# Patient Record
Sex: MALE | Race: WHITE | NOT HISPANIC OR LATINO | Employment: FULL TIME | URBAN - METROPOLITAN AREA
[De-identification: names, ages, dates, MRNs, and addresses within clinical notes are randomized per-mention and may not be internally consistent; named-entity substitution may affect disease eponyms.]

---

## 2017-09-19 ENCOUNTER — HOSPITAL ENCOUNTER (OUTPATIENT)
Facility: MEDICAL CENTER | Age: 57
End: 2017-09-20
Attending: EMERGENCY MEDICINE | Admitting: ORTHOPAEDIC SURGERY

## 2017-09-19 DIAGNOSIS — S71.111A LACERATION OF RIGHT THIGH, INITIAL ENCOUNTER: ICD-10-CM

## 2017-09-19 PROBLEM — Z98.890 STATUS POST INCISION AND DRAINAGE: Status: ACTIVE | Noted: 2017-09-19

## 2017-09-19 LAB
ALBUMIN SERPL BCP-MCNC: 3.9 G/DL (ref 3.2–4.9)
ALBUMIN/GLOB SERPL: 1.8 G/DL
ALP SERPL-CCNC: 46 U/L (ref 30–99)
ALT SERPL-CCNC: 20 U/L (ref 2–50)
ANION GAP SERPL CALC-SCNC: 7 MMOL/L (ref 0–11.9)
APTT PPP: 24.1 SEC (ref 24.7–36)
AST SERPL-CCNC: 19 U/L (ref 12–45)
BASOPHILS # BLD AUTO: 0.3 % (ref 0–1.8)
BASOPHILS # BLD: 0.05 K/UL (ref 0–0.12)
BILIRUB SERPL-MCNC: 0.3 MG/DL (ref 0.1–1.5)
BUN SERPL-MCNC: 28 MG/DL (ref 8–22)
CALCIUM SERPL-MCNC: 8.6 MG/DL (ref 8.5–10.5)
CHLORIDE SERPL-SCNC: 110 MMOL/L (ref 96–112)
CO2 SERPL-SCNC: 21 MMOL/L (ref 20–33)
CREAT SERPL-MCNC: 0.89 MG/DL (ref 0.5–1.4)
EOSINOPHIL # BLD AUTO: 0.34 K/UL (ref 0–0.51)
EOSINOPHIL NFR BLD: 2.3 % (ref 0–6.9)
ERYTHROCYTE [DISTWIDTH] IN BLOOD BY AUTOMATED COUNT: 43.9 FL (ref 35.9–50)
GFR SERPL CREATININE-BSD FRML MDRD: >60 ML/MIN/1.73 M 2
GLOBULIN SER CALC-MCNC: 2.2 G/DL (ref 1.9–3.5)
GLUCOSE SERPL-MCNC: 100 MG/DL (ref 65–99)
HCT VFR BLD AUTO: 40.5 % (ref 42–52)
HGB BLD-MCNC: 14.1 G/DL (ref 14–18)
IMM GRANULOCYTES # BLD AUTO: 0.1 K/UL (ref 0–0.11)
IMM GRANULOCYTES NFR BLD AUTO: 0.7 % (ref 0–0.9)
INR PPP: 0.93 (ref 0.87–1.13)
LYMPHOCYTES # BLD AUTO: 1.83 K/UL (ref 1–4.8)
LYMPHOCYTES NFR BLD: 12.3 % (ref 22–41)
MCH RBC QN AUTO: 32.8 PG (ref 27–33)
MCHC RBC AUTO-ENTMCNC: 34.8 G/DL (ref 33.7–35.3)
MCV RBC AUTO: 94.2 FL (ref 81.4–97.8)
MONOCYTES # BLD AUTO: 0.95 K/UL (ref 0–0.85)
MONOCYTES NFR BLD AUTO: 6.4 % (ref 0–13.4)
NEUTROPHILS # BLD AUTO: 11.65 K/UL (ref 1.82–7.42)
NEUTROPHILS NFR BLD: 78 % (ref 44–72)
NRBC # BLD AUTO: 0 K/UL
NRBC BLD AUTO-RTO: 0 /100 WBC
PLATELET # BLD AUTO: 354 K/UL (ref 164–446)
PMV BLD AUTO: 9.6 FL (ref 9–12.9)
POTASSIUM SERPL-SCNC: 4.4 MMOL/L (ref 3.6–5.5)
PROT SERPL-MCNC: 6.1 G/DL (ref 6–8.2)
PROTHROMBIN TIME: 12.7 SEC (ref 12–14.6)
RBC # BLD AUTO: 4.3 M/UL (ref 4.7–6.1)
SODIUM SERPL-SCNC: 138 MMOL/L (ref 135–145)
WBC # BLD AUTO: 14.9 K/UL (ref 4.8–10.8)

## 2017-09-19 PROCEDURE — 85730 THROMBOPLASTIN TIME PARTIAL: CPT

## 2017-09-19 PROCEDURE — 501487 HCHG STRYKER TIP: Performed by: ORTHOPAEDIC SURGERY

## 2017-09-19 PROCEDURE — A6454 SELF-ADHER BAND W>=3" <5"/YD: HCPCS | Performed by: ORTHOPAEDIC SURGERY

## 2017-09-19 PROCEDURE — 99291 CRITICAL CARE FIRST HOUR: CPT

## 2017-09-19 PROCEDURE — 160048 HCHG OR STATISTICAL LEVEL 1-5: Performed by: ORTHOPAEDIC SURGERY

## 2017-09-19 PROCEDURE — 700111 HCHG RX REV CODE 636 W/ 250 OVERRIDE (IP): Performed by: EMERGENCY MEDICINE

## 2017-09-19 PROCEDURE — 160009 HCHG ANES TIME/MIN: Performed by: ORTHOPAEDIC SURGERY

## 2017-09-19 PROCEDURE — G0378 HOSPITAL OBSERVATION PER HR: HCPCS

## 2017-09-19 PROCEDURE — 500122 HCHG BOVIE, BLADE: Performed by: ORTHOPAEDIC SURGERY

## 2017-09-19 PROCEDURE — 700105 HCHG RX REV CODE 258: Performed by: EMERGENCY MEDICINE

## 2017-09-19 PROCEDURE — 160035 HCHG PACU - 1ST 60 MINS PHASE I: Performed by: ORTHOPAEDIC SURGERY

## 2017-09-19 PROCEDURE — 160027 HCHG SURGERY MINUTES - 1ST 30 MINS LEVEL 2: Performed by: ORTHOPAEDIC SURGERY

## 2017-09-19 PROCEDURE — 700111 HCHG RX REV CODE 636 W/ 250 OVERRIDE (IP): Performed by: ORTHOPAEDIC SURGERY

## 2017-09-19 PROCEDURE — 85025 COMPLETE CBC W/AUTO DIFF WBC: CPT

## 2017-09-19 PROCEDURE — 501486 HCHG STRYKER IRRIG SET HC W/TUBING: Performed by: ORTHOPAEDIC SURGERY

## 2017-09-19 PROCEDURE — 96376 TX/PRO/DX INJ SAME DRUG ADON: CPT

## 2017-09-19 PROCEDURE — A9270 NON-COVERED ITEM OR SERVICE: HCPCS

## 2017-09-19 PROCEDURE — 700111 HCHG RX REV CODE 636 W/ 250 OVERRIDE (IP)

## 2017-09-19 PROCEDURE — 96365 THER/PROPH/DIAG IV INF INIT: CPT

## 2017-09-19 PROCEDURE — 160036 HCHG PACU - EA ADDL 30 MINS PHASE I: Performed by: ORTHOPAEDIC SURGERY

## 2017-09-19 PROCEDURE — 160002 HCHG RECOVERY MINUTES (STAT): Performed by: ORTHOPAEDIC SURGERY

## 2017-09-19 PROCEDURE — 500881 HCHG PACK, EXTREMITY: Performed by: ORTHOPAEDIC SURGERY

## 2017-09-19 PROCEDURE — 160038 HCHG SURGERY MINUTES - EA ADDL 1 MIN LEVEL 2: Performed by: ORTHOPAEDIC SURGERY

## 2017-09-19 PROCEDURE — 501445 HCHG STAPLER, SKIN DISP: Performed by: ORTHOPAEDIC SURGERY

## 2017-09-19 PROCEDURE — 96375 TX/PRO/DX INJ NEW DRUG ADDON: CPT

## 2017-09-19 PROCEDURE — 500054 HCHG BANDAGE, ELASTIC 6: Performed by: ORTHOPAEDIC SURGERY

## 2017-09-19 PROCEDURE — 700102 HCHG RX REV CODE 250 W/ 637 OVERRIDE(OP)

## 2017-09-19 PROCEDURE — 501838 HCHG SUTURE GENERAL: Performed by: ORTHOPAEDIC SURGERY

## 2017-09-19 PROCEDURE — 700101 HCHG RX REV CODE 250

## 2017-09-19 PROCEDURE — 80053 COMPREHEN METABOLIC PANEL: CPT

## 2017-09-19 PROCEDURE — 85610 PROTHROMBIN TIME: CPT

## 2017-09-19 RX ORDER — DOCUSATE SODIUM 100 MG/1
100 CAPSULE, LIQUID FILLED ORAL 2 TIMES DAILY
Status: DISCONTINUED | OUTPATIENT
Start: 2017-09-19 | End: 2017-09-20 | Stop reason: HOSPADM

## 2017-09-19 RX ORDER — CEFAZOLIN SODIUM 2 G/100ML
2 INJECTION, SOLUTION INTRAVENOUS EVERY 8 HOURS
Status: DISCONTINUED | OUTPATIENT
Start: 2017-09-20 | End: 2017-09-20 | Stop reason: HOSPADM

## 2017-09-19 RX ORDER — SODIUM CHLORIDE 9 MG/ML
1000 INJECTION, SOLUTION INTRAVENOUS ONCE
Status: COMPLETED | OUTPATIENT
Start: 2017-09-19 | End: 2017-09-19

## 2017-09-19 RX ORDER — ONDANSETRON 2 MG/ML
INJECTION INTRAMUSCULAR; INTRAVENOUS
Status: COMPLETED
Start: 2017-09-19 | End: 2017-09-19

## 2017-09-19 RX ORDER — KETOROLAC TROMETHAMINE 30 MG/ML
INJECTION, SOLUTION INTRAMUSCULAR; INTRAVENOUS
Status: COMPLETED
Start: 2017-09-19 | End: 2017-09-19

## 2017-09-19 RX ORDER — OXYCODONE HYDROCHLORIDE 5 MG/1
5 TABLET ORAL
Status: DISCONTINUED | OUTPATIENT
Start: 2017-09-19 | End: 2017-09-20 | Stop reason: HOSPADM

## 2017-09-19 RX ORDER — CEFAZOLIN SODIUM 2 G/100ML
2 INJECTION, SOLUTION INTRAVENOUS ONCE
Status: COMPLETED | OUTPATIENT
Start: 2017-09-19 | End: 2017-09-19

## 2017-09-19 RX ORDER — ONDANSETRON 2 MG/ML
4 INJECTION INTRAMUSCULAR; INTRAVENOUS EVERY 4 HOURS PRN
Status: DISCONTINUED | OUTPATIENT
Start: 2017-09-19 | End: 2017-09-20 | Stop reason: HOSPADM

## 2017-09-19 RX ORDER — ACETAMINOPHEN 500 MG
1000 TABLET ORAL EVERY 6 HOURS
Status: DISCONTINUED | OUTPATIENT
Start: 2017-09-20 | End: 2017-09-20 | Stop reason: HOSPADM

## 2017-09-19 RX ORDER — BISACODYL 10 MG
10 SUPPOSITORY, RECTAL RECTAL
Status: DISCONTINUED | OUTPATIENT
Start: 2017-09-19 | End: 2017-09-20 | Stop reason: HOSPADM

## 2017-09-19 RX ORDER — AMOXICILLIN AND CLAVULANATE POTASSIUM 875; 125 MG/1; MG/1
1 TABLET, FILM COATED ORAL 2 TIMES DAILY
Qty: 14 TAB | Refills: 0 | Status: SHIPPED | OUTPATIENT
Start: 2017-09-19 | End: 2017-09-26

## 2017-09-19 RX ORDER — OXYCODONE HCL 5 MG/5 ML
SOLUTION, ORAL ORAL
Status: COMPLETED
Start: 2017-09-19 | End: 2017-09-19

## 2017-09-19 RX ORDER — ONDANSETRON 2 MG/ML
4 INJECTION INTRAMUSCULAR; INTRAVENOUS ONCE
Status: COMPLETED | OUTPATIENT
Start: 2017-09-19 | End: 2017-09-19

## 2017-09-19 RX ORDER — HALOPERIDOL 5 MG/ML
INJECTION INTRAMUSCULAR
Status: COMPLETED
Start: 2017-09-19 | End: 2017-09-19

## 2017-09-19 RX ORDER — HYDROMORPHONE HYDROCHLORIDE 2 MG/ML
INJECTION, SOLUTION INTRAMUSCULAR; INTRAVENOUS; SUBCUTANEOUS
Status: DISPENSED
Start: 2017-09-19 | End: 2017-09-20

## 2017-09-19 RX ORDER — MIDAZOLAM HYDROCHLORIDE 1 MG/ML
INJECTION INTRAMUSCULAR; INTRAVENOUS
Status: DISPENSED
Start: 2017-09-19 | End: 2017-09-20

## 2017-09-19 RX ORDER — DIPHENHYDRAMINE HYDROCHLORIDE 50 MG/ML
INJECTION INTRAMUSCULAR; INTRAVENOUS
Status: DISPENSED
Start: 2017-09-19 | End: 2017-09-20

## 2017-09-19 RX ORDER — KETOROLAC TROMETHAMINE 30 MG/ML
30 INJECTION, SOLUTION INTRAMUSCULAR; INTRAVENOUS EVERY 6 HOURS
Status: DISCONTINUED | OUTPATIENT
Start: 2017-09-20 | End: 2017-09-20 | Stop reason: HOSPADM

## 2017-09-19 RX ORDER — HYDROCODONE BITARTRATE AND ACETAMINOPHEN 10; 325 MG/1; MG/1
.5-1 TABLET ORAL EVERY 4 HOURS PRN
Qty: 40 TAB | Refills: 0 | Status: SHIPPED | OUTPATIENT
Start: 2017-09-19 | End: 2021-01-20

## 2017-09-19 RX ORDER — OXYCODONE HYDROCHLORIDE 10 MG/1
10 TABLET ORAL
Status: DISCONTINUED | OUTPATIENT
Start: 2017-09-19 | End: 2017-09-20 | Stop reason: HOSPADM

## 2017-09-19 RX ADMIN — HYDROMORPHONE HYDROCHLORIDE 1 MG: 1 INJECTION, SOLUTION INTRAMUSCULAR; INTRAVENOUS; SUBCUTANEOUS at 14:15

## 2017-09-19 RX ADMIN — ONDANSETRON 4 MG: 2 INJECTION INTRAMUSCULAR; INTRAVENOUS at 21:34

## 2017-09-19 RX ADMIN — HYDROMORPHONE HYDROCHLORIDE 0.2 MG: 1 INJECTION, SOLUTION INTRAMUSCULAR; INTRAVENOUS; SUBCUTANEOUS at 19:50

## 2017-09-19 RX ADMIN — HYDROMORPHONE HYDROCHLORIDE 0.2 MG: 1 INJECTION, SOLUTION INTRAMUSCULAR; INTRAVENOUS; SUBCUTANEOUS at 20:15

## 2017-09-19 RX ADMIN — EPHEDRINE SULFATE 10 MG: 50 INJECTION INTRAMUSCULAR; INTRAVENOUS; SUBCUTANEOUS at 18:00

## 2017-09-19 RX ADMIN — SODIUM CHLORIDE 1000 ML: 9 INJECTION, SOLUTION INTRAVENOUS at 13:25

## 2017-09-19 RX ADMIN — EPHEDRINE SULFATE 10 MG: 50 INJECTION INTRAMUSCULAR; INTRAVENOUS; SUBCUTANEOUS at 18:30

## 2017-09-19 RX ADMIN — HYDROMORPHONE HYDROCHLORIDE 0.2 MG: 1 INJECTION, SOLUTION INTRAMUSCULAR; INTRAVENOUS; SUBCUTANEOUS at 20:20

## 2017-09-19 RX ADMIN — HYDROMORPHONE HYDROCHLORIDE 0.2 MG: 1 INJECTION, SOLUTION INTRAMUSCULAR; INTRAVENOUS; SUBCUTANEOUS at 20:00

## 2017-09-19 RX ADMIN — HYDROMORPHONE HYDROCHLORIDE 0.2 MG: 1 INJECTION, SOLUTION INTRAMUSCULAR; INTRAVENOUS; SUBCUTANEOUS at 20:05

## 2017-09-19 RX ADMIN — HYDROMORPHONE HYDROCHLORIDE 1 MG: 1 INJECTION, SOLUTION INTRAMUSCULAR; INTRAVENOUS; SUBCUTANEOUS at 13:25

## 2017-09-19 RX ADMIN — HYDROMORPHONE HYDROCHLORIDE 1 MG: 1 INJECTION, SOLUTION INTRAMUSCULAR; INTRAVENOUS; SUBCUTANEOUS at 21:34

## 2017-09-19 RX ADMIN — HALOPERIDOL LACTATE 1 MG: 5 INJECTION, SOLUTION INTRAMUSCULAR at 20:25

## 2017-09-19 RX ADMIN — KETOROLAC TROMETHAMINE 30 MG: 30 INJECTION, SOLUTION INTRAMUSCULAR at 18:50

## 2017-09-19 RX ADMIN — CEFAZOLIN SODIUM 2 G: 2 INJECTION, SOLUTION INTRAVENOUS at 13:45

## 2017-09-19 RX ADMIN — EPHEDRINE SULFATE 10 MG: 50 INJECTION INTRAMUSCULAR; INTRAVENOUS; SUBCUTANEOUS at 17:30

## 2017-09-19 RX ADMIN — OXYCODONE HYDROCHLORIDE 10 MG: 5 SOLUTION ORAL at 18:35

## 2017-09-19 RX ADMIN — ONDANSETRON 4 MG: 2 INJECTION INTRAMUSCULAR; INTRAVENOUS at 13:25

## 2017-09-19 RX ADMIN — ONDANSETRON 4 MG: 2 INJECTION INTRAMUSCULAR; INTRAVENOUS at 18:30

## 2017-09-19 ASSESSMENT — PAIN SCALES - GENERAL
PAINLEVEL_OUTOF10: 6
PAINLEVEL_OUTOF10: 5
PAINLEVEL_OUTOF10: 0
PAINLEVEL_OUTOF10: 5
PAINLEVEL_OUTOF10: 8
PAINLEVEL_OUTOF10: 5
PAINLEVEL_OUTOF10: 5

## 2017-09-19 ASSESSMENT — LIFESTYLE VARIABLES
CONSUMPTION TOTAL: POSITIVE
TOTAL SCORE: 0
HOW MANY TIMES IN THE PAST YEAR HAVE YOU HAD 5 OR MORE DRINKS IN A DAY: 10
EVER HAD A DRINK FIRST THING IN THE MORNING TO STEADY YOUR NERVES TO GET RID OF A HANGOVER: NO
EVER FELT BAD OR GUILTY ABOUT YOUR DRINKING: NO
ALCOHOL_USE: YES
HAVE YOU EVER FELT YOU SHOULD CUT DOWN ON YOUR DRINKING: NO
EVER_SMOKED: YES
TOTAL SCORE: 0
DO YOU DRINK ALCOHOL: NO
TOTAL SCORE: 0
HAVE PEOPLE ANNOYED YOU BY CRITICIZING YOUR DRINKING: NO
AVERAGE NUMBER OF DAYS PER WEEK YOU HAVE A DRINK CONTAINING ALCOHOL: 1
ON A TYPICAL DAY WHEN YOU DRINK ALCOHOL HOW MANY DRINKS DO YOU HAVE: 1

## 2017-09-19 NOTE — ED NOTES
"Chief Complaint   Patient presents with   • Leg Laceration     Approx 8in x 3 in     /83   Pulse 74   Temp 36.7 °C (98.1 °F)   Resp 18   Ht 1.753 m (5' 9\")   Wt 72.6 kg (160 lb)   BMI 23.63 kg/m²   Patient brought in by capo. 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 Wound that goes down to the muscle layer. Full CMS intact in right leg. Bleeding is controlled. Patient given 2oomcg of fentanyl and 1 mg versed en route. Last dose of fenatynl at approx 1235.  "

## 2017-09-19 NOTE — ED PROVIDER NOTES
ED Provider Note    CHIEF COMPLAINT  Chief Complaint   Patient presents with   • T-5000 Lacerations     Full thickness gaping wound       HPI  Noe Hickey is a 56 y.o. male who presents For evaluation of a leg laceration.  The patient was working today when he has skill saw strike his right thigh resulting in a very large laceration over the right lateral thigh.  The patient denies any numbness tingling distally.  No other injuries or complaints.  No recent illness.    REVIEW OF SYSTEMS  See HPI for further details.  The patient denies: Hypertension, diabetes, thyroid dysfunction, cardiopulmonary disorders.  He is not on any anticoagulants.  His last tetanus was one year ago.  All other systems negative.    PAST MEDICAL HISTORY  Past Medical History:   Diagnosis Date   • Cyclic vomiting syndrome    • Fracture    • Headache, classical migraine    Depression    FAMILY HISTORY  History reviewed. No pertinent family history.    SOCIAL HISTORY  Positive tobacco use; denies alcohol or drug abuse;    SURGICAL HISTORY  Past Surgical History:   Procedure Laterality Date   • SHOULDER HEMICAP RESURFACING Right 10/12/2015    Procedure: RIGHT SHOULDER RESURFACING;  Surgeon: Javon Doll M.D.;  Location: SURGERY [facility];  Service:    • SHOULDER ARTHROSCOPY         CURRENT MEDICATIONS  Home Medications     Reviewed by Elias oRss R.N. (Registered Nurse) on 09/19/17 at 1250  Med List Status: <None>   Medication Last Dose Status   docusate sodium 100 MG Cap 10/20/2015 Active   hydrocodone-acetaminophen (NORCO) 7.5-325 MG per tablet  Active   hydrocodone/acetaminophen (NORCO)  MG Tab 10/20/2015 Active   hydrOXYzine (ATARAX) 50 MG Tab 10/20/2015 Active   ibuprofen (MOTRIN) 800 MG Tab  Active   omeprazole (PRILOSEC) 20 MG delayed-release capsule about 1 month ago Active   ondansetron (ZOFRAN ODT) 8 MG TABLET DISPERSIBLE  Active   ondansetron (ZOFRAN ODT) 8 MG TABLET DISPERSIBLE  Active   ondansetron  "(ZOFRAN ODT) 8 MG TABLET DISPERSIBLE  Active   ondansetron (ZOFRAN) 8 MG Tab  Active   oxycodone immediate release (ROXICODONE) 10 MG immediate release tablet about 2 weeks ago Active   oxycodone immediate release (ROXICODONE) 10 MG immediate release tablet  Active                ALLERGIES  No Known Allergies    PHYSICAL EXAM  VITAL SIGNS: /83   Pulse 74   Temp 36.7 °C (98.1 °F)   Resp 18   Ht 1.753 m (5' 9\")   Wt 72.6 kg (160 lb)   BMI 23.63 kg/m²    Constitutional: A 56-year-old male, anxious, awake, oriented ×3  HENT: Normocephalic, Atraumatic, Nares:Clear, Oropharynx: moist, well hydrated, posterior pharynx:clear   Eyes: PERRL, EOMI, Conjunctiva normal, No discharge.   Neck: Normal range of motion, No tenderness, Supple, No stridor.   Lymphatic: No lymphadenopathy noted.   Cardiovascular: Regular rate and rhythm without mumurs, gallups, rubs   Thorax & Lungs: Normal Equal breath sounds, No respiratory distress, No wheezing, no stridor, no rales. No chest tenderness.   Abdomen: Soft, nontender, nondistended, no organomegaly, positive bowel sounds normal in quality. No guarding or rebound.  Skin: Good skin turgor, pink, warm, dry. No rashes, petechiae, purpura. Normal capillary refill.   Extremities: Intact distal pulses, No edema, No tenderness, No cyanosis,  Vascular: Pulses are 2+, symmetric in the upper and lower extremities.  Musculoskeletal: Right lower extremity: The patient has a large 18 cm laceration over the right anterolateral thigh extending through the subcutaneous tissue and muscle fascia into the quadriceps musculature; motor, sensory, vascular distally  Neurologic: Alert & oriented x 3,  No gross focal deficits noted.   Psychiatric: Affect normal, Judgment normal, Mood normal.     COURSE & MEDICAL DECISION MAKING  Pertinent Labs & Imaging studies reviewed. (See chart for details)  1.  Monitor next line 2.  IV normal saline  3.  Zofran, titrated  4.  Dilaudid, titrated  5.  Ancef 2 g " IV    Laboratory studies:    Discussion/consultation: At this time, the patient presents for evaluation of laceration to his right lateral thigh.  Laceration is complex and large.  It would be best served by undergoing irrigation and surgical closure in the operating room.  Therefore, spoke with the orthopedic surgeon, Dr. Rivera.  The patient will be taken the operating room for further treatment and care.    FINAL IMPRESSION  1. Laceration of right thigh, initial encounter           PLAN  1.  Patient taken the operating room for further treatment care.    Electronically signed by: Guy G Gansert, 9/19/2017 1:06 PM

## 2017-09-19 NOTE — H&P
CHIEF COMPLAINT:  Right thigh laceration.    HISTORY OF PRESENT ILLNESS:  This is a 56-year-old male.  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  He presented to [facility] Emergency Department and he was   evaluated by Dr. Gansert, Dr. Gansert consulted me for further evaluation   given the depth and complexity of the open wound.  Patient denies any numbness   or paresthesias distally in the extremities and states this is an isolated   injury.  His tetanus is up to date and he did receive Ancef in the emergency   department.    PAST MEDICAL HISTORY:  ALLERGIES:  No known drug allergies.    MEDICATIONS AS OUTPATIENT:  Historical medications include oxycodone and   omeprazole.    PAST MEDICAL DIAGNOSES:  History of migraines, history of documentation of   cyclic vomiting syndrome.    PAST SURGICAL HISTORY:  Right shoulder replacement by Dr. Doll in 2015 and   multiple right shoulder surgeries leading up to that.    FAMILY HISTORY:  Negative for significant medical problems that he is aware   of.    SOCIAL HISTORY:  Patient smokes a pack of cigarettes a day.  Denies alcohol   use.  Denies illicit drug use, but states he has had some issues with opioid   dependence from pain medication in the past.    REVIEW OF SYSTEMS:  He denies fevers, chills, nausea, vomiting, shortness of   breath, chest pain, otherwise normal per AMA criteria other than that already   stated in the HPI.    PHYSICAL EXAMINATION:  VITAL SIGNS:  Temperature 98.1, heart rate 74, respiratory rate 18, blood   pressure 110/83, pulse oximetry is not recorded.  GENERAL APPEARANCE:  Patient is alert.  He is oriented.  He is in mild amount   of distress due to discomfort in the right thigh.  HEAD, EYES, EARS, NOSE, AND THROAT:  Normocephalic and atraumatic.  Mucous   membranes are moist.  PULMONARY:  Symmetric, unlabored breathing.  CARDIOVASCULAR:  Extremities are well perfused.   No obvious elevated JVP is   noted.  ABDOMEN:  Thin, nondistended.  MUSCULOSKELETAL:  Right lower extremity has some gauze in place to the lateral   thigh, this was elevated.  He has large laceration with exposed quadriceps   muscle down to fascia.  Hemostasis present with this compressive dressing.  He   is able to dorsi and plantarflex his foot and flex and extend his toes.  He   has sensation diffusely intact to light touch in the foot with palpable   dorsalis pedis pulse.    ASSESSMENT:  A 56-year-old male with laceration with a skilsaw of the right   thigh with complex depth involving the muscle of the quadriceps through   fascia.    PLAN:  1.  I discussed these findings with the patient.  I feel that given the size   and complexity and depth of the wound that he would benefit from surgical   debridement and closure in the operating room.  We discussed possibility of   requiring wound VAC therapy and potential multiple debridements, others found   to have gross contamination.  He expressed understanding and wished to proceed   with surgery when possible.  2.  Patient should be n.p.o. and I will make preparations to get into the   operating room later today for debridement and closure of his wound.       ____________________________________     MD CAROL Lubin / ALISSA    DD:  09/19/2017 13:40:22  DT:  09/19/2017 13:56:37    D#:  [id number]  Job#:  [id number]

## 2017-09-20 VITALS
SYSTOLIC BLOOD PRESSURE: 109 MMHG | TEMPERATURE: 98.2 F | HEIGHT: 69 IN | RESPIRATION RATE: 20 BRPM | BODY MASS INDEX: 23.7 KG/M2 | DIASTOLIC BLOOD PRESSURE: 53 MMHG | HEART RATE: 86 BPM | OXYGEN SATURATION: 96 % | WEIGHT: 160 LBS

## 2017-09-20 PROCEDURE — 700105 HCHG RX REV CODE 258

## 2017-09-20 PROCEDURE — 96376 TX/PRO/DX INJ SAME DRUG ADON: CPT

## 2017-09-20 PROCEDURE — G0378 HOSPITAL OBSERVATION PER HR: HCPCS

## 2017-09-20 PROCEDURE — 700111 HCHG RX REV CODE 636 W/ 250 OVERRIDE (IP): Performed by: ORTHOPAEDIC SURGERY

## 2017-09-20 PROCEDURE — G8980 MOBILITY D/C STATUS: HCPCS | Mod: CI

## 2017-09-20 PROCEDURE — G8978 MOBILITY CURRENT STATUS: HCPCS | Mod: CI

## 2017-09-20 PROCEDURE — A9270 NON-COVERED ITEM OR SERVICE: HCPCS | Performed by: ORTHOPAEDIC SURGERY

## 2017-09-20 PROCEDURE — 700102 HCHG RX REV CODE 250 W/ 637 OVERRIDE(OP): Performed by: ORTHOPAEDIC SURGERY

## 2017-09-20 PROCEDURE — 97161 PT EVAL LOW COMPLEX 20 MIN: CPT

## 2017-09-20 PROCEDURE — G8979 MOBILITY GOAL STATUS: HCPCS | Mod: CI

## 2017-09-20 RX ORDER — SODIUM CHLORIDE 9 MG/ML
INJECTION, SOLUTION INTRAVENOUS
Status: COMPLETED
Start: 2017-09-20 | End: 2017-09-20

## 2017-09-20 RX ADMIN — HYDROMORPHONE HYDROCHLORIDE 0.5 MG: 1 INJECTION, SOLUTION INTRAMUSCULAR; INTRAVENOUS; SUBCUTANEOUS at 06:13

## 2017-09-20 RX ADMIN — HYDROMORPHONE HYDROCHLORIDE 0.5 MG: 1 INJECTION, SOLUTION INTRAMUSCULAR; INTRAVENOUS; SUBCUTANEOUS at 00:33

## 2017-09-20 RX ADMIN — ENOXAPARIN SODIUM 40 MG: 100 INJECTION SUBCUTANEOUS at 08:26

## 2017-09-20 RX ADMIN — SODIUM CHLORIDE 500 ML: 9 INJECTION, SOLUTION INTRAVENOUS at 00:48

## 2017-09-20 RX ADMIN — ONDANSETRON 4 MG: 2 INJECTION INTRAMUSCULAR; INTRAVENOUS at 01:21

## 2017-09-20 RX ADMIN — OXYCODONE HYDROCHLORIDE 10 MG: 10 TABLET ORAL at 11:42

## 2017-09-20 RX ADMIN — OXYCODONE HYDROCHLORIDE 10 MG: 10 TABLET ORAL at 08:26

## 2017-09-20 RX ADMIN — CEFAZOLIN SODIUM 2 G: 2 INJECTION, SOLUTION INTRAVENOUS at 00:44

## 2017-09-20 RX ADMIN — ACETAMINOPHEN 1000 MG: 500 TABLET ORAL at 00:32

## 2017-09-20 ASSESSMENT — COGNITIVE AND FUNCTIONAL STATUS - GENERAL
MOBILITY SCORE: 24
SUGGESTED CMS G CODE MODIFIER MOBILITY: CH

## 2017-09-20 ASSESSMENT — PAIN SCALES - GENERAL
PAINLEVEL_OUTOF10: 7
PAINLEVEL_OUTOF10: 0
PAINLEVEL_OUTOF10: 6
PAINLEVEL_OUTOF10: 3

## 2017-09-20 ASSESSMENT — PATIENT HEALTH QUESTIONNAIRE - PHQ9
SUM OF ALL RESPONSES TO PHQ QUESTIONS 1-9: 0
1. LITTLE INTEREST OR PLEASURE IN DOING THINGS: NOT AT ALL
SUM OF ALL RESPONSES TO PHQ9 QUESTIONS 1 AND 2: 0
2. FEELING DOWN, DEPRESSED, IRRITABLE, OR HOPELESS: NOT AT ALL

## 2017-09-20 ASSESSMENT — GAIT ASSESSMENTS
GAIT LEVEL OF ASSIST: SUPERVISED
DISTANCE (FEET): 100

## 2017-09-20 NOTE — OR SURGEON
Operative Report    PreOp Diagnosis: Right thigh complex traumatic laceration    PostOp Diagnosis: same    Procedure(s):  IRRIGATION & DEBRIDEMENT ORTHO-THIGH - Wound Class: Contaminated    Surgeon(s):  Corbin Rivera M.D.    Anesthesiologist/Type of Anesthesia:  Anesthesiologist: Arnie Taylor M.D./General    Surgical Staff:  Circulator: Elias Mancera R.N.  Scrub Person: Belinda Jimenez    Specimens: none    Estimated Blood Loss: minimal    Findings: see dictation    Complications: none known    PLAN:  --discharge to home from PACU  --WBAT RLE  --Rx for augmentin x 7 days  --fu 10-14 days for wound check and suture removal          9/19/2017 5:21 PM Corbin Rivera

## 2017-09-20 NOTE — THERAPY
"Physical Therapy Evaluation completed.   Bed Mobility:  Supine to Sit:  (up standing in hallway.)  Transfers: Sit to Stand: Supervised  Gait: Level Of Assist: Supervised with No Equipment Needed       Plan of Care: Patient with no further skilled PT needs in the acute care setting at this time  Discharge Recommendations: Equipment: No Equipment Needed. Post-acute therapy Currently anticipate no further skilled therapy needs once patient is discharged from the inpatient setting.    See \"Rehab Therapy-Acute\" Patient Summary Report for complete documentation.     "

## 2017-09-20 NOTE — CARE PLAN
Problem: Safety  Goal: Will remain free from injury  Outcome: PROGRESSING AS EXPECTED  Safety education provided. Call light in reach, low light on, non slip socks.

## 2017-09-20 NOTE — OR NURSING
Pt AA/Ox4. VSS. Dressing to right thigh, CDI. CMS+. Pt reports 5/10 pain scale. Pain medication given. Pt reports slight nausea and vomiting. Anti-emetic given. Pt denies numbness or tingling. No nausea or vomiting. SCD to LLE in place. Report given to ALLEN Alcantar.     Pt via [employer], accompanied by transport, was transferred to [facility] at 2101. All personal belongings sent with Pt.

## 2017-09-20 NOTE — DISCHARGE INSTRUCTIONS
Discharge Instructions    Discharged to home by car with friend. Discharged via wheelchair, hospital escort: Refused.  Special equipment needed: Crutches    Be sure to schedule a follow-up appointment with your primary care doctor or any specialists as instructed.     Discharge Plan:   Diet Plan: Discussed  Activity Level: Discussed  Smoking Cessation Offered: Patient Refused  Confirmed Follow up Appointment: Patient to Call and Schedule Appointment  Confirmed Symptoms Management: Discussed  Medication Reconciliation Updated: Yes  Influenza Vaccine Indication: Patient Refuses    I understand that a diet low in cholesterol, fat, and sodium is recommended for good health. Unless I have been given specific instructions below for another diet, I accept this instruction as my diet prescription.   Other diet: general    Special Instructions: Discharge instructions for the Orthopedic Patient    Follow up with Primary Care Physician within 2 weeks of discharge to home, regarding:  Review of medications and diagnostic testing.  Surveillance for medical complications.  Workup and treatment of osteoporosis, if appropriate.     -Is this a Joint Replacement patient? No    -Is this patient being discharged with medication to prevent blood clots?  No    · Is patient discharged on Warfarin / Coumadin?   No     · Is patient Post Blood Transfusion?  No    Depression / Suicide Risk    As you are discharged from this [facility] facility, it is important to learn how to keep safe from harming yourself.    Recognize the warning signs:  · Abrupt changes in personality, positive or negative- including increase in energy   · Giving away possessions  · Change in eating patterns- significant weight changes-  positive or negative  · Change in sleeping patterns- unable to sleep or sleeping all the time   · Unwillingness or inability to communicate  · Depression  · Unusual sadness, discouragement and loneliness  · Talk of wanting to  die  · Neglect of personal appearance   · Rebelliousness- reckless behavior  · Withdrawal from people/activities they love  · Confusion- inability to concentrate     If you or a loved one observes any of these behaviors or has concerns about self-harm, here's what you can do:  · Talk about it- your feelings and reasons for harming yourself  · Remove any means that you might use to hurt yourself (examples: pills, rope, extension cords, firearm)  · Get professional help from the community (Mental Health, Substance Abuse, psychological counseling)  · Do not be alone:Call your Safe Contact- someone whom you trust who will be there for you.  · Call your local CRISIS HOTLINE 689-3078 or 939-094-3907  · Call your local Children's Mobile Crisis Response Team [place] (671) 831-2198 or www.rapt.fm  · Call the toll free National Suicide Prevention Hotlines   · National Suicide Prevention Lifeline 174-335-IBXV (0960)  · [facility] Line Network 800-SUICIDE (718-5964)        ACTIVITY: Rest and take it easy for the first 24 hours.  A responsible adult is recommended to remain with you during that time.  It is normal to feel sleepy.  We encourage you to not do anything that requires balance, judgment or coordination.    MILD FLU-LIKE SYMPTOMS ARE NORMAL. YOU MAY EXPERIENCE GENERALIZED MUSCLE ACHES, THROAT IRRITATION, HEADACHE AND/OR SOME NAUSEA.    FOR 24 HOURS DO NOT:  Drive, operate machinery or run household appliances.  Drink beer or alcoholic beverages.   Make important decisions or sign legal documents.    SPECIAL INSTRUCTIONS:   --Weight Bearing As Tolerated - Right Lower Extremity   --Okay to change dressing after 72hours.   --Keep incision clean, dry and covered.   --Rx for augmentin and norco.   --Follow-up: 10-14 days postop.    DIET: To avoid nausea, slowly advance diet as tolerated, avoiding spicy or greasy foods for the first day.  Add more substantial food to your diet according to your physician's  instructions.  Babies can be fed formula or breast milk as soon as they are hungry.  INCREASE FLUIDS AND FIBER TO AVOID CONSTIPATION.    SURGICAL DRESSING/BATHING: Keep dressing clean, dry and intact. Okay to change dressing after 72 hours. No tub bathe, submerging or swimming until advised by doctor.    FOLLOW-UP APPOINTMENT:  A follow-up appointment should be arranged with your doctor in 10-14 days; call to schedule.    You should CALL YOUR PHYSICIAN if you develop:  Fever greater than 101 degrees F.  Pain not relieved by medication, or persistent nausea or vomiting.  Excessive bleeding (blood soaking through dressing) or unexpected drainage from the wound.  Extreme redness or swelling around the incision site, drainage of pus or foul smelling drainage.  Inability to urinate or empty your bladder within 8 hours.  Problems with breathing or chest pain.    You should call 911 if you develop problems with breathing or chest pain.  If you are unable to contact your doctor or surgical center, you should go to the nearest emergency room or urgent care center.  Physician's telephone #: Corbin Nicole >> [phone].    If any questions arise, call your doctor.  If your doctor is not available, please feel free to call the Surgical Center at [phone].  The Center is open Monday through Friday from 7AM to 7PM.  You can also call the [facility] HOTLINE open 24 hours/day, 7 days/week and speak to a nurse at [phone], or toll free at [phone].    A registered nurse may call you a few days after your surgery to see how you are doing after your procedure.    MEDICATIONS: Resume taking daily medication.  Take prescribed pain medication with food.  If no medication is prescribed, you may take non-aspirin pain medication if needed.  PAIN MEDICATION CAN BE VERY CONSTIPATING.  Take a stool softener or laxative such as senokot, pericolace, or milk of magnesia if needed.    Prescription given for Augmentin and Norco.   Last pain medication given at          .    If your physician has prescribed pain medication that includes Acetaminophen (Tylenol), do not take additional Acetaminophen (Tylenol) while taking the prescribed medication.    Depression / Suicide Risk    As you are discharged from this [facility] facility, it is important to learn how to keep safe from harming yourself.    Recognize the warning signs:  · Abrupt changes in personality, positive or negative- including increase in energy   · Giving away possessions  · Change in eating patterns- significant weight changes-  positive or negative  · Change in sleeping patterns- unable to sleep or sleeping all the time   · Unwillingness or inability to communicate  · Depression  · Unusual sadness, discouragement and loneliness  · Talk of wanting to die  · Neglect of personal appearance   · Rebelliousness- reckless behavior  · Withdrawal from people/activities they love  · Confusion- inability to concentrate     If you or a loved one observes any of these behaviors or has concerns about self-harm, here's what you can do:  · Talk about it- your feelings and reasons for harming yourself  · Remove any means that you might use to hurt yourself (examples: pills, rope, extension cords, firearm)  · Get professional help from the community (Mental Health, Substance Abuse, psychological counseling)  · Do not be alone:Call your Safe Contact- someone whom you trust who will be there for you.  · Call your local CRISIS HOTLINE 153-3950 or 913-867-2311  · Call your local Children's Mobile Crisis Response Team [place] (347) 183-1048 or www.Gydget  · Call the toll free National Suicide Prevention Hotlines   · National Suicide Prevention Lifeline 942-458-JQYC (5362)  · National Hope Line Network 800-SUICIDE (048-5259)

## 2017-09-20 NOTE — PROGRESS NOTES
RN reviewed discharge instructions and medications with patient at bedside. All questions addressed. Patient discharged at 1150 with all belongings, discharge instructions, and prescriptions x2. Patient transported via wheelchair to private vehicle. Family member wheeled patient down to lobby. Declined staff assistance.

## 2017-09-20 NOTE — OP REPORT
DATE OF SERVICE:  09/19/2017    PREOPERATIVE DIAGNOSIS:  Right traumatic complex thigh laceration.    POSTOPERATIVE DIAGNOSIS:  Right traumatic complex thigh laceration.    PROCEDURES PERFORMED:  Exploration of penetrating wound to right thigh with   excisional debridement and layered closure through wound measuring 15x6x8 cm   deep, including skin, subcutaneous tissue and muscle.    INDICATION FOR PROCEDURE:  The patient is a 56-year-old male.  He was using a   saw earlier today and it essentially lacerated the lateral aspect of his mid   thigh.  He was seen in the emergency department and his wound was evaluated.    I felt he would benefit from going to the operating room for exploration of   his wound, debridement and closure.  He signed informed consent preoperatively   and wished to proceed as outlined above.    DESCRIPTION OF PROCEDURE:  The patient was met in the preoperative holding   area and his surgical site was signed.  His consent was confirmed for   accuracy.  He was taken back to the operating room and general anesthesia was   induced.  Ancef was administered.  The right lower extremity was then prepped   and draped in the usual sterile fashion.  A formal timeout was performed to   confirm patient's correct name, correct surgical site, correct procedure and   correct laterality.  He had an elliptically shaped 15x6 cm laceration that   extended down nearly all the way to the femur with just a portion of vastus   lateralis that it had been lacerated through, but it did extend through nearly   80% of the vastus lateralis muscle and through the iliotibial band.  There   was no gross contamination present, but some of the skin edges were macerated.    Excisional debridement of the wound was performed after thorough exploration   revealed there was no obvious deep bleeding  vessels or gross   contamination.  I excised a portion of the macerated skin edges with a 10   blade scalpel and then  repaired after thoroughly lavaging the wound with 3   liters normal saline using a Pulsavac.  The remaining muscle of vastus   lateralis seemed to be pink, viable and contractile with Bovie cautery   stimulation.  I repaired the iliotibial band with interrupted 0 PDS suture,   the subQ layers with interrupted 2-0 PDS suture and skin edges with staples.    The wound was thoroughly cleansed and dried.  Sterile compressive dressing was   applied.  He was then awoken from anesthesia and transferred on the [place]   and taken to postanesthesia care unit in stable condition.    PLAN:  1.  The patient will be discharged home from the PACU when he recovers from   anesthesia.  2.  He can be weightbearing as tolerated to the right lower extremity.  3.  I will discharge him home on some prophylactic Augmentin for 7 days postop   due to the depth and complexity of the penetrating wound as prophylaxis.  4.  He should follow up in 10-14 days for routine wound check and staple   removal.       ____________________________________     MD CAROL Lubin / ALISSA    DD:  09/19/2017 17:30:36  DT:  09/19/2017 17:46:41    D#:  [id number]  Job#:  [id number]

## 2020-12-22 ENCOUNTER — HOSPITAL ENCOUNTER (OUTPATIENT)
Facility: MEDICAL CENTER | Age: 60
End: 2020-12-22
Attending: ORTHOPAEDIC SURGERY | Admitting: ORTHOPAEDIC SURGERY
Payer: OTHER GOVERNMENT

## 2021-01-20 ENCOUNTER — APPOINTMENT (OUTPATIENT)
Dept: RADIOLOGY | Facility: MEDICAL CENTER | Age: 61
End: 2021-01-20
Attending: EMERGENCY MEDICINE
Payer: MEDICAID

## 2021-01-20 ENCOUNTER — HOSPITAL ENCOUNTER (EMERGENCY)
Facility: MEDICAL CENTER | Age: 61
End: 2021-01-20
Attending: EMERGENCY MEDICINE
Payer: MEDICAID

## 2021-01-20 VITALS
OXYGEN SATURATION: 98 % | RESPIRATION RATE: 17 BRPM | BODY MASS INDEX: 25.92 KG/M2 | WEIGHT: 175 LBS | SYSTOLIC BLOOD PRESSURE: 138 MMHG | TEMPERATURE: 98.6 F | DIASTOLIC BLOOD PRESSURE: 89 MMHG | HEIGHT: 69 IN | HEART RATE: 87 BPM

## 2021-01-20 DIAGNOSIS — S80.02XA CONTUSION OF LEFT KNEE, INITIAL ENCOUNTER: ICD-10-CM

## 2021-01-20 PROCEDURE — 99283 EMERGENCY DEPT VISIT LOW MDM: CPT

## 2021-01-20 PROCEDURE — A9270 NON-COVERED ITEM OR SERVICE: HCPCS | Performed by: EMERGENCY MEDICINE

## 2021-01-20 PROCEDURE — 73564 X-RAY EXAM KNEE 4 OR MORE: CPT | Mod: LT

## 2021-01-20 PROCEDURE — 700102 HCHG RX REV CODE 250 W/ 637 OVERRIDE(OP): Performed by: EMERGENCY MEDICINE

## 2021-01-20 RX ORDER — HYDROCODONE BITARTRATE AND ACETAMINOPHEN 5; 325 MG/1; MG/1
1 TABLET ORAL ONCE
Status: COMPLETED | OUTPATIENT
Start: 2021-01-20 | End: 2021-01-20

## 2021-01-20 RX ADMIN — HYDROCODONE BITARTRATE AND ACETAMINOPHEN 1 TABLET: 5; 325 TABLET ORAL at 06:06

## 2021-01-20 NOTE — ED NOTES
Patient verbalizes understanding of follow up, pain management and when to return to the ED.  All questions answered
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
## 2021-01-28 ENCOUNTER — HOSPITAL ENCOUNTER (OUTPATIENT)
Facility: MEDICAL CENTER | Age: 61
End: 2021-01-28
Attending: ANESTHESIOLOGY
Payer: MEDICAID

## 2021-01-28 LAB
SARS-COV+SARS-COV-2 AG RESP QL IA.RAPID: NOTDETECTED
SPECIMEN SOURCE: NORMAL

## 2021-01-28 PROCEDURE — 87426 SARSCOV CORONAVIRUS AG IA: CPT

## 2021-02-02 ENCOUNTER — HOSPITAL ENCOUNTER (EMERGENCY)
Dept: HOSPITAL 8 - ED | Age: 61
Discharge: HOME | End: 2021-02-02
Payer: COMMERCIAL

## 2021-02-02 VITALS — SYSTOLIC BLOOD PRESSURE: 130 MMHG | DIASTOLIC BLOOD PRESSURE: 65 MMHG

## 2021-02-02 VITALS — HEIGHT: 69 IN | WEIGHT: 180.12 LBS | BODY MASS INDEX: 26.68 KG/M2

## 2021-02-02 DIAGNOSIS — F17.210: ICD-10-CM

## 2021-02-02 DIAGNOSIS — Z88.6: ICD-10-CM

## 2021-02-02 DIAGNOSIS — M96.89: Primary | ICD-10-CM

## 2021-02-02 PROCEDURE — 96375 TX/PRO/DX INJ NEW DRUG ADDON: CPT

## 2021-02-02 PROCEDURE — 99406 BEHAV CHNG SMOKING 3-10 MIN: CPT

## 2021-02-02 PROCEDURE — 96374 THER/PROPH/DIAG INJ IV PUSH: CPT

## 2021-02-02 PROCEDURE — 99284 EMERGENCY DEPT VISIT MOD MDM: CPT

## 2021-02-02 PROCEDURE — 96376 TX/PRO/DX INJ SAME DRUG ADON: CPT

## 2021-02-02 RX ADMIN — HYDROMORPHONE HYDROCHLORIDE PRN MG: 2 INJECTION INTRAMUSCULAR; INTRAVENOUS; SUBCUTANEOUS at 03:54

## 2021-02-02 RX ADMIN — HYDROMORPHONE HYDROCHLORIDE PRN MG: 2 INJECTION INTRAMUSCULAR; INTRAVENOUS; SUBCUTANEOUS at 04:53

## 2021-02-02 NOTE — NUR
patient notified of plan of care to administer percocet and dc home with follow 
up with [facility] today when they open this morning to discuss further pain 
management and be re-evaluated by ortho. patient repoted concerns for his pain 
management at home and requested to speak with Dr. Amaro again regarding pain 
management.patient was requesting to go pain medications  Dr. Amaro notified by 
RN and to bedside to discuss with patient. RN, MD and patient all agreed upon 
one more dose of dilaudid to be adminsitered and patient be observed for 30 
minutes after this administration and then percocet will be given after 30 
minutes has passed and will dc home with followup with [facility]. all in agreement, 
2nd dose of dilaudid administered and L hand incisions dressed per Dr. Amaro's 
verbal orders of adaptic with kerlix and coban. sites cleansed with sterile NS. 
 + palpable pulse to L radial. VS remain stable

## 2021-02-02 NOTE — NUR
prior to dilaudid being administered patient reported he used to be addicted to 
prescription drugs. I asked patient if he felt comfortable receiving dilaudid 
at this time and patient stated "yes i do, it was more than 3 years ago. i was 
stupid back then". RN inserted IV and administered IV pain medications. call 
bell in reach. safety maintained. will continue to monitor.

## 2021-02-02 NOTE — NUR
patient hyperventilating due to pain in L hand. RN encouraged patient to slow 
down breathing with deep breaths and pursed lip breathing. this was performed 
by patient and breathing decreased. L hand swollen with bandages in place but 
place where cyst removed LAM and dried blood present. call camacho in reach. Dr. Amaro to bedside and removed dressing

## 2021-02-02 NOTE — NUR
discharge instructions reviewed with patient. no further questions at this 
time. percocet administered. patient states he will walk home as he lives 5 
blocks away. he declined a cab voucher. all personal belongings with patient on 
dc. IV removed per dc protocol. steady gait to lobby. VS stable

## 2021-03-31 ENCOUNTER — APPOINTMENT (OUTPATIENT)
Dept: RADIOLOGY | Facility: MEDICAL CENTER | Age: 61
End: 2021-03-31
Attending: EMERGENCY MEDICINE
Payer: MEDICAID

## 2021-03-31 ENCOUNTER — OFFICE VISIT (OUTPATIENT)
Dept: URGENT CARE | Facility: CLINIC | Age: 61
End: 2021-03-31
Payer: MEDICAID

## 2021-03-31 ENCOUNTER — HOSPITAL ENCOUNTER (EMERGENCY)
Facility: MEDICAL CENTER | Age: 61
End: 2021-03-31
Attending: EMERGENCY MEDICINE
Payer: MEDICAID

## 2021-03-31 VITALS
OXYGEN SATURATION: 96 % | SYSTOLIC BLOOD PRESSURE: 138 MMHG | BODY MASS INDEX: 28.88 KG/M2 | TEMPERATURE: 97.6 F | DIASTOLIC BLOOD PRESSURE: 73 MMHG | HEART RATE: 64 BPM | HEIGHT: 69 IN | RESPIRATION RATE: 16 BRPM | WEIGHT: 195 LBS

## 2021-03-31 VITALS
HEIGHT: 69 IN | SYSTOLIC BLOOD PRESSURE: 130 MMHG | WEIGHT: 196 LBS | OXYGEN SATURATION: 95 % | HEART RATE: 73 BPM | BODY MASS INDEX: 29.03 KG/M2 | TEMPERATURE: 98.4 F | DIASTOLIC BLOOD PRESSURE: 80 MMHG

## 2021-03-31 DIAGNOSIS — U07.1 COVID-19: ICD-10-CM

## 2021-03-31 DIAGNOSIS — R51.9 UNILATERAL HEADACHE: ICD-10-CM

## 2021-03-31 DIAGNOSIS — G44.009 CLUSTER HEADACHE, NOT INTRACTABLE, UNSPECIFIED CHRONICITY PATTERN: ICD-10-CM

## 2021-03-31 DIAGNOSIS — H53.19 VISUAL HALOS: ICD-10-CM

## 2021-03-31 LAB
ALBUMIN SERPL BCP-MCNC: 4.2 G/DL (ref 3.2–4.9)
ALBUMIN/GLOB SERPL: 1.4 G/DL
ALP SERPL-CCNC: 67 U/L (ref 30–99)
ALT SERPL-CCNC: 20 U/L (ref 2–50)
ANION GAP SERPL CALC-SCNC: 11 MMOL/L (ref 7–16)
AST SERPL-CCNC: 18 U/L (ref 12–45)
BASOPHILS # BLD AUTO: 0.4 % (ref 0–1.8)
BASOPHILS # BLD: 0.04 K/UL (ref 0–0.12)
BILIRUB SERPL-MCNC: 0.3 MG/DL (ref 0.1–1.5)
BUN SERPL-MCNC: 18 MG/DL (ref 8–22)
CALCIUM SERPL-MCNC: 8.8 MG/DL (ref 8.5–10.5)
CHLORIDE SERPL-SCNC: 106 MMOL/L (ref 96–112)
CO2 SERPL-SCNC: 24 MMOL/L (ref 20–33)
CREAT SERPL-MCNC: 0.91 MG/DL (ref 0.5–1.4)
EKG IMPRESSION: NORMAL
EOSINOPHIL # BLD AUTO: 0.25 K/UL (ref 0–0.51)
EOSINOPHIL NFR BLD: 2.3 % (ref 0–6.9)
ERYTHROCYTE [DISTWIDTH] IN BLOOD BY AUTOMATED COUNT: 44.3 FL (ref 35.9–50)
ERYTHROCYTE [SEDIMENTATION RATE] IN BLOOD BY WESTERGREN METHOD: 6 MM/HOUR (ref 0–20)
GLOBULIN SER CALC-MCNC: 2.9 G/DL (ref 1.9–3.5)
GLUCOSE SERPL-MCNC: 90 MG/DL (ref 65–99)
HCT VFR BLD AUTO: 43.5 % (ref 42–52)
HGB BLD-MCNC: 14.9 G/DL (ref 14–18)
IMM GRANULOCYTES # BLD AUTO: 0.15 K/UL (ref 0–0.11)
IMM GRANULOCYTES NFR BLD AUTO: 1.4 % (ref 0–0.9)
LYMPHOCYTES # BLD AUTO: 1.71 K/UL (ref 1–4.8)
LYMPHOCYTES NFR BLD: 16 % (ref 22–41)
MCH RBC QN AUTO: 32.2 PG (ref 27–33)
MCHC RBC AUTO-ENTMCNC: 34.3 G/DL (ref 33.7–35.3)
MCV RBC AUTO: 94 FL (ref 81.4–97.8)
MONOCYTES # BLD AUTO: 0.75 K/UL (ref 0–0.85)
MONOCYTES NFR BLD AUTO: 7 % (ref 0–13.4)
NEUTROPHILS # BLD AUTO: 7.77 K/UL (ref 1.82–7.42)
NEUTROPHILS NFR BLD: 72.9 % (ref 44–72)
NRBC # BLD AUTO: 0 K/UL
NRBC BLD-RTO: 0 /100 WBC
PLATELET # BLD AUTO: 337 K/UL (ref 164–446)
PMV BLD AUTO: 10.3 FL (ref 9–12.9)
POTASSIUM SERPL-SCNC: 4.4 MMOL/L (ref 3.6–5.5)
PROT SERPL-MCNC: 7.1 G/DL (ref 6–8.2)
RBC # BLD AUTO: 4.63 M/UL (ref 4.7–6.1)
SODIUM SERPL-SCNC: 141 MMOL/L (ref 135–145)
WBC # BLD AUTO: 10.7 K/UL (ref 4.8–10.8)

## 2021-03-31 PROCEDURE — 70496 CT ANGIOGRAPHY HEAD: CPT

## 2021-03-31 PROCEDURE — 96372 THER/PROPH/DIAG INJ SC/IM: CPT | Mod: XU

## 2021-03-31 PROCEDURE — 85652 RBC SED RATE AUTOMATED: CPT

## 2021-03-31 PROCEDURE — 80053 COMPREHEN METABOLIC PANEL: CPT

## 2021-03-31 PROCEDURE — 700117 HCHG RX CONTRAST REV CODE 255: Performed by: EMERGENCY MEDICINE

## 2021-03-31 PROCEDURE — 700101 HCHG RX REV CODE 250: Performed by: EMERGENCY MEDICINE

## 2021-03-31 PROCEDURE — 70498 CT ANGIOGRAPHY NECK: CPT

## 2021-03-31 PROCEDURE — 700111 HCHG RX REV CODE 636 W/ 250 OVERRIDE (IP): Performed by: EMERGENCY MEDICINE

## 2021-03-31 PROCEDURE — 99203 OFFICE O/P NEW LOW 30 MIN: CPT | Mod: CS | Performed by: NURSE PRACTITIONER

## 2021-03-31 PROCEDURE — 99285 EMERGENCY DEPT VISIT HI MDM: CPT

## 2021-03-31 PROCEDURE — 93005 ELECTROCARDIOGRAM TRACING: CPT | Performed by: EMERGENCY MEDICINE

## 2021-03-31 PROCEDURE — 96375 TX/PRO/DX INJ NEW DRUG ADDON: CPT | Mod: XU

## 2021-03-31 PROCEDURE — 96374 THER/PROPH/DIAG INJ IV PUSH: CPT | Mod: XU

## 2021-03-31 PROCEDURE — 85025 COMPLETE CBC W/AUTO DIFF WBC: CPT

## 2021-03-31 RX ORDER — ESCITALOPRAM OXALATE 10 MG/1
TABLET ORAL
COMMUNITY
Start: 2021-03-09 | End: 2023-01-04

## 2021-03-31 RX ORDER — SUMATRIPTAN 6 MG/.5ML
6 INJECTION, SOLUTION SUBCUTANEOUS ONCE
Status: COMPLETED | OUTPATIENT
Start: 2021-03-31 | End: 2021-03-31

## 2021-03-31 RX ORDER — ERGOCALCIFEROL 1.25 MG/1
CAPSULE ORAL
COMMUNITY
Start: 2021-03-02 | End: 2023-01-04

## 2021-03-31 RX ORDER — KETOROLAC TROMETHAMINE 30 MG/ML
15 INJECTION, SOLUTION INTRAMUSCULAR; INTRAVENOUS ONCE
Status: COMPLETED | OUTPATIENT
Start: 2021-03-31 | End: 2021-03-31

## 2021-03-31 RX ORDER — GABAPENTIN 300 MG/1
CAPSULE ORAL
COMMUNITY
Start: 2021-03-02 | End: 2023-01-04

## 2021-03-31 RX ORDER — DICLOFENAC SODIUM 75 MG/1
TABLET, DELAYED RELEASE ORAL
COMMUNITY
Start: 2021-03-02 | End: 2023-01-04

## 2021-03-31 RX ORDER — BUPROPION HYDROCHLORIDE 150 MG/1
TABLET ORAL
COMMUNITY
Start: 2021-03-02 | End: 2023-01-04

## 2021-03-31 RX ORDER — TRAZODONE HYDROCHLORIDE 50 MG/1
TABLET ORAL
COMMUNITY
Start: 2021-03-17 | End: 2023-01-04

## 2021-03-31 RX ORDER — MIRTAZAPINE 15 MG/1
TABLET, FILM COATED ORAL
COMMUNITY
Start: 2021-03-29 | End: 2023-01-04

## 2021-03-31 RX ORDER — BUSPIRONE HYDROCHLORIDE 10 MG/1
TABLET ORAL
COMMUNITY
Start: 2021-03-29 | End: 2023-01-04

## 2021-03-31 RX ORDER — TRAZODONE HYDROCHLORIDE 100 MG/1
TABLET ORAL
COMMUNITY
Start: 2021-03-29 | End: 2023-01-04

## 2021-03-31 RX ORDER — PROPARACAINE HYDROCHLORIDE 5 MG/ML
1 SOLUTION/ DROPS OPHTHALMIC ONCE
Status: COMPLETED | OUTPATIENT
Start: 2021-03-31 | End: 2021-03-31

## 2021-03-31 RX ORDER — ONDANSETRON 4 MG/1
TABLET, FILM COATED ORAL
COMMUNITY
Start: 2021-03-17 | End: 2023-01-04

## 2021-03-31 RX ORDER — METOCLOPRAMIDE HYDROCHLORIDE 5 MG/ML
10 INJECTION INTRAMUSCULAR; INTRAVENOUS ONCE
Status: COMPLETED | OUTPATIENT
Start: 2021-03-31 | End: 2021-03-31

## 2021-03-31 RX ORDER — DIPHENHYDRAMINE HYDROCHLORIDE 50 MG/ML
25 INJECTION INTRAMUSCULAR; INTRAVENOUS ONCE
Status: COMPLETED | OUTPATIENT
Start: 2021-03-31 | End: 2021-03-31

## 2021-03-31 RX ORDER — HYDROXYZINE HYDROCHLORIDE 25 MG/1
TABLET, FILM COATED ORAL
COMMUNITY
Start: 2021-03-09 | End: 2023-01-04

## 2021-03-31 RX ADMIN — DIPHENHYDRAMINE HYDROCHLORIDE 25 MG: 50 INJECTION INTRAMUSCULAR; INTRAVENOUS at 13:39

## 2021-03-31 RX ADMIN — KETOROLAC TROMETHAMINE 15 MG: 30 INJECTION, SOLUTION INTRAMUSCULAR at 15:58

## 2021-03-31 RX ADMIN — FLUORESCEIN SODIUM 1 MG: 1 STRIP OPHTHALMIC at 15:40

## 2021-03-31 RX ADMIN — IOHEXOL 80 ML: 350 INJECTION, SOLUTION INTRAVENOUS at 15:25

## 2021-03-31 RX ADMIN — SUMATRIPTAN 6 MG: 6 INJECTION, SOLUTION SUBCUTANEOUS at 15:59

## 2021-03-31 RX ADMIN — PROPARACAINE HYDROCHLORIDE 1 DROP: 5 SOLUTION/ DROPS OPHTHALMIC at 15:40

## 2021-03-31 RX ADMIN — METOCLOPRAMIDE 10 MG: 5 INJECTION, SOLUTION INTRAMUSCULAR; INTRAVENOUS at 13:38

## 2021-03-31 ASSESSMENT — ENCOUNTER SYMPTOMS
HEADACHES: 1
SHORTNESS OF BREATH: 1

## 2021-03-31 ASSESSMENT — LIFESTYLE VARIABLES: DO YOU DRINK ALCOHOL: NO

## 2021-03-31 NOTE — ED NOTES
"erp at bedside    Visual acuity completed. Patient does not wear glasses or contacts   LEFT: 20/70 \"blurry\"  RIGHT: 20/20 \"okay\"  BOTH: 20/20 \"a little blurry\"    Placed patient 2L NC per erp request. erp stated to patient \"supplemental oxygen will sometimes help with cluster headaches\"   "

## 2021-03-31 NOTE — ED NOTES
Pt ambulated to blue 19.  Agree with triage note.  Pt reports HA for the last 2-3 days with report of tingling in bilateral finger tips with report  slight weakness to right since yesterday.  Pt changing into gown.  ERP to see.

## 2021-03-31 NOTE — ED TRIAGE NOTES
"Chief Complaint   Patient presents with   • Eye Pain     Patient is has had a \"pressure, stabbing pain behind his left eye\" for about two days. Patient went to  this AM and was sent over here. Patient has taken excedrin with no relief.      /82   Pulse 74   Temp 36.3 °C (97.4 °F) (Temporal)   Resp 20   Ht 1.753 m (5' 9\")   Wt 88.5 kg (195 lb)   SpO2 98%   BMI 28.80 kg/m²     Patient arrived to ED for the above complaint. Triage process explained to patient. Patient placed in senior lounge and told to notify staff of any changes. Pt is currently covid positive.   "

## 2021-03-31 NOTE — PATIENT INSTRUCTIONS
COVID-19  COVID-19 is a respiratory infection that is caused by a virus called severe acute respiratory syndrome coronavirus 2 (SARS-CoV-2). The disease is also known as coronavirus disease or novel coronavirus. In some people, the virus may not cause any symptoms. In others, it may cause a serious infection. The infection can get worse quickly and can lead to complications, such as:  · Pneumonia, or infection of the lungs.  · Acute respiratory distress syndrome or ARDS. This is fluid build-up in the lungs.  · Acute respiratory failure. This is a condition in which there is not enough oxygen passing from the lungs to the body.  · Sepsis or septic shock. This is a serious bodily reaction to an infection.  · Blood clotting problems.  · Secondary infections due to bacteria or fungus.  The virus that causes COVID-19 is contagious. This means that it can spread from person to person through droplets from coughs and sneezes (respiratory secretions).  What are the causes?  This illness is caused by a virus. You may catch the virus by:  · Breathing in droplets from an infected person's cough or sneeze.  · Touching something, like a table or a doorknob, that was exposed to the virus (contaminated) and then touching your mouth, nose, or eyes.  What increases the risk?  Risk for infection  You are more likely to be infected with this virus if you:  · Live in or travel to an area with a COVID-19 outbreak.  · Come in contact with a sick person who recently traveled to an area with a COVID-19 outbreak.  · Provide care for or live with a person who is infected with COVID-19.  Risk for serious illness  You are more likely to become seriously ill from the virus if you:  · Are 65 years of age or older.  · Have a long-term disease that lowers your body's ability to fight infection (immunocompromised).  · Live in a nursing home or long-term care facility.  · Have a long-term (chronic) disease such as:  ? Chronic lung disease, including  chronic obstructive pulmonary disease or asthma  ? Heart disease.  ? Diabetes.  ? Chronic kidney disease.  ? Liver disease.  · Are obese.  What are the signs or symptoms?  Symptoms of this condition can range from mild to severe. Symptoms may appear any time from 2 to 14 days after being exposed to the virus. They include:  · A fever.  · A cough.  · Difficulty breathing.  · Chills.  · Muscle pains.  · A sore throat.  · Loss of taste or smell.  Some people may also have stomach problems, such as nausea, vomiting, or diarrhea.  Other people may not have any symptoms of COVID-19.  How is this diagnosed?  This condition may be diagnosed based on:  · Your signs and symptoms, especially if:  ? You live in an area with a COVID-19 outbreak.  ? You recently traveled to or from an area where the virus is common.  ? You provide care for or live with a person who was diagnosed with COVID-19.  · A physical exam.  · Lab tests, which may include:  ? A nasal swab to take a sample of fluid from your nose.  ? A throat swab to take a sample of fluid from your throat.  ? A sample of mucus from your lungs (sputum).  ? Blood tests.  · Imaging tests, which may include, X-rays, CT scan, or ultrasound.  How is this treated?  At present, there is no medicine to treat COVID-19. Medicines that treat other diseases are being used on a trial basis to see if they are effective against COVID-19.  Your health care provider will talk with you about ways to treat your symptoms. For most people, the infection is mild and can be managed at home with rest, fluids, and over-the-counter medicines.  Treatment for a serious infection usually takes places in a hospital intensive care unit (ICU). It may include one or more of the following treatments. These treatments are given until your symptoms improve.  · Receiving fluids and medicines through an IV.  · Supplemental oxygen. Extra oxygen is given through a tube in the nose, a face mask, or a  esquivel.  · Positioning you to lie on your stomach (prone position). This makes it easier for oxygen to get into the lungs.  · Continuous positive airway pressure (CPAP) or bi-level positive airway pressure (BPAP) machine. This treatment uses mild air pressure to keep the airways open. A tube that is connected to a motor delivers oxygen to the body.  · Ventilator. This treatment moves air into and out of the lungs by using a tube that is placed in your windpipe.  · Tracheostomy. This is a procedure to create a hole in the neck so that a breathing tube can be inserted.  · Extracorporeal membrane oxygenation (ECMO). This procedure gives the lungs a chance to recover by taking over the functions of the heart and lungs. It supplies oxygen to the body and removes carbon dioxide.  Follow these instructions at home:  Lifestyle  · If you are sick, stay home except to get medical care. Your health care provider will tell you how long to stay home. Call your health care provider before you go for medical care.  · Rest at home as told by your health care provider.  · Do not use any products that contain nicotine or tobacco, such as cigarettes, e-cigarettes, and chewing tobacco. If you need help quitting, ask your health care provider.  · Return to your normal activities as told by your health care provider. Ask your health care provider what activities are safe for you.  General instructions  · Take over-the-counter and prescription medicines only as told by your health care provider.  · Drink enough fluid to keep your urine pale yellow.  · Keep all follow-up visits as told by your health care provider. This is important.  How is this prevented?    There is no vaccine to help prevent COVID-19 infection. However, there are steps you can take to protect yourself and others from this virus.  To protect yourself:   · Do not travel to areas where COVID-19 is a risk. The areas where COVID-19 is reported change often. To identify  high-risk areas and travel restrictions, check the [facility] travel website: wwwnc.cdc.gov/travel/notices  · If you live in, or must travel to, an area where COVID-19 is a risk, take precautions to avoid infection.  ? Stay away from people who are sick.  ? Wash your hands often with soap and water for 20 seconds. If soap and water are not available, use an alcohol-based hand .  ? Avoid touching your mouth, face, eyes, or nose.  ? Avoid going out in public, follow guidance from your state and local health authorities.  ? If you must go out in public, wear a cloth face covering or face mask.  ? Disinfect objects and surfaces that are frequently touched every day. This may include:  § Counters and tables.  § Doorknobs and light switches.  § Sinks and faucets.  § Electronics, such as phones, remote controls, keyboards, computers, and tablets.  To protect others:  If you have symptoms of COVID-19, take steps to prevent the virus from spreading to others.  · If you think you have a COVID-19 infection, contact your health care provider right away. Tell your health care team that you think you may have a COVID-19 infection.  · Stay home. Leave your house only to seek medical care. Do not use public transport.  · Do not travel while you are sick.  · Wash your hands often with soap and water for 20 seconds. If soap and water are not available, use alcohol-based hand .  · Stay away from other members of your household. Let healthy household members care for children and pets, if possible. If you have to care for children or pets, wash your hands often and wear a mask. If possible, stay in your own room, separate from others. Use a different bathroom.  · Make sure that all people in your household wash their hands well and often.  · Cough or sneeze into a tissue or your sleeve or elbow. Do not cough or sneeze into your hand or into the air.  · Wear a cloth face covering or face mask.  Where to find more  information  · Centers for Disease Control and Prevention: www.cdc.gov/coronavirus/2019-ncov/index.html  · World Health Organization: www.who.int/health-topics/coronavirus  Contact a health care provider if:  · You live in or have traveled to an area where COVID-19 is a risk and you have symptoms of the infection.  · You have had contact with someone who has COVID-19 and you have symptoms of the infection.  Get help right away if:  · You have trouble breathing.  · You have pain or pressure in your chest.  · You have confusion.  · You have bluish lips and fingernails.  · You have difficulty waking from sleep.  · You have symptoms that get worse.  These symptoms may represent a serious problem that is an emergency. Do not wait to see if the symptoms will go away. Get medical help right away. Call your local emergency services (911 in the [place] Do not drive yourself to the hospital. Let the emergency medical personnel know if you think you have COVID-19.  Summary  · COVID-19 is a respiratory infection that is caused by a virus. It is also known as coronavirus disease or novel coronavirus. It can cause serious infections, such as pneumonia, acute respiratory distress syndrome, acute respiratory failure, or sepsis.  · The virus that causes COVID-19 is contagious. This means that it can spread from person to person through droplets from coughs and sneezes.  · You are more likely to develop a serious illness if you are 65 years of age or older, have a weak immunity, live in a nursing home, or have chronic disease.  · There is no medicine to treat COVID-19. Your health care provider will talk with you about ways to treat your symptoms.  · Take steps to protect yourself and others from infection. Wash your hands often and disinfect objects and surfaces that are frequently touched every day. Stay away from people who are sick and wear a mask if you are sick.  This information is not intended to replace advice given to you by  your health care provider. Make sure you discuss any questions you have with your health care provider.  Document Released: 01/23/2020 Document Revised: 05/14/2020 Document Reviewed: 01/23/2020  Elsevier Patient Education © 2020 [employer]    INSTRUCTIONS FOR COVID-19 OR ANY OTHER INFECTIOUS RESPIRATORY ILLNESSES    The Centers for Disease Control and Prevention (CDC) states that early indications for COVID-19 include cough, shortness of breath, difficulty breathing, or at least two of the following symptoms: chills, shaking with chills, muscle pain, headache, sore throat, and loss of taste or smell. Symptoms can range from mild to severe and may appear up to two weeks after exposure to the virus.    The practice of self-isolation and quarantine helps protect the public and your family by  preventing exposure to people who have or may have a contagious disease. Please follow the prevention steps below as based on CDC guidelines:    WHEN TO STOP ISOLATION: Persons with COVID-19 or any other infectious respiratory illness who have symptoms and were advised to care for themselves at home may discontinue home isolation under the following conditions:  · At least 24 hours have passed since recovery defined as resolution of fever without the use of fever-reducing medications; AND,  · Improvement in respiratory symptoms (e.g., cough, shortness of breath); AND,  · At least 10 days have passed since symptoms first appeared and have had no subsequent illness.    MONITOR YOUR SYMPTOMS: If your illness is worsening, seek prompt medical attention. If you have a medical emergency and need to call 911, notify the dispatch personnel that you have, or are being evaluated for confirmed or suspected COVID-19 or another infectious respiratory illness. Wear a facemask if possible.    ACTIVITY RESTRICTION: restrict activities outside your home, except for getting medical care. Do not go to work, school, or public areas. Avoid using  public transportation, ride-sharing, or taxis.    SCHEDULED MEDICAL APPOINTMENTS: Notify your provider that you have, or are being evaluated for, confirmed or suspected COVID-19 or another infectious respiratory. This will help the healthcare provider’s office safely take care of you and keep other people from getting exposed or infected.    FACEMASKS, when to wear: Anytime you are away from your home or around other people or pets. If you are unable to wear one, maintain a minimum of 6 feet distancing from others.    LIVING ENVIRONMENT: Stay in a separate room from other people and pets. If possible, use a separate bathroom, have someone else care for your pets and avoid sharing household items. Any items used should be washed thoroughly with soap and water. Clean all “high-touch” surfaces every day. Use a household cleaning spray or wipe, according to the label instructions. High touch surfaces include (but are not limited to) counters, tabletops, doorknobs, bathroom fixtures, toilets, phones, keyboards, tablets, and bedside tables.     HAND WASHING: Frequently wash hands with soap and water for at least 20 seconds,  especially after blowing your nose, coughing, or sneezing; going to the bathroom; before and after interacting with pets; and before and after eating or preparing food. If hands are visibly dirty use soap and water. If soap and water are not available, use an alcohol-based hand  with at least 60% alcohol. Avoid touching your eyes, nose, and mouth with unwashed hands. Cover your coughs and sneezes with a tissue. Throw used tissues in a lined trash can. Immediately wash your hands.    ACTIVE/FACILITATED SELF-MONITORING: Follow instructions provided by your local health department or health professionals, as appropriate. When working with your local health department check their available hours.    [facility]   Phone Number   [place] [phone]   [facility] [place] [phone]    [facility] [address]   [place] [phone]     IF YOU HAVE CONFIRMED POSITIVE COVID-19:    Those who have completely recovered from COVID-19 may have immune-boosting antibodies in their plasma--called “convalescent plasma”--that could be used to treat critically ill COVID19 patients.    [facility] is excited to begin working with [employer] on collecting convalescent plasma from  people who have recovered from COVID-19 as part of a program to treat patients infected with the virus. This FDA-approved “emergency investigational new drug” is a special blood product containing antibodies that may give patients an extra boost to fight the virus.    To be eligible to donate convalescent plasma, you must have a prior COVID-19 diagnosis documented by a laboratory test (or a positive test result for SARS-CoV-2 antibodies) and meet additional eligibility requirements.    If you are interested in donating convalescent plasma or have any additional questions, please contact the [facility] Convalescent Plasma  at [phone] or via e-mail at [email or web address].

## 2021-03-31 NOTE — PROGRESS NOTES
"  Subjective:     Noe Hickey is a 60 y.o. male who presents for Sinusitis (Pressure behind left eye, pt. stated \"cheeks feel like someone punch him\"), Headache (Pressure and and ice pick that feels like it's stabbing in the eye.), and Other (Tested positive for Covid 3/26/21)      Presents for stabbing left eye pain x 3 days. Halos in vision. Sudden onset mid day, 3 days ago. Excedrin dulled it. No relief the last 2 days. Exposed to COVID Thursday 2 weeks ago. Tested negative the first day he was exposed. Started having symptoms 2 days later, that Saturday.  Positive for COVID on second test on 3/26. Hx cluster headaches, states this does not feel the same. Excedrin usually resolves headaches. No relief this time.  Nasal congestion. Feels like a punch to left cheek bone. No taste. SOB, is getting better. Vomited due to HA. Ringing in ears. TTP cheek and nose. Slight sensitivity to light. Clear nasal drainage. Feels right hand is weaker, right hand dominant.     Sinusitis  There has been no fever. His pain is at a severity of 8/10. The pain is moderate. Associated symptoms include congestion, coughing, headaches and shortness of breath. Pertinent negatives include no ear pain.   Headache   This is a new problem. The current episode started in the past 7 days. The problem occurs constantly. The problem has been gradually worsening. The pain is located in the left unilateral region. The pain does not radiate. The pain quality is not similar to prior headaches. The quality of the pain is described as stabbing. The pain is severe. Associated symptoms include coughing, eye pain, photophobia and weakness. Pertinent negatives include no ear pain, eye redness or fever. His past medical history is significant for cluster headaches. There is no history of sinus disease.   Other  Associated symptoms include congestion, coughing, headaches and weakness. Pertinent negatives include no fever.       Past Medical History: "   Diagnosis Date   • Cyclic vomiting syndrome    • Fracture    • Headache, classical migraine        Past Surgical History:   Procedure Laterality Date   • IRRIGATION & DEBRIDEMENT ORTHO Right 9/19/2017    Procedure: IRRIGATION & DEBRIDEMENT ORTHO-THIGH;  Surgeon: Corbin Rivera M.D.;  Location: SURGERY [facility];  Service: Orthopedics   • SHOULDER HEMICAP RESURFACING Right 10/12/2015    Procedure: RIGHT SHOULDER RESURFACING;  Surgeon: Javon Doll M.D.;  Location: SURGERY [facility];  Service:    • SHOULDER ARTHROSCOPY         Social History     Socioeconomic History   • Marital status:      Spouse name: Not on file   • Number of children: Not on file   • Years of education: Not on file   • Highest education level: Not on file   Occupational History   • Not on file   Tobacco Use   • Smoking status: Current Every Day Smoker     Packs/day: 1.00     Years: 40.00     Pack years: 40.00     Types: Cigarettes   • Smokeless tobacco: Never Used   Substance and Sexual Activity   • Alcohol use: No   • Drug use: Not Currently     Comment: past problems with narcotics    • Sexual activity: Not on file   Other Topics Concern   • Not on file   Social History Narrative   • Not on file     Social Determinants of Health     Financial Resource Strain:    • Difficulty of Paying Living Expenses:    Food Insecurity:    • Worried About Running Out of Food in the Last Year:    • Ran Out of Food in the Last Year:    Transportation Needs:    • Lack of Transportation (Medical):    • Lack of Transportation (Non-Medical):    Physical Activity:    • Days of Exercise per Week:    • Minutes of Exercise per Session:    Stress:    • Feeling of Stress :    Social Connections:    • Frequency of Communication with Friends and Family:    • Frequency of Social Gatherings with Friends and Family:    • Attends Restorationism Services:    • Active Member of Clubs or Organizations:    • Attends Club or Organization Meetings:    • Marital  "Status:    Intimate Partner Violence:    • Fear of Current or Ex-Partner:    • Emotionally Abused:    • Physically Abused:    • Sexually Abused:         No family history on file.     Allergies   Allergen Reactions   • Morphine      rash       Review of Systems   Constitutional: Positive for malaise/fatigue. Negative for fever.   HENT: Positive for congestion and sinus pain. Negative for ear pain.    Eyes: Positive for photophobia and pain. Negative for discharge and redness.   Respiratory: Positive for cough and shortness of breath.    Neurological: Positive for weakness and headaches. Negative for speech change.   All other systems reviewed and are negative.       Objective:   /80 (BP Location: Left arm, Patient Position: Sitting, BP Cuff Size: Adult)   Pulse 73   Temp 36.9 °C (98.4 °F) (Temporal)   Ht 1.753 m (5' 9\")   Wt 88.9 kg (196 lb)   SpO2 95%   BMI 28.94 kg/m²     Physical Exam  Vitals reviewed.   Constitutional:       General: He is not in acute distress.     Appearance: He is well-developed. He is ill-appearing. He is not toxic-appearing.      Comments: Grimacing.    HENT:      Head: Normocephalic and atraumatic.      Right Ear: Tympanic membrane, ear canal and external ear normal.      Left Ear: Tympanic membrane, ear canal and external ear normal.      Nose: Mucosal edema present.      Left Sinus: Maxillary sinus tenderness and frontal sinus tenderness present.      Comments: Generalized left orbital TTP.      Mouth/Throat:      Mouth: Mucous membranes are moist.   Eyes:      Extraocular Movements: Extraocular movements intact.      Conjunctiva/sclera: Conjunctivae normal.      Pupils: Pupils are equal, round, and reactive to light.   Cardiovascular:      Rate and Rhythm: Normal rate.   Pulmonary:      Effort: Pulmonary effort is normal. No respiratory distress.      Breath sounds: Normal breath sounds.   Musculoskeletal:         General: Normal range of motion.      Cervical back: Normal " range of motion and neck supple. No rigidity.   Skin:     General: Skin is warm and dry.      Findings: No rash.   Neurological:      General: No focal deficit present.      Mental Status: He is alert and oriented to person, place, and time.      GCS: GCS eye subscore is 4. GCS verbal subscore is 5. GCS motor subscore is 6.      Comments: No facial droop. Strong  bilaterally. Negative arm drift.    Psychiatric:         Mood and Affect: Mood normal.         Speech: Speech normal.         Behavior: Behavior normal.         Thought Content: Thought content normal.         Judgment: Judgment normal.         Assessment/Plan:   1. COVID-19    2. Unilateral headache    3. Visual halos  -Visual Acuity    Discussed concerns with unilateral headache, and visual disturbance, halos. Vision deficit in left eye. No reported  hx of. Hx of cluster headaches, states these symptoms are atypical for him. Clear nasal drainage. Minimal nasal congestion. Discussed possible migraine related to COVID.    Red Flags indicating possible need for imaging ([id number]):  ?Onset is sudden or abrupt  ?Older age (onset after age 50 years)  ?Pattern change or recent onset of new headache    Referred to follow up emergently for further evaluation of headache.    Differential diagnosis, natural history, supportive care, and indications for immediate follow-up discussed.

## 2021-03-31 NOTE — ED PROVIDER NOTES
"ED Provider Note    CHIEF COMPLAINT  Chief Complaint   Patient presents with   • Eye Pain     Patient is has had a \"pressure, stabbing pain behind his left eye\" for about two days. Patient went to  this AM and was sent over here. Patient has taken excedrin with no relief.        HPI  Noe Hickey is a 60 y.o. male who presents to the emergency department with complaint of left eye pain, pressure and stabbing behind his left eye.  Has been there for approximately 3 days.  Does have history of cluster headaches but never has been this severe and never has been only 1 eye for this long.  He did take Excedrin without relief.  The patient states the pain is like a knife, it is very painful behind his left eye, no lesio alleviating factors.  In addition, he states that his vision is slightly hazy.  Denies trauma, loss of sensation or strength of his arms or legs, difficulty speaking, slurred speech, facial droop, difficulty walking.  The patient was seen in urgent care earlier today and instructed to come to our facility for evaluation.  The patient has tested positive for Covid last week.  Since that time he has had no significant symptoms of decreased taste or smell, cough, fever only this headache.    REVIEW OF SYSTEMS  Positives as above. Pertinent negatives include loss of sensation or strength to arms or legs, head trauma, fever, shakes, chills, sweats, nausea, vomiting  All other 10 review of systems are negative    PAST MEDICAL HISTORY  Past Medical History:   Diagnosis Date   • Cyclic vomiting syndrome    • Fracture    • Headache, classical migraine        FAMILY HISTORY  Noncontributory    SOCIAL HISTORY  Social History     Socioeconomic History   • Marital status:      Spouse name: Not on file   • Number of children: Not on file   • Years of education: Not on file   • Highest education level: Not on file   Occupational History   • Not on file   Tobacco Use   • Smoking status: Current Every " Day Smoker     Packs/day: 1.00     Years: 40.00     Pack years: 40.00     Types: Cigarettes   • Smokeless tobacco: Never Used   Substance and Sexual Activity   • Alcohol use: No   • Drug use: Not Currently     Comment: past problems with narcotics    • Sexual activity: Not on file   Other Topics Concern   • Not on file   Social History Narrative   • Not on file     Social Determinants of Health     Financial Resource Strain:    • Difficulty of Paying Living Expenses:    Food Insecurity:    • Worried About Running Out of Food in the Last Year:    • Ran Out of Food in the Last Year:    Transportation Needs:    • Lack of Transportation (Medical):    • Lack of Transportation (Non-Medical):    Physical Activity:    • Days of Exercise per Week:    • Minutes of Exercise per Session:    Stress:    • Feeling of Stress :    Social Connections:    • Frequency of Communication with Friends and Family:    • Frequency of Social Gatherings with Friends and Family:    • Attends Rastafari Services:    • Active Member of Clubs or Organizations:    • Attends Club or Organization Meetings:    • Marital Status:    Intimate Partner Violence:    • Fear of Current or Ex-Partner:    • Emotionally Abused:    • Physically Abused:    • Sexually Abused:        SURGICAL HISTORY  Past Surgical History:   Procedure Laterality Date   • IRRIGATION & DEBRIDEMENT ORTHO Right 9/19/2017    Procedure: IRRIGATION & DEBRIDEMENT ORTHO-THIGH;  Surgeon: Corbin Rivera M.D.;  Location: SURGERY [facility];  Service: Orthopedics   • SHOULDER HEMICAP RESURFACING Right 10/12/2015    Procedure: RIGHT SHOULDER RESURFACING;  Surgeon: Javon Doll M.D.;  Location: SURGERY [facility];  Service:    • SHOULDER ARTHROSCOPY         CURRENT MEDICATIONS  Home Medications     Reviewed by Belinda Rebollar R.N. (Registered Nurse) on 03/31/21 at 1143  Med List Status: Not Addressed   Medication Last Dose Status   asa/apap/caffeine (EXCEDRIN) 250-250-65 MG Tab   "Active   buPROPion (WELLBUTRIN XL) 150 MG XL tablet  Active   busPIRone (BUSPAR) 10 MG Tab tablet  Active   diclofenac DR (VOLTAREN) 75 MG Tablet Delayed Response  Active   escitalopram (LEXAPRO) 10 MG Tab  Active   gabapentin (NEURONTIN) 300 MG Cap  Active   hydrOXYzine HCl (ATARAX) 25 MG Tab  Active   ibuprofen (MOTRIN) 800 MG Tab  Active   mirtazapine (REMERON) 15 MG Tab  Active   ondansetron (ZOFRAN) 4 MG Tab tablet  Active   traZODone (DESYREL) 100 MG Tab  Active   traZODone (DESYREL) 50 MG Tab  Active   vitamin D, Ergocalciferol, (DRISDOL) 1.25 MG (16935 UT) Cap capsule  Active                ALLERGIES  Allergies   Allergen Reactions   • Morphine      rash       PHYSICAL EXAM  VITAL SIGNS: /73   Pulse 64   Temp 36.4 °C (97.6 °F) (Temporal)   Resp 16   Ht 1.753 m (5' 9\")   Wt 88.5 kg (195 lb)   SpO2 96%   BMI 28.80 kg/m²      Constitutional: Well developed, Well nourished, No acute distress, Non-toxic appearance.   Eyes: PERRLA, EOMI, Conjunctiva normal, No discharge, intraocular pressure 1711, slightly examination reveals no evidence of anterior chamber abnormality.   Cardiovascular: Normal heart rate, Normal rhythm, No murmurs, No rubs, No gallops, and intact distal pulses.   Thorax & Lungs:  No respiratory distress, no rales, no rhonchi, No wheezing, No chest wall tenderness.   Abdomen: Bowel sounds normal, Soft, No tenderness, No guarding, No rebound, No pulsatile masses.   Skin: Warm, Dry, No erythema, No rash.   Extremities: Full range of motion, no deformity, no edema.  Neurologic:  Alert & oriented to month and age, Normal cognition, Cranial nerves II-XII are intact, No slurred speech, Negative finger to nose bilaterally, No pronator drift bilaterally,   strength 5/5 bilaterally, Leg raise strength 5/5 bilaterally, Plantarflexion strength 5/5 bilaterally, Dorsiflexion strength 5/5 bilaterally, Deep tendon reflexes 2/4 upper and lower extremities bilaterally, Sensation intact throughout, " No Nystagmus.  Psychiatric: Affect normal for clinical presentation.      RADIOLOGY/PROCEDURES  CT-CTA HEAD WITH & W/O-POST PROCESS   Final Result         1. No hemodynamically significant narrowing of the major intracranial vessels.      CT-CTA NECK WITH & W/O-POST PROCESSING   Final Result      1. No evidence of flow-limiting stenosis in the cervical carotid or cervical vertebral arteries.        Results for orders placed or performed during the hospital encounter of 03/31/21   CBC WITH DIFFERENTIAL   Result Value Ref Range    WBC 10.7 4.8 - 10.8 K/uL    RBC 4.63 (L) 4.70 - 6.10 M/uL    Hemoglobin 14.9 14.0 - 18.0 g/dL    Hematocrit 43.5 42.0 - 52.0 %    MCV 94.0 81.4 - 97.8 fL    MCH 32.2 27.0 - 33.0 pg    MCHC 34.3 33.7 - 35.3 g/dL    RDW 44.3 35.9 - 50.0 fL    Platelet Count 337 164 - 446 K/uL    MPV 10.3 9.0 - 12.9 fL    Neutrophils-Polys 72.90 (H) 44.00 - 72.00 %    Lymphocytes 16.00 (L) 22.00 - 41.00 %    Monocytes 7.00 0.00 - 13.40 %    Eosinophils 2.30 0.00 - 6.90 %    Basophils 0.40 0.00 - 1.80 %    Immature Granulocytes 1.40 (H) 0.00 - 0.90 %    Nucleated RBC 0.00 /100 WBC    Neutrophils (Absolute) 7.77 (H) 1.82 - 7.42 K/uL    Lymphs (Absolute) 1.71 1.00 - 4.80 K/uL    Monos (Absolute) 0.75 0.00 - 0.85 K/uL    Eos (Absolute) 0.25 0.00 - 0.51 K/uL    Baso (Absolute) 0.04 0.00 - 0.12 K/uL    Immature Granulocytes (abs) 0.15 (H) 0.00 - 0.11 K/uL    NRBC (Absolute) 0.00 K/uL   COMP METABOLIC PANEL   Result Value Ref Range    Sodium 141 135 - 145 mmol/L    Potassium 4.4 3.6 - 5.5 mmol/L    Chloride 106 96 - 112 mmol/L    Co2 24 20 - 33 mmol/L    Anion Gap 11.0 7.0 - 16.0    Glucose 90 65 - 99 mg/dL    Bun 18 8 - 22 mg/dL    Creatinine 0.91 0.50 - 1.40 mg/dL    Calcium 8.8 8.5 - 10.5 mg/dL    AST(SGOT) 18 12 - 45 U/L    ALT(SGPT) 20 2 - 50 U/L    Alkaline Phosphatase 67 30 - 99 U/L    Total Bilirubin 0.3 0.1 - 1.5 mg/dL    Albumin 4.2 3.2 - 4.9 g/dL    Total Protein 7.1 6.0 - 8.2 g/dL    Globulin 2.9 1.9 - 3.5  g/dL    A-G Ratio 1.4 g/dL   Sed Rate   Result Value Ref Range    Sed Rate Westergren 6 0 - 20 mm/hour   ESTIMATED GFR   Result Value Ref Range    GFR If African American >60 >60 mL/min/1.73 m 2    GFR If Non African American >60 >60 mL/min/1.73 m 2   EKG   Result Value Ref Range    Report       [facility] Emergency Dept.    Test Date:  2021  Pt Name:    JUSTIN TOPETE               Department: ER  MRN:        [id number]                      Room:        19  Gender:     Male                         Technician: [id number]  :        1960                   Requested By:NÉSTOR COUCH  Order #:    [id number]                    Reading MD: NÉSTOR COUCH DO    Measurements  Intervals                                Axis  Rate:       61                           P:          71  OR:         184                          QRS:        -10  QRSD:       83                           T:          34  QT:         406  QTc:        410    Interpretive Statements  Sinus rhythm  Abnormal R-wave progression, early transition  No previous ECG available for comparison  Electronically Signed On 3- 15:44:30 PDT by NÉSTOR COUCH DO         COURSE & MEDICAL DECISION MAKING  Pertinent Labs & Imaging studies reviewed. (See chart for details)  This is a pleasant 60 gentleman presents with significant left eye pain and headache with visual changes.  He recently did have diagnosis of Covid is concern for possible intracranial abnormality.  The patient does have a history of cluster headaches and I do believe this is a cluster headache equivalent.  The patient had a CTA head and neck that were negative for acute anterior cranial abnormality, no evidence of stenosis or significant obstructive lesion vascularly.  In addition, the patient had no ocular abnormality, normal intraocular pressure excluding glaucoma, no evidence of significant abnormality on my evaluation.  The patient did  receive Reglan and Benadryl IV, Toradol IV and as well as sumatriptan subcutaneously.  On reevaluation, he states he is completely asymptomatic, his vision is normal, and he does believe he had a cluster equivalent.  I did have a long conversation with the patient concerning need to return to the emergency department if he has increasing symptoms such as numbness or weakness.  The patient be following with a primary care physician.  Discharge is positive p.o., amatory with out difficulty and we following up.    FINAL IMPRESSION     1. Cluster headache, not intractable, unspecified chronicity pattern Active     DISPOSITION:  Patient will be discharged home in stable condition.    FOLLOW UP:  [facility], Emergency Dept  [address] [address] [phone]    If symptoms worsen    Ana Barfield M.D.  [address] [place] [id number] [phone]    Schedule an appointment as soon as possible for a visit       Electronically signed by: Xander Gallo D.O., 3/31/2021 1:26 PM

## 2021-04-01 ASSESSMENT — ENCOUNTER SYMPTOMS
WEAKNESS: 1
SPEECH CHANGE: 0
PHOTOPHOBIA: 1
EYE REDNESS: 0
FEVER: 0
COUGH: 1
CLUSTER HEADACHES: 1
EYE PAIN: 1
SINUS PAIN: 1
EYE DISCHARGE: 0

## 2021-04-03 ENCOUNTER — HOSPITAL ENCOUNTER (EMERGENCY)
Facility: MEDICAL CENTER | Age: 61
End: 2021-04-03
Attending: EMERGENCY MEDICINE | Admitting: EMERGENCY MEDICINE
Payer: MEDICAID

## 2021-04-03 ENCOUNTER — APPOINTMENT (OUTPATIENT)
Dept: RADIOLOGY | Facility: MEDICAL CENTER | Age: 61
End: 2021-04-03
Attending: EMERGENCY MEDICINE
Payer: MEDICAID

## 2021-04-03 VITALS
RESPIRATION RATE: 17 BRPM | TEMPERATURE: 98.1 F | OXYGEN SATURATION: 96 % | HEIGHT: 69 IN | HEART RATE: 63 BPM | BODY MASS INDEX: 28.34 KG/M2 | DIASTOLIC BLOOD PRESSURE: 75 MMHG | SYSTOLIC BLOOD PRESSURE: 120 MMHG | WEIGHT: 191.36 LBS

## 2021-04-03 DIAGNOSIS — M25.511 ACUTE PAIN OF RIGHT SHOULDER: ICD-10-CM

## 2021-04-03 PROCEDURE — 700102 HCHG RX REV CODE 250 W/ 637 OVERRIDE(OP): Performed by: EMERGENCY MEDICINE

## 2021-04-03 PROCEDURE — 99284 EMERGENCY DEPT VISIT MOD MDM: CPT

## 2021-04-03 PROCEDURE — 73030 X-RAY EXAM OF SHOULDER: CPT | Mod: RT

## 2021-04-03 PROCEDURE — A9270 NON-COVERED ITEM OR SERVICE: HCPCS | Performed by: EMERGENCY MEDICINE

## 2021-04-03 RX ORDER — OXYCODONE HYDROCHLORIDE AND ACETAMINOPHEN 5; 325 MG/1; MG/1
1 TABLET ORAL ONCE
Status: COMPLETED | OUTPATIENT
Start: 2021-04-03 | End: 2021-04-03

## 2021-04-03 RX ADMIN — OXYCODONE HYDROCHLORIDE AND ACETAMINOPHEN 1 TABLET: 5; 325 TABLET ORAL at 21:17

## 2021-04-03 ASSESSMENT — FIBROSIS 4 INDEX: FIB4 SCORE: 0.73

## 2021-04-03 ASSESSMENT — PAIN DESCRIPTION - DESCRIPTORS: DESCRIPTORS: SHARP;SHOOTING

## 2021-04-04 NOTE — ED TRIAGE NOTES
"Chief Complaint   Patient presents with   • Shoulder Pain     right shoulder pain, patient was picking up a tooth brush bag and hurt a \"pop.\" Pt states he feels like something is pinching on the shoulder.       Patient states about an hour a half ago, he was picking up a bag of tooth brushes where he heard a \"pop\" noise from his right shoulder. States he has been having pain, a pinching sensation, like an \"ice toothpick\" is stabbing his shoulder, more posterior. States to feel some numbness and tingling from his Elbow up to his shoulder. CNS intact, patient able to move fingers and hand. No obvious deformity present. Pt has had 4 surgeries in his right shoulder, and last surgery was about 4 years ago and it was a total shoulder replacement. Patient having some difficulty moving due to the pain. GCS 15.     Pt is alert and oriented, speaking in full sentences, follows commands and responds appropriately to questions. NAD. Resp are even and unlabored.      Pt placed in lobby. Pt educated on triage process. Pt encouraged to alert staff for any changes.     Patient and staff wearing appropriate PPE    /83   Pulse 95   Temp 36.8 °C (98.2 °F) (Temporal)   Resp (!) 24   Ht 1.753 m (5' 9\")   Wt 86.8 kg (191 lb 5.8 oz)   SpO2 98%   BMI 28.26 kg/m²   "

## 2021-04-04 NOTE — ED PROVIDER NOTES
"ED Provider Note    CHIEF COMPLAINT  Chief Complaint   Patient presents with   • Shoulder Pain     right shoulder pain, patient was picking up a tooth brush bag and hurt a \"pop.\" Pt states he feels like something is pinching on the shoulder.       HPI  Noe Hickey is a 60 y.o. male who presents to the emergency part of a right shoulder pain. The patient states he was lifting a tube of toothpaste and felt a crack pop in his shoulder. He has had pain in the shoulder since that time. The pain is worsened by movement and palpation. Denies any numbness or tingling. Denies any falls or direct trauma. Denies any fevers or chills. Nothing makes it better nothing makes it worse and has no other associated complaints.    REVIEW OF SYSTEMS  See HPI for further details. Musculoskeletal: No other musculoskeletal injuries or complaints    PAST MEDICAL HISTORY  Past Medical History:   Diagnosis Date   • Cyclic vomiting syndrome    • Fracture    • Headache, classical migraine        FAMILY HISTORY  History reviewed. No pertinent family history.    SOCIAL HISTORY  Social History     Socioeconomic History   • Marital status:      Spouse name: Not on file   • Number of children: Not on file   • Years of education: Not on file   • Highest education level: Not on file   Occupational History   • Not on file   Tobacco Use   • Smoking status: Current Every Day Smoker     Packs/day: 1.00     Years: 40.00     Pack years: 40.00     Types: Cigarettes   • Smokeless tobacco: Never Used   Substance and Sexual Activity   • Alcohol use: No   • Drug use: Not Currently     Comment: past problems with narcotics    • Sexual activity: Not on file   Other Topics Concern   • Not on file   Social History Narrative   • Not on file     Social Determinants of Health     Financial Resource Strain:    • Difficulty of Paying Living Expenses:    Food Insecurity:    • Worried About Running Out of Food in the Last Year:    • Ran Out of Food in the " Last Year:    Transportation Needs:    • Lack of Transportation (Medical):    • Lack of Transportation (Non-Medical):    Physical Activity:    • Days of Exercise per Week:    • Minutes of Exercise per Session:    Stress:    • Feeling of Stress :    Social Connections:    • Frequency of Communication with Friends and Family:    • Frequency of Social Gatherings with Friends and Family:    • Attends Taoist Services:    • Active Member of Clubs or Organizations:    • Attends Club or Organization Meetings:    • Marital Status:    Intimate Partner Violence:    • Fear of Current or Ex-Partner:    • Emotionally Abused:    • Physically Abused:    • Sexually Abused:        SURGICAL HISTORY  Past Surgical History:   Procedure Laterality Date   • IRRIGATION & DEBRIDEMENT ORTHO Right 9/19/2017    Procedure: IRRIGATION & DEBRIDEMENT ORTHO-THIGH;  Surgeon: Corbin Rivera M.D.;  Location: SURGERY [facility];  Service: Orthopedics   • SHOULDER HEMICAP RESURFACING Right 10/12/2015    Procedure: RIGHT SHOULDER RESURFACING;  Surgeon: Javon Doll M.D.;  Location: SURGERY [facility];  Service:    • SHOULDER ARTHROSCOPY         CURRENT MEDICATIONS  Home Medications     Reviewed by Stefania Jacob R.N. (Registered Nurse) on 04/03/21 at 2007  Med List Status: Partial   Medication Last Dose Status   asa/apap/caffeine (EXCEDRIN) 250-250-65 MG Tab  Active   buPROPion (WELLBUTRIN XL) 150 MG XL tablet  Active   busPIRone (BUSPAR) 10 MG Tab tablet  Active   diclofenac DR (VOLTAREN) 75 MG Tablet Delayed Response  Active   escitalopram (LEXAPRO) 10 MG Tab  Active   gabapentin (NEURONTIN) 300 MG Cap  Active   hydrOXYzine HCl (ATARAX) 25 MG Tab  Active   ibuprofen (MOTRIN) 800 MG Tab  Active   mirtazapine (REMERON) 15 MG Tab  Active   ondansetron (ZOFRAN) 4 MG Tab tablet  Active   traZODone (DESYREL) 100 MG Tab  Active   traZODone (DESYREL) 50 MG Tab  Active   vitamin D, Ergocalciferol, (DRISDOL) 1.25 MG (68960 UT) Cap capsule   "Active                ALLERGIES  Allergies   Allergen Reactions   • Morphine      rash       PHYSICAL EXAM  VITAL SIGNS: /83   Pulse 64   Temp 36.8 °C (98.2 °F) (Temporal)   Resp (!) 23   Ht 1.753 m (5' 9\")   Wt 86.8 kg (191 lb 5.8 oz)   SpO2 96%   BMI 28.26 kg/m²    Constitutional: Awake alert anxious feels significantly with no acute cardiopulmonary distress  HENT: Normocephalic, Atraumatic,  Musculoskeletal: There is tenderness around the glenohumeral joint without obvious signs of dislocation. Normal neurovascular exam.   Neurologic: Alert, No focal deficits noted.   Psychiatric: Affect anxious      RADIOLOGY/PROCEDURES  DX-SHOULDER 2+ RIGHT   Final Result         1. Status post right shoulder arthroplasty without evidence of hardware complication.            COURSE & MEDICAL DECISION MAKING  Pertinent Labs & Imaging studies reviewed. (See chart for details)  Patient was given a Percocet for pain. X-ray is reassuring. Post x-ray he is reexamined he has good range of motion with good internal and external rotation of the shoulder. No clinical evidence of dislocation. Neurovascular exam is reassuring.    The patient will be placed in a sling for comfort. He is to follow-up with his doctor with orthopedic doctor. Take over-the-counter pain medication. Return for worsening pain, swelling, or other concerns. Questions answered, agreed with plan and discharged in good condition.    Ana Barfield M.D.  [address] [place] [id number]  [phone]              FINAL IMPRESSION  1. Acute pain of right shoulder                  Electronically signed by: Diony Butts M.D., 4/3/2021 9:56 PM    "

## 2021-04-05 ENCOUNTER — HOSPITAL ENCOUNTER (EMERGENCY)
Facility: MEDICAL CENTER | Age: 61
End: 2021-04-05
Attending: EMERGENCY MEDICINE | Admitting: EMERGENCY MEDICINE
Payer: MEDICAID

## 2021-04-05 VITALS
BODY MASS INDEX: 28.9 KG/M2 | HEIGHT: 69 IN | OXYGEN SATURATION: 99 % | WEIGHT: 195.11 LBS | HEART RATE: 88 BPM | TEMPERATURE: 97 F | RESPIRATION RATE: 16 BRPM | SYSTOLIC BLOOD PRESSURE: 129 MMHG | DIASTOLIC BLOOD PRESSURE: 86 MMHG

## 2021-04-05 DIAGNOSIS — K04.7 DENTAL ABSCESS: ICD-10-CM

## 2021-04-05 PROCEDURE — 99283 EMERGENCY DEPT VISIT LOW MDM: CPT

## 2021-04-05 PROCEDURE — 700102 HCHG RX REV CODE 250 W/ 637 OVERRIDE(OP): Performed by: EMERGENCY MEDICINE

## 2021-04-05 PROCEDURE — A9270 NON-COVERED ITEM OR SERVICE: HCPCS | Performed by: EMERGENCY MEDICINE

## 2021-04-05 RX ORDER — IBUPROFEN 600 MG/1
600 TABLET ORAL ONCE
Status: COMPLETED | OUTPATIENT
Start: 2021-04-05 | End: 2021-04-05

## 2021-04-05 RX ORDER — AMOXICILLIN 500 MG/1
1000 CAPSULE ORAL ONCE
Status: COMPLETED | OUTPATIENT
Start: 2021-04-05 | End: 2021-04-05

## 2021-04-05 RX ORDER — ACETAMINOPHEN 325 MG/1
650 TABLET ORAL ONCE
Status: COMPLETED | OUTPATIENT
Start: 2021-04-05 | End: 2021-04-05

## 2021-04-05 RX ADMIN — ACETAMINOPHEN 650 MG: 325 TABLET, FILM COATED ORAL at 11:44

## 2021-04-05 RX ADMIN — AMOXICILLIN 1000 MG: 500 CAPSULE ORAL at 11:44

## 2021-04-05 RX ADMIN — IBUPROFEN 600 MG: 600 TABLET, FILM COATED ORAL at 11:45

## 2021-04-05 ASSESSMENT — FIBROSIS 4 INDEX: FIB4 SCORE: 0.73

## 2021-04-05 NOTE — ED TRIAGE NOTES
"Chief Complaint   Patient presents with   • Dental Pain     left upper x 1 day    • Headache     reports headache x 1 week seen in ed for this   pt ambulated to triage having headaches \"cluster headaches\" x 1 week pt reports seen in ED for this. Continues to have headache and now dental pain.   Pt currently resident at [facility], seen by physician this am but was not examined. Reports she prescribe abx however can not get filled until this evening.   Mask applied to patient prior to triage. This RN in ppe prior to encounter.   "

## 2021-04-05 NOTE — ED PROVIDER NOTES
ED Provider Note    CHIEF COMPLAINT  Chief Complaint   Patient presents with   • Dental Pain     left upper x 1 day    • Headache     reports headache x 1 week seen in ed for this       HPI  Noe Hickey is a 60 y.o. male who presents to the emergency room with left upper tooth pain.  Pain started about 3 or 4 days ago.  He has had headaches on the left side of his head for the last week.  Pain is located in left upper tooth.  Feels swelling.  Intense stabbing pain and nerve sensitivity.  He has a history of cluster headaches by his report, but past medical history listed in the chart is classical migraines.  He has had headache for the last week or so.  Head pain is located behind the left eye.  Stabbing discomfort.  Associated light sensitivity.  Nausea and vomiting.  No focal weakness numbness slurred speech confusion.    Patient was seen in the ER on 3/31 for eye pain and headache.  He underwent CTA of the head and CTA of the C-spine which were negative.  He was treated with migraine cocktail and had improved symptoms.  2 days ago he was in the emergency department with shoulder pain.  Had a negative x-ray at that time. He was given a Percocet.     Patient has a history of methamphetamine addiction and is currently living at [facility].  He told the physician about his tooth pain.  They sent a prescription for antibiotics, but he might be able to start them until the afternoon he would like to start antibiotics sooner.    REVIEW OF SYSTEMS  As per HPI, all other systems reviewed and negative    PAST MEDICAL HISTORY  Past Medical History:   Diagnosis Date   • Cyclic vomiting syndrome    • Fracture    • Headache, classical migraine        SOCIAL HISTORY  Social History     Tobacco Use   • Smoking status: Current Every Day Smoker     Packs/day: 1.00     Years: 40.00     Pack years: 40.00     Types: Cigarettes   • Smokeless tobacco: Never Used   Substance Use Topics   • Alcohol use: No   • Drug use:  "Not Currently     Comment: past problems with narcotics        SURGICAL HISTORY  Past Surgical History:   Procedure Laterality Date   • IRRIGATION & DEBRIDEMENT ORTHO Right 9/19/2017    Procedure: IRRIGATION & DEBRIDEMENT ORTHO-THIGH;  Surgeon: Corbin Rivera M.D.;  Location: SURGERY [facility];  Service: Orthopedics   • SHOULDER HEMICAP RESURFACING Right 10/12/2015    Procedure: RIGHT SHOULDER RESURFACING;  Surgeon: Javon Doll M.D.;  Location: SURGERY [facility];  Service:    • SHOULDER ARTHROSCOPY         ALLERGIES  Allergies   Allergen Reactions   • Morphine      rash       PHYSICAL EXAM  VITAL SIGNS: /77   Pulse 91   Temp 36.1 °C (97 °F) (Temporal)   Resp 16   Ht 1.753 m (5' 9\")   Wt 88.5 kg (195 lb 1.7 oz)   SpO2 99%   BMI 28.81 kg/m²    Constitutional: Awake and alert. Nontoxic  HENT: Significant dental carry left first maxillary molar.  Mild surrounding swelling.  Exquisite tenderness in the whole region of the maxilla.  No drainable abscess.  Eyes: Pupils equal round reactive to light.  No tearing.  Conjunctive are normal.  Neck: Normal range of motion  Cardiovascular: Normal heart rate   Thorax & Lungs: No respiratory distress  Skin:  No pathologic rash.   Extremities: Well perfused  Psychiatric: easily agitated when discussing history  Neuro: Awake alert oriented.  Symmetric motor.  Steady gait.      COURSE & MEDICAL DECISION MAKING  Patient presents with headache and dental caries.  I suspect headache related to dental caries.  Appears to have infection and this will be treated with amoxicillin.  Given first dose in the ER.  He was given Tylenol and ibuprofen.  He states he is already called insurance to follow-up with dentist.  Stressed the importance of follow-up for definitive management of his cavities.  He should return to the ER for swelling, high fever, uncontrolled symptoms or concern.    FINAL IMPRESSION  1.  Dental caries with secondary infection  2.  Headache secondary " to #1  3.  History of methamphetamine addiction currently undergoing treatment      Disposition: home in good condition      This dictation was created using voice recognition software. The accuracy of the dictation is limited to the abilities of the software.  The nursing notes were reviewed and certain aspects of this information were incorporated into this note.      Electronically signed by: Elias Mike M.D., 4/5/2021 11:30 AM

## 2021-08-27 ENCOUNTER — HOSPITAL ENCOUNTER (EMERGENCY)
Dept: HOSPITAL 8 - ED | Age: 61
LOS: 1 days | Discharge: HOME | End: 2021-08-28
Payer: MEDICAID

## 2021-08-27 VITALS — BODY MASS INDEX: 29.13 KG/M2 | WEIGHT: 196.65 LBS | HEIGHT: 69 IN

## 2021-08-27 DIAGNOSIS — E87.1: ICD-10-CM

## 2021-08-27 DIAGNOSIS — K64.8: Primary | ICD-10-CM

## 2021-08-27 DIAGNOSIS — R10.11: ICD-10-CM

## 2021-08-27 LAB
ALBUMIN SERPL-MCNC: 3.5 G/DL (ref 3.4–5)
ALP SERPL-CCNC: 65 U/L (ref 45–117)
ALT SERPL-CCNC: 38 U/L (ref 12–78)
ANION GAP SERPL CALC-SCNC: 7 MMOL/L (ref 5–15)
APTT BLD: 26 SECONDS (ref 25–31)
BASOPHILS # BLD AUTO: 0.1 X10^3/UL (ref 0–0.1)
BASOPHILS NFR BLD AUTO: 1 % (ref 0–1)
BILIRUB SERPL-MCNC: 0.3 MG/DL (ref 0.2–1)
CALCIUM SERPL-MCNC: 9.3 MG/DL (ref 8.5–10.1)
CHLORIDE SERPL-SCNC: 105 MMOL/L (ref 98–107)
CREAT SERPL-MCNC: 1.33 MG/DL (ref 0.7–1.3)
EOSINOPHIL # BLD AUTO: 0.4 X10^3/UL (ref 0–0.4)
EOSINOPHIL NFR BLD AUTO: 4 % (ref 1–7)
ERYTHROCYTE [DISTWIDTH] IN BLOOD BY AUTOMATED COUNT: 15.3 % (ref 9.4–14.8)
INR PPP: 0.96 (ref 0.93–1.1)
LYMPHOCYTES # BLD AUTO: 2.1 X10^3/UL (ref 1–3.4)
LYMPHOCYTES NFR BLD AUTO: 19 % (ref 22–44)
MCH RBC QN AUTO: 31.1 PG (ref 27.5–34.5)
MCHC RBC AUTO-ENTMCNC: 33.8 G/DL (ref 33.2–36.2)
MONOCYTES # BLD AUTO: 1.1 X10^3/UL (ref 0.2–0.8)
MONOCYTES NFR BLD AUTO: 10 % (ref 2–9)
NEUTROPHILS # BLD AUTO: 7.5 X10^3/UL (ref 1.8–6.8)
NEUTROPHILS NFR BLD AUTO: 67 % (ref 42–75)
PLATELET # BLD AUTO: 436 X10^3/UL (ref 130–400)
PMV BLD AUTO: 8.5 FL (ref 7.4–10.4)
PROT SERPL-MCNC: 7.6 G/DL (ref 6.4–8.2)
PROTHROMBIN TIME: 10.3 SECONDS (ref 9.6–11.5)
RBC # BLD AUTO: 4.92 X10^6/UL (ref 4.38–5.82)
TROPONIN I SERPL-MCNC: < 0.015 NG/ML (ref 0–0.04)

## 2021-08-27 PROCEDURE — 80053 COMPREHEN METABOLIC PANEL: CPT

## 2021-08-27 PROCEDURE — 85610 PROTHROMBIN TIME: CPT

## 2021-08-27 PROCEDURE — 93005 ELECTROCARDIOGRAM TRACING: CPT

## 2021-08-27 PROCEDURE — 84484 ASSAY OF TROPONIN QUANT: CPT

## 2021-08-27 PROCEDURE — 99285 EMERGENCY DEPT VISIT HI MDM: CPT

## 2021-08-27 PROCEDURE — 76700 US EXAM ABDOM COMPLETE: CPT

## 2021-08-27 PROCEDURE — 36415 COLL VENOUS BLD VENIPUNCTURE: CPT

## 2021-08-27 PROCEDURE — 85730 THROMBOPLASTIN TIME PARTIAL: CPT

## 2021-08-27 PROCEDURE — 85025 COMPLETE CBC W/AUTO DIFF WBC: CPT

## 2021-08-28 VITALS — SYSTOLIC BLOOD PRESSURE: 105 MMHG | DIASTOLIC BLOOD PRESSURE: 62 MMHG

## 2022-12-21 NOTE — H&P (VIEW-ONLY)
REFERRING PHYSICIAN: LANI Valente.     CONSULTING PHYSICIAN: Sreena Goyal MD.    CHIEF COMPLAINT: shortness of breath     HISTORY OF PRESENT ILLNESS: The patient is a 62 y.o. male with a past medical history of GERD, migraines, 40-pack-year tobacco use, ascending aortic aneurysm 4.7 cm, aortic stenosis, a flutter with RVR. Complains of shortness of breath; coming on with shorter and shorter distances; now he cannot walk 10 steps without feeling dyspnea. Patient denies chest pain, syncope, edema and PND.  He still smokes 5-6 cigarettes per day. States he is motivated to quit.    PAST MEDICAL HISTORY:   Past Medical History:   Diagnosis Date    Cyclic vomiting syndrome     Fracture     Headache, classical migraine        PAST SURGICAL HISTORY:   Past Surgical History:   Procedure Laterality Date    IRRIGATION & DEBRIDEMENT ORTHO Right 9/19/2017    Procedure: IRRIGATION & DEBRIDEMENT ORTHO-THIGH;  Surgeon: Corbin Rivera M.D.;  Location: SURGERY [facility];  Service: Orthopedics    SHOULDER HEMICAP RESURFACING Right 10/12/2015    Procedure: RIGHT SHOULDER RESURFACING;  Surgeon: Javon Doll M.D.;  Location: SURGERY [facility];  Service:     SHOULDER ARTHROSCOPY         FAMILY HISTORY:   Negative for early CAD or sudden death    SOCIAL HISTORY:   Social History     Socioeconomic History    Marital status:      Spouse name: Not on file    Number of children: Not on file    Years of education: Not on file    Highest education level: Not on file   Occupational History    Not on file   Tobacco Use    Smoking status: Every Day     Packs/day: 1.00     Years: 40.00     Pack years: 40.00     Types: Cigarettes    Smokeless tobacco: Never   Vaping Use    Vaping Use: Never used   Substance and Sexual Activity    Alcohol use: No    Drug use: Not Currently     Comment: past problems with narcotics     Sexual activity: Not on file   Other Topics Concern    Not on file   Social History Narrative    Not  on file     Social Determinants of Health     Financial Resource Strain: Not on file   Food Insecurity: Not on file   Transportation Needs: Not on file   Physical Activity: Not on file   Stress: Not on file   Social Connections: Not on file   Intimate Partner Violence: Not on file   Housing Stability: Not on file       ALLERGIES:   Allergies   Allergen Reactions    Morphine      rash        CURRENT MEDICATIONS:     Current Outpatient Medications:     busPIRone (BUSPAR) 10 MG Tab tablet, , Disp: , Rfl:     buPROPion (WELLBUTRIN XL) 150 MG XL tablet, , Disp: , Rfl:     diclofenac DR (VOLTAREN) 75 MG Tablet Delayed Response, , Disp: , Rfl:     vitamin D, Ergocalciferol, (DRISDOL) 1.25 MG (11044 UT) Cap capsule, , Disp: , Rfl:     escitalopram (LEXAPRO) 10 MG Tab, , Disp: , Rfl:     gabapentin (NEURONTIN) 300 MG Cap, , Disp: , Rfl:     hydrOXYzine HCl (ATARAX) 25 MG Tab, , Disp: , Rfl:     mirtazapine (REMERON) 15 MG Tab, , Disp: , Rfl:     ondansetron (ZOFRAN) 4 MG Tab tablet, , Disp: , Rfl:     traZODone (DESYREL) 100 MG Tab, , Disp: , Rfl:     traZODone (DESYREL) 50 MG Tab, , Disp: , Rfl:     asa/apap/caffeine (EXCEDRIN) 250-250-65 MG Tab, Take 1 Tab by mouth every 6 hours as needed for Headache. Indications: Headache, Disp: , Rfl:     ibuprofen (MOTRIN) 800 MG Tab, Take 1 Tab by mouth 2 times a day as needed., Disp: 60 Tab, Rfl: 0     LABS REVIEWED:  Lab Results   Component Value Date/Time    SODIUM 137 12/07/2022 04:44 AM    SODIUM 141 03/31/2021 01:51 PM    POTASSIUM 5.2 12/07/2022 04:44 AM    POTASSIUM 4.4 03/31/2021 01:51 PM    CHLORIDE 104 12/07/2022 04:44 AM    CHLORIDE 106 03/31/2021 01:51 PM    CO2 27.0 12/07/2022 04:44 AM    CO2 24 03/31/2021 01:51 PM    GLUCOSE 83 12/07/2022 04:44 AM    GLUCOSE 90 03/31/2021 01:51 PM    BUN 18.0 12/07/2022 04:44 AM    BUN 18 03/31/2021 01:51 PM    CREATININE 1.1 12/07/2022 04:44 AM    CREATININE 0.91 03/31/2021 01:51 PM    BUNCREATRAT 16.4 12/07/2022 04:44 AM      Lab  Results   Component Value Date/Time    PROTHROMBTM 12.7 09/19/2017 01:40 PM    INR 0.93 09/19/2017 01:40 PM      Lab Results   Component Value Date/Time    WBC 17.60 (H) 12/08/2022 09:49 AM    WBC 10.7 03/31/2021 01:51 PM    RBC 5.78 (H) 12/08/2022 09:49 AM    RBC 4.63 (L) 03/31/2021 01:51 PM    HEMOGLOBIN 13.7 12/08/2022 09:49 AM    HEMOGLOBIN 14.9 03/31/2021 01:51 PM    HEMATOCRIT 42.3 12/08/2022 09:49 AM    HEMATOCRIT 43.5 03/31/2021 01:51 PM    MCV 73.2 (L) 12/08/2022 09:49 AM    MCV 94.0 03/31/2021 01:51 PM    MCH 23.8 (L) 12/08/2022 09:49 AM    MCH 32.2 03/31/2021 01:51 PM    MCHC 32.5 (L) 12/08/2022 09:49 AM    MCHC 34.3 03/31/2021 01:51 PM    MPV 7.8 12/08/2022 09:49 AM    MPV 10.3 03/31/2021 01:51 PM    NEUTSPOLYS 83.7 12/08/2022 09:49 AM    NEUTSPOLYS 72.90 (H) 03/31/2021 01:51 PM    LYMPHOCYTES 5.3 12/08/2022 09:49 AM    LYMPHOCYTES 16.00 (L) 03/31/2021 01:51 PM    MONOCYTES 7.1 12/08/2022 09:49 AM    MONOCYTES 7.00 03/31/2021 01:51 PM    EOSINOPHILS 3.6 12/08/2022 09:49 AM    EOSINOPHILS 2.30 03/31/2021 01:51 PM    BASOPHILS 0.3 12/08/2022 09:49 AM    BASOPHILS 0.40 03/31/2021 01:51 PM        IMAGING REVIEWED AND INTERPRETED:    ECHOCARDIOGRAM:   12/5/22 [facility]  Conclusion   1. The left ventricle is normal in size with low-normal systolic function and an ejection fraction of 50-55% by Abreu's biplane. No distinct wall motion abnormalities noted. Global peak strain of -12.0%. Moderate concentric left ventricular hypertrophy.   2. Left atrium is mildly dilated with a LA volume index of 36 mL/m2. Right atrium is mildly dilated with an area of 24 cm2.     3. The aortic valve is severely sclerotic. It is difficult to visualize aortic valve leaflets. moderate aortic stenosis with an AV max of 3.43 m/s, peak gradient 47 mmHg, mean gradient 29 mmHg, POPPY 1.0 cm2, and velocity ratio 0.27. Mild regurgitation.   4. Mild mitral regurgitation.   5. The ascending aorta is dilated at 4.7 cm.   6. Right ventricle is  mildly dilated with preserved systolic function; RVSP is elevated at 44 mmHg with a RAP of 8 mmHg.      ANGIOGRAM:   12/7/22 [facility]  Findings:     Right coronary artery: Dominant but relatively small after the posterior descending. 20% ostial narrowing.     Left main trunk: Normal.     Left anterior descending: normal other than 20% plaque in the mid third     Circumflex: Supplies a large obtuse marginal branch which is normal     Left ventriculogram: Normal volume in diastole and in systole. Normal left ventricular systolic function is present. There is no left ventricular outflow gradient.    REVIEW OF SYSTEMS:   Review of Systems   Constitutional:  Positive for malaise/fatigue. Negative for chills, fever and weight loss.   HENT:  Negative for ear pain, nosebleeds and tinnitus.    Eyes:  Negative for double vision, photophobia and pain.   Respiratory:  Positive for shortness of breath. Negative for cough and hemoptysis.    Cardiovascular:  Positive for orthopnea. Negative for chest pain, palpitations, leg swelling and PND.   Gastrointestinal:  Negative for abdominal pain, blood in stool, nausea and vomiting.   Genitourinary:  Negative for frequency, hematuria and urgency.   Musculoskeletal:  Negative for joint pain.   Skin:  Negative for rash.   Neurological:  Negative for dizziness, tremors, speech change, focal weakness, seizures and headaches.   Endo/Heme/Allergies:  Negative for polydipsia. Does not bruise/bleed easily.   Psychiatric/Behavioral:  Negative for hallucinations and memory loss.        PHYSICAL EXAMINATION:   Physical Exam  Vitals reviewed.   Constitutional:       General: He is not in acute distress.     Appearance: Normal appearance.   HENT:      Head: Normocephalic and atraumatic.      Right Ear: External ear normal.      Left Ear: External ear normal.      Nose: Nose normal. No congestion.   Eyes:      General: No scleral icterus.     Extraocular Movements: Extraocular movements intact.       "Conjunctiva/sclera: Conjunctivae normal.   Cardiovascular:      Rate and Rhythm: Normal rate and regular rhythm.      Heart sounds: Murmur heard.   Systolic murmur is present with a grade of 3/6.   Pulmonary:      Effort: Pulmonary effort is normal. No respiratory distress.   Abdominal:      General: There is no distension.      Palpations: Abdomen is soft.   Musculoskeletal:         General: Normal range of motion.      Cervical back: Normal range of motion.   Skin:     General: Skin is warm and dry.      Coloration: Skin is not jaundiced.      Findings: No rash.   Neurological:      Mental Status: He is alert and oriented to person, place, and time.      Cranial Nerves: No cranial nerve deficit.   Psychiatric:         Mood and Affect: Mood normal.         Behavior: Behavior normal.     /86 (BP Location: Left arm, Patient Position: Sitting, BP Cuff Size: Adult)   Pulse 93   Temp 37.1 °C (98.8 °F) (Temporal)   Ht 1.753 m (5' 9\")   Wt 97.3 kg (214 lb 6.4 oz)   SpO2 90%   BMI 31.66 kg/m²     IMPRESSION:  63 yo gentleman with known conditions of obesity, HLD, HTN, GERD, migraines, 40-pack-year tobacco use still smoking now with severe symptomatic aortic stenosis and ascending aortic aneurysm.      PLAN:  I recommend AVR tissue and ascending aortic replacement next available outpatient appointment.    The procedure, its risks, benefits, potential complications and alternative treatments were discussed with the patient in detail including the risks should he decide not to undergo my recommended treatment. All of his questions were answered to his satisfaction and he is willing to proceed with the operation. The risks include death, stroke,  infection: to include a rare bacterial infection related to the use of the heart/lung machine, shreya-operative myocardial infarction, dysrhythmias, diaphragmatic paralysis, chest wall paresthesia, tracheostomy, kidney or other organ failure, possible return to the operating " room for bleeding, bleeding requiring transfusion with its attendant risks including AIDS or hepatitis, dehiscence of surgical incisions, respiratory complications including the need for prolonged ventilator support, Protamine or other drug reaction, peripheral neuropathy, loss of limb, and miscount of surgical items. The operative mortality risk is approximately 1-2%. The STS mortality risk score is 1.4% and the morbidity and mortality risk score is 8%. The scores were discussed with patient.    The operation,AVR tissue and ascending aortic replacement is scheduled for Thursday Jan 6th at 7:30 AM at [facility].    Thank you for this very challenging consultation and participation in the patient’s care.  I will keep you apprised of all future developments.          Sincerely,       Serena Goyal MD.

## 2022-12-21 NOTE — H&P
REFERRING PHYSICIAN: LANI Valente.     CONSULTING PHYSICIAN: Serena Goyal MD.    CHIEF COMPLAINT: shortness of breath     HISTORY OF PRESENT ILLNESS: The patient is a 62 y.o. male with a past medical history of GERD, migraines, 40-pack-year tobacco use, ascending aortic aneurysm 4.7 cm, aortic stenosis, a flutter with RVR. Complains of shortness of breath; coming on with shorter and shorter distances; now he cannot walk 10 steps without feeling dyspnea. Patient denies chest pain, syncope, edema and PND.  He still smokes 5-6 cigarettes per day. States he is motivated to quit.    PAST MEDICAL HISTORY:   Past Medical History:   Diagnosis Date    Cyclic vomiting syndrome     Fracture     Headache, classical migraine        PAST SURGICAL HISTORY:   Past Surgical History:   Procedure Laterality Date    IRRIGATION & DEBRIDEMENT ORTHO Right 9/19/2017    Procedure: IRRIGATION & DEBRIDEMENT ORTHO-THIGH;  Surgeon: Corbin Rivera M.D.;  Location: SURGERY [facility];  Service: Orthopedics    SHOULDER HEMICAP RESURFACING Right 10/12/2015    Procedure: RIGHT SHOULDER RESURFACING;  Surgeon: Javon Doll M.D.;  Location: SURGERY [facility];  Service:     SHOULDER ARTHROSCOPY         FAMILY HISTORY:   Negative for early CAD or sudden death    SOCIAL HISTORY:   Social History     Socioeconomic History    Marital status:      Spouse name: Not on file    Number of children: Not on file    Years of education: Not on file    Highest education level: Not on file   Occupational History    Not on file   Tobacco Use    Smoking status: Every Day     Packs/day: 1.00     Years: 40.00     Pack years: 40.00     Types: Cigarettes    Smokeless tobacco: Never   Vaping Use    Vaping Use: Never used   Substance and Sexual Activity    Alcohol use: No    Drug use: Not Currently     Comment: past problems with narcotics     Sexual activity: Not on file   Other Topics Concern    Not on file   Social History Narrative    Not  on file     Social Determinants of Health     Financial Resource Strain: Not on file   Food Insecurity: Not on file   Transportation Needs: Not on file   Physical Activity: Not on file   Stress: Not on file   Social Connections: Not on file   Intimate Partner Violence: Not on file   Housing Stability: Not on file       ALLERGIES:   Allergies   Allergen Reactions    Morphine      rash        CURRENT MEDICATIONS:     Current Outpatient Medications:     busPIRone (BUSPAR) 10 MG Tab tablet, , Disp: , Rfl:     buPROPion (WELLBUTRIN XL) 150 MG XL tablet, , Disp: , Rfl:     diclofenac DR (VOLTAREN) 75 MG Tablet Delayed Response, , Disp: , Rfl:     vitamin D, Ergocalciferol, (DRISDOL) 1.25 MG (66557 UT) Cap capsule, , Disp: , Rfl:     escitalopram (LEXAPRO) 10 MG Tab, , Disp: , Rfl:     gabapentin (NEURONTIN) 300 MG Cap, , Disp: , Rfl:     hydrOXYzine HCl (ATARAX) 25 MG Tab, , Disp: , Rfl:     mirtazapine (REMERON) 15 MG Tab, , Disp: , Rfl:     ondansetron (ZOFRAN) 4 MG Tab tablet, , Disp: , Rfl:     traZODone (DESYREL) 100 MG Tab, , Disp: , Rfl:     traZODone (DESYREL) 50 MG Tab, , Disp: , Rfl:     asa/apap/caffeine (EXCEDRIN) 250-250-65 MG Tab, Take 1 Tab by mouth every 6 hours as needed for Headache. Indications: Headache, Disp: , Rfl:     ibuprofen (MOTRIN) 800 MG Tab, Take 1 Tab by mouth 2 times a day as needed., Disp: 60 Tab, Rfl: 0     LABS REVIEWED:  Lab Results   Component Value Date/Time    SODIUM 137 12/07/2022 04:44 AM    SODIUM 141 03/31/2021 01:51 PM    POTASSIUM 5.2 12/07/2022 04:44 AM    POTASSIUM 4.4 03/31/2021 01:51 PM    CHLORIDE 104 12/07/2022 04:44 AM    CHLORIDE 106 03/31/2021 01:51 PM    CO2 27.0 12/07/2022 04:44 AM    CO2 24 03/31/2021 01:51 PM    GLUCOSE 83 12/07/2022 04:44 AM    GLUCOSE 90 03/31/2021 01:51 PM    BUN 18.0 12/07/2022 04:44 AM    BUN 18 03/31/2021 01:51 PM    CREATININE 1.1 12/07/2022 04:44 AM    CREATININE 0.91 03/31/2021 01:51 PM    BUNCREATRAT 16.4 12/07/2022 04:44 AM      Lab  Results   Component Value Date/Time    PROTHROMBTM 12.7 09/19/2017 01:40 PM    INR 0.93 09/19/2017 01:40 PM      Lab Results   Component Value Date/Time    WBC 17.60 (H) 12/08/2022 09:49 AM    WBC 10.7 03/31/2021 01:51 PM    RBC 5.78 (H) 12/08/2022 09:49 AM    RBC 4.63 (L) 03/31/2021 01:51 PM    HEMOGLOBIN 13.7 12/08/2022 09:49 AM    HEMOGLOBIN 14.9 03/31/2021 01:51 PM    HEMATOCRIT 42.3 12/08/2022 09:49 AM    HEMATOCRIT 43.5 03/31/2021 01:51 PM    MCV 73.2 (L) 12/08/2022 09:49 AM    MCV 94.0 03/31/2021 01:51 PM    MCH 23.8 (L) 12/08/2022 09:49 AM    MCH 32.2 03/31/2021 01:51 PM    MCHC 32.5 (L) 12/08/2022 09:49 AM    MCHC 34.3 03/31/2021 01:51 PM    MPV 7.8 12/08/2022 09:49 AM    MPV 10.3 03/31/2021 01:51 PM    NEUTSPOLYS 83.7 12/08/2022 09:49 AM    NEUTSPOLYS 72.90 (H) 03/31/2021 01:51 PM    LYMPHOCYTES 5.3 12/08/2022 09:49 AM    LYMPHOCYTES 16.00 (L) 03/31/2021 01:51 PM    MONOCYTES 7.1 12/08/2022 09:49 AM    MONOCYTES 7.00 03/31/2021 01:51 PM    EOSINOPHILS 3.6 12/08/2022 09:49 AM    EOSINOPHILS 2.30 03/31/2021 01:51 PM    BASOPHILS 0.3 12/08/2022 09:49 AM    BASOPHILS 0.40 03/31/2021 01:51 PM        IMAGING REVIEWED AND INTERPRETED:    ECHOCARDIOGRAM:   12/5/22 [facility]  Conclusion   1. The left ventricle is normal in size with low-normal systolic function and an ejection fraction of 50-55% by Abreu's biplane. No distinct wall motion abnormalities noted. Global peak strain of -12.0%. Moderate concentric left ventricular hypertrophy.   2. Left atrium is mildly dilated with a LA volume index of 36 mL/m2. Right atrium is mildly dilated with an area of 24 cm2.     3. The aortic valve is severely sclerotic. It is difficult to visualize aortic valve leaflets. moderate aortic stenosis with an AV max of 3.43 m/s, peak gradient 47 mmHg, mean gradient 29 mmHg, POPPY 1.0 cm2, and velocity ratio 0.27. Mild regurgitation.   4. Mild mitral regurgitation.   5. The ascending aorta is dilated at 4.7 cm.   6. Right ventricle is  mildly dilated with preserved systolic function; RVSP is elevated at 44 mmHg with a RAP of 8 mmHg.      ANGIOGRAM:   12/7/22 [facility]  Findings:     Right coronary artery: Dominant but relatively small after the posterior descending. 20% ostial narrowing.     Left main trunk: Normal.     Left anterior descending: normal other than 20% plaque in the mid third     Circumflex: Supplies a large obtuse marginal branch which is normal     Left ventriculogram: Normal volume in diastole and in systole. Normal left ventricular systolic function is present. There is no left ventricular outflow gradient.    REVIEW OF SYSTEMS:   Review of Systems   Constitutional:  Positive for malaise/fatigue. Negative for chills, fever and weight loss.   HENT:  Negative for ear pain, nosebleeds and tinnitus.    Eyes:  Negative for double vision, photophobia and pain.   Respiratory:  Positive for shortness of breath. Negative for cough and hemoptysis.    Cardiovascular:  Positive for orthopnea. Negative for chest pain, palpitations, leg swelling and PND.   Gastrointestinal:  Negative for abdominal pain, blood in stool, nausea and vomiting.   Genitourinary:  Negative for frequency, hematuria and urgency.   Musculoskeletal:  Negative for joint pain.   Skin:  Negative for rash.   Neurological:  Negative for dizziness, tremors, speech change, focal weakness, seizures and headaches.   Endo/Heme/Allergies:  Negative for polydipsia. Does not bruise/bleed easily.   Psychiatric/Behavioral:  Negative for hallucinations and memory loss.        PHYSICAL EXAMINATION:   Physical Exam  Vitals reviewed.   Constitutional:       General: He is not in acute distress.     Appearance: Normal appearance.   HENT:      Head: Normocephalic and atraumatic.      Right Ear: External ear normal.      Left Ear: External ear normal.      Nose: Nose normal. No congestion.   Eyes:      General: No scleral icterus.     Extraocular Movements: Extraocular movements intact.       "Conjunctiva/sclera: Conjunctivae normal.   Cardiovascular:      Rate and Rhythm: Normal rate and regular rhythm.      Heart sounds: Murmur heard.   Systolic murmur is present with a grade of 3/6.   Pulmonary:      Effort: Pulmonary effort is normal. No respiratory distress.   Abdominal:      General: There is no distension.      Palpations: Abdomen is soft.   Musculoskeletal:         General: Normal range of motion.      Cervical back: Normal range of motion.   Skin:     General: Skin is warm and dry.      Coloration: Skin is not jaundiced.      Findings: No rash.   Neurological:      Mental Status: He is alert and oriented to person, place, and time.      Cranial Nerves: No cranial nerve deficit.   Psychiatric:         Mood and Affect: Mood normal.         Behavior: Behavior normal.     /86 (BP Location: Left arm, Patient Position: Sitting, BP Cuff Size: Adult)   Pulse 93   Temp 37.1 °C (98.8 °F) (Temporal)   Ht 1.753 m (5' 9\")   Wt 97.3 kg (214 lb 6.4 oz)   SpO2 90%   BMI 31.66 kg/m²     IMPRESSION:  61 yo gentleman with known conditions of obesity, HLD, HTN, GERD, migraines, 40-pack-year tobacco use still smoking now with severe symptomatic aortic stenosis and ascending aortic aneurysm.      PLAN:  I recommend AVR tissue and ascending aortic replacement next available outpatient appointment.    The procedure, its risks, benefits, potential complications and alternative treatments were discussed with the patient in detail including the risks should he decide not to undergo my recommended treatment. All of his questions were answered to his satisfaction and he is willing to proceed with the operation. The risks include death, stroke,  infection: to include a rare bacterial infection related to the use of the heart/lung machine, shreya-operative myocardial infarction, dysrhythmias, diaphragmatic paralysis, chest wall paresthesia, tracheostomy, kidney or other organ failure, possible return to the operating " room for bleeding, bleeding requiring transfusion with its attendant risks including AIDS or hepatitis, dehiscence of surgical incisions, respiratory complications including the need for prolonged ventilator support, Protamine or other drug reaction, peripheral neuropathy, loss of limb, and miscount of surgical items. The operative mortality risk is approximately 1-2%. The STS mortality risk score is 1.4% and the morbidity and mortality risk score is 8%. The scores were discussed with patient.    The operation,AVR tissue and ascending aortic replacement is scheduled for Thursday Jan 6th at 7:30 AM at [facility].    Thank you for this very challenging consultation and participation in the patient’s care.  I will keep you apprised of all future developments.          Sincerely,       Serena Goyal MD.

## 2022-12-27 ENCOUNTER — OFFICE VISIT (OUTPATIENT)
Dept: CARDIOTHORACIC SURGERY | Facility: MEDICAL CENTER | Age: 62
End: 2022-12-27
Payer: COMMERCIAL

## 2022-12-27 VITALS
TEMPERATURE: 98.8 F | SYSTOLIC BLOOD PRESSURE: 138 MMHG | HEIGHT: 69 IN | OXYGEN SATURATION: 90 % | DIASTOLIC BLOOD PRESSURE: 86 MMHG | WEIGHT: 214.4 LBS | HEART RATE: 93 BPM | BODY MASS INDEX: 31.76 KG/M2

## 2022-12-27 DIAGNOSIS — Z01.818 PRE-OP TESTING: ICD-10-CM

## 2022-12-27 DIAGNOSIS — I71.21 ANEURYSM OF ASCENDING AORTA WITHOUT RUPTURE (HCC): ICD-10-CM

## 2022-12-27 DIAGNOSIS — I35.0 AORTIC STENOSIS, SEVERE: ICD-10-CM

## 2022-12-27 PROCEDURE — 99205 OFFICE O/P NEW HI 60 MIN: CPT | Performed by: THORACIC SURGERY (CARDIOTHORACIC VASCULAR SURGERY)

## 2022-12-27 RX ORDER — APIXABAN 5 MG/1
TABLET, FILM COATED ORAL
COMMUNITY
Start: 2022-12-08 | End: 2023-01-04

## 2022-12-27 RX ORDER — METOPROLOL SUCCINATE 50 MG/1
TABLET, EXTENDED RELEASE ORAL
COMMUNITY
Start: 2022-12-08 | End: 2023-01-04

## 2022-12-27 RX ORDER — PANTOPRAZOLE SODIUM 40 MG/1
TABLET, DELAYED RELEASE ORAL
COMMUNITY
Start: 2022-12-08 | End: 2023-01-04

## 2022-12-27 RX ORDER — ATORVASTATIN CALCIUM 40 MG/1
TABLET, FILM COATED ORAL
COMMUNITY
Start: 2022-12-08 | End: 2023-01-04

## 2022-12-27 ASSESSMENT — ENCOUNTER SYMPTOMS
SHORTNESS OF BREATH: 1
POLYDIPSIA: 0
SPEECH CHANGE: 0
DOUBLE VISION: 0
NAUSEA: 0
COUGH: 0
HALLUCINATIONS: 0
SEIZURES: 0
PHOTOPHOBIA: 0
BLOOD IN STOOL: 0
PALPITATIONS: 0
FEVER: 0
PND: 0
HEADACHES: 0
ORTHOPNEA: 1
WEIGHT LOSS: 0
CHILLS: 0
FOCAL WEAKNESS: 0
MEMORY LOSS: 0
TREMORS: 0
HEMOPTYSIS: 0
EYE PAIN: 0
DIZZINESS: 0
VOMITING: 0
BRUISES/BLEEDS EASILY: 0
ABDOMINAL PAIN: 0

## 2022-12-27 ASSESSMENT — FIBROSIS 4 INDEX: FIB4 SCORE: 0.92

## 2022-12-28 NOTE — NON-PROVIDER
Problem: Prehabilitation    Goal: optimize identified modifiable risk factors prior to cardiac surgery.     Intervention: Screening and interventions for the following  risk factors with educational materials provided if indicated  and patient demonstrates readiness to participate.  Dentition, malnutrition, CAD and dietary cholesterol,  obesity, alcohol, tobacco, and illegal drug use,  home exercise regimen appropriateness, and social support  system for post discharge planning. Also, teaching of and   participation of inspiratory muscle training via  incentive spirometer (provided).    Review of post-surgical physical limitations, upcoming  appointments & testing, ordered diagnostics, and medication review  to identify and educate on anticoagulant and antihypertensives  that need cessation in the days prior to surgery.    The cardiac surgery prehabilitation sheet, surgery instruction sheet,  MAP, education booklet, vitals logbook, normals after heart surgery,  and cardiac rehab flier was provided for patient to read and review.    Carb load drink and surgical CHG wipes were also  provided with instructions on use.    DENTITION:  Routine dental appointment prior to surgery is   indicated for valve procedure,  patient educated as he does not get   regular dental cleanings. he denies current   dental infection or issues that should be  addressed prior to surgery and were  encouraged to get a dental cleaning.    INCENTIVE SPIROMETRY:  Discussed importance of incentive spirometry (IS) use,  20 times a day AT MINIMUM but more often if possible to  optimize cardio-pulmonary function prior to surgery.  They were instructed to allow a 5 minute break in  between uses to achieve max IS volume.  Education with return demonstration performed.   Patient is minimally effective, coaching was not needed.  Prehabilitation IS baseline is 1250.    HOME EXERCISE REGIMEN:  We discussed importance of preventing deconditioning and  muscle  wasting in the time preceding surgery as this will occur  post surgery.    > 50 % left main disease present? NO  EF-- 55%  Active sub lingual nitro use? NO  he is appropriate for initiation  of a home exercise regimen.  he is moderately physically active; current exercise  tolerance/level is low-moderate due to  symptoms of SOB. he was educated   to continue his weight lifting regimen 5-6 days a week,  adjusting the length for tolerance level.  he was educated that if chest pain or SOB occurs  patient is to stop immediately and if symptoms do not  resolve after stopping to call 911.    he was also encouraged to practice sit to stand from a chair  without arm assistance 12 times a day to strengthen quadriceps.    CAD:  Patient does not have known CAD; education on cholesterol, diet,  and the heart are not indicated and were not provided.    OBESITY:  Patient's BMI is over 30.  Education regarding portion sizing and diet are not indicated and were not provided given his BMI is likely high due to body building and high muscle mass.    MALNUTRITION:  Malnutrition screening tool (MST) shows he is not at risk  with a score of 0. (0-1 not at risk; greater or equal to 2 is at risk).    Given patient response to MST, functional capacity,   and ** reported muscle loss, it was not  recommended they increase their protein  intake to 1.2 to 2.5 gram/kg/day.    SMOKING:  Patient confirms current smoking history.  Smoking risks and cessation education indicated and was not provided as he is currently weaning down, a 1/2 pk per day, pack yr hx is 40.    ALCOHOL ABUSE:  Patient denies alcohol abuse.  Alcohol risks and cessation  education not indicated and was not provided.    ILLEGAL DRUG USE:  Patient denies illicit drug use.  Illicit drug use risks  and cessation education not indicated and was not provided    SOCIAL SUPPORT SYSTEM FOR DISCHARGE NEEDS:    Patient does have family, his wife María to stay for 1-week post  discharge  to assist with ADL's. No barriers to hospital  discharge anticipated.    PSYCHOLOGICAL PREPARATION:  We discussed the basics of physical limitation post op to include:               No driving for 4 weeks               No lifting, pushing or pulling > 10 lbs for 6 weeks  Sternal precautions to include moving within the tube and  safe mobility in and out of bed and the chair.    ANTIBIOTIC STEWARDSHIP:  MRSA swab will be collected by [facility] during pre-admit testing.    MEDICATION AN UPCOMING APPOINTMENTS REVIEW:  Current medications were reviewed that may need  specific stop dates prior to surgery. The patient was instructed   to stop the following medications after the indicated date.    No meds to stop; he said he was not taking the Eliquis due to stomach upset.    Vascular scheduling sheet was provided and explained but a referral was sent to [facility] due to insurance.    Walk test Times: 5 5 5    A phone appointment for pre op education was made for January 4th at 1030 to review all provided education materials.

## 2022-12-29 ENCOUNTER — TELEPHONE (OUTPATIENT)
Dept: CARDIOTHORACIC SURGERY | Facility: MEDICAL CENTER | Age: 62
End: 2022-12-29
Payer: COMMERCIAL

## 2022-12-30 NOTE — TELEPHONE ENCOUNTER
Chief concern:   No help at home post op: discussion of SNF vs rehab.     Disability paperwork    I explained criteria from rehab and SNF placement and that it cannot be determined beforehand if it can be arranged but we can try given he will have no help after surgery.     He was told to speak with his work's HR regarding obtaining disability or FMLA paperwork.    Call time 3 minutes

## 2023-01-04 ENCOUNTER — HOSPITAL ENCOUNTER (OUTPATIENT)
Dept: RADIOLOGY | Facility: MEDICAL CENTER | Age: 63
DRG: 219 | End: 2023-01-04
Attending: THORACIC SURGERY (CARDIOTHORACIC VASCULAR SURGERY) | Admitting: THORACIC SURGERY (CARDIOTHORACIC VASCULAR SURGERY)
Payer: COMMERCIAL

## 2023-01-04 ENCOUNTER — PRE-ADMISSION TESTING (OUTPATIENT)
Dept: ADMISSIONS | Facility: MEDICAL CENTER | Age: 63
DRG: 219 | End: 2023-01-04
Attending: THORACIC SURGERY (CARDIOTHORACIC VASCULAR SURGERY)
Payer: COMMERCIAL

## 2023-01-04 ENCOUNTER — TELEPHONE (OUTPATIENT)
Dept: CARDIOTHORACIC SURGERY | Facility: MEDICAL CENTER | Age: 63
End: 2023-01-04

## 2023-01-04 ENCOUNTER — ANESTHESIA EVENT (OUTPATIENT)
Dept: SURGERY | Facility: MEDICAL CENTER | Age: 63
DRG: 219 | End: 2023-01-04
Payer: COMMERCIAL

## 2023-01-04 DIAGNOSIS — Z01.811 PRE-OPERATIVE RESPIRATORY EXAMINATION: ICD-10-CM

## 2023-01-04 DIAGNOSIS — Z01.812 PRE-OPERATIVE LABORATORY EXAMINATION: ICD-10-CM

## 2023-01-04 DIAGNOSIS — Z01.810 PRE-OPERATIVE CARDIOVASCULAR EXAMINATION: ICD-10-CM

## 2023-01-04 LAB
ABO GROUP BLD: NORMAL
ALBUMIN SERPL BCP-MCNC: 4.1 G/DL (ref 3.2–4.9)
ALBUMIN/GLOB SERPL: 1.4 G/DL
ALP SERPL-CCNC: 47 U/L (ref 30–99)
ALT SERPL-CCNC: 38 U/L (ref 2–50)
AMPHET UR QL SCN: NEGATIVE
ANION GAP SERPL CALC-SCNC: 12 MMOL/L (ref 7–16)
APPEARANCE UR: CLEAR
APTT PPP: 27.7 SEC (ref 24.7–36)
AST SERPL-CCNC: 30 U/L (ref 12–45)
BARBITURATES UR QL SCN: NEGATIVE
BASOPHILS # BLD AUTO: 0.9 % (ref 0–1.8)
BASOPHILS # BLD: 0.13 K/UL (ref 0–0.12)
BENZODIAZ UR QL SCN: NEGATIVE
BILIRUB SERPL-MCNC: 0.3 MG/DL (ref 0.1–1.5)
BILIRUB UR QL STRIP.AUTO: NEGATIVE
BLD GP AB SCN SERPL QL: NORMAL
BUN SERPL-MCNC: 23 MG/DL (ref 8–22)
BZE UR QL SCN: NEGATIVE
CALCIUM ALBUM COR SERPL-MCNC: 9.4 MG/DL (ref 8.5–10.5)
CALCIUM SERPL-MCNC: 9.5 MG/DL (ref 8.5–10.5)
CANNABINOIDS UR QL SCN: NEGATIVE
CHLORIDE SERPL-SCNC: 100 MMOL/L (ref 96–112)
CO2 SERPL-SCNC: 22 MMOL/L (ref 20–33)
COLOR UR: YELLOW
CREAT SERPL-MCNC: 1.07 MG/DL (ref 0.5–1.4)
EKG IMPRESSION: NORMAL
EOSINOPHIL # BLD AUTO: 0.58 K/UL (ref 0–0.51)
EOSINOPHIL NFR BLD: 3.9 % (ref 0–6.9)
ERYTHROCYTE [DISTWIDTH] IN BLOOD BY AUTOMATED COUNT: 45.6 FL (ref 35.9–50)
EST. AVERAGE GLUCOSE BLD GHB EST-MCNC: 131 MG/DL
GFR SERPLBLD CREATININE-BSD FMLA CKD-EPI: 78 ML/MIN/1.73 M 2
GLOBULIN SER CALC-MCNC: 3 G/DL (ref 1.9–3.5)
GLUCOSE SERPL-MCNC: 99 MG/DL (ref 65–99)
GLUCOSE UR STRIP.AUTO-MCNC: NEGATIVE MG/DL
HBA1C MFR BLD: 6.2 % (ref 4–5.6)
HCT VFR BLD AUTO: 48.4 % (ref 42–52)
HGB BLD-MCNC: 15.1 G/DL (ref 14–18)
IMM GRANULOCYTES # BLD AUTO: 0.37 K/UL (ref 0–0.11)
IMM GRANULOCYTES NFR BLD AUTO: 2.5 % (ref 0–0.9)
INR PPP: 0.95 (ref 0.87–1.13)
KETONES UR STRIP.AUTO-MCNC: NEGATIVE MG/DL
LEUKOCYTE ESTERASE UR QL STRIP.AUTO: NEGATIVE
LYMPHOCYTES # BLD AUTO: 1.34 K/UL (ref 1–4.8)
LYMPHOCYTES NFR BLD: 8.9 % (ref 22–41)
MCH RBC QN AUTO: 22.8 PG (ref 27–33)
MCHC RBC AUTO-ENTMCNC: 31.2 G/DL (ref 33.7–35.3)
MCV RBC AUTO: 73 FL (ref 81.4–97.8)
METHADONE UR QL SCN: NEGATIVE
MICRO URNS: NORMAL
MONOCYTES # BLD AUTO: 1.16 K/UL (ref 0–0.85)
MONOCYTES NFR BLD AUTO: 7.7 % (ref 0–13.4)
NEUTROPHILS # BLD AUTO: 11.46 K/UL (ref 1.82–7.42)
NEUTROPHILS NFR BLD: 76.1 % (ref 44–72)
NITRITE UR QL STRIP.AUTO: NEGATIVE
NRBC # BLD AUTO: 0 K/UL
NRBC BLD-RTO: 0 /100 WBC
OPIATES UR QL SCN: NEGATIVE
OXYCODONE UR QL SCN: NEGATIVE
PCP UR QL SCN: NEGATIVE
PH UR STRIP.AUTO: 5.5 [PH] (ref 5–8)
PLATELET # BLD AUTO: 602 K/UL (ref 164–446)
PMV BLD AUTO: 9.4 FL (ref 9–12.9)
POTASSIUM SERPL-SCNC: 4.8 MMOL/L (ref 3.6–5.5)
PROPOXYPH UR QL SCN: NEGATIVE
PROT SERPL-MCNC: 7.1 G/DL (ref 6–8.2)
PROT UR QL STRIP: NEGATIVE MG/DL
PROTHROMBIN TIME: 12.6 SEC (ref 12–14.6)
RBC # BLD AUTO: 6.63 M/UL (ref 4.7–6.1)
RBC UR QL AUTO: NEGATIVE
RH BLD: NORMAL
SCCMEC + MECA PNL NOSE NAA+PROBE: NEGATIVE
SCCMEC + MECA PNL NOSE NAA+PROBE: NEGATIVE
SODIUM SERPL-SCNC: 134 MMOL/L (ref 135–145)
SP GR UR STRIP.AUTO: 1.02
UROBILINOGEN UR STRIP.AUTO-MCNC: 0.2 MG/DL
WBC # BLD AUTO: 15 K/UL (ref 4.8–10.8)

## 2023-01-04 PROCEDURE — 81003 URINALYSIS AUTO W/O SCOPE: CPT

## 2023-01-04 PROCEDURE — 86901 BLOOD TYPING SEROLOGIC RH(D): CPT

## 2023-01-04 PROCEDURE — 86900 BLOOD TYPING SEROLOGIC ABO: CPT

## 2023-01-04 PROCEDURE — 87641 MR-STAPH DNA AMP PROBE: CPT

## 2023-01-04 PROCEDURE — 85025 COMPLETE CBC W/AUTO DIFF WBC: CPT

## 2023-01-04 PROCEDURE — 87640 STAPH A DNA AMP PROBE: CPT

## 2023-01-04 PROCEDURE — 93010 ELECTROCARDIOGRAM REPORT: CPT | Performed by: INTERNAL MEDICINE

## 2023-01-04 PROCEDURE — 93005 ELECTROCARDIOGRAM TRACING: CPT

## 2023-01-04 PROCEDURE — 36415 COLL VENOUS BLD VENIPUNCTURE: CPT

## 2023-01-04 PROCEDURE — 83036 HEMOGLOBIN GLYCOSYLATED A1C: CPT

## 2023-01-04 PROCEDURE — 80307 DRUG TEST PRSMV CHEM ANLYZR: CPT

## 2023-01-04 PROCEDURE — 71046 X-RAY EXAM CHEST 2 VIEWS: CPT

## 2023-01-04 PROCEDURE — 86850 RBC ANTIBODY SCREEN: CPT

## 2023-01-04 PROCEDURE — 80053 COMPREHEN METABOLIC PANEL: CPT

## 2023-01-04 PROCEDURE — 85730 THROMBOPLASTIN TIME PARTIAL: CPT

## 2023-01-04 PROCEDURE — 85610 PROTHROMBIN TIME: CPT

## 2023-01-04 RX ORDER — DEXMEDETOMIDINE HYDROCHLORIDE 4 UG/ML
0-1.5 INJECTION, SOLUTION INTRAVENOUS CONTINUOUS
Status: DISCONTINUED | OUTPATIENT
Start: 2023-01-05 | End: 2023-01-05

## 2023-01-04 RX ORDER — METHADONE HYDROCHLORIDE 10 MG/ML
20 INJECTION, SOLUTION INTRAMUSCULAR; INTRAVENOUS; SUBCUTANEOUS ONCE
Status: COMPLETED | OUTPATIENT
Start: 2023-01-05 | End: 2023-01-05

## 2023-01-04 RX ORDER — EPINEPHRINE HCL IN 0.9 % NACL 4MG/250ML
0-.5 PLASTIC BAG, INJECTION (ML) INTRAVENOUS CONTINUOUS
Status: DISCONTINUED | OUTPATIENT
Start: 2023-01-05 | End: 2023-01-05

## 2023-01-04 RX ORDER — PANTOPRAZOLE SODIUM 40 MG/1
40 TABLET, DELAYED RELEASE ORAL DAILY
COMMUNITY
End: 2023-01-20

## 2023-01-04 RX ORDER — NOREPINEPHRINE BITARTRATE 0.03 MG/ML
0-1 INJECTION, SOLUTION INTRAVENOUS CONTINUOUS
Status: DISCONTINUED | OUTPATIENT
Start: 2023-01-05 | End: 2023-01-05

## 2023-01-04 ASSESSMENT — FIBROSIS 4 INDEX
FIB4 SCORE: 0.92
FIB4 SCORE: 0.92

## 2023-01-04 NOTE — TELEPHONE ENCOUNTER
Patient was reminded that AVR and TAA surgery with  Dr. Goyal is on 1/5 at 0730 in the morning. he   are aware check in is at 515 am.    Baseline IS was 1250. he has been compliant   with the IS. Volume has not improved to 1250.    he was prescribed a walking regimen or  told to continue and he has been compliant.   he has been practicing sit to stand.    The CHG wipes instructions were reviewed and understood.    PAT date and time was reviewed with patient and  verified it is within 72 hours of surgery.    Vascular studies and procedures carotids were scheduled, completed,  and reviewed by myself for concerning results did not  need escalation to the SHAUN/MD.    he did not need a dental check/work.    No meds to stop; no food after midnight.    he was told that pantoprazole medication (s) are okay to  take the morning of surgery prior to check in.    We reviewed what to bring the hospital.    Call time 5 minutes

## 2023-01-05 ENCOUNTER — APPOINTMENT (OUTPATIENT)
Dept: CARDIOLOGY | Facility: MEDICAL CENTER | Age: 63
DRG: 219 | End: 2023-01-05
Attending: ANESTHESIOLOGY
Payer: COMMERCIAL

## 2023-01-05 ENCOUNTER — HOSPITAL ENCOUNTER (INPATIENT)
Facility: MEDICAL CENTER | Age: 63
LOS: 6 days | DRG: 219 | End: 2023-01-11
Attending: THORACIC SURGERY (CARDIOTHORACIC VASCULAR SURGERY) | Admitting: THORACIC SURGERY (CARDIOTHORACIC VASCULAR SURGERY)
Payer: COMMERCIAL

## 2023-01-05 ENCOUNTER — ANESTHESIA (OUTPATIENT)
Dept: SURGERY | Facility: MEDICAL CENTER | Age: 63
DRG: 219 | End: 2023-01-05
Payer: COMMERCIAL

## 2023-01-05 ENCOUNTER — APPOINTMENT (OUTPATIENT)
Dept: RADIOLOGY | Facility: MEDICAL CENTER | Age: 63
DRG: 219 | End: 2023-01-05
Attending: THORACIC SURGERY (CARDIOTHORACIC VASCULAR SURGERY)
Payer: COMMERCIAL

## 2023-01-05 DIAGNOSIS — Z95.2 S/P AVR: ICD-10-CM

## 2023-01-05 DIAGNOSIS — G89.18 POST-OPERATIVE PAIN: ICD-10-CM

## 2023-01-05 PROBLEM — E87.5 HYPERKALEMIA: Status: ACTIVE | Noted: 2023-01-05

## 2023-01-05 PROBLEM — I35.0 NONRHEUMATIC AORTIC VALVE STENOSIS: Status: ACTIVE | Noted: 2023-01-05

## 2023-01-05 PROBLEM — Z99.11 ENCOUNTER FOR WEANING FROM VENTILATOR (HCC): Status: ACTIVE | Noted: 2023-01-05

## 2023-01-05 PROBLEM — I71.21 ANEURYSM OF ASCENDING AORTA WITHOUT RUPTURE (HCC): Status: ACTIVE | Noted: 2023-01-05

## 2023-01-05 PROBLEM — D62 ACUTE BLOOD LOSS AS CAUSE OF POSTOPERATIVE ANEMIA: Status: ACTIVE | Noted: 2023-01-05

## 2023-01-05 PROBLEM — K21.9 GASTROESOPHAGEAL REFLUX DISEASE WITHOUT ESOPHAGITIS: Status: ACTIVE | Noted: 2023-01-05

## 2023-01-05 LAB
ABO + RH BLD: NORMAL
ACT BLD: 107 SEC (ref 74–137)
ACT BLD: 131 SEC (ref 74–137)
ACT BLD: 389 SEC (ref 74–137)
ACT BLD: 414 SEC (ref 74–137)
ACT BLD: 624 SEC (ref 74–137)
ACT BLD: 630 SEC (ref 74–137)
ACT BLD: 630 SEC (ref 74–137)
ACT BLD: 642 SEC (ref 74–137)
ACT BLD: 678 SEC (ref 74–137)
ANION GAP SERPL CALC-SCNC: 10 MMOL/L (ref 7–16)
APTT PPP: 46.7 SEC (ref 24.7–36)
BASE EXCESS BLDA CALC-SCNC: -1 MMOL/L (ref -4–3)
BASE EXCESS BLDA CALC-SCNC: -2 MMOL/L (ref -4–3)
BASE EXCESS BLDA CALC-SCNC: -2 MMOL/L (ref -4–3)
BASE EXCESS BLDA CALC-SCNC: -3 MMOL/L (ref -4–3)
BASE EXCESS BLDA CALC-SCNC: -5 MMOL/L (ref -4–3)
BASE EXCESS BLDA CALC-SCNC: 0 MMOL/L (ref -4–3)
BASE EXCESS BLDA CALC-SCNC: 0 MMOL/L (ref -4–3)
BASE EXCESS BLDV CALC-SCNC: 0 MMOL/L (ref -4–3)
BODY TEMPERATURE: ABNORMAL DEGREES
BUN SERPL-MCNC: 27 MG/DL (ref 8–22)
CA-I BLD ISE-SCNC: 0.82 MMOL/L (ref 1.1–1.3)
CA-I BLD ISE-SCNC: 0.88 MMOL/L (ref 1.1–1.3)
CA-I BLD ISE-SCNC: 0.91 MMOL/L (ref 1.1–1.3)
CA-I BLD ISE-SCNC: 0.92 MMOL/L (ref 1.1–1.3)
CA-I BLD ISE-SCNC: 0.93 MMOL/L (ref 1.1–1.3)
CA-I BLD ISE-SCNC: 0.94 MMOL/L (ref 1.1–1.3)
CA-I BLD ISE-SCNC: 0.97 MMOL/L (ref 1.1–1.3)
CA-I BLD ISE-SCNC: 1.08 MMOL/L (ref 1.1–1.3)
CA-I BLD ISE-SCNC: 1.1 MMOL/L (ref 1.1–1.3)
CA-I BLD ISE-SCNC: 1.18 MMOL/L (ref 1.1–1.3)
CALCIUM SERPL-MCNC: 7.3 MG/DL (ref 8.5–10.5)
CHLORIDE SERPL-SCNC: 104 MMOL/L (ref 96–112)
CO2 BLDA-SCNC: 21 MMOL/L (ref 20–33)
CO2 BLDA-SCNC: 23 MMOL/L (ref 20–33)
CO2 BLDA-SCNC: 24 MMOL/L (ref 20–33)
CO2 BLDA-SCNC: 24 MMOL/L (ref 20–33)
CO2 BLDA-SCNC: 25 MMOL/L (ref 20–33)
CO2 BLDA-SCNC: 25 MMOL/L (ref 20–33)
CO2 BLDA-SCNC: 27 MMOL/L (ref 20–33)
CO2 BLDA-SCNC: 28 MMOL/L (ref 20–33)
CO2 BLDA-SCNC: 28 MMOL/L (ref 20–33)
CO2 BLDV-SCNC: 29 MMOL/L (ref 20–33)
CO2 SERPL-SCNC: 21 MMOL/L (ref 20–33)
CREAT SERPL-MCNC: 1.32 MG/DL (ref 0.5–1.4)
EKG IMPRESSION: NORMAL
GFR SERPLBLD CREATININE-BSD FMLA CKD-EPI: 61 ML/MIN/1.73 M 2
GLUCOSE BLD STRIP.AUTO-MCNC: 115 MG/DL (ref 65–99)
GLUCOSE BLD STRIP.AUTO-MCNC: 116 MG/DL (ref 65–99)
GLUCOSE BLD STRIP.AUTO-MCNC: 127 MG/DL (ref 65–99)
GLUCOSE BLD STRIP.AUTO-MCNC: 136 MG/DL (ref 65–99)
GLUCOSE BLD STRIP.AUTO-MCNC: 140 MG/DL (ref 65–99)
GLUCOSE BLD STRIP.AUTO-MCNC: 140 MG/DL (ref 65–99)
GLUCOSE BLD STRIP.AUTO-MCNC: 151 MG/DL (ref 65–99)
GLUCOSE BLD STRIP.AUTO-MCNC: 162 MG/DL (ref 65–99)
GLUCOSE BLD STRIP.AUTO-MCNC: 93 MG/DL (ref 65–99)
GLUCOSE SERPL-MCNC: 173 MG/DL (ref 65–99)
HCO3 BLDA-SCNC: 20 MMOL/L (ref 17–25)
HCO3 BLDA-SCNC: 21.5 MMOL/L (ref 17–25)
HCO3 BLDA-SCNC: 23.1 MMOL/L (ref 17–25)
HCO3 BLDA-SCNC: 23.1 MMOL/L (ref 17–25)
HCO3 BLDA-SCNC: 23.5 MMOL/L (ref 17–25)
HCO3 BLDA-SCNC: 23.7 MMOL/L (ref 17–25)
HCO3 BLDA-SCNC: 25.5 MMOL/L (ref 17–25)
HCO3 BLDA-SCNC: 25.6 MMOL/L (ref 17–25)
HCO3 BLDA-SCNC: 25.8 MMOL/L (ref 17–25)
HCO3 BLDA-SCNC: 26.2 MMOL/L (ref 17–25)
HCO3 BLDA-SCNC: 26.7 MMOL/L (ref 17–25)
HCO3 BLDV-SCNC: 27 MMOL/L (ref 24–28)
HCT VFR BLD AUTO: 40.2 % (ref 42–52)
HCT VFR BLD CALC: 33 % (ref 42–52)
HCT VFR BLD CALC: 34 % (ref 42–52)
HCT VFR BLD CALC: 36 % (ref 42–52)
HCT VFR BLD CALC: 36 % (ref 42–52)
HCT VFR BLD CALC: 37 % (ref 42–52)
HCT VFR BLD CALC: 37 % (ref 42–52)
HCT VFR BLD CALC: 38 % (ref 42–52)
HCT VFR BLD CALC: 45 % (ref 42–52)
HGB BLD-MCNC: 11.2 G/DL (ref 14–18)
HGB BLD-MCNC: 11.6 G/DL (ref 14–18)
HGB BLD-MCNC: 12.2 G/DL (ref 14–18)
HGB BLD-MCNC: 12.2 G/DL (ref 14–18)
HGB BLD-MCNC: 12.5 G/DL (ref 14–18)
HGB BLD-MCNC: 12.6 G/DL (ref 14–18)
HGB BLD-MCNC: 12.6 G/DL (ref 14–18)
HGB BLD-MCNC: 12.9 G/DL (ref 14–18)
HGB BLD-MCNC: 15.3 G/DL (ref 14–18)
HOROWITZ INDEX BLDA+IHG-RTO: 237 MM[HG]
HOROWITZ INDEX BLDA+IHG-RTO: 75 MM[HG]
HOROWITZ INDEX BLDA+IHG-RTO: 85 MM[HG]
INR PPP: 1.3 (ref 0.87–1.13)
LACTATE BLD-SCNC: 1.2 MMOL/L (ref 0.5–2)
LACTATE BLD-SCNC: 1.6 MMOL/L (ref 0.5–2)
MAGNESIUM SERPL-MCNC: 2.5 MG/DL (ref 1.5–2.5)
MAGNESIUM SERPL-MCNC: 3 MG/DL (ref 1.5–2.5)
O2/TOTAL GAS SETTING VFR VENT: 100 %
O2/TOTAL GAS SETTING VFR VENT: 100 %
O2/TOTAL GAS SETTING VFR VENT: 90 %
PATHOLOGY CONSULT NOTE: NORMAL
PCO2 BLDA: 35.4 MMHG (ref 26–37)
PCO2 BLDA: 37.9 MMHG (ref 26–37)
PCO2 BLDA: 38.5 MMHG (ref 26–37)
PCO2 BLDA: 40 MMHG (ref 26–37)
PCO2 BLDA: 40.9 MMHG (ref 26–37)
PCO2 BLDA: 41 MMHG (ref 26–37)
PCO2 BLDA: 45.6 MMHG (ref 26–37)
PCO2 BLDA: 47.7 MMHG (ref 26–37)
PCO2 BLDA: 49.7 MMHG (ref 26–37)
PCO2 BLDA: 50.9 MMHG (ref 26–37)
PCO2 BLDA: 52.4 MMHG (ref 26–37)
PCO2 BLDV: 55.1 MMHG (ref 41–51)
PCO2 TEMP ADJ BLDA: 33.6 MMHG (ref 26–37)
PCO2 TEMP ADJ BLDA: 37.1 MMHG (ref 26–37)
PCO2 TEMP ADJ BLDA: 37.4 MMHG (ref 26–37)
PCO2 TEMP ADJ BLDA: 38.5 MMHG (ref 26–37)
PCO2 TEMP ADJ BLDA: 38.6 MMHG (ref 26–37)
PCO2 TEMP ADJ BLDA: 40.2 MMHG (ref 26–37)
PCO2 TEMP ADJ BLDA: 42.8 MMHG (ref 26–37)
PCO2 TEMP ADJ BLDA: 45.6 MMHG (ref 26–37)
PCO2 TEMP ADJ BLDA: 47.8 MMHG (ref 26–37)
PCO2 TEMP ADJ BLDA: 48 MMHG (ref 26–37)
PCO2 TEMP ADJ BLDA: 50.9 MMHG (ref 26–37)
PCO2 TEMP ADJ BLDV: 51.2 MMHG (ref 41–51)
PH BLDA: 7.31 [PH] (ref 7.4–7.5)
PH BLDA: 7.32 [PH] (ref 7.4–7.5)
PH BLDA: 7.33 [PH] (ref 7.4–7.5)
PH BLDA: 7.33 [PH] (ref 7.4–7.5)
PH BLDA: 7.34 [PH] (ref 7.4–7.5)
PH BLDA: 7.36 [PH] (ref 7.4–7.5)
PH BLDA: 7.38 [PH] (ref 7.4–7.5)
PH BLDA: 7.39 [PH] (ref 7.4–7.5)
PH BLDA: 7.39 [PH] (ref 7.4–7.5)
PH BLDV: 7.3 [PH] (ref 7.31–7.45)
PH TEMP ADJ BLDA: 7.33 [PH] (ref 7.4–7.5)
PH TEMP ADJ BLDA: 7.34 [PH] (ref 7.4–7.5)
PH TEMP ADJ BLDA: 7.36 [PH] (ref 7.4–7.5)
PH TEMP ADJ BLDA: 7.37 [PH] (ref 7.4–7.5)
PH TEMP ADJ BLDA: 7.37 [PH] (ref 7.4–7.5)
PH TEMP ADJ BLDA: 7.39 [PH] (ref 7.4–7.5)
PH TEMP ADJ BLDA: 7.41 [PH] (ref 7.4–7.5)
PH TEMP ADJ BLDV: 7.32 [PH] (ref 7.31–7.45)
PLATELET # BLD AUTO: 460 K/UL (ref 164–446)
PO2 BLDA: 168 MMHG (ref 64–87)
PO2 BLDA: 213 MMHG (ref 64–87)
PO2 BLDA: 262 MMHG (ref 64–87)
PO2 BLDA: 271 MMHG (ref 64–87)
PO2 BLDA: 305 MMHG (ref 64–87)
PO2 BLDA: 328 MMHG (ref 64–87)
PO2 BLDA: 354 MMHG (ref 64–87)
PO2 BLDA: 443 MMHG (ref 64–87)
PO2 BLDA: 75 MMHG (ref 64–87)
PO2 BLDA: 85 MMHG (ref 64–87)
PO2 BLDA: 90 MMHG (ref 64–87)
PO2 BLDV: 52 MMHG (ref 25–40)
PO2 TEMP ADJ BLDA: 166 MMHG (ref 64–87)
PO2 TEMP ADJ BLDA: 213 MMHG (ref 64–87)
PO2 TEMP ADJ BLDA: 262 MMHG (ref 64–87)
PO2 TEMP ADJ BLDA: 267 MMHG (ref 64–87)
PO2 TEMP ADJ BLDA: 294 MMHG (ref 64–87)
PO2 TEMP ADJ BLDA: 328 MMHG (ref 64–87)
PO2 TEMP ADJ BLDA: 342 MMHG (ref 64–87)
PO2 TEMP ADJ BLDA: 431 MMHG (ref 64–87)
PO2 TEMP ADJ BLDA: 69 MMHG (ref 64–87)
PO2 TEMP ADJ BLDA: 81 MMHG (ref 64–87)
PO2 TEMP ADJ BLDA: 88 MMHG (ref 64–87)
PO2 TEMP ADJ BLDV: 47 MMHG (ref 25–40)
POTASSIUM BLD-SCNC: 4.3 MMOL/L (ref 3.6–5.5)
POTASSIUM BLD-SCNC: 4.4 MMOL/L (ref 3.6–5.5)
POTASSIUM BLD-SCNC: 5.3 MMOL/L (ref 3.6–5.5)
POTASSIUM BLD-SCNC: 5.5 MMOL/L (ref 3.6–5.5)
POTASSIUM BLD-SCNC: 5.7 MMOL/L (ref 3.6–5.5)
POTASSIUM BLD-SCNC: 5.8 MMOL/L (ref 3.6–5.5)
POTASSIUM BLD-SCNC: 6.2 MMOL/L (ref 3.6–5.5)
POTASSIUM BLD-SCNC: 6.7 MMOL/L (ref 3.6–5.5)
POTASSIUM BLD-SCNC: 6.9 MMOL/L (ref 3.6–5.5)
POTASSIUM BLD-SCNC: 7.2 MMOL/L (ref 3.6–5.5)
POTASSIUM SERPL-SCNC: 4.4 MMOL/L (ref 3.6–5.5)
POTASSIUM SERPL-SCNC: 5.6 MMOL/L (ref 3.6–5.5)
POTASSIUM SERPL-SCNC: 5.6 MMOL/L (ref 3.6–5.5)
POTASSIUM SERPL-SCNC: 5.8 MMOL/L (ref 3.6–5.5)
POTASSIUM SERPL-SCNC: 6.4 MMOL/L (ref 3.6–5.5)
PROTHROMBIN TIME: 15.9 SEC (ref 12–14.6)
SAO2 % BLDA: 100 % (ref 93–99)
SAO2 % BLDA: 95 % (ref 93–99)
SAO2 % BLDA: 95 % (ref 93–99)
SAO2 % BLDA: 96 % (ref 93–99)
SAO2 % BLDA: 99 % (ref 93–99)
SAO2 % BLDV: 82 %
SODIUM BLD-SCNC: 131 MMOL/L (ref 135–145)
SODIUM BLD-SCNC: 132 MMOL/L (ref 135–145)
SODIUM BLD-SCNC: 134 MMOL/L (ref 135–145)
SODIUM BLD-SCNC: 135 MMOL/L (ref 135–145)
SODIUM BLD-SCNC: 138 MMOL/L (ref 135–145)
SODIUM BLD-SCNC: 138 MMOL/L (ref 135–145)
SODIUM BLD-SCNC: 140 MMOL/L (ref 135–145)
SODIUM SERPL-SCNC: 135 MMOL/L (ref 135–145)
SPECIMEN DRAWN FROM PATIENT: ABNORMAL

## 2023-01-05 PROCEDURE — 700111 HCHG RX REV CODE 636 W/ 250 OVERRIDE (IP): Performed by: ANESTHESIOLOGY

## 2023-01-05 PROCEDURE — C9248 INJ, CLEVIDIPINE BUTYRATE: HCPCS | Performed by: ANESTHESIOLOGY

## 2023-01-05 PROCEDURE — 85730 THROMBOPLASTIN TIME PARTIAL: CPT

## 2023-01-05 PROCEDURE — 160042 HCHG SURGERY MINUTES - EA ADDL 1 MIN LEVEL 5: Performed by: THORACIC SURGERY (CARDIOTHORACIC VASCULAR SURGERY)

## 2023-01-05 PROCEDURE — 700105 HCHG RX REV CODE 258: Performed by: NURSE PRACTITIONER

## 2023-01-05 PROCEDURE — C1729 CATH, DRAINAGE: HCPCS | Performed by: THORACIC SURGERY (CARDIOTHORACIC VASCULAR SURGERY)

## 2023-01-05 PROCEDURE — C1768 GRAFT, VASCULAR: HCPCS | Performed by: THORACIC SURGERY (CARDIOTHORACIC VASCULAR SURGERY)

## 2023-01-05 PROCEDURE — 71045 X-RAY EXAM CHEST 1 VIEW: CPT

## 2023-01-05 PROCEDURE — 84295 ASSAY OF SERUM SODIUM: CPT

## 2023-01-05 PROCEDURE — 85018 HEMOGLOBIN: CPT

## 2023-01-05 PROCEDURE — 82330 ASSAY OF CALCIUM: CPT | Mod: 91

## 2023-01-05 PROCEDURE — 110372 HCHG SHELL REV 278: Performed by: THORACIC SURGERY (CARDIOTHORACIC VASCULAR SURGERY)

## 2023-01-05 PROCEDURE — 33859 AS-AORT GRF F/DS OTH/THN DSJ: CPT | Performed by: THORACIC SURGERY (CARDIOTHORACIC VASCULAR SURGERY)

## 2023-01-05 PROCEDURE — 85049 AUTOMATED PLATELET COUNT: CPT

## 2023-01-05 PROCEDURE — 36415 COLL VENOUS BLD VENIPUNCTURE: CPT

## 2023-01-05 PROCEDURE — 110454 HCHG SHELL REV 250: Performed by: THORACIC SURGERY (CARDIOTHORACIC VASCULAR SURGERY)

## 2023-01-05 PROCEDURE — 94799 UNLISTED PULMONARY SVC/PX: CPT

## 2023-01-05 PROCEDURE — 85610 PROTHROMBIN TIME: CPT

## 2023-01-05 PROCEDURE — 93005 ELECTROCARDIOGRAM TRACING: CPT | Performed by: NURSE PRACTITIONER

## 2023-01-05 PROCEDURE — 94002 VENT MGMT INPAT INIT DAY: CPT

## 2023-01-05 PROCEDURE — 700102 HCHG RX REV CODE 250 W/ 637 OVERRIDE(OP): Performed by: THORACIC SURGERY (CARDIOTHORACIC VASCULAR SURGERY)

## 2023-01-05 PROCEDURE — 700111 HCHG RX REV CODE 636 W/ 250 OVERRIDE (IP): Performed by: THORACIC SURGERY (CARDIOTHORACIC VASCULAR SURGERY)

## 2023-01-05 PROCEDURE — 93312 ECHO TRANSESOPHAGEAL: CPT | Mod: 26,59 | Performed by: ANESTHESIOLOGY

## 2023-01-05 PROCEDURE — C1898 LEAD, PMKR, OTHER THAN TRANS: HCPCS | Performed by: THORACIC SURGERY (CARDIOTHORACIC VASCULAR SURGERY)

## 2023-01-05 PROCEDURE — 770022 HCHG ROOM/CARE - ICU (200)

## 2023-01-05 PROCEDURE — 700105 HCHG RX REV CODE 258: Performed by: THORACIC SURGERY (CARDIOTHORACIC VASCULAR SURGERY)

## 2023-01-05 PROCEDURE — 33405 REPLACEMENT AORTIC VALVE OPN: CPT | Performed by: THORACIC SURGERY (CARDIOTHORACIC VASCULAR SURGERY)

## 2023-01-05 PROCEDURE — 93010 ELECTROCARDIOGRAM REPORT: CPT | Mod: 59 | Performed by: INTERNAL MEDICINE

## 2023-01-05 PROCEDURE — 33859 AS-AORT GRF F/DS OTH/THN DSJ: CPT | Mod: AS | Performed by: NURSE PRACTITIONER

## 2023-01-05 PROCEDURE — 93312 ECHO TRANSESOPHAGEAL: CPT

## 2023-01-05 PROCEDURE — 02UX0JZ SUPPLEMENT THORACIC AORTA, ASCENDING/ARCH WITH SYNTHETIC SUBSTITUTE, OPEN APPROACH: ICD-10-PCS | Performed by: THORACIC SURGERY (CARDIOTHORACIC VASCULAR SURGERY)

## 2023-01-05 PROCEDURE — 94760 N-INVAS EAR/PLS OXIMETRY 1: CPT

## 2023-01-05 PROCEDURE — 503001 HCHG PERFUSION: Performed by: THORACIC SURGERY (CARDIOTHORACIC VASCULAR SURGERY)

## 2023-01-05 PROCEDURE — 5A1221Z PERFORMANCE OF CARDIAC OUTPUT, CONTINUOUS: ICD-10-PCS | Performed by: THORACIC SURGERY (CARDIOTHORACIC VASCULAR SURGERY)

## 2023-01-05 PROCEDURE — 88311 DECALCIFY TISSUE: CPT

## 2023-01-05 PROCEDURE — A9270 NON-COVERED ITEM OR SERVICE: HCPCS | Performed by: THORACIC SURGERY (CARDIOTHORACIC VASCULAR SURGERY)

## 2023-01-05 PROCEDURE — 160031 HCHG SURGERY MINUTES - 1ST 30 MINS LEVEL 5: Performed by: THORACIC SURGERY (CARDIOTHORACIC VASCULAR SURGERY)

## 2023-01-05 PROCEDURE — 88305 TISSUE EXAM BY PATHOLOGIST: CPT

## 2023-01-05 PROCEDURE — 700102 HCHG RX REV CODE 250 W/ 637 OVERRIDE(OP): Performed by: ANESTHESIOLOGY

## 2023-01-05 PROCEDURE — 160009 HCHG ANES TIME/MIN: Performed by: THORACIC SURGERY (CARDIOTHORACIC VASCULAR SURGERY)

## 2023-01-05 PROCEDURE — 700101 HCHG RX REV CODE 250: Performed by: ANESTHESIOLOGY

## 2023-01-05 PROCEDURE — 99291 CRITICAL CARE FIRST HOUR: CPT | Performed by: INTERNAL MEDICINE

## 2023-01-05 PROCEDURE — 37799 UNLISTED PX VASCULAR SURGERY: CPT

## 2023-01-05 PROCEDURE — P9047 ALBUMIN (HUMAN), 25%, 50ML: HCPCS

## 2023-01-05 PROCEDURE — 94669 MECHANICAL CHEST WALL OSCILL: CPT

## 2023-01-05 PROCEDURE — 88304 TISSUE EXAM BY PATHOLOGIST: CPT

## 2023-01-05 PROCEDURE — 700111 HCHG RX REV CODE 636 W/ 250 OVERRIDE (IP)

## 2023-01-05 PROCEDURE — 80048 BASIC METABOLIC PNL TOTAL CA: CPT

## 2023-01-05 PROCEDURE — 700111 HCHG RX REV CODE 636 W/ 250 OVERRIDE (IP): Performed by: NURSE PRACTITIONER

## 2023-01-05 PROCEDURE — 36556 INSERT NON-TUNNEL CV CATH: CPT | Mod: 59,RT | Performed by: ANESTHESIOLOGY

## 2023-01-05 PROCEDURE — A9270 NON-COVERED ITEM OR SERVICE: HCPCS | Performed by: NURSE PRACTITIONER

## 2023-01-05 PROCEDURE — 83735 ASSAY OF MAGNESIUM: CPT | Mod: 91

## 2023-01-05 PROCEDURE — 85014 HEMATOCRIT: CPT

## 2023-01-05 PROCEDURE — 85347 COAGULATION TIME ACTIVATED: CPT | Mod: 91

## 2023-01-05 PROCEDURE — 700102 HCHG RX REV CODE 250 W/ 637 OVERRIDE(OP): Performed by: STUDENT IN AN ORGANIZED HEALTH CARE EDUCATION/TRAINING PROGRAM

## 2023-01-05 PROCEDURE — 700105 HCHG RX REV CODE 258

## 2023-01-05 PROCEDURE — 93005 ELECTROCARDIOGRAM TRACING: CPT | Performed by: THORACIC SURGERY (CARDIOTHORACIC VASCULAR SURGERY)

## 2023-01-05 PROCEDURE — C1725 CATH, TRANSLUMIN NON-LASER: HCPCS | Performed by: THORACIC SURGERY (CARDIOTHORACIC VASCULAR SURGERY)

## 2023-01-05 PROCEDURE — 84132 ASSAY OF SERUM POTASSIUM: CPT | Mod: 91

## 2023-01-05 PROCEDURE — 700102 HCHG RX REV CODE 250 W/ 637 OVERRIDE(OP): Performed by: NURSE PRACTITIONER

## 2023-01-05 PROCEDURE — C1751 CATH, INF, PER/CENT/MIDLINE: HCPCS | Performed by: THORACIC SURGERY (CARDIOTHORACIC VASCULAR SURGERY)

## 2023-01-05 PROCEDURE — 700105 HCHG RX REV CODE 258: Performed by: ANESTHESIOLOGY

## 2023-01-05 PROCEDURE — 700101 HCHG RX REV CODE 250: Performed by: NURSE PRACTITIONER

## 2023-01-05 PROCEDURE — 99292 CRITICAL CARE ADDL 30 MIN: CPT | Performed by: STUDENT IN AN ORGANIZED HEALTH CARE EDUCATION/TRAINING PROGRAM

## 2023-01-05 PROCEDURE — B24BZZ4 ULTRASONOGRAPHY OF HEART WITH AORTA, TRANSESOPHAGEAL: ICD-10-PCS | Performed by: ANESTHESIOLOGY

## 2023-01-05 PROCEDURE — 93321 DOPPLER ECHO F-UP/LMTD STD: CPT | Mod: 26 | Performed by: ANESTHESIOLOGY

## 2023-01-05 PROCEDURE — 93325 DOPPLER ECHO COLOR FLOW MAPG: CPT | Mod: 26 | Performed by: ANESTHESIOLOGY

## 2023-01-05 PROCEDURE — 36620 INSERTION CATHETER ARTERY: CPT | Performed by: ANESTHESIOLOGY

## 2023-01-05 PROCEDURE — 160048 HCHG OR STATISTICAL LEVEL 1-5: Performed by: THORACIC SURGERY (CARDIOTHORACIC VASCULAR SURGERY)

## 2023-01-05 PROCEDURE — 700101 HCHG RX REV CODE 250

## 2023-01-05 PROCEDURE — 00560 ANES PX HEART W/O PUMP OXTJ: CPT | Performed by: ANESTHESIOLOGY

## 2023-01-05 PROCEDURE — 82962 GLUCOSE BLOOD TEST: CPT | Mod: 91

## 2023-01-05 PROCEDURE — 82803 BLOOD GASES ANY COMBINATION: CPT | Mod: 91

## 2023-01-05 PROCEDURE — 700101 HCHG RX REV CODE 250: Performed by: THORACIC SURGERY (CARDIOTHORACIC VASCULAR SURGERY)

## 2023-01-05 PROCEDURE — 83605 ASSAY OF LACTIC ACID: CPT | Mod: 91

## 2023-01-05 PROCEDURE — 33405 REPLACEMENT AORTIC VALVE OPN: CPT | Mod: AS | Performed by: NURSE PRACTITIONER

## 2023-01-05 PROCEDURE — 02RF08Z REPLACEMENT OF AORTIC VALVE WITH ZOOPLASTIC TISSUE, OPEN APPROACH: ICD-10-PCS | Performed by: THORACIC SURGERY (CARDIOTHORACIC VASCULAR SURGERY)

## 2023-01-05 DEVICE — GRAFT HEMASHIELD PLAT 32MM - 30CM WDV: Type: IMPLANTABLE DEVICE | Site: AORTA | Status: FUNCTIONAL

## 2023-01-05 DEVICE — VALVE INSPIRIS AORTIC 27MM: Type: IMPLANTABLE DEVICE | Site: HEART | Status: FUNCTIONAL

## 2023-01-05 RX ORDER — ALUMINA, MAGNESIA, AND SIMETHICONE 2400; 2400; 240 MG/30ML; MG/30ML; MG/30ML
30 SUSPENSION ORAL EVERY 4 HOURS PRN
Status: DISCONTINUED | OUTPATIENT
Start: 2023-01-05 | End: 2023-01-11 | Stop reason: HOSPADM

## 2023-01-05 RX ORDER — POLYETHYLENE GLYCOL 3350 17 G/17G
1 POWDER, FOR SOLUTION ORAL DAILY
Status: DISCONTINUED | OUTPATIENT
Start: 2023-01-06 | End: 2023-01-11 | Stop reason: HOSPADM

## 2023-01-05 RX ORDER — DEXAMETHASONE SODIUM PHOSPHATE 4 MG/ML
INJECTION, SOLUTION INTRA-ARTICULAR; INTRALESIONAL; INTRAMUSCULAR; INTRAVENOUS; SOFT TISSUE PRN
Status: DISCONTINUED | OUTPATIENT
Start: 2023-01-05 | End: 2023-01-05 | Stop reason: SURG

## 2023-01-05 RX ORDER — CEFAZOLIN SODIUM 1 G/3ML
INJECTION, POWDER, FOR SOLUTION INTRAMUSCULAR; INTRAVENOUS PRN
Status: DISCONTINUED | OUTPATIENT
Start: 2023-01-05 | End: 2023-01-05 | Stop reason: HOSPADM

## 2023-01-05 RX ORDER — AMOXICILLIN 250 MG
1 CAPSULE ORAL DAILY
COMMUNITY
End: 2023-01-20

## 2023-01-05 RX ORDER — PROCHLORPERAZINE EDISYLATE 5 MG/ML
10 INJECTION INTRAMUSCULAR; INTRAVENOUS EVERY 6 HOURS PRN
Status: DISCONTINUED | OUTPATIENT
Start: 2023-01-05 | End: 2023-01-11 | Stop reason: HOSPADM

## 2023-01-05 RX ORDER — ACETAMINOPHEN 500 MG
1000 TABLET ORAL EVERY 6 HOURS PRN
Status: DISCONTINUED | OUTPATIENT
Start: 2023-01-10 | End: 2023-01-11 | Stop reason: HOSPADM

## 2023-01-05 RX ORDER — PROTAMINE SULFATE 10 MG/ML
INJECTION, SOLUTION INTRAVENOUS PRN
Status: DISCONTINUED | OUTPATIENT
Start: 2023-01-05 | End: 2023-01-05 | Stop reason: SURG

## 2023-01-05 RX ORDER — PHENYLEPHRINE HCL IN 0.9% NACL 0.5 MG/5ML
SYRINGE (ML) INTRAVENOUS PRN
Status: DISCONTINUED | OUTPATIENT
Start: 2023-01-05 | End: 2023-01-05 | Stop reason: SURG

## 2023-01-05 RX ORDER — SODIUM CHLORIDE, SODIUM LACTATE, POTASSIUM CHLORIDE, CALCIUM CHLORIDE 600; 310; 30; 20 MG/100ML; MG/100ML; MG/100ML; MG/100ML
INJECTION, SOLUTION INTRAVENOUS
Status: DISCONTINUED | OUTPATIENT
Start: 2023-01-05 | End: 2023-01-05 | Stop reason: SURG

## 2023-01-05 RX ORDER — MIDAZOLAM HYDROCHLORIDE 1 MG/ML
INJECTION INTRAMUSCULAR; INTRAVENOUS PRN
Status: DISCONTINUED | OUTPATIENT
Start: 2023-01-05 | End: 2023-01-05

## 2023-01-05 RX ORDER — LIDOCAINE HYDROCHLORIDE 20 MG/ML
INJECTION, SOLUTION EPIDURAL; INFILTRATION; INTRACAUDAL; PERINEURAL PRN
Status: DISCONTINUED | OUTPATIENT
Start: 2023-01-05 | End: 2023-01-05 | Stop reason: SURG

## 2023-01-05 RX ORDER — NOREPINEPHRINE BITARTRATE 0.03 MG/ML
0-1 INJECTION, SOLUTION INTRAVENOUS CONTINUOUS
Status: DISCONTINUED | OUTPATIENT
Start: 2023-01-05 | End: 2023-01-07

## 2023-01-05 RX ORDER — TRAMADOL HYDROCHLORIDE 50 MG/1
50 TABLET ORAL EVERY 4 HOURS PRN
Status: DISCONTINUED | OUTPATIENT
Start: 2023-01-05 | End: 2023-01-11 | Stop reason: HOSPADM

## 2023-01-05 RX ORDER — MAGNESIUM SULFATE 1 G/100ML
1 INJECTION INTRAVENOUS DAILY
Status: COMPLETED | OUTPATIENT
Start: 2023-01-05 | End: 2023-01-07

## 2023-01-05 RX ORDER — AMOXICILLIN 250 MG
2 CAPSULE ORAL 2 TIMES DAILY
Status: DISCONTINUED | OUTPATIENT
Start: 2023-01-05 | End: 2023-01-11 | Stop reason: HOSPADM

## 2023-01-05 RX ORDER — SODIUM CHLORIDE, SODIUM GLUCONATE, SODIUM ACETATE, POTASSIUM CHLORIDE AND MAGNESIUM CHLORIDE 526; 502; 368; 37; 30 MG/100ML; MG/100ML; MG/100ML; MG/100ML; MG/100ML
INJECTION, SOLUTION INTRAVENOUS
Status: DISCONTINUED | OUTPATIENT
Start: 2023-01-05 | End: 2023-01-05

## 2023-01-05 RX ORDER — DEXTROSE MONOHYDRATE 25 G/50ML
50 INJECTION, SOLUTION INTRAVENOUS ONCE
Status: DISCONTINUED | OUTPATIENT
Start: 2023-01-05 | End: 2023-01-05

## 2023-01-05 RX ORDER — EPINEPHRINE HCL IN 0.9 % NACL 4MG/250ML
0-.5 PLASTIC BAG, INJECTION (ML) INTRAVENOUS CONTINUOUS
Status: DISCONTINUED | OUTPATIENT
Start: 2023-01-05 | End: 2023-01-07

## 2023-01-05 RX ORDER — DEXMEDETOMIDINE HYDROCHLORIDE 4 UG/ML
0-1.5 INJECTION, SOLUTION INTRAVENOUS CONTINUOUS
Status: DISCONTINUED | OUTPATIENT
Start: 2023-01-05 | End: 2023-01-05

## 2023-01-05 RX ORDER — BISACODYL 10 MG
10 SUPPOSITORY, RECTAL RECTAL
Status: DISCONTINUED | OUTPATIENT
Start: 2023-01-05 | End: 2023-01-11 | Stop reason: HOSPADM

## 2023-01-05 RX ORDER — NITROGLYCERIN 20 MG/100ML
0-100 INJECTION INTRAVENOUS CONTINUOUS
Status: DISCONTINUED | OUTPATIENT
Start: 2023-01-05 | End: 2023-01-07

## 2023-01-05 RX ORDER — DEXMEDETOMIDINE HYDROCHLORIDE 4 UG/ML
0-1.5 INJECTION, SOLUTION INTRAVENOUS CONTINUOUS
Status: DISCONTINUED | OUTPATIENT
Start: 2023-01-05 | End: 2023-01-07

## 2023-01-05 RX ORDER — SODIUM CHLORIDE, SODIUM GLUCONATE, SODIUM ACETATE, POTASSIUM CHLORIDE AND MAGNESIUM CHLORIDE 526; 502; 368; 37; 30 MG/100ML; MG/100ML; MG/100ML; MG/100ML; MG/100ML
INJECTION, SOLUTION INTRAVENOUS PRN
Status: DISCONTINUED | OUTPATIENT
Start: 2023-01-05 | End: 2023-01-08

## 2023-01-05 RX ORDER — ROCURONIUM BROMIDE 10 MG/ML
INJECTION, SOLUTION INTRAVENOUS PRN
Status: DISCONTINUED | OUTPATIENT
Start: 2023-01-05 | End: 2023-01-05 | Stop reason: SURG

## 2023-01-05 RX ORDER — SODIUM CHLORIDE 9 MG/ML
INJECTION, SOLUTION INTRAVENOUS
Status: DISCONTINUED | OUTPATIENT
Start: 2023-01-05 | End: 2023-01-05 | Stop reason: SURG

## 2023-01-05 RX ORDER — ACETAMINOPHEN 325 MG/1
650 TABLET ORAL EVERY 4 HOURS PRN
Status: DISCONTINUED | OUTPATIENT
Start: 2023-01-05 | End: 2023-01-11 | Stop reason: HOSPADM

## 2023-01-05 RX ORDER — ACETAMINOPHEN 650 MG/1
650 SUPPOSITORY RECTAL EVERY 4 HOURS PRN
Status: DISCONTINUED | OUTPATIENT
Start: 2023-01-05 | End: 2023-01-11 | Stop reason: HOSPADM

## 2023-01-05 RX ORDER — OMEPRAZOLE 20 MG/1
20 CAPSULE, DELAYED RELEASE ORAL DAILY
Status: DISCONTINUED | OUTPATIENT
Start: 2023-01-06 | End: 2023-01-11 | Stop reason: HOSPADM

## 2023-01-05 RX ORDER — ACETAMINOPHEN 500 MG
1000 TABLET ORAL EVERY 6 HOURS
Status: DISPENSED | OUTPATIENT
Start: 2023-01-05 | End: 2023-01-10

## 2023-01-05 RX ORDER — OXYCODONE HYDROCHLORIDE 10 MG/1
10 TABLET ORAL
Status: DISCONTINUED | OUTPATIENT
Start: 2023-01-05 | End: 2023-01-11 | Stop reason: HOSPADM

## 2023-01-05 RX ORDER — ONDANSETRON 2 MG/ML
8 INJECTION INTRAMUSCULAR; INTRAVENOUS EVERY 6 HOURS PRN
Status: DISCONTINUED | OUTPATIENT
Start: 2023-01-05 | End: 2023-01-11 | Stop reason: HOSPADM

## 2023-01-05 RX ORDER — SODIUM CHLORIDE 9 MG/ML
INJECTION, SOLUTION INTRAVENOUS CONTINUOUS
Status: DISCONTINUED | OUTPATIENT
Start: 2023-01-05 | End: 2023-01-07

## 2023-01-05 RX ORDER — HEPARIN SODIUM,PORCINE 1000/ML
VIAL (ML) INJECTION PRN
Status: DISCONTINUED | OUTPATIENT
Start: 2023-01-05 | End: 2023-01-05 | Stop reason: HOSPADM

## 2023-01-05 RX ORDER — MIDAZOLAM HYDROCHLORIDE 1 MG/ML
2 INJECTION INTRAMUSCULAR; INTRAVENOUS
Status: DISCONTINUED | OUTPATIENT
Start: 2023-01-05 | End: 2023-01-07

## 2023-01-05 RX ORDER — OXYCODONE HYDROCHLORIDE 5 MG/1
5 TABLET ORAL
Status: DISCONTINUED | OUTPATIENT
Start: 2023-01-05 | End: 2023-01-11 | Stop reason: HOSPADM

## 2023-01-05 RX ORDER — ACETAMINOPHEN 500 MG
1000 TABLET ORAL ONCE
Status: COMPLETED | OUTPATIENT
Start: 2023-01-05 | End: 2023-01-05

## 2023-01-05 RX ADMIN — OXYCODONE 5 MG: 5 TABLET ORAL at 18:10

## 2023-01-05 RX ADMIN — DOCUSATE SODIUM 50 MG AND SENNOSIDES 8.6 MG 2 TABLET: 8.6; 5 TABLET, FILM COATED ORAL at 19:40

## 2023-01-05 RX ADMIN — HEPARIN SODIUM 5000 UNITS: 1000 INJECTION, SOLUTION INTRAVENOUS; SUBCUTANEOUS at 09:20

## 2023-01-05 RX ADMIN — Medication 200 MCG: at 09:10

## 2023-01-05 RX ADMIN — ACETAMINOPHEN 1000 MG: 500 TABLET ORAL at 19:40

## 2023-01-05 RX ADMIN — FENTANYL CITRATE 50 MCG: 50 INJECTION, SOLUTION INTRAMUSCULAR; INTRAVENOUS at 17:12

## 2023-01-05 RX ADMIN — EPHEDRINE SULFATE 10 MG: 50 INJECTION, SOLUTION INTRAVENOUS at 08:08

## 2023-01-05 RX ADMIN — MAGNESIUM SULFATE HEPTAHYDRATE 1 G: 1 INJECTION, SOLUTION INTRAVENOUS at 13:36

## 2023-01-05 RX ADMIN — ROCURONIUM BROMIDE 50 MG: 10 INJECTION, SOLUTION INTRAVENOUS at 11:57

## 2023-01-05 RX ADMIN — Medication 200 MCG: at 09:32

## 2023-01-05 RX ADMIN — Medication 200 MCG: at 08:50

## 2023-01-05 RX ADMIN — INSULIN HUMAN 5 UNITS: 100 INJECTION, SOLUTION PARENTERAL at 22:46

## 2023-01-05 RX ADMIN — SODIUM CHLORIDE, SODIUM GLUCONATE, SODIUM ACETATE, POTASSIUM CHLORIDE AND MAGNESIUM CHLORIDE: 526; 502; 368; 37; 30 INJECTION, SOLUTION INTRAVENOUS at 20:00

## 2023-01-05 RX ADMIN — SODIUM CHLORIDE: 9 INJECTION, SOLUTION INTRAVENOUS at 08:16

## 2023-01-05 RX ADMIN — SODIUM CHLORIDE, POTASSIUM CHLORIDE, SODIUM LACTATE AND CALCIUM CHLORIDE: 600; 310; 30; 20 INJECTION, SOLUTION INTRAVENOUS at 08:02

## 2023-01-05 RX ADMIN — METOPROLOL TARTRATE 12.5 MG: 25 TABLET, FILM COATED ORAL at 06:56

## 2023-01-05 RX ADMIN — CEFAZOLIN 2 G: 2 INJECTION, POWDER, FOR SOLUTION INTRAMUSCULAR; INTRAVENOUS at 20:53

## 2023-01-05 RX ADMIN — EPINEPHRINE 0.02 MCG/KG/MIN: 1 INJECTION INTRAMUSCULAR; INTRAVENOUS; SUBCUTANEOUS at 12:16

## 2023-01-05 RX ADMIN — FENTANYL CITRATE 50 MCG: 50 INJECTION, SOLUTION INTRAMUSCULAR; INTRAVENOUS at 22:20

## 2023-01-05 RX ADMIN — ROCURONIUM BROMIDE 100 MG: 10 INJECTION, SOLUTION INTRAVENOUS at 08:07

## 2023-01-05 RX ADMIN — FENTANYL CITRATE 100 MCG: 50 INJECTION, SOLUTION INTRAMUSCULAR; INTRAVENOUS at 08:05

## 2023-01-05 RX ADMIN — NOREPINEPHRINE BITARTRATE 0.02 MCG/KG/MIN: 1 INJECTION, SOLUTION, CONCENTRATE INTRAVENOUS at 08:11

## 2023-01-05 RX ADMIN — HEPARIN SODIUM 38000 UNITS: 1000 INJECTION, SOLUTION INTRAVENOUS; SUBCUTANEOUS at 09:08

## 2023-01-05 RX ADMIN — MIDAZOLAM HYDROCHLORIDE 2 MG: 1 INJECTION, SOLUTION INTRAMUSCULAR; INTRAVENOUS at 08:05

## 2023-01-05 RX ADMIN — METHADONE HYDROCHLORIDE 10 MG: 10 INJECTION, SOLUTION INTRAMUSCULAR; INTRAVENOUS; SUBCUTANEOUS at 08:07

## 2023-01-05 RX ADMIN — Medication 200 MCG: at 08:11

## 2023-01-05 RX ADMIN — CEFAZOLIN 2 G: 2 INJECTION, POWDER, FOR SOLUTION INTRAMUSCULAR; INTRAVENOUS at 08:18

## 2023-01-05 RX ADMIN — AMINOCAPROIC ACID 9730 MG: 250 INJECTION, SOLUTION INTRAVENOUS at 09:00

## 2023-01-05 RX ADMIN — AMINOCAPROIC ACID 2 G/HR: 250 INJECTION, SOLUTION INTRAVENOUS at 09:38

## 2023-01-05 RX ADMIN — LIDOCAINE HYDROCHLORIDE 100 MG: 20 INJECTION, SOLUTION EPIDURAL; INFILTRATION; INTRACAUDAL at 08:07

## 2023-01-05 RX ADMIN — OXYCODONE HYDROCHLORIDE 10 MG: 10 TABLET ORAL at 21:20

## 2023-01-05 RX ADMIN — DEXTROSE MONOHYDRATE 25 G: 100 INJECTION, SOLUTION INTRAVENOUS at 22:48

## 2023-01-05 RX ADMIN — CLEVIPIDINE 2 MG/HR: 0.5 EMULSION INTRAVENOUS at 11:36

## 2023-01-05 RX ADMIN — CEFAZOLIN 1 G: 330 INJECTION, POWDER, FOR SOLUTION INTRAMUSCULAR; INTRAVENOUS at 11:58

## 2023-01-05 RX ADMIN — CLEVIPIDINE 200 MCG: 0.5 EMULSION INTRAVENOUS at 09:22

## 2023-01-05 RX ADMIN — ACETAMINOPHEN 1000 MG: 500 TABLET ORAL at 06:56

## 2023-01-05 RX ADMIN — SODIUM CHLORIDE 2 UNITS/HR: 9 INJECTION, SOLUTION INTRAVENOUS at 10:11

## 2023-01-05 RX ADMIN — PROPOFOL 150 MG: 10 INJECTION, EMULSION INTRAVENOUS at 08:07

## 2023-01-05 RX ADMIN — DEXMEDETOMIDINE HYDROCHLORIDE 0.7 MCG/KG/HR: 100 INJECTION, SOLUTION INTRAVENOUS at 23:44

## 2023-01-05 RX ADMIN — DEXAMETHASONE SODIUM PHOSPHATE 8 MG: 4 INJECTION, SOLUTION INTRA-ARTICULAR; INTRALESIONAL; INTRAMUSCULAR; INTRAVENOUS; SOFT TISSUE at 08:20

## 2023-01-05 RX ADMIN — SODIUM CHLORIDE, SODIUM GLUCONATE, SODIUM ACETATE, POTASSIUM CHLORIDE AND MAGNESIUM CHLORIDE: 526; 502; 368; 37; 30 INJECTION, SOLUTION INTRAVENOUS at 12:19

## 2023-01-05 RX ADMIN — Medication 200 MCG: at 08:08

## 2023-01-05 RX ADMIN — PROTAMINE SULFATE 450 MG: 10 INJECTION, SOLUTION INTRAVENOUS at 11:41

## 2023-01-05 RX ADMIN — METHADONE HYDROCHLORIDE 10 MG: 10 INJECTION, SOLUTION INTRAMUSCULAR; INTRAVENOUS; SUBCUTANEOUS at 08:40

## 2023-01-05 RX ADMIN — DEXMEDETOMIDINE HYDROCHLORIDE 0.3 MCG/KG/HR: 100 INJECTION, SOLUTION INTRAVENOUS at 09:36

## 2023-01-05 RX ADMIN — SODIUM CHLORIDE: 9 INJECTION, SOLUTION INTRAVENOUS at 13:33

## 2023-01-05 RX ADMIN — Medication 200 MCG: at 08:14

## 2023-01-05 RX ADMIN — SODIUM CHLORIDE, SODIUM GLUCONATE, SODIUM ACETATE, POTASSIUM CHLORIDE AND MAGNESIUM CHLORIDE: 526; 502; 368; 37; 30 INJECTION, SOLUTION INTRAVENOUS at 08:15

## 2023-01-05 ASSESSMENT — PAIN DESCRIPTION - PAIN TYPE
TYPE: SURGICAL PAIN
TYPE: ACUTE PAIN
TYPE: ACUTE PAIN
TYPE: SURGICAL PAIN
TYPE: ACUTE PAIN
TYPE: ACUTE PAIN
TYPE: SURGICAL PAIN

## 2023-01-05 ASSESSMENT — FIBROSIS 4 INDEX: FIB4 SCORE: 0.51

## 2023-01-05 ASSESSMENT — PAIN SCALES - GENERAL: PAIN_LEVEL: 0

## 2023-01-05 NOTE — ASSESSMENT & PLAN NOTE
Status postsurgical repair 1/5  Postoperative management as described above with strict attention to blood pressure control

## 2023-01-05 NOTE — ASSESSMENT & PLAN NOTE
S/p aortic valve replacement with ascending aortic aneurysm repair with intraoperative ABBY 1/5/2023  Continue routine postoperative management  Monitor for and treat arrhythmias  Strict blood pressure management  As needed analgesics  Aspirin  S/p norepinephrine/epinephrine drips  S/p insulin infusion for tight glycemic control in the post-operative setting --> continue insulin sliding scale

## 2023-01-05 NOTE — ANESTHESIA PROCEDURE NOTES
Arterial Line  Performed by: Noe Bar M.D.  Authorized by: Noe Bar M.D.     Start Time:  1/5/2023 8:10 AM  End Time:  1/5/2023 8:10 AM  Localization: surface landmarks    Patient Location:  OR  Indication: continuous blood pressure monitoring        Catheter Size:  20 G  Seldinger Technique?: Yes    Laterality:  Left  Site:  Radial artery  Line Secured:  Antimicrobial disc, tape and transparent dressing  Events: patient tolerated procedure well with no complications

## 2023-01-05 NOTE — ANESTHESIA TIME REPORT
Anesthesia Start and Stop Event Times     Date Time Event    1/5/2023 0730 Ready for Procedure     0802 Anesthesia Start     1303 Anesthesia Stop        Responsible Staff  01/05/23    Name Role Begin End    Noe Bar M.D. Anesth 0802 1303        Overtime Reason:  no overtime (within assigned shift)    Comments:

## 2023-01-05 NOTE — ANESTHESIA PREPROCEDURE EVALUATION
Case: [id number] Date/Time: 01/05/23 0715    Procedures:       AORTIC VALVE REPLACEMENT, ASCENDING AORTIC ANEURYSM REPAIR AND TRANSESOPHAGEAL ECHOCARDIOGRAM. (Chest)      REPAIR, ANEURYSM OR DISSECTION, AORTA, ASCENDING (Chest)      ECHOCARDIOGRAM, TRANSESOPHAGEAL, INTRAOPERATIVE. (Esophagus)    Anesthesia type: General    Pre-op diagnosis: ASCENDING AORTIC ANEURYSM/AORTIC STENOSIS    Location: [facility] OR 03 / SURGERY [facility]    Surgeons: Serena Goyal M.D.          Relevant Problems   No relevant active problems       Physical Exam    Airway   Mallampati: III  TM distance: >3 FB  Neck ROM: full       Cardiovascular - normal exam  Rhythm: regular  Rate: normal  (+) murmur     Dental - normal exam             Pulmonary   (+) decreased breath sounds     Abdominal   (+) obese     Neurological - normal exam               Anesthesia Plan    ASA 4   ASA physical status 4 criteria: severe valve dysfunction    Plan - general       Airway plan will be ETT  ABBY Planned        Induction: intravenous    Postoperative Plan: Postoperative administration of opioids is intended.    Pertinent diagnostic labs and testing reviewed    Informed Consent:    Anesthetic plan and risks discussed with patient.    Use of blood products discussed with: patient whom consented to blood products.       severe symptomatic AS with ascending aortic anuerysm, current smoker, very short of breath, poor exercise tolerance, GERD here for AVR aneurysm repair.

## 2023-01-05 NOTE — ASSESSMENT & PLAN NOTE
Intraoperatively 6.9,  continues improving, down to 4.9 today  Monitor daily serum potassium levels  S/p IV calcium and bicarb  Continuous telemetry monitoring

## 2023-01-05 NOTE — ANESTHESIA POSTPROCEDURE EVALUATION
Patient: Noe Hickey    Procedure Summary     Date: 01/05/23 Room / Location: [facility] 03 / SURGERY [facility]    Anesthesia Start: 0802 Anesthesia Stop: 1303    Procedures:       AORTIC VALVE REPLACEMENT, ASCENDING AORTIC ANEURYSM REPAIR AND TRANSESOPHAGEAL ECHOCARDIOGRAM. (Chest)      REPAIR, ANEURYSM OR DISSECTION, AORTA, ASCENDING (Chest)      ECHOCARDIOGRAM, TRANSESOPHAGEAL, INTRAOPERATIVE. (Esophagus) Diagnosis: (ASCENDING AORTIC ANEURYSM/AORTIC STENOSIS)    Surgeons: Serena Goyal M.D. Responsible Provider: Noe Bar M.D.    Anesthesia Type: general ASA Status: 4          Final Anesthesia Type: general  Last vitals  BP   Blood Pressure: 128/74    Temp   36.3 °C (97.4 °F)    Pulse   83   Resp   (!) 24    SpO2   94 %      Anesthesia Post Evaluation    Patient location during evaluation: ICU  Patient participation: waiting for patient participation  Level of consciousness: obtunded/minimal responses  Pain score: 0    Airway patency: patent  Anesthetic complications: no  Cardiovascular status: hemodynamically stable  Respiratory status: acceptable, ventilator and intubated  Hydration status: euvolemic    PONV: none  patient was unable to participate        No notable events documented.     Nurse Pain Score: 5 (NPRS)

## 2023-01-05 NOTE — CONSULTS
Critical Care Consultation    Date of consult: 1/5/2023    Referring Physician  Serena Goyal M.D.    Reason for Consultation  Perioperative critical care management    History of Presenting Illness  62 y.o. male who presented 1/5/2023 with a past medical history significant for hypertension, atrial flutter with RVR, cyclical vomiting syndrome, GERD, obesity and symptomatic aortic stenosis due to bicuspid aortic valve and an ascending aortic aneurysm who was taken to the operating room today for an elective aortic valve replacement and aneurysm repair.  He is brought to the intensive care unit postoperatively and I was consulted for critical care management.   Per anesthesiology: Patient was intubated easily with an 8.0 endotracheal tube at 24 cm at the teeth with a grade 2A view.  Patient received 2.5 L of IV crystalloid and 580 mL of Cell Saver with urine output of 250 cc.  On echo, he has left ventricular hypertrophy with a dilated right ventricle and ejection fraction of 50 to 55% on epinephrine and norepinephrine drips.  His current drips include Precedex at 0.3, insulin drip is off, norepinephrine at 0.04 and epinephrine at 0.04.  He did have hyperkalemia intraoperatively up to 6.9 that was treated medically and most recently is down to 5.    Code Status  Full Code    Review of Systems  Review of Systems   Unable to perform ROS: Intubated     Past Medical History   has a past medical history of Arrhythmia, Breath shortness, Cyclic vomiting syndrome, Fracture, Headache, classical migraine, Heart burn, Heart valve disease, Indigestion, and Snoring.    He has no past medical history of Anesthesia, Asthma, Cancer (HCC), Carcinoma in situ of respiratory system, Cataract, Cold, Coughing blood, Dental disorder, Disorder of thyroid, Encounter for renal dialysis, Glaucoma, Heart murmur, Hemorrhagic disorder (HCC), Hepatitis A, Hepatitis B, Hepatitis C, Hiatus hernia syndrome, High cholesterol, Hypertension,  Myocardial infarct (HCC), Pacemaker, Personal history of venous thrombosis and embolism, Pneumonia, Psychiatric problem, Rheumatic fever, Seizure (HCC), Sleep apnea, Stroke (HCC), Tuberculosis, Urinary bladder disorder, or Urinary incontinence.    Surgical History   has a past surgical history that includes shoulder arthroscopy; shoulder hemicap resurfacing (Right, 10/12/2015); irrigation & debridement ortho (Right, 09/19/2017); and shoulder surgery.    Family History  family history is not on file.    Social History   reports that he quit smoking 2 days ago. His smoking use included cigarettes. He has a 40.00 pack-year smoking history. He has never used smokeless tobacco. He reports that he does not currently use drugs. He reports that he does not drink alcohol.    Medications  Home Medications       Reviewed by Desi Parks R.N. (Registered Nurse) on 01/05/23 at 0652  Med List Status: Complete     Medication Last Dose Status   asa/apap/caffeine (EXCEDRIN) 250-250-65 MG Tab 1/2/2023 Active   pantoprazole (PROTONIX) 40 MG Tablet Delayed Response 1/4/2023 Active   senna-docusate (PERICOLACE OR SENOKOT S) 8.6-50 MG Tab 1/3/2023 Active                  Current Facility-Administered Medications   Medication Dose Route Frequency Provider Last Rate Last Admin    Cardiac Surgery Prophylactic Antibiotics per Pharmacy   Other PHARMACY TO DOSE Serena Goyal M.D.        lidocaine (XYLOCAINE) 1 % injection 0.5 mL  0.5 mL Intradermal Once PRN Serena Goyal M.D.        insulin regular (HumuLIN R) 100 Units in  mL Infusion  1-6 Units/hr Intravenous Continuous Serena Goyal M.D.        EPINEPHrine (Adrenalin) infusion 4 mg/250 mL (premix)  0-0.5 mcg/kg/min (Ideal) Intravenous Continuous Serena Goyal M.D.        norepinephrine (Levophed) 8 mg in 250 mL NS infusion (premix)  0-1 mcg/kg/min (Ideal) Intravenous Continuous Serena Goyal M.D. 5.303 mL/hr at 01/05/23 0820 0.04 mcg/kg/min at 01/05/23 0820     dexmedetomidine (PRECEDEX) 400 mcg/100mL NS premix infusion  0-1.5 mcg/kg/hr (Ideal) Intravenous Continuous Serena Goyal M.D. 5.303 mL/hr at 01/05/23 0936 0.3 mcg/kg/hr at 01/05/23 0936     Facility-Administered Medications Ordered in Other Encounters   Medication Dose Route Frequency Provider Last Rate Last Admin    heparin OR USE ONLY   Intravenous PRN Noe Bar M.D.   5,000 Units at 01/05/23 0920    Lactated Ringers   Intravenous Intra-Op Continuous Noe Bar M.D.   New Bag at 01/05/23 0802    electrolyte-A (PLASMALYTE-A) infusion   Intravenous Intra-Op Continuous Noe Bar M.D.   New Bag at 01/05/23 0815    NS infusion   Intravenous Intra-Op Continuous Noe Bar M.D.   New Bag at 01/05/23 0816    fentaNYL (SUBLIMAZE) injection   Intravenous PRN Noe Bar M.D.   100 mcg at 01/05/23 0805    midazolam (Versed) injection   Intravenous PRN Noe Bar M.D.   2 mg at 01/05/23 0805    rocuronium (ZEMURON) injection   Intravenous PRN Noe Bar M.D.   100 mg at 01/05/23 0807    propofol (DIPRIVAN) injection   Intravenous PRN Noe Bar M.D.   150 mg at 01/05/23 0807    dexamethasone (DECADRON) injection   Intravenous PRDIPAK Bar M.D.   8 mg at 01/05/23 0820    ePHEDrine injection   Intravenous PRDIPAK Bar M.D.   10 mg at 01/05/23 0808    phenylephrine (HANSA-SYNEPHRINE) 100 mcg/mL inj (IV Push Syringe)   Intravenous PRN Noe Bar M.D.   200 mcg at 01/05/23 0814       Allergies  Allergies   Allergen Reactions    Morphine      Rash/ difficulty breathing       Vital Signs last 24 hours  Temp:  [36.3 °C (97.4 °F)] 36.3 °C (97.4 °F)  Pulse:  [74-83] 83  Resp:  [24] 24  BP: (128-141)/(74-81) 128/74  SpO2:  [94 %] 94 %    Physical Exam  Physical Exam  Vitals and nursing note reviewed.   Constitutional:       General: He is sleeping.      Appearance: He is well-developed and normal weight. He is not diaphoretic.      Interventions: He is sedated and intubated.   HENT:       Head: Normocephalic and atraumatic.      Nose: Nose normal. No congestion.      Mouth/Throat:      Mouth: Mucous membranes are moist.      Pharynx: Oropharynx is clear.      Comments: Endotracheal tube in place  Eyes:      General: No scleral icterus.     Conjunctiva/sclera: Conjunctivae normal.      Pupils: Pupils are equal, round, and reactive to light.   Neck:      Comments: Right IJ central venous catheter without surrounding hematoma  Cardiovascular:      Rate and Rhythm: Normal rate and regular rhythm. Occasional Extrasystoles are present.     Chest Wall: PMI is displaced.      Pulses: Decreased pulses.      Heart sounds: Heart sounds are distant. No murmur heard.     Comments: Sternotomy incision is clean/dry/intact, mediastinal chest tubes and epicardial pacing wires in place, epinephrine and norepinephrine drips infusing  Pulmonary:      Effort: No tachypnea or accessory muscle usage. He is intubated.      Breath sounds: Examination of the right-lower field reveals rales. Examination of the left-lower field reveals rales. Rales present. No wheezing.      Comments: % support, PEEP of 8, 100% FiO2 with good compliance  Abdominal:      General: Bowel sounds are normal. There is no distension.      Palpations: Abdomen is soft.      Tenderness: There is no abdominal tenderness. There is no guarding.   Genitourinary:     Comments: Miranda catheter in place  Musculoskeletal:         General: No tenderness.      Cervical back: Neck supple. No rigidity.      Right lower leg: No edema.      Left lower leg: No edema.      Comments: Left radial arterial catheter in place with distal CMS intact   Skin:     General: Skin is warm and dry.      Capillary Refill: Capillary refill takes 2 to 3 seconds.      Coloration: Skin is not pale.   Neurological:      Comments: Heavily sedated postoperatively   Psychiatric:      Comments: Unable to assess given current clinical condition       Fluids    Intake/Output Summary  (Last 24 hours) at 1/5/2023 0943  Last data filed at 1/5/2023 0818  Gross per 24 hour   Intake 10 ml   Output --   Net 10 ml       Laboratory  Recent Results (from the past 48 hour(s))   S. Aureus By PCR, Nasal Complete    Collection Time: 01/04/23  8:42 AM    Specimen: Respirate   Result Value Ref Range    Staph aureus by PCR Negative Negative    MRSA by PCR Negative Negative   URINALYSIS,CULTURE IF INDICATED    Collection Time: 01/04/23  8:43 AM    Specimen: Urine   Result Value Ref Range    Color Yellow     Character Clear     Specific Gravity 1.023 <1.035    Ph 5.5 5.0 - 8.0    Glucose Negative Negative mg/dL    Ketones Negative Negative mg/dL    Protein Negative Negative mg/dL    Bilirubin Negative Negative    Urobilinogen, Urine 0.2 Negative    Nitrite Negative Negative    Leukocyte Esterase Negative Negative    Occult Blood Negative Negative    Micro Urine Req see below    Comp Metabolic Panel    Collection Time: 01/04/23  8:44 AM   Result Value Ref Range    Sodium 134 (L) 135 - 145 mmol/L    Potassium 4.8 3.6 - 5.5 mmol/L    Chloride 100 96 - 112 mmol/L    Co2 22 20 - 33 mmol/L    Anion Gap 12.0 7.0 - 16.0    Glucose 99 65 - 99 mg/dL    Bun 23 (H) 8 - 22 mg/dL    Creatinine 1.07 0.50 - 1.40 mg/dL    Calcium 9.5 8.5 - 10.5 mg/dL    AST(SGOT) 30 12 - 45 U/L    ALT(SGPT) 38 2 - 50 U/L    Alkaline Phosphatase 47 30 - 99 U/L    Total Bilirubin 0.3 0.1 - 1.5 mg/dL    Albumin 4.1 3.2 - 4.9 g/dL    Total Protein 7.1 6.0 - 8.2 g/dL    Globulin 3.0 1.9 - 3.5 g/dL    A-G Ratio 1.4 g/dL   CBC WITH DIFFERENTIAL    Collection Time: 01/04/23  8:44 AM   Result Value Ref Range    WBC 15.0 (H) 4.8 - 10.8 K/uL    RBC 6.63 (H) 4.70 - 6.10 M/uL    Hemoglobin 15.1 14.0 - 18.0 g/dL    Hematocrit 48.4 42.0 - 52.0 %    MCV 73.0 (L) 81.4 - 97.8 fL    MCH 22.8 (L) 27.0 - 33.0 pg    MCHC 31.2 (L) 33.7 - 35.3 g/dL    RDW 45.6 35.9 - 50.0 fL    Platelet Count 602 (H) 164 - 446 K/uL    MPV 9.4 9.0 - 12.9 fL    Neutrophils-Polys 76.10 (H)  44.00 - 72.00 %    Lymphocytes 8.90 (L) 22.00 - 41.00 %    Monocytes 7.70 0.00 - 13.40 %    Eosinophils 3.90 0.00 - 6.90 %    Basophils 0.90 0.00 - 1.80 %    Immature Granulocytes 2.50 (H) 0.00 - 0.90 %    Nucleated RBC 0.00 /100 WBC    Neutrophils (Absolute) 11.46 (H) 1.82 - 7.42 K/uL    Lymphs (Absolute) 1.34 1.00 - 4.80 K/uL    Monos (Absolute) 1.16 (H) 0.00 - 0.85 K/uL    Eos (Absolute) 0.58 (H) 0.00 - 0.51 K/uL    Baso (Absolute) 0.13 (H) 0.00 - 0.12 K/uL    Immature Granulocytes (abs) 0.37 (H) 0.00 - 0.11 K/uL    NRBC (Absolute) 0.00 K/uL   HEMOGLOBIN A1C    Collection Time: 23  8:44 AM   Result Value Ref Range    Glycohemoglobin 6.2 (H) 4.0 - 5.6 %    Est Avg Glucose 131 mg/dL   APTT    Collection Time: 23  8:44 AM   Result Value Ref Range    APTT 27.7 24.7 - 36.0 sec   PT    Collection Time: 23  8:44 AM   Result Value Ref Range    PT 12.6 12.0 - 14.6 sec    INR 0.95 0.87 - 1.13   PreAdmit COD    Collection Time: 23  8:44 AM   Result Value Ref Range    ABO Grouping Only O     Rh Grouping Only NEG     Antibody Screen Scrn NEG    CORRECTED CALCIUM    Collection Time: 23  8:44 AM   Result Value Ref Range    Correct Calcium 9.4 8.5 - 10.5 mg/dL   ESTIMATED GFR    Collection Time: 23  8:44 AM   Result Value Ref Range    GFR (CKD-EPI) 78 >60 mL/min/1.73 m 2   EKG    Collection Time: 23  8:48 AM   Result Value Ref Range    Report       [facility]    Test Date:  2023  Pt Name:    JUSTIN TOPETE               Department: [facility]  MRN:        [id number]                      Room:  Gender:     Male                         Technician: MARCIAL  :        1960                   Requested By:TAY MADISON  Order #:    [id number]                    Reading MD: Keysha Jameson MD    Measurements  Intervals                                Axis  Rate:       69                           P:          62  AK:         157                          QRS:        12  QRSD:        89                           T:          19  QT:         377  QTc:        404    Interpretive Statements  Sinus rhythm  RSR' in V1 or V2, probably normal variant  ST elevation, consider anterior injury  Compared to ECG 03/31/2021 14:08:42  RSR' in V1 or V2 now present  ST (T wave) deviation now present  Myocardial infarct finding now present  Electronically Signed On 1-4-2023 9:23:34 PST by Keysha Jameson MD     URINE DRUG SCREEN    Collection Time: 01/04/23  8:49 AM   Result Value Ref Range    Amphetamines Urine Negative Negative    Barbiturates Negative Negative    Benzodiazepines Negative Negative    Cocaine Metabolite Negative Negative    Methadone Negative Negative    Opiates Negative Negative    Oxycodone Negative Negative    Phencyclidine -Pcp Negative Negative    Propoxyphene Negative Negative    Cannabinoid Metab Negative Negative   POCT glucose device results    Collection Time: 01/05/23  7:17 AM   Result Value Ref Range    POC Glucose, Blood 93 65 - 99 mg/dL   ABO Rh Confirm    Collection Time: 01/05/23  7:20 AM   Result Value Ref Range    ABO Rh Confirm O NEG        Imaging  DX-CHEST-PORTABLE (1 VIEW)    (Results Pending)   EC-ABBY W/O CONT    (Results Pending)    *Personally reviewed postoperative chest x-ray showing: Enlarged cardiac silhouette with moderate edema and left-sided effusion, endotracheal tube/right IJ central venous catheter/mediastinal chest tubes x2 noted as well as sternal wires    Assessment/Plan  * Nonrheumatic aortic valve stenosis  Assessment & Plan  Status post aortic valve replacement with ascending aortic aneurysm repair with intraoperative ABBY 1/5/2023  Routine postoperative management  Monitor mediastinal chest tube output, monitor for arrhythmias  Strict blood pressure management  As needed analgesics  Eventual antiplatelet versus anticoagulation when appropriate  Titrate epinephrine first then norepinephrine drips to goal mean arterial pressure greater than 65  Monitor  CVP and give IV fluids to maintain on the higher side initially  Insulin infusion for tight glycemic control in the post-operative setting    Encounter for weaning from ventilator (HCC)  Assessment & Plan  Intubated intraoperatively 1/5/2023  Continue full mechanical ventilatory support using ASV mode titrating per protocol  Titrate FiO2 to goal SPO2 greater than 92%  RT/O2 protocol  Dexmedetomidine infusion titrating to adequate sedation  ABCDEF bundle  Eventual diuresis    Aneurysm of ascending aorta without rupture  Assessment & Plan  Status postsurgical repair 1/5  Postoperative management as described above with strict attention to blood pressure control    Hyperkalemia  Assessment & Plan  Intraoperatively 6.9, now down to 5  Serial potassium levels every 4 hours with additional medical management if recurs  Continuous telemetry monitoring    Gastroesophageal reflux disease without esophagitis  Assessment & Plan  Resume pantoprazole  GERD precautions    Acute blood loss as cause of postoperative anemia  Assessment & Plan  Monitor CBC with conservative transfusion strategy  Monitor for bleeding postoperatively        Discussed patient condition and risk of morbidity and/or mortality with RN, RT, Pharmacy, Charge nurse / hot rounds, Patient, CVS, and anesthesiology .    The patient remains critically ill.  Critical care time = 35 minutes in directly providing and coordinating critical care and extensive data review.  No time overlap and excludes procedures.    Please note that this dictation was created using voice recognition software. I have made every reasonable attempt to correct obvious errors, but there may be errors of grammar and possibly content that I did not discover before finalizing the note.

## 2023-01-05 NOTE — ASSESSMENT & PLAN NOTE
Intubated intraoperatively 1/5/2023 --> extubated within target  Encourage incentive spirometry/PEP

## 2023-01-05 NOTE — ANESTHESIA PROCEDURE NOTES
ABBY    Date/Time: 1/5/2023 8:16 AM  Performed by: Noe Bar M.D.  Authorized by: Noe Bar M.D.     Start Time:1/5/2023 8:16 AM  Preanesthetic Checklist: patient identified, IV checked, site marked, risks and benefits discussed, surgical consent, monitors and equipment checked, pre-op evaluation and timeout performed    Indication for ABBY: diagnostic   Patient Location: OR  Intubated: Yes  Bite Block: Yes  Heart Visualized: Yes  Insertion: atraumatic    **See FULL ABBY report in patient's chart via CV Synapse**

## 2023-01-05 NOTE — ANESTHESIA PROCEDURE NOTES
Airway    Date/Time: 1/5/2023 8:07 AM  Performed by: Noe Bar M.D.  Authorized by: Noe Bar M.D.     Location:  OR  Urgency:  Elective  Difficult Airway: No    Indications for Airway Management:  Anesthesia      Spontaneous Ventilation: absent    Sedation Level:  Deep  Preoxygenated: Yes    Patient Position:  Sniffing  Mask Difficulty Assessment:  0 - not attempted  Final Airway Type:  Endotracheal airway  Final Endotracheal Airway:  ETT  Cuffed: Yes    Technique Used for Successful ETT Placement:  Direct laryngoscopy  Devices/Methods Used in Placement:  Intubating stylet and anterior pressure/BURP    Insertion Site:  Oral  Blade Type:  Lee  Laryngoscope Blade/Videolaryngoscope Blade Size:  2  ETT Size (mm):  8.0  Measured from:  Teeth  ETT to Teeth (cm):  24  Placement Verified by: auscultation and capnometry    Cormack-Lehane Classification:  Grade IIa - partial view of glottis  Number of Attempts at Approach:  1  Number of Other Approaches Attempted:  0

## 2023-01-05 NOTE — OP REPORT
Operative Report    Date of service: 01/05/23        PreOp Diagnosis: severe symptomatic aortic stenosis due to bicuspid valve, ascending aortic aneurysm, HTN, obesity       PostOp Diagnosis: same      Procedure(s):  AORTIC VALVE REPLACEMENT 27mm Inspiris tissue valve, ASCENDING AORTIC ANEURYSM REPAIR with 32mm Hemashield graft AND TRANSESOPHAGEAL ECHOCARDIOGRAM.       Surgeon(s):  Serena Goyal M.D.    Anesthesiologist/Type of Anesthesia:  Anesthesiologist: Noe Bar M.D./General    Surgical Staff:  Circulator: Elias Mancera R.N.  Perfusionist: Michelle Wilder  Scrub Person: Patty Braswell  First Assist: EFRAÍN Schofield    Specimens removed if any:  ID Type Source Tests Collected by Time Destination   A : Aortic valve Tissue Heart Valve PATHOLOGY SPECIMEN Serena Goyal M.D. 1/5/2023  9:43 AM    B : ascending aortic aneurysm Tissue Heart PATHOLOGY SPECIMEN Sreena Goyal M.D. 1/5/2023 11:01 AM        Findings: severely stenosed and calcified aortic valve with fusion of R and L cusps consistent with bicuspid congenital valve, normal diameter of arch and root with aneurysm isolated to ascending aorta, LVH, preop EF 45-50%    Complications: none              DISPOSITION: CICU            CONDITION: intubated, hemodynamically stable    Procedure Details:    The patient was taken to the operating room. Standard monitoring lines and Miranda catheter were placed. General anesthesia was induced. The patient was prepped and draped in sterile fashion. Standard median sternotomy was performed. The heart was exposed and pericardial traction sutures placed. The patient was heparinized then distal aortic and triple stage right atrial venous cannulation was performed. Antegrade cardioplegia catheter was placed. The patient was placed on cardiopulmonary bypass.  The aorta was cross-clamped and the heart arrested with [place] cardioplegia. Myocardial protection was maintained with an additional 200cc dose throught  the root after approximately 90 minutes. The field was flooded with carbon dioxide.  The aorta was transsected at the level of the sinotubular junction and the valve exposed. The leaflets were excised. The annulus was very calcified. The annulus was debrided of as much calcification as possible. Interrupted, pledgetted 2-0 Ethibond sutures were placed in horizontal mattress fashion around the annulus. The valve was sized to a 27mm Inspiris tissue valve. The sutures were all passed through the sewing ring of the valve and the valve lowered into position. All sutures were tightened, crimped, and cut with Cor-Knot device securing the valve in position.   The aortic aneurysm was resected distally to normal caliber aorta at the level of the proximal arch junction with distal ascending. The proximal anastomosis was performed to a 32x30 Hemashield graft with prolene sutures and pledgets as needed on the aortic side. The posterior wall was reinforced with additional internal pledgeted sutures.  The rewarming was initiated. The graft was trimmed so as to connect at the level of the distal end. The anastomosis was performed with prolene sutures. The posterior wall was reinforced with additional internal pledgeted sutures. Next an aortic root vent was placed and de-airing maneuvers were performed.    The heart was allowed 10 minutes to re-perfuse and ABBY was used to verify complete de-airing. The patient was then weaned and  from cardiopulmonary bypass. ABBY was used to assess the valve which was functioning normally with no significant shreya-valvular leak. Protamine was given to reverse the heparin. Ventricular pacing wires were placed. The chest tubes were placed. Hemostasis was secured then the sternum was closed using stainless steel wires. The incision was closed in three layers with sutures. Sterile dressings were placed. At the end of the operation, all sponge and needle counts were correct.

## 2023-01-05 NOTE — ANESTHESIA PROCEDURE NOTES
Central Venous Line  Performed by: Noe Bar M.D.  Authorized by: Noe Bar M.D.     Start Time:  1/5/2023 8:15 AM  End Time:  1/5/2023 8:15 AM  Patient Location:  OR  Indication: central venous access        provider hand hygiene performed prior to central venous catheter insertion, all 5 sterile barriers used (gloves, gown, cap, mask, large sterile drape) during central venous catheter insertion and skin prep agent completely dried prior to procedure    Patient Position:  Trendelenburg  Laterality:  Right  Site:  Internal jugular  Prep:  Chlorhexidine  Catheter Size:  7.5 Fr  Catheter Length (cm):  20  Number of Lumens:  Triple lumen  target vein identified, needle advanced into vein and blood aspirated and guidewire advanced into vein    Seldinger Technique?: Yes    Ultrasound-Guided: ultrasound-guided  Image captured, interpreted and electronically stored.  Sterile Gel and Probe Cover Used for Ultrasound?: Yes    Intravenous Verification: verified by ultrasound, venous blood return and chest x-ray pending    all ports aspirated, all ports flushed easily, guidewire was removed intact, biopatch was applied, line was sutured in place and dressing was applied    Events: patient tolerated procedure well with no complications    PA Catheter Placed?: No

## 2023-01-06 ENCOUNTER — APPOINTMENT (OUTPATIENT)
Dept: RADIOLOGY | Facility: MEDICAL CENTER | Age: 63
DRG: 219 | End: 2023-01-06
Attending: INTERNAL MEDICINE
Payer: COMMERCIAL

## 2023-01-06 ENCOUNTER — APPOINTMENT (OUTPATIENT)
Dept: RADIOLOGY | Facility: MEDICAL CENTER | Age: 63
DRG: 219 | End: 2023-01-06
Attending: NURSE PRACTITIONER
Payer: COMMERCIAL

## 2023-01-06 ENCOUNTER — APPOINTMENT (OUTPATIENT)
Dept: CARDIOLOGY | Facility: MEDICAL CENTER | Age: 63
DRG: 219 | End: 2023-01-06
Attending: EMERGENCY MEDICINE
Payer: COMMERCIAL

## 2023-01-06 ENCOUNTER — APPOINTMENT (OUTPATIENT)
Dept: RADIOLOGY | Facility: MEDICAL CENTER | Age: 63
DRG: 219 | End: 2023-01-06
Attending: EMERGENCY MEDICINE
Payer: COMMERCIAL

## 2023-01-06 PROBLEM — J96.02 ACUTE RESPIRATORY FAILURE WITH HYPOXIA AND HYPERCAPNIA (HCC): Status: ACTIVE | Noted: 2023-01-06

## 2023-01-06 PROBLEM — I50.810 RVF (RIGHT VENTRICULAR FAILURE) (HCC): Status: ACTIVE | Noted: 2023-01-06

## 2023-01-06 PROBLEM — I46.9 CARDIAC ARREST (HCC): Status: ACTIVE | Noted: 2023-01-06

## 2023-01-06 PROBLEM — J96.01 ACUTE RESPIRATORY FAILURE WITH HYPOXIA AND HYPERCAPNIA (HCC): Status: ACTIVE | Noted: 2023-01-06

## 2023-01-06 PROBLEM — D72.823 LEUKEMOID REACTION: Status: ACTIVE | Noted: 2023-01-06

## 2023-01-06 PROBLEM — E87.20 METABOLIC ACIDOSIS: Status: ACTIVE | Noted: 2023-01-06

## 2023-01-06 PROBLEM — N17.0 ACUTE RENAL FAILURE WITH TUBULAR NECROSIS (HCC): Status: ACTIVE | Noted: 2023-01-06

## 2023-01-06 LAB
ALBUMIN SERPL BCP-MCNC: 3.3 G/DL (ref 3.2–4.9)
ALBUMIN SERPL BCP-MCNC: 3.3 G/DL (ref 3.2–4.9)
ALBUMIN/GLOB SERPL: 1.4 G/DL
ALBUMIN/GLOB SERPL: 1.6 G/DL
ALP SERPL-CCNC: 35 U/L (ref 30–99)
ALP SERPL-CCNC: 37 U/L (ref 30–99)
ALT SERPL-CCNC: 36 U/L (ref 2–50)
ALT SERPL-CCNC: 38 U/L (ref 2–50)
ANION GAP SERPL CALC-SCNC: 10 MMOL/L (ref 7–16)
ANION GAP SERPL CALC-SCNC: 11 MMOL/L (ref 7–16)
ANION GAP SERPL CALC-SCNC: 12 MMOL/L (ref 7–16)
ANION GAP SERPL CALC-SCNC: 8 MMOL/L (ref 7–16)
ANION GAP SERPL CALC-SCNC: 9 MMOL/L (ref 7–16)
ANION GAP SERPL CALC-SCNC: 9 MMOL/L (ref 7–16)
AST SERPL-CCNC: 73 U/L (ref 12–45)
AST SERPL-CCNC: 74 U/L (ref 12–45)
BASE EXCESS BLDA CALC-SCNC: -2 MMOL/L (ref -4–3)
BASE EXCESS BLDA CALC-SCNC: -4 MMOL/L (ref -4–3)
BASE EXCESS BLDA CALC-SCNC: -4 MMOL/L (ref -4–3)
BASOPHILS # BLD AUTO: 0 % (ref 0–1.8)
BASOPHILS # BLD: 0 K/UL (ref 0–0.12)
BILIRUB SERPL-MCNC: 0.3 MG/DL (ref 0.1–1.5)
BILIRUB SERPL-MCNC: 0.4 MG/DL (ref 0.1–1.5)
BODY TEMPERATURE: ABNORMAL DEGREES
BUN SERPL-MCNC: 34 MG/DL (ref 8–22)
BUN SERPL-MCNC: 38 MG/DL (ref 8–22)
BUN SERPL-MCNC: 39 MG/DL (ref 8–22)
BUN SERPL-MCNC: 40 MG/DL (ref 8–22)
BUN SERPL-MCNC: 42 MG/DL (ref 8–22)
BUN SERPL-MCNC: 45 MG/DL (ref 8–22)
BURR CELLS BLD QL SMEAR: NORMAL
CA-I SERPL-SCNC: 0.8 MMOL/L (ref 1.1–1.3)
CALCIUM ALBUM COR SERPL-MCNC: 7.9 MG/DL (ref 8.5–10.5)
CALCIUM ALBUM COR SERPL-MCNC: 8.9 MG/DL (ref 8.5–10.5)
CALCIUM SERPL-MCNC: 6.7 MG/DL (ref 8.5–10.5)
CALCIUM SERPL-MCNC: 7.3 MG/DL (ref 8.5–10.5)
CALCIUM SERPL-MCNC: 7.9 MG/DL (ref 8.5–10.5)
CALCIUM SERPL-MCNC: 8.3 MG/DL (ref 8.5–10.5)
CFT BLD TEG: 4.7 MIN (ref 4.6–9.1)
CFT P HPASE BLD TEG: 4.8 MIN (ref 4.3–8.3)
CHLORIDE SERPL-SCNC: 100 MMOL/L (ref 96–112)
CHLORIDE SERPL-SCNC: 100 MMOL/L (ref 96–112)
CHLORIDE SERPL-SCNC: 102 MMOL/L (ref 96–112)
CHLORIDE SERPL-SCNC: 95 MMOL/L (ref 96–112)
CLOT ANGLE BLD TEG: 76.3 DEGREES (ref 63–78)
CLOT LYSIS 30M P MA LENFR BLD TEG: 0 % (ref 0–2.6)
CO2 BLDA-SCNC: 24 MMOL/L (ref 20–33)
CO2 SERPL-SCNC: 20 MMOL/L (ref 20–33)
CO2 SERPL-SCNC: 20 MMOL/L (ref 20–33)
CO2 SERPL-SCNC: 21 MMOL/L (ref 20–33)
CO2 SERPL-SCNC: 25 MMOL/L (ref 20–33)
CO2 SERPL-SCNC: 25 MMOL/L (ref 20–33)
CO2 SERPL-SCNC: 26 MMOL/L (ref 20–33)
CREAT SERPL-MCNC: 1.59 MG/DL (ref 0.5–1.4)
CREAT SERPL-MCNC: 1.66 MG/DL (ref 0.5–1.4)
CREAT SERPL-MCNC: 1.67 MG/DL (ref 0.5–1.4)
CREAT SERPL-MCNC: 1.67 MG/DL (ref 0.5–1.4)
CREAT SERPL-MCNC: 1.7 MG/DL (ref 0.5–1.4)
CREAT SERPL-MCNC: 2.02 MG/DL (ref 0.5–1.4)
CT.EXTRINSIC BLD ROTEM: 0.9 MIN (ref 0.8–2.1)
DELSYS IDSYS: ABNORMAL
EKG IMPRESSION: NORMAL
EOSINOPHIL # BLD AUTO: 0 K/UL (ref 0–0.51)
EOSINOPHIL NFR BLD: 0 % (ref 0–6.9)
ERYTHROCYTE [DISTWIDTH] IN BLOOD BY AUTOMATED COUNT: 46.3 FL (ref 35.9–50)
ERYTHROCYTE [DISTWIDTH] IN BLOOD BY AUTOMATED COUNT: 46.4 FL (ref 35.9–50)
GFR SERPLBLD CREATININE-BSD FMLA CKD-EPI: 36 ML/MIN/1.73 M 2
GFR SERPLBLD CREATININE-BSD FMLA CKD-EPI: 45 ML/MIN/1.73 M 2
GFR SERPLBLD CREATININE-BSD FMLA CKD-EPI: 46 ML/MIN/1.73 M 2
GFR SERPLBLD CREATININE-BSD FMLA CKD-EPI: 48 ML/MIN/1.73 M 2
GLOBULIN SER CALC-MCNC: 2.1 G/DL (ref 1.9–3.5)
GLOBULIN SER CALC-MCNC: 2.4 G/DL (ref 1.9–3.5)
GLUCOSE BLD STRIP.AUTO-MCNC: 118 MG/DL (ref 65–99)
GLUCOSE BLD STRIP.AUTO-MCNC: 125 MG/DL (ref 65–99)
GLUCOSE BLD STRIP.AUTO-MCNC: 140 MG/DL (ref 65–99)
GLUCOSE BLD STRIP.AUTO-MCNC: 149 MG/DL (ref 65–99)
GLUCOSE BLD STRIP.AUTO-MCNC: 149 MG/DL (ref 65–99)
GLUCOSE BLD STRIP.AUTO-MCNC: 156 MG/DL (ref 65–99)
GLUCOSE BLD STRIP.AUTO-MCNC: 159 MG/DL (ref 65–99)
GLUCOSE BLD STRIP.AUTO-MCNC: 165 MG/DL (ref 65–99)
GLUCOSE BLD STRIP.AUTO-MCNC: 166 MG/DL (ref 65–99)
GLUCOSE BLD STRIP.AUTO-MCNC: 182 MG/DL (ref 65–99)
GLUCOSE BLD STRIP.AUTO-MCNC: 224 MG/DL (ref 65–99)
GLUCOSE BLD STRIP.AUTO-MCNC: 233 MG/DL (ref 65–99)
GLUCOSE BLD STRIP.AUTO-MCNC: 99 MG/DL (ref 65–99)
GLUCOSE SERPL-MCNC: 130 MG/DL (ref 65–99)
GLUCOSE SERPL-MCNC: 131 MG/DL (ref 65–99)
GLUCOSE SERPL-MCNC: 132 MG/DL (ref 65–99)
GLUCOSE SERPL-MCNC: 138 MG/DL (ref 65–99)
GLUCOSE SERPL-MCNC: 155 MG/DL (ref 65–99)
GLUCOSE SERPL-MCNC: 181 MG/DL (ref 65–99)
HCO3 BLDA-SCNC: 22.3 MMOL/L (ref 17–25)
HCO3 BLDA-SCNC: 22.4 MMOL/L (ref 17–25)
HCO3 BLDA-SCNC: 22.6 MMOL/L (ref 17–25)
HCT VFR BLD AUTO: 36.2 % (ref 42–52)
HCT VFR BLD AUTO: 36.7 % (ref 42–52)
HGB BLD-MCNC: 11.5 G/DL (ref 14–18)
HGB BLD-MCNC: 11.5 G/DL (ref 14–18)
HOROWITZ INDEX BLDA+IHG-RTO: 139 MM[HG]
HOROWITZ INDEX BLDA+IHG-RTO: 36 MM[HG]
LACTATE SERPL-SCNC: 1.9 MMOL/L (ref 0.5–2)
LACTATE SERPL-SCNC: 2 MMOL/L (ref 0.5–2)
LACTATE SERPL-SCNC: 2.9 MMOL/L (ref 0.5–2)
LACTATE SERPL-SCNC: 3.4 MMOL/L (ref 0.5–2)
LPM ILPM: 15 LPM
LPM ILPM: 15 LPM
LV EJECT FRACT  99904: 50
LV EJECT FRACT  99904: 55
LYMPHOCYTES # BLD AUTO: 1.37 K/UL (ref 1–4.8)
LYMPHOCYTES NFR BLD: 3.4 % (ref 22–41)
MAGNESIUM SERPL-MCNC: 2.7 MG/DL (ref 1.5–2.5)
MAGNESIUM SERPL-MCNC: 2.8 MG/DL (ref 1.5–2.5)
MANUAL DIFF BLD: NORMAL
MCF BLD TEG: 68.1 MM (ref 52–69)
MCF.PLATELET INHIB BLD ROTEM: 24.6 MM (ref 15–32)
MCH RBC QN AUTO: 23.1 PG (ref 27–33)
MCH RBC QN AUTO: 23.3 PG (ref 27–33)
MCHC RBC AUTO-ENTMCNC: 31.3 G/DL (ref 33.7–35.3)
MCHC RBC AUTO-ENTMCNC: 31.8 G/DL (ref 33.7–35.3)
MCV RBC AUTO: 73.3 FL (ref 81.4–97.8)
MCV RBC AUTO: 73.8 FL (ref 81.4–97.8)
METAMYELOCYTES NFR BLD MANUAL: 0.8 %
MONOCYTES # BLD AUTO: 2.02 K/UL (ref 0–0.85)
MONOCYTES NFR BLD AUTO: 5 % (ref 0–13.4)
MORPHOLOGY BLD-IMP: NORMAL
NEUTROPHILS # BLD AUTO: 36.68 K/UL (ref 1.82–7.42)
NEUTROPHILS NFR BLD: 87.4 % (ref 44–72)
NEUTS BAND NFR BLD MANUAL: 3.4 % (ref 0–10)
NRBC # BLD AUTO: 0 K/UL
NRBC BLD-RTO: 0 /100 WBC
O2/TOTAL GAS SETTING VFR VENT: 100 %
O2/TOTAL GAS SETTING VFR VENT: 70 %
OVALOCYTES BLD QL SMEAR: NORMAL
PA AA BLD-ACNC: 71.2 % (ref 0–11)
PA ADP BLD-ACNC: 95.6 % (ref 0–17)
PCO2 BLDA: 37.7 MMHG (ref 26–37)
PCO2 BLDA: 44.6 MMHG (ref 26–37)
PCO2 BLDA: 44.6 MMHG (ref 26–37)
PCO2 TEMP ADJ BLDA: 38.1 MMHG (ref 26–37)
PCO2 TEMP ADJ BLDA: 45.4 MMHG (ref 26–37)
PCO2 TEMP ADJ BLDA: 45.6 MMHG (ref 26–37)
PH BLDA: 7.31 [PH] (ref 7.4–7.5)
PH BLDA: 7.31 [PH] (ref 7.4–7.5)
PH BLDA: 7.38 [PH] (ref 7.4–7.5)
PH TEMP ADJ BLDA: 7.3 [PH] (ref 7.4–7.5)
PH TEMP ADJ BLDA: 7.3 [PH] (ref 7.4–7.5)
PH TEMP ADJ BLDA: 7.38 [PH] (ref 7.4–7.5)
PHOSPHATE SERPL-MCNC: 5.8 MG/DL (ref 2.5–4.5)
PLATELET # BLD AUTO: 394 K/UL (ref 164–446)
PLATELET # BLD AUTO: 411 K/UL (ref 164–446)
PLATELET BLD QL SMEAR: NORMAL
PMV BLD AUTO: 9.6 FL (ref 9–12.9)
PMV BLD AUTO: 9.7 FL (ref 9–12.9)
PO2 BLDA: 36 MMHG (ref 64–87)
PO2 BLDA: 78 MMHG (ref 64–87)
PO2 BLDA: 97 MMHG (ref 64–87)
PO2 TEMP ADJ BLDA: 37 MMHG (ref 64–87)
PO2 TEMP ADJ BLDA: 80 MMHG (ref 64–87)
PO2 TEMP ADJ BLDA: 98 MMHG (ref 64–87)
POIKILOCYTOSIS BLD QL SMEAR: NORMAL
POTASSIUM SERPL-SCNC: 5.3 MMOL/L (ref 3.6–5.5)
POTASSIUM SERPL-SCNC: 5.3 MMOL/L (ref 3.6–5.5)
POTASSIUM SERPL-SCNC: 5.4 MMOL/L (ref 3.6–5.5)
POTASSIUM SERPL-SCNC: 5.5 MMOL/L (ref 3.6–5.5)
POTASSIUM SERPL-SCNC: 5.7 MMOL/L (ref 3.6–5.5)
POTASSIUM SERPL-SCNC: 5.9 MMOL/L (ref 3.6–5.5)
POTASSIUM SERPL-SCNC: 6.2 MMOL/L (ref 3.6–5.5)
PROT SERPL-MCNC: 5.4 G/DL (ref 6–8.2)
PROT SERPL-MCNC: 5.7 G/DL (ref 6–8.2)
RBC # BLD AUTO: 4.94 M/UL (ref 4.7–6.1)
RBC # BLD AUTO: 4.97 M/UL (ref 4.7–6.1)
RBC BLD AUTO: PRESENT
SAO2 % BLDA: 62 % (ref 93–99)
SAO2 % BLDA: 94 % (ref 93–99)
SAO2 % BLDA: 97 % (ref 93–99)
SCHISTOCYTES BLD QL SMEAR: NORMAL
SODIUM SERPL-SCNC: 128 MMOL/L (ref 135–145)
SODIUM SERPL-SCNC: 130 MMOL/L (ref 135–145)
SODIUM SERPL-SCNC: 130 MMOL/L (ref 135–145)
SODIUM SERPL-SCNC: 131 MMOL/L (ref 135–145)
SODIUM SERPL-SCNC: 132 MMOL/L (ref 135–145)
SODIUM SERPL-SCNC: 132 MMOL/L (ref 135–145)
SPECIMEN DRAWN FROM PATIENT: ABNORMAL
TEG ALGORITHM TGALG: ABNORMAL
TROPONIN T SERPL-MCNC: 449 NG/L (ref 6–19)
WBC # BLD AUTO: 35.2 K/UL (ref 4.8–10.8)
WBC # BLD AUTO: 40.4 K/UL (ref 4.8–10.8)

## 2023-01-06 PROCEDURE — 36620 INSERTION CATHETER ARTERY: CPT | Performed by: INTERNAL MEDICINE

## 2023-01-06 PROCEDURE — 82330 ASSAY OF CALCIUM: CPT

## 2023-01-06 PROCEDURE — 99254 IP/OBS CNSLTJ NEW/EST MOD 60: CPT | Performed by: INTERNAL MEDICINE

## 2023-01-06 PROCEDURE — 03HY32Z INSERTION OF MONITORING DEVICE INTO UPPER ARTERY, PERCUTANEOUS APPROACH: ICD-10-PCS | Performed by: INTERNAL MEDICINE

## 2023-01-06 PROCEDURE — 93308 TTE F-UP OR LMTD: CPT | Mod: 26 | Performed by: INTERNAL MEDICINE

## 2023-01-06 PROCEDURE — 71045 X-RAY EXAM CHEST 1 VIEW: CPT

## 2023-01-06 PROCEDURE — 92960 CARDIOVERSION ELECTRIC EXT: CPT

## 2023-01-06 PROCEDURE — 84100 ASSAY OF PHOSPHORUS: CPT

## 2023-01-06 PROCEDURE — 700105 HCHG RX REV CODE 258: Performed by: INTERNAL MEDICINE

## 2023-01-06 PROCEDURE — 700101 HCHG RX REV CODE 250: Performed by: INTERNAL MEDICINE

## 2023-01-06 PROCEDURE — 99291 CRITICAL CARE FIRST HOUR: CPT | Performed by: EMERGENCY MEDICINE

## 2023-01-06 PROCEDURE — 85007 BL SMEAR W/DIFF WBC COUNT: CPT

## 2023-01-06 PROCEDURE — 700101 HCHG RX REV CODE 250: Performed by: EMERGENCY MEDICINE

## 2023-01-06 PROCEDURE — 700111 HCHG RX REV CODE 636 W/ 250 OVERRIDE (IP): Performed by: EMERGENCY MEDICINE

## 2023-01-06 PROCEDURE — A9270 NON-COVERED ITEM OR SERVICE: HCPCS | Performed by: NURSE PRACTITIONER

## 2023-01-06 PROCEDURE — 770022 HCHG ROOM/CARE - ICU (200)

## 2023-01-06 PROCEDURE — 84484 ASSAY OF TROPONIN QUANT: CPT

## 2023-01-06 PROCEDURE — 80048 BASIC METABOLIC PNL TOTAL CA: CPT | Mod: 91

## 2023-01-06 PROCEDURE — 94644 CONT INHLJ TX 1ST HOUR: CPT

## 2023-01-06 PROCEDURE — 82962 GLUCOSE BLOOD TEST: CPT | Mod: 91

## 2023-01-06 PROCEDURE — 700111 HCHG RX REV CODE 636 W/ 250 OVERRIDE (IP): Performed by: INTERNAL MEDICINE

## 2023-01-06 PROCEDURE — 700102 HCHG RX REV CODE 250 W/ 637 OVERRIDE(OP): Performed by: NURSE PRACTITIONER

## 2023-01-06 PROCEDURE — 93010 ELECTROCARDIOGRAM REPORT: CPT | Mod: 77 | Performed by: INTERNAL MEDICINE

## 2023-01-06 PROCEDURE — 85027 COMPLETE CBC AUTOMATED: CPT

## 2023-01-06 PROCEDURE — 93005 ELECTROCARDIOGRAM TRACING: CPT | Performed by: EMERGENCY MEDICINE

## 2023-01-06 PROCEDURE — 85347 COAGULATION TIME ACTIVATED: CPT

## 2023-01-06 PROCEDURE — 93005 ELECTROCARDIOGRAM TRACING: CPT | Performed by: NURSE PRACTITIONER

## 2023-01-06 PROCEDURE — 76775 US EXAM ABDO BACK WALL LIM: CPT

## 2023-01-06 PROCEDURE — 85576 BLOOD PLATELET AGGREGATION: CPT

## 2023-01-06 PROCEDURE — 85025 COMPLETE CBC W/AUTO DIFF WBC: CPT

## 2023-01-06 PROCEDURE — 84132 ASSAY OF SERUM POTASSIUM: CPT

## 2023-01-06 PROCEDURE — 80053 COMPREHEN METABOLIC PANEL: CPT

## 2023-01-06 PROCEDURE — 36600 WITHDRAWAL OF ARTERIAL BLOOD: CPT

## 2023-01-06 PROCEDURE — 93010 ELECTROCARDIOGRAM REPORT: CPT | Performed by: INTERNAL MEDICINE

## 2023-01-06 PROCEDURE — 82803 BLOOD GASES ANY COMBINATION: CPT

## 2023-01-06 PROCEDURE — 83605 ASSAY OF LACTIC ACID: CPT | Mod: 91

## 2023-01-06 PROCEDURE — 37799 UNLISTED PX VASCULAR SURGERY: CPT

## 2023-01-06 PROCEDURE — 700111 HCHG RX REV CODE 636 W/ 250 OVERRIDE (IP): Performed by: NURSE PRACTITIONER

## 2023-01-06 PROCEDURE — 83735 ASSAY OF MAGNESIUM: CPT | Mod: 91

## 2023-01-06 PROCEDURE — 94660 CPAP INITIATION&MGMT: CPT

## 2023-01-06 PROCEDURE — 94640 AIRWAY INHALATION TREATMENT: CPT

## 2023-01-06 PROCEDURE — 85384 FIBRINOGEN ACTIVITY: CPT

## 2023-01-06 PROCEDURE — 700111 HCHG RX REV CODE 636 W/ 250 OVERRIDE (IP)

## 2023-01-06 PROCEDURE — 99292 CRITICAL CARE ADDL 30 MIN: CPT | Mod: 25 | Performed by: INTERNAL MEDICINE

## 2023-01-06 PROCEDURE — 93010 ELECTROCARDIOGRAM REPORT: CPT | Mod: 76 | Performed by: INTERNAL MEDICINE

## 2023-01-06 PROCEDURE — 94645 CONT INHLJ TX EACH ADDL HOUR: CPT

## 2023-01-06 PROCEDURE — 92950 HEART/LUNG RESUSCITATION CPR: CPT

## 2023-01-06 PROCEDURE — 36620 INSERTION CATHETER ARTERY: CPT

## 2023-01-06 PROCEDURE — 700105 HCHG RX REV CODE 258: Performed by: NURSE PRACTITIONER

## 2023-01-06 PROCEDURE — 93325 DOPPLER ECHO COLOR FLOW MAPG: CPT

## 2023-01-06 RX ORDER — DEXTROSE MONOHYDRATE 25 G/50ML
50 INJECTION, SOLUTION INTRAVENOUS ONCE
Status: DISCONTINUED | OUTPATIENT
Start: 2023-01-06 | End: 2023-01-06

## 2023-01-06 RX ORDER — SODIUM BICARBONATE IN D5W 150/1000ML
PLASTIC BAG, INJECTION (ML) INTRAVENOUS CONTINUOUS
Status: DISCONTINUED | OUTPATIENT
Start: 2023-01-06 | End: 2023-01-07

## 2023-01-06 RX ORDER — CALCIUM GLUCONATE 20 MG/ML
1 INJECTION, SOLUTION INTRAVENOUS ONCE
Status: DISCONTINUED | OUTPATIENT
Start: 2023-01-06 | End: 2023-01-06

## 2023-01-06 RX ORDER — HYDROMORPHONE HYDROCHLORIDE 1 MG/ML
1 INJECTION, SOLUTION INTRAMUSCULAR; INTRAVENOUS; SUBCUTANEOUS
Status: DISCONTINUED | OUTPATIENT
Start: 2023-01-06 | End: 2023-01-11

## 2023-01-06 RX ORDER — CALCIUM GLUCONATE 20 MG/ML
1 INJECTION, SOLUTION INTRAVENOUS ONCE
Status: COMPLETED | OUTPATIENT
Start: 2023-01-06 | End: 2023-01-06

## 2023-01-06 RX ORDER — FUROSEMIDE 10 MG/ML
20 INJECTION INTRAMUSCULAR; INTRAVENOUS ONCE
Status: COMPLETED | OUTPATIENT
Start: 2023-01-06 | End: 2023-01-06

## 2023-01-06 RX ORDER — DEXTROSE MONOHYDRATE 25 G/50ML
50 INJECTION, SOLUTION INTRAVENOUS ONCE
Status: COMPLETED | OUTPATIENT
Start: 2023-01-06 | End: 2023-01-06

## 2023-01-06 RX ORDER — FUROSEMIDE 10 MG/ML
20 INJECTION INTRAMUSCULAR; INTRAVENOUS
Status: DISCONTINUED | OUTPATIENT
Start: 2023-01-06 | End: 2023-01-07

## 2023-01-06 RX ADMIN — OXYCODONE HYDROCHLORIDE 10 MG: 10 TABLET ORAL at 14:43

## 2023-01-06 RX ADMIN — ACETAMINOPHEN 1000 MG: 500 TABLET ORAL at 17:15

## 2023-01-06 RX ADMIN — ACETAMINOPHEN 1000 MG: 500 TABLET ORAL at 11:01

## 2023-01-06 RX ADMIN — HYDROMORPHONE HYDROCHLORIDE 1 MG: 1 INJECTION, SOLUTION INTRAMUSCULAR; INTRAVENOUS; SUBCUTANEOUS at 16:11

## 2023-01-06 RX ADMIN — OXYCODONE HYDROCHLORIDE 10 MG: 10 TABLET ORAL at 17:45

## 2023-01-06 RX ADMIN — FENTANYL CITRATE 50 MCG: 50 INJECTION, SOLUTION INTRAMUSCULAR; INTRAVENOUS at 11:49

## 2023-01-06 RX ADMIN — MAGNESIUM SULFATE HEPTAHYDRATE 1 G: 1 INJECTION, SOLUTION INTRAVENOUS at 05:24

## 2023-01-06 RX ADMIN — WATER 0.05 MCG/KG/MIN: 1 SOLUTION INTRAVENOUS at 22:15

## 2023-01-06 RX ADMIN — FENTANYL CITRATE 50 MCG: 50 INJECTION, SOLUTION INTRAMUSCULAR; INTRAVENOUS at 04:05

## 2023-01-06 RX ADMIN — FUROSEMIDE 20 MG: 10 INJECTION, SOLUTION INTRAVENOUS at 07:22

## 2023-01-06 RX ADMIN — OXYCODONE HYDROCHLORIDE 10 MG: 10 TABLET ORAL at 21:06

## 2023-01-06 RX ADMIN — INSULIN HUMAN 2 UNITS: 100 INJECTION, SOLUTION PARENTERAL at 17:18

## 2023-01-06 RX ADMIN — HYDROMORPHONE HYDROCHLORIDE 1 MG: 1 INJECTION, SOLUTION INTRAMUSCULAR; INTRAVENOUS; SUBCUTANEOUS at 13:47

## 2023-01-06 RX ADMIN — WATER 0.05 MCG/KG/MIN: 1 SOLUTION INTRAVENOUS at 17:15

## 2023-01-06 RX ADMIN — DOCUSATE SODIUM 50 MG AND SENNOSIDES 8.6 MG 2 TABLET: 8.6; 5 TABLET, FILM COATED ORAL at 17:16

## 2023-01-06 RX ADMIN — DEXTROSE MONOHYDRATE 50 ML: 25 INJECTION, SOLUTION INTRAVENOUS at 02:14

## 2023-01-06 RX ADMIN — HYDROMORPHONE HYDROCHLORIDE 1 MG: 1 INJECTION, SOLUTION INTRAMUSCULAR; INTRAVENOUS; SUBCUTANEOUS at 18:38

## 2023-01-06 RX ADMIN — HYDROMORPHONE HYDROCHLORIDE 1 MG: 1 INJECTION, SOLUTION INTRAMUSCULAR; INTRAVENOUS; SUBCUTANEOUS at 21:43

## 2023-01-06 RX ADMIN — CALCIUM GLUCONATE 1 G: 20 INJECTION, SOLUTION INTRAVENOUS at 02:25

## 2023-01-06 RX ADMIN — METOPROLOL TARTRATE 12.5 MG: 25 TABLET, FILM COATED ORAL at 17:15

## 2023-01-06 RX ADMIN — OXYCODONE HYDROCHLORIDE 10 MG: 10 TABLET ORAL at 11:01

## 2023-01-06 RX ADMIN — ACETAMINOPHEN 1000 MG: 500 TABLET ORAL at 01:11

## 2023-01-06 RX ADMIN — FUROSEMIDE 20 MG: 10 INJECTION, SOLUTION INTRAVENOUS at 16:11

## 2023-01-06 RX ADMIN — SODIUM BICARBONATE: 84 INJECTION, SOLUTION INTRAVENOUS at 07:34

## 2023-01-06 RX ADMIN — WATER 0.05 MCG/KG/MIN: 1 SOLUTION INTRAVENOUS at 12:22

## 2023-01-06 RX ADMIN — OXYCODONE HYDROCHLORIDE 10 MG: 10 TABLET ORAL at 03:01

## 2023-01-06 RX ADMIN — CALCIUM CHLORIDE 1000 MG: 100 INJECTION, SOLUTION INTRAVENOUS at 08:57

## 2023-01-06 RX ADMIN — FUROSEMIDE 20 MG: 10 INJECTION, SOLUTION INTRAVENOUS at 02:28

## 2023-01-06 ASSESSMENT — ENCOUNTER SYMPTOMS
BLURRED VISION: 0
SORE THROAT: 0
NERVOUS/ANXIOUS: 1
SENSORY CHANGE: 0
SPEECH CHANGE: 0
HEADACHES: 0
DIAPHORESIS: 1
VOMITING: 0
BACK PAIN: 0
DIZZINESS: 0
BRUISES/BLEEDS EASILY: 0
WHEEZING: 0
DEPRESSION: 0
FEVER: 0
PALPITATIONS: 0
SHORTNESS OF BREATH: 1
COUGH: 1
CHILLS: 0
NAUSEA: 0
SPUTUM PRODUCTION: 0
MYALGIAS: 0
ABDOMINAL PAIN: 0

## 2023-01-06 ASSESSMENT — COGNITIVE AND FUNCTIONAL STATUS - GENERAL
SUGGESTED CMS G CODE MODIFIER DAILY ACTIVITY: CH
SUGGESTED CMS G CODE MODIFIER MOBILITY: CH
MOBILITY SCORE: 24
SUGGESTED CMS G CODE MODIFIER MOBILITY: CH
MOBILITY SCORE: 24
DAILY ACTIVITIY SCORE: 24

## 2023-01-06 ASSESSMENT — PATIENT HEALTH QUESTIONNAIRE - PHQ9
2. FEELING DOWN, DEPRESSED, IRRITABLE, OR HOPELESS: NOT AT ALL
1. LITTLE INTEREST OR PLEASURE IN DOING THINGS: NOT AT ALL
SUM OF ALL RESPONSES TO PHQ9 QUESTIONS 1 AND 2: 0

## 2023-01-06 ASSESSMENT — PAIN DESCRIPTION - PAIN TYPE
TYPE: SURGICAL PAIN

## 2023-01-06 ASSESSMENT — PAIN SCALES - WONG BAKER: WONGBAKER_NUMERICALRESPONSE: HURTS A WHOLE LOT

## 2023-01-06 ASSESSMENT — PULMONARY FUNCTION TESTS: EPAP_CMH2O: 8

## 2023-01-06 NOTE — PROGRESS NOTES
I was called to the bedside twice over the evening.    First time was to suction mediastinal tubes.  The right tube had developed large clot proximally and was no longer draining.  Using sterile gloves and sterile technique with nursing assistance the tube was suctioned and clots were removed after which the tube was draining appropriately.        Second time I was called to the room was due to sudden bradycardia and loss of consciousness and hypotension.  I am told that during the day his pacer was capturing and pacing inappropriately (with a rate set below the patient's intrinsic rate it was still pacing intermittently causing dysrhythmia).  Given this, the pacer was disconnected and he had been stable for several hours.    I was called to the bedside again when he suddenly became bradycardic to the 20s (appears to be sinus bradycardia based on rhythm strip printed from telemetry) and loss of his blood pressure as well as loss of consciousness.  He was rapidly reconnected to the pacer box by the bedside nurse which restored his rhythm, blood pressure and consciousness within a matter of seconds.  He again began to experience the dysrhythmia as described above with his pacer.  Leads were switched which did alleviate this problem to a great extent though it is still present to some degree.    ECG after his bradycardic episode is concerning for evolution of ST elevations especially in V2 and V4.  See pictures below.  I discussed with cardiac surgery, and reviewed his cardiac catheterization prior to his operation (aside from 20% ostial stenosis his cath was essentially clean).  The patient has been complaining of ongoing chest pain but of course after having cardiac surgery and with mediastinal drains in place this is very difficult to interpret.    Given this I discussed with cardiology who went over the clinical history and ECGs with me.  We discussed potentially obtaining stat echocardiogram to look for wall of  motion abnormalities.  Cardiology felt that an echo at this point in time would not be helpful as there would likely be wall motion abnormalities either from intermittent pacing, postsurgical stunning or even transient wall motion abnormalities from his hypotensive episode earlier.  Given the clean cath, recent surgery and hypotensive episode, cardiology felt that stat echocardiogram would not be something that could be acted on.  They recommended continuing to monitor for any change in his clinical condition but otherwise are attributing his ST elevations to his transient hypotensive episode.    I did look at his heart with a bedside ultrasound and while the windows were difficult given postsurgical dressings and drains, I was able to visualize an apical four-chamber which did not readily demonstrate any pericardial effusion and certainly did not demonstrate any collapse of his atria or ventricles.  Therefore it is clear he is not in cardiac tamponade and indeed, with restoration of his intrinsic heart rhythm his hemodynamics have normalized.    I then closed the loop with bedside nursing and I explained the details of my conversations with cardiac surgery and cardiology.  Cardiology and cardiac surgery are both content at this time to continue monitoring his clinical status and to hold off on stat echocardiogram.        Lastly, he has had continued issues with hyperkalemia.  He responds well to medical therapy and is making urine.  I do not think his bradycardia was due to his hyperkalemia as it was not severely elevated and is again down-trending.  Will give another course of dextrose/insulin and continue to check potassium q4.  An option if he does not correct on his own is to consider Veltassa.  We did check gas to evaluate acidosis, his HCO3 is 20 but otherwise is not significantly acidotic so likely his hyperkalemia is not due to ongoing cellular shifts.  Will continue to monitor this.      Initial ECG post  operatively:        ECG post bradycardic & hypotensive episode:        Scot Muhammad M.D.    The patient remains critically ill.  Critical care time = 81 minutes in directly providing and coordinating critical care and extensive data review.  No time overlap and excludes procedures.

## 2023-01-06 NOTE — CONSULTS
Cardiology Consult Note:    Keysha Jameson M.D.  Date & Time note created:    1/6/2023   1:22 AM     Referring MD:  Dr. Goyal    Patient ID:   Name:             Noe Hickey   YOB: 1960  Age:                 62 y.o.  male   MRN:               [id number]                                                             Chief Complaint / Reason for consult:  ST elevation on EKGs.    History of Present Illness:    This is a 62 years old man with recent elective aortic valve replacement and aneurysm repair.  Cardiology has been consulted due to recent event of bradycardia, hypotension and ST elevation seen on EKG.  Patient was put back on the temporary pacemaker and his blood pressure has been stabilized.  Patient is currently hemodynamically stable.    I personally interpreted EKG tracing which showed diffuse ST elevation.  Sinus rhythm.    Of note, patient did have cholangiogram done last month prior to his elective surgery which showed no obstructive CAD per report.  It was done at [facility].    Review of Systems:      Patient is resting comfortably, sleeping.          No chest pain at this time.      Past Medical History:   Past Medical History:   Diagnosis Date    Arrhythmia     Breath shortness     Cyclic vomiting syndrome     Fracture     Headache, classical migraine     Heart burn     Heart valve disease     Indigestion     Snoring      Active Hospital Problems    Diagnosis     Nonrheumatic aortic valve stenosis [I35.0]     Aneurysm of ascending aorta without rupture [I71.21]     Encounter for weaning from ventilator (HCC) [Z99.11]     Acute blood loss as cause of postoperative anemia [D62]     Gastroesophageal reflux disease without esophagitis [K21.9]     Hyperkalemia [E87.5]        Past Surgical History:  Past Surgical History:   Procedure Laterality Date    AORTIC VALVE REPLACEMENT  1/5/2023    Procedure: AORTIC VALVE REPLACEMENT, ASCENDING AORTIC ANEURYSM REPAIR AND  TRANSESOPHAGEAL ECHOCARDIOGRAM.;  Surgeon: Serena Goyal M.D.;  Location: SURGERY [facility];  Service: Cardiac    AORTIC ASCENDING DISSECTION  1/5/2023    Procedure: REPAIR, ANEURYSM OR DISSECTION, AORTA, ASCENDING;  Surgeon: Serena Goyal M.D.;  Location: SURGERY [facility];  Service: Cardiac    ECHOCARDIOGRAM, TRANSESOPHAGEAL, INTRAOPERATIVE  1/5/2023    Procedure: ECHOCARDIOGRAM, TRANSESOPHAGEAL, INTRAOPERATIVE.;  Surgeon: Serena Goyal M.D.;  Location: SURGERY [facility];  Service: Cardiac    IRRIGATION & DEBRIDEMENT ORTHO Right 09/19/2017    Procedure: IRRIGATION & DEBRIDEMENT ORTHO-THIGH;  Surgeon: Corbin Rivera M.D.;  Location: SURGERY [facility];  Service: Orthopedics    SHOULDER HEMICAP RESURFACING Right 10/12/2015    Procedure: RIGHT SHOULDER RESURFACING;  Surgeon: Javon Doll M.D.;  Location: SURGERY [facility];  Service:     SHOULDER ARTHROSCOPY      x3    SHOULDER SURGERY      replacement right       Hospital Medications:    Current Facility-Administered Medications:     Respiratory Therapy Consult, , Nebulization, Continuous RT, Matt Maurer A.P.R.N.    NS infusion, , Intravenous, Continuous, Matt Maurer A.P.R.N., Last Rate: 10 mL/hr at 01/05/23 1436, Restarted at 01/05/23 1436    electrolyte-A (PLASMALYTE-A) infusion, , Intravenous, PRN, Matt Maurer, A.P.R.N., Last Rate: 999 mL/hr at 01/05/23 2000, New Bag at 01/05/23 2000    calcium CHLORIDE 1,000 mg in dextrose 5% 100 mL IVPB, 1,000 mg, Intravenous, Once PRN, Matt Maurer A.P.R.N.    magnesium sulfate in D5W IVPB premix 1 g, 1 g, Intravenous, DAILY, Matt Maurer A.P.R.N., Stopped at 01/05/23 1436    metoprolol tartrate (LOPRESSOR) tablet 12.5 mg, 12.5 mg, Oral, BID **FOLLOWED BY** [START ON 1/7/2023] metoprolol tartrate (LOPRESSOR) tablet 25 mg, 25 mg, Oral, BID, Matt W Kading, A.P.R.N.    aspirin EC (ECOTRIN) tablet 81 mg, 81 mg, Oral, DAILY, STEPHANIE SchofieldP.R.N.    clevidipine (Cleviprex) IV emulsion, 0-21  mg/hr, Intravenous, Continuous, THANH Schofield.P.R.N., Dose not Required at 01/05/23 1315    nitroglycerin 50 mg in D5W 250 ml infusion, 0-100 mcg/min, Intravenous, Continuous, THANH Schofield.P.R.N., Dose not Required at 01/05/23 1315    Pharmacy Consult Request ...Pain Management Review 1 Each, 1 Each, Other, PHARMACY TO DOSE, STEPHANIE SchofieldP.R.DIPAK.    acetaminophen (TYLENOL) tablet 1,000 mg, 1,000 mg, Oral, Q6HRS, 1,000 mg at 01/06/23 0111 **FOLLOWED BY** [START ON 1/10/2023] acetaminophen (TYLENOL) tablet 1,000 mg, 1,000 mg, Oral, Q6HRS PRN, THANH Schofield.P.R.N.    oxyCODONE immediate-release (ROXICODONE) tablet 5 mg, 5 mg, Oral, Q3HRS PRN, 5 mg at 01/05/23 1810 **OR** oxyCODONE immediate release (ROXICODONE) tablet 10 mg, 10 mg, Oral, Q3HRS PRN, 10 mg at 01/05/23 2120 **OR** fentaNYL (SUBLIMAZE) injection 50 mcg, 50 mcg, Intravenous, Q3HRS PRN, Matt Maurer A.P.R.N., 50 mcg at 01/05/23 2220    traMADol (Ultram) 50 MG tablet 50 mg, 50 mg, Oral, Q4HRS PRN, Matt Maurer A.P.R.N.    midazolam (Versed) injection 2 mg, 2 mg, Intravenous, Q HOUR PRN, THANH Schofield.P.R.N.    sodium bicarbonate 8.4 % injection 50 mEq, 50 mEq, Intravenous, Q HOUR PRN, THANH Schofield.P.R.N.    ondansetron (ZOFRAN) syringe/vial injection 8 mg, 8 mg, Intravenous, Q6HRS PRN **OR** prochlorperazine (COMPAZINE) injection 10 mg, 10 mg, Intravenous, Q6HRS PRN, Matt Maurer A.P.R.N.    acetaminophen (Tylenol) tablet 650 mg, 650 mg, Oral, Q4HRS PRN **OR** acetaminophen (TYLENOL) suppository 650 mg, 650 mg, Rectal, Q4HRS PRN, CHELY SchofieldRLEON    senna-docusate (PERICOLACE or SENOKOT S) 8.6-50 MG per tablet 2 Tablet, 2 Tablet, Oral, BID, 2 Tablet at 01/05/23 1940 **AND** polyethylene glycol/lytes (MIRALAX) PACKET 1 Packet, 1 Packet, Oral, DAILY **AND** [START ON 1/7/2023] magnesium hydroxide (MILK OF MAGNESIA) suspension 30 mL, 30 mL, Oral, DAILY **AND** bisacodyl (DULCOLAX) suppository 10 mg, 10 mg, Rectal, QDAY PRN, Matt BECERRA  CHELY MaurerRLEON    omeprazole (PRILOSEC) capsule 20 mg, 20 mg, Oral, DAILY, EFRAÍN Schofield    mag hydrox-al hydrox-simeth (MAALOX PLUS ES or MYLANTA DS) suspension 30 mL, 30 mL, Oral, Q4HRS PRN, EFRAÍN Schofield    dexmedetomidine (PRECEDEX) 400 mcg/100mL NS premix infusion, 0-1.5 mcg/kg/hr (Ideal), Intravenous, Continuous, STEPHANIE SchofieldP.RLEON, Last Rate: 15.9 mL/hr at 01/06/23 0009, 0.9 mcg/kg/hr at 01/06/23 0009    norepinephrine (Levophed) 8 mg in 250 mL NS infusion (premix), 0-1 mcg/kg/min (Ideal), Intravenous, Continuous, Serena Goyal M.D., Last Rate: 2.7 mL/hr at 01/06/23 0008, 0.02 mcg/kg/min at 01/06/23 0008    EPINEPHrine (Adrenalin) infusion 4 mg/250 mL (premix), 0-0.5 mcg/kg/min (Ideal), Intravenous, Continuous, Serena Goyal M.D., Stopped at 01/05/23 1319    Potassium Serum (K), , , Q4H(S) **AND** K+ Scale: Goal of 4.5, 1 Each, Intravenous, Q4HR, Serena Goyal M.D.    Current Outpatient Medications:  Medications Prior to Admission   Medication Sig Dispense Refill Last Dose    senna-docusate (PERICOLACE OR SENOKOT S) 8.6-50 MG Tab Take 1 Tablet by mouth every day.   1/3/2023    pantoprazole (PROTONIX) 40 MG Tablet Delayed Response Take 40 mg by mouth every day.   1/4/2023 at 0530    asa/apap/caffeine (EXCEDRIN) 250-250-65 MG Tab Take 1 Tablet by mouth every 6 hours as needed for Headache. Indications: Headache   1/2/2023       Medication Allergy:  Allergies   Allergen Reactions    Morphine      Rash/ difficulty breathing       Family History:  History reviewed. No pertinent family history.    Social History:  Social History     Socioeconomic History    Marital status:      Spouse name: Not on file    Number of children: Not on file    Years of education: Not on file    Highest education level: Not on file   Occupational History    Not on file   Tobacco Use    Smoking status: Former     Packs/day: 1.00     Years: 40.00     Pack years: 40.00     Types: Cigarettes      "Quit date: 1/3/2023    Smokeless tobacco: Never   Vaping Use    Vaping Use: Never used   Substance and Sexual Activity    Alcohol use: No    Drug use: Not Currently     Comment: past problems with narcotics     Sexual activity: Not on file   Other Topics Concern    Not on file   Social History Narrative    Not on file     Social Determinants of Health     Financial Resource Strain: Not on file   Food Insecurity: Not on file   Transportation Needs: Not on file   Physical Activity: Not on file   Stress: Not on file   Social Connections: Not on file   Intimate Partner Violence: Not on file   Housing Stability: Not on file         Physical Exam:  Vitals/ General Appearance:   Weight/BMI: Body mass index is 30.33 kg/m².  BP 97/56   Pulse 75   Temp 36.8 °C (98.2 °F) (Bladder)   Resp 20   Ht 1.753 m (5' 9.02\")   Wt 93.2 kg (205 lb 7.5 oz)   SpO2 93%   Vitals:    01/06/23 0030 01/06/23 0045 01/06/23 0100 01/06/23 0115   BP:  101/66 97/58 97/56   Pulse: 75 73 73 75   Resp: 17 (!) 28 (!) 30 20   Temp:       TempSrc:       SpO2: 94% 93% 93%    Weight:       Height:         Oxygen Therapy:  Pulse Oximetry: 93 %, O2 (LPM): 3, FiO2%: 60 %, O2 Delivery Device: Oxymask    Constitutional:   No acute distress  HENMT:  Normocephalic, Atraumatic.  Eyes:  No discharge.  Neck:  no JVD.  Cardiovascular:  Normal heart rate, Normal rhythm.  Extremitites with intact distal pulses, no cyanosis, or edema.  Lungs:  No respiratory distress.  Abdomen: Soft, No tenderness, No guarding, No rebound.  Skin: No significant rash.        MDM (Data Review):     Records reviewed and summarized in current documentation    Lab Data Review:  Recent Results (from the past 24 hour(s))   POCT glucose device results    Collection Time: 01/05/23  7:17 AM   Result Value Ref Range    POC Glucose, Blood 93 65 - 99 mg/dL   ABO Rh Confirm    Collection Time: 01/05/23  7:20 AM   Result Value Ref Range    ABO Rh Confirm O NEG    POCT glucose device results    " Collection Time: 01/05/23  8:48 AM   Result Value Ref Range    POC Glucose, Blood 115 (H) 65 - 99 mg/dL   POCT arterial blood gas device results    Collection Time: 01/05/23  8:51 AM   Result Value Ref Range    Ph 7.328 (L) 7.400 - 7.500    Pco2 50.9 (H) 26.0 - 37.0 mmHg    Po2 328 (H) 64 - 87 mmHg    Tco2 28 20 - 33 mmol/L    S02 100 (H) 93 - 99 %    Hco3 26.7 (H) 17.0 - 25.0 mmol/L    BE 0 -4 - 3 mmol/L    Body Temp 37.0 C degrees    Ph Temp Berna 7.328 (L) 7.400 - 7.500    Pco2 Temp Co 50.9 (H) 26.0 - 37.0 mmHg    Po2 Temp Cor 328 (H) 64 - 87 mmHg    Specimen Arterial    POCT sodium device results    Collection Time: 01/05/23  8:51 AM   Result Value Ref Range    Istat Sodium 135 135 - 145 mmol/L   POCT potassium device results    Collection Time: 01/05/23  8:51 AM   Result Value Ref Range    Istat Potassium 5.3 3.6 - 5.5 mmol/L   POCT ionized CA device results    Collection Time: 01/05/23  8:51 AM   Result Value Ref Range    Istat Ionized Calcium 1.18 1.10 - 1.30 mmol/L   POCT hematocrit and hemoglobin device results    Collection Time: 01/05/23  8:51 AM   Result Value Ref Range    Istat Hematocrit 45 42 - 52 %    Istat Hemoglobin 15.3 14.0 - 18.0 g/dL   POCT activated clotting time device results    Collection Time: 01/05/23  8:54 AM   Result Value Ref Range    Istat Activated Clotting Time 131 74 - 137 sec   POCT activated clotting time device results    Collection Time: 01/05/23  9:13 AM   Result Value Ref Range    Istat Activated Clotting Time 414 (H) 74 - 137 sec   POCT glucose device results    Collection Time: 01/05/23  9:39 AM   Result Value Ref Range    POC Glucose, Blood 140 (H) 65 - 99 mg/dL   POCT activated clotting time device results    Collection Time: 01/05/23  9:41 AM   Result Value Ref Range    Istat Activated Clotting Time 389 (H) 74 - 137 sec   Histology Request    Collection Time: 01/05/23  9:43 AM   Result Value Ref Range    Pathology Request Sent to Histo    POCT venous blood gas device  results    Collection Time: 01/05/23  9:50 AM   Result Value Ref Range    Ph 7.298 (L) 7.310 - 7.450    Pco2 55.1 (H) 41.0 - 51.0 mmHg    Po2 52 (H) 25 - 40 mmHg    Tco2 29 20 - 33 mmol/L    SO2 82 %    Hco3 27.0 24.0 - 28.0 mmol/L    BE 0 -4 - 3 mmol/L    Body Temp 35.3 C degrees    Ph Temp Correc 7.322 7.310 - 7.450    Pco2 Temp Berna 51.2 (H) 41.0 - 51.0 mmHg    Po2 Temp Corre 47 (H) 25 - 40 mmHg    Specimen Venous    POCT sodium device results    Collection Time: 01/05/23  9:50 AM   Result Value Ref Range    Istat Sodium 131 (L) 135 - 145 mmol/L   POCT potassium device results    Collection Time: 01/05/23  9:50 AM   Result Value Ref Range    Istat Potassium 7.2 (HH) 3.6 - 5.5 mmol/L   POCT ionized CA device results    Collection Time: 01/05/23  9:50 AM   Result Value Ref Range    Istat Ionized Calcium 0.92 (L) 1.10 - 1.30 mmol/L   POCT hematocrit and hemoglobin device results    Collection Time: 01/05/23  9:50 AM   Result Value Ref Range    Istat Hematocrit 33 (L) 42 - 52 %    Istat Hemoglobin 11.2 (L) 14.0 - 18.0 g/dL   POCT glucose device results    Collection Time: 01/05/23  9:53 AM   Result Value Ref Range    POC Glucose, Blood 140 (H) 65 - 99 mg/dL   POCT activated clotting time device results    Collection Time: 01/05/23  9:54 AM   Result Value Ref Range    Istat Activated Clotting Time 624 (H) 74 - 137 sec   POCT arterial blood gas device results    Collection Time: 01/05/23 10:05 AM   Result Value Ref Range    Ph 7.307 (L) 7.400 - 7.500    Pco2 52.4 (HH) 26.0 - 37.0 mmHg    Po2 443 (H) 64 - 87 mmHg    Tco2 28 20 - 33 mmol/L    S02 100 (H) 93 - 99 %    Hco3 26.2 (H) 17.0 - 25.0 mmol/L    BE -1 -4 - 3 mmol/L    Body Temp 34.9 C degrees    Ph Temp Berna 7.337 (L) 7.400 - 7.500    Pco2 Temp Co 47.8 (H) 26.0 - 37.0 mmHg    Po2 Temp Cor 431 (H) 64 - 87 mmHg    Specimen Arterial    POCT sodium device results    Collection Time: 01/05/23 10:05 AM   Result Value Ref Range    Istat Sodium 132 (L) 135 - 145 mmol/L    POCT potassium device results    Collection Time: 01/05/23 10:05 AM   Result Value Ref Range    Istat Potassium 6.9 (HH) 3.6 - 5.5 mmol/L   POCT ionized CA device results    Collection Time: 01/05/23 10:05 AM   Result Value Ref Range    Istat Ionized Calcium 0.93 (L) 1.10 - 1.30 mmol/L   POCT hematocrit and hemoglobin device results    Collection Time: 01/05/23 10:05 AM   Result Value Ref Range    Istat Hematocrit 36 (L) 42 - 52 %    Istat Hemoglobin 12.2 (L) 14.0 - 18.0 g/dL   POCT glucose device results    Collection Time: 01/05/23 10:18 AM   Result Value Ref Range    POC Glucose, Blood 162 (H) 65 - 99 mg/dL   POCT arterial blood gas device results    Collection Time: 01/05/23 10:21 AM   Result Value Ref Range    Ph 7.337 (L) 7.400 - 7.500    Pco2 47.7 (H) 26.0 - 37.0 mmHg    Po2 354 (H) 64 - 87 mmHg    Tco2 27 20 - 33 mmol/L    S02 100 (H) 93 - 99 %    Hco3 25.6 (H) 17.0 - 25.0 mmol/L    BE -1 -4 - 3 mmol/L    Body Temp 34.5 C degrees    Ph Temp Berna 7.373 (L) 7.400 - 7.500    Pco2 Temp Co 42.8 (H) 26.0 - 37.0 mmHg    Po2 Temp Cor 342 (H) 64 - 87 mmHg    Specimen Arterial    POCT sodium device results    Collection Time: 01/05/23 10:21 AM   Result Value Ref Range    Istat Sodium 134 (L) 135 - 145 mmol/L   POCT potassium device results    Collection Time: 01/05/23 10:21 AM   Result Value Ref Range    Istat Potassium 6.7 (HH) 3.6 - 5.5 mmol/L   POCT ionized CA device results    Collection Time: 01/05/23 10:21 AM   Result Value Ref Range    Istat Ionized Calcium 0.94 (L) 1.10 - 1.30 mmol/L   POCT hematocrit and hemoglobin device results    Collection Time: 01/05/23 10:21 AM   Result Value Ref Range    Istat Hematocrit 37 (L) 42 - 52 %    Istat Hemoglobin 12.6 (L) 14.0 - 18.0 g/dL   POCT activated clotting time device results    Collection Time: 01/05/23 10:24 AM   Result Value Ref Range    Istat Activated Clotting Time 642 (H) 74 - 137 sec   POCT glucose device results    Collection Time: 01/05/23 10:34 AM    Result Value Ref Range    POC Glucose, Blood 151 (H) 65 - 99 mg/dL   POCT arterial blood gas device results    Collection Time: 01/05/23 10:36 AM   Result Value Ref Range    Ph 7.358 (L) 7.400 - 7.500    Pco2 41.0 (H) 26.0 - 37.0 mmHg    Po2 305 (H) 64 - 87 mmHg    Tco2 24 20 - 33 mmol/L    S02 100 (H) 93 - 99 %    Hco3 23.1 17.0 - 25.0 mmol/L    BE -2 -4 - 3 mmol/L    Body Temp 34.7 C degrees    Ph Temp Berna 7.391 (L) 7.400 - 7.500    Pco2 Temp Co 37.1 (H) 26.0 - 37.0 mmHg    Po2 Temp Cor 294 (H) 64 - 87 mmHg    Specimen Arterial    POCT sodium device results    Collection Time: 01/05/23 10:36 AM   Result Value Ref Range    Istat Sodium 135 135 - 145 mmol/L   POCT potassium device results    Collection Time: 01/05/23 10:36 AM   Result Value Ref Range    Istat Potassium 6.2 (H) 3.6 - 5.5 mmol/L   POCT ionized CA device results    Collection Time: 01/05/23 10:36 AM   Result Value Ref Range    Istat Ionized Calcium 0.91 (L) 1.10 - 1.30 mmol/L   POCT hematocrit and hemoglobin device results    Collection Time: 01/05/23 10:36 AM   Result Value Ref Range    Istat Hematocrit 37 (L) 42 - 52 %    Istat Hemoglobin 12.6 (L) 14.0 - 18.0 g/dL   POCT activated clotting time device results    Collection Time: 01/05/23 10:40 AM   Result Value Ref Range    Istat Activated Clotting Time 678 (H) 74 - 137 sec   POCT glucose device results    Collection Time: 01/05/23 11:06 AM   Result Value Ref Range    POC Glucose, Blood 136 (H) 65 - 99 mg/dL   POCT arterial blood gas device results    Collection Time: 01/05/23 11:09 AM   Result Value Ref Range    Ph 7.380 (L) 7.400 - 7.500    Pco2 40.0 (H) 26.0 - 37.0 mmHg    Po2 271 (H) 64 - 87 mmHg    Tco2 25 20 - 33 mmol/L    S02 100 (H) 93 - 99 %    Hco3 23.7 17.0 - 25.0 mmol/L    BE -1 -4 - 3 mmol/L    Body Temp 36.2 C degrees    Ph Temp Berna 7.392 (L) 7.400 - 7.500    Pco2 Temp Co 38.6 (H) 26.0 - 37.0 mmHg    Po2 Temp Cor 267 (H) 64 - 87 mmHg    Specimen Arterial    POCT sodium device  results    Collection Time: 01/05/23 11:09 AM   Result Value Ref Range    Istat Sodium 138 135 - 145 mmol/L   POCT potassium device results    Collection Time: 01/05/23 11:09 AM   Result Value Ref Range    Istat Potassium 5.8 (H) 3.6 - 5.5 mmol/L   POCT ionized CA device results    Collection Time: 01/05/23 11:09 AM   Result Value Ref Range    Istat Ionized Calcium 0.88 (L) 1.10 - 1.30 mmol/L   POCT hematocrit and hemoglobin device results    Collection Time: 01/05/23 11:09 AM   Result Value Ref Range    Istat Hematocrit 38 (L) 42 - 52 %    Istat Hemoglobin 12.9 (L) 14.0 - 18.0 g/dL   POCT activated clotting time device results    Collection Time: 01/05/23 11:12 AM   Result Value Ref Range    Istat Activated Clotting Time 630 (H) 74 - 137 sec   POCT glucose device results    Collection Time: 01/05/23 11:27 AM   Result Value Ref Range    POC Glucose, Blood 127 (H) 65 - 99 mg/dL   POCT activated clotting time device results    Collection Time: 01/05/23 11:29 AM   Result Value Ref Range    Istat Activated Clotting Time 630 (H) 74 - 137 sec   POCT arterial blood gas device results    Collection Time: 01/05/23 11:29 AM   Result Value Ref Range    Ph 7.360 (L) 7.400 - 7.500    Pco2 40.9 (H) 26.0 - 37.0 mmHg    Po2 168 (H) 64 - 87 mmHg    Tco2 24 20 - 33 mmol/L    S02 99 93 - 99 %    Hco3 23.1 17.0 - 25.0 mmol/L    BE -2 -4 - 3 mmol/L    Body Temp 36.6 C degrees    Ph Temp Berna 7.366 (L) 7.400 - 7.500    Pco2 Temp Co 40.2 (H) 26.0 - 37.0 mmHg    Po2 Temp Cor 166 (H) 64 - 87 mmHg    Specimen Arterial    POCT sodium device results    Collection Time: 01/05/23 11:29 AM   Result Value Ref Range    Istat Sodium 135 135 - 145 mmol/L   POCT potassium device results    Collection Time: 01/05/23 11:29 AM   Result Value Ref Range    Istat Potassium 5.5 3.6 - 5.5 mmol/L   POCT ionized CA device results    Collection Time: 01/05/23 11:29 AM   Result Value Ref Range    Istat Ionized Calcium 0.82 (L) 1.10 - 1.30 mmol/L   POCT hematocrit  and hemoglobin device results    Collection Time: 01/05/23 11:29 AM   Result Value Ref Range    Istat Hematocrit 34 (L) 42 - 52 %    Istat Hemoglobin 11.6 (L) 14.0 - 18.0 g/dL   POCT glucose device results    Collection Time: 01/05/23 11:57 AM   Result Value Ref Range    POC Glucose, Blood 116 (H) 65 - 99 mg/dL   POCT activated clotting time device results    Collection Time: 01/05/23 11:58 AM   Result Value Ref Range    Istat Activated Clotting Time 107 74 - 137 sec   POCT arterial blood gas device results    Collection Time: 01/05/23 11:59 AM   Result Value Ref Range    Ph 7.361 (L) 7.400 - 7.500    Pco2 45.6 (H) 26.0 - 37.0 mmHg    Po2 262 (H) 64 - 87 mmHg    Tco2 27 20 - 33 mmol/L    S02 100 (H) 93 - 99 %    Hco3 25.8 (H) 17.0 - 25.0 mmol/L    BE 0 -4 - 3 mmol/L    Body Temp 37.0 C degrees    Ph Temp Berna 7.361 (L) 7.400 - 7.500    Pco2 Temp Co 45.6 (H) 26.0 - 37.0 mmHg    Po2 Temp Cor 262 (H) 64 - 87 mmHg    Specimen Arterial    POCT sodium device results    Collection Time: 01/05/23 11:59 AM   Result Value Ref Range    Istat Sodium 138 135 - 145 mmol/L   POCT potassium device results    Collection Time: 01/05/23 11:59 AM   Result Value Ref Range    Istat Potassium 4.3 3.6 - 5.5 mmol/L   POCT ionized CA device results    Collection Time: 01/05/23 11:59 AM   Result Value Ref Range    Istat Ionized Calcium 1.08 (L) 1.10 - 1.30 mmol/L   POCT hematocrit and hemoglobin device results    Collection Time: 01/05/23 11:59 AM   Result Value Ref Range    Istat Hematocrit 36 (L) 42 - 52 %    Istat Hemoglobin 12.2 (L) 14.0 - 18.0 g/dL   Platelet count    Collection Time: 01/05/23  1:00 PM   Result Value Ref Range    Platelet Count 460 (H) 164 - 446 K/uL   Magnesium    Collection Time: 01/05/23  1:00 PM   Result Value Ref Range    Magnesium 3.0 (H) 1.5 - 2.5 mg/dL   Prothrombin time (INR)    Collection Time: 01/05/23  1:00 PM   Result Value Ref Range    PT 15.9 (H) 12.0 - 14.6 sec    INR 1.30 (H) 0.87 - 1.13   APTT (PTT)     Collection Time: 23  1:00 PM   Result Value Ref Range    APTT 46.7 (H) 24.7 - 36.0 sec   HEMOGLOBIN AND HEMATOCRIT    Collection Time: 23  1:00 PM   Result Value Ref Range    Hemoglobin 12.5 (L) 14.0 - 18.0 g/dL    Hematocrit 40.2 (L) 42.0 - 52.0 %   POTASSIUM SERUM (K)    Collection Time: 23  1:00 PM   Result Value Ref Range    Potassium 4.4 3.6 - 5.5 mmol/L   POCT arterial blood gas device results    Collection Time: 23  1:05 PM   Result Value Ref Range    Ph 7.319 (L) 7.400 - 7.500    Pco2 49.7 (H) 26.0 - 37.0 mmHg    Po2 85 64 - 87 mmHg    Tco2 27 20 - 33 mmol/L    S02 95 93 - 99 %    Hco3 25.5 (H) 17.0 - 25.0 mmol/L    BE -1 -4 - 3 mmol/L    Body Temp 36.2 C degrees    O2 Therapy 100 %    iPF Ratio 85     Ph Temp Berna 7.330 (L) 7.400 - 7.500    Pco2 Temp Co 48.0 (H) 26.0 - 37.0 mmHg    Po2 Temp Cor 81 64 - 87 mmHg    Specimen Arterial    POCT sodium device results    Collection Time: 23  1:05 PM   Result Value Ref Range    Istat Sodium 140 135 - 145 mmol/L   POCT potassium device results    Collection Time: 23  1:05 PM   Result Value Ref Range    Istat Potassium 4.4 3.6 - 5.5 mmol/L   POCT ionized CA device results    Collection Time: 23  1:05 PM   Result Value Ref Range    Istat Ionized Calcium 1.10 1.10 - 1.30 mmol/L   EKG on arrival to CSU    Collection Time: 23  1:21 PM   Result Value Ref Range    Report       [facility]    Test Date:  2023  Pt Name:    JUSTIN TOPETE               Department: 161  MRN:        [id number]                      Room:       21  Gender:     Male                         Technician: MARCIAL  :        [id number]                   Requested By:MALI ESPINOSA  Order #:    [id number]                    Reading MD: Justin Sanchez MD    Measurements  Intervals                                Axis  Rate:       78                           P:          80  DC:         167                          QRS:        36  QRSD:       106                           T:          -51  QT:         409  QTc:        466    Interpretive Statements  SINUS RHYTHM  RSR' IN V1 OR V2  EARLY REPOLARIZATION  ST SEGMENT DEPRESSION, INFERIOR LEADS, CONSIDER ISCHEMIA  Electronically Signed On 1-5-2023 18:32:14 PST by Noe Sanchez MD     POCT arterial blood gas device results    Collection Time: 01/05/23  2:38 PM   Result Value Ref Range    Ph 7.391 (L) 7.400 - 7.500    Pco2 35.4 26.0 - 37.0 mmHg    Po2 75 64 - 87 mmHg    Tco2 23 20 - 33 mmol/L    S02 95 93 - 99 %    Hco3 21.5 17.0 - 25.0 mmol/L    BE -3 -4 - 3 mmol/L    Body Temp 35.8 C degrees    O2 Therapy 100 %    iPF Ratio 75     Ph Temp Berna 7.409 7.400 - 7.500    Pco2 Temp Co 33.6 26.0 - 37.0 mmHg    Po2 Temp Cor 69 64 - 87 mmHg    Specimen Arterial    POCT lactate device results    Collection Time: 01/05/23  2:38 PM   Result Value Ref Range    iStat Lactate 1.6 0.5 - 2.0 mmol/L   POCT arterial blood gas device results    Collection Time: 01/05/23  4:25 PM   Result Value Ref Range    Ph 7.393 (L) 7.400 - 7.500    Pco2 38.5 (H) 26.0 - 37.0 mmHg    Po2 213 (H) 64 - 87 mmHg    Tco2 25 20 - 33 mmol/L    S02 100 (H) 93 - 99 %    Hco3 23.5 17.0 - 25.0 mmol/L    BE -1 -4 - 3 mmol/L    Body Temp 37.0 C degrees    O2 Therapy 90 %    iPF Ratio 237     Ph Temp Berna 7.393 (L) 7.400 - 7.500    Pco2 Temp Co 38.5 (H) 26.0 - 37.0 mmHg    Po2 Temp Cor 213 (H) 64 - 87 mmHg    Specimen Arterial    POCT lactate device results    Collection Time: 01/05/23  4:25 PM   Result Value Ref Range    iStat Lactate 1.2 0.5 - 2.0 mmol/L   Potassium Serum (K)    Collection Time: 01/05/23  5:31 PM   Result Value Ref Range    Potassium 5.6 (H) 3.6 - 5.5 mmol/L   Basic Metabolic Panel    Collection Time: 01/05/23  5:31 PM   Result Value Ref Range    Sodium 135 135 - 145 mmol/L    Potassium 5.6 (H) 3.6 - 5.5 mmol/L    Chloride 104 96 - 112 mmol/L    Co2 21 20 - 33 mmol/L    Glucose 173 (H) 65 - 99 mg/dL    Bun 27 (H) 8 - 22 mg/dL    Creatinine  1.32 0.50 - 1.40 mg/dL    Calcium 7.3 (L) 8.5 - 10.5 mg/dL    Anion Gap 10.0 7.0 - 16.0   ESTIMATED GFR    Collection Time: 23  5:31 PM   Result Value Ref Range    GFR (CKD-EPI) 61 >60 mL/min/1.73 m 2   Potassium Serum (K)    Collection Time: 23  9:43 PM   Result Value Ref Range    Potassium 6.4 (H) 3.6 - 5.5 mmol/L   Potassium Serum (K)    Collection Time: 23 10:20 PM   Result Value Ref Range    Potassium 5.8 (H) 3.6 - 5.5 mmol/L   Magnesium    Collection Time: 23 10:20 PM   Result Value Ref Range    Magnesium 2.5 1.5 - 2.5 mg/dL   EKG    Collection Time: 23 10:20 PM   Result Value Ref Range    Report       [facility]    Test Date:  2023  Pt Name:    JUSTIN TOPETE               Department: 161  MRN:        [id number]                      Room:       [facility]  Gender:     Male                         Technician: NINFA  :        1960                   Requested By:TAY MADISON  Order #:    [id number]                    Reading MD:    Measurements  Intervals                                Axis  Rate:       75                           P:          54  TX:         171                          QRS:        -14  QRSD:       103                          T:          -2  QT:         389  QTc:        435    Interpretive Statements  Sinus rhythm  RSR' in V1 or V2, right VCD or RVH  Inferior infarct, old  ST elevation, consider anterior injury  Compared to ECG 2023 13:21:19  Right ventricular hypertrophy now present  Myocardial infarct finding now present  ST (T wave) deviation now present  Early repolarization no longer present  Possible ischemia no longer present     POCT arterial blood gas device results    Collection Time: 23 10:21 PM   Result Value Ref Range    Ph 7.331 (L) 7.400 - 7.500    Pco2 37.9 (H) 26.0 - 37.0 mmHg    Po2 90 (H) 64 - 87 mmHg    Tco2 21 20 - 33 mmol/L    S02 96 93 - 99 %    Hco3 20.0 17.0 - 25.0 mmol/L    BE -5 (L) -4 - 3 mmol/L    Body Temp  36.7 C degrees    Ph Temp Berna 7.335 (L) 7.400 - 7.500    Pco2 Temp Co 37.4 (H) 26.0 - 37.0 mmHg    Po2 Temp Cor 88 (H) 64 - 87 mmHg    Specimen Arterial    POCT sodium device results    Collection Time: 23 10:21 PM   Result Value Ref Range    Istat Sodium 135 135 - 145 mmol/L   POCT potassium device results    Collection Time: 23 10:21 PM   Result Value Ref Range    Istat Potassium 5.7 (H) 3.6 - 5.5 mmol/L   POCT ionized CA device results    Collection Time: 23 10:21 PM   Result Value Ref Range    Istat Ionized Calcium 0.97 (L) 1.10 - 1.30 mmol/L   EKG in the AM Post Op Day #1 (Specify Time)    Collection Time: 23 12:35 AM   Result Value Ref Range    Report       [facility]    Test Date:  2023  Pt Name:    JUSTIN TOPETE               Department: [facility]  MRN:        [id number]                      Room:       21  Gender:     Male                         Technician: NINFA  :        1960                   Requested By:MALI ESPINOSA  Order #:    [id number]                    Reading MD:    Measurements  Intervals                                Axis  Rate:       75                           P:          52  [place]:         185                          QRS:        -8  QRSD:       90                           T:          -8  QT:         365  QTc:        408    Interpretive Statements  Sinus rhythm  RSR' in V1 or V2, right VCD or RVH  Inferior infarct, age indeterminate  ST elevation, consider anterolateral injury  Compared to ECG 2023 22:20:48  No significant changes     CBC without Differential Critical Care 0130    Collection Time: 23  1:03 AM   Result Value Ref Range    WBC 35.2 (H) 4.8 - 10.8 K/uL    RBC 4.94 4.70 - 6.10 M/uL    Hemoglobin 11.5 (L) 14.0 - 18.0 g/dL    Hematocrit 36.2 (L) 42.0 - 52.0 %    MCV 73.3 (L) 81.4 - 97.8 fL    MCH 23.3 (L) 27.0 - 33.0 pg    MCHC 31.8 (L) 33.7 - 35.3 g/dL    RDW 46.3 35.9 - 50.0 fL    Platelet Count 394 164 - 446 K/uL    MPV 9.6  9.0 - 12.9 fL       Imaging/Procedures Review:    Chest Xray:  Reviewed    EKG:   As in HPI.     MDM (Assessment and Plan):     Active Hospital Problems    Diagnosis     Nonrheumatic aortic valve stenosis [I35.0]     Aneurysm of ascending aorta without rupture [I71.21]     Encounter for weaning from ventilator (HCC) [Z99.11]     Acute blood loss as cause of postoperative anemia [D62]     Gastroesophageal reflux disease without esophagitis [K21.9]     Hyperkalemia [E87.5]          At this time, there is no concern for acute coronary syndrome.  Diffuse ST elevation may be related to global ischemia secondary to transient episodes of hypotension due to bradycardia.  No indication for further invasive cardiac work-up or intervention at this time.  Continue supportive care.  Continue to monitor cardiac rhythm and conduction. If need PPM in future, please call us back.        Keysha Jameson MD.   Cardiology Inpatient Service.  [facility].  [phone].  [place].

## 2023-01-06 NOTE — ASSESSMENT & PLAN NOTE
Likely secondary to RV failure/ATN, possible outpatient supplement involvement - improving today, nonoliguric  Avoid nephrotoxins  Monitor creatinine, urine output, electrolytes  Continue diuresis  S/p bicarb drip

## 2023-01-06 NOTE — ASSESSMENT & PLAN NOTE
Bradycardic followed by PEA/torsade de pointes arrest with rapid ROSC after defibrillation on 1/6  Continue supportive care  Continuous telemetry monitoring  Cardiology consulted and signed off  Optimize electrolytes

## 2023-01-06 NOTE — PROGRESS NOTES
Lab called critical calcium at 6.7.   Labs reviewed, Potassium at 6.2. Orders received by Dr. Montero to give IV DW50 injection, 10 units of IV insulin and 1g IVPB calcium gluconate. WILLIS Castro paged.   At this time patient bradycardic and hypotensive again pacer initiated pacing. Pacer rate now 80 bpm. Epi gtt restarted.       0211 WILLIS Castro return paged, Updated on lab values and patient's hemodynamics, agreeable with D50, Insulin and Calcium orders. Patient with 10cc of urine for last hour, creatinine at 1.66, CVP intermittently reading 10-20 with inappropriate waveform, WILLIS Castro made aware, orders received for 20mg IV lasix once. Orders repeated and entered in epic.

## 2023-01-06 NOTE — PROGRESS NOTES
Late Entry  Pt received from OR at 1300 s/p AV Replace and AAA Repair, accompanied by Dr. Bar and OR staff.  Dr. Goyal, LANI Velazquez, and Dr. Gonda at bedside for report.  Epinephrine infusing at 0.04 mcg/kg/min, levophed infusing at 0.04 mcg/kg/min, and precedex infusing at 0.3 mcg/kg/hr through RIJ triple. Rhythm at time of arrival SR 70s .  Initial labs and blood sugar collected.  Marlo Hugger initiated due to temperature of 35.2 Celsius.  Mediastinal chest tubes in place.  L side draining appropriately, R side requiring stripping for clots.      Per report, insulin infusion started in OR for hyperkalemia along with a 4 unit insulin bolus.  Infusion stopped prior to arrival to floor. K scale adjusted to q4hrs.  iStat K upon arrival 4.4.  FSBS upon arrival 125.  Per Dr. Goyal, continue to check blood sugars and restart insulin infusion per protocol if needed.      1400  Patient awake and able to follow commands.      1700  Weaning completed.  Extubation parameters met Pinsp 7, RR 14, PEEP 8, FiO2 50%, NIF -30, VC within expected range, PACO2 38, pH 7.39, Sp02 97%, RSBI less than 100.  Hemodynamically stable on one pressor.  Pt initially placed on 4L NC.  Transitioned to oxymask 5L due to mouth breathing.  Immediately complained of pain post extubation.  Fentanyl given.      1740  R mediastinal chest tube continuously clotting and becoming harder to strip.  Updated LANI Velazquez.  Dr. Muhammad came to bedside and suctioned chest tubes.  Clots removed.      1815  R mediastinal chest tube began clotting again shortly after inline suction.  Patient complaining of chest pain specifically on R side above chest tube.  Updated LANI Velazquez on chest tubes as well as current vitals, pressor use, chest tube output, urine output, and pain.      1830  Potassium resulted at 5.6.  Discussed K result with LANI Velazquez who deferred potassium management overnight to the Intensivist.  Also further discussed chest tube  clotting as well as patient's specific pain area.  Per andrew Castroay for Dr. Muhammad to suction chest tubes again if needed. BMP added.      1840  Discussed patient with Dr. Muhammad including but not limited to general presentation, history, and potassium level and management.  Per Dr. Muhammad, no intervention at this time.  Update after next K check if remains elevated.

## 2023-01-06 NOTE — THERAPY
Occupational Therapy Contact Note    Patient Name: Noe Hickey  Age:  62 y.o., Sex:  male  Medical Record #: [id number]  Today's Date: 1/6/2023    OT consult rec'd. Pt is POD #1; per order, will hold OT eval and initiate as appropriate/able after pt reaches POD #2.

## 2023-01-06 NOTE — PROCEDURES
Procedure Note    Date: 1/6/2023  Time: 0800    Procedure: Arterial Line placement  Site: R radial artery    Indication: Shock requiring continuous BP monitoring, frequent ABG monitoring  Consent: Informed consent obtained from patient or designated decision maker after explaining the benefits/risks of the procedure including but not limited to bleeding, infection, nerve or other deep structure injury, or failure of placement. Patient or surrogate expressed understanding and agreement.    Time out: Verbal consent was obtained. Immediately prior to procedure, a time out was called to verify the correct patient, procedure, equipment, support staff and site/side marked as required. Pre-procedure pain reported as 1/10 and post-procedure was 2/10.    Procedure: After obtaining consent, a time-out was performed. An Curtis's test was performed to test for adequate collateral circulation. Patient positioned, prepped, and draped in sterile fashion.  5 mL of local anesthetic injected (1% lidocaine without epinephrine) achieving appropriate comfort level for patient. Artery was located using realtime US/physical exam. A 18 gauge catheter was placed using Seldinger technique. Appropriate arterial blood visualized from the catheter and the arterial waveform confirmed on the monitor. Line secured and dressed in place. Patient tolerated procedure well without any difficulties and left in care of bedside nurse.     EBL: minimal  Complications: none    Jeremy Gonda, MD  Critical Care Medicine

## 2023-01-06 NOTE — CONSULTS
Was called to the bedside for CODE BLUE    Patient is a 62-year-old man who presented for AVR and aneurysm repair postop day 1.  Patient has had some bradycardia arrhythmias and some ST changes on his EKG daily was previously notable for some ST segment elevations, the patient reportedly had a previous angiogram within the last month that was not consistent coronary artery disease.    This morning the patient reportedly per bedside nurse went into a ventricular arrhythmia and had a pulseless cardiac arrest for which she was cardioverted rapidly without doing any chest compressions.  After cardioversion the patient restored sinus rhythm and he was somewhat bradycardic and there was a question of whether or not his pacer wires are working so we started transcutaneous pacing establish cardiac output and good perfusion and the patient awoke and returned to his baseline neurologic status.    Was unable to turn off the transcutaneous pacing and he maintained a sinus rhythm with PVCs and some ectopy, EKG was performed that showed a sinus rhythm with persistent ST segment elevations, but the patient was not hypotensive.    Reviewed the cardiac rhythm strips which were consistent with polymorphic V. tach/torsade pattern so the patient was given empiric magnesium.    Patient was hyperkalemic earlier in the evening and was given some shifting therapy and his last potassium prior to the arrest was in the 5 range, we have sent repeat labs at this time.    Stat echo has been ordered.    POCUS following the arrest shows mildly depressed left ventricular ejection fraction with preserved function, RV function looks somewhat depressed there is significant tricuspid regurgitation although did not measure the pressure gradient across the valve.    No pericardial effusion or tamponade      Patient is critically ill.   The patient continues to have: Unstable arrhythmias  The critical that has been undertaken is medically complex.   There  has been no overlap in critical care time.   Critical Care Time not including procedures: 50

## 2023-01-06 NOTE — HOSPITAL COURSE
62 y.o. male who presented 1/5/2023 with a past medical history significant for hypertension, atrial flutter with RVR, cyclical vomiting syndrome, GERD, obesity and symptomatic aortic stenosis due to bicuspid aortic valve and an ascending aortic aneurysm who was taken to the operating room today for an elective aortic valve replacement and aneurysm repair.  He is brought to the intensive care unit postoperatively and I was consulted for critical care management.              Per anesthesiology: Patient was intubated easily with an 8.0 endotracheal tube at 24 cm at the teeth with a grade 2A view.  Patient received 2.5 L of IV crystalloid and 580 mL of Cell Saver with urine output of 250 cc.  On echo, he has left ventricular hypertrophy with a dilated right ventricle and ejection fraction of 50 to 55% on epinephrine and norepinephrine drips.  His current drips include Precedex at 0.3, insulin drip is off, norepinephrine at 0.04 and epinephrine at 0.04.  He did have hyperkalemia intraoperatively up to 6.9 that was treated medically and most recently is down to 5.    1/5-1/6 - bradycardic requiring epicardial pacing, LOC, MICHELLE on EKG, cardiology consulted, PEA/torsades arrest early this morning s/p defibrillation  1/6 - flolan, BiPAP, epi gtt, diuresis  1/7 - HFNC, weaned off flolan, improving JENNY/K

## 2023-01-06 NOTE — PROGRESS NOTES
Monitor Summary    Intermittently SR 70s-80s, episodes of bradycardia, requiring pacing. Pacer rate at 50, 8, 08 VVI.     Torsades at 0540, see notes.         12hr chart check

## 2023-01-06 NOTE — CARE PLAN
Problem: Pain - Standard  Goal: Alleviation of pain or a reduction in pain to the patient’s comfort goal  Outcome: Progressing     Problem: Knowledge Deficit - Standard  Goal: Patient and family/care givers will demonstrate understanding of plan of care, disease process/condition, diagnostic tests and medications  Outcome: Progressing     Problem: Day of surgery post CABG/Heart valve replacement  Goal: Stabilization in immediate post op period  Outcome: Progressing  Intervention: Serum K q 6 hours x 24 hours.  ABG and CBC prn.  Note: Serum K Q 4 for hyperkalemia  Intervention: Initiate post cardiac insulin infusion protocol orders for FSBS greater than 140 and check frequency per protocol  Note: BG within range no insulin required   Intervention: For patients on Beta Blockers: verify dose given prior to surgery or within 6 hours after arrival to the unit  Note: Held   Intervention: Chest tube to 20 cm suction, record CT drainage with VS, and check for air leak  Note: No airleak, CT output recorded   Intervention: For CT drainage >300 mL in first hour post op and/or 150 mL in subsequent hours: Stat platelets, PT, INR, TEG, iSTAT, and H&H per order  Note: N/A  Intervention: Titrate and wean off vasoactive drips per patient's condition and per MD order while maintaining SBP  mmHg per MD order  Note: Progressing, patient on Epinephrine gtt   Intervention: IS q 1 hour while awake post extubation  Note: Unable to perform   Intervention: Bedrest until extubated and groin lines out  Note: Bedrest through outshift for bradycardia   Intervention: Dangle within 4 hours post extubation  Note: Not able to perform   Intervention: Up in chair 4 hours, day of extubation  Note: Not able to perform    The patient is Unstable - High likelihood or risk of patient condition declining or worsening    Shift Goals  Clinical Goals: Wean off presosores, control pain  Patient Goals: decrease pain  Family Goals: HUGH

## 2023-01-06 NOTE — ASSESSMENT & PLAN NOTE
Postoperatively -clinically improving  Cardiology consulted  S/p inhaled Flolan for RV afterload reduction (weaned off 1/7)  Continue diuresis   [At Term] : at term [Age Appropriate] : age appropriate developmental milestones met [Physical Therapy] : physical therapy [de-identified] : 5 lb 13 [de-identified] : poor weight gain over last month  [FreeTextEntry4] : nuchal chord [FreeTextEntry5] : for core strength

## 2023-01-06 NOTE — CARE PLAN
The patient is Watcher - Medium risk of patient condition declining or worsening    Shift Goals  Clinical Goals: Extubate, Stable BP  Patient Goals: Unable to Assess  Family Goals: Unable to Assess    Progress made toward(s) clinical / shift goals:  Met    Patient is not progressing towards the following goals:      Problem: Day of surgery post CABG/Heart valve replacement  Goal: Stabilization in immediate post op period  Intervention: VS q 15 min x 4 hours, then q 1 hour. Include temperature immediately upon arrival. Check CO/CI q 2-4 hours and PRN  Note: VS assessed and charted per protocol.    Intervention: First post op hour labs and EKG per order  Note: First post op labs  and EKG completed and assessed.    Intervention: Serum K q 6 hours x 24 hours.  ABG and CBC prn.  Note: K lab changed from q6hrs to q4 hrs.  ABG and CBC done per protocol.    Intervention: Initiate post cardiac insulin infusion protocol orders for FSBS greater than 140 and check frequency per protocol  Note: Blood sugars checked q2hrs.    Intervention: Chest tube to 20 cm suction, record CT drainage with VS, and check for air leak  Note: Chest tube to 20 cm suction.  No air leak.  Output charted per protocol.    Intervention: For CT drainage >300 mL in first hour post op and/or 150 mL in subsequent hours: Stat platelets, PT, INR, TEG, iSTAT, and H&H per order  Note: N/A  Intervention: Titrate and wean off vasoactive drips per patient's condition and per MD order while maintaining SBP  mmHg per MD order  Note: Pressors titrated to maintain SBP  mmHg.    Intervention: VAP protocol in place  Note: Confirmed  Intervention: Wean from Vent per protocol (see protocol), extubation goal within 6 hours post op  Note: Weaned per protocol and extubated at 1700.   Intervention: IS q 1 hour while awake post extubation  Note: Patient unable to do IS post extubation due to pain.  Will attempt once pain is managed.   Intervention: Dangle within 4  hours post extubation  Note: Patient to be dangled on night shift.   Intervention: Maintain all original surgical dressings per provider orders and specifications  Note: Prevena in place on midline incision.    Intervention: Clear liquids post extubation, order carbohydrate free (post cardiac surgery) diet, advance as tolerated  Note: Patient not hungry.  Diet ordered to start in am.

## 2023-01-06 NOTE — PROGRESS NOTES
Patient with sudden restlessness then unresponsive followed by bradycardia HR 20s, hypotensive, SBP less than 50. Staff assist called, pacer connected to patient increased pacer rate to 80 and 100% pacing. Dr. Muhammad at bedside, pressors titrated. Labs, EKG, ABG ordered STAT.       2215 Patient responding to questions, now with an intrinsic sinus rhythm rates 70s-80s. Potassium result of 6.4, D10 and 5 units of IV insulin ordered per Dr. Muhammad.     EKG Reveiwed by Dr. Muhammad. WILLIS Velazquez returned page, updated on event, labs and EKG, agreeable with orders in place.    Pacer sensitivity a 0.8, back up rate now at 35, intrinsic rhythm 80s. Pacer inappropriately pacing despite equipment change, WILLIS Castro aware.

## 2023-01-06 NOTE — PROGRESS NOTES
0538-Patient complaining of nausea,   0540- patient showing torsades on monitor, no pulse found. Code blue called, pads placed   0541- patient shocked 120J, rhythm now pacing through epicardial pacing wires at 50bpm via pcer wires, no capture noted, transcutaneously pacing at 70, capture noted, pulse found patient responsive.  0544 Dr. Montero at bedside   0545 Pacing at 80  0548 2g magnesium administered  0549 Epi at 0.02  0551 page placed to cardiac apn on call  0555 rob Castro returned page  0609 Dr. Montero performing cardiac ultrasound  0609 STAT Echo, labs, ekg, xray ordered

## 2023-01-06 NOTE — PROGRESS NOTES
Received report and assumed patient care. Plan to start Bipap and replace arterial line per Dr. Gonda. Pt on epi infusion, VSS on infusion. Spoke with LANI Maurer on the phone, orders to draw ionized calcium lab, if less than 1 will replace with 1g calcium chloride.

## 2023-01-06 NOTE — PROGRESS NOTES
Cardiovascular Surgery Progress Note    Name: Noe Hickey  MRN: [id number]  : 1960  Admit Date: 2023  5:24 AM  1 Day Post-Op     Procedure:  Procedure(s) and Anesthesia Type:     * AORTIC VALVE REPLACEMENT, ASCENDING AORTIC ANEURYSM REPAIR AND TRANSESOPHAGEAL ECHOCARDIOGRAM. - General     * REPAIR, ANEURYSM OR DISSECTION, AORTA, ASCENDING - General     * ECHOCARDIOGRAM, TRANSESOPHAGEAL, INTRAOPERATIVE. - General    Vitals:  Vitals:    23 0615 23 0630 23 0645 23 0758   BP: 105/67 (!) 86/66 111/65    Pulse: 74 75 76 74   Resp: 14 14 14 13   Temp:       TempSrc:       SpO2: 95% 96%  95%   Weight:       Height:          Temp (24hrs), Av.6 °C (97.9 °F), Min:35.8 °C (96.4 °F), Max:37.2 °C (99 °F)      Respiratory:  Vent Mode: Spont, PEEP/CPAP: 8, PIP: 18, MAP: 14 Respiration: 13, Pulse Oximetry: 95 %       Fluids:    Intake/Output Summary (Last 24 hours) at 2023 0853  Last data filed at 2023 0657  Gross per 24 hour   Intake 6572.76 ml   Output 2330 ml   Net 4242.76 ml     Admit weight: Weight: 97.3 kg (214 lb 8.1 oz)  Current weight: Weight: 93.2 kg (205 lb 7.5 oz) (23 0604)    Labs:  Recent Labs     23  0844 23  1300 23  0103 23  0556   WBC 15.0*  --  35.2* 40.4*   RBC 6.63*  --  4.94 4.97   HEMOGLOBIN 15.1 12.5* 11.5* 11.5*   HEMATOCRIT 48.4 40.2* 36.2* 36.7*   MCV 73.0*  --  73.3* 73.8*   MCH 22.8*  --  23.3* 23.1*   MCHC 31.2*  --  31.8* 31.3*   RDW 45.6  --  46.3 46.4   PLATELETCT 602* 460* 394 411   MPV 9.4  --  9.6 9.7     Recent Labs     23  1731 23  2143 23  0103 23  0504 23  0556   SODIUM 135  --  132*  --  132*   POTASSIUM 5.6*  5.6*   < > 6.2* 5.5 5.9*   CHLORIDE 104  --  102  --  100   CO2 21  --  21  --  20   GLUCOSE 173*  --  155*  --  132*   BUN 27*  --  34*  --  38*   CREATININE 1.32  --  1.66*  --  2.02*   CALCIUM 7.3*  --  6.7*  --  7.3*    < > = values in this interval not displayed.      Recent Labs     01/04/23  0844 01/05/23  1300   APTT 27.7 46.7*   INR 0.95 1.30*           Medications:  Scheduled Medications   Medication Dose Frequency    furosemide  20 mg BID DIURETIC    Calcium CHLORIDE IVPB  1,000 mg Once    magnesium sulfate  1 g DAILY    metoprolol tartrate  12.5 mg BID    Followed by    [START ON 1/7/2023] metoprolol tartrate  25 mg BID    aspirin EC  81 mg DAILY    Pharmacy Consult Request  1 Each PHARMACY TO DOSE    acetaminophen  1,000 mg Q6HRS    senna-docusate  2 Tablet BID    And    polyethylene glycol/lytes  1 Packet DAILY    And    [START ON 1/7/2023] magnesium hydroxide  30 mL DAILY    omeprazole  20 mg DAILY    K+ Scale: Goal of 4.5  1 Each Q4HR        Exam:   Physical Exam  Vitals and nursing note reviewed.   Constitutional:       General: He is not in acute distress.     Appearance: Normal appearance.   HENT:      Head: Normocephalic and atraumatic.      Right Ear: External ear normal.      Left Ear: External ear normal.      Nose: Nose normal.      Mouth/Throat:      Mouth: Mucous membranes are moist.   Eyes:      Extraocular Movements: Extraocular movements intact.      Conjunctiva/sclera: Conjunctivae normal.      Pupils: Pupils are equal, round, and reactive to light.   Cardiovascular:      Rate and Rhythm: Normal rate and regular rhythm.      Pulses: Normal pulses.      Heart sounds: Normal heart sounds.   Pulmonary:      Breath sounds: Examination of the right-lower field reveals decreased breath sounds. Examination of the left-lower field reveals decreased breath sounds. Decreased breath sounds present.      Comments: Bipap  Abdominal:      General: Abdomen is flat.      Palpations: Abdomen is soft.   Musculoskeletal:         General: Normal range of motion.      Cervical back: Normal range of motion.      Right lower leg: Edema present.      Left lower leg: Edema present.   Skin:     General: Skin is warm and dry.      Capillary Refill: Capillary refill takes less  than 2 seconds.   Neurological:      General: No focal deficit present.      Mental Status: He is alert and oriented to person, place, and time. Mental status is at baseline.   Psychiatric:         Mood and Affect: Mood normal.         Behavior: Behavior normal.       Cardiac Medications:    ASA - Yes    Plavix - No; contraindicated because of Bleeding    Post-operative Beta Blockers - No; contraindicated because of Other arrhythmias bradycardia    Ace/ARB- No; contraindicated because of Normal EF    Statin - No; contraindicated because of No CAD    Aldactone- No; contraindicated because of Normal EF    Ejection Fraction:  55%    Telemetry:   1/6 SR now.  Episodes of bradycardia/ ventricular arrhythmia-torsades this AM    Assessment/Plan:  POD 1 Crticially ill, on bipap.  On epi-wean off prefer levo, SR currently.  Ventricular arrhythmia this am-torsades, electrolytes repleted, defibrillation x 1, sinus restored.  Neuro intact.  Wounds CDI.  Moderate output from chest tubes.  Hgb 11.5. Creatinine increasing 2.02 UO 500ml overnight.  K 5.9.  Additional lasix given this AM.  Ionized Ca 0.8-replete.  Mg 2.7 Echo shows preservation of EF and LV function.  Plan: Trend serial electrolytes and replete.  Lasix, trend creatinine, UO- consult neph if no improvement or decreasing UO. Wean bipap as tolerated.  Transition from epi to norepi for vasopressor support to avoid arrhythmias.  Avoid fluids with potassium.      Disposition:  TBD

## 2023-01-06 NOTE — CARE PLAN
Problem: Humidified High Flow Nasal Cannula  Goal: Maintain adequate oxygenation dependent on patient condition  Description: Target End Date:  resolve prior to discharge or when underlying condition is resolved/stabilized    1.  Implement humidified high flow oxygen therapy  2.  Titrate high flow oxygen to maintain appropriate SpO2  Outcome: Progressing   Hfnc 40 lpm 50%, dirk well

## 2023-01-06 NOTE — PROGRESS NOTES
Critical Care Progress Note    Date of admission  1/5/2023    Chief Complaint  62 y.o. male admitted 1/5/2023 with AS and AAA s/p AVR and AAA repair    Hospital Course  62 y.o. male who presented 1/5/2023 with a past medical history significant for hypertension, atrial flutter with RVR, cyclical vomiting syndrome, GERD, obesity and symptomatic aortic stenosis due to bicuspid aortic valve and an ascending aortic aneurysm who was taken to the operating room today for an elective aortic valve replacement and aneurysm repair.  He is brought to the intensive care unit postoperatively and I was consulted for critical care management.              Per anesthesiology: Patient was intubated easily with an 8.0 endotracheal tube at 24 cm at the teeth with a grade 2A view.  Patient received 2.5 L of IV crystalloid and 580 mL of Cell Saver with urine output of 250 cc.  On echo, he has left ventricular hypertrophy with a dilated right ventricle and ejection fraction of 50 to 55% on epinephrine and norepinephrine drips.  His current drips include Precedex at 0.3, insulin drip is off, norepinephrine at 0.04 and epinephrine at 0.04.  He did have hyperkalemia intraoperatively up to 6.9 that was treated medically and most recently is down to 5.    1/5-1/6 - bradycardic requiring epicardial pacing, LOC, MICHELLE on EKG, cardiology consulted, PEA arrest this morning    Interval Problem Update  Reviewed last 24 hour events:   - patient had an eventful night with bradycardic events needing pacing, development of EKG changes (cards consulted), and eventual PEA arrest s/p ROSC   - AF   - NSR, back-up pacing wires in place   - SBP    - CVP 15-20   - epi gtt being transitioned to NE gtt   - developing respiratory and metabolic acidosis   - repeat echo with poor RV function --> start iflolan for afterload reduction   - WBC up to 40   - Hgb unchanged at 11.5   - Na down to 132   - K down to 5.9 this morning   - glucose well controlled   -  elevated troponin, echo without obvious WMA, EF 55%   - TEG with 96% ADP inhibition   - AAOx4, oxy for pain   - CT with 900 ml output in 24 hrs    Review of Systems  Review of Systems   Constitutional:  Positive for diaphoresis and malaise/fatigue. Negative for chills and fever.   HENT:  Negative for congestion and sore throat.    Eyes:  Negative for blurred vision.   Respiratory:  Positive for cough and shortness of breath. Negative for sputum production and wheezing.    Cardiovascular:  Positive for chest pain. Negative for palpitations and leg swelling.   Gastrointestinal:  Negative for abdominal pain, nausea and vomiting.   Genitourinary:  Negative for dysuria.   Musculoskeletal:  Negative for back pain and myalgias.   Skin:  Negative for rash.   Neurological:  Negative for dizziness, sensory change, speech change and headaches.   Endo/Heme/Allergies:  Does not bruise/bleed easily.   Psychiatric/Behavioral:  Negative for depression. The patient is nervous/anxious.    All other systems reviewed and are negative.     Vital Signs for last 24 hours   Temp:  [35.8 °C (96.4 °F)-37.2 °C (99 °F)] 36.8 °C (98.2 °F)  Pulse:  [54-95] 75  Resp:  [13-51] 14  BP: ()/(50-81) 86/66  SpO2:  [90 %-100 %] 96 %    Hemodynamic parameters for last 24 hours  CVP:  [10 MM HG-24 MM HG] 15 MM HG    Respiratory Information for the last 24 hours  Vent Mode: Spont  PEEP/CPAP: 8  P Support: 5  MAP: 14 --> extubated 1/5 to 4 lpm, now up to 15 lpm FM this morning    Physical Exam   Physical Exam  Vitals and nursing note reviewed.   Constitutional:       General: He is sleeping. He is in acute distress.      Appearance: He is well-developed and normal weight. He is ill-appearing and diaphoretic.      Interventions: Face mask in place.   HENT:      Head: Normocephalic and atraumatic.      Nose: Nose normal. No congestion.      Mouth/Throat:      Mouth: Mucous membranes are moist.      Pharynx: Oropharynx is clear. No oropharyngeal exudate.    Eyes:      General: No scleral icterus.     Conjunctiva/sclera: Conjunctivae normal.      Pupils: Pupils are equal, round, and reactive to light.   Neck:      Vascular: No JVD.      Comments: Right IJ central venous catheter without surrounding hematoma  Cardiovascular:      Rate and Rhythm: Normal rate and regular rhythm. FrequentExtrasystoles are present.     Chest Wall: PMI is displaced.      Pulses: Decreased pulses.      Heart sounds: Heart sounds are distant. No murmur heard.     Comments: Sternotomy incision is clean/dry/intact, mediastinal chest tubes and epicardial pacing wires in place  Pulmonary:      Effort: Tachypnea and respiratory distress present. No accessory muscle usage.      Breath sounds: No stridor. Examination of the right-middle field reveals rales. Examination of the left-middle field reveals rales. Examination of the right-lower field reveals decreased breath sounds and rales. Examination of the left-lower field reveals decreased breath sounds and rales. Decreased breath sounds and rales present. No wheezing.      Comments: Able to speak in full sentences but tachypneic  Abdominal:      General: Bowel sounds are normal. There is no distension.      Palpations: Abdomen is soft.      Tenderness: There is no abdominal tenderness. There is no guarding or rebound.   Genitourinary:     Comments: Miranda catheter in place  Musculoskeletal:         General: No tenderness.      Cervical back: Neck supple. No tenderness.      Right lower leg: No edema.      Left lower leg: No edema.      Comments: Left radial arterial catheter in place with poor waveform but distal CMS intact   Skin:     General: Skin is warm.      Capillary Refill: Capillary refill takes 2 to 3 seconds.      Coloration: Skin is not pale.   Neurological:      General: No focal deficit present.      Mental Status: He is oriented to person, place, and time and easily aroused.      Cranial Nerves: No cranial nerve deficit.      Sensory:  No sensory deficit.      Comments: Some short-term memory difficulties   Psychiatric:         Behavior: Behavior normal. Behavior is cooperative.         Thought Content: Thought content normal.      Comments: Anxious       Medications  Current Facility-Administered Medications   Medication Dose Route Frequency Provider Last Rate Last Admin    Respiratory Therapy Consult   Nebulization Continuous RT Matt Maurer, A.P.R.N.        NS infusion   Intravenous Continuous Matt Maurer, A.P.R.N. 10 mL/hr at 01/05/23 1436 Restarted at 01/05/23 1436    electrolyte-A (PLASMALYTE-A) infusion   Intravenous PRN Matt Maurer, A.P.R.N. 999 mL/hr at 01/05/23 2000 New Bag at 01/05/23 2000    calcium CHLORIDE 1,000 mg in dextrose 5% 100 mL IVPB  1,000 mg Intravenous Once PRN Matt Maurer, A.P.R.N.        magnesium sulfate in D5W IVPB premix 1 g  1 g Intravenous DAILY Matt Maurer, A.P.R.N. 100 mL/hr at 01/06/23 0524 1 g at 01/06/23 0524    metoprolol tartrate (LOPRESSOR) tablet 12.5 mg  12.5 mg Oral BID Matt Maurer, A.P.R.N.        Followed by    [START ON 1/7/2023] metoprolol tartrate (LOPRESSOR) tablet 25 mg  25 mg Oral BID Matt Maurer, A.P.R.N.        aspirin EC (ECOTRIN) tablet 81 mg  81 mg Oral DAILY Matt Maurer, A.P.R.N.        clevidipine (Cleviprex) IV emulsion  0-21 mg/hr Intravenous Continuous Matt Maurer, A.P.R.N.   Dose not Required at 01/05/23 1315    nitroglycerin 50 mg in D5W 250 ml infusion  0-100 mcg/min Intravenous Continuous Matt Maurer, A.P.R.N.   Dose not Required at 01/05/23 1315    Pharmacy Consult Request ...Pain Management Review 1 Each  1 Each Other PHARMACY TO DOSE Matt Maurer, A.P.R.N.        acetaminophen (TYLENOL) tablet 1,000 mg  1,000 mg Oral Q6HRS Matt Maurer, A.P.R.N.   1,000 mg at 01/06/23 0111    Followed by    [START ON 1/10/2023] acetaminophen (TYLENOL) tablet 1,000 mg  1,000 mg Oral Q6HRS PRN EFRAÍN Schofield        oxyCODONE immediate-release (ROXICODONE) tablet 5 mg  5  mg Oral Q3HRS PRN Matt Maurer, A.P.R.N.   5 mg at 01/05/23 1810    Or    oxyCODONE immediate release (ROXICODONE) tablet 10 mg  10 mg Oral Q3HRS PRN Matt Maurer, A.P.R.N.   10 mg at 01/06/23 0301    Or    fentaNYL (SUBLIMAZE) injection 50 mcg  50 mcg Intravenous Q3HRS PRN Matt Maurer, A.P.R.N.   50 mcg at 01/06/23 0405    traMADol (Ultram) 50 MG tablet 50 mg  50 mg Oral Q4HRS PRN Matt Maurer, A.P.R.N.        midazolam (Versed) injection 2 mg  2 mg Intravenous Q HOUR PRN Matt Maurer, A.P.R.N.        sodium bicarbonate 8.4 % injection 50 mEq  50 mEq Intravenous Q HOUR PRN Matt Maurer, A.P.R.N.        ondansetron (ZOFRAN) syringe/vial injection 8 mg  8 mg Intravenous Q6HRS PRN Matt Maurer, A.P.R.N.        Or    prochlorperazine (COMPAZINE) injection 10 mg  10 mg Intravenous Q6HRS PRN Matt Maurer, A.P.R.N.        acetaminophen (Tylenol) tablet 650 mg  650 mg Oral Q4HRS PRN Matt Maurer, A.P.R.N.        Or    acetaminophen (TYLENOL) suppository 650 mg  650 mg Rectal Q4HRS PRN Matt Maurer, A.P.R.N.        senna-docusate (PERICOLACE or SENOKOT S) 8.6-50 MG per tablet 2 Tablet  2 Tablet Oral BID Matt Maurer, A.P.R.N.   2 Tablet at 01/05/23 1940    And    polyethylene glycol/lytes (MIRALAX) PACKET 1 Packet  1 Packet Oral DAILY Matt Maurer, A.P.R.N.        And    [START ON 1/7/2023] magnesium hydroxide (MILK OF MAGNESIA) suspension 30 mL  30 mL Oral DAILY Matt Maurer, A.P.R.N.        And    bisacodyl (DULCOLAX) suppository 10 mg  10 mg Rectal QDAY PRN Matt Maurer, A.P.R.N.        omeprazole (PRILOSEC) capsule 20 mg  20 mg Oral DAILY STEPHANIE SchofieldPSaschaRSaschaN.        mag hydrox-al hydrox-simeth (MAALOX PLUS ES or MYLANTA DS) suspension 30 mL  30 mL Oral Q4HRS PRN STEPHANIE SchofieldPSaschaRROSA M.        dexmedetomidine (PRECEDEX) 400 mcg/100mL NS premix infusion  0-1.5 mcg/kg/hr (Ideal) Intravenous Continuous STEPHANIE SchofieldP.R.N. 15.9 mL/hr at 01/06/23 0409 0.9 mcg/kg/hr at 01/06/23 0409    norepinephrine  (Levophed) 8 mg in 250 mL NS infusion (premix)  0-1 mcg/kg/min (Ideal) Intravenous Continuous Serena Goyal M.D.   Stopped at 01/06/23 0247    EPINEPHrine (Adrenalin) infusion 4 mg/250 mL (premix)  0-0.5 mcg/kg/min (Ideal) Intravenous Continuous Serena Goyal M.D. 5.3 mL/hr at 01/06/23 0530 0.02 mcg/kg/min at 01/06/23 0530    K+ Scale: Goal of 4.5  1 Each Intravenous Q4HR Serena Goyal M.D.           Fluids    Intake/Output Summary (Last 24 hours) at 1/6/2023 0640  Last data filed at 1/6/2023 0600  Gross per 24 hour   Intake 6490.49 ml   Output 2310 ml   Net 4180.49 ml       Laboratory  Recent Labs     01/05/23  1438 01/05/23  1625 01/05/23  2221 01/06/23  0554   ISTATAPH 7.391* 7.393* 7.331* 7.308*   ISTATAPCO2 35.4 38.5* 37.9* 44.6*   ISTATAPO2 75 213* 90* 36*   ISTATATCO2 23 25 21 24   [id number] 95 100* 96 62*   ISTATARTHCO3 21.5 23.5 20.0 22.3   ISTATARTBE -3 -1 -5* -4   ISTATTEMP 35.8 C 37.0 C 36.7 C 37.5 C   ISTATFIO2 100 90  --  100   ISTATSPEC Arterial Arterial Arterial Arterial   ISTATAPHTC 7.409 7.393* 7.335* 7.301*   [id number] 69 213* 88* 37*         Recent Labs     01/04/23  0844 01/05/23  1300 01/05/23  1731 01/05/23  2143 01/05/23  2220 01/06/23  0103 01/06/23  0504   SODIUM 134*  --  135  --   --  132*  --    POTASSIUM 4.8 4.4 5.6*  5.6*   < > 5.8* 6.2* 5.5   CHLORIDE 100  --  104  --   --  102  --    CO2 22  --  21  --   --  21  --    BUN 23*  --  27*  --   --  34*  --    CREATININE 1.07  --  1.32  --   --  1.66*  --    MAGNESIUM  --  3.0*  --   --  2.5  --   --    CALCIUM 9.5  --  7.3*  --   --  6.7*  --     < > = values in this interval not displayed.     Recent Labs     01/04/23  0844 01/05/23 1731 01/06/23 0103   ALTSGPT 38  --   --    ASTSGOT 30  --   --    ALKPHOSPHAT 47  --   --    TBILIRUBIN 0.3  --   --    GLUCOSE 99 173* 155*     Recent Labs     01/04/23  0844 01/06/23 0103   WBC 15.0* 35.2*   NEUTSPOLYS 76.10*  --    LYMPHOCYTES 8.90*  --    MONOCYTES 7.70  --     EOSINOPHILS 3.90  --    BASOPHILS 0.90  --    ASTSGOT 30  --    ALTSGPT 38  --    ALKPHOSPHAT 47  --    TBILIRUBIN 0.3  --      Recent Labs     01/04/23  0844 01/05/23  1300 01/06/23  0103   RBC 6.63*  --  4.94   HEMOGLOBIN 15.1 12.5* 11.5*   HEMATOCRIT 48.4 40.2* 36.2*   PLATELETCT 602* 460* 394   PROTHROMBTM 12.6 15.9*  --    APTT 27.7 46.7*  --    INR 0.95 1.30*  --        Imaging  X-Ray:  I have personally reviewed the images and compared with prior images. and My impression is: Worsening diffuse edema with enlarged cardiac silhouette, small left effusion, sternotomy wires and right humeral hardware seen, right IJ central venous catheter and mediastinal chest tubes in good position  EKG:  I have personally reviewed the images and compared with prior images.  Echo:   Reviewed    Assessment/Plan  * Nonrheumatic aortic valve stenosis  Assessment & Plan  S/p aortic valve replacement with ascending aortic aneurysm repair with intraoperative ABBY 1/5/2023  Continue routine postoperative management  Continue to monitor mediastinal chest tube output, monitor for and treat arrhythmias  Strict blood pressure management  As needed analgesics  Eventual antiplatelet versus anticoagulation when appropriate  Wean off epinephrine today and transition to norepinephrine drips to goal mean arterial pressure greater than 65  Continue to monitor CVP  S/p insulin infusion for tight glycemic control in the post-operative setting --> insulin sliding scale for now    Acute respiratory failure with hypoxia and hypercapnia (HCC)  Assessment & Plan  Occurring postoperative day #1, associated cardiogenic pulmonary edema  Begin noninvasive ventilatory support titrating IPAP/EPAP and FiO2 to adequate ventilation and SPO2 greater than 92%  RT/O2 protocol  Forced diuresis  Limit sedatives  Low threshold to reintubate if worsens    RVF (right ventricular failure) (HCC)  Assessment & Plan  Postoperatively  Cardiology consulted, possible ischemic  work-up/cath  Begin inhaled Flolan for RV afterload reduction  Diuresis  Positive pressure ventilation  Monitor endorgan function for adequacy of resuscitation    Acute renal failure with tubular necrosis (HCC)  Assessment & Plan  Likely secondary to RV failure/ATN, possible outpatient supplement involvement  Avoid nephrotoxins  Monitor creatinine, urine output, electrolytes closely  Renal ultrasound  Improve perfusion to the kidneys  Keep Miranda catheter in place for today  Diuresis    Cardiac arrest (HCC)  Assessment & Plan  Bradycardic followed by PEA/torsade the points arrest with rapid ROSC, defibrillation  Continue supportive care, close neurologic monitoring  Continuous telemetry monitoring  Cardiology consulted  Optimize electrolytes    Encounter for weaning from ventilator (HCC)  Assessment & Plan  Intubated intraoperatively 1/5/2023 --> extubated within target  Encourage incentive spirometry    Aneurysm of ascending aorta without rupture  Assessment & Plan  Status postsurgical repair 1/5  Postoperative management as described above with strict attention to blood pressure control    Metabolic acidosis  Assessment & Plan  Worsening due to renal failure and complicating hyperkalemia  Begin bicarb drip and monitor    Hyperkalemia  Assessment & Plan  Intraoperatively 6.9, fluctuating but remains elevated  Continue to monitor serial potassium levels every 4 hours  Optimize renal perfusion  Begin bicarb drip  IV calcium  Continuous telemetry monitoring    Gastroesophageal reflux disease without esophagitis  Assessment & Plan  Continue pantoprazole  GERD precautions    Leukemoid reaction  Assessment & Plan  Likely reactive, doubt infection  Monitor off antibiotics for now    Acute blood loss as cause of postoperative anemia  Assessment & Plan  Monitor CBC with conservative transfusion strategy  Monitor for bleeding postoperatively         VTE:  Contraindicated  Ulcer: Not Indicated  Lines: Central Line  Ongoing  indication addressed, Arterial Line  Ongoing indication addressed, and Miranda Catheter  Ongoing indication addressed    I have performed a physical exam and reviewed and updated ROS and Plan today (1/6/2023). In review of yesterday's note (1/5/2023), there are no changes except as documented above.     Patient remains critically ill today requiring active management of his persistent shock (right heart failure), arrhythmias, worsening renal failure and worsening pulmonary edema. High risk of deterioration and worsening vital organ dysfunction and death without the above critical care interventions.    Discussed patient condition and risk of morbidity and/or mortality with RN, RT, Pharmacy, Charge nurse / hot rounds, Patient, and CVS. Critical care time = 105 minutes in directly providing and coordinating critical care and extensive data review.  No time overlap and excludes procedures.    Please note that this dictation was created using voice recognition software. I have made every reasonable attempt to correct obvious errors, but there may be errors of grammar and possibly content that I did not discover before finalizing the note.

## 2023-01-06 NOTE — DIETARY
Nutrition Services: Day 0 of admit.  Noe Hickey is a 62 y.o. male with admitting DX of Aneurysm of the ascending aorta, without rupture, Nonrheumatic aortic (valve) stenosis, Ascending aortic aneurysm, unspecified whether ruptured.    Consult received for Cardiac Rehab Diet Education.  S/p AORTIC VALVE REPLACEMENT; ASCENDING AORTIC ANEURYSM REPAIR.  Specific cardiac diet education is not indicated.  (Of note, no lipid panel.)

## 2023-01-07 ENCOUNTER — APPOINTMENT (OUTPATIENT)
Dept: RADIOLOGY | Facility: MEDICAL CENTER | Age: 63
DRG: 219 | End: 2023-01-07
Attending: EMERGENCY MEDICINE
Payer: COMMERCIAL

## 2023-01-07 PROBLEM — E83.51 HYPOCALCEMIA: Status: ACTIVE | Noted: 2023-01-07

## 2023-01-07 LAB
ANION GAP SERPL CALC-SCNC: 10 MMOL/L (ref 7–16)
ANION GAP SERPL CALC-SCNC: 10 MMOL/L (ref 7–16)
BUN SERPL-MCNC: 51 MG/DL (ref 8–22)
BUN SERPL-MCNC: 51 MG/DL (ref 8–22)
CA-I SERPL-SCNC: 1 MMOL/L (ref 1.1–1.3)
CALCIUM SERPL-MCNC: 7.9 MG/DL (ref 8.5–10.5)
CALCIUM SERPL-MCNC: 8 MG/DL (ref 8.5–10.5)
CHLORIDE SERPL-SCNC: 92 MMOL/L (ref 96–112)
CHLORIDE SERPL-SCNC: 93 MMOL/L (ref 96–112)
CO2 SERPL-SCNC: 25 MMOL/L (ref 20–33)
CO2 SERPL-SCNC: 27 MMOL/L (ref 20–33)
CREAT SERPL-MCNC: 1.55 MG/DL (ref 0.5–1.4)
CREAT SERPL-MCNC: 1.61 MG/DL (ref 0.5–1.4)
EKG IMPRESSION: NORMAL
ERYTHROCYTE [DISTWIDTH] IN BLOOD BY AUTOMATED COUNT: 47 FL (ref 35.9–50)
GFR SERPLBLD CREATININE-BSD FMLA CKD-EPI: 48 ML/MIN/1.73 M 2
GFR SERPLBLD CREATININE-BSD FMLA CKD-EPI: 50 ML/MIN/1.73 M 2
GLUCOSE BLD STRIP.AUTO-MCNC: 112 MG/DL (ref 65–99)
GLUCOSE BLD STRIP.AUTO-MCNC: 116 MG/DL (ref 65–99)
GLUCOSE BLD STRIP.AUTO-MCNC: 158 MG/DL (ref 65–99)
GLUCOSE BLD STRIP.AUTO-MCNC: 172 MG/DL (ref 65–99)
GLUCOSE SERPL-MCNC: 124 MG/DL (ref 65–99)
GLUCOSE SERPL-MCNC: 138 MG/DL (ref 65–99)
HCT VFR BLD AUTO: 35.1 % (ref 42–52)
HGB BLD-MCNC: 11 G/DL (ref 14–18)
MAGNESIUM SERPL-MCNC: 2.8 MG/DL (ref 1.5–2.5)
MCH RBC QN AUTO: 23.1 PG (ref 27–33)
MCHC RBC AUTO-ENTMCNC: 31.3 G/DL (ref 33.7–35.3)
MCV RBC AUTO: 73.7 FL (ref 81.4–97.8)
PLATELET # BLD AUTO: 438 K/UL (ref 164–446)
PMV BLD AUTO: 9.9 FL (ref 9–12.9)
POTASSIUM SERPL-SCNC: 4.8 MMOL/L (ref 3.6–5.5)
POTASSIUM SERPL-SCNC: 4.9 MMOL/L (ref 3.6–5.5)
POTASSIUM SERPL-SCNC: 4.9 MMOL/L (ref 3.6–5.5)
POTASSIUM SERPL-SCNC: 5.1 MMOL/L (ref 3.6–5.5)
POTASSIUM SERPL-SCNC: 5.7 MMOL/L (ref 3.6–5.5)
RBC # BLD AUTO: 4.76 M/UL (ref 4.7–6.1)
SODIUM SERPL-SCNC: 128 MMOL/L (ref 135–145)
SODIUM SERPL-SCNC: 129 MMOL/L (ref 135–145)
WBC # BLD AUTO: 39.8 K/UL (ref 4.8–10.8)

## 2023-01-07 PROCEDURE — 700111 HCHG RX REV CODE 636 W/ 250 OVERRIDE (IP): Performed by: THORACIC SURGERY (CARDIOTHORACIC VASCULAR SURGERY)

## 2023-01-07 PROCEDURE — 700111 HCHG RX REV CODE 636 W/ 250 OVERRIDE (IP): Performed by: NURSE PRACTITIONER

## 2023-01-07 PROCEDURE — 82330 ASSAY OF CALCIUM: CPT | Mod: 91

## 2023-01-07 PROCEDURE — 700102 HCHG RX REV CODE 250 W/ 637 OVERRIDE(OP): Performed by: NURSE PRACTITIONER

## 2023-01-07 PROCEDURE — 94645 CONT INHLJ TX EACH ADDL HOUR: CPT

## 2023-01-07 PROCEDURE — 770022 HCHG ROOM/CARE - ICU (200)

## 2023-01-07 PROCEDURE — 84132 ASSAY OF SERUM POTASSIUM: CPT | Mod: 91

## 2023-01-07 PROCEDURE — 85027 COMPLETE CBC AUTOMATED: CPT

## 2023-01-07 PROCEDURE — 700111 HCHG RX REV CODE 636 W/ 250 OVERRIDE (IP): Performed by: INTERNAL MEDICINE

## 2023-01-07 PROCEDURE — 99291 CRITICAL CARE FIRST HOUR: CPT | Performed by: INTERNAL MEDICINE

## 2023-01-07 PROCEDURE — 36620 INSERTION CATHETER ARTERY: CPT

## 2023-01-07 PROCEDURE — 83735 ASSAY OF MAGNESIUM: CPT

## 2023-01-07 PROCEDURE — A9270 NON-COVERED ITEM OR SERVICE: HCPCS | Performed by: NURSE PRACTITIONER

## 2023-01-07 PROCEDURE — 94669 MECHANICAL CHEST WALL OSCILL: CPT

## 2023-01-07 PROCEDURE — 71045 X-RAY EXAM CHEST 1 VIEW: CPT

## 2023-01-07 PROCEDURE — 94640 AIRWAY INHALATION TREATMENT: CPT

## 2023-01-07 PROCEDURE — 93010 ELECTROCARDIOGRAM REPORT: CPT | Performed by: INTERNAL MEDICINE

## 2023-01-07 PROCEDURE — 82962 GLUCOSE BLOOD TEST: CPT | Mod: 91

## 2023-01-07 PROCEDURE — 700101 HCHG RX REV CODE 250: Performed by: INTERNAL MEDICINE

## 2023-01-07 PROCEDURE — 700105 HCHG RX REV CODE 258: Performed by: THORACIC SURGERY (CARDIOTHORACIC VASCULAR SURGERY)

## 2023-01-07 PROCEDURE — 80048 BASIC METABOLIC PNL TOTAL CA: CPT | Mod: 91

## 2023-01-07 PROCEDURE — 93005 ELECTROCARDIOGRAM TRACING: CPT | Performed by: THORACIC SURGERY (CARDIOTHORACIC VASCULAR SURGERY)

## 2023-01-07 RX ORDER — GABAPENTIN 100 MG/1
100 CAPSULE ORAL 3 TIMES DAILY
Status: DISCONTINUED | OUTPATIENT
Start: 2023-01-07 | End: 2023-01-08

## 2023-01-07 RX ORDER — LIDOCAINE 50 MG/G
1 PATCH TOPICAL EVERY 24 HOURS
Status: DISCONTINUED | OUTPATIENT
Start: 2023-01-07 | End: 2023-01-11 | Stop reason: HOSPADM

## 2023-01-07 RX ORDER — SCOLOPAMINE TRANSDERMAL SYSTEM 1 MG/1
1 PATCH, EXTENDED RELEASE TRANSDERMAL
Status: DISCONTINUED | OUTPATIENT
Start: 2023-01-07 | End: 2023-01-08

## 2023-01-07 RX ORDER — AMLODIPINE BESYLATE 10 MG/1
5 TABLET ORAL
Status: DISCONTINUED | OUTPATIENT
Start: 2023-01-07 | End: 2023-01-08

## 2023-01-07 RX ORDER — FUROSEMIDE 10 MG/ML
40 INJECTION INTRAMUSCULAR; INTRAVENOUS
Status: DISCONTINUED | OUTPATIENT
Start: 2023-01-07 | End: 2023-01-07

## 2023-01-07 RX ORDER — FUROSEMIDE 10 MG/ML
40 INJECTION INTRAMUSCULAR; INTRAVENOUS ONCE
Status: COMPLETED | OUTPATIENT
Start: 2023-01-07 | End: 2023-01-07

## 2023-01-07 RX ORDER — FUROSEMIDE 10 MG/ML
40 INJECTION INTRAMUSCULAR; INTRAVENOUS
Status: DISCONTINUED | OUTPATIENT
Start: 2023-01-07 | End: 2023-01-11 | Stop reason: HOSPADM

## 2023-01-07 RX ORDER — AMIODARONE HYDROCHLORIDE 200 MG/1
400 TABLET ORAL 2 TIMES DAILY
Status: DISCONTINUED | OUTPATIENT
Start: 2023-01-08 | End: 2023-01-11 | Stop reason: HOSPADM

## 2023-01-07 RX ADMIN — ACETAMINOPHEN 1000 MG: 500 TABLET ORAL at 05:48

## 2023-01-07 RX ADMIN — FUROSEMIDE 40 MG: 10 INJECTION, SOLUTION INTRAMUSCULAR; INTRAVENOUS at 02:54

## 2023-01-07 RX ADMIN — OXYCODONE 5 MG: 5 TABLET ORAL at 07:13

## 2023-01-07 RX ADMIN — HYDROMORPHONE HYDROCHLORIDE 1 MG: 1 INJECTION, SOLUTION INTRAMUSCULAR; INTRAVENOUS; SUBCUTANEOUS at 00:52

## 2023-01-07 RX ADMIN — OMEPRAZOLE 20 MG: 20 CAPSULE, DELAYED RELEASE ORAL at 05:49

## 2023-01-07 RX ADMIN — HYDROMORPHONE HYDROCHLORIDE 1 MG: 1 INJECTION, SOLUTION INTRAMUSCULAR; INTRAVENOUS; SUBCUTANEOUS at 04:22

## 2023-01-07 RX ADMIN — MAGNESIUM SULFATE HEPTAHYDRATE 1 G: 1 INJECTION, SOLUTION INTRAVENOUS at 05:55

## 2023-01-07 RX ADMIN — OXYCODONE HYDROCHLORIDE 10 MG: 10 TABLET ORAL at 00:04

## 2023-01-07 RX ADMIN — DOCUSATE SODIUM 50 MG AND SENNOSIDES 8.6 MG 2 TABLET: 8.6; 5 TABLET, FILM COATED ORAL at 05:50

## 2023-01-07 RX ADMIN — HYDROMORPHONE HYDROCHLORIDE 1 MG: 1 INJECTION, SOLUTION INTRAMUSCULAR; INTRAVENOUS; SUBCUTANEOUS at 13:21

## 2023-01-07 RX ADMIN — AMIODARONE HYDROCHLORIDE 1 MG/MIN: 1.8 INJECTION, SOLUTION INTRAVENOUS at 19:03

## 2023-01-07 RX ADMIN — HYDROMORPHONE HYDROCHLORIDE 1 MG: 1 INJECTION, SOLUTION INTRAMUSCULAR; INTRAVENOUS; SUBCUTANEOUS at 21:32

## 2023-01-07 RX ADMIN — OXYCODONE HYDROCHLORIDE 10 MG: 10 TABLET ORAL at 20:43

## 2023-01-07 RX ADMIN — ACETAMINOPHEN 1000 MG: 500 TABLET ORAL at 11:46

## 2023-01-07 RX ADMIN — HYDROMORPHONE HYDROCHLORIDE 1 MG: 1 INJECTION, SOLUTION INTRAMUSCULAR; INTRAVENOUS; SUBCUTANEOUS at 18:23

## 2023-01-07 RX ADMIN — SCOPOLAMINE 1 PATCH: 1.5 PATCH, EXTENDED RELEASE TRANSDERMAL at 11:46

## 2023-01-07 RX ADMIN — WATER 0.05 MCG/KG/MIN: 1 SOLUTION INTRAVENOUS at 03:39

## 2023-01-07 RX ADMIN — LIDOCAINE 1 PATCH: 50 PATCH CUTANEOUS at 09:59

## 2023-01-07 RX ADMIN — POLYETHYLENE GLYCOL 3350 1 PACKET: 17 POWDER, FOR SOLUTION ORAL at 05:49

## 2023-01-07 RX ADMIN — FUROSEMIDE 20 MG: 10 INJECTION, SOLUTION INTRAVENOUS at 05:50

## 2023-01-07 RX ADMIN — INSULIN HUMAN 2 UNITS: 100 INJECTION, SOLUTION PARENTERAL at 11:56

## 2023-01-07 RX ADMIN — ACETAMINOPHEN 1000 MG: 500 TABLET ORAL at 00:05

## 2023-01-07 RX ADMIN — METOPROLOL TARTRATE 25 MG: 25 TABLET, FILM COATED ORAL at 05:49

## 2023-01-07 RX ADMIN — INSULIN HUMAN 2 UNITS: 100 INJECTION, SOLUTION PARENTERAL at 20:51

## 2023-01-07 RX ADMIN — OXYCODONE 5 MG: 5 TABLET ORAL at 11:45

## 2023-01-07 RX ADMIN — DOCUSATE SODIUM 50 MG AND SENNOSIDES 8.6 MG 2 TABLET: 8.6; 5 TABLET, FILM COATED ORAL at 17:41

## 2023-01-07 RX ADMIN — ACETAMINOPHEN 1000 MG: 500 TABLET ORAL at 23:16

## 2023-01-07 RX ADMIN — OXYCODONE HYDROCHLORIDE 10 MG: 10 TABLET ORAL at 03:08

## 2023-01-07 RX ADMIN — ACETAMINOPHEN 1000 MG: 500 TABLET ORAL at 17:35

## 2023-01-07 RX ADMIN — HYDROMORPHONE HYDROCHLORIDE 1 MG: 1 INJECTION, SOLUTION INTRAMUSCULAR; INTRAVENOUS; SUBCUTANEOUS at 09:18

## 2023-01-07 RX ADMIN — AMLODIPINE BESYLATE 5 MG: 10 TABLET ORAL at 12:00

## 2023-01-07 RX ADMIN — AMIODARONE HYDROCHLORIDE 150 MG: 1.5 INJECTION, SOLUTION INTRAVENOUS at 18:47

## 2023-01-07 RX ADMIN — CALCIUM CHLORIDE 2000 MG: 100 INJECTION, SOLUTION INTRAVENOUS at 13:19

## 2023-01-07 RX ADMIN — FUROSEMIDE 40 MG: 10 INJECTION, SOLUTION INTRAMUSCULAR; INTRAVENOUS at 15:36

## 2023-01-07 RX ADMIN — MAGNESIUM HYDROXIDE 30 ML: 400 SUSPENSION ORAL at 13:17

## 2023-01-07 RX ADMIN — OXYCODONE HYDROCHLORIDE 10 MG: 10 TABLET ORAL at 17:38

## 2023-01-07 RX ADMIN — METOPROLOL TARTRATE 25 MG: 25 TABLET, FILM COATED ORAL at 17:35

## 2023-01-07 RX ADMIN — ASPIRIN 81 MG: 81 TABLET, COATED ORAL at 05:50

## 2023-01-07 RX ADMIN — HYDROMORPHONE HYDROCHLORIDE 1 MG: 1 INJECTION, SOLUTION INTRAMUSCULAR; INTRAVENOUS; SUBCUTANEOUS at 06:32

## 2023-01-07 ASSESSMENT — ENCOUNTER SYMPTOMS
NERVOUS/ANXIOUS: 0
MYALGIAS: 0
BLURRED VISION: 0
SENSORY CHANGE: 0
DIZZINESS: 1
SORE THROAT: 0
WHEEZING: 0
NAUSEA: 1
INSOMNIA: 1
HEADACHES: 0
BRUISES/BLEEDS EASILY: 0
SHORTNESS OF BREATH: 1
CONSTIPATION: 0
DIAPHORESIS: 0
COUGH: 1
FEVER: 0
BACK PAIN: 0
VOMITING: 0
DEPRESSION: 0
SPEECH CHANGE: 0
PALPITATIONS: 0
SPUTUM PRODUCTION: 0
ABDOMINAL PAIN: 1

## 2023-01-07 ASSESSMENT — PAIN DESCRIPTION - PAIN TYPE
TYPE: SURGICAL PAIN

## 2023-01-07 ASSESSMENT — FIBROSIS 4 INDEX: FIB4 SCORE: 1.89

## 2023-01-07 NOTE — PROGRESS NOTES
Contacted LANI Pineda about K+ 5.7. verbal orders for one time dose 40mg lasix and repeat BMP in 3 hours.

## 2023-01-07 NOTE — PROGRESS NOTES
Critical Care Progress Note    Date of admission  1/5/2023    Chief Complaint  62 y.o. male admitted 1/5/2023 with AS and AAA s/p AVR and AAA repair    Hospital Course  62 y.o. male who presented 1/5/2023 with a past medical history significant for hypertension, atrial flutter with RVR, cyclical vomiting syndrome, GERD, obesity and symptomatic aortic stenosis due to bicuspid aortic valve and an ascending aortic aneurysm who was taken to the operating room today for an elective aortic valve replacement and aneurysm repair.  He is brought to the intensive care unit postoperatively and I was consulted for critical care management.              Per anesthesiology: Patient was intubated easily with an 8.0 endotracheal tube at 24 cm at the teeth with a grade 2A view.  Patient received 2.5 L of IV crystalloid and 580 mL of Cell Saver with urine output of 250 cc.  On echo, he has left ventricular hypertrophy with a dilated right ventricle and ejection fraction of 50 to 55% on epinephrine and norepinephrine drips.  His current drips include Precedex at 0.3, insulin drip is off, norepinephrine at 0.04 and epinephrine at 0.04.  He did have hyperkalemia intraoperatively up to 6.9 that was treated medically and most recently is down to 5.    1/5-1/6 - bradycardic requiring epicardial pacing, LOC, MICHELLE on EKG, cardiology consulted, PEA/torsades arrest early this morning s/p defibrillation  1/6 - flolan, BiPAP, epi gtt, diuresis    Interval Problem Update  Reviewed last 24 hour events:   - AF, WBC remains 40   - NSR, -160, off epi/NE gtts   - AAOx4, c/o pain and nausea   - CTs remain in place with 330 ml output   - rivera with good UOP with lasix   - BiPAP overnight --> HFNC this morning, iFlolan   - bicarb gtt, now metabolic alkalsosis   - Hgb down to 11   - Na 129   - K still remains elevated but improving   - slowly improving JENNY   - low Ca    Review of Systems  Review of Systems   Constitutional:  Positive for  malaise/fatigue. Negative for diaphoresis and fever.   HENT:  Negative for congestion and sore throat.    Eyes:  Negative for blurred vision.   Respiratory:  Positive for cough and shortness of breath. Negative for sputum production and wheezing.    Cardiovascular:  Positive for chest pain. Negative for palpitations and leg swelling.   Gastrointestinal:  Positive for abdominal pain and nausea. Negative for constipation and vomiting.   Genitourinary:  Negative for dysuria.   Musculoskeletal:  Negative for back pain and myalgias.   Skin:  Negative for itching.   Neurological:  Positive for dizziness. Negative for sensory change, speech change and headaches.   Endo/Heme/Allergies:  Does not bruise/bleed easily.   Psychiatric/Behavioral:  Negative for depression. The patient has insomnia. The patient is not nervous/anxious.    All other systems reviewed and are negative.     Vital Signs for last 24 hours   Temp:  [37 °C (98.6 °F)-37.4 °C (99.3 °F)] 37.3 °C (99.1 °F)  Pulse:  [68-84] 76  Resp:  [8-64] 15  BP: ()/(57-87) 164/78  SpO2:  [89 %-100 %] 93 %    Hemodynamic parameters for last 24 hours  CVP:  [14 MM HG-166 MM HG] 166 MM HG    Respiratory Information for the last 24 hours    BiPAP 14/8 for 2 hours overnight --> HFNC 50 lpm, 40%    Physical Exam   Physical Exam  Vitals and nursing note reviewed.   Constitutional:       General: He is awake.      Appearance: He is well-developed and normal weight. He is ill-appearing. He is not diaphoretic.      Interventions: Nasal cannula in place.   HENT:      Head: Normocephalic and atraumatic.      Nose: Nose normal. No congestion.      Comments: High flow nasal cannula in place     Mouth/Throat:      Mouth: Mucous membranes are moist.      Pharynx: Oropharynx is clear.   Eyes:      General: No scleral icterus.     Conjunctiva/sclera: Conjunctivae normal.      Pupils: Pupils are equal, round, and reactive to light.   Neck:      Vascular: No JVD.      Comments: Right IJ  central venous catheter without surrounding hematoma  Cardiovascular:      Rate and Rhythm: Normal rate and regular rhythm. Occasional Extrasystoles are present.     Chest Wall: PMI is displaced.      Pulses: No decreased pulses.      Heart sounds: Heart sounds are distant. No murmur heard.     Comments: Sternotomy incision remains clean/dry/intact, mediastinal chest tubes and epicardial pacing wires in place  Pulmonary:      Effort: Tachypnea present. No accessory muscle usage.      Breath sounds: No stridor. Examination of the right-lower field reveals rales. Examination of the left-lower field reveals rales. Rales present. No decreased breath sounds or wheezing.      Comments: Speaking in full sentences, some splinting due to pain  Abdominal:      General: Bowel sounds are normal. There is no distension.      Palpations: Abdomen is soft.      Tenderness: There is abdominal tenderness (Mild epigastric tenderness). There is no guarding or rebound.   Genitourinary:     Comments: Miranda catheter in place  Musculoskeletal:         General: No tenderness.      Cervical back: No tenderness.      Right lower leg: No edema.      Left lower leg: No edema.   Skin:     General: Skin is warm and dry.      Capillary Refill: Capillary refill takes less than 2 seconds.      Coloration: Skin is not pale.   Neurological:      General: No focal deficit present.      Mental Status: He is alert and oriented to person, place, and time.      Cranial Nerves: No cranial nerve deficit.      Sensory: No sensory deficit.      Comments: Some short-term memory difficulties   Psychiatric:         Mood and Affect: Mood normal.         Behavior: Behavior normal. Behavior is cooperative.         Thought Content: Thought content normal.       Medications  Current Facility-Administered Medications   Medication Dose Route Frequency Provider Last Rate Last Admin    furosemide (LASIX) injection 20 mg  20 mg Intravenous BID DIURETIC Jeremy M Gonda, M.D.    20 mg at 01/07/23 0550    insulin regular (HumuLIN R,NovoLIN R) injection  2-9 Units Subcutaneous 4X/DAY ACHS Matt Maurer, A.P.R.N.   2 Units at 01/06/23 1718    And    dextrose 10 % BOLUS 25 g  25 g Intravenous Q15 MIN PRN Matt Maurer, A.P.R.N.        HYDROmorphone (Dilaudid) injection 1 mg  1 mg Intravenous Q2HRS PRN Matt Maurer, A.P.R.N.   1 mg at 01/07/23 0632    Respiratory Therapy Consult   Nebulization Continuous RT Matt Maurer, A.P.R.N.        electrolyte-A (PLASMALYTE-A) infusion   Intravenous PRN Matt Maurer A.P.R.N. 999 mL/hr at 01/05/23 2000 New Bag at 01/05/23 2000    magnesium sulfate in D5W IVPB premix 1 g  1 g Intravenous DAILY Matt Maurer, A.P.R.N. 100 mL/hr at 01/07/23 0555 1 g at 01/07/23 0555    metoprolol tartrate (LOPRESSOR) tablet 25 mg  25 mg Oral BID Matt Maurer, A.P.R.N.   25 mg at 01/07/23 0549    aspirin EC (ECOTRIN) tablet 81 mg  81 mg Oral DAILY Matt Maurer, A.P.R.N.   81 mg at 01/07/23 0550    Pharmacy Consult Request ...Pain Management Review 1 Each  1 Each Other PHARMACY TO DOSE Matt Maurer A.P.R.N.        acetaminophen (TYLENOL) tablet 1,000 mg  1,000 mg Oral Q6HRS Matt Maurer, A.P.R.N.   1,000 mg at 01/07/23 0548    Followed by    [START ON 1/10/2023] acetaminophen (TYLENOL) tablet 1,000 mg  1,000 mg Oral Q6HRS PRN Matt Maurer, A.P.R.N.        oxyCODONE immediate-release (ROXICODONE) tablet 5 mg  5 mg Oral Q3HRS PRN Matt Maurer, A.P.R.N.   5 mg at 01/05/23 1810    Or    oxyCODONE immediate release (ROXICODONE) tablet 10 mg  10 mg Oral Q3HRS PRN Matt Maurer, A.P.R.N.   10 mg at 01/07/23 0308    traMADol (Ultram) 50 MG tablet 50 mg  50 mg Oral Q4HRS PRN Matt Maurer, A.P.R.N.        sodium bicarbonate 8.4 % injection 50 mEq  50 mEq Intravenous Q HOUR PRN Matt Maurer, A.P.R.N.        ondansetron (ZOFRAN) syringe/vial injection 8 mg  8 mg Intravenous Q6HRS PRN Matt Maurer, A.P.R.N.        Or    prochlorperazine (COMPAZINE) injection 10 mg  10 mg  Intravenous Q6HRS PRN Matt Maurer, A.P.R.N.        acetaminophen (Tylenol) tablet 650 mg  650 mg Oral Q4HRS PRN Matt Maurer, A.P.R.N.        Or    acetaminophen (TYLENOL) suppository 650 mg  650 mg Rectal Q4HRS PRN Matt Maurer, A.P.R.N.        senna-docusate (PERICOLACE or SENOKOT S) 8.6-50 MG per tablet 2 Tablet  2 Tablet Oral BID Matt Maurer, A.P.R.N.   2 Tablet at 01/07/23 0550    And    polyethylene glycol/lytes (MIRALAX) PACKET 1 Packet  1 Packet Oral DAILY Matt Maurer, A.P.R.N.   1 Packet at 01/07/23 0549    And    magnesium hydroxide (MILK OF MAGNESIA) suspension 30 mL  30 mL Oral DAILY Matt Maurer, A.P.R.N.        And    bisacodyl (DULCOLAX) suppository 10 mg  10 mg Rectal QDAY PRN Matt Maurer, A.P.R.N.        omeprazole (PRILOSEC) capsule 20 mg  20 mg Oral DAILY Matt Maurer, A.P.R.N.   20 mg at 01/07/23 0549    mag hydrox-al hydrox-simeth (MAALOX PLUS ES or MYLANTA DS) suspension 30 mL  30 mL Oral Q4HRS PRN Matt Maurer, A.P.R.N.        norepinephrine (Levophed) 8 mg in 250 mL NS infusion (premix)  0-1 mcg/kg/min (Ideal) Intravenous Continuous Serena Goyal M.D.   Stopped at 01/06/23 0247    EPINEPHrine (Adrenalin) infusion 4 mg/250 mL (premix)  0-0.5 mcg/kg/min (Ideal) Intravenous Continuous Serena Goyal M.D.   Stopped at 01/06/23 0936       Fluids    Intake/Output Summary (Last 24 hours) at 1/7/2023 0637  Last data filed at 1/7/2023 0600  Gross per 24 hour   Intake 1035.48 ml   Output 2980 ml   Net -1944.52 ml         Laboratory  Recent Labs     01/05/23  1625 01/05/23  2221 01/06/23  0554 01/06/23  0635 01/06/23  1043   ISTATAPH 7.393*   < > 7.308* 7.310* 7.385*   ISTATAPCO2 38.5*   < > 44.6* 44.6* 37.7*   ISTATAPO2 213*   < > 36* 78 97*   ISTATATCO2 25   < > 24 24 24   [id number] 100*   < > 62* 94 97   ISTATARTHCO3 23.5   < > 22.3 22.4 22.6   ISTATARTBE -1   < > -4 -4 -2   ISTATTEMP 37.0 C   < > 37.5 C 37.4 C 37.2 C   ISTATFIO2 90  --  100  --  70   ISTATSPEC Arterial   < >  Arterial Arterial Arterial   ISTATAPHTC 7.393*   < > 7.301* 7.304* 7.382*   [id number] 213*   < > 37* 80 98*    < > = values in this interval not displayed.           Recent Labs     01/05/23 2220 01/06/23 0103 01/06/23  0550 01/06/23  0556 01/06/23  0934 01/06/23  1351 01/06/23 1720 01/06/23 2102 01/07/23 0053   SODIUM  --    < >  --    < > 131*   < > 130* 130* 128*   POTASSIUM 5.8*   < >  --    < > 5.4   < > 5.3 5.3 5.7*   CHLORIDE  --    < >  --    < > 100   < > 95* 95* 93*   CO2  --    < >  --    < > 20   < > 25 26 25   BUN  --    < >  --    < > 39*   < > 42* 45* 51*   CREATININE  --    < >  --    < > 1.67*   < > 1.67* 1.70* 1.61*   MAGNESIUM 2.5  --  2.7*  --  2.8*  --   --   --   --    PHOSPHORUS  --   --  5.8*  --   --   --   --   --   --    CALCIUM  --    < >  --    < > 8.3*   < > 7.9* 7.9* 7.9*    < > = values in this interval not displayed.       Recent Labs     01/04/23 0844 01/05/23 1731 01/06/23  0556 01/06/23 0934 01/06/23 1351 01/06/23 1720 01/06/23 2102 01/07/23 0053   ALTSGPT 38  --  38 36  --   --   --   --    ASTSGOT 30  --  73* 74*  --   --   --   --    ALKPHOSPHAT 47  --  35 37  --   --   --   --    TBILIRUBIN 0.3  --  0.4 0.3  --   --   --   --    GLUCOSE 99   < > 132* 181*   < > 138* 131* 124*    < > = values in this interval not displayed.       Recent Labs     01/04/23 0844 01/06/23 0103 01/06/23  0556 01/06/23 0934 01/07/23  0555   WBC 15.0* 35.2* 40.4*  --  39.8*   NEUTSPOLYS 76.10*  --  87.40*  --   --    LYMPHOCYTES 8.90*  --  3.40*  --   --    MONOCYTES 7.70  --  5.00  --   --    EOSINOPHILS 3.90  --  0.00  --   --    BASOPHILS 0.90  --  0.00  --   --    ASTSGOT 30  --  73* 74*  --    ALTSGPT 38  --  38 36  --    ALKPHOSPHAT 47  --  35 37  --    TBILIRUBIN 0.3  --  0.4 0.3  --        Recent Labs     01/04/23  0844 01/05/23  1300 01/06/23  0103 01/06/23  0556 01/07/23  0555   RBC 6.63*  --  4.94 4.97 4.76   HEMOGLOBIN 15.1 12.5* 11.5* 11.5* 11.0*   HEMATOCRIT 48.4 40.2*  36.2* 36.7* 35.1*   PLATELETCT 602* 460* 394 411 438   PROTHROMBTM 12.6 15.9*  --   --   --    APTT 27.7 46.7*  --   --   --    INR 0.95 1.30*  --   --   --          Imaging  X-Ray:  I have personally reviewed the images and compared with prior images. and My impression is: Improving edema with elevated left hemidiaphragm and associated hiatal hernia, enlarged cardiac silhouette with sternotomy wires, right IJ central venous catheter mediastinal chest tubes in place    Assessment/Plan  * Nonrheumatic aortic valve stenosis  Assessment & Plan  S/p aortic valve replacement with ascending aortic aneurysm repair with intraoperative ABBY 1/5/2023  Continue routine postoperative management  Continue to monitor mediastinal chest tube output (possible removal today), monitor for and treat arrhythmias  Strict blood pressure management  As needed analgesics  Aspirin  S/p norepinephrine/epinephrine drips  S/p insulin infusion for tight glycemic control in the post-operative setting --> insulin sliding scale    Acute respiratory failure with hypoxia and hypercapnia (HCC)  Assessment & Plan  Occurring postoperative day #1, associated cardiogenic pulmonary edema -improving  Discontinue BiPAP and continue to titrate high flow nasal cannula flow and FiO2 to keep SPO2 greater than 92%  RT/O2 protocol  Forced diuresis  Limit sedatives    RVF (right ventricular failure) (HCC)  Assessment & Plan  Postoperatively -clinically improving  Cardiology consulted  Weaned off of inhaled Flolan for RV afterload reduction  Continue diuresis  Monitor endorgan perfusion    Acute renal failure with tubular necrosis (HCC)  Assessment & Plan  Likely secondary to RV failure/ATN, possible outpatient supplement involvement -slowly improving  Avoid nephrotoxins  Monitor creatinine, urine output, electrolytes closely  Renal ultrasound within normal limits 1/6  Okay to remove rivera catheter when cleared by CT surgery  Continue diuresis  Discontinue bicarb  drip    Cardiac arrest (HCC)  Assessment & Plan  Bradycardic followed by PEA/torsade de pointes arrest with rapid ROSC after defibrillation on 1/6  Continue supportive care, close neurologic monitoring  Continuous telemetry monitoring  Cardiology consulted  Optimize electrolytes    Encounter for weaning from ventilator (HCC)  Assessment & Plan  Intubated intraoperatively 1/5/2023 --> extubated within target  Encourage incentive spirometry/PEP    Aneurysm of ascending aorta without rupture  Assessment & Plan  Status postsurgical repair 1/5  Postoperative management as described above with strict attention to blood pressure control    Metabolic acidosis  Assessment & Plan  Secondary to renal failure and complicating hyperkalemia -improving  Discontinue bicarb drip and monitor    Hyperkalemia  Assessment & Plan  Intraoperatively 6.9, slowly improving  Continue to monitor serial potassium levels every 6 hours  Optimize renal perfusion  Discontinue bicarb drip  S/p IV calcium  Continuous telemetry monitoring    Gastroesophageal reflux disease without esophagitis  Assessment & Plan  Continue pantoprazole  GERD precautions    Hypocalcemia  Assessment & Plan  Replete and monitor closely    Leukemoid reaction  Assessment & Plan  Likely reactive, doubt infection - unchanged  Monitor off antibiotics for now    Acute blood loss as cause of postoperative anemia  Assessment & Plan  Relatively unchanged  Continue to monitor CBC daily with conservative transfusion strategy         VTE:  Contraindicated until chest tubes removed  Ulcer: Not Indicated  Lines: Central Line  Ongoing indication addressed and Miranda Catheter  to be removed today    I have performed a physical exam and reviewed and updated ROS and Plan today (1/7/2023). In review of yesterday's note (1/6/2023), there are no changes except as documented above.     Patient is critically ill today requiring active titration of high flow nasal cannula and weaning off of  inhaled Flolan monitoring cardiopulmonary status closely. High risk of deterioration and worsening vital organ dysfunction and death without the above critical care interventions.    Discussed patient condition and risk of morbidity and/or mortality with RN, RT, Pharmacy, Charge nurse / hot rounds, Patient, and CVS. Critical care time = 33 minutes in directly providing and coordinating critical care and extensive data review.  No time overlap and excludes procedures.    Please note that this dictation was created using voice recognition software. I have made every reasonable attempt to correct obvious errors, but there may be errors of grammar and possibly content that I did not discover before finalizing the note.

## 2023-01-07 NOTE — PROGRESS NOTES
Cardiovascular Surgery Progress Note    Name: Noe Hickey  MRN: [id number]  : 1960  Admit Date: 2023  5:24 AM  2 Days Post-Op     Procedure:  Procedure(s) and Anesthesia Type:     * AORTIC VALVE REPLACEMENT, ASCENDING AORTIC ANEURYSM REPAIR AND TRANSESOPHAGEAL ECHOCARDIOGRAM. - General     * REPAIR, ANEURYSM OR DISSECTION, AORTA, ASCENDING - General     * ECHOCARDIOGRAM, TRANSESOPHAGEAL, INTRAOPERATIVE. - General    Vitals:  Vitals:    23 0540 23 0549 23 0600 23 0700   BP: (!) 153/63 (!) 153/63 (!) 164/78 125/79   Pulse: 76 79 76 73   Resp: 15   (!) 22   Temp:       TempSrc:    Bladder   SpO2: 93%  92% 97%   Weight:       Height:          Temp (24hrs), Av.2 °C (99 °F), Min:37 °C (98.6 °F), Max:37.4 °C (99.3 °F)      Respiratory:    Respiration: (!) 22, Pulse Oximetry: 97 %       Fluids:    Intake/Output Summary (Last 24 hours) at 2023 0820  Last data filed at 2023 0700  Gross per 24 hour   Intake 1045.31 ml   Output 2780 ml   Net -1734.69 ml       Admit weight: Weight: 97.3 kg (214 lb 8.1 oz)  Current weight: Weight: 89 kg (196 lb 3.4 oz) (23 0538)    Labs:  Recent Labs     23  0103 23  0556 23  0555   WBC 35.2* 40.4* 39.8*   RBC 4.94 4.97 4.76   HEMOGLOBIN 11.5* 11.5* 11.0*   HEMATOCRIT 36.2* 36.7* 35.1*   MCV 73.3* 73.8* 73.7*   MCH 23.3* 23.1* 23.1*   MCHC 31.8* 31.3* 31.3*   RDW 46.3 46.4 47.0   PLATELETCT 394 411 438   MPV 9.6 9.7 9.9       Recent Labs     23  2102 23  0053 23  0555   SODIUM 130* 128* 129*   POTASSIUM 5.3 5.7* 4.9   CHLORIDE 95* 93* 92*   CO2 26 25 27   GLUCOSE 131* 124* 138*   BUN 45* 51* 51*   CREATININE 1.70* 1.61* 1.55*   CALCIUM 7.9* 7.9* 8.0*       Recent Labs     23  0844 23  1300   APTT 27.7 46.7*   INR 0.95 1.30*             Medications:  Scheduled Medications   Medication Dose Frequency    furosemide  20 mg BID DIURETIC    insulin regular  2-9 Units 4X/DAY ACHS    metoprolol  tartrate  25 mg BID    aspirin EC  81 mg DAILY    Pharmacy Consult Request  1 Each PHARMACY TO DOSE    acetaminophen  1,000 mg Q6HRS    senna-docusate  2 Tablet BID    And    polyethylene glycol/lytes  1 Packet DAILY    And    magnesium hydroxide  30 mL DAILY    omeprazole  20 mg DAILY        Exam:   Physical Exam  Vitals and nursing note reviewed.   HENT:      Head: Normocephalic and atraumatic.      Right Ear: External ear normal.      Left Ear: External ear normal.      Nose: Nose normal.      Mouth/Throat:      Mouth: Mucous membranes are moist.   Eyes:      Extraocular Movements: Extraocular movements intact.      Conjunctiva/sclera: Conjunctivae normal.      Pupils: Pupils are equal, round, and reactive to light.   Cardiovascular:      Rate and Rhythm: Normal rate and regular rhythm.      Pulses: Normal pulses.      Heart sounds: Normal heart sounds.   Pulmonary:      Breath sounds: Examination of the right-lower field reveals decreased breath sounds. Examination of the left-lower field reveals decreased breath sounds. Decreased breath sounds present.      Comments: High flow nasal cannula  Abdominal:      General: Abdomen is flat.      Palpations: Abdomen is soft.   Musculoskeletal:         General: Normal range of motion.      Cervical back: Normal range of motion.      Right lower leg: Edema present.      Left lower leg: Edema present.   Skin:     General: Skin is warm and dry.      Capillary Refill: Capillary refill takes less than 2 seconds.      Comments: Sternum- prevena   Neurological:      General: No focal deficit present.      Mental Status: He is alert and oriented to person, place, and time. Mental status is at baseline.   Psychiatric:         Mood and Affect: Mood normal.         Behavior: Behavior normal.       Cardiac Medications:    ASA - Yes    Plavix - No; contraindicated because of Bleeding    Post-operative Beta Blockers - Yes    Ace/ARB- No; contraindicated because of Normal EF    Statin  - No; contraindicated because of No CAD    Aldactone- No; contraindicated because of Normal EF    Ejection Fraction:  55%    Telemetry:   1/6 SR now.  Episodes of bradycardia/ ventricular arrhythmia-torsades this AM  1/7 SR    Assessment/Plan:  POD 1 Crticially ill, on bipap.  On epi-wean off prefer levo, SR currently.  Ventricular arrhythmia this am-torsades, electrolytes repleted, defibrillation x 1, sinus restored.  Neuro intact.  Wounds CDI.  Moderate output from chest tubes.  Hgb 11.5. Creatinine increasing 2.02 UO 500ml overnight.  K 5.9.  Additional lasix given this AM.  Ionized Ca 0.8-replete.  Mg 2.7 Echo shows preservation of EF and LV function.  Plan: Trend serial electrolytes and replete.  Lasix, trend creatinine, UO- consult neph if no improvement or decreasing UO. Wean bipap as tolerated.  Transition from epi to norepi for vasopressor support to avoid arrhythmias.  Avoid fluids with potassium.      POD 2 HDS- no gtts, SR- no further arrhythmias, nasal cannula 6lnc- CXR improved, Keep mediastinal tubes, Cr improved- occ hyperkalemia- diuresing well, mag- normal, ionized calcium- replace calcium, nausea- ok to use zofran, pain- add lidocaine patch/neurontin, keep rivera for strict I&O    Disposition:  TBD

## 2023-01-07 NOTE — CARE PLAN
Problem: Post Op Day 1 CABG/Heart Valve Replacement  Goal: Optimal care of the post op CABG/heart valve replacement Post Op Day 1  Intervention: EKG and CXR completed  Note: Completed in am with noc shift  Intervention: Antibiotics are discontinued within 24 hours of anesthesia end time unless indication documented for continuation beyond 24 hours  Note: Completed  Intervention: Daily weights in the morning  Note: Not completed pt was hemodynamically unstable  Intervention: Up in chair for all meals  Note: Pt up to chair once in the afternoon. Pt on HFNC.  Intervention: Ambulate in am if stable. First ambulation 25 feet. Repeat x 3 as tolerated  Note: Unable to ambulate, pt is on HFNC.   Intervention: Discontinue rivera catheter unless documented reason for continuation  Note: Rivera for strict I/Os  Intervention: Assess surgical dressing and check provider orders for potential removal  Note: Prevena over midline sternal incision   Intervention: Ensure referal to intensive cardiac rehab is ordered, and smoking cessation education if appropriate  Note: Completed  Intervention: [facility] trained RN to remove chest tubes if ordered by provider  Note: N/A, high output with hemodynamic instability in the am   Intervention: IS q 1 hour while awake and record best IS volume  Note: 200  Intervention: Knee high SUKI hose, on during the day, off at night  Note: Completed  Intervention: Saline lock IV  Note: Sodium Bibarb infusing  Intervention: After 24th hour post-anesthesia end time, transition patient to Cardiac Surgery SQ Insulin Protocol  Note: Completed, insulin gtt never started  Intervention: If patient is CABG or on home beta-blocker, start/resume beta-blocker on POD 1 or POD 2 or document contraindication  Note: Metoprolol given      The patient is Watcher - Medium risk of patient condition declining or worsening    Shift Goals  Clinical Goals: Wean off presosores, control pain  Patient Goals: decrease pain  Family Goals:  HUGH    Progress made toward(s) clinical / shift goals:  Hemodynamics improving, BP stable, remains on HFNC    Patient is not progressing towards the following goals: Pain control

## 2023-01-07 NOTE — CARE PLAN
The patient is Watcher - Medium risk of patient condition declining or worsening    Shift Goals  Clinical Goals: wean off HF  Patient Goals: Rest overnight  Family Goals: HUGH    Progress made toward(s) clinical / shift goals:    Problem: Pain - Standard  Goal: Alleviation of pain or a reduction in pain to the patient’s comfort goal  Outcome: Progressing     Problem: Knowledge Deficit - Standard  Goal: Patient and family/care givers will demonstrate understanding of plan of care, disease process/condition, diagnostic tests and medications  Outcome: Progressing     Problem: Post op day 2 CABG/Heart Valve Replacement  Goal: Optimal care of the post op CABG/heart valve replacement post op day 2  Outcome: Progressing  Intervention: Daily weights in the morning  Note: Pt weighed standing scale   Intervention: Up in chair for all meals  Note: Working on mobility and ambulation.   Intervention: Consider pacer wire removal by MD  Note: Pacer wires capped per protocol      Problem: Respiratory  Goal: Patient will achieve/maintain optimum respiratory ventilation and gas exchange  Outcome: Progressing       Patient is not progressing towards the following goals:

## 2023-01-08 PROBLEM — E87.1 HYPONATREMIA: Status: ACTIVE | Noted: 2023-01-08

## 2023-01-08 LAB
ANION GAP SERPL CALC-SCNC: 8 MMOL/L (ref 7–16)
BUN SERPL-MCNC: 50 MG/DL (ref 8–22)
CA-I SERPL-SCNC: 1.1 MMOL/L (ref 1.1–1.3)
CALCIUM SERPL-MCNC: 8.8 MG/DL (ref 8.5–10.5)
CHLORIDE SERPL-SCNC: 91 MMOL/L (ref 96–112)
CO2 SERPL-SCNC: 31 MMOL/L (ref 20–33)
CREAT SERPL-MCNC: 1.38 MG/DL (ref 0.5–1.4)
EKG IMPRESSION: NORMAL
ERYTHROCYTE [DISTWIDTH] IN BLOOD BY AUTOMATED COUNT: 46.1 FL (ref 35.9–50)
GFR SERPLBLD CREATININE-BSD FMLA CKD-EPI: 57 ML/MIN/1.73 M 2
GLUCOSE BLD STRIP.AUTO-MCNC: 116 MG/DL (ref 65–99)
GLUCOSE BLD STRIP.AUTO-MCNC: 144 MG/DL (ref 65–99)
GLUCOSE BLD STRIP.AUTO-MCNC: 167 MG/DL (ref 65–99)
GLUCOSE BLD STRIP.AUTO-MCNC: 184 MG/DL (ref 65–99)
GLUCOSE SERPL-MCNC: 130 MG/DL (ref 65–99)
HCT VFR BLD AUTO: 31.2 % (ref 42–52)
HGB BLD-MCNC: 9.9 G/DL (ref 14–18)
MAGNESIUM SERPL-MCNC: 2.6 MG/DL (ref 1.5–2.5)
MCH RBC QN AUTO: 22.9 PG (ref 27–33)
MCHC RBC AUTO-ENTMCNC: 31.7 G/DL (ref 33.7–35.3)
MCV RBC AUTO: 72.1 FL (ref 81.4–97.8)
PLATELET # BLD AUTO: 415 K/UL (ref 164–446)
PMV BLD AUTO: 10.5 FL (ref 9–12.9)
POTASSIUM SERPL-SCNC: 4.9 MMOL/L (ref 3.6–5.5)
RBC # BLD AUTO: 4.33 M/UL (ref 4.7–6.1)
SODIUM SERPL-SCNC: 130 MMOL/L (ref 135–145)
WBC # BLD AUTO: 31.7 K/UL (ref 4.8–10.8)

## 2023-01-08 PROCEDURE — A9270 NON-COVERED ITEM OR SERVICE: HCPCS | Performed by: NURSE PRACTITIONER

## 2023-01-08 PROCEDURE — 99233 SBSQ HOSP IP/OBS HIGH 50: CPT | Performed by: INTERNAL MEDICINE

## 2023-01-08 PROCEDURE — 700111 HCHG RX REV CODE 636 W/ 250 OVERRIDE (IP): Performed by: NURSE PRACTITIONER

## 2023-01-08 PROCEDURE — 85027 COMPLETE CBC AUTOMATED: CPT

## 2023-01-08 PROCEDURE — 93010 ELECTROCARDIOGRAM REPORT: CPT | Performed by: INTERNAL MEDICINE

## 2023-01-08 PROCEDURE — 83735 ASSAY OF MAGNESIUM: CPT

## 2023-01-08 PROCEDURE — 770020 HCHG ROOM/CARE - TELE (206)

## 2023-01-08 PROCEDURE — 700102 HCHG RX REV CODE 250 W/ 637 OVERRIDE(OP): Performed by: NURSE PRACTITIONER

## 2023-01-08 PROCEDURE — 700101 HCHG RX REV CODE 250: Performed by: INTERNAL MEDICINE

## 2023-01-08 PROCEDURE — 97161 PT EVAL LOW COMPLEX 20 MIN: CPT

## 2023-01-08 PROCEDURE — 82962 GLUCOSE BLOOD TEST: CPT | Mod: 91

## 2023-01-08 PROCEDURE — 80048 BASIC METABOLIC PNL TOTAL CA: CPT

## 2023-01-08 PROCEDURE — 93005 ELECTROCARDIOGRAM TRACING: CPT | Performed by: NURSE PRACTITIONER

## 2023-01-08 PROCEDURE — 97535 SELF CARE MNGMENT TRAINING: CPT

## 2023-01-08 PROCEDURE — 94669 MECHANICAL CHEST WALL OSCILL: CPT

## 2023-01-08 RX ORDER — AMLODIPINE BESYLATE 10 MG/1
10 TABLET ORAL
Status: DISCONTINUED | OUTPATIENT
Start: 2023-01-09 | End: 2023-01-11 | Stop reason: HOSPADM

## 2023-01-08 RX ORDER — GABAPENTIN 100 MG/1
200 CAPSULE ORAL 3 TIMES DAILY
Status: DISCONTINUED | OUTPATIENT
Start: 2023-01-08 | End: 2023-01-09

## 2023-01-08 RX ADMIN — DOCUSATE SODIUM 50 MG AND SENNOSIDES 8.6 MG 2 TABLET: 8.6; 5 TABLET, FILM COATED ORAL at 17:39

## 2023-01-08 RX ADMIN — FUROSEMIDE 40 MG: 10 INJECTION, SOLUTION INTRAMUSCULAR; INTRAVENOUS at 05:52

## 2023-01-08 RX ADMIN — GABAPENTIN 200 MG: 100 CAPSULE ORAL at 11:47

## 2023-01-08 RX ADMIN — HYDROMORPHONE HYDROCHLORIDE 1 MG: 1 INJECTION, SOLUTION INTRAMUSCULAR; INTRAVENOUS; SUBCUTANEOUS at 11:51

## 2023-01-08 RX ADMIN — HYDROMORPHONE HYDROCHLORIDE 1 MG: 1 INJECTION, SOLUTION INTRAMUSCULAR; INTRAVENOUS; SUBCUTANEOUS at 03:16

## 2023-01-08 RX ADMIN — MAGNESIUM HYDROXIDE 30 ML: 400 SUSPENSION ORAL at 05:52

## 2023-01-08 RX ADMIN — HYDROMORPHONE HYDROCHLORIDE 1 MG: 1 INJECTION, SOLUTION INTRAMUSCULAR; INTRAVENOUS; SUBCUTANEOUS at 19:58

## 2023-01-08 RX ADMIN — ASPIRIN 81 MG: 81 TABLET, COATED ORAL at 05:52

## 2023-01-08 RX ADMIN — ACETAMINOPHEN 1000 MG: 500 TABLET ORAL at 23:02

## 2023-01-08 RX ADMIN — OXYCODONE HYDROCHLORIDE 10 MG: 10 TABLET ORAL at 09:40

## 2023-01-08 RX ADMIN — ACETAMINOPHEN 1000 MG: 500 TABLET ORAL at 05:53

## 2023-01-08 RX ADMIN — AMIODARONE HYDROCHLORIDE 0.5 MG/MIN: 1.8 INJECTION, SOLUTION INTRAVENOUS at 11:24

## 2023-01-08 RX ADMIN — FUROSEMIDE 40 MG: 10 INJECTION, SOLUTION INTRAMUSCULAR; INTRAVENOUS at 16:00

## 2023-01-08 RX ADMIN — AMIODARONE HYDROCHLORIDE 400 MG: 200 TABLET ORAL at 05:52

## 2023-01-08 RX ADMIN — GABAPENTIN 200 MG: 100 CAPSULE ORAL at 17:38

## 2023-01-08 RX ADMIN — METOPROLOL TARTRATE 25 MG: 25 TABLET, FILM COATED ORAL at 17:37

## 2023-01-08 RX ADMIN — OXYCODONE HYDROCHLORIDE 10 MG: 10 TABLET ORAL at 13:59

## 2023-01-08 RX ADMIN — OXYCODONE 5 MG: 5 TABLET ORAL at 05:52

## 2023-01-08 RX ADMIN — POLYETHYLENE GLYCOL 3350 1 PACKET: 17 POWDER, FOR SOLUTION ORAL at 05:52

## 2023-01-08 RX ADMIN — AMIODARONE HYDROCHLORIDE 400 MG: 200 TABLET ORAL at 17:38

## 2023-01-08 RX ADMIN — ACETAMINOPHEN 1000 MG: 500 TABLET ORAL at 11:47

## 2023-01-08 RX ADMIN — OXYCODONE HYDROCHLORIDE 10 MG: 10 TABLET ORAL at 23:02

## 2023-01-08 RX ADMIN — INSULIN HUMAN 2 UNITS: 100 INJECTION, SOLUTION PARENTERAL at 11:10

## 2023-01-08 RX ADMIN — DOCUSATE SODIUM 50 MG AND SENNOSIDES 8.6 MG 2 TABLET: 8.6; 5 TABLET, FILM COATED ORAL at 05:52

## 2023-01-08 RX ADMIN — ACETAMINOPHEN 1000 MG: 500 TABLET ORAL at 17:37

## 2023-01-08 RX ADMIN — METOPROLOL TARTRATE 25 MG: 25 TABLET, FILM COATED ORAL at 05:53

## 2023-01-08 RX ADMIN — GABAPENTIN 100 MG: 100 CAPSULE ORAL at 05:53

## 2023-01-08 RX ADMIN — LIDOCAINE 1 PATCH: 50 PATCH CUTANEOUS at 09:42

## 2023-01-08 RX ADMIN — HYDROMORPHONE HYDROCHLORIDE 1 MG: 1 INJECTION, SOLUTION INTRAMUSCULAR; INTRAVENOUS; SUBCUTANEOUS at 07:35

## 2023-01-08 RX ADMIN — OXYCODONE 5 MG: 5 TABLET ORAL at 01:30

## 2023-01-08 RX ADMIN — OMEPRAZOLE 20 MG: 20 CAPSULE, DELAYED RELEASE ORAL at 05:53

## 2023-01-08 RX ADMIN — AMLODIPINE BESYLATE 5 MG: 10 TABLET ORAL at 05:53

## 2023-01-08 RX ADMIN — AMIODARONE HYDROCHLORIDE 0.5 MG/MIN: 1.8 INJECTION, SOLUTION INTRAVENOUS at 00:08

## 2023-01-08 RX ADMIN — OXYCODONE HYDROCHLORIDE 10 MG: 10 TABLET ORAL at 17:43

## 2023-01-08 RX ADMIN — INSULIN HUMAN 2 UNITS: 100 INJECTION, SOLUTION PARENTERAL at 16:55

## 2023-01-08 ASSESSMENT — PAIN DESCRIPTION - PAIN TYPE
TYPE: SURGICAL PAIN

## 2023-01-08 ASSESSMENT — ENCOUNTER SYMPTOMS
DEPRESSION: 0
SPUTUM PRODUCTION: 0
MYALGIAS: 0
BLURRED VISION: 0
CONSTIPATION: 0
NAUSEA: 1
SORE THROAT: 0
VOMITING: 0
SPEECH CHANGE: 0
SENSORY CHANGE: 0
DIZZINESS: 0
DIAPHORESIS: 0
FEVER: 0
NERVOUS/ANXIOUS: 0
HEADACHES: 0
COUGH: 0
BRUISES/BLEEDS EASILY: 0
INSOMNIA: 1
SHORTNESS OF BREATH: 0
ABDOMINAL PAIN: 0
PALPITATIONS: 0
BACK PAIN: 0

## 2023-01-08 ASSESSMENT — FIBROSIS 4 INDEX: FIB4 SCORE: 1.77

## 2023-01-08 ASSESSMENT — COGNITIVE AND FUNCTIONAL STATUS - GENERAL
CLIMB 3 TO 5 STEPS WITH RAILING: A LITTLE
MOBILITY SCORE: 16
STANDING UP FROM CHAIR USING ARMS: A LITTLE
TURNING FROM BACK TO SIDE WHILE IN FLAT BAD: A LOT
MOVING TO AND FROM BED TO CHAIR: A LOT
WALKING IN HOSPITAL ROOM: A LITTLE
SUGGESTED CMS G CODE MODIFIER MOBILITY: CK
MOVING FROM LYING ON BACK TO SITTING ON SIDE OF FLAT BED: A LITTLE

## 2023-01-08 ASSESSMENT — GAIT ASSESSMENTS
DISTANCE (FEET): 5
GAIT LEVEL OF ASSIST: SUPERVISED

## 2023-01-08 NOTE — PROGRESS NOTES
Cardiovascular Surgery Progress Note    Name: Noe Hickey  MRN: [id number]  : 1960  Admit Date: 2023  5:24 AM  3 Days Post-Op     Procedure:  Procedure(s) and Anesthesia Type:     * AORTIC VALVE REPLACEMENT, ASCENDING AORTIC ANEURYSM REPAIR AND TRANSESOPHAGEAL ECHOCARDIOGRAM. - General     * REPAIR, ANEURYSM OR DISSECTION, AORTA, ASCENDING - General     * ECHOCARDIOGRAM, TRANSESOPHAGEAL, INTRAOPERATIVE. - General    Vitals:  Vitals:    23 0552 23 0553 23 0554 23 0800   BP: (!) 165/99 (!) 165/99  133/69   Pulse: 79 78  60   Resp:       Temp:    36.7 °C (98.1 °F)   TempSrc:       SpO2:    97%   Weight:   95.9 kg (211 lb 6.7 oz)    Height:          Temp (24hrs), Av.9 °C (98.4 °F), Min:36.7 °C (98.1 °F), Max:37.2 °C (98.9 °F)      Respiratory:    Respiration: 16, Pulse Oximetry: 97 %       Fluids:    Intake/Output Summary (Last 24 hours) at 2023 0938  Last data filed at 2023 0800  Gross per 24 hour   Intake 1080.98 ml   Output 3125 ml   Net -2044.02 ml       Admit weight: Weight: 97.3 kg (214 lb 8.1 oz)  Current weight: Weight: 95.9 kg (211 lb 6.7 oz) (23 0554)    Labs:  Recent Labs     23  0556 23  0555 23  0523   WBC 40.4* 39.8* 31.7*   RBC 4.97 4.76 4.33*   HEMOGLOBIN 11.5* 11.0* 9.9*   HEMATOCRIT 36.7* 35.1* 31.2*   MCV 73.8* 73.7* 72.1*   MCH 23.1* 23.1* 22.9*   MCHC 31.3* 31.3* 31.7*   RDW 46.4 47.0 46.1   PLATELETCT 411 438 415   MPV 9.7 9.9 10.5       Recent Labs     23  0053 23  0555 23  1145 23  1745 23  2319 23  0523   SODIUM 128* 129*  --   --   --  130*   POTASSIUM 5.7* 4.9   < > 4.8 4.9 4.9   CHLORIDE 93* 92*  --   --   --  91*   CO2 25 27  --   --   --  31   GLUCOSE 124* 138*  --   --   --  130*   BUN 51* 51*  --   --   --  50*   CREATININE 1.61* 1.55*  --   --   --  1.38   CALCIUM 7.9* 8.0*  --   --   --  8.8    < > = values in this interval not displayed.       Recent Labs      01/05/23  1300   APTT 46.7*   INR 1.30*             Medications:  Scheduled Medications   Medication Dose Frequency    furosemide  40 mg BID DIURETIC    lidocaine  1 Patch Q24HR    scopolamine  1 Patch Q72HRS    amLODIPine  5 mg Q DAY    gabapentin  100 mg TID    amiodarone  400 mg BID    insulin regular  2-9 Units 4X/DAY ACHS    metoprolol tartrate  25 mg BID    aspirin EC  81 mg DAILY    Pharmacy Consult Request  1 Each PHARMACY TO DOSE    acetaminophen  1,000 mg Q6HRS    senna-docusate  2 Tablet BID    And    polyethylene glycol/lytes  1 Packet DAILY    And    magnesium hydroxide  30 mL DAILY    omeprazole  20 mg DAILY        Exam:   Physical Exam  Vitals and nursing note reviewed.   HENT:      Head: Normocephalic and atraumatic.      Right Ear: External ear normal.      Left Ear: External ear normal.      Nose: Nose normal.      Mouth/Throat:      Mouth: Mucous membranes are moist.   Eyes:      Extraocular Movements: Extraocular movements intact.      Conjunctiva/sclera: Conjunctivae normal.      Pupils: Pupils are equal, round, and reactive to light.   Cardiovascular:      Rate and Rhythm: Normal rate and regular rhythm.      Pulses: Normal pulses.      Heart sounds: Normal heart sounds.   Pulmonary:      Breath sounds: Examination of the right-lower field reveals decreased breath sounds. Examination of the left-lower field reveals decreased breath sounds. Decreased breath sounds present.      Comments: High flow nasal cannula  Abdominal:      General: Abdomen is flat.      Palpations: Abdomen is soft.   Musculoskeletal:         General: Normal range of motion.      Cervical back: Normal range of motion.      Right lower leg: Edema present.      Left lower leg: Edema present.   Skin:     General: Skin is warm and dry.      Capillary Refill: Capillary refill takes less than 2 seconds.      Comments: Sternum- prevena   Neurological:      General: No focal deficit present.      Mental Status: He is alert and  oriented to person, place, and time. Mental status is at baseline.   Psychiatric:         Mood and Affect: Mood normal.         Behavior: Behavior normal.       Cardiac Medications:    ASA - Yes    Plavix - No; contraindicated because of Bleeding    Post-operative Beta Blockers - Yes    Ace/ARB- No; contraindicated because of Normal EF    Statin - No; contraindicated because of No CAD    Aldactone- No; contraindicated because of Normal EF    Ejection Fraction:  55%    Telemetry:   1/6 SR now.  Episodes of bradycardia/ ventricular arrhythmia-torsades this AM  1/7 SR  1/8 Afib rates 70's    Assessment/Plan:  POD 1 Crticially ill, on bipap.  On epi-wean off prefer levo, SR currently.  Ventricular arrhythmia this am-torsades, electrolytes repleted, defibrillation x 1, sinus restored.  Neuro intact.  Wounds CDI.  Moderate output from chest tubes.  Hgb 11.5. Creatinine increasing 2.02 UO 500ml overnight.  K 5.9.  Additional lasix given this AM.  Ionized Ca 0.8-replete.  Mg 2.7 Echo shows preservation of EF and LV function.  Plan: Trend serial electrolytes and replete.  Lasix, trend creatinine, UO- consult neph if no improvement or decreasing UO. Wean bipap as tolerated.  Transition from epi to norepi for vasopressor support to avoid arrhythmias.  Avoid fluids with potassium.      POD 2 HDS- no gtts, SR- no further arrhythmias, nasal cannula 6lnc- CXR improved, Keep mediastinal tubes, Cr improved- occ hyperkalemia- diuresing well, mag- normal, ionized calcium- replace calcium, nausea- ok to use zofran, pain- add lidocaine patch/neurontin, keep rivera for strict I&O    POD 3 HTN- inc norvasc, Afib- on amio gtt/PO, NC 2l, Cont diuresis, CR/K+- stable, rivera removed pt patient- able to void no hematuria, pain- inc neurontin- Oxy Q 3-4 hours- d/w pt weaning off dilaudid due to possible d/c home 1-2 days and he is in agreement, awaiting PT/OT eval, transfer to tele    Disposition:  1/8 awaiting PT/OT eval - s/o agreed to take  him home o her house

## 2023-01-08 NOTE — PROGRESS NOTES
"Critical Care Progress Note    Date of admission  1/5/2023    Chief Complaint  62 y.o. male admitted 1/5/2023 with AS and AAA s/p AVR and AAA repair    Hospital Course  62 y.o. male who presented 1/5/2023 with a past medical history significant for hypertension, atrial flutter with RVR, cyclical vomiting syndrome, GERD, obesity and symptomatic aortic stenosis due to bicuspid aortic valve and an ascending aortic aneurysm who was taken to the operating room today for an elective aortic valve replacement and aneurysm repair.  He is brought to the intensive care unit postoperatively and I was consulted for critical care management.              Per anesthesiology: Patient was intubated easily with an 8.0 endotracheal tube at 24 cm at the teeth with a grade 2A view.  Patient received 2.5 L of IV crystalloid and 580 mL of Cell Saver with urine output of 250 cc.  On echo, he has left ventricular hypertrophy with a dilated right ventricle and ejection fraction of 50 to 55% on epinephrine and norepinephrine drips.  His current drips include Precedex at 0.3, insulin drip is off, norepinephrine at 0.04 and epinephrine at 0.04.  He did have hyperkalemia intraoperatively up to 6.9 that was treated medically and most recently is down to 5.    1/5-1/6 - bradycardic requiring epicardial pacing, LOC, MICHELLE on EKG, cardiology consulted, PEA/torsades arrest early this morning s/p defibrillation  1/6 - flolan, BiPAP, epi gtt, diuresis  1/7 - HFNC, weaned off flolan, improving JENNY/K    Interval Problem Update  Reviewed last 24 hour events:   - weaned off HFNC and iflolan without difficulty   - improving JENNY, K stable   - confused overnight and pulled rivera cath   - CTs removed   - very upset this morning regarding pain medication management overnight and \"being accused of being confused\"   - WBC slowly improving   - Hgb down to 10   - Na up to 130   - K 4.9   - improving JENNY, good UOP on lasix    Review of Systems  Review of Systems "   Constitutional:  Positive for malaise/fatigue. Negative for diaphoresis and fever.   HENT:  Negative for congestion and sore throat.    Eyes:  Negative for blurred vision.   Respiratory:  Negative for cough, sputum production and shortness of breath.    Cardiovascular:  Positive for chest pain. Negative for palpitations and leg swelling.   Gastrointestinal:  Positive for nausea. Negative for abdominal pain, constipation and vomiting.   Genitourinary:  Negative for dysuria.   Musculoskeletal:  Negative for back pain and myalgias.   Skin:  Negative for itching.   Neurological:  Negative for dizziness, sensory change, speech change and headaches.   Endo/Heme/Allergies:  Does not bruise/bleed easily.   Psychiatric/Behavioral:  Negative for depression. The patient has insomnia. The patient is not nervous/anxious.    All other systems reviewed and are negative.     Vital Signs for last 24 hours   Temp:  [36.8 °C (98.2 °F)-37.2 °C (98.9 °F)] 36.8 °C (98.2 °F)  Pulse:  [] 78  Resp:  [16-22] 16  BP: ()/(63-99) 165/99  SpO2:  [71 %-98 %] 96 %    Respiratory Information for the last 24 hours    4 lpm n/c    Physical Exam   Physical Exam  Vitals and nursing note reviewed.   Constitutional:       General: He is awake.      Appearance: He is well-developed and normal weight.      Interventions: Nasal cannula in place.      Comments: Sitting up in chair   HENT:      Head: Normocephalic and atraumatic.      Nose: Nose normal. No congestion.      Mouth/Throat:      Mouth: Mucous membranes are moist.      Pharynx: Oropharynx is clear.   Eyes:      General: No scleral icterus.     Conjunctiva/sclera: Conjunctivae normal.      Pupils: Pupils are equal, round, and reactive to light.   Neck:      Vascular: No JVD.      Comments: Right IJ central venous catheter without surrounding hematoma  Cardiovascular:      Rate and Rhythm: Normal rate and regular rhythm. Occasional Extrasystoles are present.     Chest Wall: PMI is  displaced.      Pulses: No decreased pulses.      Heart sounds: Heart sounds are distant. No murmur heard.     Comments: Sternotomy incision remains clean/dry/intact, hypertensive at times  Pulmonary:      Effort: No tachypnea or accessory muscle usage.      Breath sounds: No stridor. Examination of the right-lower field reveals rales. Examination of the left-lower field reveals rales. Rales present. No decreased breath sounds or wheezing.      Comments: Continues speaking in full sentences, some splinting due to pain  Abdominal:      General: Bowel sounds are normal. There is no distension.      Palpations: Abdomen is soft.      Tenderness: There is no abdominal tenderness. There is no guarding or rebound.   Musculoskeletal:         General: No tenderness.      Cervical back: No tenderness.      Right lower leg: No edema.      Left lower leg: No edema.   Skin:     General: Skin is warm and dry.      Capillary Refill: Capillary refill takes less than 2 seconds.      Coloration: Skin is not pale.   Neurological:      General: No focal deficit present.      Mental Status: He is alert and oriented to person, place, and time.      Cranial Nerves: No cranial nerve deficit.      Sensory: No sensory deficit.   Psychiatric:         Behavior: Behavior normal. Behavior is cooperative.         Thought Content: Thought content normal.       Medications  Current Facility-Administered Medications   Medication Dose Route Frequency Provider Last Rate Last Admin    furosemide (LASIX) injection 40 mg  40 mg Intravenous BID DIURETIC Belinda Pineda, A.P.N.   40 mg at 01/08/23 0552    lidocaine (LIDODERM) 5 % 1 Patch  1 Patch Transdermal Q24HR Jeremy M Gonda, M.D.   1 Patch at 01/07/23 0959    scopolamine (TRANSDERM-SCOP) patch 1 Patch  1 Patch Transdermal Q72HRS Belinda Pineda A.P.N.   1 Patch at 01/07/23 1146    amLODIPine (NORVASC) tablet 5 mg  5 mg Oral Q DAY Belinda Pineda A.P.N.   5 mg at 01/08/23 0553    gabapentin  (NEURONTIN) capsule 100 mg  100 mg Oral TID THANH Prado.P.N.   100 mg at 01/08/23 0553    amiodarone (Cordarone) tablet 400 mg  400 mg Oral BID THANH Prado.P.N.   400 mg at 01/08/23 0552    amiodarone (Nexterone) 360 mg/200 mL infusion  0.5-1 mg/min Intravenous Continuous THANH Praod.P.N. 17 mL/hr at 01/08/23 0422 0.5 mg/min at 01/08/23 0422    insulin regular (HumuLIN R,NovoLIN R) injection  2-9 Units Subcutaneous 4X/DAY ACHS Matt Maurer A.P.R.N.   2 Units at 01/07/23 2051    And    dextrose 10 % BOLUS 25 g  25 g Intravenous Q15 MIN PRN Matt Maurer A.P.R.N.        HYDROmorphone (Dilaudid) injection 1 mg  1 mg Intravenous Q2HRS PRN Matt Maurer A.P.R.N.   1 mg at 01/08/23 0316    Respiratory Therapy Consult   Nebulization Continuous RT THANH Schofield.P.R.N.        electrolyte-A (PLASMALYTE-A) infusion   Intravenous PRN Matt Maurer A.P.R.N. 999 mL/hr at 01/05/23 2000 New Bag at 01/05/23 2000    metoprolol tartrate (LOPRESSOR) tablet 25 mg  25 mg Oral BID Matt Maurer A.P.R.N.   25 mg at 01/08/23 0553    aspirin EC (ECOTRIN) tablet 81 mg  81 mg Oral DAILY Matt Maurer A.P.R.N.   81 mg at 01/08/23 0552    Pharmacy Consult Request ...Pain Management Review 1 Each  1 Each Other PHARMACY TO DOSE THANH Schofield.P.R.N.        acetaminophen (TYLENOL) tablet 1,000 mg  1,000 mg Oral Q6HRS Matt Maurer A.P.R.N.   1,000 mg at 01/08/23 0553    Followed by    [START ON 1/10/2023] acetaminophen (TYLENOL) tablet 1,000 mg  1,000 mg Oral Q6HRS PRN Matt Maurer, A.P.R.N.        oxyCODONE immediate-release (ROXICODONE) tablet 5 mg  5 mg Oral Q3HRS PRN Matt Maurer A.P.R.N.   5 mg at 01/08/23 0552    Or    oxyCODONE immediate release (ROXICODONE) tablet 10 mg  10 mg Oral Q3HRS PRN Matt Maurer A.P.R.N.   10 mg at 01/07/23 2043    traMADol (Ultram) 50 MG tablet 50 mg  50 mg Oral Q4HRS PRN Matt Maurer A.P.R.N.        sodium bicarbonate 8.4 % injection 50 mEq  50 mEq Intravenous Q HOUR PRN  Matt Maurer, A.P.R.N.        ondansetron (ZOFRAN) syringe/vial injection 8 mg  8 mg Intravenous Q6HRS PRN Matt Maurer, A.P.R.N.        Or    prochlorperazine (COMPAZINE) injection 10 mg  10 mg Intravenous Q6HRS PRN Matt Maurer, A.P.R.N.        acetaminophen (Tylenol) tablet 650 mg  650 mg Oral Q4HRS PRN Matt Maurer, A.P.R.N.        Or    acetaminophen (TYLENOL) suppository 650 mg  650 mg Rectal Q4HRS PRN Matt Maurer, A.P.R.N.        senna-docusate (PERICOLACE or SENOKOT S) 8.6-50 MG per tablet 2 Tablet  2 Tablet Oral BID Matt Maurer, A.P.R.N.   2 Tablet at 01/08/23 0552    And    polyethylene glycol/lytes (MIRALAX) PACKET 1 Packet  1 Packet Oral DAILY Matt Maurer, A.P.R.N.   1 Packet at 01/08/23 0552    And    magnesium hydroxide (MILK OF MAGNESIA) suspension 30 mL  30 mL Oral DAILY Matt Maurer, A.P.R.N.   30 mL at 01/08/23 0552    And    bisacodyl (DULCOLAX) suppository 10 mg  10 mg Rectal QDAY PRN Matt Maurer, A.P.R.N.        omeprazole (PRILOSEC) capsule 20 mg  20 mg Oral DAILY Matt Maurer, A.P.R.N.   20 mg at 01/08/23 0553    mag hydrox-al hydrox-simeth (MAALOX PLUS ES or MYLANTA DS) suspension 30 mL  30 mL Oral Q4HRS PRN Matt Maurer, A.P.R.N.           Fluids    Intake/Output Summary (Last 24 hours) at 1/8/2023 0634  Last data filed at 1/8/2023 0553  Gross per 24 hour   Intake 1070.33 ml   Output 3455 ml   Net -2384.67 ml         Laboratory  Recent Labs     01/05/23  1625 01/05/23  2221 01/06/23  0554 01/06/23  0635 01/06/23  1043   ISTATAPH 7.393*   < > 7.308* 7.310* 7.385*   ISTATAPCO2 38.5*   < > 44.6* 44.6* 37.7*   ISTATAPO2 213*   < > 36* 78 97*   ISTATATCO2 25   < > 24 24 24   ORQLLVH1SWC 100*   < > 62* 94 97   ISTATARTHCO3 23.5   < > 22.3 22.4 22.6   ISTATARTBE -1   < > -4 -4 -2   ISTATTEMP 37.0 C   < > 37.5 C 37.4 C 37.2 C   ISTATFIO2 90  --  100  --  70   ISTATSPEC Arterial   < > Arterial Arterial Arterial   ISTATAPHTC 7.393*   < > 7.301* 7.304* 7.382*   TUPAPSUT6TE 213*   < > 37*  80 98*    < > = values in this interval not displayed.           Recent Labs     01/06/23  0550 01/06/23  0556 01/06/23  0934 01/06/23  1351 01/07/23 0053 01/07/23 0555 01/07/23  0912 01/07/23  1145 01/07/23  1745 01/07/23  2319 01/08/23 0523   SODIUM  --    < > 131*   < > 128* 129*  --   --   --   --  130*   POTASSIUM  --    < > 5.4   < > 5.7* 4.9  --    < > 4.8 4.9 4.9   CHLORIDE  --    < > 100   < > 93* 92*  --   --   --   --  91*   CO2  --    < > 20   < > 25 27  --   --   --   --  31   BUN  --    < > 39*   < > 51* 51*  --   --   --   --  50*   CREATININE  --    < > 1.67*   < > 1.61* 1.55*  --   --   --   --  1.38   MAGNESIUM 2.7*  --  2.8*  --   --   --  2.8*  --   --   --  2.6*   PHOSPHORUS 5.8*  --   --   --   --   --   --   --   --   --   --    CALCIUM  --    < > 8.3*   < > 7.9* 8.0*  --   --   --   --  8.8    < > = values in this interval not displayed.       Recent Labs     01/06/23  0556 01/06/23  0934 01/06/23 1351 01/07/23 0053 01/07/23 0555 01/08/23 0523   ALTSGPT 38 36  --   --   --   --    ASTSGOT 73* 74*  --   --   --   --    ALKPHOSPHAT 35 37  --   --   --   --    TBILIRUBIN 0.4 0.3  --   --   --   --    GLUCOSE 132* 181*   < > 124* 138* 130*    < > = values in this interval not displayed.       Recent Labs     01/06/23  0556 01/06/23  0934 01/07/23 0555 01/08/23 0523   WBC 40.4*  --  39.8* 31.7*   NEUTSPOLYS 87.40*  --   --   --    LYMPHOCYTES 3.40*  --   --   --    MONOCYTES 5.00  --   --   --    EOSINOPHILS 0.00  --   --   --    BASOPHILS 0.00  --   --   --    ASTSGOT 73* 74*  --   --    ALTSGPT 38 36  --   --    ALKPHOSPHAT 35 37  --   --    TBILIRUBIN 0.4 0.3  --   --        Recent Labs     01/05/23  1300 01/06/23  0103 01/06/23  0556 01/07/23  0555 01/08/23  0523   RBC  --    < > 4.97 4.76 4.33*   HEMOGLOBIN 12.5*   < > 11.5* 11.0* 9.9*   HEMATOCRIT 40.2*   < > 36.7* 35.1* 31.2*   PLATELETCT 460*   < > 411 438 415   PROTHROMBTM 15.9*  --   --   --   --    APTT 46.7*  --   --   --   --     INR 1.30*  --   --   --   --     < > = values in this interval not displayed.         Imaging  X-Ray:  I have personally reviewed the images and compared with prior images. and No film today    Assessment/Plan  * Nonrheumatic aortic valve stenosis  Assessment & Plan  S/p aortic valve replacement with ascending aortic aneurysm repair with intraoperative ABBY 1/5/2023  Continue routine postoperative management  Monitor for and treat arrhythmias  Strict blood pressure management  As needed analgesics  Aspirin  S/p norepinephrine/epinephrine drips  S/p insulin infusion for tight glycemic control in the post-operative setting --> continue insulin sliding scale    Acute respiratory failure with hypoxia and hypercapnia (HCC)  Assessment & Plan  Occurred POD #1, associated cardiogenic pulmonary edema - improving  s/p BiPAP and high flow nasal cannula flow  RT/O2 protocol  Continue forced diuresis  Limit sedatives  Encourage incentive spirometry, out of bed to chair    RVF (right ventricular failure) (HCC)  Assessment & Plan  Postoperatively -clinically improving  Cardiology consulted  S/p inhaled Flolan for RV afterload reduction (weaned off 1/7)  Continue diuresis    Acute renal failure with tubular necrosis (HCC)  Assessment & Plan  Likely secondary to RV failure/ATN, possible outpatient supplement involvement - improving today, nonoliguric  Avoid nephrotoxins  Monitor creatinine, urine output, electrolytes  Continue diuresis  S/p bicarb drip    Cardiac arrest (HCC)  Assessment & Plan  Bradycardic followed by PEA/torsade de pointes arrest with rapid ROSC after defibrillation on 1/6  Continue supportive care  Continuous telemetry monitoring  Cardiology consulted and signed off  Optimize electrolytes    Encounter for weaning from ventilator (HCC)  Assessment & Plan  Intubated intraoperatively 1/5/2023 --> extubated within target  Encourage incentive spirometry/PEP    Aneurysm of ascending aorta without rupture  Assessment  & Plan  Status postsurgical repair 1/5  Postoperative management as described above with strict attention to blood pressure control    Metabolic acidosis  Assessment & Plan  Secondary to renal failure and complicating hyperkalemia -improved    Hyperkalemia  Assessment & Plan  Intraoperatively 6.9,  continues improving, down to 4.9 today  Monitor daily serum potassium levels  S/p IV calcium and bicarb  Continuous telemetry monitoring    Gastroesophageal reflux disease without esophagitis  Assessment & Plan  Continue outpatient pantoprazole  GERD precautions    Hyponatremia  Assessment & Plan  Secondary to renal failure and fluid overload -improving  Monitor with diuresis    Hypocalcemia  Assessment & Plan  Repleted    Leukemoid reaction  Assessment & Plan  Likely reactive, doubt infection -  slowly improving  Monitor off antibiotics for now    Acute blood loss as cause of postoperative anemia  Assessment & Plan  Slowly worsening  Continue to monitor CBC daily with conservative transfusion strategy         VTE:  Not Indicated   Ulcer: PPI  Lines: Central Line  Ongoing indication addressed    I have performed a physical exam and reviewed and updated ROS and Plan today (1/8/2023). In review of yesterday's note (1/7/2023), there are no changes except as documented above.     Discussed patient condition and risk of morbidity and/or mortality with RN, RT, Pharmacy, Charge nurse / hot rounds, Patient, and [facility]. Critical care services will sign off at this time.  Please call with any questions or concerns.    Please note that this dictation was created using voice recognition software. I have made every reasonable attempt to correct obvious errors, but there may be errors of grammar and possibly content that I did not discover before finalizing the note.
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
## 2023-01-08 NOTE — THERAPY
"Physical Therapy   Initial Evaluation     Patient Name: Noe Hickey  Age:  62 y.o., Sex:  male  Medical Record #: [id number]  Today's Date: 1/8/2023     Precautions: Cardiac Precautions;Sternal Precautions    Assessment  Patient is a 62 y.o. male admitted s/p AVR and AAA repair on 1/5. Pt seen for PT evaluation at this time. Educated and provided handout regarding post-op sternal precautions and appropriate return to activity with return home including home exercise walking program and self-monitoring activity tolerance via talk test and RPE scale. Pt and spouse present receptive and verbalize understanding. Pt feeling fatigued following earlier ambulation with nursing and shower. Pt demo'd STS with SPV while maintaining sternal precautions, was able to walk from chair to bed and returned supine with SPV. Anticipate pt will be functionally capable to return home with spouse assist at DC. PT will follow for continued edu and gait/stair training prior to DC. Recommend outpatient cardiac rehab to facilitate return to PLOF.     Plan  Treatment Plan : Bed Mobility, Gait Training, Neuro Re-Education / Balance, Self Care / Home Evaluation, Therapeutic Exercise, Therapeutic Activities, Stair Training  Treatment Frequency: 4 Times per Week  Duration: Until Therapy Goals Met    DC Equipment Recommendations: Unable to determine at this time  Discharge Recommendations: Outpatient Cardiac Rehab     01/08/23 1129   Prior Living Situation   Prior Services None   Housing / Facility 1 Story House   Steps Into Home \"a couple\"   Steps In Home 0   Bathroom Set up Bathtub / Shower Combination   Equipment Owned None   Lives with -  Spouse   Comments spouse present and supportive, will be home for a few days before starting a new job   Prior Level of Functional Mobility   Bed Mobility Independent   Transfer Status Independent   Ambulation Independent   Distance Ambulation (Feet) community distances   Assistive Devices Used None "   Stairs Independent   Cognition    Level of Consciousness Alert   Comments pleasant and cooperative, somewhat lethargic d/t incr activity today but is able to provide teach back for edu learned, spouse present and very receptive to edu on return to activity and home management   Balance Assessment   Sitting Balance (Static) Good   Sitting Balance (Dynamic) Fair +   Standing Balance (Static) Fair +   Standing Balance (Dynamic) Fair   Weight Shift Sitting Good   Weight Shift Standing Good   Comments no AD   Bed Mobility    Sit to Supine Supervised   Scooting Supervised   Comments up in chair pre-tx, performs log roll to return supine and maintains sternal prec   Gait Analysis   Gait Level Of Assist Supervised   Assistive Device None   Distance (Feet) 5   Weight Bearing Status no restrictions   Comments deferred incr amb today given fatigue from walking and showering earlier. pt was able to walk from chair to EOB without AD or LOB   Functional Mobility   Sit to Stand Supervised   Bed, Chair, Wheelchair Transfer Supervised   Comments maintains sternal precautions   Short Term Goals    Short Term Goal # 1 pt will perform supine <> sit without bed features with SPV in 6 visits to get in/out of bed at home   Short Term Goal # 2 pt will ambulate 150ft with FWW and SPV in 6 visits to access home   Short Term Goal # 3 pt will negotiate 3 steps with SPV in 6 visits to access home   Education Group   Education Provided Role of Physical Therapist;Cardiac Precautions;Sternal Precautions

## 2023-01-08 NOTE — CARE PLAN
Problem: Pain - Standard  Goal: Alleviation of pain or a reduction in pain to the patient’s comfort goal  Outcome: Not Met. Offered comfort. Medications see MAR. Repositions.      Problem: Knowledge Deficit - Standard  Goal: Patient and family/care givers will demonstrate understanding of plan of care, disease process/condition, diagnostic tests and medications  Outcome: Progressing     Problem: Post Op Day 3 CABG/Heart Valve replacement  Goal: Optimal care of the post op CABG/Heart Valve replacement post op day 3  Outcome: Progressing  Intervention: Daily weights in the morning  Note: In chart in epic     Intervention: Shower daily and clean incisions twice daily with soap and water  Note: Prevena in place    Intervention: Up in chair for all meals  Note: Pt moved into chair for this morning   Intervention: Ambulate 4 times daily, increasing the distance each time  Note: One walk in for the morning     Intervention: Consider removal of rivera, chest tube and pacer wires if not already done  Note: Rivera removed by pt   Intervention: Assess need for case management and  for discharge planning  Note: No needs present    The patient is Stable - Low risk of patient condition declining or worsening    Shift Goals  Clinical Goals: wean off HF  Patient Goals: Rest overnight  Family Goals: HUGH    Progress made toward(s) clinical / shift goals:    Problem: Fall Risk  Goal: Patient will remain free from falls  Outcome: Progressing, pt has chair alarm on.      Problem: Skin Integrity  Goal: Skin integrity is maintained or improved  Outcome: Progressing       Patient is not progressing towards the following goals:      Problem: Pain - Standard  Goal: Alleviation of pain or a reduction in pain to the patient’s comfort goal  Outcome: Not Met,

## 2023-01-08 NOTE — CARE PLAN
The patient is Watcher - Medium risk of patient condition declining or worsening    Shift Goals  Clinical Goals: wean O2  Patient Goals: Rest overnight  Family Goals: HUGH      Problem: Post op day 2 CABG/Heart Valve Replacement  Goal: Optimal care of the post op CABG/heart valve replacement post op day 2  Outcome: Progressing

## 2023-01-08 NOTE — PROGRESS NOTES
Patient converted into a. Fib rate 70s-120s, /64, asymptomatic, 3L NC. Pending serum potassium. Belinda GARIBAY updated, orders received for amio gtt with bolus.

## 2023-01-08 NOTE — PROGRESS NOTES
Monitor summary:    Sinus rhythm 70s-100s for most of shift.  1800: A. Fib rates 70s-120s. See previous note.  V wires insulated per protocol.

## 2023-01-08 NOTE — PROGRESS NOTES
Pt jumped out of bed without nurse present. Bed alarm on.  Education provided about importance of not getting up without nurse present. All fall risk precautions in place.

## 2023-01-09 ENCOUNTER — PATIENT OUTREACH (OUTPATIENT)
Dept: SCHEDULING | Facility: IMAGING CENTER | Age: 63
End: 2023-01-09
Payer: COMMERCIAL

## 2023-01-09 ENCOUNTER — APPOINTMENT (OUTPATIENT)
Dept: RADIOLOGY | Facility: MEDICAL CENTER | Age: 63
DRG: 219 | End: 2023-01-09
Attending: NURSE PRACTITIONER
Payer: COMMERCIAL

## 2023-01-09 LAB
ANION GAP SERPL CALC-SCNC: 8 MMOL/L (ref 7–16)
BUN SERPL-MCNC: 42 MG/DL (ref 8–22)
CA-I SERPL-SCNC: 1.1 MMOL/L (ref 1.1–1.3)
CALCIUM SERPL-MCNC: 8.4 MG/DL (ref 8.5–10.5)
CHLORIDE SERPL-SCNC: 91 MMOL/L (ref 96–112)
CO2 SERPL-SCNC: 34 MMOL/L (ref 20–33)
CREAT SERPL-MCNC: 1.12 MG/DL (ref 0.5–1.4)
ERYTHROCYTE [DISTWIDTH] IN BLOOD BY AUTOMATED COUNT: 45.9 FL (ref 35.9–50)
GFR SERPLBLD CREATININE-BSD FMLA CKD-EPI: 74 ML/MIN/1.73 M 2
GLUCOSE BLD STRIP.AUTO-MCNC: 123 MG/DL (ref 65–99)
GLUCOSE BLD STRIP.AUTO-MCNC: 99 MG/DL (ref 65–99)
GLUCOSE SERPL-MCNC: 132 MG/DL (ref 65–99)
HCT VFR BLD AUTO: 30.7 % (ref 42–52)
HGB BLD-MCNC: 9.7 G/DL (ref 14–18)
MAGNESIUM SERPL-MCNC: 2.5 MG/DL (ref 1.5–2.5)
MCH RBC QN AUTO: 22.9 PG (ref 27–33)
MCHC RBC AUTO-ENTMCNC: 31.6 G/DL (ref 33.7–35.3)
MCV RBC AUTO: 72.6 FL (ref 81.4–97.8)
PLATELET # BLD AUTO: 470 K/UL (ref 164–446)
PMV BLD AUTO: 10.1 FL (ref 9–12.9)
POTASSIUM SERPL-SCNC: 4.4 MMOL/L (ref 3.6–5.5)
POTASSIUM SERPL-SCNC: 4.4 MMOL/L (ref 3.6–5.5)
RBC # BLD AUTO: 4.23 M/UL (ref 4.7–6.1)
SODIUM SERPL-SCNC: 133 MMOL/L (ref 135–145)
WBC # BLD AUTO: 25.9 K/UL (ref 4.8–10.8)

## 2023-01-09 PROCEDURE — 71046 X-RAY EXAM CHEST 2 VIEWS: CPT

## 2023-01-09 PROCEDURE — 700111 HCHG RX REV CODE 636 W/ 250 OVERRIDE (IP): Performed by: NURSE PRACTITIONER

## 2023-01-09 PROCEDURE — 84132 ASSAY OF SERUM POTASSIUM: CPT

## 2023-01-09 PROCEDURE — 700102 HCHG RX REV CODE 250 W/ 637 OVERRIDE(OP): Performed by: NURSE PRACTITIONER

## 2023-01-09 PROCEDURE — 82330 ASSAY OF CALCIUM: CPT

## 2023-01-09 PROCEDURE — 97165 OT EVAL LOW COMPLEX 30 MIN: CPT

## 2023-01-09 PROCEDURE — 85027 COMPLETE CBC AUTOMATED: CPT

## 2023-01-09 PROCEDURE — A9270 NON-COVERED ITEM OR SERVICE: HCPCS | Performed by: NURSE PRACTITIONER

## 2023-01-09 PROCEDURE — 80048 BASIC METABOLIC PNL TOTAL CA: CPT

## 2023-01-09 PROCEDURE — 99024 POSTOP FOLLOW-UP VISIT: CPT | Performed by: THORACIC SURGERY (CARDIOTHORACIC VASCULAR SURGERY)

## 2023-01-09 PROCEDURE — 83735 ASSAY OF MAGNESIUM: CPT

## 2023-01-09 PROCEDURE — 770020 HCHG ROOM/CARE - TELE (206)

## 2023-01-09 PROCEDURE — 700101 HCHG RX REV CODE 250: Performed by: INTERNAL MEDICINE

## 2023-01-09 PROCEDURE — 82962 GLUCOSE BLOOD TEST: CPT

## 2023-01-09 RX ORDER — GABAPENTIN 300 MG/1
300 CAPSULE ORAL 3 TIMES DAILY
Status: DISCONTINUED | OUTPATIENT
Start: 2023-01-09 | End: 2023-01-11 | Stop reason: HOSPADM

## 2023-01-09 RX ADMIN — AMIODARONE HYDROCHLORIDE 0.5 MG/MIN: 1.8 INJECTION, SOLUTION INTRAVENOUS at 17:29

## 2023-01-09 RX ADMIN — LIDOCAINE 1 PATCH: 50 PATCH CUTANEOUS at 23:08

## 2023-01-09 RX ADMIN — GABAPENTIN 200 MG: 100 CAPSULE ORAL at 11:09

## 2023-01-09 RX ADMIN — GABAPENTIN 200 MG: 100 CAPSULE ORAL at 05:07

## 2023-01-09 RX ADMIN — OXYCODONE HYDROCHLORIDE 10 MG: 10 TABLET ORAL at 12:38

## 2023-01-09 RX ADMIN — FUROSEMIDE 40 MG: 10 INJECTION, SOLUTION INTRAMUSCULAR; INTRAVENOUS at 17:05

## 2023-01-09 RX ADMIN — POLYETHYLENE GLYCOL 3350 1 PACKET: 17 POWDER, FOR SOLUTION ORAL at 05:08

## 2023-01-09 RX ADMIN — AMLODIPINE BESYLATE 10 MG: 10 TABLET ORAL at 05:07

## 2023-01-09 RX ADMIN — ACETAMINOPHEN 1000 MG: 500 TABLET ORAL at 11:09

## 2023-01-09 RX ADMIN — OXYCODONE HYDROCHLORIDE 10 MG: 10 TABLET ORAL at 03:30

## 2023-01-09 RX ADMIN — OXYCODONE HYDROCHLORIDE 10 MG: 10 TABLET ORAL at 07:39

## 2023-01-09 RX ADMIN — OXYCODONE HYDROCHLORIDE 10 MG: 10 TABLET ORAL at 19:53

## 2023-01-09 RX ADMIN — METOPROLOL TARTRATE 25 MG: 25 TABLET, FILM COATED ORAL at 05:13

## 2023-01-09 RX ADMIN — FUROSEMIDE 40 MG: 10 INJECTION, SOLUTION INTRAMUSCULAR; INTRAVENOUS at 05:07

## 2023-01-09 RX ADMIN — METOPROLOL TARTRATE 25 MG: 25 TABLET, FILM COATED ORAL at 17:05

## 2023-01-09 RX ADMIN — AMIODARONE HYDROCHLORIDE 400 MG: 200 TABLET ORAL at 05:41

## 2023-01-09 RX ADMIN — LIDOCAINE 1 PATCH: 50 PATCH CUTANEOUS at 11:08

## 2023-01-09 RX ADMIN — OMEPRAZOLE 20 MG: 20 CAPSULE, DELAYED RELEASE ORAL at 05:07

## 2023-01-09 RX ADMIN — GABAPENTIN 300 MG: 300 CAPSULE ORAL at 17:04

## 2023-01-09 RX ADMIN — DOCUSATE SODIUM 50 MG AND SENNOSIDES 8.6 MG 2 TABLET: 8.6; 5 TABLET, FILM COATED ORAL at 05:07

## 2023-01-09 RX ADMIN — ACETAMINOPHEN 1000 MG: 500 TABLET ORAL at 05:07

## 2023-01-09 RX ADMIN — DOCUSATE SODIUM 50 MG AND SENNOSIDES 8.6 MG 2 TABLET: 8.6; 5 TABLET, FILM COATED ORAL at 17:04

## 2023-01-09 RX ADMIN — MAGNESIUM HYDROXIDE 30 ML: 400 SUSPENSION ORAL at 05:06

## 2023-01-09 RX ADMIN — OXYCODONE HYDROCHLORIDE 10 MG: 10 TABLET ORAL at 17:04

## 2023-01-09 RX ADMIN — ACETAMINOPHEN 1000 MG: 500 TABLET ORAL at 17:04

## 2023-01-09 RX ADMIN — HYDROMORPHONE HYDROCHLORIDE 1 MG: 1 INJECTION, SOLUTION INTRAMUSCULAR; INTRAVENOUS; SUBCUTANEOUS at 01:29

## 2023-01-09 RX ADMIN — AMIODARONE HYDROCHLORIDE 1 MG/MIN: 1.8 INJECTION, SOLUTION INTRAVENOUS at 11:15

## 2023-01-09 RX ADMIN — ASPIRIN 81 MG: 81 TABLET, COATED ORAL at 05:07

## 2023-01-09 ASSESSMENT — COGNITIVE AND FUNCTIONAL STATUS - GENERAL
DAILY ACTIVITIY SCORE: 19
TOILETING: A LITTLE
DRESSING REGULAR UPPER BODY CLOTHING: A LITTLE
DRESSING REGULAR LOWER BODY CLOTHING: A LITTLE
PERSONAL GROOMING: A LITTLE
SUGGESTED CMS G CODE MODIFIER DAILY ACTIVITY: CK
HELP NEEDED FOR BATHING: A LITTLE

## 2023-01-09 ASSESSMENT — PAIN DESCRIPTION - PAIN TYPE
TYPE: SURGICAL PAIN

## 2023-01-09 ASSESSMENT — FIBROSIS 4 INDEX: FIB4 SCORE: 1.65

## 2023-01-09 ASSESSMENT — ACTIVITIES OF DAILY LIVING (ADL): TOILETING: INDEPENDENT

## 2023-01-09 NOTE — PROGRESS NOTES
Cardiovascular Surgery Progress Note    Name: Noe Hickey  MRN: [id number]  : 1960  Admit Date: 2023  5:24 AM  4 Days Post-Op     Procedure:  Procedure(s) and Anesthesia Type:     * AORTIC VALVE REPLACEMENT, ASCENDING AORTIC ANEURYSM REPAIR AND TRANSESOPHAGEAL ECHOCARDIOGRAM. - General     * REPAIR, ANEURYSM OR DISSECTION, AORTA, ASCENDING - General     * ECHOCARDIOGRAM, TRANSESOPHAGEAL, INTRAOPERATIVE. - General    Vitals:  Vitals:    23 0400 23 0600 23 0700 23 0800   BP: 123/74 138/67  128/69   Pulse: 64 64 60 65   Resp:       Temp: 36.5 °C (97.7 °F)      TempSrc: Temporal      SpO2: 96% 98% 96% 96%   Weight:       Height:          Temp (24hrs), Av.6 °C (97.9 °F), Min:36.5 °C (97.7 °F), Max:36.8 °C (98.2 °F)      Respiratory:    Respiration: 16, Pulse Oximetry: 96 %       Fluids:    Intake/Output Summary (Last 24 hours) at 2023 1049  Last data filed at 2023 0800  Gross per 24 hour   Intake 1014.23 ml   Output 3750 ml   Net -2735.77 ml     Admit weight: Weight: 97.3 kg (214 lb 8.1 oz)  Current weight: Weight: 94.9 kg (209 lb 3.5 oz) (23 0330)    Labs:  Recent Labs     23  0555 23  0523 23  0135   WBC 39.8* 31.7* 25.9*   RBC 4.76 4.33* 4.23*   HEMOGLOBIN 11.0* 9.9* 9.7*   HEMATOCRIT 35.1* 31.2* 30.7*   MCV 73.7* 72.1* 72.6*   MCH 23.1* 22.9* 22.9*   MCHC 31.3* 31.7* 31.6*   RDW 47.0 46.1 45.9   PLATELETCT 438 415 470*   MPV 9.9 10.5 10.1     Recent Labs     23  0555 23  1145 23  2319 23  0523 23  0135   SODIUM 129*  --   --  130* 133*   POTASSIUM 4.9   < > 4.9 4.9 4.4   CHLORIDE 92*  --   --  91* 91*   CO2 27  --   --  31 34*   GLUCOSE 138*  --   --  130* 132*   BUN 51*  --   --  50* 42*   CREATININE 1.55*  --   --  1.38 1.12   CALCIUM 8.0*  --   --  8.8 8.4*    < > = values in this interval not displayed.                 Medications:  Scheduled Medications   Medication Dose Frequency    amLODIPine  10 mg Q  DAY    gabapentin  200 mg TID    furosemide  40 mg BID DIURETIC    lidocaine  1 Patch Q24HR    amiodarone  400 mg BID    insulin regular  2-9 Units 4X/DAY ACHS    metoprolol tartrate  25 mg BID    aspirin EC  81 mg DAILY    Pharmacy Consult Request  1 Each PHARMACY TO DOSE    acetaminophen  1,000 mg Q6HRS    senna-docusate  2 Tablet BID    And    polyethylene glycol/lytes  1 Packet DAILY    And    magnesium hydroxide  30 mL DAILY    omeprazole  20 mg DAILY        Exam:   Physical Exam  Vitals and nursing note reviewed.   HENT:      Head: Normocephalic and atraumatic.      Right Ear: External ear normal.      Left Ear: External ear normal.      Nose: Nose normal.      Mouth/Throat:      Mouth: Mucous membranes are moist.   Eyes:      Extraocular Movements: Extraocular movements intact.      Conjunctiva/sclera: Conjunctivae normal.      Pupils: Pupils are equal, round, and reactive to light.   Cardiovascular:      Rate and Rhythm: Normal rate and regular rhythm.      Pulses: Normal pulses.      Heart sounds: Normal heart sounds.   Pulmonary:      Breath sounds: Examination of the right-lower field reveals decreased breath sounds. Examination of the left-lower field reveals decreased breath sounds. Decreased breath sounds present.      Comments: High flow nasal cannula  Abdominal:      General: Abdomen is flat.      Palpations: Abdomen is soft.   Musculoskeletal:         General: Normal range of motion.      Cervical back: Normal range of motion.      Right lower leg: Edema present.      Left lower leg: Edema present.   Skin:     General: Skin is warm and dry.      Capillary Refill: Capillary refill takes less than 2 seconds.      Comments: Sternum- prevena   Neurological:      General: No focal deficit present.      Mental Status: He is alert and oriented to person, place, and time. Mental status is at baseline.   Psychiatric:         Mood and Affect: Mood normal.         Behavior: Behavior normal.       Cardiac  Medications:    ASA - Yes    Plavix - No; contraindicated because of Bleeding    Post-operative Beta Blockers - Yes    Ace/ARB- No; contraindicated because of Normal EF    Statin - No; contraindicated because of No CAD    Aldactone- No; contraindicated because of Normal EF    Ejection Fraction:  55%    Telemetry:   1/6 SR now.  Episodes of bradycardia/ ventricular arrhythmia-torsades this AM  1/7 SR  1/8 Afib rates 70's  1/9 A fib    Assessment/Plan:  POD 1 Crticially ill, on bipap.  On epi-wean off prefer levo, SR currently.  Ventricular arrhythmia this am-torsades, electrolytes repleted, defibrillation x 1, sinus restored.  Neuro intact.  Wounds CDI.  Moderate output from chest tubes.  Hgb 11.5. Creatinine increasing 2.02 UO 500ml overnight.  K 5.9.  Additional lasix given this AM.  Ionized Ca 0.8-replete.  Mg 2.7 Echo shows preservation of EF and LV function.  Plan: Trend serial electrolytes and replete.  Lasix, trend creatinine, UO- consult neph if no improvement or decreasing UO. Wean bipap as tolerated.  Transition from epi to norepi for vasopressor support to avoid arrhythmias.  Avoid fluids with potassium.      POD 2 HDS- no gtts, SR- no further arrhythmias, nasal cannula 6lnc- CXR improved, Keep mediastinal tubes, Cr improved- occ hyperkalemia- diuresing well, mag- normal, ionized calcium- replace calcium, nausea- ok to use zofran, pain- add lidocaine patch/neurontin, keep rivera for strict I&O    POD 3 HTN- inc norvasc, Afib- on amio gtt/PO, NC 2l, Cont diuresis, CR/K+- stable, rivera removed pt patient- able to void no hematuria, pain- inc neurontin- Oxy Q 3-4 hours- d/w pt weaning off dilaudid due to possible d/c home 1-2 days and he is in agreement, awaiting PT/OT eval, transfer to tele    POD 4  HDS- HTN improved, still in a fib- 70s, neuro intact, prevena in place, no BM, fluid balance negative, wt down, 4 L NC.  Plan: Resume amio gtt, continue diuresis, continue bowel protocol. Walking O2 test.  IS/ambulate.     Disposition:  1/8 awaiting PT/OT eval - s/o agreed to take him home o her house

## 2023-01-09 NOTE — PROGRESS NOTES
Pt converted into A-fib. ECG ordered and APRN was notified. Belinda Pineda gave orders to continue to monitor and call back if HR is sustained above 100.

## 2023-01-09 NOTE — CARE PLAN
The patient is Watcher - Medium risk of patient condition declining or worsening    Shift Goals  Clinical Goals: Hemodynamic stability  Patient Goals: Rest  Family Goals: HUGH    Progress made toward(s) clinical / shift goals:    Problem: Pain - Standard  Goal: Alleviation of pain or a reduction in pain to the patient’s comfort goal  Outcome: Progressing     Problem: Post Op Day 4 CABG/Heart Valve Replacement  Goal: Optimal care of the Post Op CABG/Heart Valve replacement Post Op Day 4  Outcome: Progressing  Intervention: Daily weights in the morning  Note: Weight will be taken in am  Intervention: Up in chair for all meals  Note: Pt will be brought up to chair in AM.  Intervention: Ambulate 4 times daily, increasing the distance each time  Note: Pt ambulated twice on NOC shift  Intervention: IS q 1 hour while awake and record best IS volume  Note: IS was used.  Intervention: Consider removal of rivera, chest tube and pacer wires if not already done  Note: Removed     Problem: Fall Risk  Goal: Patient will remain free from falls  Outcome: Progressing       Patient is not progressing towards the following goals:

## 2023-01-09 NOTE — THERAPY
"Occupational Therapy   Initial Evaluation     Patient Name: Noe Hickey  Age:  62 y.o., Sex:  male  Medical Record #: [id number]  Today's Date: 1/9/2023     Precautions  Precautions: Fall Risk, Cardiac Precautions (See Comments), Sternal Precautions (See Comments)  Comments: prevena    Assessment  Patient is 62 y.o. male s/p AVR and AAA repair. Pt is cooperative, but with poor attention and receptivity to education req redirection, and decreased insight into deficits and safety. Wife not present, but OT will be available for any questions. Pt educated on sternal and cardiac precautions, adaptive techniques for ADLs, gradual return to activity,  pacing, and importance of continued OOB activity. Completed ADLs/txfs with CGA-SPV and functional ambulation w/FWW and SBA; req v/cs for safety with FWW. Reports he will be staying with spouse (per RN, they are , but she is going to stay with him during his recovery.) Reports she is able to assist for a few days, but then will be starting a new job and will be able to assist only when not working. Patient will not be actively followed for occupational therapy services at this time, however may be seen if requested by physician for 1 more visit within 30 days to address any discharge or equipment needs.     Plan    Occupational Therapy Initial Treatment Plan   Duration: Other (See Comments) (d/c needs only)    DC Equipment Recommendations: Tub / Shower Seat  Discharge Recommendations: Recommend home health for continued occupational therapy services      Objective     01/09/23 1501   Prior Living Situation   Prior Services Home-Independent   Housing / Facility 1 Story House   Steps Into Home   (\"a couple\")   Bathroom Set up Bathtub / Shower Combination   Equipment Owned None   Lives with - Patient's Self Care Capacity Spouse   Comments Reports he will be staying with spouse (per RN, they are , but she is going to stay with him during his recovery.) " Reports she is able to assist for a few days, but then will be starting a new job and will be able to assist only when not working   Prior Level of ADL Function   Self Feeding Independent   Grooming / Hygiene Independent   Bathing Independent   Dressing Independent   Toileting Independent   Prior Level of IADL Function   Medication Management Independent   Laundry Independent   Kitchen Mobility Independent   Finances Independent   Home Management Independent   Shopping Independent   Prior Level Of Mobility Independent Without Device in Community;Independent Without Device in Home   Driving / Transportation Driving Independent   Precautions   Precautions Fall Risk;Cardiac Precautions (See Comments);Sternal Precautions (See Comments)   Comments prevena   Vitals   O2 (LPM) 4   O2 Delivery Device Silicone Nasal Cannula   Vitals Comments Pt with SOB after session, but SpO2 >90%. Req v/cs to stop and take deep breaths   Pain 0 - 10 Group   Location Chest   Therapist Pain Assessment During Activity;Post Activity Pain Same as Prior to Activity;Nurse Notified  (not quantified)   Cognition    Cognition / Consciousness X   Level of Consciousness Alert   Safety Awareness Impaired   New Learning Impaired   Comments pt is cooperative, but with poor attention and receptivity to education req redirection, and decreased insight into deficits and safety.   Passive ROM Upper Body   Passive ROM Upper Body WDL   Active ROM Upper Body   Active ROM Upper Body  WDL   Strength Upper Body   Upper Body Strength  X   Comments limited by precautions   Balance Assessment   Sitting Balance (Static) Good   Sitting Balance (Dynamic) Fair +   Standing Balance (Static) Fair   Standing Balance (Dynamic) Fair   Weight Shift Sitting Good   Weight Shift Standing Good   Comments w/FWW; intermittently would try to leave FWW behind   Bed Mobility    Scooting Supervised   Comments found and left in chair   ADL Assessment   Eating Supervision   Grooming    (declined need, able to reach face and top of head)   Lower Body Dressing Contact Guard Assist  (socks)   Toileting   (declined need)   Comments Educated on sternal and cardiac precautions, adaptive techniques for ADLs, gradual return to activity,  pacing, and importance of continued OOB activity.   Functional Mobility   Sit to Stand Supervised   Bed, Chair, Wheelchair Transfer Supervised   Toilet Transfers Supervised   Transfer Method Stand Step   Mobility w/FWW; req v/cs for safety with FWW. chair>toilet>hallway>chair   ICU Target Mobility Level   ICU Mobility - Targeted Level Level 4   Edema / Skin Assessment   Edema / Skin  Not Assessed   Comments prevena in place pre and post session   Activity Tolerance   Comments limited by fatigue and cognition   Education Group   Education Provided Role of Occupational Therapist;Transfers;Home Safety;Cardiac Precautions;Sternal Precautions;Activities of Daily Living;Pathology of bedrest   Role of Occupational Therapist Patient Response Patient;Acceptance;Explanation;Verbal Demonstration;Reinforcement Needed   Cardiac Precautions Patient Response Patient;Acceptance;Explanation;Handout;Verbal Demonstration;Reinforcement Needed   Sternal Precautions Patient Response Patient;Acceptance;Explanation;Handout;Action Demonstration;Reinforcement Needed   Home Safety Patient Response Patient;Acceptance;Explanation;Reinforcement Needed;No Learning Evidence   Transfers Patient Response Patient;Acceptance;Explanation;Action Demonstration;Reinforcement Needed   ADL Patient Response Patient;Acceptance;Explanation;Reinforcement Needed;Verbal Demonstration   Pathology of Bedrest Patient Response Patient;Acceptance;Explanation;Verbal Demonstration;Reinforcement Needed

## 2023-01-10 LAB
ANION GAP SERPL CALC-SCNC: 7 MMOL/L (ref 7–16)
BUN SERPL-MCNC: 33 MG/DL (ref 8–22)
CA-I SERPL-SCNC: 1.1 MMOL/L (ref 1.1–1.3)
CALCIUM SERPL-MCNC: 8.7 MG/DL (ref 8.5–10.5)
CHLORIDE SERPL-SCNC: 90 MMOL/L (ref 96–112)
CO2 SERPL-SCNC: 37 MMOL/L (ref 20–33)
CREAT SERPL-MCNC: 1.1 MG/DL (ref 0.5–1.4)
ERYTHROCYTE [DISTWIDTH] IN BLOOD BY AUTOMATED COUNT: 45.9 FL (ref 35.9–50)
GFR SERPLBLD CREATININE-BSD FMLA CKD-EPI: 75 ML/MIN/1.73 M 2
GLUCOSE SERPL-MCNC: 104 MG/DL (ref 65–99)
HCT VFR BLD AUTO: 31.4 % (ref 42–52)
HGB BLD-MCNC: 9.9 G/DL (ref 14–18)
INR PPP: 1.02 (ref 0.87–1.13)
MCH RBC QN AUTO: 23 PG (ref 27–33)
MCHC RBC AUTO-ENTMCNC: 31.5 G/DL (ref 33.7–35.3)
MCV RBC AUTO: 73 FL (ref 81.4–97.8)
PLATELET # BLD AUTO: 474 K/UL (ref 164–446)
PMV BLD AUTO: 10 FL (ref 9–12.9)
POTASSIUM SERPL-SCNC: 4.1 MMOL/L (ref 3.6–5.5)
PROTHROMBIN TIME: 13.3 SEC (ref 12–14.6)
RBC # BLD AUTO: 4.3 M/UL (ref 4.7–6.1)
SODIUM SERPL-SCNC: 134 MMOL/L (ref 135–145)
WBC # BLD AUTO: 21.2 K/UL (ref 4.8–10.8)

## 2023-01-10 PROCEDURE — 85027 COMPLETE CBC AUTOMATED: CPT

## 2023-01-10 PROCEDURE — A9270 NON-COVERED ITEM OR SERVICE: HCPCS | Performed by: NURSE PRACTITIONER

## 2023-01-10 PROCEDURE — 700111 HCHG RX REV CODE 636 W/ 250 OVERRIDE (IP): Performed by: NURSE PRACTITIONER

## 2023-01-10 PROCEDURE — 700102 HCHG RX REV CODE 250 W/ 637 OVERRIDE(OP): Performed by: NURSE PRACTITIONER

## 2023-01-10 PROCEDURE — 82330 ASSAY OF CALCIUM: CPT

## 2023-01-10 PROCEDURE — 770020 HCHG ROOM/CARE - TELE (206)

## 2023-01-10 PROCEDURE — 85610 PROTHROMBIN TIME: CPT

## 2023-01-10 PROCEDURE — 80048 BASIC METABOLIC PNL TOTAL CA: CPT

## 2023-01-10 PROCEDURE — 99024 POSTOP FOLLOW-UP VISIT: CPT | Performed by: THORACIC SURGERY (CARDIOTHORACIC VASCULAR SURGERY)

## 2023-01-10 RX ORDER — WARFARIN SODIUM 5 MG/1
5 TABLET ORAL DAILY
Status: DISCONTINUED | OUTPATIENT
Start: 2023-01-10 | End: 2023-01-11 | Stop reason: HOSPADM

## 2023-01-10 RX ADMIN — DOCUSATE SODIUM 50 MG AND SENNOSIDES 8.6 MG 2 TABLET: 8.6; 5 TABLET, FILM COATED ORAL at 05:11

## 2023-01-10 RX ADMIN — METOPROLOL TARTRATE 25 MG: 25 TABLET, FILM COATED ORAL at 17:06

## 2023-01-10 RX ADMIN — HYDROMORPHONE HYDROCHLORIDE 1 MG: 1 INJECTION, SOLUTION INTRAMUSCULAR; INTRAVENOUS; SUBCUTANEOUS at 21:26

## 2023-01-10 RX ADMIN — GABAPENTIN 300 MG: 300 CAPSULE ORAL at 05:12

## 2023-01-10 RX ADMIN — DOCUSATE SODIUM 50 MG AND SENNOSIDES 8.6 MG 2 TABLET: 8.6; 5 TABLET, FILM COATED ORAL at 17:06

## 2023-01-10 RX ADMIN — WARFARIN SODIUM 5 MG: 5 TABLET ORAL at 17:06

## 2023-01-10 RX ADMIN — OXYCODONE HYDROCHLORIDE 10 MG: 10 TABLET ORAL at 05:12

## 2023-01-10 RX ADMIN — OMEPRAZOLE 20 MG: 20 CAPSULE, DELAYED RELEASE ORAL at 05:13

## 2023-01-10 RX ADMIN — AMIODARONE HYDROCHLORIDE 400 MG: 200 TABLET ORAL at 05:13

## 2023-01-10 RX ADMIN — AMIODARONE HYDROCHLORIDE 400 MG: 200 TABLET ORAL at 17:05

## 2023-01-10 RX ADMIN — MAGNESIUM HYDROXIDE 30 ML: 400 SUSPENSION ORAL at 05:12

## 2023-01-10 RX ADMIN — OXYCODONE HYDROCHLORIDE 10 MG: 10 TABLET ORAL at 20:16

## 2023-01-10 RX ADMIN — OXYCODONE HYDROCHLORIDE 10 MG: 10 TABLET ORAL at 00:10

## 2023-01-10 RX ADMIN — METOPROLOL TARTRATE 25 MG: 25 TABLET, FILM COATED ORAL at 06:00

## 2023-01-10 RX ADMIN — AMIODARONE HYDROCHLORIDE 0.5 MG/MIN: 1.8 INJECTION, SOLUTION INTRAVENOUS at 05:39

## 2023-01-10 RX ADMIN — AMLODIPINE BESYLATE 10 MG: 10 TABLET ORAL at 05:12

## 2023-01-10 RX ADMIN — ACETAMINOPHEN 1000 MG: 500 TABLET ORAL at 12:09

## 2023-01-10 RX ADMIN — FUROSEMIDE 40 MG: 10 INJECTION, SOLUTION INTRAMUSCULAR; INTRAVENOUS at 16:36

## 2023-01-10 RX ADMIN — FUROSEMIDE 40 MG: 10 INJECTION, SOLUTION INTRAMUSCULAR; INTRAVENOUS at 05:10

## 2023-01-10 RX ADMIN — POLYETHYLENE GLYCOL 3350 1 PACKET: 17 POWDER, FOR SOLUTION ORAL at 05:11

## 2023-01-10 RX ADMIN — ACETAMINOPHEN 1000 MG: 500 TABLET ORAL at 05:11

## 2023-01-10 RX ADMIN — ACETAMINOPHEN 1000 MG: 500 TABLET ORAL at 00:00

## 2023-01-10 RX ADMIN — ASPIRIN 81 MG: 81 TABLET, COATED ORAL at 05:11

## 2023-01-10 RX ADMIN — OXYCODONE HYDROCHLORIDE 10 MG: 10 TABLET ORAL at 23:19

## 2023-01-10 ASSESSMENT — CHA2DS2 SCORE
SEX: MALE
DIABETES: NO
CHF OR LEFT VENTRICULAR DYSFUNCTION: NO
HYPERTENSION: YES
AGE 75 OR GREATER: NO
VASCULAR DISEASE: YES
PRIOR STROKE OR TIA OR THROMBOEMBOLISM: NO
CHA2DS2 VASC SCORE: 2
AGE 65 TO 74: NO

## 2023-01-10 ASSESSMENT — PAIN DESCRIPTION - PAIN TYPE
TYPE: SURGICAL PAIN

## 2023-01-10 ASSESSMENT — FIBROSIS 4 INDEX
FIB4 SCORE: 1.64
FIB4 SCORE: 1.64

## 2023-01-10 NOTE — FACE TO FACE
"Face to Face Note  -  Durable Medical Equipment    EFRAÍN Sneed - NPI: [id number]  I certify that this patient is under my care and that they have had a durable medical equipment(DME)face to face encounter by myself that meets the physician DME face-to-face encounter requirements with this patient on:    Date of encounter:   Patient:                    MRN:                       YOB: 2024  Noe Hickey  [id number]  1960     The encounter with the patient was in whole, or in part, for the following medical condition, which is the primary reason for durable medical equipment:  Post-Op Surgery    I certify that, based on my findings, the following durable medical equipment is medically necessary:  Oxygen   HOME O2 Saturation Measurements:(Values must be present for Home Oxygen orders)  Room air sat at rest: 80  Room air sat with amb: 95  With liters of O2: 2, O2 sat at rest with O2: 95  With Liters of O2: 2, O2 sat with amb with O2 : 98  Is the patient mobile?: Yes  If patient feels more short of breath, they can go up to 6 liters per minute and contact healthcare provider.    Supporting Symptoms: The patient requires supplemental oxygen, as the following interventions have been tried with limited or no improvement: \"Bronchodilators and/or steroid inhalers, \"Positive expiratory pressure therapies, \"Oral and/or IV steroids, and \"Incentive spirometry.        ------------------------------------------------------------------------------------------------------------------    Face to Face Supporting Documentation - Home Health    The encounter with this patient was in whole or in part the primary reason for home health admission.    Date of encounter:   Patient:                    MRN:                       YOB: 2024  Noe Hickey  [id number]  1960     Home health to see patient for:  Skilled Nursing care for assessment, interventions & " education, Physical Therapy evaluation and treatment, and Occupational therapy evaluation and treatment    Skilled need for:  Surgical Aftercare s/p AVR    Skilled nursing interventions to include:  Wound Care    Homebound evidenced status by:  Needs the assistance of another person in order to leave the home. Leaving home must require a considerable and taxing effort. There must exist a normal inability to leave the home.    Community Physician to provide follow up care: Pcp Pt States None     Optional Interventions    Wound information & treatment:    Home Infusion Therapy orders:    Line/Drain/Airway:    I certify the face to face encounter for this home care referral meets the CMS requirements and the encounter/clinical assessment with the patient was, in whole, or in part, for the medical condition(s) listed above, which is the primary reason for home health care. Based on my clinical findings: the service(s) are medically necessary, support the need for home health care, and the homebound criteria are met.  I certify that this patient has had a face to face encounter by myself.  Violeta Doll A.P.R.N. - NPI: [id number]    *Debility, frailty and advanced age in the absence of an acute deterioration or exacerbation of a condition do not qualify a patient for home health.

## 2023-01-10 NOTE — PROGRESS NOTES
Cardiovascular Surgery Progress Note    Name: Noe Hickey  MRN: [id number]  : 1960  Admit Date: 2023  5:24 AM  5 Days Post-Op     Procedure:  Procedure(s) and Anesthesia Type:     * AORTIC VALVE REPLACEMENT, ASCENDING AORTIC ANEURYSM REPAIR AND TRANSESOPHAGEAL ECHOCARDIOGRAM. - General     * REPAIR, ANEURYSM OR DISSECTION, AORTA, ASCENDING - General     * ECHOCARDIOGRAM, TRANSESOPHAGEAL, INTRAOPERATIVE. - General    Vitals:  Vitals:    01/10/23 0500 01/10/23 0600 01/10/23 0806 01/10/23 0818   BP: (!) 149/66   119/67   Pulse: (!) 56 63 (!) 57 (!) 53   Resp:   16    Temp:   35.9 °C (96.7 °F)    TempSrc:   Temporal    SpO2: 97% 98%  96%   Weight:  94.3 kg (207 lb 14.3 oz)     Height:          Temp (24hrs), Av.3 °C (97.3 °F), Min:35.9 °C (96.7 °F), Max:36.6 °C (97.9 °F)      Respiratory:    Respiration: 16, Pulse Oximetry: 96 %       Fluids:    Intake/Output Summary (Last 24 hours) at 1/10/2023 0904  Last data filed at 1/10/2023 0600  Gross per 24 hour   Intake 1182.78 ml   Output 2800 ml   Net -1617.22 ml       Admit weight: Weight: 97.3 kg (214 lb 8.1 oz)  Current weight: Weight: 94.3 kg (207 lb 14.3 oz) (01/10/23 0600)    Labs:  Recent Labs     23  0523 23  0135 01/10/23  0305   WBC 31.7* 25.9* 21.2*   RBC 4.33* 4.23* 4.30*   HEMOGLOBIN 9.9* 9.7* 9.9*   HEMATOCRIT 31.2* 30.7* 31.4*   MCV 72.1* 72.6* 73.0*   MCH 22.9* 22.9* 23.0*   MCHC 31.7* 31.6* 31.5*   RDW 46.1 45.9 45.9   PLATELETCT 415 470* 474*   MPV 10.5 10.1 10.0       Recent Labs     23  0523 23  0135 23  1120 01/10/23  0305   SODIUM 130* 133*  --  134*   POTASSIUM 4.9 4.4 4.4 4.1   CHLORIDE 91* 91*  --  90*   CO2 31 34*  --  37*   GLUCOSE 130* 132*  --  104*   BUN 50* 42*  --  33*   CREATININE 1.38 1.12  --  1.10   CALCIUM 8.8 8.4*  --  8.7                   Medications:  Scheduled Medications   Medication Dose Frequency    gabapentin  300 mg TID    amLODIPine  10 mg Q DAY    furosemide  40 mg BID  DIURETIC    lidocaine  1 Patch Q24HR    amiodarone  400 mg BID    metoprolol tartrate  25 mg BID    aspirin EC  81 mg DAILY    Pharmacy Consult Request  1 Each PHARMACY TO DOSE    acetaminophen  1,000 mg Q6HRS    senna-docusate  2 Tablet BID    And    polyethylene glycol/lytes  1 Packet DAILY    And    magnesium hydroxide  30 mL DAILY    omeprazole  20 mg DAILY        Exam:   Physical Exam  Vitals and nursing note reviewed.   HENT:      Head: Normocephalic and atraumatic.      Right Ear: External ear normal.      Left Ear: External ear normal.      Nose: Nose normal.      Mouth/Throat:      Mouth: Mucous membranes are moist.   Eyes:      Extraocular Movements: Extraocular movements intact.      Conjunctiva/sclera: Conjunctivae normal.      Pupils: Pupils are equal, round, and reactive to light.   Cardiovascular:      Rate and Rhythm: Normal rate and regular rhythm.      Pulses: Normal pulses.      Heart sounds: Normal heart sounds.   Pulmonary:      Breath sounds: Examination of the right-lower field reveals decreased breath sounds. Examination of the left-lower field reveals decreased breath sounds. Decreased breath sounds present.   Abdominal:      General: Abdomen is flat.      Palpations: Abdomen is soft.   Musculoskeletal:         General: Normal range of motion.      Cervical back: Normal range of motion.      Right lower leg: Edema present.      Left lower leg: Edema present.   Skin:     General: Skin is warm and dry.      Capillary Refill: Capillary refill takes less than 2 seconds.      Comments: Sternum- prevena   Neurological:      General: No focal deficit present.      Mental Status: He is alert and oriented to person, place, and time. Mental status is at baseline.   Psychiatric:         Mood and Affect: Mood normal.         Behavior: Behavior normal.       Cardiac Medications:    ASA - Yes    Plavix - No; contraindicated because of Bleeding    Post-operative Beta Blockers - Yes    Ace/ARB- No;  contraindicated because of Normal EF    Statin - No; contraindicated because of No CAD    Aldactone- No; contraindicated because of Normal EF    Ejection Fraction:  55%    Telemetry:   1/6 SR now.  Episodes of bradycardia/ ventricular arrhythmia-torsades this AM  1/7 SR  1/8 Afib rates 70's  1/9 A fib  1/10 A fib 50s    Assessment/Plan:  POD 1 Crticially ill, on bipap.  On epi-wean off prefer levo, SR currently.  Ventricular arrhythmia this am-torsades, electrolytes repleted, defibrillation x 1, sinus restored.  Neuro intact.  Wounds CDI.  Moderate output from chest tubes.  Hgb 11.5. Creatinine increasing 2.02 UO 500ml overnight.  K 5.9.  Additional lasix given this AM.  Ionized Ca 0.8-replete.  Mg 2.7 Echo shows preservation of EF and LV function.  Plan: Trend serial electrolytes and replete.  Lasix, trend creatinine, UO- consult neph if no improvement or decreasing UO. Wean bipap as tolerated.  Transition from epi to norepi for vasopressor support to avoid arrhythmias.  Avoid fluids with potassium.      POD 2 HDS- no gtts, SR- no further arrhythmias, nasal cannula 6lnc- CXR improved, Keep mediastinal tubes, Cr improved- occ hyperkalemia- diuresing well, mag- normal, ionized calcium- replace calcium, nausea- ok to use zofran, pain- add lidocaine patch/neurontin, keep rivera for strict I&O    POD 3 HTN- inc norvasc, Afib- on amio gtt/PO, NC 2l, Cont diuresis, CR/K+- stable, rivera removed pt patient- able to void no hematuria, pain- inc neurontin- Oxy Q 3-4 hours- d/w pt weaning off dilaudid due to possible d/c home 1-2 days and he is in agreement, awaiting PT/OT eval, transfer to tele    POD 4  HDS- HTN improved, still in a fib- 70s, neuro intact, prevena in place, no BM, fluid balance negative, wt down, 4 L NC.  Plan: Resume amio gtt, continue diuresis, continue bowel protocol. Walking O2 test. IS/ambulate.     POD 5  HDS, a fib 50s, neuro intact, prevena in place, abdomen soft. +BM today, fluid balance negative, wt  down, 2L NC. WBC trending down  Plan: Stop amio gtt , continue rate control with metoprolol, start anticoagulation. Walking O2 test today, remove prevena.  IS/ambulate. Possible d/c tomorrow.     Disposition:  1/8 awaiting PT/OT eval - s/o agreed to take him home o her house

## 2023-01-10 NOTE — PROGRESS NOTES
Patient has been drowsy today all shift, falls asleep during conversation. Discussed the POC for pain control including minimizing Oxycodone if possible, increased ambulation, Tylenol, breathing exercises. Patient states he wants to take less narcotics.   Wants to hold off on gabapentin.   Refuses his shower today but may take it tonight after dinner. All incisions cleaned with soap and water. Prevena removed per orders. Wife and patient educated on wound care.

## 2023-01-10 NOTE — PROGRESS NOTES
Inpatient Anticoagulation Service Note for 1/10/2023      Reason for Anticoagulation: Atrial Fibrillation   [id number] VASc Score: 2  HAS-BLED Score: 2    Hemoglobin Value: (!) 9.9  Hematocrit Value: (!) 31.4  Lab Platelet Value: (!) 474  Target INR: 2.0 to 3.0    INR from last 7 days       Date/Time INR Value    01/10/23 1203 1.02    01/05/23 1300 1.3          Dose from last 7 days       Date/Time Dose (mg)    01/10/23 1322 5          Average Dose (mg):  new start  Significant Interactions: Amiodarone, Antiplatelet Medications  Bridge Therapy: No   Reversal Agent Administered: Not Applicable    Comments: New start warfarin for AF, following bio AVR. Baseline INR sub-therapeutic. CBC low but stable, and no signs of active bleeding. DDI noted above. Cardiac diet ordered and patient with good PO intake (% of meals charted). Baseline INR sub-therapeutic. Will initiate warfarn 5mg po daily with repeat INR tomorrow.    Plan:  warfarin 5mg po daily  Education Material Provided?: No (needs prior to discharge)    Pharmacist suggested discharge dosing: warfarin 5mg po daily with repeat INR within 2-3 days of discharge     Rachana Thomas, PharmD, BCCCP

## 2023-01-10 NOTE — FACE TO FACE
Face to Face Supporting Documentation - Home Health    The encounter with this patient was in whole or in part the primary reason for home health admission.    Date of encounter:   Patient:                    MRN:                       YOB: 2024  Noe Hickey  [id number]  1960     Home health to see patient for:  Skilled Nursing care for assessment, interventions & education    Skilled need for:  Surgical Aftercare AVR and Medication Management Coumadin management, INR draws    Skilled nursing interventions to include:  Comment: INR draws    Homebound status evidenced by:  Needs the assistance of another person in order to leave the home. Leaving home requires a considerable and taxing effort. There is a normal inability to leave the home.    Community Physician to provide follow up care: Pcp Pt States None     Optional Interventions? No      I certify the face to face encounter for this home health care referral meets the CMS requirements and the encounter/clinical assessment with the patient was, in whole, or in part, for the medical condition(s) listed above, which is the primary reason for home health care. Based on my clinical findings: the service(s) are medically necessary, support the need for home health care, and the homebound criteria are met.  I certify that this patient has had a face to face encounter by myself.  Violeta Doll A.P.R.N. - NPI: [id number]

## 2023-01-10 NOTE — CARE PLAN
The patient is Stable - Low risk of patient condition declining or worsening    Shift Goals  Clinical Goals: patient pain well be well managed and he will remain vitally stable  Patient Goals: pain management and sleep  Family Goals: jelly    Progress made toward(s) clinical / shift goals:      Problem: Pain - Standard  Goal: Alleviation of pain or a reduction in pain to the patient’s comfort goal  Outcome: Progressing  Flowsheets  Taken 1/10/2023 0612 by Santosh Grover R.NSascha  Pain Rating Scale (NPRS): 4  Taken 1/8/2023 1958 by Andrew G Reyes, RSaschaNSascha  Non Verbal Scale: Crying  Taken 1/6/2023 1201 by Pastora Acosta RSaschaNSascha  Galindo-Metcalf Scale: 8   Taken 1/6/2023 0435 by Evelia Hooker R.NSascha  Critical-Care Pain Observation Score: 3  Note: Patient pain being controlled with PRN's     Problem: Knowledge Deficit - Standard  Goal: Patient and family/care givers will demonstrate understanding of plan of care, disease process/condition, diagnostic tests and medications  Outcome: Progressing  Note: Patient reports an understanding in plan of care     Problem: Post Op Day 5 CABG/Heart Valve Replacement  Goal: Optimal care of the Post Op CABG/Heart Valve replacement Post Op Day 5  Outcome: Progressing     Problem: Hemodynamics  Goal: Patient's hemodynamics, fluid balance and neurologic status will be stable or improve  Outcome: Progressing       Patient is not progressing towards the following goals:

## 2023-01-10 NOTE — CARE PLAN
The patient is Watcher - Medium risk of patient condition declining or worsening    Shift Goals  Clinical Goals: pain management, hemodynamic stability, BM  Patient Goals: pain management, bm  Family Goals: HUGH    Progress made toward(s) clinical / shift goals:    Problem: Pain - Standard  Goal: Alleviation of pain or a reduction in pain to the patient’s comfort goal  Outcome: Progressing     Problem: Knowledge Deficit - Standard  Goal: Patient and family/care givers will demonstrate understanding of plan of care, disease process/condition, diagnostic tests and medications  Outcome: Progressing     Problem: Post Op Day 4 CABG/Heart Valve Replacement  Goal: Optimal care of the Post Op CABG/Heart Valve replacement Post Op Day 4  Outcome: Progressing  Intervention: Shower daily and clean incisions twice daily with soap and water  Note: completed  Intervention: Up in chair for all meals  Note: completed  Intervention: Ambulate 4 times daily, increasing the distance each time  Note: Ambulated 3/4 during day shift  Intervention: Consider removal of rivera, chest tube and pacer wires if not already done  Note: Previously completed       Patient is not progressing towards the following goals:

## 2023-01-10 NOTE — DISCHARGE PLANNING
Agency/Facility Name: [facility]    Spoke To: Keila   Outcome: Keila called to inform DPA, Pt accepted to service.     1413  Agency/Facility Name: [facility]  Spoke To: Jhon   Outcome: Jhon called to inform Pt accepted and will be delivering within an hour.

## 2023-01-10 NOTE — DISCHARGE PLANNING
Case Management Discharge Planning    Admission Date: 1/5/2023  GMLOS: 8.9  ALOS: 5    6-Clicks ADL Score: 19  6-Clicks Mobility Score: 16  PT and/or OT Eval ordered: Yes  Post-acute Referrals Ordered: Yes  Post-acute Choice Obtained: Yes  Has referral(s) been sent to post-acute provider:  Yes      Anticipated Discharge Dispo: Discharge Disposition: D/T to home under HHA care in anticipation of covered skilled care (06)  [address] [facility] [address] [place]  [address]  Discharge Contact Phone Number: [phone]    DME Needed: Home oxygen, FWW    Action(s) Taken: RN CM spoke with patient and spouse, Do at bedside.  Pt sitting up in chair.  Pt receiving oxygen 2 LNC.  Pt sleepy.  Pt and spouse responding to questions.  Pt will be living with his spouse at [address]st [address] apartment) [place]  [address].  Do will be staying with patient 24/7 until 1-17-23.  She will be starting a new job on 1-18-23.  RN CM Explained home health, home oxygen, FWW.  Both agreeable.  Consent obtained to send referrals to in network providers.  Choice forms faxed to DPA.    Medically Clear: No    Next Steps: Follow up on referrals.    Barriers to Discharge: home health acceptance, home oxygen and fww delivery to bedside, medical clearance    Care Transition Team Assessment    Information Source  Orientation Level: Oriented X4  Information Given By: Patient, Spouse  Who is responsible for making decisions for patient? : Patient    Readmission Evaluation  Is this a readmission?: No    Elopement Risk  Legal Hold: No  Ambulatory or Self Mobile in Wheelchair: No-Not an Elopement Risk  Disoriented: No  Psychiatric Symptoms: None  History of Wandering: No  Elopement this Admit: No  Vocalizing Wanting to Leave: No  Displays Behaviors, Body Language Wanting to Leave: No-Not at Risk for Elopement  Elopement Risk: Not at Risk for Elopement    Interdisciplinary Discharge Planning  Lives with - Patient's Self Care  Capacity: Spouse  Patient or legal guardian wants to designate a caregiver: No  Housing / Facility: [address] [facility] [address]  Prior Services: Home-Independent    Discharge Preparedness  What is your plan after discharge?: Home with help, Home health care  What are your discharge supports?: Spouse  Prior Functional Level: Ambulatory, Drives Self, Independent with Activities of Daily Living, Independent with Medication Management  Difficulity with ADLs: Bathing, Dressing, Toileting, Walking  Difficulity with IADLs: Cooking, Driving, Laundry, Managing medication, Shopping    Functional Assesment  Prior Functional Level: Ambulatory, Drives Self, Independent with Activities of Daily Living, Independent with Medication Management    Finances  Financial Barriers to Discharge: No  Prescription Coverage: Yes    Vision / Hearing Impairment  Vision Impairment : No  Hearing Impairment : No         Advance Directive  Advance Directive?: None    Domestic Abuse  Have you ever been the victim of abuse or violence?: No  Physical Abuse or Sexual Abuse: No  Verbal Abuse or Emotional Abuse: No  Possible Abuse/Neglect Reported to:: Not Applicable         Discharge Risks or Barriers  Discharge risks or barriers?: No PCP    Anticipated Discharge Information  Discharge Disposition: D/T to home under A care in anticipation of covered skilled care (06)  Discharge Address: [address] [place]  [address]  Discharge Contact Phone Number: [phone]

## 2023-01-10 NOTE — FACE TO FACE
"Face to Face Note  -  Durable Medical Equipment    EFRAÍN Sneed - NPI: [id number]  I certify that this patient is under my care and that they have had a durable medical equipment(DME)face to face encounter by myself that meets the physician DME face-to-face encounter requirements with this patient on:    Date of encounter:   Patient:                    MRN:                       YOB: 2024  Noe Hickey  [id number]  1960     The encounter with the patient was in whole, or in part, for the following medical condition, which is the primary reason for durable medical equipment:  Post-Op Surgery    I certify that, based on my findings, the following durable medical equipment is medically necessary:  Oxygen   HOME O2 Saturation Measurements:(Values must be present for Home Oxygen orders)  Room air sat at rest: 80  Room air sat with amb: 95  With liters of O2: 2, O2 sat at rest with O2: 95  With Liters of O2: 2, O2 sat with amb with O2 : 98  Is the patient mobile?: Yes  If patient feels more short of breath, they can go up to 6 liters per minute and contact healthcare provider.    Supporting Symptoms: The patient requires supplemental oxygen, as the following interventions have been tried with limited or no improvement: \"Bronchodilators and/or steroid inhalers, \"Positive expiratory pressure therapies, \"Ambulation with oximetry, and \"Incentive spirometry   and Walkers.        ------------------------------------------------------------------------------------------------------------------    Face to Face Supporting Documentation - Home Health    The encounter with this patient was in whole or in part the primary reason for home health admission.    Date of encounter:   Patient:                    MRN:                       YOB: 2024  Noe Hickey  [id number]  1960     Home health to see patient for:  Skilled Nursing care for assessment, " interventions & education, Home health aide, Physical Therapy evaluation and treatment, and Occupational therapy evaluation and treatment    Skilled need for:  Surgical Aftercare AVR    Skilled nursing interventions to include:  Wound Care and Comment: INR draws, coumadin management    Homebound evidenced status by:  Needs the assistance of another person in order to leave the home. Leaving home must require a considerable and taxing effort. There must exist a normal inability to leave the home.    Community Physician to provide follow up care: Pcp Pt States None     Optional Interventions    Wound information & treatment:    Home Infusion Therapy orders:    Line/Drain/Airway:    I certify the face to face encounter for this home care referral meets the CMS requirements and the encounter/clinical assessment with the patient was, in whole, or in part, for the medical condition(s) listed above, which is the primary reason for home health care. Based on my clinical findings: the service(s) are medically necessary, support the need for home health care, and the homebound criteria are met.  I certify that this patient has had a face to face encounter by myself.  CHELY SneedR.N. - NPI: [id number]    *Debility, frailty and advanced age in the absence of an acute deterioration or exacerbation of a condition do not qualify a patient for home health.

## 2023-01-11 VITALS
DIASTOLIC BLOOD PRESSURE: 74 MMHG | TEMPERATURE: 98.1 F | HEIGHT: 69 IN | OXYGEN SATURATION: 96 % | BODY MASS INDEX: 30.43 KG/M2 | RESPIRATION RATE: 18 BRPM | WEIGHT: 205.47 LBS | SYSTOLIC BLOOD PRESSURE: 115 MMHG | HEART RATE: 64 BPM

## 2023-01-11 PROBLEM — J96.01 ACUTE RESPIRATORY FAILURE WITH HYPOXIA AND HYPERCAPNIA (HCC): Status: RESOLVED | Noted: 2023-01-06 | Resolved: 2023-01-11

## 2023-01-11 PROBLEM — I46.9 CARDIAC ARREST (HCC): Status: RESOLVED | Noted: 2023-01-06 | Resolved: 2023-01-11

## 2023-01-11 PROBLEM — Z99.11 ENCOUNTER FOR WEANING FROM VENTILATOR (HCC): Status: RESOLVED | Noted: 2023-01-05 | Resolved: 2023-01-11

## 2023-01-11 PROBLEM — D72.823 LEUKEMOID REACTION: Status: RESOLVED | Noted: 2023-01-06 | Resolved: 2023-01-11

## 2023-01-11 PROBLEM — I35.0 NONRHEUMATIC AORTIC VALVE STENOSIS: Status: RESOLVED | Noted: 2023-01-05 | Resolved: 2023-01-11

## 2023-01-11 PROBLEM — N17.0 ACUTE RENAL FAILURE WITH TUBULAR NECROSIS (HCC): Status: RESOLVED | Noted: 2023-01-06 | Resolved: 2023-01-11

## 2023-01-11 PROBLEM — I50.810 RVF (RIGHT VENTRICULAR FAILURE) (HCC): Status: RESOLVED | Noted: 2023-01-06 | Resolved: 2023-01-11

## 2023-01-11 PROBLEM — I71.21 ANEURYSM OF ASCENDING AORTA WITHOUT RUPTURE (HCC): Status: RESOLVED | Noted: 2023-01-05 | Resolved: 2023-01-11

## 2023-01-11 PROBLEM — J96.02 ACUTE RESPIRATORY FAILURE WITH HYPOXIA AND HYPERCAPNIA (HCC): Status: RESOLVED | Noted: 2023-01-06 | Resolved: 2023-01-11

## 2023-01-11 PROBLEM — E87.5 HYPERKALEMIA: Status: RESOLVED | Noted: 2023-01-05 | Resolved: 2023-01-11

## 2023-01-11 PROBLEM — E83.51 HYPOCALCEMIA: Status: RESOLVED | Noted: 2023-01-07 | Resolved: 2023-01-11

## 2023-01-11 PROBLEM — E87.1 HYPONATREMIA: Status: RESOLVED | Noted: 2023-01-08 | Resolved: 2023-01-11

## 2023-01-11 PROBLEM — E87.20 METABOLIC ACIDOSIS: Status: RESOLVED | Noted: 2023-01-06 | Resolved: 2023-01-11

## 2023-01-11 LAB
ANION GAP SERPL CALC-SCNC: 10 MMOL/L (ref 7–16)
BUN SERPL-MCNC: 29 MG/DL (ref 8–22)
CA-I SERPL-SCNC: 1 MMOL/L (ref 1.1–1.3)
CALCIUM SERPL-MCNC: 8.8 MG/DL (ref 8.5–10.5)
CHLORIDE SERPL-SCNC: 90 MMOL/L (ref 96–112)
CO2 SERPL-SCNC: 35 MMOL/L (ref 20–33)
CREAT SERPL-MCNC: 1.08 MG/DL (ref 0.5–1.4)
ERYTHROCYTE [DISTWIDTH] IN BLOOD BY AUTOMATED COUNT: 46.5 FL (ref 35.9–50)
GFR SERPLBLD CREATININE-BSD FMLA CKD-EPI: 77 ML/MIN/1.73 M 2
GLUCOSE SERPL-MCNC: 107 MG/DL (ref 65–99)
HCT VFR BLD AUTO: 32.3 % (ref 42–52)
HGB BLD-MCNC: 10.1 G/DL (ref 14–18)
INR PPP: 1.04 (ref 0.87–1.13)
MAGNESIUM SERPL-MCNC: 2.2 MG/DL (ref 1.5–2.5)
MCH RBC QN AUTO: 22.4 PG (ref 27–33)
MCHC RBC AUTO-ENTMCNC: 31.3 G/DL (ref 33.7–35.3)
MCV RBC AUTO: 71.8 FL (ref 81.4–97.8)
PLATELET # BLD AUTO: 548 K/UL (ref 164–446)
PMV BLD AUTO: 9.9 FL (ref 9–12.9)
POTASSIUM SERPL-SCNC: 3.8 MMOL/L (ref 3.6–5.5)
PROTHROMBIN TIME: 13.5 SEC (ref 12–14.6)
RBC # BLD AUTO: 4.5 M/UL (ref 4.7–6.1)
SODIUM SERPL-SCNC: 135 MMOL/L (ref 135–145)
WBC # BLD AUTO: 19.8 K/UL (ref 4.8–10.8)

## 2023-01-11 PROCEDURE — A9270 NON-COVERED ITEM OR SERVICE: HCPCS | Performed by: NURSE PRACTITIONER

## 2023-01-11 PROCEDURE — 83735 ASSAY OF MAGNESIUM: CPT

## 2023-01-11 PROCEDURE — 700111 HCHG RX REV CODE 636 W/ 250 OVERRIDE (IP): Performed by: NURSE PRACTITIONER

## 2023-01-11 PROCEDURE — 700102 HCHG RX REV CODE 250 W/ 637 OVERRIDE(OP): Performed by: NURSE PRACTITIONER

## 2023-01-11 PROCEDURE — 85610 PROTHROMBIN TIME: CPT

## 2023-01-11 PROCEDURE — 82330 ASSAY OF CALCIUM: CPT

## 2023-01-11 PROCEDURE — 85027 COMPLETE CBC AUTOMATED: CPT

## 2023-01-11 PROCEDURE — 80048 BASIC METABOLIC PNL TOTAL CA: CPT

## 2023-01-11 PROCEDURE — 97116 GAIT TRAINING THERAPY: CPT

## 2023-01-11 RX ORDER — AMIODARONE HYDROCHLORIDE 400 MG/1
400 TABLET ORAL 2 TIMES DAILY
Qty: 120 TABLET | Refills: 0 | Status: ON HOLD | OUTPATIENT
Start: 2023-01-11 | End: 2023-01-23

## 2023-01-11 RX ORDER — POTASSIUM CHLORIDE 20 MEQ/1
20 TABLET, EXTENDED RELEASE ORAL DAILY
Qty: 30 TABLET | Refills: 0 | Status: SHIPPED | OUTPATIENT
Start: 2023-01-11 | End: 2023-04-03

## 2023-01-11 RX ORDER — AMLODIPINE BESYLATE 10 MG/1
10 TABLET ORAL DAILY
Qty: 30 TABLET | Refills: 2 | Status: SHIPPED | OUTPATIENT
Start: 2023-01-12 | End: 2023-04-03

## 2023-01-11 RX ORDER — ASPIRIN 81 MG/1
81 TABLET ORAL DAILY
Qty: 30 TABLET | Refills: 2 | Status: SHIPPED | OUTPATIENT
Start: 2023-01-12 | End: 2023-04-03

## 2023-01-11 RX ORDER — WARFARIN SODIUM 5 MG/1
5 TABLET ORAL DAILY
Qty: 30 TABLET | Refills: 2 | Status: ON HOLD | OUTPATIENT
Start: 2023-01-11 | End: 2023-01-23 | Stop reason: SDUPTHER

## 2023-01-11 RX ORDER — ACETAMINOPHEN 500 MG
1000 TABLET ORAL EVERY 6 HOURS PRN
Qty: 30 TABLET | Refills: 0 | COMMUNITY
Start: 2023-01-11 | End: 2023-04-03

## 2023-01-11 RX ORDER — FUROSEMIDE 40 MG/1
40 TABLET ORAL DAILY
Qty: 30 TABLET | Refills: 0 | Status: SHIPPED | OUTPATIENT
Start: 2023-01-11 | End: 2023-04-03

## 2023-01-11 RX ORDER — OXYCODONE HYDROCHLORIDE 10 MG/1
10 TABLET ORAL EVERY 6 HOURS PRN
Qty: 28 TABLET | Refills: 0 | Status: SHIPPED | OUTPATIENT
Start: 2023-01-11 | End: 2023-01-18

## 2023-01-11 RX ORDER — ONDANSETRON 4 MG/1
4 TABLET, FILM COATED ORAL EVERY 6 HOURS PRN
Qty: 28 EACH | Refills: 0 | Status: SHIPPED | OUTPATIENT
Start: 2023-01-11 | End: 2023-01-18

## 2023-01-11 RX ORDER — GABAPENTIN 300 MG/1
300 CAPSULE ORAL 3 TIMES DAILY
Qty: 90 CAPSULE | Refills: 0 | Status: SHIPPED | OUTPATIENT
Start: 2023-01-11 | End: 2023-01-20

## 2023-01-11 RX ADMIN — POLYETHYLENE GLYCOL 3350 1 PACKET: 17 POWDER, FOR SOLUTION ORAL at 05:11

## 2023-01-11 RX ADMIN — AMLODIPINE BESYLATE 10 MG: 10 TABLET ORAL at 05:12

## 2023-01-11 RX ADMIN — OXYCODONE HYDROCHLORIDE 10 MG: 10 TABLET ORAL at 07:48

## 2023-01-11 RX ADMIN — HYDROMORPHONE HYDROCHLORIDE 1 MG: 1 INJECTION, SOLUTION INTRAMUSCULAR; INTRAVENOUS; SUBCUTANEOUS at 05:08

## 2023-01-11 RX ADMIN — OXYCODONE HYDROCHLORIDE 10 MG: 10 TABLET ORAL at 04:00

## 2023-01-11 RX ADMIN — FUROSEMIDE 40 MG: 10 INJECTION, SOLUTION INTRAMUSCULAR; INTRAVENOUS at 05:13

## 2023-01-11 RX ADMIN — METOPROLOL TARTRATE 25 MG: 25 TABLET, FILM COATED ORAL at 05:15

## 2023-01-11 RX ADMIN — MAGNESIUM HYDROXIDE 30 ML: 400 SUSPENSION ORAL at 05:11

## 2023-01-11 RX ADMIN — DOCUSATE SODIUM 50 MG AND SENNOSIDES 8.6 MG 2 TABLET: 8.6; 5 TABLET, FILM COATED ORAL at 05:12

## 2023-01-11 RX ADMIN — ONDANSETRON 8 MG: 2 INJECTION INTRAMUSCULAR; INTRAVENOUS at 07:48

## 2023-01-11 RX ADMIN — GABAPENTIN 300 MG: 300 CAPSULE ORAL at 05:12

## 2023-01-11 RX ADMIN — OMEPRAZOLE 20 MG: 20 CAPSULE, DELAYED RELEASE ORAL at 05:12

## 2023-01-11 RX ADMIN — ASPIRIN 81 MG: 81 TABLET, COATED ORAL at 05:12

## 2023-01-11 RX ADMIN — HYDROMORPHONE HYDROCHLORIDE 1 MG: 1 INJECTION, SOLUTION INTRAMUSCULAR; INTRAVENOUS; SUBCUTANEOUS at 00:25

## 2023-01-11 RX ADMIN — AMIODARONE HYDROCHLORIDE 400 MG: 200 TABLET ORAL at 05:11

## 2023-01-11 RX ADMIN — OXYCODONE HYDROCHLORIDE 10 MG: 10 TABLET ORAL at 11:53

## 2023-01-11 ASSESSMENT — COGNITIVE AND FUNCTIONAL STATUS - GENERAL
STANDING UP FROM CHAIR USING ARMS: A LITTLE
WALKING IN HOSPITAL ROOM: A LITTLE
CLIMB 3 TO 5 STEPS WITH RAILING: A LITTLE
MOBILITY SCORE: 18
MOVING FROM LYING ON BACK TO SITTING ON SIDE OF FLAT BED: A LITTLE
TURNING FROM BACK TO SIDE WHILE IN FLAT BAD: A LITTLE
SUGGESTED CMS G CODE MODIFIER MOBILITY: CK
MOVING TO AND FROM BED TO CHAIR: A LITTLE

## 2023-01-11 ASSESSMENT — PAIN SCALES - WONG BAKER: WONGBAKER_NUMERICALRESPONSE: DOESN'T HURT AT ALL

## 2023-01-11 ASSESSMENT — PAIN DESCRIPTION - PAIN TYPE
TYPE: SURGICAL PAIN

## 2023-01-11 ASSESSMENT — GAIT ASSESSMENTS
GAIT LEVEL OF ASSIST: SUPERVISED
DISTANCE (FEET): 300
DEVIATION: DECREASED TOE OFF;DECREASED HEEL STRIKE

## 2023-01-11 ASSESSMENT — FIBROSIS 4 INDEX: FIB4 SCORE: 1.42

## 2023-01-11 NOTE — CARE PLAN
"The patient is Watcher - Medium risk of patient condition declining or worsening    Shift Goals  Clinical Goals: pain control, monitor I&O's  Patient Goals: pain control  Family Goals: HUGH    Progress made toward(s) clinical / shift goals:      Problem: Post Op Day 5 CABG/Heart Valve Replacement  Goal: Optimal care of the Post Op CABG/Heart Valve replacement Post Op Day 5  Outcome: Progressing  Intervention: Daily weights in the morning  Note: Weight taken at 0400  Intervention: Shower daily and clean incisions twice daily with soap and water  Note: Pt cleaned incision early this AM  Intervention: Ambulate 4 times daily, increasing the distance each time  Note: Pt completed first walk of the day  Intervention: IS q 1 hour while awake and record best IS volume  Note: Pt is self motivated to use IS  Intervention: Complete \"Home O2 Assessment' in vitals flowsheets if unable to wean off O2  Note: Home O2 eval completed, home concentrator at bedside  Intervention: Consider removal of rivera, chest tube and pacer wires if not already done  Note: Chest tubes, pacer wires, and rivera removed  Intervention: Discharge Education  Note: Ongoing education provided  Intervention: Add special instructions for cardiac surgery discharge instructions to after visit summary and review with patient  Note: Pt expected to d/c today, instructions will be provided and reviewed with pt     Problem: Pain - Standard  Goal: Alleviation of pain or a reduction in pain to the patient’s comfort goal  Outcome: Progressing  Note: Pt receiving PRN medications and education provided on non-pharmacological comfort measures such as using their heart pillow       Patient is not progressing towards the following goals:      "

## 2023-01-11 NOTE — PROGRESS NOTES
4 Eyes Skin Assessment Completed by ALLEN Overton and ALEX Lake.    Head WDL  Ears WDL  Nose WDL  Mouth WDL  Neck WDL  Breast/Chest Redness and Incision, sternal incision clean/dry/intact   Shoulder Blades WDL  Spine WDL  (R) Arm/Elbow/Hand WDL  (L) Arm/Elbow/Hand WDL  Abdomen Redness and Incision, X2 mediastinal sites clean/dry/intact  Groin WDL  Scrotum/Coccyx/Buttocks WDL  (R) Leg WDL  (L) Leg WDL  (R) Heel/Foot/Toe WDL  (L) Heel/Foot/Toe WDL          Devices In Places Tele Box, Blood Pressure Cuff, Pulse Ox, Central Line, Nasal Cannula, and SUKI's      Interventions In Place Gray Ear Foams, Pillows, and Pressure Redistribution Mattress    Possible Skin Injury No    Pictures Uploaded Into Epic Yes  Wound Consult Placed N/A  RN Wound Prevention Protocol Ordered No

## 2023-01-11 NOTE — DISCHARGE INSTRUCTIONS
DISCHARGE INSTRUCTIONS DISCUSSED WITH THE PATIENT:       1. NO driving for 4 weeks after surgery. You may ride as a passenger.  2. NO lifting of any item over 10 lbs (e.g. gallon of milk) for 6 weeks after surgery.  3. DO walk as much as possible! Walk a minimum of once a day. Depending on your fatigue and comfort level, you may walk as much as you wish. There is no maximum.  4. Other physical activities (sex, housework, gardening, etc.) are OK after 4 weeks   5. Continue using incentive spirometer for 2 weeks, especially if going home on oxygen.     Incision Care:  1. SHOWER ONLY - no baths. Clean incision daily with plain Ivory ® soap or any other dye or perfume free soap. Then pat incision dry with clean towel. Avoid creams or lotions on the incision(s).  a. If there is any increase in redness or swelling, or separation of the incision line, or thick drainage* from any of the incisions, call right away  * Clear, thin drainage is not abnormal especially from the leg incision and/or                         chest tube sites.  2. Continue to wear your SUKI Stockings for 4 weeks. You may take off the stockings when in bed or when the legs are elevated.     Patient instructed to call [facility] at [phone]  if any increased shortness of breath, uncontrolled pain, weight gain greater than 2 pounds in 1 day, SBP >140, HR <60 or redness swelling or drainage of incisions.

## 2023-01-11 NOTE — CARE PLAN
The patient is Stable - Low risk of patient condition declining or worsening    Shift Goals  Clinical Goals: Pain management  Patient Goals: pain control  Family Goals: HUGH      Problem: Knowledge Deficit - Standard  Goal: Patient and family/care givers will demonstrate understanding of plan of care, disease process/condition, diagnostic tests and medications  Outcome: Progressing       Problem: Pain - Standard  Goal: Alleviation of pain or a reduction in pain to the patient’s comfort goal  Outcome: Not Met

## 2023-01-11 NOTE — PROGRESS NOTES
Patient brought to room 723-1 with belongings. Report received and assumed patient care. Pt A&O x 4 and on 3L NC, saturating 92 %. No signs of distress noted at this time. Pt placed on tele box and monitors were notified of arrival. Pt updated on plan of care for the day and has call light within reach. Fall precautions are in place.

## 2023-01-11 NOTE — CARE PLAN
"The patient is Watcher - Medium risk of patient condition declining or worsening    Shift Goals  Clinical Goals: better pain control  Patient Goals: pain management and sleep  Family Goals: jelly    Progress made toward(s) clinical / shift goals:    Problem: Pain - Standard  Goal: Alleviation of pain or a reduction in pain to the patient’s comfort goal  Outcome: Progressing  Note: Pain assessed PRN and per unit protocol. Non-pharmacological measures utilized and medication given PRN per MAR.      Problem: Post Op Day 5 CABG/Heart Valve Replacement  Goal: Optimal care of the Post Op CABG/Heart Valve replacement Post Op Day 5  Outcome: Progressing  Intervention: Daily weights in the morning  Note: Done in am    Intervention: Shower daily and clean incisions twice daily with soap and water  Note: Refuses shower. Incisions cleaned with soap and water  Intervention: Up in chair for all meals  Note: Up to chair for all meals  Intervention: Ambulate 4 times daily, increasing the distance each time  Note: Ambulated frequently  Intervention: IS q 1 hour while awake and record best IS volume  Note: 1000 is best  Intervention: Complete \"Home O2 Assessment' in vitals flowsheets if unable to wean off O2  Note: completed  Intervention: Consider removal of rivera, chest tube and pacer wires if not already done  Note: All removed  Intervention: Discharge Education  Note: ongoing  Intervention: Add special instructions for cardiac surgery discharge instructions to after visit summary and review with patient  Note: Ongoing         Patient is not progressing towards the following goals:      "

## 2023-01-11 NOTE — PROGRESS NOTES
IV and tele monitor removed. Discharge instructions reviewed and signed. All questions answered. Pt. Awaiting arrival of ride.

## 2023-01-11 NOTE — DISCHARGE PLANNING
Patient discharging home with Radha JACKSON and Home O2 with [facility]. (O2 delivered to patient room.

## 2023-01-11 NOTE — THERAPY
Physical Therapy   Daily Treatment     Patient Name: Noe Hickey  Age:  62 y.o., Sex:  male  Medical Record #: [id number]  Today's Date: 1/11/2023     Precautions  Precautions: Cardiac Precautions (See Comments);Sternal Precautions (See Comments)    Assessment    Pt seen for follow up PT tx. Pt able to demonstrate 300ft of gait with no AD and managing O2 tank. Education provided on line management for O2 in order to decrease fall risk. PT will cont if pt remains in acute care setting.     Plan    Physical Therapy Treatment Plan  Physical Therapy Treatment Plan: Continue Current Treatment Plan    DC Equipment Recommendations: None  Discharge Recommendations: Other - (Outpatient cardiac rehab)         01/11/23 1150   Vitals   O2 (LPM) 3   O2 Delivery Device Silicone Nasal Cannula   Cognition    Level of Consciousness Alert   Comments mild deficits with safety awareness noted when managing lines   Other Treatments   Other Treatments Provided education provided on O2 line management   Balance   Sitting Balance (Static) Good   Sitting Balance (Dynamic) Good   Standing Balance (Static) Fair +   Standing Balance (Dynamic) Fair +   Weight Shift Sitting Good   Weight Shift Standing Good   Comments no AD   Bed Mobility    Comments in chair pre and post, reports no issues with bed mobility   Gait Analysis   Gait Level Of Assist Supervised   Assistive Device None   Distance (Feet) 300   # of Times Distance was Traveled 1   Deviation Decreased Toe Off;Decreased Heel Strike   Weight Bearing Status no restrictions   Skilled Intervention Verbal Cuing   Comments pt reports only 1 step, declined attempt   Functional Mobility   Sit to Stand Supervised   Bed, Chair, Wheelchair Transfer Supervised   Toilet Transfers Supervised   Transfer Method Stand Step   Mobility in room and hallway with no AD   Skilled Intervention Verbal Cuing   Short Term Goals    Short Term Goal # 1 pt will perform supine <> sit without bed features with  SPV in 6 visits to get in/out of bed at home   Goal Outcome # 1 Other (see comments)  (not observed, pt reports no problem with bed mobility)   Short Term Goal # 2 pt will ambulate 150ft with FWW and SPV in 6 visits to access home   Goal Outcome # 2 Goal met   Short Term Goal # 3 pt will negotiate 3 steps with SPV in 6 visits to access home   Goal Outcome # 3 Other (see comments)  (pt declined)   Anticipated Discharge Equipment and Recommendations   DC Equipment Recommendations None   Discharge Recommendations Other -  (Outpatient cardiac rehab)

## 2023-01-11 NOTE — PROGRESS NOTES
Received report from NOC shift RN. Patient is A&Ox4, VSS, no signs of distress. Patient up to chair for breakfast. All questions and concerns answered, call light in reach, will continue to monitor.

## 2023-01-11 NOTE — DISCHARGE SUMMARY
DISCHARGE SUMMARY    ADMISSION DATE: 1/5/2023    DISCHARGE DATE: 1/11/23    ADMITTING DIAGNOSES: severe symptomatic aortic stenosis due to bicuspid valve, ascending aortic aneurysm, HTN, obesity     DISCHARGE DIAGNOSES: severe symptomatic aortic stenosis due to bicuspid valve, ascending aortic aneurysm, HTN, obesity     PROCEDURES PERFORMED: Dr Goyal 1/5/22  AORTIC VALVE REPLACEMENT 27mm Inspiris tissue valve, ASCENDING AORTIC ANEURYSM REPAIR with 32mm Hemashield graft AND TRANSESOPHAGEAL ECHOCARDIOGRAM.     HISTORY OF PRESENT ILLNESS:  The patient is a 62 y.o. male with a past medical history of GERD, migraines, 40-pack-year tobacco use, ascending aortic aneurysm 4.7 cm, aortic stenosis, a flutter with RVR. Complains of shortness of breath; coming on with shorter and shorter distances; now he cannot walk 10 steps without feeling dyspnea. Patient denies chest pain, syncope, edema and PND.  He still smokes 5-6 cigarettes per day. States he is motivated to quit.    HOSPITAL COURSE:   POD 1 Crticially ill, on bipap.  On epi-wean off prefer levo, SR currently.  Ventricular arrhythmia this am-torsades, electrolytes repleted, defibrillation x 1, sinus restored.  Neuro intact.  Wounds CDI.  Moderate output from chest tubes.  Hgb 11.5. Creatinine increasing 2.02 UO 500ml overnight.  K 5.9.  Additional lasix given this AM.  Ionized Ca 0.8-replete.  Mg 2.7 Echo shows preservation of EF and LV function.  Plan: Trend serial electrolytes and replete.  Lasix, trend creatinine, UO- consult neph if no improvement or decreasing UO. Wean bipap as tolerated.  Transition from epi to norepi for vasopressor support to avoid arrhythmias.  Avoid fluids with potassium.       POD 2 HDS- no gtts, SR- no further arrhythmias, nasal cannula 6lnc- CXR improved, Keep mediastinal tubes, Cr improved- occ hyperkalemia- diuresing well, mag- normal, ionized calcium- replace calcium, nausea- ok to use zofran, pain- add lidocaine patch/neurontin,  keep rivera for strict I&O     POD 3 HTN- inc norvasc, Afib- on amio gtt/PO, NC 2l, Cont diuresis, CR/K+- stable, rivera removed pt patient- able to void no hematuria, pain- inc neurontin- Oxy Q 3-4 hours- d/w pt weaning off dilaudid due to possible d/c home 1-2 days and he is in agreement, awaiting PT/OT eval, transfer to [facility]     POD 4  HDS- HTN improved, still in a fib- 70s, neuro intact, prevena in place, no BM, fluid balance negative, wt down, 4 L NC.  Plan: Resume amio gtt, continue diuresis, continue bowel protocol. Walking O2 test. IS/ambulate.      POD 5  HDS, a fib 50s, neuro intact, prevena in place, abdomen soft. +BM today, fluid balance negative, wt down, 2L NC. WBC trending down  Plan: Stop amio gtt , continue rate control with metoprolol, start anticoagulation. Walking O2 test today, remove prevena.  IS/ambulate. Possible d/c tomorrow.     POD 6 HDS. Afib 70s. Neuro intact.  Abdomen soft.  Weight down fluid negative.  Cr stable. Hgb stable.  Plan: Home with home o2/HH.      TELEMETRY:1/6 SR now.  Episodes of bradycardia/ ventricular arrhythmia-torsades this AM  1/7 SR  1/8 Afib rates 70's  1/9 A fib  1/10 A fib 50s  1/11 Afib 70s    RECENT LABS:     Lab Results   Component Value Date/Time    SODIUM 135 01/11/2023 03:02 AM    POTASSIUM 3.8 01/11/2023 03:02 AM    CHLORIDE 90 (L) 01/11/2023 03:02 AM    CO2 35 (H) 01/11/2023 03:02 AM    GLUCOSE 107 (H) 01/11/2023 03:02 AM    BUN 29 (H) 01/11/2023 03:02 AM    CREATININE 1.08 01/11/2023 03:02 AM    BUNCREATRAT 16.4 12/07/2022 04:44 AM      Lab Results   Component Value Date/Time    WBC 19.8 (H) 01/11/2023 03:02 AM    RBC 4.50 (L) 01/11/2023 03:02 AM    HEMOGLOBIN 10.1 (L) 01/11/2023 03:02 AM    HEMATOCRIT 32.3 (L) 01/11/2023 03:02 AM    MCV 71.8 (L) 01/11/2023 03:02 AM    MCH 22.4 (L) 01/11/2023 03:02 AM    MCHC 31.3 (L) 01/11/2023 03:02 AM    MPV 9.9 01/11/2023 03:02 AM    NEUTSPOLYS 87.40 (H) 01/06/2023 05:56 AM    LYMPHOCYTES 3.40 (L) 01/06/2023 05:56 AM     MONOCYTES 5.00 01/06/2023 05:56 AM    EOSINOPHILS 0.00 01/06/2023 05:56 AM    BASOPHILS 0.00 01/06/2023 05:56 AM      Lab Results   Component Value Date/Time    PROTHROMBTM 13.5 01/11/2023 03:02 AM    INR 1.04 01/11/2023 03:02 AM        Fluids:    Intake/Output Summary (Last 24 hours) at 1/11/2023 1036  Last data filed at 1/11/2023 1022  Gross per 24 hour   Intake 2160 ml   Output 2500 ml   Net -340 ml     Admit weight: Weight: 97.3 kg (214 lb 8.1 oz)  Current weight: Weight: 93.2 kg (205 lb 7.5 oz) (01/11/23 0400)    ALLERGIES:     Morphine    EJECTION FRACTION:  55%    CARDIAC MEDICATIONS:    ASA - Yes     Plavix - No; contraindicated because of Bleeding     Post-operative Beta Blockers - Yes     Ace/ARB- No; contraindicated because of Normal EF     Statin - No; contraindicated because of No CAD     Aldactone- No; contraindicated because of Normal EF    DISCHARGE MEDICATIONS:      Medication List        START taking these medications        Instructions   acetaminophen 500 MG Tabs  Commonly known as: TYLENOL   Take 2 Tablets by mouth every 6 hours as needed for Moderate Pain or Mild Pain.  Dose: 1,000 mg     amiodarone 400 MG tablet  Commonly known as: PACERONE   Doctor's comments: 400 mg po BID x 7 days then 400 mg po daily x 7 days then 200 mg po daily thereafter.  Take 1 Tablet by mouth 2 times a day.  Dose: 400 mg     amLODIPine 10 MG Tabs  Start taking on: January 12, 2023  Commonly known as: NORVASC   Take 1 Tablet by mouth every day.  Dose: 10 mg     aspirin 81 MG EC tablet  Start taking on: January 12, 2023   Take 1 Tablet by mouth every day.  Dose: 81 mg     furosemide 40 MG Tabs  Commonly known as: LASIX   Take 1 Tablet by mouth every day.  Dose: 40 mg     gabapentin 300 MG Caps  Commonly known as: NEURONTIN   Take 1 Capsule by mouth 3 times a day.  Dose: 300 mg     metoprolol tartrate 25 MG Tabs  Commonly known as: LOPRESSOR   Take 1 Tablet by mouth 2 times a day.  Dose: 25 mg     ondansetron 4 MG  Tabs tablet  Commonly known as: Zofran   Take 1 Tablet by mouth every 6 hours as needed for Nausea/Vomiting for up to 7 days.  Dose: 4 mg     oxyCODONE immediate release 10 MG immediate release tablet  Commonly known as: ROXICODONE   Take 1 Tablet by mouth every 6 hours as needed for Severe Pain for up to 7 days.  Dose: 10 mg     potassium chloride SA 20 MEQ Tbcr  Commonly known as: Kdur   Take 1 Tablet by mouth every day.  Dose: 20 mEq     warfarin 5 MG Tabs  Commonly known as: COUMADIN   Take 1 Tablet by mouth every day at 6 PM.  Dose: 5 mg            CONTINUE taking these medications        Instructions   pantoprazole 40 MG Tbec  Commonly known as: PROTONIX   Take 40 mg by mouth every day.  Dose: 40 mg     senna-docusate 8.6-50 MG Tabs  Commonly known as: PERICOLACE or SENOKOT S   Take 1 Tablet by mouth every day.  Dose: 1 Tablet            STOP taking these medications      asa/apap/caffeine 250-250-65 MG Tabs  Commonly known as: EXCEDRIN              NARCOTIC PAIN MEDICATIONS:   In prescribing controlled substances to this patient, I certify that I have obtained and reviewed their medical history. I have also made a good brian effort to obtain applicable records from other providers who have treated the patient .    I have conducted a physical exam and documented it. I have reviewed the patient's prescription history as maintained by the [place] Prescription Monitoring Program.     I have assessed the patient’s risk for abuse, dependency, and addiction using the validated Opioid Risk Tool.    Given the above, I believe the benefits of controlled substance therapy outweigh the risks. The reasons for prescribing controlled substances include non-narcotic, oral analgesic alternatives have been inadequate for pain control. Accordingly, I have discussed the risk and benefits, treatment plan, and alternative therapies with the patient.     Pt understands this prescription is a controlled substance which is potentially  habit-forming and its use is regulated by the [facility]. It must be submitted to the pharmacy within 5 days of the date written and can not be called in or faxed to the pharmacy. Refills are subject to terms of a medicine agreement. Any refill requires a new prescription that must be obtained from this office during regular office hours Monday through Thursday 7 am to 4 pm. We ask for 72 hours notice to get an appointment for a narcotic pain medication refill. This medicine can cause nausea, significant constipation, sedation, confusion.     DIET:   Cardiac diet    DISCHARGE INSTRUCTIONS DISCUSSED WITH THE PATIENT:      1. NO driving for 4 weeks after surgery. You may ride as a passenger.  2. NO lifting of any item over 10 lbs (e.g. gallon of milk) for 6 weeks after surgery.  3. DO walk as much as possible! Walk a minimum of once a day. Depending on your fatigue and comfort level, you may walk as much as you wish. There is no maximum.  4. Other physical activities (sex, housework, gardening, etc.) are OK after 4 weeks   5. Continue using incentive spirometer for 2 weeks, especially if going home on oxygen.    Incision Care:  1. SHOWER ONLY - no baths. Clean incision daily with plain Ivory ® soap or any other dye or perfume free soap. Then pat incision dry with clean towel. Avoid creams or lotions on the incision(s).  a. If there is any increase in redness or swelling, or separation of the incision line, or thick drainage* from any of the incisions, call right away  * Clear, thin drainage is not abnormal especially from the leg incision and/or                         chest tube sites.  2. Continue to wear your SUKI Stockings for 4 weeks. You may take off the stockings when in bed or when the legs are elevated.    Patient instructed to call [facility] at [phone]  if any increased shortness of breath, uncontrolled pain, weight gain greater than 2 pounds in 1 day, SBP >140, HR <60 or redness swelling or drainage  of incisions.      FOLLOW-UP:   Future Appointments   Date Time Provider Department Center   2/6/2023  1:45 PM CT RESOURCE PROVIDER [facility] None

## 2023-01-13 ENCOUNTER — TELEPHONE (OUTPATIENT)
Dept: CARDIOTHORACIC SURGERY | Facility: MEDICAL CENTER | Age: 63
End: 2023-01-13
Payer: COMMERCIAL

## 2023-01-13 ENCOUNTER — ANTICOAGULATION MONITORING (OUTPATIENT)
Dept: VASCULAR LAB | Facility: MEDICAL CENTER | Age: 63
End: 2023-01-13
Payer: COMMERCIAL

## 2023-01-13 ENCOUNTER — DOCUMENTATION (OUTPATIENT)
Dept: VASCULAR LAB | Facility: MEDICAL CENTER | Age: 63
End: 2023-01-13
Payer: COMMERCIAL

## 2023-01-13 DIAGNOSIS — I48.91 ATRIAL FIBRILLATION, UNSPECIFIED TYPE (HCC): ICD-10-CM

## 2023-01-13 LAB — INR PPP: 2.2 (ref 2–3.5)

## 2023-01-13 NOTE — TELEPHONE ENCOUNTER
"Patient states that he needs to stay with his brother in [facility] as \"it's just not working out staying with Do\" and wanted to know how that can be done in relation to his appointments.    He was told to keep his appointments and have his brother drive him in for them which he was agreeable to.    He was told he needs to contact our coumadin clinic (number provided) for insight into INR draws and how that will work because he was set up with home health prior to discharge for those draws. Getting draws done in [place] and getting the results to our clinic will be challenging.    Call time 4 minutes  "

## 2023-01-13 NOTE — PROGRESS NOTES
OP Anticoagulation Service Note    Date: 1/13/2023  There were no vitals filed for this visit.    Anticoagulation Summary  As of 1/13/2023      INR goal:  2.0-3.0   TTR:  --   INR used for dosing:  No new INR was available at the time of this encounter.   Warfarin maintenance plan:  5 mg (5 mg x 1) every day   Weekly warfarin total:  35 mg   Plan last modified:  Mala Choi, PharmD (1/13/2023)   Next INR check:  1/13/2023   Target end date:               Anticoagulation Episode Summary       INR check location:      Preferred lab:      Send INR reminders to:      Comments:            Anticoagulation Patient Findings      HPI:   Noe Hickey is new to our services, on anticoagulation therapy with warfarin (a high risk medication) for atrial fibrillation s/p bioprosthetic AVR.     CHADS-VASC = 1 (vascular disease)  HAS - BLED = 1 (ASA)    Provided pt education on warfarin. Including how to take, what to do if missed dose, importance of INR monitoring, drug and food interactions. Patient is aware to avoid NSAIDs and ASA (unless directed by provider). Educated pt on s/s of bleeding and thrombosis. Patient is aware to seek medical attention for severe falls or injury to their heads, to report all medication changes to our office and to notify our office of unusual bleeding or bruising.   Medication reviewed and updated  Is pt on triple therapy? No, DAPT per cardiology  Hx of bleeding ? Y (No)  Pt counseled to avoid pregnancy - n/a  Pt counseled to avoid estrogen - n/a  Pt counseled to quit smoking, done during inpatient    A/P   Patient being seen by home health but is planning to go back to [place] and will need to be managed telephonically and have lab orders placed for future INR's.    INR order for [facility] placed for today. Will update once lab is obtained.     CC: Dr Bloch

## 2023-01-14 NOTE — PROGRESS NOTES
Initial anticoag note and most recent d/c summary reviewed.     Pending further recs, we will continue with indefinite oac (at least 3 mo with warfarin) and at least a few months of low dose asa as directed at d/c for aflutter and recent SAVR.     Will need to develop a plan to transfer care back to his treatment team in [place] if he is not going to remains in our area.     We will defer all further cv care, aside from day to day management of anticoagulation to cardiology and CT surgery until or unless patient establishes with vascular medicine.     Michael Bloch, MD  Anticoagulation Clinic    Cc: SAE Maurer

## 2023-01-14 NOTE — PROGRESS NOTES
Anticoagulation Summary  As of 2023      INR goal:  2.0-3.0   TTR:  --   INR used for dosin.20 (2023)   Warfarin maintenance plan:  2.5 mg (5 mg x 0.5) every day   Weekly warfarin total:  17.5 mg   Plan last modified:  Royer Chavez PharmD (2023)   Next INR check:  2023   Target end date:               Anticoagulation Episode Summary       INR check location:      Preferred lab:      Send INR reminders to:      Comments:              Refer to Anticoagulation Patient Findings for HPI  Patient Findings       Negatives:  Signs/symptoms of thrombosis, Signs/symptoms of bleeding, Laboratory test error suspected, Change in health, Change in alcohol use, Change in activity, Upcoming invasive procedure, Emergency department visit, Upcoming dental procedure, Missed doses, Extra doses, Change in medications, Change in diet/appetite, Hospital admission, Bruising, Other complaints            Spoke with patient to report a therapeutic INR.      Pt instructed to HOLD x 1 dose (INR therapeutic after only 2 doses) and then begin newly decreased regimen of 2.5 mg once daily.     Will follow up in 3 days via [employer] .     Royer Chavez, RosalindaD, BCACP     [Right Handed] : right handed [Postictal Confusion and Lethargy] : postictal confusion and lethargy [Family History of Seizures] : family history of seizures [Head Trauma] : head trauma [Brivaracetam] : brivaracetam [Divalproex (Depakote)] : divalproex (Depakote) [Gabapentin (Neurontin)] : gabapentin (Neurontin) [Lamotrigine (Lamictal)] : lamotrigine (Lamictal) [Levetiracetam (Keppra)] : levetiracetam (Keppra) [Oxcarbazepine (Trileptal)] : oxcarbazepine (Trileptal) [Phenytoin (Dilantin)] : phenytoin (Dilantin) [Zonisamide (Zonegran)] : zonisamide (Zonegran) [[place] Status Epilepticus] : no [ Complications] : ~T no  complications [Febrile Seizures] : no febrile seizures [Meningitis or Encephalitis] : no meningitis or encephalitis [Developmental Delay] : no developmental delay [Stroke] : no stroke  [FreeTextEntry1] : 13 yo [FreeTextEntry2] : myoclonus, GTCs [FreeTextEntry6] : 1-6 min convulsions [FreeTextEntry8] : Photosensitivity [de-identified] : [facility] 2010 [de-identified] : age 6 head fracture (hairline) [de-identified] : .  more emotional, tired. [de-identified] : rash to VPA, LTG not effective for myoclonus. BRV .  more emotional, tired.

## 2023-01-16 ENCOUNTER — TELEPHONE (OUTPATIENT)
Dept: CARDIOTHORACIC SURGERY | Facility: MEDICAL CENTER | Age: 63
End: 2023-01-16
Payer: COMMERCIAL

## 2023-01-16 NOTE — PROGRESS NOTES
CHIEF COMPLAINT: Post-op visit     PROCEDURE: Dr Goyal 1/5/22  AORTIC VALVE REPLACEMENT 27mm Inspiris tissue valve, ASCENDING AORTIC ANEURYSM REPAIR with 32mm Hemashield graft AND TRANSESOPHAGEAL ECHOCARDIOGRAM.     HPI: ***    There were no vitals taken for this visit.    PHYSICAL EXAM:  Physical Exam   Cardiac: S1S2  Neuro:  AAO x 3  Resp:  CTA  Wounds:  CDI  Sternum:  Stable    PLAN: ***  Continue coumadin for 3 months or per your Cardiologist's recommendations.  We reviewed post operative sternal precautions, weight limits and driving precautions moving forward.  We reviewed SBE prophylaxis.      Overall, we are very pleased with the patient’s progress and we have instructed the patient to follow-up with us in the future should they have any concerns related to their surgery. Otherwise, we will see the patient on a PRN basis. The patient will continue to follow-up with their Cardiologist and PCP.  The patient has been informed that any further prescription refills should be done through their primary care physician and/or cardiologist.  They acknowledged understanding.  Thank you for allowing us to participate in the care of this very pleasant patient and please let us know if there is any way we may be of further assistance.

## 2023-01-16 NOTE — TELEPHONE ENCOUNTER
Hospital follow up call    he states that all incisions are healing well and approximated with no s/s of infection (redness, puss, fever, purulent drainage, or tenderness).    he is using the IS; volume is not improved although he has been having pain issues.    he is walking everyday, distance is increased from the hospital.    he is obtaining daily weights. Current weight is 210.4 lbs which is more than the first home weight of 207 lbs. His LE edema is minimally improved. He states his appetite is improved and is eating well.    he is taking all prescribed meds as directed. He has no medication questions, he has decided not to stay with his brother, he will remain in [place].    he is taking vitals (HR and BP).    BP's: 110's to 120 / 80's  HR's: 70's to 90's    he has had a bowel movement since discharge.    he is showering everyday or every other day and is wearing the compression/SUKI hose.    he states that the post op pain is somewhat well controlled.  Narcotics are being utilized but he is having bad delusions. Tylenol is not being utilized, he just realized this today he was instructed to take 1000 mg Q 6 hours with a daily max of 4000 mg in 24 hours.    he has no additional questions at this time. Follow up education was needed on calling if he continues to gain weight, if he LE edema worsens or if his SOB does not improve with better pain control afforded with adding tylenol. Follow up appointments were reviewed and I ensured they have my number if issues or concerns arise.      Follow up call time was 9 minutes.

## 2023-01-17 ENCOUNTER — TELEPHONE (OUTPATIENT)
Dept: CARDIOTHORACIC SURGERY | Facility: MEDICAL CENTER | Age: 63
End: 2023-01-17
Payer: COMMERCIAL

## 2023-01-17 ENCOUNTER — ANTICOAGULATION MONITORING (OUTPATIENT)
Dept: VASCULAR LAB | Facility: MEDICAL CENTER | Age: 63
End: 2023-01-17
Payer: COMMERCIAL

## 2023-01-17 LAB — INR PPP: 2.8 (ref 2–3.5)

## 2023-01-17 NOTE — TELEPHONE ENCOUNTER
Chief concern: worsening LE edema and weight gain.    His weight is up 3 lbs from yesterday; he is now at 213.5 lbs vs 210.4 lbs yesterday.    Sent home on 40 mg lasix daily and 20 meq of potassium daily.    Per LANI Velazquez have patient take an extra 40 mg lasix and 20 meq potassium today and check back in with RN navigator tomorrow morning.    The instructions were relayed to Noe; he verbalized understanding.    Event time: 15 minutes

## 2023-01-17 NOTE — TELEPHONE ENCOUNTER
"Patient states HH facilitated him going to a SNF, he is just awaiting insurance auth and then he will be admitted there. He states again that his situation is \"not working out\" and that it's \"not good for me, not good for the wife\".    No actionable issues at this time.    Call time 2 minutes  "

## 2023-01-18 ENCOUNTER — TELEPHONE (OUTPATIENT)
Dept: CARDIOTHORACIC SURGERY | Facility: MEDICAL CENTER | Age: 63
End: 2023-01-18
Payer: COMMERCIAL

## 2023-01-18 NOTE — PROGRESS NOTES
Anticoagulation Summary  As of 2023      INR goal:  2.0-3.0   TTR:  --   INR used for dosin.80 (2023)   Warfarin maintenance plan:  2.5 mg (5 mg x 0.5) every day   Weekly warfarin total:  17.5 mg   Plan last modified:  Rosalinda MorrisD (2023)   Next INR check:  2023   Target end date:  Indefinite             Anticoagulation Episode Summary       INR check location:      Preferred lab:      Send INR reminders to:      Comments:              Refer to Anticoagulation Patient Findings for HPI  Patient Findings       Negatives:  Signs/symptoms of thrombosis, Signs/symptoms of bleeding, Laboratory test error suspected, Change in health, Change in alcohol use, Change in activity, Upcoming invasive procedure, Emergency department visit, Upcoming dental procedure, Missed doses, Extra doses, Change in medications, Change in diet/appetite, Hospital admission, Bruising, Other complaints            Spoke with patient to report a therapeutic INR.      Pt is on antiplatelet therapy Aspirin 81mg for SAVR    Pt instructed to continue with current warfarin dosing regimen, confirms dosing.   Will follow up in 3 day(s) via [employer] .     Jennifer Prado, RosalindaD

## 2023-01-18 NOTE — TELEPHONE ENCOUNTER
"Patient called to check in as requested with his weight after the extra 1 time dose of 40 mg po lasix yesterday (plus 1 extra 20 meq of potassium)     He states his breathing \"isn't too good\" and and he is now at 222 lbs which is up 9 lbs from yesterday and up 15 lbs from first home discharge weight.    He states his legs are even more swollen he states that \"he can't even talk\" and he is on 2L nc day and night.  He is at 92%.    Have placed call to LANI Pineda and will await further instructions/interventions.  "

## 2023-01-18 NOTE — TELEPHONE ENCOUNTER
Per LANI Pineda, continue with double lasix (80 mg) and double potassium (40 meq)  daily for now, trend weights and call tomorrow if weight is still going up.    He states he will be admitted to [facility] within the hour; he was told that if, upon assessment, they are concerned with his presentation they can send him to the ED.    Call time 5 minutes

## 2023-01-20 ENCOUNTER — HOSPITAL ENCOUNTER (INPATIENT)
Facility: MEDICAL CENTER | Age: 63
LOS: 14 days | DRG: 208 | End: 2023-02-03
Attending: EMERGENCY MEDICINE | Admitting: HOSPITALIST
Payer: COMMERCIAL

## 2023-01-20 ENCOUNTER — TELEPHONE (OUTPATIENT)
Dept: VASCULAR LAB | Facility: MEDICAL CENTER | Age: 63
End: 2023-01-20
Payer: COMMERCIAL

## 2023-01-20 ENCOUNTER — APPOINTMENT (OUTPATIENT)
Dept: RADIOLOGY | Facility: MEDICAL CENTER | Age: 63
DRG: 208 | End: 2023-01-20
Attending: EMERGENCY MEDICINE
Payer: COMMERCIAL

## 2023-01-20 DIAGNOSIS — G89.18 POST-OPERATIVE PAIN: ICD-10-CM

## 2023-01-20 DIAGNOSIS — Z95.2 AORTIC VALVE REPLACED: ICD-10-CM

## 2023-01-20 DIAGNOSIS — G47.00 INSOMNIA, UNSPECIFIED TYPE: ICD-10-CM

## 2023-01-20 DIAGNOSIS — G93.40 ACUTE ENCEPHALOPATHY: ICD-10-CM

## 2023-01-20 DIAGNOSIS — I50.23 ACUTE ON CHRONIC SYSTOLIC HEART FAILURE (HCC): ICD-10-CM

## 2023-01-20 DIAGNOSIS — Z95.2 S/P AVR: ICD-10-CM

## 2023-01-20 DIAGNOSIS — I48.0 PAROXYSMAL ATRIAL FIBRILLATION (HCC): ICD-10-CM

## 2023-01-20 DIAGNOSIS — J18.9 PNEUMONIA DUE TO INFECTIOUS ORGANISM, UNSPECIFIED LATERALITY, UNSPECIFIED PART OF LUNG: ICD-10-CM

## 2023-01-20 DIAGNOSIS — J96.01 ACUTE RESPIRATORY FAILURE WITH HYPOXIA (HCC): ICD-10-CM

## 2023-01-20 DIAGNOSIS — Z98.890 STATUS POST INCISION AND DRAINAGE: ICD-10-CM

## 2023-01-20 DIAGNOSIS — J44.9 CHRONIC OBSTRUCTIVE PULMONARY DISEASE, UNSPECIFIED COPD TYPE (HCC): ICD-10-CM

## 2023-01-20 DIAGNOSIS — K21.9 GASTROESOPHAGEAL REFLUX DISEASE WITHOUT ESOPHAGITIS: ICD-10-CM

## 2023-01-20 DIAGNOSIS — J18.9 PNEUMONIA OF LEFT LOWER LOBE DUE TO INFECTIOUS ORGANISM: ICD-10-CM

## 2023-01-20 PROBLEM — I50.33 ACUTE ON CHRONIC DIASTOLIC HEART FAILURE (HCC): Status: ACTIVE | Noted: 2023-01-20

## 2023-01-20 PROBLEM — N17.9 ACUTE RENAL FAILURE (ARF) (HCC): Status: ACTIVE | Noted: 2023-01-06

## 2023-01-20 PROBLEM — R79.89 ELEVATED TROPONIN: Status: ACTIVE | Noted: 2023-01-20

## 2023-01-20 LAB
ALBUMIN SERPL BCP-MCNC: 3.6 G/DL (ref 3.2–4.9)
ALBUMIN/GLOB SERPL: 1.3 G/DL
ALP SERPL-CCNC: 60 U/L (ref 30–99)
ALT SERPL-CCNC: 45 U/L (ref 2–50)
ANION GAP SERPL CALC-SCNC: 10 MMOL/L (ref 7–16)
AST SERPL-CCNC: 33 U/L (ref 12–45)
BASOPHILS # BLD AUTO: 0.5 % (ref 0–1.8)
BASOPHILS # BLD: 0.08 K/UL (ref 0–0.12)
BILIRUB SERPL-MCNC: 0.4 MG/DL (ref 0.1–1.5)
BUN SERPL-MCNC: 24 MG/DL (ref 8–22)
CALCIUM ALBUM COR SERPL-MCNC: 9 MG/DL (ref 8.5–10.5)
CALCIUM SERPL-MCNC: 8.7 MG/DL (ref 8.5–10.5)
CHLORIDE SERPL-SCNC: 100 MMOL/L (ref 96–112)
CO2 SERPL-SCNC: 25 MMOL/L (ref 20–33)
CREAT SERPL-MCNC: 1.73 MG/DL (ref 0.5–1.4)
EOSINOPHIL # BLD AUTO: 0.21 K/UL (ref 0–0.51)
EOSINOPHIL NFR BLD: 1.4 % (ref 0–6.9)
ERYTHROCYTE [DISTWIDTH] IN BLOOD BY AUTOMATED COUNT: 48.4 FL (ref 35.9–50)
FLUAV RNA SPEC QL NAA+PROBE: NEGATIVE
FLUBV RNA SPEC QL NAA+PROBE: NEGATIVE
GFR SERPLBLD CREATININE-BSD FMLA CKD-EPI: 44 ML/MIN/1.73 M 2
GLOBULIN SER CALC-MCNC: 2.8 G/DL (ref 1.9–3.5)
GLUCOSE SERPL-MCNC: 93 MG/DL (ref 65–99)
HCT VFR BLD AUTO: 29.5 % (ref 42–52)
HGB BLD-MCNC: 9 G/DL (ref 14–18)
IMM GRANULOCYTES # BLD AUTO: 0.27 K/UL (ref 0–0.11)
IMM GRANULOCYTES NFR BLD AUTO: 1.7 % (ref 0–0.9)
INR PPP: 3.39 (ref 0.87–1.13)
LYMPHOCYTES # BLD AUTO: 1.09 K/UL (ref 1–4.8)
LYMPHOCYTES NFR BLD: 7 % (ref 22–41)
MCH RBC QN AUTO: 22.2 PG (ref 27–33)
MCHC RBC AUTO-ENTMCNC: 30.5 G/DL (ref 33.7–35.3)
MCV RBC AUTO: 72.8 FL (ref 81.4–97.8)
MONOCYTES # BLD AUTO: 1.84 K/UL (ref 0–0.85)
MONOCYTES NFR BLD AUTO: 11.9 % (ref 0–13.4)
NEUTROPHILS # BLD AUTO: 11.99 K/UL (ref 1.82–7.42)
NEUTROPHILS NFR BLD: 77.5 % (ref 44–72)
NRBC # BLD AUTO: 0.16 K/UL
NRBC BLD-RTO: 1 /100 WBC
NT-PROBNP SERPL IA-MCNC: 1489 PG/ML (ref 0–125)
PLATELET # BLD AUTO: 464 K/UL (ref 164–446)
PMV BLD AUTO: 9.9 FL (ref 9–12.9)
POTASSIUM SERPL-SCNC: 4.8 MMOL/L (ref 3.6–5.5)
PROCALCITONIN SERPL-MCNC: 0.08 NG/ML
PROT SERPL-MCNC: 6.4 G/DL (ref 6–8.2)
PROTHROMBIN TIME: 33 SEC (ref 12–14.6)
RBC # BLD AUTO: 4.05 M/UL (ref 4.7–6.1)
RSV RNA SPEC QL NAA+PROBE: NEGATIVE
SARS-COV-2 RNA RESP QL NAA+PROBE: NOTDETECTED
SODIUM SERPL-SCNC: 135 MMOL/L (ref 135–145)
SPECIMEN SOURCE: NORMAL
TROPONIN T SERPL-MCNC: 85 NG/L (ref 6–19)
TROPONIN T SERPL-MCNC: 87 NG/L (ref 6–19)
WBC # BLD AUTO: 15.5 K/UL (ref 4.8–10.8)

## 2023-01-20 PROCEDURE — 84145 PROCALCITONIN (PCT): CPT

## 2023-01-20 PROCEDURE — 700111 HCHG RX REV CODE 636 W/ 250 OVERRIDE (IP): Performed by: EMERGENCY MEDICINE

## 2023-01-20 PROCEDURE — 36415 COLL VENOUS BLD VENIPUNCTURE: CPT

## 2023-01-20 PROCEDURE — C9803 HOPD COVID-19 SPEC COLLECT: HCPCS | Performed by: EMERGENCY MEDICINE

## 2023-01-20 PROCEDURE — 93005 ELECTROCARDIOGRAM TRACING: CPT | Performed by: EMERGENCY MEDICINE

## 2023-01-20 PROCEDURE — 96376 TX/PRO/DX INJ SAME DRUG ADON: CPT

## 2023-01-20 PROCEDURE — 85610 PROTHROMBIN TIME: CPT

## 2023-01-20 PROCEDURE — 700117 HCHG RX CONTRAST REV CODE 255: Performed by: EMERGENCY MEDICINE

## 2023-01-20 PROCEDURE — A9270 NON-COVERED ITEM OR SERVICE: HCPCS | Performed by: HOSPITALIST

## 2023-01-20 PROCEDURE — 0241U HCHG SARS-COV-2 COVID-19 NFCT DS RESP RNA 4 TRGT MIC: CPT

## 2023-01-20 PROCEDURE — 96365 THER/PROPH/DIAG IV INF INIT: CPT

## 2023-01-20 PROCEDURE — 84484 ASSAY OF TROPONIN QUANT: CPT | Mod: 91

## 2023-01-20 PROCEDURE — 87040 BLOOD CULTURE FOR BACTERIA: CPT

## 2023-01-20 PROCEDURE — 99223 1ST HOSP IP/OBS HIGH 75: CPT | Performed by: HOSPITALIST

## 2023-01-20 PROCEDURE — 71045 X-RAY EXAM CHEST 1 VIEW: CPT

## 2023-01-20 PROCEDURE — 80053 COMPREHEN METABOLIC PANEL: CPT

## 2023-01-20 PROCEDURE — 93005 ELECTROCARDIOGRAM TRACING: CPT

## 2023-01-20 PROCEDURE — 83880 ASSAY OF NATRIURETIC PEPTIDE: CPT

## 2023-01-20 PROCEDURE — 96375 TX/PRO/DX INJ NEW DRUG ADDON: CPT

## 2023-01-20 PROCEDURE — 700102 HCHG RX REV CODE 250 W/ 637 OVERRIDE(OP): Performed by: HOSPITALIST

## 2023-01-20 PROCEDURE — 71275 CT ANGIOGRAPHY CHEST: CPT

## 2023-01-20 PROCEDURE — 700111 HCHG RX REV CODE 636 W/ 250 OVERRIDE (IP): Performed by: HOSPITALIST

## 2023-01-20 PROCEDURE — 99285 EMERGENCY DEPT VISIT HI MDM: CPT

## 2023-01-20 PROCEDURE — 770020 HCHG ROOM/CARE - TELE (206)

## 2023-01-20 PROCEDURE — 85025 COMPLETE CBC W/AUTO DIFF WBC: CPT

## 2023-01-20 RX ORDER — FAMOTIDINE 20 MG/1
20 TABLET, FILM COATED ORAL 2 TIMES DAILY
Status: DISCONTINUED | OUTPATIENT
Start: 2023-01-20 | End: 2023-01-21

## 2023-01-20 RX ORDER — ONDANSETRON 2 MG/ML
4 INJECTION INTRAMUSCULAR; INTRAVENOUS EVERY 4 HOURS PRN
Status: DISCONTINUED | OUTPATIENT
Start: 2023-01-20 | End: 2023-02-03 | Stop reason: HOSPADM

## 2023-01-20 RX ORDER — PROCHLORPERAZINE EDISYLATE 5 MG/ML
5-10 INJECTION INTRAMUSCULAR; INTRAVENOUS EVERY 4 HOURS PRN
Status: DISCONTINUED | OUTPATIENT
Start: 2023-01-20 | End: 2023-02-03 | Stop reason: HOSPADM

## 2023-01-20 RX ORDER — BISACODYL 10 MG
10 SUPPOSITORY, RECTAL RECTAL
Status: DISCONTINUED | OUTPATIENT
Start: 2023-01-20 | End: 2023-01-29

## 2023-01-20 RX ORDER — HYDRALAZINE HYDROCHLORIDE 20 MG/ML
10 INJECTION INTRAMUSCULAR; INTRAVENOUS EVERY 4 HOURS PRN
Status: DISCONTINUED | OUTPATIENT
Start: 2023-01-20 | End: 2023-02-03 | Stop reason: HOSPADM

## 2023-01-20 RX ORDER — PROMETHAZINE HYDROCHLORIDE 25 MG/1
12.5-25 TABLET ORAL EVERY 4 HOURS PRN
Status: DISCONTINUED | OUTPATIENT
Start: 2023-01-20 | End: 2023-01-29

## 2023-01-20 RX ORDER — OXYCODONE HYDROCHLORIDE 5 MG/1
5 TABLET ORAL EVERY 4 HOURS PRN
Status: DISCONTINUED | OUTPATIENT
Start: 2023-01-20 | End: 2023-01-21

## 2023-01-20 RX ORDER — ACETAMINOPHEN 325 MG/1
650 TABLET ORAL EVERY 6 HOURS PRN
Status: DISCONTINUED | OUTPATIENT
Start: 2023-01-20 | End: 2023-01-29

## 2023-01-20 RX ORDER — AMIODARONE HYDROCHLORIDE 200 MG/1
400 TABLET ORAL DAILY
Status: DISCONTINUED | OUTPATIENT
Start: 2023-01-21 | End: 2023-01-29

## 2023-01-20 RX ORDER — AMLODIPINE BESYLATE 10 MG/1
10 TABLET ORAL DAILY
Status: DISCONTINUED | OUTPATIENT
Start: 2023-01-21 | End: 2023-01-29

## 2023-01-20 RX ORDER — GUAIFENESIN/DEXTROMETHORPHAN 100-10MG/5
10 SYRUP ORAL EVERY 6 HOURS PRN
Status: DISCONTINUED | OUTPATIENT
Start: 2023-01-20 | End: 2023-01-29

## 2023-01-20 RX ORDER — FUROSEMIDE 10 MG/ML
40 INJECTION INTRAMUSCULAR; INTRAVENOUS
Status: DISCONTINUED | OUTPATIENT
Start: 2023-01-20 | End: 2023-01-23

## 2023-01-20 RX ORDER — FUROSEMIDE 40 MG/1
40 TABLET ORAL 2 TIMES DAILY
Status: ON HOLD | COMMUNITY
End: 2023-01-23

## 2023-01-20 RX ORDER — AZITHROMYCIN 500 MG/1
500 INJECTION, POWDER, LYOPHILIZED, FOR SOLUTION INTRAVENOUS ONCE
Status: COMPLETED | OUTPATIENT
Start: 2023-01-20 | End: 2023-01-20

## 2023-01-20 RX ORDER — WARFARIN SODIUM 5 MG/1
5 TABLET ORAL DAILY
Status: DISCONTINUED | OUTPATIENT
Start: 2023-01-20 | End: 2023-01-20

## 2023-01-20 RX ORDER — CEFTRIAXONE 2 G/1
2000 INJECTION, POWDER, FOR SOLUTION INTRAMUSCULAR; INTRAVENOUS ONCE
Status: COMPLETED | OUTPATIENT
Start: 2023-01-20 | End: 2023-01-20

## 2023-01-20 RX ORDER — AMOXICILLIN 250 MG
2 CAPSULE ORAL 2 TIMES DAILY
Status: DISCONTINUED | OUTPATIENT
Start: 2023-01-20 | End: 2023-01-29

## 2023-01-20 RX ORDER — AMIODARONE HYDROCHLORIDE 400 MG/1
400 TABLET ORAL DAILY
Status: ON HOLD | COMMUNITY
End: 2023-01-23 | Stop reason: SDUPTHER

## 2023-01-20 RX ORDER — POTASSIUM CHLORIDE 1500 MG/1
20 TABLET, EXTENDED RELEASE ORAL 2 TIMES DAILY
Status: ON HOLD | COMMUNITY
End: 2023-01-23

## 2023-01-20 RX ORDER — ONDANSETRON 4 MG/1
4 TABLET, ORALLY DISINTEGRATING ORAL EVERY 4 HOURS PRN
Status: DISCONTINUED | OUTPATIENT
Start: 2023-01-20 | End: 2023-01-29

## 2023-01-20 RX ORDER — PROMETHAZINE HYDROCHLORIDE 25 MG/1
12.5-25 SUPPOSITORY RECTAL EVERY 4 HOURS PRN
Status: DISCONTINUED | OUTPATIENT
Start: 2023-01-20 | End: 2023-02-03 | Stop reason: HOSPADM

## 2023-01-20 RX ORDER — POLYETHYLENE GLYCOL 3350 17 G/17G
1 POWDER, FOR SOLUTION ORAL
Status: DISCONTINUED | OUTPATIENT
Start: 2023-01-20 | End: 2023-01-29

## 2023-01-20 RX ADMIN — AZITHROMYCIN MONOHYDRATE 500 MG: 500 INJECTION, POWDER, LYOPHILIZED, FOR SOLUTION INTRAVENOUS at 21:14

## 2023-01-20 RX ADMIN — IOHEXOL 81 ML: 350 INJECTION, SOLUTION INTRAVENOUS at 20:19

## 2023-01-20 RX ADMIN — OXYCODONE HYDROCHLORIDE 5 MG: 5 TABLET ORAL at 22:20

## 2023-01-20 RX ADMIN — FENTANYL CITRATE 100 MCG: 50 INJECTION, SOLUTION INTRAMUSCULAR; INTRAVENOUS at 18:48

## 2023-01-20 RX ADMIN — METOPROLOL TARTRATE 25 MG: 25 TABLET, FILM COATED ORAL at 22:20

## 2023-01-20 RX ADMIN — FUROSEMIDE 40 MG: 10 INJECTION, SOLUTION INTRAMUSCULAR; INTRAVENOUS at 22:20

## 2023-01-20 RX ADMIN — FAMOTIDINE 20 MG: 20 TABLET, FILM COATED ORAL at 22:20

## 2023-01-20 RX ADMIN — FENTANYL CITRATE 50 MCG: 50 INJECTION, SOLUTION INTRAMUSCULAR; INTRAVENOUS at 17:33

## 2023-01-20 RX ADMIN — CEFTRIAXONE SODIUM 2000 MG: 2 INJECTION, POWDER, FOR SOLUTION INTRAMUSCULAR; INTRAVENOUS at 21:09

## 2023-01-20 ASSESSMENT — CHA2DS2 SCORE
AGE 65 TO 74: NO
HYPERTENSION: YES
DIABETES: NO
AGE 75 OR GREATER: NO
SEX: MALE
CHA2DS2 VASC SCORE: 2
VASCULAR DISEASE: NO
PRIOR STROKE OR TIA OR THROMBOEMBOLISM: NO
CHF OR LEFT VENTRICULAR DYSFUNCTION: YES

## 2023-01-20 ASSESSMENT — ENCOUNTER SYMPTOMS
WHEEZING: 0
HALLUCINATIONS: 0
FEVER: 0
BACK PAIN: 0
FALLS: 0
SHORTNESS OF BREATH: 1
DIZZINESS: 0
SORE THROAT: 0
SINUS PAIN: 0
HEADACHES: 0
STRIDOR: 0
HEMOPTYSIS: 0
EYE PAIN: 0
NECK PAIN: 0
NAUSEA: 0
PALPITATIONS: 0
BLURRED VISION: 0
SPUTUM PRODUCTION: 0
POLYDIPSIA: 0
COUGH: 1
BLOOD IN STOOL: 0
WEAKNESS: 0
PHOTOPHOBIA: 0
DOUBLE VISION: 0
MYALGIAS: 0
HEARTBURN: 0
CONSTIPATION: 0
BRUISES/BLEEDS EASILY: 0
PND: 1
FLANK PAIN: 0
CHILLS: 0
DIAPHORESIS: 0
VOMITING: 0
ORTHOPNEA: 1
TINGLING: 0
CLAUDICATION: 0
DIARRHEA: 0
DEPRESSION: 0
TREMORS: 0
ABDOMINAL PAIN: 0

## 2023-01-20 ASSESSMENT — COPD QUESTIONNAIRES
HAVE YOU SMOKED AT LEAST 100 CIGARETTES IN YOUR ENTIRE LIFE: YES
DO YOU EVER COUGH UP ANY MUCUS OR PHLEGM?: NO/ONLY WITH OCCASIONAL COLDS OR INFECTIONS
DURING THE PAST 4 WEEKS HOW MUCH DID YOU FEEL SHORT OF BREATH: NONE/LITTLE OF THE TIME
COPD SCREENING SCORE: 4

## 2023-01-20 ASSESSMENT — COGNITIVE AND FUNCTIONAL STATUS - GENERAL
MOBILITY SCORE: 21
MOVING FROM LYING ON BACK TO SITTING ON SIDE OF FLAT BED: A LITTLE
SUGGESTED CMS G CODE MODIFIER DAILY ACTIVITY: CH
DAILY ACTIVITIY SCORE: 24
MOVING TO AND FROM BED TO CHAIR: A LITTLE
SUGGESTED CMS G CODE MODIFIER MOBILITY: CJ
TURNING FROM BACK TO SIDE WHILE IN FLAT BAD: A LITTLE

## 2023-01-20 ASSESSMENT — LIFESTYLE VARIABLES
DOES PATIENT WANT TO STOP DRINKING: NO
TOTAL SCORE: 0
EVER HAD A DRINK FIRST THING IN THE MORNING TO STEADY YOUR NERVES TO GET RID OF A HANGOVER: NO
SUBSTANCE_ABUSE: 0
CONSUMPTION TOTAL: INCOMPLETE
HAVE PEOPLE ANNOYED YOU BY CRITICIZING YOUR DRINKING: NO
EVER FELT BAD OR GUILTY ABOUT YOUR DRINKING: NO
ALCOHOL_USE: NO
HAVE YOU EVER FELT YOU SHOULD CUT DOWN ON YOUR DRINKING: NO

## 2023-01-20 ASSESSMENT — FIBROSIS 4 INDEX
FIB4 SCORE: 1.42
FIB4 SCORE: 0.67

## 2023-01-20 ASSESSMENT — PATIENT HEALTH QUESTIONNAIRE - PHQ9
2. FEELING DOWN, DEPRESSED, IRRITABLE, OR HOPELESS: NOT AT ALL
SUM OF ALL RESPONSES TO PHQ9 QUESTIONS 1 AND 2: 0
1. LITTLE INTEREST OR PLEASURE IN DOING THINGS: NOT AT ALL

## 2023-01-20 NOTE — TELEPHONE ENCOUNTER
Called Trevor  regarding INR that is due today.    Trevor  state that pt now residing in [facility] - they manage warfarin while pt staying in their facility.    Trevor state that pt most likely admitted x 1 month. They will contact us w repeat INR results if pt discharged sooner.    Will f/u periodically to ensure pt has not been discharged.    Royer Chavez, RosalindaD, BCACP

## 2023-01-21 ENCOUNTER — APPOINTMENT (OUTPATIENT)
Dept: CARDIOLOGY | Facility: MEDICAL CENTER | Age: 63
DRG: 208 | End: 2023-01-21
Attending: HOSPITALIST
Payer: COMMERCIAL

## 2023-01-21 PROBLEM — R06.2 WHEEZING: Status: ACTIVE | Noted: 2023-01-21

## 2023-01-21 LAB
ALBUMIN SERPL BCP-MCNC: 3.7 G/DL (ref 3.2–4.9)
ALBUMIN/GLOB SERPL: 1.3 G/DL
ALP SERPL-CCNC: 60 U/L (ref 30–99)
ALT SERPL-CCNC: 44 U/L (ref 2–50)
ANION GAP SERPL CALC-SCNC: 12 MMOL/L (ref 7–16)
ANISOCYTOSIS BLD QL SMEAR: ABNORMAL
AST SERPL-CCNC: 35 U/L (ref 12–45)
BASOPHILS # BLD AUTO: 0.9 % (ref 0–1.8)
BASOPHILS # BLD: 0.14 K/UL (ref 0–0.12)
BILIRUB SERPL-MCNC: 0.2 MG/DL (ref 0.1–1.5)
BUN SERPL-MCNC: 26 MG/DL (ref 8–22)
CALCIUM ALBUM COR SERPL-MCNC: 8.9 MG/DL (ref 8.5–10.5)
CALCIUM SERPL-MCNC: 8.7 MG/DL (ref 8.5–10.5)
CHLORIDE SERPL-SCNC: 96 MMOL/L (ref 96–112)
CO2 SERPL-SCNC: 25 MMOL/L (ref 20–33)
CREAT SERPL-MCNC: 1.63 MG/DL (ref 0.5–1.4)
EOSINOPHIL # BLD AUTO: 0.12 K/UL (ref 0–0.51)
EOSINOPHIL NFR BLD: 0.8 % (ref 0–6.9)
ERYTHROCYTE [DISTWIDTH] IN BLOOD BY AUTOMATED COUNT: 48.9 FL (ref 35.9–50)
GFR SERPLBLD CREATININE-BSD FMLA CKD-EPI: 47 ML/MIN/1.73 M 2
GLOBULIN SER CALC-MCNC: 2.9 G/DL (ref 1.9–3.5)
GLUCOSE SERPL-MCNC: 101 MG/DL (ref 65–99)
HCT VFR BLD AUTO: 31.8 % (ref 42–52)
HGB BLD-MCNC: 9.5 G/DL (ref 14–18)
INR PPP: 2.92 (ref 0.87–1.13)
LV EJECT FRACT  99904: 45
LV EJECT FRACT MOD 4C 99902: 54.69
LYMPHOCYTES # BLD AUTO: 0.94 K/UL (ref 1–4.8)
LYMPHOCYTES NFR BLD: 6.1 % (ref 22–41)
MANUAL DIFF BLD: NORMAL
MCH RBC QN AUTO: 21.8 PG (ref 27–33)
MCHC RBC AUTO-ENTMCNC: 29.9 G/DL (ref 33.7–35.3)
MCV RBC AUTO: 73.1 FL (ref 81.4–97.8)
MICROCYTES BLD QL SMEAR: ABNORMAL
MONOCYTES # BLD AUTO: 0 K/UL (ref 0–0.85)
MONOCYTES NFR BLD AUTO: 0 % (ref 0–13.4)
MORPHOLOGY BLD-IMP: NORMAL
NEUTROPHILS # BLD AUTO: 14.2 K/UL (ref 1.82–7.42)
NEUTROPHILS NFR BLD: 92.2 % (ref 44–72)
NRBC # BLD AUTO: 0.12 K/UL
NRBC BLD-RTO: 0.8 /100 WBC
OVALOCYTES BLD QL SMEAR: NORMAL
PLATELET # BLD AUTO: 459 K/UL (ref 164–446)
PLATELET BLD QL SMEAR: NORMAL
PMV BLD AUTO: 10 FL (ref 9–12.9)
POIKILOCYTOSIS BLD QL SMEAR: NORMAL
POLYCHROMASIA BLD QL SMEAR: NORMAL
POTASSIUM SERPL-SCNC: 4.6 MMOL/L (ref 3.6–5.5)
PROT SERPL-MCNC: 6.6 G/DL (ref 6–8.2)
PROTHROMBIN TIME: 29.5 SEC (ref 12–14.6)
RBC # BLD AUTO: 4.35 M/UL (ref 4.7–6.1)
RBC BLD AUTO: PRESENT
SCHISTOCYTES BLD QL SMEAR: NORMAL
SODIUM SERPL-SCNC: 133 MMOL/L (ref 135–145)
WBC # BLD AUTO: 15.4 K/UL (ref 4.8–10.8)

## 2023-01-21 PROCEDURE — A9270 NON-COVERED ITEM OR SERVICE: HCPCS | Performed by: HOSPITALIST

## 2023-01-21 PROCEDURE — 700102 HCHG RX REV CODE 250 W/ 637 OVERRIDE(OP): Performed by: STUDENT IN AN ORGANIZED HEALTH CARE EDUCATION/TRAINING PROGRAM

## 2023-01-21 PROCEDURE — 36415 COLL VENOUS BLD VENIPUNCTURE: CPT

## 2023-01-21 PROCEDURE — 770020 HCHG ROOM/CARE - TELE (206)

## 2023-01-21 PROCEDURE — A9270 NON-COVERED ITEM OR SERVICE: HCPCS | Performed by: STUDENT IN AN ORGANIZED HEALTH CARE EDUCATION/TRAINING PROGRAM

## 2023-01-21 PROCEDURE — 85610 PROTHROMBIN TIME: CPT

## 2023-01-21 PROCEDURE — 93306 TTE W/DOPPLER COMPLETE: CPT | Mod: 26 | Performed by: INTERNAL MEDICINE

## 2023-01-21 PROCEDURE — 80053 COMPREHEN METABOLIC PANEL: CPT

## 2023-01-21 PROCEDURE — 93306 TTE W/DOPPLER COMPLETE: CPT

## 2023-01-21 PROCEDURE — 99232 SBSQ HOSP IP/OBS MODERATE 35: CPT | Performed by: STUDENT IN AN ORGANIZED HEALTH CARE EDUCATION/TRAINING PROGRAM

## 2023-01-21 PROCEDURE — 700102 HCHG RX REV CODE 250 W/ 637 OVERRIDE(OP): Performed by: HOSPITALIST

## 2023-01-21 PROCEDURE — 85007 BL SMEAR W/DIFF WBC COUNT: CPT

## 2023-01-21 PROCEDURE — 700111 HCHG RX REV CODE 636 W/ 250 OVERRIDE (IP): Performed by: HOSPITALIST

## 2023-01-21 PROCEDURE — 85025 COMPLETE CBC W/AUTO DIFF WBC: CPT

## 2023-01-21 RX ORDER — IPRATROPIUM BROMIDE AND ALBUTEROL SULFATE 2.5; .5 MG/3ML; MG/3ML
3 SOLUTION RESPIRATORY (INHALATION)
Status: DISCONTINUED | OUTPATIENT
Start: 2023-01-21 | End: 2023-01-22

## 2023-01-21 RX ORDER — OMEPRAZOLE 20 MG/1
20 CAPSULE, DELAYED RELEASE ORAL DAILY
Status: DISCONTINUED | OUTPATIENT
Start: 2023-01-21 | End: 2023-01-29

## 2023-01-21 RX ORDER — OXYCODONE HYDROCHLORIDE 10 MG/1
10 TABLET ORAL EVERY 4 HOURS PRN
Status: DISCONTINUED | OUTPATIENT
Start: 2023-01-21 | End: 2023-01-26

## 2023-01-21 RX ORDER — TRAZODONE HYDROCHLORIDE 50 MG/1
50 TABLET ORAL
Status: DISCONTINUED | OUTPATIENT
Start: 2023-01-21 | End: 2023-01-29

## 2023-01-21 RX ORDER — WARFARIN SODIUM 3 MG/1
3 TABLET ORAL DAILY
Status: DISCONTINUED | OUTPATIENT
Start: 2023-01-21 | End: 2023-01-22

## 2023-01-21 RX ADMIN — AMLODIPINE BESYLATE 10 MG: 10 TABLET ORAL at 05:37

## 2023-01-21 RX ADMIN — ACETAMINOPHEN 650 MG: 325 TABLET ORAL at 21:58

## 2023-01-21 RX ADMIN — OXYCODONE HYDROCHLORIDE 5 MG: 5 TABLET ORAL at 08:14

## 2023-01-21 RX ADMIN — OXYCODONE HYDROCHLORIDE 10 MG: 10 TABLET ORAL at 15:23

## 2023-01-21 RX ADMIN — FUROSEMIDE 40 MG: 10 INJECTION, SOLUTION INTRAMUSCULAR; INTRAVENOUS at 15:24

## 2023-01-21 RX ADMIN — OXYCODONE HYDROCHLORIDE 10 MG: 10 TABLET ORAL at 19:55

## 2023-01-21 RX ADMIN — FAMOTIDINE 20 MG: 20 TABLET, FILM COATED ORAL at 05:37

## 2023-01-21 RX ADMIN — CEFTRIAXONE SODIUM 2000 MG: 10 INJECTION, POWDER, FOR SOLUTION INTRAVENOUS at 05:38

## 2023-01-21 RX ADMIN — TRAZODONE HYDROCHLORIDE 50 MG: 50 TABLET ORAL at 20:52

## 2023-01-21 RX ADMIN — METOPROLOL TARTRATE 25 MG: 25 TABLET, FILM COATED ORAL at 16:54

## 2023-01-21 RX ADMIN — ONDANSETRON 4 MG: 4 TABLET, ORALLY DISINTEGRATING ORAL at 03:21

## 2023-01-21 RX ADMIN — AMIODARONE HYDROCHLORIDE 400 MG: 200 TABLET ORAL at 05:37

## 2023-01-21 RX ADMIN — OXYCODONE HYDROCHLORIDE 10 MG: 10 TABLET ORAL at 11:02

## 2023-01-21 RX ADMIN — OMEPRAZOLE 20 MG: 20 CAPSULE, DELAYED RELEASE ORAL at 16:54

## 2023-01-21 RX ADMIN — ONDANSETRON HYDROCHLORIDE 4 MG: 2 SOLUTION INTRAMUSCULAR; INTRAVENOUS at 14:01

## 2023-01-21 RX ADMIN — OXYCODONE HYDROCHLORIDE 5 MG: 5 TABLET ORAL at 02:42

## 2023-01-21 RX ADMIN — SENNOSIDES AND DOCUSATE SODIUM 2 TABLET: 50; 8.6 TABLET ORAL at 16:54

## 2023-01-21 RX ADMIN — METOPROLOL TARTRATE 25 MG: 25 TABLET, FILM COATED ORAL at 05:38

## 2023-01-21 RX ADMIN — FUROSEMIDE 40 MG: 10 INJECTION, SOLUTION INTRAMUSCULAR; INTRAVENOUS at 05:37

## 2023-01-21 RX ADMIN — ASPIRIN 81 MG: 81 TABLET, COATED ORAL at 05:37

## 2023-01-21 RX ADMIN — WARFARIN SODIUM 3 MG: 3 TABLET ORAL at 16:53

## 2023-01-21 ASSESSMENT — LIFESTYLE VARIABLES: SUBSTANCE_ABUSE: 0

## 2023-01-21 ASSESSMENT — ENCOUNTER SYMPTOMS
SENSORY CHANGE: 0
HEADACHES: 0
WHEEZING: 0
VOMITING: 0
ABDOMINAL PAIN: 0
HEMOPTYSIS: 0
WEAKNESS: 1
PALPITATIONS: 0
SHORTNESS OF BREATH: 1
SPUTUM PRODUCTION: 0
FEVER: 0
LOSS OF CONSCIOUSNESS: 0
DIZZINESS: 0
FALLS: 0
BLURRED VISION: 0
COUGH: 1
FOCAL WEAKNESS: 0
EYE PAIN: 0
CHILLS: 0
INSOMNIA: 0
BACK PAIN: 0
NAUSEA: 0

## 2023-01-21 ASSESSMENT — PAIN SCALES - WONG BAKER
WONGBAKER_NUMERICALRESPONSE: HURTS A LITTLE MORE
WONGBAKER_NUMERICALRESPONSE: HURTS A LITTLE MORE

## 2023-01-21 NOTE — PROGRESS NOTES
4 Eyes Skin Assessment Completed by ALLEN Gifford and ALLEN Overton.    Head WDL  Ears WDL  Nose WDL  Mouth WDL  Neck WDL  Breast/Chest WDL  Shoulder Blades WDL  Spine WDL  (R) Arm/Elbow/Hand WDL  (L) Arm/Elbow/Hand WDL  Abdomen WDL  Groin WDL  Scrotum/Coccyx/Buttocks WDL  (R) Leg WDL  (L) Leg WDL  (R) Heel/Foot/Toe WDL  (L) Heel/Foot/Toe WDL          Devices In Places Tele Box      Interventions In Place Pillows    Possible Skin Injury No    Pictures Uploaded Into Epic N/A  Wound Consult Placed N/A  RN Wound Prevention Protocol Ordered No

## 2023-01-21 NOTE — ASSESSMENT & PLAN NOTE
Respiratory care, respiratory therapy  40+ pack year history, stopped smoking recently  Suspect underlying COPD, outpatient PFT and pulm follow up  symbicort and cesia here

## 2023-01-21 NOTE — ASSESSMENT & PLAN NOTE
Echo with EF of 45% and also shoving global hypokinesis   I have consulted cardiology appreciate rec.

## 2023-01-21 NOTE — ED NOTES
Assumed care of patient, report given by Christina VILLA. Porter in lowest position and locked. Instructed pt to call for assistance if needed. Call light and belongings within reach. Pt sitting up in bed, awaiting for CT.

## 2023-01-21 NOTE — ASSESSMENT & PLAN NOTE
Rate controlled on metoprolol and amiodarone  Continue warfarin for anticoagulation  Cardiology follow-up

## 2023-01-21 NOTE — H&P
Hospital Medicine History & Physical Note    Date of Service  1/20/2023    Primary Care Physician  Pcp Pt States None    Consultants      Specialist Names:     Code Status  Full Code    Chief Complaint  Chief Complaint   Patient presents with    Chest Pain     X 5 hours    Shortness of Breath    Leg Swelling       History of Presenting Illness  Noe Hickey is a 62 y.o. male who presented 1/20/2023 with past medical history of atrial fibrillation/flutter, history of aortic stenosis status post valve replacement, history of tobacco abuse, hyperlipidemia who presents to the hospital for shortness of breath, cough and lower extremity swelling.  Patient was recently discharged in the hospital on 1/5 for aortic aneurysm repair and aortic valve replacement.  Postoperatively he was given IV fluids due to blood loss.  During his hospital stay he had a complication of torsades and ventricular arrhythmia.  He was shocked once and then returned back to normal sinus rhythm.  His echo found EF to be 55% at that time.  Patient states that since his hospital stay has been having worsening lower extremity edema and cough.  He denies any hemoptysis or purulent sputum.  He was discharged to [facility] nursing [facility] and states that he has been eating a high salt diet.  Patient was discharged with Lasix and states that he has been compliant with it.    Chest x-ray interpreted by me found left lower lobe infiltrate  EKG found normal sinus rhythm, left axis deviation, low voltage    I discussed the plan of care with patient.    Review of Systems  Review of Systems   Constitutional:  Positive for malaise/fatigue. Negative for chills, diaphoresis and fever.   HENT:  Negative for congestion, ear discharge, ear pain, hearing loss, nosebleeds, sinus pain, sore throat and tinnitus.    Eyes:  Negative for blurred vision, double vision, photophobia and pain.   Respiratory:  Positive for cough and shortness of breath. Negative  for hemoptysis, sputum production, wheezing and stridor.    Cardiovascular:  Positive for orthopnea, leg swelling and PND. Negative for chest pain, palpitations and claudication.   Gastrointestinal:  Negative for abdominal pain, blood in stool, constipation, diarrhea, heartburn, melena, nausea and vomiting.   Genitourinary:  Negative for dysuria, flank pain, frequency, hematuria and urgency.   Musculoskeletal:  Negative for back pain, falls, joint pain, myalgias and neck pain.   Skin:  Negative for itching and rash.   Neurological:  Negative for dizziness, tingling, tremors, weakness and headaches.   Endo/Heme/Allergies:  Negative for environmental allergies and polydipsia. Does not bruise/bleed easily.   Psychiatric/Behavioral:  Negative for depression, hallucinations, substance abuse and suicidal ideas.      Past Medical History   has a past medical history of Arrhythmia, Breath shortness, Cyclic vomiting syndrome, Fracture, Headache, classical migraine, Heart burn, Heart valve disease, Indigestion, Pneumonia due to infectious organism (1/20/2023), and Snoring.    Surgical History   has a past surgical history that includes shoulder arthroscopy; shoulder hemicap resurfacing (Right, 10/12/2015); irrigation & debridement ortho (Right, 09/19/2017); shoulder surgery; aortic valve replacement (1/5/2023); aortic ascending dissection (1/5/2023); and echocardiogram, transesophageal, intraoperative (1/5/2023).     Family History    Family history reviewed with patient. There is no family history that is pertinent to the chief complaint.     Social History   reports that he quit smoking about 2 weeks ago. His smoking use included cigarettes. He has a 40.00 pack-year smoking history. He has never used smokeless tobacco. He reports that he does not currently use drugs. He reports that he does not drink alcohol.    Allergies  Allergies   Allergen Reactions    Morphine Rash     Rash/ difficulty breathing        Medications  Prior to Admission Medications   Prescriptions Last Dose Informant Patient Reported? Taking?   acetaminophen (TYLENOL) 500 MG Tab 1/19/2023 at AM Significant Other, Patient Yes No   Sig: Take 2 Tablets by mouth every 6 hours as needed for Moderate Pain or Mild Pain.   amLODIPine (NORVASC) 10 MG Tab 1/17/2023 at PM Significant Other No No   Sig: Take 1 Tablet by mouth every day.   amiodarone (PACERONE) 400 MG tablet 1/18/2023 at PM Significant Other No No   Sig: Take 1 Tablet by mouth 2 times a day.   amiodarone (PACERONE) 400 MG tablet Not started yet at N/A Significant Other Yes Yes   Sig: Take 400 mg by mouth every day.   aspirin EC 81 MG EC tablet 1/17/2023 at PM Significant Other No No   Sig: Take 1 Tablet by mouth every day.   furosemide (LASIX) 40 MG Tab 1/17/2023 at PM Significant Other No No   Sig: Take 1 Tablet by mouth every day.   furosemide (LASIX) 40 MG Tab Not started yet at N/A Significant Other Yes Yes   Sig: Take 40 mg by mouth 2 times a day.   metoprolol tartrate (LOPRESSOR) 25 MG Tab 1/17/2023 at PM Significant Other No No   Sig: Take 1 Tablet by mouth 2 times a day.   potassium Chloride ER (K-TAB) 20 MEQ Tab CR tablet Not started yet at N/A Significant Other Yes Yes   Sig: Take 20 mEq by mouth 2 times a day.   potassium chloride SA (KDUR) 20 MEQ Tab CR 1/17/2023 at PM Significant Other No No   Sig: Take 1 Tablet by mouth every day.   warfarin (COUMADIN) 5 MG Tab 1/17/2023 at PM Significant Other No No   Sig: Take 1 Tablet by mouth every day at 6 PM.      Facility-Administered Medications: None       Physical Exam  Temp:  [36.4 °C (97.6 °F)] 36.4 °C (97.6 °F)  Pulse:  [64-68] 67  Resp:  [15-24] 24  BP: (122-150)/(80-82) 150/80  SpO2:  [94 %-98 %] 97 %  Blood Pressure: (!) 150/80   Temperature: 36.4 °C (97.6 °F)   Pulse: 67   Respiration: (!) 24   Pulse Oximetry: 97 %       Physical Exam  Vitals and nursing note reviewed.   Constitutional:       General: He is not in acute  distress.     Appearance: Normal appearance. He is not ill-appearing, toxic-appearing or diaphoretic.   HENT:      Head: Normocephalic and atraumatic.      Nose: No congestion or rhinorrhea.      Mouth/Throat:      Pharynx: No posterior oropharyngeal erythema.   Eyes:      General: No scleral icterus.        Right eye: No discharge.   Neck:      Vascular: Hepatojugular reflux and JVD present.   Cardiovascular:      Rate and Rhythm: Normal rate and regular rhythm.      Pulses: Normal pulses.      Heart sounds: No murmur heard.    No friction rub. No gallop.   Pulmonary:      Effort: No respiratory distress.      Breath sounds: No stridor. Rales present. No wheezing or rhonchi.   Abdominal:      General: There is no distension.      Tenderness: There is no abdominal tenderness.   Musculoskeletal:         General: No swelling, tenderness, deformity or signs of injury.      Cervical back: Normal range of motion.      Right lower leg: Edema present.      Left lower leg: Edema present.   Skin:     Capillary Refill: Capillary refill takes more than 3 seconds.      Coloration: Skin is not jaundiced or pale.      Findings: No bruising, erythema, lesion or rash.   Neurological:      General: No focal deficit present.      Mental Status: He is alert and oriented to person, place, and time.       Laboratory:  Recent Labs     01/20/23  1622   WBC 15.5*   RBC 4.05*   HEMOGLOBIN 9.0*   HEMATOCRIT 29.5*   MCV 72.8*   MCH 22.2*   MCHC 30.5*   RDW 48.4   PLATELETCT 464*   MPV 9.9     Recent Labs     01/20/23  1622   SODIUM 135   POTASSIUM 4.8   CHLORIDE 100   CO2 25   GLUCOSE 93   BUN 24*   CREATININE 1.73*   CALCIUM 8.7     Recent Labs     01/20/23  1622   ALTSGPT 45   ASTSGOT 33   ALKPHOSPHAT 60   TBILIRUBIN 0.4   GLUCOSE 93         Recent Labs     01/20/23  1622   NTPROBNP 1489*         Recent Labs     01/20/23  1622 01/20/23  1852   TROPONINT 85* 87*       Imaging:  CT-CTA CHEST PULMONARY ARTERY W/ RECONS   Final Result      1.   No CT evidence of pulmonary embolism.      2.  Significant cardiomegaly.      3.  Consolidation and volume loss in the left lower lobe and lingular segment left upper lobe could BE due to consolidative atelectasis and pneumonia.      4.  Minimal groundglass opacifications noted in the right lung which could be due to inflammation or infection.      5.  Additional linear opacifications in the right lung likely due to atelectasis or scarring.            DX-CHEST-PORTABLE (1 VIEW)   Final Result      1.  Left lung base atelectasis.      2.  Cardiomegaly.      3.  Chronic elevation of left hemidiaphragm.      EC-ECHOCARDIOGRAM COMPLETE W/O CONT    (Results Pending)       X-Ray:  I have personally reviewed the images and compared with prior images.  EKG:  I have personally reviewed the images and compared with prior images.    Assessment/Plan:  Justification for Admission Status  I anticipate this patient will require at least two midnights for appropriate medical management, necessitating inpatient admission because pneumonia    Patient will need a Telemetry bed on MEDICAL service .  The need is secondary to pneumonia.    * Pneumonia due to infectious organism- (present on admission)  Assessment & Plan  Found to have left sided infiltrate  Bronchial breath sounds and egophony on the left lung exam   Started on  antibiotics including ceftriaxone and azithromycin   Follow up sputum and blood cultures  Respiratory care protocol   Placed on o2 therapy       Elevated troponin  Assessment & Plan  Secondary to renal failure    Paroxysmal atrial fibrillation (HCC)  Assessment & Plan  Rate controlled on metoprolol and amiodarone  Continue warfarin    Acute on chronic diastolic heart failure (HCC)  Assessment & Plan  Last EF was 55%  Decompensated  I have ordered a cardiac echo  Monitor telemetry  Strict ins and outs and daily weights  Start IV Lasix 40 twice daily      Acute respiratory failure with hypoxia (HCC)  Assessment &  Plan  On 3 L of O2 above baseline    Acute renal failure (ARF) (HCC)  Assessment & Plan  Monitor BMP and assess response  Avoid IV contrast/nephrotoxins/NSAIDs  Dose adjust meds for decreased GFR    Start IV Lasix today assess response        VTE prophylaxis: SCDs/TEDs

## 2023-01-21 NOTE — ED NOTES
Pt medicated per MAR, urinal emptied at 300cc of urine voided. Pt medicated for pain per MAR. RT at bedside assessing patient. Sputum cup at bedside, pt is aware.

## 2023-01-21 NOTE — ED TRIAGE NOTES
"Chief Complaint   Patient presents with    Chest Pain     X 5 hours    Shortness of Breath    Leg Swelling     Pt DORA SANTIAGO from [facility] for complaints of SOB, chest pain, and leg swelling. Pt had open heart surgery on 1/5.     BP (!) 146/82   Pulse 68   Resp (!) 24   Ht 1.753 m (5' 9\")   Wt 93 kg (205 lb)   SpO2 96%     "

## 2023-01-21 NOTE — ASSESSMENT & PLAN NOTE
Last EF was 55%  Echo shows follow-up EF 45%  Continue current medication regimen as optimized with the help of cardiology  Assess for additional treatment for volume control

## 2023-01-21 NOTE — CARE PLAN
The patient is Watcher - Medium risk of patient condition declining or worsening    Shift Goals  Clinical Goals: pain management    Progress made toward(s) clinical / shift goals:    Problem: Pain - Standard  Goal: Alleviation of pain or a reduction in pain to the patient’s comfort goal  Outcome: Progressing  Note: Pain medication per mar     Problem: Knowledge Deficit - Standard  Goal: Patient and family/care givers will demonstrate understanding of plan of care, disease process/condition, diagnostic tests and medications  Outcome: Progressing     Problem: Fall Risk  Goal: Patient will remain free from falls  Outcome: Progressing       Patient is not progressing towards the following goals:

## 2023-01-21 NOTE — DISCHARGE PLANNING
Care Transition Team Assessment    RN SISI spoke with patient at bedside. Assessment limited due to patient's cough/shortness of breath. Patient with recent admission 1/5-1/11/23. Patient was discharged home with [facility] and [facility] for home O2. Patient states he has an [employer] and wife will bring upon discharge.    Information Source  Orientation Level: Oriented X4  Information Given By: Patient  Who is responsible for making decisions for patient? : Patient    Elopement Risk  Legal Hold: No  Ambulatory or Self Mobile in Wheelchair: Yes  Disoriented: No  Psychiatric Symptoms: None  History of Wandering: No  Elopement this Admit: No  Vocalizing Wanting to Leave: No  Displays Behaviors, Body Language Wanting to Leave: No-Not at Risk for Elopement  Elopement Risk: Not at Risk for Elopement    Interdisciplinary Discharge Planning  Lives with - Patient's Self Care Capacity: Spouse  Support Systems: Spouse / Significant Other  Do You Take your Prescribed Medications Regularly: Yes  Mobility Issues: No  Prior Services: Skilled Home Health Services ([facility])  Durable Medical Equipment: Home Oxygen  DME Provider / Phone: [facility]    Discharge Preparedness  What is your plan after discharge?: Home health care  Prior Functional Level: Needs Assist with Activities of Daily Living    Functional Assesment  Prior Functional Level: Needs Assist with Activities of Daily Living    Domestic Abuse  Have you ever been the victim of abuse or violence?: No  Physical Abuse or Sexual Abuse: No  Verbal Abuse or Emotional Abuse: No  Possible Abuse/Neglect Reported to:: Not Applicable    Anticipated Discharge Information  Discharge Disposition: D/T to home under A care in anticipation of covered skilled care (06)

## 2023-01-21 NOTE — ED NOTES
POC updated to patient, x 1 set of blood cultures collected and sent, Notified phlebotomist for second set of BC. ERP at bedside. Warming up pt food, okay to eat per ERP

## 2023-01-21 NOTE — ASSESSMENT & PLAN NOTE
Patient s/p mechanical ventilation, successfully extubated  Continue to wean oxygen  Pulmonary toilet  Volume control

## 2023-01-21 NOTE — PROGRESS NOTES
Inpatient Anticoagulation Service Note for 2023      Reason for Anticoagulation: Atrial Fibrillation, Bioprosthetic Valve Replacement   [id number] VASc Score: 2  HAS-BLED Score: 1    Hemoglobin Value: (!) 9  Hematocrit Value: (!) 29.5  Lab Platelet Value: (!) 464  Target INR: 2.0 to 3.0    INR from last 7 days       Date/Time INR Value    23 1622 3.39          Dose from last 7 days       Date/Time Dose (mg)    23 2233 0          Significant Interactions: Antiplatelet Medications, Amiodarone  Bridge Therapy: No    Reversal Agent Administered: Not Applicable  Comments: INR supratherapeutic, will hold dose tonight. According to med rec, patient has not had warfarin in the last 2 days although this seems inconsistent with his INR pattern. No bleeding noted. Daily INR ordered.    Plan:  Hold warfarin  Education Material Provided?: No    Pharmacist suggested discharge dosin.5mg daily. INR within 72 hours of discharge.      Rohit Rees, RosalindaD

## 2023-01-21 NOTE — ED PROVIDER NOTES
"ED Provider Note    CHIEF COMPLAINT  Chief Complaint   Patient presents with    Chest Pain     X 5 hours    Shortness of Breath    Leg Swelling       EXTERNAL RECORDS REVIEWED  Previous notes for inpatient    HPI/ROS  LIMITATION TO HISTORY   None  OUTSIDE HISTORIAN(S):  Other, girlfriend    Noe Hickey is a 62 y.o. male who presents here for evaluation of shortness of breath and bilateral lower leg swelling.  Patient recently had a procedure, aortic valve replacement, done on the fifth of this month.  He states that since that time, he has been in rehab, but has been having shortness of breath has been persistent.  He has no fever chills, no vomiting, no abdominal pain.  Patient states that shortness of breath is worse when he talks, and better when he remains still.  He has not taken anything other than his \"water pills\" to get rid of some of the fluid.    PAST MEDICAL HISTORY   has a past medical history of Arrhythmia, Breath shortness, Cyclic vomiting syndrome, Fracture, Headache, classical migraine, Heart burn, Heart valve disease, Indigestion, and Snoring.    SURGICAL HISTORY   has a past surgical history that includes shoulder arthroscopy; shoulder hemicap resurfacing (Right, 10/12/2015); irrigation & debridement ortho (Right, 2017); shoulder surgery; aortic valve replacement (2023); aortic ascending dissection (2023); and echocardiogram, transesophageal, intraoperative (2023).    FAMILY HISTORY  No family history on file.    SOCIAL HISTORY  Social History     Tobacco Use    Smoking status: Former     Packs/day: 1.00     Years: 40.00     Pack years: 40.00     Types: Cigarettes     Quit date: 1/3/2023     Years since quittin.0    Smokeless tobacco: Never   Vaping Use    Vaping Use: Never used   Substance and Sexual Activity    Alcohol use: No    Drug use: Not Currently     Comment: past problems with narcotics     Sexual activity: Not on file       CURRENT MEDICATIONS  Home " "Medications       Reviewed by Alexandra Ang (Pharmacy Tech) on 01/20/23 at 1737  Med List Status: Complete     Medication Last Dose Status   acetaminophen (TYLENOL) 500 MG Tab 1/19/2023 Active   amiodarone (PACERONE) 400 MG tablet 1/18/2023 Active   amiodarone (PACERONE) 400 MG tablet Not started yet Active   amLODIPine (NORVASC) 10 MG Tab 1/17/2023 Active   aspirin EC 81 MG EC tablet 1/17/2023 Active   furosemide (LASIX) 40 MG Tab 1/17/2023 Active   furosemide (LASIX) 40 MG Tab Not started yet Active   metoprolol tartrate (LOPRESSOR) 25 MG Tab 1/17/2023 Active   potassium Chloride ER (K-TAB) 20 MEQ Tab CR tablet Not started yet Active   potassium chloride SA (KDUR) 20 MEQ Tab CR 1/17/2023 Active   warfarin (COUMADIN) 5 MG Tab 1/17/2023 Active                    ALLERGIES  Allergies   Allergen Reactions    Morphine Rash     Rash/ difficulty breathing       PHYSICAL EXAM  VITAL SIGNS: /81   Pulse 67   Resp 17   Ht 1.753 m (5' 9\")   Wt 93 kg (205 lb)   SpO2 98%   BMI 30.27 kg/m²    Constitutional: Well developed, well nourished.  Daughter at acute distress.  HEENT: Normocephalic, atraumatic. Posterior pharynx clear and moist.  Eyes:  EOMI. Normal sclera.  Neck: Supple, Full range of motion, nontender.   Chest/Pulmonary: Diminished breath sounds, equal expansion  Cardio: Regular rate and rhythm with no murmur.   Abdomen: Soft, nontender. No peritoneal signs. No guarding. No palpable masses.  Musculoskeletal: No deformity, no edema, neurovascular intact.   Neuro: Clear speech, appropriate, cooperative, cranial nerves II-XII grossly intact.  Psych: Normal mood and affect      DIAGNOSTIC STUDIES / PROCEDURES      LABS  Results for orders placed or performed during the hospital encounter of 01/20/23   CBC with Differential   Result Value Ref Range    WBC 15.5 (H) 4.8 - 10.8 K/uL    RBC 4.05 (L) 4.70 - 6.10 M/uL    Hemoglobin 9.0 (L) 14.0 - 18.0 g/dL    Hematocrit 29.5 (L) 42.0 - 52.0 %    MCV 72.8 (L) 81.4 " - 97.8 fL    MCH 22.2 (L) 27.0 - 33.0 pg    MCHC 30.5 (L) 33.7 - 35.3 g/dL    RDW 48.4 35.9 - 50.0 fL    Platelet Count 464 (H) 164 - 446 K/uL    MPV 9.9 9.0 - 12.9 fL    Neutrophils-Polys 77.50 (H) 44.00 - 72.00 %    Lymphocytes 7.00 (L) 22.00 - 41.00 %    Monocytes 11.90 0.00 - 13.40 %    Eosinophils 1.40 0.00 - 6.90 %    Basophils 0.50 0.00 - 1.80 %    Immature Granulocytes 1.70 (H) 0.00 - 0.90 %    Nucleated RBC 1.00 /100 WBC    Neutrophils (Absolute) 11.99 (H) 1.82 - 7.42 K/uL    Lymphs (Absolute) 1.09 1.00 - 4.80 K/uL    Monos (Absolute) 1.84 (H) 0.00 - 0.85 K/uL    Eos (Absolute) 0.21 0.00 - 0.51 K/uL    Baso (Absolute) 0.08 0.00 - 0.12 K/uL    Immature Granulocytes (abs) 0.27 (H) 0.00 - 0.11 K/uL    NRBC (Absolute) 0.16 K/uL   Complete Metabolic Panel (CMP)   Result Value Ref Range    Sodium 135 135 - 145 mmol/L    Potassium 4.8 3.6 - 5.5 mmol/L    Chloride 100 96 - 112 mmol/L    Co2 25 20 - 33 mmol/L    Anion Gap 10.0 7.0 - 16.0    Glucose 93 65 - 99 mg/dL    Bun 24 (H) 8 - 22 mg/dL    Creatinine 1.73 (H) 0.50 - 1.40 mg/dL    Calcium 8.7 8.5 - 10.5 mg/dL    AST(SGOT) 33 12 - 45 U/L    ALT(SGPT) 45 2 - 50 U/L    Alkaline Phosphatase 60 30 - 99 U/L    Total Bilirubin 0.4 0.1 - 1.5 mg/dL    Albumin 3.6 3.2 - 4.9 g/dL    Total Protein 6.4 6.0 - 8.2 g/dL    Globulin 2.8 1.9 - 3.5 g/dL    A-G Ratio 1.3 g/dL   Troponins NOW   Result Value Ref Range    Troponin T 85 (H) 6 - 19 ng/L   Troponins in two (2) hours   Result Value Ref Range    Troponin T 87 (H) 6 - 19 ng/L   CORRECTED CALCIUM   Result Value Ref Range    Correct Calcium 9.0 8.5 - 10.5 mg/dL   ESTIMATED GFR   Result Value Ref Range    GFR (CKD-EPI) 44 (A) >60 mL/min/1.73 m 2   proBrain Natriuretic Peptide, NT   Result Value Ref Range    NT-proBNP 1489 (H) 0 - 125 pg/mL   EKG   Result Value Ref Range    Report       [facility] Emergency Dept.    Test Date:  2023-01-20  Pt Name:    JUSTIN TOPETE               Department: ER  MRN:         [id number]                      Room:       [facility]  Gender:     Male                         Technician: [id number]  :        1960                   Requested By:ER TRIAGE PROTOCOL  Order #:    [id number]                    Reading MD:    Measurements  Intervals                                Axis  Rate:       69                           P:          55  AR:         202                          QRS:        -6  QRSD:       82                           T:          40  QT:         405  QTc:        434    Interpretive Statements  Sinus rhythm  Compared to ECG 2023 20:28:36  Atrial fibrillation no longer present  ST (T wave) deviation no longer present  Myocardial infarct finding no longer present       EKG; normal sinus rhythm at a rate of 69.  No ST elevation, no ST depression.  QTC is 434.  Compared to EKG from 2023.    RADIOLOGY  I have independently interpreted the diagnostic imaging associated with this visit and am waiting the final reading from the radiologist.   My preliminary interpretation is a follows:   No acute fx    CT-CTA CHEST PULMONARY ARTERY W/ RECONS   Final Result      1.  No CT evidence of pulmonary embolism.      2.  Significant cardiomegaly.      3.  Consolidation and volume loss in the left lower lobe and lingular segment left upper lobe could BE due to consolidative atelectasis and pneumonia.      4.  Minimal groundglass opacifications noted in the right lung which could be due to inflammation or infection.      5.  Additional linear opacifications in the right lung likely due to atelectasis or scarring.            DX-CHEST-PORTABLE (1 VIEW)   Final Result      1.  Left lung base atelectasis.      2.  Cardiomegaly.      3.  Chronic elevation of left hemidiaphragm.            COURSE & MEDICAL DECISION MAKING      INITIAL ASSESSMENT AND PLAN  Care Narrative: This is a 62-year-old male here for evaluation of shortness of breath from pulm rehab.  Patient is here because he is having  increasing shortness of breath over the last few days.  And some lower leg swelling.  He is recently postoperative out of a aortic valve replacement, and comes in here for the breathing issue and swelling.  The patient will be seen and evaluated by the hospitalist service, and on CT it appears he has a pneumonia.  He also has elevated white blood cell count, will cultures drawn prior to antibiotics.    ADDITIONAL PROBLEM LIST AND DISPOSITION        FINAL DIAGNOSIS  1. Pneumonia due to infectious organism, unspecified laterality, unspecified part of lung    2.      Critical care time 50 minutes.            Electronically signed by: Rene Araujo D.O., 1/20/2023 5:54 PM

## 2023-01-21 NOTE — PROGRESS NOTES
Inpatient Anticoagulation Service Note for 1/21/2023      Reason for Anticoagulation: Atrial Fibrillation, Bioprosthetic Valve Replacement   [id number] VASc Score: 2  HAS-BLED Score: 1    Hemoglobin Value: (!) 9.5  Hematocrit Value: (!) 31.8  Lab Platelet Value: (!) 459  Target INR: 2.0 to 3.0    INR from last 7 days       Date/Time INR Value    01/21/23 0210 2.92    01/20/23 1622 3.39          Dose from last 7 days       Date/Time Dose (mg)    01/21/23 1203 3    01/20/23 2233 0          Significant Interactions: Antiplatelet Medications, Amiodarone  Bridge Therapy: No   Reversal Agent Administered: Not Applicable  Comments: Resuming at lower end of dosing at 3 mg for now.  INR trend.  Education Material Provided?: No    Pharmacist suggested discharge dosing: SANDRA Rodriguez, PharmD  [phone]

## 2023-01-21 NOTE — ASSESSMENT & PLAN NOTE
S/p antibiotic treatment  Respiratory care protocol   Placed on o2 therapy   Improving currently on room air has completed antibiotics

## 2023-01-21 NOTE — ED NOTES
Pt to T714/02 via [facility]. Pt with all belongings in possession. Pt is on cardiac monitoring and on 3L of oxygen NC. Report has been given to Ирина VILLA.

## 2023-01-21 NOTE — ED NOTES
Med Rec Complete per patient & patient's spouse at bedside  Allergies Reviewed with patient  No antibiotics within the last 30 days  Patient's Preferred Pharmacy:  [facility] on [address]     There have been changes regarding the frequency  to a few of the patient's medications:     1) Furosemide 40 mg now taken twice daily  (not started yet)  2) Potassium 20 mEq now taken twice daily (not started yet)  3) Amiodarone 400mg now taken only once daily (not started yet).

## 2023-01-21 NOTE — ASSESSMENT & PLAN NOTE
Improving  Avoid IV contrast/nephrotoxins/NSAIDs  Creatinine has normalized continue to monitor

## 2023-01-21 NOTE — PROGRESS NOTES
[facility] Medicine Daily Progress Note    Date of Service  1/21/2023    Chief Complaint  Noe Hickey is a 62 y.o. male admitted 1/20/2023 with leg swelling, cough.    Hospital Course  Noe Hickey is a 62 y.o. male who presented 1/20/2023 with past medical history of atrial fibrillation/flutter, history of aortic stenosis status post valve replacement, history of tobacco abuse, hyperlipidemia who presents to the hospital for shortness of breath, cough and lower extremity swelling.  Patient was recently discharged in the hospital on 1/5 for aortic aneurysm repair and aortic valve replacement.  Postoperatively he was given IV fluids due to blood loss.  During his hospital stay he had a complication of torsades and ventricular arrhythmia.  He was shocked once and then returned back to normal sinus rhythm.  His echo found EF to be 55% at that time.  Patient states that since his hospital stay has been having worsening lower extremity edema and cough.  He denies any hemoptysis or purulent sputum.  He was discharged to [facility] and states that he has been eating a high salt diet.  Patient was discharged with Lasix and states that he has been compliant with it.    Interval Problem Update  Patient admitted overnight for acute hypoxia due to pneumonia.  On 3L oxygen, continue to wean.  Continue ceftriaxone for pneumonia, can stop azithromycin as pneumonia appears lobar.  Unclear if actual pneumonia vs atelectasis, however given hypoxia and cough will continue short course antibiotics.  Continue IV lasix for edemaq.  Echo ordered.  Wheezing heard on exam, no formal diagnosis of COPD in the past, however patient with 40+ pack year history. Start duonebs prn for COPD. Start symbicort.  Came from [facility], possibly can discharge back there. PT/OT evaluations.    I have discussed this patient's plan of care and discharge plan at IDT rounds today with Case Management, Nursing,  Nursing leadership, and other members of the IDT team.    Consultants/Specialty  none    Code Status  Full Code    Disposition  Patient is not medically cleared for discharge.   Anticipate discharge to to skilled nursing facility.  I have placed the appropriate orders for post-discharge needs.    Review of Systems  Review of Systems   Constitutional:  Negative for chills and fever.   Eyes:  Negative for blurred vision and pain.   Respiratory:  Positive for cough and shortness of breath. Negative for hemoptysis, sputum production and wheezing.    Cardiovascular:  Positive for leg swelling. Negative for chest pain and palpitations.   Gastrointestinal:  Negative for abdominal pain, nausea and vomiting.   Genitourinary:  Negative for dysuria and urgency.   Musculoskeletal:  Negative for back pain and falls.   Skin:  Negative for itching and rash.   Neurological:  Positive for weakness. Negative for dizziness, sensory change, focal weakness, loss of consciousness and headaches.   Psychiatric/Behavioral:  Negative for substance abuse. The patient does not have insomnia.       Physical Exam  Temp:  [36 °C (96.8 °F)-37.3 °C (99.1 °F)] 36.7 °C (98.1 °F)  Pulse:  [63-73] 64  Resp:  [14-24] 18  BP: (113-189)/() 117/76  SpO2:  [91 %-98 %] 95 %    Physical Exam  Constitutional:       General: He is not in acute distress.     Appearance: He is not ill-appearing.   HENT:      Head: Normocephalic and atraumatic.      Right Ear: External ear normal.      Left Ear: External ear normal.      Mouth/Throat:      Pharynx: No oropharyngeal exudate or posterior oropharyngeal erythema.   Eyes:      Extraocular Movements: Extraocular movements intact.      Pupils: Pupils are equal, round, and reactive to light.   Cardiovascular:      Rate and Rhythm: Normal rate and regular rhythm.      Pulses: Normal pulses.      Heart sounds: Normal heart sounds.   Pulmonary:      Effort: Pulmonary effort is normal. No respiratory distress.       Breath sounds: No stridor. Wheezing present. No rales.   Abdominal:      General: Bowel sounds are normal. There is no distension.      Palpations: Abdomen is soft.      Tenderness: There is no abdominal tenderness. There is no guarding or rebound.   Musculoskeletal:         General: Swelling present. No tenderness.      Cervical back: Normal range of motion and neck supple.      Right lower leg: Edema present.      Left lower leg: Edema present.   Skin:     General: Skin is warm and dry.   Neurological:      General: No focal deficit present.      Mental Status: He is oriented to person, place, and time.      Cranial Nerves: No cranial nerve deficit.      Sensory: No sensory deficit.      Motor: No weakness.   Psychiatric:         Mood and Affect: Mood normal.         Behavior: Behavior normal.       Fluids    Intake/Output Summary (Last 24 hours) at 1/21/2023 1320  Last data filed at 1/21/2023 0400  Gross per 24 hour   Intake --   Output 1450 ml   Net -1450 ml       Laboratory  Recent Labs     01/20/23  1622 01/21/23  0210   WBC 15.5* 15.4*   RBC 4.05* 4.35*   HEMOGLOBIN 9.0* 9.5*   HEMATOCRIT 29.5* 31.8*   MCV 72.8* 73.1*   MCH 22.2* 21.8*   MCHC 30.5* 29.9*   RDW 48.4 48.9   PLATELETCT 464* 459*   MPV 9.9 10.0     Recent Labs     01/20/23  1622 01/21/23  0210   SODIUM 135 133*   POTASSIUM 4.8 4.6   CHLORIDE 100 96   CO2 25 25   GLUCOSE 93 101*   BUN 24* 26*   CREATININE 1.73* 1.63*   CALCIUM 8.7 8.7     Recent Labs     01/20/23  1622 01/21/23  0210   INR 3.39* 2.92*               Imaging  EC-ECHOCARDIOGRAM COMPLETE W/O CONT   Final Result      CT-CTA CHEST PULMONARY ARTERY W/ RECONS   Final Result      1.  No CT evidence of pulmonary embolism.      2.  Significant cardiomegaly.      3.  Consolidation and volume loss in the left lower lobe and lingular segment left upper lobe could BE due to consolidative atelectasis and pneumonia.      4.  Minimal groundglass opacifications noted in the right lung which could be  due to inflammation or infection.      5.  Additional linear opacifications in the right lung likely due to atelectasis or scarring.            DX-CHEST-PORTABLE (1 VIEW)   Final Result      1.  Left lung base atelectasis.      2.  Cardiomegaly.      3.  Chronic elevation of left hemidiaphragm.           Assessment/Plan  * Pneumonia due to infectious organism- (present on admission)  Assessment & Plan  Found to have left sided infiltrate  Bronchial breath sounds and egophony on the left lung exam   Started on  antibiotics  Follow up sputum and blood cultures  Respiratory care protocol   Placed on o2 therapy       Wheezing  Assessment & Plan  [place]zard on exam  40+ pack year history, stopped smoking recently  Suspect underlying COPD, outpatient PFT and pulm follow up  Start symbicort and duonebs here    Elevated troponin  Assessment & Plan  Secondary to renal failure    Paroxysmal atrial fibrillation (HCC)  Assessment & Plan  Rate controlled on metoprolol and amiodarone  Continue warfarin    Acute on chronic diastolic heart failure (HCC)  Assessment & Plan  Last EF was 55%  Decompensated  I have ordered a cardiac echo  Monitor telemetry  Strict ins and outs and daily weights  Start IV Lasix 40 twice daily      Acute respiratory failure with hypoxia (HCC)  Assessment & Plan  On 3 L of O2 above baseline  Suspect due to pneumonia vs atelectasis  Continue antibiotics and IS    Acute renal failure (ARF) (HCC)  Assessment & Plan  Monitor BMP and assess response  Avoid IV contrast/nephrotoxins/NSAIDs  Dose adjust meds for decreased GFR    Start IV Lasix today assess response         VTE prophylaxis: therapeutic anticoagulation with warfarin    I have performed a physical exam and reviewed and updated ROS and Plan today (1/21/2023). In review of yesterday's note (1/20/2023), there are no changes except as documented above.

## 2023-01-22 PROBLEM — J44.9 COPD (CHRONIC OBSTRUCTIVE PULMONARY DISEASE) (HCC): Status: ACTIVE | Noted: 2023-01-21

## 2023-01-22 PROBLEM — I50.23 ACUTE ON CHRONIC SYSTOLIC HEART FAILURE (HCC): Status: ACTIVE | Noted: 2023-01-20

## 2023-01-22 LAB
ANION GAP SERPL CALC-SCNC: 10 MMOL/L (ref 7–16)
BUN SERPL-MCNC: 30 MG/DL (ref 8–22)
CALCIUM SERPL-MCNC: 8.5 MG/DL (ref 8.5–10.5)
CHLORIDE SERPL-SCNC: 97 MMOL/L (ref 96–112)
CO2 SERPL-SCNC: 29 MMOL/L (ref 20–33)
CREAT SERPL-MCNC: 1.61 MG/DL (ref 0.5–1.4)
ERYTHROCYTE [DISTWIDTH] IN BLOOD BY AUTOMATED COUNT: 48.3 FL (ref 35.9–50)
GFR SERPLBLD CREATININE-BSD FMLA CKD-EPI: 48 ML/MIN/1.73 M 2
GLUCOSE SERPL-MCNC: 123 MG/DL (ref 65–99)
HCT VFR BLD AUTO: 28.5 % (ref 42–52)
HGB BLD-MCNC: 8.7 G/DL (ref 14–18)
INR PPP: 2.3 (ref 0.87–1.13)
MCH RBC QN AUTO: 22 PG (ref 27–33)
MCHC RBC AUTO-ENTMCNC: 30.5 G/DL (ref 33.7–35.3)
MCV RBC AUTO: 72.2 FL (ref 81.4–97.8)
PLATELET # BLD AUTO: 424 K/UL (ref 164–446)
PMV BLD AUTO: 10 FL (ref 9–12.9)
POTASSIUM SERPL-SCNC: 4.2 MMOL/L (ref 3.6–5.5)
PROTHROMBIN TIME: 24.6 SEC (ref 12–14.6)
RBC # BLD AUTO: 3.95 M/UL (ref 4.7–6.1)
SODIUM SERPL-SCNC: 136 MMOL/L (ref 135–145)
UFH PPP CHRO-ACNC: <0.1 IU/ML
WBC # BLD AUTO: 14.7 K/UL (ref 4.8–10.8)

## 2023-01-22 PROCEDURE — 36415 COLL VENOUS BLD VENIPUNCTURE: CPT

## 2023-01-22 PROCEDURE — 80048 BASIC METABOLIC PNL TOTAL CA: CPT

## 2023-01-22 PROCEDURE — 85610 PROTHROMBIN TIME: CPT

## 2023-01-22 PROCEDURE — 770020 HCHG ROOM/CARE - TELE (206)

## 2023-01-22 PROCEDURE — A9270 NON-COVERED ITEM OR SERVICE: HCPCS | Performed by: HOSPITALIST

## 2023-01-22 PROCEDURE — 700101 HCHG RX REV CODE 250: Performed by: STUDENT IN AN ORGANIZED HEALTH CARE EDUCATION/TRAINING PROGRAM

## 2023-01-22 PROCEDURE — 85027 COMPLETE CBC AUTOMATED: CPT

## 2023-01-22 PROCEDURE — 700102 HCHG RX REV CODE 250 W/ 637 OVERRIDE(OP): Performed by: HOSPITALIST

## 2023-01-22 PROCEDURE — 94640 AIRWAY INHALATION TREATMENT: CPT

## 2023-01-22 PROCEDURE — 97162 PT EVAL MOD COMPLEX 30 MIN: CPT

## 2023-01-22 PROCEDURE — 700111 HCHG RX REV CODE 636 W/ 250 OVERRIDE (IP): Performed by: HOSPITALIST

## 2023-01-22 PROCEDURE — A9270 NON-COVERED ITEM OR SERVICE: HCPCS | Performed by: STUDENT IN AN ORGANIZED HEALTH CARE EDUCATION/TRAINING PROGRAM

## 2023-01-22 PROCEDURE — 99232 SBSQ HOSP IP/OBS MODERATE 35: CPT | Performed by: STUDENT IN AN ORGANIZED HEALTH CARE EDUCATION/TRAINING PROGRAM

## 2023-01-22 PROCEDURE — 85520 HEPARIN ASSAY: CPT

## 2023-01-22 PROCEDURE — 700102 HCHG RX REV CODE 250 W/ 637 OVERRIDE(OP): Performed by: STUDENT IN AN ORGANIZED HEALTH CARE EDUCATION/TRAINING PROGRAM

## 2023-01-22 RX ORDER — IPRATROPIUM BROMIDE AND ALBUTEROL SULFATE 2.5; .5 MG/3ML; MG/3ML
3 SOLUTION RESPIRATORY (INHALATION)
Status: DISCONTINUED | OUTPATIENT
Start: 2023-01-22 | End: 2023-01-23

## 2023-01-22 RX ORDER — WARFARIN SODIUM 2.5 MG/1
2.5 TABLET ORAL DAILY
Status: DISCONTINUED | OUTPATIENT
Start: 2023-01-22 | End: 2023-01-29

## 2023-01-22 RX ORDER — BUDESONIDE AND FORMOTEROL FUMARATE DIHYDRATE 80; 4.5 UG/1; UG/1
2 AEROSOL RESPIRATORY (INHALATION)
Status: DISCONTINUED | OUTPATIENT
Start: 2023-01-22 | End: 2023-01-23

## 2023-01-22 RX ADMIN — BUDESONIDE AND FORMOTEROL FUMARATE DIHYDRATE 2 PUFF: 80; 4.5 AEROSOL RESPIRATORY (INHALATION) at 08:28

## 2023-01-22 RX ADMIN — ACETAMINOPHEN 650 MG: 325 TABLET ORAL at 15:45

## 2023-01-22 RX ADMIN — OXYCODONE HYDROCHLORIDE 10 MG: 10 TABLET ORAL at 17:26

## 2023-01-22 RX ADMIN — SENNOSIDES AND DOCUSATE SODIUM 2 TABLET: 50; 8.6 TABLET ORAL at 05:04

## 2023-01-22 RX ADMIN — BUDESONIDE AND FORMOTEROL FUMARATE DIHYDRATE 2 PUFF: 80; 4.5 AEROSOL RESPIRATORY (INHALATION) at 19:31

## 2023-01-22 RX ADMIN — METOPROLOL TARTRATE 25 MG: 25 TABLET, FILM COATED ORAL at 17:26

## 2023-01-22 RX ADMIN — OXYCODONE HYDROCHLORIDE 10 MG: 10 TABLET ORAL at 21:30

## 2023-01-22 RX ADMIN — FUROSEMIDE 40 MG: 10 INJECTION, SOLUTION INTRAMUSCULAR; INTRAVENOUS at 15:47

## 2023-01-22 RX ADMIN — OXYCODONE HYDROCHLORIDE 10 MG: 10 TABLET ORAL at 10:45

## 2023-01-22 RX ADMIN — AMLODIPINE BESYLATE 10 MG: 10 TABLET ORAL at 05:09

## 2023-01-22 RX ADMIN — WARFARIN SODIUM 2.5 MG: 2.5 TABLET ORAL at 17:26

## 2023-01-22 RX ADMIN — TRAZODONE HYDROCHLORIDE 50 MG: 50 TABLET ORAL at 21:30

## 2023-01-22 RX ADMIN — CEFTRIAXONE SODIUM 2000 MG: 10 INJECTION, POWDER, FOR SOLUTION INTRAVENOUS at 05:10

## 2023-01-22 RX ADMIN — ASPIRIN 81 MG: 81 TABLET, COATED ORAL at 05:09

## 2023-01-22 RX ADMIN — FUROSEMIDE 40 MG: 10 INJECTION, SOLUTION INTRAMUSCULAR; INTRAVENOUS at 05:04

## 2023-01-22 RX ADMIN — OXYCODONE HYDROCHLORIDE 10 MG: 10 TABLET ORAL at 04:34

## 2023-01-22 RX ADMIN — IPRATROPIUM BROMIDE AND ALBUTEROL SULFATE 3 ML: .5; 2.5 SOLUTION RESPIRATORY (INHALATION) at 18:35

## 2023-01-22 RX ADMIN — OXYCODONE HYDROCHLORIDE 10 MG: 10 TABLET ORAL at 00:13

## 2023-01-22 RX ADMIN — OMEPRAZOLE 20 MG: 20 CAPSULE, DELAYED RELEASE ORAL at 05:05

## 2023-01-22 RX ADMIN — METOPROLOL TARTRATE 25 MG: 25 TABLET, FILM COATED ORAL at 05:05

## 2023-01-22 RX ADMIN — AMIODARONE HYDROCHLORIDE 400 MG: 200 TABLET ORAL at 05:05

## 2023-01-22 ASSESSMENT — LIFESTYLE VARIABLES: SUBSTANCE_ABUSE: 0

## 2023-01-22 ASSESSMENT — COGNITIVE AND FUNCTIONAL STATUS - GENERAL
STANDING UP FROM CHAIR USING ARMS: A LITTLE
MOVING TO AND FROM BED TO CHAIR: A LOT
CLIMB 3 TO 5 STEPS WITH RAILING: A LOT
MOVING FROM LYING ON BACK TO SITTING ON SIDE OF FLAT BED: A LITTLE
MOBILITY SCORE: 16
SUGGESTED CMS G CODE MODIFIER MOBILITY: CK
TURNING FROM BACK TO SIDE WHILE IN FLAT BAD: A LITTLE
WALKING IN HOSPITAL ROOM: A LITTLE

## 2023-01-22 ASSESSMENT — ENCOUNTER SYMPTOMS
DIZZINESS: 0
SENSORY CHANGE: 0
SPUTUM PRODUCTION: 0
VOMITING: 0
CHILLS: 0
BLURRED VISION: 0
PALPITATIONS: 0
ABDOMINAL PAIN: 0
BACK PAIN: 0
HEADACHES: 0
FEVER: 0
EYE PAIN: 0
INSOMNIA: 0
WEAKNESS: 1
FOCAL WEAKNESS: 0
COUGH: 1
SHORTNESS OF BREATH: 1
FALLS: 0
WHEEZING: 0
HEMOPTYSIS: 0
NAUSEA: 0
LOSS OF CONSCIOUSNESS: 0

## 2023-01-22 ASSESSMENT — GAIT ASSESSMENTS
DEVIATION: SHUFFLED GAIT;BRADYKINETIC
DISTANCE (FEET): 5
GAIT LEVEL OF ASSIST: CONTACT GUARD ASSIST

## 2023-01-22 ASSESSMENT — PAIN DESCRIPTION - PAIN TYPE
TYPE: ACUTE PAIN
TYPE: CHRONIC PAIN
TYPE: CHRONIC PAIN
TYPE: ACUTE PAIN

## 2023-01-22 NOTE — PROGRESS NOTES
[facility] Medicine Daily Progress Note    Date of Service  1/22/2023    Chief Complaint  Noe Hickey is a 62 y.o. male admitted 1/20/2023 with leg swelling, cough.    Hospital Course  Noe Hickey is a 62 y.o. male who presented 1/20/2023 with past medical history of atrial fibrillation/flutter, history of aortic stenosis status post valve replacement, history of tobacco abuse, hyperlipidemia who presents to the hospital for shortness of breath, cough and lower extremity swelling.  Patient was recently discharged in the hospital on 1/5 for aortic aneurysm repair and aortic valve replacement.  Postoperatively he was given IV fluids due to blood loss.  During his hospital stay he had a complication of torsades and ventricular arrhythmia.  He was shocked once and then returned back to normal sinus rhythm.  His echo found EF to be 55% at that time.  Patient states that since his hospital stay has been having worsening lower extremity edema and cough.  He denies any hemoptysis or purulent sputum.  He was discharged to [facility] and states that he has been eating a high salt diet.  Patient was discharged with Lasix and states that he has been compliant with it.    Interval Problem Update  No acute events overnight.  On 3L oxygen, continue to wean.  Continue ceftriaxone for pneumonia.  Continue IV lasix diuresis for edema.  Echo reviewed, EF45% worse than prior.  Holding lisinopril start due to JENNY.  Duonebs scheduled q6h for wheezing.  PT recommends post acute services. OT eval pending.  Anticipate patient should be medically cleared by tomorrow if continues to improve.  Came from [facility], can potentially return there tomorrow if medically cleared.      I have discussed this patient's plan of care and discharge plan at IDT rounds today with Case Management, Nursing, Nursing leadership, and other members of the IDT team.    Consultants/Specialty  none    Code Status  Full  Code    Disposition  Patient is not medically cleared for discharge.   Anticipate discharge to to skilled nursing facility.  I have placed the appropriate orders for post-discharge needs.    Review of Systems  Review of Systems   Constitutional:  Negative for chills and fever.   Eyes:  Negative for blurred vision and pain.   Respiratory:  Positive for cough and shortness of breath. Negative for hemoptysis, sputum production and wheezing.    Cardiovascular:  Positive for leg swelling. Negative for chest pain and palpitations.   Gastrointestinal:  Negative for abdominal pain, nausea and vomiting.   Genitourinary:  Negative for dysuria and urgency.   Musculoskeletal:  Negative for back pain and falls.   Skin:  Negative for itching and rash.   Neurological:  Positive for weakness. Negative for dizziness, sensory change, focal weakness, loss of consciousness and headaches.   Psychiatric/Behavioral:  Negative for substance abuse. The patient does not have insomnia.       Physical Exam  Temp:  [36.9 °C (98.4 °F)-37.3 °C (99.1 °F)] 37.2 °C (99 °F)  Pulse:  [65-92] 65  Resp:  [16-18] 18  BP: (114-158)/(62-80) 138/80  SpO2:  [94 %-96 %] 94 %    Physical Exam  Constitutional:       General: He is not in acute distress.     Appearance: He is not ill-appearing.   HENT:      Head: Normocephalic and atraumatic.      Right Ear: External ear normal.      Left Ear: External ear normal.      Mouth/Throat:      Pharynx: No oropharyngeal exudate or posterior oropharyngeal erythema.   Eyes:      Extraocular Movements: Extraocular movements intact.      Pupils: Pupils are equal, round, and reactive to light.   Cardiovascular:      Rate and Rhythm: Normal rate and regular rhythm.      Pulses: Normal pulses.      Heart sounds: Normal heart sounds.   Pulmonary:      Effort: Pulmonary effort is normal. No respiratory distress.      Breath sounds: No stridor. Wheezing present. No rales.   Abdominal:      General: Bowel sounds are normal. There  is no distension.      Palpations: Abdomen is soft.      Tenderness: There is no abdominal tenderness. There is no guarding or rebound.   Musculoskeletal:         General: Swelling present. No tenderness.      Cervical back: Normal range of motion and neck supple.      Right lower leg: Edema present.      Left lower leg: Edema present.   Skin:     General: Skin is warm and dry.   Neurological:      General: No focal deficit present.      Mental Status: He is oriented to person, place, and time.      Cranial Nerves: No cranial nerve deficit.      Sensory: No sensory deficit.      Motor: No weakness.   Psychiatric:         Mood and Affect: Mood normal.         Behavior: Behavior normal.       Fluids    Intake/Output Summary (Last 24 hours) at 1/22/2023 1205  Last data filed at 1/22/2023 0926  Gross per 24 hour   Intake 360 ml   Output 1408 ml   Net -1048 ml       Laboratory  Recent Labs     01/20/23  1622 01/21/23 0210 01/22/23  0132   WBC 15.5* 15.4* 14.7*   RBC 4.05* 4.35* 3.95*   HEMOGLOBIN 9.0* 9.5* 8.7*   HEMATOCRIT 29.5* 31.8* 28.5*   MCV 72.8* 73.1* 72.2*   MCH 22.2* 21.8* 22.0*   MCHC 30.5* 29.9* 30.5*   RDW 48.4 48.9 48.3   PLATELETCT 464* 459* 424   MPV 9.9 10.0 10.0     Recent Labs     01/20/23  1622 01/21/23  0210 01/22/23  0132   SODIUM 135 133* 136   POTASSIUM 4.8 4.6 4.2   CHLORIDE 100 96 97   CO2 25 25 29   GLUCOSE 93 101* 123*   BUN 24* 26* 30*   CREATININE 1.73* 1.63* 1.61*   CALCIUM 8.7 8.7 8.5     Recent Labs     01/20/23  1622 01/21/23  0210 01/22/23  0132   INR 3.39* 2.92* 2.30*               Imaging  EC-ECHOCARDIOGRAM COMPLETE W/O CONT   Final Result      CT-CTA CHEST PULMONARY ARTERY W/ RECONS   Final Result      1.  No CT evidence of pulmonary embolism.      2.  Significant cardiomegaly.      3.  Consolidation and volume loss in the left lower lobe and lingular segment left upper lobe could BE due to consolidative atelectasis and pneumonia.      4.  Minimal groundglass opacifications noted in  the right lung which could be due to inflammation or infection.      5.  Additional linear opacifications in the right lung likely due to atelectasis or scarring.            DX-CHEST-PORTABLE (1 VIEW)   Final Result      1.  Left lung base atelectasis.      2.  Cardiomegaly.      3.  Chronic elevation of left hemidiaphragm.           Assessment/Plan  * Pneumonia due to infectious organism- (present on admission)  Assessment & Plan  Found to have left sided infiltrate  Bronchial breath sounds and egophony on the left lung exam   Started on  antibiotics  Follow up sputum and blood cultures  Respiratory care protocol   Placed on o2 therapy       COPD (chronic obstructive pulmonary disease) ([facility])  Assessment & Plan  Wheezing heard on exam  40+ pack year history, stopped smoking recently  Suspect underlying COPD, outpatient PFT and pulm follow up  Start symbicort and duonebs here  Not in acute exacerbation    Elevated troponin  Assessment & Plan  Secondary to renal failure    Paroxysmal atrial fibrillation ([facility])  Assessment & Plan  Rate controlled on metoprolol and amiodarone  Continue warfarin    Acute on chronic systolic heart failure ([facility])  Assessment & Plan  Last EF was 55%  Echo shows worse EF 45%  Continue lasix IV diuresis  On metoprolol  Holding lisinopril start due to JENNY  Strict ins and outs and daily weights    Acute respiratory failure with hypoxia ([facility])  Assessment & Plan  On 3 L of O2 above baseline  Suspect due to pneumonia vs atelectasis  Continue antibiotics and IS    Acute renal failure (ARF) ([facility])  Assessment & Plan  Monitor BMP and assess response  Avoid IV contrast/nephrotoxins/NSAIDs  Dose adjust meds for decreased GFR    Start IV Lasix today assess response         VTE prophylaxis: therapeutic anticoagulation with warfarin    I have performed a physical exam and reviewed and updated ROS and Plan today (1/22/2023). In review of yesterday's note (1/21/2023), there are no changes except as documented  above.

## 2023-01-22 NOTE — PROGRESS NOTES
Inpatient Anticoagulation Service Note for 1/22/2023      Reason for Anticoagulation: Atrial Fibrillation, Bioprosthetic Valve Replacement   [id number] VASc Score: 2  HAS-BLED Score: 1    Hemoglobin Value: (!) 8.7  Hematocrit Value: (!) 28.5  Lab Platelet Value: 424  Target INR: 2.0 to 3.0    INR from last 7 days       Date/Time INR Value    01/22/23 0132 2.3    01/21/23 0210 2.92    01/20/23 1622 3.39          Dose from last 7 days       Date/Time Dose (mg)    01/22/23 0929 2.5    01/21/23 1203 3    01/20/23 2233 0          Average Dose (mg): 2.5  Significant Interactions: Antiplatelet Medications, Amiodarone  Bridge Therapy: No   Reversal Agent Administered: Not Applicable    Assessment: INR supratherapeutic on admission, likely result of change in clinical status with aeCHF.  He sees the [facility], per review of their notes he has been therapeutic on warfarin 2.5 mg daily.  His last dose of warfarin PTA was 1/17, so he has had several days prior without warfarin, likely result of ongoing downtrending INR.  H/H low, no overt s/sx of bleeding.  No changes to diet.      Plan:  Will resume his home warfarin regimen of 2.5 mg daily and follow up INR in the AM.  Education Material Provided?: No    Pharmacist suggested discharge dosing: warfarin 2.5 mg daily and follow up INR within 2-3 days of discharge.     Sheila Baig, PharmD, BCPS

## 2023-01-22 NOTE — PROGRESS NOTES
Handoff report received from day shift nurse and pt care assumed. Pt is currently resting in bed, A&Ox4 with no complaints of pain, on 1L O2 nasal cannula, and VSS. Tele box on and monitors notified. Call light and belongings within reach, bed in lowest and locked position, fall precautions in place. Pt educated on use of call light and all needs addressed. Hourly rounding in place.

## 2023-01-22 NOTE — DISCHARGE PLANNING
Received Choice form at 1233  Agency/Facility Name: [facility]   Referral sent per Choice form @ [id number]

## 2023-01-22 NOTE — THERAPY
"Physical Therapy   Initial Evaluation     Patient Name: Noe Hickey  Age:  62 y.o., Sex:  male  Medical Record #: [id number]  Today's Date: 1/22/2023     Precautions  Precautions: Cardiac Precautions (See Comments);Sternal Precautions (See Comments)  Comments: recent CABG    Assessment  Patient is 62 y.o. male admitted from SNF with LE edema and cough. Recent admission for AAA and AVR. Pt initially states he discharged home but reports increased difficulty with mobility, then admitted to [facility]. Pt received EOB, recovering post walk to and from bathroom with RN. He was having difficulty getting full sentences out due to SOB, SpO2 >90% on 5L. Pt required SBA for transfers and CGA for 5ft of gait to recliner. PT will cont while pt is in acute care setting to address strength, balance, activity tolerance, and mobility.     Plan    Physical Therapy Initial Treatment Plan   Treatment Plan : Bed Mobility, Gait Training, Neuro Re-Education / Balance, Self Care / Home Evaluation, Stair Training, Therapeutic Activities, Therapeutic Exercise  Treatment Frequency: 3 Times per Week  Duration: Until Therapy Goals Met    DC Equipment Recommendations: Unable to determine at this time  Discharge Recommendations: Recommend post-acute placement for additional physical therapy services prior to discharge home        01/22/23 0850   Vitals   O2 (LPM) 5   O2 Delivery Device Nasal Cannula   Vitals Comments significant SOB, SpO2 sats >90%   Prior Living Situation   Prior Services Other (Comments)  (SNF)   Housing / Facility Skilled Nursing Facility   Equipment Owned 4-Wheel Walker   Lives with - Patient's Self Care Capacity Spouse   Comments pt attempted to dc home post last admission and reports it \"wasnt going well\" but attributing most of the issues to social issues. He then went to [facility] where he was just starting with therapy when SOB worsened and he was sent back to hospital   Prior Level of Functional Mobility "   Bed Mobility Independent   Transfer Status Independent   Ambulation Independent   Distance Ambulation (Feet)   (community)   Assistive Devices Used None   Stairs Independent   Comments independent prior to last admission   Cognition    Level of Consciousness Alert   Comments cooperative and receptive   Active ROM Lower Body    Active ROM Lower Body  WDL   Strength Lower Body   Lower Body Strength  WDL   Comments functional but weak   Other Treatments   Other Treatments Provided spoke with pt about last dc home and issues. He would like to dc back to SNF for continued therapy   Balance Assessment   Sitting Balance (Static) Fair +   Sitting Balance (Dynamic) Fair   Standing Balance (Static) Fair   Standing Balance (Dynamic) Fair -   Weight Shift Sitting Good   Weight Shift Standing Fair   Comments no AD   Bed Mobility    Comments found EOB and left in recliner   Gait Analysis   Gait Level Of Assist Contact Guard Assist   Assistive Device None   Distance (Feet) 5   # of Times Distance was Traveled 1   Deviation Shuffled Gait;Bradykinetic   Weight Bearing Status no restrictions   Comments limited by SOB   Functional Mobility   Sit to Stand Standby Assist   Bed, Chair, Wheelchair Transfer Standby Assist   Transfer Method Stand Step   Mobility in room with no AD   Short Term Goals    Short Term Goal # 1 pt will perform supine <> sit without bed features with SPV in 6 visits to get in/out of bed at home   Short Term Goal # 2 pt will ambulate 150ft with FWW and SPV in 6 visits to access home   Short Term Goal # 3 pt will negotiate 3 steps with SPV in 6 visits to access home   Short Term Goal # 4 pt will be able to complete functional transfers with no AD and SPV in 6tx in order to dc home   Education Group   Education Provided Role of Physical Therapist   Role of Physical Therapist Patient Response Patient;Acceptance;Demonstration;Action Demonstration   Anticipated Discharge Equipment and Recommendations   DC Equipment  Recommendations Unable to determine at this time   Discharge Recommendations Recommend post-acute placement for additional physical therapy services prior to discharge home

## 2023-01-22 NOTE — CARE PLAN
The patient is Stable - Low risk of patient condition declining or worsening    Shift Goals  Clinical Goals: Pain management, Monitor labs and vitals  Patient Goals: Pain management  Family Goals: HUGH      Problem: Pain - Standard  Goal: Alleviation of pain or a reduction in pain to the patient’s comfort goal  Outcome: Progressing     Problem: Knowledge Deficit - Standard  Goal: Patient and family/care givers will demonstrate understanding of plan of care, disease process/condition, diagnostic tests and medications  Outcome: Progressing     Problem: Fall Risk  Goal: Patient will remain free from falls  Outcome: Progressing       Progress made toward(s) clinical / shift goals:  Patient's pain was assessed and managed throughout the whole shift, see MAR. Patient was responded well to the PRN oxycodone, reporting at a 3 after administration. Patient remained free from falls for the whole duration of the shift. Fall precautions in place.     Patient is not progressing towards the following goals:

## 2023-01-22 NOTE — PROGRESS NOTES
Handoff report received from day shift nurse and pt care assumed. Pt is currently resting in bed, A&Ox4 with reports of pain around sternal incision, on 3L O2 nasal cannula, and VSS. Tele box on and monitors notified. Call light and belongings within reach, bed in lowest and locked position, fall precautions in place. Pt educated on use of call light and all needs addressed. Hourly rounding in place.

## 2023-01-22 NOTE — RESPIRATORY CARE
COPD EDUCATION by COPD CLINICAL EDUCATOR  1/22/2023 at 2:57 PM by Arabella Pulido, RRT     Patient reviewed by COPD education team. Patient does not have a history or diagnosis of COPD.Therefore, patient does not qualify for the COPD program.

## 2023-01-23 ENCOUNTER — PATIENT OUTREACH (OUTPATIENT)
Dept: SCHEDULING | Facility: IMAGING CENTER | Age: 63
End: 2023-01-23
Payer: COMMERCIAL

## 2023-01-23 LAB
ANION GAP SERPL CALC-SCNC: 12 MMOL/L (ref 7–16)
BUN SERPL-MCNC: 27 MG/DL (ref 8–22)
CALCIUM SERPL-MCNC: 8.8 MG/DL (ref 8.5–10.5)
CHLORIDE SERPL-SCNC: 94 MMOL/L (ref 96–112)
CO2 SERPL-SCNC: 31 MMOL/L (ref 20–33)
CREAT SERPL-MCNC: 1.53 MG/DL (ref 0.5–1.4)
GFR SERPLBLD CREATININE-BSD FMLA CKD-EPI: 51 ML/MIN/1.73 M 2
GLUCOSE SERPL-MCNC: 84 MG/DL (ref 65–99)
INR PPP: 2.29 (ref 0.87–1.13)
POTASSIUM SERPL-SCNC: 3.7 MMOL/L (ref 3.6–5.5)
PROTHROMBIN TIME: 24.5 SEC (ref 12–14.6)
SARS-COV+SARS-COV-2 AG RESP QL IA.RAPID: NOTDETECTED
SODIUM SERPL-SCNC: 137 MMOL/L (ref 135–145)
SPECIMEN SOURCE: NORMAL

## 2023-01-23 PROCEDURE — A9270 NON-COVERED ITEM OR SERVICE: HCPCS | Performed by: HOSPITALIST

## 2023-01-23 PROCEDURE — A9270 NON-COVERED ITEM OR SERVICE: HCPCS | Performed by: STUDENT IN AN ORGANIZED HEALTH CARE EDUCATION/TRAINING PROGRAM

## 2023-01-23 PROCEDURE — 99232 SBSQ HOSP IP/OBS MODERATE 35: CPT | Performed by: STUDENT IN AN ORGANIZED HEALTH CARE EDUCATION/TRAINING PROGRAM

## 2023-01-23 PROCEDURE — 80048 BASIC METABOLIC PNL TOTAL CA: CPT

## 2023-01-23 PROCEDURE — 700102 HCHG RX REV CODE 250 W/ 637 OVERRIDE(OP): Performed by: HOSPITALIST

## 2023-01-23 PROCEDURE — 700111 HCHG RX REV CODE 636 W/ 250 OVERRIDE (IP): Performed by: HOSPITALIST

## 2023-01-23 PROCEDURE — 36415 COLL VENOUS BLD VENIPUNCTURE: CPT

## 2023-01-23 PROCEDURE — 97165 OT EVAL LOW COMPLEX 30 MIN: CPT

## 2023-01-23 PROCEDURE — 700102 HCHG RX REV CODE 250 W/ 637 OVERRIDE(OP): Performed by: STUDENT IN AN ORGANIZED HEALTH CARE EDUCATION/TRAINING PROGRAM

## 2023-01-23 PROCEDURE — 700101 HCHG RX REV CODE 250: Performed by: STUDENT IN AN ORGANIZED HEALTH CARE EDUCATION/TRAINING PROGRAM

## 2023-01-23 PROCEDURE — 85610 PROTHROMBIN TIME: CPT

## 2023-01-23 PROCEDURE — 87426 SARSCOV CORONAVIRUS AG IA: CPT

## 2023-01-23 PROCEDURE — 94640 AIRWAY INHALATION TREATMENT: CPT

## 2023-01-23 PROCEDURE — 770020 HCHG ROOM/CARE - TELE (206)

## 2023-01-23 RX ORDER — AMIODARONE HYDROCHLORIDE 400 MG/1
TABLET ORAL
Qty: 30 TABLET | Refills: 0
Start: 2023-01-23 | End: 2023-02-03

## 2023-01-23 RX ORDER — CEFDINIR 300 MG/1
300 CAPSULE ORAL EVERY 12 HOURS
Qty: 6 CAPSULE | Refills: 0 | Status: ACTIVE
Start: 2023-01-23 | End: 2023-01-26

## 2023-01-23 RX ORDER — BUDESONIDE AND FORMOTEROL FUMARATE DIHYDRATE 80; 4.5 UG/1; UG/1
2 AEROSOL RESPIRATORY (INHALATION) 2 TIMES DAILY
Qty: 1 EACH | Refills: 0
Start: 2023-01-23 | End: 2023-04-03

## 2023-01-23 RX ORDER — OXYCODONE HYDROCHLORIDE 10 MG/1
10 TABLET ORAL EVERY 6 HOURS PRN
Qty: 10 TABLET | Refills: 0
Start: 2023-01-23 | End: 2023-01-28

## 2023-01-23 RX ORDER — METOPROLOL TARTRATE 50 MG/1
50 TABLET, FILM COATED ORAL 2 TIMES DAILY
Status: DISCONTINUED | OUTPATIENT
Start: 2023-01-23 | End: 2023-01-29

## 2023-01-23 RX ORDER — LORAZEPAM 0.5 MG/1
0.5 TABLET ORAL ONCE
Status: COMPLETED | OUTPATIENT
Start: 2023-01-23 | End: 2023-01-23

## 2023-01-23 RX ORDER — TRAZODONE HYDROCHLORIDE 50 MG/1
50 TABLET ORAL
Qty: 30 TABLET | Refills: 0
Start: 2023-01-23 | End: 2023-04-03

## 2023-01-23 RX ORDER — FUROSEMIDE 40 MG/1
40 TABLET ORAL
Status: DISCONTINUED | OUTPATIENT
Start: 2023-01-23 | End: 2023-01-24

## 2023-01-23 RX ORDER — CEFDINIR 300 MG/1
300 CAPSULE ORAL EVERY 12 HOURS
Status: COMPLETED | OUTPATIENT
Start: 2023-01-23 | End: 2023-01-26

## 2023-01-23 RX ORDER — METOPROLOL TARTRATE 50 MG/1
50 TABLET, FILM COATED ORAL 2 TIMES DAILY
Qty: 60 TABLET | Refills: 0
Start: 2023-01-23 | End: 2023-04-03

## 2023-01-23 RX ORDER — OMEPRAZOLE 20 MG/1
20 CAPSULE, DELAYED RELEASE ORAL DAILY
Qty: 30 CAPSULE | Refills: 0
Start: 2023-01-24 | End: 2023-04-03

## 2023-01-23 RX ORDER — WARFARIN SODIUM 5 MG/1
2.5 TABLET ORAL DAILY
Qty: 30 TABLET | Refills: 2 | Status: ACTIVE
Start: 2023-01-23 | End: 2023-02-03

## 2023-01-23 RX ORDER — BUDESONIDE AND FORMOTEROL FUMARATE DIHYDRATE 80; 4.5 UG/1; UG/1
2 AEROSOL RESPIRATORY (INHALATION) 2 TIMES DAILY
Status: DISCONTINUED | OUTPATIENT
Start: 2023-01-24 | End: 2023-02-03 | Stop reason: HOSPADM

## 2023-01-23 RX ORDER — IPRATROPIUM BROMIDE AND ALBUTEROL SULFATE 2.5; .5 MG/3ML; MG/3ML
3 SOLUTION RESPIRATORY (INHALATION)
Status: DISCONTINUED | OUTPATIENT
Start: 2023-01-24 | End: 2023-01-24

## 2023-01-23 RX ADMIN — ASPIRIN 81 MG: 81 TABLET, COATED ORAL at 05:40

## 2023-01-23 RX ADMIN — OXYCODONE HYDROCHLORIDE 10 MG: 10 TABLET ORAL at 17:27

## 2023-01-23 RX ADMIN — IPRATROPIUM BROMIDE AND ALBUTEROL SULFATE 3 ML: .5; 2.5 SOLUTION RESPIRATORY (INHALATION) at 14:34

## 2023-01-23 RX ADMIN — METOPROLOL TARTRATE 25 MG: 25 TABLET, FILM COATED ORAL at 05:40

## 2023-01-23 RX ADMIN — IPRATROPIUM BROMIDE AND ALBUTEROL SULFATE 3 ML: .5; 2.5 SOLUTION RESPIRATORY (INHALATION) at 08:32

## 2023-01-23 RX ADMIN — IPRATROPIUM BROMIDE AND ALBUTEROL SULFATE 3 ML: .5; 2.5 SOLUTION RESPIRATORY (INHALATION) at 19:35

## 2023-01-23 RX ADMIN — BUDESONIDE AND FORMOTEROL FUMARATE DIHYDRATE 2 PUFF: 80; 4.5 AEROSOL RESPIRATORY (INHALATION) at 19:35

## 2023-01-23 RX ADMIN — AMIODARONE HYDROCHLORIDE 400 MG: 200 TABLET ORAL at 05:40

## 2023-01-23 RX ADMIN — CEFDINIR 300 MG: 300 CAPSULE ORAL at 17:27

## 2023-01-23 RX ADMIN — CEFTRIAXONE SODIUM 2000 MG: 10 INJECTION, POWDER, FOR SOLUTION INTRAVENOUS at 05:40

## 2023-01-23 RX ADMIN — FUROSEMIDE 40 MG: 40 TABLET ORAL at 15:17

## 2023-01-23 RX ADMIN — FUROSEMIDE 40 MG: 10 INJECTION, SOLUTION INTRAMUSCULAR; INTRAVENOUS at 05:40

## 2023-01-23 RX ADMIN — OXYCODONE HYDROCHLORIDE 10 MG: 10 TABLET ORAL at 11:48

## 2023-01-23 RX ADMIN — WARFARIN SODIUM 2.5 MG: 2.5 TABLET ORAL at 17:28

## 2023-01-23 RX ADMIN — OXYCODONE HYDROCHLORIDE 10 MG: 10 TABLET ORAL at 20:29

## 2023-01-23 RX ADMIN — CEFDINIR 300 MG: 300 CAPSULE ORAL at 07:40

## 2023-01-23 RX ADMIN — IPRATROPIUM BROMIDE AND ALBUTEROL SULFATE 3 ML: .5; 2.5 SOLUTION RESPIRATORY (INHALATION) at 03:14

## 2023-01-23 RX ADMIN — OXYCODONE HYDROCHLORIDE 10 MG: 10 TABLET ORAL at 07:40

## 2023-01-23 RX ADMIN — AMLODIPINE BESYLATE 10 MG: 10 TABLET ORAL at 05:40

## 2023-01-23 RX ADMIN — OMEPRAZOLE 20 MG: 20 CAPSULE, DELAYED RELEASE ORAL at 05:40

## 2023-01-23 RX ADMIN — BUDESONIDE AND FORMOTEROL FUMARATE DIHYDRATE 2 PUFF: 80; 4.5 AEROSOL RESPIRATORY (INHALATION) at 07:41

## 2023-01-23 RX ADMIN — LORAZEPAM 0.5 MG: 0.5 TABLET ORAL at 15:17

## 2023-01-23 RX ADMIN — METOPROLOL TARTRATE 50 MG: 50 TABLET, FILM COATED ORAL at 17:27

## 2023-01-23 RX ADMIN — OXYCODONE HYDROCHLORIDE 10 MG: 10 TABLET ORAL at 02:44

## 2023-01-23 RX ADMIN — TRAZODONE HYDROCHLORIDE 50 MG: 50 TABLET ORAL at 20:30

## 2023-01-23 ASSESSMENT — COGNITIVE AND FUNCTIONAL STATUS - GENERAL
DAILY ACTIVITIY SCORE: 24
SUGGESTED CMS G CODE MODIFIER DAILY ACTIVITY: CH

## 2023-01-23 ASSESSMENT — ACTIVITIES OF DAILY LIVING (ADL): TOILETING: INDEPENDENT

## 2023-01-23 ASSESSMENT — PAIN DESCRIPTION - PAIN TYPE: TYPE: ACUTE PAIN;CHRONIC PAIN

## 2023-01-23 ASSESSMENT — FIBROSIS 4 INDEX: FIB4 SCORE: 0.78

## 2023-01-23 NOTE — CARE PLAN
Problem: Pain - Standard  Goal: Alleviation of pain or a reduction in pain to the patient’s comfort goal  Outcome: Progressing     Problem: Knowledge Deficit - Standard  Goal: Patient and family/care givers will demonstrate understanding of plan of care, disease process/condition, diagnostic tests and medications  Outcome: Progressing     Problem: Fall Risk  Goal: Patient will remain free from falls  Outcome: Progressing   The patient is Stable - Low risk of patient condition declining or worsening    Shift Goals  Clinical Goals: Pain management  Patient Goals: Pain management  Family Goals: HUGH

## 2023-01-23 NOTE — DISCHARGE PLANNING
Transport Request Received:1/23/2023 at 1511    Transport Company: [employer]    Transport Date: 1/24/2023  Time: 1300    MTM: Spoke with [employer] #[id number]    Destination: [facility] at [address]     Notified care team of scheduled transport via [employer].      If there are any changes needed to the DC transportation scheduled, please contact [facility] at ext. [phone] between the hours of 9997-4687 Mon-Fri. If outside those hours, contact the ED Case Manager at ext. [phone].

## 2023-01-23 NOTE — DISCHARGE SUMMARY
Discharge Summary    CHIEF COMPLAINT ON ADMISSION  Chief Complaint   Patient presents with    Chest Pain     X 5 hours    Shortness of Breath    Leg Swelling       Reason for Admission  ems    Admission Date  1/20/2023     CODE STATUS  Full Code    HPI & HOSPITAL COURSE  This is a 62 y.o. male here with leg swelling, cough. Patient with history of atrial fibrillation/flutter on warfarin, aortic stenosis status post valve replacement, tobacco use, hyperlipidemia, who presented for leg swelling and cough. Patient with recent hospitalization for aortic aneurysm repair and aortic valve replacement, complicated by torsades and ventricular arrhythmia, he was shocked and started on amiodarone for rhythm control at that time. Patient found to have acute hypoxic respiratory failure due to pneumonia and acute CHF exacerbation. Patient treated with antibiotics for pneumonia, transitioned to oral cefdinir on discharge. Repeat echocardiogram shows worsening of left ventricular ejection fraction to 45%. Patient was diuresed with IV lasix with good effect, patient with improved leg swelling. He is on metoprolol for heart failure, ace inhibitor/arb held due to acute kidney injury. Acute kidney injury stable with creatinine at 1.53, slight improvement with lasix diuresis. He is to follow up with his cardiologist. Patient also likely with underlying COPD, not formally diagnosed. He has extensive tobacco history with wheezing on exam. He was treated with symbicort and duoneb breathing treatments. He was counseled on tobacco cessation and continued health benefits of such, as well as detriments of continued tobacco use. He is to follow up with his PCP for formal pulmonary function testing and continued management. Patient requiring 2.5-3L oxygen nasal cannula at time of discharge. He has been accepted to [facility] for continued rehabilitation therapies. He is feeling well.    Therefore, he is discharged in fair and stable condition  to skilled nursing facility.    The patient met 2-midnight criteria for an inpatient stay at the time of discharge.      FOLLOW UP ITEMS POST DISCHARGE  Take medications as prescribed.  Follow up with PCP, cardiology.    DISCHARGE DIAGNOSES  Principal Problem:    Pneumonia due to infectious organism POA: Yes  Active Problems:    Acute renal failure (ARF) (HCC) POA: Unknown    Acute respiratory failure with hypoxia (HCC) POA: Unknown    Acute on chronic systolic heart failure (HCC) POA: Unknown    Paroxysmal atrial fibrillation (HCC) POA: Unknown    Elevated troponin POA: Unknown    COPD (chronic obstructive pulmonary disease) ([facility]) POA: Unknown  Resolved Problems:    * No resolved hospital problems. *      FOLLOW UP  Future Appointments   Date Time Provider Department Center   2/6/2023  1:45 PM CT RESOURCE PROVIDER [facility] None     primary care provider    Follow up  Please call your primary care provider to schedule a hospital follow up. Thank you.    ETTA Marie  [address]  [facility] [address]  [phone]    Go on 2/15/2023  Go to your appointment with ETTA Marie on Wednesday February 15th at 4:00pm      MEDICATIONS ON DISCHARGE     Medication List        START taking these medications        Instructions   budesonide-formoterol 80-4.5 MCG/ACT Aero  Commonly known as: SYMBICORT   Inhale 2 Puffs 2 times a day.  Dose: 2 Puff     cefdinir 300 MG Caps  Commonly known as: OMNICEF   Take 1 Capsule by mouth every 12 hours for 3 days.  Dose: 300 mg     omeprazole 20 MG delayed-release capsule  Start taking on: January 24, 2023  Commonly known as: PRILOSEC   Take 1 Capsule by mouth every day.  Dose: 20 mg     oxyCODONE immediate release 10 MG immediate release tablet  Commonly known as: ROXICODONE   Take 1 Tablet by mouth every 6 hours as needed for Severe Pain for up to 5 days.  Dose: 10 mg     traZODone 50 MG Tabs  Commonly known as: DESYREL   Take 1 Tablet by mouth at  bedtime.  Dose: 50 mg            CHANGE how you take these medications        Instructions   amiodarone 400 MG tablet  What changed: Another medication with the same name was removed. Continue taking this medication, and follow the directions you see here.  Commonly known as: PACERONE   Take 400 mg by mouth every day.  Dose: 400 mg     furosemide 40 MG Tabs  What changed: Another medication with the same name was removed. Continue taking this medication, and follow the directions you see here.  Commonly known as: LASIX   Take 1 Tablet by mouth every day.  Dose: 40 mg     metoprolol tartrate 50 MG Tabs  What changed:   medication strength  how much to take  Commonly known as: LOPRESSOR   Take 1 Tablet by mouth 2 times a day.  Dose: 50 mg            CONTINUE taking these medications        Instructions   acetaminophen 500 MG Tabs  Commonly known as: TYLENOL   Take 2 Tablets by mouth every 6 hours as needed for Moderate Pain or Mild Pain.  Dose: 1,000 mg     amLODIPine 10 MG Tabs  Commonly known as: NORVASC   Take 1 Tablet by mouth every day.  Dose: 10 mg     aspirin 81 MG EC tablet   Take 1 Tablet by mouth every day.  Dose: 81 mg     potassium chloride SA 20 MEQ Tbcr  Commonly known as: Kdur   Take 1 Tablet by mouth every day.  Dose: 20 mEq     warfarin 5 MG Tabs  Commonly known as: COUMADIN   Take 1 Tablet by mouth every day at 6 PM.  Dose: 5 mg            STOP taking these medications      potassium Chloride ER 20 MEQ Tbcr tablet  Commonly known as: K-TAB              Allergies  Allergies   Allergen Reactions    Morphine Rash     Rash/ difficulty breathing       DIET  Orders Placed This Encounter   Procedures    Diet Order Diet: Cardiac     Standing Status:   Standing     Number of Occurrences:   1     Order Specific Question:   Diet:     Answer:   Cardiac [6]       ACTIVITY  As tolerated and directed by skilled nursing.  Weight bearing as tolerated    LINES, DRAINS, AND WOUNDS  This is an automated list. Peripheral  IVs will be removed prior to discharge.  Peripheral IV 01/22/23 20 G Left;Posterior Forearm (Active)   Site Assessment Clean;Dry;Intact 01/23/23 0756   Dressing Type Transparent;Occlusive 01/23/23 0756   Line Status Flushed;Blood return noted;Saline locked 01/23/23 0756   Dressing Status Clean;Dry;Intact 01/23/23 0756   Dressing Intervention N/A 01/23/23 0756   Dressing Change Due 01/28/23 01/23/23 0756   Infiltration Grading ([facility]) 0 01/23/23 0756   Phlebitis Scale ([facility] Only) 0 01/23/23 0756          Peripheral IV 01/22/23 20 G Left;Posterior Forearm (Active)   Site Assessment Clean;Dry;Intact 01/23/23 0756   Dressing Type Transparent;Occlusive 01/23/23 0756   Line Status Flushed;Blood return noted;Saline locked 01/23/23 0756   Dressing Status Clean;Dry;Intact 01/23/23 0756   Dressing Intervention N/A 01/23/23 0756   Dressing Change Due 01/28/23 01/23/23 0756   Infiltration Grading ([facility], [facility]) 0 01/23/23 0756   Phlebitis Scale ([facility] Only) 0 01/23/23 0756               MENTAL STATUS ON TRANSFER      Alert, oriented x4       CONSULTATIONS  none    PROCEDURES  none    LABORATORY  Lab Results   Component Value Date    SODIUM 137 01/23/2023    POTASSIUM 3.7 01/23/2023    CHLORIDE 94 (L) 01/23/2023    CO2 31 01/23/2023    GLUCOSE 84 01/23/2023    BUN 27 (H) 01/23/2023    CREATININE 1.53 (H) 01/23/2023        Lab Results   Component Value Date    WBC 14.7 (H) 01/22/2023    HEMOGLOBIN 8.7 (L) 01/22/2023    HEMATOCRIT 28.5 (L) 01/22/2023    PLATELETCT 424 01/22/2023        Total time of the discharge process exceeds 38 minutes.

## 2023-01-23 NOTE — DISCHARGE INSTRUCTIONS
HF Patient Discharge Instructions  Monitor your weight daily, and maintain a weight chart, to track your weight changes.   Activity as tolerated, unless your Doctor has ordered otherwise.  Follow a low fat, low cholesterol, low salt diet unless instructed otherwise by your Doctor. Read the labels on the back of food products and track your intake of fat, cholesterol and salt.   Fluid Restriction No. If a Fluid Restriction has been ordered by your Doctor, measure fluids with a measuring cup to ensure that you are not exceeding the restriction.   No smoking.  Oxygen Yes. If your Doctor has ordered that you wear Oxygen at home, it is important to wear it as ordered.  Did you receive an explanation from staff on the importance of taking each of your medications and why it is necessary to keep taking them unless your doctor says to stop? Yes  Were all of your questions answered about how to manage your heart failure and what to do if you have increased signs and symptoms after you go home? Yes  Do you feel like your heart failure care team involved you in the care treatment plan and allowed you to make decisions regarding your care while in the hospital and addressed any discharge needs you might have? Yes    See the educational handout provided at discharge for more information on monitoring your daily weight, activity and diet. This also explains more about Heart Failure, symptoms of a flare-up and some of the tests that you have undergone.     Warning Signs of a Flare-Up include:  Swelling in the ankles or lower legs.  Shortness of breath, while at rest, or while doing normal activities.   Shortness of breath at night when in bed, or coughing in bed.   Requiring more pillows to sleep at night, or needing to sit up at night to sleep.  Feeling weak, dizzy or fatigued.     When to call your Doctor:  Call [facility] seven days a week from 8:00 a.m. to 8:00 p.m. for medical questions [phone].  Call your  Primary Care Physician or Cardiologist if:   You experience any pain radiating to your jaw or neck.  You have any difficulty breathing.  You experience weight gain of 3 lbs in a day or 5 lbs in a week.   You feel any palpitations or irregular heartbeats.  You become dizzy or lose consciousness.   If you have had an angiogram or had a pacemaker or AICD placed, and experience:  Bleeding, drainage or swelling at the surgical / puncture site.  Fever greater than 100.0 F  Shock from internal defibrillator.  Cool and / or numb extremities.     Please access the [facility] My HF Guide/Heart Failure Interactive Workbook:   [email or web address]

## 2023-01-23 NOTE — CARE PLAN
The patient is Stable - Low risk of patient condition declining or worsening    Shift Goals  Clinical Goals: Monitor O2, Manage pain  Patient Goals: Pain management, Sleep  Family Goals: HUGH      Problem: Pain - Standard  Goal: Alleviation of pain or a reduction in pain to the patient’s comfort goal  Outcome: Progressing     Problem: Knowledge Deficit - Standard  Goal: Patient and family/care givers will demonstrate understanding of plan of care, disease process/condition, diagnostic tests and medications  Outcome: Progressing     Problem: Fall Risk  Goal: Patient will remain free from falls  Outcome: Progressing       Progress made toward(s) clinical / shift goals:  Patient's oxygen was monitored throughout the shift and was addressed as needed. Patient's pain was assessed throughout the night and managed per MAR. Patient remained free from falls and precautions in place.    Patient is not progressing towards the following goals:

## 2023-01-23 NOTE — PROGRESS NOTES
Handoff report received from day shift nurse and pt care assumed. Pt is currently resting in bed, A&Ox4 with no reports of pain, on 3L O2 nasal cannula, and VSS. Tele box on and monitors notified. Call light and belongings within reach, bed in lowest and locked position, fall precautions in place. Pt educated on use of call light and all needs addressed. Hourly rounding in place.

## 2023-01-23 NOTE — THERAPY
"Occupational Therapy   Initial Evaluation     Patient Name: Noe Hickey  Age:  62 y.o., Sex:  male  Medical Record #: [id number]  Today's Date: 1/23/2023     Precautions: Cardiac Precautions (See Comments), Sternal Precautions (See Comments)  Comments: supplemental O2 required    Assessment    Patient is 62 y.o. male admitted with cough and BLE swelling. Recent admit for aortic aneurysm repair and aortic valve replacement. Pt reports he cannot DC back with his wife as they are not getting along. Pt receptive to education regarding activity modifications, energy conservation, and ADL adaptive strategies to maintain cardiac and sternal precautions. Pt does not have acute OT needs at this time.     Plan    Occupational Therapy Initial Treatment Plan   Duration: Evaluation only    DC Equipment Recommendations: Tub / Shower Seat  Discharge Recommendations: Anticipate that the patient will have no further occupational therapy needs after discharge from the hospital     Subjective    \"I'm going to work with the social workers to figure out where to stay.\"     Objective       01/23/23 1005   Prior Living Situation   Prior Services Continuous (24 Hour) Care Giving Per Service   Housing / Facility   ([place])   Comments pt reports he and his wife have a volitile relationship and he cannot live with her, he is \"in between\" housing right now and plans to work with social work to find a suitable place to DC   Prior Level of ADL Function   Self Feeding Independent   Grooming / Hygiene Independent   Bathing Independent   Dressing Independent   Toileting Independent   Prior Level of IADL Function   Comments independent   Precautions   Comments supplemental O2 required   Pain 0 - 10 Group   Therapist Pain Assessment Nurse Notified;Post Activity Pain Same as Prior to Activity;0   Cognition    Level of Consciousness Alert   Comments tangential, emotionally labile, pleasant and cooperative   Coordination Upper Body "   Coordination WDL   Balance Assessment   Sitting Balance (Static) Good   Sitting Balance (Dynamic) Good   Standing Balance (Static) Good   Standing Balance (Dynamic) Good   Weight Shift Sitting Good   Weight Shift Standing Good   Bed Mobility    Comments up with CNA pre, seated in bedside chair post   ADL Assessment   Eating Supervision   Grooming Supervision;Standing   Upper Body Dressing Supervision   Lower Body Dressing Supervision   Toileting Supervision   How much help from another person does the patient currently need...   Putting on and taking off regular lower body clothing? 4   Bathing (including washing, rinsing, and drying)? 4   Toileting, which includes using a toilet, bedpan, or urinal? 4   Putting on and taking off regular upper body clothing? 4   Taking care of personal grooming such as brushing teeth? 4   Eating meals? 4   6 Clicks Daily Activity Score 24   Functional Mobility   Sit to Stand Supervised   Bed, Chair, Wheelchair Transfer Supervised   Toilet Transfers Supervised   Mobility no AD   Activity Tolerance   Comments functional for ADLs   Education Group   Education Provided Role of Occupational Therapist;Activities of Daily Living   Role of Occupational Therapist Patient Response Patient;Acceptance;Explanation;Verbal Demonstration   ADL Patient Response Patient;Acceptance;Explanation;Verbal Demonstration   Occupational Therapy Initial Treatment Plan    Duration Evaluation only   Problem List   Problem List None   Anticipated Discharge Equipment and Recommendations   DC Equipment Recommendations Tub / Shower Seat   Discharge Recommendations Anticipate that the patient will have no further occupational therapy needs after discharge from the hospital

## 2023-01-23 NOTE — DISCHARGE PLANNING
RNCM PC to Liaison from [facility] to inquire bed avaialability; [facility] submitted for auth - likely pt able transfer to [facility] on 1/24/23

## 2023-01-23 NOTE — PROGRESS NOTES
Inpatient Anticoagulation Service Note for 2023    Reason for Anticoagulation: Atrial Fibrillation, Bioprosthetic Valve Replacement   CAB8CS1 VASc Score: 2  HAS-BLED Score: 1    Hemoglobin Value: (!) 8.7  Hematocrit Value: (!) 28.5  Lab Platelet Value: 424  Target INR: 2.0 to 3.0    INR from last 7 days       Date/Time INR Value    23 0119 2.29    23 0132 2.3    23 0210 2.92    23 1622 3.39          Dose from last 7 days       Date/Time Dose (mg)    23 1425 2.5    23 0929 2.5    23 1203 3    23 2233 0          Home Dosin.5 mg daily  Significant Interactions: Antiplatelet Medications, Amiodarone    A/P:  - warfarin continued from home  - INR remains therapeutic, 2.29  - DDIs above noted, CBCs remain stable w/o overt bleeding   - continue w/ home dosing, warfarin 2.5 mg daily  - f/u INR in the AM  - pharmacy will continue following     Pharmacist suggested discharge dosing: Home dosing of 2.5mg daily. Recommend INR check w/in 72 hours of discharge.        Rosalinda FooteD

## 2023-01-23 NOTE — DISCHARGE PLANNING
Agency/Facility Name: [facility]  Outcome: DPA left voicemail regarding Pt now medically cleared    [phone]  Agency/Facility Name: [facility]   Spoke To: Lana   Outcome: Lana called to have referral resent to [facility].     DPA resent referral @ [phone]  Agency/Facility Name: [facility]   Outcome: DPA left Vmail regarding bed availability. DPA awaiting call back.

## 2023-01-24 ENCOUNTER — APPOINTMENT (OUTPATIENT)
Dept: RADIOLOGY | Facility: MEDICAL CENTER | Age: 63
DRG: 208 | End: 2023-01-24
Attending: INTERNAL MEDICINE
Payer: COMMERCIAL

## 2023-01-24 LAB
ANION GAP SERPL CALC-SCNC: 12 MMOL/L (ref 7–16)
BUN SERPL-MCNC: 29 MG/DL (ref 8–22)
CALCIUM SERPL-MCNC: 9.1 MG/DL (ref 8.5–10.5)
CHLORIDE SERPL-SCNC: 94 MMOL/L (ref 96–112)
CO2 SERPL-SCNC: 29 MMOL/L (ref 20–33)
CREAT SERPL-MCNC: 1.6 MG/DL (ref 0.5–1.4)
ERYTHROCYTE [DISTWIDTH] IN BLOOD BY AUTOMATED COUNT: 47.5 FL (ref 35.9–50)
GFR SERPLBLD CREATININE-BSD FMLA CKD-EPI: 48 ML/MIN/1.73 M 2
GLUCOSE SERPL-MCNC: 79 MG/DL (ref 65–99)
HCT VFR BLD AUTO: 30.9 % (ref 42–52)
HGB BLD-MCNC: 9.3 G/DL (ref 14–18)
INR PPP: 2.47 (ref 0.87–1.13)
MCH RBC QN AUTO: 21.6 PG (ref 27–33)
MCHC RBC AUTO-ENTMCNC: 30.1 G/DL (ref 33.7–35.3)
MCV RBC AUTO: 71.7 FL (ref 81.4–97.8)
PLATELET # BLD AUTO: 508 K/UL (ref 164–446)
PMV BLD AUTO: 10.1 FL (ref 9–12.9)
POTASSIUM SERPL-SCNC: 4 MMOL/L (ref 3.6–5.5)
PROTHROMBIN TIME: 26 SEC (ref 12–14.6)
RBC # BLD AUTO: 4.31 M/UL (ref 4.7–6.1)
SODIUM SERPL-SCNC: 135 MMOL/L (ref 135–145)
WBC # BLD AUTO: 15.4 K/UL (ref 4.8–10.8)

## 2023-01-24 PROCEDURE — A9270 NON-COVERED ITEM OR SERVICE: HCPCS

## 2023-01-24 PROCEDURE — 700111 HCHG RX REV CODE 636 W/ 250 OVERRIDE (IP): Performed by: INTERNAL MEDICINE

## 2023-01-24 PROCEDURE — 80048 BASIC METABOLIC PNL TOTAL CA: CPT

## 2023-01-24 PROCEDURE — 700102 HCHG RX REV CODE 250 W/ 637 OVERRIDE(OP): Performed by: INTERNAL MEDICINE

## 2023-01-24 PROCEDURE — 700102 HCHG RX REV CODE 250 W/ 637 OVERRIDE(OP): Performed by: HOSPITALIST

## 2023-01-24 PROCEDURE — 94640 AIRWAY INHALATION TREATMENT: CPT

## 2023-01-24 PROCEDURE — 700102 HCHG RX REV CODE 250 W/ 637 OVERRIDE(OP)

## 2023-01-24 PROCEDURE — 99232 SBSQ HOSP IP/OBS MODERATE 35: CPT | Performed by: INTERNAL MEDICINE

## 2023-01-24 PROCEDURE — A9270 NON-COVERED ITEM OR SERVICE: HCPCS | Performed by: HOSPITALIST

## 2023-01-24 PROCEDURE — 36415 COLL VENOUS BLD VENIPUNCTURE: CPT

## 2023-01-24 PROCEDURE — 71045 X-RAY EXAM CHEST 1 VIEW: CPT

## 2023-01-24 PROCEDURE — A9270 NON-COVERED ITEM OR SERVICE: HCPCS | Performed by: INTERNAL MEDICINE

## 2023-01-24 PROCEDURE — 97116 GAIT TRAINING THERAPY: CPT

## 2023-01-24 PROCEDURE — 700102 HCHG RX REV CODE 250 W/ 637 OVERRIDE(OP): Performed by: STUDENT IN AN ORGANIZED HEALTH CARE EDUCATION/TRAINING PROGRAM

## 2023-01-24 PROCEDURE — 770020 HCHG ROOM/CARE - TELE (206)

## 2023-01-24 PROCEDURE — 99254 IP/OBS CNSLTJ NEW/EST MOD 60: CPT | Performed by: INTERNAL MEDICINE

## 2023-01-24 PROCEDURE — 85610 PROTHROMBIN TIME: CPT

## 2023-01-24 PROCEDURE — 700101 HCHG RX REV CODE 250: Performed by: STUDENT IN AN ORGANIZED HEALTH CARE EDUCATION/TRAINING PROGRAM

## 2023-01-24 PROCEDURE — 85027 COMPLETE CBC AUTOMATED: CPT

## 2023-01-24 PROCEDURE — A9270 NON-COVERED ITEM OR SERVICE: HCPCS | Performed by: STUDENT IN AN ORGANIZED HEALTH CARE EDUCATION/TRAINING PROGRAM

## 2023-01-24 RX ORDER — POTASSIUM CHLORIDE 20 MEQ/1
40 TABLET, EXTENDED RELEASE ORAL 2 TIMES DAILY
Status: DISCONTINUED | OUTPATIENT
Start: 2023-01-24 | End: 2023-01-29

## 2023-01-24 RX ORDER — FUROSEMIDE 10 MG/ML
40 INJECTION INTRAMUSCULAR; INTRAVENOUS
Status: DISCONTINUED | OUTPATIENT
Start: 2023-01-24 | End: 2023-01-26

## 2023-01-24 RX ORDER — IPRATROPIUM BROMIDE AND ALBUTEROL SULFATE 2.5; .5 MG/3ML; MG/3ML
3 SOLUTION RESPIRATORY (INHALATION)
Status: DISCONTINUED | OUTPATIENT
Start: 2023-01-24 | End: 2023-01-29

## 2023-01-24 RX ORDER — HYDROXYZINE HYDROCHLORIDE 25 MG/1
25 TABLET, FILM COATED ORAL
Status: COMPLETED | OUTPATIENT
Start: 2023-01-24 | End: 2023-01-24

## 2023-01-24 RX ADMIN — METOPROLOL TARTRATE 50 MG: 50 TABLET, FILM COATED ORAL at 17:51

## 2023-01-24 RX ADMIN — CEFDINIR 300 MG: 300 CAPSULE ORAL at 05:51

## 2023-01-24 RX ADMIN — OMEPRAZOLE 20 MG: 20 CAPSULE, DELAYED RELEASE ORAL at 05:51

## 2023-01-24 RX ADMIN — WARFARIN SODIUM 2.5 MG: 2.5 TABLET ORAL at 17:52

## 2023-01-24 RX ADMIN — TRAZODONE HYDROCHLORIDE 50 MG: 50 TABLET ORAL at 21:40

## 2023-01-24 RX ADMIN — IPRATROPIUM BROMIDE AND ALBUTEROL SULFATE 3 ML: 2.5; .5 SOLUTION RESPIRATORY (INHALATION) at 07:32

## 2023-01-24 RX ADMIN — OXYCODONE HYDROCHLORIDE 10 MG: 10 TABLET ORAL at 20:09

## 2023-01-24 RX ADMIN — ACETAMINOPHEN 650 MG: 325 TABLET ORAL at 02:32

## 2023-01-24 RX ADMIN — ASPIRIN 81 MG: 81 TABLET, COATED ORAL at 05:51

## 2023-01-24 RX ADMIN — HYDROXYZINE HYDROCHLORIDE 25 MG: 25 TABLET, FILM COATED ORAL at 21:40

## 2023-01-24 RX ADMIN — OXYCODONE HYDROCHLORIDE 10 MG: 10 TABLET ORAL at 02:51

## 2023-01-24 RX ADMIN — METOPROLOL TARTRATE 50 MG: 50 TABLET, FILM COATED ORAL at 05:51

## 2023-01-24 RX ADMIN — POTASSIUM CHLORIDE 40 MEQ: 20 TABLET, EXTENDED RELEASE ORAL at 17:51

## 2023-01-24 RX ADMIN — AMLODIPINE BESYLATE 10 MG: 10 TABLET ORAL at 05:51

## 2023-01-24 RX ADMIN — CEFDINIR 300 MG: 300 CAPSULE ORAL at 17:51

## 2023-01-24 RX ADMIN — OXYCODONE HYDROCHLORIDE 10 MG: 10 TABLET ORAL at 15:38

## 2023-01-24 RX ADMIN — FUROSEMIDE 40 MG: 10 INJECTION, SOLUTION INTRAMUSCULAR; INTRAVENOUS at 15:48

## 2023-01-24 RX ADMIN — AMIODARONE HYDROCHLORIDE 400 MG: 200 TABLET ORAL at 05:50

## 2023-01-24 RX ADMIN — OXYCODONE HYDROCHLORIDE 10 MG: 10 TABLET ORAL at 10:06

## 2023-01-24 RX ADMIN — BUDESONIDE AND FORMOTEROL FUMARATE DIHYDRATE 2 PUFF: 80; 4.5 AEROSOL RESPIRATORY (INHALATION) at 17:51

## 2023-01-24 RX ADMIN — FUROSEMIDE 40 MG: 40 TABLET ORAL at 05:51

## 2023-01-24 RX ADMIN — BUDESONIDE AND FORMOTEROL FUMARATE DIHYDRATE 2 PUFF: 80; 4.5 AEROSOL RESPIRATORY (INHALATION) at 05:53

## 2023-01-24 ASSESSMENT — GAIT ASSESSMENTS
DISTANCE (FEET): 300
DEVIATION: SHUFFLED GAIT;DECREASED HEEL STRIKE;DECREASED TOE OFF
GAIT LEVEL OF ASSIST: SUPERVISED

## 2023-01-24 ASSESSMENT — ENCOUNTER SYMPTOMS
EYE PAIN: 0
FEVER: 0
BACK PAIN: 0
LOSS OF CONSCIOUSNESS: 0
WHEEZING: 0
DIZZINESS: 0
ABDOMINAL PAIN: 0
WEAKNESS: 1
SHORTNESS OF BREATH: 1
NAUSEA: 0
VOMITING: 0
COUGH: 1
CHILLS: 0
HEMOPTYSIS: 0
SPUTUM PRODUCTION: 0
PALPITATIONS: 0
SENSORY CHANGE: 0
FALLS: 0
HEADACHES: 0
INSOMNIA: 0
BLURRED VISION: 0
FOCAL WEAKNESS: 0

## 2023-01-24 ASSESSMENT — COGNITIVE AND FUNCTIONAL STATUS - GENERAL
TURNING FROM BACK TO SIDE WHILE IN FLAT BAD: A LITTLE
MOBILITY SCORE: 18
MOVING TO AND FROM BED TO CHAIR: A LITTLE
SUGGESTED CMS G CODE MODIFIER MOBILITY: CK
CLIMB 3 TO 5 STEPS WITH RAILING: A LITTLE
STANDING UP FROM CHAIR USING ARMS: A LITTLE
WALKING IN HOSPITAL ROOM: A LITTLE
MOVING FROM LYING ON BACK TO SITTING ON SIDE OF FLAT BED: A LITTLE

## 2023-01-24 ASSESSMENT — FIBROSIS 4 INDEX: FIB4 SCORE: 0.65

## 2023-01-24 ASSESSMENT — LIFESTYLE VARIABLES: SUBSTANCE_ABUSE: 0

## 2023-01-24 NOTE — DISCHARGE PLANNING
Agency/Facility Name: [facility]   Outcome: DPA left Vmail regarding insurance auth updates. DPA awaiting call back.     [id number]  Agency/Facility Name: [facility]   Spoke To: Lana   Outcome: Lana called back to notify DPA no insurance updates just yet. Lana to contact DPA with updates.     [phone]  Agency/Facility Name: [facility]   Spoke To: Lana   Outcome: Lana notified DPA insurance auth still pending, Lana asked if transport time can be pushed back.

## 2023-01-24 NOTE — PROGRESS NOTES
Monitor Summary:  Rate: 62-76  Rhythm: SR  Ectopy: (R) PVCs  Measurements: 0.18/0.08/0.42

## 2023-01-24 NOTE — PROGRESS NOTES
[facility] Medicine Daily Progress Note    Date of Service  1/24/2023    Chief Complaint  Noe Hickey is a 62 y.o. male admitted 1/20/2023 with leg swelling, cough.    Hospital Course  Noe Hickey is a 62 y.o. male who presented 1/20/2023 with past medical history of atrial fibrillation/flutter, history of aortic stenosis status post valve replacement, history of tobacco abuse, hyperlipidemia who presents to the hospital for shortness of breath, cough and lower extremity swelling.  Patient was recently discharged in the hospital on 1/5 for aortic aneurysm repair and aortic valve replacement.  Postoperatively he was given IV fluids due to blood loss.  During his hospital stay he had a complication of torsades and ventricular arrhythmia.  He was shocked once and then returned back to normal sinus rhythm.  His echo found EF to be 55% at that time.  Patient states that since his hospital stay has been having worsening lower extremity edema and cough.  He denies any hemoptysis or purulent sputum.  He was discharged to [place] skilled nursing [facility] and states that he has been eating a high salt diet.  Patient was discharged with Lasix and states that he has been compliant with it.    Interval Problem Update  Patient seen and examined, admitted for chest pain and SOB, chest pain intermittently   Wbc 15 today cont on cefdinir repeat CXR.  His echo showed global hypokinesis and EF of  45% from 55 it was before   I have consulted cardiology appreciate rec.     I have discussed this patient's plan of care and discharge plan at IDT rounds today with Case Management, Nursing, Nursing leadership, and other members of the IDT team.    Consultants/Specialty  none    Code Status  Full Code    Disposition  Patient is not medically cleared for discharge.   Anticipate discharge to to skilled nursing facility.  I have placed the appropriate orders for post-discharge needs.    Review of Systems  Review of Systems    Constitutional:  Negative for chills and fever.   Eyes:  Negative for blurred vision and pain.   Respiratory:  Positive for cough and shortness of breath. Negative for hemoptysis, sputum production and wheezing.    Cardiovascular:  Positive for leg swelling. Negative for chest pain and palpitations.   Gastrointestinal:  Negative for abdominal pain, nausea and vomiting.   Genitourinary:  Negative for dysuria and urgency.   Musculoskeletal:  Negative for back pain and falls.   Skin:  Negative for itching and rash.   Neurological:  Positive for weakness. Negative for dizziness, sensory change, focal weakness, loss of consciousness and headaches.   Psychiatric/Behavioral:  Negative for substance abuse. The patient does not have insomnia.       Physical Exam  Temp:  [36.5 °C (97.7 °F)-37.6 °C (99.7 °F)] 37.2 °C (99 °F)  Pulse:  [58-71] 63  Resp:  [18-21] 19  BP: (121-128)/(66-90) 128/73  SpO2:  [90 %-98 %] 90 %    Physical Exam  Constitutional:       General: He is not in acute distress.     Appearance: He is not ill-appearing.   HENT:      Head: Normocephalic and atraumatic.      Right Ear: External ear normal.      Left Ear: External ear normal.      Mouth/Throat:      Pharynx: No oropharyngeal exudate or posterior oropharyngeal erythema.   Eyes:      Extraocular Movements: Extraocular movements intact.      Pupils: Pupils are equal, round, and reactive to light.   Cardiovascular:      Rate and Rhythm: Normal rate and regular rhythm.      Pulses: Normal pulses.      Heart sounds: Normal heart sounds.   Pulmonary:      Effort: Pulmonary effort is normal. No respiratory distress.      Breath sounds: No stridor. Wheezing present. No rales.   Abdominal:      General: Bowel sounds are normal. There is no distension.      Palpations: Abdomen is soft.      Tenderness: There is no abdominal tenderness. There is no guarding or rebound.   Musculoskeletal:         General: Swelling present. No tenderness.      Cervical back:  Normal range of motion and neck supple.      Right lower leg: Edema present.      Left lower leg: Edema present.   Skin:     General: Skin is warm and dry.   Neurological:      General: No focal deficit present.      Mental Status: He is oriented to person, place, and time.      Cranial Nerves: No cranial nerve deficit.      Sensory: No sensory deficit.      Motor: No weakness.   Psychiatric:         Mood and Affect: Mood normal.         Behavior: Behavior normal.       Fluids    Intake/Output Summary (Last 24 hours) at 1/24/2023 1356  Last data filed at 1/24/2023 1221  Gross per 24 hour   Intake 240 ml   Output 200 ml   Net 40 ml       Laboratory  Recent Labs     01/22/23  0132 01/24/23  0804   WBC 14.7* 15.4*   RBC 3.95* 4.31*   HEMOGLOBIN 8.7* 9.3*   HEMATOCRIT 28.5* 30.9*   MCV 72.2* 71.7*   MCH 22.0* 21.6*   MCHC 30.5* 30.1*   RDW 48.3 47.5   PLATELETCT 424 508*   MPV 10.0 10.1     Recent Labs     01/22/23  0132 01/23/23  0119 01/24/23  0804   SODIUM 136 137 135   POTASSIUM 4.2 3.7 4.0   CHLORIDE 97 94* 94*   CO2 29 31 29   GLUCOSE 123* 84 79   BUN 30* 27* 29*   CREATININE 1.61* 1.53* 1.60*   CALCIUM 8.5 8.8 9.1     Recent Labs     01/22/23  0132 01/23/23  0119 01/24/23  0101   INR 2.30* 2.29* 2.47*               Imaging  DX-CHEST-PORTABLE (1 VIEW)   Final Result      1.  Chronic elevation of the left hemidiaphragm with left basilar atelectasis.   2.  Cardiomegaly.      EC-ECHOCARDIOGRAM COMPLETE W/O CONT   Final Result      CT-CTA CHEST PULMONARY ARTERY W/ RECONS   Final Result      1.  No CT evidence of pulmonary embolism.      2.  Significant cardiomegaly.      3.  Consolidation and volume loss in the left lower lobe and lingular segment left upper lobe could BE due to consolidative atelectasis and pneumonia.      4.  Minimal groundglass opacifications noted in the right lung which could be due to inflammation or infection.      5.  Additional linear opacifications in the right lung likely due to atelectasis  or scarring.            DX-CHEST-PORTABLE (1 VIEW)   Final Result      1.  Left lung base atelectasis.      2.  Cardiomegaly.      3.  Chronic elevation of left hemidiaphragm.           Assessment/Plan  * Pneumonia due to infectious organism- (present on admission)  Assessment & Plan  Found to have left sided infiltrate  Bronchial breath sounds and egophony on the left lung exam   Started on  antibiotics  Follow up sputum and blood cultures  Respiratory care protocol   Placed on o2 therapy       COPD (chronic obstructive pulmonary disease) ([facility])  Assessment & Plan  Wheezing heard on exam  40+ pack year history, stopped smoking recently  Suspect underlying COPD, outpatient PFT and pulm follow up  Start symbicort and duonebs here  Not in acute exacerbation    Elevated troponin  Assessment & Plan  Echo with EF of 45% and also shoving global hypokinesis   I have consulted cardiology appreciate rec.     Paroxysmal atrial fibrillation ([facility])  Assessment & Plan  Rate controlled on metoprolol and amiodarone  Continue warfarin    Acute on chronic systolic heart failure ([facility])  Assessment & Plan  Last EF was 55%  Echo shows worse EF 45%  Also global hypokinesis   Continue lasix IV diuresis  On metoprolol  I have consulted cardiology appreciate rec     Acute respiratory failure with hypoxia ([facility])  Assessment & Plan  On 3 L of O2 above baseline  Suspect due to pneumonia vs atelectasis  Continue antibiotics and IS    Acute renal failure (ARF) ([facility])  Assessment & Plan  Monitor BMP and assess response  Avoid IV contrast/nephrotoxins/NSAIDs  Dose adjust meds for decreased GFR             VTE prophylaxis: therapeutic anticoagulation with warfarin    I have performed a physical exam and reviewed and updated ROS and Plan today (1/24/2023). In review of yesterday's note (1/23/2023), there are no changes except as documented above.

## 2023-01-24 NOTE — DISCHARGE PLANNING
Discharge transport with WMT changed from 1300 to 1500 due to SNF preference for arrival. WMT update via Angelica at [facility].

## 2023-01-24 NOTE — DISCHARGE PLANNING
Discharge transport cancelled with WMT due to PT not being medically cleared to leave at this time.   SLC

## 2023-01-24 NOTE — PROGRESS NOTES
Inpatient Anticoagulation Service Note for 2023    Reason for Anticoagulation: Atrial Fibrillation, Bioprosthetic Valve Replacement   [id number] VASc Score: 2  HAS-BLED Score: 1    Hemoglobin Value: (!) 9.3  Hematocrit Value: (!) 30.9  Lab Platelet Value: (!) 508  Target INR: 2.0 to 3.0    INR from last 7 days       Date/Time INR Value    23 0101 2.47    23 0119 2.29    23 0132 2.3    23 0210 2.92    23 1622 3.39          Dose from last 7 days       Date/Time Dose (mg)    23 1139 2.5    23 1425 2.5    23 0929 2.5    23 1203 3    23 2233 0          Home Dosin.5 mg daily  Significant Interactions: Antiplatelet Medications, Amiodarone     A/P:  - warfarin continued from home  - INR remains therapeutic, 2.47  - DDIs above noted, CBCs remain stable w/o overt bleeding   - continue w/ home dosing, warfarin 2.5 mg daily  - f/u INR in the AM  - pharmacy will continue following     Pharmacist suggested discharge dosing: Home dosing of 2.5mg daily. Recommend INR check w/in 72 hours of discharge.        Diony Bertrand, RosalindaD

## 2023-01-24 NOTE — HEART FAILURE PROGRAM
Patient discharged on 1/11/23 after SAVR. It looks like there were some calls to [facility] med group on 1/18/23 due to weight gain and dyspnea. Pt was apparently admitting to [facility] - per h/p patient states that he was eating a high salt diet there.    Per Dr. Anguiano's note yesterday, patient's primary problem is pneumonia but heart failure is also diagnosed.   EF this time is 45% it was 50% at the time of his SAVR. Patient follows with [facility] group so our HF clinic does not have control of his care in the community.    Admission on Friday, 1/20/23 discharging back to [facility]. Discussed HF education with RN Judy also asked that she include in report to SNF that patient has heart failure and needs to be monitored for s/s of exacerbation. Judy says that pt is going to SNF tomorrow but that she'll pass along in report.     Asked pharmacist, Diony Bertrand to assess for vaccinations. Admit profile flu info section is not complete.     Where we stand right now on HFpEF MEASURES per review of most recent notes and discharge med rec.    Anticoagulation for atrial arrhythmia, if applicable: warfarin  Glycemic control for DM + HF: n/a  Lipid lowering medication for DM + HF: n/a  Pneumococcal vaccine: discussed above  Influenza vaccine for the current flu season: discussed above  Documentation of nicotine cessation counseling: MISSING see below  Referral to disease management program specializing in heart failure care: discussed above, pt goes to  Cardiology group, will not be able to go to HF clinic  HF Clinic RN alert: n/a  [facility] Interactive HF education prompt placed in f/u section: yes    Thank you, Ирина Cardio RN Navigator [id number]

## 2023-01-24 NOTE — PROGRESS NOTES
"Pt has not been sleeping all night and throughout shift is observed pacing in room rearranging various items. Pt answering orientation questions appropriately, but making statements that do not coincide with his surroundings. Examples include telling staff to speak with his wife gesturing to the corner of the room where he is the only occupant and asking about backing a truck up to the RV. Pt came out of his room to the nurses station stating he is \"going to leave for a little bit.\" RN author of this note informed the patient he is not able to leave the unit while on telemetry monitoring and has IV access. Pt started telling this RN \"I'm not your prisoner, you can't hold me hostage, I can do whatever I'm not under arrest.\" This RN acknowledged that the patient is not under any kind of legal hold and if he wants to walk around he is able to do so within the nursing unit, otherwise he would be leaving AMA and would need his monitor and IV access removed. The patient was continuing to disagree with the RN who called for security assistance as the patient was becoming more agitated and attempting to go to the elevators. RN with assistance of CNA and ACT were able to redirect patient back onto unit where he ambulated and returned to his room. Security arrived on unit while pt was ambulating and was informed of the situation. Security informed RN to call if further assistance is needed. Pt back in room but still pacing, restless, and rearranging items continuously. Will continue to monitor.  "

## 2023-01-24 NOTE — PROGRESS NOTES
NOC HOSPITALIST CROSS COVER    Notified by RN regarding patient being agitated and pacing.  Per nursing, patient did attempt to elope from the unit, but appeared to be hallucinating.      Vitals:    01/24/23 0323   BP: 122/66   Pulse: 71   Resp: 20   Temp: 37.3 °C (99.1 °F)   SpO2: 91%          Plan:  #Agitation  -Atarax 25 mg p.o. once  -Attempt to reorient  -UDS negative on 1/4/2023        -----------------------------------------------------------------------------------------------------------    Electronically signed by:  Jose العلي, DNP, APRN, ALEXANDERP-BC  Hospitalist Services

## 2023-01-24 NOTE — CARE PLAN
The patient is Stable - Low risk of patient condition declining or worsening    Shift Goals  Clinical Goals: Wean oxygen as able  Patient Goals: Pain management, discharge  Family Goals: HUGH    Progress made toward(s) clinical / shift goals: Pt stable on 2.5L n/c (baseline oxygen at [place] per pt, will DC back to [place] tomorrow      Problem: Pain - Standard  Goal: Alleviation of pain or a reduction in pain to the patient’s comfort goal  Outcome: Progressing     Problem: Knowledge Deficit - Standard  Goal: Patient and family/care givers will demonstrate understanding of plan of care, disease process/condition, diagnostic tests and medications  Outcome: Progressing     Problem: Fall Risk  Goal: Patient will remain free from falls  Outcome: Progressing

## 2023-01-24 NOTE — CONSULTS
Reason for Consult:  Asked by Dr Mehul Mahmood M.D. to see this patient with acute on chronic heart failure  Patient's PCP: Unknown    CC:   Chief Complaint   Patient presents with    Chest Pain     X 5 hours    Shortness of Breath    Leg Swelling       HPI: This is a 62-year-old gentleman who recently underwent aortic valve replacement and aortic aneurysm repair within the month.  He presents with shortness of breath leg swelling and acute kidney injury    Unfortunately the patient was quite agitated and did not want to engage in conversation appropriately was hard to redirect him and he became more more agitated during our conversation and it was not entirely clear that he was understanding the rationale of continued hospitalization.  He is understandably frustrated by his postoperative course after hospitalization but did not seem to be engaged in the conversation to achieve meaningful dialogue so we ended our conversation and I told him I would return tomorrow    He reports his typical weight was around 215 pounds he presented to surgery around 210 and lost down to 196 as would be expected he has had significant weight gain since surgery due to inadequate diuresis.    Medications / Drug list prior to admission:  No current facility-administered medications on file prior to encounter.     Current Outpatient Medications on File Prior to Encounter   Medication Sig Dispense Refill    acetaminophen (TYLENOL) 500 MG Tab Take 2 Tablets by mouth every 6 hours as needed for Moderate Pain or Mild Pain. 30 Tablet 0    amLODIPine (NORVASC) 10 MG Tab Take 1 Tablet by mouth every day. 30 Tablet 2    aspirin EC 81 MG EC tablet Take 1 Tablet by mouth every day. 30 Tablet 2    furosemide (LASIX) 40 MG Tab Take 1 Tablet by mouth every day. 30 Tablet 0    potassium chloride SA (KDUR) 20 MEQ Tab CR Take 1 Tablet by mouth every day. 30 Tablet 0       Current list of administered Medications:    Current Facility-Administered  Medications:     hydrOXYzine HCl (ATARAX) tablet 25 mg, 25 mg, Oral, Once PRN, EFRAÍN Martel    ipratropium-albuterol (DUONEB) nebulizer solution, 3 mL, Nebulization, Q2HRS PRN (RT), Mehul Mahmood M.D.    cefdinir (OMNICEF) capsule 300 mg, 300 mg, Oral, Q12HRS, Noe Anguiano M.D., 300 mg at 01/24/23 0551    furosemide (LASIX) tablet 40 mg, 40 mg, Oral, Q DAY, Noe Anguiano M.D., 40 mg at 01/24/23 0551    metoprolol tartrate (LOPRESSOR) tablet 50 mg, 50 mg, Oral, BID, Noe Anguiano M.D., 50 mg at 01/24/23 0551    budesonide-formoterol (SYMBICORT) 80-4.5 MCG/ACT inhaler 2 Puff, 2 Puff, Inhalation, BID, Noe Anguiano M.D., 2 Puff at 01/24/23 0553    warfarin (COUMADIN) tablet 2.5 mg, 2.5 mg, Oral, DAILY AT 1800, Noe Anguiano M.D., 2.5 mg at 01/23/23 1728    oxyCODONE immediate release (ROXICODONE) tablet 10 mg, 10 mg, Oral, Q4HRS PRN, Noe Anguiano M.D., 10 mg at 01/24/23 1006    omeprazole (PRILOSEC) capsule 20 mg, 20 mg, Oral, DAILY, Noe Anguiano M.D., 20 mg at 01/24/23 0551    traZODone (DESYREL) tablet 50 mg, 50 mg, Oral, QHS, Noe Anguiano M.D., 50 mg at 01/23/23 2030    senna-docusate (PERICOLACE or SENOKOT S) 8.6-50 MG per tablet 2 Tablet, 2 Tablet, Oral, BID, 2 Tablet at 01/22/23 0504 **AND** polyethylene glycol/lytes (MIRALAX) PACKET 1 Packet, 1 Packet, Oral, QDAY PRN **AND** magnesium hydroxide (MILK OF MAGNESIA) suspension 30 mL, 30 mL, Oral, QDAY PRN **AND** bisacodyl (DULCOLAX) suppository 10 mg, 10 mg, Rectal, QDAY PRN, Burt Yin M.D.    Respiratory Therapy Consult, , Nebulization, Continuous RT, Burt Yin M.D.    acetaminophen (Tylenol) tablet 650 mg, 650 mg, Oral, Q6HRS PRN, Burt Yin M.D., 650 mg at 01/24/23 0232    hydrALAZINE (APRESOLINE) injection 10 mg, 10 mg, Intravenous, Q4HRS PRN, Burt Yin M.D.    ondansetron (ZOFRAN) syringe/vial injection 4 mg, 4 mg, Intravenous, Q4HRS PRN, Burt Yin M.D., 4 mg at 01/21/23 1401    ondansetron (ZOFRAN ODT) dispertab 4 mg, 4 mg,  Oral, Q4HRS PRN, Burt Yin M.D., 4 mg at 01/21/23 0321    promethazine (PHENERGAN) tablet 12.5-25 mg, 12.5-25 mg, Oral, Q4HRS PRN, Burt Yin M.D.    promethazine (PHENERGAN) suppository 12.5-25 mg, 12.5-25 mg, Rectal, Q4HRS PRN, Burt Yin M.D.    prochlorperazine (COMPAZINE) injection 5-10 mg, 5-10 mg, Intravenous, Q4HRS PRN, Burt Yin M.D.    guaiFENesin dextromethorphan (ROBITUSSIN DM) 100-10 MG/5ML syrup 10 mL, 10 mL, Oral, Q6HRS PRN, Burt Yin M.D.    amiodarone (Cordarone) tablet 400 mg, 400 mg, Oral, DAILY, Burt Yin M.D., 400 mg at 01/24/23 0550    amLODIPine (NORVASC) tablet 10 mg, 10 mg, Oral, DAILY, Burt Yin M.D., 10 mg at 01/24/23 0551    aspirin EC (ECOTRIN) tablet 81 mg, 81 mg, Oral, DAILY, Burt Yin M.D., 81 mg at 01/24/23 0551    MD Alert...Warfarin per Pharmacy, , Other, PHARMACY TO DOSE, Burt Yin M.D.    Past Medical History:   Diagnosis Date    Arrhythmia     Breath shortness     Cyclic vomiting syndrome     Fracture     Headache, classical migraine     Heart burn     Heart valve disease     Indigestion     Pneumonia due to infectious organism 1/20/2023    Snoring        Past Surgical History:   Procedure Laterality Date    AORTIC VALVE REPLACEMENT  1/5/2023    Procedure: AORTIC VALVE REPLACEMENT, ASCENDING AORTIC ANEURYSM REPAIR AND TRANSESOPHAGEAL ECHOCARDIOGRAM.;  Surgeon: Serena Goyal M.D.;  Location: SURGERY [facility];  Service: Cardiac    AORTIC ASCENDING DISSECTION  1/5/2023    Procedure: REPAIR, ANEURYSM OR DISSECTION, AORTA, ASCENDING;  Surgeon: Serena Goyal M.D.;  Location: SURGERY [facility];  Service: Cardiac    ECHOCARDIOGRAM, TRANSESOPHAGEAL, INTRAOPERATIVE  1/5/2023    Procedure: ECHOCARDIOGRAM, TRANSESOPHAGEAL, INTRAOPERATIVE.;  Surgeon: Serena Goyal M.D.;  Location: SURGERY [facility];  Service: Cardiac    IRRIGATION & DEBRIDEMENT ORTHO Right 09/19/2017    Procedure: IRRIGATION & DEBRIDEMENT ORTHO-THIGH;  Surgeon: Corbin QUEVEDO  RONI Rivera;  Location: SURGERY [facility];  Service: Orthopedics    SHOULDER HEMICAP RESURFACING Right 10/12/2015    Procedure: RIGHT SHOULDER RESURFACING;  Surgeon: Javon Doll M.D.;  Location: SURGERY [facility];  Service:     SHOULDER ARTHROSCOPY      x3    SHOULDER SURGERY      replacement right       No family history on file.  Patient family history was personally reviewed, no pertinent family history to current presentation    Social History     Tobacco Use    Smoking status: Former     Packs/day: 1.00     Years: 40.00     Pack years: 40.00     Types: Cigarettes     Quit date: 1/3/2023     Years since quittin.0    Smokeless tobacco: Never   Vaping Use    Vaping Use: Never used   Substance Use Topics    Alcohol use: No    Drug use: Not Currently     Comment: past problems with narcotics        ALLERGIES:  Allergies   Allergen Reactions    Morphine Rash     Rash/ difficulty breathing       Review of systems:  A complete review of symptoms was reviewed with patient. This is reviewed in H&P and PMH. ALL OTHERS reviewed and negative    Physical exam:  Patient Vitals for the past 24 hrs:   BP Temp Temp src Pulse Resp SpO2 Weight   23 1221 128/73 37.2 °C (99 °F) Temporal 63 19 90 % --   23 0746 127/72 37.1 °C (98.8 °F) Temporal 61 19 93 % --   23 0732 -- -- -- (!) 58 (!) 21 98 % --   23 0323 122/66 37.3 °C (99.1 °F) Temporal 71 20 91 % --   23 2320 128/87 36.5 °C (97.7 °F) Temporal 65 18 92 % --   23 126/88 37.5 °C (99.5 °F) Temporal 65 20 91 % 98.7 kg (217 lb 9.5 oz)   23 1550 (!) 121/90 37.6 °C (99.7 °F) Temporal 70 18 95 % --     General: No acute respiratory distress.  The patient was agitated and seems to get more agitated with ongoing conversation  EYES: no jaundice  HEENT: OP clear   Neck:  No JVD.   CVS:  RRR.   Resp: Normal respiratory effort,   Abdomen: ND,  Skin: Grossly nothing acute no obvious rashes  Neurological: Alert, Moves all  extremities  Extremities:   Moderate lower edema. No cyanosis.       Data:  Laboratory studies personally reviewed by me:  Recent Results (from the past 24 hour(s))   SNF COVID Discharge (DRY NASAL SWAB IS REQUIRED)    Collection Time: 01/23/23  5:33 PM   Result Value Ref Range    SARS-CoV-2 Source Nasal Swab     SARS-COV ANTIGEN SULEMAN NotDetected NotDetected   PROTHROMBIN TIME    Collection Time: 01/24/23  1:01 AM   Result Value Ref Range    PT 26.0 (H) 12.0 - 14.6 sec    INR 2.47 (H) 0.87 - 1.13   CBC WITHOUT DIFFERENTIAL    Collection Time: 01/24/23  8:04 AM   Result Value Ref Range    WBC 15.4 (H) 4.8 - 10.8 K/uL    RBC 4.31 (L) 4.70 - 6.10 M/uL    Hemoglobin 9.3 (L) 14.0 - 18.0 g/dL    Hematocrit 30.9 (L) 42.0 - 52.0 %    MCV 71.7 (L) 81.4 - 97.8 fL    MCH 21.6 (L) 27.0 - 33.0 pg    MCHC 30.1 (L) 33.7 - 35.3 g/dL    RDW 47.5 35.9 - 50.0 fL    Platelet Count 508 (H) 164 - 446 K/uL    MPV 10.1 9.0 - 12.9 fL   Basic Metabolic Panel    Collection Time: 01/24/23  8:04 AM   Result Value Ref Range    Sodium 135 135 - 145 mmol/L    Potassium 4.0 3.6 - 5.5 mmol/L    Chloride 94 (L) 96 - 112 mmol/L    Co2 29 20 - 33 mmol/L    Glucose 79 65 - 99 mg/dL    Bun 29 (H) 8 - 22 mg/dL    Creatinine 1.60 (H) 0.50 - 1.40 mg/dL    Calcium 9.1 8.5 - 10.5 mg/dL    Anion Gap 12.0 7.0 - 16.0   ESTIMATED GFR    Collection Time: 01/24/23  8:04 AM   Result Value Ref Range    GFR (CKD-EPI) 48 (A) >60 mL/min/1.73 m 2       Imaging:  DX-CHEST-PORTABLE (1 VIEW)   Final Result      1.  Chronic elevation of the left hemidiaphragm with left basilar atelectasis.   2.  Cardiomegaly.      EC-ECHOCARDIOGRAM COMPLETE W/O CONT   Final Result      CT-CTA CHEST PULMONARY ARTERY W/ RECONS   Final Result      1.  No CT evidence of pulmonary embolism.      2.  Significant cardiomegaly.      3.  Consolidation and volume loss in the left lower lobe and lingular segment left upper lobe could BE due to consolidative atelectasis and pneumonia.      4.  Minimal  groundglass opacifications noted in the right lung which could be due to inflammation or infection.      5.  Additional linear opacifications in the right lung likely due to atelectasis or scarring.            DX-CHEST-PORTABLE (1 VIEW)   Final Result      1.  Left lung base atelectasis.      2.  Cardiomegaly.      3.  Chronic elevation of left hemidiaphragm.              EKG tracings personally reviewed by me              Echocardiogram images personally reviewed by me show mildly reduced ejection fraction with inferior inferior lateral hypokinesis present on prior echo    Angiogram report from [facility] shows mild coronary artery disease    All pertinent features of laboratory and imaging reviewed including primary images where applicable      Principal Problem:    Acute on chronic systolic heart failure (HCC) POA: Yes  Active Problems:    Acute renal failure (ARF) (HCC) POA: Unknown    Acute respiratory failure with hypoxia (HCC) POA: Unknown    Pneumonia due to infectious organism POA: Yes    Paroxysmal atrial fibrillation (HCC) POA: Unknown    Elevated troponin POA: Unknown    COPD (chronic obstructive pulmonary disease) (HCC) POA: Unknown  Resolved Problems:    * No resolved hospital problems. *      Assessment / Plan:  Acute on chronic heart failure  Based on his weight I would imagine his dry weight after surgery is below 200 pounds and so he needs to achieve more diuresis.    I tried to reinforce this with the patient and the fact that he had torsades in the setting of recent surgery that he needs to stay for inpatient diuresis with IV diuretics with close monitoring of his potassium and kidney function.    I stressed to him the high risk nature of his underlying health issues including recent cardiac arrest with torsades and the need for structured successful diuresis.    Hopefully we are able to have a more meaningful conversation tomorrow    I personally discussed his case with  Dr Mehul Mahmood,  M.D.    Future Appointments   Date Time Provider Department Center   2/6/2023  1:45 PM CT RESOURCE PROVIDER [facility] None       It is my pleasure to participate in the care of Mr. Hickey.  Please do not hesitate to contact me with questions or concerns.    Keshawn Trejo MD PhD [facility]  Cardiologist [facility]    1/24/2023    Please note that this dictation was created using voice recognition software. There may be errors I did not discover before finalizing the note.

## 2023-01-25 LAB
ANION GAP SERPL CALC-SCNC: 13 MMOL/L (ref 7–16)
BACTERIA BLD CULT: NORMAL
BACTERIA BLD CULT: NORMAL
BUN SERPL-MCNC: 31 MG/DL (ref 8–22)
CALCIUM SERPL-MCNC: 9.1 MG/DL (ref 8.5–10.5)
CHLORIDE SERPL-SCNC: 94 MMOL/L (ref 96–112)
CO2 SERPL-SCNC: 30 MMOL/L (ref 20–33)
CREAT SERPL-MCNC: 1.67 MG/DL (ref 0.5–1.4)
ERYTHROCYTE [DISTWIDTH] IN BLOOD BY AUTOMATED COUNT: 47.7 FL (ref 35.9–50)
GFR SERPLBLD CREATININE-BSD FMLA CKD-EPI: 46 ML/MIN/1.73 M 2
GLUCOSE SERPL-MCNC: 93 MG/DL (ref 65–99)
HCT VFR BLD AUTO: 31.9 % (ref 42–52)
HGB BLD-MCNC: 9.5 G/DL (ref 14–18)
INR PPP: 2.47 (ref 0.87–1.13)
MAGNESIUM SERPL-MCNC: 1.8 MG/DL (ref 1.5–2.5)
MCH RBC QN AUTO: 21.6 PG (ref 27–33)
MCHC RBC AUTO-ENTMCNC: 29.8 G/DL (ref 33.7–35.3)
MCV RBC AUTO: 72.7 FL (ref 81.4–97.8)
PLATELET # BLD AUTO: 525 K/UL (ref 164–446)
PMV BLD AUTO: 10 FL (ref 9–12.9)
POTASSIUM SERPL-SCNC: 4.3 MMOL/L (ref 3.6–5.5)
PROTHROMBIN TIME: 26 SEC (ref 12–14.6)
RBC # BLD AUTO: 4.39 M/UL (ref 4.7–6.1)
SIGNIFICANT IND 70042: NORMAL
SIGNIFICANT IND 70042: NORMAL
SITE SITE: NORMAL
SITE SITE: NORMAL
SODIUM SERPL-SCNC: 137 MMOL/L (ref 135–145)
SOURCE SOURCE: NORMAL
SOURCE SOURCE: NORMAL
TROPONIN T SERPL-MCNC: 56 NG/L (ref 6–19)
WBC # BLD AUTO: 15.2 K/UL (ref 4.8–10.8)

## 2023-01-25 PROCEDURE — A9270 NON-COVERED ITEM OR SERVICE: HCPCS | Performed by: HOSPITALIST

## 2023-01-25 PROCEDURE — 99232 SBSQ HOSP IP/OBS MODERATE 35: CPT | Performed by: INTERNAL MEDICINE

## 2023-01-25 PROCEDURE — 84484 ASSAY OF TROPONIN QUANT: CPT

## 2023-01-25 PROCEDURE — 99254 IP/OBS CNSLTJ NEW/EST MOD 60: CPT | Mod: GC | Performed by: PSYCHIATRY & NEUROLOGY

## 2023-01-25 PROCEDURE — 700102 HCHG RX REV CODE 250 W/ 637 OVERRIDE(OP): Performed by: HOSPITALIST

## 2023-01-25 PROCEDURE — 85027 COMPLETE CBC AUTOMATED: CPT

## 2023-01-25 PROCEDURE — 93005 ELECTROCARDIOGRAM TRACING: CPT | Performed by: STUDENT IN AN ORGANIZED HEALTH CARE EDUCATION/TRAINING PROGRAM

## 2023-01-25 PROCEDURE — 700111 HCHG RX REV CODE 636 W/ 250 OVERRIDE (IP): Performed by: INTERNAL MEDICINE

## 2023-01-25 PROCEDURE — 94640 AIRWAY INHALATION TREATMENT: CPT

## 2023-01-25 PROCEDURE — 93005 ELECTROCARDIOGRAM TRACING: CPT | Performed by: INTERNAL MEDICINE

## 2023-01-25 PROCEDURE — 700101 HCHG RX REV CODE 250: Performed by: INTERNAL MEDICINE

## 2023-01-25 PROCEDURE — A9270 NON-COVERED ITEM OR SERVICE: HCPCS | Performed by: INTERNAL MEDICINE

## 2023-01-25 PROCEDURE — 700102 HCHG RX REV CODE 250 W/ 637 OVERRIDE(OP): Performed by: INTERNAL MEDICINE

## 2023-01-25 PROCEDURE — 770020 HCHG ROOM/CARE - TELE (206)

## 2023-01-25 PROCEDURE — 80048 BASIC METABOLIC PNL TOTAL CA: CPT

## 2023-01-25 PROCEDURE — 83735 ASSAY OF MAGNESIUM: CPT

## 2023-01-25 PROCEDURE — 700102 HCHG RX REV CODE 250 W/ 637 OVERRIDE(OP): Performed by: STUDENT IN AN ORGANIZED HEALTH CARE EDUCATION/TRAINING PROGRAM

## 2023-01-25 PROCEDURE — 36415 COLL VENOUS BLD VENIPUNCTURE: CPT

## 2023-01-25 PROCEDURE — A9270 NON-COVERED ITEM OR SERVICE: HCPCS | Performed by: STUDENT IN AN ORGANIZED HEALTH CARE EDUCATION/TRAINING PROGRAM

## 2023-01-25 PROCEDURE — 85610 PROTHROMBIN TIME: CPT

## 2023-01-25 RX ORDER — NITROGLYCERIN 0.4 MG/1
0.4 TABLET SUBLINGUAL
Status: DISCONTINUED | OUTPATIENT
Start: 2023-01-25 | End: 2023-02-03 | Stop reason: HOSPADM

## 2023-01-25 RX ORDER — LORAZEPAM 2 MG/ML
1 INJECTION INTRAMUSCULAR EVERY 4 HOURS PRN
Status: DISCONTINUED | OUTPATIENT
Start: 2023-01-25 | End: 2023-01-29

## 2023-01-25 RX ADMIN — POTASSIUM CHLORIDE 40 MEQ: 20 TABLET, EXTENDED RELEASE ORAL at 16:51

## 2023-01-25 RX ADMIN — IPRATROPIUM BROMIDE AND ALBUTEROL SULFATE 3 ML: .5; 2.5 SOLUTION RESPIRATORY (INHALATION) at 23:16

## 2023-01-25 RX ADMIN — ASPIRIN 81 MG: 81 TABLET, COATED ORAL at 05:54

## 2023-01-25 RX ADMIN — FUROSEMIDE 40 MG: 10 INJECTION, SOLUTION INTRAMUSCULAR; INTRAVENOUS at 16:01

## 2023-01-25 RX ADMIN — METOPROLOL TARTRATE 50 MG: 50 TABLET, FILM COATED ORAL at 05:54

## 2023-01-25 RX ADMIN — NITROGLYCERIN 0.4 MG: 0.4 TABLET, ORALLY DISINTEGRATING SUBLINGUAL at 11:08

## 2023-01-25 RX ADMIN — AMLODIPINE BESYLATE 10 MG: 10 TABLET ORAL at 05:54

## 2023-01-25 RX ADMIN — OXYCODONE HYDROCHLORIDE 10 MG: 10 TABLET ORAL at 02:25

## 2023-01-25 RX ADMIN — FUROSEMIDE 40 MG: 10 INJECTION, SOLUTION INTRAMUSCULAR; INTRAVENOUS at 05:54

## 2023-01-25 RX ADMIN — WARFARIN SODIUM 2.5 MG: 2.5 TABLET ORAL at 18:25

## 2023-01-25 RX ADMIN — CEFDINIR 300 MG: 300 CAPSULE ORAL at 05:54

## 2023-01-25 RX ADMIN — OMEPRAZOLE 20 MG: 20 CAPSULE, DELAYED RELEASE ORAL at 05:53

## 2023-01-25 RX ADMIN — OXYCODONE HYDROCHLORIDE 10 MG: 10 TABLET ORAL at 11:11

## 2023-01-25 RX ADMIN — BUDESONIDE AND FORMOTEROL FUMARATE DIHYDRATE 2 PUFF: 80; 4.5 AEROSOL RESPIRATORY (INHALATION) at 16:50

## 2023-01-25 RX ADMIN — CEFDINIR 300 MG: 300 CAPSULE ORAL at 16:51

## 2023-01-25 RX ADMIN — TRAZODONE HYDROCHLORIDE 50 MG: 50 TABLET ORAL at 21:56

## 2023-01-25 RX ADMIN — OXYCODONE HYDROCHLORIDE 10 MG: 10 TABLET ORAL at 16:00

## 2023-01-25 RX ADMIN — BUDESONIDE AND FORMOTEROL FUMARATE DIHYDRATE 2 PUFF: 80; 4.5 AEROSOL RESPIRATORY (INHALATION) at 05:55

## 2023-01-25 RX ADMIN — FUROSEMIDE 40 MG: 10 INJECTION, SOLUTION INTRAMUSCULAR; INTRAVENOUS at 11:08

## 2023-01-25 RX ADMIN — AMIODARONE HYDROCHLORIDE 400 MG: 200 TABLET ORAL at 05:54

## 2023-01-25 RX ADMIN — METOPROLOL TARTRATE 50 MG: 50 TABLET, FILM COATED ORAL at 16:51

## 2023-01-25 RX ADMIN — POTASSIUM CHLORIDE 40 MEQ: 20 TABLET, EXTENDED RELEASE ORAL at 05:54

## 2023-01-25 RX ADMIN — OXYCODONE HYDROCHLORIDE 10 MG: 10 TABLET ORAL at 20:58

## 2023-01-25 ASSESSMENT — ENCOUNTER SYMPTOMS
SPUTUM PRODUCTION: 0
HEMOPTYSIS: 0
LOSS OF CONSCIOUSNESS: 0
DIZZINESS: 0
CHILLS: 0
HEADACHES: 0
DEPRESSION: 0
VOMITING: 0
COUGH: 1
HALLUCINATIONS: 1
FOCAL WEAKNESS: 0
INSOMNIA: 0
NAUSEA: 0
BLURRED VISION: 0
INSOMNIA: 1
WEAKNESS: 1
EYE PAIN: 0
SENSORY CHANGE: 0
FALLS: 0
ABDOMINAL PAIN: 0
BACK PAIN: 0
FEVER: 0
WHEEZING: 0
SHORTNESS OF BREATH: 1
NERVOUS/ANXIOUS: 1
PALPITATIONS: 0

## 2023-01-25 ASSESSMENT — LIFESTYLE VARIABLES: SUBSTANCE_ABUSE: 0

## 2023-01-25 ASSESSMENT — PAIN DESCRIPTION - PAIN TYPE
TYPE: ACUTE PAIN
TYPE: ACUTE PAIN

## 2023-01-25 NOTE — PROGRESS NOTES
Inpatient Anticoagulation Service Note for 2023    Reason for Anticoagulation: Atrial Fibrillation, Bioprosthetic Valve Replacement  Target INR: 2.0 to 3.0    [id number] VASc Score: 2  HAS-BLED Score: 1    Hemoglobin Value: (!) 9.5  Hematocrit Value: (!) 31.9  Lab Platelet Value: (!) 525    INR from last 7 days       Date/Time INR Value    23 0045 2.47    23 0101 2.47    23 0119 2.29    23 0132 2.3    23 0210 2.92    23 1622 3.39          Dose from last 7 days       Date/Time Dose (mg)    23 0824 2.5    23 1139 2.5    23 1425 2.5    23 0929 2.5    23 1203 3    23 2233 0          Home Dosin.5 mg daily  Significant Interactions: Antiplatelet Medications, Amiodarone     A/P:  - warfarin continued from home  - INR remains therapeutic, 2.47  - DDIs above noted, CBCs remain stable w/o overt bleeding   - continue w/ home dosing, warfarin 2.5 mg daily  - f/u INR in the AM  - pharmacy will continue following     Pharmacist suggested discharge dosing: Home dosing of 2.5 mg daily. Recommend INR check w/in 72 hours of discharge.        Diony Bertrand, RosalindaD

## 2023-01-25 NOTE — PROGRESS NOTES
[facility] Medicine Daily Progress Note    Date of Service  1/25/2023    Chief Complaint  Noe Hickey is a 62 y.o. male admitted 1/20/2023 with leg swelling, cough.    Hospital Course  Noe Hickey is a 62 y.o. male who presented 1/20/2023 with past medical history of atrial fibrillation/flutter, history of aortic stenosis status post valve replacement, history of tobacco abuse, hyperlipidemia who presents to the hospital for shortness of breath, cough and lower extremity swelling.  Patient was recently discharged in the hospital on 1/5 for aortic aneurysm repair and aortic valve replacement.  Postoperatively he was given IV fluids due to blood loss.  During his hospital stay he had a complication of torsades and ventricular arrhythmia.  He was shocked once and then returned back to normal sinus rhythm.  His echo found EF to be 55% at that time.  Patient states that since his hospital stay has been having worsening lower extremity edema and cough.  He denies any hemoptysis or purulent sputum.  He was discharged to [facility] and states that he has been eating a high salt diet.  Patient was discharged with Lasix and states that he has been compliant with it.    Interval Problem Update  1/24: Patient seen and examined, admitted for chest pain and SOB, chest pain intermittently   Wbc 15 today cont on cefdinir repeat CXR.  His echo showed global hypokinesis and EF of  45% from 55 it was before   I have consulted cardiology appreciate rec.   1/25: Patient seen and examined, agitated and with some hallucination as per staff has been going for a while concern for some underline psychiatric  issues   Regarding his HF, cardiology has been following on IV lasix  for diuresis   I have discussed this patient's plan of care and discharge plan at IDT rounds today with Case Management, Nursing, Nursing leadership, and other members of the IDT team.    Consultants/Specialty  none    Code  Status  Full Code    Disposition  Patient is not medically cleared for discharge.   Anticipate discharge to to skilled nursing facility.  I have placed the appropriate orders for post-discharge needs.    Review of Systems  Review of Systems   Constitutional:  Negative for chills and fever.   Eyes:  Negative for blurred vision and pain.   Respiratory:  Positive for cough and shortness of breath. Negative for hemoptysis, sputum production and wheezing.    Cardiovascular:  Positive for leg swelling. Negative for chest pain and palpitations.   Gastrointestinal:  Negative for abdominal pain, nausea and vomiting.   Genitourinary:  Negative for dysuria and urgency.   Musculoskeletal:  Negative for back pain and falls.   Skin:  Negative for itching and rash.   Neurological:  Positive for weakness. Negative for dizziness, sensory change, focal weakness, loss of consciousness and headaches.   Psychiatric/Behavioral:  Negative for substance abuse. The patient does not have insomnia.       Physical Exam  Temp:  [36.5 °C (97.7 °F)-37.2 °C (99 °F)] 37.1 °C (98.8 °F)  Pulse:  [59-66] 60  Resp:  [18-22] 22  BP: ()/() 118/69  SpO2:  [90 %-93 %] 90 %    Physical Exam  Constitutional:       General: He is not in acute distress.     Appearance: He is not ill-appearing.   HENT:      Head: Normocephalic and atraumatic.      Right Ear: External ear normal.      Left Ear: External ear normal.      Mouth/Throat:      Pharynx: No oropharyngeal exudate or posterior oropharyngeal erythema.   Eyes:      Extraocular Movements: Extraocular movements intact.      Pupils: Pupils are equal, round, and reactive to light.   Cardiovascular:      Rate and Rhythm: Normal rate and regular rhythm.      Pulses: Normal pulses.      Heart sounds: Normal heart sounds.   Pulmonary:      Effort: Pulmonary effort is normal. No respiratory distress.      Breath sounds: No stridor. Wheezing present. No rales.   Abdominal:      General: Bowel sounds are  normal. There is no distension.      Palpations: Abdomen is soft.      Tenderness: There is no abdominal tenderness. There is no guarding or rebound.   Musculoskeletal:         General: Swelling present. No tenderness.      Cervical back: Normal range of motion and neck supple.      Right lower leg: Edema present.      Left lower leg: Edema present.   Skin:     General: Skin is warm and dry.   Neurological:      General: No focal deficit present.      Mental Status: He is oriented to person, place, and time.      Cranial Nerves: No cranial nerve deficit.      Sensory: No sensory deficit.      Motor: No weakness.   Psychiatric:         Mood and Affect: Mood normal.         Behavior: Behavior normal.       Fluids  No intake or output data in the 24 hours ending 01/25/23 1250      Laboratory  Recent Labs     01/24/23  0804 01/25/23  0045   WBC 15.4* 15.2*   RBC 4.31* 4.39*   HEMOGLOBIN 9.3* 9.5*   HEMATOCRIT 30.9* 31.9*   MCV 71.7* 72.7*   MCH 21.6* 21.6*   MCHC 30.1* 29.8*   RDW 47.5 47.7   PLATELETCT 508* 525*   MPV 10.1 10.0     Recent Labs     01/23/23  0119 01/24/23  0804 01/25/23  0045   SODIUM 137 135 137   POTASSIUM 3.7 4.0 4.3   CHLORIDE 94* 94* 94*   CO2 31 29 30   GLUCOSE 84 79 93   BUN 27* 29* 31*   CREATININE 1.53* 1.60* 1.67*   CALCIUM 8.8 9.1 9.1     Recent Labs     01/23/23  0119 01/24/23  0101 01/25/23  0045   INR 2.29* 2.47* 2.47*               Imaging  DX-CHEST-PORTABLE (1 VIEW)   Final Result      1.  Chronic elevation of the left hemidiaphragm with left basilar atelectasis.   2.  Cardiomegaly.      EC-ECHOCARDIOGRAM COMPLETE W/O CONT   Final Result      CT-CTA CHEST PULMONARY ARTERY W/ RECONS   Final Result      1.  No CT evidence of pulmonary embolism.      2.  Significant cardiomegaly.      3.  Consolidation and volume loss in the left lower lobe and lingular segment left upper lobe could BE due to consolidative atelectasis and pneumonia.      4.  Minimal groundglass opacifications noted in the  right lung which could be due to inflammation or infection.      5.  Additional linear opacifications in the right lung likely due to atelectasis or scarring.            DX-CHEST-PORTABLE (1 VIEW)   Final Result      1.  Left lung base atelectasis.      2.  Cardiomegaly.      3.  Chronic elevation of left hemidiaphragm.           Assessment/Plan  * Acute on chronic systolic heart failure (HCC)- (present on admission)  Assessment & Plan  Last EF was 55%  Echo shows worse EF 45%  Also global hypokinesis   Continue lasix IV diuresis  On metoprolol  I have consulted cardiology appreciate rec     COPD (chronic obstructive pulmonary disease) ([facility])  Assessment & Plan  Wheezing heard on exam  40+ pack year history, stopped smoking recently  Suspect underlying COPD, outpatient PFT and pulm follow up  Start symbicort and duonebs here  Not in acute exacerbation    Elevated troponin  Assessment & Plan  Echo with EF of 45% and also shoving global hypokinesis   I have consulted cardiology appreciate rec.     Paroxysmal atrial fibrillation ([facility])  Assessment & Plan  Rate controlled on metoprolol and amiodarone  Continue warfarin    Pneumonia due to infectious organism- (present on admission)  Assessment & Plan  Found to have left sided infiltrate  Bronchial breath sounds and egophony on the left lung exam   Started on  antibiotics  Follow up sputum and blood cultures  Respiratory care protocol   Placed on o2 therapy       Acute respiratory failure with hypoxia ([facility])  Assessment & Plan  On 3 L of O2 above baseline  Suspect due to pneumonia vs atelectasis  Continue antibiotics and IS    Acute renal failure (ARF) ([facility])  Assessment & Plan  Monitor BMP and assess response  Avoid IV contrast/nephrotoxins/NSAIDs  Dose adjust meds for decreased GFR             VTE prophylaxis: therapeutic anticoagulation with warfarin    I have performed a physical exam and reviewed and updated ROS and Plan today (1/25/2023). In review of yesterday's note  (1/24/2023), there are no changes except as documented above.

## 2023-01-25 NOTE — PROGRESS NOTES
Pt again did not sleep overnight and exhibiting episodes of hallucinations both visual and auditory. Although he answers orientation questions appropriately he is constantly wandering in his room, rearranging furniture and causing damage to various items in his room. He is constantly removing his tele monitor, removed his IV overnight, and removed his wrist band. When questioned about his behavior he is unable to provide an explanation and acknowledges he is behaving erratically yet unable to control his impulses.

## 2023-01-25 NOTE — CONSULTS
PSYCHIATRIC INTAKE EVALUATION(new)  Reason for admission: Shortness of breath, cough, lower extremity swelling  Reason for consult: Psychiatric medication evaluation, management for new onset hallucinations/agitation.  Requesting Provider: Dr. Mahmood     Legal Hold Status: Not currently on legal hold               *HPI: Noe Hickey is a 62 year old male, with past medical history of atrial fibrillation/flutter, history of aortic stenosis status post valve replacement, tobacco abuse and hyperlipidemia, who presented to the ED for shortness of breath, cough and lower extremity swelling on 1/20/2023.  Patient was admitted to the medicine floor for infectious pneumonia, elevated troponin, acute on chronic systolic heart failure, acute respiratory failure with hypoxia and acute renal failure.  Chart reviewed.  Patient does not have any home psychotropic medications.  Currently, patient has trazodone 50 mg nightly for sleep.  There are currently no medications for agitation/aggression available.    Per nursing notation, patient has not been sleeping throughout the night and is exhibiting episodes of hallucinations, both visual and auditory.  Although he responds to orientation questions appropriately, he is observed constantly wandering around in his room, rearranging furniture and causing damage to various items in his room.  He is constantly removing his telemetry monitor, removed his IV overnight and removed his wristband.  When questioned about his behavior, he is unable to provide an explanation and acknowledges that he is behaving erratically, yet is unable to control his impulses.  Has reportedly tried to elope.  Today, nursing reports that patient has been observed alternating between bed and walking in the room, takes his monitor off intermittently, no anger/aggression.  Nursing suggests the patient may be potentially sundowning.    Per medicine team documentation, patient was recently discharged from the  "[facility] on 1/5 for aortic aneurysm repair and aortic valve replacement.  Patient was then admitted on 1/20/2023 for leg swelling and cough.  On 1/24, patient was seen and examined for chest pain and shortness of breath.  WBC 15.  Patient was started on cefdinir.  Echo showed global hypokinesis and EF of 45%.  Cardiology was consulted.  On 1/25, patient was reported to have agitation with some hallucinations per staff, which showed concern for underlying psychiatric issues.    Per interview with patient, patient reports that he has trouble talking due to shortness of breath.  He states that he had a recent surgery on January 1, which involved an aneurysm repair and valve replacement.  He reports that he has diffuse pain from \"the blades of my here to my feet.\"  He states he is currently on oxycodone for pain.  He reports that he is currently experiencing a lot of stress, would like to transition to the \"healing part.\"  His current stressors involve contention with his wife.  He reports that prior to his surgery in January, he was trying to work through their issues and plan to move in with her.  However, this lasted for \"18 hours,\" and he currently does not have a place to live.  He reports financial stressors also contribute to his anxiety.  His goal involves getting through the the next couple of months, however he is unsure with his recovery looks like.    He reports frustration towards the medical team after having been approved to transfer to [place] [facility], and ultimately not being able to transfer due to his leg swelling.  He perseverates on feeling misled, and expresses concerns about why his leg swelling was not an issue before.  He does report that his legs hurt as a result of the swelling, and he states that he is currently taking a diuretic for the swelling.  He states that [facility] does not want to let him leave until he is ready, which he is agreeable to.  However, fixates on being upset on not being " "told about his leg swelling prior to his [place] acceptance. He reports having an issue with heart control, in which he takes warfarin for.  He continues to endorse 3/10 chest pain.    Patient denies depressive symptoms.  He reports experiencing increased anxiety over the last 2 years, which he describes as being situational.  Despite being situational, he reports that it is distressing to him.  He denies history of psychotropic medications for anxiety or therapy.  He denies any history of violence, irritability and anger.  He does endorse feeling \"pissed off\" at the medical team for not informing him about his leg swelling being an issue for transfer beforehand.  He denies both suicidal and homicidal ideation.  He denies intent to harm staff.  His goals continue to be to transfer to [place] [facility].  He denies periods of confusion.  He does report experiencing auditory and visual hallucinations, in which occur primarily at night when he is trying to sleep.  He reports chronic issues with sleep as a result of his anxiety and stress levels.  He has hallucinations are related to \"memories.\"    Psychiatric ROS:  Depression: Denies issues with attention/concentration, energy, suicidal ideation, anhedonia, depressed mood, energy.  Reports poor sleep, however states is related to anxiety.  Shereen: Denies issues with distractibility, impulsivity, grandiosity, flight of ideas, increased goal oriented activity, decreased need for sleep, talkativeness.  Anxiety: Reports racing thoughts, \"900 mph.\"  He reports worrying about everything that has happened over the last 2 years, which range from conflict with his wife, lack of housing, declining health.  Psychosis: Reports having visual and auditory hallucinations, which she reports occur at night when he is trying to sleep.  Reports that these hallucinations tend to be memories, for instance they will involve his wife or ex-girlfriend.    Medical ROS (as pertinent):     Review of " "Systems   Constitutional:  Positive for malaise/fatigue.   Psychiatric/Behavioral:  Positive for hallucinations. Negative for depression, substance abuse and suicidal ideas. The patient is nervous/anxious and has insomnia.          *Psychiatric Examination:  Vitals:   Vitals:    01/25/23 0751   BP: (!) 156/105   Pulse: (!) 59   Resp: 18   Temp:    SpO2: 93%      General Appearance: 62-year-old male, appears stated age, appears cross chronically ill, in no acute distress, wearing hospital attire.  Patient is pleasant and cooperative with interview.  Abnormal Movements: No tics or tremors noted.  Patient appeared to make gestures with hands during sleep, however these were not present patient is awake  Gait and Posture: No abnormalities in gait.  Speech: Slowed rate, tone and volume, which appear to be related to dyspnea.  Thought processes: Mostly linear, organized.  Associations: No loose associations.  Abnormal or Psychotic Thoughts: Reports experiencing auditory and visual hallucinations, which tend to occur when the patient is trying to sleep.  Reports that these tend to go away once he wakes up and \"tells them to leave me alone.\"  He denies that these are distressing to him.  Reports history of \"vivid dreams\" during withdrawal from methamphetamine.  Judgement and Insight: Fair, fair.  Orientation: Alert and oriented to person, place, time, situation patient.  Recent and Remote Memory: No gross deficits in recent or remote memory.  Attention Span and Concentration: Grossly intact.  Language: Fluent in English, peers to comprehend questions asked, spontaneously.  Fund of Knowledge: Not formally tested.  Mood and Affect: Mood: \"pissed off.\" Affect: Somewhat irritable, restricted. No mood lability.   SI/HI: Denies suicidal ideation.  Denies homicidal ideation.    Past Medical History:   Past Medical History:   Diagnosis Date    Arrhythmia     Breath shortness     Cyclic vomiting syndrome     Fracture     Headache, " "classical migraine     Heart burn     Heart valve disease     Indigestion     Pneumonia due to infectious organism 1/20/2023    Snoring         Past Psychiatric History:  Previous Diagnosis: Denies.  Current meds: Denies.  Previous med trials: Denies.  Hospitalizations: Denies.   Suicide attempts/SIB: Denies.  Outpatient services: Denies.  Access to guns: Denies.  Abuse/trauma hx: Denies, however states that he does not recall anything between the ages of [age].  Reports living with his mother during this time.  Legal hx: Reports hx of being in [facility] for 20 years, due to attempted robbery.    Family Hx: Denies.    Social Hx:   Drug: Patient reports \"there is not a drug I have not taken.\"  He denies any drug use since 01/2021.  [facility].  Alcohol: Denies current alcohol use.  Nicotine: Reports he quit smoking prior to his surgery in 01/2023.    Most previously living in a custodial house until his admission to the hospital.  He currently has a wife, however their relationship is strained and he will not be living with her.  He plans to attend [facility] and figure out housing afterwards.  He reports being employed as a , and has been with this company for the last 1 year.  His mother lives in [place].  He currently has 3 children, stating that he is currently reconnecting with 2 of his sons.      Current Medications:  Current Facility-Administered Medications   Medication Dose Route Frequency Provider Last Rate Last Admin    ipratropium-albuterol (DUONEB) nebulizer solution  3 mL Nebulization Q2HRS PRN (RT) Mehul Mahmood M.D.        furosemide (LASIX) injection 40 mg  40 mg Intravenous TID DIURETIC Keshawn Trejo M.D.   40 mg at 01/25/23 0554    potassium chloride SA (Kdur) tablet 40 mEq  40 mEq Oral BID Keshawn Trejo M.D.   40 mEq at 01/25/23 0554    cefdinir (OMNICEF) capsule 300 mg  300 mg Oral Q12HRS Noe Anguiano M.D.   300 mg at 01/25/23 0554 "    metoprolol tartrate (LOPRESSOR) tablet 50 mg  50 mg Oral BID Noe Anguiano M.D.   50 mg at 01/25/23 0554    budesonide-formoterol (SYMBICORT) 80-4.5 MCG/ACT inhaler 2 Puff  2 Puff Inhalation BID Noe Anguiano M.D.   2 Puff at 01/25/23 0555    warfarin (COUMADIN) tablet 2.5 mg  2.5 mg Oral DAILY AT 1800 Noe Anguiano M.D.   2.5 mg at 01/24/23 1752    oxyCODONE immediate release (ROXICODONE) tablet 10 mg  10 mg Oral Q4HRS PRN Noe Anguiano M.D.   10 mg at 01/25/23 0225    omeprazole (PRILOSEC) capsule 20 mg  20 mg Oral DAILY Noe Anguiano M.D.   20 mg at 01/25/23 0553    traZODone (DESYREL) tablet 50 mg  50 mg Oral QHS Noe Anguiano M.D.   50 mg at 01/24/23 2140    senna-docusate (PERICOLACE or SENOKOT S) 8.6-50 MG per tablet 2 Tablet  2 Tablet Oral BID Burt Yin M.D.   2 Tablet at 01/22/23 0504    And    polyethylene glycol/lytes (MIRALAX) PACKET 1 Packet  1 Packet Oral QDAY PRN Burt Yin M.D.        And    magnesium hydroxide (MILK OF MAGNESIA) suspension 30 mL  30 mL Oral QDAY PRN Burt Yin M.D.        And    bisacodyl (DULCOLAX) suppository 10 mg  10 mg Rectal QDAY PRN Burt Yin M.D.        Respiratory Therapy Consult   Nebulization Continuous RT Burt Yin M.D.        acetaminophen (Tylenol) tablet 650 mg  650 mg Oral Q6HRS PRN Burt Yin M.D.   650 mg at 01/24/23 0232    hydrALAZINE (APRESOLINE) injection 10 mg  10 mg Intravenous Q4HRS PRN Burt Yin M.D.        ondansetron (ZOFRAN) syringe/vial injection 4 mg  4 mg Intravenous Q4HRS PRN Burt Yin M.D.   4 mg at 01/21/23 1401    ondansetron (ZOFRAN ODT) dispertab 4 mg  4 mg Oral Q4HRS PRN Burt Yin M.D.   4 mg at 01/21/23 0321    promethazine (PHENERGAN) tablet 12.5-25 mg  12.5-25 mg Oral Q4HRS PRN Burt Yin M.D.        promethazine (PHENERGAN) suppository 12.5-25 mg  12.5-25 mg Rectal Q4HRS PRN Burt Yin M.D.        prochlorperazine (COMPAZINE) injection 5-10 mg  5-10 mg Intravenous Q4HRS PRDIPAK Yin M.D.         guaiFENesin dextromethorphan (ROBITUSSIN DM) 100-10 MG/5ML syrup 10 mL  10 mL Oral Q6HRS PRN Burt Yin M.D.        amiodarone (Cordarone) tablet 400 mg  400 mg Oral DAILY Burt Yin M.D.   400 mg at 01/25/23 0554    amLODIPine (NORVASC) tablet 10 mg  10 mg Oral DAILY Burt Yin M.D.   10 mg at 01/25/23 0554    aspirin EC (ECOTRIN) tablet 81 mg  81 mg Oral DAILY Burt Yin M.D.   81 mg at 01/25/23 0554    MD Alert...Warfarin per Pharmacy   Other PHARMACY TO DOSE Burt Yin M.D.            Allergies:  Morphine       Labs personally reviewed:   Recent Results (from the past 72 hour(s))   PROTHROMBIN TIME    Collection Time: 01/23/23  1:19 AM   Result Value Ref Range    PT 24.5 (H) 12.0 - 14.6 sec    INR 2.29 (H) 0.87 - 1.13   Basic Metabolic Panel    Collection Time: 01/23/23  1:19 AM   Result Value Ref Range    Sodium 137 135 - 145 mmol/L    Potassium 3.7 3.6 - 5.5 mmol/L    Chloride 94 (L) 96 - 112 mmol/L    Co2 31 20 - 33 mmol/L    Glucose 84 65 - 99 mg/dL    Bun 27 (H) 8 - 22 mg/dL    Creatinine 1.53 (H) 0.50 - 1.40 mg/dL    Calcium 8.8 8.5 - 10.5 mg/dL    Anion Gap 12.0 7.0 - 16.0   ESTIMATED GFR    Collection Time: 01/23/23  1:19 AM   Result Value Ref Range    GFR (CKD-EPI) 51 (A) >60 mL/min/1.73 m 2   SNF COVID Discharge (DRY NASAL SWAB IS REQUIRED)    Collection Time: 01/23/23  5:33 PM   Result Value Ref Range    SARS-CoV-2 Source Nasal Swab     SARS-COV ANTIGEN SULEMAN NotDetected NotDetected   PROTHROMBIN TIME    Collection Time: 01/24/23  1:01 AM   Result Value Ref Range    PT 26.0 (H) 12.0 - 14.6 sec    INR 2.47 (H) 0.87 - 1.13   CBC WITHOUT DIFFERENTIAL    Collection Time: 01/24/23  8:04 AM   Result Value Ref Range    WBC 15.4 (H) 4.8 - 10.8 K/uL    RBC 4.31 (L) 4.70 - 6.10 M/uL    Hemoglobin 9.3 (L) 14.0 - 18.0 g/dL    Hematocrit 30.9 (L) 42.0 - 52.0 %    MCV 71.7 (L) 81.4 - 97.8 fL    MCH 21.6 (L) 27.0 - 33.0 pg    MCHC 30.1 (L) 33.7 - 35.3 g/dL    RDW 47.5 35.9 - 50.0 fL    Platelet Count  508 (H) 164 - 446 K/uL    MPV 10.1 9.0 - 12.9 fL   Basic Metabolic Panel    Collection Time: 01/24/23  8:04 AM   Result Value Ref Range    Sodium 135 135 - 145 mmol/L    Potassium 4.0 3.6 - 5.5 mmol/L    Chloride 94 (L) 96 - 112 mmol/L    Co2 29 20 - 33 mmol/L    Glucose 79 65 - 99 mg/dL    Bun 29 (H) 8 - 22 mg/dL    Creatinine 1.60 (H) 0.50 - 1.40 mg/dL    Calcium 9.1 8.5 - 10.5 mg/dL    Anion Gap 12.0 7.0 - 16.0   ESTIMATED GFR    Collection Time: 01/24/23  8:04 AM   Result Value Ref Range    GFR (CKD-EPI) 48 (A) >60 mL/min/1.73 m 2   PROTHROMBIN TIME    Collection Time: 01/25/23 12:45 AM   Result Value Ref Range    PT 26.0 (H) 12.0 - 14.6 sec    INR 2.47 (H) 0.87 - 1.13   CBC WITHOUT DIFFERENTIAL    Collection Time: 01/25/23 12:45 AM   Result Value Ref Range    WBC 15.2 (H) 4.8 - 10.8 K/uL    RBC 4.39 (L) 4.70 - 6.10 M/uL    Hemoglobin 9.5 (L) 14.0 - 18.0 g/dL    Hematocrit 31.9 (L) 42.0 - 52.0 %    MCV 72.7 (L) 81.4 - 97.8 fL    MCH 21.6 (L) 27.0 - 33.0 pg    MCHC 29.8 (L) 33.7 - 35.3 g/dL    RDW 47.7 35.9 - 50.0 fL    Platelet Count 525 (H) 164 - 446 K/uL    MPV 10.0 9.0 - 12.9 fL   Basic Metabolic Panel    Collection Time: 01/25/23 12:45 AM   Result Value Ref Range    Sodium 137 135 - 145 mmol/L    Potassium 4.3 3.6 - 5.5 mmol/L    Chloride 94 (L) 96 - 112 mmol/L    Co2 30 20 - 33 mmol/L    Glucose 93 65 - 99 mg/dL    Bun 31 (H) 8 - 22 mg/dL    Creatinine 1.67 (H) 0.50 - 1.40 mg/dL    Calcium 9.1 8.5 - 10.5 mg/dL    Anion Gap 13.0 7.0 - 16.0   MAGNESIUM    Collection Time: 01/25/23 12:45 AM   Result Value Ref Range    Magnesium 1.8 1.5 - 2.5 mg/dL   ESTIMATED GFR    Collection Time: 01/25/23 12:45 AM   Result Value Ref Range    GFR (CKD-EPI) 46 (A) >60 mL/min/1.73 m 2           EKG:   Results for orders placed or performed during the hospital encounter of 01/20/23   EKG   Result Value Ref Range    Report       [facility] Emergency Dept.    Test Date:  2023-01-20  Pt Name:    JUSTIN TOPETE                Department: ER  MRN:        [id number]                      Room:       GR 25  Gender:     Male                         Technician: [id number]  :        1960                   Requested By:ER TRIAGE PROTOCOL  Order #:    [id number]                    Reading MD:    Measurements  Intervals                                Axis  Rate:       69                           P:          55  CA:         202                          QRS:        -6  QRSD:       82                           T:          40  QT:         405  QTc:        434    Interpretive Statements  Sinus rhythm  Compared to ECG 2023 20:28:36  Atrial fibrillation no longer present  ST (T wave) deviation no longer present  Myocardial infarct finding no longer present     EKG   Result Value Ref Range    Report       [facility] Emergency Dept.    Test Date:  2023  Pt Name:    JUSTIN TOPETE               Department: ER  MRN:        [id number]                      Room:       T714  Gender:     Male                         Technician: [id number]  :        1960                   Requested By:ER TRIAGE PROTOCOL  Order #:    [id number]                    Reading MD:    Measurements  Intervals                                Axis  Rate:       69                           P:          55  CA:         202                          QRS:        -6  QRSD:       82                           T:          40  QT:         405  QTc:        434    Interpretive Statements  Sinus rhythm  Compared to ECG 2023 20:28:36  Atrial fibrillation no longer present  ST (T wave) deviation no longer present  Myocardial infarct finding no longer present     EKG   Result Value Ref Range    Report       [facility]    Test Date:  2023  Pt Name:    JUSTIN TOPETE               Department: 171  MRN:        [id number]                      Room:       T709  Gender:     Male                         Technician: JAYLENE  :        1960                    Requested By:KATY SEGURA  Order #:    [id number]                    Reading MD:    Measurements  Intervals                                Axis  Rate:       55                           P:          39  HI:         197                          QRS:        -13  QRSD:       115                          T:          26  QT:         479  QTc:        459    Interpretive Statements  Sinus bradycardia  Nonspecific intraventricular conduction delay  Inferior infarct, old  Baseline wander in lead(s) V4  Compared to ECG 01/20/2023 16:21:18  Intraventricular conduction delay now present  Myocardial infarct finding now present  Sinus rhythm no longer present        Brain Imaging:   CTA head with and without postprocess on 3/2021:  IMPRESSION:   1. No hemodynamically significant narrowing of the major intracranial vessels.  EEG: No EEG on file.      Assessment: Noe Hickey is a 62 year old male, with past medical history of atrial fibrillation/flutter, history of aortic stenosis status post valve replacement, tobacco abuse and hyperlipidemia, who presented to the ED for shortness of breath, cough and lower extremity swelling on 1/20/2023.  Patient was admitted to the medicine floor for infectious pneumonia, elevated troponin, acute on chronic systolic heart failure, acute respiratory failure with hypoxia and acute renal failure.      Psychiatry was consulted to evaluate patient for new onset agitation and hallucinations.  Patient does not have a significant past psychiatric history.  He is currently not on any home psychotropic medications.  Patient denies history of auditory and visual hallucinations, with the exception of hallucinations while withdrawing from methamphetamine, which he described as vivid dreams.  Patient does not have history of reported of neurocognitive disorder.  There appears to be documentation throughout his hospitalization involving new onset of agitation and hallucinations, however patient has not  required physical restraints.  Throughout psychiatric evaluation, patient was alert, oriented, logical and did not appear to be altered.  Given the context of patient being treated for infectious pneumonia in addition to other multiple comorbid conditions, as well as hallucinations occurring while patient is attempting to sleep, suspect that this presentation may be consistent with delirium that has either improved, resolved, or is still waxing and waning.  Other predisposing factors for delirium involve age and sleep deprivation. Will recommend Ativan IM as needed for acute agitation/aggression.  Psychiatry will continue to follow.      Dx:  #Delirium (improving or resolved)    Medical:  #Acute on chronic systolic heart failure  #COPD  #Elevated troponin  #Paroxysmal atrial fibrillation  #Pneumonia due to infectious organism  #Acute respiratory failure with hypoxia  #Acute renal failure    Plan:  Legal hold: Does not meet criteria for legal hold.  No legal hold placed at this time.  Psychotropic medications  May consider Ativan 1 mg IM every 4 hours as needed as needed for severe agitation/aggression.  May continue Trazodone 50 mg QHS for sleep.   Recommend delirium precautions (frequent redirection, frequent orientation, reducing noise and light at nighttime).  Labs reviewed.  No labs ordered.  EKG reviewed.  EKG reviewed.    Discussed the case with: Dr. Garcia, Dr. Mahmood.    Psychiatry will follow up. Thank you for the consult.     This note was created using voice recognition software (Dragon). The accuracy of the dictation is limited by the abilities of the software. I have reviewed the note prior to signing. However, error related to voice recognition software and /or scribes may still exist and should be interpreted within the appropriate context.

## 2023-01-25 NOTE — THERAPY
Physical Therapy   Daily Treatment     Patient Name: Noe Hickey  Age:  62 y.o., Sex:  male  Medical Record #: [id number]  Today's Date: 1/24/2023     Precautions  Precautions: Cardiac Precautions (See Comments);Sternal Precautions (See Comments)    Assessment    Pt seen for follow up PT tx. Pt up self in room on arrival. He declined sup O2 with gait and demonstrate 300ft with no AD or assist and 1 rest break. On arrival back in room, SpO2 88%. Patient will not be actively followed for physical therapy services at this time, however may be seen if requested by physician for 1 more visit within 30 days to address any discharge or equipment needs.    Plan    Physical Therapy Treatment Plan  Physical Therapy Treatment Plan: Modify Current Treatment Plan  Modified Plan: Discharge Secondary to Goals Met    DC Equipment Recommendations: None  Discharge Recommendations: Anticipate that the patient will have no further physical therapy needs after discharge from the hospital         01/24/23 1633   Vitals   Room Air Oximetry 88   O2 (LPM) 0   O2 Delivery Device None - Room Air   Vitals Comments declined O2 with ambulating, 88% on RA   Cognition    Cognition / Consciousness X   Level of Consciousness Alert   Safety Awareness Impaired   New Learning Impaired   Comments emotinally labile, wants to go to [facility]   Other Treatments   Other Treatments Provided encouraged to keep ambulating with staff   Balance   Sitting Balance (Static) Good   Sitting Balance (Dynamic) Good   Standing Balance (Static) Good   Standing Balance (Dynamic) Fair +   Weight Shift Sitting Good   Weight Shift Standing Good   Comments no AD   Bed Mobility    Comments recevied EOB and left EOB   Gait Analysis   Gait Level Of Assist Supervised   Assistive Device None   Distance (Feet) 300   # of Times Distance was Traveled 1   Deviation Shuffled Gait;Decreased Heel Strike;Decreased Toe Off   Weight Bearing Status no restrictions   Skilled  Intervention Verbal Cuing   Comments SOB requiring standing rest break   Functional Mobility   Sit to Stand Supervised   Bed, Chair, Wheelchair Transfer Supervised   Transfer Method Stand Step   Mobility in hallway with no AD   Short Term Goals    Short Term Goal # 1 pt will perform supine <> sit without bed features with SPV in 6 visits to get in/out of bed at home   Goal Outcome # 1 Other (see comments)  (not observed, up self in room with no reports of issues with bed mob)   Short Term Goal # 2 pt will ambulate 150ft with FWW and SPV in 6 visits to access home   Goal Outcome # 2 Goal met   Short Term Goal # 3 pt will negotiate 3 steps with SPV in 6 visits to access home   Goal Outcome # 3 Other (see comments)  (pt declined)   Short Term Goal # 4 pt will be able to complete functional transfers with no AD and SPV in 6tx in order to dc home   Goal Outcome # 4 Goal met   Physical Therapy Treatment Plan   Physical Therapy Treatment Plan Modify Current Treatment Plan   Modified Plan Discharge Secondary to Goals Met   Anticipated Discharge Equipment and Recommendations   DC Equipment Recommendations None   Discharge Recommendations Anticipate that the patient will have no further physical therapy needs after discharge from the hospital

## 2023-01-25 NOTE — PROGRESS NOTES
"Patient complaining of 8/10 stabbing chest pain. Vital signs checked and stable. MD aware. EKG ordered per MD order.     10:34 EKG showed sinus georges, cardiology Dr. Trejo and primary  aware. Nitroglycerin ordered by cardiology.     11:08 1 dose of nitro given along with oxycodone (see MAR). Patient states relief with one dose of nitro and states he does not want anymore.     11:28 Call from RN case manager Juvenal reporting that EKG report states myocardial infarct finding now present\". Dr. Trejo notified. SISI Sanford to order troponin. BP stable.    "

## 2023-01-25 NOTE — PROGRESS NOTES
[facility] Cardiology Follow-up Consult Note  Date of note:    1/25/2023          Patient ID:  Name:   Noe Hickey   YOB: 1960  Age:   62 y.o.  male   MRN:   [id number]    Chief Complaint   Patient presents with    Chest Pain     X 5 hours    Shortness of Breath    Leg Swelling       Interim Events:  Patient was much more cooperative willing to engage in conversation today understands importance of treatment of his heart failure he had reported chest pain was given nitro and pain management and has improved    Creatinine is stable with the IV diuretics    Psych eval for hallucinations and agitation    ROS  No NV, No Bleeding, No dizziness   All other review of systems reviewed and negative.    Past medical, surgical, social, and family history reviewed and unchanged from admission except as noted in assessment and plan.    Medications: Reviewed in MAR  Current Facility-Administered Medications   Medication Dose Frequency Provider Last Rate Last Admin    nitroglycerin (NITROSTAT) tablet 0.4 mg  0.4 mg Q5 MIN PRN Keshawn Trejo M.D.   0.4 mg at 01/25/23 1108    ipratropium-albuterol (DUONEB) nebulizer solution  3 mL Q2HRS PRN (RT) Mehul Mahmood M.D.        furosemide (LASIX) injection 40 mg  40 mg TID DIURETIC Keshawn Trejo M.D.   40 mg at 01/25/23 1108    potassium chloride SA (Kdur) tablet 40 mEq  40 mEq BID Keshawn Trejo M.D.   40 mEq at 01/25/23 0554    cefdinir (OMNICEF) capsule 300 mg  300 mg Q12HRS Noe Anguiano M.D.   300 mg at 01/25/23 0554    metoprolol tartrate (LOPRESSOR) tablet 50 mg  50 mg BID Noe Anguiano M.D.   50 mg at 01/25/23 0554    budesonide-formoterol (SYMBICORT) 80-4.5 MCG/ACT inhaler 2 Puff  2 Puff BID Noe Anguiano M.D.   2 Puff at 01/25/23 0555    warfarin (COUMADIN) tablet 2.5 mg  2.5 mg DAILY AT 1800 Noe Anguiano M.D.   2.5 mg at 01/24/23 1752    oxyCODONE immediate release (ROXICODONE) tablet 10 mg  10 mg Q4HRS PRN Noe Anguiano M.D.   10 mg at  01/25/23 1111    omeprazole (PRILOSEC) capsule 20 mg  20 mg DAILY Noe Anguiano M.D.   20 mg at 01/25/23 0553    traZODone (DESYREL) tablet 50 mg  50 mg QHS Noe Anguiano M.D.   50 mg at 01/24/23 2140    senna-docusate (PERICOLACE or SENOKOT S) 8.6-50 MG per tablet 2 Tablet  2 Tablet BID Burt Yin M.D.   2 Tablet at 01/22/23 0504    And    polyethylene glycol/lytes (MIRALAX) PACKET 1 Packet  1 Packet QDAY PRN Burt Yin M.D.        And    magnesium hydroxide (MILK OF MAGNESIA) suspension 30 mL  30 mL QDAY PRN Burt Yin M.D.        And    bisacodyl (DULCOLAX) suppository 10 mg  10 mg QDAY PRN Burt Yin M.D.        Respiratory Therapy Consult   Continuous RT Burt Yin M.D.        acetaminophen (Tylenol) tablet 650 mg  650 mg Q6HRS PRN Burt Yin M.D.   650 mg at 01/24/23 0232    hydrALAZINE (APRESOLINE) injection 10 mg  10 mg Q4HRS PRDIPAK Yin M.D.        ondansetron (ZOFRAN) syringe/vial injection 4 mg  4 mg Q4HRS PRDIPAK Yin M.D.   4 mg at 01/21/23 1401    ondansetron (ZOFRAN ODT) dispertab 4 mg  4 mg Q4HRS PRN Burt Yin M.D.   4 mg at 01/21/23 0321    promethazine (PHENERGAN) tablet 12.5-25 mg  12.5-25 mg Q4HRS PRDIPAK Yin M.D.        promethazine (PHENERGAN) suppository 12.5-25 mg  12.5-25 mg Q4HRS PRN Burt Yin M.D.        prochlorperazine (COMPAZINE) injection 5-10 mg  5-10 mg Q4HRS PRDIPAK Yin M.D.        guaiFENesin dextromethorphan (ROBITUSSIN DM) 100-10 MG/5ML syrup 10 mL  10 mL Q6HRS PRN Burt Yin M.D.        amiodarone (Cordarone) tablet 400 mg  400 mg DAILY Burt Yin M.D.   400 mg at 01/25/23 0554    amLODIPine (NORVASC) tablet 10 mg  10 mg DAILY Burt iYn M.D.   10 mg at 01/25/23 0554    aspirin EC (ECOTRIN) tablet 81 mg  81 mg DAILY Burt Yin M.D.   81 mg at 01/25/23 0554    MD Alert...Warfarin per Pharmacy   PHARMACY TO DOSE Burt Yin M.D.       Last reviewed on 1/20/2023 11:11 PM by Ирина Dennis R.N.   Allergies   Allergen  "Reactions    Morphine Rash     Rash/ difficulty breathing       Physical Exam  Body mass index is 32.07 kg/m². /71   Pulse (!) 59   Temp 36.9 °C (98.5 °F) (Temporal)   Resp 18   Ht 1.753 m (5' 9\")   Wt 98.5 kg (217 lb 2.5 oz)   SpO2 93%    Vitals:    01/25/23 0400 01/25/23 0751 01/25/23 1000 01/25/23 1122   BP: (!) 162/104 (!) 156/105 123/71 (P) 118/69   Pulse:  (!) 59     Resp:  18     Temp:       TempSrc:  Temporal     SpO2: 90% 93%     Weight:       Height:        Oxygen Therapy:  Pulse Oximetry: 93 %, O2 (LPM): 0, O2 Delivery Device: None - Room Air    General: no apparent distress  Eyes: nl conjunctiva  ENT: OP clear  Neck: no JVD   Lungs: normal respiratory effort,   Heart: RRR,   EXT moderate edema bilateral lower extremities.   Abdomen: soft, non tender, non distended,  Neurological: No focal deficits  Psychiatric: Appropriate affect,   Skin: Warm extremities    Labs (personally reviewed and notable for):   Recent Results (from the past 24 hour(s))   PROTHROMBIN TIME    Collection Time: 01/25/23 12:45 AM   Result Value Ref Range    PT 26.0 (H) 12.0 - 14.6 sec    INR 2.47 (H) 0.87 - 1.13   CBC WITHOUT DIFFERENTIAL    Collection Time: 01/25/23 12:45 AM   Result Value Ref Range    WBC 15.2 (H) 4.8 - 10.8 K/uL    RBC 4.39 (L) 4.70 - 6.10 M/uL    Hemoglobin 9.5 (L) 14.0 - 18.0 g/dL    Hematocrit 31.9 (L) 42.0 - 52.0 %    MCV 72.7 (L) 81.4 - 97.8 fL    MCH 21.6 (L) 27.0 - 33.0 pg    MCHC 29.8 (L) 33.7 - 35.3 g/dL    RDW 47.7 35.9 - 50.0 fL    Platelet Count 525 (H) 164 - 446 K/uL    MPV 10.0 9.0 - 12.9 fL   Basic Metabolic Panel    Collection Time: 01/25/23 12:45 AM   Result Value Ref Range    Sodium 137 135 - 145 mmol/L    Potassium 4.3 3.6 - 5.5 mmol/L    Chloride 94 (L) 96 - 112 mmol/L    Co2 30 20 - 33 mmol/L    Glucose 93 65 - 99 mg/dL    Bun 31 (H) 8 - 22 mg/dL    Creatinine 1.67 (H) 0.50 - 1.40 mg/dL    Calcium 9.1 8.5 - 10.5 mg/dL    Anion Gap 13.0 7.0 - 16.0   MAGNESIUM    Collection Time: " 23 12:45 AM   Result Value Ref Range    Magnesium 1.8 1.5 - 2.5 mg/dL   ESTIMATED GFR    Collection Time: 23 12:45 AM   Result Value Ref Range    GFR (CKD-EPI) 46 (A) >60 mL/min/1.73 m 2   EKG    Collection Time: 23 10:27 AM   Result Value Ref Range    Report       [facility]    Test Date:  2023  Pt Name:    JUSTIN TOPETE               Department: 171  MRN:        [id number]                      Room:       [facility]  Gender:     Male                         Technician: JAYLENE  :        1960                   Requested By:KATY SEGURA  Order #:    [id number]                    Reading MD:    Measurements  Intervals                                Axis  Rate:       55                           P:          39  MS:         197                          QRS:        -13  QRSD:       115                          T:          26  QT:         479  QTc:        459    Interpretive Statements  Sinus bradycardia  Nonspecific intraventricular conduction delay  Inferior infarct, old  Baseline wander in lead(s) V4  Compared to ECG 2023 16:21:18  Intraventricular conduction delay now present  Myocardial infarct finding now present  Sinus rhythm no longer present         Cardiac Imaging and Procedures Review:    EKG and telemetry tracings personally reviewed        Impression and Medical Decision Making:  Principal Problem:    Acute on chronic systolic heart failure (HCC) POA: Yes  Active Problems:    Acute renal failure (ARF) (HCC) POA: Unknown    Acute respiratory failure with hypoxia (HCC) POA: Unknown    Pneumonia due to infectious organism POA: Yes    Paroxysmal atrial fibrillation (HCC) POA: Unknown    Elevated troponin POA: Unknown    COPD (chronic obstructive pulmonary disease) (HCC) POA: Unknown  Resolved Problems:    * No resolved hospital problems. *    Acute heart failure  Weight is still elevated as expected his dry weight is 205 or less as well as edema on exam  He agrees to stay for IV  diuresis understands importance of regular follow-up on his labs given recent history of torsades    I personally discussed his case with  Dr Mehul Mahmood M.D.    Future Appointments   Date Time Provider Department Center   2/6/2023  1:45 PM CT RESOURCE PROVIDER [facility] None       It is my pleasure to participate in the care of Mr. Hickey.  Please do not hesitate to contact me with questions or concerns.    Keshawn Trejo MD PhD [facility]  Cardiologist [facility]    1/25/2023    Please note that this dictation was created using voice recognition software. There may be errors of grammar and possibly content I did not discover before finalizing the note.

## 2023-01-25 NOTE — CARE PLAN
The patient is Stable - Low risk of patient condition declining or worsening    Shift Goals  Clinical Goals: pt will rest comfortably overnight  Patient Goals: Pain management, discharge  Family Goals: HUGH    Progress made toward(s) clinical / shift goals:    Problem: Pain - Standard  Goal: Alleviation of pain or a reduction in pain to the patient’s comfort goal  Outcome: Progressing  Note: Pain meds administered per MAR for chest pain related to open heart surgery. Pt educated on nonpharmacologic methods of pain management also.

## 2023-01-26 LAB
ANION GAP SERPL CALC-SCNC: 10 MMOL/L (ref 7–16)
BUN SERPL-MCNC: 33 MG/DL (ref 8–22)
CALCIUM SERPL-MCNC: 9.1 MG/DL (ref 8.5–10.5)
CHLORIDE SERPL-SCNC: 98 MMOL/L (ref 96–112)
CO2 SERPL-SCNC: 28 MMOL/L (ref 20–33)
CREAT SERPL-MCNC: 1.67 MG/DL (ref 0.5–1.4)
EKG IMPRESSION: NORMAL
EKG IMPRESSION: NORMAL
ERYTHROCYTE [DISTWIDTH] IN BLOOD BY AUTOMATED COUNT: 47.9 FL (ref 35.9–50)
GFR SERPLBLD CREATININE-BSD FMLA CKD-EPI: 46 ML/MIN/1.73 M 2
GLUCOSE SERPL-MCNC: 94 MG/DL (ref 65–99)
HCT VFR BLD AUTO: 32.2 % (ref 42–52)
HGB BLD-MCNC: 9.5 G/DL (ref 14–18)
INR PPP: 2.13 (ref 0.87–1.13)
MCH RBC QN AUTO: 21.5 PG (ref 27–33)
MCHC RBC AUTO-ENTMCNC: 29.5 G/DL (ref 33.7–35.3)
MCV RBC AUTO: 72.9 FL (ref 81.4–97.8)
PLATELET # BLD AUTO: 328 K/UL (ref 164–446)
PMV BLD AUTO: 10.1 FL (ref 9–12.9)
POTASSIUM SERPL-SCNC: 4.5 MMOL/L (ref 3.6–5.5)
PROTHROMBIN TIME: 23.3 SEC (ref 12–14.6)
RBC # BLD AUTO: 4.42 M/UL (ref 4.7–6.1)
SODIUM SERPL-SCNC: 136 MMOL/L (ref 135–145)
WBC # BLD AUTO: 13.1 K/UL (ref 4.8–10.8)

## 2023-01-26 PROCEDURE — 36415 COLL VENOUS BLD VENIPUNCTURE: CPT

## 2023-01-26 PROCEDURE — 94640 AIRWAY INHALATION TREATMENT: CPT

## 2023-01-26 PROCEDURE — A9270 NON-COVERED ITEM OR SERVICE: HCPCS | Performed by: INTERNAL MEDICINE

## 2023-01-26 PROCEDURE — 94760 N-INVAS EAR/PLS OXIMETRY 1: CPT

## 2023-01-26 PROCEDURE — A9270 NON-COVERED ITEM OR SERVICE: HCPCS | Performed by: HOSPITALIST

## 2023-01-26 PROCEDURE — 700101 HCHG RX REV CODE 250: Performed by: INTERNAL MEDICINE

## 2023-01-26 PROCEDURE — 85027 COMPLETE CBC AUTOMATED: CPT

## 2023-01-26 PROCEDURE — 700102 HCHG RX REV CODE 250 W/ 637 OVERRIDE(OP): Performed by: STUDENT IN AN ORGANIZED HEALTH CARE EDUCATION/TRAINING PROGRAM

## 2023-01-26 PROCEDURE — 93010 ELECTROCARDIOGRAM REPORT: CPT | Performed by: INTERNAL MEDICINE

## 2023-01-26 PROCEDURE — 99231 SBSQ HOSP IP/OBS SF/LOW 25: CPT | Mod: GC | Performed by: PSYCHIATRY & NEUROLOGY

## 2023-01-26 PROCEDURE — 93010 ELECTROCARDIOGRAM REPORT: CPT | Mod: 76 | Performed by: INTERNAL MEDICINE

## 2023-01-26 PROCEDURE — 700102 HCHG RX REV CODE 250 W/ 637 OVERRIDE(OP): Performed by: HOSPITALIST

## 2023-01-26 PROCEDURE — 99232 SBSQ HOSP IP/OBS MODERATE 35: CPT | Performed by: INTERNAL MEDICINE

## 2023-01-26 PROCEDURE — 700102 HCHG RX REV CODE 250 W/ 637 OVERRIDE(OP): Performed by: INTERNAL MEDICINE

## 2023-01-26 PROCEDURE — 80048 BASIC METABOLIC PNL TOTAL CA: CPT

## 2023-01-26 PROCEDURE — 770020 HCHG ROOM/CARE - TELE (206)

## 2023-01-26 PROCEDURE — 85610 PROTHROMBIN TIME: CPT

## 2023-01-26 PROCEDURE — 99232 SBSQ HOSP IP/OBS MODERATE 35: CPT | Mod: FS | Performed by: INTERNAL MEDICINE

## 2023-01-26 PROCEDURE — A9270 NON-COVERED ITEM OR SERVICE: HCPCS | Performed by: STUDENT IN AN ORGANIZED HEALTH CARE EDUCATION/TRAINING PROGRAM

## 2023-01-26 PROCEDURE — 700111 HCHG RX REV CODE 636 W/ 250 OVERRIDE (IP): Performed by: INTERNAL MEDICINE

## 2023-01-26 RX ORDER — OXYCODONE HYDROCHLORIDE 10 MG/1
10 TABLET ORAL
Status: DISCONTINUED | OUTPATIENT
Start: 2023-01-26 | End: 2023-01-29

## 2023-01-26 RX ORDER — FUROSEMIDE 10 MG/ML
80 INJECTION INTRAMUSCULAR; INTRAVENOUS
Status: DISCONTINUED | OUTPATIENT
Start: 2023-01-26 | End: 2023-01-31

## 2023-01-26 RX ADMIN — FUROSEMIDE 40 MG: 10 INJECTION, SOLUTION INTRAMUSCULAR; INTRAVENOUS at 05:23

## 2023-01-26 RX ADMIN — OXYCODONE HYDROCHLORIDE 10 MG: 10 TABLET ORAL at 12:09

## 2023-01-26 RX ADMIN — NITROGLYCERIN 0.4 MG: 0.4 TABLET, ORALLY DISINTEGRATING SUBLINGUAL at 02:05

## 2023-01-26 RX ADMIN — METOPROLOL TARTRATE 50 MG: 50 TABLET, FILM COATED ORAL at 18:44

## 2023-01-26 RX ADMIN — BUDESONIDE AND FORMOTEROL FUMARATE DIHYDRATE 2 PUFF: 80; 4.5 AEROSOL RESPIRATORY (INHALATION) at 05:23

## 2023-01-26 RX ADMIN — TRAZODONE HYDROCHLORIDE 50 MG: 50 TABLET ORAL at 23:20

## 2023-01-26 RX ADMIN — POTASSIUM CHLORIDE 40 MEQ: 20 TABLET, EXTENDED RELEASE ORAL at 18:44

## 2023-01-26 RX ADMIN — OMEPRAZOLE 20 MG: 20 CAPSULE, DELAYED RELEASE ORAL at 05:23

## 2023-01-26 RX ADMIN — IPRATROPIUM BROMIDE AND ALBUTEROL SULFATE 3 ML: .5; 2.5 SOLUTION RESPIRATORY (INHALATION) at 21:38

## 2023-01-26 RX ADMIN — BUDESONIDE AND FORMOTEROL FUMARATE DIHYDRATE 2 PUFF: 80; 4.5 AEROSOL RESPIRATORY (INHALATION) at 18:47

## 2023-01-26 RX ADMIN — OXYCODONE HYDROCHLORIDE 10 MG: 10 TABLET ORAL at 18:44

## 2023-01-26 RX ADMIN — POTASSIUM CHLORIDE 40 MEQ: 20 TABLET, EXTENDED RELEASE ORAL at 05:22

## 2023-01-26 RX ADMIN — ASPIRIN 81 MG: 81 TABLET, COATED ORAL at 05:22

## 2023-01-26 RX ADMIN — FUROSEMIDE 80 MG: 10 INJECTION, SOLUTION INTRAMUSCULAR; INTRAVENOUS at 12:13

## 2023-01-26 RX ADMIN — FUROSEMIDE 80 MG: 10 INJECTION, SOLUTION INTRAMUSCULAR; INTRAVENOUS at 18:44

## 2023-01-26 RX ADMIN — OXYCODONE HYDROCHLORIDE 10 MG: 10 TABLET ORAL at 22:00

## 2023-01-26 RX ADMIN — OXYCODONE HYDROCHLORIDE 10 MG: 10 TABLET ORAL at 08:52

## 2023-01-26 RX ADMIN — CEFDINIR 300 MG: 300 CAPSULE ORAL at 18:44

## 2023-01-26 RX ADMIN — OXYCODONE HYDROCHLORIDE 10 MG: 10 TABLET ORAL at 02:00

## 2023-01-26 RX ADMIN — CEFDINIR 300 MG: 300 CAPSULE ORAL at 05:22

## 2023-01-26 RX ADMIN — WARFARIN SODIUM 2.5 MG: 2.5 TABLET ORAL at 18:47

## 2023-01-26 RX ADMIN — OXYCODONE HYDROCHLORIDE 10 MG: 10 TABLET ORAL at 15:29

## 2023-01-26 RX ADMIN — AMLODIPINE BESYLATE 10 MG: 10 TABLET ORAL at 05:23

## 2023-01-26 ASSESSMENT — ENCOUNTER SYMPTOMS
ARTHRALGIAS: 0
HEADACHES: 0
HEMOPTYSIS: 0
LOSS OF CONSCIOUSNESS: 0
CHILLS: 0
ENDOCRINE NEGATIVE: 1
FEVER: 0
BRUISES/BLEEDS EASILY: 0
PALPITATIONS: 0
COUGH: 1
ABDOMINAL PAIN: 0
NAUSEA: 0
EYES NEGATIVE: 1
CHEST TIGHTNESS: 0
NERVOUS/ANXIOUS: 1
HALLUCINATIONS: 0
DEPRESSION: 0
DIZZINESS: 0
PSYCHIATRIC NEGATIVE: 1
COLOR CHANGE: 0
LIGHT-HEADEDNESS: 0
SHORTNESS OF BREATH: 1
BLURRED VISION: 0
FALLS: 0
VOMITING: 0
DIAPHORESIS: 0
SENSORY CHANGE: 0
CONSTIPATION: 0
EYE PAIN: 0
WHEEZING: 0
SPUTUM PRODUCTION: 0
INSOMNIA: 0
COUGH: 0
WEAKNESS: 1
DIARRHEA: 0
ABDOMINAL DISTENTION: 0
FATIGUE: 0
FOCAL WEAKNESS: 0
BACK PAIN: 0
MYALGIAS: 0

## 2023-01-26 ASSESSMENT — FIBROSIS 4 INDEX: FIB4 SCORE: 1.01

## 2023-01-26 ASSESSMENT — PAIN DESCRIPTION - PAIN TYPE
TYPE: ACUTE PAIN

## 2023-01-26 ASSESSMENT — LIFESTYLE VARIABLES: SUBSTANCE_ABUSE: 0

## 2023-01-26 NOTE — CARE PLAN
The patient is Stable - Low risk of patient condition declining or worsening    Shift Goals  Clinical Goals: psych consult, pain management  Patient Goals: rest,comfort, pain management  Family Goals: HUGH    Progress made toward(s) clinical / shift goals: Psych consult completed, pain management on going.       Problem: Pain - Standard  Goal: Alleviation of pain or a reduction in pain to the patient’s comfort goal  Outcome: Progressing  Note: Patient began complaining of 8/10 chest pain, all orders followed, patient given nitro and oxy x2 as ordered. Patient states some relief of pain and states his comfort goal is 7 or below.      Problem: Mobility  Goal: Patient's capacity to carry out activities will improve  Outcome: Progressing  Note: Patient steady with ambulation and moves about in the room quite a bit. Patient ambulates to and from the bathroom and also ambulates in the room.        Patient is not progressing towards the following goals:

## 2023-01-26 NOTE — PROGRESS NOTES
Cardiology Follow Up Progress Note    Date of Service  1/26/2023    Attending Physician  Mehul Mahmood M.D.    Chief Complaint   Chest pain     HPI  Noe Hickey is a 62 y.o. male with a history of atrial fibrillation/flutter on warfarin, aortic stenosis status post valve replacement, tobacco use, hyperlipidemia, admitted 1/20/2023 with shortness of breath, lower extremity edema, and JENNY.     Interim Events  1/26/2023: No cardiac events overnight. Sinus georges to sinus on telemetry. Vital signs stable. SPO2 91-93% on RA. Urine output 1140 over the past 24hrs. He reports continued pleuritic chest pain when he takes a breath in along with shortness of breath. He has continued lower extremity edema and orthopnea. No palpitations.  No dizziness or lightheadedness. No syncope or presyncope.      Review of Systems  Review of Systems   Constitutional:  Negative for chills, diaphoresis, fatigue and fever.   HENT: Negative.     Eyes: Negative.    Respiratory:  Positive for shortness of breath. Negative for cough and chest tightness.         Chest pain when he takes a deep breath in   Cardiovascular:  Negative for chest pain, palpitations and leg swelling.   Gastrointestinal:  Negative for abdominal distention, abdominal pain, constipation, diarrhea, nausea and vomiting.   Endocrine: Negative.    Genitourinary:  Negative for decreased urine volume, difficulty urinating, dysuria, frequency and urgency.   Musculoskeletal:  Negative for arthralgias and myalgias.   Skin:  Negative for color change.   Neurological:  Negative for dizziness, syncope and light-headedness.   Hematological:  Does not bruise/bleed easily.   Psychiatric/Behavioral: Negative.       Vital signs in last 24 hours  Temp:  [36.7 °C (98.1 °F)-37.1 °C (98.8 °F)] 36.7 °C (98.1 °F)  Pulse:  [52-64] 64  Resp:  [18-22] 18  BP: (118-130)/(64-89) 123/66  SpO2:  [90 %-93 %] 92 %    Physical Exam  Physical Exam  Vitals and nursing note reviewed.   Constitutional:        General: He is not in acute distress.     Appearance: Normal appearance. He is not toxic-appearing.   HENT:      Head: Normocephalic and atraumatic.      Right Ear: External ear normal.      Left Ear: External ear normal.      Nose: Nose normal.      Mouth/Throat:      Mouth: Mucous membranes are moist.      Pharynx: Oropharynx is clear.   Eyes:      General: No scleral icterus.     Extraocular Movements: Extraocular movements intact.      Conjunctiva/sclera: Conjunctivae normal.      Pupils: Pupils are equal, round, and reactive to light.   Neck:      Vascular: No JVD.   Cardiovascular:      Rate and Rhythm: Normal rate and regular rhythm.      Pulses: Normal pulses.      Heart sounds: Normal heart sounds. No murmur heard.    No friction rub. No gallop.   Pulmonary:      Effort: Pulmonary effort is normal.      Breath sounds: Rales present.   Abdominal:      General: Abdomen is flat. Bowel sounds are normal. There is no distension.      Palpations: Abdomen is soft.      Tenderness: There is no abdominal tenderness.   Musculoskeletal:         General: Normal range of motion.      Cervical back: Normal range of motion and neck supple.      Right lower leg: Edema present.      Left lower leg: Edema present.   Skin:     General: Skin is warm and dry.      Capillary Refill: Capillary refill takes less than 2 seconds.      Coloration: Skin is not jaundiced.   Neurological:      General: No focal deficit present.      Mental Status: He is alert and oriented to person, place, and time. Mental status is at baseline.   Psychiatric:         Mood and Affect: Mood normal.         Behavior: Behavior normal.         Judgment: Judgment normal.       Lab Review  Lab Results   Component Value Date/Time    WBC 13.1 (H) 01/26/2023 02:07 AM    RBC 4.42 (L) 01/26/2023 02:07 AM    HEMOGLOBIN 9.5 (L) 01/26/2023 02:07 AM    HEMATOCRIT 32.2 (L) 01/26/2023 02:07 AM    MCV 72.9 (L) 01/26/2023 02:07 AM    MCH 21.5 (L) 01/26/2023 02:07 AM     MCHC 29.5 (L) 01/26/2023 02:07 AM    MPV 10.1 01/26/2023 02:07 AM      Lab Results   Component Value Date/Time    SODIUM 136 01/26/2023 02:07 AM    POTASSIUM 4.5 01/26/2023 02:07 AM    CHLORIDE 98 01/26/2023 02:07 AM    CO2 28 01/26/2023 02:07 AM    GLUCOSE 94 01/26/2023 02:07 AM    BUN 33 (H) 01/26/2023 02:07 AM    CREATININE 1.67 (H) 01/26/2023 02:07 AM    BUNCREATRAT 16.4 12/07/2022 04:44 AM      Lab Results   Component Value Date/Time    ASTSGOT 35 01/21/2023 02:10 AM    ALTSGPT 44 01/21/2023 02:10 AM     Lab Results   Component Value Date/Time    TROPONINT 56 (H) 01/25/2023 12:36 PM       No results for input(s): NTPROBNP in the last 72 hours.    Cardiac Imaging and Procedures Review  EKG:  My personal interpretation of the EKG dated 1/25/2022 is Sinus bradycardia     Echocardiogram:  1/21/2023  CONCLUSIONS  Compared to the prior study on 1-5-23 ju, interval aortic valve   replacement   The left ventricular ejection fraction is visually estimated to be 45%.  Global hypokinesis with regional variation.  Known bioprosthetic aortic valve that is functioning normally with   normal transvalvular gradients.    Imaging  Chest X-Ray:  1/24/2023  FINDINGS:  Cardiomediastinal silhouette is stable. There is a valve prosthesis.  Chronic elevation of left hemidiaphragm with left basilar subsegmental atelectasis.  Right lung is clear. No pleural effusion or pneumothorax.  Right humeral head prosthesis.  IMPRESSION:  1.  Chronic elevation of the left hemidiaphragm with left basilar atelectasis.  2.  Cardiomegaly.    Assessment/Plan  #HFrEF AHA Stage C NYHA Class IV  #JENNY  #Paroxysmal Afib  #Acute respiratory failure with hypoxia  #Pneumonia  #COPD  #Elevated troponin    Recommendations:  -He continues to exhibit signs and symptoms of fluid overload  -ACE-I/ARB/ARNI: Consider once euvolemic  -Evidence Based Beta-blocker: Continue lopressor 50mg BID  - Urine output -1140 over the past 24 hours. Goal >2L  -Diuretic: Increase  furosemide to 80mg TID along with potassium 40mg BID  - LVEF 40% on recent echo  -ICD not indicated given LVEF of >35%  - Rate is well controlled  - Continue amlodipine  - Continue warfarin per pharmacy  -Labs: Daily BMP  -Continue Daily weights; Strict I+O’s:   -PNA and Flu vaccine: Per pharmacy  -Meds-to-beds and HF instructions on discharge   -FU in HF clinic within 7 days of discharge      Thank you for allowing me to participate in the care of this patient.  Cardiology will continue to follow.    Please contact me with any questions.    Belinda Hernández PA-C, Cardiology  [facility] Heart and Vascular [facility], [address]  [address] [address]  Phone: [phone]  Fax: [phone]    PLEASE NOTE: This Note was created using voice recognition Software. I have made every reasonable attempt to correct obvious errors, but I expect that there are errors of grammar and possibly content that I did not discover before finalizing the note.     I personally spent a total of 14 minutes which includes face-to-face time and non-face-to-face time spent on preparing to see the patient, reviewing hospital notes and tests, obtaining history from the patient, performing a medically appropriate exam, counseling and educating the patient, ordering medications/tests/procedures/referrals as clinically indicated, and documenting information in the electronic medical record.

## 2023-01-26 NOTE — CONSULTS
PSYCHIATRIC FOLLOW-UP:(established)  Reason for admission: Shortness of breath, cough, lower extremity swelling.  Reason for consult: Psychiatric medication evaluation, management for new onset hallucinations/agitation.  Legal Hold Status: Not applicable.        Chart reviewed.         *HPI: Noe Hickey is a 62 year old male, with past medical history of atrial fibrillation/flutter, history of aortic stenosis status post valve replacement, tobacco abuse and hyperlipidemia, who presented to the ED for shortness of breath, cough and lower extremity swelling on 1/20/2023.  Patient was admitted to the medicine floor for infectious pneumonia, elevated troponin, acute on chronic systolic heart failure, acute respiratory failure with hypoxia and acute renal failure.  Psychiatry was consulted on 1/25/2023 for new onset agitation and hallucinations.  Patient does not have any past psychiatric history and is currently not on any psychotropic medications.  Patient has trazodone 50 mg nightly for sleep available to him.  Per medicine team, patient appeared to be exhibiting new onset agitation and hallucinations for the last 2 days. During psychiatric evaluation, patient denies history of auditory and visual hallucinations outside of the context of substance withdrawal.  Patient endorses having visual and auditory hallucinations, which tend to occur when the patient is trying to sleep.  Due to patient being treated for infectious pneumonia, suspect that this was in the context of delirium, which has improved or resolved.  Throughout interview, patient remained calm, pleasant, cooperative and logical.  It is noted that he has not required physical restraints throughout his hospital stay.  He has also not required any medications to manage agitation.  Due to significant cardiac disease, it was recommended that antipsychotics be avoided for managing agitation.  If agitation persist persists, it was recommended that medicine team  "consider Ativan 1 mg IM every 4 hours as needed.  He was also recommended to continue monitoring for worsening for worsening confusion.  Chart reviewed.    Per chart review, patient has not required as needed agitation agitation medications over overnight.    Per interview with patient, patient was observed sitting at bedside.  Patient reports that he is waiting patiently for his breakfast and pain medications.  He spontaneously reports that he would like to follow-up with the conversation yesterday, in which he reports that his anxiety and stress levels have improved.  He attributes this to having better interactions with staff, the ability to discuss his anxiety with psychiatry, and his decision to stay focused on the present moment and not worry so much about things outside of his control.  He discusses how his anxiety/stress during the last few days is largely attributed to things going on outside of the hospital, which involved establishing stable housing, returning to work as well as can tensions with his wife.  He states that he has a better understanding of why he continues to remain hospitalized, stating that although he may not completely agree, he does understand and knows that this is part of his healing process.  His coping mechanisms for anxiety involve \"taking a step back, looking at what is going on, and taking things one step at a time.\"  He also endorses having paced in the hallway rather than stay in his room, which he also attributed to his high anxiety and stress.  He also discusses the dichotomy of being in prison versus the hospital setting.  In prison, he reports having to be \"in charge.\"  However, in the hospital setting, he acknowledges that he should collaborate with the hospital team, as they are trying to help him.  He denies suicidal ideation, homicidal ideation, auditory and visual hallucinations.  He does report having dreams last night, and he does state that these were different " "than the hallucinations he was experiencing previously.  Patient denies offer to speak with psychotherapist.  He denies any additional questions or concerns at this time.    Medical ROS (as pertinent):     Review of Systems   Constitutional:  Negative for malaise/fatigue.   Respiratory:  Positive for shortness of breath.    Cardiovascular:  Positive for chest pain and leg swelling.   Psychiatric/Behavioral:  Negative for depression, hallucinations, substance abuse and suicidal ideas. The patient is nervous/anxious. The patient does not have insomnia.          *Psychiatric Examination:  Vitals:   Vitals:    01/26/23 0520   BP: 123/64   Pulse: (!) 52   Resp: 18   Temp: 37 °C (98.6 °F)   SpO2: 91%      General Appearance: 62-year-old male, appears stated age, appears cross chronically ill, in no acute distress, wearing hospital attire.  Patient is pleasant and cooperative with interview.  Abnormal Movements: No tics or tremors noted.    Gait and Posture: Gait not observed.  Speech: Continues to have slowed rate, tone and volume, and paucity in speech, which appear to be related to dyspnea.  Patient also states that this may be related to some anxiety.  Thought processes: Linear, organized, logical.  Associations: No loose associations.  Abnormal or Psychotic Thoughts: Denies auditory and visual hallucinations.  Denies suicidal and homicidal ideation.  Reports having dreams last night, however does state they are different than the hallucinations he was experiencing yesterday.   Judgement and Insight: Fair, fair.  Orientation: Alert and oriented to person, place, time, situation patient.  Recent and Remote Memory: No gross deficits in recent or remote memory.  Attention Span and Concentration: Grossly intact.  Language: Fluent in English, peers to comprehend questions asked, spontaneously.  Fund of Knowledge: Not formally tested.  Mood and Affect: Mood: \"Better\" Affect: Constricted, no mood lability.       *EKG: "   Results for orders placed or performed during the hospital encounter of 23   EKG   Result Value Ref Range    Report       [facility]    Test Date:  2023  Pt Name:    JUSTIN TOPETE               Department: ER  MRN:        [id number]                      Room:        25  Gender:     Male                         Technician: [id number]  :        1960                   Requested By:ER TRIAGE PROTOCOL  Order #:    [id number]                    Reading MD:    Measurements  Intervals                                Axis  Rate:       69                           P:          55  NE:         202                          QRS:        -6  QRSD:       82                           T:          40  QT:         405  QTc:        434    Interpretive Statements  Sinus rhythm  Compared to ECG 2023 20:28:36  Atrial fibrillation no longer present  ST (T wave) deviation no longer present  Myocardial infarct finding no longer present     EKG   Result Value Ref Range    Report       [facility]    Test Date:  2023  Pt Name:    JUSTIN TOPETE               Department: ER  MRN:        [id number]                      Room:       14  Gender:     Male                         Technician: [id number]  :        1960                   Requested By:ER TRIAGE PROTOCOL  Order #:    [id number]                    Reading MD:    Measurements  Intervals                                Axis  Rate:       69                           P:          55  NE:         202                          QRS:        -6  QRSD:       82                           T:          40  QT:         405  QTc:        434    Interpretive Statements  Sinus rhythm  Compared to ECG 2023 20:28:36  Atrial fibrillation no longer present  ST (T wave) deviation no longer present  Myocardial infarct finding no longer present     EKG   Result Value Ref Range    Report       [facility]    Test Date:  2023  Pt Name:    JUSTIN TOPETE               Department: 171  MRN:        [id number]                      Room:       T709  Gender:     Male                         Technician: [facility]  :        1960                   Requested By:KATY SEGURA  Order #:    [id number]                    Reading MD:    Measurements  Intervals                                Axis  Rate:       55                           P:          39  PA:         197                          QRS:        -13  QRSD:       115                          T:          26  QT:         479  QTc:        459    Interpretive Statements  Sinus bradycardia  Nonspecific intraventricular conduction delay  Inferior infarct, old  Baseline wander in lead(s) V4  Compared to ECG 2023 16:21:18  Intraventricular conduction delay now present  Myocardial infarct finding now present  Sinus rhythm no longer present     EKG   Result Value Ref Range    Report       [facility]    Test Date:  2023  Pt Name:    JUSTIN TOPETE               Department: 171  MRN:        [id number]                      Room:       T709  Gender:     Male                         Technician: [facility]  :        1960                   Requested By:LEEANN CAICEDO  Order #:    [id number]                    Reading MD:    Measurements  Intervals                                Axis  Rate:       54                           P:          44  PA:         198                          QRS:        -1  QRSD:       100                          T:          38  QT:         510  QTc:        484    Interpretive Statements  Sinus bradycardia  Borderline T abnormalities, anterior leads  Borderline prolonged QT interval  Compared to ECG 2023 10:27:13  T-wave abnormality now present  Intraventricular conduction delay no longer present  Myocardial infarct finding no longer present           Brain Imaging:   CTA head with and without postprocess on 3/2021:  IMPRESSION:    1. No hemodynamically significant narrowing of the major intracranial vessels.  EEG: No EEG on file.     *Labs personally reviewed: GFR 46 (unchanged), PT 23.3 (trending down), INR 2.13 (trending down).     Assessment: Noe Hickey is a 62 year old male, with past medical history of atrial fibrillation/flutter, history of aortic stenosis status post valve replacement, tobacco abuse and hyperlipidemia, who presented to the ED for shortness of breath, cough and lower extremity swelling on 1/20/2023.  Patient was admitted to the medicine floor for infectious pneumonia, elevated troponin, acute on chronic systolic heart failure, acute respiratory failure with hypoxia and acute renal failure.       Psychiatry was consulted to evaluate patient for new onset agitation and hallucinations.  Patient does not have a significant past psychiatric history.  He is currently not on any home psychotropic medications.  Patient denies history of auditory and visual hallucinations, with the exception of hallucinations while withdrawing from methamphetamine, which he described as vivid dreams.  Patient does not have history of reported of neurocognitive disorder.  There appears to be documentation throughout his hospitalization involving new onset of agitation and hallucinations, however patient has not required physical restraints.  Throughout psychiatric evaluation, patient was alert, oriented, logical and did not appear to be altered.  Given the context of patient being treated for infectious pneumonia in addition to other multiple comorbid conditions, as well as hallucinations occurring while patient is attempting to sleep, suspect that this presentation may be consistent with delirium that has either improved, resolved, or is still waxing and waning.  Other predisposing factors for delirium involve age and sleep deprivation.  Recommended Ativan IM as needed for agitation.    Today, patient reports resolution of auditory visual  hallucinations.  Patient did not require IM agitation medications overnight.  Patient reports feeling less anxious and stressed yesterday and endorses his willingness to cooperate with healthcare team going forward.  Suspected delirium state appears to be resolved.  Psychiatry will be signing off.  Continue to recommend delirium protocol as well as monitoring for changes in mental state and increased agitation/aggression.  May continue to consider IM Ativan as needed for agitation.    Dx:  #Delirium (resolved)     Medical:  #Acute on chronic systolic heart failure  #COPD  #Elevated troponin  #Paroxysmal atrial fibrillation  #Pneumonia due to infectious organism  #Acute respiratory failure with hypoxia  #Acute renal failure     Plan:  Legal hold: Does not meet criteria for legal hold.  No legal hold placed at this time.  Psychotropic medications  May consider Ativan 1 mg IM every 4 hours as needed as needed for severe agitation/aggression.  May continue Trazodone 50 mg QHS for sleep.   Recommend delirium precautions (frequent redirection, frequent orientation, reducing noise and light at nighttime).  Labs reviewed.  No labs ordered.  EKG reviewed.  EKG reviewed.    Discussed the case with: Dr. Garcia, Dr. Mahmood.    Psychiatry will be signing off. Thank you for the consult.       This note was created using voice recognition software (Dragon). The accuracy of the dictation is limited by the abilities of the software. I have reviewed the note prior to signing. However, error related to voice recognition software and /or scribes may still exist and should be interpreted within the appropriate context.

## 2023-01-26 NOTE — PROGRESS NOTES
Monitor summary:     Rhythm : Sinus Rhythm / Sinus Quentin  Rate : 55-61  Ectopy : PVC/PAC    TX=.18  QRS=.06  QT=.46        Per telemetry strip summary from monitor room.

## 2023-01-26 NOTE — PROGRESS NOTES
Inpatient Anticoagulation Service Note for 1/26/2023      Reason for Anticoagulation: Atrial Fibrillation, Bioprosthetic Valve Replacement   [id number] VASc Score: 2  HAS-BLED Score: 1    Hemoglobin Value: (!) 9.5  Hematocrit Value: (!) 32.2  Lab Platelet Value: 328  Target INR: 2.0 to 3.0    INR from last 7 days       Date/Time INR Value    01/26/23 0207 2.13    01/25/23 0045 2.47    01/24/23 0101 2.47    01/23/23 0119 2.29    01/22/23 0132 2.3    01/21/23 0210 2.92    01/20/23 1622 3.39          Dose from last 7 days       Date/Time Dose (mg)    01/26/23 0953 2.5    01/25/23 0824 2.5    01/24/23 1139 2.5    01/23/23 1425 2.5    01/22/23 0929 2.5    01/21/23 1203 3    01/20/23 2233 0          Average Dose (mg): 2.5  Significant Interactions: Antiplatelet Medications, Amiodarone, Antibiotics  Bridge Therapy: No   Reversal Agent Administered: Not Applicable  Comments: Patient continues on home warfarin for history of AF, bio valve replacement. INR therapeutic. CBC low but stable and no signs/symptoms of bleeding. DDI noted above, antibiotics to complete today. Cardiac diet ordered, adequate intake per chart review (% of dinner consumed yesterday). Previous warfarin doses received. INR therapeutic, continue home warfarin 2.5mg po daily with repeat INR tomorrow.    Plan:  warfarin 2.5mg po daily with repeat INR tomorrow  Education Material Provided?: No (Home medication)    Pharmacist suggested discharge dosing: continue home warfarin dose of 2.5mg po daily with repeat INR within 2-3 days of discharge.      Rachana Thomas, PharmD, BCCCP

## 2023-01-26 NOTE — CARE PLAN
"The patient is Stable - Low risk of patient condition declining or worsening    Shift Goals  Clinical Goals: Patient's agitation/anxiety will remain low throughout shift. Pain management.  Patient Goals: \"Get something to eat and less pain.\"  Family Goals: HUGH    Progress made toward(s) clinical / shift goals:  Provided patient with a verbal discussion related to POC. Patient's level of anxiety/agitation subjectively reported to be less. Client has remained calm with normal affect throughout shift without the need for anti-anxiety medications. Pt is able to verbalize pain on a scale of 1-10 and is able to verbalize comfort goal. Pain management plan followed and pt educated on nonpharmacologic and pharmacological comfort measures.     Patient is not progressing towards the following goals: Patient continues to have 8/10 chest pain despite PRN medications. MD aware.       "

## 2023-01-26 NOTE — DISCHARGE PLANNING
Agency/Facility Name: [facility]   Spoke To: Lana   Outcome: DPA called to notify Lana anticipating medical clearance tomorrow. DPA to start insurance auth.

## 2023-01-26 NOTE — PROGRESS NOTES
[facility] Medicine Daily Progress Note    Date of Service  1/26/2023    Chief Complaint  Noe Hickey is a 62 y.o. male admitted 1/20/2023 with leg swelling, cough.    Hospital Course  Noe Hickey is a 62 y.o. male who presented 1/20/2023 with past medical history of atrial fibrillation/flutter, history of aortic stenosis status post valve replacement, history of tobacco abuse, hyperlipidemia who presents to the hospital for shortness of breath, cough and lower extremity swelling.  Patient was recently discharged in the hospital on 1/5 for aortic aneurysm repair and aortic valve replacement.  Postoperatively he was given IV fluids due to blood loss.  During his hospital stay he had a complication of torsades and ventricular arrhythmia.  He was shocked once and then returned back to normal sinus rhythm.  His echo found EF to be 55% at that time.  Patient states that since his hospital stay has been having worsening lower extremity edema and cough.  He denies any hemoptysis or purulent sputum.  He was discharged to [facility] nursing [facility] and states that he has been eating a high salt diet.  Patient was discharged with Lasix and states that he has been compliant with it.    Interval Problem Update  1/24: Patient seen and examined, admitted for chest pain and SOB, chest pain intermittently   Wbc 15 today cont on cefdinir repeat CXR.  His echo showed global hypokinesis and EF of  45% from 55 it was before   I have consulted cardiology appreciate rec.   1/25: Patient seen and examined, agitated and with some hallucination as per staff has been going for a while concern for some underline psychiatric  issues   Regarding his HF, cardiology has been following on IV lasix  for diuresis   1/26: patient seen and examined, afebrile, seen by psychiatry appreciate rec.  Regarding his HF, cardiology following on IV lasix appreciate rec.   I have discussed this patient's plan of care and discharge plan  at IDT rounds today with Case Management, Nursing, Nursing leadership, and other members of the IDT team.    Consultants/Specialty  none    Code Status  Full Code    Disposition  Patient is not medically cleared for discharge.   Anticipate discharge to to skilled nursing facility.  I have placed the appropriate orders for post-discharge needs.    Review of Systems  Review of Systems   Constitutional:  Negative for chills and fever.   Eyes:  Negative for blurred vision and pain.   Respiratory:  Positive for cough and shortness of breath. Negative for hemoptysis, sputum production and wheezing.    Cardiovascular:  Positive for leg swelling. Negative for chest pain and palpitations.   Gastrointestinal:  Negative for abdominal pain, nausea and vomiting.   Genitourinary:  Negative for dysuria and urgency.   Musculoskeletal:  Negative for back pain and falls.   Skin:  Negative for itching and rash.   Neurological:  Positive for weakness. Negative for dizziness, sensory change, focal weakness, loss of consciousness and headaches.   Psychiatric/Behavioral:  Negative for substance abuse. The patient does not have insomnia.       Physical Exam  Temp:  [36.7 °C (98.1 °F)-37.1 °C (98.8 °F)] 36.7 °C (98.1 °F)  Pulse:  [52-64] 64  Resp:  [18-20] 18  BP: (118-130)/(64-91) 125/91  SpO2:  [91 %-93 %] 93 %    Physical Exam  Constitutional:       General: He is not in acute distress.     Appearance: He is not ill-appearing.   HENT:      Head: Normocephalic and atraumatic.      Right Ear: External ear normal.      Left Ear: External ear normal.      Mouth/Throat:      Pharynx: No oropharyngeal exudate or posterior oropharyngeal erythema.   Eyes:      Extraocular Movements: Extraocular movements intact.      Pupils: Pupils are equal, round, and reactive to light.   Cardiovascular:      Rate and Rhythm: Normal rate and regular rhythm.      Pulses: Normal pulses.      Heart sounds: Normal heart sounds.   Pulmonary:      Effort: Pulmonary  effort is normal. No respiratory distress.      Breath sounds: No stridor. Wheezing present. No rales.   Abdominal:      General: Bowel sounds are normal. There is no distension.      Palpations: Abdomen is soft.      Tenderness: There is no abdominal tenderness. There is no guarding or rebound.   Musculoskeletal:         General: Swelling present. No tenderness.      Cervical back: Normal range of motion and neck supple.      Right lower leg: Edema present.      Left lower leg: Edema present.   Skin:     General: Skin is warm and dry.   Neurological:      General: No focal deficit present.      Mental Status: He is oriented to person, place, and time.      Cranial Nerves: No cranial nerve deficit.      Sensory: No sensory deficit.      Motor: No weakness.   Psychiatric:         Mood and Affect: Mood normal.         Behavior: Behavior normal.       Fluids    Intake/Output Summary (Last 24 hours) at 1/26/2023 1227  Last data filed at 1/26/2023 0852  Gross per 24 hour   Intake 720 ml   Output 1140 ml   Net -420 ml         Laboratory  Recent Labs     01/24/23  0804 01/25/23  0045 01/26/23  0207   WBC 15.4* 15.2* 13.1*   RBC 4.31* 4.39* 4.42*   HEMOGLOBIN 9.3* 9.5* 9.5*   HEMATOCRIT 30.9* 31.9* 32.2*   MCV 71.7* 72.7* 72.9*   MCH 21.6* 21.6* 21.5*   MCHC 30.1* 29.8* 29.5*   RDW 47.5 47.7 47.9   PLATELETCT 508* 525* 328   MPV 10.1 10.0 10.1       Recent Labs     01/24/23  0804 01/25/23  0045 01/26/23  0207   SODIUM 135 137 136   POTASSIUM 4.0 4.3 4.5   CHLORIDE 94* 94* 98   CO2 29 30 28   GLUCOSE 79 93 94   BUN 29* 31* 33*   CREATININE 1.60* 1.67* 1.67*   CALCIUM 9.1 9.1 9.1       Recent Labs     01/24/23  0101 01/25/23  0045 01/26/23  0207   INR 2.47* 2.47* 2.13*                 Imaging  DX-CHEST-PORTABLE (1 VIEW)   Final Result      1.  Chronic elevation of the left hemidiaphragm with left basilar atelectasis.   2.  Cardiomegaly.      EC-ECHOCARDIOGRAM COMPLETE W/O CONT   Final Result      CT-CTA CHEST PULMONARY ARTERY  W/ RECONS   Final Result      1.  No CT evidence of pulmonary embolism.      2.  Significant cardiomegaly.      3.  Consolidation and volume loss in the left lower lobe and lingular segment left upper lobe could BE due to consolidative atelectasis and pneumonia.      4.  Minimal groundglass opacifications noted in the right lung which could be due to inflammation or infection.      5.  Additional linear opacifications in the right lung likely due to atelectasis or scarring.            DX-CHEST-PORTABLE (1 VIEW)   Final Result      1.  Left lung base atelectasis.      2.  Cardiomegaly.      3.  Chronic elevation of left hemidiaphragm.             Assessment/Plan  * Acute on chronic systolic heart failure (HCC)- (present on admission)  Assessment & Plan  Last EF was 55%  Echo shows worse EF 45%  Also global hypokinesis   Continue lasix IV diuresis  On metoprolol  I have consulted cardiology appreciate rec     COPD (chronic obstructive pulmonary disease) (HCC)  Assessment & Plan  Wheezing heard on exam  40+ pack year history, stopped smoking recently  Suspect underlying COPD, outpatient PFT and pulm follow up  Start symbicort and duonebs here  Not in acute exacerbation    Elevated troponin  Assessment & Plan  Echo with EF of 45% and also shoving global hypokinesis   I have consulted cardiology appreciate rec.     Paroxysmal atrial fibrillation (HCC)  Assessment & Plan  Rate controlled on metoprolol and amiodarone  Continue warfarin    Pneumonia due to infectious organism- (present on admission)  Assessment & Plan  Found to have left sided infiltrate  Bronchial breath sounds and egophony on the left lung exam   Started on  antibiotics  Follow up sputum and blood cultures  Respiratory care protocol   Placed on o2 therapy       Acute respiratory failure with hypoxia (HCC)  Assessment & Plan  On 3 L of O2 above baseline  Suspect due to pneumonia vs atelectasis  Continue antibiotics and IS    Acute renal failure (ARF)  (HCC)  Assessment & Plan  Monitor BMP and assess response  Avoid IV contrast/nephrotoxins/NSAIDs  Dose adjust meds for decreased GFR             VTE prophylaxis: therapeutic anticoagulation with warfarin    I have performed a physical exam and reviewed and updated ROS and Plan today (1/26/2023). In review of yesterday's note (1/25/2023), there are no changes except as documented above.

## 2023-01-27 ENCOUNTER — APPOINTMENT (OUTPATIENT)
Dept: RADIOLOGY | Facility: MEDICAL CENTER | Age: 63
DRG: 208 | End: 2023-01-27
Attending: INTERNAL MEDICINE
Payer: COMMERCIAL

## 2023-01-27 PROBLEM — J98.6 ELEVATED HEMIDIAPHRAGM: Chronic | Status: ACTIVE | Noted: 2023-01-04

## 2023-01-27 LAB
ANION GAP SERPL CALC-SCNC: 12 MMOL/L (ref 7–16)
BUN SERPL-MCNC: 30 MG/DL (ref 8–22)
CALCIUM SERPL-MCNC: 8.9 MG/DL (ref 8.5–10.5)
CHLORIDE SERPL-SCNC: 94 MMOL/L (ref 96–112)
CO2 SERPL-SCNC: 28 MMOL/L (ref 20–33)
CREAT SERPL-MCNC: 1.59 MG/DL (ref 0.5–1.4)
ERYTHROCYTE [DISTWIDTH] IN BLOOD BY AUTOMATED COUNT: 49 FL (ref 35.9–50)
GFR SERPLBLD CREATININE-BSD FMLA CKD-EPI: 48 ML/MIN/1.73 M 2
GLUCOSE SERPL-MCNC: 98 MG/DL (ref 65–99)
HCT VFR BLD AUTO: 33.7 % (ref 42–52)
HGB BLD-MCNC: 9.9 G/DL (ref 14–18)
INR PPP: 2.06 (ref 0.87–1.13)
MCH RBC QN AUTO: 21.3 PG (ref 27–33)
MCHC RBC AUTO-ENTMCNC: 29.4 G/DL (ref 33.7–35.3)
MCV RBC AUTO: 72.6 FL (ref 81.4–97.8)
PLATELET # BLD AUTO: 540 K/UL (ref 164–446)
PMV BLD AUTO: 9.8 FL (ref 9–12.9)
POTASSIUM SERPL-SCNC: 4.2 MMOL/L (ref 3.6–5.5)
PROCALCITONIN SERPL-MCNC: 0.07 NG/ML
PROTHROMBIN TIME: 22.6 SEC (ref 12–14.6)
RBC # BLD AUTO: 4.64 M/UL (ref 4.7–6.1)
SODIUM SERPL-SCNC: 134 MMOL/L (ref 135–145)
WBC # BLD AUTO: 15.3 K/UL (ref 4.8–10.8)

## 2023-01-27 PROCEDURE — 700102 HCHG RX REV CODE 250 W/ 637 OVERRIDE(OP)

## 2023-01-27 PROCEDURE — 36415 COLL VENOUS BLD VENIPUNCTURE: CPT

## 2023-01-27 PROCEDURE — 700102 HCHG RX REV CODE 250 W/ 637 OVERRIDE(OP): Performed by: INTERNAL MEDICINE

## 2023-01-27 PROCEDURE — 700102 HCHG RX REV CODE 250 W/ 637 OVERRIDE(OP): Performed by: HOSPITALIST

## 2023-01-27 PROCEDURE — A9270 NON-COVERED ITEM OR SERVICE: HCPCS | Performed by: STUDENT IN AN ORGANIZED HEALTH CARE EDUCATION/TRAINING PROGRAM

## 2023-01-27 PROCEDURE — 85027 COMPLETE CBC AUTOMATED: CPT

## 2023-01-27 PROCEDURE — 84145 PROCALCITONIN (PCT): CPT

## 2023-01-27 PROCEDURE — 99232 SBSQ HOSP IP/OBS MODERATE 35: CPT | Performed by: INTERNAL MEDICINE

## 2023-01-27 PROCEDURE — 80048 BASIC METABOLIC PNL TOTAL CA: CPT

## 2023-01-27 PROCEDURE — 770020 HCHG ROOM/CARE - TELE (206)

## 2023-01-27 PROCEDURE — 700111 HCHG RX REV CODE 636 W/ 250 OVERRIDE (IP): Performed by: INTERNAL MEDICINE

## 2023-01-27 PROCEDURE — A9270 NON-COVERED ITEM OR SERVICE: HCPCS | Performed by: HOSPITALIST

## 2023-01-27 PROCEDURE — 85610 PROTHROMBIN TIME: CPT

## 2023-01-27 PROCEDURE — 99232 SBSQ HOSP IP/OBS MODERATE 35: CPT | Mod: FS | Performed by: INTERNAL MEDICINE

## 2023-01-27 PROCEDURE — A9270 NON-COVERED ITEM OR SERVICE: HCPCS | Performed by: INTERNAL MEDICINE

## 2023-01-27 PROCEDURE — 71045 X-RAY EXAM CHEST 1 VIEW: CPT

## 2023-01-27 PROCEDURE — A9270 NON-COVERED ITEM OR SERVICE: HCPCS

## 2023-01-27 PROCEDURE — 700102 HCHG RX REV CODE 250 W/ 637 OVERRIDE(OP): Performed by: STUDENT IN AN ORGANIZED HEALTH CARE EDUCATION/TRAINING PROGRAM

## 2023-01-27 PROCEDURE — 700105 HCHG RX REV CODE 258: Performed by: INTERNAL MEDICINE

## 2023-01-27 RX ADMIN — AMIODARONE HYDROCHLORIDE 400 MG: 200 TABLET ORAL at 05:02

## 2023-01-27 RX ADMIN — BUDESONIDE AND FORMOTEROL FUMARATE DIHYDRATE 2 PUFF: 80; 4.5 AEROSOL RESPIRATORY (INHALATION) at 05:05

## 2023-01-27 RX ADMIN — POTASSIUM CHLORIDE 40 MEQ: 20 TABLET, EXTENDED RELEASE ORAL at 05:01

## 2023-01-27 RX ADMIN — POTASSIUM CHLORIDE 40 MEQ: 20 TABLET, EXTENDED RELEASE ORAL at 18:12

## 2023-01-27 RX ADMIN — OXYCODONE HYDROCHLORIDE 10 MG: 10 TABLET ORAL at 02:03

## 2023-01-27 RX ADMIN — METOPROLOL TARTRATE 50 MG: 50 TABLET, FILM COATED ORAL at 18:12

## 2023-01-27 RX ADMIN — LIDOCAINE HYDROCHLORIDE 15 ML: 20 SOLUTION OROPHARYNGEAL at 05:02

## 2023-01-27 RX ADMIN — OXYCODONE HYDROCHLORIDE 10 MG: 10 TABLET ORAL at 15:31

## 2023-01-27 RX ADMIN — TRAZODONE HYDROCHLORIDE 50 MG: 50 TABLET ORAL at 23:54

## 2023-01-27 RX ADMIN — OMEPRAZOLE 20 MG: 20 CAPSULE, DELAYED RELEASE ORAL at 05:02

## 2023-01-27 RX ADMIN — OXYCODONE HYDROCHLORIDE 10 MG: 10 TABLET ORAL at 20:48

## 2023-01-27 RX ADMIN — FUROSEMIDE 80 MG: 10 INJECTION, SOLUTION INTRAMUSCULAR; INTRAVENOUS at 05:03

## 2023-01-27 RX ADMIN — OXYCODONE HYDROCHLORIDE 10 MG: 10 TABLET ORAL at 11:55

## 2023-01-27 RX ADMIN — FUROSEMIDE 80 MG: 10 INJECTION, SOLUTION INTRAMUSCULAR; INTRAVENOUS at 15:31

## 2023-01-27 RX ADMIN — OXYCODONE HYDROCHLORIDE 10 MG: 10 TABLET ORAL at 07:35

## 2023-01-27 RX ADMIN — AMPICILLIN AND SULBACTAM 3 G: 1; 2 INJECTION, POWDER, FOR SOLUTION INTRAMUSCULAR; INTRAVENOUS at 18:19

## 2023-01-27 RX ADMIN — AMPICILLIN AND SULBACTAM 3 G: 1; 2 INJECTION, POWDER, FOR SOLUTION INTRAMUSCULAR; INTRAVENOUS at 14:47

## 2023-01-27 RX ADMIN — AMPICILLIN AND SULBACTAM 3 G: 1; 2 INJECTION, POWDER, FOR SOLUTION INTRAMUSCULAR; INTRAVENOUS at 23:53

## 2023-01-27 RX ADMIN — FUROSEMIDE 80 MG: 10 INJECTION, SOLUTION INTRAMUSCULAR; INTRAVENOUS at 11:55

## 2023-01-27 RX ADMIN — WARFARIN SODIUM 2.5 MG: 2.5 TABLET ORAL at 18:12

## 2023-01-27 RX ADMIN — BUDESONIDE AND FORMOTEROL FUMARATE DIHYDRATE 2 PUFF: 80; 4.5 AEROSOL RESPIRATORY (INHALATION) at 18:12

## 2023-01-27 RX ADMIN — SENNOSIDES AND DOCUSATE SODIUM 2 TABLET: 50; 8.6 TABLET ORAL at 18:12

## 2023-01-27 RX ADMIN — OXYCODONE HYDROCHLORIDE 10 MG: 10 TABLET ORAL at 23:54

## 2023-01-27 RX ADMIN — AMLODIPINE BESYLATE 10 MG: 10 TABLET ORAL at 05:02

## 2023-01-27 RX ADMIN — METOPROLOL TARTRATE 50 MG: 50 TABLET, FILM COATED ORAL at 05:02

## 2023-01-27 RX ADMIN — ASPIRIN 81 MG: 81 TABLET, COATED ORAL at 05:02

## 2023-01-27 ASSESSMENT — ENCOUNTER SYMPTOMS
COUGH: 1
SENSORY CHANGE: 0
SHORTNESS OF BREATH: 1
ARTHRALGIAS: 0
COLOR CHANGE: 0
HEADACHES: 0
LOSS OF CONSCIOUSNESS: 0
FOCAL WEAKNESS: 0
BLURRED VISION: 0
COUGH: 0
MYALGIAS: 0
WHEEZING: 0
HEMOPTYSIS: 0
WEAKNESS: 1
CHILLS: 0
INSOMNIA: 0
SPUTUM PRODUCTION: 0
CHEST TIGHTNESS: 0
EYES NEGATIVE: 1
ABDOMINAL DISTENTION: 0
FEVER: 0
PSYCHIATRIC NEGATIVE: 1
ENDOCRINE NEGATIVE: 1
LIGHT-HEADEDNESS: 0
EYE PAIN: 0
NAUSEA: 0
CONSTIPATION: 0
BACK PAIN: 0
VOMITING: 0
DIARRHEA: 0
ABDOMINAL PAIN: 0
FALLS: 0
BRUISES/BLEEDS EASILY: 0
FATIGUE: 0
DIZZINESS: 0
DIAPHORESIS: 0
PALPITATIONS: 0

## 2023-01-27 ASSESSMENT — LIFESTYLE VARIABLES
EVER FELT BAD OR GUILTY ABOUT YOUR DRINKING: NO
HOW MANY TIMES IN THE PAST YEAR HAVE YOU HAD 5 OR MORE DRINKS IN A DAY: 0
TOTAL SCORE: 0
CONSUMPTION TOTAL: NEGATIVE
SUBSTANCE_ABUSE: 0
ALCOHOL_USE: NO
TOTAL SCORE: 0
ON A TYPICAL DAY WHEN YOU DRINK ALCOHOL HOW MANY DRINKS DO YOU HAVE: 0
EVER HAD A DRINK FIRST THING IN THE MORNING TO STEADY YOUR NERVES TO GET RID OF A HANGOVER: NO
AVERAGE NUMBER OF DAYS PER WEEK YOU HAVE A DRINK CONTAINING ALCOHOL: 0
TOTAL SCORE: 0
DOES PATIENT WANT TO STOP DRINKING: NO
HAVE YOU EVER FELT YOU SHOULD CUT DOWN ON YOUR DRINKING: NO
HAVE PEOPLE ANNOYED YOU BY CRITICIZING YOUR DRINKING: NO

## 2023-01-27 ASSESSMENT — FIBROSIS 4 INDEX
FIB4 SCORE: 0.59
FIB4 SCORE: 0.62

## 2023-01-27 ASSESSMENT — PAIN DESCRIPTION - PAIN TYPE
TYPE: ACUTE PAIN

## 2023-01-27 NOTE — PROGRESS NOTES
Cardiology Follow Up Progress Note    Date of Service  1/27/2023    Attending Physician  Mehul Mahmood M.D.    Chief Complaint   Chest pain     HPI  Noe Hickey is a 62 y.o. male with a history of atrial fibrillation/flutter on warfarin, aortic stenosis status post valve replacement, tobacco use, hyperlipidemia, admitted 1/20/2023 with shortness of breath, lower extremity edema, and JENNY.     Interim Events  No cardiac events overnight. NSR on the monitor   Complaining of heartburn this morning.  Noted 2-3+ pitting edema pretibial. Per patient it is improving     Review of Systems  Review of Systems   Constitutional:  Negative for chills, diaphoresis, fatigue and fever.   HENT: Negative.     Eyes: Negative.    Respiratory:  Positive for shortness of breath. Negative for cough and chest tightness.    Cardiovascular:  Positive for leg swelling. Negative for chest pain and palpitations.   Gastrointestinal:  Negative for abdominal distention, abdominal pain, constipation, diarrhea, nausea and vomiting.   Endocrine: Negative.    Genitourinary:  Negative for decreased urine volume, difficulty urinating, dysuria, frequency and urgency.   Musculoskeletal:  Negative for arthralgias and myalgias.   Skin:  Negative for color change.   Neurological:  Negative for dizziness, syncope and light-headedness.   Hematological:  Does not bruise/bleed easily.   Psychiatric/Behavioral: Negative.       Vital signs in last 24 hours  Temp:  [36.6 °C (97.9 °F)-37.3 °C (99.1 °F)] 37.2 °C (99 °F)  Pulse:  [61-74] 61  Resp:  [16-22] 16  BP: (100-144)/() 135/65  SpO2:  [90 %-96 %] 91 %    Physical Exam  Physical Exam  Vitals and nursing note reviewed.   Constitutional:       General: He is not in acute distress.     Appearance: Normal appearance. He is not toxic-appearing.   HENT:      Head: Normocephalic and atraumatic.      Right Ear: External ear normal.      Left Ear: External ear normal.      Nose: Nose normal.       Mouth/Throat:      Mouth: Mucous membranes are moist.      Pharynx: Oropharynx is clear.   Eyes:      General: No scleral icterus.     Extraocular Movements: Extraocular movements intact.      Conjunctiva/sclera: Conjunctivae normal.      Pupils: Pupils are equal, round, and reactive to light.   Neck:      Vascular: No JVD.   Cardiovascular:      Rate and Rhythm: Normal rate and regular rhythm.      Pulses: Normal pulses.      Heart sounds: Normal heart sounds. No murmur heard.    No friction rub. No gallop.   Pulmonary:      Effort: Pulmonary effort is normal.      Breath sounds: Rales present.   Abdominal:      General: Abdomen is flat. Bowel sounds are normal. There is no distension.      Palpations: Abdomen is soft.      Tenderness: There is no abdominal tenderness.   Musculoskeletal:         General: Normal range of motion.      Cervical back: Normal range of motion and neck supple.      Right lower leg: 3+ Edema present.      Left lower leg: 3+ Edema present.   Skin:     General: Skin is warm and dry.      Capillary Refill: Capillary refill takes less than 2 seconds.      Coloration: Skin is not jaundiced.   Neurological:      General: No focal deficit present.      Mental Status: He is alert and oriented to person, place, and time. Mental status is at baseline.   Psychiatric:         Mood and Affect: Mood normal.         Behavior: Behavior normal.         Judgment: Judgment normal.       Lab Review  Lab Results   Component Value Date/Time    WBC 15.3 (H) 01/27/2023 02:48 AM    RBC 4.64 (L) 01/27/2023 02:48 AM    HEMOGLOBIN 9.9 (L) 01/27/2023 02:48 AM    HEMATOCRIT 33.7 (L) 01/27/2023 02:48 AM    MCV 72.6 (L) 01/27/2023 02:48 AM    MCH 21.3 (L) 01/27/2023 02:48 AM    MCHC 29.4 (L) 01/27/2023 02:48 AM    MPV 9.8 01/27/2023 02:48 AM      Lab Results   Component Value Date/Time    SODIUM 134 (L) 01/27/2023 02:43 AM    POTASSIUM 4.2 01/27/2023 02:43 AM    CHLORIDE 94 (L) 01/27/2023 02:43 AM    CO2 28 01/27/2023  02:43 AM    GLUCOSE 98 01/27/2023 02:43 AM    BUN 30 (H) 01/27/2023 02:43 AM    CREATININE 1.59 (H) 01/27/2023 02:43 AM    BUNCREATRAT 16.4 12/07/2022 04:44 AM      Lab Results   Component Value Date/Time    ASTSGOT 35 01/21/2023 02:10 AM    ALTSGPT 44 01/21/2023 02:10 AM     Lab Results   Component Value Date/Time    TROPONINT 56 (H) 01/25/2023 12:36 PM       No results for input(s): NTPROBNP in the last 72 hours.    Cardiac Imaging and Procedures Review  EKG:  My personal interpretation of the EKG dated 1/25/2022 is Sinus bradycardia     Echocardiogram:  1/21/2023  CONCLUSIONS  Compared to the prior study on 1-5-23 ju, interval aortic valve   replacement   The left ventricular ejection fraction is visually estimated to be 45%.  Global hypokinesis with regional variation.  Known bioprosthetic aortic valve that is functioning normally with   normal transvalvular gradients.    Imaging  Chest X-Ray:  1/24/2023  FINDINGS:  Cardiomediastinal silhouette is stable. There is a valve prosthesis.  Chronic elevation of left hemidiaphragm with left basilar subsegmental atelectasis.  Right lung is clear. No pleural effusion or pneumothorax.  Right humeral head prosthesis.  IMPRESSION:  1.  Chronic elevation of the left hemidiaphragm with left basilar atelectasis.  2.  Cardiomegaly.    Assessment/Plan  #HFrEF AHA Stage C NYHA Class IV  #JENNY  #Paroxysmal Afib  #Acute respiratory failure with hypoxia  #Pneumonia  #COPD  #Elevated troponin    Recommendations:  -He continues to exhibit signs and symptoms of fluid overload  -ACE-I/ARB/ARNI: Consider once euvolemic  -Evidence Based Beta-blocker: Continue lopressor 50mg BID  - Urine output -2000 over the past 24 hours. Goal >2L  -Diuretic: continue furosemide to 80mg TID along with potassium 40mg BID  - LVEF 40% on recent echo  -ICD not indicated given LVEF of >35%  - Rate is well controlled  - Continue amlodipine    - continue amiodarone 400mg qd   - Continue warfarin per  pharmacy  -Labs: Daily BMP  -Continue Daily weights; Strict I+O’s:         Thank you for allowing me to participate in the care of this patient.  Cardiology will continue to follow.    Please contact me with any questions.    I personally spent a total of 14 minutes which includes face-to-face time and non-face-to-face time spent on preparing to see the patient, reviewing hospital notes and tests, obtaining history from the patient, performing a medically appropriate exam, counseling and educating the patient, ordering medications/tests/procedures/referrals as clinically indicated, and documenting information in the electronic medical record.     LANI Rodriguez   [facility]

## 2023-01-27 NOTE — PROGRESS NOTES
[facility] Medicine Daily Progress Note    Date of Service  1/27/2023    Chief Complaint  Noe Hickey is a 62 y.o. male admitted 1/20/2023 with leg swelling, cough.    Hospital Course  Noe Hickey is a 62 y.o. male who presented 1/20/2023 with past medical history of atrial fibrillation/flutter, history of aortic stenosis status post valve replacement, history of tobacco abuse, hyperlipidemia who presents to the hospital for shortness of breath, cough and lower extremity swelling.  Patient was recently discharged in the hospital on 1/5 for aortic aneurysm repair and aortic valve replacement.  Postoperatively he was given IV fluids due to blood loss.  During his hospital stay he had a complication of torsades and ventricular arrhythmia.  He was shocked once and then returned back to normal sinus rhythm.  His echo found EF to be 55% at that time.  Patient states that since his hospital stay has been having worsening lower extremity edema and cough.  He denies any hemoptysis or purulent sputum.  He was discharged to [facility] nursing [facility] and states that he has been eating a high salt diet.  Patient was discharged with Lasix and states that he has been compliant with it.    Interval Problem Update  1/24: Patient seen and examined, admitted for chest pain and SOB, chest pain intermittently   Wbc 15 today cont on cefdinir repeat CXR.  His echo showed global hypokinesis and EF of  45% from 55 it was before   I have consulted cardiology appreciate rec.   1/25: Patient seen and examined, agitated and with some hallucination as per staff has been going for a while concern for some underline psychiatric  issues   Regarding his HF, cardiology has been following on IV lasix  for diuresis   1/26: patient seen and examined, afebrile, seen by psychiatry appreciate rec.  Regarding his HF, cardiology following on IV lasix appreciate rec.   1/27: Patient resting in bed, afebrile, cont on IV lasix for  his HF as per cardiology rec.  Wbc 15 today will check a CXR and UA afebrile   Will start empiric IV abx   Will check a procalcitonin     I have discussed this patient's plan of care and discharge plan at IDT rounds today with Case Management, Nursing, Nursing leadership, and other members of the IDT team.    Consultants/Specialty  none    Code Status  Full Code    Disposition  Patient is not medically cleared for discharge.   Anticipate discharge to to skilled nursing facility.  I have placed the appropriate orders for post-discharge needs.    Review of Systems  Review of Systems   Constitutional:  Negative for chills and fever.   Eyes:  Negative for blurred vision and pain.   Respiratory:  Positive for cough and shortness of breath. Negative for hemoptysis, sputum production and wheezing.    Cardiovascular:  Positive for leg swelling. Negative for chest pain and palpitations.   Gastrointestinal:  Negative for abdominal pain, nausea and vomiting.   Genitourinary:  Negative for dysuria and urgency.   Musculoskeletal:  Negative for back pain and falls.   Skin:  Negative for itching and rash.   Neurological:  Positive for weakness. Negative for dizziness, sensory change, focal weakness, loss of consciousness and headaches.   Psychiatric/Behavioral:  Negative for substance abuse. The patient does not have insomnia.       Physical Exam  Temp:  [36.6 °C (97.9 °F)-37.3 °C (99.1 °F)] 37.1 °C (98.8 °F)  Pulse:  [61-74] 63  Resp:  [16-22] 16  BP: (100-144)/() 135/91  SpO2:  [90 %-96 %] 92 %    Physical Exam  Constitutional:       General: He is not in acute distress.     Appearance: He is not ill-appearing.   HENT:      Head: Normocephalic and atraumatic.      Right Ear: External ear normal.      Left Ear: External ear normal.      Mouth/Throat:      Pharynx: No oropharyngeal exudate or posterior oropharyngeal erythema.   Eyes:      Extraocular Movements: Extraocular movements intact.      Pupils: Pupils are equal,  round, and reactive to light.   Cardiovascular:      Rate and Rhythm: Normal rate and regular rhythm.      Pulses: Normal pulses.      Heart sounds: Normal heart sounds.   Pulmonary:      Effort: Pulmonary effort is normal. No respiratory distress.      Breath sounds: No stridor. Wheezing present. No rales.   Abdominal:      General: Bowel sounds are normal. There is no distension.      Palpations: Abdomen is soft.      Tenderness: There is no abdominal tenderness. There is no guarding or rebound.   Musculoskeletal:         General: Swelling present. No tenderness.      Cervical back: Normal range of motion and neck supple.      Right lower leg: Edema present.      Left lower leg: Edema present.   Skin:     General: Skin is warm and dry.   Neurological:      General: No focal deficit present.      Mental Status: He is oriented to person, place, and time.      Cranial Nerves: No cranial nerve deficit.      Sensory: No sensory deficit.      Motor: No weakness.   Psychiatric:         Mood and Affect: Mood normal.         Behavior: Behavior normal.       Fluids    Intake/Output Summary (Last 24 hours) at 1/27/2023 1321  Last data filed at 1/27/2023 1010  Gross per 24 hour   Intake 1400 ml   Output 1300 ml   Net 100 ml         Laboratory  Recent Labs     01/25/23  0045 01/26/23  0207 01/27/23  0248   WBC 15.2* 13.1* 15.3*   RBC 4.39* 4.42* 4.64*   HEMOGLOBIN 9.5* 9.5* 9.9*   HEMATOCRIT 31.9* 32.2* 33.7*   MCV 72.7* 72.9* 72.6*   MCH 21.6* 21.5* 21.3*   MCHC 29.8* 29.5* 29.4*   RDW 47.7 47.9 49.0   PLATELETCT 525* 328 540*   MPV 10.0 10.1 9.8     Recent Labs     01/25/23  0045 01/26/23  0207 01/27/23  0243   SODIUM 137 136 134*   POTASSIUM 4.3 4.5 4.2   CHLORIDE 94* 98 94*   CO2 30 28 28   GLUCOSE 93 94 98   BUN 31* 33* 30*   CREATININE 1.67* 1.67* 1.59*   CALCIUM 9.1 9.1 8.9     Recent Labs     01/25/23  0045 01/26/23  0207 01/27/23  0243   INR 2.47* 2.13* 2.06*               Imaging  DX-CHEST-PORTABLE (1 VIEW)   Final  Result      1.  Chronic elevation of the left hemidiaphragm with left basilar atelectasis.   2.  Cardiomegaly.      EC-ECHOCARDIOGRAM COMPLETE W/O CONT   Final Result      CT-CTA CHEST PULMONARY ARTERY W/ RECONS   Final Result      1.  No CT evidence of pulmonary embolism.      2.  Significant cardiomegaly.      3.  Consolidation and volume loss in the left lower lobe and lingular segment left upper lobe could BE due to consolidative atelectasis and pneumonia.      4.  Minimal groundglass opacifications noted in the right lung which could be due to inflammation or infection.      5.  Additional linear opacifications in the right lung likely due to atelectasis or scarring.            DX-CHEST-PORTABLE (1 VIEW)   Final Result      1.  Left lung base atelectasis.      2.  Cardiomegaly.      3.  Chronic elevation of left hemidiaphragm.      DX-CHEST-PORTABLE (1 VIEW)    (Results Pending)          Assessment/Plan  * Acute on chronic systolic heart failure (HCC)- (present on admission)  Assessment & Plan  Last EF was 55%  Echo shows worse EF 45%  Also global hypokinesis   Continue lasix IV diuresis  On metoprolol  I have consulted cardiology appreciate rec     COPD (chronic obstructive pulmonary disease) ([facility])  Assessment & Plan  Wheezing heard on exam  40+ pack year history, stopped smoking recently  Suspect underlying COPD, outpatient PFT and pulm follow up  Start symbicort and duonebs here  Not in acute exacerbation    Elevated troponin  Assessment & Plan  Echo with EF of 45% and also shoving global hypokinesis   I have consulted cardiology appreciate rec.     Paroxysmal atrial fibrillation (HCC)  Assessment & Plan  Rate controlled on metoprolol and amiodarone  Continue warfarin    Pneumonia due to infectious organism- (present on admission)  Assessment & Plan  Found to have left sided infiltrate  Bronchial breath sounds and egophony on the left lung exam   Started on  antibiotics  Follow up sputum and blood  cultures  Respiratory care protocol   Placed on o2 therapy       Acute respiratory failure with hypoxia (HCC)  Assessment & Plan  On 3 L of O2 above baseline  Suspect due to pneumonia vs atelectasis  Continue antibiotics and IS    Acute renal failure (ARF) (HCC)  Assessment & Plan  Monitor BMP and assess response  Avoid IV contrast/nephrotoxins/NSAIDs  Dose adjust meds for decreased GFR             VTE prophylaxis: therapeutic anticoagulation with warfarin    I have performed a physical exam and reviewed and updated ROS and Plan today (1/27/2023). In review of yesterday's note (1/26/2023), there are no changes except as documented above.

## 2023-01-27 NOTE — PROGRESS NOTES
Received bedside report from ALLEN Whyte, pt care assumed. VS WDL, pt assessment complete. Pt A&Ox4, c/o 6/10 pain at this time. POC discussed with pt and verbalizes no questions. Pt denies any additional needs at this time. Bed locked and in lowest position, bed alarm off as client is up to self. Pt educated on fall risk and verbalized understanding, call light within reach, hourly rounding initiated.

## 2023-01-27 NOTE — PROGRESS NOTES
"Inpatient Anticoagulation Service Note for 1/27/2023      Reason for Anticoagulation: Atrial Fibrillation, Bioprosthetic Valve Replacement   [id number] VASc Score: 2  HAS-BLED Score: 1    Hemoglobin Value: (!) 9.9  Hematocrit Value: (!) 33.7  Lab Platelet Value: (!) 540  Target INR: 2.0 to 3.0    INR from last 7 days       Date/Time INR Value    01/27/23 0243 2.06    01/26/23 0207 2.13    01/25/23 0045 2.47    01/24/23 0101 2.47    01/23/23 0119 2.29    01/22/23 0132 2.3    01/21/23 0210 2.92    01/20/23 1622 3.39          Dose from last 7 days       Date/Time Dose (mg)    01/27/23 0851 2.5    01/26/23 0953 2.5    01/25/23 0824 2.5    01/24/23 1139 2.5    01/23/23 1425 2.5    01/22/23 0929 2.5    01/21/23 1203 3    01/20/23 2233 0          Average Dose (mg): 2.5  Significant Interactions: Antiplatelet Medications, Amiodarone  Bridge Therapy: No   Reversal Agent Administered: Not Applicable  Comments: Patient continues on home warfarin for history of AF, bio valve replacement. INR therapeutic. CBC low but stable and no signs/symptoms of bleeding. DDI noted above, antibiotics completed yesterday. Cardiac diet ordered, adequate intake per chart review (% of meals consumed yesterday/this morning). Previous warfarin doses received. INR therapeutic, continue home warfarin 2.5mg po daily with repeat INR tomorrow.Note INR drifting downward over the past few days, tentative plan for a \"boost dose\" tomorrow if trend continues.    Plan:  warfarin 2.5mg po daily  Education Material Provided?: No (Home medication)    Pharmacist suggested discharge dosing: warfarin 2.5mg po daily with repeat INR within 2-3 days of discharge.      Rachana Thomas, PharmD, BCCCP    "

## 2023-01-27 NOTE — DISCHARGE PLANNING
Agency/Facility Name: [facility]   Spoke To: Lana   Outcome: DPA called to verify if Lana has gotten insurance auth. Per Lana [employer]. DPA informed Lana [facility] not [facility]. Lana will expedite insurance auth.

## 2023-01-27 NOTE — CARE PLAN
The patient is Stable - Low risk of patient condition declining or worsening    Shift Goals  Clinical Goals: Patient will remain safe throughout shift. Pain management. Patient will verbalize understanding of POC.  Patient Goals: Denied  Family Goals: HUGH    Progress made toward(s) clinical / shift goals:  Educated patient about 1-10 pain scale, regular pain assessments, and available pharmaciological and non-pharmacological pain relief. Provided PRN pain relief as ordered and utilized. Patient expressed understanding and had no further questions at this time. Patient educated to use call light for assistance. Fall precautions in place. Staff will assist with mobilization. Hourly rounding in place.     Patient is not progressing towards the following goals: Patient's pain remains at best 6/10 despite pain interventions.

## 2023-01-28 LAB
ANION GAP SERPL CALC-SCNC: 10 MMOL/L (ref 7–16)
BUN SERPL-MCNC: 29 MG/DL (ref 8–22)
CALCIUM SERPL-MCNC: 8.9 MG/DL (ref 8.5–10.5)
CHLORIDE SERPL-SCNC: 97 MMOL/L (ref 96–112)
CO2 SERPL-SCNC: 31 MMOL/L (ref 20–33)
CREAT SERPL-MCNC: 1.64 MG/DL (ref 0.5–1.4)
ERYTHROCYTE [DISTWIDTH] IN BLOOD BY AUTOMATED COUNT: 48.4 FL (ref 35.9–50)
GFR SERPLBLD CREATININE-BSD FMLA CKD-EPI: 47 ML/MIN/1.73 M 2
GLUCOSE SERPL-MCNC: 93 MG/DL (ref 65–99)
HCT VFR BLD AUTO: 32.6 % (ref 42–52)
HGB BLD-MCNC: 9.6 G/DL (ref 14–18)
INR PPP: 2.16 (ref 0.87–1.13)
MCH RBC QN AUTO: 21.4 PG (ref 27–33)
MCHC RBC AUTO-ENTMCNC: 29.4 G/DL (ref 33.7–35.3)
MCV RBC AUTO: 72.6 FL (ref 81.4–97.8)
MORPHOLOGY BLD-IMP: NORMAL
PLATELET # BLD AUTO: 564 K/UL (ref 164–446)
PMV BLD AUTO: 9.8 FL (ref 9–12.9)
POTASSIUM SERPL-SCNC: 4.2 MMOL/L (ref 3.6–5.5)
PROTHROMBIN TIME: 23.5 SEC (ref 12–14.6)
RBC # BLD AUTO: 4.49 M/UL (ref 4.7–6.1)
SODIUM SERPL-SCNC: 138 MMOL/L (ref 135–145)
WBC # BLD AUTO: 13.4 K/UL (ref 4.8–10.8)

## 2023-01-28 PROCEDURE — 700111 HCHG RX REV CODE 636 W/ 250 OVERRIDE (IP): Performed by: HOSPITALIST

## 2023-01-28 PROCEDURE — 700102 HCHG RX REV CODE 250 W/ 637 OVERRIDE(OP)

## 2023-01-28 PROCEDURE — 700102 HCHG RX REV CODE 250 W/ 637 OVERRIDE(OP): Performed by: HOSPITALIST

## 2023-01-28 PROCEDURE — 85610 PROTHROMBIN TIME: CPT

## 2023-01-28 PROCEDURE — 94760 N-INVAS EAR/PLS OXIMETRY 1: CPT

## 2023-01-28 PROCEDURE — A9270 NON-COVERED ITEM OR SERVICE: HCPCS | Performed by: INTERNAL MEDICINE

## 2023-01-28 PROCEDURE — 700102 HCHG RX REV CODE 250 W/ 637 OVERRIDE(OP): Performed by: INTERNAL MEDICINE

## 2023-01-28 PROCEDURE — 700111 HCHG RX REV CODE 636 W/ 250 OVERRIDE (IP): Performed by: INTERNAL MEDICINE

## 2023-01-28 PROCEDURE — 80048 BASIC METABOLIC PNL TOTAL CA: CPT

## 2023-01-28 PROCEDURE — 85027 COMPLETE CBC AUTOMATED: CPT

## 2023-01-28 PROCEDURE — 700102 HCHG RX REV CODE 250 W/ 637 OVERRIDE(OP): Performed by: STUDENT IN AN ORGANIZED HEALTH CARE EDUCATION/TRAINING PROGRAM

## 2023-01-28 PROCEDURE — 99232 SBSQ HOSP IP/OBS MODERATE 35: CPT | Performed by: INTERNAL MEDICINE

## 2023-01-28 PROCEDURE — 700105 HCHG RX REV CODE 258: Performed by: INTERNAL MEDICINE

## 2023-01-28 PROCEDURE — A9270 NON-COVERED ITEM OR SERVICE: HCPCS

## 2023-01-28 PROCEDURE — 770020 HCHG ROOM/CARE - TELE (206)

## 2023-01-28 PROCEDURE — 700101 HCHG RX REV CODE 250: Performed by: INTERNAL MEDICINE

## 2023-01-28 PROCEDURE — 99231 SBSQ HOSP IP/OBS SF/LOW 25: CPT | Mod: FS | Performed by: INTERNAL MEDICINE

## 2023-01-28 PROCEDURE — A9270 NON-COVERED ITEM OR SERVICE: HCPCS | Performed by: HOSPITALIST

## 2023-01-28 PROCEDURE — A9270 NON-COVERED ITEM OR SERVICE: HCPCS | Performed by: STUDENT IN AN ORGANIZED HEALTH CARE EDUCATION/TRAINING PROGRAM

## 2023-01-28 RX ORDER — CALCIUM CARBONATE 500 MG/1
500 TABLET, CHEWABLE ORAL ONCE
Status: COMPLETED | OUTPATIENT
Start: 2023-01-28 | End: 2023-01-28

## 2023-01-28 RX ADMIN — OXYCODONE HYDROCHLORIDE 10 MG: 10 TABLET ORAL at 03:46

## 2023-01-28 RX ADMIN — TRAZODONE HYDROCHLORIDE 50 MG: 50 TABLET ORAL at 23:12

## 2023-01-28 RX ADMIN — ASPIRIN 81 MG: 81 TABLET, COATED ORAL at 06:17

## 2023-01-28 RX ADMIN — AMPICILLIN AND SULBACTAM 3 G: 1; 2 INJECTION, POWDER, FOR SOLUTION INTRAMUSCULAR; INTRAVENOUS at 23:12

## 2023-01-28 RX ADMIN — OXYCODONE HYDROCHLORIDE 10 MG: 10 TABLET ORAL at 09:46

## 2023-01-28 RX ADMIN — FUROSEMIDE 80 MG: 10 INJECTION, SOLUTION INTRAMUSCULAR; INTRAVENOUS at 17:15

## 2023-01-28 RX ADMIN — AMLODIPINE BESYLATE 10 MG: 10 TABLET ORAL at 06:16

## 2023-01-28 RX ADMIN — AMPICILLIN AND SULBACTAM 3 G: 1; 2 INJECTION, POWDER, FOR SOLUTION INTRAMUSCULAR; INTRAVENOUS at 06:16

## 2023-01-28 RX ADMIN — WARFARIN SODIUM 2.5 MG: 2.5 TABLET ORAL at 17:14

## 2023-01-28 RX ADMIN — AMPICILLIN AND SULBACTAM 3 G: 1; 2 INJECTION, POWDER, FOR SOLUTION INTRAMUSCULAR; INTRAVENOUS at 12:05

## 2023-01-28 RX ADMIN — CALCIUM CARBONATE 500 MG: 500 TABLET, CHEWABLE ORAL at 20:16

## 2023-01-28 RX ADMIN — AMPICILLIN AND SULBACTAM 3 G: 1; 2 INJECTION, POWDER, FOR SOLUTION INTRAMUSCULAR; INTRAVENOUS at 17:26

## 2023-01-28 RX ADMIN — OXYCODONE HYDROCHLORIDE 10 MG: 10 TABLET ORAL at 17:14

## 2023-01-28 RX ADMIN — IPRATROPIUM BROMIDE AND ALBUTEROL SULFATE 3 ML: .5; 2.5 SOLUTION RESPIRATORY (INHALATION) at 01:54

## 2023-01-28 RX ADMIN — METOPROLOL TARTRATE 50 MG: 50 TABLET, FILM COATED ORAL at 06:16

## 2023-01-28 RX ADMIN — POTASSIUM CHLORIDE 40 MEQ: 20 TABLET, EXTENDED RELEASE ORAL at 06:16

## 2023-01-28 RX ADMIN — OMEPRAZOLE 20 MG: 20 CAPSULE, DELAYED RELEASE ORAL at 06:16

## 2023-01-28 RX ADMIN — FUROSEMIDE 80 MG: 10 INJECTION, SOLUTION INTRAMUSCULAR; INTRAVENOUS at 06:16

## 2023-01-28 RX ADMIN — POTASSIUM CHLORIDE 40 MEQ: 20 TABLET, EXTENDED RELEASE ORAL at 17:15

## 2023-01-28 RX ADMIN — SENNOSIDES AND DOCUSATE SODIUM 2 TABLET: 50; 8.6 TABLET ORAL at 06:17

## 2023-01-28 RX ADMIN — ONDANSETRON 4 MG: 4 TABLET, ORALLY DISINTEGRATING ORAL at 09:46

## 2023-01-28 RX ADMIN — METOPROLOL TARTRATE 50 MG: 50 TABLET, FILM COATED ORAL at 17:15

## 2023-01-28 RX ADMIN — FUROSEMIDE 80 MG: 10 INJECTION, SOLUTION INTRAMUSCULAR; INTRAVENOUS at 12:03

## 2023-01-28 RX ADMIN — BUDESONIDE AND FORMOTEROL FUMARATE DIHYDRATE 2 PUFF: 80; 4.5 AEROSOL RESPIRATORY (INHALATION) at 17:15

## 2023-01-28 RX ADMIN — OXYCODONE HYDROCHLORIDE 10 MG: 10 TABLET ORAL at 20:59

## 2023-01-28 RX ADMIN — AMIODARONE HYDROCHLORIDE 400 MG: 200 TABLET ORAL at 06:16

## 2023-01-28 RX ADMIN — OXYCODONE HYDROCHLORIDE 10 MG: 10 TABLET ORAL at 13:13

## 2023-01-28 ASSESSMENT — ENCOUNTER SYMPTOMS
SHORTNESS OF BREATH: 1
WHEEZING: 0
CHEST TIGHTNESS: 0
EYE PAIN: 0
FATIGUE: 0
NAUSEA: 0
COUGH: 0
EYES NEGATIVE: 1
INSOMNIA: 0
COLOR CHANGE: 0
PSYCHIATRIC NEGATIVE: 1
PALPITATIONS: 0
WEAKNESS: 1
COUGH: 1
LIGHT-HEADEDNESS: 0
ABDOMINAL PAIN: 0
SENSORY CHANGE: 0
FALLS: 0
CONSTIPATION: 0
BRUISES/BLEEDS EASILY: 0
ENDOCRINE NEGATIVE: 1
ARTHRALGIAS: 0
BACK PAIN: 0
CHILLS: 0
HEMOPTYSIS: 0
VOMITING: 0
SPUTUM PRODUCTION: 0
LOSS OF CONSCIOUSNESS: 0
FOCAL WEAKNESS: 0
MYALGIAS: 0
ABDOMINAL DISTENTION: 0
FEVER: 0
HEADACHES: 0
DIAPHORESIS: 0
BLURRED VISION: 0
DIARRHEA: 0
DIZZINESS: 0

## 2023-01-28 ASSESSMENT — FIBROSIS 4 INDEX: FIB4 SCORE: 0.59

## 2023-01-28 ASSESSMENT — LIFESTYLE VARIABLES: SUBSTANCE_ABUSE: 0

## 2023-01-28 NOTE — PROGRESS NOTES
[facility] Medicine Daily Progress Note    Date of Service  1/28/2023    Chief Complaint  Noe Hickey is a 62 y.o. male admitted 1/20/2023 with leg swelling, cough.    Hospital Course  Noe Hickey is a 62 y.o. male who presented 1/20/2023 with past medical history of atrial fibrillation/flutter, history of aortic stenosis status post valve replacement, history of tobacco abuse, hyperlipidemia who presents to the hospital for shortness of breath, cough and lower extremity swelling.  Patient was recently discharged in the hospital on 1/5 for aortic aneurysm repair and aortic valve replacement.  Postoperatively he was given IV fluids due to blood loss.  During his hospital stay he had a complication of torsades and ventricular arrhythmia.  He was shocked once and then returned back to normal sinus rhythm.  His echo found EF to be 55% at that time.  Patient states that since his hospital stay has been having worsening lower extremity edema and cough.  He denies any hemoptysis or purulent sputum.  He was discharged to [facility] nursing [facility] and states that he has been eating a high salt diet.  Patient was discharged with Lasix and states that he has been compliant with it.    Interval Problem Update  1/24: Patient seen and examined, admitted for chest pain and SOB, chest pain intermittently   Wbc 15 today cont on cefdinir repeat CXR.  His echo showed global hypokinesis and EF of  45% from 55 it was before   I have consulted cardiology appreciate rec.   1/25: Patient seen and examined, agitated and with some hallucination as per staff has been going for a while concern for some underline psychiatric  issues   Regarding his HF, cardiology has been following on IV lasix  for diuresis   1/26: patient seen and examined, afebrile, seen by psychiatry appreciate rec.  Regarding his HF, cardiology following on IV lasix appreciate rec.   1/27: Patient resting in bed, afebrile, cont on IV lasix for  his HF as per cardiology rec.  Wbc 15 today will check a CXR and UA afebrile   Will start empiric IV abx   Will check a procalcitonin   /28: Patient seen and examined, afebrile. Cardiology following cont on IV diuresis.   No overnight events   I have discussed this patient's plan of care and discharge plan at IDT rounds today with Case Management, Nursing, Nursing leadership, and other members of the IDT team.    Consultants/Specialty  none    Code Status  Full Code    Disposition  Patient is not medically cleared for discharge.   Anticipate discharge to to skilled nursing facility.  I have placed the appropriate orders for post-discharge needs.    Review of Systems  Review of Systems   Constitutional:  Negative for chills and fever.   Eyes:  Negative for blurred vision and pain.   Respiratory:  Positive for cough and shortness of breath. Negative for hemoptysis, sputum production and wheezing.    Cardiovascular:  Positive for leg swelling. Negative for chest pain and palpitations.   Gastrointestinal:  Negative for abdominal pain, nausea and vomiting.   Genitourinary:  Negative for dysuria and urgency.   Musculoskeletal:  Negative for back pain and falls.   Skin:  Negative for itching and rash.   Neurological:  Positive for weakness. Negative for dizziness, sensory change, focal weakness, loss of consciousness and headaches.   Psychiatric/Behavioral:  Negative for substance abuse. The patient does not have insomnia.       Physical Exam  Temp:  [36.9 °C (98.4 °F)-37.2 °C (99 °F)] 36.9 °C (98.4 °F)  Pulse:  [54-68] 54  Resp:  [16-19] 18  BP: (111-143)/(67-82) 125/81  SpO2:  [89 %-98 %] 90 %    Physical Exam  Constitutional:       General: He is not in acute distress.     Appearance: He is not ill-appearing.   HENT:      Head: Normocephalic and atraumatic.      Right Ear: External ear normal.      Left Ear: External ear normal.      Mouth/Throat:      Pharynx: No oropharyngeal exudate or posterior oropharyngeal  erythema.   Eyes:      Extraocular Movements: Extraocular movements intact.      Pupils: Pupils are equal, round, and reactive to light.   Cardiovascular:      Rate and Rhythm: Normal rate and regular rhythm.      Pulses: Normal pulses.      Heart sounds: Normal heart sounds.   Pulmonary:      Effort: Pulmonary effort is normal. No respiratory distress.      Breath sounds: No stridor. Wheezing present. No rales.   Abdominal:      General: Bowel sounds are normal. There is no distension.      Palpations: Abdomen is soft.      Tenderness: There is no abdominal tenderness. There is no guarding or rebound.   Musculoskeletal:         General: Swelling present. No tenderness.      Cervical back: Normal range of motion and neck supple.      Right lower leg: Edema present.      Left lower leg: Edema present.   Skin:     General: Skin is warm and dry.   Neurological:      General: No focal deficit present.      Mental Status: He is oriented to person, place, and time.      Cranial Nerves: No cranial nerve deficit.      Sensory: No sensory deficit.      Motor: No weakness.   Psychiatric:         Mood and Affect: Mood normal.         Behavior: Behavior normal.       Fluids    Intake/Output Summary (Last 24 hours) at 1/28/2023 1159  Last data filed at 1/28/2023 0626  Gross per 24 hour   Intake 550 ml   Output 1700 ml   Net -1150 ml           Laboratory  Recent Labs     01/26/23 0207 01/27/23 0248 01/28/23 0208   WBC 13.1* 15.3* 13.4*   RBC 4.42* 4.64* 4.49*   HEMOGLOBIN 9.5* 9.9* 9.6*   HEMATOCRIT 32.2* 33.7* 32.6*   MCV 72.9* 72.6* 72.6*   MCH 21.5* 21.3* 21.4*   MCHC 29.5* 29.4* 29.4*   RDW 47.9 49.0 48.4   PLATELETCT 328 540* 564*   MPV 10.1 9.8 9.8       Recent Labs     01/26/23 0207 01/27/23 0243 01/28/23 0208   SODIUM 136 134* 138   POTASSIUM 4.5 4.2 4.2   CHLORIDE 98 94* 97   CO2 28 28 31   GLUCOSE 94 98 93   BUN 33* 30* 29*   CREATININE 1.67* 1.59* 1.64*   CALCIUM 9.1 8.9 8.9       Recent Labs     01/26/23 0207  01/27/23  0243 01/28/23  0208   INR 2.13* 2.06* 2.16*                 Imaging  DX-CHEST-PORTABLE (1 VIEW)   Final Result      1.  Chronic elevation of the left hemidiaphragm with left basilar atelectasis.   2.  Cardiomegaly.         DX-CHEST-PORTABLE (1 VIEW)   Final Result      1.  Chronic elevation of the left hemidiaphragm with left basilar atelectasis.   2.  Cardiomegaly.      EC-ECHOCARDIOGRAM COMPLETE W/O CONT   Final Result      CT-CTA CHEST PULMONARY ARTERY W/ RECONS   Final Result      1.  No CT evidence of pulmonary embolism.      2.  Significant cardiomegaly.      3.  Consolidation and volume loss in the left lower lobe and lingular segment left upper lobe could BE due to consolidative atelectasis and pneumonia.      4.  Minimal groundglass opacifications noted in the right lung which could be due to inflammation or infection.      5.  Additional linear opacifications in the right lung likely due to atelectasis or scarring.            DX-CHEST-PORTABLE (1 VIEW)   Final Result      1.  Left lung base atelectasis.      2.  Cardiomegaly.      3.  Chronic elevation of left hemidiaphragm.             Assessment/Plan  * Acute on chronic systolic heart failure (HCC)- (present on admission)  Assessment & Plan  Last EF was 55%  Echo shows worse EF 45%  Also global hypokinesis   Continue lasix IV diuresis  On metoprolol  I have consulted cardiology appreciate rec     COPD (chronic obstructive pulmonary disease) ([facility])  Assessment & Plan  Wheezing heard on exam  40+ pack year history, stopped smoking recently  Suspect underlying COPD, outpatient PFT and pulm follow up  Start symbicort and duonebs here  Not in acute exacerbation    Elevated troponin  Assessment & Plan  Echo with EF of 45% and also shoving global hypokinesis   I have consulted cardiology appreciate rec.     Paroxysmal atrial fibrillation ([facility])  Assessment & Plan  Rate controlled on metoprolol and amiodarone  Continue warfarin    Pneumonia due to  infectious organism- (present on admission)  Assessment & Plan  Found to have left sided infiltrate  Bronchial breath sounds and egophony on the left lung exam   Started on  antibiotics  Follow up sputum and blood cultures  Respiratory care protocol   Placed on o2 therapy       Acute respiratory failure with hypoxia (HCC)  Assessment & Plan  On 3 L of O2 above baseline  Suspect due to pneumonia vs atelectasis  Continue antibiotics and IS    Acute renal failure (ARF) (HCC)  Assessment & Plan  Monitor BMP and assess response  Avoid IV contrast/nephrotoxins/NSAIDs  Dose adjust meds for decreased GFR             VTE prophylaxis: therapeutic anticoagulation with warfarin    I have performed a physical exam and reviewed and updated ROS and Plan today (1/28/2023). In review of yesterday's note (1/27/2023), there are no changes except as documented above.

## 2023-01-28 NOTE — PROGRESS NOTES
Inpatient Anticoagulation Service Note    Date: 1/28/2023    Reason for Anticoagulation: Atrial Fibrillation, Bioprosthetic Valve Replacement (27mm Inspiris tissue valve 1/5/2023)  Target INR: 2.0 to 3.0    [id number] VASc Score: 2  HAS-BLED Score: 1     Hemoglobin Value: (!) 9.6  Hematocrit Value: (!) 32.6  Lab Platelet Value: (!) 564    INR from last 7 days       Date/Time INR Value    01/28/23 0208 2.16    01/27/23 0243 2.06    01/26/23 0207 2.13    01/25/23 0045 2.47    01/24/23 0101 2.47    01/23/23 0119 2.29    01/22/23 0132 2.3          Dose from last 7 days       Date/Time Dose (mg)    01/28/23  2.5    01/27/23  2.5    01/26/23  2.5    01/25/23  2.5    01/24/23  2.5    01/23/23  2.5    01/22/23  2.5    01/21/23  3            HPI: 61 yo male with CHF & COPD admitted with acute respiratory failure is chronically anticoagulated with warfarin therapy and is managed outpatient by the [facility].   Per records, patient is prescribed Warfarin 2.5 mg po daily. INR slightly SUPRAtherapeutic on admission.     Assessment: INR remains within therapeutic range. Hematology within acceptable parameters.   Potential drug-drug interactions identified with acute inpatient medications: Antibiotics - Unasyn until 2/1/23 & Omeprazole   Potential drug-drug interactions identified with chronic home medications: Aspirin & Amiodarone which should be established interactions with warfarin therapy.   Inpatient Diet: Cardiac      Plan: Continue warfarin 2.5 mg daily. Change INR draws to every Mon, Wed, Fri.     Education Material Provided?: No (established warfarin patient)     Pharmacist suggested discharge dosing: Warfarin 2.5 mg po daily.      Ping K. Amos, PharmD

## 2023-01-28 NOTE — PROGRESS NOTES
Cardiology Follow Up Progress Note    Date of Service  1/28/2023    Attending Physician  Mehul Mahmood M.D.    Chief Complaint   Chest pain     HPI  Noe Hickey is a 62 y.o. male with a history of atrial fibrillation/flutter on warfarin, aortic stenosis status post valve replacement, tobacco use, hyperlipidemia, admitted 1/20/2023 with shortness of breath, lower extremity edema, and JENNY.     Interim Events  No cardiac events overnight. NSR on the monitor   No event overnight   Noted 2-3+ pitting edema pretibial. improving     Review of Systems  Review of Systems   Constitutional:  Negative for chills, diaphoresis, fatigue and fever.   HENT: Negative.     Eyes: Negative.    Respiratory:  Positive for shortness of breath. Negative for cough and chest tightness.    Cardiovascular:  Positive for leg swelling. Negative for chest pain and palpitations.   Gastrointestinal:  Negative for abdominal distention, abdominal pain, constipation, diarrhea, nausea and vomiting.   Endocrine: Negative.    Genitourinary:  Negative for decreased urine volume, difficulty urinating, dysuria, frequency and urgency.   Musculoskeletal:  Negative for arthralgias and myalgias.   Skin:  Negative for color change.   Neurological:  Negative for dizziness, syncope and light-headedness.   Hematological:  Does not bruise/bleed easily.   Psychiatric/Behavioral: Negative.       Vital signs in last 24 hours  Temp:  [37.1 °C (98.8 °F)-37.2 °C (99 °F)] 37.1 °C (98.8 °F)  Pulse:  [61-68] 68  Resp:  [16-19] 18  BP: (111-143)/(67-91) 125/81  SpO2:  [89 %-98 %] 92 %    Physical Exam  Physical Exam  Vitals and nursing note reviewed.   Constitutional:       General: He is not in acute distress.     Appearance: Normal appearance. He is not toxic-appearing.   HENT:      Head: Normocephalic and atraumatic.      Right Ear: External ear normal.      Left Ear: External ear normal.      Nose: Nose normal.      Mouth/Throat:      Mouth: Mucous membranes are  moist.      Pharynx: Oropharynx is clear.   Eyes:      General: No scleral icterus.     Extraocular Movements: Extraocular movements intact.      Conjunctiva/sclera: Conjunctivae normal.      Pupils: Pupils are equal, round, and reactive to light.   Neck:      Vascular: No JVD.   Cardiovascular:      Rate and Rhythm: Normal rate and regular rhythm.      Pulses: Normal pulses.      Heart sounds: Normal heart sounds. No murmur heard.    No friction rub. No gallop.   Pulmonary:      Effort: Pulmonary effort is normal.      Breath sounds: Rales present.   Abdominal:      General: Abdomen is flat. Bowel sounds are normal. There is no distension.      Palpations: Abdomen is soft.      Tenderness: There is no abdominal tenderness.   Musculoskeletal:         General: Normal range of motion.      Cervical back: Normal range of motion and neck supple.      Right lower le+ Pitting Edema present.      Left lower le+ Pitting Edema present.   Skin:     General: Skin is warm and dry.      Capillary Refill: Capillary refill takes less than 2 seconds.      Coloration: Skin is not jaundiced.   Neurological:      General: No focal deficit present.      Mental Status: He is alert and oriented to person, place, and time. Mental status is at baseline.   Psychiatric:         Mood and Affect: Mood normal.         Behavior: Behavior normal.         Judgment: Judgment normal.       Lab Review  Lab Results   Component Value Date/Time    WBC 13.4 (H) 2023 02:08 AM    RBC 4.49 (L) 2023 02:08 AM    HEMOGLOBIN 9.6 (L) 2023 02:08 AM    HEMATOCRIT 32.6 (L) 2023 02:08 AM    MCV 72.6 (L) 2023 02:08 AM    MCH 21.4 (L) 2023 02:08 AM    MCHC 29.4 (L) 2023 02:08 AM    MPV 9.8 2023 02:08 AM      Lab Results   Component Value Date/Time    SODIUM 138 2023 02:08 AM    POTASSIUM 4.2 2023 02:08 AM    CHLORIDE 97 2023 02:08 AM    CO2 31 2023 02:08 AM    GLUCOSE 93 2023 02:08  AM    BUN 29 (H) 01/28/2023 02:08 AM    CREATININE 1.64 (H) 01/28/2023 02:08 AM    BUNCREATRAT 16.4 12/07/2022 04:44 AM      Lab Results   Component Value Date/Time    ASTSGOT 35 01/21/2023 02:10 AM    ALTSGPT 44 01/21/2023 02:10 AM     Lab Results   Component Value Date/Time    TROPONINT 56 (H) 01/25/2023 12:36 PM       No results for input(s): NTPROBNP in the last 72 hours.    Cardiac Imaging and Procedures Review  EKG:  My personal interpretation of the EKG dated 1/25/2022 is Sinus bradycardia     Echocardiogram:  1/21/2023  CONCLUSIONS  Compared to the prior study on 1-5-23 ju, interval aortic valve   replacement   The left ventricular ejection fraction is visually estimated to be 45%.  Global hypokinesis with regional variation.  Known bioprosthetic aortic valve that is functioning normally with   normal transvalvular gradients.    Imaging  Chest X-Ray:  1/24/2023  FINDINGS:  Cardiomediastinal silhouette is stable. There is a valve prosthesis.  Chronic elevation of left hemidiaphragm with left basilar subsegmental atelectasis.  Right lung is clear. No pleural effusion or pneumothorax.  Right humeral head prosthesis.  IMPRESSION:  1.  Chronic elevation of the left hemidiaphragm with left basilar atelectasis.  2.  Cardiomegaly.    Assessment/Plan  #HFrEF AHA Stage C NYHA Class IV  #JENNY  #Paroxysmal Afib  #Acute respiratory failure with hypoxia  #Pneumonia  #COPD  #Elevated troponin  # s/p AVR replacement  27mm Inspiris tissue valve with Dr. Goyal 1/5/2023    Recommendations:  -He continues to exhibit signs and symptoms of fluid overload  -ACE-I/ARB/ARNI: Consider once euvolemic  -Evidence Based Beta-blocker: Continue lopressor 50mg BID  - Urine output -1.7 over the past 24 hours. Goal >2L. Weight down 7lbs since admission  -Diuretic: continue furosemide to 80mg TID along with potassium 40mg BID  - LVEF 40% on recent echo  -ICD not indicated given LVEF of >35%  - Rate is well controlled  - Continue  amlodipine    - continue amiodarone 400mg qd   - Continue warfarin per pharmacy  -Labs: Daily BMP  -Continue Daily weights; Strict I+O’s:         Thank you for allowing me to participate in the care of this patient.  Cardiology will continue to follow.    Please contact me with any questions.    I personally spent a total of 14 minutes which includes face-to-face time and non-face-to-face time spent on preparing to see the patient, reviewing hospital notes and tests, obtaining history from the patient, performing a medically appropriate exam, counseling and educating the patient, ordering medications/tests/procedures/referrals as clinically indicated, and documenting information in the electronic medical record.     LANI Rodriguez   [facility]

## 2023-01-28 NOTE — CARE PLAN
The patient is Stable - Low risk of patient condition declining or worsening    Shift Goals  Clinical Goals: Patient will remain safe throughout shift. Pain management. Patient will verbalize understanding of POC.  Patient Goals: Denied  Family Goals: HUGH    Progress made toward(s) clinical / shift goals:    Problem: Knowledge Deficit - Standard  Goal: Patient and family/care givers will demonstrate understanding of plan of care, disease process/condition, diagnostic tests and medications  Outcome: Progressing     Problem: Fall Risk  Goal: Patient will remain free from falls  Outcome: Progressing       Patient is not progressing towards the following goals:    Problem: Pain - Standard  Goal: Alleviation of pain or a reduction in pain to the patient’s comfort goal  Outcome: Progressing     Problem: Psychosocial  Goal: Patient's level of anxiety will decrease  Outcome: Progressing

## 2023-01-29 ENCOUNTER — APPOINTMENT (OUTPATIENT)
Dept: RADIOLOGY | Facility: MEDICAL CENTER | Age: 63
DRG: 208 | End: 2023-01-29
Attending: INTERNAL MEDICINE
Payer: COMMERCIAL

## 2023-01-29 PROBLEM — Z95.2 S/P AVR: Status: ACTIVE | Noted: 2023-01-29

## 2023-01-29 PROBLEM — G93.40 ACUTE ENCEPHALOPATHY: Status: ACTIVE | Noted: 2023-01-29

## 2023-01-29 PROBLEM — L27.0 DRUG RASH: Status: ACTIVE | Noted: 2023-01-29

## 2023-01-29 LAB
AMPHET UR QL SCN: NEGATIVE
ANION GAP SERPL CALC-SCNC: 11 MMOL/L (ref 7–16)
BARBITURATES UR QL SCN: NEGATIVE
BASE EXCESS BLDA CALC-SCNC: 8 MMOL/L (ref -4–3)
BENZODIAZ UR QL SCN: NEGATIVE
BODY TEMPERATURE: ABNORMAL DEGREES
BREATHS SETTING VENT: 18
BUN SERPL-MCNC: 33 MG/DL (ref 8–22)
BZE UR QL SCN: NEGATIVE
CALCIUM SERPL-MCNC: 9.2 MG/DL (ref 8.5–10.5)
CANNABINOIDS UR QL SCN: NEGATIVE
CHLORIDE SERPL-SCNC: 96 MMOL/L (ref 96–112)
CO2 BLDA-SCNC: 35 MMOL/L (ref 20–33)
CO2 SERPL-SCNC: 29 MMOL/L (ref 20–33)
CREAT SERPL-MCNC: 1.58 MG/DL (ref 0.5–1.4)
DELSYS IDSYS: ABNORMAL
EKG IMPRESSION: NORMAL
END TIDAL CARBON DIOXIDE IECO2: 40 MMHG
ERYTHROCYTE [DISTWIDTH] IN BLOOD BY AUTOMATED COUNT: 48.8 FL (ref 35.9–50)
GFR SERPLBLD CREATININE-BSD FMLA CKD-EPI: 49 ML/MIN/1.73 M 2
GLUCOSE SERPL-MCNC: 93 MG/DL (ref 65–99)
HCO3 BLDA-SCNC: 33.1 MMOL/L (ref 17–25)
HCT VFR BLD AUTO: 31.8 % (ref 42–52)
HGB BLD-MCNC: 9.3 G/DL (ref 14–18)
HOROWITZ INDEX BLDA+IHG-RTO: 132 MM[HG]
MCH RBC QN AUTO: 21.3 PG (ref 27–33)
MCHC RBC AUTO-ENTMCNC: 29.2 G/DL (ref 33.7–35.3)
MCV RBC AUTO: 72.9 FL (ref 81.4–97.8)
METHADONE UR QL SCN: NEGATIVE
MODE IMODE: ABNORMAL
O2/TOTAL GAS SETTING VFR VENT: 60 %
OPIATES UR QL SCN: NEGATIVE
OXYCODONE UR QL SCN: POSITIVE
PCO2 BLDA: 46.5 MMHG (ref 26–37)
PCO2 TEMP ADJ BLDA: 47.3 MMHG (ref 26–37)
PCP UR QL SCN: NEGATIVE
PEEP END EXPIRATORY PRESSURE IPEEP: 8 CMH20
PH BLDA: 7.46 [PH] (ref 7.4–7.5)
PH TEMP ADJ BLDA: 7.46 [PH] (ref 7.4–7.5)
PLATELET # BLD AUTO: 537 K/UL (ref 164–446)
PMV BLD AUTO: 10.2 FL (ref 9–12.9)
PO2 BLDA: 79 MMHG (ref 64–87)
PO2 TEMP ADJ BLDA: 82 MMHG (ref 64–87)
POTASSIUM SERPL-SCNC: 5.1 MMOL/L (ref 3.6–5.5)
PROPOXYPH UR QL SCN: NEGATIVE
RBC # BLD AUTO: 4.36 M/UL (ref 4.7–6.1)
SAO2 % BLDA: 96 % (ref 93–99)
SODIUM SERPL-SCNC: 136 MMOL/L (ref 135–145)
SPECIMEN DRAWN FROM PATIENT: ABNORMAL
TIDAL VOLUME IVT: 460 ML
WBC # BLD AUTO: 13.7 K/UL (ref 4.8–10.8)

## 2023-01-29 PROCEDURE — 700105 HCHG RX REV CODE 258: Performed by: INTERNAL MEDICINE

## 2023-01-29 PROCEDURE — A9270 NON-COVERED ITEM OR SERVICE: HCPCS | Performed by: HOSPITALIST

## 2023-01-29 PROCEDURE — 700111 HCHG RX REV CODE 636 W/ 250 OVERRIDE (IP): Performed by: INTERNAL MEDICINE

## 2023-01-29 PROCEDURE — 36556 INSERT NON-TUNNEL CV CATH: CPT

## 2023-01-29 PROCEDURE — 93005 ELECTROCARDIOGRAM TRACING: CPT | Performed by: INTERNAL MEDICINE

## 2023-01-29 PROCEDURE — 770022 HCHG ROOM/CARE - ICU (200)

## 2023-01-29 PROCEDURE — 700101 HCHG RX REV CODE 250: Performed by: STUDENT IN AN ORGANIZED HEALTH CARE EDUCATION/TRAINING PROGRAM

## 2023-01-29 PROCEDURE — 02HV33Z INSERTION OF INFUSION DEVICE INTO SUPERIOR VENA CAVA, PERCUTANEOUS APPROACH: ICD-10-PCS | Performed by: INTERNAL MEDICINE

## 2023-01-29 PROCEDURE — 700117 HCHG RX CONTRAST REV CODE 255: Performed by: INTERNAL MEDICINE

## 2023-01-29 PROCEDURE — 93010 ELECTROCARDIOGRAM REPORT: CPT | Performed by: INTERNAL MEDICINE

## 2023-01-29 PROCEDURE — A9270 NON-COVERED ITEM OR SERVICE: HCPCS | Performed by: INTERNAL MEDICINE

## 2023-01-29 PROCEDURE — 94002 VENT MGMT INPAT INIT DAY: CPT

## 2023-01-29 PROCEDURE — 85027 COMPLETE CBC AUTOMATED: CPT

## 2023-01-29 PROCEDURE — 99233 SBSQ HOSP IP/OBS HIGH 50: CPT | Performed by: INTERNAL MEDICINE

## 2023-01-29 PROCEDURE — 700111 HCHG RX REV CODE 636 W/ 250 OVERRIDE (IP): Performed by: HOSPITALIST

## 2023-01-29 PROCEDURE — 94799 UNLISTED PULMONARY SVC/PX: CPT

## 2023-01-29 PROCEDURE — 700102 HCHG RX REV CODE 250 W/ 637 OVERRIDE(OP): Performed by: HOSPITALIST

## 2023-01-29 PROCEDURE — 36556 INSERT NON-TUNNEL CV CATH: CPT | Mod: RT,GC | Performed by: INTERNAL MEDICINE

## 2023-01-29 PROCEDURE — 36600 WITHDRAWAL OF ARTERIAL BLOOD: CPT

## 2023-01-29 PROCEDURE — 70496 CT ANGIOGRAPHY HEAD: CPT

## 2023-01-29 PROCEDURE — 80307 DRUG TEST PRSMV CHEM ANLYZR: CPT

## 2023-01-29 PROCEDURE — 700102 HCHG RX REV CODE 250 W/ 637 OVERRIDE(OP): Performed by: INTERNAL MEDICINE

## 2023-01-29 PROCEDURE — 82803 BLOOD GASES ANY COMBINATION: CPT

## 2023-01-29 PROCEDURE — 99291 CRITICAL CARE FIRST HOUR: CPT | Mod: 25 | Performed by: INTERNAL MEDICINE

## 2023-01-29 PROCEDURE — C1751 CATH, INF, PER/CENT/MIDLINE: HCPCS

## 2023-01-29 PROCEDURE — 70498 CT ANGIOGRAPHY NECK: CPT

## 2023-01-29 PROCEDURE — 5A1945Z RESPIRATORY VENTILATION, 24-96 CONSECUTIVE HOURS: ICD-10-PCS | Performed by: INTERNAL MEDICINE

## 2023-01-29 PROCEDURE — 99292 CRITICAL CARE ADDL 30 MIN: CPT | Mod: 25 | Performed by: INTERNAL MEDICINE

## 2023-01-29 PROCEDURE — 31500 INSERT EMERGENCY AIRWAY: CPT | Mod: GC | Performed by: INTERNAL MEDICINE

## 2023-01-29 PROCEDURE — 700102 HCHG RX REV CODE 250 W/ 637 OVERRIDE(OP): Performed by: STUDENT IN AN ORGANIZED HEALTH CARE EDUCATION/TRAINING PROGRAM

## 2023-01-29 PROCEDURE — 0BH17EZ INSERTION OF ENDOTRACHEAL AIRWAY INTO TRACHEA, VIA NATURAL OR ARTIFICIAL OPENING: ICD-10-PCS | Performed by: INTERNAL MEDICINE

## 2023-01-29 PROCEDURE — 71275 CT ANGIOGRAPHY CHEST: CPT

## 2023-01-29 PROCEDURE — 99232 SBSQ HOSP IP/OBS MODERATE 35: CPT | Mod: FS | Performed by: INTERNAL MEDICINE

## 2023-01-29 PROCEDURE — 31500 INSERT EMERGENCY AIRWAY: CPT

## 2023-01-29 PROCEDURE — 99152 MOD SED SAME PHYS/QHP 5/>YRS: CPT

## 2023-01-29 PROCEDURE — 700101 HCHG RX REV CODE 250: Performed by: INTERNAL MEDICINE

## 2023-01-29 PROCEDURE — 94003 VENT MGMT INPAT SUBQ DAY: CPT

## 2023-01-29 PROCEDURE — 700111 HCHG RX REV CODE 636 W/ 250 OVERRIDE (IP)

## 2023-01-29 PROCEDURE — 71045 X-RAY EXAM CHEST 1 VIEW: CPT

## 2023-01-29 PROCEDURE — 80048 BASIC METABOLIC PNL TOTAL CA: CPT

## 2023-01-29 PROCEDURE — A9270 NON-COVERED ITEM OR SERVICE: HCPCS | Performed by: STUDENT IN AN ORGANIZED HEALTH CARE EDUCATION/TRAINING PROGRAM

## 2023-01-29 RX ORDER — TRAZODONE HYDROCHLORIDE 50 MG/1
50 TABLET ORAL
Status: DISCONTINUED | OUTPATIENT
Start: 2023-01-29 | End: 2023-01-31

## 2023-01-29 RX ORDER — HALOPERIDOL 5 MG/ML
1 INJECTION INTRAMUSCULAR EVERY 4 HOURS PRN
Status: DISCONTINUED | OUTPATIENT
Start: 2023-01-29 | End: 2023-01-29

## 2023-01-29 RX ORDER — AMLODIPINE BESYLATE 10 MG/1
10 TABLET ORAL DAILY
Status: DISCONTINUED | OUTPATIENT
Start: 2023-01-30 | End: 2023-01-31

## 2023-01-29 RX ORDER — ROCURONIUM BROMIDE 10 MG/ML
100 INJECTION, SOLUTION INTRAVENOUS ONCE
Status: COMPLETED | OUTPATIENT
Start: 2023-01-29 | End: 2023-01-29

## 2023-01-29 RX ORDER — WARFARIN SODIUM 2.5 MG/1
2.5 TABLET ORAL DAILY
Status: DISCONTINUED | OUTPATIENT
Start: 2023-01-30 | End: 2023-01-30

## 2023-01-29 RX ORDER — DEXMEDETOMIDINE HYDROCHLORIDE 4 UG/ML
.1-1.5 INJECTION, SOLUTION INTRAVENOUS CONTINUOUS
Status: DISCONTINUED | OUTPATIENT
Start: 2023-01-29 | End: 2023-01-29

## 2023-01-29 RX ORDER — IPRATROPIUM BROMIDE AND ALBUTEROL SULFATE 2.5; .5 MG/3ML; MG/3ML
3 SOLUTION RESPIRATORY (INHALATION)
Status: DISCONTINUED | OUTPATIENT
Start: 2023-01-29 | End: 2023-02-03 | Stop reason: HOSPADM

## 2023-01-29 RX ORDER — ETOMIDATE 2 MG/ML
20 INJECTION INTRAVENOUS ONCE
Status: COMPLETED | OUTPATIENT
Start: 2023-01-29 | End: 2023-01-29

## 2023-01-29 RX ORDER — PROMETHAZINE HYDROCHLORIDE 25 MG/1
12.5-25 TABLET ORAL EVERY 4 HOURS PRN
Status: DISCONTINUED | OUTPATIENT
Start: 2023-01-29 | End: 2023-02-03 | Stop reason: HOSPADM

## 2023-01-29 RX ORDER — ASPIRIN 81 MG/1
81 TABLET, CHEWABLE ORAL DAILY
Status: DISCONTINUED | OUTPATIENT
Start: 2023-01-30 | End: 2023-02-03 | Stop reason: HOSPADM

## 2023-01-29 RX ORDER — ACETAMINOPHEN 325 MG/1
650 TABLET ORAL EVERY 6 HOURS PRN
Status: DISCONTINUED | OUTPATIENT
Start: 2023-01-29 | End: 2023-01-31

## 2023-01-29 RX ORDER — METOPROLOL TARTRATE 50 MG/1
50 TABLET, FILM COATED ORAL 2 TIMES DAILY
Status: DISCONTINUED | OUTPATIENT
Start: 2023-01-30 | End: 2023-01-31

## 2023-01-29 RX ORDER — ROCURONIUM BROMIDE 10 MG/ML
50 INJECTION, SOLUTION INTRAVENOUS ONCE
Status: COMPLETED | OUTPATIENT
Start: 2023-01-29 | End: 2023-01-29

## 2023-01-29 RX ORDER — FAMOTIDINE 20 MG/1
20 TABLET, FILM COATED ORAL EVERY 12 HOURS
Status: DISCONTINUED | OUTPATIENT
Start: 2023-01-29 | End: 2023-01-29

## 2023-01-29 RX ORDER — HALOPERIDOL 5 MG/ML
1-3 INJECTION INTRAMUSCULAR
Status: DISCONTINUED | OUTPATIENT
Start: 2023-01-29 | End: 2023-01-29

## 2023-01-29 RX ORDER — POLYETHYLENE GLYCOL 3350 17 G/17G
1 POWDER, FOR SOLUTION ORAL
Status: DISCONTINUED | OUTPATIENT
Start: 2023-01-29 | End: 2023-01-31

## 2023-01-29 RX ORDER — HALOPERIDOL 5 MG/ML
2.5 INJECTION INTRAMUSCULAR
Status: COMPLETED | OUTPATIENT
Start: 2023-01-29 | End: 2023-01-29

## 2023-01-29 RX ORDER — HALOPERIDOL 5 MG/ML
2.5 INJECTION INTRAMUSCULAR EVERY 4 HOURS PRN
Status: DISCONTINUED | OUTPATIENT
Start: 2023-01-29 | End: 2023-01-29

## 2023-01-29 RX ORDER — LORAZEPAM 2 MG/ML
1 INJECTION INTRAMUSCULAR EVERY 4 HOURS PRN
Status: DISCONTINUED | OUTPATIENT
Start: 2023-01-29 | End: 2023-01-29

## 2023-01-29 RX ORDER — AMOXICILLIN 250 MG
2 CAPSULE ORAL 2 TIMES DAILY
Status: DISCONTINUED | OUTPATIENT
Start: 2023-01-30 | End: 2023-01-31

## 2023-01-29 RX ORDER — ONDANSETRON 4 MG/1
4 TABLET, ORALLY DISINTEGRATING ORAL EVERY 4 HOURS PRN
Status: DISCONTINUED | OUTPATIENT
Start: 2023-01-29 | End: 2023-01-31

## 2023-01-29 RX ORDER — AMIODARONE HYDROCHLORIDE 200 MG/1
400 TABLET ORAL DAILY
Status: DISCONTINUED | OUTPATIENT
Start: 2023-01-30 | End: 2023-01-31

## 2023-01-29 RX ORDER — BISACODYL 10 MG
10 SUPPOSITORY, RECTAL RECTAL
Status: DISCONTINUED | OUTPATIENT
Start: 2023-01-29 | End: 2023-01-31

## 2023-01-29 RX ORDER — POTASSIUM CHLORIDE 20 MEQ/1
40 TABLET, EXTENDED RELEASE ORAL 2 TIMES DAILY
Status: DISCONTINUED | OUTPATIENT
Start: 2023-01-30 | End: 2023-01-30

## 2023-01-29 RX ADMIN — PROPOFOL 20 MCG/KG/MIN: 10 INJECTION, EMULSION INTRAVENOUS at 11:09

## 2023-01-29 RX ADMIN — LORAZEPAM 1 MG: 2 INJECTION INTRAMUSCULAR; INTRAVENOUS at 05:03

## 2023-01-29 RX ADMIN — AMPICILLIN AND SULBACTAM 3 G: 1; 2 INJECTION, POWDER, FOR SOLUTION INTRAMUSCULAR; INTRAVENOUS at 05:32

## 2023-01-29 RX ADMIN — POTASSIUM CHLORIDE 40 MEQ: 20 TABLET, EXTENDED RELEASE ORAL at 18:00

## 2023-01-29 RX ADMIN — HALOPERIDOL LACTATE 3 MG: 5 INJECTION, SOLUTION INTRAMUSCULAR at 09:20

## 2023-01-29 RX ADMIN — HALOPERIDOL LACTATE 2.5 MG: 5 INJECTION, SOLUTION INTRAMUSCULAR at 09:39

## 2023-01-29 RX ADMIN — FUROSEMIDE 80 MG: 10 INJECTION, SOLUTION INTRAMUSCULAR; INTRAVENOUS at 16:00

## 2023-01-29 RX ADMIN — ROCURONIUM BROMIDE 50 MG: 10 INJECTION, SOLUTION INTRAVENOUS at 15:15

## 2023-01-29 RX ADMIN — IOHEXOL 75 ML: 350 INJECTION, SOLUTION INTRAVENOUS at 15:46

## 2023-01-29 RX ADMIN — DEXMEDETOMIDINE 1.5 MCG/KG/HR: 200 INJECTION, SOLUTION INTRAVENOUS at 10:26

## 2023-01-29 RX ADMIN — PROPOFOL 30 MCG/KG/MIN: 10 INJECTION, EMULSION INTRAVENOUS at 13:51

## 2023-01-29 RX ADMIN — HALOPERIDOL LACTATE 2.5 MG: 5 INJECTION, SOLUTION INTRAMUSCULAR at 06:46

## 2023-01-29 RX ADMIN — ONDANSETRON 4 MG: 4 TABLET, ORALLY DISINTEGRATING ORAL at 01:27

## 2023-01-29 RX ADMIN — HALOPERIDOL LACTATE 2.5 MG: 5 INJECTION, SOLUTION INTRAMUSCULAR at 04:17

## 2023-01-29 RX ADMIN — WARFARIN SODIUM 2.5 MG: 2.5 TABLET ORAL at 18:00

## 2023-01-29 RX ADMIN — OXYCODONE HYDROCHLORIDE 10 MG: 10 TABLET ORAL at 02:05

## 2023-01-29 RX ADMIN — TRAZODONE HYDROCHLORIDE 50 MG: 50 TABLET ORAL at 21:43

## 2023-01-29 RX ADMIN — PROPOFOL 35 MCG/KG/MIN: 10 INJECTION, EMULSION INTRAVENOUS at 20:20

## 2023-01-29 RX ADMIN — IOHEXOL 75 ML: 350 INJECTION, SOLUTION INTRAVENOUS at 15:51

## 2023-01-29 RX ADMIN — ETOMIDATE 20 MG: 2 INJECTION, SOLUTION INTRAVENOUS at 11:02

## 2023-01-29 RX ADMIN — PROPOFOL 30 MCG/KG/MIN: 10 INJECTION, EMULSION INTRAVENOUS at 11:30

## 2023-01-29 RX ADMIN — ROCURONIUM BROMIDE 100 MG: 50 INJECTION INTRAVENOUS at 11:04

## 2023-01-29 RX ADMIN — FUROSEMIDE 80 MG: 10 INJECTION, SOLUTION INTRAMUSCULAR; INTRAVENOUS at 10:39

## 2023-01-29 RX ADMIN — SENNOSIDES AND DOCUSATE SODIUM 2 TABLET: 50; 8.6 TABLET ORAL at 18:00

## 2023-01-29 ASSESSMENT — ENCOUNTER SYMPTOMS
HEADACHES: 0
NAUSEA: 0
CHILLS: 0
DIZZINESS: 0
BLURRED VISION: 0
EYE PAIN: 0
PALPITATIONS: 0
HEMOPTYSIS: 0
FEVER: 0
LOSS OF CONSCIOUSNESS: 0
WEAKNESS: 1
SHORTNESS OF BREATH: 1
COUGH: 1
BACK PAIN: 0
SENSORY CHANGE: 0
ABDOMINAL PAIN: 0
FALLS: 0
VOMITING: 0
FOCAL WEAKNESS: 0
INSOMNIA: 0
WHEEZING: 0
SPUTUM PRODUCTION: 0

## 2023-01-29 ASSESSMENT — PAIN DESCRIPTION - PAIN TYPE
TYPE: ACUTE PAIN
TYPE: ACUTE PAIN

## 2023-01-29 ASSESSMENT — LIFESTYLE VARIABLES: SUBSTANCE_ABUSE: 0

## 2023-01-29 NOTE — PROGRESS NOTES
"While sitting at bedside, patient appeared to be experiencing hallucinations. Pt stated \"he was driving\" and ordered me to \"get out of the way or I'm going to hit you.\" After several attempts of redirecting pt to lay down in bed, pt continued to get out of the bed and was unable follow directions. Pt became very agitated. Pt appeared to be hallucinating and on an attempt to leave the bed, pt hit is head on bedside table. Pt responds to verbal redirection however continues to need redirection frequently. I notified charge ALLEN Palacios and Alireza VILLA about my concerns of pt becoming verbally aggressive and falling out of bed.   "

## 2023-01-29 NOTE — PROGRESS NOTES
Pt on max NRB, desatting to 56% and apneic, pt stimulated and rass +4 despite dex gtt. Dr. Queen and Ozzy updated. Dr. Preciado at bedside, preparing for intubation.     RSI  1100 144/102, 92% max NRB, 94 hr, 44 resp  1102 20 mg etomidate in  1104 100 mg rocuronium in, leather restraints removed by security, converted to soft bilateral wrist restraints for medical safety  1104 positive color change and lung sounds     ET tube 24 @ teeth    MD preparing for CVC placement    1120 - CT surgery LANI Velazquez updated on patient condition and severe agitation in restraints earlier, orders for CT chest.

## 2023-01-29 NOTE — PROGRESS NOTES
This nurse was called to the room by the sitter because the patient broke out of his restraints after 3 mg IV haldol was given. This nurse ran into the room and watched the patient break out of the second wrist restraint and then throw a punch at this nurse. This nurse then was charged by the patient which lead to the nurse slamming the door closed and holding the door shut while the patient continued to attempt to pull the door open and try to break the glass with the telebox. Security was called and this nurse continued to hold the door shut until security arrived. Haldol was given again per provider. Patient was then placed in leather restraints and stabilized to transfer to next unit.

## 2023-01-29 NOTE — CARE PLAN
The patient is Watcher - Medium risk of patient condition declining or worsening    Shift Goals  Clinical Goals: pain control  Patient Goals: comfort  Family Goals: HUGH    Progress made toward(s) clinical / shift goals:    Problem: Pain - Standard  Goal: Alleviation of pain or a reduction in pain to the patient’s comfort goal  Outcome: Progressing     Problem: Fall Risk  Goal: Patient will remain free from falls  Outcome: Progressing     Problem: Psychosocial  Goal: Patient's level of anxiety will decrease  Outcome: Progressing  Goal: Patient's ability to verbalize feelings about condition will improve  Outcome: Progressing       Patient is not progressing towards the following goals:
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
## 2023-01-29 NOTE — PROGRESS NOTES
"Date of Service: 1/29/2023     Procedure:  Internal Jugular Triple Lumen Central line placement     Indication: Shock requiring multiple medications, anticipated need for pressor medications.     Physician:  Dr. Jorden Levi MD, Dr. Juvencio Preciado MD     Post Procedure Diagnosis:  1.  Acute hypoxic respiratory failure  2.  Shock       Narrative:    Attending Dr. Preciado was present for entire procedure    Appropriate consent was obtained and \"time out\" was performed.  .          Patient was laid down flat and timeout was called. IJ located with ultrasound, then patient was prepped and draped in usual fashion. Sterile technique utilized, hands washed, gloves, gowns and bouffant worn. Using sterile ultrasound guidance needle was advanced and pulsatile flow noted, guidewire advanced through needle and confirmed in place with ultrasound. Needle removed over the wire and small nick made in the skin with scalpel, and dilator passed over the wire. Dilator removed and catheter passed over the wire, confirmed in place with ultrasound, wire removed. All ports flushed and functioning. Catheter was sutured in place and dressed in usual fashion.     Placement location will be confirmed with chest xray      "

## 2023-01-29 NOTE — PROGRESS NOTES
Inpatient Anticoagulation Service Note    Date: 1/29/2023    Reason for Anticoagulation: Atrial Fibrillation, Bioprosthetic Valve Replacement (27mm Inspiris tissue valve 1/5/2023)  Target INR: 2.0 to 3.0     [id number] VASc Score: 2  HAS-BLED Score: 1     Hemoglobin Value: (!) 9.3  Hematocrit Value: (!) 31.8  Lab Platelet Value: (!) 537    INR from last 7 days       Date/Time INR Value    01/28/23 0208 2.16    01/27/23 0243 2.06    01/26/23 0207 2.13    01/25/23 0045 2.47    01/24/23 0101 2.47    01/23/23 0119 2.29          Dose from last 7 days       Date/Time Dose (mg)    01/29/23  2.5    01/28/23  2.5    01/27/23  2.5    01/26/23  2.5    01/25/23  2.5    01/24/23  2.5    01/23/23  2.5    01/22/23  2.5            HPI: 63 yo male with CHF & COPD admitted with acute respiratory failure is chronically anticoagulated with warfarin therapy and is managed outpatient by the [facility].   Per records, patient is prescribed Warfarin 2.5 mg po daily. INR slightly SUPRAtherapeutic on admission.      Assessment: No INR today (ordered mon, wed, fri).   Potential drug-drug interactions identified with acute inpatient medications: Antibiotics - Unasyn until 2/1/23 & Omeprazole   Potential drug-drug interactions identified with chronic home medications: Aspirin & Amiodarone which should be established interactions with warfarin therapy.   Inpatient Diet: Cardiac       Plan: Continue warfarin 2.5 mg daily.      Education Material Provided?: No (established warfarin patient)      Pharmacist suggested discharge dosing: Warfarin 2.5 mg po daily.      Ping Trinidad, PharmD

## 2023-01-29 NOTE — CONSULTS
BRIEF PSYCHIATRIC CONSULT NOTE: patient seen: consult requested for agitation and aggression requiring intubation and restraints. Chart reviewed.  Likely encephalopathy. No past psych hx on file. Hx of meth. Will cancel consult, please reconsult as needed.

## 2023-01-29 NOTE — PROGRESS NOTES
[facility] Medicine Daily Progress Note    Date of Service  1/29/2023    Chief Complaint  Noe Hickey is a 62 y.o. male admitted 1/20/2023 with leg swelling, cough.    Hospital Course  Noe Hickey is a 62 y.o. male who presented 1/20/2023 with past medical history of atrial fibrillation/flutter, history of aortic stenosis status post valve replacement, history of tobacco abuse, hyperlipidemia who presents to the hospital for shortness of breath, cough and lower extremity swelling.  Patient was recently discharged in the hospital on 1/5 for aortic aneurysm repair and aortic valve replacement.  Postoperatively he was given IV fluids due to blood loss.  During his hospital stay he had a complication of torsades and ventricular arrhythmia.  He was shocked once and then returned back to normal sinus rhythm.  His echo found EF to be 55% at that time.  Patient states that since his hospital stay has been having worsening lower extremity edema and cough.  He denies any hemoptysis or purulent sputum.  He was discharged to [facility] nursing [facility] and states that he has been eating a high salt diet.  Patient was discharged with Lasix and states that he has been compliant with it.    Interval Problem Update  1/24: Patient seen and examined, admitted for chest pain and SOB, chest pain intermittently   Wbc 15 today cont on cefdinir repeat CXR.  His echo showed global hypokinesis and EF of  45% from 55 it was before   I have consulted cardiology appreciate rec.   1/25: Patient seen and examined, agitated and with some hallucination as per staff has been going for a while concern for some underline psychiatric  issues   Regarding his HF, cardiology has been following on IV lasix  for diuresis   1/26: patient seen and examined, afebrile, seen by psychiatry appreciate rec.  Regarding his HF, cardiology following on IV lasix appreciate rec.   1/27: Patient resting in bed, afebrile, cont on IV lasix for  his HF as per cardiology rec.  Wbc 15 today will check a CXR and UA afebrile   Will start empiric IV abx   Will check a procalcitonin   1/28: Patient seen and examined, afebrile. Cardiology following cont on IV diuresis.   No overnight events   1/29: patient aggressive and agitated a code gray was called patient has received multiple doses of haldol total of 15 mg still aggressive and security was called   Patient placed on violent retrains   Critical care has been consulted and patient started on precedex drip and will be admitted to ICU since also his respiratory status is deteriorating     I have discussed this patient's plan of care and discharge plan at IDT rounds today with Case Management, Nursing, Nursing leadership, and other members of the IDT team.    Consultants/Specialty  none    Code Status  Full Code    Disposition  Patient is not medically cleared for discharge.   Anticipate discharge to to skilled nursing facility.  I have placed the appropriate orders for post-discharge needs.    Review of Systems  Review of Systems   Constitutional:  Negative for chills and fever.   Eyes:  Negative for blurred vision and pain.   Respiratory:  Positive for cough and shortness of breath. Negative for hemoptysis, sputum production and wheezing.    Cardiovascular:  Positive for leg swelling. Negative for chest pain and palpitations.   Gastrointestinal:  Negative for abdominal pain, nausea and vomiting.   Genitourinary:  Negative for dysuria and urgency.   Musculoskeletal:  Negative for back pain and falls.   Skin:  Negative for itching and rash.   Neurological:  Positive for weakness. Negative for dizziness, sensory change, focal weakness, loss of consciousness and headaches.   Psychiatric/Behavioral:  Negative for substance abuse. The patient does not have insomnia.       Physical Exam  Temp:  [36.8 °C (98.2 °F)-37.8 °C (100 °F)] 37.8 °C (100 °F)  Pulse:  [] 60  Resp:  [15-48] 18  BP: ()/(51-83)  103/55  SpO2:  [28 %-100 %] 100 %    Physical Exam  Constitutional:       General: He is not in acute distress.     Appearance: He is not ill-appearing.   HENT:      Head: Normocephalic and atraumatic.      Right Ear: External ear normal.      Left Ear: External ear normal.      Mouth/Throat:      Pharynx: No oropharyngeal exudate or posterior oropharyngeal erythema.   Eyes:      Extraocular Movements: Extraocular movements intact.      Pupils: Pupils are equal, round, and reactive to light.   Cardiovascular:      Rate and Rhythm: Normal rate and regular rhythm.      Pulses: Normal pulses.      Heart sounds: Normal heart sounds.   Pulmonary:      Effort: Pulmonary effort is normal. No respiratory distress.      Breath sounds: No stridor. Wheezing present. No rales.   Abdominal:      General: Bowel sounds are normal. There is no distension.      Palpations: Abdomen is soft.      Tenderness: There is no abdominal tenderness. There is no guarding or rebound.   Musculoskeletal:         General: Swelling present. No tenderness.      Cervical back: Normal range of motion and neck supple.      Right lower leg: Edema present.      Left lower leg: Edema present.   Skin:     General: Skin is warm and dry.   Neurological:      General: No focal deficit present.      Mental Status: He is oriented to person, place, and time.      Cranial Nerves: No cranial nerve deficit.      Sensory: No sensory deficit.      Motor: No weakness.   Psychiatric:         Mood and Affect: Mood normal.         Behavior: Behavior normal.       Fluids    Intake/Output Summary (Last 24 hours) at 1/29/2023 1526  Last data filed at 1/29/2023 1344  Gross per 24 hour   Intake 564.18 ml   Output 2100 ml   Net -1535.82 ml           Laboratory  Recent Labs     01/27/23  0248 01/28/23  0208 01/29/23  0204   WBC 15.3* 13.4* 13.7*   RBC 4.64* 4.49* 4.36*   HEMOGLOBIN 9.9* 9.6* 9.3*   HEMATOCRIT 33.7* 32.6* 31.8*   MCV 72.6* 72.6* 72.9*   MCH 21.3* 21.4* 21.3*    MCHC 29.4* 29.4* 29.2*   RDW 49.0 48.4 48.8   PLATELETCT 540* 564* 537*   MPV 9.8 9.8 10.2       Recent Labs     01/27/23  0243 01/28/23  0208 01/29/23  0204   SODIUM 134* 138 136   POTASSIUM 4.2 4.2 5.1   CHLORIDE 94* 97 96   CO2 28 31 29   GLUCOSE 98 93 93   BUN 30* 29* 33*   CREATININE 1.59* 1.64* 1.58*   CALCIUM 8.9 8.9 9.2       Recent Labs     01/27/23  0243 01/28/23  0208   INR 2.06* 2.16*                 Imaging  DX-ABDOMEN FOR TUBE PLACEMENT   Final Result      NG tube tip in expected location the gastric body.      DX-CHEST-PORTABLE (1 VIEW)   Final Result      Tubes and line as described above.      Improving left basilar atelectasis.      DX-CHEST-PORTABLE (1 VIEW)   Final Result      1.  Chronic elevation of the left hemidiaphragm with left basilar atelectasis.   2.  Cardiomegaly.         DX-CHEST-PORTABLE (1 VIEW)   Final Result      1.  Chronic elevation of the left hemidiaphragm with left basilar atelectasis.   2.  Cardiomegaly.      EC-ECHOCARDIOGRAM COMPLETE W/O CONT   Final Result      CT-CTA CHEST PULMONARY ARTERY W/ RECONS   Final Result      1.  No CT evidence of pulmonary embolism.      2.  Significant cardiomegaly.      3.  Consolidation and volume loss in the left lower lobe and lingular segment left upper lobe could BE due to consolidative atelectasis and pneumonia.      4.  Minimal groundglass opacifications noted in the right lung which could be due to inflammation or infection.      5.  Additional linear opacifications in the right lung likely due to atelectasis or scarring.            DX-CHEST-PORTABLE (1 VIEW)   Final Result      1.  Left lung base atelectasis.      2.  Cardiomegaly.      3.  Chronic elevation of left hemidiaphragm.      CT-CTA HEAD WITH & W/O-POST PROCESS    (Results Pending)   CT-CTA NECK WITH & W/O-POST PROCESSING    (Results Pending)   CT-CTA CHEST PULMONARY ARTERY W/ RECONS    (Results Pending)          Assessment/Plan  * Acute on chronic systolic heart failure  ([facility])- (present on admission)  Assessment & Plan  Last EF was 55%  Echo shows worse EF 45%  Also global hypokinesis   Continue lasix IV diuresis  On metoprolol  I have consulted cardiology appreciate rec     COPD (chronic obstructive pulmonary disease) ([facility])  Assessment & Plan  Wheezing heard on exam  40+ pack year history, stopped smoking recently  Suspect underlying COPD, outpatient PFT and pulm follow up  Start symbicort and duonebs here  Not in acute exacerbation    Elevated troponin  Assessment & Plan  Echo with EF of 45% and also shoving global hypokinesis   I have consulted cardiology appreciate rec.     Paroxysmal atrial fibrillation ([facility])  Assessment & Plan  Rate controlled on metoprolol and amiodarone  Continue warfarin    Pneumonia due to infectious organism- (present on admission)  Assessment & Plan  Found to have left sided infiltrate  Bronchial breath sounds and egophony on the left lung exam   Started on  antibiotics  Follow up sputum and blood cultures  Respiratory care protocol   Placed on o2 therapy       Acute respiratory failure with hypoxia ([facility])  Assessment & Plan  On 3 L of O2 above baseline  Suspect due to pneumonia vs atelectasis  Continue antibiotics and IS    Acute renal failure (ARF) ([facility])  Assessment & Plan  Monitor BMP and assess response  Avoid IV contrast/nephrotoxins/NSAIDs  Dose adjust meds for decreased GFR             VTE prophylaxis: therapeutic anticoagulation with warfarin    I have performed a physical exam and reviewed and updated ROS and Plan today (1/29/2023). In review of yesterday's note (1/28/2023), there are no changes except as documented above.

## 2023-01-29 NOTE — PROGRESS NOTES
Pt brought to T620 on zoll by RRT. On max oxymask, spo2 90-93%. Starting dex gtt at max rate per Dr. Queen. MD at bedside. Pt in 4 point leather restraints, security at bedside.     Unable to complete 2 RN skin check at this time d/t agitation and safety issues. Will complete when able.

## 2023-01-29 NOTE — PROGRESS NOTES
[facility] Acceptance Note    Called by Dr. Mehul Mahmood for admission to IMCU for agitated delirium in which a code grey was called who is now in violent restraints having received 8mg of Haldol.  Will start Precedex drip in meantime.  Will admit to IMCU under the care of the hospitalist.      Camila Queen MD  Pulmonary and Critical Care Medicine      Addendum:  pt with desaturations on 10 lpm oxymask and still agitated.  Due to high risk of needing behavioral intubation and sedation due to agitated delirium, will send to ICU instead.

## 2023-01-29 NOTE — ASSESSMENT & PLAN NOTE
Chronic tobacco use, no PFT on file, CT more suggestive of chronic bronchitis vs. Emphysema. Not in exacerbation.  -Symbicort 2 puff BID  -Duoneb q2h/prn  -RT protocols

## 2023-01-29 NOTE — ASSESSMENT & PLAN NOTE
Agitation requiring sedation, intubated 1/29. Extubated on 1/31.  -titrate O2 sats to >88%  -RT protocols

## 2023-01-29 NOTE — PROGRESS NOTES
1118 - Timeout called for CVC placement, all agree. Emergent consent by 2 MD.     1130 - Wire out. Xray ordered.

## 2023-01-29 NOTE — PROGRESS NOTES
4 Eyes Skin Assessment Completed by Dante RN and Senthil RN.    Head WDL  Ears Redness and Blanching  Nose Redness and Blanching  Mouth Redness  Neck Redness and Blanching  Breast/Chest Redness, Blanching, and Scar  Shoulder Blades Redness and Blanching  Spine Redness and Blanching  (R) Arm/Elbow/Hand Redness, Blanching, Bruising, Abrasion, Discoloration, and Edema  (L) Arm/Elbow/Hand Redness, Blanching, Bruising, Abrasion, Discoloration, and Edema  Abdomen Redness, Blanching, and Scar  Groin WDL  Scrotum/Coccyx/Buttocks WDL  (R) Leg Redness, Blanching, and Abrasion  (L) Leg Redness, Blanching, and Abrasion  (R) Heel/Foot/Toe WDL  (L) Heel/Foot/Toe WDL    Patient was requiring the use of leather restraints prior to arrival onto unit. Refer to photographs for further detail.      Devices In Places ECG, Pulse Ox, Miranda, SCD's, ET Tube, OG/NG, and Central Line      Interventions In Place Heel Mepilex, Sacral Mepilex, Heel Float Boots, Pillows, Q2 Turns, Low Air Loss Mattress, Barrier Cream, Heels Loaded W/Pillows, and Pressure Redistribution Mattress    Possible Skin Injury No    Pictures Uploaded Into Epic Yes  Wound Consult Placed N/A  RN Wound Prevention Protocol Ordered No

## 2023-01-29 NOTE — PROCEDURES
Date of Service: 1/29/2023     Procedure: Endotracheal intubation      Indication: Acute hypoxic respiratory failure     Physician:  Dr. Jorden Levi MD, Dr. Juvencio Preciado MD       Narrative:    Attending Dr. Preciado was present for entire procedure          Consent obtained:  Emergent situation    Risks discussed:  Low GCS, unable to protect airway, hypoxia    Alternatives discussed:  No acceptable alternative treatments  Pre-procedure details:     Patient status:  Unresponsive    Mallampati score:  II    Pretreatment meds: etomidate 20.    Paralytics:  Rocuronium 100  Procedure details:     Preoxygenation:  Bag valve mask    CPR in progress: no      Intubation method:  Oral    Oral intubation technique:  Video-assisted    Laryngoscope type:  GlideScope    Laryngoscope blade:  Mac 4    Cormack-Lehane Classification:  Grade 2a    Tube size (mm):  8.0    Tube type:  Cuffed    Number of attempts:  1    Ventilation between attempts: no      Cricoid pressure: no      Tube visualized through cords: yes    Placement assessment:     ETT to teeth:  24    Tube secured with:  ETT delvalle    Breath sounds:  Equal    Placement verification: chest rise, condensation, CXR verification and direct visualization      CXR findings:  ETT in proper place  Post-procedure details:     Patient tolerance of procedure:  Tolerated well, no immediate complications  Comments:       Patient combatative, minimally responsive, and desaturating on pulse oximetry monitor. For patient safety ICU team determined endotracheal intubation indicated, patient unable to safely protect his airway.    Emergent endotracheal intubation

## 2023-01-29 NOTE — ASSESSMENT & PLAN NOTE
Agitation. No acute abnormal cranial imaging findings. Suspected relation to medication effect (atarax?). Required sedation leading to intubation.  -STOP atarax  -seroquel 12.5mg PO TID

## 2023-01-29 NOTE — ASSESSMENT & PLAN NOTE
Renal failure, possible relation to medication reaction, possible new baseline renal function.  -CMP follow renal function  -oral fluids as tolerated

## 2023-01-29 NOTE — CONSULTS
"Critical Care Consultation    Date of consult: 1/29/2023    Referring Physician  Mehul Olson M.D.    Reason for Consultation  Critical care management of agitated delirium with acute hypoxic respiratory failure    History of Presenting Illness  Mr. Hickey is a 62 year old male with the past medical history significant for atrial fibrillation/flutter, tobacco abuse, hyperlipidemia, hypertension, obesity, and severe symptomatic aortic stenosis due to bicuspid valve with ascending aortic aneurysm who was hospitalized at [facility] from 1/5-1/11/2023 for AVR and aneurysm repair whose hospital course was complicated by acute hypoxic respiratory failure requiring BiPAP therapy, defibrillation for torsades de pointe, and atrial fibrillation on Warfarin who eventually was discharged to [facility].  The patient was readmitted on 1/20/2023 for complaints of worsening lower extremity swelling, chest pain, shortness of breath, and cough that was thought to be a CHF exacerbation.  The patient was started on diuretics and seen by Cardiology.  A repeat ECHO did reveal global hypokinesis with a worsening EF from 55% to 45%.  The patient was doing relatively well until 1/24 when he started having nocturnal agitation and hallucinating and received Atarax.  This agitation continued to wax and wane until today when it fully escalated to a CODE GRAY after the patient became increasingly agitated, pacing his room, yelling out to staff and attempting to punch nursing.  At one point I am told that the patient took his telemetry box and was slamming it against the glass windows of his room.  When roughly 10 security guards came for the CODE GRAY the patient was found to be naked except for his half shirt, but cooperated and laid back in bed.  He received multiple doses of Haldol, a total of 13mg, and he remains agitated requiring 4-point restraints.  The critical care team attempted the \"Soft Radha\" song from [employer] Bang [employer] to soothe and calm " the patient, but this didn't work.  The patient was transferred to the ICU for precedex infusion and urgent need for behavioral intubation.    Code Status  Full Code    Review of Systems  Review of Systems   Unable to perform ROS: Acuity of condition     Past Medical History   has a past medical history of Arrhythmia, Breath shortness, Cyclic vomiting syndrome, Elevated hemidiaphragm - LEFT post AVR (1/4/2023), Fracture, Headache, classical migraine, Heart burn, Heart valve disease, Indigestion, Pneumonia due to infectious organism (1/20/2023), and Snoring.    He has no past medical history of Anesthesia, Asthma, Cancer (HCC), Carcinoma in situ of respiratory system, Cataract, Cold, Coughing blood, Dental disorder, Disorder of thyroid, Encounter for renal dialysis, Glaucoma, Heart murmur, Hemorrhagic disorder (HCC), Hepatitis A, Hepatitis B, Hepatitis C, Hiatus hernia syndrome, High cholesterol, Hypertension, Myocardial infarct (HCC), Pacemaker, Personal history of venous thrombosis and embolism, Psychiatric problem, Rheumatic fever, Seizure (HCC), Sleep apnea, Stroke (HCC), Tuberculosis, Urinary bladder disorder, or Urinary incontinence.    Surgical History   has a past surgical history that includes shoulder arthroscopy; shoulder hemicap resurfacing (Right, 10/12/2015); irrigation & debridement ortho (Right, 09/19/2017); shoulder surgery; aortic valve replacement (1/5/2023); aortic ascending dissection (1/5/2023); and echocardiogram, transesophageal, intraoperative (1/5/2023).    Family History  Unknown due to the patient being unable to give history given his agitated state.    Social History   reports that he quit smoking about 3 weeks ago. His smoking use included cigarettes. He has a 40.00 pack-year smoking history. He has never used smokeless tobacco. He reports that he does not currently use drugs. He reports that he does not drink alcohol.  Hx of methamphetamine abuse    Medications  Home Medications        Reviewed by Ирина Dennis R.N. (Registered Nurse) on 01/20/23 at 2311  Med List Status: Complete     Medication Last Dose Status   acetaminophen (TYLENOL) 500 MG Tab 1/19/2023 Active   amiodarone (PACERONE) 400 MG tablet 1/18/2023 Active   amiodarone (PACERONE) 400 MG tablet Not started yet Active   amLODIPine (NORVASC) 10 MG Tab 1/17/2023 Active   aspirin EC 81 MG EC tablet 1/17/2023 Active   furosemide (LASIX) 40 MG Tab 1/17/2023 Active   furosemide (LASIX) 40 MG Tab Not started yet Active   metoprolol tartrate (LOPRESSOR) 25 MG Tab 1/17/2023 Active   potassium Chloride ER (K-TAB) 20 MEQ Tab CR tablet Not started yet Active   potassium chloride SA (KDUR) 20 MEQ Tab CR 1/17/2023 Active   warfarin (COUMADIN) 5 MG Tab 1/17/2023 Active                  Current Facility-Administered Medications   Medication Dose Route Frequency Provider Last Rate Last Admin    Respiratory Therapy Consult   Nebulization Continuous RT Camila Queen M.D.        MD Alert...ICU Electrolyte Replacement per Pharmacy   Other PHARMACY TO DOSE Camila Queen M.D.        lidocaine (XYLOCAINE) 1 % injection 2 mL  2 mL Tracheal Tube Q30 MIN PRN Camila Queen M.D.        propofol (DIPRIVAN) injection  0-80 mcg/kg/min (Ideal) Intravenous Continuous Camila Queen M.D. 12.7 mL/hr at 01/29/23 1130 30 mcg/kg/min at 01/29/23 1130    fentaNYL (SUBLIMAZE) injection 25 mcg  25 mcg Intravenous Q HOUR PRN Camila Queen M.D.        Or    fentaNYL (SUBLIMAZE) injection 50 mcg  50 mcg Intravenous Q HOUR PRN Camila Queen M.D.        Or    fentaNYL (SUBLIMAZE) injection 100 mcg  100 mcg Intravenous Q HOUR PRN Camila Queen M.D.        ipratropium-albuterol (DUONEB) nebulizer solution  3 mL Nebulization Q2HRS PRN (RT) Camila Queen M.D.        rocuronium (ZEMURON) injection 50 mg  50 mg Intravenous Once Jordne Levi M.D.        furosemide (LASIX) injection 80 mg  80 mg Intravenous TID DIURETIC Keshawn Trejo M.D.   80 mg at  01/29/23 1039    nitroglycerin (NITROSTAT) tablet 0.4 mg  0.4 mg Sublingual Q5 MIN PRN Keshawn Trejo M.D.   0.4 mg at 01/26/23 0205    potassium chloride SA (Kdur) tablet 40 mEq  40 mEq Oral BID Keshawn Trejo M.D.   40 mEq at 01/28/23 1715    metoprolol tartrate (LOPRESSOR) tablet 50 mg  50 mg Oral BID Noe Anguiano M.D.   50 mg at 01/28/23 1715    budesonide-formoterol (SYMBICORT) 80-4.5 MCG/ACT inhaler 2 Puff  2 Puff Inhalation BID Neo Anguiano M.D.   2 Puff at 01/28/23 1715    warfarin (COUMADIN) tablet 2.5 mg  2.5 mg Oral DAILY AT 1800 Noe Anguiano M.D.   2.5 mg at 01/28/23 1714    omeprazole (PRILOSEC) capsule 20 mg  20 mg Oral DAILY Noe Anguiano M.D.   20 mg at 01/28/23 0616    traZODone (DESYREL) tablet 50 mg  50 mg Oral QHS Noe Anguiano M.D.   50 mg at 01/28/23 2312    senna-docusate (PERICOLACE or SENOKOT S) 8.6-50 MG per tablet 2 Tablet  2 Tablet Oral BID Burt Yin M.D.   2 Tablet at 01/28/23 0617    And    polyethylene glycol/lytes (MIRALAX) PACKET 1 Packet  1 Packet Oral QDAY PRN Burt Yin M.D.        And    magnesium hydroxide (MILK OF MAGNESIA) suspension 30 mL  30 mL Oral QDAY PRN Burt Yin M.D.        And    bisacodyl (DULCOLAX) suppository 10 mg  10 mg Rectal QDAY PRN Burt Yin M.D.        acetaminophen (Tylenol) tablet 650 mg  650 mg Oral Q6HRS PRN Burt iYn M.D.   650 mg at 01/24/23 0232    hydrALAZINE (APRESOLINE) injection 10 mg  10 mg Intravenous Q4HRS PRN Burt Yin M.D.        ondansetron (ZOFRAN) syringe/vial injection 4 mg  4 mg Intravenous Q4HRS PRDIPAK Yin M.D.   4 mg at 01/21/23 1401    ondansetron (ZOFRAN ODT) dispertab 4 mg  4 mg Oral Q4HRS PRDIPAK Yin M.D.   4 mg at 01/29/23 0127    promethazine (PHENERGAN) tablet 12.5-25 mg  12.5-25 mg Oral Q4HRS PRDIPAK Yin M.D.        promethazine (PHENERGAN) suppository 12.5-25 mg  12.5-25 mg Rectal Q4HRS PRDIPAK Yin M.D.        prochlorperazine (COMPAZINE) injection 5-10 mg  5-10  mg Intravenous Q4HRS PRN Burt Yin M.D.        amiodarone (Cordarone) tablet 400 mg  400 mg Oral DAILY Burt Yin M.D.   400 mg at 01/28/23 0616    amLODIPine (NORVASC) tablet 10 mg  10 mg Oral DAILY Burt Yin M.D.   10 mg at 01/28/23 0616    aspirin EC (ECOTRIN) tablet 81 mg  81 mg Oral DAILY Burt Yin M.D.   81 mg at 01/28/23 0617    MD Alert...Warfarin per Pharmacy   Other PHARMACY TO DOSE Burt Yin M.D.           Allergies  Allergies   Allergen Reactions    Morphine Rash     Rash/ difficulty breathing    Ampicillin-Sulbactam Sodium      Rash to upper torso - appears to be related to Unasyn new start (Jan 2023)       Vital Signs last 24 hours  Temp:  [36.8 °C (98.2 °F)-37.8 °C (100 °F)] 37.8 °C (100 °F)  Pulse:  [] 61  Resp:  [15-48] 19  BP: ()/(51-83) 113/63  SpO2:  [28 %-100 %] 99 %    Physical Exam  Physical Exam  Vitals and nursing note reviewed.   Constitutional:       General: He is in acute distress.      Appearance: He is ill-appearing and toxic-appearing.   HENT:      Head: Normocephalic and atraumatic.      Right Ear: External ear normal.      Left Ear: External ear normal.      Nose: Nose normal. No rhinorrhea.      Mouth/Throat:      Mouth: Mucous membranes are dry.      Pharynx: Oropharynx is clear. No oropharyngeal exudate.   Eyes:      General: No scleral icterus.     Conjunctiva/sclera: Conjunctivae normal.      Pupils: Pupils are equal, round, and reactive to light.   Cardiovascular:      Rate and Rhythm: Tachycardia present. Rhythm irregularly irregular.      Pulses:           Radial pulses are 2+ on the right side and 2+ on the left side.        Dorsalis pedis pulses are 2+ on the right side and 2+ on the left side.      Heart sounds: Heart sounds are distant. No murmur heard.  Pulmonary:      Effort: Respiratory distress present.      Breath sounds: No wheezing.      Comments: Coarse breath sounds heard throughout  Sternal incision is intact without drainage,  erythema, or swelling  Chest:      Chest wall: No tenderness.   Abdominal:      Tenderness: There is no abdominal tenderness. There is no guarding.   Musculoskeletal:         General: Normal range of motion.      Cervical back: Normal range of motion and neck supple.      Right lower le+ Pitting Edema present.      Left lower le+ Pitting Edema present.   Lymphadenopathy:      Cervical: No cervical adenopathy.   Skin:     General: Skin is warm and dry.      Capillary Refill: Capillary refill takes less than 2 seconds.      Findings: Rash present.      Comments: Erythematous papular rash to face and chest   Neurological:      General: No focal deficit present.      Mental Status: He is disoriented.      Cranial Nerves: No cranial nerve deficit.      Sensory: No sensory deficit.      Motor: No weakness.   Psychiatric:         Mood and Affect: Affect is angry.         Speech: Speech is rapid and pressured and tangential.         Behavior: Behavior is agitated, aggressive, hyperactive and combative.         Thought Content: Thought content is delusional.         Judgment: Judgment is impulsive and inappropriate.      Comments: ?hallucinations       Fluids    Intake/Output Summary (Last 24 hours) at 2023 1309  Last data filed at 2023 2350  Gross per 24 hour   Intake 1000 ml   Output 1200 ml   Net -200 ml       Laboratory  Recent Results (from the past 48 hour(s))   PROTHROMBIN TIME    Collection Time: 23  2:08 AM   Result Value Ref Range    PT 23.5 (H) 12.0 - 14.6 sec    INR 2.16 (H) 0.87 - 1.13   CBC WITHOUT DIFFERENTIAL    Collection Time: 23  2:08 AM   Result Value Ref Range    WBC 13.4 (H) 4.8 - 10.8 K/uL    RBC 4.49 (L) 4.70 - 6.10 M/uL    Hemoglobin 9.6 (L) 14.0 - 18.0 g/dL    Hematocrit 32.6 (L) 42.0 - 52.0 %    MCV 72.6 (L) 81.4 - 97.8 fL    MCH 21.4 (L) 27.0 - 33.0 pg    MCHC 29.4 (L) 33.7 - 35.3 g/dL    RDW 48.4 35.9 - 50.0 fL    Platelet Count 564 (H) 164 - 446 K/uL    MPV 9.8 9.0  - 12.9 fL   Basic Metabolic Panel    Collection Time: 23  2:08 AM   Result Value Ref Range    Sodium 138 135 - 145 mmol/L    Potassium 4.2 3.6 - 5.5 mmol/L    Chloride 97 96 - 112 mmol/L    Co2 31 20 - 33 mmol/L    Glucose 93 65 - 99 mg/dL    Bun 29 (H) 8 - 22 mg/dL    Creatinine 1.64 (H) 0.50 - 1.40 mg/dL    Calcium 8.9 8.5 - 10.5 mg/dL    Anion Gap 10.0 7.0 - 16.0   ESTIMATED GFR    Collection Time: 23  2:08 AM   Result Value Ref Range    GFR (CKD-EPI) 47 (A) >60 mL/min/1.73 m 2   PERIPHERAL SMEAR REVIEW    Collection Time: 23  2:08 AM   Result Value Ref Range    Peripheral Smear Review see below    CBC WITHOUT DIFFERENTIAL    Collection Time: 23  2:04 AM   Result Value Ref Range    WBC 13.7 (H) 4.8 - 10.8 K/uL    RBC 4.36 (L) 4.70 - 6.10 M/uL    Hemoglobin 9.3 (L) 14.0 - 18.0 g/dL    Hematocrit 31.8 (L) 42.0 - 52.0 %    MCV 72.9 (L) 81.4 - 97.8 fL    MCH 21.3 (L) 27.0 - 33.0 pg    MCHC 29.2 (L) 33.7 - 35.3 g/dL    RDW 48.8 35.9 - 50.0 fL    Platelet Count 537 (H) 164 - 446 K/uL    MPV 10.2 9.0 - 12.9 fL   Basic Metabolic Panel    Collection Time: 23  2:04 AM   Result Value Ref Range    Sodium 136 135 - 145 mmol/L    Potassium 5.1 3.6 - 5.5 mmol/L    Chloride 96 96 - 112 mmol/L    Co2 29 20 - 33 mmol/L    Glucose 93 65 - 99 mg/dL    Bun 33 (H) 8 - 22 mg/dL    Creatinine 1.58 (H) 0.50 - 1.40 mg/dL    Calcium 9.2 8.5 - 10.5 mg/dL    Anion Gap 11.0 7.0 - 16.0   ESTIMATED GFR    Collection Time: 23  2:04 AM   Result Value Ref Range    GFR (CKD-EPI) 49 (A) >60 mL/min/1.73 m 2   EKG    Collection Time: 23 10:13 AM   Result Value Ref Range    Report       [facility]    Test Date:  2023  Pt Name:    JUSTIN VEGANER               Department: 171  MRN:        [id number]                      Room:       [facility]  Gender:     Male                         Technician: GAY  :        1960                   Requested By:MAGDIEL LAZCANO  Order #:    [id number]                     Reading MD:    Measurements  Intervals                                Axis  Rate:       110                          P:  UT:                                      QRS:        7  QRSD:       159                          T:          66  QT:         352  QTc:        477    Interpretive Statements  Atrial fibrillation  Nonspecific intraventricular conduction delay  Compared to ECG 01/25/2023 12:01:10  Intraventricular conduction delay now present  Sinus bradycardia no longer present     URINE DRUG SCREEN    Collection Time: 01/29/23 11:20 AM   Result Value Ref Range    Amphetamines Urine Negative Negative    Barbiturates Negative Negative    Benzodiazepines Negative Negative    Cocaine Metabolite Negative Negative    Methadone Negative Negative    Opiates Negative Negative    Oxycodone Positive (A) Negative    Phencyclidine -Pcp Negative Negative    Propoxyphene Negative Negative    Cannabinoid Metab Negative Negative       Imaging  CXR(reviewed): mild cephalization    Assessment/Plan  * Acute on chronic systolic heart failure (HCC)- (present on admission)  Assessment & Plan  Cont lasix, metoprolol  Judicious fluid management    S/P AVR- (present on admission)  Assessment & Plan  Hx of on 1/5 with ascending aortic aneurysm repair    Drug rash- (present on admission)  Assessment & Plan  Concern with allergy to unasyn and sulfa  Monitor  Stop empiric Unasyn    Acute encephalopathy  Assessment & Plan  ?metabolic vs toxic  UDS pending  CT head w/o, CTA head and neck pending  CT chest to eval for sternal infection pending  Behavorial intubation required due to severe agitated delirium    COPD (chronic obstructive pulmonary disease) ([facility])  Assessment & Plan  No exacerbation at this time  RT consult  Duonebs as needed    Elevated troponin  Assessment & Plan  No acute ischemia    Paroxysmal atrial fibrillation (HCC)  Assessment & Plan  Rate controlled on metoprolol  Warfarin  INR at 2.16    Acute respiratory failure with  hypoxia (HCC)  Assessment & Plan  Worsening O2 needs requiring emergent intubation on 1/29  Cont full vent support  RT/O2 protocols  Cont vent bundle protocols  Lung protective ventilatory strategies  SAT/SBTs as tolerated    Acute renal failure (ARF) (HCC)  Assessment & Plan  ?due to diuresis  Monitor UOP/creat  Avoid nephrotoxins        Discussed patient condition and risk of morbidity and/or mortality with Hospitalist, RN, RT, Pharmacy, Charge nurse / hot rounds, and Patient.      The patient remains critically ill.  I have assessed and reassessed the respiratory status and made ventilator adjustments based upon arterial blood gas analysis, ventilator waveforms and airway mechanics.  I have assessed and reassessed the blood pressure, hemodynamics, cardiovascular status. This patient remains at high risk for worsening cardiopulmonary dysfunction and death without the above critical care interventions.    Critical care time = 125 minutes in directly providing and coordinating critical care and extensive data review.  No time overlap and excludes procedures.

## 2023-01-29 NOTE — CARE PLAN
The patient is Watcher - Medium risk of patient condition declining or worsening    Shift Goals  Clinical Goals: pain control  Patient Goals: comfort  Family Goals: HUGH    Progress made toward(s) clinical / shift goals:    Problem: Safety - Medical Restraint  Goal: Remains free of injury from restraints (Restraint for Interference with Medical Device)  Outcome: Progressing       Patient is not progressing towards the following goals:

## 2023-01-29 NOTE — ASSESSMENT & PLAN NOTE
Rash found on chest during transfer to ICU. Possible interaction with unasyn, atarax?  -STOP unasyn/atarax

## 2023-01-29 NOTE — ASSESSMENT & PLAN NOTE
HFrEF (45%), related to Ao stenosis, afib RVR.  -HOLD lasix 80mg IV  -metoprolol 50mg PO BID  -Amlodipine 10mg PO qD  -no Nitrates indicated  -immunizations per primary care

## 2023-01-29 NOTE — ASSESSMENT & PLAN NOTE
Rate controlled on metoprolol  -warfarin per pharmacy, goals of INR 2-3  -amiodarone 400mg PO qD  -Metoprolol 50mg PO BID

## 2023-01-30 ENCOUNTER — APPOINTMENT (OUTPATIENT)
Dept: RADIOLOGY | Facility: MEDICAL CENTER | Age: 63
DRG: 208 | End: 2023-01-30
Attending: INTERNAL MEDICINE
Payer: COMMERCIAL

## 2023-01-30 PROBLEM — R79.89 ELEVATED TROPONIN: Status: RESOLVED | Noted: 2023-01-20 | Resolved: 2023-01-30

## 2023-01-30 LAB
ALBUMIN SERPL BCP-MCNC: 3 G/DL (ref 3.2–4.9)
ALBUMIN/GLOB SERPL: 1.3 G/DL
ALP SERPL-CCNC: 59 U/L (ref 30–99)
ALT SERPL-CCNC: 249 U/L (ref 2–50)
ANION GAP SERPL CALC-SCNC: 10 MMOL/L (ref 7–16)
ANION GAP SERPL CALC-SCNC: 11 MMOL/L (ref 7–16)
ANISOCYTOSIS BLD QL SMEAR: ABNORMAL
AST SERPL-CCNC: 197 U/L (ref 12–45)
BASE EXCESS BLDA CALC-SCNC: 10 MMOL/L (ref -4–3)
BASOPHILS # BLD AUTO: 0.7 % (ref 0–1.8)
BASOPHILS # BLD: 0.1 K/UL (ref 0–0.12)
BILIRUB SERPL-MCNC: 0.6 MG/DL (ref 0.1–1.5)
BODY TEMPERATURE: ABNORMAL DEGREES
BREATHS SETTING VENT: 18
BUN SERPL-MCNC: 31 MG/DL (ref 8–22)
BUN SERPL-MCNC: 34 MG/DL (ref 8–22)
BURR CELLS BLD QL SMEAR: NORMAL
CALCIUM ALBUM COR SERPL-MCNC: 9.5 MG/DL (ref 8.5–10.5)
CALCIUM SERPL-MCNC: 8.7 MG/DL (ref 8.5–10.5)
CALCIUM SERPL-MCNC: 8.7 MG/DL (ref 8.5–10.5)
CHLORIDE SERPL-SCNC: 100 MMOL/L (ref 96–112)
CHLORIDE SERPL-SCNC: 100 MMOL/L (ref 96–112)
CK SERPL-CCNC: 155 U/L (ref 0–154)
CO2 BLDA-SCNC: 35 MMOL/L (ref 20–33)
CO2 SERPL-SCNC: 31 MMOL/L (ref 20–33)
CO2 SERPL-SCNC: 33 MMOL/L (ref 20–33)
COMMENT 1642: NORMAL
CREAT SERPL-MCNC: 1.8 MG/DL (ref 0.5–1.4)
CREAT SERPL-MCNC: 1.9 MG/DL (ref 0.5–1.4)
DELSYS IDSYS: ABNORMAL
END TIDAL CARBON DIOXIDE IECO2: 37 MMHG
EOSINOPHIL # BLD AUTO: 0.24 K/UL (ref 0–0.51)
EOSINOPHIL NFR BLD: 1.6 % (ref 0–6.9)
ERYTHROCYTE [DISTWIDTH] IN BLOOD BY AUTOMATED COUNT: 47.7 FL (ref 35.9–50)
GFR SERPLBLD CREATININE-BSD FMLA CKD-EPI: 39 ML/MIN/1.73 M 2
GFR SERPLBLD CREATININE-BSD FMLA CKD-EPI: 42 ML/MIN/1.73 M 2
GLOBULIN SER CALC-MCNC: 2.4 G/DL (ref 1.9–3.5)
GLUCOSE SERPL-MCNC: 72 MG/DL (ref 65–99)
GLUCOSE SERPL-MCNC: 87 MG/DL (ref 65–99)
HCO3 BLDA-SCNC: 33.8 MMOL/L (ref 17–25)
HCT VFR BLD AUTO: 28.1 % (ref 42–52)
HGB BLD-MCNC: 8.3 G/DL (ref 14–18)
HOROWITZ INDEX BLDA+IHG-RTO: 153 MM[HG]
HYPOCHROMIA BLD QL SMEAR: ABNORMAL
IMM GRANULOCYTES # BLD AUTO: 0.21 K/UL (ref 0–0.11)
IMM GRANULOCYTES NFR BLD AUTO: 1.4 % (ref 0–0.9)
INR PPP: 1.97 (ref 0.87–1.13)
INR PPP: 1.98 (ref 0.87–1.13)
LYMPHOCYTES # BLD AUTO: 0.91 K/UL (ref 1–4.8)
LYMPHOCYTES NFR BLD: 6.1 % (ref 22–41)
MAGNESIUM SERPL-MCNC: 1.9 MG/DL (ref 1.5–2.5)
MCH RBC QN AUTO: 21 PG (ref 27–33)
MCHC RBC AUTO-ENTMCNC: 29.5 G/DL (ref 33.7–35.3)
MCV RBC AUTO: 71 FL (ref 81.4–97.8)
MICROCYTES BLD QL SMEAR: ABNORMAL
MODE IMODE: ABNORMAL
MONOCYTES # BLD AUTO: 1.25 K/UL (ref 0–0.85)
MONOCYTES NFR BLD AUTO: 8.3 % (ref 0–13.4)
MORPHOLOGY BLD-IMP: NORMAL
NEUTROPHILS # BLD AUTO: 12.28 K/UL (ref 1.82–7.42)
NEUTROPHILS NFR BLD: 81.9 % (ref 44–72)
NRBC # BLD AUTO: 0.11 K/UL
NRBC BLD-RTO: 0.7 /100 WBC
O2/TOTAL GAS SETTING VFR VENT: 40 %
PCO2 BLDA: 40.3 MMHG (ref 26–37)
PCO2 TEMP ADJ BLDA: 40.5 MMHG (ref 26–37)
PEEP END EXPIRATORY PRESSURE IPEEP: 8 CMH20
PH BLDA: 7.53 [PH] (ref 7.4–7.5)
PH TEMP ADJ BLDA: 7.53 [PH] (ref 7.4–7.5)
PHOSPHATE SERPL-MCNC: 2.4 MG/DL (ref 2.5–4.5)
PLATELET # BLD AUTO: 461 K/UL (ref 164–446)
PLATELET BLD QL SMEAR: NORMAL
PMV BLD AUTO: 10 FL (ref 9–12.9)
PO2 BLDA: 61 MMHG (ref 64–87)
PO2 TEMP ADJ BLDA: 62 MMHG (ref 64–87)
POIKILOCYTOSIS BLD QL SMEAR: NORMAL
POLYCHROMASIA BLD QL SMEAR: NORMAL
POTASSIUM SERPL-SCNC: 3.8 MMOL/L (ref 3.6–5.5)
POTASSIUM SERPL-SCNC: 4 MMOL/L (ref 3.6–5.5)
PROT SERPL-MCNC: 5.4 G/DL (ref 6–8.2)
PROTHROMBIN TIME: 21.9 SEC (ref 12–14.6)
PROTHROMBIN TIME: 22 SEC (ref 12–14.6)
RBC # BLD AUTO: 3.96 M/UL (ref 4.7–6.1)
RBC BLD AUTO: NORMAL
SAO2 % BLDA: 93 % (ref 93–99)
SODIUM SERPL-SCNC: 141 MMOL/L (ref 135–145)
SODIUM SERPL-SCNC: 144 MMOL/L (ref 135–145)
SPECIMEN DRAWN FROM PATIENT: ABNORMAL
TIDAL VOLUME IVT: 460 ML
WBC # BLD AUTO: 15 K/UL (ref 4.8–10.8)

## 2023-01-30 PROCEDURE — A9270 NON-COVERED ITEM OR SERVICE: HCPCS | Performed by: STUDENT IN AN ORGANIZED HEALTH CARE EDUCATION/TRAINING PROGRAM

## 2023-01-30 PROCEDURE — 700105 HCHG RX REV CODE 258: Performed by: INTERNAL MEDICINE

## 2023-01-30 PROCEDURE — 99291 CRITICAL CARE FIRST HOUR: CPT | Mod: GC | Performed by: INTERNAL MEDICINE

## 2023-01-30 PROCEDURE — 82803 BLOOD GASES ANY COMBINATION: CPT

## 2023-01-30 PROCEDURE — 94003 VENT MGMT INPAT SUBQ DAY: CPT

## 2023-01-30 PROCEDURE — 770022 HCHG ROOM/CARE - ICU (200)

## 2023-01-30 PROCEDURE — 82550 ASSAY OF CK (CPK): CPT

## 2023-01-30 PROCEDURE — 85025 COMPLETE CBC W/AUTO DIFF WBC: CPT

## 2023-01-30 PROCEDURE — 700102 HCHG RX REV CODE 250 W/ 637 OVERRIDE(OP)

## 2023-01-30 PROCEDURE — 700102 HCHG RX REV CODE 250 W/ 637 OVERRIDE(OP): Performed by: INTERNAL MEDICINE

## 2023-01-30 PROCEDURE — A9270 NON-COVERED ITEM OR SERVICE: HCPCS | Performed by: INTERNAL MEDICINE

## 2023-01-30 PROCEDURE — 80053 COMPREHEN METABOLIC PANEL: CPT

## 2023-01-30 PROCEDURE — 71045 X-RAY EXAM CHEST 1 VIEW: CPT

## 2023-01-30 PROCEDURE — 700111 HCHG RX REV CODE 636 W/ 250 OVERRIDE (IP): Performed by: INTERNAL MEDICINE

## 2023-01-30 PROCEDURE — 85610 PROTHROMBIN TIME: CPT | Mod: 91

## 2023-01-30 PROCEDURE — 99233 SBSQ HOSP IP/OBS HIGH 50: CPT | Performed by: INTERNAL MEDICINE

## 2023-01-30 PROCEDURE — 700105 HCHG RX REV CODE 258: Performed by: STUDENT IN AN ORGANIZED HEALTH CARE EDUCATION/TRAINING PROGRAM

## 2023-01-30 PROCEDURE — 83735 ASSAY OF MAGNESIUM: CPT

## 2023-01-30 PROCEDURE — 94799 UNLISTED PULMONARY SVC/PX: CPT

## 2023-01-30 PROCEDURE — 700102 HCHG RX REV CODE 250 W/ 637 OVERRIDE(OP): Performed by: HOSPITALIST

## 2023-01-30 PROCEDURE — 302135 SEQUENTIAL COMPRESSION MACHINE: Performed by: INTERNAL MEDICINE

## 2023-01-30 PROCEDURE — A9270 NON-COVERED ITEM OR SERVICE: HCPCS

## 2023-01-30 PROCEDURE — A9270 NON-COVERED ITEM OR SERVICE: HCPCS | Performed by: HOSPITALIST

## 2023-01-30 PROCEDURE — 87040 BLOOD CULTURE FOR BACTERIA: CPT

## 2023-01-30 PROCEDURE — 36600 WITHDRAWAL OF ARTERIAL BLOOD: CPT

## 2023-01-30 PROCEDURE — 700101 HCHG RX REV CODE 250: Performed by: INTERNAL MEDICINE

## 2023-01-30 PROCEDURE — 80048 BASIC METABOLIC PNL TOTAL CA: CPT

## 2023-01-30 PROCEDURE — 97602 WOUND(S) CARE NON-SELECTIVE: CPT

## 2023-01-30 PROCEDURE — 700102 HCHG RX REV CODE 250 W/ 637 OVERRIDE(OP): Performed by: STUDENT IN AN ORGANIZED HEALTH CARE EDUCATION/TRAINING PROGRAM

## 2023-01-30 PROCEDURE — 84100 ASSAY OF PHOSPHORUS: CPT

## 2023-01-30 RX ORDER — WARFARIN SODIUM 5 MG/1
5 TABLET ORAL
Status: COMPLETED | OUTPATIENT
Start: 2023-01-30 | End: 2023-01-30

## 2023-01-30 RX ORDER — SODIUM CHLORIDE, SODIUM LACTATE, POTASSIUM CHLORIDE, AND CALCIUM CHLORIDE .6; .31; .03; .02 G/100ML; G/100ML; G/100ML; G/100ML
500 INJECTION, SOLUTION INTRAVENOUS ONCE
Status: COMPLETED | OUTPATIENT
Start: 2023-01-30 | End: 2023-01-31

## 2023-01-30 RX ORDER — MAGNESIUM SULFATE HEPTAHYDRATE 40 MG/ML
2 INJECTION, SOLUTION INTRAVENOUS ONCE
Status: COMPLETED | OUTPATIENT
Start: 2023-01-30 | End: 2023-01-30

## 2023-01-30 RX ORDER — QUETIAPINE FUMARATE 25 MG/1
12.5 TABLET, FILM COATED ORAL 3 TIMES DAILY
Status: DISCONTINUED | OUTPATIENT
Start: 2023-01-30 | End: 2023-01-31

## 2023-01-30 RX ORDER — QUETIAPINE FUMARATE 25 MG/1
TABLET, FILM COATED ORAL
Status: COMPLETED
Start: 2023-01-30 | End: 2023-01-30

## 2023-01-30 RX ADMIN — PROPOFOL 40 MCG/KG/MIN: 10 INJECTION, EMULSION INTRAVENOUS at 23:24

## 2023-01-30 RX ADMIN — FENTANYL CITRATE 100 MCG: 50 INJECTION, SOLUTION INTRAMUSCULAR; INTRAVENOUS at 19:15

## 2023-01-30 RX ADMIN — OMEPRAZOLE 40 MG: KIT at 06:18

## 2023-01-30 RX ADMIN — POTASSIUM CHLORIDE 40 MEQ: 1500 TABLET, EXTENDED RELEASE ORAL at 06:19

## 2023-01-30 RX ADMIN — PROPOFOL 35 MCG/KG/MIN: 10 INJECTION, EMULSION INTRAVENOUS at 00:50

## 2023-01-30 RX ADMIN — TRAZODONE HYDROCHLORIDE 50 MG: 50 TABLET ORAL at 21:08

## 2023-01-30 RX ADMIN — PROPOFOL 40 MCG/KG/MIN: 10 INJECTION, EMULSION INTRAVENOUS at 06:34

## 2023-01-30 RX ADMIN — QUETIAPINE 12.5 MG: 25 TABLET, FILM COATED ORAL at 18:00

## 2023-01-30 RX ADMIN — QUETIAPINE FUMARATE 12.5 MG: 25 TABLET ORAL at 14:30

## 2023-01-30 RX ADMIN — ASPIRIN 81 MG: 81 TABLET, CHEWABLE ORAL at 06:18

## 2023-01-30 RX ADMIN — SENNOSIDES AND DOCUSATE SODIUM 2 TABLET: 50; 8.6 TABLET ORAL at 18:00

## 2023-01-30 RX ADMIN — AMIODARONE HYDROCHLORIDE 400 MG: 200 TABLET ORAL at 06:18

## 2023-01-30 RX ADMIN — MAGNESIUM SULFATE HEPTAHYDRATE 2 G: 40 INJECTION, SOLUTION INTRAVENOUS at 07:45

## 2023-01-30 RX ADMIN — WARFARIN SODIUM 5 MG: 5 TABLET ORAL at 18:00

## 2023-01-30 RX ADMIN — PROPOFOL 30 MCG/KG/MIN: 10 INJECTION, EMULSION INTRAVENOUS at 12:14

## 2023-01-30 RX ADMIN — THIAMINE HYDROCHLORIDE 100 MG: 100 INJECTION, SOLUTION INTRAMUSCULAR; INTRAVENOUS at 14:15

## 2023-01-30 RX ADMIN — PROPOFOL 35 MCG/KG/MIN: 10 INJECTION, EMULSION INTRAVENOUS at 17:19

## 2023-01-30 RX ADMIN — SODIUM CHLORIDE, POTASSIUM CHLORIDE, SODIUM LACTATE AND CALCIUM CHLORIDE 500 ML: 600; 310; 30; 20 INJECTION, SOLUTION INTRAVENOUS at 11:30

## 2023-01-30 RX ADMIN — SENNOSIDES AND DOCUSATE SODIUM 2 TABLET: 50; 8.6 TABLET ORAL at 06:19

## 2023-01-30 RX ADMIN — FUROSEMIDE 80 MG: 10 INJECTION, SOLUTION INTRAMUSCULAR; INTRAVENOUS at 06:18

## 2023-01-30 RX ADMIN — SODIUM PHOSPHATE, MONOBASIC, MONOHYDRATE AND SODIUM PHOSPHATE, DIBASIC, ANHYDROUS 15 MMOL: 142; 276 INJECTION, SOLUTION INTRAVENOUS at 07:45

## 2023-01-30 RX ADMIN — QUETIAPINE FUMARATE 12.5 MG: 25 TABLET ORAL at 18:00

## 2023-01-30 ASSESSMENT — ENCOUNTER SYMPTOMS
PALPITATIONS: 0
ABDOMINAL DISTENTION: 0
CHILLS: 0
POLYPHAGIA: 0
EYE PAIN: 0
JOINT SWELLING: 0
ACTIVITY CHANGE: 0
APPETITE CHANGE: 0
AGITATION: 0
ABDOMINAL PAIN: 0
EYE ITCHING: 0
EYE DISCHARGE: 0
STRIDOR: 0
SPEECH DIFFICULTY: 0
COLOR CHANGE: 0
HALLUCINATIONS: 0
DIAPHORESIS: 0
COUGH: 0
DIARRHEA: 0
CHOKING: 0
NUMBNESS: 0
BRUISES/BLEEDS EASILY: 0
BACK PAIN: 0
NAUSEA: 0
WEAKNESS: 0
POLYDIPSIA: 0
LIGHT-HEADEDNESS: 0
SEIZURES: 0
MYALGIAS: 0
FLANK PAIN: 0
CHEST TIGHTNESS: 0
NERVOUS/ANXIOUS: 0
CONFUSION: 0
TREMORS: 0
DECREASED CONCENTRATION: 0
EYE REDNESS: 0
CONSTIPATION: 0
HEADACHES: 0
ADENOPATHY: 0

## 2023-01-30 ASSESSMENT — PAIN DESCRIPTION - PAIN TYPE
TYPE: ACUTE PAIN

## 2023-01-30 ASSESSMENT — FIBROSIS 4 INDEX: FIB4 SCORE: 1.71

## 2023-01-30 NOTE — PROGRESS NOTES
[facility] Cardiology Follow-up Consult Note  Date of note:    1/29/2023          Patient ID:  Name:   Noe Hickey   YOB: 1960  Age:   62 y.o.  male   MRN:   [id number]    Chief Complaint   Patient presents with    Chest Pain     X 5 hours    Shortness of Breath    Leg Swelling       Interim Events:  Patient became agitated this a.m. and had hallucinations overnight events requiring intubation given the need for sedating medications      ROS  Unable to obtain given intubation    Past medical, surgical, social, and family history reviewed and unchanged from admission except as noted in assessment and plan.    Medications: Reviewed in MAR  Current Facility-Administered Medications   Medication Dose Frequency Provider Last Rate Last Admin    Respiratory Therapy Consult   Continuous RT Camila Queen M.D. MD Alert...ICU Electrolyte Replacement per Pharmacy   PHARMACY TO DOSE Camila Queen M.D.        lidocaine (XYLOCAINE) 1 % injection 2 mL  2 mL Q30 MIN PRN Camila Queen M.D.        propofol (DIPRIVAN) injection  0-80 mcg/kg/min (Ideal) Continuous Camila Queen M.D. 14.9 mL/hr at 01/29/23 2039 35 mcg/kg/min at 01/29/23 2039    fentaNYL (SUBLIMAZE) injection 25 mcg  25 mcg Q HOUR PRN Camila Queen M.D.        Or    fentaNYL (SUBLIMAZE) injection 50 mcg  50 mcg Q HOUR PRN Camila Queen M.D.        Or    fentaNYL (SUBLIMAZE) injection 100 mcg  100 mcg Q HOUR PRN Camila Queen M.D.        ipratropium-albuterol (DUONEB) nebulizer solution  3 mL Q2HRS PRN (RT) Camila Queen M.D.        traZODone (DESYREL) tablet 50 mg  50 mg QHS Noe Anguiano M.D.   50 mg at 01/29/23 2143    [START ON 1/30/2023] metoprolol tartrate (LOPRESSOR) tablet 50 mg  50 mg BID Noe Anguiano M.D.        ondansetron (ZOFRAN ODT) dispertab 4 mg  4 mg Q4HRS PRN Burt Yin M.D.        [START ON 1/30/2023] senna-docusate (PERICOLACE or SENOKOT S) 8.6-50 MG per tablet 2 Tablet  2 Tablet BID Hamad  RONI Yin        And    polyethylene glycol/lytes (MIRALAX) PACKET 1 Packet  1 Packet QDAY PRN Burt Yin M.D.        And    magnesium hydroxide (MILK OF MAGNESIA) suspension 30 mL  30 mL QDAY PRN Burt Yin M.D.        And    bisacodyl (DULCOLAX) suppository 10 mg  10 mg QDAY PRN Burt Yin M.D.        [START ON 1/30/2023] amLODIPine (NORVASC) tablet 10 mg  10 mg DAILY Burt Yin M.D.        [START ON 1/30/2023] amiodarone (Cordarone) tablet 400 mg  400 mg DAILY Burt Yin M.D.        promethazine (PHENERGAN) tablet 12.5-25 mg  12.5-25 mg Q4HRS PRN Burt Yin M.D.        acetaminophen (Tylenol) tablet 650 mg  650 mg Q6HRS PRN Burt Yin M.D.        [START ON 1/30/2023] warfarin (COUMADIN) tablet 2.5 mg  2.5 mg DAILY AT 1800 Noe Anguiano M.D.        Pharmacy Consult: Enteral tube insertion - review meds/change route/product selection   PHARMACY TO DOSE Camila Queen M.D.        [START ON 1/30/2023] omeprazole (FIRST-OMEPRAZOLE) 2 mg/mL oral susp 40 mg  40 mg DAILY Camila Queen M.D.        [START ON 1/30/2023] aspirin (ASA) chewable tab 81 mg  81 mg DAILY Camila Queen M.D.        [START ON 1/30/2023] potassium chloride SA (Kdur) tablet 40 mEq  40 mEq BID Camila Queen M.D.        furosemide (LASIX) injection 80 mg  80 mg TID DIURETIC Keshawn Trejo M.D.   80 mg at 01/29/23 1600    nitroglycerin (NITROSTAT) tablet 0.4 mg  0.4 mg Q5 MIN PRN Keshawn Trejo M.D.   0.4 mg at 01/26/23 0205    budesonide-formoterol (SYMBICORT) 80-4.5 MCG/ACT inhaler 2 Puff  2 Puff BID Noe Anguiano M.D.   2 Puff at 01/28/23 1715    hydrALAZINE (APRESOLINE) injection 10 mg  10 mg Q4HRS PRN Burt Yin M.D.        ondansetron (ZOFRAN) syringe/vial injection 4 mg  4 mg Q4HRS PRDIPAK Yin M.D.   4 mg at 01/21/23 1401    promethazine (PHENERGAN) suppository 12.5-25 mg  12.5-25 mg Q4HRS PRN Burt Yin M.D.        prochlorperazine (COMPAZINE) injection 5-10 mg  5-10 mg Q4HRS PRN  "Burt Yin M.D.        MD Alert...Warfarin per Pharmacy   PHARMACY TO DOSE Burt Yin M.D.       Last reviewed on 1/20/2023 11:11 PM by Ирина Dennis R.N.   Allergies   Allergen Reactions    Morphine Rash     Rash/ difficulty breathing    Ampicillin-Sulbactam Sodium      Rash to upper torso - appears to be related to Unasyn new start (Jan 2023)       Physical Exam  Body mass index is 31.45 kg/m². BP 93/50   Pulse 61   Temp 36.8 °C (98.2 °F) (Bladder)   Resp 18   Ht 1.753 m (5' 9\")   Wt 96.6 kg (212 lb 15.4 oz)   SpO2 99%    Vitals:    01/29/23 2000 01/29/23 2100 01/29/23 2157 01/29/23 2200   BP: 95/52 104/53 91/50 93/50   Pulse: 60 62 61 61   Resp: 18 18 18 18   Temp: 36.8 °C (98.2 °F)      TempSrc: Bladder      SpO2: 100% 100% 99% 99%   Weight:       Height:        Oxygen Therapy:  Pulse Oximetry: 99 %, O2 (LPM): 0, FiO2%: 40 %, O2 Delivery Device: Ventilator    General: RASS -3  Eyes: nl conjunctiva  ENT: ET tube  Neck: no JVD   Lungs: On ventilator  Heart: RRR  EXT moderate edema bilateral lower extremities. + pedal pulses. no cyanosis  Abdomen: Mildly distended,  Neurological: Sedated  Psychiatric: Sedated on ventilator  Skin: Warm extremities    Labs (personally reviewed and notable for):   Recent Results (from the past 24 hour(s))   CBC WITHOUT DIFFERENTIAL    Collection Time: 01/29/23  2:04 AM   Result Value Ref Range    WBC 13.7 (H) 4.8 - 10.8 K/uL    RBC 4.36 (L) 4.70 - 6.10 M/uL    Hemoglobin 9.3 (L) 14.0 - 18.0 g/dL    Hematocrit 31.8 (L) 42.0 - 52.0 %    MCV 72.9 (L) 81.4 - 97.8 fL    MCH 21.3 (L) 27.0 - 33.0 pg    MCHC 29.2 (L) 33.7 - 35.3 g/dL    RDW 48.8 35.9 - 50.0 fL    Platelet Count 537 (H) 164 - 446 K/uL    MPV 10.2 9.0 - 12.9 fL   Basic Metabolic Panel    Collection Time: 01/29/23  2:04 AM   Result Value Ref Range    Sodium 136 135 - 145 mmol/L    Potassium 5.1 3.6 - 5.5 mmol/L    Chloride 96 96 - 112 mmol/L    Co2 29 20 - 33 mmol/L    Glucose 93 65 - 99 mg/dL    Bun 33 (H) 8 - 22 " mg/dL    Creatinine 1.58 (H) 0.50 - 1.40 mg/dL    Calcium 9.2 8.5 - 10.5 mg/dL    Anion Gap 11.0 7.0 - 16.0   ESTIMATED GFR    Collection Time: 23  2:04 AM   Result Value Ref Range    GFR (CKD-EPI) 49 (A) >60 mL/min/1.73 m 2   EKG    Collection Time: 23 10:13 AM   Result Value Ref Range    Report       [facility]    Test Date:  2023  Pt Name:    JUSTIN TOPETE               Department: 171  MRN:        [id number]                      Room:       [facility]  Gender:     Male                         Technician: GAY  :        1960                   Requested By:MAGDIEL LAZCANO  Order #:    [id number]                    Reading MD:    Measurements  Intervals                                Axis  Rate:       110                          P:  CT:                                      QRS:        7  QRSD:       159                          T:          66  QT:         352  QTc:        477    Interpretive Statements  Atrial fibrillation  Nonspecific intraventricular conduction delay  Compared to ECG 2023 12:01:10  Intraventricular conduction delay now present  Sinus bradycardia no longer present     URINE DRUG SCREEN    Collection Time: 23 11:20 AM   Result Value Ref Range    Amphetamines Urine Negative Negative    Barbiturates Negative Negative    Benzodiazepines Negative Negative    Cocaine Metabolite Negative Negative    Methadone Negative Negative    Opiates Negative Negative    Oxycodone Positive (A) Negative    Phencyclidine -Pcp Negative Negative    Propoxyphene Negative Negative    Cannabinoid Metab Negative Negative   POCT arterial blood gas device results    Collection Time: 23 12:55 PM   Result Value Ref Range    Ph 7.461 7.400 - 7.500    Pco2 46.5 (H) 26.0 - 37.0 mmHg    Po2 79 64 - 87 mmHg    Tco2 35 (H) 20 - 33 mmol/L    S02 96 93 - 99 %    Hco3 33.1 (H) 17.0 - 25.0 mmol/L    BE 8 (H) -4 - 3 mmol/L    Body Temp 37.4 C degrees    O2 Therapy 60 %    iPF Ratio 132     Ph  Temp Berna 7.455 7.400 - 7.500    Pco2 Temp Co 47.3 (H) 26.0 - 37.0 mmHg    Po2 Temp Cor 82 64 - 87 mmHg    Specimen Arterial     DelSys Vent     End Tidal Carbon Dioxide 40 mmhg    Tidal Volume 460 mL    Peep End Expiratory Pressure 8 cmh20    Set Rate 18     Mode APV-CMV        Cardiac Imaging and Procedures Review:    EKG and telemetry tracings personally reviewed    Impression and Medical Decision Making:  Principal Problem:    Acute on chronic systolic heart failure (HCC) POA: Yes  Active Problems:    Acute renal failure (ARF) (HCC) POA: Unknown    Acute respiratory failure with hypoxia (HCC) POA: Unknown    Pneumonia due to infectious organism POA: Yes    Paroxysmal atrial fibrillation (HCC) POA: Unknown    Elevated troponin POA: Unknown    COPD (chronic obstructive pulmonary disease) (HCC) POA: Unknown    Elevated hemidiaphragm - LEFT post AVR (Chronic) POA: Yes    Acute encephalopathy POA: No    Drug rash POA: Yes    S/P AVR POA: Yes  Resolved Problems:    * No resolved hospital problems. *    Acute on chronic heart failure  Continue diuresis  Replete electrolytes  Goal weight was 205 or less depending on lower extremity edema as well    Paroxysmal A. Fib  Amiodarone  Anticoagulation    I personally discussed his case with  Dr Camila Queen M.D. and Dr. Gudino    Future Appointments   Date Time Provider Department Center   2/6/2023  1:45 PM CT RESOURCE PROVIDER [facility] None       It is my pleasure to participate in the care of Mr. Hickey.  Please do not hesitate to contact me with questions or concerns.    Keshawn Trejo MD PhD FAC  Cardiologist [facility]    1/29/2023    Please note that this dictation was created using voice recognition software. There may be errors of grammar and possibly content I did not discover before finalizing the note.

## 2023-01-30 NOTE — PROGRESS NOTES
Monitor summary:  Sinus bradycardia to normal sinus rhythm with rates from the 50s to 60s  Frequent PACs  .16/.09/.46

## 2023-01-30 NOTE — WOUND TEAM
[facility]  Inpatient Services  Wound and Skin Care Brief Evaluation    Admission Date: 1/20/2023     Last order of IP CONSULT TO WOUND CARE was found on 1/29/2023 from Hospital Encounter on 1/20/2023     HPI, PMH, SH: Reviewed    Chief Complaint   Patient presents with    Chest Pain     X 5 hours    Shortness of Breath    Leg Swelling     Diagnosis: Pneumonia due to infectious organism [J18.9]    Unit where seen by Wound Team: T620/00     Wound consult placed regarding BUE and BLE. Chart and images reviewed. This discussed with bedside RN Aldo. This RN in to assess patient. Pt sedated. Non-selectively debrided with NS/wound cleanser and gauze OR moist warm washcloth and no rinse foam soap. No pressure injuries or advanced wound care needs identified. Wound consult completed. No further follow up unless indicated and consulted.                     RSKIN:   CURRENTLY IN PLACE (X), APPLIED THIS VISIT (A), ORDERED (O):   Q shift Clement:  X  Q shift pressure point assessments:  X    Surface/Positioning X  Pressure redistribution mattress            Low Airloss        x ICU  Bariatric foam      Bariatric ASIF     Waffle cushion        Waffle Overlay          Reposition q 2 hours  x    TAPs Turning system     Z Omar Pillow     Offloading/Redistribution X  Sacral Mepilex (Silicone dressing)     Heel Mepilex (Silicone dressing)  x       Heel float boots (Prevalon boot)             Float Heels off Bed with Pillows           Respiratory ventilator  Silicone O2 tubing         Gray Foam Ear protectors     Cannula fixation Device (Tender )          High flow offloading Clip    Elastic head band offloading device      Anchorfast                                                         Trach with Optifoam split foam             Containment/Moisture Prevention x    Rectal tube or BMS    Purwick/Condom Cath        Miranda Catheter  x  Barrier wipes           Barrier paste       Antifungal tx      Interdry         Mobilization HUGH      Up to chair        Ambulate      PT/OT      Nutrition X     Dietician        Diabetes Education      PO     TF     TPN     NPOx   # days     Other    All areas blanching continue to offload.

## 2023-01-30 NOTE — CARE PLAN
Ventilator Daily Summary    Vent Day #1    Ventilator settings changed this shift:18 [phone] +     Weaning trials:    Respiratory Procedures:    Plan: Continue current ventilator settings and wean mechanical ventilation as tolerated per physician orders.     Problem: Ventilation  Goal: Ability to achieve and maintain unassisted ventilation or tolerate decreased levels of ventilator support  Description: Target End Date:  4 days     Document on Vent flowsheet    1.  Support and monitor invasive and noninvasive mechanical ventilation  2.  Monitor ventilator weaning response  3.  Perform ventilator associated pneumonia prevention interventions  4.  Manage ventilation therapy by monitoring diagnostic test results  Outcome: Progressing

## 2023-01-30 NOTE — PROGRESS NOTES
Inpatient Anticoagulation Service Note for 1/30/2023      Reason for Anticoagulation: Atrial Fibrillation   [id number] VASc Score: 2  HAS-BLED Score: 1    Hemoglobin Value: (Abnormal) 8.3  Hematocrit Value: (Abnormal) 28.1  Lab Platelet Value: (Abnormal) 461  Target INR: 2.0 to 3.0    INR from last 7 days       Date/Time INR Value    01/30/23 1222 (Abnormal) 1.98    01/30/23 0813 (Abnormal) 1.97    01/28/23 0208 (Abnormal) 2.16    01/27/23 0243 (Abnormal) 2.06    01/26/23 0207 (Abnormal) 2.13    01/25/23 0045 (Abnormal) 2.47    01/24/23 0101 (Abnormal) 2.47          Dose from last 7 days       Date/Time Dose (mg)    01/30/23 1331 5    01/29/23 0828 2.5    01/28/23 1127 2.5    01/27/23 0851 2.5    01/26/23 0953 2.5    01/25/23 0824 2.5    01/24/23 1139 2.5    01/23/23 1425 2.5          Average Dose (mg): 2.5  Significant Interactions: Antiplatelet Medications, Amiodarone  Bridge Therapy: No   Reversal Agent Administered: Not Applicable    A/P  Slightly sub-therapeutic INR while receiving outpatient dosing of warfarin.  No new DDI noted.  Will give warfairn 5 mg tonight.  INR tomorrow to guide subsquent dosing.    Education Material Provided?: No    Pharmacist suggested discharge dosing: TBD pending INR trends, perhaps warfarin 2.5 mg PO daily.  Recommend a follow-up PT/INR within 48-72 hours of discharge.     Kay Rhodes, PharmD, BCPS, BCCCP

## 2023-01-30 NOTE — CARE PLAN
The patient is Stable - Low risk of patient condition declining or worsening    Shift Goals  Clinical Goals: obtain RASS goal of -1 to +1  Patient Goals: HUGH  Family Goals: HUGH    Progress made toward(s) clinical / shift goals:    Problem: Pain - Standard  Goal: Alleviation of pain or a reduction in pain to the patient’s comfort goal  Outcome: Progressing  Note: CPOT: 1     Problem: Safety - Medical Restraint  Goal: Remains free of injury from restraints (Restraint for Interference with Medical Device)  Outcome: Progressing  Flowsheets (Taken 1/30/2023 0016)  Addressed this shift: Remains free of injury from restraints (restraint for interference with medical device):   Determine that other, less restrictive measures have been tried or would not be effective before applying the restraint   Evaluate the patient's condition at the time of restraint application   Inform patient/family regarding the reason for restraint   Every 2 hours: Monitor safety, psychosocial status, comfort, nutrition and hydration  Note: Pt remains free from injury  Goal: Free from restraint(s) (Restraint for Interference with Medical Device)  Outcome: Progressing  Flowsheets (Taken 1/30/2023 0016)  Addressed this shift: Free from restraint(s) (restraint for interference with medical device):   ONCE/SHIFT or MINIMUM Every 12 hours: Assess and document the continuing need for restraints   Every 24 hours: Continued use of restraint requires Licensed Independent Practitioner to perform face to face examination and written order   Identify and implement measures to help patient regain control  Note: Pt requires mechanical restraints to prevent from pulling at life sustaining lines and tubes       Patient is not progressing towards the following goals:

## 2023-01-30 NOTE — PROGRESS NOTES
[facility] Progress Note      Admit Date: 1/20/2023    Resident(s): Jd Disla M.D.   Attending:  QUINN MERCHANT/ Dr. Jesus MD    Patient ID:    Name:  Noe Hickey   YOB: 1960  Age:  62 y.o.  male   MRN:  [id number]    Hospital Course (carried forward and updated):  Mr. Hickey is a 62 year old male with the past medical history significant for atrial fibrillation/flutter, tobacco abuse, hyperlipidemia, hypertension, obesity, and severe symptomatic aortic stenosis due to bicuspid valve with ascending aortic aneurysm who was hospitalized at [facility] from 1/5-1/11/2023 for AVR and aneurysm repair whose hospital course was complicated by acute hypoxic respiratory failure requiring BiPAP therapy, defibrillation for torsades de pointe, and atrial fibrillation on Warfarin who eventually was discharged to [facility].  The patient was readmitted on 1/20/2023 for complaints of worsening lower extremity swelling, chest pain, shortness of breath, and cough that was thought to be a CHF exacerbation.  The patient was started on diuretics and seen by Cardiology.  A repeat ECHO did reveal global hypokinesis with a worsening EF from 55% to 45%.  The patient was doing relatively well until 1/24 when he started having nocturnal agitation and hallucinating and received Atarax.  This agitation continued to wax and wane until today when it fully escalated to a CODE GRAY after the patient became increasingly agitated, pacing his room, yelling out to staff and attempting to punch nursing.  At one point I am told that the patient took his telemetry box and was slamming it against the glass windows of his room.  When roughly 10 security guards came for the CODE GRAY the patient was found to be naked except for his half shirt, but cooperated and laid back in bed.  He received multiple doses of Haldol, a total of 13mg, and he remains agitated requiring 4-point restraints (per JAIME Queen consult)    1/29: transfer  "to ICU, intubated, precedex gtt  1/30: Attempted wean of sedation, no SBT. Elevated transaminases.     Consultants:  Critical Care  Psychiatry  Cardiology     Interval Events:  Transferred to ICU, intubated for agitation requiring sedation. CVC placed. Cautious sedation weaning, high risk for delirium/agitation.      Review of Systems   Unable to perform ROS: Intubated     PHYSICAL EXAM:  Vitals:    01/30/23 0400 01/30/23 0500 01/30/23 0600 01/30/23 0823   BP: 108/61 94/52 99/65    Pulse: 64 60 64 81   Resp: 18 18 19 (!) 7   Temp: 37.2 °C (99 °F)      TempSrc: Bladder      SpO2: 100% 97% 99% 99%   Weight:   89 kg (196 lb 3.4 oz)    Height:        Body mass index is 28.98 kg/m².  Latest Vitals:  BP 99/65   Pulse 81   Temp 37.2 °C (99 °F) (Bladder)   Resp (!) 7   Ht 1.753 m (5' 9\")   Wt 89 kg (196 lb 3.4 oz)   SpO2 99%   BMI 28.98 kg/m²   O2 therapy: Pulse Oximetry: 99 %, O2 Delivery Device: Ventilator  Vitals Range last 24h:  Temp:  [36.8 °C (98.2 °F)-37.2 °C (99 °F)] 37.2 °C (99 °F)  Pulse:  [] 81  Resp:  [7-41] 7  BP: ()/(50-80) 99/65  SpO2:  [96 %-100 %] 99 %       Intake/Output Summary (Last 24 hours) at 1/30/2023 1111  Last data filed at 1/30/2023 0609  Gross per 24 hour   Intake 319.04 ml   Output 5300 ml   Net -4980.96 ml        Physical Exam  Vitals and nursing note reviewed.   Constitutional:       Comments: Sedated, intubated   HENT:      Head: Normocephalic and atraumatic.      Mouth/Throat:      Comments: ETT in place, no visible oral lesions/wounds  Cardiovascular:      Comments: Regular rhythm, 3/6 systolic murmur, no peripheral edema  Pulmonary:      Breath sounds: No wheezing, rhonchi or rales.      Comments: Mechanical sounds  Abdominal:      General: There is no distension.      Palpations: Abdomen is soft.      Tenderness: There is no abdominal tenderness. There is no guarding or rebound.   Skin:     General: Skin is warm and dry.   Neurological:      Comments: sedated "       Recent Labs     01/29/23  1255 01/30/23  0349   ISTATAPH 7.461 7.531*   ISTATAPCO2 46.5* 40.3*   ISTATAPO2 79 61*   ISTATATCO2 35* 35*   ABBEZAW5CPO 96 93   ISTATARTHCO3 33.1* 33.8*   ISTATARTBE 8* 10*   ISTATTEMP 37.4 C 37.1 C   ISTATFIO2 60 40   ISTATSPEC Arterial Arterial   ISTATAPHTC 7.455 7.530*   WDZWCOKT5NF 82 62*     Recent Labs     01/28/23  0208 01/29/23  0204 01/30/23  0345   SODIUM 138 136 141   POTASSIUM 4.2 5.1 3.8   CHLORIDE 97 96 100   CO2 31 29 31   BUN 29* 33* 34*   CREATININE 1.64* 1.58* 1.90*   MAGNESIUM  --   --  1.9   PHOSPHORUS  --   --  2.4*   CALCIUM 8.9 9.2 8.7     Recent Labs     01/28/23 0208 01/29/23 0204 01/30/23 0345   ALTSGPT  --   --  249*   ASTSGOT  --   --  197*   ALKPHOSPHAT  --   --  59   TBILIRUBIN  --   --  0.6   GLUCOSE 93 93 72     Recent Labs     01/28/23 0208 01/29/23  0204 01/30/23  0345 01/30/23  0813   RBC 4.49* 4.36* 3.96*  --    HEMOGLOBIN 9.6* 9.3* 8.3*  --    HEMATOCRIT 32.6* 31.8* 28.1*  --    PLATELETCT 564* 537* 461*  --    PROTHROMBTM 23.5*  --   --  21.9*   INR 2.16*  --   --  1.97*     Recent Labs     01/28/23 0208 01/29/23 0204 01/30/23 0345   WBC 13.4* 13.7* 15.0*   NEUTSPOLYS  --   --  81.90*   LYMPHOCYTES  --   --  6.10*   MONOCYTES  --   --  8.30   EOSINOPHILS  --   --  1.60   BASOPHILS  --   --  0.70   ASTSGOT  --   --  197*   ALTSGPT  --   --  249*   ALKPHOSPHAT  --   --  59   TBILIRUBIN  --   --  0.6       Meds:   sodium phosphate ivpb  15 mmol 15 mmol (01/30/23 0745)    LR  500 mL      Respiratory Therapy Consult        MD Alert...Adult ICU Electrolyte Replacement per Pharmacy        lidocaine  2 mL      propofol  0-80 mcg/kg/min (Ideal) 40 mcg/kg/min (01/30/23 0634)    fentaNYL  25 mcg      Or    fentaNYL  50 mcg      Or    fentaNYL  100 mcg      ipratropium-albuterol  3 mL      traZODone  50 mg      metoprolol tartrate  50 mg      ondansetron  4 mg      senna-docusate  2 Tablet      And    polyethylene glycol/lytes  1 Packet      And     magnesium hydroxide  30 mL      And    bisacodyl  10 mg      amLODIPine  10 mg      amiodarone  400 mg      promethazine  12.5-25 mg      acetaminophen  650 mg      warfarin  2.5 mg      Pharmacy        omeprazole  40 mg      aspirin  81 mg      [Held by provider] furosemide  80 mg      nitroglycerin  0.4 mg      budesonide-formoterol  2 Puff      hydrALAZINE  10 mg      ondansetron  4 mg      promethazine  12.5-25 mg      prochlorperazine  5-10 mg      MD Alert...Warfarin per Pharmacy            Procedures:  1/29: intubation, Critical care  1/29: R IJ CVC, critical care    Imaging:  DX-CHEST-PORTABLE (1 VIEW)   Final Result         1.  Left lower lobe infiltrate, slightly increased since prior study.   2.  Cardiomegaly   3.  Atherosclerosis      CT-CTA CHEST PULMONARY ARTERY W/ RECONS   Final Result      1.  No evidence of pulmonary embolus.      2.  Worsening bibasilar atelectasis/consolidation, left greater than right.      3.  Again seen recent surgical change consistent with unhealed median sternotomy and aortic valvular replacement. There is again seen fluid and stranding attenuation within the anterior mediastinum. No evidence of rim-enhancing fluid collection to    suggest presence of abscess.      4.  Cardiomegaly.      5.  Fatty liver.      CT-CTA NECK WITH & W/O-POST PROCESSING   Final Result      Mild plaque in the left carotid bulb and proximal left internal carotid artery with less than 50% stenosis.      NG tube coiled in the hypopharynx.      CT-CTA HEAD WITH & W/O-POST PROCESS   Final Result      CT angiogram of the [place] within normal limits.      DX-ABDOMEN FOR TUBE PLACEMENT   Final Result      NG tube tip in expected location the gastric body.      DX-CHEST-PORTABLE (1 VIEW)   Final Result      Tubes and line as described above.      Improving left basilar atelectasis.      DX-CHEST-PORTABLE (1 VIEW)   Final Result      1.  Chronic elevation of the left hemidiaphragm with left basilar  atelectasis.   2.  Cardiomegaly.         DX-CHEST-PORTABLE (1 VIEW)   Final Result      1.  Chronic elevation of the left hemidiaphragm with left basilar atelectasis.   2.  Cardiomegaly.      EC-ECHOCARDIOGRAM COMPLETE W/O CONT   Final Result      CT-CTA CHEST PULMONARY ARTERY W/ RECONS   Final Result      1.  No CT evidence of pulmonary embolism.      2.  Significant cardiomegaly.      3.  Consolidation and volume loss in the left lower lobe and lingular segment left upper lobe could BE due to consolidative atelectasis and pneumonia.      4.  Minimal groundglass opacifications noted in the right lung which could be due to inflammation or infection.      5.  Additional linear opacifications in the right lung likely due to atelectasis or scarring.            DX-CHEST-PORTABLE (1 VIEW)   Final Result      1.  Left lung base atelectasis.      2.  Cardiomegaly.      3.  Chronic elevation of left hemidiaphragm.          Problem and Plan:    Noe Hickey is a 62M admitted on 1/20 for hypoxic respiratory failure, PNA; pmhx inclusive of Ao stenosis (bovine tissue valve 1/5), ascending aortic aneurysm (1/5 repair), A fib RVR (warfarin), HFpEF (45%), chronic tobacco use, migraines; transferred to CICU on 1/29 for agitation requiring sedation/intubation.    * Acute on chronic systolic heart failure (HCC)- (present on admission)  Assessment & Plan  HFrEF (45%), related to Ao stenosis, afib RVR.  -HOLD lasix 80mg IV  -metoprolol 50mg PO BID  -Amlodipine 10mg PO qD  -no Nitrates indicated  -immunizations per primary care        S/P AVR- (present on admission)  Assessment & Plan  Hx of on 1/5 with ascending aortic aneurysm repair    Drug rash- (present on admission)  Assessment & Plan  Rash found on chest during transfer to ICU. Possible interaction with unasyn, atarax?  -STOP unasyn/atarax    Acute encephalopathy  Assessment & Plan  Agitation. No acute abnormal cranial imaging findings. Suspected relation to medication effect  (atarax?). Required sedation leading to intubation.  -STOP atarax  -SAT/SBT  -Consider seroquel as needed  -Psychiatry reconsult as weaned off vent/following    COPD (chronic obstructive pulmonary disease) (HCC)  Assessment & Plan  Chronic tobacco use, no PFT on file, CT more suggestive of chronic bronchitis vs. Emphysema. Not in exacerbation.  -Symbicort 2 puff BID  -Duoneb q2h/prn  -RT protocols    Paroxysmal atrial fibrillation (HCC)  Assessment & Plan  Rate controlled on metoprolol  -warfarin per pharmacy, goals of INR 2-3  -amiodarone 400mg PO qD  -Metoprolol 50mg PO BID    Acute respiratory failure with hypoxia ([facility])  Assessment & Plan  Agitation requiring sedation, intubated 1/29.  -daily SAT/SBT  -A-F bundle      Acute renal failure (ARF) ([facility])  Assessment & Plan  Renal failure, possible relation to medication reaction, possible new baseline renal function.  -CMP follow renal function  -LR 500ml Bolus        DISPO: CICU, airway support/intubated    CODE STATUS: FULL    Quality Measures:  Feeding: TF  Analgesia: fentanyl  Sedation: propofol  Thromboprophylaxis: ASA/warfarin  Head of bed: >30 degrees  Ulcer prophylaxis: pepcid  Glycemic control: n/a  Bowel care: bowel regimen  Indwelling lines: ETT, AGA rivera IJ CVC, PIV  Deescalation of antibiotics: n/a      Jd Disla M.D.

## 2023-01-30 NOTE — PROGRESS NOTES
Cardiology Follow Up Progress Note    Date of Service  1/30/2023    Attending Physician  Jhon Lee M.D.    Chief Complaint       HPI  Noe Hickey is a 62 y.o. male admitted 1/20/2023 with ADHF    Interim Events  ADHF  No blood cultures sent  Intubated last night for delirium  Cr stable, baseline normal prior to surgery  Diuresing well    Review of Systems  Review of Systems   Constitutional:  Negative for activity change, appetite change, chills and diaphoresis.   Eyes:  Negative for pain, discharge, redness and itching.   Respiratory:  Negative for cough, choking, chest tightness and stridor.    Cardiovascular:  Negative for palpitations.   Gastrointestinal:  Negative for abdominal distention, abdominal pain, constipation, diarrhea and nausea.   Endocrine: Negative for cold intolerance, heat intolerance, polydipsia and polyphagia.   Genitourinary:  Negative for difficulty urinating, dysuria, enuresis, flank pain, frequency, genital sores and hematuria.   Musculoskeletal:  Negative for back pain, gait problem, joint swelling and myalgias.   Skin:  Negative for color change, pallor and rash.   Neurological:  Negative for tremors, seizures, syncope, speech difficulty, weakness, light-headedness, numbness and headaches.   Hematological:  Negative for adenopathy. Does not bruise/bleed easily.   Psychiatric/Behavioral:  Negative for agitation, behavioral problems, confusion, decreased concentration and hallucinations. The patient is not nervous/anxious.      Vital signs in last 24 hours  Temp:  [36.8 °C (98.2 °F)-37.2 °C (99 °F)] 37.2 °C (99 °F)  Pulse:  [54-81] 81  Resp:  [7-20] 7  BP: ()/(50-66) 99/65  SpO2:  [96 %-100 %] 99 %    Physical Exam  Physical Exam  Vitals and nursing note reviewed.   Constitutional:       General: He is not in acute distress.     Appearance: Normal appearance. He is normal weight. He is not ill-appearing, toxic-appearing or diaphoretic.   HENT:      Head:  Normocephalic and atraumatic.      Right Ear: Ear canal and external ear normal.      Left Ear: Ear canal and external ear normal.      Nose: Nose normal. No congestion or rhinorrhea.      Mouth/Throat:      Mouth: Mucous membranes are moist.      Pharynx: Oropharynx is clear. No oropharyngeal exudate or posterior oropharyngeal erythema.   Eyes:      General: No scleral icterus.        Right eye: No discharge.         Left eye: No discharge.      Extraocular Movements: Extraocular movements intact.      Conjunctiva/sclera: Conjunctivae normal.      Pupils: Pupils are equal, round, and reactive to light.   Neck:      Vascular: No carotid bruit.   Cardiovascular:      Rate and Rhythm: Normal rate and regular rhythm.      Pulses: Normal pulses.      Heart sounds: Normal heart sounds. No murmur heard.    No gallop.   Pulmonary:      Effort: Pulmonary effort is normal. No respiratory distress.      Breath sounds: Normal breath sounds. No stridor. No wheezing, rhonchi or rales.   Abdominal:      General: Abdomen is flat. Bowel sounds are normal. There is no distension.      Palpations: Abdomen is soft. There is no mass.      Tenderness: There is no abdominal tenderness. There is no guarding or rebound.      Hernia: No hernia is present.   Musculoskeletal:         General: No swelling or tenderness. Normal range of motion.      Cervical back: Normal range of motion and neck supple. No rigidity. No muscular tenderness.      Right lower leg: No edema.      Left lower leg: No edema.   Lymphadenopathy:      Cervical: No cervical adenopathy.   Skin:     General: Skin is warm and dry.      Capillary Refill: Capillary refill takes 2 to 3 seconds.      Coloration: Skin is not jaundiced or pale.      Findings: No bruising or lesion.   Neurological:      General: No focal deficit present.      Mental Status: He is alert and oriented to person, place, and time. Mental status is at baseline.      Cranial Nerves: No cranial nerve  deficit.      Motor: No weakness.   Psychiatric:         Mood and Affect: Mood normal.         Behavior: Behavior normal.         Thought Content: Thought content normal.         Judgment: Judgment normal.       Lab Review  Lab Results   Component Value Date/Time    WBC 15.0 (H) 01/30/2023 03:45 AM    RBC 3.96 (L) 01/30/2023 03:45 AM    HEMOGLOBIN 8.3 (L) 01/30/2023 03:45 AM    HEMATOCRIT 28.1 (L) 01/30/2023 03:45 AM    MCV 71.0 (L) 01/30/2023 03:45 AM    MCH 21.0 (L) 01/30/2023 03:45 AM    MCHC 29.5 (L) 01/30/2023 03:45 AM    MPV 10.0 01/30/2023 03:45 AM      Lab Results   Component Value Date/Time    SODIUM 141 01/30/2023 03:45 AM    POTASSIUM 3.8 01/30/2023 03:45 AM    CHLORIDE 100 01/30/2023 03:45 AM    CO2 31 01/30/2023 03:45 AM    GLUCOSE 72 01/30/2023 03:45 AM    BUN 34 (H) 01/30/2023 03:45 AM    CREATININE 1.90 (H) 01/30/2023 03:45 AM    BUNCREATRAT 16.4 12/07/2022 04:44 AM      Lab Results   Component Value Date/Time    ASTSGOT 197 (H) 01/30/2023 03:45 AM    ALTSGPT 249 (H) 01/30/2023 03:45 AM     Lab Results   Component Value Date/Time    TROPONINT 56 (H) 01/25/2023 12:36 PM       No results for input(s): NTPROBNP in the last 72 hours.    Cardiac Imaging and Procedures Review  EKG:  My personal interpretation of the EKG dated 1/29/2023 is atrial fibrillation with rapid ventricular sponsor    Echocardiogram: Dated 1/21/2023 personally viewed inter myself showing an aortic valve replacement EF 45%.    Cardiac Catheterization: Dated 12/16/2022 personally reviewed and interpreted myself showing mild nonobstructive CAD    Imaging  Chest X-Ray:  NA     Stress Test:  NA    Assessment/Plan  No new Assessment & Plan notes have been filed under this hospital service since the last note was generated.  Service: Cardiology  62-year-old male with agitation of unknown etiology now status post intubation.  His echocardiogram is mildly unremarkable.  We will keep him on the amiodarone for the paroxysmal atrial  fibrillation.  He will continue to diurese.  I ordered blood cultures for the delirium as well as his recent surgery.    Thank you for allowing me to participate in the care of this patient.  I will continue to follow this patient    Please contact me with any questions.    Keshawn Abbott M.D.   Cardiologist, [facility]  [phone]

## 2023-01-31 ENCOUNTER — HOSPITAL ENCOUNTER (OUTPATIENT)
Dept: RADIOLOGY | Facility: MEDICAL CENTER | Age: 63
End: 2023-01-31
Attending: INTERNAL MEDICINE
Payer: COMMERCIAL

## 2023-01-31 LAB
ANION GAP SERPL CALC-SCNC: 9 MMOL/L (ref 7–16)
BASE EXCESS BLDA CALC-SCNC: 11 MMOL/L (ref -4–3)
BASE EXCESS BLDA CALC-SCNC: 12 MMOL/L (ref -4–3)
BASOPHILS # BLD AUTO: 0.6 % (ref 0–1.8)
BASOPHILS # BLD: 0.08 K/UL (ref 0–0.12)
BODY TEMPERATURE: ABNORMAL DEGREES
BODY TEMPERATURE: ABNORMAL DEGREES
BREATHS SETTING VENT: 14
BREATHS SETTING VENT: 14
BUN SERPL-MCNC: 25 MG/DL (ref 8–22)
CALCIUM SERPL-MCNC: 8.5 MG/DL (ref 8.5–10.5)
CHLORIDE SERPL-SCNC: 101 MMOL/L (ref 96–112)
CO2 BLDA-SCNC: 37 MMOL/L (ref 20–33)
CO2 BLDA-SCNC: 38 MMOL/L (ref 20–33)
CO2 SERPL-SCNC: 31 MMOL/L (ref 20–33)
CREAT SERPL-MCNC: 1.53 MG/DL (ref 0.5–1.4)
DELSYS IDSYS: ABNORMAL
DELSYS IDSYS: ABNORMAL
EKG IMPRESSION: NORMAL
END TIDAL CARBON DIOXIDE IECO2: 42 MMHG
EOSINOPHIL # BLD AUTO: 0.28 K/UL (ref 0–0.51)
EOSINOPHIL NFR BLD: 2 % (ref 0–6.9)
ERYTHROCYTE [DISTWIDTH] IN BLOOD BY AUTOMATED COUNT: 49.1 FL (ref 35.9–50)
GFR SERPLBLD CREATININE-BSD FMLA CKD-EPI: 51 ML/MIN/1.73 M 2
GLUCOSE SERPL-MCNC: 72 MG/DL (ref 65–99)
HCO3 BLDA-SCNC: 35.6 MMOL/L (ref 17–25)
HCO3 BLDA-SCNC: 36.4 MMOL/L (ref 17–25)
HCT VFR BLD AUTO: 28.8 % (ref 42–52)
HGB BLD-MCNC: 8.5 G/DL (ref 14–18)
HOROWITZ INDEX BLDA+IHG-RTO: 133 MM[HG]
HOROWITZ INDEX BLDA+IHG-RTO: 263 MM[HG]
IMM GRANULOCYTES # BLD AUTO: 0.16 K/UL (ref 0–0.11)
IMM GRANULOCYTES NFR BLD AUTO: 1.1 % (ref 0–0.9)
INR PPP: 2.1 (ref 0.87–1.13)
LYMPHOCYTES # BLD AUTO: 1 K/UL (ref 1–4.8)
LYMPHOCYTES NFR BLD: 7.1 % (ref 22–41)
MAGNESIUM SERPL-MCNC: 2.3 MG/DL (ref 1.5–2.5)
MCH RBC QN AUTO: 21.3 PG (ref 27–33)
MCHC RBC AUTO-ENTMCNC: 29.5 G/DL (ref 33.7–35.3)
MCV RBC AUTO: 72.2 FL (ref 81.4–97.8)
MODE IMODE: ABNORMAL
MODE IMODE: ABNORMAL
MONOCYTES # BLD AUTO: 1.11 K/UL (ref 0–0.85)
MONOCYTES NFR BLD AUTO: 7.8 % (ref 0–13.4)
NEUTROPHILS # BLD AUTO: 11.52 K/UL (ref 1.82–7.42)
NEUTROPHILS NFR BLD: 81.4 % (ref 44–72)
NRBC # BLD AUTO: 0.04 K/UL
NRBC BLD-RTO: 0.3 /100 WBC
O2/TOTAL GAS SETTING VFR VENT: 30 %
O2/TOTAL GAS SETTING VFR VENT: 30 %
PCO2 BLDA: 46.9 MMHG (ref 26–37)
PCO2 BLDA: 47.7 MMHG (ref 26–37)
PCO2 TEMP ADJ BLDA: 46.1 MMHG (ref 26–37)
PCO2 TEMP ADJ BLDA: 46.8 MMHG (ref 26–37)
PEEP END EXPIRATORY PRESSURE IPEEP: 8 CMH20
PEEP END EXPIRATORY PRESSURE IPEEP: 8 CMH20
PH BLDA: 7.48 [PH] (ref 7.4–7.5)
PH BLDA: 7.5 [PH] (ref 7.4–7.5)
PH TEMP ADJ BLDA: 7.49 [PH] (ref 7.4–7.5)
PH TEMP ADJ BLDA: 7.5 [PH] (ref 7.4–7.5)
PHOSPHATE SERPL-MCNC: 3.9 MG/DL (ref 2.5–4.5)
PLATELET # BLD AUTO: 464 K/UL (ref 164–446)
PMV BLD AUTO: 10.1 FL (ref 9–12.9)
PO2 BLDA: 40 MMHG (ref 64–87)
PO2 BLDA: 79 MMHG (ref 64–87)
PO2 TEMP ADJ BLDA: 39 MMHG (ref 64–87)
PO2 TEMP ADJ BLDA: 77 MMHG (ref 64–87)
POTASSIUM SERPL-SCNC: 3.8 MMOL/L (ref 3.6–5.5)
PROTHROMBIN TIME: 23 SEC (ref 12–14.6)
RBC # BLD AUTO: 3.99 M/UL (ref 4.7–6.1)
SAO2 % BLDA: 79 % (ref 93–99)
SAO2 % BLDA: 96 % (ref 93–99)
SODIUM SERPL-SCNC: 141 MMOL/L (ref 135–145)
SPECIMEN DRAWN FROM PATIENT: ABNORMAL
SPECIMEN DRAWN FROM PATIENT: ABNORMAL
TIDAL VOLUME IVT: 460 ML
TIDAL VOLUME IVT: 460 ML
TRIGL SERPL-MCNC: 89 MG/DL (ref 0–149)
WBC # BLD AUTO: 14.2 K/UL (ref 4.8–10.8)

## 2023-01-31 PROCEDURE — A9270 NON-COVERED ITEM OR SERVICE: HCPCS | Performed by: INTERNAL MEDICINE

## 2023-01-31 PROCEDURE — 94003 VENT MGMT INPAT SUBQ DAY: CPT

## 2023-01-31 PROCEDURE — 700111 HCHG RX REV CODE 636 W/ 250 OVERRIDE (IP): Performed by: INTERNAL MEDICINE

## 2023-01-31 PROCEDURE — 71045 X-RAY EXAM CHEST 1 VIEW: CPT

## 2023-01-31 PROCEDURE — 80048 BASIC METABOLIC PNL TOTAL CA: CPT

## 2023-01-31 PROCEDURE — 93005 ELECTROCARDIOGRAM TRACING: CPT | Performed by: INTERNAL MEDICINE

## 2023-01-31 PROCEDURE — 85610 PROTHROMBIN TIME: CPT

## 2023-01-31 PROCEDURE — A9270 NON-COVERED ITEM OR SERVICE: HCPCS | Performed by: STUDENT IN AN ORGANIZED HEALTH CARE EDUCATION/TRAINING PROGRAM

## 2023-01-31 PROCEDURE — 770022 HCHG ROOM/CARE - ICU (200)

## 2023-01-31 PROCEDURE — 700102 HCHG RX REV CODE 250 W/ 637 OVERRIDE(OP): Performed by: INTERNAL MEDICINE

## 2023-01-31 PROCEDURE — 84100 ASSAY OF PHOSPHORUS: CPT

## 2023-01-31 PROCEDURE — 700102 HCHG RX REV CODE 250 W/ 637 OVERRIDE(OP): Performed by: STUDENT IN AN ORGANIZED HEALTH CARE EDUCATION/TRAINING PROGRAM

## 2023-01-31 PROCEDURE — 83735 ASSAY OF MAGNESIUM: CPT

## 2023-01-31 PROCEDURE — 85025 COMPLETE CBC W/AUTO DIFF WBC: CPT

## 2023-01-31 PROCEDURE — 82803 BLOOD GASES ANY COMBINATION: CPT

## 2023-01-31 PROCEDURE — 94150 VITAL CAPACITY TEST: CPT

## 2023-01-31 PROCEDURE — 93010 ELECTROCARDIOGRAM REPORT: CPT | Performed by: INTERNAL MEDICINE

## 2023-01-31 PROCEDURE — 36600 WITHDRAWAL OF ARTERIAL BLOOD: CPT

## 2023-01-31 PROCEDURE — 700105 HCHG RX REV CODE 258: Performed by: INTERNAL MEDICINE

## 2023-01-31 PROCEDURE — 99232 SBSQ HOSP IP/OBS MODERATE 35: CPT | Performed by: INTERNAL MEDICINE

## 2023-01-31 PROCEDURE — 99291 CRITICAL CARE FIRST HOUR: CPT | Mod: GC | Performed by: INTERNAL MEDICINE

## 2023-01-31 PROCEDURE — 700102 HCHG RX REV CODE 250 W/ 637 OVERRIDE(OP): Performed by: HOSPITALIST

## 2023-01-31 PROCEDURE — 84478 ASSAY OF TRIGLYCERIDES: CPT

## 2023-01-31 PROCEDURE — 94799 UNLISTED PULMONARY SVC/PX: CPT

## 2023-01-31 PROCEDURE — A9270 NON-COVERED ITEM OR SERVICE: HCPCS | Performed by: HOSPITALIST

## 2023-01-31 RX ORDER — QUETIAPINE FUMARATE 25 MG/1
25 TABLET, FILM COATED ORAL EVERY 8 HOURS
Status: DISCONTINUED | OUTPATIENT
Start: 2023-01-31 | End: 2023-02-02

## 2023-01-31 RX ORDER — GAUZE BANDAGE 2" X 2"
100 BANDAGE TOPICAL DAILY
Status: COMPLETED | OUTPATIENT
Start: 2023-02-01 | End: 2023-02-01

## 2023-01-31 RX ORDER — QUETIAPINE FUMARATE 25 MG/1
25 TABLET, FILM COATED ORAL EVERY 8 HOURS
Status: DISCONTINUED | OUTPATIENT
Start: 2023-01-31 | End: 2023-01-31

## 2023-01-31 RX ORDER — QUETIAPINE FUMARATE 25 MG/1
12.5 TABLET, FILM COATED ORAL EVERY 8 HOURS
Status: DISCONTINUED | OUTPATIENT
Start: 2023-01-31 | End: 2023-01-31

## 2023-01-31 RX ORDER — BISACODYL 10 MG
10 SUPPOSITORY, RECTAL RECTAL
Status: DISCONTINUED | OUTPATIENT
Start: 2023-01-31 | End: 2023-02-03 | Stop reason: HOSPADM

## 2023-01-31 RX ORDER — METOPROLOL TARTRATE 50 MG/1
50 TABLET, FILM COATED ORAL 2 TIMES DAILY
Status: DISCONTINUED | OUTPATIENT
Start: 2023-01-31 | End: 2023-02-03 | Stop reason: HOSPADM

## 2023-01-31 RX ORDER — AMLODIPINE BESYLATE 10 MG/1
10 TABLET ORAL DAILY
Status: DISCONTINUED | OUTPATIENT
Start: 2023-02-01 | End: 2023-02-03 | Stop reason: HOSPADM

## 2023-01-31 RX ORDER — OMEPRAZOLE 20 MG/1
20 CAPSULE, DELAYED RELEASE ORAL DAILY
Status: DISCONTINUED | OUTPATIENT
Start: 2023-02-01 | End: 2023-02-03 | Stop reason: HOSPADM

## 2023-01-31 RX ORDER — POLYETHYLENE GLYCOL 3350 17 G/17G
1 POWDER, FOR SOLUTION ORAL
Status: DISCONTINUED | OUTPATIENT
Start: 2023-01-31 | End: 2023-02-03 | Stop reason: HOSPADM

## 2023-01-31 RX ORDER — ONDANSETRON 4 MG/1
4 TABLET, ORALLY DISINTEGRATING ORAL EVERY 4 HOURS PRN
Status: DISCONTINUED | OUTPATIENT
Start: 2023-01-31 | End: 2023-02-03 | Stop reason: HOSPADM

## 2023-01-31 RX ORDER — AMOXICILLIN 250 MG
2 CAPSULE ORAL 2 TIMES DAILY
Status: DISCONTINUED | OUTPATIENT
Start: 2023-01-31 | End: 2023-02-03 | Stop reason: HOSPADM

## 2023-01-31 RX ORDER — AMIODARONE HYDROCHLORIDE 200 MG/1
400 TABLET ORAL DAILY
Status: DISCONTINUED | OUTPATIENT
Start: 2023-02-01 | End: 2023-02-03 | Stop reason: HOSPADM

## 2023-01-31 RX ORDER — ACETAMINOPHEN 325 MG/1
650 TABLET ORAL EVERY 6 HOURS PRN
Status: DISCONTINUED | OUTPATIENT
Start: 2023-01-31 | End: 2023-01-31

## 2023-01-31 RX ORDER — KETOROLAC TROMETHAMINE 30 MG/ML
15 INJECTION, SOLUTION INTRAMUSCULAR; INTRAVENOUS ONCE
Status: COMPLETED | OUTPATIENT
Start: 2023-01-31 | End: 2023-01-31

## 2023-01-31 RX ORDER — WARFARIN SODIUM 2.5 MG/1
2.5 TABLET ORAL DAILY
Status: DISCONTINUED | OUTPATIENT
Start: 2023-01-31 | End: 2023-02-02

## 2023-01-31 RX ORDER — TRAZODONE HYDROCHLORIDE 50 MG/1
50 TABLET ORAL
Status: DISCONTINUED | OUTPATIENT
Start: 2023-01-31 | End: 2023-02-03 | Stop reason: HOSPADM

## 2023-01-31 RX ORDER — ACETAMINOPHEN 325 MG/1
650 TABLET ORAL EVERY 6 HOURS PRN
Status: DISCONTINUED | OUTPATIENT
Start: 2023-01-31 | End: 2023-02-03 | Stop reason: HOSPADM

## 2023-01-31 RX ADMIN — PROPOFOL 0 MCG/KG/MIN: 10 INJECTION, EMULSION INTRAVENOUS at 06:15

## 2023-01-31 RX ADMIN — AMLODIPINE BESYLATE 10 MG: 10 TABLET ORAL at 06:00

## 2023-01-31 RX ADMIN — WARFARIN SODIUM 2.5 MG: 2.5 TABLET ORAL at 18:20

## 2023-01-31 RX ADMIN — METOPROLOL TARTRATE 50 MG: 50 TABLET, FILM COATED ORAL at 06:00

## 2023-01-31 RX ADMIN — ASPIRIN 81 MG: 81 TABLET, CHEWABLE ORAL at 05:13

## 2023-01-31 RX ADMIN — SENNOSIDES AND DOCUSATE SODIUM 2 TABLET: 50; 8.6 TABLET ORAL at 05:13

## 2023-01-31 RX ADMIN — QUETIAPINE FUMARATE 25 MG: 25 TABLET ORAL at 13:56

## 2023-01-31 RX ADMIN — POLYETHYLENE GLYCOL 3350 1 PACKET: 17 POWDER, FOR SOLUTION ORAL at 05:14

## 2023-01-31 RX ADMIN — METOPROLOL TARTRATE 50 MG: 50 TABLET, FILM COATED ORAL at 18:20

## 2023-01-31 RX ADMIN — QUETIAPINE FUMARATE 25 MG: 25 TABLET ORAL at 22:14

## 2023-01-31 RX ADMIN — POTASSIUM BICARBONATE 25 MEQ: 978 TABLET, EFFERVESCENT ORAL at 07:36

## 2023-01-31 RX ADMIN — KETOROLAC TROMETHAMINE 15 MG: 30 INJECTION, SOLUTION INTRAMUSCULAR; INTRAVENOUS at 18:20

## 2023-01-31 RX ADMIN — QUETIAPINE FUMARATE 12.5 MG: 25 TABLET ORAL at 05:13

## 2023-01-31 RX ADMIN — BUDESONIDE AND FORMOTEROL FUMARATE DIHYDRATE 2 PUFF: 80; 4.5 AEROSOL RESPIRATORY (INHALATION) at 18:20

## 2023-01-31 RX ADMIN — AMIODARONE HYDROCHLORIDE 400 MG: 200 TABLET ORAL at 05:13

## 2023-01-31 RX ADMIN — THIAMINE HYDROCHLORIDE 100 MG: 100 INJECTION, SOLUTION INTRAMUSCULAR; INTRAVENOUS at 05:20

## 2023-01-31 RX ADMIN — OMEPRAZOLE 40 MG: KIT at 05:13

## 2023-01-31 RX ADMIN — TRAZODONE HYDROCHLORIDE 50 MG: 50 TABLET ORAL at 20:11

## 2023-01-31 RX ADMIN — ACETAMINOPHEN 650 MG: 325 TABLET, FILM COATED ORAL at 18:20

## 2023-01-31 ASSESSMENT — ENCOUNTER SYMPTOMS
SEIZURES: 0
EYE PAIN: 0
CHILLS: 0
MYALGIAS: 0
EYE ITCHING: 0
NAUSEA: 0
HALLUCINATIONS: 0
COLOR CHANGE: 0
POLYPHAGIA: 0
HEADACHES: 0
APPETITE CHANGE: 0
CONFUSION: 0
STRIDOR: 0
AGITATION: 0
PALPITATIONS: 0
NUMBNESS: 0
CONSTIPATION: 0
POLYDIPSIA: 0
EYE DISCHARGE: 0
BACK PAIN: 0
DIAPHORESIS: 0
ACTIVITY CHANGE: 0
EYE REDNESS: 0
SPEECH DIFFICULTY: 0
DECREASED CONCENTRATION: 0
COUGH: 0
WEAKNESS: 0
FLANK PAIN: 0
LIGHT-HEADEDNESS: 0
BRUISES/BLEEDS EASILY: 0
CHEST TIGHTNESS: 0
TREMORS: 0
CHOKING: 0
DIARRHEA: 0
JOINT SWELLING: 0
ABDOMINAL PAIN: 0
ABDOMINAL DISTENTION: 0
ADENOPATHY: 0
NERVOUS/ANXIOUS: 0

## 2023-01-31 ASSESSMENT — PAIN DESCRIPTION - PAIN TYPE
TYPE: ACUTE PAIN

## 2023-01-31 ASSESSMENT — FIBROSIS 4 INDEX: FIB4 SCORE: 1.7

## 2023-01-31 ASSESSMENT — PULMONARY FUNCTION TESTS: FVC: 1.9

## 2023-01-31 NOTE — CARE PLAN
The patient is Watcher - Medium risk of patient condition declining or worsening    Shift Goals  Clinical Goals: Maintain RASS +1 to -1, stable hemodynamics  Patient Goals: HUGH  Family Goals: HUGH    Progress made toward(s) clinical / shift goals:    Problem: Pain - Standard  Goal: Alleviation of pain or a reduction in pain to the patient’s comfort goal  Outcome: Progressing     Problem: Fall Risk  Goal: Patient will remain free from falls  Outcome: Progressing     Problem: Hemodynamics  Goal: Patient's hemodynamics, fluid balance and neurologic status will be stable or improve  Outcome: Progressing     Problem: Safety - Medical Restraint  Goal: Remains free of injury from restraints (Restraint for Interference with Medical Device)  Outcome: Progressing  Goal: Free from restraint(s) (Restraint for Interference with Medical Device)  Outcome: Progressing       Patient is not progressing towards the following goals:

## 2023-01-31 NOTE — CARE PLAN
Ventilator Daily Summary    Vent Day #2    Ventilator settings changed this shift: 14, 460, 8, 30%    Weaning trials: None    Respiratory Procedures: None    Plan: Continue current ventilator settings and wean mechanical ventilation as tolerated per physician orders.     Problem: Ventilation  Goal: Ability to achieve and maintain unassisted ventilation or tolerate decreased levels of ventilator support  Description: Target End Date:  4 days     Document on Vent flowsheet    1.  Support and monitor invasive and noninvasive mechanical ventilation  2.  Monitor ventilator weaning response  3.  Perform ventilator associated pneumonia prevention interventions  4.  Manage ventilation therapy by monitoring diagnostic test results  Outcome: Progressing

## 2023-01-31 NOTE — PROGRESS NOTES
Cardiology Follow Up Progress Note    Date of Service  1/31/2023    Attending Physician  Jhon Lee M.D.    Chief Complaint       HPI  Noe Hickey is a 62 y.o. male admitted 1/20/2023 with ADHF    Interim Events  Extubated  Diuresing well  Emotionally labile  No chest pain    Review of Systems  Review of Systems   Constitutional:  Negative for activity change, appetite change, chills and diaphoresis.   Eyes:  Negative for pain, discharge, redness and itching.   Respiratory:  Negative for cough, choking, chest tightness and stridor.    Cardiovascular:  Negative for palpitations.   Gastrointestinal:  Negative for abdominal distention, abdominal pain, constipation, diarrhea and nausea.   Endocrine: Negative for cold intolerance, heat intolerance, polydipsia and polyphagia.   Genitourinary:  Negative for difficulty urinating, dysuria, enuresis, flank pain, frequency, genital sores and hematuria.   Musculoskeletal:  Negative for back pain, gait problem, joint swelling and myalgias.   Skin:  Negative for color change, pallor and rash.   Neurological:  Negative for tremors, seizures, syncope, speech difficulty, weakness, light-headedness, numbness and headaches.   Hematological:  Negative for adenopathy. Does not bruise/bleed easily.   Psychiatric/Behavioral:  Negative for agitation, behavioral problems, confusion, decreased concentration and hallucinations. The patient is not nervous/anxious.      Vital signs in last 24 hours  Temp:  [36.3 °C (97.3 °F)] 36.3 °C (97.3 °F)  Pulse:  [55-92] 69  Resp:  [5-19] 16  BP: ()/(51-72) 107/58  SpO2:  [96 %-99 %] 98 %    Physical Exam  Physical Exam  Vitals and nursing note reviewed.   Constitutional:       General: He is not in acute distress.     Appearance: Normal appearance. He is normal weight. He is not ill-appearing, toxic-appearing or diaphoretic.   HENT:      Head: Normocephalic and atraumatic.      Right Ear: Ear canal and external ear normal.       Left Ear: Ear canal and external ear normal.      Nose: Nose normal. No congestion or rhinorrhea.      Mouth/Throat:      Mouth: Mucous membranes are moist.      Pharynx: Oropharynx is clear. No oropharyngeal exudate or posterior oropharyngeal erythema.   Eyes:      General: No scleral icterus.        Right eye: No discharge.         Left eye: No discharge.      Extraocular Movements: Extraocular movements intact.      Conjunctiva/sclera: Conjunctivae normal.      Pupils: Pupils are equal, round, and reactive to light.   Neck:      Vascular: No carotid bruit.   Cardiovascular:      Rate and Rhythm: Normal rate and regular rhythm.      Pulses: Normal pulses.      Heart sounds: Normal heart sounds. No murmur heard.    No gallop.   Pulmonary:      Effort: Pulmonary effort is normal. No respiratory distress.      Breath sounds: Normal breath sounds. No stridor. No wheezing, rhonchi or rales.   Abdominal:      General: Abdomen is flat. Bowel sounds are normal. There is no distension.      Palpations: Abdomen is soft. There is no mass.      Tenderness: There is no abdominal tenderness. There is no guarding or rebound.      Hernia: No hernia is present.   Musculoskeletal:         General: No swelling or tenderness. Normal range of motion.      Cervical back: Normal range of motion and neck supple. No rigidity. No muscular tenderness.      Right lower leg: No edema.      Left lower leg: No edema.   Lymphadenopathy:      Cervical: No cervical adenopathy.   Skin:     General: Skin is warm and dry.      Capillary Refill: Capillary refill takes 2 to 3 seconds.      Coloration: Skin is not jaundiced or pale.      Findings: No bruising or lesion.   Neurological:      General: No focal deficit present.      Mental Status: He is alert and oriented to person, place, and time. Mental status is at baseline.      Cranial Nerves: No cranial nerve deficit.      Motor: No weakness.   Psychiatric:         Mood and Affect: Mood normal.          Behavior: Behavior normal.         Thought Content: Thought content normal.         Judgment: Judgment normal.       Lab Review  Lab Results   Component Value Date/Time    WBC 14.2 (H) 01/31/2023 02:55 AM    RBC 3.99 (L) 01/31/2023 02:55 AM    HEMOGLOBIN 8.5 (L) 01/31/2023 02:55 AM    HEMATOCRIT 28.8 (L) 01/31/2023 02:55 AM    MCV 72.2 (L) 01/31/2023 02:55 AM    MCH 21.3 (L) 01/31/2023 02:55 AM    MCHC 29.5 (L) 01/31/2023 02:55 AM    MPV 10.1 01/31/2023 02:55 AM      Lab Results   Component Value Date/Time    SODIUM 141 01/31/2023 02:55 AM    POTASSIUM 3.8 01/31/2023 02:55 AM    CHLORIDE 101 01/31/2023 02:55 AM    CO2 31 01/31/2023 02:55 AM    GLUCOSE 72 01/31/2023 02:55 AM    BUN 25 (H) 01/31/2023 02:55 AM    CREATININE 1.53 (H) 01/31/2023 02:55 AM    BUNCREATRAT 16.4 12/07/2022 04:44 AM      Lab Results   Component Value Date/Time    ASTSGOT 197 (H) 01/30/2023 03:45 AM    ALTSGPT 249 (H) 01/30/2023 03:45 AM     Lab Results   Component Value Date/Time    TRIGLYCERIDE 89 01/31/2023 02:55 AM    TROPONINT 56 (H) 01/25/2023 12:36 PM       No results for input(s): NTPROBNP in the last 72 hours.    Cardiac Imaging and Procedures Review  EKG:  My personal interpretation of the EKG dated 1/29/2023 is atrial fibrillation with rapid ventricular sponsor    Echocardiogram: Dated 1/21/2023 personally viewed inter myself showing an aortic valve replacement EF 45%.    Cardiac Catheterization: Dated 12/16/2022 personally reviewed and interpreted myself showing mild nonobstructive CAD    Imaging  Chest X-Ray:  NA     Stress Test:  NA    Assessment/Plan  No new Assessment & Plan notes have been filed under this hospital service since the last note was generated.  Service: Cardiology  62-year-old male with agitation of unknown etiology now status post intubation.  The last point from a cardiac standpoint he is doing well.  I will keep on the amiodarone for the atrial fibrillation and continue anticoagulation.  He is doing well  from a cardiac standpoint.  Cardiology will sign off at this point.  Please feel free to reconsult should you have further questions.    Thank you for allowing me to participate in the care of this patient.  Cardiology will sign off on this patient    Please contact me with any questions.    Keshawn Abbott M.D.   Cardiologist, [facility]  [phone]

## 2023-01-31 NOTE — PROGRESS NOTES
[facility] ICU Progress Note      Admit Date: 1/20/2023    Resident(s): Jd Disla M.D.   Attending:  QUINN MERCHANT/ Dr. Jesus MD    Patient ID:    Name:  Noe Hickey   YOB: 1960  Age:  62 y.o.  male   MRN:  [id number]    Hospital Course (carried forward and updated):  Mr. Hickey is a 62 year old male with the past medical history significant for atrial fibrillation/flutter, tobacco abuse, hyperlipidemia, hypertension, obesity, and severe symptomatic aortic stenosis due to bicuspid valve with ascending aortic aneurysm who was hospitalized at [facility] from 1/5-1/11/2023 for AVR and aneurysm repair whose hospital course was complicated by acute hypoxic respiratory failure requiring BiPAP therapy, defibrillation for torsades de pointe, and atrial fibrillation on Warfarin who eventually was discharged to [facility].  The patient was readmitted on 1/20/2023 for complaints of worsening lower extremity swelling, chest pain, shortness of breath, and cough that was thought to be a CHF exacerbation.  The patient was started on diuretics and seen by Cardiology.  A repeat ECHO did reveal global hypokinesis with a worsening EF from 55% to 45%.  The patient was doing relatively well until 1/24 when he started having nocturnal agitation and hallucinating and received Atarax.  This agitation continued to wax and wane until today when it fully escalated to a CODE GRAY after the patient became increasingly agitated, pacing his room, yelling out to staff and attempting to punch nursing.  At one point I am told that the patient took his telemetry box and was slamming it against the glass windows of his room.  When roughly 10 security guards came for the CODE GRAY the patient was found to be naked except for his half shirt, but cooperated and laid back in bed.  He received multiple doses of Haldol, a total of 13mg, and he remains agitated requiring 4-point restraints (per JAIME Queen consult)    1/29: transfer  "to ICU, intubated, precedex gtt  1/30: Attempted wean of sedation, no SBT. Elevated transaminases.  1/31: passed SAT, following commands. Successfully extubated.    Consultants:  Critical Care  Psychiatry  Cardiology     Interval Events:  SAT successful, SBT ongoing. Following commands. Extubated withOUT complication.      Review of Systems   Unable to perform ROS: Intubated     PHYSICAL EXAM:  Vitals:    01/31/23 0500 01/31/23 0600 01/31/23 0633 01/31/23 0800   BP: 103/56 133/72  106/62   Pulse: 62 92 85 72   Resp: (!) 10 (!) 7 14 13   Temp:       TempSrc:       SpO2: 97% 97% 96% 96%   Weight:       Height:   1.753 m (5' 9.02\")     Body mass index is 28.83 kg/m².  Latest Vitals:  /62   Pulse 72   Temp 36.3 °C (97.3 °F) (Bladder)   Resp 13   Ht 1.753 m (5' 9.02\")   Wt 88.6 kg (195 lb 5.2 oz)   SpO2 96%   BMI 28.83 kg/m²   O2 therapy: Pulse Oximetry: 96 %, O2 (LPM): 3.5, O2 Delivery Device: Nasal Cannula  Vitals Range last 24h:  Temp:  [36.3 °C (97.3 °F)] 36.3 °C (97.3 °F)  Pulse:  [55-92] 72  Resp:  [5-34] 13  BP: ()/(51-72) 106/62  SpO2:  [96 %-99 %] 96 %  Date 01/31/23 0700 - 02/01/23 0659   Shift 5197-6021 9819-5883 8361-1782 24 Hour Total   INTAKE   Shift Total       OUTPUT   Urine 275   275     Output (mL) (Urethral Catheter Temperature probe 16 Fr.) 275   275   Shift Total 275   275   NET -275   -275        Intake/Output Summary (Last 24 hours) at 1/31/2023 1014  Last data filed at 1/31/2023 0800  Gross per 24 hour   Intake 791.09 ml   Output 3320 ml   Net -2528.91 ml        Physical Exam  Vitals and nursing note reviewed.   Constitutional:       Comments: Sedated, intubated   HENT:      Head: Normocephalic and atraumatic.      Mouth/Throat:      Comments: ETT in place, no visible oral lesions/wounds  Cardiovascular:      Comments: Regular rhythm, 3/6 systolic murmur, no peripheral edema  Pulmonary:      Breath sounds: No wheezing, rhonchi or rales.      Comments: Mechanical " sounds  Abdominal:      General: There is no distension.      Palpations: Abdomen is soft.      Tenderness: There is no abdominal tenderness. There is no guarding or rebound.   Skin:     General: Skin is warm and dry.   Neurological:      Comments: sedated       Recent Labs     01/29/23  1255 01/30/23  0349 01/31/23  0433   ISTATAPH 7.461 7.531* 7.482   ISTATAPCO2 46.5* 40.3* 47.7*   ISTATAPO2 79 61* 79   ISTATATCO2 35* 35* 37*   [id number] 96 93 96   ISTATARTHCO3 33.1* 33.8* 35.6*   ISTATARTBE 8* 10* 11*   ISTATTEMP 37.4 C 37.1 C 36.6 C   ISTATFIO2 60 40 30   ISTATSPEC Arterial Arterial Arterial   ISTATAPHTC 7.455 7.530* 7.488   [id number] 82 62* 77     Recent Labs     01/30/23  0345 01/30/23  1222 01/31/23  0255   SODIUM 141 144 141   POTASSIUM 3.8 4.0 3.8   CHLORIDE 100 100 101   CO2 31 33 31   BUN 34* 31* 25*   CREATININE 1.90* 1.80* 1.53*   MAGNESIUM 1.9  --  2.3   PHOSPHORUS 2.4*  --  3.9   CALCIUM 8.7 8.7 8.5     Recent Labs     01/30/23  0345 01/30/23  1222 01/31/23  0255   ALTSGPT 249*  --   --    ASTSGOT 197*  --   --    ALKPHOSPHAT 59  --   --    TBILIRUBIN 0.6  --   --    GLUCOSE 72 87 72     Recent Labs     01/29/23  0204 01/30/23  0345 01/30/23  0813 01/30/23  1222 01/31/23  0255   RBC 4.36* 3.96*  --   --  3.99*   HEMOGLOBIN 9.3* 8.3*  --   --  8.5*   HEMATOCRIT 31.8* 28.1*  --   --  28.8*   PLATELETCT 537* 461*  --   --  464*   PROTHROMBTM  --   --  21.9* 22.0* 23.0*   INR  --   --  1.97* 1.98* 2.10*     Recent Labs     01/29/23  0204 01/30/23  0345 01/31/23  0255   WBC 13.7* 15.0* 14.2*   NEUTSPOLYS  --  81.90* 81.40*   LYMPHOCYTES  --  6.10* 7.10*   MONOCYTES  --  8.30 7.80   EOSINOPHILS  --  1.60 2.00   BASOPHILS  --  0.70 0.60   ASTSGOT  --  197*  --    ALTSGPT  --  249*  --    ALKPHOSPHAT  --  59  --    TBILIRUBIN  --  0.6  --        Meds:   QUEtiapine  12.5 mg      thiamine  100 mg Stopped (01/31/23 0550)    Respiratory Therapy Consult        MD Alert...Adult ICU Electrolyte Replacement per  Pharmacy        ipratropium-albuterol  3 mL      traZODone  50 mg      metoprolol tartrate  50 mg      ondansetron  4 mg      senna-docusate  2 Tablet      And    polyethylene glycol/lytes  1 Packet      And    magnesium hydroxide  30 mL      And    bisacodyl  10 mg      amLODIPine  10 mg      amiodarone  400 mg      promethazine  12.5-25 mg      acetaminophen  650 mg      Pharmacy        omeprazole  40 mg      aspirin  81 mg      nitroglycerin  0.4 mg      budesonide-formoterol  2 Puff      hydrALAZINE  10 mg      ondansetron  4 mg      promethazine  12.5-25 mg      prochlorperazine  5-10 mg      MD Alert...Warfarin per Pharmacy            Procedures:  1/29: intubation, Critical care  1/29: R IJ CVC, critical care    Imaging:  DX-CHEST-PORTABLE (1 VIEW)   Final Result         1.  Left lower lobe infiltrate   2.  Layering left pleural effusion   3.  Cardiomegaly   4.  Atherosclerosis      DX-CHEST-PORTABLE (1 VIEW)   Final Result         1.  Left lower lobe infiltrate, slightly increased since prior study.   2.  Cardiomegaly   3.  Atherosclerosis      CT-CTA CHEST PULMONARY ARTERY W/ RECONS   Final Result      1.  No evidence of pulmonary embolus.      2.  Worsening bibasilar atelectasis/consolidation, left greater than right.      3.  Again seen recent surgical change consistent with unhealed median sternotomy and aortic valvular replacement. There is again seen fluid and stranding attenuation within the anterior mediastinum. No evidence of rim-enhancing fluid collection to    suggest presence of abscess.      4.  Cardiomegaly.      5.  Fatty liver.      CT-CTA NECK WITH & W/O-POST PROCESSING   Final Result      Mild plaque in the left carotid bulb and proximal left internal carotid artery with less than 50% stenosis.      NG tube coiled in the hypopharynx.      CT-CTA HEAD WITH & W/O-POST PROCESS   Final Result      CT angiogram of the [place] within normal limits.      DX-ABDOMEN FOR TUBE PLACEMENT    Final Result      NG tube tip in expected location the gastric body.      DX-CHEST-PORTABLE (1 VIEW)   Final Result      Tubes and line as described above.      Improving left basilar atelectasis.      DX-CHEST-PORTABLE (1 VIEW)   Final Result      1.  Chronic elevation of the left hemidiaphragm with left basilar atelectasis.   2.  Cardiomegaly.         DX-CHEST-PORTABLE (1 VIEW)   Final Result      1.  Chronic elevation of the left hemidiaphragm with left basilar atelectasis.   2.  Cardiomegaly.      EC-ECHOCARDIOGRAM COMPLETE W/O CONT   Final Result      CT-CTA CHEST PULMONARY ARTERY W/ RECONS   Final Result      1.  No CT evidence of pulmonary embolism.      2.  Significant cardiomegaly.      3.  Consolidation and volume loss in the left lower lobe and lingular segment left upper lobe could BE due to consolidative atelectasis and pneumonia.      4.  Minimal groundglass opacifications noted in the right lung which could be due to inflammation or infection.      5.  Additional linear opacifications in the right lung likely due to atelectasis or scarring.            DX-CHEST-PORTABLE (1 VIEW)   Final Result      1.  Left lung base atelectasis.      2.  Cardiomegaly.      3.  Chronic elevation of left hemidiaphragm.          Problem and Plan:    Noe Hickey is a 62M admitted on 1/20 for hypoxic respiratory failure, PNA; pmhx inclusive of Ao stenosis (bovine tissue valve 1/5), ascending aortic aneurysm (1/5 repair), A fib RVR (warfarin), HFpEF (45%), chronic tobacco use, migraines; transferred to CICU on 1/29 for agitation requiring sedation/intubation.    * Acute on chronic systolic heart failure (HCC)- (present on admission)  Assessment & Plan  HFrEF (45%), related to Ao stenosis, afib RVR.  -HOLD lasix 80mg IV  -metoprolol 50mg PO BID  -Amlodipine 10mg PO qD  -no Nitrates indicated  -immunizations per primary care        S/P AVR- (present on admission)  Assessment & Plan  Hx of on 1/5 with ascending aortic  aneurysm repair    Drug rash- (present on admission)  Assessment & Plan  Rash found on chest during transfer to ICU. Possible interaction with unasyn, atarax?  -STOP unasyn/atarax    Acute encephalopathy  Assessment & Plan  Agitation. No acute abnormal cranial imaging findings. Suspected relation to medication effect (atarax?). Required sedation leading to intubation.  -STOP atarax  -seroquel 12.5mg PO TID      COPD (chronic obstructive pulmonary disease) (HCC)  Assessment & Plan  Chronic tobacco use, no PFT on file, CT more suggestive of chronic bronchitis vs. Emphysema. Not in exacerbation.  -Symbicort 2 puff BID  -Duoneb q2h/prn  -RT protocols    Paroxysmal atrial fibrillation (HCC)  Assessment & Plan  Rate controlled on metoprolol  -warfarin per pharmacy, goals of INR 2-3  -amiodarone 400mg PO qD  -Metoprolol 50mg PO BID    Acute respiratory failure with hypoxia (HCC)  Assessment & Plan  Agitation requiring sedation, intubated 1/29. Extubated on 1/31.  -titrate O2 sats to >88%  -RT protocols      Acute renal failure (ARF) (HCC)  Assessment & Plan  Renal failure, possible relation to medication reaction, possible new baseline renal function.  -CMP follow renal function  -oral fluids as tolerated        DISPO: CICU, airway support/intubated    CODE STATUS: FULL    Quality Measures:  Feeding: TF  Analgesia: fentanyl  Sedation: propofol  Thromboprophylaxis: ASA/warfarin  Head of bed: >30 degrees  Ulcer prophylaxis: pepcid  Glycemic control: n/a  Bowel care: bowel regimen  Indwelling lines: Miranda, R IJ CVC, PIV  Deescalation of antibiotics: n/a      Jd Disla M.D.

## 2023-01-31 NOTE — PROGRESS NOTES
Inpatient Anticoagulation Service Note for 1/31/2023    Reason for Anticoagulation: Atrial Fibrillation   [id number] VASc Score: 2  HAS-BLED Score: 1    Hemoglobin Value: (Abnormal) 8.5  Hematocrit Value: (Abnormal) 28.8  Lab Platelet Value: (Abnormal) 464  Target INR: 2.0 to 3.0    INR from last 7 days       Date/Time INR Value    01/31/23 0255 (Abnormal) 2.1    01/30/23 1222 (Abnormal) 1.98    01/30/23 0813 (Abnormal) 1.97    01/28/23 0208 (Abnormal) 2.16    01/27/23 0243 (Abnormal) 2.06    01/26/23 0207 (Abnormal) 2.13    01/25/23 0045 (Abnormal) 2.47          Dose from last 7 days       Date/Time Dose (mg)    01/31/23 1144 2.5    01/30/23 1331 5    01/29/23 0828 2.5    01/28/23 1127 2.5    01/27/23 0851 2.5    01/26/23 0953 2.5    01/25/23 0824 2.5          Average Dose (mg):  (per [facility] clinic - 2.5 mg PO QD)  Significant Interactions: Antiplatelet Medications, Amiodarone  Bridge Therapy: No  Reversal Agent Administered: Not Applicable    A/P  Therapeutic INR.  No new DDI noted.  Patient has been extubated.  Will resume home dosing of warfarin 2.5 mg PO daily with INR tomorrow.  If stable the next 48 hours consider reducing frequency of INR draws.    Education Material Provided?: No    Pharmacist suggested discharge dosing: TBD pending INR trends, perhaps warfarin 2.5 mg PO daily.  Recommend a follow-up PT/INR within 48-72 hours of discharge.      Kay Rhodes, PharmD, BCPS, BCCCP

## 2023-02-01 LAB
ANION GAP SERPL CALC-SCNC: 11 MMOL/L (ref 7–16)
BASOPHILS # BLD AUTO: 0.6 % (ref 0–1.8)
BASOPHILS # BLD: 0.07 K/UL (ref 0–0.12)
BUN SERPL-MCNC: 22 MG/DL (ref 8–22)
CALCIUM SERPL-MCNC: 8.5 MG/DL (ref 8.5–10.5)
CHLORIDE SERPL-SCNC: 101 MMOL/L (ref 96–112)
CO2 SERPL-SCNC: 29 MMOL/L (ref 20–33)
CREAT SERPL-MCNC: 1.64 MG/DL (ref 0.5–1.4)
EKG IMPRESSION: NORMAL
EOSINOPHIL # BLD AUTO: 0.32 K/UL (ref 0–0.51)
EOSINOPHIL NFR BLD: 2.6 % (ref 0–6.9)
ERYTHROCYTE [DISTWIDTH] IN BLOOD BY AUTOMATED COUNT: 50.3 FL (ref 35.9–50)
FERRITIN SERPL-MCNC: 24.5 NG/ML (ref 22–322)
GFR SERPLBLD CREATININE-BSD FMLA CKD-EPI: 47 ML/MIN/1.73 M 2
GLUCOSE SERPL-MCNC: 136 MG/DL (ref 65–99)
HCT VFR BLD AUTO: 31.1 % (ref 42–52)
HGB BLD-MCNC: 8.9 G/DL (ref 14–18)
IMM GRANULOCYTES # BLD AUTO: 0.2 K/UL (ref 0–0.11)
IMM GRANULOCYTES NFR BLD AUTO: 1.6 % (ref 0–0.9)
INR PPP: 2.11 (ref 0.87–1.13)
IRON SATN MFR SERPL: 5 % (ref 15–55)
IRON SERPL-MCNC: 14 UG/DL (ref 50–180)
LYMPHOCYTES # BLD AUTO: 0.96 K/UL (ref 1–4.8)
LYMPHOCYTES NFR BLD: 7.7 % (ref 22–41)
MAGNESIUM SERPL-MCNC: 2 MG/DL (ref 1.5–2.5)
MCH RBC QN AUTO: 21 PG (ref 27–33)
MCHC RBC AUTO-ENTMCNC: 28.6 G/DL (ref 33.7–35.3)
MCV RBC AUTO: 73.5 FL (ref 81.4–97.8)
MONOCYTES # BLD AUTO: 0.93 K/UL (ref 0–0.85)
MONOCYTES NFR BLD AUTO: 7.5 % (ref 0–13.4)
NEUTROPHILS # BLD AUTO: 9.95 K/UL (ref 1.82–7.42)
NEUTROPHILS NFR BLD: 80 % (ref 44–72)
NRBC # BLD AUTO: 0.05 K/UL
NRBC BLD-RTO: 0.4 /100 WBC
PHOSPHATE SERPL-MCNC: 3.1 MG/DL (ref 2.5–4.5)
PLATELET # BLD AUTO: 481 K/UL (ref 164–446)
PMV BLD AUTO: 10.4 FL (ref 9–12.9)
POTASSIUM SERPL-SCNC: 3.6 MMOL/L (ref 3.6–5.5)
PROTHROMBIN TIME: 23 SEC (ref 12–14.6)
RBC # BLD AUTO: 4.23 M/UL (ref 4.7–6.1)
SODIUM SERPL-SCNC: 141 MMOL/L (ref 135–145)
TIBC SERPL-MCNC: 265 UG/DL (ref 250–450)
UIBC SERPL-MCNC: 251 UG/DL (ref 110–370)
WBC # BLD AUTO: 12.4 K/UL (ref 4.8–10.8)

## 2023-02-01 PROCEDURE — 700102 HCHG RX REV CODE 250 W/ 637 OVERRIDE(OP): Performed by: INTERNAL MEDICINE

## 2023-02-01 PROCEDURE — 85025 COMPLETE CBC W/AUTO DIFF WBC: CPT

## 2023-02-01 PROCEDURE — 700101 HCHG RX REV CODE 250: Performed by: HOSPITALIST

## 2023-02-01 PROCEDURE — 83540 ASSAY OF IRON: CPT

## 2023-02-01 PROCEDURE — 85610 PROTHROMBIN TIME: CPT

## 2023-02-01 PROCEDURE — 83550 IRON BINDING TEST: CPT

## 2023-02-01 PROCEDURE — 80048 BASIC METABOLIC PNL TOTAL CA: CPT

## 2023-02-01 PROCEDURE — A9270 NON-COVERED ITEM OR SERVICE: HCPCS | Performed by: INTERNAL MEDICINE

## 2023-02-01 PROCEDURE — 700102 HCHG RX REV CODE 250 W/ 637 OVERRIDE(OP): Performed by: HOSPITALIST

## 2023-02-01 PROCEDURE — A9270 NON-COVERED ITEM OR SERVICE: HCPCS | Performed by: HOSPITALIST

## 2023-02-01 PROCEDURE — 770020 HCHG ROOM/CARE - TELE (206)

## 2023-02-01 PROCEDURE — 99233 SBSQ HOSP IP/OBS HIGH 50: CPT | Performed by: HOSPITALIST

## 2023-02-01 PROCEDURE — 84100 ASSAY OF PHOSPHORUS: CPT

## 2023-02-01 PROCEDURE — 82728 ASSAY OF FERRITIN: CPT

## 2023-02-01 PROCEDURE — 83735 ASSAY OF MAGNESIUM: CPT

## 2023-02-01 RX ORDER — OXYCODONE HYDROCHLORIDE 5 MG/1
5 TABLET ORAL EVERY 4 HOURS PRN
Status: DISCONTINUED | OUTPATIENT
Start: 2023-02-01 | End: 2023-02-02

## 2023-02-01 RX ORDER — POTASSIUM CHLORIDE 20 MEQ/1
60 TABLET, EXTENDED RELEASE ORAL ONCE
Status: COMPLETED | OUTPATIENT
Start: 2023-02-01 | End: 2023-02-01

## 2023-02-01 RX ORDER — LIDOCAINE 50 MG/G
1 PATCH TOPICAL EVERY 24 HOURS
Status: DISCONTINUED | OUTPATIENT
Start: 2023-02-01 | End: 2023-02-03 | Stop reason: HOSPADM

## 2023-02-01 RX ORDER — ALPRAZOLAM 0.25 MG/1
0.25 TABLET ORAL 4 TIMES DAILY PRN
Status: DISCONTINUED | OUTPATIENT
Start: 2023-02-01 | End: 2023-02-03 | Stop reason: HOSPADM

## 2023-02-01 RX ADMIN — QUETIAPINE FUMARATE 25 MG: 25 TABLET ORAL at 14:49

## 2023-02-01 RX ADMIN — BUDESONIDE AND FORMOTEROL FUMARATE DIHYDRATE 2 PUFF: 80; 4.5 AEROSOL RESPIRATORY (INHALATION) at 05:22

## 2023-02-01 RX ADMIN — WARFARIN SODIUM 2.5 MG: 2.5 TABLET ORAL at 17:02

## 2023-02-01 RX ADMIN — POTASSIUM CHLORIDE 60 MEQ: 1500 TABLET, EXTENDED RELEASE ORAL at 09:54

## 2023-02-01 RX ADMIN — BUDESONIDE AND FORMOTEROL FUMARATE DIHYDRATE 2 PUFF: 80; 4.5 AEROSOL RESPIRATORY (INHALATION) at 17:02

## 2023-02-01 RX ADMIN — THIAMINE HCL TAB 100 MG 100 MG: 100 TAB at 05:12

## 2023-02-01 RX ADMIN — SENNOSIDES AND DOCUSATE SODIUM 2 TABLET: 50; 8.6 TABLET ORAL at 05:21

## 2023-02-01 RX ADMIN — QUETIAPINE FUMARATE 25 MG: 25 TABLET ORAL at 05:13

## 2023-02-01 RX ADMIN — LIDOCAINE 1 PATCH: 50 PATCH TOPICAL at 20:03

## 2023-02-01 RX ADMIN — ACETAMINOPHEN 650 MG: 325 TABLET, FILM COATED ORAL at 17:03

## 2023-02-01 RX ADMIN — METOPROLOL TARTRATE 50 MG: 50 TABLET, FILM COATED ORAL at 17:03

## 2023-02-01 RX ADMIN — AMIODARONE HYDROCHLORIDE 400 MG: 200 TABLET ORAL at 05:11

## 2023-02-01 RX ADMIN — ASPIRIN 81 MG: 81 TABLET, CHEWABLE ORAL at 05:12

## 2023-02-01 RX ADMIN — OXYCODONE HYDROCHLORIDE 5 MG: 5 TABLET ORAL at 20:03

## 2023-02-01 RX ADMIN — TRAZODONE HYDROCHLORIDE 50 MG: 50 TABLET ORAL at 20:03

## 2023-02-01 RX ADMIN — OMEPRAZOLE 20 MG: 20 CAPSULE, DELAYED RELEASE ORAL at 05:13

## 2023-02-01 RX ADMIN — ALPRAZOLAM 0.25 MG: 0.25 TABLET ORAL at 18:23

## 2023-02-01 RX ADMIN — QUETIAPINE FUMARATE 25 MG: 25 TABLET ORAL at 22:30

## 2023-02-01 ASSESSMENT — ENCOUNTER SYMPTOMS
FOCAL WEAKNESS: 0
SENSORY CHANGE: 0
ABDOMINAL PAIN: 0
HEMOPTYSIS: 0
FALLS: 0
DIZZINESS: 0
NERVOUS/ANXIOUS: 1
FEVER: 0
DEPRESSION: 1
CHILLS: 0
BACK PAIN: 0
SHORTNESS OF BREATH: 1
LOSS OF CONSCIOUSNESS: 0
PALPITATIONS: 0
EYE PAIN: 0
WHEEZING: 0
INSOMNIA: 0
BLURRED VISION: 0
NAUSEA: 0
WEAKNESS: 1
COUGH: 1
HEADACHES: 0
VOMITING: 0
SPUTUM PRODUCTION: 0

## 2023-02-01 ASSESSMENT — FIBROSIS 4 INDEX: FIB4 SCORE: 1.64

## 2023-02-01 ASSESSMENT — LIFESTYLE VARIABLES: SUBSTANCE_ABUSE: 0

## 2023-02-01 ASSESSMENT — PAIN DESCRIPTION - PAIN TYPE: TYPE: ACUTE PAIN

## 2023-02-01 NOTE — PROGRESS NOTES
Noe Hickey is a 62M admitted on 1/20 for hypoxic respiratory failure, PNA; pmhx inclusive of Ao stenosis (bovine tissue valve 1/5), ascending aortic aneurysm (1/5 repair), A fib RVR (warfarin), HFpEF (45%), chronic tobacco use, migraines; transferred to CICU on 1/29 intubated, extubated on 1/31.      Ao valve bovine prosthetic valve replacement with ascending aorta aneursymal repair during previous hospitalisation on 1/6. Readmitted on 1/20 for chest pain, swelling, cough. Heart failure. Was progressing to discharge when developed severe agitation requiring sedation and intubation. Extubated on 1/31 withOUT complication. Labile mood post extubation, withOUT agitation.    -Titration of atrial fibrillation medications  -Trend JENNY, likely related to diuresis  -consider psychiatry reconsult

## 2023-02-01 NOTE — CONSULTS
Psychiatry consult received.  Chart reviewed. Pt not seen.  Please repeat EKG, last QTC was 501 yesterday.    If QTC remains around 501, switch to Risperdal 0.5mg-1mg PO QHS for agitation. Use Haldol 2mg IM Q6H PRN with caution. If delirious, avoid benzos or anticholinergics.     If QTC >510, please avoid any antipsychotics.    If QTC<460, continue seroquel 25mg PO TID.    Psychiatry will see patient tomorrow early in the morning.    Case discussed with Dr Bright

## 2023-02-01 NOTE — CARE PLAN
The patient is Watcher - Medium risk of patient condition declining or worsening    Shift Goals  Clinical Goals: Maintain RASS +1 to -1, stable hemodynamics  Patient Goals: HUGH  Family Goals: HUGH    Progress made toward(s) clinical / shift goals:    Problem: Pain - Standard  Goal: Alleviation of pain or a reduction in pain to the patient’s comfort goal  Outcome: Progressing  Note: Medicated per MAR. Educated on pain scale. implemented non pharmacological methods: distraction, reposition, rest.       Problem: Knowledge Deficit - Standard  Goal: Patient and family/care givers will demonstrate understanding of plan of care, disease process/condition, diagnostic tests and medications  Outcome: Progressing  Note: Pt educated about disease process. Reason why medications are taken. And informed about treatment plan.      Problem: Safety - Medical Restraint  Goal: Remains free of injury from restraints (Restraint for Interference with Medical Device)  Outcome: Met  Goal: Free from restraint(s) (Restraint for Interference with Medical Device)  Outcome: Met       Patient is not progressing towards the following goals:

## 2023-02-01 NOTE — CARE PLAN
The patient is Stable - Low risk of patient condition declining or worsening    Shift Goals  Clinical Goals: Maintain RASS +1 to -1, stable hemodynamics  Patient Goals: HUGH  Family Goals: HUGH    Progress made toward(s) clinical / shift goals:    Problem: Pain - Standard  Goal: Alleviation of pain or a reduction in pain to the patient’s comfort goal  Outcome: Progressing     Problem: Psychosocial  Goal: Patient's level of anxiety will decrease  Outcome: Progressing  Goal: Patient's ability to verbalize feelings about condition will improve  Outcome: Progressing  Goal: Patient's ability to re-evaluate and adapt role responsibilities will improve  Outcome: Progressing  Goal: Patient and family will demonstrate ability to cope with life altering diagnosis and/or procedure  Outcome: Progressing  Goal: Spiritual and cultural needs incorporated into hospitalization  Outcome: Progressing

## 2023-02-01 NOTE — PROGRESS NOTES
Pt complaining of new mid sternal chest pain 8/10, NOT radiating to arm or jaw. Pain increased when area palpated. STAT EKG ordered. Dr. Queen updated.

## 2023-02-01 NOTE — PROGRESS NOTES
Inpatient Anticoagulation Service Note for 2/1/2023    Reason for Anticoagulation: Atrial Fibrillation   [id number] VASc Score: 2  HAS-BLED Score: 1    Hemoglobin Value: (Abnormal) 8.9  Hematocrit Value: (Abnormal) 31.1  Lab Platelet Value: (Abnormal) 481  Target INR: 2.0 to 3.0    INR from last 7 days       Date/Time INR Value    02/01/23 0525 (Abnormal) 2.11    01/31/23 0255 (Abnormal) 2.1    01/30/23 1222 (Abnormal) 1.98    01/30/23 0813 (Abnormal) 1.97    01/28/23 0208 (Abnormal) 2.16    01/27/23 0243 (Abnormal) 2.06    01/26/23 0207 (Abnormal) 2.13          Dose from last 7 days       Date/Time Dose (mg)    02/01/23 1355 2.5    01/31/23 1144 2.5    01/30/23 1331 5    01/29/23 0828 2.5    01/28/23 1127 2.5    01/27/23 0851 2.5    01/26/23 0953 2.5          Average Dose (mg):  (per [facility] - 2.5 mg PO QD)  Significant Interactions: Antiplatelet Medications, Amiodarone  Bridge Therapy: No   Reversal Agent Administered: Not Applicable    A/p  Therapeutic INR.  Continue with home dosing of warfarin 2.5 mg daily with INR tomorrow.  If INR remains therapeutic tomorrow consider reducing frequency of INR draws.    Education Material Provided?: No    Pharmacist suggested discharge dosing: TBD pending INR trends, perhaps warfarin 2.5 mg PO daily.  Recommend a follow-up PT/INR within 48-72 hours of discharge.      Kay Rhodes, PharmD, BCPS, BCCCP

## 2023-02-02 ENCOUNTER — APPOINTMENT (OUTPATIENT)
Dept: RADIOLOGY | Facility: MEDICAL CENTER | Age: 63
DRG: 208 | End: 2023-02-02
Attending: HOSPITALIST
Payer: COMMERCIAL

## 2023-02-02 LAB
ALBUMIN SERPL BCP-MCNC: 3 G/DL (ref 3.2–4.9)
ALBUMIN/GLOB SERPL: 1 G/DL
ALP SERPL-CCNC: 53 U/L (ref 30–99)
ALT SERPL-CCNC: 119 U/L (ref 2–50)
ANION GAP SERPL CALC-SCNC: 6 MMOL/L (ref 7–16)
ANION GAP SERPL CALC-SCNC: 9 MMOL/L (ref 7–16)
AST SERPL-CCNC: 44 U/L (ref 12–45)
BASOPHILS # BLD AUTO: 0.6 % (ref 0–1.8)
BASOPHILS # BLD AUTO: 0.6 % (ref 0–1.8)
BASOPHILS # BLD: 0.06 K/UL (ref 0–0.12)
BASOPHILS # BLD: 0.06 K/UL (ref 0–0.12)
BILIRUB SERPL-MCNC: 0.4 MG/DL (ref 0.1–1.5)
BUN SERPL-MCNC: 14 MG/DL (ref 8–22)
BUN SERPL-MCNC: 15 MG/DL (ref 8–22)
CALCIUM ALBUM COR SERPL-MCNC: 9.2 MG/DL (ref 8.5–10.5)
CALCIUM SERPL-MCNC: 8.4 MG/DL (ref 8.5–10.5)
CALCIUM SERPL-MCNC: 8.6 MG/DL (ref 8.5–10.5)
CHLORIDE SERPL-SCNC: 102 MMOL/L (ref 96–112)
CHLORIDE SERPL-SCNC: 105 MMOL/L (ref 96–112)
CO2 SERPL-SCNC: 25 MMOL/L (ref 20–33)
CO2 SERPL-SCNC: 30 MMOL/L (ref 20–33)
CREAT SERPL-MCNC: 1.11 MG/DL (ref 0.5–1.4)
CREAT SERPL-MCNC: 1.23 MG/DL (ref 0.5–1.4)
EKG IMPRESSION: NORMAL
EOSINOPHIL # BLD AUTO: 0.39 K/UL (ref 0–0.51)
EOSINOPHIL # BLD AUTO: 0.39 K/UL (ref 0–0.51)
EOSINOPHIL NFR BLD: 3.6 % (ref 0–6.9)
EOSINOPHIL NFR BLD: 3.7 % (ref 0–6.9)
ERYTHROCYTE [DISTWIDTH] IN BLOOD BY AUTOMATED COUNT: 49.2 FL (ref 35.9–50)
ERYTHROCYTE [DISTWIDTH] IN BLOOD BY AUTOMATED COUNT: 50.2 FL (ref 35.9–50)
GFR SERPLBLD CREATININE-BSD FMLA CKD-EPI: 66 ML/MIN/1.73 M 2
GFR SERPLBLD CREATININE-BSD FMLA CKD-EPI: 75 ML/MIN/1.73 M 2
GLOBULIN SER CALC-MCNC: 3 G/DL (ref 1.9–3.5)
GLUCOSE SERPL-MCNC: 81 MG/DL (ref 65–99)
GLUCOSE SERPL-MCNC: 92 MG/DL (ref 65–99)
HCT VFR BLD AUTO: 31.1 % (ref 42–52)
HCT VFR BLD AUTO: 32.7 % (ref 42–52)
HGB BLD-MCNC: 8.8 G/DL (ref 14–18)
HGB BLD-MCNC: 9.5 G/DL (ref 14–18)
IMM GRANULOCYTES # BLD AUTO: 0.14 K/UL (ref 0–0.11)
IMM GRANULOCYTES # BLD AUTO: 0.18 K/UL (ref 0–0.11)
IMM GRANULOCYTES NFR BLD AUTO: 1.3 % (ref 0–0.9)
IMM GRANULOCYTES NFR BLD AUTO: 1.7 % (ref 0–0.9)
INR PPP: 1.96 (ref 0.87–1.13)
INR PPP: 2.05 (ref 0.87–1.13)
LYMPHOCYTES # BLD AUTO: 1.17 K/UL (ref 1–4.8)
LYMPHOCYTES # BLD AUTO: 1.34 K/UL (ref 1–4.8)
LYMPHOCYTES NFR BLD: 10.9 % (ref 22–41)
LYMPHOCYTES NFR BLD: 12.7 % (ref 22–41)
MAGNESIUM SERPL-MCNC: 1.8 MG/DL (ref 1.5–2.5)
MCH RBC QN AUTO: 20.7 PG (ref 27–33)
MCH RBC QN AUTO: 21 PG (ref 27–33)
MCHC RBC AUTO-ENTMCNC: 28.3 G/DL (ref 33.7–35.3)
MCHC RBC AUTO-ENTMCNC: 29.1 G/DL (ref 33.7–35.3)
MCV RBC AUTO: 72.2 FL (ref 81.4–97.8)
MCV RBC AUTO: 73.2 FL (ref 81.4–97.8)
MONOCYTES # BLD AUTO: 0.96 K/UL (ref 0–0.85)
MONOCYTES # BLD AUTO: 0.96 K/UL (ref 0–0.85)
MONOCYTES NFR BLD AUTO: 8.9 % (ref 0–13.4)
MONOCYTES NFR BLD AUTO: 9.1 % (ref 0–13.4)
NEUTROPHILS # BLD AUTO: 7.64 K/UL (ref 1.82–7.42)
NEUTROPHILS # BLD AUTO: 8.02 K/UL (ref 1.82–7.42)
NEUTROPHILS NFR BLD: 72.6 % (ref 44–72)
NEUTROPHILS NFR BLD: 74.3 % (ref 44–72)
NRBC # BLD AUTO: 0.04 K/UL
NRBC # BLD AUTO: 0.06 K/UL
NRBC BLD-RTO: 0.4 /100 WBC
NRBC BLD-RTO: 0.6 /100 WBC
NT-PROBNP SERPL IA-MCNC: 1033 PG/ML (ref 0–125)
PHOSPHATE SERPL-MCNC: 2.9 MG/DL (ref 2.5–4.5)
PLATELET # BLD AUTO: 462 K/UL (ref 164–446)
PLATELET # BLD AUTO: 480 K/UL (ref 164–446)
PMV BLD AUTO: 10 FL (ref 9–12.9)
PMV BLD AUTO: 9.7 FL (ref 9–12.9)
POTASSIUM SERPL-SCNC: 4 MMOL/L (ref 3.6–5.5)
POTASSIUM SERPL-SCNC: 4.1 MMOL/L (ref 3.6–5.5)
PROCALCITONIN SERPL-MCNC: <0.05 NG/ML
PROT SERPL-MCNC: 6 G/DL (ref 6–8.2)
PROTHROMBIN TIME: 21.8 SEC (ref 12–14.6)
PROTHROMBIN TIME: 22.5 SEC (ref 12–14.6)
RBC # BLD AUTO: 4.25 M/UL (ref 4.7–6.1)
RBC # BLD AUTO: 4.53 M/UL (ref 4.7–6.1)
SODIUM SERPL-SCNC: 136 MMOL/L (ref 135–145)
SODIUM SERPL-SCNC: 141 MMOL/L (ref 135–145)
T4 FREE SERPL-MCNC: 0.94 NG/DL (ref 0.93–1.7)
TSH SERPL DL<=0.005 MIU/L-ACNC: 6.01 UIU/ML (ref 0.38–5.33)
WBC # BLD AUTO: 10.5 K/UL (ref 4.8–10.8)
WBC # BLD AUTO: 10.8 K/UL (ref 4.8–10.8)

## 2023-02-02 PROCEDURE — 700101 HCHG RX REV CODE 250: Performed by: INTERNAL MEDICINE

## 2023-02-02 PROCEDURE — 84100 ASSAY OF PHOSPHORUS: CPT

## 2023-02-02 PROCEDURE — 700102 HCHG RX REV CODE 250 W/ 637 OVERRIDE(OP): Performed by: STUDENT IN AN ORGANIZED HEALTH CARE EDUCATION/TRAINING PROGRAM

## 2023-02-02 PROCEDURE — 99232 SBSQ HOSP IP/OBS MODERATE 35: CPT | Performed by: STUDENT IN AN ORGANIZED HEALTH CARE EDUCATION/TRAINING PROGRAM

## 2023-02-02 PROCEDURE — 80048 BASIC METABOLIC PNL TOTAL CA: CPT

## 2023-02-02 PROCEDURE — A9270 NON-COVERED ITEM OR SERVICE: HCPCS | Performed by: STUDENT IN AN ORGANIZED HEALTH CARE EDUCATION/TRAINING PROGRAM

## 2023-02-02 PROCEDURE — 36415 COLL VENOUS BLD VENIPUNCTURE: CPT

## 2023-02-02 PROCEDURE — 71045 X-RAY EXAM CHEST 1 VIEW: CPT

## 2023-02-02 PROCEDURE — 83880 ASSAY OF NATRIURETIC PEPTIDE: CPT

## 2023-02-02 PROCEDURE — 770020 HCHG ROOM/CARE - TELE (206)

## 2023-02-02 PROCEDURE — A9270 NON-COVERED ITEM OR SERVICE: HCPCS | Performed by: HOSPITALIST

## 2023-02-02 PROCEDURE — 700102 HCHG RX REV CODE 250 W/ 637 OVERRIDE(OP): Performed by: INTERNAL MEDICINE

## 2023-02-02 PROCEDURE — 83735 ASSAY OF MAGNESIUM: CPT

## 2023-02-02 PROCEDURE — 85025 COMPLETE CBC W/AUTO DIFF WBC: CPT

## 2023-02-02 PROCEDURE — 700101 HCHG RX REV CODE 250: Performed by: HOSPITALIST

## 2023-02-02 PROCEDURE — 93005 ELECTROCARDIOGRAM TRACING: CPT | Performed by: HOSPITALIST

## 2023-02-02 PROCEDURE — A9270 NON-COVERED ITEM OR SERVICE: HCPCS | Performed by: INTERNAL MEDICINE

## 2023-02-02 PROCEDURE — 700102 HCHG RX REV CODE 250 W/ 637 OVERRIDE(OP): Performed by: HOSPITALIST

## 2023-02-02 PROCEDURE — 84145 PROCALCITONIN (PCT): CPT

## 2023-02-02 PROCEDURE — 85610 PROTHROMBIN TIME: CPT

## 2023-02-02 PROCEDURE — 94760 N-INVAS EAR/PLS OXIMETRY 1: CPT

## 2023-02-02 PROCEDURE — 700111 HCHG RX REV CODE 636 W/ 250 OVERRIDE (IP)

## 2023-02-02 PROCEDURE — 94640 AIRWAY INHALATION TREATMENT: CPT

## 2023-02-02 PROCEDURE — 84439 ASSAY OF FREE THYROXINE: CPT

## 2023-02-02 PROCEDURE — 84443 ASSAY THYROID STIM HORMONE: CPT

## 2023-02-02 PROCEDURE — 93010 ELECTROCARDIOGRAM REPORT: CPT | Performed by: INTERNAL MEDICINE

## 2023-02-02 PROCEDURE — 80053 COMPREHEN METABOLIC PANEL: CPT

## 2023-02-02 PROCEDURE — 99232 SBSQ HOSP IP/OBS MODERATE 35: CPT | Mod: GC | Performed by: PSYCHIATRY & NEUROLOGY

## 2023-02-02 RX ORDER — WARFARIN SODIUM 3 MG/1
3 TABLET ORAL DAILY
Status: DISCONTINUED | OUTPATIENT
Start: 2023-02-02 | End: 2023-02-03

## 2023-02-02 RX ORDER — QUETIAPINE FUMARATE 25 MG/1
25 TABLET, FILM COATED ORAL 2 TIMES DAILY
Status: DISCONTINUED | OUTPATIENT
Start: 2023-02-02 | End: 2023-02-03 | Stop reason: HOSPADM

## 2023-02-02 RX ORDER — PROCHLORPERAZINE EDISYLATE 5 MG/ML
INJECTION INTRAMUSCULAR; INTRAVENOUS
Status: COMPLETED
Start: 2023-02-02 | End: 2023-02-02

## 2023-02-02 RX ORDER — OXYCODONE HYDROCHLORIDE 10 MG/1
10 TABLET ORAL EVERY 4 HOURS PRN
Status: DISCONTINUED | OUTPATIENT
Start: 2023-02-02 | End: 2023-02-03 | Stop reason: HOSPADM

## 2023-02-02 RX ADMIN — TRAZODONE HYDROCHLORIDE 50 MG: 50 TABLET ORAL at 20:36

## 2023-02-02 RX ADMIN — PROCHLORPERAZINE EDISYLATE 5 MG: 5 INJECTION INTRAMUSCULAR; INTRAVENOUS at 22:20

## 2023-02-02 RX ADMIN — WARFARIN SODIUM 3 MG: 3 TABLET ORAL at 18:02

## 2023-02-02 RX ADMIN — QUETIAPINE FUMARATE 25 MG: 25 TABLET ORAL at 17:34

## 2023-02-02 RX ADMIN — ASPIRIN 81 MG: 81 TABLET, CHEWABLE ORAL at 04:36

## 2023-02-02 RX ADMIN — ALPRAZOLAM 0.25 MG: 0.25 TABLET ORAL at 09:41

## 2023-02-02 RX ADMIN — OXYCODONE HYDROCHLORIDE 10 MG: 10 TABLET ORAL at 20:34

## 2023-02-02 RX ADMIN — AMIODARONE HYDROCHLORIDE 400 MG: 200 TABLET ORAL at 06:54

## 2023-02-02 RX ADMIN — IPRATROPIUM BROMIDE AND ALBUTEROL SULFATE 3 ML: .5; 2.5 SOLUTION RESPIRATORY (INHALATION) at 11:38

## 2023-02-02 RX ADMIN — METOPROLOL TARTRATE 50 MG: 50 TABLET, FILM COATED ORAL at 17:34

## 2023-02-02 RX ADMIN — QUETIAPINE FUMARATE 25 MG: 25 TABLET ORAL at 06:53

## 2023-02-02 RX ADMIN — ALPRAZOLAM 0.25 MG: 0.25 TABLET ORAL at 17:34

## 2023-02-02 RX ADMIN — OXYCODONE HYDROCHLORIDE 5 MG: 5 TABLET ORAL at 12:05

## 2023-02-02 RX ADMIN — OMEPRAZOLE 20 MG: 20 CAPSULE, DELAYED RELEASE ORAL at 04:36

## 2023-02-02 RX ADMIN — OXYCODONE HYDROCHLORIDE 10 MG: 10 TABLET ORAL at 15:50

## 2023-02-02 RX ADMIN — LIDOCAINE 1 PATCH: 50 PATCH TOPICAL at 20:36

## 2023-02-02 RX ADMIN — AMLODIPINE BESYLATE 10 MG: 10 TABLET ORAL at 04:36

## 2023-02-02 RX ADMIN — OXYCODONE HYDROCHLORIDE 5 MG: 5 TABLET ORAL at 08:06

## 2023-02-02 RX ADMIN — OXYCODONE HYDROCHLORIDE 5 MG: 5 TABLET ORAL at 00:00

## 2023-02-02 RX ADMIN — ALPRAZOLAM 0.25 MG: 0.25 TABLET ORAL at 03:04

## 2023-02-02 RX ADMIN — BUDESONIDE AND FORMOTEROL FUMARATE DIHYDRATE 2 PUFF: 80; 4.5 AEROSOL RESPIRATORY (INHALATION) at 04:40

## 2023-02-02 RX ADMIN — BUDESONIDE AND FORMOTEROL FUMARATE DIHYDRATE 2 PUFF: 80; 4.5 AEROSOL RESPIRATORY (INHALATION) at 17:34

## 2023-02-02 RX ADMIN — ACETAMINOPHEN 650 MG: 325 TABLET, FILM COATED ORAL at 17:40

## 2023-02-02 ASSESSMENT — PAIN DESCRIPTION - PAIN TYPE
TYPE: ACUTE PAIN
TYPE: ACUTE PAIN

## 2023-02-02 ASSESSMENT — COGNITIVE AND FUNCTIONAL STATUS - GENERAL
SUGGESTED CMS G CODE MODIFIER DAILY ACTIVITY: CH
WALKING IN HOSPITAL ROOM: A LITTLE
DAILY ACTIVITIY SCORE: 24
MOBILITY SCORE: 22
CLIMB 3 TO 5 STEPS WITH RAILING: A LITTLE
SUGGESTED CMS G CODE MODIFIER MOBILITY: CJ

## 2023-02-02 ASSESSMENT — ENCOUNTER SYMPTOMS
WEAKNESS: 1
NAUSEA: 0
COUGH: 1
NERVOUS/ANXIOUS: 1
PALPITATIONS: 0
LOSS OF CONSCIOUSNESS: 0
FALLS: 0
HALLUCINATIONS: 0
HEADACHES: 0
DIZZINESS: 0
BLURRED VISION: 0
CHILLS: 0
WHEEZING: 0
INSOMNIA: 0
SPUTUM PRODUCTION: 0
VOMITING: 0
EYE PAIN: 0
SHORTNESS OF BREATH: 1
ABDOMINAL PAIN: 0
DEPRESSION: 1
SENSORY CHANGE: 0
FEVER: 0
DEPRESSION: 0
FOCAL WEAKNESS: 0
HEMOPTYSIS: 0
BACK PAIN: 0

## 2023-02-02 ASSESSMENT — LIFESTYLE VARIABLES: SUBSTANCE_ABUSE: 0

## 2023-02-02 ASSESSMENT — FIBROSIS 4 INDEX: FIB4 SCORE: 0.53

## 2023-02-02 NOTE — CARE PLAN
The patient is Stable - Low risk of patient condition declining or worsening    Shift Goals  Clinical Goals: Maintain RASS +1 to -1, stable hemodynamics  Patient Goals: HUGH  Family Goals: HUGH      Patient is not progressing towards the following goals:    Problem: Discharge Barriers/Planning  Goal: Patient's continuum of care needs are met  Outcome: Not Met     Problem: Self Care  Goal: Patient will have the ability to perform ADLs independently or with assistance (bathe, groom, dress, toilet and feed)  Outcome: Not Met    Progress made toward(s) clinical / shift goals:  progressing    Problem: Pain - Standard  Goal: Alleviation of pain or a reduction in pain to the patient’s comfort goal  Outcome: Progressing     Problem: Knowledge Deficit - Standard  Goal: Patient and family/care givers will demonstrate understanding of plan of care, disease process/condition, diagnostic tests and medications  Outcome: Progressing     Problem: Fall Risk  Goal: Patient will remain free from falls  Outcome: Progressing     Problem: Psychosocial  Goal: Patient's level of anxiety will decrease  Outcome: Progressing  Goal: Patient's ability to verbalize feelings about condition will improve  Outcome: Progressing  Goal: Patient's ability to re-evaluate and adapt role responsibilities will improve  Outcome: Progressing     Problem: Communication  Goal: The ability to communicate needs accurately and effectively will improve  Outcome: Progressing     Problem: Hemodynamics  Goal: Patient's hemodynamics, fluid balance and neurologic status will be stable or improve  Outcome: Progressing     Problem: Respiratory  Goal: Patient will achieve/maintain optimum respiratory ventilation and gas exchange  Outcome: Progressing     Problem: Fluid Volume  Goal: Fluid volume balance will be maintained  Outcome: Progressing     Problem: Nutrition  Goal: Patient's nutritional and fluid intake will be adequate or improve  Outcome: Progressing     Problem:  Urinary Elimination  Goal: Establish and maintain regular urinary output  Outcome: Progressing     Problem: Mobility  Goal: Patient's capacity to carry out activities will improve  Outcome: Progressing     Problem: Infection - Standard  Goal: Patient will remain free from infection  Outcome: Progressing

## 2023-02-02 NOTE — CONSULTS
PSYCHIATRIC FOLLOW-UP:(established)  *Reason for admission: Shortness of breath, cough, lower extremity swelling, pneumonia       *Legal Hold Status:  Not on legal hold.             *HPI: Noe Hickey is a 62 year old male, with past medical history of atrial fibrillation/flutter, history of aortic stenosis status post valve replacement, tobacco abuse and hyperlipidemia, who presented to the ED for shortness of breath, cough and lower extremity swelling on 1/20/2023. Patient was admitted to the medicine floor for infectious pneumonia, elevated troponin, acute on chronic systolic heart failure, acute respiratory failure with hypoxia and acute renal failure.      Psychiatry was consulted on 1/25/2023 for new onset agitation and hallucinations.  During evaluation, patient had endorsed visual and auditory hallucinations, which occurred when patient attempted to sleep.  Due to patient being treated for infectious disease, it was suspected that this was in the context of delirium, given he has no past psychiatric history of psychosis or hallucinations during withdrawal.  There are also reports that he has been agitated, however he was calm and cooperative during examination.  Due to significant cardiac disease, it was recommended that antipsychotics be avoided for managing agitation.it was recommended that the team consider Ativan 1 mg IM every 4 hours as needed for continued agitation.  On 1/26/2023, patient endorsed resolution of hallucinations and did not require any agitation medications overnight.  Psychiatry signed off.    Per chart review, patient was progressing towards discharge when he developed severe and violent agitation requiring sedation and intubation on 1/29/2023.  He was extubated on 1/31/2023.    On 2/1/2023, psychiatry was reconsulted for recommendations to manage agitation and aggression.  Most recent EKG showed QTc of 501.  It was recommended to repeat EKG, and if QTc remains around 501, it was  "recommended to switch to Risperdal 0.5 mg to 1 mg p.o. nightly for agitation.  It was advised to use Haldol 2 mg IM every 6 hours as needed with caution.  If patient is delirious, it was recommended to avoid benzodiazepines and anticholinergics.  If QTc is over 510, avoid any antipsychotics.  If QTc is below 460, is recommended continue Seroquel 25 mg p.o. 3 times daily.    Per chart review, pt given alprazolam 0.25 mg as needed for anxiety at 3 AM.  Patient has alprazolam 0.25 mg 4 times daily as needed for anxiety and sleep.  Currently on Seroquel 25 mg 3 times daily.  Also on trazodone 50 mg nightly for sleep.  Repeat EKG on 2/2/2023 showed QTc of 450.     Per nursing, did not endorse any acute concerns or behavioral outbursts overnight this morning.    During interview with pt, patient was observed lying in bed approach.  Patient set up and was interviewed at bedside.  Patient reports that he was scheduled to be discharged on 1/30 to [facility].  However, on 1/29, patient endorsed experiencing visual hallucinations of \"John Biden and democrats\".  He denies any recollection of the events following the hallucinations, and remembers waking up with a \"tube down my throat\".  Today, patient denies visual and auditory hallucinations.  He denies suicidal and homicidal ideation.  During the interview, case management walked in to discuss discharge planning, as patient will be discharged today.  Patient will have a PT evaluation to ensure he meets criteria for [facility].  It was discussed that if he does not meet criteria for [facility], he would have to find another place to stay.  Patient denied having any additional supports in the community.  Reports having a contentious relationship with his wife, does not have a close relationship with his brother (already recently declined allowing the patient to live with him in [place]), and is estranged from his children.  Patient denies having access to funds " "currently, however states that a coworker owes him $250, in which he could stay in a hotel for couple days.  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  He denies that he could potentially stay with a coworker or supervisor, stating, you do not do that in construction\".  Throughout the interview, patient was notably becoming upset and tearful, however did not exhibit any aggression, violence, agitation.  Rather, he calmly endorsed his frustrations at his current situation, and was receptive to being advised to take things one step at a time.  Patient denied any additional questions or concerns at this time.      Per rounds with attending, patient endorses that his anxiety has improved with Seroquel, however does report feeling somewhat sedated throughout the day.  Will recommend decreasing Seroquel to 3 times daily to twice daily.  Recommend that patient also continue trazodone 50 mg nightly for sleep.  Patient denies having a PCP.  Case management was notified, and will speak to him about establishing care with a PCP.  Recommended that patient titrate slowly off Seroquel with the assistance of his PCP in the outpatient setting.  Patient reports some anxiety surrounding his discharge, however will continue taking things one step at a time.        Medical ROS (as pertinent):     Review of Systems   Constitutional:  Positive for malaise/fatigue.   Psychiatric/Behavioral:  Negative for depression, hallucinations and suicidal ideas. The patient is nervous/anxious. The patient does not have insomnia.          *Psychiatric Examination:  Vitals:   Vitals:    02/02/23 0654   BP: 98/59   Pulse: 64   Resp:    Temp:    SpO2:       General Appearance: 62-year-old male, appears stated age, appears cross chronically ill, in no acute distress. Patient is pleasant and cooperative with interview.  Notably becomes upset and tearful upon discussing his lack of stable housing and minimal " "support system.  He does not become violent, agitated or aggressive.  Abnormal Movements: No abnormal tics or tremors noted.    Gait and Posture: Gait not observed.  Speech: Slowed rate, tone and volume. Appear to be related to dyspnea (has chronic COPD requiring oxygen).  Thought processes: Linear, organized, goal oriented.  Associations: No loose associations.  Abnormal or Psychotic Thoughts: Denies current auditory and visual hallucinations.  Reports recent history of visual hallucinations, however these have resolved.  Patient is not observed responding to internal stimuli.  Judgement and Insight: Fair, fair.  Orientation: Alert and oriented to person, place, time.  Recent and Remote Memory: No gross deficits in recent or remote memory.  Attention Span and Concentration: Grossly intact.  Language: Fluent in English, peers to comprehend questions asked, spontaneously.  Fund of Knowledge: Not formally tested.  Mood and Affect: Mood: \"Frustrated\" Affect: Restricted, somewhat irritable when discussing barriers to discharge, appropriate to content, no mood lability.  SI/HI: Denies SI/HI.         *EKG:   Results for orders placed or performed during the hospital encounter of 23   EKG   Result Value Ref Range    Report       [facility]    Test Date:  2023  Pt Name:    JUSTIN TOPETE               Department: ER  MRN:        [id number]                      Room:       T804  Gender:     Male                         Technician: SHIRAJ  :        1960                   Requested By:ER TRIAGE PROTOCOL  Order #:    [id number]                    Reading MD: Manpreet Mcdonough MD    Measurements  Intervals                                Axis  Rate:       58                           P:          42  OK:         196                          QRS:        -5  QRSD:       92                           T:          17  QT:         449  QTc:        442    Interpretive Statements  Sinus bradycardia  Electronically " Signed On 2023 22:24:27 PST by Manpreet Mcdonough MD     EKG   Result Value Ref Range    Report       [facility]    Test Date:  2023  Pt Name:    JUSTIN TOPETE               Department: ER  MRN:        [id number]                      Room:       T714  Gender:     Male                         Technician: [id number]  :        1960                   Requested By:ER TRIAGE PROTOCOL  Order #:    [id number]                    Reading MD:    Measurements  Intervals                                Axis  Rate:       69                           P:          55  VT:         202                          QRS:        -6  QRSD:       82                           T:          40  QT:         405  QTc:        434    Interpretive Statements  Sinus rhythm  Compared to ECG 2023 20:28:36  Atrial fibrillation no longer present  ST (T wave) deviation no longer present  Myocardial infarct finding no longer present     EKG   Result Value Ref Range    Report       [facility]    Test Date:  2023  Pt Name:    JUSTIN YOSELYN               Department: 171  MRN:        [id number]                      Room:       T709  Gender:     Male                         Technician: DLH  :        1960                   Requested By:KATY SEGURA  Order #:    [id number]                    Reading MD: Rachid Jefferson MD    Measurements  Intervals                                Axis  Rate:       55                           P:          39  VT:         197                          QRS:        -13  QRSD:       115                          T:          26  QT:         479  QTc:        459    Interpretive Statements  Sinus bradycardia  Consider inferior infarct, old  Prolonged QTC  Electronically Signed On 2023 13:36:54 PST by Rachid Jefferson MD     EKG   Result Value Ref Range    Report       [facility]    Test Date:  2023  Pt Name:    JUSTIN Treichlers               Department:  171  MRN:        [id number]                      Room:       T709  Gender:     Male                         Technician: [facility]  :        1960                   Requested By:LEEANN CAICEDO  Order #:    [id number]                    Reading MD: Rachid Jefferson MD    Measurements  Intervals                                Axis  Rate:       54                           P:          44  NM:         198                          QRS:        -1  QRSD:       100                          T:          38  QT:         477  QTc:        453    Interpretive Statements  Sinus bradycardia  Prolonged QTC  Electronically Signed On 2023 16:56:30 PST by Rachid Jefferson MD     EKG   Result Value Ref Range    Report       [facility]    Test Date:  2023  Pt Name:    JUSTIN MUNOZGONER               Department: 171  MRN:        [id number]                      Room:       T620  Gender:     Male                         Technician: TXM  :        1960                   Requested By:MAGDIEL LAZCANO  Order #:    [id number]                    Reading MD: Keshawn Trejo MD    Measurements  Intervals                                Axis  Rate:       110                          P:  NM:                                      QRS:        7  QRSD:       159                          T:          66  QT:         352  QTc:        477    Interpretive Statements  Atrial fibrillation  Nonspecific intraventricular conduction delay  Compared to ECG 2023 12:01:10  Sinus bradycardia no longer present  Electronically Signed On 2023 23:53:50 PST by Keshawn Trejo MD     EKG   Result Value Ref Range    Report       [facility]    Test Date:  2023  Pt Name:    JUSTIN MUNOZGONER               Department: 161  MRN:        [id number]                      Room:       T620  Gender:     Male                         Technician: [facility]  :        1960                   Requested By:LOLITA COLIN  Order #:    [id number]                     Reading MD: Justin Sanhcez MD    Measurements  Intervals                                Axis  Rate:       74                           P:          37  HI:         199                          QRS:        -10  QRSD:       94                           T:  QT:         451  QTc:        501    Interpretive Statements  Sinus rhythm  Ventricular premature complex  NONSPECIFIC ST-T WAVE ABNORMALITIES, ANTEROLATERAL LEADS  Prolonged QT interval  Electronically Signed On 2023 18:52:15 PST by Justin Sanchez MD     EKG   Result Value Ref Range    Report       [facility]    Test Date:  2023  Pt Name:    JUSTIN TOPETE               Department: 161  MRN:        [id number]                      Room:       [facility]  Gender:     Male                         Technician: NINFA  :        1960                   Requested By:RODOLFO RAMIREZ  Order #:    [id number]                    Reading MD: Manpreet Mcdonough MD    Measurements  Intervals                                Axis  Rate:       59                           P:          50  HI:         200                          QRS:        6  QRSD:       97                           T:          41  QT:         454  QTc:        450    Interpretive Statements  Sinus bradycardia  Electronically Signed On 2023 6:54:13 PST by Manpreet Mcdonough MD        *Imaging:   -CTA head on : WNL  -CXR on :   IMPRESSION:  1.  Left lower lobe infiltrate slightly increased since prior study.  2.  Layering left pleural effusion, stable to slightly increased since prior study  3.  Cardiomegaly  4.  Atherosclerosis     EEG:  No EEG on file.      *Labs personally reviewed: GFR 47 (trending down), Calcium 9.2, Hgb 8.8 (trending down), BMP pending.     Assessment: Justin Topete is a 62 year old male, with past medical history of atrial fibrillation/flutter, history of aortic stenosis status post valve replacement, tobacco abuse and hyperlipidemia, who  presented to the ED for shortness of breath, cough and lower extremity swelling on 1/20/2023. Patient was admitted to the medicine floor for infectious pneumonia, elevated troponin, acute on chronic systolic heart failure, acute respiratory failure with hypoxia and acute renal failure.      Today, patient endorses recollection of visual hallucinations a few days prior, which have since resolved.  He does not recall the events leading to his intubation. Patient currently denies auditory and visual hallucinations, as well as suicidal and homicidal ideation.  Patient will be discharged today.  Upon discussing barriers to discharge, patient becomes visibly upset and tearful, however patient did not exhibit any violence, aggression or agitation.  Patient discussed the importance of taking things one step at a time and if one door shots, he plans to look for another.  Suspect the hallucinations and agitation, that ultimately led to intubation, was in the context of delirium.  His delirium appears to have resolved.  Please see medication recommendations below if agitation persists.  Patient reports good effect of Seroquel on anxiety.  Will recommend decreasing Seroquel dosage to 25 mg twice daily due to sedation throughout the day.  Psychiatry will continue to follow on this patient was discharged from the hospital.    Dx:   #Delirium (resolved)    Medical : Per medical team.     Plan:  1- Legal hold: Does not meet legal criteria.  2- Psychotropic medications:   -If QTC remains around 501, recommend Risperdal 0.5 mg to 1 mg p.o.   nightly for agitation.    -It is advised to use Haldol 2 mg IM every 6 hours as needed with   caution.    -If patient is delirious, it was recommended to avoid benzodiazepines   and anticholinergics.    -If QTc is over 510, avoid any antipsychotics.    -If QTc is below 460, is recommended continue Seroquel.  Seroquel dosage decreased to 25 mg twice daily for anxiety/agitation, due to sedation.   Recommend that this be tapered down by the assistance of a PCP.  3- Brief supportive psychotherapy provided (10 minutes)  4- Labs reviewed. No new labs ordered.  5- EKG reviewed.  6- Old records ordered/reviewed/summarized.  7- Discussed the case with: , Dr. Snow.   8- Psychiatry will continue to follow if patient is not discharged.  Thank you for the consult.         This note was created using voice recognition software (Dragon). The accuracy of the dictation is limited by the abilities of the software. I have reviewed the note prior to signing. However, error related to voice recognition software and /or scribes may still exist and should be interpreted within the appropriate context.

## 2023-02-02 NOTE — DISCHARGE PLANNING
Case Management Discharge Planning    Admission Date: 1/20/2023  GMLOS: 5  ALOS: 13    6-Clicks ADL Score: 24  6-Clicks Mobility Score: 22      Anticipated Discharge Dispo: Discharge Disposition: D/T to SNF with Medicare cert in anticipation of skilled care (03)  Wife called and she reported that patient has all his clothes and belongings at [place]. She ws grateful for the update.

## 2023-02-02 NOTE — PROGRESS NOTES
[facility] Medicine Daily Progress Note    Date of Service  2/1/2023    Chief Complaint  Noe Hickey is a 62 y.o. male admitted 1/20/2023 with leg swelling, cough.    Hospital Course  Noe Hickey is a 62 y.o. male who presented 1/20/2023 with past medical history of atrial fibrillation/flutter, history of aortic stenosis status post valve replacement, history of tobacco abuse, hyperlipidemia who presents to the hospital for shortness of breath, cough and lower extremity swelling.  Patient was recently discharged in the hospital on 1/5 for aortic aneurysm repair and aortic valve replacement.  Postoperatively he was given IV fluids due to blood loss.  During his hospital stay he had a complication of torsades and ventricular arrhythmia.  He was shocked once and then returned back to normal sinus rhythm.  His echo found EF to be 55% at that time.  Patient states that since his hospital stay has been having worsening lower extremity edema and cough.  He denies any hemoptysis or purulent sputum.  He was discharged to [facility] nursing [facility] and states that he has been eating a high salt diet.  Patient was discharged with Lasix and states that he has been compliant with it.  The patient was cared for in the acute setting, was treated with antibiotics, a echocardiogram was repeated showing a EF of 45%, cardiology was consulted, and the course the patient developed agitation and hallucinations, he received Atarax.  The agitation continued to wax and wane until 1/29 where a code gray was called and the patient became increasingly agitated and aggressive.  The patient was treated at that time with multiple doses of Haldol, requiring four-point restraints.  The patient was then transferred to the ICU level of care and started on a Precedex drip.  Patient found to have acute encephalopathy, metabolic versus toxic, had a CT of the head that was negative, the patient was maintained on cardiac  treatment including treatment for paroxysmal atrial fibrillation in form of amiodarone and anticoagulation with Coumadin.  The patient was seen by psychiatry no clear cause for the patient's agitated delirium was identified.  The patient was started eventually on Seroquel 3 times daily, he is significantly improved after that and was felt to be able to be transferred out of ICU again.  The patient on my examination today is calm, he is somewhat tearful, he seems to have some marital problems, he is worried about disposition, he states that he has significant anxiety currently  Interval Problem Update  Patient seen and examined today.  Data, Medication data reviewed.  Case discussed with nursing as available.  Plan of Care reviewed with patient and notified of changes.   2/1 the patient is afebrile, heart rate in 60s, respiration 18, unlabored, 1.5 L nasal cannula oxygen for support, blood pressure in the 100s over 50s, the patient is calm, he is alert and oriented x4, he is somewhat tearful and appears depressed and anxious, he does have some residual pain in his incisional area from his open heart surgery.  The area is healing well though without signs or symptoms of infection.  Cardiology is overall pleased with his progression, his laboratory data is reviewed, he does have a slight leukocytosis, hemoglobin 8.9, platelet count 481, his chemistry reflects a creatinine of 1.64, glucose 136, INR of 2.11.      I have discussed this patient's plan of care and discharge plan at IDT rounds today with Case Management, Nursing, Nursing leadership, and other members of the IDT team.    Consultants/Specialty  Cardiology  Critical care intensivist  Psychiatry    Code Status  Full Code    Disposition  Patient is not medically cleared for discharge.   Anticipate discharge to to skilled nursing facility.  I have placed the appropriate orders for post-discharge needs.    Review of Systems  Review of Systems   Constitutional:   Negative for chills and fever.   Eyes:  Negative for blurred vision and pain.   Respiratory:  Positive for cough and shortness of breath. Negative for hemoptysis, sputum production and wheezing.    Cardiovascular:  Positive for chest pain. Negative for palpitations.   Gastrointestinal:  Negative for abdominal pain, nausea and vomiting.   Genitourinary:  Negative for dysuria and urgency.   Musculoskeletal:  Negative for back pain and falls.   Skin:  Negative for itching and rash.   Neurological:  Positive for weakness. Negative for dizziness, sensory change, focal weakness, loss of consciousness and headaches.   Psychiatric/Behavioral:  Positive for depression. Negative for substance abuse. The patient is nervous/anxious. The patient does not have insomnia.       Physical Exam  Temp:  [37 °C (98.6 °F)-37.8 °C (100 °F)] 37.1 °C (98.8 °F)  Pulse:  [54-72] 67  Resp:  [12-29] 16  BP: ()/(47-79) 99/54  SpO2:  [89 %-98 %] 93 %    Physical Exam  Constitutional:       General: He is not in acute distress.     Appearance: He is not ill-appearing.   HENT:      Head: Normocephalic and atraumatic.      Right Ear: External ear normal.      Left Ear: External ear normal.      Mouth/Throat:      Pharynx: No oropharyngeal exudate or posterior oropharyngeal erythema.   Eyes:      Extraocular Movements: Extraocular movements intact.      Pupils: Pupils are equal, round, and reactive to light.   Cardiovascular:      Rate and Rhythm: Normal rate and regular rhythm.      Pulses: Normal pulses.      Heart sounds: Normal heart sounds.      Comments: Healing sternotomy  Pulmonary:      Effort: Pulmonary effort is normal. No respiratory distress.      Breath sounds: No stridor. Rhonchi present. No rales.   Abdominal:      General: Bowel sounds are normal. There is no distension.      Palpations: Abdomen is soft.      Tenderness: There is no abdominal tenderness. There is no guarding or rebound.   Musculoskeletal:         General: No  tenderness.      Cervical back: Normal range of motion and neck supple.   Skin:     General: Skin is warm and dry.   Neurological:      General: No focal deficit present.      Mental Status: He is oriented to person, place, and time.      Cranial Nerves: No cranial nerve deficit.      Sensory: No sensory deficit.      Motor: No weakness.   Psychiatric:         Behavior: Behavior normal.      Comments: Anxious, tearful       Fluids    Intake/Output Summary (Last 24 hours) at 2/1/2023 1735  Last data filed at 2/1/2023 1200  Gross per 24 hour   Intake 1580 ml   Output 1240 ml   Net 340 ml           Laboratory  Recent Labs     01/30/23  0345 01/31/23  0255 02/01/23  0525   WBC 15.0* 14.2* 12.4*   RBC 3.96* 3.99* 4.23*   HEMOGLOBIN 8.3* 8.5* 8.9*   HEMATOCRIT 28.1* 28.8* 31.1*   MCV 71.0* 72.2* 73.5*   MCH 21.0* 21.3* 21.0*   MCHC 29.5* 29.5* 28.6*   RDW 47.7 49.1 50.3*   PLATELETCT 461* 464* 481*   MPV 10.0 10.1 10.4       Recent Labs     01/30/23  1222 01/31/23  0255 02/01/23  0525   SODIUM 144 141 141   POTASSIUM 4.0 3.8 3.6   CHLORIDE 100 101 101   CO2 33 31 29   GLUCOSE 87 72 136*   BUN 31* 25* 22   CREATININE 1.80* 1.53* 1.64*   CALCIUM 8.7 8.5 8.5       Recent Labs     01/30/23  1222 01/31/23  0255 02/01/23  0525   INR 1.98* 2.10* 2.11*           Recent Labs     01/31/23  0255   TRIGLYCERIDE 89       Imaging  DX-CHEST-PORTABLE (1 VIEW)   Final Result         1.  Left lower lobe infiltrate   2.  Layering left pleural effusion   3.  Cardiomegaly   4.  Atherosclerosis      DX-CHEST-PORTABLE (1 VIEW)   Final Result         1.  Left lower lobe infiltrate, slightly increased since prior study.   2.  Cardiomegaly   3.  Atherosclerosis      CT-CTA CHEST PULMONARY ARTERY W/ RECONS   Final Result      1.  No evidence of pulmonary embolus.      2.  Worsening bibasilar atelectasis/consolidation, left greater than right.      3.  Again seen recent surgical change consistent with unhealed median sternotomy and aortic valvular  replacement. There is again seen fluid and stranding attenuation within the anterior mediastinum. No evidence of rim-enhancing fluid collection to    suggest presence of abscess.      4.  Cardiomegaly.      5.  Fatty liver.      CT-CTA NECK WITH & W/O-POST PROCESSING   Final Result      Mild plaque in the left carotid bulb and proximal left internal carotid artery with less than 50% stenosis.      NG tube coiled in the hypopharynx.      CT-CTA HEAD WITH & W/O-POST PROCESS   Final Result      CT angiogram of the [place] within normal limits.      DX-ABDOMEN FOR TUBE PLACEMENT   Final Result      NG tube tip in expected location the gastric body.      DX-CHEST-PORTABLE (1 VIEW)   Final Result      Tubes and line as described above.      Improving left basilar atelectasis.      DX-CHEST-PORTABLE (1 VIEW)   Final Result      1.  Chronic elevation of the left hemidiaphragm with left basilar atelectasis.   2.  Cardiomegaly.         DX-CHEST-PORTABLE (1 VIEW)   Final Result      1.  Chronic elevation of the left hemidiaphragm with left basilar atelectasis.   2.  Cardiomegaly.      EC-ECHOCARDIOGRAM COMPLETE W/O CONT   Final Result      CT-CTA CHEST PULMONARY ARTERY W/ RECONS   Final Result      1.  No CT evidence of pulmonary embolism.      2.  Significant cardiomegaly.      3.  Consolidation and volume loss in the left lower lobe and lingular segment left upper lobe could BE due to consolidative atelectasis and pneumonia.      4.  Minimal groundglass opacifications noted in the right lung which could be due to inflammation or infection.      5.  Additional linear opacifications in the right lung likely due to atelectasis or scarring.            DX-CHEST-PORTABLE (1 VIEW)   Final Result      1.  Left lung base atelectasis.      2.  Cardiomegaly.      3.  Chronic elevation of left hemidiaphragm.             Assessment/Plan  * Acute on chronic systolic heart failure (HCC)- (present on admission)  Assessment &  Plan  Last EF was 55%  Echo shows follow-up EF 45%  Continue current medication regimen as optimized with the help of cardiology  Assess for additional treatment for volume control       Acute encephalopathy  Assessment & Plan  Unclear overall cause, appeared to be a toxic metabolic  The patient is currently improving,  No organic cause identified  Psychiatry is consulting      S/P AVR- (present on admission)  Assessment & Plan  On 1/5/2023  Cardiology consulted  Follow-up with CTS as scheduled after discharge    Elevated hemidiaphragm - LEFT post AVR- (present on admission)  Assessment & Plan  Incentive spirometry, outpatient follow-up    COPD (chronic obstructive pulmonary disease) ([facility])  Assessment & Plan  Respiratory care, respiratory therapy  40+ pack year history, stopped smoking recently  Suspect underlying COPD, outpatient PFT and pulm follow up  symbicort and duonebs here      Paroxysmal atrial fibrillation ([facility])  Assessment & Plan  Rate controlled on metoprolol and amiodarone  Continue warfarin for anticoagulation  Cardiology follow-up    Pneumonia due to infectious organism- (present on admission)  Assessment & Plan  S/p antibiotic treatment  Respiratory care protocol   Placed on o2 therapy       Acute respiratory failure with hypoxia ([facility])  Assessment & Plan  Patient s/p mechanical ventilation, successfully extubated  Continue to wean oxygen  Pulmonary toilet  Volume control    Acute renal failure (ARF) ([facility])  Assessment & Plan  Improving  Avoid IV contrast/nephrotoxins/NSAIDs  Monitor closely        Acute blood loss as cause of postoperative anemia- (present on admission)  Assessment & Plan  Monitor, transfuse as indicated    Plan  Continue with current cardiac regimen including amiodarone, blood pressure control with Norvasc, beta-blockade for rate control of atrial fibrillation,  Continue anticoagulation, titrate warfarin per pharmacy  Respiratory care, inhaled steroids, respiratory protocol  Continue  with low-dose Seroquel, adjust and titrate as tolerated and indicated  Monitor hemoglobin,  Evaluate for iron deficiency  Monitor renal function closely, avoid nephrotoxic agents  Hopefully the patient will return to previous normal renal function over time with cardiac optimization  Monitor for additional volume control  Follow-up chest x-ray in the a.m.  Continue mobilization, ambulation  Psychiatry follow-up appreciated  Check a.m. EKG/QTC    Medically complex high-risk patient      VTE prophylaxis: therapeutic anticoagulation with warfarin    I have performed a physical exam and reviewed and updated ROS and Plan today (2/1/2023). In review of yesterday's note (1/31/2023), there are no changes except as documented above.      Please note that this dictation was created using voice recognition software. I have made every reasonable attempt to correct obvious errors, but I expect that there are errors of grammar and possibly context that I did not discover before finalizing the note.

## 2023-02-02 NOTE — DISCHARGE PLANNING
Case Management Discharge Planning    Admission Date: 1/20/2023  GMLOS: 5  ALOS: 13    6-Clicks ADL Score: 24  6-Clicks Mobility Score: 22    DX :  1/5 for aortic aneurysm repair and aortic valve replacement.  Transfer to  Anticipated Discharge Dispo: Discharge Disposition: D/T to SNF [facility] .Awaiting call back from [facility] . Met with patient and he reported the plan is to return to [facility] for post op care and rehab.Patient gave permission to call wife to notify that he will be transfer today to [facility].         DME Needed: No,has home oxygen with [employer] -    Action(s) Taken: Updated Provider/Nurse on Discharge Plan, met with patient and verified that he his plan is to return to [facility]. Patient confirmed that was the plan . Phoned Phoned patient's wife Do and left message .     Escalations Completed: DPA to call [facility].    Medically Clear: No    Next Steps: DPA to call [facility] SN[facility] , PT will reevaluate for SNF rehab     Barriers to Discharge: Awaiting placement at [facility].   Is the patient up for discharge tomorrow: No

## 2023-02-02 NOTE — PROGRESS NOTES
Inpatient Anticoagulation Service Note for 2/2/2023      Reason for Anticoagulation: Atrial Fibrillation   [id number] VASc Score: 2  HAS-BLED Score: 1    Hemoglobin Value: (!) 9.5  Hematocrit Value: (!) 32.7  Lab Platelet Value: (!) 480  Target INR: 2.0 to 3.0    INR from last 7 days       Date/Time INR Value    02/02/23 0948 2.05    02/01/23 0525 2.11    01/31/23 0255 2.1    01/30/23 1222 1.98    01/30/23 0813 1.97    01/28/23 0208 2.16    01/27/23 0243 2.06          Dose from last 7 days       Date/Time Dose (mg)    02/02/23 1216 3    02/01/23 1355 2.5    01/31/23 1144 2.5    01/30/23 1331 5    01/29/23 0828 2.5    01/28/23 1127 2.5    01/27/23 0851 2.5          Average Dose (mg):  (per [facility] clinic - 2.5 mg PO QD)  Significant Interactions: Antiplatelet Medications, Amiodarone  Bridge Therapy: No    Reversal Agent Administered: Not Applicable  Comments: Therapeutic INR but trending down. Pt has required a few higher doses throughout admission to keep INR therapeutic. Will plan on bumping the dose slightly tonight and re-assessing daily. H/H stable. No concerns for bleeding noted in chart. No new DDIs.    Plan:  3 mg   Education Material Provided?: No (established warfarin patient.)    Pharmacist suggested discharge dosing: Warfarin 3 mg PO daily with close INR follow up within 48 hours of discharge.      Flory Cano, PharmD

## 2023-02-02 NOTE — DISCHARGE PLANNING
Agency/Facility Name: [facility]  Spoke To: Adriana  Outcome: SNF will begin process to obtain auth, at this time SNF will not have bed for resumption of services until Wednesday.     RN CM notified

## 2023-02-02 NOTE — DISCHARGE PLANNING
Agency/Facility Name: [facility]  Outcome: DPA left v-mail regarding referral status with request for callback.

## 2023-02-02 NOTE — PROGRESS NOTES
Pt downgraded from ICU around approx 2300. Pt A&Ox4, calm, cooperative. Pt updated on POC; oriented to new unit and room. Pt educated on safety/fall precautions.     4 Eyes Skin Assessment Completed by Giana Yuan, ALLEN and Dorian Darnell RN.    Head WDL  Ears WDL  Nose WDL  Mouth WDL  Neck WDL  Breast/Chest WDL  Shoulder Blades WDL  Spine WDL  (R) Arm/Elbow/Hand WDL  (L) Arm/Elbow/Hand WDL  Abdomen WDL  Groin WDL  Scrotum/Coccyx/Buttocks WDL  (R) Leg WDL  (L) Leg WDL  (R) Heel/Foot/Toe WDL  (L) Heel/Foot/Toe WDL          Devices In Places Nasal Cannula, telemetry leads/box      Interventions In Place Gray Ear Foams, Pillows, and Heels Loaded W/Pillows    Possible Skin Injury No    Pictures Uploaded Into Epic N/A  Wound Consult Placed N/A  RN Wound Prevention Protocol Ordered No

## 2023-02-02 NOTE — ASSESSMENT & PLAN NOTE
Unclear overall cause, appeared to be a toxic metabolic  The patient is currently improving,  No organic cause identified  Psychiatry is consulting

## 2023-02-03 ENCOUNTER — PHARMACY VISIT (OUTPATIENT)
Dept: PHARMACY | Facility: MEDICAL CENTER | Age: 63
End: 2023-02-03
Payer: COMMERCIAL

## 2023-02-03 ENCOUNTER — PATIENT OUTREACH (OUTPATIENT)
Dept: SCHEDULING | Facility: IMAGING CENTER | Age: 63
End: 2023-02-03
Payer: COMMERCIAL

## 2023-02-03 VITALS
HEIGHT: 69 IN | DIASTOLIC BLOOD PRESSURE: 65 MMHG | OXYGEN SATURATION: 92 % | TEMPERATURE: 97.7 F | SYSTOLIC BLOOD PRESSURE: 122 MMHG | BODY MASS INDEX: 29.94 KG/M2 | WEIGHT: 202.16 LBS | HEART RATE: 68 BPM | RESPIRATION RATE: 18 BRPM

## 2023-02-03 PROBLEM — J96.01 ACUTE RESPIRATORY FAILURE WITH HYPOXIA (HCC): Status: RESOLVED | Noted: 2023-01-06 | Resolved: 2023-02-03

## 2023-02-03 LAB
ANION GAP SERPL CALC-SCNC: 9 MMOL/L (ref 7–16)
BASOPHILS # BLD AUTO: 0.5 % (ref 0–1.8)
BASOPHILS # BLD: 0.07 K/UL (ref 0–0.12)
BUN SERPL-MCNC: 17 MG/DL (ref 8–22)
CALCIUM SERPL-MCNC: 8.6 MG/DL (ref 8.5–10.5)
CHLORIDE SERPL-SCNC: 104 MMOL/L (ref 96–112)
CO2 SERPL-SCNC: 26 MMOL/L (ref 20–33)
COMMENT 1642: NORMAL
CREAT SERPL-MCNC: 1.3 MG/DL (ref 0.5–1.4)
EOSINOPHIL # BLD AUTO: 0.46 K/UL (ref 0–0.51)
EOSINOPHIL NFR BLD: 3.6 % (ref 0–6.9)
ERYTHROCYTE [DISTWIDTH] IN BLOOD BY AUTOMATED COUNT: 50.3 FL (ref 35.9–50)
GFR SERPLBLD CREATININE-BSD FMLA CKD-EPI: 62 ML/MIN/1.73 M 2
GLUCOSE SERPL-MCNC: 95 MG/DL (ref 65–99)
HCT VFR BLD AUTO: 32.1 % (ref 42–52)
HGB BLD-MCNC: 9.2 G/DL (ref 14–18)
IMM GRANULOCYTES # BLD AUTO: 0.2 K/UL (ref 0–0.11)
IMM GRANULOCYTES NFR BLD AUTO: 1.5 % (ref 0–0.9)
INR PPP: 1.8 (ref 0.87–1.13)
LYMPHOCYTES # BLD AUTO: 1.49 K/UL (ref 1–4.8)
LYMPHOCYTES NFR BLD: 11.5 % (ref 22–41)
MCH RBC QN AUTO: 21 PG (ref 27–33)
MCHC RBC AUTO-ENTMCNC: 28.7 G/DL (ref 33.7–35.3)
MCV RBC AUTO: 73.1 FL (ref 81.4–97.8)
MONOCYTES # BLD AUTO: 1.09 K/UL (ref 0–0.85)
MONOCYTES NFR BLD AUTO: 8.4 % (ref 0–13.4)
MORPHOLOGY BLD-IMP: NORMAL
NEUTROPHILS # BLD AUTO: 9.6 K/UL (ref 1.82–7.42)
NEUTROPHILS NFR BLD: 74.5 % (ref 44–72)
NRBC # BLD AUTO: 0.05 K/UL
NRBC BLD-RTO: 0.4 /100 WBC
PLATELET # BLD AUTO: 479 K/UL (ref 164–446)
PMV BLD AUTO: 10.3 FL (ref 9–12.9)
POTASSIUM SERPL-SCNC: 4.5 MMOL/L (ref 3.6–5.5)
PROTHROMBIN TIME: 20.5 SEC (ref 12–14.6)
RBC # BLD AUTO: 4.39 M/UL (ref 4.7–6.1)
SODIUM SERPL-SCNC: 139 MMOL/L (ref 135–145)
WBC # BLD AUTO: 12.9 K/UL (ref 4.8–10.8)

## 2023-02-03 PROCEDURE — 700102 HCHG RX REV CODE 250 W/ 637 OVERRIDE(OP): Performed by: STUDENT IN AN ORGANIZED HEALTH CARE EDUCATION/TRAINING PROGRAM

## 2023-02-03 PROCEDURE — 85025 COMPLETE CBC W/AUTO DIFF WBC: CPT

## 2023-02-03 PROCEDURE — A9270 NON-COVERED ITEM OR SERVICE: HCPCS | Performed by: STUDENT IN AN ORGANIZED HEALTH CARE EDUCATION/TRAINING PROGRAM

## 2023-02-03 PROCEDURE — 700102 HCHG RX REV CODE 250 W/ 637 OVERRIDE(OP): Performed by: HOSPITALIST

## 2023-02-03 PROCEDURE — 97164 PT RE-EVAL EST PLAN CARE: CPT

## 2023-02-03 PROCEDURE — 700111 HCHG RX REV CODE 636 W/ 250 OVERRIDE (IP): Performed by: HOSPITALIST

## 2023-02-03 PROCEDURE — 99239 HOSP IP/OBS DSCHRG MGMT >30: CPT | Performed by: STUDENT IN AN ORGANIZED HEALTH CARE EDUCATION/TRAINING PROGRAM

## 2023-02-03 PROCEDURE — RXMED WILLOW AMBULATORY MEDICATION CHARGE: Performed by: STUDENT IN AN ORGANIZED HEALTH CARE EDUCATION/TRAINING PROGRAM

## 2023-02-03 PROCEDURE — 97168 OT RE-EVAL EST PLAN CARE: CPT

## 2023-02-03 PROCEDURE — 700102 HCHG RX REV CODE 250 W/ 637 OVERRIDE(OP): Performed by: INTERNAL MEDICINE

## 2023-02-03 PROCEDURE — A9270 NON-COVERED ITEM OR SERVICE: HCPCS | Performed by: INTERNAL MEDICINE

## 2023-02-03 PROCEDURE — 85610 PROTHROMBIN TIME: CPT

## 2023-02-03 PROCEDURE — 80048 BASIC METABOLIC PNL TOTAL CA: CPT

## 2023-02-03 PROCEDURE — 97535 SELF CARE MNGMENT TRAINING: CPT

## 2023-02-03 PROCEDURE — A9270 NON-COVERED ITEM OR SERVICE: HCPCS | Performed by: HOSPITALIST

## 2023-02-03 RX ORDER — AMIODARONE HYDROCHLORIDE 200 MG/1
200 TABLET ORAL DAILY
Qty: 30 TABLET | Refills: 1 | Status: SHIPPED | OUTPATIENT
Start: 2023-02-03 | End: 2023-05-05

## 2023-02-03 RX ORDER — WARFARIN SODIUM 2.5 MG/1
TABLET ORAL
Qty: 35 TABLET | Refills: 0 | Status: ACTIVE | OUTPATIENT
Start: 2023-02-03 | End: 2023-05-11

## 2023-02-03 RX ORDER — WARFARIN SODIUM 5 MG/1
5 TABLET ORAL DAILY
Status: DISCONTINUED | OUTPATIENT
Start: 2023-02-03 | End: 2023-02-03 | Stop reason: HOSPADM

## 2023-02-03 RX ORDER — WARFARIN SODIUM 2.5 MG/1
2.5 TABLET ORAL DAILY
Qty: 35 TABLET | Refills: 0 | Status: SHIPPED | OUTPATIENT
Start: 2023-02-03 | End: 2023-02-03 | Stop reason: SDUPTHER

## 2023-02-03 RX ORDER — OXYCODONE HYDROCHLORIDE 10 MG/1
10 TABLET ORAL EVERY 6 HOURS PRN
Qty: 20 TABLET | Refills: 0 | Status: SHIPPED | OUTPATIENT
Start: 2023-02-03 | End: 2023-02-08

## 2023-02-03 RX ORDER — QUETIAPINE FUMARATE 25 MG/1
25 TABLET, FILM COATED ORAL 2 TIMES DAILY
Qty: 60 TABLET | Refills: 3 | Status: SHIPPED | OUTPATIENT
Start: 2023-02-03 | End: 2023-05-30

## 2023-02-03 RX ORDER — AMIODARONE HYDROCHLORIDE 400 MG/1
200 TABLET ORAL DAILY
Qty: 30 TABLET | Refills: 1 | Status: SHIPPED | OUTPATIENT
Start: 2023-02-04 | End: 2023-02-03

## 2023-02-03 RX ADMIN — METOPROLOL TARTRATE 50 MG: 50 TABLET, FILM COATED ORAL at 17:06

## 2023-02-03 RX ADMIN — OMEPRAZOLE 20 MG: 20 CAPSULE, DELAYED RELEASE ORAL at 05:15

## 2023-02-03 RX ADMIN — QUETIAPINE FUMARATE 25 MG: 25 TABLET ORAL at 05:25

## 2023-02-03 RX ADMIN — OXYCODONE HYDROCHLORIDE 10 MG: 10 TABLET ORAL at 09:29

## 2023-02-03 RX ADMIN — OXYCODONE HYDROCHLORIDE 10 MG: 10 TABLET ORAL at 05:23

## 2023-02-03 RX ADMIN — BUDESONIDE AND FORMOTEROL FUMARATE DIHYDRATE 2 PUFF: 80; 4.5 AEROSOL RESPIRATORY (INHALATION) at 05:30

## 2023-02-03 RX ADMIN — AMLODIPINE BESYLATE 10 MG: 10 TABLET ORAL at 05:17

## 2023-02-03 RX ADMIN — ALPRAZOLAM 0.25 MG: 0.25 TABLET ORAL at 09:29

## 2023-02-03 RX ADMIN — AMIODARONE HYDROCHLORIDE 400 MG: 200 TABLET ORAL at 05:24

## 2023-02-03 RX ADMIN — OXYCODONE HYDROCHLORIDE 10 MG: 10 TABLET ORAL at 14:02

## 2023-02-03 RX ADMIN — ASPIRIN 81 MG: 81 TABLET, CHEWABLE ORAL at 05:15

## 2023-02-03 RX ADMIN — ALPRAZOLAM 0.25 MG: 0.25 TABLET ORAL at 15:07

## 2023-02-03 RX ADMIN — OXYCODONE HYDROCHLORIDE 10 MG: 10 TABLET ORAL at 00:44

## 2023-02-03 RX ADMIN — ONDANSETRON HYDROCHLORIDE 4 MG: 2 SOLUTION INTRAMUSCULAR; INTRAVENOUS at 02:26

## 2023-02-03 RX ADMIN — QUETIAPINE FUMARATE 25 MG: 25 TABLET ORAL at 17:06

## 2023-02-03 RX ADMIN — WARFARIN SODIUM 5 MG: 5 TABLET ORAL at 17:06

## 2023-02-03 ASSESSMENT — COGNITIVE AND FUNCTIONAL STATUS - GENERAL
HELP NEEDED FOR BATHING: A LITTLE
MOBILITY SCORE: 24
PERSONAL GROOMING: A LITTLE
SUGGESTED CMS G CODE MODIFIER DAILY ACTIVITY: CJ
TOILETING: A LITTLE
SUGGESTED CMS G CODE MODIFIER MOBILITY: CH
DAILY ACTIVITIY SCORE: 21

## 2023-02-03 ASSESSMENT — GAIT ASSESSMENTS
GAIT LEVEL OF ASSIST: SUPERVISED
DISTANCE (FEET): 175
DEVIATION: NO DEVIATION

## 2023-02-03 NOTE — PROGRESS NOTES
[facility] Medicine Daily Progress Note    Date of Service  2/2/2023    Chief Complaint  Noe Hickey is a 62 y.o. male admitted 1/20/2023 with leg swelling, cough.    Hospital Course  Noe Hickey is a 62 y.o. male who presented 1/20/2023 with past medical history of atrial fibrillation/flutter, history of aortic stenosis status post valve replacement, history of tobacco abuse, hyperlipidemia who presents to the hospital for shortness of breath, cough and lower extremity swelling.  Patient was recently discharged in the hospital on 1/5 for aortic aneurysm repair and aortic valve replacement.  Postoperatively he was given IV fluids due to blood loss.  During his hospital stay he had a complication of torsades and ventricular arrhythmia.  He was shocked once and then returned back to normal sinus rhythm.  His echo found EF to be 55% at that time.  Patient states that since his hospital stay has been having worsening lower extremity edema and cough.  He denies any hemoptysis or purulent sputum.  He was discharged to [facility] nursing [facility] and states that he has been eating a high salt diet.  Patient was discharged with Lasix and states that he has been compliant with it.  The patient was cared for in the acute setting, was treated with antibiotics, a echocardiogram was repeated showing a EF of 45%, cardiology was consulted, and the course the patient developed agitation and hallucinations, he received Atarax.  The agitation continued to wax and wane until 1/29 where a code gray was called and the patient became increasingly agitated and aggressive.  The patient was treated at that time with multiple doses of Haldol, requiring four-point restraints.  The patient was then transferred to the ICU level of care and started on a Precedex drip.  Patient found to have acute encephalopathy, metabolic versus toxic, had a CT of the head that was negative, the patient was maintained on cardiac  treatment including treatment for paroxysmal atrial fibrillation in form of amiodarone and anticoagulation with Coumadin.  The patient was seen by psychiatry no clear cause for the patient's agitated delirium was identified.  The patient was started eventually on Seroquel 3 times daily, he is significantly improved after that and was felt to be able to be transferred out of ICU again.  The patient on my examination today is calm, he is somewhat tearful, he seems to have some marital problems, he is worried about disposition, he states that he has significant anxiety currently      Interval Problem Update  Patient seen and examined today, he complains of persistent postsurgical chest pain, has been ongoing however slightly worse today after coughing, denies significant shortness of breath feels like that has been improving  -We will increase oxycodone from 5 to 10 mg.  -Psychiatry following, Seroquel dose has been adjusted  -Patient is calm, pleasant and appreciative of his care  -He is hemodynamically stable and saturating well on room air soft pressures however MAP still greater than 65  -H&H slowly improving    Patient was previously at [facility] at this time he is medically clear for discharge either back to [facility] or home.  Case management is working on help with his disposition.    I have discussed this patient's plan of care and discharge plan at IDT rounds today with Case Management, Nursing, Nursing leadership, and other members of the IDT team.    Consultants/Specialty  Cardiology  Critical care intensivist  Psychiatry    Code Status  Full Code    Disposition  Patient is medically cleared for discharge.   Anticipate discharge to to skilled nursing facility.  I have placed the appropriate orders for post-discharge needs.    Review of Systems  Review of Systems   Constitutional:  Negative for chills and fever.   Eyes:  Negative for blurred vision and pain.   Respiratory:  Positive for cough and shortness of  breath. Negative for hemoptysis, sputum production and wheezing.    Cardiovascular:  Positive for chest pain. Negative for palpitations.   Gastrointestinal:  Negative for abdominal pain, nausea and vomiting.   Genitourinary:  Negative for dysuria and urgency.   Musculoskeletal:  Negative for back pain and falls.   Skin:  Negative for itching and rash.   Neurological:  Positive for weakness. Negative for dizziness, sensory change, focal weakness, loss of consciousness and headaches.   Psychiatric/Behavioral:  Positive for depression. Negative for substance abuse. The patient is nervous/anxious. The patient does not have insomnia.       Physical Exam  Temp:  [36.7 °C (98.1 °F)-37.1 °C (98.8 °F)] 37.1 °C (98.8 °F)  Pulse:  [58-75] 75  Resp:  [16-18] 16  BP: ()/(45-77) 95/59  SpO2:  [92 %-98 %] 92 %    Physical Exam  Constitutional:       General: He is not in acute distress.     Appearance: He is not ill-appearing.   HENT:      Head: Normocephalic and atraumatic.      Right Ear: External ear normal.      Left Ear: External ear normal.      Mouth/Throat:      Pharynx: No oropharyngeal exudate or posterior oropharyngeal erythema.   Eyes:      Extraocular Movements: Extraocular movements intact.      Pupils: Pupils are equal, round, and reactive to light.   Neck:      Comments: Right IJ  Cardiovascular:      Rate and Rhythm: Normal rate and regular rhythm.      Pulses: Normal pulses.      Heart sounds: Normal heart sounds.      Comments: Well healing sternotomy  Pulmonary:      Effort: Pulmonary effort is normal. No respiratory distress.      Breath sounds: No stridor. No rhonchi or rales.      Comments: Diminished breath sounds in bases, lungs sound a bit tight but no wheezing  Abdominal:      General: Bowel sounds are normal. There is no distension.      Palpations: Abdomen is soft.      Tenderness: There is no abdominal tenderness. There is no guarding or rebound.   Musculoskeletal:         General: No  tenderness.      Cervical back: Normal range of motion and neck supple.   Skin:     General: Skin is warm and dry.   Neurological:      General: No focal deficit present.      Mental Status: He is oriented to person, place, and time.      Cranial Nerves: No cranial nerve deficit.      Sensory: No sensory deficit.      Motor: No weakness.   Psychiatric:         Behavior: Behavior normal.      Comments: Anxious, tearful       Fluids    Intake/Output Summary (Last 24 hours) at 2/2/2023 1659  Last data filed at 2/1/2023 2000  Gross per 24 hour   Intake 600 ml   Output 350 ml   Net 250 ml         Laboratory  Recent Labs     02/01/23  0525 02/02/23  0400 02/02/23  1151   WBC 12.4* 10.5 10.8   RBC 4.23* 4.25* 4.53*   HEMOGLOBIN 8.9* 8.8* 9.5*   HEMATOCRIT 31.1* 31.1* 32.7*   MCV 73.5* 73.2* 72.2*   MCH 21.0* 20.7* 21.0*   MCHC 28.6* 28.3* 29.1*   RDW 50.3* 50.2* 49.2   PLATELETCT 481* 462* 480*   MPV 10.4 10.0 9.7     Recent Labs     02/01/23  0525 02/02/23  0400 02/02/23  1151   SODIUM 141 141 136   POTASSIUM 3.6 4.0 4.1   CHLORIDE 101 105 102   CO2 29 30 25   GLUCOSE 136* 92 81   BUN 22 15 14   CREATININE 1.64* 1.11 1.23   CALCIUM 8.5 8.4* 8.6     Recent Labs     02/01/23  0525 02/02/23  0948 02/02/23  1151   INR 2.11* 2.05* 1.96*         Recent Labs     01/31/23  0255   TRIGLYCERIDE 89       Imaging  DX-CHEST-PORTABLE (1 VIEW)   Final Result         1.  Left lower lobe infiltrate slightly increased since prior study.   2.  Layering left pleural effusion, stable to slightly increased since prior study   3.  Cardiomegaly   4.  Atherosclerosis      DX-CHEST-PORTABLE (1 VIEW)   Final Result         1.  Left lower lobe infiltrate   2.  Layering left pleural effusion   3.  Cardiomegaly   4.  Atherosclerosis      DX-CHEST-PORTABLE (1 VIEW)   Final Result         1.  Left lower lobe infiltrate, slightly increased since prior study.   2.  Cardiomegaly   3.  Atherosclerosis      CT-CTA CHEST PULMONARY ARTERY W/ RECONS   Final  Result      1.  No evidence of pulmonary embolus.      2.  Worsening bibasilar atelectasis/consolidation, left greater than right.      3.  Again seen recent surgical change consistent with unhealed median sternotomy and aortic valvular replacement. There is again seen fluid and stranding attenuation within the anterior mediastinum. No evidence of rim-enhancing fluid collection to    suggest presence of abscess.      4.  Cardiomegaly.      5.  Fatty liver.      CT-CTA NECK WITH & W/O-POST PROCESSING   Final Result      Mild plaque in the left carotid bulb and proximal left internal carotid artery with less than 50% stenosis.      NG tube coiled in the hypopharynx.      CT-CTA HEAD WITH & W/O-POST PROCESS   Final Result      CT angiogram of the [place] within normal limits.      DX-ABDOMEN FOR TUBE PLACEMENT   Final Result      NG tube tip in expected location the gastric body.      DX-CHEST-PORTABLE (1 VIEW)   Final Result      Tubes and line as described above.      Improving left basilar atelectasis.      DX-CHEST-PORTABLE (1 VIEW)   Final Result      1.  Chronic elevation of the left hemidiaphragm with left basilar atelectasis.   2.  Cardiomegaly.         DX-CHEST-PORTABLE (1 VIEW)   Final Result      1.  Chronic elevation of the left hemidiaphragm with left basilar atelectasis.   2.  Cardiomegaly.      EC-ECHOCARDIOGRAM COMPLETE W/O CONT   Final Result      CT-CTA CHEST PULMONARY ARTERY W/ RECONS   Final Result      1.  No CT evidence of pulmonary embolism.      2.  Significant cardiomegaly.      3.  Consolidation and volume loss in the left lower lobe and lingular segment left upper lobe could BE due to consolidative atelectasis and pneumonia.      4.  Minimal groundglass opacifications noted in the right lung which could be due to inflammation or infection.      5.  Additional linear opacifications in the right lung likely due to atelectasis or scarring.            DX-CHEST-PORTABLE (1 VIEW)   Final  Result      1.  Left lung base atelectasis.      2.  Cardiomegaly.      3.  Chronic elevation of left hemidiaphragm.             Assessment/Plan  * Acute on chronic systolic heart failure (HCC)- (present on admission)  Assessment & Plan  Last EF was 55%  Echo shows follow-up EF 45%  Continue current medication regimen as optimized with the help of cardiology  Assess for additional treatment for volume control       S/P AVR- (present on admission)  Assessment & Plan  On 1/5/2023  Cardiology consulted  Follow-up with CTS as scheduled after discharge    Acute encephalopathy  Assessment & Plan  Unclear overall cause, appeared to be a toxic metabolic  The patient is currently improving,  No organic cause identified  Psychiatry is consulting      Elevated hemidiaphragm - LEFT post AVR- (present on admission)  Assessment & Plan  Incentive spirometry, outpatient follow-up    COPD (chronic obstructive pulmonary disease) ([facility])  Assessment & Plan  Respiratory care, respiratory therapy  40+ pack year history, stopped smoking recently  Suspect underlying COPD, outpatient PFT and pulm follow up  symbicort and duonebs here      Paroxysmal atrial fibrillation ([facility])  Assessment & Plan  Rate controlled on metoprolol and amiodarone  Continue warfarin for anticoagulation  Cardiology follow-up    Pneumonia due to infectious organism- (present on admission)  Assessment & Plan  S/p antibiotic treatment  Respiratory care protocol   Placed on o2 therapy   Improving currently on room air has completed antibiotics      Acute respiratory failure with hypoxia ([facility])  Assessment & Plan  Patient s/p mechanical ventilation, successfully extubated  Continue to wean oxygen  Pulmonary toilet  Volume control    Acute renal failure (ARF) ([facility])  Assessment & Plan  Improving  Avoid IV contrast/nephrotoxins/NSAIDs  Creatinine has normalized continue to monitor        Acute blood loss as cause of postoperative anemia- (present on admission)  Assessment &  Plan  Monitor, transfuse as indicated            VTE prophylaxis: therapeutic anticoagulation with warfarin    I have performed a physical exam and reviewed and updated ROS and Plan today (2/2/2023). In review of yesterday's note (2/1/2023), there are no changes except as documented above.

## 2023-02-03 NOTE — DISCHARGE PLANNING
Agency/Facility Name: [facility]  Spoke To: Adriana   Outcome: SNF requesting updated referral, DPA will resend referral with updated clinicals pending updated PT/OT consult.    RN CM notified

## 2023-02-03 NOTE — DISCHARGE PLANNING
Case Management Discharge Planning    Admission Date: 1/20/2023  GMLOS: 5  ALOS: 14    6-Clicks ADL Score: 24  6-Clicks Mobility Score: 22    Anticipated Discharge Dispo: Discharge Disposition: D/T to SNF with Medicare cert in anticipation of skilled care (03)  [facility] will not have a bed until 2/13/23. Plan is to have PT evaluate. Patient 6 clicks 22  .If patient does not meet criteria he will be discharged .   DME Needed: No    Action(s) Taken: Updated Provider/Nurse on Discharge Plan    Escalations Completed: Provider    Medically Clear: Yes    Next Steps: PT eval , needed.    Barriers to Discharge: cleared by PT , no need for SNF     Is the patient up for discharge today . Wife will pick patient up at 18:00  when she get off work .

## 2023-02-03 NOTE — HEART FAILURE PROGRAM
Discussed with STEFFANY, MD, and RN patient is going home instead of to SNF. Asked schedulers for a f/u appointment within 7 calendar day f/u.

## 2023-02-03 NOTE — CARE PLAN
The patient is Stable - Low risk of patient condition declining or worsening    Shift Goals  Clinical Goals: Safety; pain management  Patient Goals: Pain control, rest, shower  Family Goals: HUGH    Progress made toward(s) clinical / shift goals:  progressing    Problem: Pain - Standard  Goal: Alleviation of pain or a reduction in pain to the patient’s comfort goal  Outcome: Progressing     Problem: Knowledge Deficit - Standard  Goal: Patient and family/care givers will demonstrate understanding of plan of care, disease process/condition, diagnostic tests and medications  Outcome: Progressing     Problem: Fall Risk  Goal: Patient will remain free from falls  Outcome: Progressing     Problem: Psychosocial  Goal: Patient's level of anxiety will decrease  Outcome: Progressing  Goal: Patient's ability to verbalize feelings about condition will improve  Outcome: Progressing  Goal: Patient's ability to re-evaluate and adapt role responsibilities will improve  Outcome: Progressing  Goal: Patient and family will demonstrate ability to cope with life altering diagnosis and/or procedure  Outcome: Progressing     Problem: Hemodynamics  Goal: Patient's hemodynamics, fluid balance and neurologic status will be stable or improve  Outcome: Progressing     Problem: Respiratory  Goal: Patient will achieve/maintain optimum respiratory ventilation and gas exchange  Outcome: Progressing     Problem: Fluid Volume  Goal: Fluid volume balance will be maintained  Outcome: Progressing     Problem: Infection - Standard  Goal: Patient will remain free from infection  Outcome: Progressing       Patient is not progressing towards the following goals:    Problem: Gastrointestinal Irritability  Goal: Nausea and vomiting will be absent or improve  Outcome: Not Met

## 2023-02-03 NOTE — PROGRESS NOTES
MONITOR SUMMARY:    RHYTHM: NSR, SB    HEART RATE: 57-76 bpm    ECTOPY: R PVC    MEASUREMENT: .18/.081/.41

## 2023-02-03 NOTE — CARE PLAN
The patient is Stable - Low risk of patient condition declining or worsening    Shift Goals  Clinical Goals: Safety; pain management  Patient Goals: Pain control, rest, shower  Family Goals: HUGH    Progress made toward(s) clinical / shift goals:    Problem: Pain - Standard  Goal: Alleviation of pain or a reduction in pain to the patient’s comfort goal  Outcome: Progressing     Problem: Knowledge Deficit - Standard  Goal: Patient and family/care givers will demonstrate understanding of plan of care, disease process/condition, diagnostic tests and medications  Outcome: Progressing     Problem: Fall Risk  Goal: Patient will remain free from falls  Outcome: Progressing     Problem: Psychosocial  Goal: Patient's level of anxiety will decrease  Outcome: Progressing  Goal: Patient's ability to verbalize feelings about condition will improve  Outcome: Progressing  Goal: Patient's ability to re-evaluate and adapt role responsibilities will improve  Outcome: Progressing  Goal: Patient and family will demonstrate ability to cope with life altering diagnosis and/or procedure  Outcome: Progressing  Goal: Spiritual and cultural needs incorporated into hospitalization  Outcome: Progressing     Problem: Communication  Goal: The ability to communicate needs accurately and effectively will improve  Outcome: Progressing     Problem: Discharge Barriers/Planning  Goal: Patient's continuum of care needs are met  Outcome: Progressing     Problem: Hemodynamics  Goal: Patient's hemodynamics, fluid balance and neurologic status will be stable or improve  Outcome: Progressing     Problem: Respiratory  Goal: Patient will achieve/maintain optimum respiratory ventilation and gas exchange  Outcome: Progressing     Problem: Chest Tube Management  Goal: Complications related to chest tube will be avoided or minimized  Outcome: Progressing     Problem: Fluid Volume  Goal: Fluid volume balance will be maintained  Outcome: Progressing     Problem:  Mechanical Ventilation  Goal: Safe management of artificial airway and ventilation  Outcome: Progressing  Goal: Successful weaning off mechanical ventilator, spontaneously maintains adequate gas exchange  Outcome: Progressing  Goal: Patient will be able to express needs and understand communication  Outcome: Progressing     Problem: Nutrition  Goal: Patient's nutritional and fluid intake will be adequate or improve  Outcome: Progressing  Goal: Enteral nutrition will be maintained or improve  Outcome: Progressing  Goal: Enteral nutrition will be maintained or improve  Outcome: Progressing     Problem: Urinary Elimination  Goal: Establish and maintain regular urinary output  Outcome: Progressing     Problem: Bowel Elimination  Goal: Establish and maintain regular bowel function  Outcome: Progressing     Problem: Gastrointestinal Irritability  Goal: Nausea and vomiting will be absent or improve  Outcome: Progressing  Goal: Diarrhea will be absent or improved  Outcome: Progressing     Problem: Rectal Tube  Goal: Fecal output will be contained and skin will remain free from irritation  Outcome: Progressing     Problem: Mobility  Goal: Patient's capacity to carry out activities will improve  Outcome: Progressing     Problem: Self Care  Goal: Patient will have the ability to perform ADLs independently or with assistance (bathe, groom, dress, toilet and feed)  Outcome: Progressing     Problem: Infection - Standard  Goal: Patient will remain free from infection  Outcome: Progressing     Problem: Wound/ / Incision Healing  Goal: Patient's wound/surgical incision will decrease in size and heals properly  Outcome: Progressing

## 2023-02-03 NOTE — THERAPY
Occupational Therapy   Re-Evaluation     Patient Name: Noe Hickey  Age:  62 y.o., Sex:  male  Medical Record #: [id number]  Today's Date: 2/3/2023     Precautions  Precautions: Cardiac Precautions (See Comments), Sternal Precautions (See Comments)  Comments: supplemental O2 required    Assessment  Patient is 62 y.o. male seen for OT re-eval after txf to ICU d/t development of agitation, aggression, and hallucinations from acute encephalopathy, and was then intubated, now extubated. Pt is pleasant and cooperative; reports he knows he's still not back to baseline cognition and intermittently gets confused (doesn't know where he is). Able to maintain sternal precautions w/o v/cs, but aware he should have someone with him for safety, especially for IADLs, until cognition has improved. Reports he will have assist and SPV for safety. Spent time educating pt on home safety, adaptive techniques for ADL/txfs, DME, energy conservation, cognition/safety with IADLs, no locking knees with sustained standing, return to work, and importance of pacing and gradual return to activity. Completed ADLs/txfs with SPV and functional ambulation w/o AD and SPV; v/cs for safety. Reports will be returning home with wife temporarily so she can assist PRN. Patient will not be actively followed for occupational therapy services at this time, however may be seen if requested by physician for 1 more visit within 30 days to address any discharge or equipment needs.       Plan    Occupational Therapy Initial Treatment Plan   Duration: Evaluation only    DC Equipment Recommendations: Tub / Shower Seat  Discharge Recommendations: Anticipate that the patient will have no further occupational therapy needs after discharge from the hospital (as long as has someone who can assist frequently with IADLs)      Objective     02/03/23 1154   Precautions   Precautions Fall Risk;Cardiac Precautions (See Comments);Sternal Precautions (See Comments)    Vitals   O2 Delivery Device None - Room Air   Vitals Comments No c/o dizziness this session, but reports intermittent dizziness during functional mobility/txfs while in house; pt aware that he needs to sit down and slow down   Pain 0 - 10 Group   Location Chest;Sternum   Location Orientation Mid   Therapist Pain Assessment Post Activity Pain Same as Prior to Activity;During Activity;Nurse Notified  (not quantified)   Cognition    Cognition / Consciousness WDL   Level of Consciousness Alert   Comments pleasant and cooperative; pt reports he knows he's still not back to baseline cognition and intermittently gets confused. Able to maintain sternal precautions w/o v/cs. Reports he will have assist and SPV for safety.   Passive ROM Upper Body   Passive ROM Upper Body WDL   Active ROM Upper Body   Active ROM Upper Body  WDL   Strength Upper Body   Upper Body Strength  X   Comments limited by precautions   Other Treatments   Other Treatments Provided Spent time educating pt on home safety, adaptive techniques for ADL/txfs, DME, energy conservation, cognition/safety with IADLs, no locking knees with sustained standing, return to work, and importance of pacing and gradual return to activity. Reports will be returning home with wife temporarily so she can assist PRN.   Balance   Sitting Balance (Static) Good   Sitting Balance (Dynamic) Good   Standing Balance (Static) Good   Standing Balance (Dynamic) Fair +   Weight Shift Sitting Good   Weight Shift Standing Good   Skilled Intervention Verbal Cuing;Tactile Cuing   Comments w/o AD   Bed Mobility    Scooting Supervised   Skilled Intervention Verbal Cuing;Compensatory Strategies   Comments found and left in chair   Activities of Daily Living   Eating Supervision   Grooming   (declined need)   Upper Body Dressing Supervision  (sweatshirt)   Lower Body Dressing Supervision   Toileting   (declined need, but reports has been completing w/o assist)   Skilled Intervention Verbal  Cuing;Compensatory Strategies   Comments Per RN, pt is up self in room and req no assist.   Functional Mobility   Sit to Stand Supervised   Bed, Chair, Wheelchair Transfer Supervised   Toilet Transfers Supervised   Mobility no AD in room and hallway   Activity Tolerance   Comments limited by fatigue and pain   Education Group   Education Provided Transfers;Home Safety;Adaptive Equipment;Pathology of bedrest;Cardiac Precautions;Sternal Precautions;Energy Conservation;Activities of Daily Living   Cardiac Precautions Patient Response Patient;Acceptance;Explanation;Verbal Demonstration;Reinforcement Needed   Sternal Precautions Patient Response Patient;Acceptance;Explanation;Reinforcement Needed;Action Demonstration   Energy Conservation Patient Response Patient;Acceptance;Explanation;Verbal Demonstration;Reinforcement Needed   Home Safety Patient Response Patient;Acceptance;Explanation;Reinforcement Needed;Verbal Demonstration   Transfers Patient Response Patient;Acceptance;Explanation;Action Demonstration;Reinforcement Needed   ADL Patient Response Patient;Acceptance;Explanation;Verbal Demonstration;Reinforcement Needed   Adaptive Equipment Patient Response Patient;Acceptance;Explanation;Verbal Demonstration;Reinforcement Needed   Pathology of Bedrest Patient Response Patient;Acceptance;Explanation;Verbal Demonstration;Reinforcement Needed   Occupational Therapy Treatment Plan   O.T. Treatment Plan Continue Current Treatment Plan  (d/c needs only)

## 2023-02-03 NOTE — DISCHARGE SUMMARY
Discharge Summary    CHIEF COMPLAINT ON ADMISSION  Chief Complaint   Patient presents with    Chest Pain     X 5 hours    Shortness of Breath    Leg Swelling       Reason for Admission  ems     Admission Date  1/20/2023    CODE STATUS  Full Code    HPI & HOSPITAL COURSE    Noe Hickey is a 62 y.o. male who presented 1/20/2023 with past medical history of atrial fibrillation/flutter, history of aortic stenosis status post valve replacement, history of tobacco abuse, hyperlipidemia who presents to the hospital for shortness of breath, cough and lower extremity swelling.  Patient was recently discharged from the hospital on 1/5 for aortic aneurysm repair and aortic valve replacement.  Postoperatively he was given IV fluids due to blood loss.  During his hospital stay he had a complication of torsades and ventricular arrhythmia.  He was shocked once and then returned back to normal sinus rhythm.  His echo found EF to be 55% at that time.  Patient states that since his hospital stay has been having worsening lower extremity edema and cough.  He denies any hemoptysis or purulent sputum.  He was discharged to [facility] and states that he has been eating a high salt diet.  Patient was discharged with Lasix and states that he has been compliant with it.  On admission he was afebrile, HR 67, /80, RR 24, 97% on RA. He did later require oxygen. He appeared volume overloaded on exam . He had elevated WBC, plt and Creatinine of 1.73. CTA done showing no PE but LLL and lingual consolidation which could be pneumonia vs atelectasis . He was started on rocephin and azithromycin for possible pneumonia and IV lasix for volume overload.   Repeat echo done showing ejection of 45% worse than previous., cardiology was consulted, diuresis and GDMT was continued minus ACEi due to JENNY. Discharge was planned to [facility] on 1/23/23 however while waiting for placement he developed worsening agitation and  confusion and hallucinations. Psychiatry was consulted. Patient required restraints and developed worsening agitation. He was transferred to [facility] for precedex ggt on 1/29/2023. He developed respiratory failure with desatruation to 56% and continued agitation despite precedex and required intubation on 1/29/2023 and transferred to ICU. Unclear etiology for his agitation. He was treated with thiamine. He was extubated on 1/31/2023, remained emotional labile but improving. Psych reconsulted and medications were adjusted. Patient improved and was transferred to telemetry floor. He had no further episodes of agitation. He was anxious and depressed and concerned about his discharge plan as he is having some social/marital issues and states he has no where to go. He was reassess by therapy however was deemed to have no post acute care needs. From a cardiology standpoint he was stable.   On discharge he was hemodynamically stable, appeared euvolemic and was saturating well on room air. He remained anxious about his social situation but was calm and cooperative and at his cognitive baseline.         Therefore, he is discharged in fair and stable condition to skilled nursing facility.  No notes on file    Therefore, he is discharged in fair and stable condition to home with close outpatient follow-up.    The patient met 2-midnight criteria for an inpatient stay at the time of discharge.    Discharge Date  2/3/2023    FOLLOW UP ITEMS POST DISCHARGE  Cardiology follow up   Psychiatry follow up       DISCHARGE DIAGNOSES  Principal Problem:    Acute on chronic systolic heart failure (HCC) POA: Yes  Active Problems:    Acute blood loss as cause of postoperative anemia POA: Yes    Acute renal failure (ARF) ([facility]) POA: No    Pneumonia due to infectious organism POA: Yes    Paroxysmal atrial fibrillation (HCC) POA: Yes    COPD (chronic obstructive pulmonary disease) ([facility]) POA: Unknown    Elevated hemidiaphragm - LEFT post AVR  (Chronic) POA: Yes    Acute encephalopathy POA: No    Drug rash POA: Yes    S/P AVR POA: Yes  Resolved Problems:    Acute respiratory failure with hypoxia (HCC) POA: Unknown    Elevated troponin POA: Unknown      FOLLOW UP  Future Appointments   Date Time Provider Department Center   2/6/2023  1:45 PM CT RESOURCE PROVIDER [facility] None     primary care provider    Follow up  Please call your primary care provider to schedule a hospital follow up. Thank you.    ETTA Marie  [address]  [address]  [facility] [address]  [phone]    Go on 3/3/2023  Please go to your follow up appointment with Jhon QUILES on Friday March 3, 2023 check in at 9:45am for a 10:00am appointment.    [facility]  [address] [facility] [address]  [phone]        Heart Failure Education  Please access the [facility] My HF Guide/Heart Failure Interactive Workbook:   [email or web address]  Follow up  Please visit this website as soon as possible for information on how to manage your heart failure at home. Thank you, [facility] Heart Failure [facility]      MEDICATIONS ON DISCHARGE     Medication List        START taking these medications        Instructions   budesonide-formoterol 80-4.5 MCG/ACT Aero  Commonly known as: SYMBICORT   Inhale 2 Puffs 2 times a day.  Dose: 2 Puff     omeprazole 20 MG delayed-release capsule  Commonly known as: PRILOSEC   Take 1 Capsule by mouth every day.  Dose: 20 mg     * oxyCODONE immediate release 10 MG immediate release tablet  Commonly known as: ROXICODONE   Take 1 Tablet by mouth every 6 hours as needed for Severe Pain for up to 5 days.  Dose: 10 mg     QUEtiapine 25 MG Tabs  Commonly known as: Seroquel   Take 1 Tablet by mouth 2 times a day.  Dose: 25 mg     traZODone 50 MG Tabs  Commonly known as: DESYREL   Take 1 Tablet by mouth at bedtime.  Dose: 50 mg           * This list has 1 medication(s) that are the same as other medications prescribed  for you. Read the directions carefully, and ask your doctor or other care provider to review them with you.                CHANGE how you take these medications        Instructions   amiodarone 400 MG tablet  Start taking on: January 23, 2023  What changed:   See the new instructions.  Another medication with the same name was removed. Continue taking this medication, and follow the directions you see here.  Commonly known as: PACERONE   Take 1 Tablet by mouth every day for 2 days, THEN 0.5 Tablets every day for 30 days.     furosemide 40 MG Tabs  What changed: Another medication with the same name was removed. Continue taking this medication, and follow the directions you see here.  Commonly known as: LASIX   Take 1 Tablet by mouth every day.  Dose: 40 mg     metoprolol tartrate 50 MG Tabs  What changed:   medication strength  how much to take  Commonly known as: LOPRESSOR   Take 1 Tablet by mouth 2 times a day.  Dose: 50 mg     warfarin 2.5 MG Tabs  What changed:   medication strength  how much to take  Commonly known as: COUMADIN   Take 1 Tablet by mouth every day at 6 PM.  Dose: 2.5 mg            CONTINUE taking these medications        Instructions   acetaminophen 500 MG Tabs  Commonly known as: TYLENOL   Take 2 Tablets by mouth every 6 hours as needed for Moderate Pain or Mild Pain.  Dose: 1,000 mg     amLODIPine 10 MG Tabs  Commonly known as: NORVASC   Take 1 Tablet by mouth every day.  Dose: 10 mg     aspirin 81 MG EC tablet   Take 1 Tablet by mouth every day.  Dose: 81 mg     potassium chloride SA 20 MEQ Tbcr  Commonly known as: Kdur   Take 1 Tablet by mouth every day.  Dose: 20 mEq            STOP taking these medications      potassium Chloride ER 20 MEQ Tbcr tablet  Commonly known as: K-TAB            ASK your doctor about these medications        Instructions   cefdinir 300 MG Caps  Commonly known as: OMNICEF  Ask about: Should I take this medication?   Take 1 Capsule by mouth every 12 hours for 3  days.  Dose: 300 mg     * oxyCODONE immediate release 10 MG immediate release tablet  Commonly known as: ROXICODONE  Ask about: Should I take this medication?   Take 1 Tablet by mouth every 6 hours as needed for Severe Pain for up to 5 days.  Dose: 10 mg           * This list has 1 medication(s) that are the same as other medications prescribed for you. Read the directions carefully, and ask your doctor or other care provider to review them with you.                  Allergies  Allergies   Allergen Reactions    Morphine Rash     Rash/ difficulty breathing    Ampicillin-Sulbactam Sodium      Rash to upper torso - appears to be related to Unasyn new start (Jan 2023)       DIET  Orders Placed This Encounter   Procedures    Diet Order Diet: Consistent CHO (Diabetic)     Standing Status:   Standing     Number of Occurrences:   1     Order Specific Question:   Diet:     Answer:   Consistent CHO (Diabetic) [4]       ACTIVITY  As tolerated.      CONSULTATIONS  Critical Care   Cardiology     PROCEDURES  ET intubation 1/29/2023  Central line placement 1/29/2023    LABORATORY  Lab Results   Component Value Date    SODIUM 139 02/03/2023    POTASSIUM 4.5 02/03/2023    CHLORIDE 104 02/03/2023    CO2 26 02/03/2023    GLUCOSE 95 02/03/2023    BUN 17 02/03/2023    CREATININE 1.30 02/03/2023        Lab Results   Component Value Date    WBC 12.9 (H) 02/03/2023    HEMOGLOBIN 9.2 (L) 02/03/2023    HEMATOCRIT 32.1 (L) 02/03/2023    PLATELETCT 479 (H) 02/03/2023        Total time of the discharge process exceeds 40 minutes.

## 2023-02-03 NOTE — PROGRESS NOTES
Inpatient Anticoagulation Service Note for 2/3/2023      Reason for Anticoagulation: Atrial Fibrillation   [id number] VASc Score: 2  HAS-BLED Score: 1    Hemoglobin Value: (!) 9.2  Hematocrit Value: (!) 32.1  Lab Platelet Value: (!) 479  Target INR: 2.0 to 3.0    INR from last 7 days       Date/Time INR Value    02/03/23 0300 1.8    02/02/23 1151 1.96    02/02/23 0948 2.05    02/01/23 0525 2.11    01/31/23 0255 2.1    01/30/23 1222 1.98    01/30/23 0813 1.97    01/28/23 0208 2.16          Dose from last 7 days       Date/Time Dose (mg)    02/03/23 1207 5    02/02/23 1216 3    02/01/23 1355 2.5    01/31/23 1144 2.5    01/30/23 1331 5    01/29/23 0828 2.5    01/28/23 1127 2.5          Average Dose (mg):  (per anticoag clinic - 2.5 mg PO QD)  Significant Interactions: Antiplatelet Medications, Amiodarone  Bridge Therapy: No     Reversal Agent Administered: Not Applicable  Comments: Subtherapeutic INR. H/H stable. No s/sx of bleeding. Plan on increasing the dose again tonight.    Plan:  5 mg   Education Material Provided?: No (established warfarin patient.)    Pharmacist suggested discharge dosing: Take 5 mg PO daily x2 days (2/3 & 2/4) then resume home dose of 2.5 mg on 2/5 and follow up with INR clinic on Monday 2/6.      Flory Cano, PharmD

## 2023-02-03 NOTE — THERAPY
Physical Therapy   Re-evaluation     Patient Name: Noe Hickey  Age:  62 y.o., Sex:  male  Medical Record #: [id number]  Today's Date: 2/3/2023     Precautions  Precautions: Cardiac Precautions (See Comments);Sternal Precautions (See Comments)    Assessment  Pt seen for PT re-eval after pt became agitated and was later intubated on 1/29 due to acute encephalopathy. PT re-eval complete, pt currently presents at his functional baseline and is able to complete home level mobility at SP level with no AD. Pt able to articulate his sternal and cardiac precautions, as well as follow them during the session. Pt reports that he is going home with his wife temporarily; reports it is a [facility] with 2 MICHELLE. Anticipate that pt will be able to safely DC home with no more PT needs. Pt has no further acute care PT needs, however he is encouraged to ambulate in [place] with [facility] staff prior to DC to prevent deconditioning.     Plan    Physical Therapy Initial Treatment Plan   Treatment Plan : Bed Mobility, Gait Training, Neuro Re-Education / Balance, Self Care / Home Evaluation, Stair Training, Therapeutic Activities, Therapeutic Exercise  Treatment Frequency: 3 Times per Week  Duration: Until Therapy Goals Met    DC Equipment Recommendations: None  Discharge Recommendations: Anticipate that the patient will have no further physical therapy needs after discharge from the hospital          02/03/23 1211   Vitals   Room Air Oximetry 91   O2 (LPM) 0   O2 Delivery Device None - Room Air   Pain 0 - 10 Group   Location Sternum   Location Orientation Mid   Pain Rating Scale (NPRS)   (not quantified)   Description Aching   Therapist Pain Assessment During Activity   Cognition    Cognition / Consciousness WDL   Level of Consciousness Alert   Comments pleasant and participatory, receptive to education and able to talk about situations in which he can improve his safety awareness   Active ROM Lower Body    Active ROM Lower Body  WDL   Strength  Lower Body   Lower Body Strength  WDL   Balance   Sitting Balance (Static) Good   Sitting Balance (Dynamic) Good   Standing Balance (Static) Good   Standing Balance (Dynamic) Fair +   Weight Shift Sitting Good   Weight Shift Standing Good   Skilled Intervention Verbal Cuing   Comments no AD   Bed Mobility    Comments NT, in chair pre/post   Gait Analysis   Gait Level Of Assist Supervised   Assistive Device None   Distance (Feet) 175   # of Times Distance was Traveled 1   Deviation No deviation   # of Stairs Climbed 6   Level of Assist with Stairs Standby Assist   Weight Bearing Status no restrictions   Skilled Intervention Verbal Cuing   Functional Mobility   Sit to Stand Supervised   Bed, Chair, Wheelchair Transfer Supervised   Toilet Transfers Supervised   Mobility no AD   Comments maintains sternal precautions   How much difficulty does the patient currently have...   Turning over in bed (including adjusting bedclothes, sheets and blankets)? 4   Sitting down on and standing up from a chair with arms (e.g., wheelchair, bedside commode, etc.) 4   Moving from lying on back to sitting on the side of the bed? 4   How much help from another person does the patient currently need...   Moving to and from a bed to a chair (including a wheelchair)? 4   Need to walk in a hospital room? 4   Climbing 3-5 steps with a railing? 4   6 clicks Mobility Score 24   Activity Tolerance   Comments no overt pain, SOB, or fatigue   Anticipated Discharge Equipment and Recommendations   DC Equipment Recommendations None   Discharge Recommendations Anticipate that the patient will have no further physical therapy needs after discharge from the hospital   Interdisciplinary Plan of Care Collaboration   IDT Collaboration with  Nursing;Occupational Therapist   Patient Position at End of Therapy Seated;Call Light within Reach;Tray Table within Reach;Phone within Reach   Collaboration Comments RN updated   Session Information   Date / Session  Number  2/3- 3 (re eval only, dc needs)

## 2023-02-04 LAB
BACTERIA BLD CULT: NORMAL
BACTERIA BLD CULT: NORMAL
SIGNIFICANT IND 70042: NORMAL
SIGNIFICANT IND 70042: NORMAL
SITE SITE: NORMAL
SITE SITE: NORMAL
SOURCE SOURCE: NORMAL
SOURCE SOURCE: NORMAL

## 2023-02-06 ENCOUNTER — APPOINTMENT (OUTPATIENT)
Dept: CARDIOTHORACIC SURGERY | Facility: MEDICAL CENTER | Age: 63
End: 2023-02-06
Payer: COMMERCIAL

## 2023-02-09 ENCOUNTER — TELEPHONE (OUTPATIENT)
Dept: VASCULAR LAB | Facility: MEDICAL CENTER | Age: 63
End: 2023-02-09
Payer: COMMERCIAL

## 2023-02-09 NOTE — TELEPHONE ENCOUNTER
[facility]    Pt missed their last appointment. Left VM for pt to reschedule.    Ulysses Vega, PharmD

## 2023-02-13 ENCOUNTER — TELEPHONE (OUTPATIENT)
Dept: VASCULAR LAB | Facility: MEDICAL CENTER | Age: 63
End: 2023-02-13
Payer: COMMERCIAL

## 2023-02-13 DIAGNOSIS — I48.91 ATRIAL FIBRILLATION, UNSPECIFIED TYPE (HCC): ICD-10-CM

## 2023-02-13 NOTE — TELEPHONE ENCOUNTER
[facility]    Pt has not obtained INR since discharge.  He states he no longer has HH. SO for INR sent to [facility] and pt to obtain INR today or tomorrow.     Ulysses Vega, PharmD

## 2023-02-15 ENCOUNTER — TELEPHONE (OUTPATIENT)
Dept: VASCULAR LAB | Facility: MEDICAL CENTER | Age: 63
End: 2023-02-15
Payer: COMMERCIAL

## 2023-02-15 ENCOUNTER — DOCUMENTATION (OUTPATIENT)
Dept: VASCULAR LAB | Facility: MEDICAL CENTER | Age: 63
End: 2023-02-15
Payer: COMMERCIAL

## 2023-02-15 DIAGNOSIS — Z95.2 S/P AVR: ICD-10-CM

## 2023-02-15 DIAGNOSIS — Z86.79 S/P ASCENDING AORTIC ANEURYSM REPAIR: ICD-10-CM

## 2023-02-15 DIAGNOSIS — Z98.890 S/P ASCENDING AORTIC ANEURYSM REPAIR: ICD-10-CM

## 2023-02-15 DIAGNOSIS — I48.91 ATRIAL FIBRILLATION, UNSPECIFIED TYPE (HCC): ICD-10-CM

## 2023-02-15 NOTE — TELEPHONE ENCOUNTER
[facility]    Pt states he will get INR as soon as he can. Educated him about the importance of getting the INR.    Ulysses Vega, PharmD

## 2023-02-16 ENCOUNTER — TELEPHONE (OUTPATIENT)
Dept: VASCULAR LAB | Facility: MEDICAL CENTER | Age: 63
End: 2023-02-16
Payer: COMMERCIAL

## 2023-02-16 NOTE — TELEPHONE ENCOUNTER
Pt is overdue for INR    Pt has no mode of transportation.  Pt will get INR drawn either 2/17 or Monday 2/20    Pt last seen 1/17/23    Jennifer Prado, RosalindaD

## 2023-02-16 NOTE — PROGRESS NOTES
Patient out of network for [facility] and apparently unable to establish with vascular medicine    Referral sent to [place] [facility] to establish care there for management of anticoagulation and postoperative management and surveillance of SAVR and ascending aortic aneurysm repair    Michael Bloch, MD  Vascular Care    Cc: LIUS Garcia

## 2023-02-22 ENCOUNTER — DOCUMENTATION (OUTPATIENT)
Dept: VASCULAR LAB | Facility: MEDICAL CENTER | Age: 63
End: 2023-02-22
Payer: COMMERCIAL

## 2023-02-22 NOTE — PROGRESS NOTES
[facility]    Pt was referred to  cardiology by Dr Bloch on 2/15/23.  Left VM with  anticoagulation clinic that it would be best if pt can transition to their services since they will be with their cardiology group.  Left VM for  anticoagulation and sent out initial note from 1/13/23 for reference.     Ulysses Vega, PharmD     [facility] Anticoagulation- f: [phone]

## 2023-02-24 ENCOUNTER — TELEPHONE (OUTPATIENT)
Dept: VASCULAR LAB | Facility: MEDICAL CENTER | Age: 63
End: 2023-02-24
Payer: COMMERCIAL

## 2023-02-24 NOTE — TELEPHONE ENCOUNTER
[facility]    S/w  anticoagulation and they are working to get the pt established with their services.  They have assumed care of anticoagulation.    Ulysses Vega, PharmD    CC  Dr Bloch

## 2023-03-21 ENCOUNTER — DOCUMENTATION (OUTPATIENT)
Dept: VASCULAR LAB | Facility: MEDICAL CENTER | Age: 63
End: 2023-03-21
Payer: COMMERCIAL

## 2023-03-21 ENCOUNTER — APPOINTMENT (OUTPATIENT)
Dept: VASCULAR LAB | Facility: MEDICAL CENTER | Age: 63
End: 2023-03-21
Payer: COMMERCIAL

## 2023-03-21 NOTE — PROGRESS NOTES
[facility]    Pt came in to [facility] today thinking his Anticoag NP appt was today. However pt has [employer] insurance which is OON with [facility]. Per documentation on 2/24, [facility] will be managing anticoagulation. Pt and his wife verbalized understanding.    Jennifer Prado, PharmD

## 2023-03-22 ENCOUNTER — DOCUMENTATION (OUTPATIENT)
Dept: VASCULAR LAB | Facility: MEDICAL CENTER | Age: 63
End: 2023-03-22
Payer: COMMERCIAL

## 2023-03-22 NOTE — PROGRESS NOTES
Received message from Pamela @ [employer] ([phone] ext [phone]) in regards to patient's current insurance coverage.  She was concerned that we did not have most up to date coverage information.  Called back to Pamela and had to LM.  Informed her that we show [employer] and to let us know if things were different.  Rashid Kerns, PharmD, BCACP    ADDENDUM:  Received c/b from Pamela @ [employer].  She states patient has dropped [employer] insurance plan and is strictly covered through [employer].  Attempted to reach patient to reschedule anticoag visit, phone rings several times then disconnects X 3.  Will attempt to reach at a later time.  Rashid Kerns, PharmTALI, BCACP

## 2023-03-27 ENCOUNTER — TELEPHONE (OUTPATIENT)
Dept: VASCULAR LAB | Facility: MEDICAL CENTER | Age: 63
End: 2023-03-27
Payer: COMMERCIAL

## 2023-03-28 ENCOUNTER — APPOINTMENT (OUTPATIENT)
Dept: VASCULAR LAB | Facility: MEDICAL CENTER | Age: 63
End: 2023-03-28
Payer: COMMERCIAL

## 2023-03-28 ENCOUNTER — APPOINTMENT (OUTPATIENT)
Dept: RADIOLOGY | Facility: MEDICAL CENTER | Age: 63
End: 2023-03-28
Attending: EMERGENCY MEDICINE
Payer: COMMERCIAL

## 2023-03-28 ENCOUNTER — HOSPITAL ENCOUNTER (EMERGENCY)
Facility: MEDICAL CENTER | Age: 63
End: 2023-03-28
Attending: EMERGENCY MEDICINE
Payer: COMMERCIAL

## 2023-03-28 VITALS
SYSTOLIC BLOOD PRESSURE: 136 MMHG | OXYGEN SATURATION: 92 % | HEIGHT: 69 IN | RESPIRATION RATE: 19 BRPM | WEIGHT: 190 LBS | BODY MASS INDEX: 28.14 KG/M2 | DIASTOLIC BLOOD PRESSURE: 79 MMHG | HEART RATE: 60 BPM | TEMPERATURE: 97.9 F

## 2023-03-28 DIAGNOSIS — R10.10 UPPER ABDOMINAL PAIN: ICD-10-CM

## 2023-03-28 LAB
ALBUMIN SERPL BCP-MCNC: 3.9 G/DL (ref 3.2–4.9)
ALBUMIN/GLOB SERPL: 1.3 G/DL
ALP SERPL-CCNC: 72 U/L (ref 30–99)
ALT SERPL-CCNC: 31 U/L (ref 2–50)
ANION GAP SERPL CALC-SCNC: 10 MMOL/L (ref 7–16)
ANISOCYTOSIS BLD QL SMEAR: ABNORMAL
APPEARANCE UR: CLEAR
AST SERPL-CCNC: 30 U/L (ref 12–45)
BASOPHILS # BLD AUTO: 0.4 % (ref 0–1.8)
BASOPHILS # BLD: 0.05 K/UL (ref 0–0.12)
BILIRUB SERPL-MCNC: 0.3 MG/DL (ref 0.1–1.5)
BILIRUB UR QL STRIP.AUTO: NEGATIVE
BUN SERPL-MCNC: 21 MG/DL (ref 8–22)
CALCIUM ALBUM COR SERPL-MCNC: 9 MG/DL (ref 8.5–10.5)
CALCIUM SERPL-MCNC: 8.9 MG/DL (ref 8.5–10.5)
CHLORIDE SERPL-SCNC: 99 MMOL/L (ref 96–112)
CO2 SERPL-SCNC: 27 MMOL/L (ref 20–33)
COLOR UR: YELLOW
COMMENT 1642: NORMAL
CREAT SERPL-MCNC: 1.23 MG/DL (ref 0.5–1.4)
EKG IMPRESSION: NORMAL
EOSINOPHIL # BLD AUTO: 0.22 K/UL (ref 0–0.51)
EOSINOPHIL NFR BLD: 1.7 % (ref 0–6.9)
ERYTHROCYTE [DISTWIDTH] IN BLOOD BY AUTOMATED COUNT: 58.5 FL (ref 35.9–50)
GFR SERPLBLD CREATININE-BSD FMLA CKD-EPI: 66 ML/MIN/1.73 M 2
GLOBULIN SER CALC-MCNC: 3 G/DL (ref 1.9–3.5)
GLUCOSE SERPL-MCNC: 96 MG/DL (ref 65–99)
GLUCOSE UR STRIP.AUTO-MCNC: NEGATIVE MG/DL
HCT VFR BLD AUTO: 40.5 % (ref 42–52)
HGB BLD-MCNC: 11.3 G/DL (ref 14–18)
HYPOCHROMIA BLD QL SMEAR: ABNORMAL
IMM GRANULOCYTES # BLD AUTO: 0.06 K/UL (ref 0–0.11)
IMM GRANULOCYTES NFR BLD AUTO: 0.5 % (ref 0–0.9)
KETONES UR STRIP.AUTO-MCNC: NEGATIVE MG/DL
LEUKOCYTE ESTERASE UR QL STRIP.AUTO: NEGATIVE
LIPASE SERPL-CCNC: 38 U/L (ref 11–82)
LYMPHOCYTES # BLD AUTO: 1.07 K/UL (ref 1–4.8)
LYMPHOCYTES NFR BLD: 8.3 % (ref 22–41)
MACROCYTES BLD QL SMEAR: ABNORMAL
MCH RBC QN AUTO: 20.4 PG (ref 27–33)
MCHC RBC AUTO-ENTMCNC: 27.9 G/DL (ref 33.7–35.3)
MCV RBC AUTO: 73.1 FL (ref 81.4–97.8)
MICRO URNS: NORMAL
MICROCYTES BLD QL SMEAR: ABNORMAL
MONOCYTES # BLD AUTO: 1.25 K/UL (ref 0–0.85)
MONOCYTES NFR BLD AUTO: 9.7 % (ref 0–13.4)
MORPHOLOGY BLD-IMP: NORMAL
NEUTROPHILS # BLD AUTO: 10.21 K/UL (ref 1.82–7.42)
NEUTROPHILS NFR BLD: 79.4 % (ref 44–72)
NITRITE UR QL STRIP.AUTO: NEGATIVE
NRBC # BLD AUTO: 0 K/UL
NRBC BLD-RTO: 0 /100 WBC
PH UR STRIP.AUTO: 5.5 [PH] (ref 5–8)
PLATELET # BLD AUTO: 423 K/UL (ref 164–446)
PLATELET BLD QL SMEAR: NORMAL
PMV BLD AUTO: 10.6 FL (ref 9–12.9)
POIKILOCYTOSIS BLD QL SMEAR: NORMAL
POTASSIUM SERPL-SCNC: 5.1 MMOL/L (ref 3.6–5.5)
PROT SERPL-MCNC: 6.9 G/DL (ref 6–8.2)
PROT UR QL STRIP: NEGATIVE MG/DL
RBC # BLD AUTO: 5.54 M/UL (ref 4.7–6.1)
RBC BLD AUTO: PRESENT
RBC UR QL AUTO: NEGATIVE
SCHISTOCYTES BLD QL SMEAR: NORMAL
SODIUM SERPL-SCNC: 136 MMOL/L (ref 135–145)
SP GR UR STRIP.AUTO: 1.01
TROPONIN T SERPL-MCNC: 17 NG/L (ref 6–19)
UROBILINOGEN UR STRIP.AUTO-MCNC: 0.2 MG/DL
WBC # BLD AUTO: 12.9 K/UL (ref 4.8–10.8)

## 2023-03-28 PROCEDURE — 700117 HCHG RX CONTRAST REV CODE 255: Performed by: EMERGENCY MEDICINE

## 2023-03-28 PROCEDURE — 700102 HCHG RX REV CODE 250 W/ 637 OVERRIDE(OP): Performed by: EMERGENCY MEDICINE

## 2023-03-28 PROCEDURE — 74175 CTA ABDOMEN W/CONTRAST: CPT

## 2023-03-28 PROCEDURE — 80053 COMPREHEN METABOLIC PANEL: CPT

## 2023-03-28 PROCEDURE — 85025 COMPLETE CBC W/AUTO DIFF WBC: CPT

## 2023-03-28 PROCEDURE — 83690 ASSAY OF LIPASE: CPT

## 2023-03-28 PROCEDURE — A9270 NON-COVERED ITEM OR SERVICE: HCPCS | Performed by: EMERGENCY MEDICINE

## 2023-03-28 PROCEDURE — 99284 EMERGENCY DEPT VISIT MOD MDM: CPT

## 2023-03-28 PROCEDURE — 36415 COLL VENOUS BLD VENIPUNCTURE: CPT

## 2023-03-28 PROCEDURE — 84484 ASSAY OF TROPONIN QUANT: CPT

## 2023-03-28 PROCEDURE — 81003 URINALYSIS AUTO W/O SCOPE: CPT

## 2023-03-28 PROCEDURE — 700105 HCHG RX REV CODE 258: Performed by: EMERGENCY MEDICINE

## 2023-03-28 RX ORDER — SODIUM CHLORIDE 9 MG/ML
1000 INJECTION, SOLUTION INTRAVENOUS ONCE
Status: COMPLETED | OUTPATIENT
Start: 2023-03-28 | End: 2023-03-28

## 2023-03-28 RX ADMIN — IOHEXOL 100 ML: 350 INJECTION, SOLUTION INTRAVENOUS at 18:11

## 2023-03-28 RX ADMIN — SODIUM CHLORIDE 1000 ML: 9 INJECTION, SOLUTION INTRAVENOUS at 17:44

## 2023-03-28 RX ADMIN — LIDOCAINE HYDROCHLORIDE 30 ML: 20 SOLUTION OROPHARYNGEAL at 17:44

## 2023-03-28 ASSESSMENT — FIBROSIS 4 INDEX: FIB4 SCORE: 0.92

## 2023-03-28 NOTE — ED TRIAGE NOTES
.  Chief Complaint   Patient presents with    Abdominal Pain      Pt BIB EMS from home with above complaint. Pt reports ULQ abd pain radiating into mid abdomen. Pt note intermittent pain x 1 week, associated nausea denies vomiting. Pain 9/10. Pt given Fentanyl 50 mch IN PTA.    Pt notes aortic aneurysm repair 1/05/23.

## 2023-03-29 NOTE — ED NOTES
Discharge instructions given to patient, a verbal understanding of all instructions was stated. IV removed, cathlon intact, site without s/s of infection. Pt preferred to walk out accompanied by self VSS, all belongings accounted for.

## 2023-03-29 NOTE — ED PROVIDER NOTES
ED Provider Note    CHIEF COMPLAINT  Chief Complaint   Patient presents with    Abdominal Pain       EXTERNAL RECORDS REVIEWED  Inpatient Notes discharge summary 2/3/2023, complex admission initially for chest pain and fluid overload ultimately ended up intubated in the ICU after some agitation and hallucinations    HPI/ROS    Noe Hickey is a 62 y.o. male who presents for evaluation of upper abdominal pain on the left side intermittently present for the past week but at times severe.  The patient has a very complicated recent medical history including aortic valve and proximal aneurysm repair.  He was then readmitted to the hospital sometime later with CHF and volume overload, requiring intubation at one point for hallucinations and agitation.  He reports the pain he is having now is not involving the chest really only the left upper quadrant of his abdomen.  Intermittently present, at times he is pain-free.  Nauseated without vomiting.  He has been somewhat constipated but no diarrhea.  He is not experiencing any shortness of breath.  No abnormal extremity edema.  No other acute complaints.  Denies any alcohol use in the past 15 years but does use meth although reports not for several years.  He has history of cyclic vomiting syndrome based on chart review as well.    PAST MEDICAL HISTORY   has a past medical history of Arrhythmia, Breath shortness, Cyclic vomiting syndrome, Elevated hemidiaphragm - LEFT post AVR (1/4/2023), Fracture, Headache, classical migraine, Heart burn, Heart valve disease, Indigestion, Pneumonia due to infectious organism (1/20/2023), and Snoring.    SURGICAL HISTORY   has a past surgical history that includes shoulder arthroscopy; shoulder hemicap resurfacing (Right, 10/12/2015); irrigation & debridement ortho (Right, 09/19/2017); shoulder surgery; aortic valve replacement (1/5/2023); aortic ascending dissection (1/5/2023); and echocardiogram, transesophageal, intraoperative  "(2023).    FAMILY HISTORY  History reviewed. No pertinent family history.    SOCIAL HISTORY  Social History     Tobacco Use    Smoking status: Every Day     Packs/day: 1.00     Years: 40.00     Pack years: 40.00     Types: Cigarettes     Last attempt to quit: 1/3/2023     Years since quittin.2    Smokeless tobacco: Never   Vaping Use    Vaping Use: Never used   Substance and Sexual Activity    Alcohol use: No    Drug use: Not Currently     Comment: past problems with narcotics     Sexual activity: Not on file       CURRENT MEDICATIONS  Home Medications    **Home medications have not yet been reviewed for this encounter**         ALLERGIES  Allergies   Allergen Reactions    Morphine Rash     Rash/ difficulty breathing    Ampicillin-Sulbactam Sodium      Rash to upper torso - appears to be related to Unasyn new start (2023)       PHYSICAL EXAM  VITAL SIGNS: BP (!) 155/86   Pulse 63   Temp 37.3 °C (99.1 °F) (Temporal)   Resp 20   Ht 1.753 m (5' 9\")   Wt 86.2 kg (190 lb)   SpO2 96%   BMI 28.06 kg/m²    Constitutional: Well appearing patient in no acute distress.  Awake and alert, not toxic nor ill in appearance.  HENT: Normocephalic, no obvious evidence of acute trauma.  Eyes: No scleral icterus. Normal conjunctiva   Thorax & Lungs: Normal nonlabored respirations.  Sternotomy scar is appreciated.  I appreciate no wheezing, rhonchi or rales. There is normal air movement.  Upon cardiac ascultation I appreciate a regular heart rhythm and a normal rate.   Abdomen: The abdomen is not visibly distended.  Upon palpation he has generalized tenderness across the upper abdomen, no rebound or guarding.  Lower portions of his abdomen are minimally tender.  Skin: The exposed portions of skin reveal no obvious rash or other abnormalities.  Extremities/Musculoskeletal: No obvious sign of acute trauma. No asymmetric calf tenderness or edema.   Neurologic: Alert & oriented. No focal deficits observed.      DIAGNOSTIC " STUDIES / PROCEDURES    LABS  Results for orders placed or performed during the hospital encounter of 03/28/23   CBC WITH DIFFERENTIAL   Result Value Ref Range    WBC 12.9 (H) 4.8 - 10.8 K/uL    RBC 5.54 4.70 - 6.10 M/uL    Hemoglobin 11.3 (L) 14.0 - 18.0 g/dL    Hematocrit 40.5 (L) 42.0 - 52.0 %    MCV 73.1 (L) 81.4 - 97.8 fL    MCH 20.4 (L) 27.0 - 33.0 pg    MCHC 27.9 (L) 33.7 - 35.3 g/dL    RDW 58.5 (H) 35.9 - 50.0 fL    Platelet Count 423 164 - 446 K/uL    MPV 10.6 9.0 - 12.9 fL    Neutrophils-Polys 79.40 (H) 44.00 - 72.00 %    Lymphocytes 8.30 (L) 22.00 - 41.00 %    Monocytes 9.70 0.00 - 13.40 %    Eosinophils 1.70 0.00 - 6.90 %    Basophils 0.40 0.00 - 1.80 %    Immature Granulocytes 0.50 0.00 - 0.90 %    Nucleated RBC 0.00 /100 WBC    Neutrophils (Absolute) 10.21 (H) 1.82 - 7.42 K/uL    Lymphs (Absolute) 1.07 1.00 - 4.80 K/uL    Monos (Absolute) 1.25 (H) 0.00 - 0.85 K/uL    Eos (Absolute) 0.22 0.00 - 0.51 K/uL    Baso (Absolute) 0.05 0.00 - 0.12 K/uL    Immature Granulocytes (abs) 0.06 0.00 - 0.11 K/uL    NRBC (Absolute) 0.00 K/uL    Hypochromia 2+ (A)     Anisocytosis 2+ (A)     Macrocytosis 1+     Microcytosis 2+ (A)    COMP METABOLIC PANEL   Result Value Ref Range    Sodium 136 135 - 145 mmol/L    Potassium 5.1 3.6 - 5.5 mmol/L    Chloride 99 96 - 112 mmol/L    Co2 27 20 - 33 mmol/L    Anion Gap 10.0 7.0 - 16.0    Glucose 96 65 - 99 mg/dL    Bun 21 8 - 22 mg/dL    Creatinine 1.23 0.50 - 1.40 mg/dL    Calcium 8.9 8.5 - 10.5 mg/dL    AST(SGOT) 30 12 - 45 U/L    ALT(SGPT) 31 2 - 50 U/L    Alkaline Phosphatase 72 30 - 99 U/L    Total Bilirubin 0.3 0.1 - 1.5 mg/dL    Albumin 3.9 3.2 - 4.9 g/dL    Total Protein 6.9 6.0 - 8.2 g/dL    Globulin 3.0 1.9 - 3.5 g/dL    A-G Ratio 1.3 g/dL   LIPASE   Result Value Ref Range    Lipase 38 11 - 82 U/L   URINALYSIS    Specimen: Urine, Clean Catch   Result Value Ref Range    Color Yellow     Character Clear     Specific Gravity 1.014 <1.035    Ph 5.5 5.0 - 8.0    Glucose  Negative Negative mg/dL    Ketones Negative Negative mg/dL    Protein Negative Negative mg/dL    Bilirubin Negative Negative    Urobilinogen, Urine 0.2 Negative    Nitrite Negative Negative    Leukocyte Esterase Negative Negative    Occult Blood Negative Negative    Micro Urine Req see below    TROPONIN   Result Value Ref Range    Troponin T 17 6 - 19 ng/L   CORRECTED CALCIUM   Result Value Ref Range    Correct Calcium 9.0 8.5 - 10.5 mg/dL   ESTIMATED GFR   Result Value Ref Range    GFR (CKD-EPI) 66 >60 mL/min/1.73 m 2   PERIPHERAL SMEAR REVIEW   Result Value Ref Range    Peripheral Smear Review see below    PLATELET ESTIMATE   Result Value Ref Range    Plt Estimation Normal    MORPHOLOGY   Result Value Ref Range    RBC Morphology Present     Poikilocytosis 1+     Schistocytes 1+    DIFFERENTIAL COMMENT   Result Value Ref Range    Comments-Diff see below         RADIOLOGY  I have independently interpreted the diagnostic imaging associated with this visit and am waiting the final reading from the radiologist.   My preliminary interpretation is as follows: No aortic dissection  Radiologist interpretation:   CT-CTA COMPLETE THORACOABDOMINAL AORTA   Final Result      1.  No aortic aneurysm or dissection identified.      2.  Post aortic valve and ascending thoracic aorta repair.      3.  Mild calcific atherosclerosis.      4.  Cardiomegaly is again present.      5.  Consolidation and volume loss left lower lobe lingular segment left upper lobe again noted, less prominent than on prior CT.      6.  No new infiltrates or consolidations.                              COURSE & MEDICAL DECISION MAKING    ED Observation Status? No; Patient does not meet criteria for ED Observation.     INITIAL ASSESSMENT, COURSE AND PLAN  Care Narrative: This is a 62-year-old male with upper abdominal pain and associated tenderness on exam, he is a couple months status post a proximal aortic aneurysm repair and aortic valve replacement.  His  current pain has been intermittent, at times not present.  Vital signs reveal some hypertension otherwise reassuring.  The plan will be for blood work, CT of his aorta, medication with a GI cocktail and Zofran to start with and then he will be reevaluated    Differential includes: Pancreatitis, hepatitis, biliary obstruction, gastritis/PUD, aortic injury, aortic dissection, anemia, dehydration, electrolyte abnormalities    Blood work is reassuring, normal renal function, normal troponin, normal lipase, normal chemistries, improving anemia.  CT scan reveals no acute findings.  At this point, the patient is stable and appropriate for discharge with no urgent emergent etiologies existing to explain his symptoms.  I do a higher suspicion at this point for gastritis/PUD given the pattern of his symptoms.  He is being discharged back to [facility], I have signed the appropriate paperwork.      DISPOSITION AND DISCUSSIONS    Escalation of care considered, and ultimately not performed:acute inpatient care management, however at this time, the patient is most appropriate for outpatient management    Decision tools and prescription drugs considered including, but not limited to: Pain Medications I did consider prescribing him some opiate analgesics for his pain however the patient is currently in a drug rehab facility so I do not think that would be appropriate .    FINAL DIAGNOSIS  1. Upper abdominal pain           Electronically signed by: Abhinav Canela M.D., 3/28/2023 5:40 PM

## 2023-03-31 ENCOUNTER — TELEPHONE (OUTPATIENT)
Dept: HEALTH INFORMATION MANAGEMENT | Facility: OTHER | Age: 63
End: 2023-03-31

## 2023-04-03 ENCOUNTER — OFFICE VISIT (OUTPATIENT)
Dept: CARDIOLOGY | Facility: MEDICAL CENTER | Age: 63
End: 2023-04-03
Payer: MEDICAID

## 2023-04-03 ENCOUNTER — TELEPHONE (OUTPATIENT)
Dept: CARDIOLOGY | Facility: MEDICAL CENTER | Age: 63
End: 2023-04-03

## 2023-04-03 VITALS
OXYGEN SATURATION: 93 % | HEART RATE: 60 BPM | BODY MASS INDEX: 28.44 KG/M2 | RESPIRATION RATE: 16 BRPM | WEIGHT: 192 LBS | DIASTOLIC BLOOD PRESSURE: 62 MMHG | SYSTOLIC BLOOD PRESSURE: 114 MMHG | HEIGHT: 69 IN

## 2023-04-03 DIAGNOSIS — I48.0 PAROXYSMAL ATRIAL FIBRILLATION (HCC): ICD-10-CM

## 2023-04-03 DIAGNOSIS — I50.23 ACUTE ON CHRONIC SYSTOLIC HEART FAILURE (HCC): ICD-10-CM

## 2023-04-03 DIAGNOSIS — D68.69 SECONDARY HYPERCOAGULABLE STATE (HCC): ICD-10-CM

## 2023-04-03 DIAGNOSIS — Z95.2 S/P AVR: ICD-10-CM

## 2023-04-03 DIAGNOSIS — Z98.890 STATUS POST ASCENDING AORTIC ANEURYSM REPAIR: ICD-10-CM

## 2023-04-03 DIAGNOSIS — Z86.79 STATUS POST ASCENDING AORTIC ANEURYSM REPAIR: ICD-10-CM

## 2023-04-03 DIAGNOSIS — I25.10 NONOBSTRUCTIVE ATHEROSCLEROSIS OF CORONARY ARTERY: ICD-10-CM

## 2023-04-03 DIAGNOSIS — Z72.0 TOBACCO ABUSE: ICD-10-CM

## 2023-04-03 DIAGNOSIS — I48.92 PAROXYSMAL ATRIAL FLUTTER (HCC): ICD-10-CM

## 2023-04-03 PROCEDURE — 99417 PROLNG OP E/M EACH 15 MIN: CPT | Performed by: INTERNAL MEDICINE

## 2023-04-03 PROCEDURE — 99215 OFFICE O/P EST HI 40 MIN: CPT | Performed by: INTERNAL MEDICINE

## 2023-04-03 RX ORDER — METOPROLOL SUCCINATE 50 MG/1
50 TABLET, EXTENDED RELEASE ORAL DAILY
Qty: 90 TABLET | Refills: 3 | Status: ON HOLD | OUTPATIENT
Start: 2023-04-03 | End: 2023-07-09

## 2023-04-03 RX ORDER — FUROSEMIDE 20 MG/1
20 TABLET ORAL DAILY
COMMUNITY
End: 2023-05-30

## 2023-04-03 RX ORDER — ONDANSETRON 4 MG/1
4 TABLET, ORALLY DISINTEGRATING ORAL EVERY 6 HOURS PRN
Status: ON HOLD | COMMUNITY
End: 2023-06-12

## 2023-04-03 RX ORDER — EMPAGLIFLOZIN 10 MG/1
10 TABLET, FILM COATED ORAL DAILY
Qty: 90 TABLET | Refills: 3 | Status: SHIPPED | OUTPATIENT
Start: 2023-04-03 | End: 2023-05-15

## 2023-04-03 RX ORDER — FERROUS SULFATE 325(65) MG
325 TABLET ORAL DAILY
COMMUNITY
End: 2023-06-13

## 2023-04-03 RX ORDER — PANTOPRAZOLE SODIUM 40 MG/1
40 TABLET, DELAYED RELEASE ORAL
Status: ON HOLD | COMMUNITY
End: 2023-06-12 | Stop reason: SDUPTHER

## 2023-04-03 RX ORDER — NITROGLYCERIN 0.3 MG/1
0.3 TABLET SUBLINGUAL
COMMUNITY
End: 2023-06-13

## 2023-04-03 ASSESSMENT — FIBROSIS 4 INDEX: FIB4 SCORE: 0.8

## 2023-04-03 NOTE — PATIENT INSTRUCTIONS
Stop metoprolol tartrate 50 mg twice daily  Start metoprolol succinate 50 mg once daily  Start rivaroxaban (Xarelto) 20 mg daily and stop warfarin if approved  If not approved, continue warfarin  Start empagliflozin (Jardiance) 10 mg daily  Have your labs checked in 1-2 weeks  Schedule an appointment with EP to discuss ablation for your atrial fibrillation  Schedule an echocardiogram  Schedule pulmonary function tests

## 2023-04-03 NOTE — PROGRESS NOTES
CARDIOLOGY CONSULTATION NOTE      Date of Visit: 4/4/2023    Primary Care Provider: EFRAÍN Hewitt    Patient Name: Noe Hickey  YOB: 1960  MRN: [id number]     Reason for Visit:   Establish care     Patient Story:   Noe Hickey is a 62 year-old gentleman with a past medical history of paroxysmal atrial fibrillation/flutter, aortic stenosis with ascending aortic aneurysm (s/p bioprosthetic AVR and ascending aortic root replacement), nonobstructive coronary artery disease, GERD, migraines, and active tobacco abuse.  Briefly, in December 2022 he was admitted to [facility] with reported atrial flutter with rapid ventricular response.  An echocardiogram at that time demonstrated moderate-severe bicuspid aortic valve stenosis with a moderately dilated ascending aorta.  He also underwent coronary angiography, which demonstrated mild nonobstructive coronary artery disease.  He then saw Dr. Goyal on 12/27/2022 for evaluation for aortic valve replacement and ascending aortic root repair.  He ultimately underwent aortic valve replacement with a 27 mm Inspiris tissue valve and ascending aortic aneurysm repair with a 32 mm Hemashield graft on 1/5/2023.  His post-operative course was complicated by an episode of torsades in the setting of multiple vasopressor agents including epinephrine and electrolyte abnormalities as well as development of atrial fibrillation.  He was discharged on 1/11/2023, but then readmitted on 1/20/2023 for shortness of breath and lower extremity edema.  During that admission, he was aggressively diuresed and was preparing for discharge to a skilled nursing facility when he developed significant delirium requiring transfer to the [facility] for initiation of a dexmedetomidine drip.  He then developed worsening respiratory status and required intubation.  He was ultimately discharged on 2/3/2023 to [facility].  On  discharge, he was referred to establish care with Vascular medicine for management of his warfarin and aortic surveillance, but was denied due to insurance issues ([employer]).  He then returned to the ED on 3/28/2023 with abdominal pain.  A CT scan at that time was unremarkable and his symptoms were thought to be related to gastritis/PUD.  He then changed insurance from [employer] to [place] [employer].  He presents today to establish care.    Today, he notes that he continues to feel poorly since his surgery, although he is slowly improving.  His furosemide was recently reduced to 40 mg daily and with this he has noticed a slight increase in lower extremity edema and shortness of breath as well as orthopnea.  He also notes that his INR was not regularly managed for over a month due to his difficulty accessing care.  He did have his laboratories checked a few days ago by his new PCP, but he has not aware of his current INR level.  He has not had any bleeding complications.  He is hoping to return to work soon.  He would like to reduce his medication burden is much as possible.  He does continue to smoke about 4-5 cigarettes daily.     Medications and Allergies:     Current Outpatient Medications   Medication Sig Dispense Refill    furosemide (LASIX) 20 MG Tab Take 20 mg by mouth every day.      pantoprazole (PROTONIX) 40 MG Tablet Delayed Response Take 40 mg by mouth every day.      ferrous sulfate 325 (65 Fe) MG tablet Take 325 mg by mouth every day.      nitroGLYCERIN (NITROSTAT) 0.3 MG SL tablet Place 0.3 mg under the tongue every 5 minutes as needed for Chest Pain.      ondansetron (ZOFRAN ODT) 4 MG TABLET DISPERSIBLE Take 4 mg by mouth every 6 hours as needed for Nausea/Vomiting.      metoprolol SR (TOPROL XL) 50 MG TABLET SR 24 HR Take 1 Tablet by mouth every day. 90 Tablet 3    rivaroxaban (XARELTO) 20 MG Tab tablet Take 1 Tablet by mouth with dinner. 90 Tablet 3    Empagliflozin (JARDIANCE) 10 MG Tab tablet Take  1 Tablet by mouth every day. 90 Tablet 3    QUEtiapine (SEROQUEL) 25 MG Tab Take 1 Tablet by mouth 2 times a day. (Patient taking differently: Take  by mouth. 1/ 2 tablet qhs) 60 Tablet 3    warfarin (COUMADIN) 2.5 MG Tab Take 2 tablets (5 mg) every evening for 2 days (on 2/3 & 2/4) then resume one tablet (2.5 mg) every evening on 2/5 unless otherwise directed by anticoagulation clinic. 35 Tablet 0    amiodarone (CORDARONE) 200 MG Tab Take 1 Tablet by mouth every day. 30 Tablet 1     No current facility-administered medications for this visit.       Allergies   Allergen Reactions    Morphine Rash     Rash/ difficulty breathing    Ampicillin-Sulbactam Sodium      Rash to upper torso - appears to be related to Unasyn new start (Jan 2023)        Medical Decision Making:   # Severe bicuspid aortic valve stenosis with ascending aortic aneurysm (s/p bioprosthetic AVR and ascending aortic aneurysm repair on 1/5/2022):  -Discontinue aspirin 81 mg while on anticoagulation    # Decompensated heart failure with reduced ejection fraction: He has mild lower extremity edema today and is complaining of shortness of breath/orthopnea since reducing his furosemide dose.  His current ejection fraction is unknown and we will obtain an urgent echocardiogram in order to guide medical therapy.  Given evidence of volume overload, we will initiate empagliflozin 10 mg daily, which would be useful for HFrEF or HFpEF.  -Obtain urgent echocardiogram  -Change to metoprolol XL 50 mg daily  -Continue furosemide 20 mg daily  -Start empagliflozin 10 mg daily for additional diuresis as patient appears mildly decompensated and is at risk for worsening decompensation  -Check BMP in 1-2 weeks to assess for potentially serious/life-threatening renal dysfunction following initiation of empagliflozin    # Paroxysmal atrial flutter/atrial fibrillation: He reportedly had atrial flutter at his initial presentation at [facility] in December  2022, however, he developed atrial fibrillation following AVR.  By palpation, he is currently regular.  -Continue amiodarone 200 mg daily for now  -Transition to rivaroxaban 20 mg daily if affordable for the patient and discontinue warfarin  -Obtain patient's most recent laboratories from PCP  -Referral to EP for evaluation for ablation or transition to another antiarrhythmic medication    # Nonobstructive coronary artery disease, tobacco abuse: His coronary angiogram demonstrated mild, nonobstructive coronary disease, which is not necessarily an indication for initiation of statin therapy in a patient with a baseline LDL less than 70.  However, in the setting of active tobacco abuse, initiation of a statin may be reasonable.  We will discuss further with patient after making numerous medication changes as above.  Importance of complete tobacco cessation was discussed and the patient reported that he eventually would like to stop smoking entirely.  -Obtain PFTs    Care Time  A total of 80 minutes was spent on this patient encounter, which required an extensive review of complicated past medical history/provider notes and evaluation of an acutely decompensated patient with complex medical problems.    Follow Up  1 month     Cardiac Studies and Procedures:   Echocardiography  TTE (1/21/2023)  The left ventricular ejection fraction is visually estimated to be 45%.  Global hypokinesis with regional variation.  Known bioprosthetic aortic valve that is functioning normally with normal transvalvular gradients.    TTE (12/5/2022)  1. The left ventricle is normal in size with low-normal systolic function and an ejection fraction of 50-55% by Abreu's biplane. No distinct wall motion abnormalities noted. Global peak strain of -12.0%. Moderate concentric left ventricular hypertrophy.  2.  The left atrium is mildly dilated with a LA volume index of 36 mL/m2. Right atrium is mildly dilated with an area of 24 cm2.   3. The  "aortic valve is severely sclerotic. It is difficult to visualize aortic valve leaflets. moderate aortic stenosis with an AV max of 3.43 m/s, peak gradient 47 mmHg, mean gradient 29 mmHg, POPPY 1.0 cm2, and velocity ratio 0.27. Mild regurgitation.  4. Mild mitral regurgitation.  5. The ascending aorta is moderately dilated at 4.7 cm.   6. Right ventricle is mildly dilated with preserved systolic function. RVSP is mildly elevated at 44 mmHg with a RAP of 8 mmHg.    Stress Testing  Nuclear MPI (12/6/2022)  1. Diaphragmatic attenuation seen. Images are patchy. 2. Small area of basal inferior ischemia.  3. Mildly depressed left ventricular systolic function with a calculated ejection fraction of 46% with global hypokinesis.  4. No chest pain and no ischemic EKG changes seen with pharmacologic stress.     Coronary Angiography/Cardiac Catheterization  CAG (12/7/2020)  1. Left main trunk: Normal.  2. Left anterior descending: normal other than 20% plaque in the mid third  3. Left circumflex: Supplies a large obtuse marginal branch which is normal  4. Right coronary artery: Dominant but relatively small after the posterior descending. 20% ostial narrowing.    CT/MRI  CT CAP (3/28/2023)  1.  No aortic aneurysm or dissection identified.  2.  Post aortic valve and ascending thoracic aorta repair.  3.  Mild calcific atherosclerosis.  4.  Cardiomegaly is again present.  5.  Consolidation and volume loss left lower lobe lingular segment left upper lobe again noted, less prominent than on prior CT.  6.  No new infiltrates or consolidations.    Cardiac Surgery  AVR with ascending aortic repair (1/5/2023)  AVR with a 27 mm Inspiris tissue valve  Ascending aortic aneurysm repair with a 32 mm Hemashield graft     Vital Signs:   /62 (BP Location: Left arm, Patient Position: Sitting)   Pulse 60   Resp 16   Ht 1.753 m (5' 9\")   Wt 87.1 kg (192 lb)   SpO2 93%    BP Readings from Last 4 Encounters:   04/03/23 114/62   03/28/23 " 136/79   02/03/23 122/65   01/11/23 115/74     Wt Readings from Last 4 Encounters:   04/03/23 87.1 kg (192 lb)   03/28/23 86.2 kg (190 lb)   02/02/23 91.7 kg (202 lb 2.6 oz)   01/11/23 93.2 kg (205 lb 7.5 oz)     Body mass index is 28.35 kg/m².     Laboratories:   Addendum for [facility] labs (3/14/2023)  Glucose 122  A1c 5.4  Creatinine 1 0.26  K 4.5  TSH 2.4  Total cholesterol 113  TG 88  HDL 29  LDL 66  WBC 8.7  Hgb 10.5    INR 1.0    Lipids  No results found for: LDL    No results found for: HDL    Lab Results   Component Value Date/Time    TRIGLYCERIDE 89 01/31/2023 02:55 AM       No results found for: CHOLSTRLTOT    No components found for: LPA      Chemistries  Lab Results   Component Value Date/Time    CREATININE 1.23 03/28/2023 04:51 PM    CREATININE 1.3 (H) 03/05/2023 04:17 AM    CREATININE 1.4 (H) 03/03/2023 04:27 AM    CREATININE 1.4 (H) 03/02/2023 04:34 AM    CREATININE 1.1 02/27/2023 04:25 AM    CREATININE 1.5 (H) 02/26/2023 04:44 AM    CREATININE 1.30 02/03/2023 01:00 AM    CREATININE 1.23 02/02/2023 11:51 AM    CREATININE 1.11 02/02/2023 04:00 AM    CREATININE 1.64 (H) 02/01/2023 05:25 AM     Lab Results   Component Value Date/Time    BUN 21 03/28/2023 04:51 PM    BUN 15.0 03/05/2023 04:17 AM    BUN 18.0 03/03/2023 04:27 AM    BUN 17.0 03/02/2023 04:34 AM    BUN 17.0 02/27/2023 04:25 AM    BUN 29.0 (H) 02/26/2023 04:44 AM    BUN 17 02/03/2023 01:00 AM    BUN 14 02/02/2023 11:51 AM    BUN 15 02/02/2023 04:00 AM    BUN 22 02/01/2023 05:25 AM     Lab Results   Component Value Date/Time    POTASSIUM 5.1 03/28/2023 04:51 PM    POTASSIUM 4.7 03/05/2023 04:17 AM    POTASSIUM 4.6 03/03/2023 04:27 AM    POTASSIUM 4.5 03/02/2023 04:34 AM    POTASSIUM 4.5 02/03/2023 01:00 AM    POTASSIUM 4.1 02/02/2023 11:51 AM     Lab Results   Component Value Date/Time    SODIUM 136 03/28/2023 04:51 PM    SODIUM 141 03/05/2023 04:17 AM    SODIUM 141 03/03/2023 04:27 AM    SODIUM 138 03/02/2023 04:34 AM    SODIUM 139  02/03/2023 01:00 AM    SODIUM 136 02/02/2023 11:51 AM     Lab Results   Component Value Date/Time    GLUCOSE 96 03/28/2023 04:51 PM    GLUCOSE 78 03/05/2023 04:17 AM    GLUCOSE 75 03/03/2023 04:27 AM    GLUCOSE 81 03/02/2023 04:34 AM    GLUCOSE 95 02/03/2023 01:00 AM    GLUCOSE 81 02/02/2023 11:51 AM     Lab Results   Component Value Date/Time    ASTSGOT 30 03/28/2023 04:51 PM    ASTSGOT 16 02/26/2023 04:44 AM    ASTSGOT 21 02/23/2023 04:38 PM    ASTSGOT 18 02/20/2023 04:53 AM    ASTSGOT 44 02/02/2023 04:00 AM    ASTSGOT 197 (H) 01/30/2023 03:45 AM     Lab Results   Component Value Date/Time    ALTSGPT 31 03/28/2023 04:51 PM    ALTSGPT 17 02/26/2023 04:44 AM    ALTSGPT 25 02/23/2023 04:38 PM    ALTSGPT 26 02/20/2023 04:53 AM    ALTSGPT 119 (H) 02/02/2023 04:00 AM    ALTSGPT 249 (H) 01/30/2023 03:45 AM     Lab Results   Component Value Date/Time    ALKPHOSPHAT 72 03/28/2023 04:51 PM    ALKPHOSPHAT 43 02/26/2023 04:44 AM    ALKPHOSPHAT 52 02/23/2023 04:38 PM    ALKPHOSPHAT 51 02/20/2023 04:53 AM    ALKPHOSPHAT 53 02/02/2023 04:00 AM    ALKPHOSPHAT 59 01/30/2023 03:45 AM     Lab Results   Component Value Date/Time    HBA1C 5.4 02/04/2023 04:32 PM    HBA1C 6.2 (H) 01/04/2023 08:44 AM     No results found for: TSH  Lab Results   Component Value Date/Time    NTPROBNP 1033 (H) 02/02/2023 04:00 AM    NTPROBNP 1489 (H) 01/20/2023 04:22 PM     Lab Results   Component Value Date/Time    TROPONINT 17 03/28/2023 04:51 PM    TROPONINT 56 (H) 01/25/2023 12:36 PM    TROPONINT 87 (H) 01/20/2023 06:52 PM       Blood Counts  Lab Results   Component Value Date/Time    HEMOGLOBIN 11.3 (L) 03/28/2023 04:51 PM    HEMOGLOBIN 9.6 (L) 03/03/2023 04:27 AM    HEMOGLOBIN 10.1 (L) 03/02/2023 04:17 AM    HEMOGLOBIN 9.1 (L) 02/27/2023 04:25 AM    HEMOGLOBIN 9.2 (L) 02/03/2023 01:00 AM    HEMOGLOBIN 9.5 (L) 02/02/2023 11:51 AM     Lab Results   Component Value Date/Time    PLATELETCT 423 03/28/2023 04:51 PM    PLATELETCT 267 03/03/2023 04:27 AM     PLATELETCT 300 03/02/2023 04:17 AM    PLATELETCT 288 02/27/2023 04:25 AM    PLATELETCT 479 (H) 02/03/2023 01:00 AM    PLATELETCT 480 (H) 02/02/2023 11:51 AM     Lab Results   Component Value Date/Time    WBC 12.9 (H) 03/28/2023 04:51 PM    WBC 4.80 03/03/2023 04:27 AM    WBC 6.00 03/02/2023 04:17 AM    WBC 9.00 02/27/2023 04:25 AM    WBC 12.9 (H) 02/03/2023 01:00 AM    WBC 10.8 02/02/2023 11:51 AM        Physical Examination:   General: Well-appearing, no acute distress  Eyes: Extraocular movements intact, anicteric  Neck: Full range of motion, JVP visible at clavicular notch  Pulmonary: Normal respiratory effort, no distress  Cardiovascular: Regular rate, regular rhythm  Extremities: Warm and well perfused, 1+ bilateral lower extremity edema  Neurological: Alert and oriented, no gross focal motor deficits  Psychiatric: Normal affect, normal judgment     Past History:   Past Medical History  The patient's past medical history was reviewed.  See HPI and self-reported patient medical history form for pertinent medical history to consultation.    Past Social History  The patient's social history was reviewed.  See HPI self-reported patient medical history form for pertinent social history to consultation.    Past Family History  The patient's family history was reviewed.  See HPI self-reported patient medical history form for pertinent family history to consultation.    Review of Systems  A pertinent cardiac review of systems was performed and was otherwise unremarkable except as per HPI and self-reported patient medical history form.        Victor Manuel Chaney MD, [facility]  Interventional Cardiology  [facility], [address]  [address] [address]  Phone: [phone]  Fax: [phone]

## 2023-04-04 PROBLEM — Z72.0 TOBACCO ABUSE: Status: ACTIVE | Noted: 2023-04-04

## 2023-04-04 PROBLEM — Z86.79 STATUS POST ASCENDING AORTIC ANEURYSM REPAIR: Status: ACTIVE | Noted: 2017-09-19

## 2023-04-04 PROBLEM — N17.9 ACUTE RENAL FAILURE (ARF) (HCC): Status: RESOLVED | Noted: 2023-01-06 | Resolved: 2023-04-04

## 2023-04-04 PROBLEM — J18.9 PNEUMONIA DUE TO INFECTIOUS ORGANISM: Status: RESOLVED | Noted: 2023-01-20 | Resolved: 2023-04-04

## 2023-04-04 PROBLEM — L27.0 DRUG RASH: Status: RESOLVED | Noted: 2023-01-29 | Resolved: 2023-04-04

## 2023-04-04 PROBLEM — G93.40 ACUTE ENCEPHALOPATHY: Status: RESOLVED | Noted: 2023-01-29 | Resolved: 2023-04-04

## 2023-04-04 PROBLEM — Z98.890 STATUS POST INCISION AND DRAINAGE: Status: RESOLVED | Noted: 2017-09-19 | Resolved: 2023-04-04

## 2023-04-04 PROBLEM — D68.69 SECONDARY HYPERCOAGULABLE STATE (HCC): Status: ACTIVE | Noted: 2023-04-04

## 2023-04-04 PROBLEM — I25.10 NONOBSTRUCTIVE ATHEROSCLEROSIS OF CORONARY ARTERY: Status: ACTIVE | Noted: 2023-04-04

## 2023-04-04 PROBLEM — I48.92 PAROXYSMAL ATRIAL FLUTTER (HCC): Status: ACTIVE | Noted: 2023-04-04

## 2023-04-04 PROBLEM — D62 ACUTE BLOOD LOSS AS CAUSE OF POSTOPERATIVE ANEMIA: Status: RESOLVED | Noted: 2023-01-05 | Resolved: 2023-04-04

## 2023-04-05 ENCOUNTER — TELEPHONE (OUTPATIENT)
Dept: CARDIOLOGY | Facility: MEDICAL CENTER | Age: 63
End: 2023-04-05
Payer: MEDICAID

## 2023-04-05 NOTE — TELEPHONE ENCOUNTER
PA approved through 4/4/2024.    JUSTIN TOPETE Key: [id number] - PA Case ID: [id number] - Rx #: [id number] help? Call us at [phone]  Outcome  Approvedtoday  PA Case: [id number], Status: Approved, Coverage Starts on: 4/5/2023 12:00:00 AM, Coverage Ends on: 4/4/2024 12:00:00 AM.  Drug  Jardiance 10MG tablets  Form  [employer] Electronic PA Form (2017 NCPDP)  Original Claim Info  76,75

## 2023-04-05 NOTE — TELEPHONE ENCOUNTER
PA started    JUSTIN TOPETE Key: [id number] - Rx #: [id number] help? Call us at [phone]  Status  New(Not sent to plan)  Drug  Jardiance 10MG tablets  Form  [employer] Electronic PA Form (2017 NCPDP)  Original Claim Info  76,75   I will START or STAY ON the medications listed below when I get home from the hospital:    acetaminophen 325 mg oral tablet  -- 975 milligram(s) by mouth every 6 hours  -- Indication: For for moderate pain, postpartum    Viread 300 mg oral tablet  -- 1 tab(s) by mouth once a day MDD:1  -- Check with your doctor before becoming pregnant.  It is very important that you take or use this exactly as directed.  Do not skip doses or discontinue unless directed by your doctor.  Obtain medical advice before taking any non-prescription drugs as some may affect the action of this medication.    -- Indication: For for hepatitis B    Prenatal 1 Plus 1 oral tablet  -- 1 tab(s) by mouth once a day  -- Indication: For continue daily

## 2023-04-05 NOTE — TELEPHONE ENCOUNTER
PA sent to lavern TOPETE Key: [id number] - PA Case ID: [id number] - Rx #: [id number] help? Call us at [phone]  Status  Sent to [facility]  Drug  Jardiance 10MG tablets  Form  [employer] Electronic PA Form (2017 NCPDP)  Original Claim Info  76,75

## 2023-04-14 ENCOUNTER — TELEPHONE (OUTPATIENT)
Dept: HEALTH INFORMATION MANAGEMENT | Facility: OTHER | Age: 63
End: 2023-04-14
Payer: MEDICAID

## 2023-05-02 ENCOUNTER — TELEPHONE (OUTPATIENT)
Dept: CARDIOLOGY | Facility: MEDICAL CENTER | Age: 63
End: 2023-05-02
Payer: MEDICAID

## 2023-05-02 NOTE — TELEPHONE ENCOUNTER
To BN, please review pt request below and advise, thank you!    ============================    Upon chart review echo scheduled 6/1/2023 & BMP not completed at this time.  FV scheduled 5/10/23 with EP clinic.

## 2023-05-02 NOTE — TELEPHONE ENCOUNTER
BN      **forgot to attach RX in previous encounter**      amiodarone (CORDARONE) 200 MG Tab [[id number]]  Take 1 Tablet by mouth every day.      Thank You  Lisa CERON

## 2023-05-02 NOTE — TELEPHONE ENCOUNTER
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 PT would like to be notified when paperwork is available for him to .     Callback Number: [phone]    Thank You  Lisa CERON

## 2023-05-02 NOTE — TELEPHONE ENCOUNTER
BN    Caller: Noe Hickey    Topic/issue: PT is experiencing nausea and throwing up everyday. PCP told PT he should contact his Cardiology provider and inquire about an alternative RX or discontinuing it all together . PCP is suggesting the RX could be causing ulcers and was told it is a harsh RX. Please call back to advise.     Callback Number: [phone]    Thank You  Lisa CERON

## 2023-05-03 NOTE — TELEPHONE ENCOUNTER
Other (Needing a return to work letter)  Victor Manuel Chaney M.D.  You 1 hour ago (4:50 PM)     He was supposed to obtain a BMP and follow-up with me in 1 month after our last visit.     Based on his last appointment, I am not comfortable with providing him a full work release.  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        Returned pt call, [phone], and reviewed MD recommendations.  unable to reach.  Left voicemail to return this call at their earliest convenience.

## 2023-05-05 RX ORDER — AMIODARONE HYDROCHLORIDE 100 MG/1
100 TABLET ORAL DAILY
COMMUNITY
End: 2023-05-10

## 2023-05-05 RX ORDER — AMIODARONE HYDROCHLORIDE 200 MG/1
100 TABLET ORAL DAILY
COMMUNITY
End: 2023-05-05 | Stop reason: CLARIF

## 2023-05-05 NOTE — TELEPHONE ENCOUNTER
To BN, pt states since taking amiodarone he's experienced nausea and vomiting daily.  FV scheduled with EP clinic 5/8/2023.  Please advise, thank you!    ===================    2nd attempt to call pt, [phone], and reviewed BN recommendations.  Noted FV scheduled with EP 5/10 and echo scheduled 5/8.  Advised to keep FV schedule.  Offered pt to be scheduled with BN 5/16/2023 and advised to contact our office to cancel if needed based on EP evaluation, he accepts. Pt scheduled for FV.  Noe verbalizes understanding and states no other concerns or questions at this time.  Pt is appreciative of information given.

## 2023-05-06 NOTE — TELEPHONE ENCOUNTER
Other (Needing a return to work letter)    Victor Manuel Chaney M.D.  You 2 hours ago (3:11 PM)     Can you have him take a half-dose of his amio until he sees EP on 5/10?     Thanks.      Returned pt call, [phone], and reviewed MD recommendations.  He verbalizes understanding and states he will proceed with recommendations.  Medication list updated.  Answered all questions answered.  He states no other concerns or questions at this time and is appreciative of information given.

## 2023-05-08 ENCOUNTER — HOSPITAL ENCOUNTER (OUTPATIENT)
Dept: CARDIOLOGY | Facility: MEDICAL CENTER | Age: 63
End: 2023-05-08
Attending: INTERNAL MEDICINE
Payer: MEDICAID

## 2023-05-08 DIAGNOSIS — I50.23 ACUTE ON CHRONIC SYSTOLIC HEART FAILURE (HCC): ICD-10-CM

## 2023-05-08 PROCEDURE — 93306 TTE W/DOPPLER COMPLETE: CPT

## 2023-05-09 LAB
LV EJECT FRACT  99904: 60
LV EJECT FRACT MOD 2C 99903: 65.32
LV EJECT FRACT MOD 4C 99902: 59.49
LV EJECT FRACT MOD BP 99901: 62.5

## 2023-05-09 PROCEDURE — 93306 TTE W/DOPPLER COMPLETE: CPT | Mod: 26 | Performed by: INTERNAL MEDICINE

## 2023-05-10 ENCOUNTER — OFFICE VISIT (OUTPATIENT)
Dept: CARDIOLOGY | Facility: MEDICAL CENTER | Age: 63
End: 2023-05-10
Attending: INTERNAL MEDICINE
Payer: MEDICAID

## 2023-05-10 ENCOUNTER — TELEPHONE (OUTPATIENT)
Dept: CARDIOLOGY | Facility: MEDICAL CENTER | Age: 63
End: 2023-05-10

## 2023-05-10 VITALS
OXYGEN SATURATION: 95 % | HEART RATE: 67 BPM | SYSTOLIC BLOOD PRESSURE: 134 MMHG | DIASTOLIC BLOOD PRESSURE: 82 MMHG | RESPIRATION RATE: 20 BRPM | HEIGHT: 69 IN | WEIGHT: 186 LBS | BODY MASS INDEX: 27.55 KG/M2

## 2023-05-10 DIAGNOSIS — I48.0 PAROXYSMAL ATRIAL FIBRILLATION (HCC): ICD-10-CM

## 2023-05-10 PROCEDURE — 99245 OFF/OP CONSLTJ NEW/EST HI 55: CPT | Performed by: INTERNAL MEDICINE

## 2023-05-10 ASSESSMENT — FIBROSIS 4 INDEX: FIB4 SCORE: 0.8

## 2023-05-10 NOTE — TELEPHONE ENCOUNTER
Patient scheduled for afib ablation on 7-25-23 with Dr. Webster. Patient has been instructed to check in at 11:00 for 1:00 case time. Message sent to authorizations. Emailed [facility].

## 2023-05-10 NOTE — PROGRESS NOTES
Arrhythmia Clinic Note (New patient)     DOS: 5/10/2023    Referring physician: Dr Chaney    Chief complaint/Reason for consult: Afib, flutter    HPI: 63 y/o M with bicuspid aortic valve and atrial flutter and fibrillation. Per review of Dr Chaney's records he presented to [facility] in Dec 2022 with rapid atrial flutter and fibrillation, found to have severe aortic stenosis with dilated ascending aorta. Unclear if he was cardioverted at [facility] but at some point he was placed on amiodarone. He was taken for AVR and aortic aneurysm repair on 1/5/23. At this point he was in sinus rhythm. On 1/6 he developed atrial fibrillation with controlled rate. He was back in sinus intermittently. At some point he had an episodes of Torsades, it was not well documented what occurred in this setting. He was continued on amiodarone. His post operative course was complex and he was treated for systolic heart failure which has since recovered. He is currently complaining of chronic daily vomiting, not a new issue. He stopped taking medications 2 days ago. He otherwise was compliant with medications. He is referred to EP to discuss rhythm control of afib/flutter. No palpitations currently.     ROS (+ highlighted in bold):  Constitutional: Fevers/chills/fatigue/weightloss  HEENT: Blurry vision/eye pain/sore throat/hearing loss  Respiratory: Shortness of breath/cough  Cardiovascular: Chest pain/palpitations/edema/orthopnea/syncope  GI: Nausea/vomitting/diarrhea  MSK: Arthralgias/myagias/muscle weakness  Skin: Rash/sores  Neurological: Numbness/tremors/vertigo  Endocrine: Excessive thirst/polyuria/cold intolerance/heat intolerance  Psych: Depression/anxiety    Past Medical History:   Diagnosis Date    Arrhythmia     Breath shortness     Cyclic vomiting syndrome     Elevated hemidiaphragm - LEFT post AVR 1/4/2023    Fracture     Headache, classical migraine     Heart burn     Heart valve disease     Indigestion     Pneumonia due to  infectious organism 2023    Snoring        Past Surgical History:   Procedure Laterality Date    AORTIC VALVE REPLACEMENT  2023    Procedure: AORTIC VALVE REPLACEMENT, ASCENDING AORTIC ANEURYSM REPAIR AND TRANSESOPHAGEAL ECHOCARDIOGRAM.;  Surgeon: Serena Goyal M.D.;  Location: SURGERY [facility];  Service: Cardiac    AORTIC ASCENDING DISSECTION  2023    Procedure: REPAIR, ANEURYSM OR DISSECTION, AORTA, ASCENDING;  Surgeon: Serena Goyal M.D.;  Location: SURGERY [facility];  Service: Cardiac    ECHOCARDIOGRAM, TRANSESOPHAGEAL, INTRAOPERATIVE  2023    Procedure: ECHOCARDIOGRAM, TRANSESOPHAGEAL, INTRAOPERATIVE.;  Surgeon: Serena Goyal M.D.;  Location: SURGERY [facility];  Service: Cardiac    IRRIGATION & DEBRIDEMENT ORTHO Right 2017    Procedure: IRRIGATION & DEBRIDEMENT ORTHO-THIGH;  Surgeon: Corbin Rivera M.D.;  Location: SURGERY [facility];  Service: Orthopedics    SHOULDER HEMICAP RESURFACING Right 10/12/2015    Procedure: RIGHT SHOULDER RESURFACING;  Surgeon: Javon Doll M.D.;  Location: SURGERY [facility];  Service:     SHOULDER ARTHROSCOPY      x3    SHOULDER SURGERY      replacement right       Social History     Socioeconomic History    Marital status:      Spouse name: Not on file    Number of children: Not on file    Years of education: Not on file    Highest education level: Not on file   Occupational History    Not on file   Tobacco Use    Smoking status: Every Day     Packs/day: 0.50     Years: 40.00     Pack years: 20.00     Types: Cigarettes     Last attempt to quit: 1/3/2023     Years since quittin.3    Smokeless tobacco: Never   Vaping Use    Vaping Use: Never used   Substance and Sexual Activity    Alcohol use: No    Drug use: Not Currently     Comment: past problems with narcotics     Sexual activity: Not on file   Other Topics Concern    Not on file   Social History Narrative    Not on file     Social Determinants of Health     Financial  Resource Strain: Not on file   Food Insecurity: Not on file   Transportation Needs: Not on file   Physical Activity: Not on file   Stress: Not on file   Social Connections: Not on file   Intimate Partner Violence: Not on file   Housing Stability: Not on file       History reviewed. No pertinent family history.    Allergies   Allergen Reactions    Morphine Rash     Rash/ difficulty breathing    Ampicillin-Sulbactam Sodium      Rash to upper torso - appears to be related to Unasyn new start (Jan 2023)       Current Outpatient Medications   Medication Sig Dispense Refill    furosemide (LASIX) 20 MG Tab Take 20 mg by mouth every day. (Patient not taking: Reported on 5/10/2023)      pantoprazole (PROTONIX) 40 MG Tablet Delayed Response Take 40 mg by mouth every day. (Patient not taking: Reported on 5/10/2023)      ferrous sulfate 325 (65 Fe) MG tablet Take 325 mg by mouth every day. (Patient not taking: Reported on 5/10/2023)      nitroGLYCERIN (NITROSTAT) 0.3 MG SL tablet Place 0.3 mg under the tongue every 5 minutes as needed for Chest Pain. (Patient not taking: Reported on 5/10/2023)      ondansetron (ZOFRAN ODT) 4 MG TABLET DISPERSIBLE Take 4 mg by mouth every 6 hours as needed for Nausea/Vomiting. (Patient not taking: Reported on 5/10/2023)      metoprolol SR (TOPROL XL) 50 MG TABLET SR 24 HR Take 1 Tablet by mouth every day. (Patient not taking: Reported on 5/10/2023) 90 Tablet 3    rivaroxaban (XARELTO) 20 MG Tab tablet Take 1 Tablet by mouth with dinner. (Patient not taking: Reported on 5/10/2023) 90 Tablet 3    Empagliflozin (JARDIANCE) 10 MG Tab tablet Take 1 Tablet by mouth every day. (Patient not taking: Reported on 5/10/2023) 90 Tablet 3    QUEtiapine (SEROQUEL) 25 MG Tab Take 1 Tablet by mouth 2 times a day. (Patient not taking: Reported on 5/10/2023) 60 Tablet 3    warfarin (COUMADIN) 2.5 MG Tab Take 2 tablets (5 mg) every evening for 2 days (on 2/3 & 2/4) then resume one tablet (2.5 mg) every evening on  "2/5 unless otherwise directed by anticoagulation clinic. (Patient not taking: Reported on 5/10/2023) 35 Tablet 0     No current facility-administered medications for this visit.       Physical Exam:  Vitals:    05/10/23 0759   BP: 134/82   BP Location: Left arm   Patient Position: Sitting   BP Cuff Size: Adult   Pulse: 67   Resp: 20   SpO2: 95%   Weight: 84.4 kg (186 lb)   Height: 1.753 m (5' 9\")     General appearance: NAD, conversant   Eyes: anicteric sclerae, moist conjunctivae; no lid-lag; PERRLA  HENT: Atraumatic; oropharynx clear with moist mucous membranes and no mucosal ulcerations; normal hard and soft palate  Neck: Trachea midline; FROM, supple, no thyromegaly or lymphadenopathy  Lungs: CTA, with normal respiratory effort and no intercostal retractions  CV: RRR, no MRGs, no JVD   Abdomen: Soft, non-tender; no masses or HSM  Extremities: No peripheral edema or extremity lymphadenopathy  Skin: Normal temperature, turgor and texture; no rash, ulcers or subcutaneous nodules  Psych: Appropriate affect, alert and oriented to person, place and time    Data:  Lipids:   Lab Results   Component Value Date/Time    TRIGLYCERIDE 89 01/31/2023 02:55 AM        BMP:  Lab Results   Component Value Date/Time    SODIUM 136 03/28/2023 1651    POTASSIUM 5.1 03/28/2023 1651    CHLORIDE 99 03/28/2023 1651    CO2 27 03/28/2023 1651    GLUCOSE 96 03/28/2023 1651    BUN 21 03/28/2023 1651    CREATININE 1.23 03/28/2023 1651    CALCIUM 8.9 03/28/2023 1651    ANION 10.0 03/28/2023 1651        TSH:   Lab Results   Component Value Date/Time    TSHULTRASEN 6.010 (H) 02/02/2023 0400        THYROXINE (T4):   No results found for: SERA     CBC:   Lab Results   Component Value Date/Time    WBC 12.9 (H) 03/28/2023 04:51 PM    RBC 5.54 03/28/2023 04:51 PM    HEMOGLOBIN 11.3 (L) 03/28/2023 04:51 PM    HEMATOCRIT 40.5 (L) 03/28/2023 04:51 PM    MCV 73.1 (L) 03/28/2023 04:51 PM    MCH 20.4 (L) 03/28/2023 04:51 PM    MCHC 27.9 (L) 03/28/2023 " 04:51 PM    RDW 58.5 (H) 03/28/2023 04:51 PM    PLATELETCT 423 03/28/2023 04:51 PM    MPV 10.6 03/28/2023 04:51 PM    NEUTSPOLYS 79.40 (H) 03/28/2023 04:51 PM    LYMPHOCYTES 8.30 (L) 03/28/2023 04:51 PM    MONOCYTES 9.70 03/28/2023 04:51 PM    EOSINOPHILS 1.70 03/28/2023 04:51 PM    BASOPHILS 0.40 03/28/2023 04:51 PM    IMMGRAN 0.50 03/28/2023 04:51 PM    NRBC 0.00 03/28/2023 04:51 PM    NEUTS 10.21 (H) 03/28/2023 04:51 PM    LYMPHS 1.07 03/28/2023 04:51 PM    MONOS 1.25 (H) 03/28/2023 04:51 PM    EOS 0.22 03/28/2023 04:51 PM    BASO 0.05 03/28/2023 04:51 PM    IMMGRANAB 0.06 03/28/2023 04:51 PM    NRBCAB 0.00 03/28/2023 04:51 PM        CBC w/o DIFF  Lab Results   Component Value Date/Time    WBC 12.9 (H) 03/28/2023 04:51 PM    RBC 5.54 03/28/2023 04:51 PM    HEMOGLOBIN 11.3 (L) 03/28/2023 04:51 PM    MCV 73.1 (L) 03/28/2023 04:51 PM    MCH 20.4 (L) 03/28/2023 04:51 PM    MCHC 27.9 (L) 03/28/2023 04:51 PM    RDW 58.5 (H) 03/28/2023 04:51 PM    MPV 10.6 03/28/2023 04:51 PM       Prior echo/stress results reviewed: Normal EF, compared to prior echo, EF improved    Prior cath results reviewed: non-obstructive CAD per report    EKG interpreted by me: SR    Impression/Plan:  1. Paroxysmal atrial fibrillation (HCC)  EKG        pAF  AFL  AS s/p AVR  High risk medication use    - He would like to stop amiodarone at this point, I agree with this  - He would like to be proactive about rhythm management. Given his presentation in December with heart failure and AFL with RVR, and given pAF requiring amiodarone beyond the expected post-op AVR course, we discussed alternative AAD vs PVI/CTI ablation. We discussed pulmonary vein isolation for therapeutic management and continued rhythm control.  We discussed the risks and benefits of this procedure.  Risks include 1-3% risk of major cardiovascular event including stroke, myocardial infarction, phrenic nerve damage, esophageal injury and/or fistula formation, cardiac perforation,  pericardial effusion, tamponade, major bleeding, or death.  I quoted a 70 to 80% chance free of atrial fibrillation at 12 months.  We discussed that he may also need a second procedure.    We will proceed with scheduling ablation, PVI and CTI. He will need to remain on OAC at minimum 2 months following ablation.      Alex Webster MD  Cardiac Electrophysiology

## 2023-05-10 NOTE — TELEPHONE ENCOUNTER
----- Message from Alex Webster M.D. sent at 5/10/2023  8:51 AM PDT -----  Please schedule afib ablation, no meds to hold, thanks

## 2023-05-10 NOTE — TELEPHONE ENCOUNTER
Caller: Noe Hickey    Topic/issue: Patient is calling back after office visit with  requesting a letter to return to work on 6/6/23. Patient is also requesting a list of his current medications that states what medication he takes daily and what to take as needed. Please advise.     Callback Number: [phone] (home)     Thank you,   Sheron SMITH

## 2023-05-11 NOTE — TELEPHONE ENCOUNTER
MC: Is pt cleared to return to work 6/6/23? Thx!    Letter drafted w/current meds prescribed by cardiology and emailed to emailprovided by pt and listed in chart.

## 2023-05-12 ENCOUNTER — TELEPHONE (OUTPATIENT)
Dept: CARDIOLOGY | Facility: MEDICAL CENTER | Age: 63
End: 2023-05-12
Payer: MEDICAID

## 2023-05-12 NOTE — TELEPHONE ENCOUNTER
----- Message from Debbie Izaguirre R.N. sent at 5/12/2023  8:33 AM PDT -----  Isauro Sloan,    Last seen by , thank you!    ----- Message -----  From: Victor Manuel Chaney M.D.  Sent: 5/11/2023   6:27 PM PDT  To: Debbie Izaguirre R.N.    His echo looked good, can you make sure he gets his labs done before his appointment with me on 5/16.  If he does them at an outside laboratory, we need copies of those.

## 2023-05-12 NOTE — TELEPHONE ENCOUNTER
Letter drafted and sent to pts email detailing able to return to work per MC.     Alex Webster M.D.  You 14 hours ago (5:53 PM)     Yes that's fine

## 2023-05-13 ENCOUNTER — OFFICE VISIT (OUTPATIENT)
Dept: URGENT CARE | Facility: CLINIC | Age: 63
End: 2023-05-13
Payer: MEDICAID

## 2023-05-13 ENCOUNTER — APPOINTMENT (OUTPATIENT)
Dept: RADIOLOGY | Facility: MEDICAL CENTER | Age: 63
End: 2023-05-13
Attending: EMERGENCY MEDICINE
Payer: MEDICAID

## 2023-05-13 ENCOUNTER — HOSPITAL ENCOUNTER (EMERGENCY)
Facility: MEDICAL CENTER | Age: 63
End: 2023-05-14
Attending: EMERGENCY MEDICINE
Payer: MEDICAID

## 2023-05-13 VITALS
TEMPERATURE: 98 F | SYSTOLIC BLOOD PRESSURE: 120 MMHG | DIASTOLIC BLOOD PRESSURE: 78 MMHG | BODY MASS INDEX: 25.98 KG/M2 | HEART RATE: 78 BPM | WEIGHT: 175.4 LBS | RESPIRATION RATE: 16 BRPM | OXYGEN SATURATION: 94 % | HEIGHT: 69 IN

## 2023-05-13 DIAGNOSIS — H92.02 OTALGIA OF LEFT EAR: ICD-10-CM

## 2023-05-13 DIAGNOSIS — H61.23 BILATERAL IMPACTED CERUMEN: ICD-10-CM

## 2023-05-13 DIAGNOSIS — F43.21 SITUATIONAL DEPRESSION: ICD-10-CM

## 2023-05-13 DIAGNOSIS — R11.0 NAUSEA: ICD-10-CM

## 2023-05-13 DIAGNOSIS — F41.0 ANXIETY ATTACK: ICD-10-CM

## 2023-05-13 DIAGNOSIS — F41.1 GENERALIZED ANXIETY DISORDER: ICD-10-CM

## 2023-05-13 DIAGNOSIS — R07.9 CHEST PAIN, UNSPECIFIED TYPE: ICD-10-CM

## 2023-05-13 LAB — EKG IMPRESSION: NORMAL

## 2023-05-13 PROCEDURE — 80053 COMPREHEN METABOLIC PANEL: CPT

## 2023-05-13 PROCEDURE — 99213 OFFICE O/P EST LOW 20 MIN: CPT | Performed by: NURSE PRACTITIONER

## 2023-05-13 PROCEDURE — 85025 COMPLETE CBC W/AUTO DIFF WBC: CPT

## 2023-05-13 PROCEDURE — 85610 PROTHROMBIN TIME: CPT

## 2023-05-13 PROCEDURE — 93005 ELECTROCARDIOGRAM TRACING: CPT

## 2023-05-13 PROCEDURE — 93005 ELECTROCARDIOGRAM TRACING: CPT | Performed by: EMERGENCY MEDICINE

## 2023-05-13 PROCEDURE — 84484 ASSAY OF TROPONIN QUANT: CPT

## 2023-05-13 PROCEDURE — 36415 COLL VENOUS BLD VENIPUNCTURE: CPT

## 2023-05-13 PROCEDURE — 85730 THROMBOPLASTIN TIME PARTIAL: CPT

## 2023-05-13 PROCEDURE — 99285 EMERGENCY DEPT VISIT HI MDM: CPT

## 2023-05-13 ASSESSMENT — FIBROSIS 4 INDEX
FIB4 SCORE: 0.8
FIB4 SCORE: 0.8

## 2023-05-13 ASSESSMENT — ENCOUNTER SYMPTOMS
FEVER: 0
COUGH: 0
SORE THROAT: 0

## 2023-05-13 NOTE — PROGRESS NOTES
Subjective:     Noe Hickey is a 62 y.o. male who presents for Otalgia ((L) ear pain X 3 days has used OTC drops and peroxide and not helping.)      Plugged up with showering. Tried OTC drops and peroxide without relief. Hx of cerumen impaction. Denies current cold symptoms.    Otalgia   There is pain in the left ear. The current episode started in the past 7 days. Pertinent negatives include no coughing or sore throat.       Past Medical History:   Diagnosis Date    Arrhythmia     Breath shortness     Cyclic vomiting syndrome     Elevated hemidiaphragm - LEFT post AVR 1/4/2023    Fracture     Headache, classical migraine     Heart burn     Heart valve disease     Indigestion     Pneumonia due to infectious organism 1/20/2023    Snoring        Past Surgical History:   Procedure Laterality Date    AORTIC VALVE REPLACEMENT  1/5/2023    Procedure: AORTIC VALVE REPLACEMENT, ASCENDING AORTIC ANEURYSM REPAIR AND TRANSESOPHAGEAL ECHOCARDIOGRAM.;  Surgeon: Serena Goyal M.D.;  Location: [facility];  Service: Cardiac    AORTIC ASCENDING DISSECTION  1/5/2023    Procedure: REPAIR, ANEURYSM OR DISSECTION, AORTA, ASCENDING;  Surgeon: Serena Goyal M.D.;  Location: [facility];  Service: Cardiac    ECHOCARDIOGRAM, TRANSESOPHAGEAL, INTRAOPERATIVE  1/5/2023    Procedure: ECHOCARDIOGRAM, TRANSESOPHAGEAL, INTRAOPERATIVE.;  Surgeon: Serena Goyal M.D.;  Location: [facility];  Service: Cardiac    IRRIGATION & DEBRIDEMENT ORTHO Right 09/19/2017    Procedure: IRRIGATION & DEBRIDEMENT ORTHO-THIGH;  Surgeon: Corbin Rivera M.D.;  Location: [facility];  Service: Orthopedics    SHOULDER HEMICAP RESURFACING Right 10/12/2015    Procedure: RIGHT SHOULDER RESURFACING;  Surgeon: Javon Doll M.D.;  Location: [facility];  Service:     SHOULDER ARTHROSCOPY      x3    SHOULDER SURGERY      replacement right       Social History     Socioeconomic History    Marital status:  "     Spouse name: Not on file    Number of children: Not on file    Years of education: Not on file    Highest education level: Not on file   Occupational History    Not on file   Tobacco Use    Smoking status: Every Day     Packs/day: 0.50     Years: 40.00     Pack years: 20.00     Types: Cigarettes     Last attempt to quit: 1/3/2023     Years since quittin.3    Smokeless tobacco: Never   Vaping Use    Vaping Use: Never used   Substance and Sexual Activity    Alcohol use: No    Drug use: Not Currently     Comment: past problems with narcotics     Sexual activity: Not on file   Other Topics Concern    Not on file   Social History Narrative    Not on file     Social Determinants of Health     Financial Resource Strain: Not on file   Food Insecurity: Not on file   Transportation Needs: Not on file   Physical Activity: Not on file   Stress: Not on file   Social Connections: Not on file   Intimate Partner Violence: Not on file   Housing Stability: Not on file        No family history on file.     Allergies   Allergen Reactions    Morphine Rash     Rash/ difficulty breathing    Ampicillin-Sulbactam Sodium      Rash to upper torso - appears to be related to Unasyn new start (2023)       Review of Systems   Constitutional:  Negative for fever.   HENT:  Positive for ear pain and tinnitus. Negative for sore throat.    Respiratory:  Negative for cough.    Endo/Heme/Allergies:  Negative for environmental allergies.   All other systems reviewed and are negative.       Objective:   /78 (BP Location: Right arm, Patient Position: Sitting, BP Cuff Size: Adult)   Pulse 78   Temp 36.7 °C (98 °F) (Temporal)   Resp 16   Ht 1.753 m (5' 9\")   Wt 79.6 kg (175 lb 6.4 oz)   SpO2 94%   BMI 25.90 kg/m²     Physical Exam  Vitals reviewed.   Constitutional:       General: He is not in acute distress.     Appearance: He is not toxic-appearing.   HENT:      Right Ear: External ear normal. No drainage or tenderness. " There is impacted cerumen. Tympanic membrane is not perforated.      Left Ear: External ear normal. Tenderness present. No drainage. There is impacted cerumen. No mastoid tenderness. Tympanic membrane is not perforated.   Cardiovascular:      Rate and Rhythm: Normal rate.   Pulmonary:      Effort: Pulmonary effort is normal. No respiratory distress.   Skin:     General: Skin is warm and dry.   Neurological:      Mental Status: He is alert and oriented to person, place, and time.         Assessment/Plan:   1. Bilateral impacted cerumen    2. Otalgia of left ear  -Bilateral ear irrigation.     -Tylenol as directed for pain and/or fevers.   -Follow up with primary care provider.    Follow up for persistent or worsening symptoms, persistent fevers, ear drainage, severe headache or stiff neck, hearing changes, or any other concerns.    -Patient reports improvement in ear symptoms post irrigation. TM intact. No indication of otitis media.     Differential diagnosis, natural history, supportive care, and indications for immediate follow-up discussed.

## 2023-05-13 NOTE — PATIENT INSTRUCTIONS
-Tylenol as directed for pain and/or fevers.   -Follow up with primary care provider.    Follow up for persistent or worsening symptoms, persistent fevers, ear drainage, severe headache or stiff neck, hearing changes, or any other concerns.

## 2023-05-14 VITALS
BODY MASS INDEX: 25.92 KG/M2 | OXYGEN SATURATION: 91 % | DIASTOLIC BLOOD PRESSURE: 73 MMHG | WEIGHT: 175 LBS | SYSTOLIC BLOOD PRESSURE: 118 MMHG | HEART RATE: 60 BPM | HEIGHT: 69 IN | RESPIRATION RATE: 12 BRPM | TEMPERATURE: 97.8 F

## 2023-05-14 LAB
ALBUMIN SERPL BCP-MCNC: 4.1 G/DL (ref 3.2–4.9)
ALBUMIN/GLOB SERPL: 1.6 G/DL
ALP SERPL-CCNC: 59 U/L (ref 30–99)
ALT SERPL-CCNC: 18 U/L (ref 2–50)
ANION GAP SERPL CALC-SCNC: 13 MMOL/L (ref 7–16)
ANISOCYTOSIS BLD QL SMEAR: ABNORMAL
APTT PPP: 32.7 SEC (ref 24.7–36)
AST SERPL-CCNC: 16 U/L (ref 12–45)
BASOPHILS # BLD AUTO: 0.6 % (ref 0–1.8)
BASOPHILS # BLD: 0.07 K/UL (ref 0–0.12)
BILIRUB SERPL-MCNC: 0.2 MG/DL (ref 0.1–1.5)
BUN SERPL-MCNC: 27 MG/DL (ref 8–22)
CALCIUM ALBUM COR SERPL-MCNC: 9.4 MG/DL (ref 8.5–10.5)
CALCIUM SERPL-MCNC: 9.5 MG/DL (ref 8.5–10.5)
CHLORIDE SERPL-SCNC: 104 MMOL/L (ref 96–112)
CO2 SERPL-SCNC: 22 MMOL/L (ref 20–33)
COMMENT 1642: NORMAL
CREAT SERPL-MCNC: 1.04 MG/DL (ref 0.5–1.4)
EOSINOPHIL # BLD AUTO: 0.28 K/UL (ref 0–0.51)
EOSINOPHIL NFR BLD: 2.4 % (ref 0–6.9)
ERYTHROCYTE [DISTWIDTH] IN BLOOD BY AUTOMATED COUNT: 57 FL (ref 35.9–50)
GFR SERPLBLD CREATININE-BSD FMLA CKD-EPI: 80 ML/MIN/1.73 M 2
GLOBULIN SER CALC-MCNC: 2.6 G/DL (ref 1.9–3.5)
GLUCOSE SERPL-MCNC: 98 MG/DL (ref 65–99)
HCT VFR BLD AUTO: 51.8 % (ref 42–52)
HGB BLD-MCNC: 15.1 G/DL (ref 14–18)
IMM GRANULOCYTES # BLD AUTO: 0.08 K/UL (ref 0–0.11)
IMM GRANULOCYTES NFR BLD AUTO: 0.7 % (ref 0–0.9)
INR PPP: 1.48 (ref 0.87–1.13)
LYMPHOCYTES # BLD AUTO: 1.57 K/UL (ref 1–4.8)
LYMPHOCYTES NFR BLD: 13.5 % (ref 22–41)
MCH RBC QN AUTO: 21.2 PG (ref 27–33)
MCHC RBC AUTO-ENTMCNC: 29.2 G/DL (ref 33.7–35.3)
MCV RBC AUTO: 72.8 FL (ref 81.4–97.8)
MICROCYTES BLD QL SMEAR: ABNORMAL
MONOCYTES # BLD AUTO: 1.1 K/UL (ref 0–0.85)
MONOCYTES NFR BLD AUTO: 9.4 % (ref 0–13.4)
MORPHOLOGY BLD-IMP: NORMAL
NEUTROPHILS # BLD AUTO: 8.55 K/UL (ref 1.82–7.42)
NEUTROPHILS NFR BLD: 73.4 % (ref 44–72)
NRBC # BLD AUTO: 0 K/UL
NRBC BLD-RTO: 0 /100 WBC
PLATELET # BLD AUTO: 316 K/UL (ref 164–446)
PLATELET BLD QL SMEAR: NORMAL
POTASSIUM SERPL-SCNC: 4.5 MMOL/L (ref 3.6–5.5)
PROT SERPL-MCNC: 6.7 G/DL (ref 6–8.2)
PROTHROMBIN TIME: 17.7 SEC (ref 12–14.6)
RBC # BLD AUTO: 7.12 M/UL (ref 4.7–6.1)
RBC BLD AUTO: PRESENT
SODIUM SERPL-SCNC: 139 MMOL/L (ref 135–145)
TROPONIN T SERPL-MCNC: 22 NG/L (ref 6–19)
TROPONIN T SERPL-MCNC: 24 NG/L (ref 6–19)
WBC # BLD AUTO: 11.7 K/UL (ref 4.8–10.8)

## 2023-05-14 PROCEDURE — 96376 TX/PRO/DX INJ SAME DRUG ADON: CPT

## 2023-05-14 PROCEDURE — 84484 ASSAY OF TROPONIN QUANT: CPT

## 2023-05-14 PROCEDURE — 71045 X-RAY EXAM CHEST 1 VIEW: CPT

## 2023-05-14 PROCEDURE — A9270 NON-COVERED ITEM OR SERVICE: HCPCS | Mod: UD | Performed by: EMERGENCY MEDICINE

## 2023-05-14 PROCEDURE — 36415 COLL VENOUS BLD VENIPUNCTURE: CPT

## 2023-05-14 PROCEDURE — 700105 HCHG RX REV CODE 258: Mod: UD | Performed by: EMERGENCY MEDICINE

## 2023-05-14 PROCEDURE — 96374 THER/PROPH/DIAG INJ IV PUSH: CPT

## 2023-05-14 PROCEDURE — 96375 TX/PRO/DX INJ NEW DRUG ADDON: CPT

## 2023-05-14 PROCEDURE — 700102 HCHG RX REV CODE 250 W/ 637 OVERRIDE(OP): Mod: UD | Performed by: EMERGENCY MEDICINE

## 2023-05-14 PROCEDURE — 700111 HCHG RX REV CODE 636 W/ 250 OVERRIDE (IP): Mod: UD | Performed by: EMERGENCY MEDICINE

## 2023-05-14 RX ORDER — ONDANSETRON 4 MG/1
4 TABLET, ORALLY DISINTEGRATING ORAL EVERY 6 HOURS PRN
Qty: 10 TABLET | Refills: 0 | Status: SHIPPED | OUTPATIENT
Start: 2023-05-14 | End: 2023-05-15

## 2023-05-14 RX ORDER — LORAZEPAM 1 MG/1
1 TABLET ORAL EVERY 6 HOURS PRN
Qty: 20 TABLET | Refills: 0 | Status: SHIPPED | OUTPATIENT
Start: 2023-05-14 | End: 2023-05-19

## 2023-05-14 RX ORDER — ONDANSETRON 2 MG/ML
4 INJECTION INTRAMUSCULAR; INTRAVENOUS ONCE
Status: COMPLETED | OUTPATIENT
Start: 2023-05-14 | End: 2023-05-14

## 2023-05-14 RX ORDER — ONDANSETRON 2 MG/ML
4 INJECTION INTRAMUSCULAR; INTRAVENOUS ONCE
Status: DISCONTINUED | OUTPATIENT
Start: 2023-05-14 | End: 2023-05-14

## 2023-05-14 RX ORDER — LORAZEPAM 2 MG/ML
1 INJECTION INTRAMUSCULAR ONCE
Status: COMPLETED | OUTPATIENT
Start: 2023-05-14 | End: 2023-05-14

## 2023-05-14 RX ORDER — HYDROMORPHONE HYDROCHLORIDE 1 MG/ML
1 INJECTION, SOLUTION INTRAMUSCULAR; INTRAVENOUS; SUBCUTANEOUS ONCE
Status: COMPLETED | OUTPATIENT
Start: 2023-05-14 | End: 2023-05-14

## 2023-05-14 RX ORDER — OXYCODONE HYDROCHLORIDE AND ACETAMINOPHEN 5; 325 MG/1; MG/1
1 TABLET ORAL ONCE
Status: COMPLETED | OUTPATIENT
Start: 2023-05-14 | End: 2023-05-14

## 2023-05-14 RX ORDER — SODIUM CHLORIDE 9 MG/ML
INJECTION, SOLUTION INTRAVENOUS CONTINUOUS
Status: DISCONTINUED | OUTPATIENT
Start: 2023-05-14 | End: 2023-05-14 | Stop reason: HOSPADM

## 2023-05-14 RX ORDER — ASPIRIN 81 MG/1
324 TABLET, CHEWABLE ORAL ONCE
Status: COMPLETED | OUTPATIENT
Start: 2023-05-14 | End: 2023-05-14

## 2023-05-14 RX ADMIN — SODIUM CHLORIDE: 9 INJECTION, SOLUTION INTRAVENOUS at 00:35

## 2023-05-14 RX ADMIN — LORAZEPAM 1 MG: 2 INJECTION INTRAMUSCULAR; INTRAVENOUS at 00:33

## 2023-05-14 RX ADMIN — ONDANSETRON 4 MG: 2 INJECTION INTRAMUSCULAR; INTRAVENOUS at 00:33

## 2023-05-14 RX ADMIN — ONDANSETRON 4 MG: 2 INJECTION INTRAMUSCULAR; INTRAVENOUS at 04:38

## 2023-05-14 RX ADMIN — ASPIRIN 81 MG 324 MG: 81 TABLET ORAL at 00:32

## 2023-05-14 RX ADMIN — OXYCODONE AND ACETAMINOPHEN 1 TABLET: 5; 325 TABLET ORAL at 04:38

## 2023-05-14 RX ADMIN — HYDROMORPHONE HYDROCHLORIDE 1 MG: 1 INJECTION, SOLUTION INTRAMUSCULAR; INTRAVENOUS; SUBCUTANEOUS at 00:34

## 2023-05-14 ASSESSMENT — PAIN DESCRIPTION - PAIN TYPE: TYPE: ACUTE PAIN

## 2023-05-14 NOTE — ED PROVIDER NOTES
ER Provider Note    Scribed for Dr. Yamilka Chaney D.O. by Rachid Roland. 5/13/2023  11:59 PM    Primary Care Provider: EFRAÍN Hewitt    CHIEF COMPLAINT  Chief Complaint   Patient presents with    Chest Pain     PT sent to ED from [facility]. PT's son passed away recently, causing PT to feel an extreme level of anxiety. PT has hx of anxiety but has felt no relief with prescribed medications and is now beginning to experience CP + SOB. PT has hx of valve replacement on 1/5/23.      EXTERNAL RECORDS REVIEWED  Inpatient Notes Admitted in Jan 2023 for non rheumatic aortic valve stenosis    HPI/ROS    LIMITATION TO HISTORY   Select: : None    Noe Hickey is a 62 y.o. male who presents to the ED from [facility] where he is currently staying for chest pain secondary to anxiety onset today. Unfortunately his son passed away earlier today due to cancer, and he began to breathe harder when this happened. His anxiety has increased also because of this.  He has a history of cardiac arrhythmia, anxiety, thoracic aneurysm repair as well as valve repair, he has not taken any aspirin today. He states that he has been sick for the last 5-6 weeks where he will wake up with abdominal pain. He has had a ultrasound, HIDA scan pending.    PAST MEDICAL HISTORY  Past Medical History:   Diagnosis Date    Arrhythmia     Breath shortness     Cyclic vomiting syndrome     Elevated hemidiaphragm - LEFT post AVR 1/4/2023    Fracture     Headache, classical migraine     Heart burn     Heart valve disease     Indigestion     Pneumonia due to infectious organism 1/20/2023    Snoring        SURGICAL HISTORY  Past Surgical History:   Procedure Laterality Date    AORTIC VALVE REPLACEMENT  1/5/2023    Procedure: AORTIC VALVE REPLACEMENT, ASCENDING AORTIC ANEURYSM REPAIR AND TRANSESOPHAGEAL ECHOCARDIOGRAM.;  Surgeon: Serena Goyal M.D.;  Location: SURGERY [facility];  Service: Cardiac    AORTIC ASCENDING  DISSECTION  1/5/2023    Procedure: REPAIR, ANEURYSM OR DISSECTION, AORTA, ASCENDING;  Surgeon: Serena Goyal M.D.;  Location: SURGERY [facility];  Service: Cardiac    ECHOCARDIOGRAM, TRANSESOPHAGEAL, INTRAOPERATIVE  1/5/2023    Procedure: ECHOCARDIOGRAM, TRANSESOPHAGEAL, INTRAOPERATIVE.;  Surgeon: Serena Goyal M.D.;  Location: SURGERY [facility];  Service: Cardiac    IRRIGATION & DEBRIDEMENT ORTHO Right 09/19/2017    Procedure: IRRIGATION & DEBRIDEMENT ORTHO-THIGH;  Surgeon: Corbin Rivera M.D.;  Location: SURGERY [facility];  Service: Orthopedics    SHOULDER HEMICAP RESURFACING Right 10/12/2015    Procedure: RIGHT SHOULDER RESURFACING;  Surgeon: Javon Doll M.D.;  Location: SURGERY [facility];  Service:     SHOULDER ARTHROSCOPY      x3    SHOULDER SURGERY      replacement right       FAMILY HISTORY  History reviewed. No pertinent family history.    SOCIAL HISTORY   reports that he has been smoking cigarettes. He has a 20.00 pack-year smoking history. He has never used smokeless tobacco. He reports that he does not currently use drugs. He reports that he does not drink alcohol.    CURRENT MEDICATIONS  Discharge Medication List as of 5/14/2023  5:28 AM        CONTINUE these medications which have NOT CHANGED    Details   furosemide (LASIX) 20 MG Tab Take 20 mg by mouth every day., Historical Med      pantoprazole (PROTONIX) 40 MG Tablet Delayed Response Take 40 mg by mouth every day., Historical Med      ferrous sulfate 325 (65 Fe) MG tablet Take 325 mg by mouth every day., Historical Med      nitroGLYCERIN (NITROSTAT) 0.3 MG SL tablet Place 0.3 mg under the tongue every 5 minutes as needed for Chest Pain., Historical Med      !! ondansetron (ZOFRAN ODT) 4 MG TABLET DISPERSIBLE Take 4 mg by mouth every 6 hours as needed for Nausea/Vomiting., Historical Med      metoprolol SR (TOPROL XL) 50 MG TABLET SR 24 HR Take 1 Tablet by mouth every day., Disp-90 Tablet, R-3, Normal      rivaroxaban (XARELTO)  "20 MG Tab tablet Take 1 Tablet by mouth with dinner., Disp-90 Tablet, R-3, Normal      Empagliflozin (JARDIANCE) 10 MG Tab tablet Take 1 Tablet by mouth every day., Disp-90 Tablet, R-3, Normal      QUEtiapine (SEROQUEL) 25 MG Tab Take 1 Tablet by mouth 2 times a day., Disp-60 Tablet, R-3, Normal       !! - Potential duplicate medications found. Please discuss with provider.          ALLERGIES  Morphine and Ampicillin-sulbactam sodium    PHYSICAL EXAM  /59   Pulse 79   Temp 36.5 °C (97.7 °F) (Temporal)   Resp 18   Ht 1.753 m (5' 9\")   Wt 79.4 kg (175 lb)   SpO2 96%   BMI 25.84 kg/m²   Constitutional: Patient is well developed, well nourished. Moderate distress, very distraught and tearful, hyperventilating and very anxious.  HENT: Normocephalic, atraumatic.  Moist oral mucosa, posterior pharynx is clear without erythema or exudates  Cardiovascular: Tachycardic rate and Regular rhythm. No murmur,   Thorax & Lungs: Clear and equal breath sounds with good excursion. No respiratory distress, no rhonchi, wheezing or rales.   Abdomen: Bowel sounds normal in all four quadrants. Soft,nontender, no rebound , guarding, palpable masses.   Skin: Warm, Dry, No erythema, No rashes.    Extremities: Peripheral pulses 4/4 No edema, No tenderness,     Neurologic: Alert & oriented x 3, Normal motor function, Normal sensory function  Psychiatric: Affect very distraught and tearful, mainly anxious.    DIAGNOSTIC STUDIES & PROCEDURES    Labs:   Results for orders placed or performed during the hospital encounter of 05/13/23   CBC with Differential   Result Value Ref Range    WBC 11.7 (H) 4.8 - 10.8 K/uL    RBC 7.12 (H) 4.70 - 6.10 M/uL    Hemoglobin 15.1 14.0 - 18.0 g/dL    Hematocrit 51.8 42.0 - 52.0 %    MCV 72.8 (L) 81.4 - 97.8 fL    MCH 21.2 (L) 27.0 - 33.0 pg    MCHC 29.2 (L) 33.7 - 35.3 g/dL    RDW 57.0 (H) 35.9 - 50.0 fL    Platelet Count 316 164 - 446 K/uL    Neutrophils-Polys 73.40 (H) 44.00 - 72.00 %    Lymphocytes " 13.50 (L) 22.00 - 41.00 %    Monocytes 9.40 0.00 - 13.40 %    Eosinophils 2.40 0.00 - 6.90 %    Basophils 0.60 0.00 - 1.80 %    Immature Granulocytes 0.70 0.00 - 0.90 %    Nucleated RBC 0.00 /100 WBC    Neutrophils (Absolute) 8.55 (H) 1.82 - 7.42 K/uL    Lymphs (Absolute) 1.57 1.00 - 4.80 K/uL    Monos (Absolute) 1.10 (H) 0.00 - 0.85 K/uL    Eos (Absolute) 0.28 0.00 - 0.51 K/uL    Baso (Absolute) 0.07 0.00 - 0.12 K/uL    Immature Granulocytes (abs) 0.08 0.00 - 0.11 K/uL    NRBC (Absolute) 0.00 K/uL    Anisocytosis 1+     Microcytosis 1+    Complete Metabolic Panel (CMP)   Result Value Ref Range    Sodium 139 135 - 145 mmol/L    Potassium 4.5 3.6 - 5.5 mmol/L    Chloride 104 96 - 112 mmol/L    Co2 22 20 - 33 mmol/L    Anion Gap 13.0 7.0 - 16.0    Glucose 98 65 - 99 mg/dL    Bun 27 (H) 8 - 22 mg/dL    Creatinine 1.04 0.50 - 1.40 mg/dL    Calcium 9.5 8.5 - 10.5 mg/dL    AST(SGOT) 16 12 - 45 U/L    ALT(SGPT) 18 2 - 50 U/L    Alkaline Phosphatase 59 30 - 99 U/L    Total Bilirubin 0.2 0.1 - 1.5 mg/dL    Albumin 4.1 3.2 - 4.9 g/dL    Total Protein 6.7 6.0 - 8.2 g/dL    Globulin 2.6 1.9 - 3.5 g/dL    A-G Ratio 1.6 g/dL   Troponins NOW   Result Value Ref Range    Troponin T 22 (H) 6 - 19 ng/L   APTT   Result Value Ref Range    APTT 32.7 24.7 - 36.0 sec   PROTHROMBIN TIME (INR)   Result Value Ref Range    PT 17.7 (H) 12.0 - 14.6 sec    INR 1.48 (H) 0.87 - 1.13   PERIPHERAL SMEAR REVIEW   Result Value Ref Range    Peripheral Smear Review see below    PLATELET ESTIMATE   Result Value Ref Range    Plt Estimation Normal    MORPHOLOGY   Result Value Ref Range    RBC Morphology Present    DIFFERENTIAL COMMENT   Result Value Ref Range    Comments-Diff see below    CORRECTED CALCIUM   Result Value Ref Range    Correct Calcium 9.4 8.5 - 10.5 mg/dL   ESTIMATED GFR   Result Value Ref Range    GFR (CKD-EPI) 80 >60 mL/min/1.73 m 2   TROPONIN   Result Value Ref Range    Troponin T 24 (H) 6 - 19 ng/L   EKG (NOW)   Result Value Ref Range     Report       [facility] Emergency Dept.    Test Date:  2023  Pt Name:    JUSTIN TOPETE               Department: ER  MRN:        [id number]                      Room:  Gender:     Male                         Technician: [id number]  :        1960                   Requested By:ER TRIAGE PROTOCOL  Order #:    [id number]                    Reading MD:    Measurements  Intervals                                Axis  Rate:       69                           P:          62  LA:         178                          QRS:        23  QRSD:       94                           T:          41  QT:         401  QTc:        430    Interpretive Statements  Sinus rhythm  Probable left atrial enlargement  Compared to ECG 05/10/2023 08:07:59  No significant changes         All labs reviewed by me.    EKG:   I have independently interpreted this EKG     Radiology:   The attending Emergency Physician has independently interpreted the diagnostic imaging associated with this visit and is awaiting the final reading from the radiologist, which will be displayed below.  Preliminary interpretation is a follows: No acute cardiopulmonary disease.  Radiologist interpretation:    DX-CHEST-PORTABLE (1 VIEW)   Final Result      1.  Unchanged LEFT basilar underinflation atelectasis   2.  Persistently enlarged cardiac silhouette          COURSE & MEDICAL DECISION MAKING    ED Observation Status? Yes; I am placing the patient in to an observation status due to a diagnostic uncertainty as well as therapeutic intensity. Patient placed in observation status at 12:06 AM, 2023.     Observation plan is as follows: Monitor for symptom management and diagnostic results     Upon Reevaluation, the patient's condition has: Improved; and will be discharged.    Patient discharged from ED Observation status at 05:19 am (Time) 23 (Date).     INITIAL ASSESSMENT AND PLAN  Care Narrative:       11:59 PM - Patient seen and evaluated  at bedside. Discussed plan of care, including imaging, medications, lab work and EKG. Patient agrees to plan of care. Patient will be treated with aspirin 324 mg, Dilaudid 1 mg, Ativan 1 mg and Zofran 4 mg for his symptoms. Ordered DX-chest, APTT, INR, CBC w/ diff, CMP, troponin and EKG to evaluate. Differential diagnoses include but are not limited to: STEMI vs stress vs GI    Patient was markedly improved after the medication to calm him down and he was resting comfortably.  Laboratories came back really unremarkable other than he has a slightly bumped troponin of 22, this was repeated in 2 hours and it was 24.  He does not complain of any chest pain at this time.  He is still somewhat anxious and requests evaluation at [facility].  I spoke with our behavioral health nurse and she spoke with the patient.  At this time he will be treated best at [facility] with outpatient counseling as renal behavioral would not admit him for his anxiety.  I will give him medication for his anxiety.  He is currently on hydroxyzine which she states is not working.  I will give him a prescription for Ativan for 5 days but he is to take this only as needed and he was instructed that it was highly addictive.  He will be discharged back to [facility] for tonight and follow-up on an outpatient basis as needed.  He is discharged in stable and improved condition.    HYDRATION: Based on the patient's presentation of Dehydration the patient was given IV fluids. IV Hydration was used because oral hydration was not adequate alone. Upon recheck following hydration, the patient was improved.               DISPOSITION AND DISCUSSIONS  I have discussed management of the patient with the following physicians and SHAUN's: None    Discussion of management with other QHP or appropriate source(s): Behavioral Health Loren spoke with patient regarding patient options and treatment    Barriers to care at this time, including but not limited  to: None    Decision tools and prescription drugs considered including, but not limited to: Pain Medications Dilaudid 1 mg .  Patient received medication in the emergency department for outpatient he was given Ativan and Zofran and he is to follow-up with outpatient counseling.      FINAL IMPRESSION   1. Chest pain, unspecified type    2. Anxiety attack    3. Situational depression    4. Nausea    5. Generalized anxiety disorder         Rachid FLETCHER (Patricia), am scribing for, and in the presence of, Yamilka Chaney D.O..    Electronically signed by: Rachid Roland (Ramoniblu), 5/13/2023    IYamilka D.O. personally performed the services described in this documentation, as scribed by Rachid Roland in my presence, and it is both accurate and complete.    The note accurately reflects work and decisions made by me.  Yamilka Chaney D.O.  5/15/2023  12:39 AM

## 2023-05-14 NOTE — DISCHARGE INSTRUCTIONS
Continue your treatment at [facility].  Take 1 aspirin daily.  Follow-up with your primary care provider within the week for recheck and return if problems.  Take the anxiety medication as directed and only as needed for severe anxiety as this is highly addictive.

## 2023-05-14 NOTE — ED TRIAGE NOTES
Chief Complaint   Patient presents with    Chest Pain     PT sent to ED from [facility]. PT's son passed away recently, causing PT to feel an extreme level of anxiety. PT has hx of anxiety but has felt no relief with prescribed medications and is now beginning to experience CP + SOB. PT has hx of valve replacement on 1/5/23.      PT ambulatory to triage for above complaint. GCS 15.     Pt is alert and oriented, speaking in full sentences, follows commands and responds appropriately to questions. NAD. Resp are even and unlabored.      Pt placed in lobby. Pt educated on triage process. Pt encouraged to alert staff for any changes.     Patient and staff wearing appropriate PPE

## 2023-05-14 NOTE — DISCHARGE PLANNING
Alert Team:    Pt presenting to ED reporting anxiety and chest pain. States his son  yesterday and he has been having extreme anxiety and difficulty sleeping. Treated with ativan in ED and symptoms improved. Pt denies SI/HI/hallucinations. Pt provided with outpatient psychiatry/therapy resources w/ emphasis on  Health and encouraged to go to [facility] Monday to establish services; also given grief resources. Pt verbalized understanding of resources and all questions answered. Pt currently in the transitional living program at [facility] and plans to return there today after discharge. ERP wrote a prescription for anti-anxiety medications and taxi voucher provided to [facility] in order for pt to  the prescription. Updated RN.

## 2023-05-14 NOTE — ED NOTES
"Pt discharged home, pt given cab voucher. Pt A&ox4, on room air, steady gait. IV discontinued and gauze placed, pt in possession of belongings. Pt provided discharge education and information pertaining to medications and follow up appointments. Pt received copy of discharge instructions and verbalized understanding. /73   Pulse 60   Temp 36.6 °C (97.8 °F) (Temporal)   Resp 12   Ht 1.753 m (5' 9\")   Wt 79.4 kg (175 lb)   SpO2 91%   BMI 25.84 kg/m²     "

## 2023-05-15 ENCOUNTER — APPOINTMENT (OUTPATIENT)
Dept: RADIOLOGY | Facility: MEDICAL CENTER | Age: 63
End: 2023-05-15
Attending: STUDENT IN AN ORGANIZED HEALTH CARE EDUCATION/TRAINING PROGRAM
Payer: MEDICAID

## 2023-05-15 ENCOUNTER — HOSPITAL ENCOUNTER (OUTPATIENT)
Facility: MEDICAL CENTER | Age: 63
End: 2023-05-16
Attending: STUDENT IN AN ORGANIZED HEALTH CARE EDUCATION/TRAINING PROGRAM | Admitting: STUDENT IN AN ORGANIZED HEALTH CARE EDUCATION/TRAINING PROGRAM
Payer: MEDICAID

## 2023-05-15 DIAGNOSIS — R07.9 CHEST PAIN, UNSPECIFIED TYPE: ICD-10-CM

## 2023-05-15 LAB
ALBUMIN SERPL BCP-MCNC: 3.9 G/DL (ref 3.2–4.9)
ALBUMIN/GLOB SERPL: 1.6 G/DL
ALP SERPL-CCNC: 61 U/L (ref 30–99)
ALT SERPL-CCNC: 21 U/L (ref 2–50)
AMPHET UR QL SCN: NEGATIVE
ANION GAP SERPL CALC-SCNC: 13 MMOL/L (ref 7–16)
ANISOCYTOSIS BLD QL SMEAR: ABNORMAL
AST SERPL-CCNC: 21 U/L (ref 12–45)
BARBITURATES UR QL SCN: NEGATIVE
BASOPHILS # BLD AUTO: 0.7 % (ref 0–1.8)
BASOPHILS # BLD: 0.1 K/UL (ref 0–0.12)
BENZODIAZ UR QL SCN: NEGATIVE
BILIRUB SERPL-MCNC: 0.2 MG/DL (ref 0.1–1.5)
BUN SERPL-MCNC: 22 MG/DL (ref 8–22)
BZE UR QL SCN: NEGATIVE
CALCIUM ALBUM COR SERPL-MCNC: 8.6 MG/DL (ref 8.5–10.5)
CALCIUM SERPL-MCNC: 8.5 MG/DL (ref 8.5–10.5)
CANNABINOIDS UR QL SCN: NEGATIVE
CHLORIDE SERPL-SCNC: 108 MMOL/L (ref 96–112)
CO2 SERPL-SCNC: 20 MMOL/L (ref 20–33)
COMMENT 1642: NORMAL
CREAT SERPL-MCNC: 1 MG/DL (ref 0.5–1.4)
EKG IMPRESSION: NORMAL
EOSINOPHIL # BLD AUTO: 0.34 K/UL (ref 0–0.51)
EOSINOPHIL NFR BLD: 2.5 % (ref 0–6.9)
ERYTHROCYTE [DISTWIDTH] IN BLOOD BY AUTOMATED COUNT: 57 FL (ref 35.9–50)
FENTANYL UR QL: NEGATIVE
GFR SERPLBLD CREATININE-BSD FMLA CKD-EPI: 84 ML/MIN/1.73 M 2
GLOBULIN SER CALC-MCNC: 2.5 G/DL (ref 1.9–3.5)
GLUCOSE SERPL-MCNC: 88 MG/DL (ref 65–99)
HCT VFR BLD AUTO: 53.5 % (ref 42–52)
HGB BLD-MCNC: 15.6 G/DL (ref 14–18)
IMM GRANULOCYTES # BLD AUTO: 0.12 K/UL (ref 0–0.11)
IMM GRANULOCYTES NFR BLD AUTO: 0.9 % (ref 0–0.9)
LYMPHOCYTES # BLD AUTO: 1.29 K/UL (ref 1–4.8)
LYMPHOCYTES NFR BLD: 9.5 % (ref 22–41)
MACROCYTES BLD QL SMEAR: ABNORMAL
MCH RBC QN AUTO: 21.3 PG (ref 27–33)
MCHC RBC AUTO-ENTMCNC: 29.2 G/DL (ref 33.7–35.3)
MCV RBC AUTO: 72.9 FL (ref 81.4–97.8)
METHADONE UR QL SCN: NEGATIVE
MICROCYTES BLD QL SMEAR: ABNORMAL
MONOCYTES # BLD AUTO: 1.42 K/UL (ref 0–0.85)
MONOCYTES NFR BLD AUTO: 10.5 % (ref 0–13.4)
MORPHOLOGY BLD-IMP: NORMAL
NEUTROPHILS # BLD AUTO: 10.3 K/UL (ref 1.82–7.42)
NEUTROPHILS NFR BLD: 75.9 % (ref 44–72)
NRBC # BLD AUTO: 0 K/UL
NRBC BLD-RTO: 0 /100 WBC
OPIATES UR QL SCN: NEGATIVE
OXYCODONE UR QL SCN: NEGATIVE
PCP UR QL SCN: NEGATIVE
PLATELET # BLD AUTO: 385 K/UL (ref 164–446)
PLATELET BLD QL SMEAR: NORMAL
POIKILOCYTOSIS BLD QL SMEAR: NORMAL
POTASSIUM SERPL-SCNC: 4.3 MMOL/L (ref 3.6–5.5)
PROPOXYPH UR QL SCN: NEGATIVE
PROT SERPL-MCNC: 6.4 G/DL (ref 6–8.2)
RBC # BLD AUTO: 7.34 M/UL (ref 4.7–6.1)
RBC BLD AUTO: PRESENT
SCHISTOCYTES BLD QL SMEAR: NORMAL
SODIUM SERPL-SCNC: 141 MMOL/L (ref 135–145)
TROPONIN T SERPL-MCNC: 19 NG/L (ref 6–19)
TROPONIN T SERPL-MCNC: 21 NG/L (ref 6–19)
WBC # BLD AUTO: 13.6 K/UL (ref 4.8–10.8)

## 2023-05-15 PROCEDURE — G0378 HOSPITAL OBSERVATION PER HR: HCPCS

## 2023-05-15 PROCEDURE — A9270 NON-COVERED ITEM OR SERVICE: HCPCS | Mod: UD | Performed by: STUDENT IN AN ORGANIZED HEALTH CARE EDUCATION/TRAINING PROGRAM

## 2023-05-15 PROCEDURE — 700117 HCHG RX CONTRAST REV CODE 255: Performed by: STUDENT IN AN ORGANIZED HEALTH CARE EDUCATION/TRAINING PROGRAM

## 2023-05-15 PROCEDURE — 99406 BEHAV CHNG SMOKING 3-10 MIN: CPT

## 2023-05-15 PROCEDURE — 36415 COLL VENOUS BLD VENIPUNCTURE: CPT

## 2023-05-15 PROCEDURE — 96374 THER/PROPH/DIAG INJ IV PUSH: CPT

## 2023-05-15 PROCEDURE — 700102 HCHG RX REV CODE 250 W/ 637 OVERRIDE(OP): Mod: UD | Performed by: STUDENT IN AN ORGANIZED HEALTH CARE EDUCATION/TRAINING PROGRAM

## 2023-05-15 PROCEDURE — 80053 COMPREHEN METABOLIC PANEL: CPT

## 2023-05-15 PROCEDURE — 700111 HCHG RX REV CODE 636 W/ 250 OVERRIDE (IP): Mod: UD | Performed by: STUDENT IN AN ORGANIZED HEALTH CARE EDUCATION/TRAINING PROGRAM

## 2023-05-15 PROCEDURE — 93005 ELECTROCARDIOGRAM TRACING: CPT

## 2023-05-15 PROCEDURE — 99285 EMERGENCY DEPT VISIT HI MDM: CPT

## 2023-05-15 PROCEDURE — 84484 ASSAY OF TROPONIN QUANT: CPT

## 2023-05-15 PROCEDURE — 71045 X-RAY EXAM CHEST 1 VIEW: CPT

## 2023-05-15 PROCEDURE — 80307 DRUG TEST PRSMV CHEM ANLYZR: CPT

## 2023-05-15 PROCEDURE — 99223 1ST HOSP IP/OBS HIGH 75: CPT | Mod: 25,GC | Performed by: STUDENT IN AN ORGANIZED HEALTH CARE EDUCATION/TRAINING PROGRAM

## 2023-05-15 PROCEDURE — 74175 CTA ABDOMEN W/CONTRAST: CPT

## 2023-05-15 PROCEDURE — 93005 ELECTROCARDIOGRAM TRACING: CPT | Performed by: STUDENT IN AN ORGANIZED HEALTH CARE EDUCATION/TRAINING PROGRAM

## 2023-05-15 PROCEDURE — 85025 COMPLETE CBC W/AUTO DIFF WBC: CPT

## 2023-05-15 PROCEDURE — 99406 BEHAV CHNG SMOKING 3-10 MIN: CPT | Mod: GC | Performed by: STUDENT IN AN ORGANIZED HEALTH CARE EDUCATION/TRAINING PROGRAM

## 2023-05-15 PROCEDURE — 96375 TX/PRO/DX INJ NEW DRUG ADDON: CPT | Mod: XU

## 2023-05-15 RX ORDER — ACETAMINOPHEN 325 MG/1
650 TABLET ORAL EVERY 6 HOURS PRN
Status: DISCONTINUED | OUTPATIENT
Start: 2023-05-15 | End: 2023-05-16 | Stop reason: HOSPADM

## 2023-05-15 RX ORDER — ONDANSETRON 2 MG/ML
4 INJECTION INTRAMUSCULAR; INTRAVENOUS ONCE
Status: COMPLETED | OUTPATIENT
Start: 2023-05-15 | End: 2023-05-15

## 2023-05-15 RX ORDER — FUROSEMIDE 40 MG/1
20 TABLET ORAL DAILY
Status: DISCONTINUED | OUTPATIENT
Start: 2023-05-16 | End: 2023-05-16 | Stop reason: HOSPADM

## 2023-05-15 RX ORDER — QUETIAPINE FUMARATE 25 MG/1
25 TABLET, FILM COATED ORAL 2 TIMES DAILY
Status: DISCONTINUED | OUTPATIENT
Start: 2023-05-16 | End: 2023-05-16 | Stop reason: HOSPADM

## 2023-05-15 RX ORDER — PROCHLORPERAZINE EDISYLATE 5 MG/ML
5-10 INJECTION INTRAMUSCULAR; INTRAVENOUS EVERY 4 HOURS PRN
Status: DISCONTINUED | OUTPATIENT
Start: 2023-05-15 | End: 2023-05-16 | Stop reason: HOSPADM

## 2023-05-15 RX ORDER — METOPROLOL SUCCINATE 50 MG/1
50 TABLET, EXTENDED RELEASE ORAL DAILY
Status: DISCONTINUED | OUTPATIENT
Start: 2023-05-16 | End: 2023-05-16 | Stop reason: HOSPADM

## 2023-05-15 RX ORDER — LORAZEPAM 1 MG/1
1 TABLET ORAL ONCE
Status: COMPLETED | OUTPATIENT
Start: 2023-05-15 | End: 2023-05-15

## 2023-05-15 RX ORDER — HYDROMORPHONE HYDROCHLORIDE 1 MG/ML
1 INJECTION, SOLUTION INTRAMUSCULAR; INTRAVENOUS; SUBCUTANEOUS ONCE
Status: COMPLETED | OUTPATIENT
Start: 2023-05-15 | End: 2023-05-15

## 2023-05-15 RX ORDER — PROMETHAZINE HYDROCHLORIDE 25 MG/1
12.5-25 TABLET ORAL EVERY 4 HOURS PRN
Status: DISCONTINUED | OUTPATIENT
Start: 2023-05-15 | End: 2023-05-16 | Stop reason: HOSPADM

## 2023-05-15 RX ORDER — NITROGLYCERIN 0.4 MG/1
0.4 TABLET SUBLINGUAL
Status: DISCONTINUED | OUTPATIENT
Start: 2023-05-15 | End: 2023-05-16 | Stop reason: HOSPADM

## 2023-05-15 RX ORDER — ONDANSETRON 4 MG/1
4 TABLET, ORALLY DISINTEGRATING ORAL EVERY 6 HOURS PRN
Status: DISCONTINUED | OUTPATIENT
Start: 2023-05-15 | End: 2023-05-16

## 2023-05-15 RX ORDER — PANTOPRAZOLE SODIUM 40 MG/1
40 TABLET, DELAYED RELEASE ORAL DAILY
Status: DISCONTINUED | OUTPATIENT
Start: 2023-05-16 | End: 2023-05-16

## 2023-05-15 RX ORDER — POLYETHYLENE GLYCOL 3350 17 G/17G
1 POWDER, FOR SOLUTION ORAL
Status: DISCONTINUED | OUTPATIENT
Start: 2023-05-15 | End: 2023-05-16 | Stop reason: HOSPADM

## 2023-05-15 RX ORDER — ONDANSETRON 2 MG/ML
4 INJECTION INTRAMUSCULAR; INTRAVENOUS EVERY 4 HOURS PRN
Status: DISCONTINUED | OUTPATIENT
Start: 2023-05-15 | End: 2023-05-16 | Stop reason: HOSPADM

## 2023-05-15 RX ORDER — BISACODYL 10 MG
10 SUPPOSITORY, RECTAL RECTAL
Status: DISCONTINUED | OUTPATIENT
Start: 2023-05-15 | End: 2023-05-16 | Stop reason: HOSPADM

## 2023-05-15 RX ORDER — ONDANSETRON 4 MG/1
4 TABLET, ORALLY DISINTEGRATING ORAL EVERY 4 HOURS PRN
Status: DISCONTINUED | OUTPATIENT
Start: 2023-05-15 | End: 2023-05-16 | Stop reason: HOSPADM

## 2023-05-15 RX ORDER — PROMETHAZINE HYDROCHLORIDE 25 MG/1
12.5-25 SUPPOSITORY RECTAL EVERY 4 HOURS PRN
Status: DISCONTINUED | OUTPATIENT
Start: 2023-05-15 | End: 2023-05-16 | Stop reason: HOSPADM

## 2023-05-15 RX ORDER — AMOXICILLIN 250 MG
2 CAPSULE ORAL 2 TIMES DAILY
Status: DISCONTINUED | OUTPATIENT
Start: 2023-05-16 | End: 2023-05-16 | Stop reason: HOSPADM

## 2023-05-15 RX ADMIN — IOHEXOL 100 ML: 350 INJECTION, SOLUTION INTRAVENOUS at 22:45

## 2023-05-15 RX ADMIN — ONDANSETRON 4 MG: 2 INJECTION INTRAMUSCULAR; INTRAVENOUS at 20:56

## 2023-05-15 RX ADMIN — LORAZEPAM 1 MG: 1 TABLET ORAL at 22:35

## 2023-05-15 RX ADMIN — HYDROMORPHONE HYDROCHLORIDE 1 MG: 1 INJECTION, SOLUTION INTRAMUSCULAR; INTRAVENOUS; SUBCUTANEOUS at 20:56

## 2023-05-15 ASSESSMENT — FIBROSIS 4 INDEX: FIB4 SCORE: 0.75

## 2023-05-15 ASSESSMENT — HEART SCORE
HISTORY: MODERATELY SUSPICIOUS
RISK FACTORS: 1-2 RISK FACTORS
AGE: 45-64
ECG: NORMAL
HEART SCORE: 4
TROPONIN: 1-3 TIMES NORMAL LIMIT

## 2023-05-15 NOTE — ED TRIAGE NOTES
Noe Hickey  62 y.o.  male  Chief Complaint   Patient presents with    Chest Pain     Pt was laying down 1 hour ago, c/o 7/10 sharp chest pain/squeezing pain down L arm + SOB, nausea & diaphoresis. Still ongoing. C/o feeling dizzy. IV started with REMSA, given 325mg ASA PTA.    Anxiety     Pt recently lost his son on May 13th, presented to ED for similar episode. States his CP today started after feeling anxious. Pt trying to slow his breathing in triage.        Pt was prescribed ativan 2 days ago & tried taking 2 an hour ago with no relief. Patient educated on triage process, to alert staff if any changes in condition.

## 2023-05-16 VITALS
HEIGHT: 69 IN | HEART RATE: 59 BPM | WEIGHT: 175.27 LBS | RESPIRATION RATE: 18 BRPM | TEMPERATURE: 97.7 F | DIASTOLIC BLOOD PRESSURE: 79 MMHG | OXYGEN SATURATION: 97 % | BODY MASS INDEX: 25.96 KG/M2 | SYSTOLIC BLOOD PRESSURE: 113 MMHG

## 2023-05-16 PROBLEM — I50.32 CHRONIC HEART FAILURE WITH PRESERVED EJECTION FRACTION (HCC): Status: ACTIVE | Noted: 2023-05-16

## 2023-05-16 PROBLEM — D72.829 LEUKOCYTOSIS: Status: ACTIVE | Noted: 2023-01-06

## 2023-05-16 PROBLEM — F11.91 OPIOID USE, UNSPECIFIED, IN REMISSION: Status: ACTIVE | Noted: 2023-05-16

## 2023-05-16 PROBLEM — Z71.89 GOALS OF CARE, COUNSELING/DISCUSSION: Status: ACTIVE | Noted: 2023-05-16

## 2023-05-16 LAB
ALBUMIN SERPL BCP-MCNC: 4.3 G/DL (ref 3.2–4.9)
ALBUMIN/GLOB SERPL: 1.9 G/DL
ALP SERPL-CCNC: 56 U/L (ref 30–99)
ALT SERPL-CCNC: 24 U/L (ref 2–50)
ANION GAP SERPL CALC-SCNC: 12 MMOL/L (ref 7–16)
AST SERPL-CCNC: 19 U/L (ref 12–45)
BILIRUB SERPL-MCNC: 0.4 MG/DL (ref 0.1–1.5)
BUN SERPL-MCNC: 22 MG/DL (ref 8–22)
CALCIUM ALBUM COR SERPL-MCNC: 9.1 MG/DL (ref 8.5–10.5)
CALCIUM SERPL-MCNC: 9.3 MG/DL (ref 8.5–10.5)
CHLORIDE SERPL-SCNC: 104 MMOL/L (ref 96–112)
CO2 SERPL-SCNC: 24 MMOL/L (ref 20–33)
CREAT SERPL-MCNC: 1.09 MG/DL (ref 0.5–1.4)
GFR SERPLBLD CREATININE-BSD FMLA CKD-EPI: 76 ML/MIN/1.73 M 2
GLOBULIN SER CALC-MCNC: 2.3 G/DL (ref 1.9–3.5)
GLUCOSE SERPL-MCNC: 88 MG/DL (ref 65–99)
POTASSIUM SERPL-SCNC: 4.7 MMOL/L (ref 3.6–5.5)
PROT SERPL-MCNC: 6.6 G/DL (ref 6–8.2)
SODIUM SERPL-SCNC: 140 MMOL/L (ref 135–145)

## 2023-05-16 PROCEDURE — 80053 COMPREHEN METABOLIC PANEL: CPT

## 2023-05-16 PROCEDURE — 700111 HCHG RX REV CODE 636 W/ 250 OVERRIDE (IP): Mod: UD | Performed by: STUDENT IN AN ORGANIZED HEALTH CARE EDUCATION/TRAINING PROGRAM

## 2023-05-16 PROCEDURE — G0378 HOSPITAL OBSERVATION PER HR: HCPCS

## 2023-05-16 PROCEDURE — 99239 HOSP IP/OBS DSCHRG MGMT >30: CPT | Mod: GC | Performed by: STUDENT IN AN ORGANIZED HEALTH CARE EDUCATION/TRAINING PROGRAM

## 2023-05-16 PROCEDURE — 96376 TX/PRO/DX INJ SAME DRUG ADON: CPT

## 2023-05-16 PROCEDURE — 36415 COLL VENOUS BLD VENIPUNCTURE: CPT

## 2023-05-16 RX ORDER — HYDROMORPHONE HYDROCHLORIDE 1 MG/ML
1 INJECTION, SOLUTION INTRAMUSCULAR; INTRAVENOUS; SUBCUTANEOUS
Status: COMPLETED | OUTPATIENT
Start: 2023-05-16 | End: 2023-05-16

## 2023-05-16 RX ORDER — OMEPRAZOLE 20 MG/1
20 CAPSULE, DELAYED RELEASE ORAL DAILY
Status: DISCONTINUED | OUTPATIENT
Start: 2023-05-16 | End: 2023-05-16 | Stop reason: HOSPADM

## 2023-05-16 RX ADMIN — HYDROMORPHONE HYDROCHLORIDE 1 MG: 1 INJECTION, SOLUTION INTRAMUSCULAR; INTRAVENOUS; SUBCUTANEOUS at 01:26

## 2023-05-16 ASSESSMENT — FIBROSIS 4 INDEX: FIB4 SCORE: 0.75

## 2023-05-16 ASSESSMENT — LIFESTYLE VARIABLES
HAVE PEOPLE ANNOYED YOU BY CRITICIZING YOUR DRINKING: NO
AVERAGE NUMBER OF DAYS PER WEEK YOU HAVE A DRINK CONTAINING ALCOHOL: 0
TOTAL SCORE: 0
ALCOHOL_USE: NO
EVER HAD A DRINK FIRST THING IN THE MORNING TO STEADY YOUR NERVES TO GET RID OF A HANGOVER: NO
CONSUMPTION TOTAL: NEGATIVE
DOES PATIENT WANT TO STOP DRINKING: NO
HOW MANY TIMES IN THE PAST YEAR HAVE YOU HAD 5 OR MORE DRINKS IN A DAY: 0
ON A TYPICAL DAY WHEN YOU DRINK ALCOHOL HOW MANY DRINKS DO YOU HAVE: 0
EVER FELT BAD OR GUILTY ABOUT YOUR DRINKING: NO
TOTAL SCORE: 0
TOTAL SCORE: 0
HAVE YOU EVER FELT YOU SHOULD CUT DOWN ON YOUR DRINKING: NO

## 2023-05-16 ASSESSMENT — ENCOUNTER SYMPTOMS
DEPRESSION: 0
NERVOUS/ANXIOUS: 1
DIZZINESS: 0
HEADACHES: 0
CONSTIPATION: 0
COUGH: 0
WEAKNESS: 0
DOUBLE VISION: 0
SHORTNESS OF BREATH: 1
BLURRED VISION: 0
SPUTUM PRODUCTION: 0
PALPITATIONS: 1
CHILLS: 0
FEVER: 0
BLOOD IN STOOL: 0
DIARRHEA: 0
NAUSEA: 1
ABDOMINAL PAIN: 1
VOMITING: 0
FOCAL WEAKNESS: 0

## 2023-05-16 ASSESSMENT — CHA2DS2 SCORE
PRIOR STROKE OR TIA OR THROMBOEMBOLISM: NO
CHA2DS2 VASC SCORE: 2
SEX: MALE
AGE 65 TO 74: YES
CHF OR LEFT VENTRICULAR DYSFUNCTION: YES
DIABETES: NO
VASCULAR DISEASE: NO
HYPERTENSION: NO
AGE 75 OR GREATER: NO

## 2023-05-16 ASSESSMENT — PAIN DESCRIPTION - PAIN TYPE: TYPE: ACUTE PAIN

## 2023-05-16 ASSESSMENT — PATIENT HEALTH QUESTIONNAIRE - PHQ9
SUM OF ALL RESPONSES TO PHQ9 QUESTIONS 1 AND 2: 0
1. LITTLE INTEREST OR PLEASURE IN DOING THINGS: NOT AT ALL
2. FEELING DOWN, DEPRESSED, IRRITABLE, OR HOPELESS: NOT AT ALL

## 2023-05-16 NOTE — ED NOTES
Bedside report received from Yudith VILLA. Pt resting comfortably and aware of POC with call light available and in reach. Pt on RA. Fall precautions in place and appropriate for pt. Pt reports no needs at this time. Appropriate equipment in room.

## 2023-05-16 NOTE — ASSESSMENT & PLAN NOTE
HF (EF 60%) with grade 1 diastolic dysfunction. NOT currently in exacerbation.  -Lasix 20mg PO qD  -HOLD toprol 50mg PO qD (per stress testing)  -Cardiology follow up as directed

## 2023-05-16 NOTE — ASSESSMENT & PLAN NOTE
#Suspected  UTI with MRSA  -Neurogenic bladder s/p recent cystoscopy.  -outpatient urine culture collected via new catheter grew MRSA   -UA collected on admission is clear  -on vancomycin 1 gm IV qd Day # 3  -UCx in house is negative   -will complete abx therapy today    #Dispo: discharge pending SW verification     Case d/w Dr Barry      57 y/o female with h/o adrenal insufficiency, Atrial fibrillation s/p ablation, anemia, anxiety, aspiration PNA, CHF (HFpEF), chronic low back pain, COPD, prior C.diff, duodenal ulcer, empyema, endometriosis, GERD, GI bleed, hypogammaglobulinemia, hypoglycemia, hypokalemia, hypomagnesemia, hyponatremia, hypothyroidism, IBS, MRSA colonization, migraines, narcolepsy, neurogenic bladder, OA, orthostatic hypotension, PCOS, peripheral neuropathy, postgastric syndrome, recurrent UTI, SCFE, schizoaffective disorder, septic embolism, sigmoid volvulus, sleep apnea, spinal stenosis, spondylolisthesis, recurrent UTI's s/p cystostomy who was admitted on 10/23 after she was referred to the ED by Dr. Lew Roy urologist because her urine culture collected by him via new catheter grew MRSA resistant to all oral antibiotics. She denies fever, chills, hypotension. The patient could tolerate IV Vancomycin premedicated with Benadryl.     1. Low grade fever. Probable UTI with MRSA. Neurogenic bladder s/p recent cystoscopy. Multiple abx allergies including PCN and vancomycin. Obesity.  -outpatient urine culture collected via new catheter grew MRSA   -UA collected on admission is clear  -tolerating vancomycin with benadryl  -on vancomycin 1 gm IV qd # 3  -tolerating abx well so far; no side effects noted  -monitor closely in shakir of vancomycin allergy history  -urology evaluation  -urine culture is negative and UA is clear  -will complete abx therapy today  -monitor temps  -f/u CBC  -supportive care  2. Other issues:   -care per medicine       Pt has been seen and examined with FP resident, resident supervised agree with a/p       Patient is a 56y old  Female who presents with a chief complaint of UTI with MRSA MDR (24 Oct 2021 10:18)      HPI:  HPI: The patient is a 57 yo female with multiple medical problems, including recurrent UTI's, s/p cystostomy who was referred to the ED by Dr. Lew Roy urologist because  her urine culture collected by him via new catheter grew MRSA resistant to all oral antibiotics.     PHYSICAL EXAM:    -rs-aeeb, cta  -cvs-s1s2 normal   -p/a-soft,bs+      A/P    #d/c today with further management as an outpt, time spent 45 minutes  The ASCVD Risk score (Chuckie SAMANO, et al., 2019) failed to calculate for the following reasons:    The patient has a prior MI or stroke diagnosis      Recurrent chest pain with typical features: central crushing chest pain radiating to L arm, with associated nausea and diaphoresis, shortness of breath. Lasting duration of day, unremitting. Denies past MI/CVA history, though with ascending aortic repair and bioprosthetic valve placement. Stressor events of recent death of son, currently staying in rehab facility.  -Troponin normal/borderline elevated, stable from previous day lab results  -treadmill stress test in AM  -NPO midnight  -HOLD toprol 50mg PO qD  -consideration of cardiology consultation   Pt has been seen and examined with FP resident, resident supervised agree with a/p       Patient is a 56y old  Female who presents with a chief complaint of UTI with MRSA MDR (24 Oct 2021 10:18)      HPI:  HPI: The patient is a 57 yo female with multiple medical problems, including recurrent UTI's, s/p cystostomy who was referred to the ED by Dr. Lew Roy urologist because  her urine culture collected by him via new catheter grew MRSA resistant to all oral antibiotics.     PHYSICAL EXAM:  Vital Signs Last 24 Hrs  T(C): 36.3 (24 Oct 2021 08:48), Max: 37.5 (23 Oct 2021 20:58)  T(F): 97.3 (24 Oct 2021 08:48), Max: 99.5 (23 Oct 2021 20:58)  HR: 78 (24 Oct 2021 08:48) (75 - 78)  BP: 126/55 (24 Oct 2021 08:48) (114/88 - 145/81)  BP(mean): 96 (23 Oct 2021 13:24) (96 - 96)  RR: 18 (24 Oct 2021 08:48) (18 - 18)  SpO2: 99% (24 Oct 2021 08:48) (94% - 99%)  -rs-aeeb, cta  -cvs-s1s2 normal   -p/a-soft,bs+      A/P    #ct abx, supportive care

## 2023-05-16 NOTE — ED PROVIDER NOTES
ED Provider Note    CHIEF COMPLAINT  Chief Complaint   Patient presents with    Chest Pain     Pt was laying down 1 hour ago, c/o 7/10 sharp chest pain/squeezing pain down L arm + SOB, nausea & diaphoresis. Still ongoing. C/o feeling dizzy. IV started with REMSA, given 325mg ASA PTA.    Anxiety     Pt recently lost his son on May 13th, presented to ED for similar episode. States his CP today started after feeling anxious. Pt trying to slow his breathing in triage.        EXTERNAL RECORDS REVIEWED  Inpatient Notes patient seen here 5/13 overnight for chest pain, at that visit thought to be anxiety which patient has a history of ; per chart review patient has had multiple cardiac procedures and evaluations in the past but no stress test or cath noted    HPI/ROS  LIMITATION TO HISTORY   Select: : None  OUTSIDE HISTORIAN(S):  EMS patient aspirin in route    Noe Hickey is a 62 y.o. male past medical history of aortic valve replacement 1/5/2023, ascending aortic aneurysm repair 1/5/2023, paroxysmal A-fib, depression, prior opiate use presenting with chest pain that began around 4 PM today as patient was laying down.  Patient describes pain as sharp and squeezing, states it radiated down his left arm and caused some shortness of breath, nausea and diaphoresis.  He states pain is still ongoing and has been getting worse.  Patient reports feeling dizzy secondary to this.  Patient states his son passed away 2 days ago from cancer and this is worsened his anxiety and he states he has been dwelling on this.  Denies SI.  He reports he has had nausea and abdominal pain for several weeks off and on.  At his visit for the same symptoms yesterday he was treated with aspirin, Dilaudid, Ativan, Zofran for his symptoms.  At the visit yesterday his troponins were only very slightly elevated at 22 and the 24.  He was evaluated by behavioral health nurse and after this he was discharged with short course of Ativan for anxiety  over the next 5 days.  Patient states he is taking 2 Ativan today which has not helped his pain in his symptoms.    Patient denies any history of cath or stress test.  He has no history of hypertension, high cholesterol, or diabetes.  Does use tobacco daily.    PAST MEDICAL HISTORY  Past Medical History:   Diagnosis Date    Pneumonia due to infectious organism 1/20/2023    Elevated hemidiaphragm - LEFT post AVR 1/4/2023    Arrhythmia     Breath shortness     Cyclic vomiting syndrome     Fracture     Headache, classical migraine     Heart burn     Heart valve disease     Indigestion     Snoring         SURGICAL HISTORY  Past Surgical History:   Procedure Laterality Date    AORTIC VALVE REPLACEMENT  1/5/2023    Procedure: AORTIC VALVE REPLACEMENT, ASCENDING AORTIC ANEURYSM REPAIR AND TRANSESOPHAGEAL ECHOCARDIOGRAM.;  Surgeon: Serena Goyal M.D.;  Location: SURGERY [facility];  Service: Cardiac    AORTIC ASCENDING DISSECTION  1/5/2023    Procedure: REPAIR, ANEURYSM OR DISSECTION, AORTA, ASCENDING;  Surgeon: Serena Goyal M.D.;  Location: [facility];  Service: Cardiac    ECHOCARDIOGRAM, TRANSESOPHAGEAL, INTRAOPERATIVE  1/5/2023    Procedure: ECHOCARDIOGRAM, TRANSESOPHAGEAL, INTRAOPERATIVE.;  Surgeon: Serena Goyal M.D.;  Location: [facility];  Service: Cardiac    IRRIGATION & DEBRIDEMENT ORTHO Right 09/19/2017    Procedure: IRRIGATION & DEBRIDEMENT ORTHO-THIGH;  Surgeon: Corbin Rivera M.D.;  Location: [facility];  Service: Orthopedics    SHOULDER HEMICAP RESURFACING Right 10/12/2015    Procedure: RIGHT SHOULDER RESURFACING;  Surgeon: Javon Doll M.D.;  Location: [facility];  Service:     SHOULDER ARTHROSCOPY      x3    SHOULDER SURGERY      replacement right        FAMILY HISTORY  No family history on file.    SOCIAL HISTORY       CURRENT MEDICATIONS  Home Medications    Medication Sig Taking? Last Dose Authorizing Provider   LORazepam (ATIVAN) 1 MG Tab Take 1  "Tablet by mouth every 6 hours as needed for Anxiety for up to 5 days.  5/15/2023 at 1400 Yamilka Chaney D.O.   furosemide (LASIX) 20 MG Tab Take 20 mg by mouth every day.  5/15/2023 at 0900 Physician Outpatient   pantoprazole (PROTONIX) 40 MG Tablet Delayed Response Take 40 mg by mouth every day.  5/15/2023 at 0900 Physician Outpatient   ferrous sulfate 325 (65 Fe) MG tablet Take 325 mg by mouth every day.  5/15/2023 at 0900 Physician Outpatient   nitroGLYCERIN (NITROSTAT) 0.3 MG SL tablet Place 0.3 mg under the tongue every 5 minutes as needed for Chest Pain.  4/15/2023 at 1500 Physician Outpatient   ondansetron (ZOFRAN ODT) 4 MG TABLET DISPERSIBLE Take 4 mg by mouth every 6 hours as needed for Nausea/Vomiting.  5/15/2023 at 0900 Physician Outpatient   metoprolol SR (TOPROL XL) 50 MG TABLET SR 24 HR Take 1 Tablet by mouth every day.  5/15/2023 at 0900 Victor Manuel Chaney M.D.   rivaroxaban (XARELTO) 20 MG Tab tablet Take 1 Tablet by mouth with dinner.  5/14/2023 at 1700 Victor Manuel Chaney M.D.   QUEtiapine (SEROQUEL) 25 MG Tab Take 1 Tablet by mouth 2 times a day.  5/14/2023 at 2100 Tania Snow M.D.       ALLERGIES  No Known Allergies    PHYSICAL EXAM  /79   Pulse (!) 59   Temp 36.5 °C (97.7 °F) (Temporal)   Resp 18   Ht 1.753 m (5' 9\")   Wt 79.5 kg (175 lb 4.3 oz)   SpO2 97%   Constitutional: Alert in no apparent distress.  HENT: No signs of trauma, Bilateral external ears normal, Nose normal.   Eyes: Pupils are equal and reactive, Conjunctiva normal, Non-icteric.   Neck: Normal range of motion, No tenderness, Supple, No stridor.   Cardiovascular: Regular rate and rhythm, no murmurs.   Thorax & Lungs: Normal breath sounds, No respiratory distress, No wheezing  Abdomen: Soft, No tenderness, No peritoneal signs, No masses, No pulsatile masses.   Skin: Warm, Dry, No erythema, No rash.   Extremities: Intact distal pulses, No edema, No tenderness, No cyanosis  Neurologic: Alert , Normal motor function, " Normal speech, No focal deficits noted.   Psychiatric: Affect normal, Judgment normal, Mood normal.         DIAGNOSTIC STUDIES / PROCEDURES    LABS & EKG  I have independently interpreted this EKG     Results for orders placed or performed during the hospital encounter of 05/15/23   CBC with Differential   Result Value Ref Range    WBC 13.6 (H) 4.8 - 10.8 K/uL    RBC 7.34 (H) 4.70 - 6.10 M/uL    Hemoglobin 15.6 14.0 - 18.0 g/dL    Hematocrit 53.5 (H) 42.0 - 52.0 %    MCV 72.9 (L) 81.4 - 97.8 fL    MCH 21.3 (L) 27.0 - 33.0 pg    MCHC 29.2 (L) 33.7 - 35.3 g/dL    RDW 57.0 (H) 35.9 - 50.0 fL    Platelet Count 385 164 - 446 K/uL    Neutrophils-Polys 75.90 (H) 44.00 - 72.00 %    Lymphocytes 9.50 (L) 22.00 - 41.00 %    Monocytes 10.50 0.00 - 13.40 %    Eosinophils 2.50 0.00 - 6.90 %    Basophils 0.70 0.00 - 1.80 %    Immature Granulocytes 0.90 0.00 - 0.90 %    Nucleated RBC 0.00 /100 WBC    Neutrophils (Absolute) 10.30 (H) 1.82 - 7.42 K/uL    Lymphs (Absolute) 1.29 1.00 - 4.80 K/uL    Monos (Absolute) 1.42 (H) 0.00 - 0.85 K/uL    Eos (Absolute) 0.34 0.00 - 0.51 K/uL    Baso (Absolute) 0.10 0.00 - 0.12 K/uL    Immature Granulocytes (abs) 0.12 (H) 0.00 - 0.11 K/uL    NRBC (Absolute) 0.00 K/uL    Anisocytosis 1+     Macrocytosis 1+     Microcytosis 1+    Complete Metabolic Panel (CMP)   Result Value Ref Range    Sodium 141 135 - 145 mmol/L    Potassium 4.3 3.6 - 5.5 mmol/L    Chloride 108 96 - 112 mmol/L    Co2 20 20 - 33 mmol/L    Anion Gap 13.0 7.0 - 16.0    Glucose 88 65 - 99 mg/dL    Bun 22 8 - 22 mg/dL    Creatinine 1.00 0.50 - 1.40 mg/dL    Calcium 8.5 8.5 - 10.5 mg/dL    AST(SGOT) 21 12 - 45 U/L    ALT(SGPT) 21 2 - 50 U/L    Alkaline Phosphatase 61 30 - 99 U/L    Total Bilirubin 0.2 0.1 - 1.5 mg/dL    Albumin 3.9 3.2 - 4.9 g/dL    Total Protein 6.4 6.0 - 8.2 g/dL    Globulin 2.5 1.9 - 3.5 g/dL    A-G Ratio 1.6 g/dL   Troponins NOW   Result Value Ref Range    Troponin T 21 (H) 6 - 19 ng/L   Troponins in two (2) hours    Result Value Ref Range    Troponin T 19 6 - 19 ng/L   CORRECTED CALCIUM   Result Value Ref Range    Correct Calcium 8.6 8.5 - 10.5 mg/dL   ESTIMATED GFR   Result Value Ref Range    GFR (CKD-EPI) 84 >60 mL/min/1.73 m 2   PERIPHERAL SMEAR REVIEW   Result Value Ref Range    Peripheral Smear Review see below    PLATELET ESTIMATE   Result Value Ref Range    Plt Estimation Normal    MORPHOLOGY   Result Value Ref Range    RBC Morphology Present     Poikilocytosis 1+     Schistocytes 1+    DIFFERENTIAL COMMENT   Result Value Ref Range    Comments-Diff see below    URINE DRUG SCREEN   Result Value Ref Range    Amphetamines Urine Negative Negative    Barbiturates Negative Negative    Benzodiazepines Negative Negative    Cocaine Metabolite Negative Negative    Fentanyl, Urine Negative Negative    Methadone Negative Negative    Opiates Negative Negative    Oxycodone Negative Negative    Phencyclidine -Pcp Negative Negative    Propoxyphene Negative Negative    Cannabinoid Metab Negative Negative   EKG   Result Value Ref Range    Report       [facility] Emergency Dept.    Test Date:  2023-05-15  Pt Name:    JUSTIN TOPETE               Department: ER  MRN:        [id number]                      Room:  Gender:     Male                         Technician: [id number]  :        1960                   Requested By:ER TRIAGE PROTOCOL  Order #:    [id number]                    Reading MD: Farheen Sanchez    Measurements  Intervals                                Axis  Rate:       72                           P:          76  ID:         [id number]                          QRS:        71  QRSD:       93                           T:          71  QT:         391  QTc:        428    Interpretive Statements  Normal sinus rhythm rate of 72, normal axis, normal intervals, no ST  elevations,  depressions, or T wave inversions, Unchanged from EKG 2023  Electronically Signed On 5- 20:55:06 PDT by Farheen Sanchez          RADIOLOGY  I have independently interpreted the diagnostic imaging associated with this visit and am waiting the final reading from the radiologist.   My preliminary interpretation is a follows: Chest x-ray shows no new focal infiltrates, patchy opacity in left base is similar to prior, less likely infection, no pneumothorax.    Radiologist interpretation:   CT-CTA COMPLETE THORACOABDOMINAL AORTA   Final Result         1.  No aortic aneurysm or dissection identified.   2.  Linear density in the left lung base favoring atelectasis.   3.  Asymmetric elevation of the left hemidiaphragm   4.  Diverticulosis   5.  Hepatomegaly   6.  Atherosclerosis                        DX-CHEST-PORTABLE (1 VIEW)   Final Result         Linear patchy opacity in the left lung base, similar to prior, atelectasis versus infection      NM-CARDIAC STRESS TEST    (Results Pending)       COURSE & MEDICAL DECISION MAKING    ED Observation Status? No; Patient does not meet criteria for ED Observation.     INITIAL ASSESSMENT, COURSE AND PLAN  Care Narrative: 62-year-old male with history of tobacco use and prior cardiac surgeries and A-fib presenting with chest pain, shortness of breath, nausea and diaphoresis over the last several hours.  Patient seen here several days ago for same symptoms was discharged home with thought that they were anxiety.  His anxiety certainly may be playing a role as patient's son recently passed away.  He is quite calm on my evaluation however and his symptoms have been persisting at home despite Ativan.  Unfortunately he does also have cardiac risk factors with age, tobacco use and has a history of prior cardiac procedures with no prior catheterization done.  He has had 2 very slightly elevated troponins at his prior visit and 1 slightly elevated when here although the time he seen a second 1 was done which is down trended.  His heart score is 4.  Given that patient has not had any prior stress test or cardiac  cath feel his risk is high enough of the possible cardiac disease contributing to his symptoms that he likely warrants admission for minimum stress test.  We will obtain a CTA to rule out dissection in the area of his prior thoracic aneurysm repair.    11:32 PM  CTA showed no evidence of dissection, patient accepted for admission by Dr. Disla, IM resident.      ADDITIONAL PROBLEM LIST    Chest pain  Anxiety    DISPOSITION AND DISCUSSIONS  I have discussed management of the patient with the following physicians and SHAUN's:  Dr. Disla, IM resident    Decision tools and prescription drugs considered including, but not limited to: HEART Score 4 .    Admitted to medicine in stable condition    FINAL DIAGNOSIS  1. Chest pain, unspecified type Acute             Electronically signed by: Farheen Sanchez M.D., 05/15/23 8:48 PM

## 2023-05-16 NOTE — H&P
History & Physical Note    Date of Admission: 5/16/2023  Admission Status: Emergency  [facility] Team: UNSOM  Attending: Farheen Sanchez M.D.   Senior Resident: Jd Disla M.D.  Contact Number: [phone]    Chief Complaint: Chest pain     History of Present Illness (HPI):   Noe samaniego a 62M presents with subacute typical chest pain; pmhx includes ascending aortic aneurysm (s/p repair 1/5/2023), bicuspid aortic valve (s/p repair 1/5/2023), atrial fibrillation (xarelto), opioid use in remission, GERD; admitted 5/15 for typical chest pain.    Recent death of son related to cancer. Has had crushing central chest pain with radiation to L arm, associated dyspnea, diaphoresis that lasts hours to days, over the past week, nearly every day. Pain is crushing, up to 6-7/10. No clear prodrome or improvement. Had aortic aneurysm repair and bioprosthetic aortic valve repair in January 2023, for which he did experience off/on chest pain since then.    Review of Systems:   Review of Systems   Constitutional:  Negative for chills, fever and malaise/fatigue.   Eyes:  Negative for blurred vision and double vision.   Respiratory:  Positive for shortness of breath. Negative for cough and sputum production.    Cardiovascular:  Positive for chest pain and palpitations. Negative for leg swelling.   Gastrointestinal:  Positive for abdominal pain and nausea. Negative for blood in stool, constipation, diarrhea and vomiting.   Genitourinary:  Negative for dysuria, frequency and urgency.   Musculoskeletal:  Positive for joint pain.   Neurological:  Negative for dizziness, focal weakness, weakness and headaches.   Psychiatric/Behavioral:  Negative for depression and suicidal ideas. The patient is nervous/anxious.          Past Medical History:   Past Medical History was reviewed with patient.   has a past medical history of Arrhythmia, Breath shortness, Cyclic vomiting syndrome, Elevated hemidiaphragm - LEFT post AVR (1/4/2023),  Fracture, Headache, classical migraine, Heart burn, Heart valve disease, Indigestion, Pneumonia due to infectious organism (1/20/2023), and Snoring.    He has no past medical history of Anesthesia, Asthma, Cancer (HCC), Carcinoma in situ of respiratory system, Cataract, Cold, Coughing blood, Dental disorder, Disorder of thyroid, Encounter for renal dialysis, Glaucoma, Heart murmur, Hemorrhagic disorder (HCC), Hepatitis A, Hepatitis B, Hepatitis C, Hiatus hernia syndrome, High cholesterol, Hypertension, Myocardial infarct (HCC), Pacemaker, Personal history of venous thrombosis and embolism, Psychiatric problem, Rheumatic fever, Seizure (HCC), Sleep apnea, Stroke (HCC), Tuberculosis, Urinary bladder disorder, or Urinary incontinence.    Past Surgical History: Past Surgical History was reviewed with patient.   has a past surgical history that includes shoulder arthroscopy; shoulder hemicap resurfacing (Right, 10/12/2015); irrigation & debridement ortho (Right, 09/19/2017); shoulder surgery; aortic valve replacement (1/5/2023); aortic ascending dissection (1/5/2023); and echocardiogram, transesophageal, intraoperative (1/5/2023).    Medications: Medications have been reviewed with patient.  Prior to Admission Medications   Prescriptions Last Dose Informant Patient Reported? Taking?   Empagliflozin (JARDIANCE) 10 MG Tab tablet   No No   Sig: Take 1 Tablet by mouth every day.   LORazepam (ATIVAN) 1 MG Tab   No No   Sig: Take 1 Tablet by mouth every 6 hours as needed for Anxiety for up to 5 days.   QUEtiapine (SEROQUEL) 25 MG Tab   No No   Sig: Take 1 Tablet by mouth 2 times a day.   ferrous sulfate 325 (65 Fe) MG tablet   Yes No   Sig: Take 325 mg by mouth every day.   furosemide (LASIX) 20 MG Tab   Yes No   Sig: Take 20 mg by mouth every day.   metoprolol SR (TOPROL XL) 50 MG TABLET SR 24 HR   No No   Sig: Take 1 Tablet by mouth every day.   nitroGLYCERIN (NITROSTAT) 0.3 MG SL tablet   Yes No   Sig: Place 0.3 mg under  the tongue every 5 minutes as needed for Chest Pain.   ondansetron (ZOFRAN ODT) 4 MG TABLET DISPERSIBLE   Yes No   Sig: Take 4 mg by mouth every 6 hours as needed for Nausea/Vomiting.   ondansetron (ZOFRAN ODT) 4 MG TABLET DISPERSIBLE   No No   Sig: Take 1 Tablet by mouth every 6 hours as needed for Nausea/Vomiting.   pantoprazole (PROTONIX) 40 MG Tablet Delayed Response   Yes No   Sig: Take 40 mg by mouth every day.   rivaroxaban (XARELTO) 20 MG Tab tablet   No No   Sig: Take 1 Tablet by mouth with dinner.      Facility-Administered Medications: None        Allergies: Allergies have been reviewed with patient.  Allergies   Allergen Reactions    Morphine Rash     Rash/ difficulty breathing    Ampicillin-Sulbactam Sodium      Rash to upper torso - appears to be related to Unasyn new start (Jan 2023)       Family History:   Son: cancer  family history is not on file.     Social History:   Tobacco: 1/2ppd, estimated 20pk/year history   Alcohol: quit around 2021   Recreational drugs (illegal and prescription):  past opioids, denies IVDU   Employment: previously factory tech  Activity Level: walking, light exercises   Living situation:  [facility] facility  Primary Care Provider: reviewed EFRAÍN Hewitt  Other (stressors, spirituality, exposures):  death of son to cancer  Physical Exam:   Vitals:  Temp:  [36.8 °C (98.2 °F)] 36.8 °C (98.2 °F)  Pulse:  [55-80] 56  Resp:  [14-19] 19  BP: (105-120)/(68-86) 111/71  SpO2:  [94 %-95 %] 94 %    Physical Exam  Constitutional:       General: He is in acute distress.      Appearance: He is not ill-appearing or diaphoretic.   HENT:      Head: Normocephalic and atraumatic.      Mouth/Throat:      Mouth: Mucous membranes are moist.      Pharynx: Oropharynx is clear.   Eyes:      Extraocular Movements: Extraocular movements intact.      Pupils: Pupils are equal, round, and reactive to light.   Cardiovascular:      Rate and Rhythm: Normal rate and regular rhythm.       Heart sounds: No murmur heard.     No friction rub. No gallop.   Pulmonary:      Breath sounds: No wheezing, rhonchi or rales.   Abdominal:      Comments: Generalised tenderness withOUT rebound or guarding, non-distended. No rash/lesions   Musculoskeletal:         General: Normal range of motion.      Cervical back: Normal range of motion.   Skin:     General: Skin is warm and dry.   Neurological:      General: No focal deficit present.      Mental Status: He is alert and oriented to person, place, and time.      Cranial Nerves: No cranial nerve deficit.      Sensory: No sensory deficit.   Psychiatric:         Behavior: Behavior normal.         Thought Content: Thought content normal.         Labs:   Recent Labs     05/13/23  2338 05/15/23  1619   WBC 11.7* 13.6*   RBC 7.12* 7.34*   HEMOGLOBIN 15.1 15.6   HEMATOCRIT 51.8 53.5*   MCV 72.8* 72.9*   MCH 21.2* 21.3*   RDW 57.0* 57.0*   PLATELETCT 316 385   NEUTSPOLYS 73.40* 75.90*   LYMPHOCYTES 13.50* 9.50*   MONOCYTES 9.40 10.50   EOSINOPHILS 2.40 2.50   BASOPHILS 0.60 0.70   RBCMORPHOLO Present Present     Lab Results   Component Value Date/Time    SODIUM 141 05/15/2023 04:19 PM    POTASSIUM 4.3 05/15/2023 04:19 PM    CHLORIDE 108 05/15/2023 04:19 PM    CO2 20 05/15/2023 04:19 PM    GLUCOSE 88 05/15/2023 04:19 PM    BUN 22 05/15/2023 04:19 PM    CREATININE 1.00 05/15/2023 04:19 PM    BUNCREATRAT 11.5 03/05/2023 04:17 AM          EKG: Per my read, normal sinus, no ST or T wave abnormality.     Imaging:   CTA abdomen 5/15:  1.  No aortic aneurysm or dissection identified.  2.  Linear density in the left lung base favoring atelectasis.  3.  Asymmetric elevation of the left hemidiaphragm  4.  Diverticulosis  5.  Hepatomegaly  6.  Atherosclerosis      Previous Data Review: reviewed    Problem Representation:     Noe samaniego a 62M presents with subacute typical chest pain; pmhx includes ascending aortic aneurysm (s/p repair 1/5/2023), bicuspid aortic valve (s/p repair  1/5/2023), atrial fibrillation (xarelto), opioid use in remission, GERD; admitted 5/15 for typical chest pain.    * Chest pain- (present on admission)  Assessment & Plan  The ASCVD Risk score (Chuckie SAMANO, et al., 2019) failed to calculate for the following reasons:    The patient has a prior MI or stroke diagnosis      Recurrent chest pain with typical features: central crushing chest pain radiating to L arm, with associated nausea and diaphoresis, shortness of breath. Lasting duration of day, unremitting. Denies past MI/CVA history, though with ascending aortic repair and bioprosthetic valve placement. Stressor events of recent death of son, currently staying in rehab facility.  -Troponin normal/borderline elevated, stable from previous day lab results  -treadmill stress test in AM  -NPO midnight  -HOLD toprol 50mg PO qD  -consideration of cardiology consultation    Chronic heart failure with preserved ejection fraction (HCC)  Assessment & Plan  HF (EF 60%) with grade 1 diastolic dysfunction. NOT currently in exacerbation.  -Lasix 20mg PO qD  -HOLD toprol 50mg PO qD (per stress testing)  -Cardiology follow up as directed      Goals of care, counseling/discussion  Assessment & Plan  Discussed goals of care and code status, in setting of current presentation and in review of past medical history. Is not currently suicidal and has NO active thoughts nor planning. Has capacity to make medical decisions. Discussed for 4 minutes.   -DNR/DNI    Opioid use, unspecified, in remission  Assessment & Plan  Prior opioid usage, now in remission. Currently in treatment at [facility]. Requesting specifically ativan and dilaudid, with recent stressor events.  -Cautious use of opioid medications    Tobacco abuse- (present on admission)  Assessment & Plan  Current 1/2ppd tobacco use. Precontemplative stage of cessation. Offered cessation medications/NRT. Counseled for 3 minutes regarding use and cessation. Declines NRT at this  time.  -PCP follow up and additional tobacco cessation counseling    Paroxysmal atrial fibrillation (HCC)- (present on admission)  Assessment & Plan  Hx of atrial fibrillation, pending ablation procedure in June per patient report, sinus by EKG on presentation.  -telemetry  -HOLD Toprol 50mg PO qD (per stress testing)  -xarelto 20mg PO qD      Leukocytosis  Assessment & Plan  Chronic appearing leukocytosis. Unclear etiology.  -CBC with smear/microscopy

## 2023-05-16 NOTE — ASSESSMENT & PLAN NOTE
Prior opioid usage, now in remission. Currently in treatment at [facility]. Requesting specifically ativan and dilaudid, with recent stressor events.  -Cautious use of opioid medications

## 2023-05-16 NOTE — ED NOTES
Ambulated to room steady gait. Labs previously collected while waiting in [facility]. Agree with triage note. Pt add that he had episode of emesis x 2 while in lobby. Placed on monitor hr 61 sr

## 2023-05-16 NOTE — ASSESSMENT & PLAN NOTE
Hx of atrial fibrillation, pending ablation procedure in June per patient report, sinus by EKG on presentation.  -telemetry  -HOLD Toprol 50mg PO qD (per stress testing)  -xarelto 20mg PO qD

## 2023-05-16 NOTE — PROGRESS NOTES
4 Eyes Skin Assessment Completed by ALLEN Hatfield and ALLEN Jacob.    Head WDL  Ears WDL  Nose WDL  Mouth WDL  Neck WDL  Breast/Chest WDL  Shoulder Blades WDL  Spine WDL  (R) Arm/Elbow/Hand WDL  (L) Arm/Elbow/Hand WDL  Abdomen WDL  Groin WDL  Scrotum/Coccyx/Buttocks WDL  (R) Leg WDL  (L) Leg WDL  (R) Heel/Foot/Toe WDL  (L) Heel/Foot/Toe WDL          Devices In Places Tele Box and Pulse Ox      Interventions In Place Pillows    Possible Skin Injury No    Pictures Uploaded Into Epic N/A  Wound Consult Placed N/A  RN Wound Prevention Protocol Ordered No     Scar to midline chest    
Noe Starks Edelmira has chosen to leave the hospital against medical advice. The attending physician has not discharged the patient. The provider is aware that the patient is leaving against medical advice. Patient expresses understanding of the risks of leaving the hospital and benefits of admission including but not limited to, the availability and proximity of nurses, physicians, monitoring, diagnostic testing, treatment, and a safe discharge plan. The patient had the opportunity to ask questions about their medical condition and recommended treatment.  Patient is aware that they may return for care at any time.  Iv removed cath intact.  MD notified of decision.  
[facility]  Infectious Disease  [address] [place] [id number]  Phone: [phone]  Fax:   Follow Up Time:

## 2023-05-16 NOTE — DISCHARGE SUMMARY
Discharge Summary    Date of Admission: 5/15/2023  Date of Discharge: 5/16/2023  3:50 AM  Discharging Attending: Nelly Spence MD  Discharging Senior Resident: Dr. Jd Disla MD    CHIEF COMPLAINT ON ADMISSION  Chief Complaint   Patient presents with    Chest Pain     Pt was laying down 1 hour ago, c/o 7/10 sharp chest pain/squeezing pain down L arm + SOB, nausea & diaphoresis. Still ongoing. C/o feeling dizzy. IV started with REMSA, given 325mg ASA PTA.    Anxiety     Pt recently lost his son on May 13th, presented to ED for similar episode. States his CP today started after feeling anxious. Pt trying to slow his breathing in triage.        Reason for Admission  Chest pain    Admission Date  5/15/2023    CODE STATUS  DNAR/DNI    HPI & HOSPITAL COURSE  Noe is a 62M presents with subacute typical chest pain; pmhx includes ascending aortic aneurysm (s/p repair 1/5/2023), bicuspid aortic valve (s/p repair 1/5/2023), atrial fibrillation (xarelto), opioid use in remission, GERD; admitted 5/15 for typical chest pain.    Was admitted for chest pain, to undergo stress testing in morning. Had made multiple requests for specifically named medications--dilaudid and ativan. After having received limited dosing due to concerns of limited indication, elected to leave prior to completion of evaluation.    #Chest pain  Significant cardiac risk factors including recent (January 2023) ascending aortic and aortic valve repair, atrial fibrillation. Chest pain had some typical features, but chronicity less suggestive for acute coronary syndrome, considering normal/stable cardiac enzymes over time course of symptoms with multiple evaluations.  -Toprol xl 50mg PO qD  -xarelto 20mg PO qD  -PCP follow up for consideration of treadmill stress testing    #polysubstance use  Current tobacco usage of 1/2ppd, opioid use (in remission). High risk for recurrence of substance use disorder, requesting medications by name, in setting of  stressor event.  -PCP follow up for cessation counseling    #leukocytosis  Chronic appearing, withOUT infectious source nor suspect medication.  -PCP follow up for consideration of peripheral smear testing, repeat CBC    Therefore, he is discharged in fair and stable condition against medcial advice.    The patient recovered much more quickly than anticipated on admission.    PHYSICAL EXAM ON DISCHARGE  Temp:  [36.5 °C (97.7 °F)-36.8 °C (98.2 °F)] 36.5 °C (97.7 °F)  Pulse:  [55-80] 59  Resp:  [14-19] 18  BP: (105-120)/(68-86) 113/79  SpO2:  [94 %-97 %] 97 %    Physical Exam    GEN: well appearing, no acute distress  CV: RRR, no m/g/r  Pulm: CTAB, no rales, wheeze, crackles  Abd: soft, nontender, nondistended  Extremities: shoulder/elbow flexion/extension 5/5 bilaterally, knee flexion/extension 5/5 bilaterally, no peripheral edema  Neuro: CN II-XII intact, no focal deficits, Aox4  Psych: Denies any SI/HI morning of discharge    Discharge Date  5/16/2023    FOLLOW UP ITEMS POST DISCHARGE  PCP for consideration of treadmill stress testing, CBC with peripheral smear, substance use counseling    DISCHARGE DIAGNOSES  Principal Problem:    Chest pain (POA: Yes)  Active Problems:    Leukocytosis (POA: Unknown)    Paroxysmal atrial fibrillation (HCC) (POA: Yes)    Tobacco abuse (POA: Yes)    Opioid use, unspecified, in remission (POA: Unknown)    Goals of care, counseling/discussion (POA: Unknown)    Chronic heart failure with preserved ejection fraction (HCC) (POA: Unknown)  Resolved Problems:    * No resolved hospital problems. *      FOLLOW UP  Future Appointments   Date Time Provider Department Center   5/16/2023  2:20 PM Victor Manuel Chaney M.D. [facility] None   5/22/2023 10:00 AM PULMONARY FUNCTION LAB [facility] None   7/25/2023  1:00 PM [facility] CATH LAB PORTABLE ECHO [facility]   7/25/2023  1:30 PM [facility] CATH LAB 3 CLOT None   10/3/2023  1:15 PM EFRAÍN Rodriguez [facility] None     No follow-up provider  specified.    MEDICATIONS ON DISCHARGE     Medication List        ASK your doctor about these medications        Instructions   ferrous sulfate 325 (65 Fe) MG tablet   Take 325 mg by mouth every day.  Dose: 325 mg     furosemide 20 MG Tabs  Commonly known as: LASIX   Take 20 mg by mouth every day.  Dose: 20 mg     LORazepam 1 MG Tabs  Commonly known as: ATIVAN   Take 1 Tablet by mouth every 6 hours as needed for Anxiety for up to 5 days.  Dose: 1 mg     metoprolol SR 50 MG Tb24  Commonly known as: TOPROL XL   Take 1 Tablet by mouth every day.  Dose: 50 mg     nitroGLYCERIN 0.3 MG SL tablet  Commonly known as: NITROSTAT   Place 0.3 mg under the tongue every 5 minutes as needed for Chest Pain.  Dose: 0.3 mg     ondansetron 4 MG Tbdp  Commonly known as: ZOFRAN ODT  Ask about: Which instructions should I use?   Take 4 mg by mouth every 6 hours as needed for Nausea/Vomiting.  Dose: 4 mg     pantoprazole 40 MG Tbec  Commonly known as: PROTONIX   Take 40 mg by mouth every day.  Dose: 40 mg     QUEtiapine 25 MG Tabs  Commonly known as: Seroquel   Take 1 Tablet by mouth 2 times a day.  Dose: 25 mg     rivaroxaban 20 MG Tabs tablet  Commonly known as: Xarelto   Take 1 Tablet by mouth with dinner.  Dose: 20 mg              Allergies  Allergies   Allergen Reactions    Morphine Rash     Rash/ difficulty breathing    Ampicillin-Sulbactam Sodium      Rash to upper torso - appears to be related to Unasyn new start (Jan 2023)       DIET  Orders Placed This Encounter   Procedures    Diet NPO Restrict to: Sips with Medications     Standing Status:   Standing     Number of Occurrences:   1     Order Specific Question:   Diet NPO Restrict to:     Answer:   Sips with Medications [3]       ACTIVITY  As tolerated.  Weight bearing as tolerated    CONSULTATIONS  N/a    PROCEDURES  N/a

## 2023-05-16 NOTE — ASSESSMENT & PLAN NOTE
Current 1/2ppd tobacco use. Precontemplative stage of cessation. Offered cessation medications/NRT. Counseled for 3 minutes regarding use and cessation. Declines NRT at this time.  -PCP follow up and additional tobacco cessation counseling

## 2023-05-16 NOTE — ASSESSMENT & PLAN NOTE
Discussed goals of care and code status, in setting of current presentation and in review of past medical history. Is not currently suicidal and has NO active thoughts nor planning. Has capacity to make medical decisions. Discussed for 4 minutes.   -DNR/DNI

## 2023-05-16 NOTE — ED NOTES
Pt ambulated up to the  with complaints of worsening pain and discomfort. Pt concerned over wait time, triage process was explained to patient along with wait time. Apologized for the wait time.

## 2023-05-19 ENCOUNTER — APPOINTMENT (OUTPATIENT)
Dept: ADMISSIONS | Facility: MEDICAL CENTER | Age: 63
End: 2023-05-19
Attending: INTERNAL MEDICINE
Payer: MEDICAID

## 2023-05-22 ENCOUNTER — APPOINTMENT (OUTPATIENT)
Dept: PULMONOLOGY | Facility: MEDICAL CENTER | Age: 63
End: 2023-05-22
Attending: INTERNAL MEDICINE
Payer: MEDICAID

## 2023-05-23 ENCOUNTER — HOSPITAL ENCOUNTER (OUTPATIENT)
Dept: RADIOLOGY | Facility: MEDICAL CENTER | Age: 63
End: 2023-05-23
Attending: NURSE PRACTITIONER
Payer: MEDICAID

## 2023-05-23 DIAGNOSIS — R11.2 NAUSEA AND VOMITING, UNSPECIFIED VOMITING TYPE: ICD-10-CM

## 2023-05-23 PROCEDURE — 78227 HEPATOBIL SYST IMAGE W/DRUG: CPT

## 2023-05-24 ENCOUNTER — PATIENT MESSAGE (OUTPATIENT)
Dept: CARDIOLOGY | Facility: MEDICAL CENTER | Age: 63
End: 2023-05-24
Payer: MEDICAID

## 2023-05-26 NOTE — PATIENT COMMUNICATION
MC: Do you have any further recommendations? Thx!    CYNDI Callahan.  You 1 minute ago (1:28 PM)       I don't think so. Sounds like he has had no more of these episodes. Patient needs to notify our office if he has more of these blurry vision episodes as it could possibly be from arrhyhtmia. Please follow up with Dr. Webster next week to see if he has any further recommendations.

## 2023-05-28 NOTE — TELEPHONE ENCOUNTER
He is scheduled for ablation in 2 months, can let him known we can move it up if we have availability.

## 2023-05-30 ENCOUNTER — OFFICE VISIT (OUTPATIENT)
Dept: CARDIOLOGY | Facility: MEDICAL CENTER | Age: 63
End: 2023-05-30
Attending: NURSE PRACTITIONER
Payer: MEDICAID

## 2023-05-30 VITALS
RESPIRATION RATE: 12 BRPM | BODY MASS INDEX: 27.4 KG/M2 | HEART RATE: 93 BPM | SYSTOLIC BLOOD PRESSURE: 138 MMHG | DIASTOLIC BLOOD PRESSURE: 92 MMHG | OXYGEN SATURATION: 98 % | WEIGHT: 185 LBS | HEIGHT: 69 IN

## 2023-05-30 DIAGNOSIS — I48.0 PAROXYSMAL ATRIAL FIBRILLATION (HCC): ICD-10-CM

## 2023-05-30 DIAGNOSIS — Z95.2 S/P AVR: ICD-10-CM

## 2023-05-30 DIAGNOSIS — R42 DIZZY: ICD-10-CM

## 2023-05-30 DIAGNOSIS — I50.32 CHRONIC HEART FAILURE WITH PRESERVED EJECTION FRACTION (HCC): ICD-10-CM

## 2023-05-30 DIAGNOSIS — Z86.79 STATUS POST ASCENDING AORTIC ANEURYSM REPAIR: ICD-10-CM

## 2023-05-30 DIAGNOSIS — I48.92 PAROXYSMAL ATRIAL FLUTTER (HCC): ICD-10-CM

## 2023-05-30 DIAGNOSIS — D68.69 SECONDARY HYPERCOAGULABLE STATE (HCC): ICD-10-CM

## 2023-05-30 DIAGNOSIS — Z98.890 STATUS POST ASCENDING AORTIC ANEURYSM REPAIR: ICD-10-CM

## 2023-05-30 DIAGNOSIS — R20.2 NUMBNESS AND TINGLING OF LEFT LOWER EXTREMITY: ICD-10-CM

## 2023-05-30 DIAGNOSIS — R20.0 NUMBNESS AND TINGLING OF LEFT LOWER EXTREMITY: ICD-10-CM

## 2023-05-30 DIAGNOSIS — I25.10 NONOBSTRUCTIVE ATHEROSCLEROSIS OF CORONARY ARTERY: ICD-10-CM

## 2023-05-30 PROCEDURE — 3075F SYST BP GE 130 - 139MM HG: CPT | Performed by: NURSE PRACTITIONER

## 2023-05-30 PROCEDURE — 99214 OFFICE O/P EST MOD 30 MIN: CPT | Performed by: NURSE PRACTITIONER

## 2023-05-30 PROCEDURE — 3080F DIAST BP >= 90 MM HG: CPT | Performed by: NURSE PRACTITIONER

## 2023-05-30 PROCEDURE — 99212 OFFICE O/P EST SF 10 MIN: CPT | Performed by: NURSE PRACTITIONER

## 2023-05-30 RX ORDER — HYDROXYZINE HYDROCHLORIDE 25 MG/1
25-50 TABLET, FILM COATED ORAL NIGHTLY PRN
COMMUNITY
Start: 2023-05-12

## 2023-05-30 RX ORDER — CHLORPROMAZINE HYDROCHLORIDE 50 MG/1
50 TABLET, FILM COATED ORAL
Status: ON HOLD | COMMUNITY
Start: 2023-05-12 | End: 2023-06-17

## 2023-05-30 ASSESSMENT — FIBROSIS 4 INDEX: FIB4 SCORE: 0.56

## 2023-05-30 NOTE — PROGRESS NOTES
Cardiology Clinic Follow-up Note    Date of note:    5/30/2023  Primary Care Provider: EFRAÍN Hewitt    Name:             Noe Hickey  YOB: 1960  MRN:               [id number]    CC: dizziness     Primary Cardiologist: Dr Chaney and Dr. Webster    Patient HPI:   Noe Hickey is a 62 y.o. male with current medical problems including bicuspid aortic valve s/p AVR and aortic aneurysm repair on 1/5/23 and atrial flutter and fibrillation    Interim History:  Mr. Hickey was last seen in this cardiology office by Dr Chaney on 4/4/3/23 and then Dr Webster on 5/3/23.  He is currently waiting to undergo A-fib ablation in July for PAF.  Amio was stopped by Dr Webster as patient felt this was causing intolerance symptoms.  Was feeling dizzy, related to low blood sugar, feels that this has improved with increasing oral intake with smaller more frequent meals.  C/o of left foot numbness tingling pain, is at night, no worse with ambulation during the day.    Patient endorses medication compliance, questioning if we can reduce or eliminate some at this point.    Review of systems:  All others systems reviewed and negative except for what is outlined in the above HPI    Past Medical History:   Diagnosis Date    Arrhythmia     Breath shortness     Cyclic vomiting syndrome     Elevated hemidiaphragm - LEFT post AVR 1/4/2023    Fracture     Headache, classical migraine     Heart burn     Heart valve disease     Indigestion     Pneumonia due to infectious organism 1/20/2023    Snoring      Past Surgical History:   Procedure Laterality Date    AORTIC VALVE REPLACEMENT  1/5/2023    Procedure: AORTIC VALVE REPLACEMENT, ASCENDING AORTIC ANEURYSM REPAIR AND TRANSESOPHAGEAL ECHOCARDIOGRAM.;  Surgeon: Serena Goyal M.D.;  Location: SURGERY [facility];  Service: Cardiac    AORTIC ASCENDING DISSECTION  1/5/2023    Procedure: REPAIR, ANEURYSM OR DISSECTION, AORTA, ASCENDING;  Surgeon: Serena Goyal  M.D.;  Location: SURGERY [facility];  Service: Cardiac    ECHOCARDIOGRAM, TRANSESOPHAGEAL, INTRAOPERATIVE  2023    Procedure: ECHOCARDIOGRAM, TRANSESOPHAGEAL, INTRAOPERATIVE.;  Surgeon: Serena Goyal M.D.;  Location: SURGERY [facility];  Service: Cardiac    IRRIGATION & DEBRIDEMENT ORTHO Right 2017    Procedure: IRRIGATION & DEBRIDEMENT ORTHO-THIGH;  Surgeon: Corbin Rivera M.D.;  Location: SURGERY [facility];  Service: Orthopedics    SHOULDER HEMICAP RESURFACING Right 10/12/2015    Procedure: RIGHT SHOULDER RESURFACING;  Surgeon: Javon Doll M.D.;  Location: SURGERY [facility];  Service:     SHOULDER ARTHROSCOPY      x3    SHOULDER SURGERY      replacement right     History reviewed. No pertinent family history.  Social History     Socioeconomic History    Marital status:      Spouse name: Not on file    Number of children: Not on file    Years of education: Not on file    Highest education level: Not on file   Occupational History    Not on file   Tobacco Use    Smoking status: Every Day     Packs/day: 0.50     Years: 40.00     Pack years: 20.00     Types: Cigarettes     Last attempt to quit: 1/3/2023     Years since quittin.4    Smokeless tobacco: Never    Tobacco comments:     Down to 5 cig daily   Vaping Use    Vaping Use: Never used   Substance and Sexual Activity    Alcohol use: No    Drug use: Not Currently     Comment: past problems with narcotics     Sexual activity: Not on file   Other Topics Concern    Not on file   Social History Narrative    Not on file     Social Determinants of Health     Financial Resource Strain: Not on file   Food Insecurity: Not on file   Transportation Needs: Not on file   Physical Activity: Not on file   Stress: Not on file   Social Connections: Not on file   Intimate Partner Violence: Not on file   Housing Stability: Not on file     Allergies   Allergen Reactions    Morphine Rash     Rash/ difficulty breathing     Current Outpatient  "Medications   Medication Sig Dispense Refill    chlorproMAZINE (THORAZINE) 25 MG Tab       hydrOXYzine HCl (ATARAX) 25 MG Tab       pantoprazole (PROTONIX) 40 MG Tablet Delayed Response Take 40 mg by mouth every day.      ferrous sulfate 325 (65 Fe) MG tablet Take 325 mg by mouth every day.      nitroGLYCERIN (NITROSTAT) 0.3 MG SL tablet Place 0.3 mg under the tongue every 5 minutes as needed for Chest Pain.      ondansetron (ZOFRAN ODT) 4 MG TABLET DISPERSIBLE Take 4 mg by mouth every 6 hours as needed for Nausea/Vomiting.      metoprolol SR (TOPROL XL) 50 MG TABLET SR 24 HR Take 1 Tablet by mouth every day. 90 Tablet 3    rivaroxaban (XARELTO) 20 MG Tab tablet Take 1 Tablet by mouth with dinner. 90 Tablet 3     No current facility-administered medications for this visit.       Physical Exam:  Ambulatory Vitals  BP (!) 138/92 (BP Location: Left arm, Patient Position: Sitting, BP Cuff Size: Adult)   Pulse 93   Resp 12   Ht 1.753 m (5' 9\")   Wt 83.9 kg (185 lb)   SpO2 98%    BP Readings from Last 4 Encounters:   05/30/23 (!) 138/92   05/16/23 113/79   05/14/23 118/73   05/13/23 120/78       Weight/BMI: Body mass index is 27.32 kg/m².  Wt Readings from Last 4 Encounters:   05/30/23 83.9 kg (185 lb)   05/16/23 79.5 kg (175 lb 4.3 oz)   05/13/23 79.4 kg (175 lb)   05/13/23 79.6 kg (175 lb 6.4 oz)       General: No apparent distress. Well nourished.   Neck: No JVD. No caroid bruits, trachea midline  Lungs: CTAB. Normal effort, without crackles/rhonchi, no wheezing  Heart: RRR. Normal S1/S2, no murmur, no rub. no lower extremity edema. 2+ radial pulses, 2+ DT pulses  Ext: No clubbing or cyanosis.  Abdomen: soft, non tender, non distended, no alan hepatomegaly.  Neurological: No focal deficits, no facial asymmetry.  Normal speech.  Psychiatric: Appropriate affect, alert and oriented x 4.   Skin: Warm and dry, no rash.    Lab Data Review:  Lab Results   Component Value Date/Time    TRIGLYCERIDE 89 01/31/2023 02:55 AM "       Lab Results   Component Value Date/Time    SODIUM 141 2023 12:03 AM    SODIUM 140 2023 02:22 AM    POTASSIUM 3.9 2023 12:03 AM    POTASSIUM 4.7 2023 02:22 AM    CHLORIDE 113 (H) 2023 12:03 AM    CHLORIDE 104 2023 02:22 AM    CO2 23.0 2023 12:03 AM    CO2 24 2023 02:22 AM    GLUCOSE 98 2023 12:03 AM    GLUCOSE 88 2023 02:22 AM    BUN 30.0 (H) 2023 12:03 AM    BUN 22 2023 02:22 AM    CREATININE 1.1 2023 12:03 AM    CREATININE 1.09 2023 02:22 AM    BUNCREATRAT 27.3 (H) 2023 12:03 AM     Lab Results   Component Value Date/Time    ALKPHOSPHAT 48 2023 12:03 AM    ALKPHOSPHAT 56 2023 02:22 AM    ASTSGOT 16 2023 12:03 AM    ASTSGOT 19 2023 02:22 AM    ALTSGPT 21 2023 12:03 AM    ALTSGPT 24 2023 02:22 AM    TBILIRUBIN 0.2 2023 12:03 AM    TBILIRUBIN 0.4 2023 02:22 AM      Lab Results   Component Value Date/Time    WBC 11.50 (H) 2023 12:03 AM    WBC 13.6 (H) 05/15/2023 04:19 PM       Cardiac Imaging and Procedures Review:      EKG 5/15/23: My Personal interpretation reveals SR 72    Echo 23:  CONCLUSIONS  Normal left ventricular systolic function.  The left ventricular ejection fraction is visually estimated to be 55-  60%.  Grade I diastolic dysfunction.  The right ventricle is moderately dilated with reduced right   ventricular systolic function.  Estimated right ventricular systolic pressure is 25 mmHg.  Known bioprosthetic aortic valve that is functioning normally with   minimally elevated transvalvular gradients.  Mild pulmonic insufficiency.  Normal inferior vena cava size and inspiratory collapse.     Compared to the prior study on 2023, left ventricular systolic   function has improved.      Assessment and Clinical Decision Makin. Chronic heart failure with preserved ejection fraction (HCC)        2. Ascending aortic aneurysm repair        3. Bioprosthetic  aortic valve for severe bicuspid aortic valve stenosis - 1/5/2023        4. Paroxysmal atrial fibrillation (HCC)        5. Paroxysmal atrial flutter (HCC)        6. Secondary hypercoagulable state (HCC)        7. Nonobstructive atherosclerosis of coronary artery        8. Dizzy          The following treatment plan was discussed    S/p bioprosthetic AV  Chronic heart failure with preserved EF  -Patient is completely asymptomatic with any heart failure type symptoms, increasing his current gym regimen and feeling well  -Dizziness and blurred vision/lightheadedness dissipated after he changed frequency of small meals  -Okay to stop furosemide, monitor for increase in weights, lower extremity swelling, increased PENN    PAF  Secondary hypercoagulable state  -Upcoming ablation  -Continue current Xarelto    Lower extremity numbness, left foot  -Discussed further work-up with PCP  -Most likely etiology neuropathy, possibly secondary to amiodarone use although patient was only on for short-term    Plan reviewed in detail with the patient, verbalizes understanding and is in agreement.  Pt is to follow up with   Future Appointments   Date Time Provider Department Center   7/3/2023  8:30 AM [facility] RN TELE 2 [facility] None   7/25/2023  1:00 PM [facility] C[facility] LAB PORTABLE ECHO [facility]   7/25/2023  1:30 PM [facility] CATH LAB 3 CLOT None   10/3/2023  1:15 PM EFRAÍN Rodriguez None       Encouraged Pt to follow up with us over the phone or electronically using my MyChart as cardiac issues/concerns arise.      PLEASE NOTE: This dictation was created using voice recognition software. I have made every reasonable attempt to correct obvious errors, but I expect that there are errors of grammar and possibly content that I did not discover before finalizing the note.       THANH Salgado.AILYN.   [facility]  [phone]

## 2023-05-31 ENCOUNTER — APPOINTMENT (OUTPATIENT)
Dept: RADIOLOGY | Facility: MEDICAL CENTER | Age: 63
End: 2023-05-31
Attending: EMERGENCY MEDICINE
Payer: MEDICAID

## 2023-05-31 ENCOUNTER — HOSPITAL ENCOUNTER (EMERGENCY)
Facility: MEDICAL CENTER | Age: 63
End: 2023-05-31
Attending: EMERGENCY MEDICINE
Payer: MEDICAID

## 2023-05-31 VITALS
BODY MASS INDEX: 27.4 KG/M2 | RESPIRATION RATE: 20 BRPM | WEIGHT: 185 LBS | HEART RATE: 75 BPM | DIASTOLIC BLOOD PRESSURE: 59 MMHG | TEMPERATURE: 98.2 F | HEIGHT: 69 IN | OXYGEN SATURATION: 96 % | SYSTOLIC BLOOD PRESSURE: 128 MMHG

## 2023-05-31 DIAGNOSIS — R07.89 OTHER CHEST PAIN: ICD-10-CM

## 2023-05-31 LAB
ALBUMIN SERPL BCP-MCNC: 3.5 G/DL (ref 3.2–4.9)
ALBUMIN/GLOB SERPL: 1.3 G/DL
ALP SERPL-CCNC: 51 U/L (ref 30–99)
ALT SERPL-CCNC: 22 U/L (ref 2–50)
ANION GAP SERPL CALC-SCNC: 10 MMOL/L (ref 7–16)
ANISOCYTOSIS BLD QL SMEAR: ABNORMAL
AST SERPL-CCNC: 25 U/L (ref 12–45)
BASOPHILS # BLD AUTO: 0.6 % (ref 0–1.8)
BASOPHILS # BLD: 0.09 K/UL (ref 0–0.12)
BILIRUB SERPL-MCNC: <0.2 MG/DL (ref 0.1–1.5)
BUN SERPL-MCNC: 25 MG/DL (ref 8–22)
BURR CELLS BLD QL SMEAR: NORMAL
CALCIUM ALBUM COR SERPL-MCNC: 9.4 MG/DL (ref 8.5–10.5)
CALCIUM SERPL-MCNC: 9 MG/DL (ref 8.5–10.5)
CHLORIDE SERPL-SCNC: 104 MMOL/L (ref 96–112)
CO2 SERPL-SCNC: 24 MMOL/L (ref 20–33)
COMMENT 1642: NORMAL
CREAT SERPL-MCNC: 1.22 MG/DL (ref 0.5–1.4)
EKG IMPRESSION: NORMAL
EOSINOPHIL # BLD AUTO: 0.27 K/UL (ref 0–0.51)
EOSINOPHIL NFR BLD: 1.9 % (ref 0–6.9)
ERYTHROCYTE [DISTWIDTH] IN BLOOD BY AUTOMATED COUNT: 56.2 FL (ref 35.9–50)
GFR SERPLBLD CREATININE-BSD FMLA CKD-EPI: 66 ML/MIN/1.73 M 2
GLOBULIN SER CALC-MCNC: 2.6 G/DL (ref 1.9–3.5)
GLUCOSE SERPL-MCNC: 146 MG/DL (ref 65–99)
HCT VFR BLD AUTO: 46.1 % (ref 42–52)
HGB BLD-MCNC: 13.8 G/DL (ref 14–18)
IMM GRANULOCYTES # BLD AUTO: 0.16 K/UL (ref 0–0.11)
IMM GRANULOCYTES NFR BLD AUTO: 1.1 % (ref 0–0.9)
LYMPHOCYTES # BLD AUTO: 1.64 K/UL (ref 1–4.8)
LYMPHOCYTES NFR BLD: 11.6 % (ref 22–41)
MCH RBC QN AUTO: 21.6 PG (ref 27–33)
MCHC RBC AUTO-ENTMCNC: 29.9 G/DL (ref 32.3–36.5)
MCV RBC AUTO: 72 FL (ref 81.4–97.8)
MICROCYTES BLD QL SMEAR: ABNORMAL
MONOCYTES # BLD AUTO: 0.94 K/UL (ref 0–0.85)
MONOCYTES NFR BLD AUTO: 6.6 % (ref 0–13.4)
MORPHOLOGY BLD-IMP: NORMAL
NEUTROPHILS # BLD AUTO: 11.05 K/UL (ref 1.82–7.42)
NEUTROPHILS NFR BLD: 78.2 % (ref 44–72)
NRBC # BLD AUTO: 0 K/UL
NRBC BLD-RTO: 0 /100 WBC (ref 0–0.2)
OVALOCYTES BLD QL SMEAR: NORMAL
PLATELET # BLD AUTO: 488 K/UL (ref 164–446)
PLATELET BLD QL SMEAR: NORMAL
PMV BLD AUTO: 9.8 FL (ref 9–12.9)
POIKILOCYTOSIS BLD QL SMEAR: NORMAL
POLYCHROMASIA BLD QL SMEAR: NORMAL
POTASSIUM SERPL-SCNC: 4.2 MMOL/L (ref 3.6–5.5)
PROT SERPL-MCNC: 6.1 G/DL (ref 6–8.2)
RBC # BLD AUTO: 6.4 M/UL (ref 4.7–6.1)
RBC BLD AUTO: PRESENT
SODIUM SERPL-SCNC: 138 MMOL/L (ref 135–145)
TROPONIN T SERPL-MCNC: 17 NG/L (ref 6–19)
TROPONIN T SERPL-MCNC: 18 NG/L (ref 6–19)
WBC # BLD AUTO: 14.2 K/UL (ref 4.8–10.8)

## 2023-05-31 PROCEDURE — 93005 ELECTROCARDIOGRAM TRACING: CPT

## 2023-05-31 PROCEDURE — 700102 HCHG RX REV CODE 250 W/ 637 OVERRIDE(OP): Mod: UD | Performed by: EMERGENCY MEDICINE

## 2023-05-31 PROCEDURE — 99285 EMERGENCY DEPT VISIT HI MDM: CPT

## 2023-05-31 PROCEDURE — 71045 X-RAY EXAM CHEST 1 VIEW: CPT

## 2023-05-31 PROCEDURE — 36415 COLL VENOUS BLD VENIPUNCTURE: CPT

## 2023-05-31 PROCEDURE — 96374 THER/PROPH/DIAG INJ IV PUSH: CPT

## 2023-05-31 PROCEDURE — 700111 HCHG RX REV CODE 636 W/ 250 OVERRIDE (IP): Mod: UD | Performed by: EMERGENCY MEDICINE

## 2023-05-31 PROCEDURE — 93005 ELECTROCARDIOGRAM TRACING: CPT | Performed by: EMERGENCY MEDICINE

## 2023-05-31 PROCEDURE — A9270 NON-COVERED ITEM OR SERVICE: HCPCS | Mod: UD | Performed by: EMERGENCY MEDICINE

## 2023-05-31 PROCEDURE — 80053 COMPREHEN METABOLIC PANEL: CPT

## 2023-05-31 PROCEDURE — 84484 ASSAY OF TROPONIN QUANT: CPT | Mod: 91

## 2023-05-31 PROCEDURE — 85025 COMPLETE CBC W/AUTO DIFF WBC: CPT

## 2023-05-31 RX ORDER — HYDROMORPHONE HYDROCHLORIDE 1 MG/ML
1 INJECTION, SOLUTION INTRAMUSCULAR; INTRAVENOUS; SUBCUTANEOUS ONCE
Status: COMPLETED | OUTPATIENT
Start: 2023-05-31 | End: 2023-05-31

## 2023-05-31 RX ORDER — OXYCODONE HYDROCHLORIDE AND ACETAMINOPHEN 5; 325 MG/1; MG/1
1 TABLET ORAL ONCE
Status: COMPLETED | OUTPATIENT
Start: 2023-05-31 | End: 2023-05-31

## 2023-05-31 RX ADMIN — HYDROMORPHONE HYDROCHLORIDE 1 MG: 1 INJECTION, SOLUTION INTRAMUSCULAR; INTRAVENOUS; SUBCUTANEOUS at 20:48

## 2023-05-31 RX ADMIN — OXYCODONE HYDROCHLORIDE AND ACETAMINOPHEN 1 TABLET: 5; 325 TABLET ORAL at 23:02

## 2023-05-31 ASSESSMENT — PAIN DESCRIPTION - PAIN TYPE
TYPE: ACUTE PAIN

## 2023-05-31 ASSESSMENT — FIBROSIS 4 INDEX: FIB4 SCORE: 0.56

## 2023-06-01 NOTE — ED PROVIDER NOTES
ED Provider Note    Scribed for Rui Rene M.D. by Gay Celaya. 5/31/2023  8:20 PM    Primary care provider: EFRAÍN Hewitt  Means of arrival: EMS    CHIEF COMPLAINT  Chief Complaint   Patient presents with    Chest Pain     Started at appx 1700, midsternal chest pain 8/10 dull stabbing pain that radiates to L arm and neck. States dizziness, +nausea        HPI    Noe Hickey is a 62 y.o. male who presents to the Emergency Department via EMS of chest pain onset prior to arrival. The patient notes that while eating dinner he felt a sudden onset of 7/10 left sided chest pain that has been increasing severity. His pain is exacerbated with deep breaths. The patient notes the pain radiates to his left shoulder and neck. The patient endures associated symptoms of dizziness and nausea. He denies any PE, DVT, or blood clots. He notes having a stress test recently at [facility] or [place]. He notes he has not have a known heart attack. He adds he had a surgery in January fixing his valve flap for a high pulse. He notes having chest pain 5-6 times since this surgery. He denies high blood pressure. He notes he is on a blood thinner. He adds his son passed this month and is working on accepting this loss. The patient notes that he feels the medication the paramedics gave him did not help, it gave him a headache. There are no known alleviating or exacerbating factors.        EXTERNAL RECORDS REVIEWED  Review of records show a discharge summary from May 16 th where the patient came in for chest pain and anxiety. The patient lost his son on May 13 th. The patient had ascending thoracic aorta repair January 2023 and a bicuspid aortic valve replaced at the same time. The patient is AFIB on Xarelto. The patient is DNR/DNI. The patient has a history of opoid use and remission. The patient is a smoker. The patient has not had a stress test done at [facility]. The most recent echo showed an EF of 55-65% with  grade 1 diastolic dysfunction. The patient has a bioprosthetic aortic valve. On  at [facility] the patient was noted to have trivial atherosclerosis after a catheter was done. The patient also had a stress test done there on the  which showed a small area of basilar ischemia and an EF of 46% with global hypokinesia.         REVIEW OF SYSTEMS  Pertinent positives include: Chest pain, left shoulder pain, neck pain, dizziness, nausea, headache.       PAST MEDICAL HISTORY  Past Medical History:   Diagnosis Date    Arrhythmia     Breath shortness     Cyclic vomiting syndrome     Elevated hemidiaphragm - LEFT post AVR 2023    Fracture     Headache, classical migraine     Heart burn     Heart valve disease     Indigestion     Pneumonia due to infectious organism 2023    Snoring      SOCIAL HISTORY  Social History     Tobacco Use    Smoking status: Every Day     Packs/day: 0.50     Years: 40.00     Pack years: 20.00     Types: Cigarettes     Last attempt to quit: 1/3/2023     Years since quittin.4    Smokeless tobacco: Never    Tobacco comments:     Down to 5 cig daily   Vaping Use    Vaping Use: Never used   Substance Use Topics    Alcohol use: No    Drug use: Not Currently     Comment: past problems with narcotics      Social History     Substance and Sexual Activity   Drug Use Not Currently    Comment: past problems with narcotics        SURGICAL HISTORY  Past Surgical History:   Procedure Laterality Date    AORTIC VALVE REPLACEMENT  2023    Procedure: AORTIC VALVE REPLACEMENT, ASCENDING AORTIC ANEURYSM REPAIR AND TRANSESOPHAGEAL ECHOCARDIOGRAM.;  Surgeon: Serena Goyal M.D.;  Location: SURGERY [facility];  Service: Cardiac    AORTIC ASCENDING DISSECTION  2023    Procedure: REPAIR, ANEURYSM OR DISSECTION, AORTA, ASCENDING;  Surgeon: Serena Goyal M.D.;  Location: SURGERY [facility];  Service: Cardiac    ECHOCARDIOGRAM, TRANSESOPHAGEAL, INTRAOPERATIVE  2023     "Procedure: ECHOCARDIOGRAM, TRANSESOPHAGEAL, INTRAOPERATIVE.;  Surgeon: Serena Goyal M.D.;  Location: SURGERY [facility];  Service: Cardiac    IRRIGATION & DEBRIDEMENT ORTHO Right 09/19/2017    Procedure: IRRIGATION & DEBRIDEMENT ORTHO-THIGH;  Surgeon: Corbin Rivera M.D.;  Location: SURGERY [facility];  Service: Orthopedics    SHOULDER HEMICAP RESURFACING Right 10/12/2015    Procedure: RIGHT SHOULDER RESURFACING;  Surgeon: Javon Doll M.D.;  Location: SURGERY [facility];  Service:     SHOULDER ARTHROSCOPY      x3    SHOULDER SURGERY      replacement right       CURRENT MEDICATIONS  No current facility-administered medications for this encounter.    Current Outpatient Medications:     chlorproMAZINE (THORAZINE) 25 MG Tab, , Disp: , Rfl:     hydrOXYzine HCl (ATARAX) 25 MG Tab, , Disp: , Rfl:     pantoprazole (PROTONIX) 40 MG Tablet Delayed Response, Take 40 mg by mouth every day., Disp: , Rfl:     ferrous sulfate 325 (65 Fe) MG tablet, Take 325 mg by mouth every day., Disp: , Rfl:     nitroGLYCERIN (NITROSTAT) 0.3 MG SL tablet, Place 0.3 mg under the tongue every 5 minutes as needed for Chest Pain., Disp: , Rfl:     ondansetron (ZOFRAN ODT) 4 MG TABLET DISPERSIBLE, Take 4 mg by mouth every 6 hours as needed for Nausea/Vomiting., Disp: , Rfl:     metoprolol SR (TOPROL XL) 50 MG TABLET SR 24 HR, Take 1 Tablet by mouth every day., Disp: 90 Tablet, Rfl: 3    rivaroxaban (XARELTO) 20 MG Tab tablet, Take 1 Tablet by mouth with dinner., Disp: 90 Tablet, Rfl: 3    ALLERGIES  Allergies   Allergen Reactions    Morphine Rash     Rash/ difficulty breathing       PHYSICAL EXAM  VITAL SIGNS: /61   Pulse 90   Temp 36.7 °C (98 °F) (Temporal)   Resp (!) 26   Ht 1.753 m (5' 9\")   Wt 83.9 kg (185 lb)   SpO2 95%   BMI 27.32 kg/m²   Reviewed and tachypneic, afebrile, no hypoxia room air  Constitutional: Well developed, Well nourished,Elevated BMI.  HENT: Normocephalic, atraumatic, bilateral external ears " normal, No intraoral erythema, edema, exudate  Eyes: PERRLA, conjunctiva pink, no scleral icterus.   Cardiovascular: 1/6 systolic murmur in the base, Regular rate and rhythm. No rubs or gallops.  No dependent edema or calf tenderness  Respiratory: Lungs clear to auscultation bilaterally. No wheezes, rales, or rhonchi.  Abdominal:  Elevated BMI, Abdomen soft, non-tender, non distended. No rebound, or guarding.    Skin: No erythema, no rash. No wounds or bruising.  Genitourinary: No costovertebral angle tenderness.   Musculoskeletal: no deformities.   Neurologic: Alert & oriented x 3, cranial nerves 2-12 intact by passive exam.  No focal deficit noted.  Psychiatric: Affect anxious, Judgment normal, Mood normal.       LABS Ordered and Reviewed by Me:  Results for orders placed or performed during the hospital encounter of 05/31/23   CBC with Differential   Result Value Ref Range    WBC 14.2 (H) 4.8 - 10.8 K/uL    RBC 6.40 (H) 4.70 - 6.10 M/uL    Hemoglobin 13.8 (L) 14.0 - 18.0 g/dL    Hematocrit 46.1 42.0 - 52.0 %    MCV 72.0 (L) 81.4 - 97.8 fL    MCH 21.6 (L) 27.0 - 33.0 pg    MCHC 29.9 (L) 32.3 - 36.5 g/dL    RDW 56.2 (H) 35.9 - 50.0 fL    Platelet Count 488 (H) 164 - 446 K/uL    MPV 9.8 9.0 - 12.9 fL    Neutrophils-Polys 78.20 (H) 44.00 - 72.00 %    Lymphocytes 11.60 (L) 22.00 - 41.00 %    Monocytes 6.60 0.00 - 13.40 %    Eosinophils 1.90 0.00 - 6.90 %    Basophils 0.60 0.00 - 1.80 %    Immature Granulocytes 1.10 (H) 0.00 - 0.90 %    Nucleated RBC 0.00 0.00 - 0.20 /100 WBC    Neutrophils (Absolute) 11.05 (H) 1.82 - 7.42 K/uL    Lymphs (Absolute) 1.64 1.00 - 4.80 K/uL    Monos (Absolute) 0.94 (H) 0.00 - 0.85 K/uL    Eos (Absolute) 0.27 0.00 - 0.51 K/uL    Baso (Absolute) 0.09 0.00 - 0.12 K/uL    Immature Granulocytes (abs) 0.16 (H) 0.00 - 0.11 K/uL    NRBC (Absolute) 0.00 K/uL    Anisocytosis 1+     Microcytosis 1+    Complete Metabolic Panel (CMP)   Result Value Ref Range    Sodium 138 135 - 145 mmol/L    Potassium  4.2 3.6 - 5.5 mmol/L    Chloride 104 96 - 112 mmol/L    Co2 24 20 - 33 mmol/L    Anion Gap 10.0 7.0 - 16.0    Glucose 146 (H) 65 - 99 mg/dL    Bun 25 (H) 8 - 22 mg/dL    Creatinine 1.22 0.50 - 1.40 mg/dL    Calcium 9.0 8.5 - 10.5 mg/dL    AST(SGOT) 25 12 - 45 U/L    ALT(SGPT) 22 2 - 50 U/L    Alkaline Phosphatase 51 30 - 99 U/L    Total Bilirubin <0.2 0.1 - 1.5 mg/dL    Albumin 3.5 3.2 - 4.9 g/dL    Total Protein 6.1 6.0 - 8.2 g/dL    Globulin 2.6 1.9 - 3.5 g/dL    A-G Ratio 1.3 g/dL   Troponins NOW   Result Value Ref Range    Troponin T 17 6 - 19 ng/L   PLATELET ESTIMATE   Result Value Ref Range    Plt Estimation Increased    MORPHOLOGY   Result Value Ref Range    RBC Morphology Present     Polychromia 1+     Poikilocytosis 1+     Ovalocytes 1+     Echinocytes 1+    PERIPHERAL SMEAR REVIEW   Result Value Ref Range    Peripheral Smear Review see below    DIFFERENTIAL COMMENT   Result Value Ref Range    Comments-Diff see below    CORRECTED CALCIUM   Result Value Ref Range    Correct Calcium 9.4 8.5 - 10.5 mg/dL   ESTIMATED GFR   Result Value Ref Range    GFR (CKD-EPI) 66 >60 mL/min/1.73 m 2                      Repeat troponin 18    Rhythm Strip: Interpretation by me Sinus rhythm     EKG Interpretation by me    Indication: Normal sinus 82    Rhythm: normal sinus   Rate: Normal at 82  Axis: normal  Ectopy: none  Conduction: normal  ST/T Waves: no acute change  Q Waves: none  R Wave progression: normal  Hypertrophy changes: Absent    Comparison: Unchanged from prior    Clinical Impression:Normal sinus rhythm    RADIOLOGY  I have independently interpreted the chest x-ray associated with this visit demonstrating prior sternotomy and persistent elevation of the left diaphragm.  I am awaiting the final reading from the radiologist.       Final Radiology Report  DX-CHEST-PORTABLE (1 VIEW)   Final Result         1.  Linear patchy opacity in the left lung base, similar to prior, atelectasis versus infection   2.  Similar  elevation of the left hemidiaphragm.        Radiologist interpretation have been reviewed by me.       ED COURSE:  8:20 PM - Patient seen and examined at bedside.     ED Observation Status? Yes; Patient placed in observation status at 8:20 PM 05/31/23 for serum studies, EKG, imaging, and reassessment.     INTERVENTIONS BY ME:  Medications   HYDROmorphone (Dilaudid) injection 1 mg (1 mg Intravenous Given 5/31/23 2048)     10:08 PM  - On reassessment response to intervention the patient notes his pain is improved he now rates the severity a 3/10. Discussed plan of discharge after second Troponin tests if level is below 17. The plan for discharge was discussed. Patient and/or family was given the opportunity to ask any questions. Patient and/or family verbalizes understanding and agreement to this plan of care.         11:33 PM - DC from ED observation.    MEDICAL DECISION MAKING:  PROBLEMS EVALUATED THIS VISIT:    This patient presents with chest pain.  It is concerning given recent cardiac surgery that he could be having an MI and PE pneumonia or pneumothorax.  Aortic complication after aortic surgery was also at initial concern.  Based on serial troponins there is no evidence of myocardial infarction.  Per outside chart review 88 angiogram in December 2022 that showed trivial atherosclerosis.  There is no evidence of clinical PE pneumonia or pneumothorax.  Per chart review he has had CT imaging of his aorta since his procedure and that is all been fine.  His presentation may be due to some of the stress and anxiety from the recent death of his son this month.  On exam it appears he has a chest wall pain.    RISK:  High given need for opiate analgesics       PLAN:  Patient will be discharged in stable condition.    Nonspecific chest pain handout given    Return for changing chest pain, shortness of breath, or leg swelling.     Followup:  EFRAÍN Hewitt  [address] [facility]  [id number]  [phone]    Schedule an appointment as soon as possible for a visit in 1 week  As needed    CONDITION: Stable.     FINAL IMPRESSION  1. Other chest pain     ED Observation Care     Gay FLETCHER (Scribe), am scribing for, and in the presence of, Rui Rene M.D..    Electronically signed by: Gya Celaya (Scribe), 5/31/2023    Rui FLETCHER M.D. personally performed the services described in this documentation, as scribed by Gay Celaya in my presence, and it is both accurate and complete.    The note accurately reflects work and decisions made by me.  Rui Rene M.D.  5/31/2023  11:35 PM

## 2023-06-01 NOTE — ED TRIAGE NOTES
"Chief Complaint   Patient presents with    Chest Pain     Started at appx 1700, midsternal chest pain 8/10 dull stabbing pain that radiates to L arm and neck. States dizziness, +nausea        BIB [facility] to R1, pt on monitor and in gown, labs drawn and sent. Pt consists of: above complaint, pt A&Ox4, on room air. Pt states he has cardiac hx and had aortic aneurysm and flap replacement recently in January 5, 2023. Pt is on xarelto. Pt states he is going to have an ablation July 25. Pt is from [facility].     Medications given en route: 324 ASA, x1 dose of nitro, 4mg zofran    /61   Pulse 90   Temp 36.7 °C (98 °F) (Temporal)   Resp (!) 26   Ht 1.753 m (5' 9\")   Wt 83.9 kg (185 lb)   SpO2 95%   BMI 27.32 kg/m²     "

## 2023-06-01 NOTE — ED NOTES
"Pt discharged to [facility], pt A&Ox4, on room air, steady gait. Pt given MTM resources and advised not to drive. IV discontinued and gauze placed, pt in possession of belongings. Pt provided discharge education and information pertaining to medications and follow up appointments. Pt received copy of discharge instructions and verbalized understanding. /59   Pulse 75   Temp 36.8 °C (98.2 °F) (Temporal)   Resp 20   Ht 1.753 m (5' 9\")   Wt 83.9 kg (185 lb)   SpO2 96%   BMI 27.32 kg/m²     "

## 2023-06-01 NOTE — ED NOTES
Pt stating he feels better after medication administration. 4/10 pain. No other needs at the moment. NAD noted.

## 2023-06-02 NOTE — TELEPHONE ENCOUNTER
Due to a cancellation - patient now scheduled for afib ablation on 6-12-23 with Dr. Webster. Patient has been instructed to check in at 6:00 for 7:30 case time. Message sent to authorizations. Emailed [employer].

## 2023-06-07 ENCOUNTER — APPOINTMENT (OUTPATIENT)
Dept: ADMISSIONS | Facility: MEDICAL CENTER | Age: 63
End: 2023-06-07
Attending: INTERNAL MEDICINE
Payer: MEDICAID

## 2023-06-09 ENCOUNTER — PRE-ADMISSION TESTING (OUTPATIENT)
Dept: ADMISSIONS | Facility: MEDICAL CENTER | Age: 63
End: 2023-06-09
Attending: INTERNAL MEDICINE
Payer: MEDICAID

## 2023-06-09 DIAGNOSIS — Z01.810 PRE-OPERATIVE CARDIOVASCULAR EXAMINATION: ICD-10-CM

## 2023-06-09 DIAGNOSIS — Z01.812 PRE-OPERATIVE LABORATORY EXAMINATION: ICD-10-CM

## 2023-06-09 LAB
ALBUMIN SERPL BCP-MCNC: 4.1 G/DL (ref 3.2–4.9)
ALBUMIN/GLOB SERPL: 1.5 G/DL
ALP SERPL-CCNC: 53 U/L (ref 30–99)
ALT SERPL-CCNC: 34 U/L (ref 2–50)
ANION GAP SERPL CALC-SCNC: 13 MMOL/L (ref 7–16)
AST SERPL-CCNC: 21 U/L (ref 12–45)
BASOPHILS # BLD AUTO: 0.6 % (ref 0–1.8)
BASOPHILS # BLD: 0.09 K/UL (ref 0–0.12)
BILIRUB SERPL-MCNC: 0.3 MG/DL (ref 0.1–1.5)
BUN SERPL-MCNC: 25 MG/DL (ref 8–22)
CALCIUM ALBUM COR SERPL-MCNC: 8.9 MG/DL (ref 8.5–10.5)
CALCIUM SERPL-MCNC: 9 MG/DL (ref 8.5–10.5)
CHLORIDE SERPL-SCNC: 102 MMOL/L (ref 96–112)
CO2 SERPL-SCNC: 21 MMOL/L (ref 20–33)
CREAT SERPL-MCNC: 1.09 MG/DL (ref 0.5–1.4)
EKG IMPRESSION: NORMAL
EOSINOPHIL # BLD AUTO: 0.41 K/UL (ref 0–0.51)
EOSINOPHIL NFR BLD: 2.6 % (ref 0–6.9)
ERYTHROCYTE [DISTWIDTH] IN BLOOD BY AUTOMATED COUNT: 53.4 FL (ref 35.9–50)
GFR SERPLBLD CREATININE-BSD FMLA CKD-EPI: 76 ML/MIN/1.73 M 2
GLOBULIN SER CALC-MCNC: 2.7 G/DL (ref 1.9–3.5)
GLUCOSE SERPL-MCNC: 144 MG/DL (ref 65–99)
HCT VFR BLD AUTO: 50.3 % (ref 42–52)
HGB BLD-MCNC: 15.3 G/DL (ref 14–18)
IMM GRANULOCYTES # BLD AUTO: 0.28 K/UL (ref 0–0.11)
IMM GRANULOCYTES NFR BLD AUTO: 1.7 % (ref 0–0.9)
INR PPP: 1.19 (ref 0.87–1.13)
LYMPHOCYTES # BLD AUTO: 1.49 K/UL (ref 1–4.8)
LYMPHOCYTES NFR BLD: 9.3 % (ref 22–41)
MCH RBC QN AUTO: 21.5 PG (ref 27–33)
MCHC RBC AUTO-ENTMCNC: 30.4 G/DL (ref 32.3–36.5)
MCV RBC AUTO: 70.8 FL (ref 81.4–97.8)
MONOCYTES # BLD AUTO: 1.12 K/UL (ref 0–0.85)
MONOCYTES NFR BLD AUTO: 7 % (ref 0–13.4)
NEUTROPHILS # BLD AUTO: 12.62 K/UL (ref 1.82–7.42)
NEUTROPHILS NFR BLD: 78.8 % (ref 44–72)
NRBC # BLD AUTO: 0 K/UL
NRBC BLD-RTO: 0 /100 WBC (ref 0–0.2)
PLATELET # BLD AUTO: 448 K/UL (ref 164–446)
PMV BLD AUTO: 9.5 FL (ref 9–12.9)
POTASSIUM SERPL-SCNC: 4.6 MMOL/L (ref 3.6–5.5)
PROT SERPL-MCNC: 6.8 G/DL (ref 6–8.2)
PROTHROMBIN TIME: 14.9 SEC (ref 12–14.6)
RBC # BLD AUTO: 7.1 M/UL (ref 4.7–6.1)
SODIUM SERPL-SCNC: 136 MMOL/L (ref 135–145)
WBC # BLD AUTO: 16 K/UL (ref 4.8–10.8)

## 2023-06-09 PROCEDURE — 80053 COMPREHEN METABOLIC PANEL: CPT

## 2023-06-09 PROCEDURE — 93010 ELECTROCARDIOGRAM REPORT: CPT | Performed by: INTERNAL MEDICINE

## 2023-06-09 PROCEDURE — 36415 COLL VENOUS BLD VENIPUNCTURE: CPT

## 2023-06-09 PROCEDURE — 93005 ELECTROCARDIOGRAM TRACING: CPT

## 2023-06-09 PROCEDURE — 85025 COMPLETE CBC W/AUTO DIFF WBC: CPT

## 2023-06-09 PROCEDURE — 85610 PROTHROMBIN TIME: CPT

## 2023-06-09 RX ORDER — IBUPROFEN 200 MG
400 TABLET ORAL EVERY 6 HOURS PRN
Status: ON HOLD | COMMUNITY
End: 2023-06-12

## 2023-06-12 ENCOUNTER — HOSPITAL ENCOUNTER (OUTPATIENT)
Facility: MEDICAL CENTER | Age: 63
End: 2023-06-12
Attending: INTERNAL MEDICINE | Admitting: INTERNAL MEDICINE
Payer: MEDICAID

## 2023-06-12 ENCOUNTER — APPOINTMENT (OUTPATIENT)
Dept: CARDIOLOGY | Facility: MEDICAL CENTER | Age: 63
End: 2023-06-12
Attending: INTERNAL MEDICINE
Payer: MEDICAID

## 2023-06-12 ENCOUNTER — ANESTHESIA (OUTPATIENT)
Dept: CARDIOLOGY | Facility: MEDICAL CENTER | Age: 63
End: 2023-06-12
Payer: MEDICAID

## 2023-06-12 ENCOUNTER — ANESTHESIA EVENT (OUTPATIENT)
Dept: CARDIOLOGY | Facility: MEDICAL CENTER | Age: 63
End: 2023-06-12
Payer: MEDICAID

## 2023-06-12 VITALS
RESPIRATION RATE: 18 BRPM | WEIGHT: 190.48 LBS | BODY MASS INDEX: 28.21 KG/M2 | OXYGEN SATURATION: 92 % | HEIGHT: 69 IN | TEMPERATURE: 98.2 F | DIASTOLIC BLOOD PRESSURE: 71 MMHG | SYSTOLIC BLOOD PRESSURE: 106 MMHG | HEART RATE: 80 BPM

## 2023-06-12 DIAGNOSIS — I48.0 PAROXYSMAL ATRIAL FIBRILLATION (HCC): ICD-10-CM

## 2023-06-12 PROBLEM — I48.91 ATRIAL FIBRILLATION (HCC): Status: ACTIVE | Noted: 2023-06-12

## 2023-06-12 PROBLEM — Z98.890 H/O CARDIAC RADIOFREQUENCY ABLATION: Status: ACTIVE | Noted: 2023-06-12

## 2023-06-12 LAB
ACT BLD: 239 SEC (ref 74–137)
ACT BLD: 269 SEC (ref 74–137)
ACT BLD: 287 SEC (ref 74–137)
EKG IMPRESSION: NORMAL
ERYTHROCYTE [DISTWIDTH] IN BLOOD BY AUTOMATED COUNT: 53.3 FL (ref 35.9–50)
HCT VFR BLD AUTO: 52.3 % (ref 42–52)
HGB BLD-MCNC: 16 G/DL (ref 14–18)
INR PPP: 1.22 (ref 0.87–1.13)
LV EJECT FRACT  99904: 40
MCH RBC QN AUTO: 21.7 PG (ref 27–33)
MCHC RBC AUTO-ENTMCNC: 30.6 G/DL (ref 32.3–36.5)
MCV RBC AUTO: 71.1 FL (ref 81.4–97.8)
PLATELET # BLD AUTO: 482 K/UL (ref 164–446)
PROTHROMBIN TIME: 15.2 SEC (ref 12–14.6)
RBC # BLD AUTO: 7.36 M/UL (ref 4.7–6.1)
WBC # BLD AUTO: 15.7 K/UL (ref 4.8–10.8)

## 2023-06-12 PROCEDURE — 93623 PRGRMD STIMJ&PACG IV RX NFS: CPT

## 2023-06-12 PROCEDURE — 93325 DOPPLER ECHO COLOR FLOW MAPG: CPT | Mod: 26 | Performed by: STUDENT IN AN ORGANIZED HEALTH CARE EDUCATION/TRAINING PROGRAM

## 2023-06-12 PROCEDURE — 93010 ELECTROCARDIOGRAM REPORT: CPT | Mod: 59 | Performed by: INTERNAL MEDICINE

## 2023-06-12 PROCEDURE — 700101 HCHG RX REV CODE 250: Performed by: STUDENT IN AN ORGANIZED HEALTH CARE EDUCATION/TRAINING PROGRAM

## 2023-06-12 PROCEDURE — 700105 HCHG RX REV CODE 258: Mod: UD | Performed by: STUDENT IN AN ORGANIZED HEALTH CARE EDUCATION/TRAINING PROGRAM

## 2023-06-12 PROCEDURE — 700111 HCHG RX REV CODE 636 W/ 250 OVERRIDE (IP): Mod: UD

## 2023-06-12 PROCEDURE — 700111 HCHG RX REV CODE 636 W/ 250 OVERRIDE (IP): Mod: UD | Performed by: STUDENT IN AN ORGANIZED HEALTH CARE EDUCATION/TRAINING PROGRAM

## 2023-06-12 PROCEDURE — 160036 HCHG PACU - EA ADDL 30 MINS PHASE I

## 2023-06-12 PROCEDURE — A9270 NON-COVERED ITEM OR SERVICE: HCPCS | Mod: UD | Performed by: STUDENT IN AN ORGANIZED HEALTH CARE EDUCATION/TRAINING PROGRAM

## 2023-06-12 PROCEDURE — 93657 TX L/R ATRIAL FIB ADDL: CPT | Performed by: INTERNAL MEDICINE

## 2023-06-12 PROCEDURE — 93655 ICAR CATH ABLTJ DSCRT ARRHYT: CPT | Performed by: INTERNAL MEDICINE

## 2023-06-12 PROCEDURE — 93005 ELECTROCARDIOGRAM TRACING: CPT | Performed by: INTERNAL MEDICINE

## 2023-06-12 PROCEDURE — 93320 DOPPLER ECHO COMPLETE: CPT | Mod: 26 | Performed by: STUDENT IN AN ORGANIZED HEALTH CARE EDUCATION/TRAINING PROGRAM

## 2023-06-12 PROCEDURE — 36415 COLL VENOUS BLD VENIPUNCTURE: CPT

## 2023-06-12 PROCEDURE — 160035 HCHG PACU - 1ST 60 MINS PHASE I

## 2023-06-12 PROCEDURE — 700101 HCHG RX REV CODE 250: Mod: UD

## 2023-06-12 PROCEDURE — 85347 COAGULATION TIME ACTIVATED: CPT | Mod: 91

## 2023-06-12 PROCEDURE — G0378 HOSPITAL OBSERVATION PER HR: HCPCS

## 2023-06-12 PROCEDURE — 85027 COMPLETE CBC AUTOMATED: CPT

## 2023-06-12 PROCEDURE — 700102 HCHG RX REV CODE 250 W/ 637 OVERRIDE(OP): Mod: UD | Performed by: STUDENT IN AN ORGANIZED HEALTH CARE EDUCATION/TRAINING PROGRAM

## 2023-06-12 PROCEDURE — 700105 HCHG RX REV CODE 258: Mod: UD | Performed by: INTERNAL MEDICINE

## 2023-06-12 PROCEDURE — 93656 COMPRE EP EVAL ABLTJ ATR FIB: CPT | Performed by: INTERNAL MEDICINE

## 2023-06-12 PROCEDURE — 93623 PRGRMD STIMJ&PACG IV RX NFS: CPT | Mod: 26 | Performed by: INTERNAL MEDICINE

## 2023-06-12 PROCEDURE — 00537 ANES CARDIAC EP PROCEDURES: CPT | Performed by: STUDENT IN AN ORGANIZED HEALTH CARE EDUCATION/TRAINING PROGRAM

## 2023-06-12 PROCEDURE — 85610 PROTHROMBIN TIME: CPT

## 2023-06-12 PROCEDURE — 160002 HCHG RECOVERY MINUTES (STAT)

## 2023-06-12 PROCEDURE — 700101 HCHG RX REV CODE 250: Mod: UD | Performed by: STUDENT IN AN ORGANIZED HEALTH CARE EDUCATION/TRAINING PROGRAM

## 2023-06-12 PROCEDURE — 93325 DOPPLER ECHO COLOR FLOW MAPG: CPT

## 2023-06-12 PROCEDURE — 93312 ECHO TRANSESOPHAGEAL: CPT | Mod: 26,59 | Performed by: STUDENT IN AN ORGANIZED HEALTH CARE EDUCATION/TRAINING PROGRAM

## 2023-06-12 RX ORDER — SODIUM CHLORIDE, SODIUM LACTATE, POTASSIUM CHLORIDE, CALCIUM CHLORIDE 600; 310; 30; 20 MG/100ML; MG/100ML; MG/100ML; MG/100ML
INJECTION, SOLUTION INTRAVENOUS CONTINUOUS
Status: ACTIVE | OUTPATIENT
Start: 2023-06-12 | End: 2023-06-12

## 2023-06-12 RX ORDER — OXYCODONE HCL 5 MG/5 ML
5 SOLUTION, ORAL ORAL
Status: COMPLETED | OUTPATIENT
Start: 2023-06-12 | End: 2023-06-12

## 2023-06-12 RX ORDER — HYDROXYZINE HYDROCHLORIDE 25 MG/1
25 TABLET, FILM COATED ORAL NIGHTLY PRN
Status: DISCONTINUED | OUTPATIENT
Start: 2023-06-12 | End: 2023-06-12 | Stop reason: HOSPADM

## 2023-06-12 RX ORDER — ISOPROTERENOL HYDROCHLORIDE 0.2 MG/ML
INJECTION, SOLUTION INTRAVENOUS
Status: COMPLETED
Start: 2023-06-12 | End: 2023-06-12

## 2023-06-12 RX ORDER — MIDAZOLAM HYDROCHLORIDE 1 MG/ML
INJECTION INTRAMUSCULAR; INTRAVENOUS
Status: COMPLETED
Start: 2023-06-12 | End: 2023-06-12

## 2023-06-12 RX ORDER — HALOPERIDOL 5 MG/ML
1 INJECTION INTRAMUSCULAR
Status: DISCONTINUED | OUTPATIENT
Start: 2023-06-12 | End: 2023-06-12 | Stop reason: HOSPADM

## 2023-06-12 RX ORDER — SODIUM CHLORIDE, SODIUM LACTATE, POTASSIUM CHLORIDE, CALCIUM CHLORIDE 600; 310; 30; 20 MG/100ML; MG/100ML; MG/100ML; MG/100ML
INJECTION, SOLUTION INTRAVENOUS CONTINUOUS
Status: DISCONTINUED | OUTPATIENT
Start: 2023-06-12 | End: 2023-06-12 | Stop reason: HOSPADM

## 2023-06-12 RX ORDER — NOREPINEPHRINE BITARTRATE 1 MG/ML
INJECTION, SOLUTION INTRAVENOUS
Status: DISCONTINUED
Start: 2023-06-12 | End: 2023-06-12 | Stop reason: HOSPADM

## 2023-06-12 RX ORDER — LIDOCAINE HYDROCHLORIDE 20 MG/ML
INJECTION, SOLUTION INFILTRATION; PERINEURAL
Status: COMPLETED
Start: 2023-06-12 | End: 2023-06-12

## 2023-06-12 RX ORDER — HEPARIN SODIUM 1000 [USP'U]/ML
INJECTION, SOLUTION INTRAVENOUS; SUBCUTANEOUS
Status: COMPLETED
Start: 2023-06-12 | End: 2023-06-12

## 2023-06-12 RX ORDER — EPHEDRINE SULFATE 50 MG/ML
5 INJECTION, SOLUTION INTRAVENOUS
Status: DISCONTINUED | OUTPATIENT
Start: 2023-06-12 | End: 2023-06-12 | Stop reason: HOSPADM

## 2023-06-12 RX ORDER — BUPIVACAINE HYDROCHLORIDE 5 MG/ML
INJECTION, SOLUTION EPIDURAL; INTRACAUDAL
Status: COMPLETED
Start: 2023-06-12 | End: 2023-06-12

## 2023-06-12 RX ORDER — PANTOPRAZOLE SODIUM 40 MG/1
40 TABLET, DELAYED RELEASE ORAL DAILY
Status: DISCONTINUED | OUTPATIENT
Start: 2023-06-12 | End: 2023-06-12

## 2023-06-12 RX ORDER — LIDOCAINE HYDROCHLORIDE 40 MG/ML
SOLUTION TOPICAL
Status: COMPLETED
Start: 2023-06-12 | End: 2023-06-12

## 2023-06-12 RX ORDER — METOPROLOL SUCCINATE 50 MG/1
50 TABLET, EXTENDED RELEASE ORAL EVERY EVENING
Status: DISCONTINUED | OUTPATIENT
Start: 2023-06-12 | End: 2023-06-12 | Stop reason: HOSPADM

## 2023-06-12 RX ORDER — KETOROLAC TROMETHAMINE 30 MG/ML
30 INJECTION, SOLUTION INTRAMUSCULAR; INTRAVENOUS ONCE
Status: COMPLETED | OUTPATIENT
Start: 2023-06-12 | End: 2023-06-12

## 2023-06-12 RX ORDER — ONDANSETRON 2 MG/ML
INJECTION INTRAMUSCULAR; INTRAVENOUS PRN
Status: DISCONTINUED | OUTPATIENT
Start: 2023-06-12 | End: 2023-06-12 | Stop reason: SURG

## 2023-06-12 RX ORDER — PHENYLEPHRINE HCL IN 0.9% NACL 0.5 MG/5ML
SYRINGE (ML) INTRAVENOUS
Status: DISCONTINUED
Start: 2023-06-12 | End: 2023-06-12 | Stop reason: HOSPADM

## 2023-06-12 RX ORDER — ONDANSETRON 2 MG/ML
4 INJECTION INTRAMUSCULAR; INTRAVENOUS
Status: COMPLETED | OUTPATIENT
Start: 2023-06-12 | End: 2023-06-12

## 2023-06-12 RX ORDER — PROTAMINE SULFATE 10 MG/ML
INJECTION, SOLUTION INTRAVENOUS
Status: DISCONTINUED
Start: 2023-06-12 | End: 2023-06-12 | Stop reason: HOSPADM

## 2023-06-12 RX ORDER — LIDOCAINE HYDROCHLORIDE 40 MG/ML
SOLUTION TOPICAL PRN
Status: DISCONTINUED | OUTPATIENT
Start: 2023-06-12 | End: 2023-06-12 | Stop reason: SURG

## 2023-06-12 RX ORDER — HEPARIN SODIUM 200 [USP'U]/100ML
INJECTION, SOLUTION INTRAVENOUS
Status: COMPLETED
Start: 2023-06-12 | End: 2023-06-12

## 2023-06-12 RX ORDER — LIDOCAINE HYDROCHLORIDE 20 MG/ML
INJECTION, SOLUTION EPIDURAL; INFILTRATION; INTRACAUDAL; PERINEURAL PRN
Status: DISCONTINUED | OUTPATIENT
Start: 2023-06-12 | End: 2023-06-12 | Stop reason: SURG

## 2023-06-12 RX ORDER — HYDRALAZINE HYDROCHLORIDE 20 MG/ML
5 INJECTION INTRAMUSCULAR; INTRAVENOUS
Status: DISCONTINUED | OUTPATIENT
Start: 2023-06-12 | End: 2023-06-12 | Stop reason: HOSPADM

## 2023-06-12 RX ORDER — DIPHENHYDRAMINE HYDROCHLORIDE 50 MG/ML
12.5 INJECTION INTRAMUSCULAR; INTRAVENOUS
Status: DISCONTINUED | OUTPATIENT
Start: 2023-06-12 | End: 2023-06-12 | Stop reason: HOSPADM

## 2023-06-12 RX ORDER — OXYCODONE HCL 5 MG/5 ML
10 SOLUTION, ORAL ORAL
Status: COMPLETED | OUTPATIENT
Start: 2023-06-12 | End: 2023-06-12

## 2023-06-12 RX ORDER — DEXAMETHASONE SODIUM PHOSPHATE 4 MG/ML
INJECTION, SOLUTION INTRA-ARTICULAR; INTRALESIONAL; INTRAMUSCULAR; INTRAVENOUS; SOFT TISSUE PRN
Status: DISCONTINUED | OUTPATIENT
Start: 2023-06-12 | End: 2023-06-12 | Stop reason: SURG

## 2023-06-12 RX ORDER — PANTOPRAZOLE SODIUM 40 MG/1
40 TABLET, DELAYED RELEASE ORAL DAILY
COMMUNITY
Start: 2023-06-12

## 2023-06-12 RX ORDER — PHENYLEPHRINE HYDROCHLORIDE 10 MG/ML
INJECTION, SOLUTION INTRAMUSCULAR; INTRAVENOUS; SUBCUTANEOUS PRN
Status: DISCONTINUED | OUTPATIENT
Start: 2023-06-12 | End: 2023-06-12 | Stop reason: SURG

## 2023-06-12 RX ADMIN — PHENYLEPHRINE HYDROCHLORIDE 200 MCG: 10 INJECTION INTRAVENOUS at 07:57

## 2023-06-12 RX ADMIN — EPHEDRINE SULFATE 5 MG: 50 INJECTION, SOLUTION INTRAVENOUS at 10:03

## 2023-06-12 RX ADMIN — OXYCODONE HYDROCHLORIDE 10 MG: 5 SOLUTION ORAL at 09:47

## 2023-06-12 RX ADMIN — DEXAMETHASONE SODIUM PHOSPHATE 4 MG: 4 INJECTION INTRA-ARTICULAR; INTRALESIONAL; INTRAMUSCULAR; INTRAVENOUS; SOFT TISSUE at 07:50

## 2023-06-12 RX ADMIN — LIDOCAINE HYDROCHLORIDE 80 MG: 20 INJECTION, SOLUTION EPIDURAL; INFILTRATION; INTRACAUDAL at 07:43

## 2023-06-12 RX ADMIN — PHENYLEPHRINE HYDROCHLORIDE 200 MCG: 10 INJECTION INTRAVENOUS at 08:05

## 2023-06-12 RX ADMIN — FENTANYL CITRATE 100 MCG: 50 INJECTION, SOLUTION INTRAMUSCULAR; INTRAVENOUS at 07:43

## 2023-06-12 RX ADMIN — BUPIVACAINE HYDROCHLORIDE: 5 INJECTION, SOLUTION EPIDURAL; INTRACAUDAL at 07:57

## 2023-06-12 RX ADMIN — SODIUM CHLORIDE, POTASSIUM CHLORIDE, SODIUM LACTATE AND CALCIUM CHLORIDE: 600; 310; 30; 20 INJECTION, SOLUTION INTRAVENOUS at 06:21

## 2023-06-12 RX ADMIN — ONDANSETRON 4 MG: 2 INJECTION INTRAMUSCULAR; INTRAVENOUS at 09:54

## 2023-06-12 RX ADMIN — HALOPERIDOL LACTATE 1 MG: 5 INJECTION, SOLUTION INTRAMUSCULAR at 09:55

## 2023-06-12 RX ADMIN — HEPARIN SODIUM 8000 UNITS: 200 INJECTION, SOLUTION INTRAVENOUS at 09:06

## 2023-06-12 RX ADMIN — ONDANSETRON 4 MG: 2 INJECTION INTRAMUSCULAR; INTRAVENOUS at 09:06

## 2023-06-12 RX ADMIN — PHENYLEPHRINE HYDROCHLORIDE 100 MCG: 10 INJECTION INTRAVENOUS at 07:48

## 2023-06-12 RX ADMIN — ISOPROTERENOL HYDROCHLORIDE 0.2 MG: 0.2 INJECTION, SOLUTION INTRACARDIAC; INTRAMUSCULAR; INTRAVENOUS; SUBCUTANEOUS at 09:06

## 2023-06-12 RX ADMIN — ROCURONIUM BROMIDE 70 MG: 10 INJECTION, SOLUTION INTRAVENOUS at 07:44

## 2023-06-12 RX ADMIN — PHENYLEPHRINE HYDROCHLORIDE 100 MCG: 10 INJECTION INTRAVENOUS at 07:53

## 2023-06-12 RX ADMIN — LIDOCAINE HYDROCHLORIDE: 20 INJECTION, SOLUTION INFILTRATION; PERINEURAL at 07:57

## 2023-06-12 RX ADMIN — LIDOCAINE HYDROCHLORIDE 4 ML: 40 SOLUTION TOPICAL at 07:44

## 2023-06-12 RX ADMIN — NOREPINEPHRINE BITARTRATE 0.05 MCG/KG/MIN: 1 INJECTION, SOLUTION, CONCENTRATE INTRAVENOUS at 08:12

## 2023-06-12 RX ADMIN — PROPOFOL 120 MG: 10 INJECTION, EMULSION INTRAVENOUS at 07:43

## 2023-06-12 RX ADMIN — HEPARIN SODIUM: 1000 INJECTION, SOLUTION INTRAVENOUS; SUBCUTANEOUS at 09:07

## 2023-06-12 RX ADMIN — SUGAMMADEX 200 MG: 100 INJECTION, SOLUTION INTRAVENOUS at 09:11

## 2023-06-12 RX ADMIN — MIDAZOLAM 2 MG: 1 INJECTION, SOLUTION INTRAMUSCULAR; INTRAVENOUS at 07:38

## 2023-06-12 RX ADMIN — HEPARIN SODIUM: 1000 INJECTION, SOLUTION INTRAVENOUS; SUBCUTANEOUS at 07:57

## 2023-06-12 RX ADMIN — FENTANYL CITRATE 50 MCG: 50 INJECTION, SOLUTION INTRAMUSCULAR; INTRAVENOUS at 09:38

## 2023-06-12 RX ADMIN — KETOROLAC TROMETHAMINE 30 MG: 30 INJECTION, SOLUTION INTRAMUSCULAR at 09:46

## 2023-06-12 ASSESSMENT — CHA2DS2 SCORE
CHF OR LEFT VENTRICULAR DYSFUNCTION: YES
PRIOR STROKE OR TIA OR THROMBOEMBOLISM: NO
CHA2DS2 VASC SCORE: 2
AGE 65 TO 74: NO
HYPERTENSION: NO
AGE 75 OR GREATER: NO
DIABETES: NO
SEX: MALE
VASCULAR DISEASE: YES

## 2023-06-12 ASSESSMENT — COGNITIVE AND FUNCTIONAL STATUS - GENERAL
DAILY ACTIVITIY SCORE: 23
HELP NEEDED FOR BATHING: A LITTLE
CLIMB 3 TO 5 STEPS WITH RAILING: A LITTLE
SUGGESTED CMS G CODE MODIFIER DAILY ACTIVITY: CI
MOBILITY SCORE: 23
SUGGESTED CMS G CODE MODIFIER MOBILITY: CI

## 2023-06-12 ASSESSMENT — PAIN SCALES - GENERAL: PAIN_LEVEL: 1

## 2023-06-12 ASSESSMENT — FIBROSIS 4 INDEX: FIB4 SCORE: 0.51

## 2023-06-12 NOTE — DISCHARGE SUMMARY
Seen in afternoon same day discharge EP rounds.  S/P ablation of symptomatic paroxysmal Afib and flutter by Dr Webster with pulmonary vein isolation, LA roof line and CTI with bidirectional block for typical atrial flutter.        Conclusions per Dr Webster's Op Note dated 6/12/23:  Electrophysiological Findings:  Sinus cycle length 766 msec  Intervals- A-H 97 msec  H-V 35 msec  QRS 88 msec   msec   msec  AV block  ms  Total RF time: 1285 sec  Fluoro time: 0 min  Complications: None  Impression:  1. Atrial fibrillation, paroxysmal  2. Successful isolation of all four pulmonary veins  3. Successful LA roof line for additional afib ablation  4. Typical atrial flutter, successful CTI ablation with bidirectional block     Monitored rhythm has been sinus rhythm during his monitored recovery.  Vital signs are stable. He has ambulated without difficulty and his Right femoral access site remains C/D/I without evidence of hematoma, significant ecchymosis or pain.  Remainder of exam WDL.      I have provided education about importance of esophageal prophylaxis post ablation, he takes Protonix as needed.  We discussed once daily dosing x next 30 days.        Medication List        CHANGE how you take these medications        Instructions   metoprolol SR 50 MG Tb24  What changed: when to take this  Commonly known as: TOPROL XL   Take 1 Tablet by mouth every day.  Dose: 50 mg     pantoprazole 40 MG Tbec  What changed:   when to take this  reasons to take this  additional instructions  Commonly known as: PROTONIX   Take 1 Tablet by mouth every day. Take daily for 30 days post ablation then can reduce to as needed again.  Dose: 40 mg            CONTINUE taking these medications        Instructions   chlorproMAZINE 25 MG Tabs  Commonly known as: THORAZINE   Take 25 mg by mouth 1 time a day as needed (nausea).  Dose: 25 mg     EXCEDRIN PO   Take 2 Tablets by mouth as needed.  Dose: 2 Tablet     hydrOXYzine HCl 25 MG  Tabs  Commonly known as: ATARAX   25 mg at bedtime as needed (sleep).  Dose: 25 mg     nitroGLYCERIN 0.3 MG SL tablet  Commonly known as: NITROSTAT   Place 0.3 mg under the tongue every 5 minutes as needed for Chest Pain.  Dose: 0.3 mg     rivaroxaban 20 MG Tabs tablet  Commonly known as: Xarelto   Take 1 Tablet by mouth with dinner.  Dose: 20 mg            STOP taking these medications      ibuprofen 200 MG Tabs  Commonly known as: MOTRIN     ondansetron 4 MG Tbdp  Commonly known as: ZOFRAN ODT            ASK your doctor about these medications        Instructions   ferrous sulfate 325 (65 Fe) MG tablet   Take 325 mg by mouth every day.  Dose: 325 mg            Diet:  Cardiac     Disposition  He will be discharged back to [facility] via car (pt reports wife will take him).  I have verified with [facility] that he is cleared from their standpoint to return back tonight and staff available 24 hours a day.     Discharge instructions discussed with patient:  [facility] Post Ablation Patient Instructions:  No lifting > 10 lbs x 1 week.    No soaking in baths, hot tubs, pools x 1 week.  May shower the day after discharge and take off groin dressings and leave uncovered.  Continue to monitor sites daily for warmth, redness, discolored drainage.  It is common to have a small lump in the area where the cather was (usually the size of a small marble); this will go away but takes approximately 6 weeks to normalize.   3.   Please take all medications as prescribed to you; please do not stop any medications prescribed post ablation unless directed by your healthcare provider.    4.   Please do not miss any doses of your blood thinner (if you have been started on, or take chronic blood thinners) without discussion with your healthcare provider first.   5.   Please walk and take deep breaths after discharge.  After discharge, if  experiences neurological changes/signs of  stroke, high fever, you should be seen in the emergency dept.   6.   It is possible you may experience some chest discomfort or chest tightness post ablation.  This is usually secondary to inflammation and irritation of the tissues at the area of the ablation.  If this occurs, it is advised to try 400 mg of Ibuprofen with food as needed up to three times a day for a maximum of two days.  This should help to decrease pain and tissue inflammation.          **Please notify the office ([phone]) if this occurs.         ** DO NOT TAKE Ibuprofen IF HISTORY OF SIGNIFICANT BLEEDING OR KIDNEY DISEASE WITHOUT DISCUSSING WITH YOUR CARDIOLOGY PROVIDER FIRST.          ** If pain becomes severe or you have additional symptoms you may need to be medically evaluated; please contact the cardiology office ([phone]) for further direction.   7. It is possible that you may experience arrhythmia/Atrial Fibrillation post ablation.  This is secondary to irritation and inflammation of the cardiac tissues from the ablation.  If you have atrial fibrillation all day or feel poorly with it, please notify your cardiologist's via phone ([phone]) or [place].    8.  Please contact call our office ([phone]) or message via [employer] message if you have any questions or concerns post procedurally.  9. You need to be seen for post ablation follow up 2-4 weeks post procedure.  If you do not have a follow up appointment scheduled, please call [phone] to schedule your follow up appointment.

## 2023-06-12 NOTE — CARE PLAN
The patient is Stable - Low risk of patient condition declining or worsening    Shift Goals  Clinical Goals: maintain sinus rhythm  Patient Goals: have lunch    Progress made toward(s) clinical / shift goals:    Problem: Pain - Standard  Goal: Alleviation of pain or a reduction in pain to the patient’s comfort goal  Outcome: Progressing     Problem: Knowledge Deficit - Standard  Goal: Patient and family/care givers will demonstrate understanding of plan of care, disease process/condition, diagnostic tests and medications  Outcome: Progressing       Patient is not progressing towards the following goals:

## 2023-06-12 NOTE — OR NURSING
0925: Pt arrived from cath lab post afib ablation. Pt is arousable to RN voice. R groin sight is CDI with gauze and tegaderm. Cardiac rhythm appears to be SR. Pt reports pain in bilateral groins; medicated per MAR.     0945: Dr. Webster at bedside; discussed POC. RN updated Dr. Webster on pt's status including BL groin pain. R groin sight is CDI.     1017: RN cleansed BL groin sights with soap and water. Ice packs placed; pt repositioned.  Pt sleeping intermittently; continues to reports pain in BL groins despite both sides being CDI and soft.     1058: Pt sleeping intermittently; reports pain is lessened; now tolerable. RN updated pt's contact, Do.    1108: Report to ALLEN Doyle.     1116: Pt to T7-2 via jay with ALLEN Noel. Groin sight is CDI and soft.

## 2023-06-12 NOTE — ANESTHESIA PREPROCEDURE EVALUATION
Anesthesia Start Date/Time: 06/12/23 0737    Scheduled providers: Alex Webster M.D.; Ирина Mathews M.D.    Procedure: CL-EP ABLATION ATRIAL FIBRILLATION    Diagnosis:       Paroxysmal atrial fibrillation (HCC) [I48.0]      Paroxysmal atrial fibrillation [I48.0]    Indications: See Associated Dx    Location: [facility] IMAGING - CATH LAB [facility]        61 yo man s/p AVR and AAA repair on 1/5/23 now with Afib for cardiac ablation.     Had episode of substernal pain ~2 weeks ago but work up was negative. Likely a result of stress and anxiety, per patient.     Tolerated anesthesia in the past without issues.   NPO>8hrs. No N/V.   METS>4. Exercises regularly at the gym with weights.     TTE 5/8/23  CONCLUSIONS  Normal left ventricular systolic function.  The left ventricular ejection fraction is visually estimated to be 55-60%.  Grade I diastolic dysfunction.  The right ventricle is moderately dilated with reduced right   ventricular systolic function.  Estimated right ventricular systolic pressure is 25 mmHg.  Known bioprosthetic aortic valve that is functioning normally with minimally elevated transvalvular gradients.  Mild pulmonic insufficiency.  Normal inferior vena cava size and inspiratory collapse.     Compared to the prior study on 01/21/2023, left ventricular systolic function has improved.    Relevant Problems   PULMONARY   (positive) COPD (chronic obstructive pulmonary disease) (HCC)      CARDIAC   (positive) Nonobstructive atherosclerosis of coronary artery   (positive) Paroxysmal atrial fibrillation (HCC)   (positive) Paroxysmal atrial flutter (HCC)      GI   (positive) Gastroesophageal reflux disease without esophagitis       Physical Exam    Airway   Mallampati: II  TM distance: >3 FB  Neck ROM: full       Cardiovascular - normal exam  Rhythm: regular  Rate: normal  (-) murmur     Dental - normal exam           Pulmonary - normal exam  Breath sounds clear to auscultation     Abdominal     Neurological - normal exam         Other findings: No loose teeth             Anesthesia Plan    ASA 3       Plan - general       Airway plan will be ETT  ABBY Planned        Induction: intravenous    Postoperative Plan: Postoperative administration of opioids is intended.    Pertinent diagnostic labs and testing reviewed    Informed Consent:    Anesthetic plan and risks discussed with patient.    Use of blood products discussed with: patient whom consented to blood products.

## 2023-06-12 NOTE — H&P
[facility]  Electrophysiology Pre-procedure H&P    DOS:6/12/2023    Planned Procedure: AF ablation    Chief complaint/Reason for Procedure: Afib    HPI: 61 y/o M with pAF for ablation      Past Medical History:   Diagnosis Date    Arrhythmia     Breath shortness 06/09/2023    prior to 1/2023 surgery    Cyclic vomiting syndrome     Elevated hemidiaphragm - LEFT post AVR 01/04/2023    Fracture     Headache, classical migraine     Heart burn     Heart valve disease 06/09/2023    heart surgery in 1/2023    Indigestion     Pneumonia due to infectious organism 01/20/2023    Snoring        Past Surgical History:   Procedure Laterality Date    AORTIC VALVE REPLACEMENT  1/5/2023    Procedure: AORTIC VALVE REPLACEMENT, ASCENDING AORTIC ANEURYSM REPAIR AND TRANSESOPHAGEAL ECHOCARDIOGRAM.;  Surgeon: Serena Goyal M.D.;  Location: SURGERY [facility];  Service: Cardiac    AORTIC ASCENDING DISSECTION  1/5/2023    Procedure: REPAIR, ANEURYSM OR DISSECTION, AORTA, ASCENDING;  Surgeon: Serena Goyal M.D.;  Location: [facility];  Service: Cardiac    ECHOCARDIOGRAM, TRANSESOPHAGEAL, INTRAOPERATIVE  1/5/2023    Procedure: ECHOCARDIOGRAM, TRANSESOPHAGEAL, INTRAOPERATIVE.;  Surgeon: Serena Goyal M.D.;  Location: SURGERY [facility];  Service: Cardiac    IRRIGATION & DEBRIDEMENT ORTHO Right 09/19/2017    Procedure: IRRIGATION & DEBRIDEMENT ORTHO-THIGH;  Surgeon: Corbin Rivera M.D.;  Location: [facility];  Service: Orthopedics    SHOULDER HEMICAP RESURFACING Right 10/12/2015    Procedure: RIGHT SHOULDER RESURFACING;  Surgeon: Javon Doll M.D.;  Location: [facility];  Service:     SHOULDER ARTHROSCOPY      x3    SHOULDER SURGERY      replacement right       Social History     Socioeconomic History    Marital status:      Spouse name: Not on file    Number of children: Not on file    Years of education: Not on file    Highest education level: Not on file   Occupational  "History    Not on file   Tobacco Use    Smoking status: Every Day     Packs/day: 0.50     Years: 40.00     Pack years: 20.00     Types: Cigarettes    Smokeless tobacco: Never    Tobacco comments:     Down to 5 cig daily   Vaping Use    Vaping Use: Never used   Substance and Sexual Activity    Alcohol use: No    Drug use: Not Currently     Comment: past problems with narcotics not since 2019    Sexual activity: Not on file   Other Topics Concern    Not on file   Social History Narrative    Not on file     Social Determinants of Health     Financial Resource Strain: Not on file   Food Insecurity: Not on file   Transportation Needs: Not on file   Physical Activity: Not on file   Stress: Not on file   Social Connections: Not on file   Intimate Partner Violence: Not on file   Housing Stability: Not on file       History reviewed. No pertinent family history.    Allergies   Allergen Reactions    Morphine Rash     Rash/ difficulty breathing       Current Facility-Administered Medications   Medication Dose Route Frequency Provider Last Rate Last Admin    lidocaine (XYLOCAINE) 1 % injection 0.5 mL  0.5 mL Intradermal Once PRN Alex Webster M.D.        lactated ringers infusion   Intravenous Continuous Alex Webster M.D. 10 mL/hr at 06/12/23 0621 New Bag at 06/12/23 0621    HEPARIN SODIUM (PORCINE) 1000 UNIT/ML INJ SOLN             LIDOCAINE HCL 2 % INJ SOLN             HEPARIN (PORCINE) IN NACL 2000-0.9 UNIT/L-% IV SOLN                Physical Exam:  Vitals:    06/12/23 0601 06/12/23 0614   BP:  110/68   Pulse:  78   Resp:  14   Temp:  36.5 °C (97.7 °F)   TempSrc:  Temporal   SpO2:  94%   Weight: 86.4 kg (190 lb 7.6 oz)    Height: 1.753 m (5' 9\")      General appearance: NAD, conversant   Neck: Trachea midline; FROM, supple, no thyromegaly or lymphadenopathy  CV: RRR, no MRGs, no JVD   Extremities: No peripheral edema or extremity lymphadenopathy  Skin: Normal temperature, turgor and texture; no rash, ulcers or " subcutaneous nodules  Psych: Appropriate affect, alert and oriented to person, place and time    Data:  Lab Results   Component Value Date/Time    TRIGLYCERIDE 89 01/31/2023 02:55 AM       Lab Results   Component Value Date/Time    SODIUM 136 06/09/2023 03:53 PM    POTASSIUM 4.6 06/09/2023 03:53 PM    CHLORIDE 102 06/09/2023 03:53 PM    CO2 21 06/09/2023 03:53 PM    GLUCOSE 144 (H) 06/09/2023 03:53 PM    BUN 25 (H) 06/09/2023 03:53 PM    CREATININE 1.09 06/09/2023 03:53 PM    BUNCREATRAT 27.3 (H) 05/19/2023 12:03 AM     Lab Results   Component Value Date/Time    ALKPHOSPHAT 53 06/09/2023 03:53 PM    ASTSGOT 21 06/09/2023 03:53 PM    ALTSGPT 34 06/09/2023 03:53 PM    TBILIRUBIN 0.3 06/09/2023 03:53 PM      No results found for: BNPBTYPENAT      Recent Labs     06/09/23  1553 06/12/23  0610   WBC 16.0* 15.7*   RBC 7.10* 7.36*   HEMOGLOBIN 15.3 16.0   HEMATOCRIT 50.3 52.3*   MCV 70.8* 71.1*   MCH 21.5* 21.7*   MCHC 30.4* 30.6*   RDW 53.4* 53.3*   PLATELETCT 448* 482*   MPV 9.5  --        EKG interpreted by me: Afib    Impression/Plan:  1) pAF    Plan AF ablation. We discussed pulmonary vein isolation for therapeutic management and continued rhythm control.  We discussed the risks and benefits of this procedure.  Risks include 1-3% risk of major cardiovascular event including stroke, myocardial infarction, phrenic nerve damage, esophageal injury and/or fistula formation, cardiac perforation, pericardial effusion, tamponade, major bleeding, or death.  I quoted a 70 to 80% chance free of atrial fibrillation at 12 months.  We discussed that he may also need a second procedure.        Alex Webster MD  Cardiac Electrophysiology

## 2023-06-12 NOTE — ANESTHESIA PROCEDURE NOTES
ABBY    Date/Time: 6/12/2023 7:48 AM    Performed by: Ирина Mathews M.D.  Authorized by: Ирина Mathews M.D.    Start Time:6/12/2023 7:48 AM  Preanesthetic Checklist: patient identified, IV checked, site marked, risks and benefits discussed, surgical consent, monitors and equipment checked, pre-op evaluation and timeout performed    Indication for ABBY: diagnostic   Patient Location: OR  Intubated: Yes  Bite Block: Yes  Heart Visualized: Yes  Insertion: atraumatic    **See FULL ABBY report in patient's chart via [facility]**

## 2023-06-12 NOTE — ANESTHESIA PROCEDURE NOTES
Airway    Date/Time: 6/12/2023 7:44 AM    Performed by: Ирина Mathews M.D.  Authorized by: Ирина Mathews M.D.    Location:  OR  Urgency:  Elective  Indications for Airway Management:  Anesthesia      Spontaneous Ventilation: absent    Sedation Level:  Deep  Preoxygenated: Yes    Patient Position:  Sniffing  Mask Difficulty Assessment:  1 - vent by mask  Final Airway Type:  Endotracheal airway  Final Endotracheal Airway:  ETT  Cuffed: Yes    Technique Used for Successful ETT Placement:  Direct laryngoscopy  Devices/Methods Used in Placement:  Anterior pressure/BURP    Insertion Site:  Oral  Blade Type:  Kevyn  Laryngoscope Blade/Videolaryngoscope Blade Size:  4  ETT Size (mm):  8.0  Measured from:  Teeth  ETT to Teeth (cm):  24  Placement Verified by: auscultation and capnometry    Cormack-Lehane Classification:  Grade IIb - view of arytenoids or posterior of glottis only  Number of Attempts at Approach:  1

## 2023-06-12 NOTE — ANESTHESIA TIME REPORT
Anesthesia Start and Stop Event Times     Date Time Event    6/12/2023 0720 Ready for Procedure     0737 Anesthesia Start     0929 Anesthesia Stop        Responsible Staff  06/12/23    Name Role Begin End    Ирина Mathews M.D. Anesth 0737 0929        Overtime Reason:  no overtime (within assigned shift)    Comments:

## 2023-06-12 NOTE — DISCHARGE INSTRUCTIONS
[facility] Post Ablation Patient Instructions:  No lifting > 10 lbs x 1 week.      No soaking in baths, hot tubs, pools x 1 week.  May shower the day after discharge and take off groin dressings and leave  sites uncovered.  Continue to monitor sites daily for warmth, redness, discolored drainage.  It is common to have a small lump in the area where the cather was (usually the size of a marble); this will go away but takes approximately 6 weeks to normalize.     3.   Please take all medications as prescribed to you; please do not stop any medications prescribed post ablation unless directed by your healthcare provider.      4.   Please do not miss any doses of your blood thinner (if you have been started on, or take chronic blood thinners) without discussion with your healthcare provider first.     5.   Please walk and take deep breaths after discharge.  After discharge, if you experience neurological changes/signs of stroke or high fever you should be seen in the emergency dept.     6.   It is possible you may experience some chest discomfort or chest tightness post ablation.  This is usually secondary to inflammation and irritation of the tissues at the area of the ablation.  If this occurs, it is advised to try 400 mg of Ibuprofen with food as needed up to three times a day for a maximum of two days.  This should help to decrease pain and tissue inflammation.          **Please notify the office ([phone]) if this occurs.         ** DO NOT TAKE Ibuprofen IF HISTORY OF ALLERGY, SIGNIFICANT BLEEDING OR KIDNEY DISEASE WITHOUT DISCUSSING WITH YOUR CARDIOLOGY PROVIDER FIRST.          ** If pain becomes severe or you have additional symptoms you may need to be medically evaluated; please contact the cardiology office ([phone]) for further direction.     7. It is possible that you may experience arrhythmia/Atrial Fibrillation post ablation.  This is secondary to irritation and  inflammation of the cardiac tissues from the ablation.  If you have atrial fibrillation all day or feel poorly with it, please notify your cardiologist's via phone ([phone]) or mychart.      8.  Please contact call our office ([phone]) or message via [employer] message if you have any questions or concerns post procedurally.    9. You need to be seen for post ablation follow up 3-4 weeks post procedure. An appointment is scheduled for you.  Please contact the office ([phone]) if you need to change your appointment.

## 2023-06-12 NOTE — OP REPORT
Electrophysiology Procedure Note  [facility]    Procedures Performed:  Pulmonary Vein Isolation  Additional ablation for atrial fibrillation  Ablation of additional arrhythmia  Intracardiac Echocardiography  Three-dimensional intracardiac mapping  IV isoproterenol infusion with programmed stimulation    Electrophysiologist: Alex Webster MD    Assistant(s): None    Anesthesia: General anesthesia was provided by Dr. Mathews of the Anesthesiology service.    Statement of Medical Necessity: This is a 62  year-old male with history of symptomatic paroxysmal atrial  fibrillation and flutter    Pre-procedure ECG: Sinus     Post-procedure ECG: Sinus     Description of Procedure:    Access and catheter placement: After obtaining informed written consent, the patient was  brought to the EP lab in the fasting, non-sedated stated. The patient was sedated and intubated  by the anesthesiologist. The patient was prepped and draped in the usual sterile fashion. Using  the modified Seldinger technique, access was obtained in the right  femoral vein. Guidewires were advanced into the IVC. In the right femoral vein, 3 sheaths of   8F were placed. A deflectable  decapolar catheter was advanced through the LFV to the coronary sinus. A 8F intracardiac  echo catheter was advanced to the right atrium.     We induced typical atrial flutter, TCL 220ms, confirmed typical flutter by LAT mapping. We performed ablation at 35W along the CTI to create a line of block >150ms.     Through an 8F sheath,  a long wire was advanced to the SVC. The short sheath was  changed out for a medium-curl Vizigo (Biosense Evangelista) sheath which was advanced to the SVC. The wire was  removed and the dilator was flushed. A 98-cm transseptal needle ([employer]) was advanced to the tip of the  dilator. The transseptal needle was attached to the manifold and  flushed. Under intracardiac echocardiographic guidance, the sheath/needle   system was withdrawn to  the fossa ovalis. At this time, 12,000 units of heparin were administered.   Additional boluses of heparin were given as needed to keep -350 sec during LA dwell time; he did require 22,000  The needle was advanced out of the dilator, and RF energy applied via the Orland needle.   ICE visualized the needle passage through the fossa ovalis into the left  atrium. Confirmation of left atrial location was confirmed by injecting saline and transducing  pressure. The dilator was advanced over the needle into the left atrium, and then the sheath was advanced over the dilator. The dilator and  needle were removed. The sheath was flushed and connected to a continuous heparinized   saline drip.       A duodecapolar Penta-Ray catheter (Bios[employer]Odds-Evangelista) was  advanced through the Vizigo sheath into the left atrium. A 3-dimensional electroanatomical  map was constructed using the CARTO system. The Penta-ray was removed and a 3.5-mm externally-irrigated ablation catheter (Biosense-Evangelista   Thermocool ST SF DF-curve) was advanced through the Vizigo sheath into the left atrium.     Attention was first turned to the left pulmonary veins. The Penta-Ray  catheter was placed in the LSPV and LIPV which showed conduction into the vein. A circumferential  ablation line using radiofrequency energy 20-40W was placed which resulted in LSPV and LIPV isolation.  The catheters were then moved to the RSPV and RIPV which showed evidence of conduction into the veins,   and ablation 20-40W was performed circumferentially around these veins   resulting in RSPV and RIPV isolation. Power was reduced near the esophagus.  The Penta-Ray was advanced into all four pulmonary veins and persistent conduction block into the right and left pulmonary veins was demonstrated.     We also performed LA roof ablation to perform additional ablation of non-PV afib substrate, ablation was performed at 40W between LSPV and RSPV, block was confirmed with  differential pacing.    Isuprel 2-4mcg/min was administered and burst pacing was performed in the left atrium without induction of sustained AF, AFL, or SVT.     The left atrial sheath and catheter were withdrawn to the right atrium. The CTI was confirmed blocked.  ICE visualization   of the pericardium confirmed no pericardial effusion at the end of the case. The  patient was awakened from anesthesia, catheters and sheaths were removed, Vascade MVP closure devices were deployed, and manual  pressure was held on bilateral groins until hemostasis was achieved.     Electrophysiological Findings:  Sinus cycle length 766 msec  Intervals- A-H 97 msec  H-V 35 msec  QRS 88 msec   msec   msec  AV block  ms    Total RF time: 1285 sec  Fluoro time: 0 min    Estimated blood loss - 30 mL    Complications: None    Impression:  1. Atrial fibrillation, paroxysmal  2. Successful isolation of all four pulmonary veins  3. Successful LA roof line for additional afib ablation  4. Typical atrial flutter, successful CTI ablation with bidirectional block    Recommendations:  1. Bed rest for 2 hours  2. Telemetry monitoring during recovery  3. Anticoagulant therapy for at least 6-12 months  4. Follow up in Arrhythmia clinic in 4 weeks      Alex Webster MD  Cardiac Electrophysiology

## 2023-06-13 ENCOUNTER — TELEPHONE (OUTPATIENT)
Dept: CARDIOLOGY | Facility: MEDICAL CENTER | Age: 63
End: 2023-06-13
Payer: MEDICAID

## 2023-06-13 ENCOUNTER — APPOINTMENT (OUTPATIENT)
Dept: RADIOLOGY | Facility: MEDICAL CENTER | Age: 63
DRG: 919 | End: 2023-06-13
Attending: EMERGENCY MEDICINE
Payer: MEDICAID

## 2023-06-13 ENCOUNTER — PATIENT MESSAGE (OUTPATIENT)
Dept: CARDIOLOGY | Facility: MEDICAL CENTER | Age: 63
End: 2023-06-13
Payer: MEDICAID

## 2023-06-13 ENCOUNTER — HOSPITAL ENCOUNTER (INPATIENT)
Facility: MEDICAL CENTER | Age: 63
LOS: 3 days | DRG: 919 | End: 2023-06-17
Attending: EMERGENCY MEDICINE | Admitting: HOSPITALIST
Payer: MEDICAID

## 2023-06-13 DIAGNOSIS — Z98.890 H/O PRIOR ABLATION TREATMENT: ICD-10-CM

## 2023-06-13 DIAGNOSIS — J18.9 PNEUMONIA OF LEFT LOWER LOBE DUE TO INFECTIOUS ORGANISM: ICD-10-CM

## 2023-06-13 DIAGNOSIS — J18.9 PNEUMONIA DUE TO INFECTIOUS ORGANISM, UNSPECIFIED LATERALITY, UNSPECIFIED PART OF LUNG: ICD-10-CM

## 2023-06-13 DIAGNOSIS — R10.31 RIGHT INGUINAL PAIN: ICD-10-CM

## 2023-06-13 DIAGNOSIS — D72.829 LEUKOCYTOSIS, UNSPECIFIED TYPE: ICD-10-CM

## 2023-06-13 DIAGNOSIS — A41.9 SEPSIS WITHOUT ACUTE ORGAN DYSFUNCTION, DUE TO UNSPECIFIED ORGANISM (HCC): ICD-10-CM

## 2023-06-13 LAB
ANION GAP SERPL CALC-SCNC: 15 MMOL/L (ref 7–16)
APTT PPP: 27.7 SEC (ref 24.7–36)
BUN SERPL-MCNC: 21 MG/DL (ref 8–22)
CALCIUM SERPL-MCNC: 8.8 MG/DL (ref 8.5–10.5)
CHLORIDE SERPL-SCNC: 104 MMOL/L (ref 96–112)
CO2 SERPL-SCNC: 22 MMOL/L (ref 20–33)
CREAT SERPL-MCNC: 1.12 MG/DL (ref 0.5–1.4)
EKG IMPRESSION: NORMAL
ERYTHROCYTE [DISTWIDTH] IN BLOOD BY AUTOMATED COUNT: 53.7 FL (ref 35.9–50)
GFR SERPLBLD CREATININE-BSD FMLA CKD-EPI: 74 ML/MIN/1.73 M 2
GLUCOSE SERPL-MCNC: 93 MG/DL (ref 65–99)
HCT VFR BLD AUTO: 44.6 % (ref 42–52)
HGB BLD-MCNC: 13.6 G/DL (ref 14–18)
INR PPP: 1.18 (ref 0.87–1.13)
LACTATE SERPL-SCNC: 1.6 MMOL/L (ref 0.5–2)
LACTATE SERPL-SCNC: 1.7 MMOL/L (ref 0.5–2)
MCH RBC QN AUTO: 22 PG (ref 27–33)
MCHC RBC AUTO-ENTMCNC: 30.5 G/DL (ref 32.3–36.5)
MCV RBC AUTO: 72.1 FL (ref 81.4–97.8)
PLATELET # BLD AUTO: 416 K/UL (ref 164–446)
PMV BLD AUTO: 9.9 FL (ref 9–12.9)
POTASSIUM SERPL-SCNC: 4.7 MMOL/L (ref 3.6–5.5)
PROTHROMBIN TIME: 14.8 SEC (ref 12–14.6)
RBC # BLD AUTO: 6.19 M/UL (ref 4.7–6.1)
SODIUM SERPL-SCNC: 141 MMOL/L (ref 135–145)
WBC # BLD AUTO: 28.2 K/UL (ref 4.8–10.8)

## 2023-06-13 PROCEDURE — 700102 HCHG RX REV CODE 250 W/ 637 OVERRIDE(OP): Mod: UD | Performed by: HOSPITALIST

## 2023-06-13 PROCEDURE — A9270 NON-COVERED ITEM OR SERVICE: HCPCS | Mod: UD | Performed by: HOSPITALIST

## 2023-06-13 PROCEDURE — 85610 PROTHROMBIN TIME: CPT

## 2023-06-13 PROCEDURE — 74176 CT ABD & PELVIS W/O CONTRAST: CPT

## 2023-06-13 PROCEDURE — 700105 HCHG RX REV CODE 258: Mod: UD | Performed by: HOSPITALIST

## 2023-06-13 PROCEDURE — 700105 HCHG RX REV CODE 258: Mod: UD | Performed by: EMERGENCY MEDICINE

## 2023-06-13 PROCEDURE — 700102 HCHG RX REV CODE 250 W/ 637 OVERRIDE(OP): Mod: UD | Performed by: EMERGENCY MEDICINE

## 2023-06-13 PROCEDURE — G0378 HOSPITAL OBSERVATION PER HR: HCPCS

## 2023-06-13 PROCEDURE — 93005 ELECTROCARDIOGRAM TRACING: CPT

## 2023-06-13 PROCEDURE — 700111 HCHG RX REV CODE 636 W/ 250 OVERRIDE (IP): Mod: UD | Performed by: HOSPITALIST

## 2023-06-13 PROCEDURE — 36415 COLL VENOUS BLD VENIPUNCTURE: CPT

## 2023-06-13 PROCEDURE — 700111 HCHG RX REV CODE 636 W/ 250 OVERRIDE (IP): Mod: UD | Performed by: EMERGENCY MEDICINE

## 2023-06-13 PROCEDURE — 83605 ASSAY OF LACTIC ACID: CPT

## 2023-06-13 PROCEDURE — 96376 TX/PRO/DX INJ SAME DRUG ADON: CPT

## 2023-06-13 PROCEDURE — A9270 NON-COVERED ITEM OR SERVICE: HCPCS | Mod: UD | Performed by: EMERGENCY MEDICINE

## 2023-06-13 PROCEDURE — A9270 NON-COVERED ITEM OR SERVICE: HCPCS | Mod: UD

## 2023-06-13 PROCEDURE — 85027 COMPLETE CBC AUTOMATED: CPT

## 2023-06-13 PROCEDURE — 85730 THROMBOPLASTIN TIME PARTIAL: CPT

## 2023-06-13 PROCEDURE — 96375 TX/PRO/DX INJ NEW DRUG ADDON: CPT

## 2023-06-13 PROCEDURE — 99222 1ST HOSP IP/OBS MODERATE 55: CPT | Performed by: HOSPITALIST

## 2023-06-13 PROCEDURE — 93926 LOWER EXTREMITY STUDY: CPT | Mod: 26,RT | Performed by: INTERNAL MEDICINE

## 2023-06-13 PROCEDURE — 93010 ELECTROCARDIOGRAM REPORT: CPT | Performed by: INTERNAL MEDICINE

## 2023-06-13 PROCEDURE — 93926 LOWER EXTREMITY STUDY: CPT | Mod: RT

## 2023-06-13 PROCEDURE — 99285 EMERGENCY DEPT VISIT HI MDM: CPT

## 2023-06-13 PROCEDURE — 87040 BLOOD CULTURE FOR BACTERIA: CPT | Mod: 91

## 2023-06-13 PROCEDURE — 80048 BASIC METABOLIC PNL TOTAL CA: CPT

## 2023-06-13 PROCEDURE — 96366 THER/PROPH/DIAG IV INF ADDON: CPT

## 2023-06-13 PROCEDURE — 71045 X-RAY EXAM CHEST 1 VIEW: CPT

## 2023-06-13 PROCEDURE — 700102 HCHG RX REV CODE 250 W/ 637 OVERRIDE(OP): Mod: UD

## 2023-06-13 PROCEDURE — 96365 THER/PROPH/DIAG IV INF INIT: CPT

## 2023-06-13 RX ORDER — ONDANSETRON 4 MG/1
4 TABLET, ORALLY DISINTEGRATING ORAL 2 TIMES DAILY PRN
COMMUNITY

## 2023-06-13 RX ORDER — BISACODYL 10 MG
10 SUPPOSITORY, RECTAL RECTAL
Status: DISCONTINUED | OUTPATIENT
Start: 2023-06-13 | End: 2023-06-17 | Stop reason: HOSPADM

## 2023-06-13 RX ORDER — AZITHROMYCIN 250 MG/1
500 TABLET, FILM COATED ORAL ONCE
Status: COMPLETED | OUTPATIENT
Start: 2023-06-13 | End: 2023-06-13

## 2023-06-13 RX ORDER — OMEPRAZOLE 20 MG/1
20 CAPSULE, DELAYED RELEASE ORAL DAILY
Status: DISCONTINUED | OUTPATIENT
Start: 2023-06-14 | End: 2023-06-17 | Stop reason: HOSPADM

## 2023-06-13 RX ORDER — ONDANSETRON 2 MG/ML
4 INJECTION INTRAMUSCULAR; INTRAVENOUS ONCE
Status: COMPLETED | OUTPATIENT
Start: 2023-06-13 | End: 2023-06-13

## 2023-06-13 RX ORDER — AMOXICILLIN 250 MG
2 CAPSULE ORAL 2 TIMES DAILY
Status: DISCONTINUED | OUTPATIENT
Start: 2023-06-13 | End: 2023-06-17 | Stop reason: HOSPADM

## 2023-06-13 RX ORDER — HYDROMORPHONE HYDROCHLORIDE 1 MG/ML
0.5 INJECTION, SOLUTION INTRAMUSCULAR; INTRAVENOUS; SUBCUTANEOUS EVERY 4 HOURS PRN
Status: DISCONTINUED | OUTPATIENT
Start: 2023-06-13 | End: 2023-06-15

## 2023-06-13 RX ORDER — HYDROMORPHONE HYDROCHLORIDE 1 MG/ML
0.5 INJECTION, SOLUTION INTRAMUSCULAR; INTRAVENOUS; SUBCUTANEOUS ONCE
Status: COMPLETED | OUTPATIENT
Start: 2023-06-13 | End: 2023-06-13

## 2023-06-13 RX ORDER — PANTOPRAZOLE SODIUM 40 MG/1
40 TABLET, DELAYED RELEASE ORAL DAILY
Status: DISCONTINUED | OUTPATIENT
Start: 2023-06-13 | End: 2023-06-13

## 2023-06-13 RX ORDER — POLYETHYLENE GLYCOL 3350 17 G/17G
1 POWDER, FOR SOLUTION ORAL
Status: DISCONTINUED | OUTPATIENT
Start: 2023-06-13 | End: 2023-06-17 | Stop reason: HOSPADM

## 2023-06-13 RX ORDER — ERGOCALCIFEROL 1.25 MG/1
5000 CAPSULE ORAL
Status: ON HOLD | COMMUNITY
Start: 2023-05-01 | End: 2023-07-09

## 2023-06-13 RX ORDER — DOXYCYCLINE 100 MG/1
100 TABLET ORAL EVERY 12 HOURS
Status: DISCONTINUED | OUTPATIENT
Start: 2023-06-13 | End: 2023-06-17 | Stop reason: HOSPADM

## 2023-06-13 RX ORDER — CHLORPROMAZINE HYDROCHLORIDE 50 MG/1
TABLET, FILM COATED ORAL
Status: SHIPPED | COMMUNITY
Start: 2023-06-09 | End: 2023-06-13

## 2023-06-13 RX ORDER — ALBUTEROL SULFATE 90 UG/1
2 AEROSOL, METERED RESPIRATORY (INHALATION)
Status: DISCONTINUED | OUTPATIENT
Start: 2023-06-13 | End: 2023-06-17 | Stop reason: HOSPADM

## 2023-06-13 RX ORDER — METOPROLOL SUCCINATE 50 MG/1
50 TABLET, EXTENDED RELEASE ORAL EVERY EVENING
Status: DISCONTINUED | OUTPATIENT
Start: 2023-06-13 | End: 2023-06-17 | Stop reason: HOSPADM

## 2023-06-13 RX ORDER — HYDROXYZINE 50 MG/1
25 TABLET, FILM COATED ORAL NIGHTLY PRN
Status: DISCONTINUED | OUTPATIENT
Start: 2023-06-13 | End: 2023-06-17 | Stop reason: HOSPADM

## 2023-06-13 RX ORDER — CHLORPROMAZINE HYDROCHLORIDE 50 MG/1
50 TABLET, FILM COATED ORAL
Status: DISCONTINUED | OUTPATIENT
Start: 2023-06-13 | End: 2023-06-17 | Stop reason: HOSPADM

## 2023-06-13 RX ADMIN — HYDROMORPHONE HYDROCHLORIDE 0.5 MG: 1 INJECTION, SOLUTION INTRAMUSCULAR; INTRAVENOUS; SUBCUTANEOUS at 23:23

## 2023-06-13 RX ADMIN — HYDROMORPHONE HYDROCHLORIDE 0.5 MG: 1 INJECTION, SOLUTION INTRAMUSCULAR; INTRAVENOUS; SUBCUTANEOUS at 19:28

## 2023-06-13 RX ADMIN — AMPICILLIN AND SULBACTAM 3 G: 1; 2 INJECTION, POWDER, FOR SOLUTION INTRAMUSCULAR; INTRAVENOUS at 23:28

## 2023-06-13 RX ADMIN — HYDROMORPHONE HYDROCHLORIDE 0.5 MG: 1 INJECTION, SOLUTION INTRAMUSCULAR; INTRAVENOUS; SUBCUTANEOUS at 11:55

## 2023-06-13 RX ADMIN — RIVAROXABAN 20 MG: 20 TABLET, FILM COATED ORAL at 17:21

## 2023-06-13 RX ADMIN — HYDROMORPHONE HYDROCHLORIDE 0.5 MG: 1 INJECTION, SOLUTION INTRAMUSCULAR; INTRAVENOUS; SUBCUTANEOUS at 14:51

## 2023-06-13 RX ADMIN — AMPICILLIN AND SULBACTAM 3 G: 1; 2 INJECTION, POWDER, FOR SOLUTION INTRAMUSCULAR; INTRAVENOUS at 17:24

## 2023-06-13 RX ADMIN — AMPICILLIN AND SULBACTAM 3 G: 1; 2 INJECTION, POWDER, FOR SOLUTION INTRAMUSCULAR; INTRAVENOUS at 13:00

## 2023-06-13 RX ADMIN — AZITHROMYCIN MONOHYDRATE 500 MG: 250 TABLET ORAL at 14:12

## 2023-06-13 RX ADMIN — DOXYCYCLINE 100 MG: 100 TABLET, FILM COATED ORAL at 17:21

## 2023-06-13 RX ADMIN — ALBUTEROL SULFATE 2 PUFF: 90 AEROSOL, METERED RESPIRATORY (INHALATION) at 21:32

## 2023-06-13 RX ADMIN — METOPROLOL SUCCINATE 50 MG: 50 TABLET, EXTENDED RELEASE ORAL at 17:21

## 2023-06-13 RX ADMIN — ONDANSETRON 4 MG: 2 INJECTION INTRAMUSCULAR; INTRAVENOUS at 11:55

## 2023-06-13 ASSESSMENT — ENCOUNTER SYMPTOMS
DOUBLE VISION: 0
FEVER: 0
BLURRED VISION: 0
HEADACHES: 0
BACK PAIN: 0
DIZZINESS: 0
SHORTNESS OF BREATH: 0
NAUSEA: 0
LOSS OF CONSCIOUSNESS: 0
DIARRHEA: 0
ABDOMINAL PAIN: 0
PALPITATIONS: 0
VOMITING: 0
CHILLS: 0
SORE THROAT: 0
COUGH: 1

## 2023-06-13 ASSESSMENT — LIFESTYLE VARIABLES
ON A TYPICAL DAY WHEN YOU DRINK ALCOHOL HOW MANY DRINKS DO YOU HAVE: 0
HAVE PEOPLE ANNOYED YOU BY CRITICIZING YOUR DRINKING: NO
EVER FELT BAD OR GUILTY ABOUT YOUR DRINKING: NO
EVER HAD A DRINK FIRST THING IN THE MORNING TO STEADY YOUR NERVES TO GET RID OF A HANGOVER: NO
REASON UNABLE TO ASSESS: DONE
HAVE YOU EVER FELT YOU SHOULD CUT DOWN ON YOUR DRINKING: NO
CONSUMPTION TOTAL: NEGATIVE
TOTAL SCORE: 0
HOW MANY TIMES IN THE PAST YEAR HAVE YOU HAD 5 OR MORE DRINKS IN A DAY: 0
TOTAL SCORE: 0
ALCOHOL_USE: YES
AVERAGE NUMBER OF DAYS PER WEEK YOU HAVE A DRINK CONTAINING ALCOHOL: 0
TOTAL SCORE: 0

## 2023-06-13 ASSESSMENT — FIBROSIS 4 INDEX
FIB4 SCORE: 0.55
FIB4 SCORE: 0.47
FIB4 SCORE: 0.55

## 2023-06-13 ASSESSMENT — CHA2DS2 SCORE
SEX: MALE
AGE 65 TO 74: NO
AGE 75 OR GREATER: NO
CHF OR LEFT VENTRICULAR DYSFUNCTION: YES
VASCULAR DISEASE: YES
PRIOR STROKE OR TIA OR THROMBOEMBOLISM: NO
CHA2DS2 VASC SCORE: 2
DIABETES: NO
HYPERTENSION: NO

## 2023-06-13 ASSESSMENT — PAIN DESCRIPTION - PAIN TYPE
TYPE: SURGICAL PAIN
TYPE: ACUTE PAIN
TYPE: SURGICAL PAIN
TYPE: ACUTE PAIN

## 2023-06-13 ASSESSMENT — PATIENT HEALTH QUESTIONNAIRE - PHQ9
SUM OF ALL RESPONSES TO PHQ9 QUESTIONS 1 AND 2: 0
2. FEELING DOWN, DEPRESSED, IRRITABLE, OR HOPELESS: NOT AT ALL
1. LITTLE INTEREST OR PLEASURE IN DOING THINGS: NOT AT ALL

## 2023-06-13 NOTE — ASSESSMENT & PLAN NOTE
Underwent successful ablation on 6/12/2023.  On rivaroxaban and metoprolol.  Has pain in right femoral access site.  No large hematoma or pseudoaneurysm noted on CT.  Continue supportive care

## 2023-06-13 NOTE — ED NOTES
Pt assisted to ronni 17 via wheelchair. Pt changed into gown and placed on monitors. Chart up for ERP.  Pt appears uncomfortable I gurney. No active femoral site bleeding noted.

## 2023-06-13 NOTE — ASSESSMENT & PLAN NOTE
Patient was afebrile and hemodynamically stable on admission.  No obvious source of infection, possible pneumonia.  Sepsis has not been established

## 2023-06-13 NOTE — TELEPHONE ENCOUNTER
Phone Number Called: [phone]    Call outcome: Spoke to patient regarding message below.    Message: Called to discuss pain level. He stated it started at 0200 pain is bad, has taken 8 motrin since then and it has not slowed down. Recommended he go to the ER for an Ultrasound and notify them of procedure done yesterday. Answered all questions and concerns, appreciative of call.     MR/LASHAUN: NITHYA Sent to ER.

## 2023-06-13 NOTE — ED TRIAGE NOTES
Chief Complaint   Patient presents with    Post-Op Complications     Pt reports having ablation yesterday. D/c'd last night. States around 2am woke up in pain and noticed large hematoma to right groin. Used ice and motrin at home without relief.      Pt ambulates to triage for above.     Had ablation for a-fib. States is still taking his xarelto, was not told to stop any of his meds.     Pt appears very uncomfortable in triage. Pt to lobby, educated on triage process. Informed to notify staff of changes in condition.

## 2023-06-13 NOTE — ED PROVIDER NOTES
ER Provider Note    Scribed for Manjit Rico M.D. by Faith Douglas. 6/13/2023   10:32 AM    Primary Care Provider: EFRAÍN Hewitt    CHIEF COMPLAINT  Chief Complaint   Patient presents with    Post-Op Complications     Pt reports having ablation yesterday. D/c'd last night. States around 2am woke up in pain and noticed large hematoma to right groin. Used ice and motrin at home without relief.      EXTERNAL RECORDS REVIEWED  Outpatient Notes shows that the patient had a Cardiac catheterization/ablation yesterday done by  (Cardiology)    HPI/ROS  LIMITATION TO HISTORY   Select: : None  OUTSIDE HISTORIAN(S):  None    Noe Hickey is a 62 y.o. male who presents to the ED for evaluation of acute right-sided groin pain onset last night. Patient had a cardiac catheterization/ablation done yesterday, and was discharged home at around 6 PM last night. The patient states he was sleeping and woke up to pain and bleeding on the right side of his groin. He tried alleviating his symptoms with Motrin and ice. Despite medication, his symptoms continued to persist, prompting him to present to the ED today for further evaluation. Currently in the ED, patient states that he still has pain to his right groin. Denies fevers. He is currently on Eliquis. He has no known drug allergies.     PAST MEDICAL HISTORY  Past Medical History:   Diagnosis Date    Arrhythmia     Breath shortness 06/09/2023    prior to 1/2023 surgery    Cyclic vomiting syndrome     Elevated hemidiaphragm - LEFT post AVR 01/04/2023    Fracture     Headache, classical migraine     Heart burn     Heart valve disease 06/09/2023    heart surgery in 1/2023    Indigestion     Pneumonia due to infectious organism 01/20/2023    Snoring        SURGICAL HISTORY  Past Surgical History:   Procedure Laterality Date    AORTIC VALVE REPLACEMENT  1/5/2023    Procedure: AORTIC VALVE REPLACEMENT, ASCENDING AORTIC ANEURYSM REPAIR AND  TRANSESOPHAGEAL ECHOCARDIOGRAM.;  Surgeon: Serena Goyal M.D.;  Location: SURGERY [facility];  Service: Cardiac    AORTIC ASCENDING DISSECTION  1/5/2023    Procedure: REPAIR, ANEURYSM OR DISSECTION, AORTA, ASCENDING;  Surgeon: Serena Goyal M.D.;  Location: SURGERY [facility];  Service: Cardiac    ECHOCARDIOGRAM, TRANSESOPHAGEAL, INTRAOPERATIVE  1/5/2023    Procedure: ECHOCARDIOGRAM, TRANSESOPHAGEAL, INTRAOPERATIVE.;  Surgeon: Serena Goyal M.D.;  Location: SURGERY [facility];  Service: Cardiac    IRRIGATION & DEBRIDEMENT ORTHO Right 09/19/2017    Procedure: IRRIGATION & DEBRIDEMENT ORTHO-THIGH;  Surgeon: Corbin Rivera M.D.;  Location: SURGERY [facility];  Service: Orthopedics    SHOULDER HEMICAP RESURFACING Right 10/12/2015    Procedure: RIGHT SHOULDER RESURFACING;  Surgeon: Javon Doll M.D.;  Location: SURGERY [facility];  Service:     SHOULDER ARTHROSCOPY      x3    SHOULDER SURGERY      replacement right       FAMILY HISTORY  History reviewed. No pertinent family history.    SOCIAL HISTORY   reports that he has been smoking cigarettes. He has a 20.00 pack-year smoking history. He has never used smokeless tobacco. He reports that he does not currently use drugs. He reports that he does not drink alcohol.    CURRENT MEDICATIONS  Previous Medications    CHLORPROMAZINE (THORAZINE) 50 MG TAB    Take 50 mg by mouth 1 time a day as needed (nausea).    HYDROXYZINE HCL (ATARAX) 25 MG TAB    25 mg at bedtime as needed (sleep).    METOPROLOL SR (TOPROL XL) 50 MG TABLET SR 24 HR    Take 1 Tablet by mouth every day.    ONDANSETRON (ZOFRAN ODT) 4 MG TABLET DISPERSIBLE    Take 4 mg by mouth 2 times a day as needed for Nausea/Vomiting.    PANTOPRAZOLE (PROTONIX) 40 MG TABLET DELAYED RESPONSE    Take 1 Tablet by mouth every day. Take daily for 30 days post ablation then can reduce to as needed again.    RIVAROXABAN (XARELTO) 20 MG TAB TABLET    Take 1 Tablet by mouth with dinner.    VITAMIN D,  "ERGOCALCIFEROL, 04427 UNITS CAP    Take 5,000 Units by mouth every 7 days.       ALLERGIES  Allergies   Allergen Reactions    Morphine Rash and Itching     Rash/ difficulty breathing        PHYSICAL EXAM  /76   Pulse 89   Temp 37.1 °C (98.7 °F) (Temporal)   Resp (!) 22   Ht 1.753 m (5' 9\")   Wt 90 kg (198 lb 6.6 oz)   SpO2 95%   BMI 29.30 kg/m²      Nursing note and vitals reviewed.  Constitutional: Well-developed and well-nourished. No distress.   HENT: Head is normocephalic and atraumatic. Oropharynx is clear and moist without exudate or erythema.   Eyes: Pupils are equal, round, and reactive to light. Conjunctiva are normal.   Cardiovascular: Normal rate and regular rhythm. No murmur heard. Normal radial pulses.  Pulmonary/Chest: Breath sounds normal. No wheezes or rales.   Abdominal: Soft and non-tender. No distention    Musculoskeletal:  Extremities exhibit normal range of motion.  Neurological: Awake, alert and oriented to person, place, and time. No focal deficits noted.  Skin: Skin is warm and dry. No rash. Ecchymosis and minimal swelling to the right groin. No palpable pulsatile mass.  Psychiatric: Normal mood and affect. Appropriate for clinical situation      DIAGNOSTIC STUDIES    Labs:   Results for orders placed or performed during the hospital encounter of 06/13/23   CBC WITHOUT DIFFERENTIAL   Result Value Ref Range    WBC 28.2 (H) 4.8 - 10.8 K/uL    RBC 6.19 (H) 4.70 - 6.10 M/uL    Hemoglobin 13.6 (L) 14.0 - 18.0 g/dL    Hematocrit 44.6 42.0 - 52.0 %    MCV 72.1 (L) 81.4 - 97.8 fL    MCH 22.0 (L) 27.0 - 33.0 pg    MCHC 30.5 (L) 32.3 - 36.5 g/dL    RDW 53.7 (H) 35.9 - 50.0 fL    Platelet Count 416 164 - 446 K/uL    MPV 9.9 9.0 - 12.9 fL   BASIC METABOLIC PANEL   Result Value Ref Range    Sodium 141 135 - 145 mmol/L    Potassium 4.7 3.6 - 5.5 mmol/L    Chloride 104 96 - 112 mmol/L    Co2 22 20 - 33 mmol/L    Glucose 93 65 - 99 mg/dL    Bun 21 8 - 22 mg/dL    Creatinine 1.12 0.50 - 1.40 " mg/dL    Calcium 8.8 8.5 - 10.5 mg/dL    Anion Gap 15.0 7.0 - 16.0   PROTHROMBIN TIME (INR)   Result Value Ref Range    PT 14.8 (H) 12.0 - 14.6 sec    INR 1.18 (H) 0.87 - 1.13   APTT   Result Value Ref Range    APTT 27.7 24.7 - 36.0 sec   ESTIMATED GFR   Result Value Ref Range    GFR (CKD-EPI) 74 >60 mL/min/1.73 m 2   LACTIC ACID   Result Value Ref Range    Lactic Acid 1.7 0.5 - 2.0 mmol/L       Radiology:   This attending emergency physician has independently interpreted the diagnostic imaging associated with this visit and is awaiting the final reading from the radiologist.   Preliminary interpretation is a follows: Ultrasound shows no evidence of pseudoaneurysm or significant hematoma.  CT scan does not demonstrate any inflammatory changes.    Radiologist interpretation:   US-EXTREMITY ARTERY LOWER UNILAT RIGHT   Final Result      CT-ABDOMEN-PELVIS W/O   Final Result      1.  Right inguinal inflammatory changes. No large hematoma or pseudoaneurysm, though suboptimally evaluated without intravenous contrast. Consider ultrasound evaluation if there is concern for these etiology.   2.  Elevation of the left hemidiaphragm. Left lower lobe consolidation. Atelectasis versus pneumonia.   3.  Tiny nonobstructing right renal stone.      DX-CHEST-PORTABLE (1 VIEW)    (Results Pending)        INITIAL ASSESSMENT AND PLAN    10:32 AM - Patient was evaluated at bedside. Patient presents to the ED for right-sided groin pain after having an ablation done yesterday. After my exam, I discussed with the patient the plan of care, which includes treating the patient with medication for their symptoms, as well as obtaining lab work and imaging for further evaluation. Patient understands and verbalizes agreement to plan of care.  Ordered for US-extremity artery lower bilateral right, BMP, CBC without diff, APTT, Prothrombin Time, and Estimated GFR to evaluate. The patient will be medicated with Zofran 4 mg and Dilaudid 0.5 mg for his  symptoms. Patient verbalizes understanding and support with my plan of care.     10:39 AM - Paged Cardiology    11:22 AM - Cardiology responded. 1:27 PM I discussed the patient's case and the above findings with Dr. Grant (Cardiology) who recommended an ultrasound be ordered and to call him back if there are any significant abnormalities.      11:40 AM - Ordered for CT-abdomen-pelvis without for further evaluation.     12:40 PM - Patient will be treated with Zithromax 500 mg and Unasyn 3 g for his symptoms. Ordered for DX-chest, blood culture, and lactic acid for further evaluation.     12:41 PM - Paged Hospitalist    1:10 PM - Hospitalist responded. I discussed the patient's case and the above findings with Dr. Stanley (Hospitalist) who agrees to evaluate the patient for hospitalization.      ED Observation Status? Yes; I am placing the patient in to an observation status due to a diagnostic uncertainty as well as therapeutic intensity. Patient placed in observation status at 10:40 AM, 6/13/2023.     Observation plan is as follows: We will manage their symptoms, evaluate with lab work and imaging, and then reassess after results are reviewed      Upon Reevaluation, the patient's condition has: not improved; and will be escalated to hospitalization.    Patient discharged from ED Observation status at 1:10 PM (Time) 6/13/2023  (Date).      COURSE AND MEDICAL DECISION MAKING    Presents today with right groin pain.  Incidentally he was noted to have a white blood cell count of 28.  In the course of his work-up for his groin discomfort he was found to have a left lower lobe pneumonia.  In further discussion with the patient he does endorse some respiratory symptoms recently.  Had a history of pneumonia 4 months ago or so.  Given his white blood cell count I feel this is likely an acute infection.  Given his overall clinical picture I feel that he would benefit from admission to the hospital for further  evaluation and treatment.    Pain was well controlled in the emergency department over the catheterization site following several doses of hydromorphone.  Much more comfortable on reevaluation.      The patient was treated with Zofran for nausea.  Placed on a cardiac monitor to monitor for any arrhythmia associated with Zofran and QT prolongation.    The patient was treated with IV narcotics for pain control.  Placed on cardiac and blood pressure monitor to monitor for associated hypotension.  Also placed on pulse oximetry to monitor for hypoxia associated with medication administration/side effect.      DISPOSITION AND DISCUSSIONS    I have discussed management of the patient with the following physicians and SHAUN's:  Dr. Grant (Cardiology) and Dr. Stanley (Hospitalist)     Discussion of management with other [facility] or appropriate source(s): None     Patient will be hospitalized by Dr. Stanley (Hospitalist)       FINAL DIAGNOSIS  1. Right inguinal pain    2. Sepsis without acute organ dysfunction, due to unspecified organism (HCC)    3. Pneumonia of left lower lobe due to infectious organism    4. Leukocytosis, unspecified type    5. H/O prior ablation treatment         Faith FLETCHER (Scribe), am scribing for, and in the presence of, Manjit Rico M.D..    Electronically signed by: Faith Douglas (Scribe), 6/13/2023    Manjit FLETCHER M.D. personally performed the services described in this documentation, as scribed by Faith Douglas in my presence, and it is both accurate and complete.      The note accurately reflects work and decisions made by me.  Manjit Rico M.D.  6/13/2023  1:40 PM

## 2023-06-13 NOTE — H&P
Hospital Medicine History & Physical Note    Date of Service  6/13/2023    Primary Care Physician  EFRAÍN Hewitt    Consultants      Specialist Names:     Code Status  Full Code    Chief Complaint  Chief Complaint   Patient presents with    Post-Op Complications     Pt reports having ablation yesterday. D/c'd last night. States around 2am woke up in pain and noticed large hematoma to right groin. Used ice and motrin at home without relief.        History of Presenting Illness  Noe Hickey is a 62 y.o. male who presented 6/13/2023 with a previous medical history that includes ascending aortic aneurysm, bicuspid aortic valve both s/p  repair, atrial fibrillation s/p ablation, hypertension.    Patient was just in the hospital and was discharged yesterday after being admitted for an elective ablation of atrial fibrillation.  Procedure was completed by Dr. Webster without issue.  Access was obtained through the right femoral artery.  Patient states he went home initially feeling well, with small amount of bruising in the right groin.  Overnight he developed significant pain in that area, in which worsened bruising in the morning.  He came in for evaluation of this.  Here in the ED the patient has had a CT of the abdomen and pelvis to rule out retroperitoneal hematoma as well as an ultrasound to evaluate for arterial damage.  The CT has demonstrated some inflammatory changes in the right groin, the ultrasound is negative for pseudoaneurysm or clot.  The CT was significant as well however for an infiltrate noted in the left lower lobe.  Lab work has been significant for white count of 28 however otherwise benign.  Patient has been in a sinus rhythm during his ED stay, and is on room air.    On my examination the patient notes pain in the right groin however denies any pain further down the leg.  Pain is worse when he moves the leg, especially when he pushes on it which is quite painful.  He does note  a dry cough which she has had for some time now, ever since he had his aortic valve replaced and aneurysm fixed earlier this year.  He has not had any fever or chills, and no shortness of breath.    I discussed the plan of care with patient.    Review of Systems  Review of Systems   Constitutional:  Negative for chills and fever.   HENT:  Negative for nosebleeds and sore throat.    Eyes:  Negative for blurred vision and double vision.   Respiratory:  Positive for cough. Negative for shortness of breath.    Cardiovascular:  Negative for chest pain, palpitations and leg swelling.   Gastrointestinal:  Negative for abdominal pain, diarrhea, nausea and vomiting.   Genitourinary:  Negative for dysuria and urgency.   Musculoskeletal:  Negative for back pain.        Right groin pain    Skin:  Negative for rash.   Neurological:  Negative for dizziness, loss of consciousness and headaches.       Past Medical History   has a past medical history of Arrhythmia, Breath shortness (06/09/2023), Cyclic vomiting syndrome, Elevated hemidiaphragm - LEFT post AVR (01/04/2023), Fracture, Headache, classical migraine, Heart burn, Heart valve disease (06/09/2023), Indigestion, Pneumonia due to infectious organism (01/20/2023), and Snoring.    Surgical History   has a past surgical history that includes shoulder arthroscopy; shoulder hemicap resurfacing (Right, 10/12/2015); irrigation & debridement ortho (Right, 09/19/2017); shoulder surgery; aortic valve replacement (1/5/2023); aortic ascending dissection (1/5/2023); and echocardiogram, transesophageal, intraoperative (1/5/2023).     Family History  family history is not on file.   Family history reviewed with patient. There is no family history that is pertinent to the chief complaint.     Social History   reports that he has been smoking cigarettes. He has a 20.00 pack-year smoking history. He has never used smokeless tobacco. He reports that he does not currently use drugs. He reports  that he does not drink alcohol.    Allergies  Allergies   Allergen Reactions    Morphine Rash and Itching     Rash/ difficulty breathing       Medications  Prior to Admission Medications   Prescriptions Last Dose Informant Patient Reported? Taking?   Vitamin D, Ergocalciferol, 64019 units Cap 2023 at unk Patient Yes No   Sig: Take 5,000 Units by mouth every 7 days.   chlorproMAZINE (THORAZINE) 50 MG Tab 2023 at 1600 Patient Yes No   Sig: Take 50 mg by mouth 1 time a day as needed (nausea).   hydrOXYzine HCl (ATARAX) 25 MG Tab 2023 at pm Patient Yes No   Si mg at bedtime as needed (sleep).   metoprolol SR (TOPROL XL) 50 MG TABLET SR 24 HR 2023 at 1600 Patient No No   Sig: Take 1 Tablet by mouth every day.   ondansetron (ZOFRAN ODT) 4 MG TABLET DISPERSIBLE 2023 at unk Patient Yes Yes   Sig: Take 4 mg by mouth 2 times a day as needed for Nausea/Vomiting.   pantoprazole (PROTONIX) 40 MG Tablet Delayed Response 2023 at 0330 Patient Yes No   Sig: Take 1 Tablet by mouth every day. Take daily for 30 days post ablation then can reduce to as needed again.   rivaroxaban (XARELTO) 20 MG Tab tablet 2023 at 1600 Patient No No   Sig: Take 1 Tablet by mouth with dinner.      Facility-Administered Medications: None       Physical Exam  Temp:  [37.1 °C (98.7 °F)] 37.1 °C (98.7 °F)  Pulse:  [83-89] 83  Resp:  [22] 22  BP: (109-119)/(58-76) 119/58  SpO2:  [88 %-95 %] 88 %  Blood Pressure: 119/58   Temperature: 37.1 °C (98.7 °F)   Pulse: 83   Respiration: (!) 22   Pulse Oximetry: 88 %       Physical Exam  Constitutional:       General: He is not in acute distress.     Appearance: He is well-developed. He is not diaphoretic.   HENT:      Head: Normocephalic and atraumatic.   Eyes:      Conjunctiva/sclera: Conjunctivae normal.   Neck:      Vascular: No JVD.   Cardiovascular:      Rate and Rhythm: Normal rate.      Heart sounds: No murmur heard.     No gallop.   Pulmonary:      Effort: Pulmonary  effort is normal. No respiratory distress.      Breath sounds: No stridor. No wheezing or rales.      Comments: Breath sounds are normal throughout with exception of diminished breath sounds in the left base  Abdominal:      Palpations: Abdomen is soft.      Tenderness: There is no abdominal tenderness. There is no guarding or rebound.   Musculoskeletal:      Right lower leg: No edema.      Left lower leg: No edema.      Comments: Significant hematomas noted in the right groin, there is no erythema however.  No areas of fluctuance.  Femoral pulses easily palpable, distal pulses in the dorsalis pedis and tibialis posterior are intact and symmetric to the other side.  Right foot is warm and pink with normal capillary refill.   Skin:     General: Skin is warm and dry.      Capillary Refill: Capillary refill takes less than 2 seconds.      Findings: No rash.   Neurological:      Mental Status: He is alert and oriented to person, place, and time.   Psychiatric:         Mood and Affect: Mood normal.         Behavior: Behavior normal.         Thought Content: Thought content normal.         Laboratory:  Recent Labs     06/12/23  0610 06/13/23  1045   WBC 15.7* 28.2*   RBC 7.36* 6.19*   HEMOGLOBIN 16.0 13.6*   HEMATOCRIT 52.3* 44.6   MCV 71.1* 72.1*   MCH 21.7* 22.0*   MCHC 30.6* 30.5*   RDW 53.3* 53.7*   PLATELETCT 482* 416   MPV  --  9.9     Recent Labs     06/13/23  1045   SODIUM 141   POTASSIUM 4.7   CHLORIDE 104   CO2 22   GLUCOSE 93   BUN 21   CREATININE 1.12   CALCIUM 8.8     Recent Labs     06/13/23  1045   GLUCOSE 93     Recent Labs     06/12/23  0610 06/13/23  1045   APTT  --  27.7   INR 1.22* 1.18*     No results for input(s): NTPROBNP in the last 72 hours.      No results for input(s): TROPONINT in the last 72 hours.    Imaging:  US-EXTREMITY ARTERY LOWER UNILAT RIGHT   Final Result      CT-ABDOMEN-PELVIS W/O   Final Result      1.  Right inguinal inflammatory changes. No large hematoma or pseudoaneurysm, though  suboptimally evaluated without intravenous contrast. Consider ultrasound evaluation if there is concern for these etiology.   2.  Elevation of the left hemidiaphragm. Left lower lobe consolidation. Atelectasis versus pneumonia.   3.  Tiny nonobstructing right renal stone.      DX-CHEST-PORTABLE (1 VIEW)    (Results Pending)       X-Ray:  I have personally reviewed the images and compared with prior images.  EKG:  I have personally reviewed the images and compared with prior images.    Assessment/Plan:  Justification for Admission Status  I anticipate this patient is appropriate for observation status at this time because patient presents with sepsis likely from respiratory source less likely from groin incision    Patient will need a Med/Surg bed on MEDICAL service .  The need is secondary to sepsis.    * Pneumonia due to infectious organism- (present on admission)  Assessment & Plan  Left lower lobe pneumonia noted on CT abdomen pelvis  Patient with recent procedure possibly aspiration  Ampicillin-sulbactam, and p.o. doxycycline  Follow cultures  Admit for observation    Sepsis (HCC)  Assessment & Plan  Present on admission  Source respiratory versus less likely right groin incision  Follow results of cultures  Repeat CBC in a.m.  Stat lactic acid pending: If it is elevated then we will complete full sepsis bolus if it is not however then we will hold any further fluid resuscitation  Vital signs have been stable in the ED    Secondary hypercoagulable state (HCC)- (present on admission)  Assessment & Plan  Patient is maintained on rivaroxaban for history of atrial fibrillation    Paroxysmal atrial flutter (HCC)- (present on admission)  Assessment & Plan  Patient is s/p ablation by Dr. Webster day prior to admission  Sinus on EKG and telemetry  Continue rivaroxaban  Continue metoprolol  Right groin site demonstrates significant hematoma however ultrasound is negative for pseudoaneurysm or clot.  CT scan is negative for  any retroperitoneal hematoma  Clinically does not look infected  Distal neurovascular exam is intact  As needed support and follow physical exam    Bioprosthetic aortic valve for severe bicuspid aortic valve stenosis - 1/5/2023- (present on admission)  Assessment & Plan  Patient with a history of a sending aortic aneurysm and bicuspid aortic valve s/p repair in January of this year        VTE prophylaxis: therapeutic anticoagulation with rivaroxaban

## 2023-06-13 NOTE — ASSESSMENT & PLAN NOTE
Patient with a history of a sending aortic aneurysm and bicuspid aortic valve s/p repair in January of this year

## 2023-06-13 NOTE — ASSESSMENT & PLAN NOTE
Left lower lobe infiltrate versus atelectasis noted on CT abdomen pelvis.  Due to recent procedure, Unasyn and doxycycline were empirically started.  Procalcitonin was negative.

## 2023-06-13 NOTE — PROGRESS NOTES
Patient discharged to [facility] with wife. DC education provided including follow up, post ablation care, and medications. All questions answered, all belongings sent with patient.

## 2023-06-13 NOTE — TELEPHONE ENCOUNTER
MR    Caller: Noe Hickey    Topic/issue: Patient had an ablation done yesterday but last night and this morning  has been experiencing pain in the upper right leg under the groin and has a lot of blood under the skin. Patient has tried ice packs and motrin but nothing is helping / working.     Callback Number: [phone]    Thank you,  -Dyan TRAN

## 2023-06-13 NOTE — CARE PLAN
The patient is Watcher - Medium risk of patient condition declining or worsening    Shift Goals  Clinical Goals: iv abx  Patient Goals: pneumonia resolved    Progress made toward(s) clinical / shift goals:  \    Patient is not progressing towards the following goals:      Problem: Knowledge Deficit - Standard  Goal: Patient and family/care givers will demonstrate understanding of plan of care, disease process/condition, diagnostic tests and medications  Outcome: Progressing     Problem: Respiratory  Goal: Patient will achieve/maintain optimum respiratory ventilation and gas exchange  Outcome: Progressing     Problem: Physical Regulation  Goal: Diagnostic test results will improve  Outcome: Progressing  Goal: Signs and symptoms of infection will decrease  Outcome: Progressing     Problem: Pain - Standard  Goal: Alleviation of pain or a reduction in pain to the patient’s comfort goal  Outcome: Progressing

## 2023-06-14 PROBLEM — J98.4 PNEUMONITIS: Status: ACTIVE | Noted: 2023-06-14

## 2023-06-14 LAB
ANION GAP SERPL CALC-SCNC: 12 MMOL/L (ref 7–16)
BUN SERPL-MCNC: 17 MG/DL (ref 8–22)
CALCIUM SERPL-MCNC: 8.3 MG/DL (ref 8.5–10.5)
CHLORIDE SERPL-SCNC: 104 MMOL/L (ref 96–112)
CO2 SERPL-SCNC: 24 MMOL/L (ref 20–33)
CREAT SERPL-MCNC: 1.21 MG/DL (ref 0.5–1.4)
ERYTHROCYTE [DISTWIDTH] IN BLOOD BY AUTOMATED COUNT: 54.3 FL (ref 35.9–50)
GFR SERPLBLD CREATININE-BSD FMLA CKD-EPI: 67 ML/MIN/1.73 M 2
GLUCOSE SERPL-MCNC: 89 MG/DL (ref 65–99)
HCT VFR BLD AUTO: 43.4 % (ref 42–52)
HGB BLD-MCNC: 12.9 G/DL (ref 14–18)
MCH RBC QN AUTO: 21.5 PG (ref 27–33)
MCHC RBC AUTO-ENTMCNC: 29.7 G/DL (ref 32.3–36.5)
MCV RBC AUTO: 72.2 FL (ref 81.4–97.8)
PLATELET # BLD AUTO: 410 K/UL (ref 164–446)
PMV BLD AUTO: 9.9 FL (ref 9–12.9)
POTASSIUM SERPL-SCNC: 5.1 MMOL/L (ref 3.6–5.5)
RBC # BLD AUTO: 6.01 M/UL (ref 4.7–6.1)
SODIUM SERPL-SCNC: 140 MMOL/L (ref 135–145)
WBC # BLD AUTO: 21 K/UL (ref 4.8–10.8)

## 2023-06-14 PROCEDURE — 85027 COMPLETE CBC AUTOMATED: CPT

## 2023-06-14 PROCEDURE — 96366 THER/PROPH/DIAG IV INF ADDON: CPT

## 2023-06-14 PROCEDURE — 700105 HCHG RX REV CODE 258: Performed by: HOSPITALIST

## 2023-06-14 PROCEDURE — 96376 TX/PRO/DX INJ SAME DRUG ADON: CPT

## 2023-06-14 PROCEDURE — 700102 HCHG RX REV CODE 250 W/ 637 OVERRIDE(OP): Mod: UD | Performed by: HOSPITALIST

## 2023-06-14 PROCEDURE — 700102 HCHG RX REV CODE 250 W/ 637 OVERRIDE(OP): Performed by: HOSPITALIST

## 2023-06-14 PROCEDURE — 700102 HCHG RX REV CODE 250 W/ 637 OVERRIDE(OP)

## 2023-06-14 PROCEDURE — A9270 NON-COVERED ITEM OR SERVICE: HCPCS | Mod: UD | Performed by: HOSPITALIST

## 2023-06-14 PROCEDURE — 700111 HCHG RX REV CODE 636 W/ 250 OVERRIDE (IP): Performed by: HOSPITALIST

## 2023-06-14 PROCEDURE — 99232 SBSQ HOSP IP/OBS MODERATE 35: CPT | Performed by: HOSPITALIST

## 2023-06-14 PROCEDURE — 80048 BASIC METABOLIC PNL TOTAL CA: CPT

## 2023-06-14 PROCEDURE — A9270 NON-COVERED ITEM OR SERVICE: HCPCS | Performed by: HOSPITALIST

## 2023-06-14 PROCEDURE — 36415 COLL VENOUS BLD VENIPUNCTURE: CPT

## 2023-06-14 PROCEDURE — 770006 HCHG ROOM/CARE - MED/SURG/GYN SEMI*

## 2023-06-14 PROCEDURE — A9270 NON-COVERED ITEM OR SERVICE: HCPCS

## 2023-06-14 RX ORDER — OXYCODONE HYDROCHLORIDE 10 MG/1
10 TABLET ORAL EVERY 4 HOURS PRN
Status: DISCONTINUED | OUTPATIENT
Start: 2023-06-14 | End: 2023-06-15

## 2023-06-14 RX ORDER — OXYCODONE HYDROCHLORIDE 5 MG/1
5 TABLET ORAL EVERY 4 HOURS PRN
Status: DISCONTINUED | OUTPATIENT
Start: 2023-06-14 | End: 2023-06-14

## 2023-06-14 RX ADMIN — OXYCODONE HYDROCHLORIDE 5 MG: 5 TABLET ORAL at 16:45

## 2023-06-14 RX ADMIN — DOXYCYCLINE 100 MG: 100 TABLET, FILM COATED ORAL at 17:48

## 2023-06-14 RX ADMIN — HYDROMORPHONE HYDROCHLORIDE 0.5 MG: 1 INJECTION, SOLUTION INTRAMUSCULAR; INTRAVENOUS; SUBCUTANEOUS at 05:02

## 2023-06-14 RX ADMIN — AMPICILLIN AND SULBACTAM 3 G: 1; 2 INJECTION, POWDER, FOR SOLUTION INTRAMUSCULAR; INTRAVENOUS at 18:45

## 2023-06-14 RX ADMIN — HYDROMORPHONE HYDROCHLORIDE 0.5 MG: 1 INJECTION, SOLUTION INTRAMUSCULAR; INTRAVENOUS; SUBCUTANEOUS at 09:06

## 2023-06-14 RX ADMIN — AMPICILLIN AND SULBACTAM 3 G: 1; 2 INJECTION, POWDER, FOR SOLUTION INTRAMUSCULAR; INTRAVENOUS at 11:36

## 2023-06-14 RX ADMIN — ALBUTEROL SULFATE 2 PUFF: 90 AEROSOL, METERED RESPIRATORY (INHALATION) at 03:16

## 2023-06-14 RX ADMIN — HYDROMORPHONE HYDROCHLORIDE 0.5 MG: 1 INJECTION, SOLUTION INTRAMUSCULAR; INTRAVENOUS; SUBCUTANEOUS at 22:26

## 2023-06-14 RX ADMIN — RIVAROXABAN 20 MG: 20 TABLET, FILM COATED ORAL at 17:49

## 2023-06-14 RX ADMIN — ALBUTEROL SULFATE 2 PUFF: 90 AEROSOL, METERED RESPIRATORY (INHALATION) at 19:25

## 2023-06-14 RX ADMIN — SENNOSIDES AND DOCUSATE SODIUM 2 TABLET: 50; 8.6 TABLET ORAL at 17:49

## 2023-06-14 RX ADMIN — OMEPRAZOLE 20 MG: 20 CAPSULE, DELAYED RELEASE ORAL at 05:02

## 2023-06-14 RX ADMIN — HYDROMORPHONE HYDROCHLORIDE 0.5 MG: 1 INJECTION, SOLUTION INTRAMUSCULAR; INTRAVENOUS; SUBCUTANEOUS at 17:49

## 2023-06-14 RX ADMIN — ALBUTEROL SULFATE 2 PUFF: 90 AEROSOL, METERED RESPIRATORY (INHALATION) at 22:27

## 2023-06-14 RX ADMIN — METOPROLOL SUCCINATE 50 MG: 50 TABLET, EXTENDED RELEASE ORAL at 17:49

## 2023-06-14 RX ADMIN — ALBUTEROL SULFATE 2 PUFF: 90 AEROSOL, METERED RESPIRATORY (INHALATION) at 11:31

## 2023-06-14 RX ADMIN — ALBUTEROL SULFATE 2 PUFF: 90 AEROSOL, METERED RESPIRATORY (INHALATION) at 08:30

## 2023-06-14 RX ADMIN — OXYCODONE HYDROCHLORIDE 10 MG: 10 TABLET ORAL at 19:58

## 2023-06-14 RX ADMIN — AMPICILLIN AND SULBACTAM 3 G: 1; 2 INJECTION, POWDER, FOR SOLUTION INTRAMUSCULAR; INTRAVENOUS at 05:08

## 2023-06-14 RX ADMIN — SENNOSIDES AND DOCUSATE SODIUM 2 TABLET: 50; 8.6 TABLET ORAL at 05:02

## 2023-06-14 RX ADMIN — DOXYCYCLINE 100 MG: 100 TABLET, FILM COATED ORAL at 05:02

## 2023-06-14 RX ADMIN — HYDROMORPHONE HYDROCHLORIDE 0.5 MG: 1 INJECTION, SOLUTION INTRAMUSCULAR; INTRAVENOUS; SUBCUTANEOUS at 13:24

## 2023-06-14 ASSESSMENT — COGNITIVE AND FUNCTIONAL STATUS - GENERAL
SUGGESTED CMS G CODE MODIFIER MOBILITY: CH
DAILY ACTIVITIY SCORE: 24
SUGGESTED CMS G CODE MODIFIER DAILY ACTIVITY: CH
MOBILITY SCORE: 24

## 2023-06-14 ASSESSMENT — PAIN DESCRIPTION - PAIN TYPE
TYPE: ACUTE PAIN

## 2023-06-14 ASSESSMENT — ENCOUNTER SYMPTOMS
CARDIOVASCULAR NEGATIVE: 1
NEUROLOGICAL NEGATIVE: 1
PSYCHIATRIC NEGATIVE: 1
CONSTITUTIONAL NEGATIVE: 1
GASTROINTESTINAL NEGATIVE: 1
RESPIRATORY NEGATIVE: 1
MYALGIAS: 1

## 2023-06-14 NOTE — PROGRESS NOTES
Pt awake,a&ox 4,no c/o sob,pt continuing IS with 1500 cc volume,pt still with pain in his rt groin,medicated for pain,pt for transfer.

## 2023-06-14 NOTE — CARE PLAN
The patient is Watcher - Medium risk of patient condition declining or worsening    Shift Goals  Clinical Goals: Continue IV abx;  Patient Goals: Pain control;  Family Goals: HUGH;    Problem: Respiratory  Goal: Patient will achieve/maintain optimum respiratory ventilation and gas exchange  Description: Target End Date: 6/15/23    1.  Assess and monitor rate, rhythm, depth and effort of respiration  2.  Breath sounds assessed qshift and/or as needed  3.  Assess O2 saturation, administer/titrate oxygen as ordered  4.  Position patient for maximum ventilatory efficiency  5.  Turn, cough, and deep breath with splinting to improve effectiveness  6.  Collaborate with RT to administer medication/treatments per order  7.  Encourage use of incentive spirometer and encourage patient to cough  8. Alternate physical activity with rest periods  Outcome: Not Progressing     Problem: Pain - Standard  Goal: Alleviation of pain or a reduction in pain to the patient’s comfort goal  Description: Target End Date: 6/15/23    Document on Vitals flowsheet    1.  Document pain using the appropriate pain scale per order or unit policy  2.  Educate and implement non-pharmacologic comfort measures (i.e. relaxation, distraction, massage, cold/heat therapy, etc.)  3.  Pain management medications as ordered  4.  Reassess pain after pain med administration per policy  5.  If opiods administered assess patient's response to pain medication is appropriate per POSS sedation scale  Outcome: Progressing

## 2023-06-14 NOTE — PROGRESS NOTES
"2115: Patient reporting chest tightness and shortness of breath. Patient stating \"the chest tightness doesn't feel hear related. I think its my lungs.\" Josse GARIBAY notified. STAT EKG ordered as well Albuterol inhaler q4 hours.   "

## 2023-06-14 NOTE — PROGRESS NOTES
Clarified medical status of patient admission order per receiving unit from RTOC lead. Venancio CONNELLY reviewed confirmed no further cardiac monitoring is indicated. Dr. Linder admission order of medical status to remain, order modified.

## 2023-06-14 NOTE — CARE PLAN
The patient is Watcher - Medium risk of patient condition declining or worsening    Shift Goals  Clinical Goals: iv abx  Patient Goals: pain control  Family Goals: HUGH;    Progress made toward(s) clinical / shift goals:  pain control    Patient is not progressing towards the following goals:      Problem: Knowledge Deficit - Standard  Goal: Patient and family/care givers will demonstrate understanding of plan of care, disease process/condition, diagnostic tests and medications  Outcome: Progressing

## 2023-06-15 PROBLEM — R10.31 RIGHT INGUINAL PAIN: Status: ACTIVE | Noted: 2023-06-15

## 2023-06-15 PROBLEM — J98.4 PNEUMONITIS: Status: RESOLVED | Noted: 2023-06-14 | Resolved: 2023-06-15

## 2023-06-15 PROBLEM — Z95.2 S/P AVR: Status: RESOLVED | Noted: 2023-01-29 | Resolved: 2023-06-15

## 2023-06-15 LAB
ANION GAP SERPL CALC-SCNC: 12 MMOL/L (ref 7–16)
BUN SERPL-MCNC: 12 MG/DL (ref 8–22)
CALCIUM SERPL-MCNC: 9.3 MG/DL (ref 8.5–10.5)
CHLORIDE SERPL-SCNC: 98 MMOL/L (ref 96–112)
CO2 SERPL-SCNC: 27 MMOL/L (ref 20–33)
CREAT SERPL-MCNC: 1.2 MG/DL (ref 0.5–1.4)
ERYTHROCYTE [DISTWIDTH] IN BLOOD BY AUTOMATED COUNT: 55.2 FL (ref 35.9–50)
GFR SERPLBLD CREATININE-BSD FMLA CKD-EPI: 68 ML/MIN/1.73 M 2
GLUCOSE SERPL-MCNC: 130 MG/DL (ref 65–99)
HCT VFR BLD AUTO: 47.5 % (ref 42–52)
HGB BLD-MCNC: 14.1 G/DL (ref 14–18)
MCH RBC QN AUTO: 21.6 PG (ref 27–33)
MCHC RBC AUTO-ENTMCNC: 29.7 G/DL (ref 32.3–36.5)
MCV RBC AUTO: 72.9 FL (ref 81.4–97.8)
PLATELET # BLD AUTO: 411 K/UL (ref 164–446)
PMV BLD AUTO: 9.6 FL (ref 9–12.9)
POTASSIUM SERPL-SCNC: 5.1 MMOL/L (ref 3.6–5.5)
PROCALCITONIN SERPL-MCNC: <0.05 NG/ML
RBC # BLD AUTO: 6.52 M/UL (ref 4.7–6.1)
SODIUM SERPL-SCNC: 137 MMOL/L (ref 135–145)
WBC # BLD AUTO: 17.5 K/UL (ref 4.8–10.8)

## 2023-06-15 PROCEDURE — 700102 HCHG RX REV CODE 250 W/ 637 OVERRIDE(OP): Performed by: INTERNAL MEDICINE

## 2023-06-15 PROCEDURE — 84145 PROCALCITONIN (PCT): CPT

## 2023-06-15 PROCEDURE — 700111 HCHG RX REV CODE 636 W/ 250 OVERRIDE (IP): Performed by: HOSPITALIST

## 2023-06-15 PROCEDURE — 80048 BASIC METABOLIC PNL TOTAL CA: CPT

## 2023-06-15 PROCEDURE — 700111 HCHG RX REV CODE 636 W/ 250 OVERRIDE (IP): Performed by: INTERNAL MEDICINE

## 2023-06-15 PROCEDURE — A9270 NON-COVERED ITEM OR SERVICE: HCPCS | Performed by: INTERNAL MEDICINE

## 2023-06-15 PROCEDURE — 85027 COMPLETE CBC AUTOMATED: CPT

## 2023-06-15 PROCEDURE — A9270 NON-COVERED ITEM OR SERVICE: HCPCS | Performed by: HOSPITALIST

## 2023-06-15 PROCEDURE — 700102 HCHG RX REV CODE 250 W/ 637 OVERRIDE(OP): Performed by: HOSPITALIST

## 2023-06-15 PROCEDURE — 700105 HCHG RX REV CODE 258: Performed by: HOSPITALIST

## 2023-06-15 PROCEDURE — 770006 HCHG ROOM/CARE - MED/SURG/GYN SEMI*

## 2023-06-15 PROCEDURE — 36415 COLL VENOUS BLD VENIPUNCTURE: CPT

## 2023-06-15 PROCEDURE — 99232 SBSQ HOSP IP/OBS MODERATE 35: CPT | Performed by: INTERNAL MEDICINE

## 2023-06-15 RX ORDER — OXYCODONE HYDROCHLORIDE 5 MG/1
5 TABLET ORAL
Status: DISCONTINUED | OUTPATIENT
Start: 2023-06-15 | End: 2023-06-17 | Stop reason: HOSPADM

## 2023-06-15 RX ORDER — ACETAMINOPHEN 500 MG
1000 TABLET ORAL EVERY 6 HOURS PRN
Status: DISCONTINUED | OUTPATIENT
Start: 2023-06-20 | End: 2023-06-17 | Stop reason: HOSPADM

## 2023-06-15 RX ORDER — HYDROMORPHONE HYDROCHLORIDE 1 MG/ML
0.5 INJECTION, SOLUTION INTRAMUSCULAR; INTRAVENOUS; SUBCUTANEOUS
Status: DISCONTINUED | OUTPATIENT
Start: 2023-06-15 | End: 2023-06-17 | Stop reason: HOSPADM

## 2023-06-15 RX ORDER — ACETAMINOPHEN 500 MG
1000 TABLET ORAL EVERY 6 HOURS
Status: DISCONTINUED | OUTPATIENT
Start: 2023-06-15 | End: 2023-06-17 | Stop reason: HOSPADM

## 2023-06-15 RX ORDER — OXYCODONE HYDROCHLORIDE 10 MG/1
10 TABLET ORAL
Status: DISCONTINUED | OUTPATIENT
Start: 2023-06-15 | End: 2023-06-17 | Stop reason: HOSPADM

## 2023-06-15 RX ORDER — TRAMADOL HYDROCHLORIDE 50 MG/1
50 TABLET ORAL EVERY 6 HOURS
Status: DISCONTINUED | OUTPATIENT
Start: 2023-06-15 | End: 2023-06-17 | Stop reason: HOSPADM

## 2023-06-15 RX ADMIN — ACETAMINOPHEN 1000 MG: 500 TABLET, FILM COATED ORAL at 12:57

## 2023-06-15 RX ADMIN — TRAMADOL HYDROCHLORIDE 50 MG: 50 TABLET, COATED ORAL at 12:57

## 2023-06-15 RX ADMIN — RIVAROXABAN 20 MG: 20 TABLET, FILM COATED ORAL at 17:50

## 2023-06-15 RX ADMIN — DOXYCYCLINE 100 MG: 100 TABLET, FILM COATED ORAL at 04:35

## 2023-06-15 RX ADMIN — DOXYCYCLINE 100 MG: 100 TABLET, FILM COATED ORAL at 17:50

## 2023-06-15 RX ADMIN — OXYCODONE HYDROCHLORIDE 10 MG: 10 TABLET ORAL at 00:00

## 2023-06-15 RX ADMIN — METOPROLOL SUCCINATE 50 MG: 50 TABLET, EXTENDED RELEASE ORAL at 17:50

## 2023-06-15 RX ADMIN — SENNOSIDES AND DOCUSATE SODIUM 2 TABLET: 50; 8.6 TABLET ORAL at 17:50

## 2023-06-15 RX ADMIN — TRAMADOL HYDROCHLORIDE 50 MG: 50 TABLET, COATED ORAL at 17:50

## 2023-06-15 RX ADMIN — HYDROMORPHONE HYDROCHLORIDE 0.5 MG: 1 INJECTION, SOLUTION INTRAMUSCULAR; INTRAVENOUS; SUBCUTANEOUS at 12:02

## 2023-06-15 RX ADMIN — OXYCODONE HYDROCHLORIDE 10 MG: 10 TABLET ORAL at 19:44

## 2023-06-15 RX ADMIN — HYDROMORPHONE HYDROCHLORIDE 0.5 MG: 1 INJECTION, SOLUTION INTRAMUSCULAR; INTRAVENOUS; SUBCUTANEOUS at 21:05

## 2023-06-15 RX ADMIN — AMPICILLIN AND SULBACTAM 3 G: 1; 2 INJECTION, POWDER, FOR SOLUTION INTRAMUSCULAR; INTRAVENOUS at 04:38

## 2023-06-15 RX ADMIN — AMPICILLIN AND SULBACTAM 3 G: 1; 2 INJECTION, POWDER, FOR SOLUTION INTRAMUSCULAR; INTRAVENOUS at 11:56

## 2023-06-15 RX ADMIN — ALBUTEROL SULFATE 2 PUFF: 90 AEROSOL, METERED RESPIRATORY (INHALATION) at 16:18

## 2023-06-15 RX ADMIN — ACETAMINOPHEN 1000 MG: 500 TABLET, FILM COATED ORAL at 17:50

## 2023-06-15 RX ADMIN — OXYCODONE HYDROCHLORIDE 10 MG: 10 TABLET ORAL at 12:57

## 2023-06-15 RX ADMIN — ALBUTEROL SULFATE 2 PUFF: 90 AEROSOL, METERED RESPIRATORY (INHALATION) at 02:30

## 2023-06-15 RX ADMIN — ALBUTEROL SULFATE 2 PUFF: 90 AEROSOL, METERED RESPIRATORY (INHALATION) at 07:58

## 2023-06-15 RX ADMIN — OMEPRAZOLE 20 MG: 20 CAPSULE, DELAYED RELEASE ORAL at 04:35

## 2023-06-15 RX ADMIN — OXYCODONE HYDROCHLORIDE 10 MG: 10 TABLET ORAL at 16:18

## 2023-06-15 RX ADMIN — HYDROMORPHONE HYDROCHLORIDE 0.5 MG: 1 INJECTION, SOLUTION INTRAMUSCULAR; INTRAVENOUS; SUBCUTANEOUS at 02:26

## 2023-06-15 RX ADMIN — OXYCODONE HYDROCHLORIDE 10 MG: 10 TABLET ORAL at 09:09

## 2023-06-15 RX ADMIN — AMPICILLIN AND SULBACTAM 3 G: 1; 2 INJECTION, POWDER, FOR SOLUTION INTRAMUSCULAR; INTRAVENOUS at 17:50

## 2023-06-15 RX ADMIN — ALBUTEROL SULFATE 2 PUFF: 90 AEROSOL, METERED RESPIRATORY (INHALATION) at 10:56

## 2023-06-15 RX ADMIN — OXYCODONE HYDROCHLORIDE 10 MG: 10 TABLET ORAL at 23:18

## 2023-06-15 RX ADMIN — AMPICILLIN AND SULBACTAM 3 G: 1; 2 INJECTION, POWDER, FOR SOLUTION INTRAMUSCULAR; INTRAVENOUS at 00:01

## 2023-06-15 RX ADMIN — HYDROMORPHONE HYDROCHLORIDE 0.5 MG: 1 INJECTION, SOLUTION INTRAMUSCULAR; INTRAVENOUS; SUBCUTANEOUS at 14:41

## 2023-06-15 RX ADMIN — SENNOSIDES AND DOCUSATE SODIUM 2 TABLET: 50; 8.6 TABLET ORAL at 04:35

## 2023-06-15 RX ADMIN — HYDROMORPHONE HYDROCHLORIDE 0.5 MG: 1 INJECTION, SOLUTION INTRAMUSCULAR; INTRAVENOUS; SUBCUTANEOUS at 06:29

## 2023-06-15 RX ADMIN — OXYCODONE HYDROCHLORIDE 10 MG: 10 TABLET ORAL at 04:36

## 2023-06-15 ASSESSMENT — PAIN DESCRIPTION - PAIN TYPE
TYPE: ACUTE PAIN

## 2023-06-15 ASSESSMENT — PAIN SCALES - WONG BAKER
WONGBAKER_NUMERICALRESPONSE: HURTS A LITTLE MORE
WONGBAKER_NUMERICALRESPONSE: HURTS A LITTLE MORE

## 2023-06-15 ASSESSMENT — ENCOUNTER SYMPTOMS
CARDIOVASCULAR NEGATIVE: 1
SHORTNESS OF BREATH: 0
PSYCHIATRIC NEGATIVE: 1
EYES NEGATIVE: 1
GASTROINTESTINAL NEGATIVE: 1
NEUROLOGICAL NEGATIVE: 1
CONSTITUTIONAL NEGATIVE: 1

## 2023-06-15 NOTE — CARE PLAN
The patient is Stable - Low risk of patient condition declining or worsening    Shift Goals  Clinical Goals: pain control, mobility, iv abx, IS  Patient Goals: pain control  Family Goals: HUGH    Progress made toward(s) clinical / shift goals:  A&O x4, VSS on RA, independent, pain management plan changed, see MAR, pt aware and agreeable with new regimen, tolerating regular diet, voiding adequately, no BM since admit but denies constipation, R groin hematoma, safety maintained      Problem: Knowledge Deficit - Standard  Goal: Patient and family/care givers will demonstrate understanding of plan of care, disease process/condition, diagnostic tests and medications  Outcome: Progressing     Problem: Hemodynamics  Goal: Patient's hemodynamics, fluid balance and neurologic status will be stable or improve  Outcome: Progressing     Problem: Respiratory  Goal: Patient will achieve/maintain optimum respiratory ventilation and gas exchange  Outcome: Progressing     Problem: Pain - Standard  Goal: Alleviation of pain or a reduction in pain to the patient’s comfort goal  Outcome: Progressing       Patient is not progressing towards the following goals:

## 2023-06-15 NOTE — ASSESSMENT & PLAN NOTE
Underwent elective ablation for atrial fibrillation with access through right femoral artery on 6/12/2023. Complained of 8 out of 10 right groin pain yesterday, improved today. CT abdomen and pelvis from admission shows no large hematoma or pseudoaneurysm.  Multimodal pain management in place. Continue supportive care.

## 2023-06-15 NOTE — PROGRESS NOTES
Assumed care of pt at 1900. Report received from Day RN. Pt is A&O x 4. Patient stated that their pain is 7/10, pt has been given pain medication per the MAR. Pt's pain comfort goal is 4/10. Pt educated on how to use the call light, pt verbalized understanding. I watched the pt ambulate to the bathroom and back with no issues. Bed in lowest locked position, call light within reach, hourly rounding in place. Labs reviewed, orders reviewed, communication board updated. Pt did say that there is not enough on the meal trays. Pt has been ordered double meal portions. Pt declines any further needs at this time.

## 2023-06-15 NOTE — PROGRESS NOTES
4 Eyes Skin Assessment Completed by ALLEN Anthony and ALLEN [place].    Head WDL  Ears WDL  Nose WDL  Mouth WDL  Neck WDL  Breast/Chest WDL  Shoulder Blades WDL  Spine WDL  (R) Arm/Elbow/Hand WDL  (L) Arm/Elbow/Hand WDL  Abdomen WDL  Groin Bruising and Swelling  Scrotum/Coccyx/Buttocks WDL  (R) Leg WDL  (L) Leg WDL  (R) Heel/Foot/Toe WDL  (L) Heel/Foot/Toe WDL          Devices In Places Blood Pressure Cuff      Interventions In Place Pillows    Possible Skin Injury No    Pictures Uploaded Into Epic N/A  Wound Consult Placed N/A  RN Wound Prevention Protocol Ordered No

## 2023-06-15 NOTE — CARE PLAN
The patient is Stable - Low risk of patient condition declining or worsening    Shift Goals  Clinical Goals: Pain Control, encourage IS  Patient Goals: Pain control  Family Goals: HUGH    Progress made toward(s) clinical / shift goals:      Problem: Respiratory  Goal: Patient will achieve/maintain optimum respiratory ventilation and gas exchange  Outcome: Progressing  Educate and encourage the importance of the IS.      Problem: Pain - Standard  Goal: Alleviation of pain or a reduction in pain to the patient’s comfort goal  Outcome: Progressing  Pt's pain is 7/10 despite pain medications. Pt's comfort goal is 4/10.       Patient is not progressing towards the following goals:

## 2023-06-15 NOTE — HOSPITAL COURSE
Noe Hickey is a 62 y.o. male who presented 6/13/2023 with a previous medical history that includes ascending aortic aneurysm, bicuspid aortic valve both s/p  repair, atrial fibrillation s/p ablation, hypertension.     Patient was just in the hospital and was discharged yesterday after being admitted for an elective ablation of atrial fibrillation.  Procedure was completed by Dr. Webster without issue.  Access was obtained through the right femoral artery.  Patient states he went home initially feeling well, with small amount of bruising in the right groin.  Overnight he developed significant pain in that area, in which worsened bruising in the morning.  He came in for evaluation of this.  Here in the ED the patient has had a CT of the abdomen and pelvis to rule out retroperitoneal hematoma as well as an ultrasound to evaluate for arterial damage.  The CT has demonstrated some inflammatory changes in the right groin, the ultrasound is negative for pseudoaneurysm or clot.  The CT was significant as well however for an infiltrate noted in the left lower lobe.  Lab work has been significant for white count of 28 however otherwise benign.  Patient has been in a sinus rhythm during his ED stay, and is on room air.

## 2023-06-15 NOTE — PROGRESS NOTES
4 Eyes Skin Assessment Completed by Mari RN and ALLEN Lange.    Head Scab L temple  Ears WDL  Nose WDL  Mouth WDL  Neck WDL  Breast/Chest WDL  Shoulder Blades WDL  Spine WDL  (R) Arm/Elbow/Hand WDL  (L) Arm/Elbow/Hand WDL  Abdomen Scar  Groin WDL  Scrotum/Coccyx/Buttocks WDL  (R) Leg WDL  (L) Leg WDL  (R) Heel/Foot/Toe WDL  (L) Heel/Foot/Toe WDL          Devices In Places Blood Pressure Cuff      Interventions In Place Pillows    Possible Skin Injury No    Pictures Uploaded Into Epic N/A  Wound Consult Placed N/A  RN Wound Prevention Protocol Ordered No

## 2023-06-15 NOTE — PROGRESS NOTES
[facility] Medicine Daily Progress Note    Date of Service  6/15/2023    Chief Complaint  Noe Hickey is a 62 y.o. male admitted 6/13/2023 with right groin pain.  He underwent elective ablation for atrial fibrillation on 6/12/2023. Access was through the right femoral artery.    He has history of ascending aortic aneurysm, bicuspid aortic valve (s/p repair), and hypertension.    Hospital Course  CT abdomen and pelvis from the ER showed right inguinal inflammatory changes, there was no large hematoma or pseudoaneurysm.  Was noted to have left lower lobe consolidation, atelectasis versus pneumonia.    He was saturating 94% on room air.  He was afebrile and hemodynamically stable.  Labs showed WBCs of 28.2 K.  Patient was started on Unasyn for presumed pneumonia.    Interval Problem Update  Afebrile and hemodynamically stable. WBCs are 21.0 K.  Complains of 8 out of 10 pain in his right groin area.  Procalcitonin ordered.  On multimodal pain management for inguinal pain.    I have discussed this patient's plan of care and discharge plan at IDT rounds today with Case Management, Nursing, Nursing leadership, and other members of the IDT team.    Consultants/Specialty  None    Code Status  Full Code    Disposition  The patient is not medically cleared for discharge to home or a post-acute facility.  Anticipate discharge to: home with close outpatient follow-up    I have placed the appropriate orders for post-discharge needs.    Review of Systems  Review of Systems   Constitutional: Negative.    HENT: Negative.     Eyes: Negative.    Respiratory:  Negative for shortness of breath.    Cardiovascular: Negative.    Gastrointestinal: Negative.    Genitourinary: Negative.    Musculoskeletal:         Pain in right inguinal area   Skin: Negative.    Neurological: Negative.    Endo/Heme/Allergies: Negative.    Psychiatric/Behavioral: Negative.          Physical Exam  Temp:  [36.3 °C (97.4 °F)-37.7 °C (99.9 °F)] 36.3 °C  (97.4 °F)  Pulse:  [66-73] 66  Resp:  [16-19] 18  BP: (116-131)/(80-85) 116/85  SpO2:  [90 %-93 %] 93 %    Physical Exam  Constitutional:       Appearance: He is ill-appearing.   HENT:      Head: Normocephalic.      Nose: Nose normal.      Mouth/Throat:      Mouth: Mucous membranes are moist.   Eyes:      Pupils: Pupils are equal, round, and reactive to light.   Cardiovascular:      Rate and Rhythm: Normal rate.   Abdominal:      Palpations: Abdomen is soft.   Musculoskeletal:      Cervical back: Normal range of motion.      Right lower leg: No edema.      Left lower leg: No edema.      Comments: Tender to touch in right inguinal area   Neurological:      General: No focal deficit present.      Mental Status: He is alert.   Psychiatric:         Mood and Affect: Mood normal.         Fluids    Intake/Output Summary (Last 24 hours) at 6/15/2023 1534  Last data filed at 6/15/2023 1000  Gross per 24 hour   Intake 565 ml   Output --   Net 565 ml       Laboratory  Recent Labs     06/13/23  1045 06/14/23  0015 06/15/23  0918   WBC 28.2* 21.0* 17.5*   RBC 6.19* 6.01 6.52*   HEMOGLOBIN 13.6* 12.9* 14.1   HEMATOCRIT 44.6 43.4 47.5   MCV 72.1* 72.2* 72.9*   MCH 22.0* 21.5* 21.6*   MCHC 30.5* 29.7* 29.7*   RDW 53.7* 54.3* 55.2*   PLATELETCT 416 410 411   MPV 9.9 9.9 9.6     Recent Labs     06/13/23  1045 06/14/23  0015 06/15/23  0918   SODIUM 141 140 137   POTASSIUM 4.7 5.1 5.1   CHLORIDE 104 104 98   CO2 22 24 27   GLUCOSE 93 89 130*   BUN 21 17 12   CREATININE 1.12 1.21 1.20   CALCIUM 8.8 8.3* 9.3     Recent Labs     06/13/23  1045   APTT 27.7   INR 1.18*               Imaging  DX-CHEST-PORTABLE (1 VIEW)   Final Result         1.  Linear patchy opacity in the left lung base, similar to prior, atelectasis versus infection   2.  Similar elevation of the left hemidiaphragm.      US-EXTREMITY ARTERY LOWER UNILAT RIGHT   Final Result      CT-ABDOMEN-PELVIS W/O   Final Result      1.  Right inguinal inflammatory changes. No large  hematoma or pseudoaneurysm, though suboptimally evaluated without intravenous contrast. Consider ultrasound evaluation if there is concern for these etiology.   2.  Elevation of the left hemidiaphragm. Left lower lobe consolidation. Atelectasis versus pneumonia.   3.  Tiny nonobstructing right renal stone.           Assessment/Plan  * Right inguinal pain  Assessment & Plan  Complains of 8 out of 10 right groin pain.  Underwent elective ablation for atrial fibrillation with access through right femoral artery on 6/12/2023.  CT abdomen and pelvis from admission shows no large hematoma or pseudoaneurysm.  Multimodal pain management in place. Continue supportive care.    Pneumonia due to infectious organism- (present on admission)  Assessment & Plan  Left lower lobe infiltrate versus atelectasis noted on CT abdomen pelvis.  Due to recent procedure, Unasyn and doxycycline were empirically started.  Procalcitonin ordered    Sepsis (HCC)  Assessment & Plan  Patient was afebrile and hemodynamically stable on admission.  No obvious source of infection, possible pneumonia.  Sepsis has not been     Secondary hypercoagulable state (HCC)- (present on admission)  Assessment & Plan  On rivaroxaban for history of atrial fibrillation    Paroxysmal atrial flutter (HCC)- (present on admission)  Assessment & Plan  Underwent successful ablation on 6/12/2023.  On rivaroxaban and metoprolol.  Has pain in right femoral access site.  No large hematoma or pseudoaneurysm noted on CT.  Continue supportive care           VTE prophylaxis: therapeutic anticoagulation with Xarelto

## 2023-06-15 NOTE — PROGRESS NOTES
Hospital Medicine Daily Progress Note    Date of Service  6/14/2023    Chief Complaint  Noe Hickey is a 62 y.o. male admitted 6/13/2023 with right groin pain    Hospital Course  Neo Hickey is a 62 y.o. male who presented 6/13/2023 with a previous medical history that includes ascending aortic aneurysm, bicuspid aortic valve both s/p  repair, atrial fibrillation s/p ablation, hypertension.     Patient was just in the hospital and was discharged yesterday after being admitted for an elective ablation of atrial fibrillation.  Procedure was completed by Dr. Webster without issue.  Access was obtained through the right femoral artery.  Patient states he went home initially feeling well, with small amount of bruising in the right groin.  Overnight he developed significant pain in that area, in which worsened bruising in the morning.  He came in for evaluation of this.  Here in the ED the patient has had a CT of the abdomen and pelvis to rule out retroperitoneal hematoma as well as an ultrasound to evaluate for arterial damage.  The CT has demonstrated some inflammatory changes in the right groin, the ultrasound is negative for pseudoaneurysm or clot.  The CT was significant as well however for an infiltrate noted in the left lower lobe.  Lab work has been significant for white count of 28 however otherwise benign.  Patient has been in a sinus rhythm during his ED stay, and is on room air.    Interval Problem Update    Patient is complaining of right groin pain intensity 8 out of 10 dull ache, constant and is worse with any movement.    Treating patient's pneumonia with IV Unasyn and p.o. doxycycline.    Patient's white count has improved but still elevated at 21,000          I have discussed this patient's plan of care and discharge plan at IDT rounds today with Case Management, Nursing, Nursing leadership, and other members of the IDT team.    Consultants/Specialty      Code Status  Full  Code    Disposition  The patient is not medically cleared for discharge to home or a post-acute facility.  Anticipate discharge to: home with close outpatient follow-up    I have placed the appropriate orders for post-discharge needs.    Review of Systems  Review of Systems   Constitutional: Negative.    HENT: Negative.     Respiratory: Negative.     Cardiovascular: Negative.    Gastrointestinal: Negative.    Genitourinary: Negative.    Musculoskeletal:  Positive for myalgias.   Neurological: Negative.    Psychiatric/Behavioral: Negative.          Physical Exam  Temp:  [36.1 °C (97 °F)-37.7 °C (99.9 °F)] 37 °C (98.6 °F)  Pulse:  [63-85] 73  Resp:  [16-19] 17  BP: (111-131)/(64-84) 116/82  SpO2:  [87 %-94 %] 92 %    Physical Exam  Constitutional:       General: He is in acute distress.      Appearance: He is not ill-appearing or diaphoretic.   HENT:      Nose: Nose normal.      Mouth/Throat:      Mouth: Mucous membranes are moist.   Cardiovascular:      Rate and Rhythm: Normal rate and regular rhythm.      Pulses: Normal pulses.   Pulmonary:      Effort: Pulmonary effort is normal. No respiratory distress.      Breath sounds: No rhonchi.   Abdominal:      General: There is no distension.      Palpations: There is no mass.      Tenderness: There is no abdominal tenderness. There is no guarding or rebound.      Hernia: No hernia is present.   Musculoskeletal:         General: Tenderness (Right groin) present. No swelling.      Cervical back: Normal range of motion.      Right lower leg: No edema.      Left lower leg: Edema present.   Skin:     Capillary Refill: Capillary refill takes 2 to 3 seconds.      Findings: Bruising present.      Comments: Right groin bruising with associated tenderness   Neurological:      General: No focal deficit present.      Mental Status: He is oriented to person, place, and time.      Cranial Nerves: No cranial nerve deficit.         Fluids  No intake or output data in the 24 hours ending  06/14/23 1921    Laboratory  Recent Labs     06/12/23  0610 06/13/23  1045 06/14/23  0015   WBC 15.7* 28.2* 21.0*   RBC 7.36* 6.19* 6.01   HEMOGLOBIN 16.0 13.6* 12.9*   HEMATOCRIT 52.3* 44.6 43.4   MCV 71.1* 72.1* 72.2*   MCH 21.7* 22.0* 21.5*   MCHC 30.6* 30.5* 29.7*   RDW 53.3* 53.7* 54.3*   PLATELETCT 482* 416 410   MPV  --  9.9 9.9     Recent Labs     06/13/23  1045 06/14/23  0015   SODIUM 141 140   POTASSIUM 4.7 5.1   CHLORIDE 104 104   CO2 22 24   GLUCOSE 93 89   BUN 21 17   CREATININE 1.12 1.21   CALCIUM 8.8 8.3*     Recent Labs     06/12/23  0610 06/13/23  1045   APTT  --  27.7   INR 1.22* 1.18*               Imaging  DX-CHEST-PORTABLE (1 VIEW)   Final Result         1.  Linear patchy opacity in the left lung base, similar to prior, atelectasis versus infection   2.  Similar elevation of the left hemidiaphragm.      US-EXTREMITY ARTERY LOWER UNILAT RIGHT   Final Result      CT-ABDOMEN-PELVIS W/O   Final Result      1.  Right inguinal inflammatory changes. No large hematoma or pseudoaneurysm, though suboptimally evaluated without intravenous contrast. Consider ultrasound evaluation if there is concern for these etiology.   2.  Elevation of the left hemidiaphragm. Left lower lobe consolidation. Atelectasis versus pneumonia.   3.  Tiny nonobstructing right renal stone.           Assessment/Plan  * Pneumonia due to infectious organism- (present on admission)  Assessment & Plan  Left lower lobe pneumonia noted on CT abdomen pelvis  Patient with recent procedure possibly aspiration  Ampicillin-sulbactam, and p.o. doxycycline  Follow cultures  Admit for observation    Pneumonitis- (present on admission)  Assessment & Plan  Ab iv unasyn and po doxy    Sepsis (HCC)  Assessment & Plan  Present on admission  Source respiratory versus less likely right groin incision  Follow results of cultures  Repeat CBC in a.m.      Secondary hypercoagulable state (HCC)- (present on admission)  Assessment & Plan  Patient is  maintained on rivaroxaban for history of atrial fibrillation    Paroxysmal atrial flutter (HCC)- (present on admission)  Assessment & Plan  Patient is s/p ablation by Dr. Webster day prior to admission  Sinus on EKG and telemetry  Continue rivaroxaban  Continue metoprolol  Right groin site demonstrates significant hematoma however ultrasound is negative for pseudoaneurysm or clot.  CT scan is negative for any retroperitoneal hematoma        Bioprosthetic aortic valve for severe bicuspid aortic valve stenosis - 1/5/2023- (present on admission)  Assessment & Plan  Patient with a history of a sending aortic aneurysm and bicuspid aortic valve s/p repair in January of this year         VTE prophylaxis: SCDs/TEDs    I have performed a physical exam and reviewed and updated ROS and Plan today (6/14/2023). In review of yesterday's note (6/13/2023), there are no changes except as documented above.

## 2023-06-16 LAB
ANION GAP SERPL CALC-SCNC: 10 MMOL/L (ref 7–16)
ANISOCYTOSIS BLD QL SMEAR: ABNORMAL
BASOPHILS # BLD AUTO: 0 % (ref 0–1.8)
BASOPHILS # BLD: 0 K/UL (ref 0–0.12)
BUN SERPL-MCNC: 16 MG/DL (ref 8–22)
CALCIUM SERPL-MCNC: 8.6 MG/DL (ref 8.5–10.5)
CHLORIDE SERPL-SCNC: 104 MMOL/L (ref 96–112)
CO2 SERPL-SCNC: 25 MMOL/L (ref 20–33)
CREAT SERPL-MCNC: 1.16 MG/DL (ref 0.5–1.4)
EOSINOPHIL # BLD AUTO: 0.67 K/UL (ref 0–0.51)
EOSINOPHIL NFR BLD: 5.1 % (ref 0–6.9)
ERYTHROCYTE [DISTWIDTH] IN BLOOD BY AUTOMATED COUNT: 55.9 FL (ref 35.9–50)
GFR SERPLBLD CREATININE-BSD FMLA CKD-EPI: 71 ML/MIN/1.73 M 2
GLUCOSE SERPL-MCNC: 110 MG/DL (ref 65–99)
HCT VFR BLD AUTO: 42 % (ref 42–52)
HGB BLD-MCNC: 12.5 G/DL (ref 14–18)
HYPOCHROMIA BLD QL SMEAR: ABNORMAL
LYMPHOCYTES # BLD AUTO: 1.23 K/UL (ref 1–4.8)
LYMPHOCYTES NFR BLD: 9.4 % (ref 22–41)
MANUAL DIFF BLD: NORMAL
MCH RBC QN AUTO: 21.9 PG (ref 27–33)
MCHC RBC AUTO-ENTMCNC: 29.8 G/DL (ref 32.3–36.5)
MCV RBC AUTO: 73.6 FL (ref 81.4–97.8)
MICROCYTES BLD QL SMEAR: ABNORMAL
MONOCYTES # BLD AUTO: 0.79 K/UL (ref 0–0.85)
MONOCYTES NFR BLD AUTO: 6 % (ref 0–13.4)
MORPHOLOGY BLD-IMP: NORMAL
NEUTROPHILS # BLD AUTO: 10.41 K/UL (ref 1.82–7.42)
NEUTROPHILS NFR BLD: 79.5 % (ref 44–72)
NRBC # BLD AUTO: 0.02 K/UL
NRBC BLD-RTO: 0.2 /100 WBC (ref 0–0.2)
OVALOCYTES BLD QL SMEAR: NORMAL
PLATELET # BLD AUTO: 378 K/UL (ref 164–446)
PLATELET BLD QL SMEAR: NORMAL
PMV BLD AUTO: 9.6 FL (ref 9–12.9)
POIKILOCYTOSIS BLD QL SMEAR: NORMAL
POTASSIUM SERPL-SCNC: 4.6 MMOL/L (ref 3.6–5.5)
RBC # BLD AUTO: 5.71 M/UL (ref 4.7–6.1)
RBC BLD AUTO: PRESENT
SCHISTOCYTES BLD QL SMEAR: NORMAL
SODIUM SERPL-SCNC: 139 MMOL/L (ref 135–145)
WBC # BLD AUTO: 13.1 K/UL (ref 4.8–10.8)

## 2023-06-16 PROCEDURE — 99232 SBSQ HOSP IP/OBS MODERATE 35: CPT | Performed by: INTERNAL MEDICINE

## 2023-06-16 PROCEDURE — 36415 COLL VENOUS BLD VENIPUNCTURE: CPT

## 2023-06-16 PROCEDURE — 770006 HCHG ROOM/CARE - MED/SURG/GYN SEMI*

## 2023-06-16 PROCEDURE — A9270 NON-COVERED ITEM OR SERVICE: HCPCS | Performed by: INTERNAL MEDICINE

## 2023-06-16 PROCEDURE — 80048 BASIC METABOLIC PNL TOTAL CA: CPT

## 2023-06-16 PROCEDURE — 85007 BL SMEAR W/DIFF WBC COUNT: CPT

## 2023-06-16 PROCEDURE — 700111 HCHG RX REV CODE 636 W/ 250 OVERRIDE (IP): Performed by: HOSPITALIST

## 2023-06-16 PROCEDURE — 700102 HCHG RX REV CODE 250 W/ 637 OVERRIDE(OP): Performed by: INTERNAL MEDICINE

## 2023-06-16 PROCEDURE — 700111 HCHG RX REV CODE 636 W/ 250 OVERRIDE (IP): Performed by: INTERNAL MEDICINE

## 2023-06-16 PROCEDURE — 700102 HCHG RX REV CODE 250 W/ 637 OVERRIDE(OP): Performed by: HOSPITALIST

## 2023-06-16 PROCEDURE — 700105 HCHG RX REV CODE 258: Performed by: HOSPITALIST

## 2023-06-16 PROCEDURE — 85025 COMPLETE CBC W/AUTO DIFF WBC: CPT

## 2023-06-16 PROCEDURE — A9270 NON-COVERED ITEM OR SERVICE: HCPCS | Performed by: HOSPITALIST

## 2023-06-16 RX ORDER — AMOXICILLIN AND CLAVULANATE POTASSIUM 875; 125 MG/1; MG/1
1 TABLET, FILM COATED ORAL EVERY 12 HOURS
Status: DISCONTINUED | OUTPATIENT
Start: 2023-06-16 | End: 2023-06-17 | Stop reason: HOSPADM

## 2023-06-16 RX ORDER — IPRATROPIUM BROMIDE AND ALBUTEROL SULFATE 2.5; .5 MG/3ML; MG/3ML
3 SOLUTION RESPIRATORY (INHALATION)
Status: DISCONTINUED | OUTPATIENT
Start: 2023-06-16 | End: 2023-06-17 | Stop reason: HOSPADM

## 2023-06-16 RX ADMIN — OXYCODONE HYDROCHLORIDE 10 MG: 10 TABLET ORAL at 17:13

## 2023-06-16 RX ADMIN — OMEPRAZOLE 20 MG: 20 CAPSULE, DELAYED RELEASE ORAL at 06:01

## 2023-06-16 RX ADMIN — OXYCODONE HYDROCHLORIDE 10 MG: 10 TABLET ORAL at 12:02

## 2023-06-16 RX ADMIN — TRAMADOL HYDROCHLORIDE 50 MG: 50 TABLET, COATED ORAL at 06:03

## 2023-06-16 RX ADMIN — RIVAROXABAN 20 MG: 20 TABLET, FILM COATED ORAL at 17:13

## 2023-06-16 RX ADMIN — ALBUTEROL SULFATE 2 PUFF: 90 AEROSOL, METERED RESPIRATORY (INHALATION) at 11:02

## 2023-06-16 RX ADMIN — AMPICILLIN AND SULBACTAM 3 G: 1; 2 INJECTION, POWDER, FOR SOLUTION INTRAMUSCULAR; INTRAVENOUS at 00:25

## 2023-06-16 RX ADMIN — ACETAMINOPHEN 1000 MG: 500 TABLET, FILM COATED ORAL at 06:01

## 2023-06-16 RX ADMIN — AMPICILLIN AND SULBACTAM 3 G: 1; 2 INJECTION, POWDER, FOR SOLUTION INTRAMUSCULAR; INTRAVENOUS at 12:01

## 2023-06-16 RX ADMIN — POLYETHYLENE GLYCOL 3350 1 PACKET: 17 POWDER, FOR SOLUTION ORAL at 17:17

## 2023-06-16 RX ADMIN — ALBUTEROL SULFATE 2 PUFF: 90 AEROSOL, METERED RESPIRATORY (INHALATION) at 22:55

## 2023-06-16 RX ADMIN — HYDROMORPHONE HYDROCHLORIDE 0.5 MG: 1 INJECTION, SOLUTION INTRAMUSCULAR; INTRAVENOUS; SUBCUTANEOUS at 01:58

## 2023-06-16 RX ADMIN — SENNOSIDES AND DOCUSATE SODIUM 2 TABLET: 50; 8.6 TABLET ORAL at 06:02

## 2023-06-16 RX ADMIN — CHLORPROMAZINE HYDROCHLORIDE 50 MG: 50 TABLET, FILM COATED ORAL at 03:11

## 2023-06-16 RX ADMIN — ACETAMINOPHEN 1000 MG: 500 TABLET, FILM COATED ORAL at 00:14

## 2023-06-16 RX ADMIN — METOPROLOL SUCCINATE 50 MG: 50 TABLET, EXTENDED RELEASE ORAL at 17:13

## 2023-06-16 RX ADMIN — AMPICILLIN AND SULBACTAM 3 G: 1; 2 INJECTION, POWDER, FOR SOLUTION INTRAMUSCULAR; INTRAVENOUS at 06:09

## 2023-06-16 RX ADMIN — HYDROMORPHONE HYDROCHLORIDE 0.5 MG: 1 INJECTION, SOLUTION INTRAMUSCULAR; INTRAVENOUS; SUBCUTANEOUS at 23:18

## 2023-06-16 RX ADMIN — ACETAMINOPHEN 1000 MG: 500 TABLET, FILM COATED ORAL at 17:13

## 2023-06-16 RX ADMIN — DOXYCYCLINE 100 MG: 100 TABLET, FILM COATED ORAL at 06:01

## 2023-06-16 RX ADMIN — OXYCODONE HYDROCHLORIDE 10 MG: 10 TABLET ORAL at 06:00

## 2023-06-16 RX ADMIN — HYDROMORPHONE HYDROCHLORIDE 0.5 MG: 1 INJECTION, SOLUTION INTRAMUSCULAR; INTRAVENOUS; SUBCUTANEOUS at 22:39

## 2023-06-16 RX ADMIN — ALBUTEROL SULFATE 2 PUFF: 90 AEROSOL, METERED RESPIRATORY (INHALATION) at 17:16

## 2023-06-16 RX ADMIN — TRAMADOL HYDROCHLORIDE 50 MG: 50 TABLET, COATED ORAL at 00:15

## 2023-06-16 RX ADMIN — HYDROXYZINE HYDROCHLORIDE 25 MG: 50 TABLET, FILM COATED ORAL at 20:23

## 2023-06-16 RX ADMIN — ALBUTEROL SULFATE 2 PUFF: 90 AEROSOL, METERED RESPIRATORY (INHALATION) at 04:07

## 2023-06-16 RX ADMIN — ACETAMINOPHEN 1000 MG: 500 TABLET, FILM COATED ORAL at 12:01

## 2023-06-16 RX ADMIN — TRAMADOL HYDROCHLORIDE 50 MG: 50 TABLET, COATED ORAL at 12:01

## 2023-06-16 RX ADMIN — TRAMADOL HYDROCHLORIDE 50 MG: 50 TABLET, COATED ORAL at 17:14

## 2023-06-16 RX ADMIN — AMOXICILLIN AND CLAVULANATE POTASSIUM 1 TABLET: 875; 125 TABLET, FILM COATED ORAL at 17:13

## 2023-06-16 RX ADMIN — OXYCODONE HYDROCHLORIDE 10 MG: 10 TABLET ORAL at 20:23

## 2023-06-16 RX ADMIN — SENNOSIDES AND DOCUSATE SODIUM 2 TABLET: 50; 8.6 TABLET ORAL at 17:13

## 2023-06-16 RX ADMIN — OXYCODONE HYDROCHLORIDE 10 MG: 10 TABLET ORAL at 09:19

## 2023-06-16 RX ADMIN — DOXYCYCLINE 100 MG: 100 TABLET, FILM COATED ORAL at 17:13

## 2023-06-16 ASSESSMENT — ENCOUNTER SYMPTOMS
GASTROINTESTINAL NEGATIVE: 1
EYES NEGATIVE: 1
PSYCHIATRIC NEGATIVE: 1
NEUROLOGICAL NEGATIVE: 1
CONSTITUTIONAL NEGATIVE: 1
CARDIOVASCULAR NEGATIVE: 1
SHORTNESS OF BREATH: 0

## 2023-06-16 ASSESSMENT — PAIN DESCRIPTION - PAIN TYPE
TYPE: ACUTE PAIN

## 2023-06-16 ASSESSMENT — PAIN SCALES - WONG BAKER
WONGBAKER_NUMERICALRESPONSE: HURTS EVEN MORE
WONGBAKER_NUMERICALRESPONSE: HURTS EVEN MORE
WONGBAKER_NUMERICALRESPONSE: HURTS JUST A LITTLE BIT

## 2023-06-16 NOTE — PROGRESS NOTES
Assumed care of patient. AO x4. With complaints of pain 4/10. Routine assessment done. Vital signs within normal limits. Call light within reach and personal belongings within reach. Bed locked and in lowest position. Policies and procedures reinforced patient verbalized understanding.Treaded socks in place. Will continue to monitor.

## 2023-06-16 NOTE — CARE PLAN
The patient is Stable - Low risk of patient condition declining or worsening    Shift Goals  Clinical Goals: Pain control/ABX  Patient Goals: Comfort  Family Goals: HUGH    Progress made toward(s) clinical / shift goals:    Problem: Pain - Standard  Goal: Alleviation of pain or a reduction in pain to the patient’s comfort goal  Outcome: Progressing     Problem: Fluid Volume  Goal: Fluid volume balance will be maintained  Outcome: Progressing     Problem: Knowledge Deficit - Standard  Goal: Patient and family/care givers will demonstrate understanding of plan of care, disease process/condition, diagnostic tests and medications  Outcome: Progressing       Patient is not progressing towards the following goals:

## 2023-06-16 NOTE — RESPIRATORY CARE
COPD EDUCATION by COPD CLINICAL EDUCATOR  6/16/2023 at 6:22 AM by Arabella Pulido, RRT     Patient reviewed by COPD education team. Patient does not have a history or diagnosis of COPD. Therefore, patient does not qualify for the COPD program.

## 2023-06-16 NOTE — PROGRESS NOTES
Hospital Medicine Daily Progress Note    Date of Service  6/16/2023    Chief Complaint  Noe Hickey is a 62 y.o. male admitted 6/13/2023 with right groin pain.  He underwent elective ablation for atrial fibrillation on 6/12/2023. Access was through the right femoral artery.    He has history of ascending aortic aneurysm, bicuspid aortic valve (s/p repair), and hypertension.    Hospital Course  CT abdomen and pelvis from the ER showed right inguinal inflammatory changes, there was no large hematoma or pseudoaneurysm.  Was noted to have left lower lobe consolidation, atelectasis versus pneumonia.    He was saturating 94% on room air.  He was afebrile and hemodynamically stable.  Labs showed WBCs of 28.2 K.  Patient was started on Unasyn for presumed pneumonia.    Interval Problem Update  Afebrile and hemodynamically stable. WBCs have improved to 13.1. Procalcitonin was negative. On multimodal pain management for inguinal pain. Pain has slightly improved. Complains of respiratory symptoms, requesting nebulizer treatment.     I have discussed this patient's plan of care and discharge plan at IDT rounds today with Case Management, Nursing, Nursing leadership, and other members of the IDT team.    Consultants/Specialty  None    Code Status  Full Code    Disposition  The patient is not medically cleared for discharge to home or a post-acute facility.  Anticipate discharge to: home with close outpatient follow-up    I have placed the appropriate orders for post-discharge needs.    Review of Systems  Review of Systems   Constitutional: Negative.    HENT: Negative.     Eyes: Negative.    Respiratory:  Negative for shortness of breath.    Cardiovascular: Negative.    Gastrointestinal: Negative.    Genitourinary: Negative.    Musculoskeletal:         Pain in right inguinal area   Skin: Negative.    Neurological: Negative.    Endo/Heme/Allergies: Negative.    Psychiatric/Behavioral: Negative.          Physical  Exam  Temp:  [36.3 °C (97.3 °F)-36.8 °C (98.3 °F)] 36.8 °C (98.3 °F)  Pulse:  [63-75] 71  Resp:  [18] 18  BP: (107-143)/(73-88) 141/84  SpO2:  [92 %-95 %] 95 %    Physical Exam  Constitutional:       Appearance: He is ill-appearing.   HENT:      Head: Normocephalic.      Nose: Nose normal.      Mouth/Throat:      Mouth: Mucous membranes are moist.   Eyes:      Pupils: Pupils are equal, round, and reactive to light.   Cardiovascular:      Rate and Rhythm: Normal rate.   Abdominal:      Palpations: Abdomen is soft.   Musculoskeletal:      Cervical back: Normal range of motion.      Right lower leg: No edema.      Left lower leg: No edema.      Comments: Tender to touch in right inguinal area   Neurological:      General: No focal deficit present.      Mental Status: He is alert.   Psychiatric:         Mood and Affect: Mood normal.         Fluids    Intake/Output Summary (Last 24 hours) at 6/16/2023 1411  Last data filed at 6/16/2023 1100  Gross per 24 hour   Intake 325 ml   Output --   Net 325 ml       Laboratory  Recent Labs     06/14/23  0015 06/15/23  0918 06/16/23  0111   WBC 21.0* 17.5* 13.1*   RBC 6.01 6.52* 5.71   HEMOGLOBIN 12.9* 14.1 12.5*   HEMATOCRIT 43.4 47.5 42.0   MCV 72.2* 72.9* 73.6*   MCH 21.5* 21.6* 21.9*   MCHC 29.7* 29.7* 29.8*   RDW 54.3* 55.2* 55.9*   PLATELETCT 410 411 378   MPV 9.9 9.6 9.6     Recent Labs     06/14/23  0015 06/15/23  0918 06/16/23  0111   SODIUM 140 137 139   POTASSIUM 5.1 5.1 4.6   CHLORIDE 104 98 104   CO2 24 27 25   GLUCOSE 89 130* 110*   BUN 17 12 16   CREATININE 1.21 1.20 1.16   CALCIUM 8.3* 9.3 8.6                     Imaging  DX-CHEST-PORTABLE (1 VIEW)   Final Result         1.  Linear patchy opacity in the left lung base, similar to prior, atelectasis versus infection   2.  Similar elevation of the left hemidiaphragm.      US-EXTREMITY ARTERY LOWER UNILAT RIGHT   Final Result      CT-ABDOMEN-PELVIS W/O   Final Result      1.  Right inguinal inflammatory changes. No  large hematoma or pseudoaneurysm, though suboptimally evaluated without intravenous contrast. Consider ultrasound evaluation if there is concern for these etiology.   2.  Elevation of the left hemidiaphragm. Left lower lobe consolidation. Atelectasis versus pneumonia.   3.  Tiny nonobstructing right renal stone.           Assessment/Plan  * Right inguinal pain  Assessment & Plan  Underwent elective ablation for atrial fibrillation with access through right femoral artery on 6/12/2023. Complained of 8 out of 10 right groin pain yesterday, improved today. CT abdomen and pelvis from admission shows no large hematoma or pseudoaneurysm.  Multimodal pain management in place. Continue supportive care.    Pneumonia due to infectious organism- (present on admission)  Assessment & Plan  Left lower lobe infiltrate versus atelectasis noted on CT abdomen pelvis.  Due to recent procedure, Unasyn and doxycycline were empirically started.  Procalcitonin was negative.    Sepsis (HCC)  Assessment & Plan  Patient was afebrile and hemodynamically stable on admission.  No obvious source of infection, possible pneumonia.  Sepsis has not been established    Secondary hypercoagulable state (HCC)- (present on admission)  Assessment & Plan  On rivaroxaban for history of atrial fibrillation    Paroxysmal atrial flutter (HCC)- (present on admission)  Assessment & Plan  Underwent successful ablation on 6/12/2023.  On rivaroxaban and metoprolol.  Has pain in right femoral access site.  No large hematoma or pseudoaneurysm noted on CT.  Continue supportive care           VTE prophylaxis: therapeutic anticoagulation with Xarelto

## 2023-06-16 NOTE — CARE PLAN
The patient is Stable - Low risk of patient condition declining or worsening    Shift Goals  Clinical Goals: pain control, hopeful to dc  Patient Goals: pain control, discharge  Family Goals: HUGH    Progress made toward(s) clinical / shift goals:  A&O x4, VSS on RA, independent, pain controlled w po meds, tolerating regular diet, voiding adequately, no BM since admit but denies constipation, R groin bruise, safety maintained      Problem: Knowledge Deficit - Standard  Goal: Patient and family/care givers will demonstrate understanding of plan of care, disease process/condition, diagnostic tests and medications  Outcome: Progressing     Problem: Hemodynamics  Goal: Patient's hemodynamics, fluid balance and neurologic status will be stable or improve  Outcome: Progressing     Problem: Respiratory  Goal: Patient will achieve/maintain optimum respiratory ventilation and gas exchange  Outcome: Progressing     Problem: Physical Regulation  Goal: Diagnostic test results will improve  Outcome: Progressing  Goal: Signs and symptoms of infection will decrease  Outcome: Progressing     Problem: Pain - Standard  Goal: Alleviation of pain or a reduction in pain to the patient’s comfort goal  Outcome: Progressing       Patient is not progressing towards the following goals:

## 2023-06-16 NOTE — PROGRESS NOTES
Assumed care of pt at 0700. Received report from NOC RN. Pt A&O x 4. Pt reports pain tolerable at this time.  Call light within reach, belongings within reach, fall precautions in place, and bed alarm on and bed in lowest position. Patient does not have any other needs at this time.      POC was discussed with patient at bedside.

## 2023-06-17 ENCOUNTER — PATIENT MESSAGE (OUTPATIENT)
Dept: CARDIOLOGY | Facility: MEDICAL CENTER | Age: 63
End: 2023-06-17
Payer: MEDICAID

## 2023-06-17 ENCOUNTER — PHARMACY VISIT (OUTPATIENT)
Dept: PHARMACY | Facility: MEDICAL CENTER | Age: 63
End: 2023-06-17
Payer: COMMERCIAL

## 2023-06-17 VITALS
OXYGEN SATURATION: 91 % | HEART RATE: 68 BPM | HEIGHT: 69 IN | BODY MASS INDEX: 29.91 KG/M2 | DIASTOLIC BLOOD PRESSURE: 85 MMHG | RESPIRATION RATE: 18 BRPM | WEIGHT: 201.94 LBS | TEMPERATURE: 97.8 F | SYSTOLIC BLOOD PRESSURE: 128 MMHG

## 2023-06-17 PROBLEM — A41.9 SEPSIS (HCC): Status: RESOLVED | Noted: 2023-06-13 | Resolved: 2023-06-17

## 2023-06-17 LAB
ANION GAP SERPL CALC-SCNC: 10 MMOL/L (ref 7–16)
BASOPHILS # BLD AUTO: 0.6 % (ref 0–1.8)
BASOPHILS # BLD: 0.07 K/UL (ref 0–0.12)
BUN SERPL-MCNC: 18 MG/DL (ref 8–22)
CALCIUM SERPL-MCNC: 9.2 MG/DL (ref 8.5–10.5)
CHLORIDE SERPL-SCNC: 101 MMOL/L (ref 96–112)
CO2 SERPL-SCNC: 26 MMOL/L (ref 20–33)
CREAT SERPL-MCNC: 1.15 MG/DL (ref 0.5–1.4)
EOSINOPHIL # BLD AUTO: 0.43 K/UL (ref 0–0.51)
EOSINOPHIL NFR BLD: 3.4 % (ref 0–6.9)
ERYTHROCYTE [DISTWIDTH] IN BLOOD BY AUTOMATED COUNT: 54.3 FL (ref 35.9–50)
GFR SERPLBLD CREATININE-BSD FMLA CKD-EPI: 71 ML/MIN/1.73 M 2
GLUCOSE SERPL-MCNC: 116 MG/DL (ref 65–99)
HCT VFR BLD AUTO: 41.4 % (ref 42–52)
HGB BLD-MCNC: 12.3 G/DL (ref 14–18)
IMM GRANULOCYTES # BLD AUTO: 0.21 K/UL (ref 0–0.11)
IMM GRANULOCYTES NFR BLD AUTO: 1.7 % (ref 0–0.9)
LYMPHOCYTES # BLD AUTO: 1.57 K/UL (ref 1–4.8)
LYMPHOCYTES NFR BLD: 12.4 % (ref 22–41)
MCH RBC QN AUTO: 21.8 PG (ref 27–33)
MCHC RBC AUTO-ENTMCNC: 29.7 G/DL (ref 32.3–36.5)
MCV RBC AUTO: 73.3 FL (ref 81.4–97.8)
MONOCYTES # BLD AUTO: 1.07 K/UL (ref 0–0.85)
MONOCYTES NFR BLD AUTO: 8.4 % (ref 0–13.4)
NEUTROPHILS # BLD AUTO: 9.32 K/UL (ref 1.82–7.42)
NEUTROPHILS NFR BLD: 73.5 % (ref 44–72)
NRBC # BLD AUTO: 0 K/UL
NRBC BLD-RTO: 0 /100 WBC (ref 0–0.2)
PLATELET # BLD AUTO: 374 K/UL (ref 164–446)
PMV BLD AUTO: 9.3 FL (ref 9–12.9)
POTASSIUM SERPL-SCNC: 4.6 MMOL/L (ref 3.6–5.5)
RBC # BLD AUTO: 5.65 M/UL (ref 4.7–6.1)
SODIUM SERPL-SCNC: 137 MMOL/L (ref 135–145)
WBC # BLD AUTO: 12.7 K/UL (ref 4.8–10.8)

## 2023-06-17 PROCEDURE — A9270 NON-COVERED ITEM OR SERVICE: HCPCS | Performed by: INTERNAL MEDICINE

## 2023-06-17 PROCEDURE — A9270 NON-COVERED ITEM OR SERVICE: HCPCS | Performed by: HOSPITALIST

## 2023-06-17 PROCEDURE — 700111 HCHG RX REV CODE 636 W/ 250 OVERRIDE (IP): Performed by: INTERNAL MEDICINE

## 2023-06-17 PROCEDURE — 80048 BASIC METABOLIC PNL TOTAL CA: CPT

## 2023-06-17 PROCEDURE — 700102 HCHG RX REV CODE 250 W/ 637 OVERRIDE(OP): Performed by: HOSPITALIST

## 2023-06-17 PROCEDURE — 36415 COLL VENOUS BLD VENIPUNCTURE: CPT

## 2023-06-17 PROCEDURE — 700102 HCHG RX REV CODE 250 W/ 637 OVERRIDE(OP): Performed by: INTERNAL MEDICINE

## 2023-06-17 PROCEDURE — 99239 HOSP IP/OBS DSCHRG MGMT >30: CPT | Performed by: INTERNAL MEDICINE

## 2023-06-17 PROCEDURE — RXMED WILLOW AMBULATORY MEDICATION CHARGE: Performed by: INTERNAL MEDICINE

## 2023-06-17 PROCEDURE — 85025 COMPLETE CBC W/AUTO DIFF WBC: CPT

## 2023-06-17 PROCEDURE — 94640 AIRWAY INHALATION TREATMENT: CPT

## 2023-06-17 PROCEDURE — 94760 N-INVAS EAR/PLS OXIMETRY 1: CPT

## 2023-06-17 PROCEDURE — 700101 HCHG RX REV CODE 250: Performed by: INTERNAL MEDICINE

## 2023-06-17 RX ORDER — DOXYCYCLINE 100 MG/1
100 TABLET ORAL EVERY 12 HOURS
Qty: 8 TABLET | Refills: 0 | Status: ON HOLD | OUTPATIENT
Start: 2023-06-17 | End: 2023-07-09

## 2023-06-17 RX ORDER — AMOXICILLIN AND CLAVULANATE POTASSIUM 875; 125 MG/1; MG/1
1 TABLET, FILM COATED ORAL EVERY 12 HOURS
Qty: 8 TABLET | Refills: 0 | Status: ACTIVE | OUTPATIENT
Start: 2023-06-17 | End: 2023-06-21

## 2023-06-17 RX ADMIN — DOXYCYCLINE 100 MG: 100 TABLET, FILM COATED ORAL at 06:17

## 2023-06-17 RX ADMIN — OMEPRAZOLE 20 MG: 20 CAPSULE, DELAYED RELEASE ORAL at 06:18

## 2023-06-17 RX ADMIN — AMOXICILLIN AND CLAVULANATE POTASSIUM 1 TABLET: 875; 125 TABLET, FILM COATED ORAL at 06:18

## 2023-06-17 RX ADMIN — POLYETHYLENE GLYCOL 3350 1 PACKET: 17 POWDER, FOR SOLUTION ORAL at 06:18

## 2023-06-17 RX ADMIN — TRAMADOL HYDROCHLORIDE 50 MG: 50 TABLET, COATED ORAL at 00:26

## 2023-06-17 RX ADMIN — OXYCODONE HYDROCHLORIDE 10 MG: 10 TABLET ORAL at 02:50

## 2023-06-17 RX ADMIN — IPRATROPIUM BROMIDE AND ALBUTEROL SULFATE 3 ML: .5; 2.5 SOLUTION RESPIRATORY (INHALATION) at 02:12

## 2023-06-17 RX ADMIN — ACETAMINOPHEN 1000 MG: 500 TABLET, FILM COATED ORAL at 06:17

## 2023-06-17 RX ADMIN — OXYCODONE HYDROCHLORIDE 10 MG: 10 TABLET ORAL at 09:22

## 2023-06-17 RX ADMIN — ACETAMINOPHEN 1000 MG: 500 TABLET, FILM COATED ORAL at 00:26

## 2023-06-17 RX ADMIN — ALBUTEROL SULFATE 2 PUFF: 90 AEROSOL, METERED RESPIRATORY (INHALATION) at 06:17

## 2023-06-17 RX ADMIN — HYDROMORPHONE HYDROCHLORIDE 0.5 MG: 1 INJECTION, SOLUTION INTRAMUSCULAR; INTRAVENOUS; SUBCUTANEOUS at 05:00

## 2023-06-17 RX ADMIN — HYDROMORPHONE HYDROCHLORIDE 0.5 MG: 1 INJECTION, SOLUTION INTRAMUSCULAR; INTRAVENOUS; SUBCUTANEOUS at 10:13

## 2023-06-17 RX ADMIN — TRAMADOL HYDROCHLORIDE 50 MG: 50 TABLET, COATED ORAL at 06:18

## 2023-06-17 RX ADMIN — ALBUTEROL SULFATE 2 PUFF: 90 AEROSOL, METERED RESPIRATORY (INHALATION) at 02:51

## 2023-06-17 ASSESSMENT — PAIN DESCRIPTION - PAIN TYPE
TYPE: ACUTE PAIN

## 2023-06-17 ASSESSMENT — COPD QUESTIONNAIRES
DURING THE PAST 4 WEEKS HOW MUCH DID YOU FEEL SHORT OF BREATH: NONE/LITTLE OF THE TIME
DO YOU EVER COUGH UP ANY MUCUS OR PHLEGM?: NO/ONLY WITH OCCASIONAL COLDS OR INFECTIONS
HAVE YOU SMOKED AT LEAST 100 CIGARETTES IN YOUR ENTIRE LIFE: YES
COPD SCREENING SCORE: 5

## 2023-06-17 ASSESSMENT — FIBROSIS 4 INDEX: FIB4 SCORE: 0.61

## 2023-06-17 NOTE — DISCHARGE SUMMARY
Discharge Summary    CHIEF COMPLAINT ON ADMISSION  Chief Complaint   Patient presents with    Post-Op Complications     Pt reports having ablation yesterday. D/c'd last night. States around 2am woke up in pain and noticed large hematoma to right groin. Used ice and motrin at home without relief.        Reason for Admission  Groin Pain     Admission Date  6/13/2023    CODE STATUS  Full Code    HPI & HOSPITAL COURSE  Noe Hickey is a 62 y.o. male admitted 6/13/2023 with right groin pain.  He underwent elective ablation for atrial fibrillation on 6/12/2023. Access was through the right femoral artery.     He has history of ascending aortic aneurysm, bicuspid aortic valve (s/p repair), and hypertension.    CT abdomen and pelvis from the ER showed right inguinal inflammatory changes, there was no large hematoma or pseudoaneurysm.  Was noted to have left lower lobe consolidation, atelectasis versus pneumonia.    Right lower extremity ultrasound showed no evidence of pseudoaneurysm.     On admission, patient was saturating 94% on room air.  He was afebrile and hemodynamically stable.  Labs showed WBCs of 28.2 K.  Patient was started on Unasyn and Doxycycline for presumed pneumonia.     Afebrile and hemodynamically stable. WBCs have improved to 12.2 K. Procalcitonin was negative. He is saturating 91-94 % on room air.     Patient feels ready to go home.  He has been advised to follow-up with his primary care physician within 2 to 3 days.  He has been advised to complete his antibiotic course with Augmentin and doxycycline for 4 more days.      Therefore, he is discharged in fair and stable condition to home with close outpatient follow-up.        Discharge Date  6/17/2023    FOLLOW UP ITEMS POST DISCHARGE  PCP in 2-3 days  Cardiology within 1 week    DISCHARGE DIAGNOSES  Principal Problem:    Right inguinal pain (POA: Unknown)  Active Problems:    Pneumonia due to infectious organism (POA: Yes)    Pneumonia (POA:  Yes)    COPD (chronic obstructive pulmonary disease) (HCC) (POA: Yes)    Paroxysmal atrial flutter (HCC) (POA: Yes)    Tobacco abuse (POA: Yes)    Secondary hypercoagulable state (HCC) (POA: Yes)    H/O cardiac radiofrequency ablation (POA: Yes)  Resolved Problems:    Bioprosthetic aortic valve for severe bicuspid aortic valve stenosis - 1/5/2023 (POA: Yes)    Sepsis (HCC) (POA: Unknown)    Pneumonitis (POA: Yes)      FOLLOW UP  Future Appointments   Date Time Provider Department Center   7/3/2023 10:45 AM EFRAÍN Theodore None   9/13/2023  4:20 PM RONI Catalan None   10/3/2023  1:15 PM EFRAÍN Rodriguez None     No follow-up provider specified.    MEDICATIONS ON DISCHARGE     Medication List        START taking these medications        Instructions   amoxicillin-clavulanate 875-125 MG Tabs  Commonly known as: AUGMENTIN   Take 1 Tablet by mouth every 12 hours for 4 days.  Dose: 1 Tablet     doxycycline monohydrate 100 MG tablet  Commonly known as: ADOXA   Take 1 Tablet by mouth every 12 hours.  Dose: 100 mg            CONTINUE taking these medications        Instructions   hydrOXYzine HCl 25 MG Tabs  Commonly known as: ATARAX   25 mg at bedtime as needed (sleep).  Dose: 25 mg     metoprolol SR 50 MG Tb24  Commonly known as: TOPROL XL   Take 1 Tablet by mouth every day.  Dose: 50 mg     ondansetron 4 MG Tbdp  Commonly known as: ZOFRAN ODT   Take 4 mg by mouth 2 times a day as needed for Nausea/Vomiting.  Dose: 4 mg     pantoprazole 40 MG Tbec  Commonly known as: PROTONIX   Take 1 Tablet by mouth every day. Take daily for 30 days post ablation then can reduce to as needed again.  Dose: 40 mg     rivaroxaban 20 MG Tabs tablet  Commonly known as: Xarelto   Take 1 Tablet by mouth with dinner.  Dose: 20 mg     Vitamin D (Ergocalciferol) 75913 units Caps   Take 5,000 Units by mouth every 7 days.  Dose: 5,000 Units            STOP taking these medications      chlorproMAZINE 50 MG  Tabs  Commonly known as: THORAZINE              Allergies  Allergies   Allergen Reactions    Morphine Rash and Itching     Rash/ difficulty breathing       DIET  Orders Placed This Encounter   Procedures    Diet Order Diet: Regular; Tray Modifications (optional): Double Portions     Standing Status:   Standing     Number of Occurrences:   1     Order Specific Question:   Diet:     Answer:   Regular [1]     Order Specific Question:   Tray Modifications (optional)     Answer:   Double Portions       ACTIVITY  As tolerated.      CONSULTATIONS  None    PROCEDURES  None    LABORATORY  Lab Results   Component Value Date    SODIUM 137 06/17/2023    POTASSIUM 4.6 06/17/2023    CHLORIDE 101 06/17/2023    CO2 26 06/17/2023    GLUCOSE 116 (H) 06/17/2023    BUN 18 06/17/2023    CREATININE 1.15 06/17/2023        Lab Results   Component Value Date    WBC 12.7 (H) 06/17/2023    HEMOGLOBIN 12.3 (L) 06/17/2023    HEMATOCRIT 41.4 (L) 06/17/2023    PLATELETCT 374 06/17/2023        Total time of the discharge process exceeds 34 minutes.

## 2023-06-17 NOTE — PROGRESS NOTES
Report received. Assumed care. Pt walk around. A/O x4. VSS. Responds appropriately. Denies pain, SOB. Assessment complete. Discussed POC, , pt verbalizes understanding. Explained importance of calling before getting OOB. Call light and belongings within reach. Bed alarm on. Bed in the lowest position. Treaded socks in place. Hourly rounding in progress. Will continue to monitor.     Goals:  Pain control- education about pain medication and using PO over IV. Patient possible DC tomorrow.   IS education   Deep breathing and cough.

## 2023-06-17 NOTE — CARE PLAN
The patient is Stable - Low risk of patient condition declining or worsening    Shift Goals  Clinical Goals: pain control  Patient Goals: pain control  Family Goals: N/A    Progress made toward(s) clinical / shift goals:      Problem: Knowledge Deficit - Standard  Goal: Patient and family/care givers will demonstrate understanding of plan of care, disease process/condition, diagnostic tests and medications  Outcome: Progressing  Note: Patient understand treatment plan. He is confused on why he cant have IV Pain medication. Education give, patient doesn't agree. Requesting iv pain medication refer to MAR.        Patient is not progressing towards the following goals:    Problem: Respiratory  Goal: Patient will achieve/maintain optimum respiratory ventilation and gas exchange  Outcome: Not Met  Flowsheets (Taken 6/17/2023 0438)  O2 Delivery Device:   None - Room Air   Nebulizer  Incentive Spirometer:   Effective   Needs Assistance  Note: Raspatory therapist came to gave breathing tx. Patient doesn't feel it helps.      Problem: Pain - Standard  Goal: Alleviation of pain or a reduction in pain to the patient’s comfort goal  Outcome: Not Met  Flowsheets (Taken 6/17/2023 0438)  OB Pain Intervention: Medication - See MAR  Note: Pain not controlled per MAR

## 2023-06-17 NOTE — PROGRESS NOTES
Patient has become very angry that he is not receiving 0.5mg of Dilaudid, Oxycodone 10mg was given 2023 PM.Educated patient on staying on a PO regiment due to possible DC tomorrow. Patient understood however was very angry that Dilaudid was being take away due to his uncontrolled pain.  Patient notified this nurse at 2239 that he was having break through pain. Nurse educated that it was two hours after PO medication was given. Patient insisted to get Dilaudid. Patient requested to speak to charge nurse to explain his frustration, while this nurse wrote Hospitalist to give Dilaudid or hold it. Hospitalist was ok to give IV Dilaudid early for breakthrough pain,Gave med but patient was still having pain after 20 mins. Charge nurse educated that the order states may repeat dose x1 in 20 mins. Second Dose of Diladid was given@ 2318 . Patient is A&O x 4. States his groin really hurts.  9 out of 10 pain. He apologized for yelling.     Patients is walking the halls, he will notify me when he is pain. Pain is not controlled at this point.     Groin is clean, dry, intact. Big bruise is present

## 2023-06-17 NOTE — PROGRESS NOTES
Assumed care of this pt at 0700. Report received by night shift RN. Pt is AnOx4 and ambulating. Plan of care discussed.

## 2023-06-17 NOTE — DISCHARGE INSTRUCTIONS
Continue Augmentin and doxycycline every 12 hours for 4 days.    Follow-up with your primary care physician within 2 to 3 days    Follow-up with your cardiologist within 1 week.

## 2023-06-19 ENCOUNTER — TELEPHONE (OUTPATIENT)
Dept: HEALTH INFORMATION MANAGEMENT | Facility: OTHER | Age: 63
End: 2023-06-19
Payer: MEDICAID

## 2023-06-19 NOTE — PATIENT COMMUNICATION
"Phone Number Called: [phone]    Call outcome: Spoke to patient regarding message below.    Message: Called to discuss message he sent. Ablation was fine. Went to ER due to pain, WBC was high, he was there for 5 days, he received little to no communication about possible pneumonia. He doesn't have any fever or chills, \"feel like I have been through a washing machine\". \"Breathing is not good, not breathing like I was before, labored at times\". Discussed ER precautions for worsening symptoms. Scheduled to see JS on 06/20/23. Answered all questions and concerns, appreciative of call.       "

## 2023-06-20 ENCOUNTER — OFFICE VISIT (OUTPATIENT)
Dept: CARDIOLOGY | Facility: MEDICAL CENTER | Age: 63
End: 2023-06-20
Attending: NURSE PRACTITIONER
Payer: MEDICAID

## 2023-06-20 VITALS
OXYGEN SATURATION: 95 % | DIASTOLIC BLOOD PRESSURE: 88 MMHG | HEART RATE: 88 BPM | HEIGHT: 69 IN | WEIGHT: 190 LBS | BODY MASS INDEX: 28.14 KG/M2 | RESPIRATION RATE: 20 BRPM | SYSTOLIC BLOOD PRESSURE: 130 MMHG

## 2023-06-20 DIAGNOSIS — Z95.2 S/P AVR: ICD-10-CM

## 2023-06-20 DIAGNOSIS — I48.0 PAROXYSMAL ATRIAL FIBRILLATION (HCC): ICD-10-CM

## 2023-06-20 DIAGNOSIS — Z79.01 ANTICOAGULATED: ICD-10-CM

## 2023-06-20 DIAGNOSIS — D68.69 SECONDARY HYPERCOAGULABLE STATE (HCC): ICD-10-CM

## 2023-06-20 DIAGNOSIS — D72.829 LEUKOCYTOSIS, UNSPECIFIED TYPE: ICD-10-CM

## 2023-06-20 DIAGNOSIS — I50.32 CHRONIC HEART FAILURE WITH PRESERVED EJECTION FRACTION (HCC): ICD-10-CM

## 2023-06-20 PROCEDURE — 99212 OFFICE O/P EST SF 10 MIN: CPT | Performed by: NURSE PRACTITIONER

## 2023-06-20 PROCEDURE — 3075F SYST BP GE 130 - 139MM HG: CPT | Performed by: NURSE PRACTITIONER

## 2023-06-20 PROCEDURE — 93005 ELECTROCARDIOGRAM TRACING: CPT | Performed by: NURSE PRACTITIONER

## 2023-06-20 PROCEDURE — 93010 ELECTROCARDIOGRAM REPORT: CPT | Performed by: NURSE PRACTITIONER

## 2023-06-20 PROCEDURE — 3079F DIAST BP 80-89 MM HG: CPT | Performed by: NURSE PRACTITIONER

## 2023-06-20 PROCEDURE — 99214 OFFICE O/P EST MOD 30 MIN: CPT | Performed by: NURSE PRACTITIONER

## 2023-06-20 ASSESSMENT — ENCOUNTER SYMPTOMS
PALPITATIONS: 0
PND: 0
WEIGHT LOSS: 0
ORTHOPNEA: 0
CLAUDICATION: 0
DIZZINESS: 0
SHORTNESS OF BREATH: 0
WEAKNESS: 0

## 2023-06-20 ASSESSMENT — FIBROSIS 4 INDEX: FIB4 SCORE: 0.61

## 2023-06-20 NOTE — PROGRESS NOTES
Chief Complaint   Patient presents with    CHF (Chronic)     F/V Dx: Chronic heart failure with preserved ejection fraction (HCC)       Subjective     Noe Hickey is a 62 y.o. male who presents today for hospital follow up.    Patient underwent AF ablation on 6/12/2023. Presented to the ER on 6/13/2023 with c/o right groin pain and swelling. CT abdomen and pelvis from the ER showed right inguinal inflammatory changes, there was no large hematoma or pseudoaneurysm. Was noted to have left lower lobe consolidation, atelectasis versus pneumonia.Right lower extremity ultrasound showed no evidence of pseudoaneurysm. Patient was started on Unasyn and Doxycycline. He was discharged on 6/17/2023.     Other past medical history is pertinent for ascending aortic aneurysm, bicuspid aortic valve (s/p replacement), aortic aneurysm (s/p repair) and hypertension.    Today patient states that he is feeling better.  Reports that his right femoral access site is without swelling or bruising and that the tenderness is improving.  Tolerating anticoagulation well with no bleeding problems.  Patient is interested in getting off the metoprolol.    Past Medical History:   Diagnosis Date    Arrhythmia     Breath shortness 06/09/2023    prior to 1/2023 surgery    Cyclic vomiting syndrome     Elevated hemidiaphragm - LEFT post AVR 01/04/2023    Fracture     Headache, classical migraine     Heart burn     Heart valve disease 06/09/2023    heart surgery in 1/2023    Indigestion     Pneumonia due to infectious organism 01/20/2023    Snoring      Past Surgical History:   Procedure Laterality Date    AORTIC VALVE REPLACEMENT  1/5/2023    Procedure: AORTIC VALVE REPLACEMENT, ASCENDING AORTIC ANEURYSM REPAIR AND TRANSESOPHAGEAL ECHOCARDIOGRAM.;  Surgeon: Serena Goyal M.D.;  Location: SURGERY [facility];  Service: Cardiac    AORTIC ASCENDING DISSECTION  1/5/2023    Procedure: REPAIR, ANEURYSM OR DISSECTION, AORTA, ASCENDING;  Surgeon:  Serena Goyal M.D.;  Location: SURGERY [facility];  Service: Cardiac    ECHOCARDIOGRAM, TRANSESOPHAGEAL, INTRAOPERATIVE  1/5/2023    Procedure: ECHOCARDIOGRAM, TRANSESOPHAGEAL, INTRAOPERATIVE.;  Surgeon: Serena Goyal M.D.;  Location: SURGERY [facility];  Service: Cardiac    IRRIGATION & DEBRIDEMENT ORTHO Right 09/19/2017    Procedure: IRRIGATION & DEBRIDEMENT ORTHO-THIGH;  Surgeon: Corbin Rivera M.D.;  Location: SURGERY [facility];  Service: Orthopedics    SHOULDER HEMICAP RESURFACING Right 10/12/2015    Procedure: RIGHT SHOULDER RESURFACING;  Surgeon: Javon Doll M.D.;  Location: SURGERY [facility];  Service:     SHOULDER ARTHROSCOPY      x3    SHOULDER SURGERY      replacement right     History reviewed. No pertinent family history.  Social History     Socioeconomic History    Marital status:      Spouse name: Not on file    Number of children: Not on file    Years of education: Not on file    Highest education level: Not on file   Occupational History    Not on file   Tobacco Use    Smoking status: Every Day     Packs/day: 0.50     Years: 40.00     Pack years: 20.00     Types: Cigarettes    Smokeless tobacco: Never    Tobacco comments:     Down to 5 cig daily   Vaping Use    Vaping Use: Never used   Substance and Sexual Activity    Alcohol use: No    Drug use: Not Currently     Comment: past problems with narcotics not since 2019    Sexual activity: Not on file   Other Topics Concern    Not on file   Social History Narrative    Not on file     Social Determinants of Health     Financial Resource Strain: Not on file   Food Insecurity: Not on file   Transportation Needs: Not on file   Physical Activity: Not on file   Stress: Not on file   Social Connections: Not on file   Intimate Partner Violence: Not on file   Housing Stability: Not on file     Allergies   Allergen Reactions    Morphine Rash and Itching     Rash/ difficulty breathing     Outpatient Encounter Medications as of 6/20/2023  "  Medication Sig Dispense Refill    amoxicillin-clavulanate (AUGMENTIN) 875-125 MG Tab Take 1 Tablet by mouth every 12 hours for 4 days. 8 Tablet 0    doxycycline monohydrate (ADOXA) 100 MG tablet Take 1 Tablet by mouth every 12 hours. 8 Tablet 0    Vitamin D, Ergocalciferol, 19385 units Cap Take 5,000 Units by mouth every 7 days.      ondansetron (ZOFRAN ODT) 4 MG TABLET DISPERSIBLE Take 4 mg by mouth 2 times a day as needed for Nausea/Vomiting.      pantoprazole (PROTONIX) 40 MG Tablet Delayed Response Take 1 Tablet by mouth every day. Take daily for 30 days post ablation then can reduce to as needed again.      hydrOXYzine HCl (ATARAX) 25 MG Tab 25 mg at bedtime as needed (sleep).      metoprolol SR (TOPROL XL) 50 MG TABLET SR 24 HR Take 1 Tablet by mouth every day. 90 Tablet 3    rivaroxaban (XARELTO) 20 MG Tab tablet Take 1 Tablet by mouth with dinner. 90 Tablet 3     No facility-administered encounter medications on file as of 6/20/2023.     Review of Systems   Constitutional:  Negative for malaise/fatigue and weight loss.   Respiratory:  Negative for shortness of breath.    Cardiovascular:  Negative for chest pain, palpitations, orthopnea, claudication, leg swelling and PND.   Musculoskeletal:         Right femoral access site tenderness improving   Neurological:  Negative for dizziness and weakness.   All other systems reviewed and are negative.             Objective     /88 (BP Location: Left arm, Patient Position: Sitting, BP Cuff Size: Adult long)   Pulse 88   Resp 20   Ht 1.753 m (5' 9.02\")   Wt 86.2 kg (190 lb)   SpO2 95%   BMI 28.04 kg/m²     Physical Exam  Constitutional:       General: He is not in acute distress.     Appearance: He is well-developed.   HENT:      Head: Normocephalic.   Eyes:      Extraocular Movements: Extraocular movements intact.   Neck:      Vascular: No carotid bruit or JVD.   Cardiovascular:      Rate and Rhythm: Normal rate and regular rhythm.      Heart sounds: " Normal heart sounds. No murmur heard.  Pulmonary:      Effort: Pulmonary effort is normal.      Breath sounds: Normal breath sounds.   Musculoskeletal:      Cervical back: Normal range of motion.      Right lower leg: No edema.      Left lower leg: No edema.   Skin:     General: Skin is warm and dry.   Neurological:      Mental Status: He is alert and oriented to person, place, and time.   Psychiatric:         Mood and Affect: Mood normal.         Behavior: Behavior normal.            EP OP NOTE 6/12/2023:  Impression:  1. Atrial fibrillation, paroxysmal  2. Successful isolation of all four pulmonary veins  3. Successful LA roof line for additional afib ablation  4. Typical atrial flutter, successful CTI ablation with bidirectional block      ABBY 6/12/2023:  CONCLUSIONS  ABBY performed to rule out thrombus prior to cardiac ablation.   No evidence of HORTENSIA thrombus.   Known prosthetic aortic valve functioning appropriately.   Moderately reduced biventricular function.   LVEF is visually estimated to be around 40%.    TTE 5/8/2023:  CONCLUSIONS  Normal left ventricular systolic function.  The left ventricular ejection fraction is visually estimated to be 55-  60%.  Grade I diastolic dysfunction.  The right ventricle is moderately dilated with reduced right   ventricular systolic function.  Estimated right ventricular systolic pressure is 25 mmHg.  Known bioprosthetic aortic valve that is functioning normally with   minimally elevated transvalvular gradients.  Mild pulmonic insufficiency.  Normal inferior vena cava size and inspiratory collapse.     Compared to the prior study on 01/21/2023, left ventricular systolic   function has improved.    Assessment & Plan     1. Chronic heart failure with preserved ejection fraction (HCC)  EKG    Basic Metabolic Panel      2. S/P AVR        3. Leukocytosis, unspecified type  CBC WITHOUT DIFFERENTIAL      4. Paroxysmal atrial fibrillation (HCC)        5. Secondary hypercoagulable  state (HCC)        6. Anticoagulated            Medical Decision Making: Today's Assessment/Status/Plan:        pAF/AFL:  - S/P PVI, LA roof line and CTI with bidirectional block with Dr. Webster on 6/12/2023.   - EKG today shows normal sinus rhythm, rate 77.  - Patient denies arrhythmia recurrence.  - Start titrating off of metoprolol 4 weeks post ablation per patient request, resume if he has arrhythmia recurrence.  - Post ablation healing duration and expectations was reviewed with patient.    Moderately reduced bi-ventricular function:  - Observed on intra-op ABBY, likely underestimated.  LVEF was 55-60% per TTE in May.  Appears euvolemic and well compensated on exam.      Bi-Cuspid Aortic Valve S/P replacement:  - ABBY on 6/12/2023 showed normal prosthetic valve functioning. LVEF was 55-60% per TTE in May.     Patient will follow up as scheduled below or earlier if needed. Encouraged patient to contact our office should any questions or concerns arise in the mean time.       Future Appointments   Date Time Provider Department Center   9/13/2023  4:20 PM Alex Webster M.D. [facility] None   10/3/2023  1:15 PM EFRAÍN Rodriguez None

## 2023-06-26 LAB — EKG IMPRESSION: NORMAL

## 2023-06-26 PROCEDURE — 93010 ELECTROCARDIOGRAM REPORT: CPT | Performed by: INTERNAL MEDICINE

## 2023-07-03 ENCOUNTER — APPOINTMENT (OUTPATIENT)
Dept: ADMISSIONS | Facility: MEDICAL CENTER | Age: 63
End: 2023-07-03
Attending: INTERNAL MEDICINE
Payer: MEDICAID

## 2023-07-04 ENCOUNTER — APPOINTMENT (OUTPATIENT)
Dept: RADIOLOGY | Facility: MEDICAL CENTER | Age: 63
DRG: 193 | End: 2023-07-04
Attending: EMERGENCY MEDICINE
Payer: MEDICAID

## 2023-07-04 ENCOUNTER — HOSPITAL ENCOUNTER (INPATIENT)
Facility: MEDICAL CENTER | Age: 63
LOS: 5 days | DRG: 193 | End: 2023-07-09
Attending: EMERGENCY MEDICINE | Admitting: HOSPITALIST
Payer: MEDICAID

## 2023-07-04 DIAGNOSIS — I50.32 CHRONIC HEART FAILURE WITH PRESERVED EJECTION FRACTION (HCC): ICD-10-CM

## 2023-07-04 DIAGNOSIS — I48.0 PAROXYSMAL ATRIAL FIBRILLATION (HCC): ICD-10-CM

## 2023-07-04 DIAGNOSIS — G44.201 ACUTE INTRACTABLE TENSION-TYPE HEADACHE: ICD-10-CM

## 2023-07-04 PROBLEM — J18.1 LOBAR PNEUMONIA (HCC): Status: ACTIVE | Noted: 2023-07-04

## 2023-07-04 PROBLEM — H53.8 BLURRY VISION, LEFT EYE: Status: ACTIVE | Noted: 2023-07-04

## 2023-07-04 PROBLEM — F17.200 TOBACCO DEPENDENCE: Status: ACTIVE | Noted: 2023-07-04

## 2023-07-04 PROBLEM — I50.22 CHRONIC SYSTOLIC CONGESTIVE HEART FAILURE (HCC): Status: ACTIVE | Noted: 2023-07-04

## 2023-07-04 LAB
ABO GROUP BLD: NORMAL
ALBUMIN SERPL BCP-MCNC: 4.1 G/DL (ref 3.2–4.9)
ALBUMIN/GLOB SERPL: 1.5 G/DL
ALP SERPL-CCNC: 52 U/L (ref 30–99)
ALT SERPL-CCNC: 32 U/L (ref 2–50)
ANION GAP SERPL CALC-SCNC: 13 MMOL/L (ref 7–16)
APTT PPP: 24.9 SEC (ref 24.7–36)
AST SERPL-CCNC: 25 U/L (ref 12–45)
BASOPHILS # BLD AUTO: 0.5 % (ref 0–1.8)
BASOPHILS # BLD: 0.09 K/UL (ref 0–0.12)
BILIRUB SERPL-MCNC: 0.3 MG/DL (ref 0.1–1.5)
BLD GP AB SCN SERPL QL: NORMAL
BUN SERPL-MCNC: 22 MG/DL (ref 8–22)
CALCIUM ALBUM COR SERPL-MCNC: 8.9 MG/DL (ref 8.5–10.5)
CALCIUM SERPL-MCNC: 9 MG/DL (ref 8.4–10.2)
CHLORIDE SERPL-SCNC: 105 MMOL/L (ref 96–112)
CO2 SERPL-SCNC: 21 MMOL/L (ref 20–33)
CREAT SERPL-MCNC: 1.09 MG/DL (ref 0.5–1.4)
EKG IMPRESSION: NORMAL
EOSINOPHIL # BLD AUTO: 0.36 K/UL (ref 0–0.51)
EOSINOPHIL NFR BLD: 2.1 % (ref 0–6.9)
ERYTHROCYTE [DISTWIDTH] IN BLOOD BY AUTOMATED COUNT: 48.9 FL (ref 35.9–50)
GFR SERPLBLD CREATININE-BSD FMLA CKD-EPI: 76 ML/MIN/1.73 M 2
GLOBULIN SER CALC-MCNC: 2.7 G/DL (ref 1.9–3.5)
GLUCOSE SERPL-MCNC: 111 MG/DL (ref 65–99)
HCT VFR BLD AUTO: 49.3 % (ref 42–52)
HGB BLD-MCNC: 14.8 G/DL (ref 14–18)
IMM GRANULOCYTES # BLD AUTO: 0.23 K/UL (ref 0–0.11)
IMM GRANULOCYTES NFR BLD AUTO: 1.3 % (ref 0–0.9)
INR PPP: 0.94 (ref 0.87–1.13)
LACTATE SERPL-SCNC: 1 MMOL/L (ref 0.5–2)
LACTATE SERPL-SCNC: 1.7 MMOL/L (ref 0.5–2)
LYMPHOCYTES # BLD AUTO: 1.37 K/UL (ref 1–4.8)
LYMPHOCYTES NFR BLD: 7.8 % (ref 22–41)
MCH RBC QN AUTO: 21.4 PG (ref 27–33)
MCHC RBC AUTO-ENTMCNC: 30 G/DL (ref 32.3–36.5)
MCV RBC AUTO: 71.1 FL (ref 81.4–97.8)
MONOCYTES # BLD AUTO: 1.13 K/UL (ref 0–0.85)
MONOCYTES NFR BLD AUTO: 6.5 % (ref 0–13.4)
NEUTROPHILS # BLD AUTO: 14.31 K/UL (ref 1.82–7.42)
NEUTROPHILS NFR BLD: 81.8 % (ref 44–72)
NRBC # BLD AUTO: 0 K/UL
NRBC BLD-RTO: 0 /100 WBC (ref 0–0.2)
PLATELET # BLD AUTO: 417 K/UL (ref 164–446)
PMV BLD AUTO: 9.2 FL (ref 9–12.9)
POTASSIUM SERPL-SCNC: 4.3 MMOL/L (ref 3.6–5.5)
PROT SERPL-MCNC: 6.8 G/DL (ref 6–8.2)
PROTHROMBIN TIME: 13.1 SEC (ref 12–14.6)
RBC # BLD AUTO: 6.93 M/UL (ref 4.7–6.1)
RH BLD: NORMAL
SODIUM SERPL-SCNC: 139 MMOL/L (ref 135–145)
TROPONIN T SERPL-MCNC: 22 NG/L (ref 6–19)
WBC # BLD AUTO: 17.5 K/UL (ref 4.8–10.8)

## 2023-07-04 PROCEDURE — 85610 PROTHROMBIN TIME: CPT

## 2023-07-04 PROCEDURE — 85025 COMPLETE CBC W/AUTO DIFF WBC: CPT

## 2023-07-04 PROCEDURE — 87040 BLOOD CULTURE FOR BACTERIA: CPT

## 2023-07-04 PROCEDURE — 36415 COLL VENOUS BLD VENIPUNCTURE: CPT

## 2023-07-04 PROCEDURE — 700105 HCHG RX REV CODE 258: Mod: JZ | Performed by: EMERGENCY MEDICINE

## 2023-07-04 PROCEDURE — 85730 THROMBOPLASTIN TIME PARTIAL: CPT

## 2023-07-04 PROCEDURE — 93005 ELECTROCARDIOGRAM TRACING: CPT | Performed by: EMERGENCY MEDICINE

## 2023-07-04 PROCEDURE — 700111 HCHG RX REV CODE 636 W/ 250 OVERRIDE (IP): Mod: JZ | Performed by: HOSPITALIST

## 2023-07-04 PROCEDURE — 700102 HCHG RX REV CODE 250 W/ 637 OVERRIDE(OP): Performed by: HOSPITALIST

## 2023-07-04 PROCEDURE — 99406 BEHAV CHNG SMOKING 3-10 MIN: CPT | Performed by: HOSPITALIST

## 2023-07-04 PROCEDURE — 700105 HCHG RX REV CODE 258: Performed by: HOSPITALIST

## 2023-07-04 PROCEDURE — 700111 HCHG RX REV CODE 636 W/ 250 OVERRIDE (IP)

## 2023-07-04 PROCEDURE — 96375 TX/PRO/DX INJ NEW DRUG ADDON: CPT

## 2023-07-04 PROCEDURE — 83036 HEMOGLOBIN GLYCOSYLATED A1C: CPT

## 2023-07-04 PROCEDURE — 700111 HCHG RX REV CODE 636 W/ 250 OVERRIDE (IP): Mod: JZ | Performed by: EMERGENCY MEDICINE

## 2023-07-04 PROCEDURE — 80053 COMPREHEN METABOLIC PANEL: CPT

## 2023-07-04 PROCEDURE — 700101 HCHG RX REV CODE 250: Performed by: EMERGENCY MEDICINE

## 2023-07-04 PROCEDURE — 86850 RBC ANTIBODY SCREEN: CPT

## 2023-07-04 PROCEDURE — 700111 HCHG RX REV CODE 636 W/ 250 OVERRIDE (IP): Performed by: HOSPITALIST

## 2023-07-04 PROCEDURE — 83605 ASSAY OF LACTIC ACID: CPT

## 2023-07-04 PROCEDURE — 99223 1ST HOSP IP/OBS HIGH 75: CPT | Mod: 25 | Performed by: HOSPITALIST

## 2023-07-04 PROCEDURE — 99285 EMERGENCY DEPT VISIT HI MDM: CPT

## 2023-07-04 PROCEDURE — 86900 BLOOD TYPING SEROLOGIC ABO: CPT

## 2023-07-04 PROCEDURE — 70496 CT ANGIOGRAPHY HEAD: CPT

## 2023-07-04 PROCEDURE — 700117 HCHG RX CONTRAST REV CODE 255: Performed by: EMERGENCY MEDICINE

## 2023-07-04 PROCEDURE — 84484 ASSAY OF TROPONIN QUANT: CPT

## 2023-07-04 PROCEDURE — 0042T CT-CEREBRAL PERFUSION ANALYSIS: CPT

## 2023-07-04 PROCEDURE — A9270 NON-COVERED ITEM OR SERVICE: HCPCS | Performed by: HOSPITALIST

## 2023-07-04 PROCEDURE — 96365 THER/PROPH/DIAG IV INF INIT: CPT

## 2023-07-04 PROCEDURE — 71045 X-RAY EXAM CHEST 1 VIEW: CPT

## 2023-07-04 PROCEDURE — 70450 CT HEAD/BRAIN W/O DYE: CPT

## 2023-07-04 PROCEDURE — 770020 HCHG ROOM/CARE - TELE (206)

## 2023-07-04 PROCEDURE — 86901 BLOOD TYPING SEROLOGIC RH(D): CPT

## 2023-07-04 PROCEDURE — 94760 N-INVAS EAR/PLS OXIMETRY 1: CPT

## 2023-07-04 PROCEDURE — 70498 CT ANGIOGRAPHY NECK: CPT

## 2023-07-04 RX ORDER — POLYETHYLENE GLYCOL 3350 17 G/17G
1 POWDER, FOR SOLUTION ORAL
Status: DISCONTINUED | OUTPATIENT
Start: 2023-07-04 | End: 2023-07-09 | Stop reason: HOSPADM

## 2023-07-04 RX ORDER — ONDANSETRON 2 MG/ML
INJECTION INTRAMUSCULAR; INTRAVENOUS
Status: COMPLETED
Start: 2023-07-04 | End: 2023-07-04

## 2023-07-04 RX ORDER — METOCLOPRAMIDE HYDROCHLORIDE 5 MG/ML
10 INJECTION INTRAMUSCULAR; INTRAVENOUS ONCE
Status: COMPLETED | OUTPATIENT
Start: 2023-07-04 | End: 2023-07-04

## 2023-07-04 RX ORDER — KETOROLAC TROMETHAMINE 30 MG/ML
15 INJECTION, SOLUTION INTRAMUSCULAR; INTRAVENOUS EVERY 6 HOURS PRN
Status: COMPLETED | OUTPATIENT
Start: 2023-07-04 | End: 2023-07-05

## 2023-07-04 RX ORDER — KETOROLAC TROMETHAMINE 30 MG/ML
30 INJECTION, SOLUTION INTRAMUSCULAR; INTRAVENOUS ONCE
Status: COMPLETED | OUTPATIENT
Start: 2023-07-04 | End: 2023-07-04

## 2023-07-04 RX ORDER — CHLORPROMAZINE HYDROCHLORIDE 50 MG/1
50 TABLET, FILM COATED ORAL 2 TIMES DAILY PRN
COMMUNITY
End: 2023-09-10

## 2023-07-04 RX ORDER — ONDANSETRON 2 MG/ML
4 INJECTION INTRAMUSCULAR; INTRAVENOUS EVERY 4 HOURS PRN
Status: DISCONTINUED | OUTPATIENT
Start: 2023-07-04 | End: 2023-07-09 | Stop reason: HOSPADM

## 2023-07-04 RX ORDER — METOPROLOL SUCCINATE 25 MG/1
50 TABLET, EXTENDED RELEASE ORAL DAILY
Status: DISCONTINUED | OUTPATIENT
Start: 2023-07-05 | End: 2023-07-06

## 2023-07-04 RX ORDER — METOPROLOL TARTRATE 1 MG/ML
5 INJECTION, SOLUTION INTRAVENOUS ONCE
Status: COMPLETED | OUTPATIENT
Start: 2023-07-04 | End: 2023-07-04

## 2023-07-04 RX ORDER — PROMETHAZINE HYDROCHLORIDE 25 MG/1
12.5-25 SUPPOSITORY RECTAL EVERY 4 HOURS PRN
Status: DISCONTINUED | OUTPATIENT
Start: 2023-07-04 | End: 2023-07-09 | Stop reason: HOSPADM

## 2023-07-04 RX ORDER — AMOXICILLIN AND CLAVULANATE POTASSIUM 875; 125 MG/1; MG/1
1 TABLET, FILM COATED ORAL 2 TIMES DAILY
Status: ON HOLD | COMMUNITY
End: 2023-07-09

## 2023-07-04 RX ORDER — ACETAMINOPHEN 500 MG
1000 TABLET ORAL ONCE
Status: COMPLETED | OUTPATIENT
Start: 2023-07-04 | End: 2023-07-04

## 2023-07-04 RX ORDER — HYDROXYZINE HYDROCHLORIDE 25 MG/1
25 TABLET, FILM COATED ORAL NIGHTLY PRN
Status: DISCONTINUED | OUTPATIENT
Start: 2023-07-04 | End: 2023-07-09 | Stop reason: HOSPADM

## 2023-07-04 RX ORDER — PROMETHAZINE HYDROCHLORIDE 25 MG/1
12.5-25 TABLET ORAL EVERY 4 HOURS PRN
Status: DISCONTINUED | OUTPATIENT
Start: 2023-07-04 | End: 2023-07-09 | Stop reason: HOSPADM

## 2023-07-04 RX ORDER — OMEPRAZOLE 20 MG/1
40 CAPSULE, DELAYED RELEASE ORAL DAILY
Status: DISCONTINUED | OUTPATIENT
Start: 2023-07-05 | End: 2023-07-08

## 2023-07-04 RX ORDER — ACETAMINOPHEN 325 MG/1
650 TABLET ORAL EVERY 6 HOURS PRN
Status: DISCONTINUED | OUTPATIENT
Start: 2023-07-04 | End: 2023-07-09 | Stop reason: HOSPADM

## 2023-07-04 RX ORDER — SODIUM CHLORIDE, SODIUM LACTATE, POTASSIUM CHLORIDE, CALCIUM CHLORIDE 600; 310; 30; 20 MG/100ML; MG/100ML; MG/100ML; MG/100ML
1000 INJECTION, SOLUTION INTRAVENOUS ONCE
Status: COMPLETED | OUTPATIENT
Start: 2023-07-04 | End: 2023-07-04

## 2023-07-04 RX ORDER — DIPHENHYDRAMINE HYDROCHLORIDE 50 MG/ML
50 INJECTION INTRAMUSCULAR; INTRAVENOUS ONCE
Status: COMPLETED | OUTPATIENT
Start: 2023-07-04 | End: 2023-07-04

## 2023-07-04 RX ORDER — ONDANSETRON 2 MG/ML
4 INJECTION INTRAMUSCULAR; INTRAVENOUS ONCE
Status: COMPLETED | OUTPATIENT
Start: 2023-07-04 | End: 2023-07-04

## 2023-07-04 RX ORDER — NICOTINE 21 MG/24HR
14 PATCH, TRANSDERMAL 24 HOURS TRANSDERMAL
Status: DISCONTINUED | OUTPATIENT
Start: 2023-07-05 | End: 2023-07-09 | Stop reason: HOSPADM

## 2023-07-04 RX ORDER — AMOXICILLIN 250 MG
2 CAPSULE ORAL 2 TIMES DAILY
Status: DISCONTINUED | OUTPATIENT
Start: 2023-07-04 | End: 2023-07-09 | Stop reason: HOSPADM

## 2023-07-04 RX ORDER — SODIUM CHLORIDE, SODIUM LACTATE, POTASSIUM CHLORIDE, AND CALCIUM CHLORIDE .6; .31; .03; .02 G/100ML; G/100ML; G/100ML; G/100ML
500 INJECTION, SOLUTION INTRAVENOUS
Status: DISCONTINUED | OUTPATIENT
Start: 2023-07-04 | End: 2023-07-09 | Stop reason: HOSPADM

## 2023-07-04 RX ORDER — PROCHLORPERAZINE EDISYLATE 5 MG/ML
5-10 INJECTION INTRAMUSCULAR; INTRAVENOUS EVERY 4 HOURS PRN
Status: DISCONTINUED | OUTPATIENT
Start: 2023-07-04 | End: 2023-07-09 | Stop reason: HOSPADM

## 2023-07-04 RX ORDER — HYDROMORPHONE HYDROCHLORIDE 1 MG/ML
0.5 INJECTION, SOLUTION INTRAMUSCULAR; INTRAVENOUS; SUBCUTANEOUS ONCE
Status: COMPLETED | OUTPATIENT
Start: 2023-07-04 | End: 2023-07-04

## 2023-07-04 RX ORDER — ONDANSETRON 4 MG/1
4 TABLET, ORALLY DISINTEGRATING ORAL EVERY 4 HOURS PRN
Status: DISCONTINUED | OUTPATIENT
Start: 2023-07-04 | End: 2023-07-09 | Stop reason: HOSPADM

## 2023-07-04 RX ORDER — BISACODYL 10 MG
10 SUPPOSITORY, RECTAL RECTAL
Status: DISCONTINUED | OUTPATIENT
Start: 2023-07-04 | End: 2023-07-09 | Stop reason: HOSPADM

## 2023-07-04 RX ADMIN — SODIUM CHLORIDE, POTASSIUM CHLORIDE, SODIUM LACTATE AND CALCIUM CHLORIDE 1000 ML: 600; 310; 30; 20 INJECTION, SOLUTION INTRAVENOUS at 16:54

## 2023-07-04 RX ADMIN — ONDANSETRON 4 MG: 2 INJECTION INTRAMUSCULAR; INTRAVENOUS at 16:22

## 2023-07-04 RX ADMIN — IOHEXOL 40 ML: 350 INJECTION, SOLUTION INTRAVENOUS at 16:51

## 2023-07-04 RX ADMIN — HYDROMORPHONE HYDROCHLORIDE 0.5 MG: 1 INJECTION, SOLUTION INTRAMUSCULAR; INTRAVENOUS; SUBCUTANEOUS at 23:20

## 2023-07-04 RX ADMIN — METOCLOPRAMIDE 10 MG: 5 INJECTION, SOLUTION INTRAMUSCULAR; INTRAVENOUS at 17:40

## 2023-07-04 RX ADMIN — DIPHENHYDRAMINE HYDROCHLORIDE 50 MG: 50 INJECTION INTRAMUSCULAR; INTRAVENOUS at 17:40

## 2023-07-04 RX ADMIN — FENTANYL CITRATE 50 MCG: 50 INJECTION, SOLUTION INTRAMUSCULAR; INTRAVENOUS at 16:57

## 2023-07-04 RX ADMIN — IOHEXOL 80 ML: 350 INJECTION, SOLUTION INTRAVENOUS at 16:39

## 2023-07-04 RX ADMIN — RIVAROXABAN 20 MG: 20 TABLET, FILM COATED ORAL at 20:37

## 2023-07-04 RX ADMIN — PIPERACILLIN AND TAZOBACTAM 4.5 G: 4; .5 INJECTION, POWDER, FOR SOLUTION INTRAVENOUS at 17:17

## 2023-07-04 RX ADMIN — METOPROLOL TARTRATE 5 MG: 5 INJECTION INTRAVENOUS at 16:54

## 2023-07-04 RX ADMIN — ACETAMINOPHEN 1000 MG: 500 TABLET ORAL at 22:07

## 2023-07-04 RX ADMIN — PIPERACILLIN AND TAZOBACTAM 4.5 G: 4; .5 INJECTION, POWDER, FOR SOLUTION INTRAVENOUS at 20:48

## 2023-07-04 RX ADMIN — KETOROLAC TROMETHAMINE 30 MG: 30 INJECTION, SOLUTION INTRAMUSCULAR; INTRAVENOUS at 17:40

## 2023-07-04 RX ADMIN — KETOROLAC TROMETHAMINE 15 MG: 30 INJECTION, SOLUTION INTRAMUSCULAR at 20:37

## 2023-07-04 ASSESSMENT — LIFESTYLE VARIABLES
ALCOHOL_USE: NO
HAVE YOU EVER FELT YOU SHOULD CUT DOWN ON YOUR DRINKING: NO
ON A TYPICAL DAY WHEN YOU DRINK ALCOHOL HOW MANY DRINKS DO YOU HAVE: 0
TOTAL SCORE: 0
TOTAL SCORE: 0
CONSUMPTION TOTAL: NEGATIVE
EVER FELT BAD OR GUILTY ABOUT YOUR DRINKING: NO
TOTAL SCORE: 0
HOW MANY TIMES IN THE PAST YEAR HAVE YOU HAD 5 OR MORE DRINKS IN A DAY: 0
AVERAGE NUMBER OF DAYS PER WEEK YOU HAVE A DRINK CONTAINING ALCOHOL: 0
HAVE PEOPLE ANNOYED YOU BY CRITICIZING YOUR DRINKING: NO
EVER HAD A DRINK FIRST THING IN THE MORNING TO STEADY YOUR NERVES TO GET RID OF A HANGOVER: NO

## 2023-07-04 ASSESSMENT — COGNITIVE AND FUNCTIONAL STATUS - GENERAL
SUGGESTED CMS G CODE MODIFIER MOBILITY: CH
SUGGESTED CMS G CODE MODIFIER DAILY ACTIVITY: CH
MOBILITY SCORE: 24
DAILY ACTIVITIY SCORE: 24

## 2023-07-04 ASSESSMENT — ENCOUNTER SYMPTOMS
FLANK PAIN: 0
BLURRED VISION: 1
TINGLING: 1
STRIDOR: 0
HEADACHES: 1
EYE REDNESS: 0
COUGH: 1
MYALGIAS: 0
EYE DISCHARGE: 0
NERVOUS/ANXIOUS: 0
CHILLS: 0
SHORTNESS OF BREATH: 1
FEVER: 0
BRUISES/BLEEDS EASILY: 0
ABDOMINAL PAIN: 0
VOMITING: 0
FOCAL WEAKNESS: 0
DIZZINESS: 1

## 2023-07-04 ASSESSMENT — CHA2DS2 SCORE
CHF OR LEFT VENTRICULAR DYSFUNCTION: YES
SEX: MALE
CHA2DS2 VASC SCORE: 2
HYPERTENSION: NO
AGE 75 OR GREATER: NO
AGE 65 TO 74: NO
DIABETES: NO
VASCULAR DISEASE: YES
PRIOR STROKE OR TIA OR THROMBOEMBOLISM: NO

## 2023-07-04 ASSESSMENT — COPD QUESTIONNAIRES
HAVE YOU SMOKED AT LEAST 100 CIGARETTES IN YOUR ENTIRE LIFE: YES
COPD SCREENING SCORE: 6
DO YOU EVER COUGH UP ANY MUCUS OR PHLEGM?: YES, A FEW DAYS A WEEK OR MONTH
DURING THE PAST 4 WEEKS HOW MUCH DID YOU FEEL SHORT OF BREATH: NONE/LITTLE OF THE TIME

## 2023-07-04 ASSESSMENT — PAIN DESCRIPTION - PAIN TYPE: TYPE: ACUTE PAIN

## 2023-07-04 ASSESSMENT — FIBROSIS 4 INDEX
FIB4 SCORE: 0.61
FIB4 SCORE: 0.67

## 2023-07-04 NOTE — ED PROVIDER NOTES
ED Provider Note    CHIEF COMPLAINT  Chief Complaint   Patient presents with    Headache    Numbness     L side of face and lips started 45 min ago    Chest Pain     Started 45 min ago    Blurred Vision     L eye started 45 minutes         EXTERNAL RECORDS REVIEWED  Inpatient Notes recent admission for leukocytosis and pneumonia.  Patient also had recent open heart surgery for aortic valve replacement and ascending aortic aneurysmal repair    HPI/ROS  LIMITATION TO HISTORY   Select: : None  OUTSIDE HISTORIAN(S):      Noe Hickey is a 62 y.o. male who presents to the emergency department with a chief complaint of left-sided facial numbness and headache.  Patient reports severe headache behind his left eye and numbness of his left cheek and lips that started approximately 45 minutes ago.  No altered mental status no weakness in extremities he reports that his vision is also blurred in his left eye and that if he opens his left eye he becomes profoundly dizzy.  Headache is severe at this time.  Patient is anticoagulated with Xarelto and aspirin he reports no trauma no heavy lifting or exertion prior to the onset of his headache he is moderately nauseous at this time is not any emesis.  No cough no shortness of breath no abdominal pain or problems urination or bowel movements no other acute symptom changes or concerns.    PAST MEDICAL HISTORY   has a past medical history of Arrhythmia, Breath shortness (06/09/2023), Cyclic vomiting syndrome, Elevated hemidiaphragm - LEFT post AVR (01/04/2023), Fracture, Headache, classical migraine, Heart burn, Heart valve disease (06/09/2023), Indigestion, Pneumonia due to infectious organism (01/20/2023), and Snoring.    SURGICAL HISTORY   has a past surgical history that includes shoulder arthroscopy; shoulder hemicap resurfacing (Right, 10/12/2015); irrigation & debridement ortho (Right, 09/19/2017); shoulder surgery; aortic valve replacement (1/5/2023); aortic ascending  "dissection (1/5/2023); and echocardiogram, transesophageal, intraoperative (1/5/2023).    FAMILY HISTORY  No family history on file.    SOCIAL HISTORY  Social History     Tobacco Use    Smoking status: Every Day     Packs/day: 0.50     Years: 40.00     Pack years: 20.00     Types: Cigarettes    Smokeless tobacco: Never    Tobacco comments:     Down to 5 cig daily   Vaping Use    Vaping Use: Never used   Substance and Sexual Activity    Alcohol use: No    Drug use: Not Currently     Comment: past problems with narcotics not since 2019    Sexual activity: Not on file       CURRENT MEDICATIONS  Home Medications       Reviewed by Mala Smallwood R.N. (Registered Nurse) on 07/04/23 at 1613  Med List Status: Partial     Medication Last Dose Status   doxycycline monohydrate (ADOXA) 100 MG tablet  Active   hydrOXYzine HCl (ATARAX) 25 MG Tab  Active   metoprolol SR (TOPROL XL) 50 MG TABLET SR 24 HR  Active   ondansetron (ZOFRAN ODT) 4 MG TABLET DISPERSIBLE  Active   pantoprazole (PROTONIX) 40 MG Tablet Delayed Response  Active   rivaroxaban (XARELTO) 20 MG Tab tablet  Active   Vitamin D, Ergocalciferol, 13102 units Cap  Active                    ALLERGIES  Allergies   Allergen Reactions    Morphine Rash and Itching     Rash/ difficulty breathing       PHYSICAL EXAM  VITAL SIGNS: /89   Pulse (!) 135   Temp 36.7 °C (98 °F) (Temporal)   Resp (!) 22   Ht 1.753 m (5' 9\")   Wt 88.9 kg (195 lb 15.8 oz)   SpO2 95%   BMI 28.94 kg/m²    Pulse ox interpretation: I interpret this pulse ox as normal.  Constitutional: Alert and oriented x 3, minimal distress  HEENT: Atraumatic normocephalic, pupils are equal round reactive to light extraocular movements are intact. The nares is clear, external ears are normal, mouth shows moist mucous membranes normal dentition for age  Neck: Supple, no JVD no tracheal deviation  Cardiovascular: Tachycardic no murmur rub or gallop 2+ pulses peripherally x4  Thorax & Lungs: No respiratory " distress, no wheezes rales or rhonchi, No chest tenderness.   GI: Soft nontender nondistended positive bowel sounds, no peritoneal signs  Skin: Warm dry no acute rash or lesion  Musculoskeletal: Very scant left-sided facial droop sparing the forehead and numbness over the lower left side of the face.  No visual field loss no hemineglect no strength discrepancy no cerebellar dysfunction.  NIH of 2.  Neurologic: Cranial nerves III through XII are grossly intact no sensory deficit no cerebellar dysfunction   Psychiatric: Appropriate affect for situation at this time          DIAGNOSTIC STUDIES / PROCEDURES  Results for orders placed or performed during the hospital encounter of 07/04/23   CBC WITH DIFFERENTIAL   Result Value Ref Range    WBC 17.5 (H) 4.8 - 10.8 K/uL    RBC 6.93 (H) 4.70 - 6.10 M/uL    Hemoglobin 14.8 14.0 - 18.0 g/dL    Hematocrit 49.3 42.0 - 52.0 %    MCV 71.1 (L) 81.4 - 97.8 fL    MCH 21.4 (L) 27.0 - 33.0 pg    MCHC 30.0 (L) 32.3 - 36.5 g/dL    RDW 48.9 35.9 - 50.0 fL    Platelet Count 417 164 - 446 K/uL    MPV 9.2 9.0 - 12.9 fL    Neutrophils-Polys 81.80 (H) 44.00 - 72.00 %    Lymphocytes 7.80 (L) 22.00 - 41.00 %    Monocytes 6.50 0.00 - 13.40 %    Eosinophils 2.10 0.00 - 6.90 %    Basophils 0.50 0.00 - 1.80 %    Immature Granulocytes 1.30 (H) 0.00 - 0.90 %    Nucleated RBC 0.00 0.00 - 0.20 /100 WBC    Neutrophils (Absolute) 14.31 (H) 1.82 - 7.42 K/uL    Lymphs (Absolute) 1.37 1.00 - 4.80 K/uL    Monos (Absolute) 1.13 (H) 0.00 - 0.85 K/uL    Eos (Absolute) 0.36 0.00 - 0.51 K/uL    Baso (Absolute) 0.09 0.00 - 0.12 K/uL    Immature Granulocytes (abs) 0.23 (H) 0.00 - 0.11 K/uL    NRBC (Absolute) 0.00 K/uL   COMP METABOLIC PANEL   Result Value Ref Range    Sodium 139 135 - 145 mmol/L    Potassium 4.3 3.6 - 5.5 mmol/L    Chloride 105 96 - 112 mmol/L    Co2 21 20 - 33 mmol/L    Anion Gap 13.0 7.0 - 16.0    Glucose 111 (H) 65 - 99 mg/dL    Bun 22 8 - 22 mg/dL    Creatinine 1.09 0.50 - 1.40 mg/dL    Calcium  9.0 8.4 - 10.2 mg/dL    AST(SGOT) 25 12 - 45 U/L    ALT(SGPT) 32 2 - 50 U/L    Alkaline Phosphatase 52 30 - 99 U/L    Total Bilirubin 0.3 0.1 - 1.5 mg/dL    Albumin 4.1 3.2 - 4.9 g/dL    Total Protein 6.8 6.0 - 8.2 g/dL    Globulin 2.7 1.9 - 3.5 g/dL    A-G Ratio 1.5 g/dL   PROTHROMBIN TIME   Result Value Ref Range    PT 13.1 12.0 - 14.6 sec    INR 0.94 0.87 - 1.13   APTT   Result Value Ref Range    APTT 24.9 24.7 - 36.0 sec   COD (ADULT)   Result Value Ref Range    ABO Grouping Only O     Rh Grouping Only NEG     Antibody Screen-Cod NEG    TROPONIN   Result Value Ref Range    Troponin T 22 (H) 6 - 19 ng/L   Lactic Acid -STAT Once   Result Value Ref Range    Lactic Acid 1.7 0.5 - 2.0 mmol/L   CORRECTED CALCIUM   Result Value Ref Range    Correct Calcium 8.9 8.5 - 10.5 mg/dL   ESTIMATED GFR   Result Value Ref Range    GFR (CKD-EPI) 76 >60 mL/min/1.73 m 2   EKG (NOW)   Result Value Ref Range    Report       [facility] Emergency Dept.    Test Date:  2023  Pt Name:    JUSTIN TOPETE               Department: [facility]  MRN:        [id number]                      [facility]   Gender:     Male                         Technician: [id number]  :        [id number]                   Requested By:LAWSON SELLERS  Order #:    [id number]                    Reading MD: LAWSON SELLERS MD    Measurements  Intervals                                Axis  Rate:       134                          P:          76  SD:         122                          QRS:        40  QRSD:       85                           T:          46  QT:         279  QTc:        417    Interpretive Statements  Sinus tachycardia at a rate of 134 no ST elevation no ST depression normal  axis normal intervals  Electronically Signed On 2023 16:50:52 PDT by LAWSON SELLERS MD           RADIOLOGY  I have independently interpreted the diagnostic imaging associated with this visit and am waiting the final reading from the  radiologist.   My preliminary interpretation is as follows: Left lower lobe pneumonia and scant left pleural effusion      Radiologist interpretation:   DX-CHEST-PORTABLE (1 VIEW)   Final Result         1.  Linear patchy opacity in the left lung base, similar to prior, atelectasis versus infection   2.  Similar elevation of the left hemidiaphragm.      CT-CEREBRAL PERFUSION ANALYSIS   Final Result      1.  Cerebral blood flow less than 30% likely representing completed infarct = 0 mL.      2.  T Max more than 6 seconds likely representing combination of completed infarct and ischemia = 0 mL.      3.  Mismatched volume likely representing ischemic brain/penumbra = None      4.  Please note that the cerebral perfusion was performed on the limited brain tissue around the basal ganglia region. Infarct/ischemia outside the CT perfusion sections can be missed in this study.      CT-CTA NECK WITH & W/O-POST PROCESSING   Final Result      CT angiogram of the neck within normal limits.      CT-CTA HEAD WITH & W/O-POST PROCESS   Final Result      CT angiogram of the [place] within normal limits.      CT-HEAD W/O   Final Result      Negative noncontrast CT scan of the head / brain.         MR-BRAIN-W/O    (Results Pending)         COURSE & MEDICAL DECISION MAKING    ED Observation Status? No; Patient does not meet criteria for ED Observation.         ASSESSMENT, COURSE AND PLAN    Care Narrative: Pleasant 62-year-old male recent admission for pneumonia returns today with headache dizziness blurred vision in his left eye some numbness over the left side of his face.  Patient was profoundly tachycardic at arrival sinus tachycardia.  Patient ran out of his metoprolol 3 days prior.  Was given dose of IV metoprolol 5 mg however does meet SIRS criteria and he is found to again have an elevated white blood cell count with a left shift.  He is given a liter of LR he is given 3.375 g of his Zosyn.  Due to his possible slight  "left-sided facial droop as well as left facial numbness and vision changes he was made a stroke protocol activation on arrival.  All imaging studies of head and neck are negative for acute abnormalities.  Patient's vision has normalized and the numbness and tingling in his face is vastly improved at this time.  Patient is also given a migraine cocktail with near total resolution of his symptoms now having very scant numbness in the left cheek and reports that his vision is nearly returned to normal.   At this point patient will require ongoing evaluation and treatment due to left lower lobe pneumonia pleural effusion and SIRS qualifying for sepsis at this time.  I discussed case up to this point with hospitalist Dr. Gonzalez and he is admitted for ongoing care.  Admitted in guarded guarded condition      ADDITIONAL PROBLEM LIST    DISPOSITION AND DISCUSSIONS    CRITICAL CARE  The very real possibilty of a deterioration of this patient's condition required the highest level of my preparedness for sudden, emergent intervention.  I provided critical care services, which included medication orders, frequent reevaluations of the patient's condition and response to treatment, ordering and reviewing test results, and discussing the case with various consultants.  The critical care time associated with the care of the patient was 45 minutes. Review chart for interventions. This time is exclusive of any other billable procedures.   /74   Pulse (!) 110   Temp 36.7 °C (98 °F) (Temporal)   Resp 18   Ht 1.753 m (5' 9\")   Wt 88.9 kg (195 lb 15.8 oz)   SpO2 92%   BMI 28.94 kg/m²       FINAL DIAGNOSIS  1.  Sepsis  2.  Pneumonia, left lower lobe  3.  Left lower pleural effusion  4.  Leukocytosis  5.  Chest pain  6.  Headache, resolved  7.  Critical care 45 minutes    "

## 2023-07-04 NOTE — ED NOTES
"1615 pt complains of \" massive HA\" in the back of his eyes, AOx4, no obvious asymmetry noted. ERP at bedside upon arrival to room 5.     1622 pt to CT.     "

## 2023-07-04 NOTE — ED TRIAGE NOTES
Pt ambulates to triage after EKG  Chief Complaint   Patient presents with    Headache    Numbness     L side of face and lips started 45 min ago    Chest Pain     Started 45 min ago    Blurred Vision     L eye started 45 minutes

## 2023-07-04 NOTE — DISCHARGE PLANNING
note:  Code Stroke was called.   CM asked pt if he wants his wife called and notified.   Pt declined.   Notified RN and CN.

## 2023-07-04 NOTE — ED NOTES
Med rec updated and complete, per pt   Allergies reviewed, per pt  Pt reports that he has not taken FUROSAMIDE 20MG, LORAZEPAM 1MG, or NICOTINE GUM 4MG, in the last 30 days or longer.  Pt reported to this writer that he is not taking an ASPIRIN 81MG

## 2023-07-05 ENCOUNTER — APPOINTMENT (OUTPATIENT)
Dept: CARDIOLOGY | Facility: MEDICAL CENTER | Age: 63
DRG: 193 | End: 2023-07-05
Attending: INTERNAL MEDICINE
Payer: MEDICAID

## 2023-07-05 ENCOUNTER — APPOINTMENT (OUTPATIENT)
Dept: RADIOLOGY | Facility: MEDICAL CENTER | Age: 63
DRG: 193 | End: 2023-07-05
Attending: INTERNAL MEDICINE
Payer: MEDICAID

## 2023-07-05 PROBLEM — R51.9 HEADACHE: Status: ACTIVE | Noted: 2023-07-05

## 2023-07-05 LAB
ALBUMIN SERPL BCP-MCNC: 3.5 G/DL (ref 3.2–4.9)
ALBUMIN/GLOB SERPL: 1.5 G/DL
ALP SERPL-CCNC: 42 U/L (ref 30–99)
ALT SERPL-CCNC: 27 U/L (ref 2–50)
ANION GAP SERPL CALC-SCNC: 9 MMOL/L (ref 7–16)
AST SERPL-CCNC: 23 U/L (ref 12–45)
BILIRUB SERPL-MCNC: 0.4 MG/DL (ref 0.1–1.5)
BUN SERPL-MCNC: 25 MG/DL (ref 8–22)
CALCIUM ALBUM COR SERPL-MCNC: 8.8 MG/DL (ref 8.5–10.5)
CALCIUM SERPL-MCNC: 8.4 MG/DL (ref 8.4–10.2)
CHLORIDE SERPL-SCNC: 108 MMOL/L (ref 96–112)
CHOLEST SERPL-MCNC: 141 MG/DL (ref 100–199)
CO2 SERPL-SCNC: 23 MMOL/L (ref 20–33)
CREAT SERPL-MCNC: 1.28 MG/DL (ref 0.5–1.4)
ERYTHROCYTE [DISTWIDTH] IN BLOOD BY AUTOMATED COUNT: 50.6 FL (ref 35.9–50)
EST. AVERAGE GLUCOSE BLD GHB EST-MCNC: 126 MG/DL
GFR SERPLBLD CREATININE-BSD FMLA CKD-EPI: 63 ML/MIN/1.73 M 2
GLOBULIN SER CALC-MCNC: 2.4 G/DL (ref 1.9–3.5)
GLUCOSE SERPL-MCNC: 81 MG/DL (ref 65–99)
HBA1C MFR BLD: 6 % (ref 4–5.6)
HCT VFR BLD AUTO: 46.2 % (ref 42–52)
HDLC SERPL-MCNC: 30 MG/DL
HGB BLD-MCNC: 13.6 G/DL (ref 14–18)
LDLC SERPL CALC-MCNC: 97 MG/DL
LV EJECT FRACT  99904: 30
LV EJECT FRACT MOD 2C 99903: 26.73
LV EJECT FRACT MOD 4C 99902: 36.54
LV EJECT FRACT MOD BP 99901: 30.51
MAGNESIUM SERPL-MCNC: 2.2 MG/DL (ref 1.5–2.5)
MCH RBC QN AUTO: 21.3 PG (ref 27–33)
MCHC RBC AUTO-ENTMCNC: 29.4 G/DL (ref 32.3–36.5)
MCV RBC AUTO: 72.2 FL (ref 81.4–97.8)
PLATELET # BLD AUTO: 362 K/UL (ref 164–446)
PMV BLD AUTO: 9.4 FL (ref 9–12.9)
POTASSIUM SERPL-SCNC: 4.3 MMOL/L (ref 3.6–5.5)
PROCALCITONIN SERPL-MCNC: 0.07 NG/ML
PROT SERPL-MCNC: 5.9 G/DL (ref 6–8.2)
RBC # BLD AUTO: 6.4 M/UL (ref 4.7–6.1)
SODIUM SERPL-SCNC: 140 MMOL/L (ref 135–145)
TRIGL SERPL-MCNC: 72 MG/DL (ref 0–149)
WBC # BLD AUTO: 12.6 K/UL (ref 4.8–10.8)

## 2023-07-05 PROCEDURE — 36415 COLL VENOUS BLD VENIPUNCTURE: CPT

## 2023-07-05 PROCEDURE — 770020 HCHG ROOM/CARE - TELE (206)

## 2023-07-05 PROCEDURE — 700102 HCHG RX REV CODE 250 W/ 637 OVERRIDE(OP): Performed by: HOSPITALIST

## 2023-07-05 PROCEDURE — 700111 HCHG RX REV CODE 636 W/ 250 OVERRIDE (IP): Mod: JZ | Performed by: INTERNAL MEDICINE

## 2023-07-05 PROCEDURE — 700105 HCHG RX REV CODE 258: Performed by: INTERNAL MEDICINE

## 2023-07-05 PROCEDURE — 93306 TTE W/DOPPLER COMPLETE: CPT | Mod: 26 | Performed by: INTERNAL MEDICINE

## 2023-07-05 PROCEDURE — 97535 SELF CARE MNGMENT TRAINING: CPT

## 2023-07-05 PROCEDURE — 700111 HCHG RX REV CODE 636 W/ 250 OVERRIDE (IP): Performed by: HOSPITALIST

## 2023-07-05 PROCEDURE — 93306 TTE W/DOPPLER COMPLETE: CPT

## 2023-07-05 PROCEDURE — 80053 COMPREHEN METABOLIC PANEL: CPT

## 2023-07-05 PROCEDURE — 700111 HCHG RX REV CODE 636 W/ 250 OVERRIDE (IP): Mod: JZ | Performed by: HOSPITALIST

## 2023-07-05 PROCEDURE — 80061 LIPID PANEL: CPT

## 2023-07-05 PROCEDURE — A9270 NON-COVERED ITEM OR SERVICE: HCPCS | Performed by: HOSPITALIST

## 2023-07-05 PROCEDURE — 97165 OT EVAL LOW COMPLEX 30 MIN: CPT

## 2023-07-05 PROCEDURE — 94669 MECHANICAL CHEST WALL OSCILL: CPT

## 2023-07-05 PROCEDURE — 700117 HCHG RX CONTRAST REV CODE 255: Performed by: INTERNAL MEDICINE

## 2023-07-05 PROCEDURE — 99233 SBSQ HOSP IP/OBS HIGH 50: CPT | Performed by: INTERNAL MEDICINE

## 2023-07-05 PROCEDURE — 70551 MRI BRAIN STEM W/O DYE: CPT

## 2023-07-05 PROCEDURE — 94760 N-INVAS EAR/PLS OXIMETRY 1: CPT

## 2023-07-05 PROCEDURE — 85027 COMPLETE CBC AUTOMATED: CPT

## 2023-07-05 PROCEDURE — 83735 ASSAY OF MAGNESIUM: CPT

## 2023-07-05 PROCEDURE — 84145 PROCALCITONIN (PCT): CPT

## 2023-07-05 PROCEDURE — 700105 HCHG RX REV CODE 258: Performed by: HOSPITALIST

## 2023-07-05 RX ORDER — HYDROMORPHONE HYDROCHLORIDE 1 MG/ML
1 INJECTION, SOLUTION INTRAMUSCULAR; INTRAVENOUS; SUBCUTANEOUS
Status: DISCONTINUED | OUTPATIENT
Start: 2023-07-05 | End: 2023-07-09 | Stop reason: HOSPADM

## 2023-07-05 RX ORDER — SODIUM CHLORIDE 9 MG/ML
INJECTION, SOLUTION INTRAVENOUS CONTINUOUS
Status: DISCONTINUED | OUTPATIENT
Start: 2023-07-05 | End: 2023-07-07

## 2023-07-05 RX ORDER — ACETAMINOPHEN 500 MG
1000 TABLET ORAL ONCE
Status: COMPLETED | OUTPATIENT
Start: 2023-07-05 | End: 2023-07-05

## 2023-07-05 RX ORDER — HYDROMORPHONE HYDROCHLORIDE 1 MG/ML
0.5 INJECTION, SOLUTION INTRAMUSCULAR; INTRAVENOUS; SUBCUTANEOUS EVERY 4 HOURS PRN
Status: DISCONTINUED | OUTPATIENT
Start: 2023-07-05 | End: 2023-07-05

## 2023-07-05 RX ORDER — DIPHENHYDRAMINE HCL 25 MG
25 TABLET ORAL ONCE
Status: DISPENSED | OUTPATIENT
Start: 2023-07-05 | End: 2023-07-06

## 2023-07-05 RX ADMIN — HYDROMORPHONE HYDROCHLORIDE 1 MG: 1 INJECTION, SOLUTION INTRAMUSCULAR; INTRAVENOUS; SUBCUTANEOUS at 09:19

## 2023-07-05 RX ADMIN — SENNOSIDES AND DOCUSATE SODIUM 2 TABLET: 50; 8.6 TABLET ORAL at 05:35

## 2023-07-05 RX ADMIN — HYDROMORPHONE HYDROCHLORIDE 1 MG: 1 INJECTION, SOLUTION INTRAMUSCULAR; INTRAVENOUS; SUBCUTANEOUS at 12:04

## 2023-07-05 RX ADMIN — HYDROMORPHONE HYDROCHLORIDE 1 MG: 1 INJECTION, SOLUTION INTRAMUSCULAR; INTRAVENOUS; SUBCUTANEOUS at 15:18

## 2023-07-05 RX ADMIN — METOPROLOL SUCCINATE 50 MG: 25 TABLET, EXTENDED RELEASE ORAL at 05:35

## 2023-07-05 RX ADMIN — HYDROMORPHONE HYDROCHLORIDE 0.5 MG: 1 INJECTION, SOLUTION INTRAMUSCULAR; INTRAVENOUS; SUBCUTANEOUS at 06:30

## 2023-07-05 RX ADMIN — KETOROLAC TROMETHAMINE 15 MG: 30 INJECTION, SOLUTION INTRAMUSCULAR at 22:31

## 2023-07-05 RX ADMIN — HYDROXYZINE HYDROCHLORIDE 25 MG: 25 TABLET, FILM COATED ORAL at 23:39

## 2023-07-05 RX ADMIN — ACETAMINOPHEN 650 MG: 325 TABLET ORAL at 17:13

## 2023-07-05 RX ADMIN — SODIUM CHLORIDE: 9 INJECTION, SOLUTION INTRAVENOUS at 12:00

## 2023-07-05 RX ADMIN — HYDROMORPHONE HYDROCHLORIDE 1 MG: 1 INJECTION, SOLUTION INTRAMUSCULAR; INTRAVENOUS; SUBCUTANEOUS at 21:38

## 2023-07-05 RX ADMIN — ACETAMINOPHEN 1000 MG: 500 TABLET ORAL at 22:30

## 2023-07-05 RX ADMIN — SODIUM CHLORIDE: 9 INJECTION, SOLUTION INTRAVENOUS at 17:16

## 2023-07-05 RX ADMIN — CEFTRIAXONE SODIUM 2000 MG: 2 INJECTION, POWDER, FOR SOLUTION INTRAMUSCULAR; INTRAVENOUS at 11:34

## 2023-07-05 RX ADMIN — CEFTRIAXONE SODIUM 2000 MG: 2 INJECTION, POWDER, FOR SOLUTION INTRAMUSCULAR; INTRAVENOUS at 17:17

## 2023-07-05 RX ADMIN — OMEPRAZOLE 40 MG: 20 CAPSULE, DELAYED RELEASE ORAL at 05:35

## 2023-07-05 RX ADMIN — PIPERACILLIN AND TAZOBACTAM 4.5 G: 4; .5 INJECTION, POWDER, FOR SOLUTION INTRAVENOUS at 05:37

## 2023-07-05 RX ADMIN — SENNOSIDES AND DOCUSATE SODIUM 2 TABLET: 50; 8.6 TABLET ORAL at 17:13

## 2023-07-05 RX ADMIN — HYDROMORPHONE HYDROCHLORIDE 0.5 MG: 1 INJECTION, SOLUTION INTRAMUSCULAR; INTRAVENOUS; SUBCUTANEOUS at 02:27

## 2023-07-05 RX ADMIN — HUMAN ALBUMIN MICROSPHERES AND PERFLUTREN 3 ML: 10; .22 INJECTION, SOLUTION INTRAVENOUS at 11:37

## 2023-07-05 RX ADMIN — HYDROMORPHONE HYDROCHLORIDE 1 MG: 1 INJECTION, SOLUTION INTRAMUSCULAR; INTRAVENOUS; SUBCUTANEOUS at 18:15

## 2023-07-05 ASSESSMENT — PAIN DESCRIPTION - PAIN TYPE
TYPE: ACUTE PAIN;CHRONIC PAIN
TYPE: ACUTE PAIN
TYPE: ACUTE PAIN;CHRONIC PAIN
TYPE: ACUTE PAIN;CHRONIC PAIN
TYPE: ACUTE PAIN
TYPE: ACUTE PAIN
TYPE: ACUTE PAIN;CHRONIC PAIN
TYPE: CHRONIC PAIN;ACUTE PAIN
TYPE: ACUTE PAIN;CHRONIC PAIN
TYPE: ACUTE PAIN
TYPE: ACUTE PAIN
TYPE: ACUTE PAIN;CHRONIC PAIN

## 2023-07-05 ASSESSMENT — COGNITIVE AND FUNCTIONAL STATUS - GENERAL
SUGGESTED CMS G CODE MODIFIER DAILY ACTIVITY: CH
DAILY ACTIVITIY SCORE: 24

## 2023-07-05 ASSESSMENT — ENCOUNTER SYMPTOMS
SENSORY CHANGE: 1
HEADACHES: 1
FOCAL WEAKNESS: 1

## 2023-07-05 ASSESSMENT — FIBROSIS 4 INDEX: FIB4 SCORE: 0.77

## 2023-07-05 ASSESSMENT — GAIT ASSESSMENTS: DISTANCE (FEET): 25

## 2023-07-05 ASSESSMENT — ACTIVITIES OF DAILY LIVING (ADL): TOILETING: INDEPENDENT

## 2023-07-05 NOTE — PROGRESS NOTES
4 Eyes Skin Assessment Completed by ALLEN Barrow and ALLEN Dahl.    Head WDL  Ears WDL  Nose WDL  Mouth WDL  Neck WDL  Breast/Chest Scar  Shoulder Blades WDL  Spine WDL  (R) Arm/Elbow/Hand WDL  (L) Arm/Elbow/Hand WDL  Abdomen WDL  Groin WDL  Scrotum/Coccyx/Buttocks WDL  (R) Leg WDL  (L) Leg WDL  (R) Heel/Foot/Toe WDL  (L) Heel/Foot/Toe WDL          Devices In Places Tele Box      Interventions In Place N/A    Possible Skin Injury No    Pictures Uploaded Into Epic N/A  Wound Consult Placed N/A  RN Wound Prevention Protocol Ordered No

## 2023-07-05 NOTE — PROGRESS NOTES
Pt brought up to floor during shift change. Telemetry placed and confirmed with monitor tech, vitals taken, pt settled in bed, alert and oriented. Stable upon transfer of care.

## 2023-07-05 NOTE — HOSPITAL COURSE
62 y.o. male with a past medical history of chronic systolic heart failure, paroxysmal atrial fibrillation, chronic obstructive pulmonary disease not currently on oxygen and tobacco use who presented 7/4/2023 with cough, headache, left-sided facial numbness and left-sided blurry vision.  Symptoms started on the day of admission, ascending onset severe headache with left-sided lip cheek numbness and tingling.  In addition the patient doubled up to blurry vision of the left eye.  Symptoms lasted for about 45 minutes.  Patient's continued to have severe headache and despite a normal MRI MRV was completed which does show a left transverse venous sinus thrombosis.  I did briefly discuss this with neurology Dr. Vang who recommended continued anticoagulation and symptom management.

## 2023-07-05 NOTE — PROGRESS NOTES
Charge Nurse Rounding Note    Bedside rounding completed to address quality of care and overall patient experience.    Patient Satisfaction addressed including staff responsiveness. Patient/family are aware of the POC and any questions answered. Thorough safety education completed including use of call light prior to all mobility throughout the entirety of the hospital stay.     Patient/family aware of time of next Hourly Round.    No further questions/concerns currently.     Additional Notes: pt needing a stat mri for severe headache.  Escalated to mri tech and pt taken to mri.

## 2023-07-05 NOTE — THERAPY
Occupational Therapy   Initial Evaluation     Patient Name: Noe Hickey  Age:  62 y.o., Sex:  male  Medical Record #: [id number]  Today's Date: 7/5/2023          Assessment  Patient is 62 y.o. male with a diagnosis of lobar pneumonia, facial numbness Left side.  Pt reported blurriness of L eye and facial numbness had resolved.  Pt reported living in a transitional/rehab apartment on the ground level. Pt reported no AE/DME.  PLOF Indep for ADL's, transfers ad ambulation w/out a device.  Pt demonstrated Indep bed mobility, Indep sit/stand, Indep ambulation w/out a device, Indep U/LB clothing management, Indep toileting, Indep standing G&H.  Therapist reviewed/educated pt on environmental/home safety, fall precautions, ADL's and transfers.  No further skilled occupational therapy recommended at this time.    Plan    DC Equipment Recommendations: (P) None  Discharge Recommendations: (P) Anticipate that the patient will have no further occupational therapy needs after discharge from the hospital     Subjective    Pt was alert and cooperative w/ tx.     Objective       07/05/23 0955    Services   Is patient using  services for this encounter? No   Initial Contact Note    Initial Contact Note Order Received and Verified, Evaluation Only - Patient Does Not Require Further Acute Occupational Therapy at this Time.  However, May Benefit from Post Acute Therapy for Higher Level Functional Deficits.   Prior Living Situation   Prior Services Home-Independent   Housing / Facility Transitional Living   Steps Into Home 0   Steps In Home 0   Bathroom Set up Walk In Shower;Shower Curtain   Equipment Owned None   Comments Pt reported living in a transitional/rehab apartment on the ground level. Pt reported no AE/DME.  PLOF Indep for ADL's, transfers ad ambulation w/out a device.   Prior Level of ADL Function   Self Feeding Independent   Grooming / Hygiene Independent   Bathing Independent   Dressing  Independent   Toileting Independent   Prior Level of IADL Function   Medication Management Independent   Laundry Independent   Kitchen Mobility Independent   Finances Independent   Home Management Independent   Shopping Independent   Prior Level Of Mobility Independent Without Device in Community;Independent With Steps in Community;Independent Without Device in Home   Driving / Transportation Driving Independent   Vitals   Pulse Oximetry 95 %   O2 Delivery Device Room air w/o2 available   Pain   Intervention Rest;Repositioned   Pain 0 - 10 Group   Location Head   Location Orientation Anterior   Description Aching   Comfort Goal Perform Activity;Comfort with Movement   Therapist Pain Assessment Post Activity Pain Same as Prior to Activity;10   Non Verbal Descriptors   Non Verbal Scale  Calm;Unlabored Breathing   Cognition    Cognition / Consciousness WDL   Level of Consciousness Alert   Active ROM Upper Body   Active ROM Upper Body  WDL   Dominant Hand Right   Strength Upper Body   Upper Body Strength  WDL   Upper Body Muscle Tone   Upper Body Muscle Tone  WDL   Coordination Upper Body   Coordination WDL   Balance Assessment   Sitting Balance (Static) Normal   Sitting Balance (Dynamic) Normal   Standing Balance (Static) Good   Standing Balance (Dynamic) Good   Weight Shift Sitting Normal   Weight Shift Standing Good   Bed Mobility    Supine to Sit Independent   Sit to Supine Independent   Scooting Independent   ADL Assessment   Eating Independent   Grooming Independent;Standing   Upper Body Dressing Independent   Lower Body Dressing Independent   Toileting Independent   How much help from another person does the patient currently need...   Putting on and taking off regular lower body clothing? 4   Bathing (including washing, rinsing, and drying)? 4   Toileting, which includes using a toilet, bedpan, or urinal? 4   Putting on and taking off regular upper body clothing? 4   Taking care of personal grooming such as  brushing teeth? 4   Eating meals? 4   6 Clicks Daily Activity Score 24   Functional Mobility   Sit to Stand Independent   Bed, Chair, Wheelchair Transfer Independent   Toilet Transfers Independent   Transfer Method Stand Step   Mobility Ambulation w/out device   Distance (Feet) 25   # of Times Distance was Traveled 2   Visual Perception   Comments Pt reported Left eye intact - blurry vision resolved   Edema / Skin Assessment   Edema / Skin  WDL   Activity Tolerance   Sitting in Chair 10   Sitting Edge of Bed 5   Standing 5x2   Comments sitting/commode 5   Education Group   Education Provided Role of Occupational Therapist;Activities of Daily Living;Transfers   Role of Occupational Therapist Patient Response Patient;Acceptance;Explanation;Verbal Demonstration   Transfers Patient Response Patient;Acceptance;Explanation;Demonstration;Verbal Demonstration;Action Demonstration   ADL Patient Response Patient;Acceptance;Explanation;Demonstration;Verbal Demonstration;Action Demonstration   Anticipated Discharge Equipment and Recommendations   DC Equipment Recommendations None   Discharge Recommendations Anticipate that the patient will have no further occupational therapy needs after discharge from the hospital

## 2023-07-05 NOTE — H&P
Hospital Medicine History & Physical Note    Date of Service  7/4/2023    Primary Care Physician  EFRAÍN Hewitt    Consultants  None     Code Status  Full Code    Chief Complaint  Chief Complaint   Patient presents with    Headache    Numbness     L side of face and lips started 45 min ago    Chest Pain     Started 45 min ago    Blurred Vision     L eye started 45 minutes       History of Presenting Illness  Noe Hickey is a 62 y.o. male with a past medical history of chronic systolic heart failure, paroxysmal atrial fibrillation, chronic obstructive pulmonary disease not currently on oxygen and tobacco use who presented 7/4/2023 with cough, headache, left-sided facial numbness and left-sided blurry vision.  Symptoms started on the day of admission, ascending onset severe headache with left-sided lip cheek numbness and tingling.  In addition the patient doubled up to blurry vision of the left eye.  Symptoms lasted for about 45 minutes.      I discussed the plan of care with emergency department physician, and the patient    Review of Systems  Review of Systems   Constitutional:  Positive for malaise/fatigue. Negative for chills and fever.   Eyes:  Positive for blurred vision. Negative for discharge and redness.   Respiratory:  Positive for cough and shortness of breath. Negative for stridor.    Cardiovascular:  Negative for chest pain and leg swelling.   Gastrointestinal:  Negative for abdominal pain and vomiting.   Genitourinary:  Negative for flank pain.   Musculoskeletal:  Negative for myalgias.   Skin: Negative.    Neurological:  Positive for dizziness, tingling and headaches. Negative for focal weakness.   Endo/Heme/Allergies:  Does not bruise/bleed easily.   Psychiatric/Behavioral:  The patient is not nervous/anxious.      Past Medical History   has a past medical history of Arrhythmia, Breath shortness (06/09/2023), Cyclic vomiting syndrome, Elevated hemidiaphragm - LEFT post AVR  (2023), Fracture, Headache, classical migraine, Heart burn, Heart valve disease (2023), Indigestion, Pneumonia due to infectious organism (2023), and Snoring.    Surgical History   has a past surgical history that includes shoulder arthroscopy; shoulder hemicap resurfacing (Right, 10/12/2015); irrigation & debridement ortho (Right, 2017); shoulder surgery; aortic valve replacement (2023); aortic ascending dissection (2023); and echocardiogram, transesophageal, intraoperative (2023).     Family History  family history is not on file.      Social History   reports that he has been smoking cigarettes. He has a 20.00 pack-year smoking history. He has never used smokeless tobacco. He reports that he does not currently use drugs. He reports that he does not drink alcohol.    Allergies  Allergies   Allergen Reactions    Morphine Rash and Itching     Rash/ difficulty breathing     Medications  Prior to Admission Medications   Prescriptions Last Dose Informant Patient Reported? Taking?   Vitamin D, Ergocalciferol, 88277 units Cap 2023 at AM Patient Yes No   Sig: Take 5,000 Units by mouth every 7 days. On Tue   amoxicillin-clavulanate (AUGMENTIN) 875-125 MG Tab 2023 at FINISHED Patient Yes Yes   Sig: Take 1 Tablet by mouth 2 times a day. Pt started on 2023 for 4 day course   chlorproMAZINE (THORAZINE) 50 MG Tab 7/3/2023 at 1000 Patient Yes Yes   Sig: Take 50 mg by mouth 2 times a day as needed. Indications: Nausea and Vomiting   doxycycline monohydrate (ADOXA) 100 MG tablet 2023 at FINISHED Patient No No   Sig: Take 1 Tablet by mouth every 12 hours.   Patient taking differently: Take 100 mg by mouth every 12 hours. Pt started on 2023 for 4 day course   hydrOXYzine HCl (ATARAX) 25 MG Tab 7/3/2023 at 2000 Patient Yes No   Si mg at bedtime as needed (sleep).   metoprolol SR (TOPROL XL) 50 MG TABLET SR 24 HR > 2 days at Ran out Patient No No   Sig: Take 1 Tablet by  mouth every day.   ondansetron (ZOFRAN ODT) 4 MG TABLET DISPERSIBLE 7/3/2023 at 2000 Patient Yes No   Sig: Take 4 mg by mouth 2 times a day as needed for Nausea/Vomiting.   pantoprazole (PROTONIX) 40 MG Tablet Delayed Response 7/4/2023 at 0400 Patient Yes No   Sig: Take 1 Tablet by mouth every day. Take daily for 30 days post ablation then can reduce to as needed again.   rivaroxaban (XARELTO) 20 MG Tab tablet 7/3/2023 at 1700 Patient No No   Sig: Take 1 Tablet by mouth with dinner.      Facility-Administered Medications: None     Physical Exam  Temp:  [36.6 °C (97.9 °F)-36.7 °C (98 °F)] 36.6 °C (97.9 °F)  Pulse:  [105-135] 105  Resp:  [16-22] 16  BP: (101-136)/(59-90) 133/59  SpO2:  [90 %-95 %] 90 %  Blood Pressure: 136/74   Temperature: 36.7 °C (98 °F)   Pulse: (!) 110   Respiration: 18   Pulse Oximetry: 92 %     Physical Exam  Constitutional:       General: He is not in acute distress.     Appearance: He is not ill-appearing or diaphoretic.   HENT:      Head: Atraumatic.      Right Ear: External ear normal.      Left Ear: External ear normal.      Nose: No congestion or rhinorrhea.      Mouth/Throat:      Mouth: Mucous membranes are moist.   Eyes:      General: No scleral icterus.        Right eye: No discharge.         Left eye: No discharge.      Pupils: Pupils are equal, round, and reactive to light.      Comments: Blurry vision through the left eye   Cardiovascular:      Rate and Rhythm: Normal rate and regular rhythm.   Pulmonary:      Effort: Pulmonary effort is normal.   Abdominal:      General: There is no distension.   Musculoskeletal:      Cervical back: Neck supple. No rigidity. No muscular tenderness.      Right lower leg: No edema.      Left lower leg: No edema.   Skin:     Coloration: Skin is not jaundiced or pale.   Neurological:      Mental Status: He is alert and oriented to person, place, and time.      Coordination: Coordination normal.      Comments: Altered sensation left face   Psychiatric:          Mood and Affect: Mood normal.         Behavior: Behavior normal.       Laboratory:  Recent Labs     07/04/23  1623   WBC 17.5*   RBC 6.93*   HEMOGLOBIN 14.8   HEMATOCRIT 49.3   MCV 71.1*   MCH 21.4*   MCHC 30.0*   RDW 48.9   PLATELETCT 417   MPV 9.2     Recent Labs     07/04/23  1623   SODIUM 139   POTASSIUM 4.3   CHLORIDE 105   CO2 21   GLUCOSE 111*   BUN 22   CREATININE 1.09   CALCIUM 9.0     Recent Labs     07/04/23  1623   ALTSGPT 32   ASTSGOT 25   ALKPHOSPHAT 52   TBILIRUBIN 0.3   GLUCOSE 111*     Recent Labs     07/04/23  1623   APTT 24.9   INR 0.94     No results for input(s): NTPROBNP in the last 72 hours.      Recent Labs     07/04/23  1623   TROPONINT 22*     Imaging:  DX-CHEST-PORTABLE (1 VIEW)   Final Result         1.  Linear patchy opacity in the left lung base, similar to prior, atelectasis versus infection   2.  Similar elevation of the left hemidiaphragm.      CT-CEREBRAL PERFUSION ANALYSIS   Final Result      1.  Cerebral blood flow less than 30% likely representing completed infarct = 0 mL.      2.  T Max more than 6 seconds likely representing combination of completed infarct and ischemia = 0 mL.      3.  Mismatched volume likely representing ischemic brain/penumbra = None      4.  Please note that the cerebral perfusion was performed on the limited brain tissue around the basal ganglia region. Infarct/ischemia outside the CT perfusion sections can be missed in this study.      CT-CTA NECK WITH & W/O-POST PROCESSING   Final Result      CT angiogram of the neck within normal limits.      CT-CTA HEAD WITH & W/O-POST PROCESS   Final Result      CT angiogram of the [place] within normal limits.      CT-HEAD W/O   Final Result      Negative noncontrast CT scan of the head / brain.         MR-BRAIN-W/O    (Results Pending)     Assessment/Plan:  Justification for Admission Status  I anticipate this patient will require at least two midnights for appropriate medical management,  necessitating inpatient admission because the patient has pneumonia will require intravenous antibiotics    * Lobar pneumonia (HCC)- (present on admission)  Assessment & Plan  X-rays show evidence for left lower lobe pneumonia  Leukocytosis 17,000  Started on Zosyn in the emergency room, I will continue for now follow on cultures and sensitivities    Left facial numbness and left blurry vision- (present on admission)  Assessment & Plan  Admit to Neuro, with continuous cardiac monitoring  CT, showed no acute infarction, or hemorrhage   I will check MRI brain    Aspiration, Fall, and seizure precautions    Neuro checks   Speech, physical, occupational therapy evaluation ordered     Tobacco dependence- (present on admission)  Assessment & Plan  I spent nearly 3.5 minutes on Tobacco cessation education and counseling.   I Discussed the benefits of quitting smoking and risks of continued smoking including cardiovascular disease, cancer and COPD.   Discussed options of nicotine patch, & medical treatment with Wellbutrin [Bupropion]      Chronic systolic congestive heart failure (HCC)- (present on admission)  Assessment & Plan  Not currently in exacerbation  Resume home metoprolol  Watch volume status closely    COPD (chronic obstructive pulmonary disease) (HCC)- (present on admission)  Assessment & Plan  Not currently in exacerbation  Counseled to stop tobacco  Oxygen as needed, Respiratory protocol, Bronchodilators, Incentive spirometry     Paroxysmal atrial flutter (HCC)- (present on admission)  Assessment & Plan  Resume home metoprolol with hold parameters  Resume home anticoagulation with rivaroxaban    Gastroesophageal reflux disease without esophagitis- (present on admission)  Assessment & Plan  Resume home omeprazole      VTE prophylaxis: SCDs/TEDs and therapeutic anticoagulation with rivaroxaban

## 2023-07-05 NOTE — ASSESSMENT & PLAN NOTE
Not currently in exacerbation  Counseled to stop tobacco  Oxygen as needed, Respiratory protocol, Bronchodilators, Incentive spirometry

## 2023-07-05 NOTE — ASSESSMENT & PLAN NOTE
X-rays show evidence for left lower lobe pneumonia  Leukocytosis 17,000  Started on Zosyn in the emergency room, I will continue for now follow on cultures and sensitivities

## 2023-07-05 NOTE — PROGRESS NOTES
Telemetry Shift Summary     Rhythm: SR/ST  Rate: 90s-110s  Measurements: 0.14/0.08/0.32  Ectopy (reported by Monitor Tech): rare PVC     Normal Values  Rhythm: Sinus  HR:   Measurements: 0.12-0.20/0.06-0.10/0.30-0.52

## 2023-07-05 NOTE — PROGRESS NOTES
Telemetry Shift Summary    Rhythm Sinus Tachycardia  HR Range 101-110  Ectopy rare PVC  Measurements .12/.08/.30        Normal Values  Rhythm SR  HR Range    Measurements 0.12-0.20 / 0.06-0.10  / 0.30-0.52

## 2023-07-05 NOTE — ED NOTES
Lactic acid was collected and sent to lab with initial blood draw, called lab regarding result. Per tech, they will run it at this time.

## 2023-07-05 NOTE — ASSESSMENT & PLAN NOTE
No longer has any focal symptoms.  Does have a history of cluster headache, however this is not consistent with a cluster headache presentation.  Empiric steroids to help reduce inflammation related to headache x24 hours  MRI without acute findings  LP was done and normal  MRV showed a left-sided dural venous sinus thrombosis, age-indeterminate  Restart Xarelto

## 2023-07-05 NOTE — THERAPY
Speech Language Therapy Contact Note    Patient Name: Noe Hickey  Age:  62 y.o., Sex:  male  Medical Record #: [id number]  Today's Date: 7/5/2023      Orders received and chart reviewed. Pt was admitted d/t concern for stroke. Brain MRI was negative for acute findings. SLP conferenced with RN, who denied any swallowing concerns on the current diet of regular solids (cardiac), thin liquids. Case also d/w attending physician, who agreed to defer SLP evaluations at this time. Please reconsult if the pt's status changes. Thank you.

## 2023-07-05 NOTE — PROGRESS NOTES
"Hospital Medicine Daily Progress Note    Date of Service  7/5/2023    Chief Complaint  Noe Hickey is a 62 y.o. male admitted 7/4/2023 with headache    Hospital Course  Per notes, \"62 y.o. male with a past medical history of chronic systolic heart failure, paroxysmal atrial fibrillation, chronic obstructive pulmonary disease not currently on oxygen and tobacco use who presented 7/4/2023 with cough, headache, left-sided facial numbness and left-sided blurry vision.  Symptoms started on the day of admission, ascending onset severe headache with left-sided lip cheek numbness and tingling.  In addition the patient doubled up to blurry vision of the left eye.  Symptoms lasted for about 45 minutes. \"    Interval Problem Update  Patient still having intractable headache, symptoms concerning for meningitis/encephalitis.  No neck rigidity.  However degree of headache, leukocytosis are not consistent with pneumonia.  Unfortunately patient was started on Xarelto, unable to do LP.  We will continue with ceftriaxone, adjusted dose  MRI without acute findings    I have discussed this patient's plan of care and discharge plan at IDT rounds today with Case Management, Nursing, Nursing leadership, and other members of the IDT team.    Consultants/Specialty  none    Code Status  Full Code    Disposition  The patient is not medically cleared for discharge to home or a post-acute facility.  Anticipate discharge to: home with close outpatient follow-up    I have placed the appropriate orders for post-discharge needs.    Review of Systems  Review of Systems   Neurological:  Positive for sensory change, focal weakness and headaches.   All other systems reviewed and are negative.       Physical Exam  Temp:  [36.3 °C (97.3 °F)-36.8 °C (98.2 °F)] 36.3 °C (97.3 °F)  Pulse:  [102-135] 102  Resp:  [15-24] 20  BP: (101-150)/() 122/73  SpO2:  [90 %-98 %] 96 %    Physical Exam  Vitals and nursing note reviewed.   Constitutional:  "      Appearance: Normal appearance.   Cardiovascular:      Rate and Rhythm: Normal rate and regular rhythm.      Pulses: Normal pulses.      Heart sounds: Normal heart sounds.   Pulmonary:      Effort: Pulmonary effort is normal.      Breath sounds: Normal breath sounds.   Abdominal:      General: Abdomen is flat. Bowel sounds are normal.      Palpations: Abdomen is soft.   Musculoskeletal:      Right lower leg: No edema.      Left lower leg: No edema.   Neurological:      General: No focal deficit present.      Mental Status: He is alert and oriented to person, place, and time. Mental status is at baseline.         Fluids  No intake or output data in the 24 hours ending 07/05/23 1303    Laboratory  Recent Labs     07/04/23  1623 07/05/23  0144   WBC 17.5* 12.6*   RBC 6.93* 6.40*   HEMOGLOBIN 14.8 13.6*   HEMATOCRIT 49.3 46.2   MCV 71.1* 72.2*   MCH 21.4* 21.3*   MCHC 30.0* 29.4*   RDW 48.9 50.6*   PLATELETCT 417 362   MPV 9.2 9.4     Recent Labs     07/04/23  1623 07/05/23  0144   SODIUM 139 140   POTASSIUM 4.3 4.3   CHLORIDE 105 108   CO2 21 23   GLUCOSE 111* 81   BUN 22 25*   CREATININE 1.09 1.28   CALCIUM 9.0 8.4     Recent Labs     07/04/23  1623   APTT 24.9   INR 0.94         Recent Labs     07/05/23  0144   TRIGLYCERIDE 72   HDL 30*   LDL 97       Imaging  EC-ECHOCARDIOGRAM COMPLETE W/ CONT   Final Result      MR-BRAIN-W/O   Final Result         No acute process.      Age-related volume loss.      DX-CHEST-PORTABLE (1 VIEW)   Final Result         1.  Linear patchy opacity in the left lung base, similar to prior, atelectasis versus infection   2.  Similar elevation of the left hemidiaphragm.      CT-CEREBRAL PERFUSION ANALYSIS   Final Result      1.  Cerebral blood flow less than 30% likely representing completed infarct = 0 mL.      2.  T Max more than 6 seconds likely representing combination of completed infarct and ischemia = 0 mL.      3.  Mismatched volume likely representing ischemic brain/penumbra =  None      4.  Please note that the cerebral perfusion was performed on the limited brain tissue around the basal ganglia region. Infarct/ischemia outside the CT perfusion sections can be missed in this study.      CT-CTA NECK WITH & W/O-POST PROCESSING   Final Result      CT angiogram of the neck within normal limits.      CT-CTA HEAD WITH & W/O-POST PROCESS   Final Result      CT angiogram of the [place] within normal limits.      CT-HEAD W/O   Final Result      Negative noncontrast CT scan of the head / brain.              Assessment/Plan  * Lobar pneumonia (HCC)- (present on admission)  Assessment & Plan  X-rays show evidence for left lower lobe pneumonia versus atelectasis.  Leukocytosis 17,000 on admission, improving  Started on Zosyn in the emergency room, switch to ceftriaxone, frequency and dose adjusted.    Headache  Assessment & Plan  No longer has any focal symptoms.  Does have a history of cluster headache, however this is not consistent with a cluster headache presentation.  Patient still having intractable headache, symptoms concerning for meningitis/encephalitis.  No neck rigidity.  However degree of headache, leukocytosis are not consistent with pneumonia.  Unfortunately patient was started on Xarelto, unable to do LP.  We will continue with ceftriaxone, adjusted dose.  Patient has not yet had any evidence of fever so we will hold acyclovir and any other antibiotics at this time  MRI without acute findings  Neurological checks    Left facial numbness and left blurry vision- (present on admission)  Assessment & Plan  Admit to Neuro, with continuous cardiac monitoring  CT, showed no acute infarction, or hemorrhage   MRI of brain with no acute findings  Aspiration, Fall, and seizure precautions    Neuro checks   Speech, physical, occupational therapy evaluation ordered     Tobacco dependence- (present on admission)  Assessment & Plan  I spent nearly 3.5 minutes on Tobacco cessation education  and counseling.   I Discussed the benefits of quitting smoking and risks of continued smoking including cardiovascular disease, cancer and COPD.   Discussed options of nicotine patch, & medical treatment with Wellbutrin [Bupropion]      Chronic systolic congestive heart failure (HCC)- (present on admission)  Assessment & Plan  Not currently in exacerbation  Continue home metoprolol  Watch volume status closely  Repeat echocardiogram    Paroxysmal atrial flutter (HCC)- (present on admission)  Assessment & Plan  Resume home metoprolol with hold parameters  Resume home anticoagulation with rivaroxaban    COPD (chronic obstructive pulmonary disease) (HCC)- (present on admission)  Assessment & Plan  Not currently in exacerbation  Counseled to stop tobacco  Oxygen as needed, Respiratory protocol, Bronchodilators, Incentive spirometry     Gastroesophageal reflux disease without esophagitis- (present on admission)  Assessment & Plan  Resume home omeprazole         VTE prophylaxis: heparin ppx    I have performed a physical exam and reviewed and updated ROS and Plan today (7/5/2023). In review of yesterday's note (7/4/2023), there are no changes except as documented above.      Total time spent with patient over 54 minutes.  This included my review with nursing and ancillary staff, review of records, face to face interview, physical examination; my review of lab results (CBC, chemistry panel), imaging review (CXR) and ECG.   In addition, counseling patient and speaking with consultants.

## 2023-07-05 NOTE — DISCHARGE PLANNING
Pt reports being independent with ADLs and IADLs. He goes to the gym daily at around 0500 am.     Ying GARIBAY is PCP    Care Transition Team Assessment    Information Source  Orientation Level: Oriented X4  Information Given By: Patient  Who is responsible for making decisions for patient? : Patient    Readmission Evaluation  Is this a readmission?: No    Elopement Risk  Legal Hold: No  Ambulatory or Self Mobile in Wheelchair: Yes  Disoriented: No  Psychiatric Symptoms: None  History of Wandering: No  Elopement this Admit: No  Vocalizing Wanting to Leave: No    Interdisciplinary Discharge Planning  Does Admitting Nurse Feel This Could be a Complex Discharge?: No  Primary Care Physician: Ying GARIBAY  Lives with - Patient's Self Care Capacity: Spouse  Support Systems: Spouse / Significant Other  Do You Take your Prescribed Medications Regularly: Yes  Able to Return to Previous ADL's: Yes  Mobility Issues: No  Prior Services: Home-Independent  Patient Prefers to be Discharged to:: Home  Assistance Needed: No  Durable Medical Equipment: Not Applicable    Discharge Preparedness  What is your plan after discharge?: Home with help  Prior Functional Level: Ambulatory, Drives Self, Independent with Activities of Daily Living, Independent with Medication Management  Difficulity with ADLs: None  Difficulity with IADLs: None    Functional Assesment  Prior Functional Level: Ambulatory, Drives Self, Independent with Activities of Daily Living, Independent with Medication Management    Finances  Financial Barriers to Discharge: No  Prescription Coverage: Yes    Vision / Hearing Impairment  Vision Impairment : No  Hearing Impairment : No    Values / Beliefs / Concerns  Values / Beliefs Concerns : No    Advance Directive  Advance Directive?: None    Domestic Abuse  Have you ever been the victim of abuse or violence?: No         Discharge Risks or Barriers  Discharge risks or barriers?: No  Patient risk  factors: Other (comment)    Anticipated Discharge Information  Discharge Disposition: Discharged to home/self care (01)

## 2023-07-05 NOTE — ASSESSMENT & PLAN NOTE
Admit to Neuro, with continuous cardiac monitoring  CT, showed no acute infarction, or hemorrhage   MRI of brain with no acute findings  Like related to the dural venous sinus thrombosis

## 2023-07-05 NOTE — ASSESSMENT & PLAN NOTE
Not currently in exacerbation  Continue home metoprolol  Repeat echocardiogram-echocardiogram showing EF of 30%, reduced from previous study.   Discussed with cardiology, follow recommendations-Will need outpatient follow-up  Continue metoprolol and losartan

## 2023-07-05 NOTE — CARE PLAN
The patient is Stable - Low risk of patient condition declining or worsening         Progress made toward(s) clinical / shift goals:    Problem: Pain - Standard  Goal: Alleviation of pain or a reduction in pain to the patient’s comfort goal  Outcome: Progressing     Problem: Knowledge Deficit - Stroke Education  Goal: Patient's knowledge of stroke and risk factors will improve  Outcome: Progressing     Problem: Neuro Status  Goal: Neuro status will remain stable or improve  Outcome: Progressing       Patient is not progressing towards the following goals:

## 2023-07-05 NOTE — PROGRESS NOTES
Upon change of shift, pt reporting the same severe headache as admission last night around 1830. Pt had been medicated on night shift with dilaudid and toradol, no change in pain. Pt has no other neurological symptoms, facial numbness resolved, pupils equal/round. MRI contacted to push scan earlier in day, no answer, will call again. MD here now to assess.      Dilaudid frequency/dosage changed, MRI called again for modified STAT status, pt placed on 2L NC per MD request to assist in possibility of cluster headache. LP to be performed when possible today.    [id number] Pt down to MRI with charge RN

## 2023-07-05 NOTE — ASSESSMENT & PLAN NOTE
X-rays show evidence for left lower lobe pneumonia versus atelectasis.  Leukocytosis 17,000 on admission, improving  Started on Zosyn in the emergency room, switch to ceftriaxone, frequency and dose adjusted.

## 2023-07-06 LAB
ANION GAP SERPL CALC-SCNC: 8 MMOL/L (ref 7–16)
BUN SERPL-MCNC: 17 MG/DL (ref 8–22)
CALCIUM SERPL-MCNC: 8.3 MG/DL (ref 8.4–10.2)
CHLORIDE SERPL-SCNC: 104 MMOL/L (ref 96–112)
CO2 SERPL-SCNC: 24 MMOL/L (ref 20–33)
CREAT SERPL-MCNC: 1.28 MG/DL (ref 0.5–1.4)
ERYTHROCYTE [DISTWIDTH] IN BLOOD BY AUTOMATED COUNT: 50.8 FL (ref 35.9–50)
GFR SERPLBLD CREATININE-BSD FMLA CKD-EPI: 63 ML/MIN/1.73 M 2
GLUCOSE SERPL-MCNC: 96 MG/DL (ref 65–99)
HCT VFR BLD AUTO: 43.5 % (ref 42–52)
HGB BLD-MCNC: 12.7 G/DL (ref 14–18)
MAGNESIUM SERPL-MCNC: 2.1 MG/DL (ref 1.5–2.5)
MCH RBC QN AUTO: 21.5 PG (ref 27–33)
MCHC RBC AUTO-ENTMCNC: 29.2 G/DL (ref 32.3–36.5)
MCV RBC AUTO: 73.6 FL (ref 81.4–97.8)
PLATELET # BLD AUTO: 367 K/UL (ref 164–446)
PMV BLD AUTO: 9.7 FL (ref 9–12.9)
POTASSIUM SERPL-SCNC: 4.8 MMOL/L (ref 3.6–5.5)
RBC # BLD AUTO: 5.91 M/UL (ref 4.7–6.1)
SODIUM SERPL-SCNC: 136 MMOL/L (ref 135–145)
WBC # BLD AUTO: 12.5 K/UL (ref 4.8–10.8)

## 2023-07-06 PROCEDURE — 700111 HCHG RX REV CODE 636 W/ 250 OVERRIDE (IP): Mod: JZ | Performed by: INTERNAL MEDICINE

## 2023-07-06 PROCEDURE — 700111 HCHG RX REV CODE 636 W/ 250 OVERRIDE (IP): Mod: JZ | Performed by: HOSPITALIST

## 2023-07-06 PROCEDURE — 99254 IP/OBS CNSLTJ NEW/EST MOD 60: CPT | Performed by: INTERNAL MEDICINE

## 2023-07-06 PROCEDURE — 94760 N-INVAS EAR/PLS OXIMETRY 1: CPT

## 2023-07-06 PROCEDURE — 80048 BASIC METABOLIC PNL TOTAL CA: CPT

## 2023-07-06 PROCEDURE — 99233 SBSQ HOSP IP/OBS HIGH 50: CPT | Performed by: INTERNAL MEDICINE

## 2023-07-06 PROCEDURE — 83735 ASSAY OF MAGNESIUM: CPT

## 2023-07-06 PROCEDURE — 700102 HCHG RX REV CODE 250 W/ 637 OVERRIDE(OP): Performed by: INTERNAL MEDICINE

## 2023-07-06 PROCEDURE — 770020 HCHG ROOM/CARE - TELE (206)

## 2023-07-06 PROCEDURE — 36415 COLL VENOUS BLD VENIPUNCTURE: CPT

## 2023-07-06 PROCEDURE — A9270 NON-COVERED ITEM OR SERVICE: HCPCS | Performed by: HOSPITALIST

## 2023-07-06 PROCEDURE — 700102 HCHG RX REV CODE 250 W/ 637 OVERRIDE(OP): Performed by: HOSPITALIST

## 2023-07-06 PROCEDURE — A9270 NON-COVERED ITEM OR SERVICE: HCPCS | Performed by: INTERNAL MEDICINE

## 2023-07-06 PROCEDURE — 700105 HCHG RX REV CODE 258: Performed by: INTERNAL MEDICINE

## 2023-07-06 PROCEDURE — 94669 MECHANICAL CHEST WALL OSCILL: CPT

## 2023-07-06 PROCEDURE — 85027 COMPLETE CBC AUTOMATED: CPT

## 2023-07-06 RX ORDER — METOPROLOL SUCCINATE 25 MG/1
50 TABLET, EXTENDED RELEASE ORAL ONCE
Status: COMPLETED | OUTPATIENT
Start: 2023-07-06 | End: 2023-07-06

## 2023-07-06 RX ORDER — LOSARTAN POTASSIUM 25 MG/1
25 TABLET ORAL
Status: DISCONTINUED | OUTPATIENT
Start: 2023-07-06 | End: 2023-07-09 | Stop reason: HOSPADM

## 2023-07-06 RX ORDER — METOPROLOL SUCCINATE 100 MG/1
100 TABLET, EXTENDED RELEASE ORAL DAILY
Status: DISCONTINUED | OUTPATIENT
Start: 2023-07-07 | End: 2023-07-09 | Stop reason: HOSPADM

## 2023-07-06 RX ORDER — SUMATRIPTAN 25 MG/1
25 TABLET, FILM COATED ORAL 3 TIMES DAILY PRN
Status: DISCONTINUED | OUTPATIENT
Start: 2023-07-06 | End: 2023-07-09 | Stop reason: HOSPADM

## 2023-07-06 RX ORDER — METOPROLOL TARTRATE 50 MG/1
TABLET, FILM COATED ORAL
Status: COMPLETED
Start: 2023-07-06 | End: 2023-07-06

## 2023-07-06 RX ADMIN — HYDROMORPHONE HYDROCHLORIDE 1 MG: 1 INJECTION, SOLUTION INTRAMUSCULAR; INTRAVENOUS; SUBCUTANEOUS at 16:14

## 2023-07-06 RX ADMIN — SENNOSIDES AND DOCUSATE SODIUM 2 TABLET: 50; 8.6 TABLET ORAL at 06:05

## 2023-07-06 RX ADMIN — SUMATRIPTAN SUCCINATE 25 MG: 25 TABLET ORAL at 15:29

## 2023-07-06 RX ADMIN — OMEPRAZOLE 40 MG: 20 CAPSULE, DELAYED RELEASE ORAL at 06:05

## 2023-07-06 RX ADMIN — CEFTRIAXONE SODIUM 2000 MG: 2 INJECTION, POWDER, FOR SOLUTION INTRAMUSCULAR; INTRAVENOUS at 06:13

## 2023-07-06 RX ADMIN — SODIUM CHLORIDE: 9 INJECTION, SOLUTION INTRAVENOUS at 05:54

## 2023-07-06 RX ADMIN — HYDROMORPHONE HYDROCHLORIDE 1 MG: 1 INJECTION, SOLUTION INTRAMUSCULAR; INTRAVENOUS; SUBCUTANEOUS at 06:04

## 2023-07-06 RX ADMIN — ACETAMINOPHEN 650 MG: 325 TABLET ORAL at 22:44

## 2023-07-06 RX ADMIN — CEFTRIAXONE SODIUM 2000 MG: 2 INJECTION, POWDER, FOR SOLUTION INTRAMUSCULAR; INTRAVENOUS at 16:59

## 2023-07-06 RX ADMIN — HYDROMORPHONE HYDROCHLORIDE 1 MG: 1 INJECTION, SOLUTION INTRAMUSCULAR; INTRAVENOUS; SUBCUTANEOUS at 02:19

## 2023-07-06 RX ADMIN — HYDROMORPHONE HYDROCHLORIDE 1 MG: 1 INJECTION, SOLUTION INTRAMUSCULAR; INTRAVENOUS; SUBCUTANEOUS at 22:37

## 2023-07-06 RX ADMIN — METOPROLOL SUCCINATE 50 MG: 25 TABLET, EXTENDED RELEASE ORAL at 16:56

## 2023-07-06 RX ADMIN — HYDROMORPHONE HYDROCHLORIDE 1 MG: 1 INJECTION, SOLUTION INTRAMUSCULAR; INTRAVENOUS; SUBCUTANEOUS at 09:16

## 2023-07-06 RX ADMIN — LOSARTAN POTASSIUM 25 MG: 25 TABLET, FILM COATED ORAL at 19:34

## 2023-07-06 RX ADMIN — HYDROMORPHONE HYDROCHLORIDE 1 MG: 1 INJECTION, SOLUTION INTRAMUSCULAR; INTRAVENOUS; SUBCUTANEOUS at 12:51

## 2023-07-06 RX ADMIN — ONDANSETRON 4 MG: 2 INJECTION INTRAMUSCULAR; INTRAVENOUS at 17:41

## 2023-07-06 RX ADMIN — HYDROMORPHONE HYDROCHLORIDE 1 MG: 1 INJECTION, SOLUTION INTRAMUSCULAR; INTRAVENOUS; SUBCUTANEOUS at 19:34

## 2023-07-06 ASSESSMENT — PAIN DESCRIPTION - PAIN TYPE
TYPE: ACUTE PAIN

## 2023-07-06 ASSESSMENT — ENCOUNTER SYMPTOMS
COUGH: 0
HEMATOCHEZIA: 0
FOCAL WEAKNESS: 1
FEVER: 0
SENSORY CHANGE: 1
WHEEZING: 0
DIAPHORESIS: 0
HEMOPTYSIS: 0
HEADACHES: 1

## 2023-07-06 ASSESSMENT — FIBROSIS 4 INDEX: FIB4 SCORE: 0.76

## 2023-07-06 NOTE — PROGRESS NOTES
Telemetry Shift Summary     Rhythm SR/ST  HR Range 90's-100's  Ectopy rPVC; rPAC  Measurements 0.12/0.08/0.34           Normal Values  Rhythm SR  HR Range    Measurements 0.12-0.20 / 0.06-0.10  / 0.30-0.52

## 2023-07-06 NOTE — CONSULTS
Cardiology Initial Consult Note    Date of note:    7/6/2023      Consulting Physician: Danis Walsh M.D.    Name:   Noe Hickey   YOB: 1960  Age:   62 y.o.  male   MRN:   [id number]    Reason for Consultation: Heart failure with reduced LV ejection fraction    HPI:  Noe Hickey is a 62 y.o. male with a history of paroxysmal atrial fibrillation/flutter status post ablation on 6/12/2023, history of bicuspid aortic valve with a ascending aortic aneurysm status post bioprosthetic aortic valve replacement and ascending aortic root replacement, nonobstructive coronary artery disease, GERD, migraines, tobacco use, who presented to the emergency room for multiple complaints please see below.    Patient presented to the emergency room on 7/4/2023 for multiple complaints of headache, numbness, chest pain, and blurred vision.  Of note, patient had run out of his metoprolol for 3 days.  His pulse was elevated and was given an IV dose of metoprolol.  He was also given a liter of fluid and started on Zosyn for possible sepsis.    Per patient, he has been staying at the rehab center.  However he has been going to the gym every day.  Prior to his valve surgery in January 2023, he was lifting up to 300 400 pounds.  He was bench pressing.  After surgery, and appropriate recovery, he was back to lifting weights.  He reports he has been lifting weights even all the way up until earlier this week without problems.  He came in more because he was having headaches and numbness, the blurred vision has been occurring after surgery.  He has not had any shortness of breath, no abdominal bloating, no orthopnea, no PND, no lower extremity edema.  There is no BMP checked here.      Problem list:  Principal Problem:    Lobar pneumonia (HCC) (POA: Yes)  Active Problems:    Gastroesophageal reflux disease without esophagitis (POA: Yes)    COPD (chronic obstructive pulmonary disease) (HCC) (POA: Yes)     Paroxysmal atrial flutter (HCC) (POA: Yes)    Chronic systolic congestive heart failure (HCC) (POA: Yes)    Tobacco dependence (POA: Yes)    Left facial numbness and left blurry vision (POA: Yes)    Headache (POA: Unknown)  Resolved Problems:    * No resolved hospital problems. *      Past Medical History:   Diagnosis Date    Arrhythmia     Breath shortness 06/09/2023    prior to 1/2023 surgery    Cyclic vomiting syndrome     Elevated hemidiaphragm - LEFT post AVR 01/04/2023    Fracture     Headache, classical migraine     Heart burn     Heart valve disease 06/09/2023    heart surgery in 1/2023    Indigestion     Pneumonia due to infectious organism 01/20/2023    Snoring        Past Surgical History:   Procedure Laterality Date    AORTIC VALVE REPLACEMENT  1/5/2023    Procedure: AORTIC VALVE REPLACEMENT, ASCENDING AORTIC ANEURYSM REPAIR AND TRANSESOPHAGEAL ECHOCARDIOGRAM.;  Surgeon: Serena Goyal M.D.;  Location: [facility];  Service: Cardiac    AORTIC ASCENDING DISSECTION  1/5/2023    Procedure: REPAIR, ANEURYSM OR DISSECTION, AORTA, ASCENDING;  Surgeon: Serena Goyal M.D.;  Location: [facility];  Service: Cardiac    ECHOCARDIOGRAM, TRANSESOPHAGEAL, INTRAOPERATIVE  1/5/2023    Procedure: ECHOCARDIOGRAM, TRANSESOPHAGEAL, INTRAOPERATIVE.;  Surgeon: Serena Goyal M.D.;  Location: [facility];  Service: Cardiac    IRRIGATION & DEBRIDEMENT ORTHO Right 09/19/2017    Procedure: IRRIGATION & DEBRIDEMENT ORTHO-THIGH;  Surgeon: Corbin Rivera M.D.;  Location: [facility];  Service: Orthopedics    SHOULDER HEMICAP RESURFACING Right 10/12/2015    Procedure: RIGHT SHOULDER RESURFACING;  Surgeon: Javon Doll M.D.;  Location: [facility];  Service:     SHOULDER ARTHROSCOPY      x3    SHOULDER SURGERY      replacement right         Medications Prior to Admission   Medication Sig Dispense Refill Last Dose    amoxicillin-clavulanate (AUGMENTIN) 875-125 MG Tab Take 1 Tablet  by mouth 2 times a day. Pt started on 6/17/2023 for 4 day course   6/20/2023 at FINISHED    chlorproMAZINE (THORAZINE) 50 MG Tab Take 50 mg by mouth 2 times a day as needed. Indications: Nausea and Vomiting   7/3/2023 at 1000    doxycycline monohydrate (ADOXA) 100 MG tablet Take 1 Tablet by mouth every 12 hours. (Patient taking differently: Take 100 mg by mouth every 12 hours. Pt started on 6/17/2023 for 4 day course) 8 Tablet 0 6/20/2023 at FINISHED    Vitamin D, Ergocalciferol, 54141 units Cap Take 5,000 Units by mouth every 7 days. On Tue 6/27/2023 at AM    ondansetron (ZOFRAN ODT) 4 MG TABLET DISPERSIBLE Take 4 mg by mouth 2 times a day as needed for Nausea/Vomiting.   7/3/2023 at 2000    pantoprazole (PROTONIX) 40 MG Tablet Delayed Response Take 1 Tablet by mouth every day. Take daily for 30 days post ablation then can reduce to as needed again.   7/4/2023 at 0400    hydrOXYzine HCl (ATARAX) 25 MG Tab 25 mg at bedtime as needed (sleep).   7/3/2023 at 2000    metoprolol SR (TOPROL XL) 50 MG TABLET SR 24 HR Take 1 Tablet by mouth every day. 90 Tablet 3 > 2 days at Ran out    rivaroxaban (XARELTO) 20 MG Tab tablet Take 1 Tablet by mouth with dinner. 90 Tablet 3 7/3/2023 at 1700     Current Facility-Administered Medications   Medication Dose Route Frequency Provider Last Rate Last Admin    HYDROmorphone (Dilaudid) injection 1 mg  1 mg Intravenous Q3HRS PRN Danis Walsh M.D.   1 mg at 07/06/23 0916    cefTRIAXone (Rocephin) 2,000 mg in  mL IVPB  2,000 mg Intravenous Q12HRS Danis Walsh M.D.   Stopped at 07/06/23 0643    NS infusion   Intravenous Continuous Danis Walsh M.D.   Paused at 07/06/23 0830    diphenhydrAMINE (Benadryl) tablet/capsule 25 mg  25 mg Oral Once Kitty Carroll M.D.        hydrOXYzine HCl (Atarax) tablet 25 mg  25 mg Oral HS PRN Nadya Gonzalez M.D.   25 mg at 07/05/23 1969    metoprolol SR (Toprol XL) tablet 50 mg  50 mg Oral DAILY Nadya Gonzalez M.D.   50 mg at  07/05/23 0535    omeprazole (PriLOSEC) capsule 40 mg  40 mg Oral DAILY Asem THANH Gonzaelz M.D.   40 mg at 07/06/23 0605    acetaminophen (Tylenol) tablet 650 mg  650 mg Oral Q6HRS PRN Asem THANH Gonzalez M.D.   650 mg at 07/05/23 1713    senna-docusate (Pericolace Or Senokot S) 8.6-50 MG per tablet 2 Tablet  2 Tablet Oral BID Asem THANH Gonzalez M.D.   2 Tablet at 07/06/23 0605    And    polyethylene glycol/lytes (Miralax) PACKET 1 Packet  1 Packet Oral QDAY PRN Asem THANH Gonzalez M.D.        And    magnesium hydroxide (Milk Of Magnesia) suspension 30 mL  30 mL Oral QDAY PRN Asem THANH Gonzalez M.D.        And    bisacodyl (Dulcolax) suppository 10 mg  10 mg Rectal QDAY PRN Asem THANH Gonzalez M.D.        lactated ringers infusion (BOLUS)  500 mL Intravenous Once PRN Asem THANH Gonzalez M.D.        Respiratory Therapy Consult   Nebulization Continuous RT Asem THANH Gonzalez M.D.        ondansetron (Zofran) syringe/vial injection 4 mg  4 mg Intravenous Q4HRS PRN Asem THANH Gonzalez M.D.        ondansetron (Zofran ODT) dispertab 4 mg  4 mg Oral Q4HRS PRN Asem THANH Gonzalez M.D.        promethazine (Phenergan) tablet 12.5-25 mg  12.5-25 mg Oral Q4HRS PRN Asem THANH Gonzalez M.D.        promethazine (Phenergan) suppository 12.5-25 mg  12.5-25 mg Rectal Q4HRS PRN Asem THANH Gonzalez M.D.        prochlorperazine (Compazine) injection 5-10 mg  5-10 mg Intravenous Q4HRS PRN Asem THANH Gonzalez M.D.        nicotine (Nicoderm) 14 MG/24HR 14 mg  14 mg Transdermal Daily-0600 Asem THANH Gonzalez M.D.        And    nicotine polacrilex (Nicorette) 2 MG piece 2 mg  2 mg Oral Q HOUR PRN Nadya Gonzalez M.D.             Allergies   Allergen Reactions    Morphine Rash and Itching     Rash/ difficulty breathing         No family history on file.      Social History     Socioeconomic History    Marital status:      Spouse name: Not on file    Number of children: Not on file    Years of education: Not on file    Highest education level: Not on file   Occupational  "History    Not on file   Tobacco Use    Smoking status: Every Day     Packs/day: 0.50     Years: 40.00     Pack years: 20.00     Types: Cigarettes    Smokeless tobacco: Never    Tobacco comments:     Down to 5 cig daily   Vaping Use    Vaping Use: Never used   Substance and Sexual Activity    Alcohol use: No    Drug use: Not Currently     Comment: past problems with narcotics not since 2019    Sexual activity: Not on file   Other Topics Concern    Not on file   Social History Narrative    Not on file     Social Determinants of Health     Financial Resource Strain: Not on file   Food Insecurity: Not on file   Transportation Needs: Not on file   Physical Activity: Not on file   Stress: Not on file   Social Connections: Not on file   Intimate Partner Violence: Not on file   Housing Stability: Not on file       Intake/Output Summary (Last 24 hours) at 7/6/2023 1153  Last data filed at 7/6/2023 0741  Gross per 24 hour   Intake 500 ml   Output --   Net 500 ml           Review of Systems   Constitutional: Negative for diaphoresis and fever.   Respiratory:  Negative for cough, hemoptysis and wheezing.    Gastrointestinal:  Negative for hematochezia and melena.   Genitourinary:  Negative for hematuria.        Physical Exam  Body mass index is 29.81 kg/m².  BP (!) 148/97   Pulse (!) 101   Temp 36.8 °C (98.3 °F) (Oral)   Resp 18   Ht 1.753 m (5' 9.02\")   Wt 91.6 kg (201 lb 15.1 oz)   SpO2 96%   Vitals:    07/06/23 0720 07/06/23 0721 07/06/23 0723 07/06/23 1131   BP:   (!) 148/97    Pulse: 99  (!) 101    Resp: 18  18    Temp:   36.8 °C (98.3 °F)    TempSrc:   Oral    SpO2: 96%      Weight:       Height:  1.753 m (5' 9.02\")  1.753 m (5' 9.02\")     Oxygen Therapy:  Pulse Oximetry: 96 %, O2 (LPM): 0, O2 Delivery Device: None - Room Air    Physical Exam  Constitutional:       Appearance: Normal appearance.   Eyes:      Extraocular Movements: Extraocular movements intact.      Conjunctiva/sclera: Conjunctivae normal.   Neck: "      Vascular: No carotid bruit or JVD.   Cardiovascular:      Rate and Rhythm: Regular rhythm. Tachycardia present.      Pulses:           Radial pulses are 1+ on the right side and 1+ on the left side.        Posterior tibial pulses are 1+ on the right side and 1+ on the left side.      Heart sounds: Normal heart sounds. No murmur heard.     No friction rub. No gallop.   Pulmonary:      Effort: Pulmonary effort is normal. No respiratory distress.      Breath sounds: Normal breath sounds. No wheezing or rales.   Abdominal:      General: Bowel sounds are normal.      Palpations: Abdomen is soft.   Musculoskeletal:      Cervical back: Neck supple.      Right lower leg: No edema.      Left lower leg: No edema.   Skin:     General: Skin is warm and dry.   Neurological:      Mental Status: He is alert and oriented to person, place, and time. Mental status is at baseline.   Psychiatric:         Mood and Affect: Mood normal.          Labs (personally reviewed and notable for):   Lab Results   Component Value Date/Time    SODIUM 136 07/06/2023 12:49 AM    POTASSIUM 4.8 07/06/2023 12:49 AM    CHLORIDE 104 07/06/2023 12:49 AM    CO2 24 07/06/2023 12:49 AM    GLUCOSE 96 07/06/2023 12:49 AM    BUN 17 07/06/2023 12:49 AM    CREATININE 1.28 07/06/2023 12:49 AM    BUNCREATRAT 27.3 (H) 05/19/2023 12:03 AM      Lab Results   Component Value Date/Time    WBC 12.5 (H) 07/06/2023 12:49 AM    RBC 5.91 07/06/2023 12:49 AM    HEMOGLOBIN 12.7 (L) 07/06/2023 12:49 AM    HEMATOCRIT 43.5 07/06/2023 12:49 AM    MCV 73.6 (L) 07/06/2023 12:49 AM    MCH 21.5 (L) 07/06/2023 12:49 AM    MCHC 29.2 (L) 07/06/2023 12:49 AM    MPV 9.7 07/06/2023 12:49 AM    NEUTSPOLYS 81.80 (H) 07/04/2023 04:23 PM    LYMPHOCYTES 7.80 (L) 07/04/2023 04:23 PM    MONOCYTES 6.50 07/04/2023 04:23 PM    EOSINOPHILS 2.10 07/04/2023 04:23 PM    BASOPHILS 0.50 07/04/2023 04:23 PM    HYPOCHROMIA 1+ 06/16/2023 01:11 AM    ANISOCYTOSIS 2+ (A) 06/16/2023 01:11 AM    INR 0.94  07/04/2023 04:23 PM      Lab Results   Component Value Date/Time    CHOLSTRLTOT 141 07/05/2023 01:44 AM    LDL 97 07/05/2023 01:44 AM    HDL 30 (A) 07/05/2023 01:44 AM    TRIGLYCERIDE 72 07/05/2023 01:44 AM       Lab Results   Component Value Date/Time    TROPONINT 22 (H) 07/04/2023 1623     Lab Results   Component Value Date/Time    NTPROBNP 1033 (H) 02/02/2023 0400     Lab Results   Component Value Date/Time    HBA1C 6.0 (H) 07/04/2023 1623     Cardiac Imaging and Procedures Review:    EKG dated 7/4/2023: My personal interpretation is sinus tachycardia, 134 bpm, compared to prior ECG of 6/20/2 9023, rate is faster    Echo dated 1/5/2023: My personal interpretation is normal left ventricular cavity size, wall thickness, and moderately decreased systolic function, visually estimated ejection fraction is around 35 to 40%.  Overall global hypokinesis with abnormal septal motion due to postop changes.  Normal functioning bioprosthetic aortic valve.  Mild left atrial enlargement.  No evidence of interatrial shunt by agitated saline bubble study.  Mild tricuspid regurgitation, RV systolic pressure estimated at 32 mmHg.  Mild mitral regurgitation.  Normal aortic root size.    Telemetry (last 24 hours): Sinus tachycardia    [facility] (12/7/2022):  Left main: Normal  Left anterior descending artery: 20% in the mid third  Left circumflex artery: No significant disease  Right coronary artery: 20% ostial narrowing    Radiology test Review:  EC-ECHOCARDIOGRAM COMPLETE W/ CONT   Final Result      MR-BRAIN-W/O   Final Result         No acute process.      Age-related volume loss.      DX-CHEST-PORTABLE (1 VIEW)   Final Result         1.  Linear patchy opacity in the left lung base, similar to prior, atelectasis versus infection   2.  Similar elevation of the left hemidiaphragm.      CT-CEREBRAL PERFUSION ANALYSIS   Final Result      1.  Cerebral blood flow less than 30% likely representing completed infarct = 0 mL.      2.  T Max more  than 6 seconds likely representing combination of completed infarct and ischemia = 0 mL.      3.  Mismatched volume likely representing ischemic brain/penumbra = None      4.  Please note that the cerebral perfusion was performed on the limited brain tissue around the basal ganglia region. Infarct/ischemia outside the CT perfusion sections can be missed in this study.      CT-CTA NECK WITH & W/O-POST PROCESSING   Final Result      CT angiogram of the neck within normal limits.      CT-CTA HEAD WITH & W/O-POST PROCESS   Final Result      CT angiogram of the [place] within normal limits.      CT-HEAD W/O   Final Result      Negative noncontrast CT scan of the head / brain.             Impression and Medical Decision Makin.  Heart failure with reduced ejection fraction.  Clinically, he appears euvolemic, symptoms are not heart failure related.  He also has been working out bench pressing without problems so he is [place] heart class I.  Went over results of his echo with him.  The echo was also done when he was tachycardic so it could be underestimated.  Discussed nothing urgent to do as his recent cardiac cath in January prior to valve replacement aortic root surgery did not show any obstructive disease and therefore unlikely to have progressed that quickly.  I do not think any further heart testing is needed  - Increase metoprolol succinate to 100 mg p.o. daily  - Add losartan 25 mg p.o. daily    2.  History of paroxysmal atrial flutter, currently just sinus tachycardia.  Increasing the metoprolol.    3.  Continue Xarelto for paroxysmal atrial flutter.    4.  Bioprosthetic aortic valve with normal function based on most recent echo    5.  Status post aortic root graft.  Control blood pressure.  Discussed with him would probably ease back on the heavy bench pressing.  I would decrease weights more repetition and add more aerobic exercise into his regimen.    6.  Nonobstructive coronary artery disease by  cardiac catheterization.  Continue secondary risk factor modification.  Hold off adding aspirin at this time as patient is also on Xarelto.      Thank you for allowing me to participate in the care of this patient, cardiology will continue to follow.  Please contact me with any questions.      Ericka Everett M.D.  Cardiologist, [facility] [place]     This note was generated using voice recognition software which has a small chance of producing errors of grammar and possibly content. I have made every reasonable attempt to find and correct any obvious errors, but expect that some may not be found prior to finalization of this note.

## 2023-07-06 NOTE — DISCHARGE INSTRUCTIONS
Community-Acquired Pneumonia, Adult  Pneumonia is an infection of the lungs. It causes irritation and swelling in the airways of the lungs. Mucus and fluid may also build up inside the airways. This may cause coughing and trouble breathing.  One type of pneumonia can happen while you are in a hospital. A different type can happen when you are not in a hospital (community-acquired pneumonia).  What are the causes?    This condition is caused by germs (viruses, bacteria, or fungi). Some types of germs can spread from person to person. Pneumonia is not thought to spread from person to person.  What increases the risk?  You have a long-term (chronic) disease, such as:  Disease of the lungs. This may be chronic obstructive pulmonary disease (COPD) or asthma.  Heart failure.  Cystic fibrosis.  Diabetes.  Kidney disease.  Sickle cell disease.  HIV.  You have other health problems, such as:  Your body's defense system (immune system) is weak.  A condition that may cause you to breathe in fluids from your mouth and nose.  You had your spleen taken out.  You do not take good care of your teeth and mouth (poor dental hygiene).  You use or have used tobacco products.  You go where the germs that cause this illness are common.  You are older than 65 years of age.  What are the signs or symptoms?  A cough.  A fever.  Sweating or chills.  Chest pain, often when you breathe deeply or cough.  Breathing problems, such as:  Fast breathing.  Trouble breathing.  Shortness of breath.  Feeling tired (fatigued).  Muscle aches.  How is this treated?  Treatment for this condition depends on many things, such as:  The cause of your illness.  Your medicines.  Your other health problems.  Most adults can be treated at home. Sometimes, treatment must happen in a hospital.  Treatment may include medicines to kill germs.  Medicines may depend on which germ caused your illness.  Very bad pneumonia is rare. If you get it, you may:  Have a machine to  help you breathe.  Have fluid taken away from around your lungs.  Follow these instructions at home:  Medicines  Take over-the-counter and prescription medicines only as told by your doctor.  Take cough medicine only if you are losing sleep. Cough medicine can keep your body from taking mucus away from your lungs.  If you were prescribed antibiotics, take them as told by your doctor. Do not stop taking them even if you start to feel better.  Lifestyle         Do not smoke or use any products that contain nicotine or tobacco. If you need help quitting, ask your doctor.  Do not drink alcohol.  Eat a healthy diet. This includes a lot of vegetables, fruits, whole grains, low-fat dairy products, and low-fat (lean) protein.  General instructions    Rest a lot. Sleep for at least 8 hours each night.  Sleep with your head and neck raised. Put a few pillows under your head or sleep in a reclining chair.  Return to your normal activities as told by your doctor. Ask your doctor what activities are safe for you.  Drink enough fluid to keep your pee (urine) pale yellow.  If your throat is sore, gargle with a mixture of salt and water 3-4 times a day or as needed. To make salt water, completely dissolve ½-1 tsp (3-6 g) of salt in 1 cup (237 mL) of warm water.  Keep all follow-up visits.  How is this prevented?  Getting the pneumonia shot (vaccine). These shots have different types and schedules. Ask your doctor what works best for you. Think about getting this shot if:  You are older than 65 years of age.  You are 19-65 years of age and:  You are being treated for cancer.  You have long-term lung disease.  You have other problems that affect your body's defense system. Ask your doctor if you have one of these.  Getting your flu shot every year. Ask your doctor which type of shot is best for you.  Going to the dentist as often as told.  Washing your hands often with soap and water for at least 20 seconds. If you cannot use soap  and water, use hand .  Contact a doctor if:  You have a fever.  You lose sleep because your cough medicine does not help.  Get help right away if:  You are short of breath and this gets worse.  You have more chest pain.  Your sickness gets worse. This is very serious if:  You are an older adult.  Your body's defense system is weak.  You cough up blood.  These symptoms may be an emergency. Get help right away. Call 911.  Do not wait to see if the symptoms will go away.  Do not drive yourself to the hospital.  Summary  Pneumonia is an infection of the lungs.  Community-acquired pneumonia affects people who have not been in the hospital. Certain germs can cause this infection.  This condition may be treated with medicines that kill germs.  For very bad pneumonia, you may need a hospital stay and treatment to help with breathing.  This information is not intended to replace advice given to you by your health care provider. Make sure you discuss any questions you have with your health care provider.  Document Revised: 02/15/2023 Document Reviewed: 02/15/2023  [employer] Patient Education © 2023 [employer]

## 2023-07-06 NOTE — PROGRESS NOTES
"Hospital Medicine Daily Progress Note    Date of Service  7/6/2023    Chief Complaint  Noe Hickey is a 62 y.o. male admitted 7/4/2023 with headache    Hospital Course  Per notes, \"62 y.o. male with a past medical history of chronic systolic heart failure, paroxysmal atrial fibrillation, chronic obstructive pulmonary disease not currently on oxygen and tobacco use who presented 7/4/2023 with cough, headache, left-sided facial numbness and left-sided blurry vision.  Symptoms started on the day of admission, ascending onset severe headache with left-sided lip cheek numbness and tingling.  In addition the patient doubled up to blurry vision of the left eye.  Symptoms lasted for about 45 minutes. \"    Interval Problem Update  7/5:Patient still having intractable headache, symptoms concerning for meningitis/encephalitis.  No neck rigidity.  However degree of headache, leukocytosis are not consistent with pneumonia.  Unfortunately patient was started on Xarelto, unable to do LP.  We will continue with ceftriaxone, adjusted dose  MRI without acute findings    7/6: Patient still with intractable headache, will plan on lumbar puncture tomorrow  Cardiogram show EF of 30%, reduced from previous.  Discussed with cardiology, will consult    I have discussed this patient's plan of care and discharge plan at IDT rounds today with Case Management, Nursing, Nursing leadership, and other members of the IDT team.    Consultants/Specialty  none    Code Status  Full Code    Disposition  The patient is not medically cleared for discharge to home or a post-acute facility.  Anticipate discharge to: home with close outpatient follow-up    I have placed the appropriate orders for post-discharge needs.    Review of Systems  Review of Systems   Neurological:  Positive for sensory change, focal weakness and headaches.   All other systems reviewed and are negative.       Physical Exam  Temp:  [36.3 °C (97.4 °F)-36.8 °C (98.3 °F)] 36.4 " °C (97.6 °F)  Pulse:  [] 110  Resp:  [17-20] 18  BP: (105-148)/(63-98) 145/98  SpO2:  [90 %-97 %] 92 %    Physical Exam  Vitals and nursing note reviewed.   Constitutional:       Appearance: Normal appearance.   Cardiovascular:      Rate and Rhythm: Normal rate and regular rhythm.      Pulses: Normal pulses.      Heart sounds: Normal heart sounds.   Pulmonary:      Effort: Pulmonary effort is normal.      Breath sounds: Normal breath sounds.   Abdominal:      General: Abdomen is flat. Bowel sounds are normal.      Palpations: Abdomen is soft.   Musculoskeletal:      Right lower leg: No edema.      Left lower leg: No edema.   Neurological:      General: No focal deficit present.      Mental Status: He is alert and oriented to person, place, and time. Mental status is at baseline.         Fluids    Intake/Output Summary (Last 24 hours) at 7/6/2023 1310  Last data filed at 7/6/2023 0741  Gross per 24 hour   Intake 500 ml   Output --   Net 500 ml       Laboratory  Recent Labs     07/04/23 1623 07/05/23 0144 07/06/23 0049   WBC 17.5* 12.6* 12.5*   RBC 6.93* 6.40* 5.91   HEMOGLOBIN 14.8 13.6* 12.7*   HEMATOCRIT 49.3 46.2 43.5   MCV 71.1* 72.2* 73.6*   MCH 21.4* 21.3* 21.5*   MCHC 30.0* 29.4* 29.2*   RDW 48.9 50.6* 50.8*   PLATELETCT 417 362 367   MPV 9.2 9.4 9.7     Recent Labs     07/04/23 1623 07/05/23 0144 07/06/23 0049   SODIUM 139 140 136   POTASSIUM 4.3 4.3 4.8   CHLORIDE 105 108 104   CO2 21 23 24   GLUCOSE 111* 81 96   BUN 22 25* 17   CREATININE 1.09 1.28 1.28   CALCIUM 9.0 8.4 8.3*     Recent Labs     07/04/23 1623   APTT 24.9   INR 0.94         Recent Labs     07/05/23 0144   TRIGLYCERIDE 72   HDL 30*   LDL 97       Imaging  EC-ECHOCARDIOGRAM COMPLETE W/ CONT   Final Result      MR-BRAIN-W/O   Final Result         No acute process.      Age-related volume loss.      DX-CHEST-PORTABLE (1 VIEW)   Final Result         1.  Linear patchy opacity in the left lung base, similar to prior, atelectasis  versus infection   2.  Similar elevation of the left hemidiaphragm.      CT-CEREBRAL PERFUSION ANALYSIS   Final Result      1.  Cerebral blood flow less than 30% likely representing completed infarct = 0 mL.      2.  T Max more than 6 seconds likely representing combination of completed infarct and ischemia = 0 mL.      3.  Mismatched volume likely representing ischemic brain/penumbra = None      4.  Please note that the cerebral perfusion was performed on the limited brain tissue around the basal ganglia region. Infarct/ischemia outside the CT perfusion sections can be missed in this study.      CT-CTA NECK WITH & W/O-POST PROCESSING   Final Result      CT angiogram of the neck within normal limits.      CT-CTA HEAD WITH & W/O-POST PROCESS   Final Result      CT angiogram of the [place] within normal limits.      CT-HEAD W/O   Final Result      Negative noncontrast CT scan of the head / brain.              Assessment/Plan  * Lobar pneumonia (HCC)- (present on admission)  Assessment & Plan  X-rays show evidence for left lower lobe pneumonia versus atelectasis.  Leukocytosis 17,000 on admission, improving  Started on Zosyn in the emergency room, switch to ceftriaxone, frequency and dose adjusted.    Headache  Assessment & Plan  No longer has any focal symptoms.  Does have a history of cluster headache, however this is not consistent with a cluster headache presentation.  Patient still having intractable headache, symptoms concerning for meningitis/encephalitis.  No neck rigidity.  However degree of headache, leukocytosis are not consistent with pneumonia.  Unfortunately patient was started on Xarelto, unable to do LP.  We will continue with ceftriaxone, adjusted dose.  Patient has not yet had any evidence of fever so we will hold acyclovir and any other antibiotics at this time  MRI without acute findings  Neurological checks    Patient still having intractable headache, despite multimodal pain approach.  If  headaches persist will plan on lumbar puncture tomorrow, 48 hours after anticoagulation    Left facial numbness and left blurry vision- (present on admission)  Assessment & Plan  Admit to Neuro, with continuous cardiac monitoring  CT, showed no acute infarction, or hemorrhage   MRI of brain with no acute findings  Aspiration, Fall, and seizure precautions    Neuro checks   Speech, physical, occupational therapy evaluation ordered     Tobacco dependence- (present on admission)  Assessment & Plan  I spent nearly 3.5 minutes on Tobacco cessation education and counseling.   I Discussed the benefits of quitting smoking and risks of continued smoking including cardiovascular disease, cancer and COPD.   Discussed options of nicotine patch, & medical treatment with Wellbutrin [Bupropion]      Chronic systolic congestive heart failure (HCC)- (present on admission)  Assessment & Plan  Not currently in exacerbation  Continue home metoprolol  DC IV fluids  Repeat echocardiogram-echocardiogram showing EF of 30%, reduced from previous study.  Discussed with cardiology, pending consult recommendations    Paroxysmal atrial flutter (HCC)- (present on admission)  Assessment & Plan  Continue home metoprolol with hold parameters  Continue home anticoagulation with rivaroxaban    COPD (chronic obstructive pulmonary disease) (HCC)- (present on admission)  Assessment & Plan  Not currently in exacerbation  Counseled to stop tobacco  Oxygen as needed, Respiratory protocol, Bronchodilators, Incentive spirometry     Gastroesophageal reflux disease without esophagitis- (present on admission)  Assessment & Plan  Resume home omeprazole         VTE prophylaxis: heparin ppx    I have performed a physical exam and reviewed and updated ROS and Plan today (7/6/2023). In review of yesterday's note (7/5/2023), there are no changes except as documented above.      Total time spent with patient over 53 minutes.  This included my review with nursing and  ancillary staff, review of records, face to face interview, physical examination; my review of lab results (CBC, chemistry panel), imaging review (CXR) and ECG.   In addition, counseling patient and speaking with consultants.

## 2023-07-06 NOTE — PROGRESS NOTES
Patient reports continued headache with some relief from dilaudid, pt reporting  h/a of 9/10 just prior to conversation with provider, dilaudid adm. Pt receiving dilaudid Q3. Dr. Manuel notified of continued headache. New orders obtained.

## 2023-07-06 NOTE — PROGRESS NOTES
Pt up in bed for dinner upon end of shift, medicated for continued headache, condition unchanged from this morning, alert and oriented. Stable upon transfer of care.

## 2023-07-06 NOTE — CARE PLAN
The patient is Stable - Low risk of patient condition declining or worsening    Shift Goals  Clinical Goals: neuro check, NIH, LP  Patient Goals: pain management, get answers  Family Goals: jelly    Progress made toward(s) clinical / shift goals:  neuro status remains WNL    Patient is not progressing towards the following goals:      Problem: Pain - Standard  Goal: Alleviation of pain or a reduction in pain to the patient’s comfort goal  Outcome: Not Progressing  Flowsheets  Taken 7/6/2023 0916 by Ariela Garza R.NSascha  Pain Rating Scale (NPRS): 10  Taken 7/5/2023 2136 by Farheen Young R.NSascha  Non Verbal Scale: Grimacing  Note: Headache     Problem: Hemodynamic Monitoring  Goal: Patient's hemodynamics, fluid balance and neurologic status will be stable or improve  Outcome: Progressing  Note: Neuro status remains WNL

## 2023-07-06 NOTE — CARE PLAN
The patient is Watcher - Medium risk of patient condition declining or worsening    Shift Goals  Clinical Goals: neuro checks, NIH q12, manage pain from headache  Patient Goals: pain managment, go home  Family Goals: jelly    Progress made toward(s) clinical / shift goals:    Problem: Pain - Standard  Goal: Alleviation of pain or a reduction in pain to the patient’s comfort goal  Outcome: Not Progressing  Note: Difficulty controlling pain from headache. Provider notified this evening, medications added and administered as ordered. Provided education on notifying nurse of breakthrough pain.      Problem: Neuro Status  Goal: Neuro status will remain stable or improve  Outcome: Progressing  Note: Q4 neuro and NIH q12.      Problem: Mobility - Stroke  Goal: Patient's capacity to carry out activities will improve  Outcome: Progressing  Note: Patient able to ambulate independently.        Patient is not progressing towards the following goals:      Problem: Pain - Standard  Goal: Alleviation of pain or a reduction in pain to the patient’s comfort goal  Outcome: Not Progressing  Note: Difficulty controlling pain from headache. Provider notified this evening, medications added and administered as ordered. Provided education on notifying nurse of breakthrough pain.

## 2023-07-07 ENCOUNTER — APPOINTMENT (OUTPATIENT)
Dept: RADIOLOGY | Facility: MEDICAL CENTER | Age: 63
DRG: 193 | End: 2023-07-07
Attending: INTERNAL MEDICINE
Payer: MEDICAID

## 2023-07-07 LAB
ANION GAP SERPL CALC-SCNC: 10 MMOL/L (ref 7–16)
BUN SERPL-MCNC: 14 MG/DL (ref 8–22)
BURR CELLS/RBC NFR CSF MANUAL: 0 %
C GATTII+NEOFOR DNA CSF QL NAA+NON-PROBE: NOT DETECTED
CALCIUM SERPL-MCNC: 9 MG/DL (ref 8.4–10.2)
CHLORIDE SERPL-SCNC: 104 MMOL/L (ref 96–112)
CLARITY CSF: CLEAR
CMV DNA CSF QL NAA+NON-PROBE: NOT DETECTED
CO2 SERPL-SCNC: 22 MMOL/L (ref 20–33)
COLOR CSF: COLORLESS
COLOR SPUN CSF: COLORLESS
CREAT SERPL-MCNC: 1.09 MG/DL (ref 0.5–1.4)
E COLI K1 DNA CSF QL NAA+NON-PROBE: NOT DETECTED
ERYTHROCYTE [DISTWIDTH] IN BLOOD BY AUTOMATED COUNT: 51.9 FL (ref 35.9–50)
ERYTHROCYTE [SEDIMENTATION RATE] IN BLOOD BY WESTERGREN METHOD: 1 MM/HOUR (ref 0–20)
EV RNA CSF QL NAA+NON-PROBE: NOT DETECTED
GFR SERPLBLD CREATININE-BSD FMLA CKD-EPI: 76 ML/MIN/1.73 M 2
GLUCOSE CSF-MCNC: 70 MG/DL (ref 40–80)
GLUCOSE SERPL-MCNC: 76 MG/DL (ref 65–99)
GP B STREP DNA CSF QL NAA+NON-PROBE: NOT DETECTED
GRAM STN SPEC: NORMAL
HAEM INFLU DNA CSF QL NAA+NON-PROBE: NOT DETECTED
HCT VFR BLD AUTO: 45.7 % (ref 42–52)
HGB BLD-MCNC: 13.3 G/DL (ref 14–18)
HHV6 DNA CSF QL NAA+NON-PROBE: NOT DETECTED
HSV1 DNA CSF QL NAA+NON-PROBE: NOT DETECTED
HSV2 DNA CSF QL NAA+NON-PROBE: NOT DETECTED
L MONOCYTOG DNA CSF QL NAA+NON-PROBE: NOT DETECTED
LYMPHOCYTES NFR CSF: 60 %
MCH RBC QN AUTO: 21.5 PG (ref 27–33)
MCHC RBC AUTO-ENTMCNC: 29.1 G/DL (ref 32.3–36.5)
MCV RBC AUTO: 73.8 FL (ref 81.4–97.8)
MONOS+MACROS NFR CSF MANUAL: 27 %
N MEN DNA CSF QL NAA+NON-PROBE: NOT DETECTED
NEUTROPHILS NFR CSF: 13 %
NUC CELL # CSF: 2 CELLS/UL (ref 0–10)
PARECHOVIRUS A RNA CSF QL NAA+NON-PROBE: NOT DETECTED
PLATELET # BLD AUTO: 317 K/UL (ref 164–446)
PMV BLD AUTO: 9.6 FL (ref 9–12.9)
POTASSIUM SERPL-SCNC: 4.8 MMOL/L (ref 3.6–5.5)
PROT CSF-MCNC: 45 MG/DL (ref 15–45)
RBC # BLD AUTO: 6.19 M/UL (ref 4.7–6.1)
RBC # CSF: 2 CELLS/UL
S PNEUM DNA CSF QL NAA+NON-PROBE: NOT DETECTED
SIGNIFICANT IND 70042: NORMAL
SITE SITE: NORMAL
SODIUM SERPL-SCNC: 136 MMOL/L (ref 135–145)
SOURCE SOURCE: NORMAL
SPECIMEN VOL CSF: 11 ML
TUBE # CSF: 4
TUBE # CSF: NORMAL
VZV DNA CSF QL NAA+NON-PROBE: NOT DETECTED
WBC # BLD AUTO: 11.6 K/UL (ref 4.8–10.8)

## 2023-07-07 PROCEDURE — 85027 COMPLETE CBC AUTOMATED: CPT

## 2023-07-07 PROCEDURE — 87070 CULTURE OTHR SPECIMN AEROBIC: CPT

## 2023-07-07 PROCEDURE — 700102 HCHG RX REV CODE 250 W/ 637 OVERRIDE(OP): Performed by: INTERNAL MEDICINE

## 2023-07-07 PROCEDURE — 36415 COLL VENOUS BLD VENIPUNCTURE: CPT

## 2023-07-07 PROCEDURE — 700102 HCHG RX REV CODE 250 W/ 637 OVERRIDE(OP): Performed by: HOSPITALIST

## 2023-07-07 PROCEDURE — A9270 NON-COVERED ITEM OR SERVICE: HCPCS | Performed by: INTERNAL MEDICINE

## 2023-07-07 PROCEDURE — 80048 BASIC METABOLIC PNL TOTAL CA: CPT

## 2023-07-07 PROCEDURE — A9270 NON-COVERED ITEM OR SERVICE: HCPCS | Performed by: HOSPITALIST

## 2023-07-07 PROCEDURE — 87483 CNS DNA AMP PROBE TYPE 12-25: CPT

## 2023-07-07 PROCEDURE — 86789 WEST NILE VIRUS ANTIBODY: CPT

## 2023-07-07 PROCEDURE — 87205 SMEAR GRAM STAIN: CPT

## 2023-07-07 PROCEDURE — 99233 SBSQ HOSP IP/OBS HIGH 50: CPT | Performed by: INTERNAL MEDICINE

## 2023-07-07 PROCEDURE — 700111 HCHG RX REV CODE 636 W/ 250 OVERRIDE (IP): Mod: JZ | Performed by: INTERNAL MEDICINE

## 2023-07-07 PROCEDURE — 94760 N-INVAS EAR/PLS OXIMETRY 1: CPT

## 2023-07-07 PROCEDURE — 700111 HCHG RX REV CODE 636 W/ 250 OVERRIDE (IP): Mod: JZ | Performed by: HOSPITALIST

## 2023-07-07 PROCEDURE — 89051 BODY FLUID CELL COUNT: CPT

## 2023-07-07 PROCEDURE — 99232 SBSQ HOSP IP/OBS MODERATE 35: CPT | Performed by: INTERNAL MEDICINE

## 2023-07-07 PROCEDURE — 86788 WEST NILE VIRUS AB IGM: CPT

## 2023-07-07 PROCEDURE — 62328 DX LMBR SPI PNXR W/FLUOR/CT: CPT

## 2023-07-07 PROCEDURE — 770020 HCHG ROOM/CARE - TELE (206)

## 2023-07-07 PROCEDURE — 700105 HCHG RX REV CODE 258: Performed by: INTERNAL MEDICINE

## 2023-07-07 PROCEDURE — 009U3ZX DRAINAGE OF SPINAL CANAL, PERCUTANEOUS APPROACH, DIAGNOSTIC: ICD-10-PCS | Performed by: RADIOLOGY

## 2023-07-07 PROCEDURE — 85652 RBC SED RATE AUTOMATED: CPT

## 2023-07-07 PROCEDURE — 82945 GLUCOSE OTHER FLUID: CPT

## 2023-07-07 PROCEDURE — 84157 ASSAY OF PROTEIN OTHER: CPT

## 2023-07-07 PROCEDURE — 94669 MECHANICAL CHEST WALL OSCILL: CPT

## 2023-07-07 RX ORDER — BUTALBITAL, ACETAMINOPHEN AND CAFFEINE 50; 325; 40 MG/1; MG/1; MG/1
1 TABLET ORAL EVERY 6 HOURS PRN
Status: DISCONTINUED | OUTPATIENT
Start: 2023-07-07 | End: 2023-07-08

## 2023-07-07 RX ORDER — MAGNESIUM SULFATE HEPTAHYDRATE 40 MG/ML
2 INJECTION, SOLUTION INTRAVENOUS ONCE
Status: COMPLETED | OUTPATIENT
Start: 2023-07-07 | End: 2023-07-07

## 2023-07-07 RX ORDER — SUCRALFATE ORAL 1 G/10ML
1 SUSPENSION ORAL EVERY 6 HOURS
Status: DISCONTINUED | OUTPATIENT
Start: 2023-07-07 | End: 2023-07-07

## 2023-07-07 RX ORDER — VALPROIC ACID 250 MG/1
250 CAPSULE, LIQUID FILLED ORAL ONCE
Status: COMPLETED | OUTPATIENT
Start: 2023-07-07 | End: 2023-07-07

## 2023-07-07 RX ORDER — ACETAMINOPHEN 500 MG
1000 TABLET ORAL ONCE
Status: COMPLETED | OUTPATIENT
Start: 2023-07-07 | End: 2023-07-07

## 2023-07-07 RX ORDER — LIDOCAINE HYDROCHLORIDE 10 MG/ML
INJECTION, SOLUTION INFILTRATION; PERINEURAL
Status: DISCONTINUED
Start: 2023-07-07 | End: 2023-07-07

## 2023-07-07 RX ADMIN — BUTALBITAL, ACETAMINOPHEN AND CAFFEINE 1 TABLET: 325; 50; 40 TABLET ORAL at 21:43

## 2023-07-07 RX ADMIN — ACETAMINOPHEN 1000 MG: 500 TABLET ORAL at 06:41

## 2023-07-07 RX ADMIN — VALPROIC ACID 250 MG: 250 CAPSULE ORAL at 17:30

## 2023-07-07 RX ADMIN — HYDROMORPHONE HYDROCHLORIDE 1 MG: 1 INJECTION, SOLUTION INTRAMUSCULAR; INTRAVENOUS; SUBCUTANEOUS at 18:47

## 2023-07-07 RX ADMIN — ONDANSETRON 4 MG: 2 INJECTION INTRAMUSCULAR; INTRAVENOUS at 04:10

## 2023-07-07 RX ADMIN — METOPROLOL SUCCINATE 100 MG: 100 TABLET, EXTENDED RELEASE ORAL at 06:42

## 2023-07-07 RX ADMIN — HYDROMORPHONE HYDROCHLORIDE 1 MG: 1 INJECTION, SOLUTION INTRAMUSCULAR; INTRAVENOUS; SUBCUTANEOUS at 02:38

## 2023-07-07 RX ADMIN — ONDANSETRON 4 MG: 2 INJECTION INTRAMUSCULAR; INTRAVENOUS at 12:44

## 2023-07-07 RX ADMIN — HYDROMORPHONE HYDROCHLORIDE 1 MG: 1 INJECTION, SOLUTION INTRAMUSCULAR; INTRAVENOUS; SUBCUTANEOUS at 21:38

## 2023-07-07 RX ADMIN — HYDROMORPHONE HYDROCHLORIDE 1 MG: 1 INJECTION, SOLUTION INTRAMUSCULAR; INTRAVENOUS; SUBCUTANEOUS at 06:39

## 2023-07-07 RX ADMIN — CEFTRIAXONE SODIUM 2000 MG: 2 INJECTION, POWDER, FOR SOLUTION INTRAMUSCULAR; INTRAVENOUS at 06:47

## 2023-07-07 RX ADMIN — MAGNESIUM SULFATE HEPTAHYDRATE 2 G: 2 INJECTION, SOLUTION INTRAVENOUS at 17:32

## 2023-07-07 RX ADMIN — OMEPRAZOLE 40 MG: 20 CAPSULE, DELAYED RELEASE ORAL at 06:41

## 2023-07-07 RX ADMIN — SUMATRIPTAN SUCCINATE 25 MG: 25 TABLET ORAL at 04:23

## 2023-07-07 RX ADMIN — CEFTRIAXONE SODIUM 2000 MG: 2 INJECTION, POWDER, FOR SOLUTION INTRAMUSCULAR; INTRAVENOUS at 16:56

## 2023-07-07 RX ADMIN — HYDROXYZINE HYDROCHLORIDE 25 MG: 25 TABLET, FILM COATED ORAL at 00:56

## 2023-07-07 RX ADMIN — SUMATRIPTAN SUCCINATE 25 MG: 25 TABLET ORAL at 12:44

## 2023-07-07 RX ADMIN — HYDROMORPHONE HYDROCHLORIDE 1 MG: 1 INJECTION, SOLUTION INTRAMUSCULAR; INTRAVENOUS; SUBCUTANEOUS at 15:13

## 2023-07-07 RX ADMIN — ACETAMINOPHEN 650 MG: 325 TABLET ORAL at 12:44

## 2023-07-07 RX ADMIN — HYDROMORPHONE HYDROCHLORIDE 1 MG: 1 INJECTION, SOLUTION INTRAMUSCULAR; INTRAVENOUS; SUBCUTANEOUS at 11:03

## 2023-07-07 SDOH — ECONOMIC STABILITY: FOOD INSECURITY: WITHIN THE PAST 12 MONTHS, YOU WORRIED THAT YOUR FOOD WOULD RUN OUT BEFORE YOU GOT MONEY TO BUY MORE.: NEVER TRUE

## 2023-07-07 SDOH — ECONOMIC STABILITY: TRANSPORTATION INSECURITY
IN THE PAST 12 MONTHS, HAS LACK OF TRANSPORTATION KEPT YOU FROM MEETINGS, WORK, OR FROM GETTING THINGS NEEDED FOR DAILY LIVING?: NO

## 2023-07-07 SDOH — ECONOMIC STABILITY: FOOD INSECURITY: WITHIN THE PAST 12 MONTHS, THE FOOD YOU BOUGHT JUST DIDN'T LAST AND YOU DIDN'T HAVE MONEY TO GET MORE.: NEVER TRUE

## 2023-07-07 SDOH — ECONOMIC STABILITY: HOUSING INSECURITY
IN THE LAST 12 MONTHS, WAS THERE A TIME WHEN YOU DID NOT HAVE A STEADY PLACE TO SLEEP OR SLEPT IN A SHELTER (INCLUDING NOW)?: NO

## 2023-07-07 SDOH — ECONOMIC STABILITY: HOUSING INSECURITY: IN THE LAST 12 MONTHS, HOW MANY PLACES HAVE YOU LIVED?: 1

## 2023-07-07 SDOH — ECONOMIC STABILITY: INCOME INSECURITY: IN THE LAST 12 MONTHS, WAS THERE A TIME WHEN YOU WERE NOT ABLE TO PAY THE MORTGAGE OR RENT ON TIME?: NO

## 2023-07-07 SDOH — ECONOMIC STABILITY: TRANSPORTATION INSECURITY
IN THE PAST 12 MONTHS, HAS THE LACK OF TRANSPORTATION KEPT YOU FROM MEDICAL APPOINTMENTS OR FROM GETTING MEDICATIONS?: NO

## 2023-07-07 SDOH — ECONOMIC STABILITY: INCOME INSECURITY: HOW HARD IS IT FOR YOU TO PAY FOR THE VERY BASICS LIKE FOOD, HOUSING, MEDICAL CARE, AND HEATING?: NOT HARD AT ALL

## 2023-07-07 ASSESSMENT — PAIN DESCRIPTION - PAIN TYPE
TYPE: ACUTE PAIN

## 2023-07-07 ASSESSMENT — ENCOUNTER SYMPTOMS
SENSORY CHANGE: 1
FOCAL WEAKNESS: 1
HEADACHES: 1

## 2023-07-07 ASSESSMENT — FIBROSIS 4 INDEX: FIB4 SCORE: 0.88

## 2023-07-07 NOTE — PROGRESS NOTES
Received bedside report from RN dash, pt care assumed. VSS, pt assessment complete. Pt A&Ox4, pt c/o nausea. POC discussed with pt and verbalizes no questions. Pt denies any additional needs at this time. Bed locked and in lowest position. Pt educated on fall risk and verbalized understanding, call light within reach, hourly rounding initiated.     Per NOC RN request, melatonin, nicotine gum, and nasal spray ordered.

## 2023-07-07 NOTE — CARE PLAN
The patient is Watcher - Medium risk of patient condition declining or worsening    Shift Goals  Clinical Goals: neuro check, NIH, LP  Patient Goals: pain control, sleep tonight  Family Goals: jelly    Progress made toward(s) clinical / shift goals:    Problem: Pain - Standard  Goal: Alleviation of pain or a reduction in pain to the patient’s comfort goal  Outcome: Not Progressing  Note: Difficulty controlling pain, patient often reporting pain at 9/10. Continue to adm PRN pain medication as ordered, provider notified        Patient is not progressing towards the following goals:      Problem: Pain - Standard  Goal: Alleviation of pain or a reduction in pain to the patient’s comfort goal  Outcome: Not Progressing  Note: Difficulty controlling pain, patient often reporting pain at 9/10. Continue to adm PRN pain medication as ordered, provider notified

## 2023-07-07 NOTE — PROGRESS NOTES
Telemetry Shift Summary     Rhythm SR/ST  HR Range   Ectopy rPAC  Measurements 0.16/0.08/0.32           Normal Values  Rhythm SR  HR Range    Measurements 0.12-0.20 / 0.06-0.10  / 0.30-0.52

## 2023-07-07 NOTE — PROGRESS NOTES
"Cardiology Follow-up Consult Note    Date of Service:    7/7/2023      Consulting Physician: Danis Walsh M.D.    Name:   Noe Hickey   YOB: 1960  Age:   62 y.o.  male   MRN:   [id number]    Problem list:  Principal Problem:    Lobar pneumonia (HCC) (POA: Yes)  Active Problems:    Gastroesophageal reflux disease without esophagitis (POA: Yes)    COPD (chronic obstructive pulmonary disease) (HCC) (POA: Yes)    Paroxysmal atrial flutter (HCC) (POA: Yes)    Chronic systolic congestive heart failure (HCC) (POA: Yes)    Tobacco dependence (POA: Yes)    Left facial numbness and left blurry vision (POA: Yes)    Headache (POA: Unknown)  Resolved Problems:    * No resolved hospital problems. *    Subjective:  Patient feels fine, no chest discomfort, no shortness of breath, no orthopnea, no PND.    All other review of systems reviewed and negative.    Past medical, surgical, social, and family history reviewed and unchanged from admission except as noted in assessment and plan.    Medications: Reviewed in MAR    Allergies   Allergen Reactions    Morphine Rash and Itching     Rash/ difficulty breathing         Intake/Output Summary (Last 24 hours) at 7/7/2023 0802  Last data filed at 7/6/2023 1500  Gross per 24 hour   Intake 360 ml   Output --   Net 360 ml        Physical Exam  Body mass index is 28.38 kg/m².  BP (!) 146/98   Pulse 97   Temp 36.4 °C (97.6 °F) (Oral)   Resp 18   Ht 1.753 m (5' 9.02\")   Wt 87.2 kg (192 lb 3.9 oz)   SpO2 97%   Vitals:    07/07/23 0528 07/07/23 0635 07/07/23 0701 07/07/23 0728   BP:  (!) 163/96 (!) 156/94 (!) 146/98   Pulse:  97  97   Resp:  18  18   Temp:    36.4 °C (97.6 °F)   TempSrc:    Oral   SpO2:    97%   Weight: 87.2 kg (192 lb 3.9 oz)      Height:         Oxygen Therapy:  Pulse Oximetry: 97 %, O2 (LPM): 2, O2 Delivery Device: Silicone Nasal Cannula    Physical Exam  Constitutional:       Appearance: Normal appearance.   Neck:      Vascular: No JVD. "   Cardiovascular:      Rate and Rhythm: Normal rate and regular rhythm.      Pulses: Normal pulses and intact distal pulses.      Heart sounds: Normal heart sounds, S1 normal and S2 normal. No murmur heard.     No friction rub. No S3 or S4 sounds.   Pulmonary:      Effort: Pulmonary effort is normal. No respiratory distress.      Breath sounds: Normal breath sounds. No wheezing or rales.   Musculoskeletal:      Cervical back: Neck supple.   Skin:     General: Skin is warm and dry.   Neurological:      Mental Status: He is alert.          Labs (personally reviewed and notable for):   Lab Results   Component Value Date/Time    SODIUM 136 07/07/2023 02:22 AM    POTASSIUM 4.8 07/07/2023 02:22 AM    CHLORIDE 104 07/07/2023 02:22 AM    CO2 22 07/07/2023 02:22 AM    GLUCOSE 76 07/07/2023 02:22 AM    BUN 14 07/07/2023 02:22 AM    CREATININE 1.09 07/07/2023 02:22 AM    BUNCREATRAT 27.3 (H) 05/19/2023 12:03 AM      Lab Results   Component Value Date/Time    WBC 11.6 (H) 07/07/2023 02:22 AM    RBC 6.19 (H) 07/07/2023 02:22 AM    HEMOGLOBIN 13.3 (L) 07/07/2023 02:22 AM    HEMATOCRIT 45.7 07/07/2023 02:22 AM    MCV 73.8 (L) 07/07/2023 02:22 AM    MCH 21.5 (L) 07/07/2023 02:22 AM    MCHC 29.1 (L) 07/07/2023 02:22 AM    MPV 9.6 07/07/2023 02:22 AM    NEUTSPOLYS 81.80 (H) 07/04/2023 04:23 PM    LYMPHOCYTES 7.80 (L) 07/04/2023 04:23 PM    MONOCYTES 6.50 07/04/2023 04:23 PM    EOSINOPHILS 2.10 07/04/2023 04:23 PM    BASOPHILS 0.50 07/04/2023 04:23 PM    HYPOCHROMIA 1+ 06/16/2023 01:11 AM    ANISOCYTOSIS 2+ (A) 06/16/2023 01:11 AM      Lab Results   Component Value Date/Time    CHOLSTRLTOT 141 07/05/2023 01:44 AM    LDL 97 07/05/2023 01:44 AM    HDL 30 (A) 07/05/2023 01:44 AM    TRIGLYCERIDE 72 07/05/2023 01:44 AM       Lab Results   Component Value Date/Time    TROPONINT 22 (H) 07/04/2023 1623     Lab Results   Component Value Date/Time    NTPROBNP 1033 (H) 02/02/2023 0400     Cardiac Imaging and Procedures Review:    Telemetry  overnight: Sinus rhythm, rare premature ventricular beats  Radiology test Review:  EC-ECHOCARDIOGRAM COMPLETE W/ CONT   Final Result      MR-BRAIN-W/O   Final Result         No acute process.      Age-related volume loss.      DX-CHEST-PORTABLE (1 VIEW)   Final Result         1.  Linear patchy opacity in the left lung base, similar to prior, atelectasis versus infection   2.  Similar elevation of the left hemidiaphragm.      CT-CEREBRAL PERFUSION ANALYSIS   Final Result      1.  Cerebral blood flow less than 30% likely representing completed infarct = 0 mL.      2.  T Max more than 6 seconds likely representing combination of completed infarct and ischemia = 0 mL.      3.  Mismatched volume likely representing ischemic brain/penumbra = None      4.  Please note that the cerebral perfusion was performed on the limited brain tissue around the basal ganglia region. Infarct/ischemia outside the CT perfusion sections can be missed in this study.      CT-CTA NECK WITH & W/O-POST PROCESSING   Final Result      CT angiogram of the neck within normal limits.      CT-CTA HEAD WITH & W/O-POST PROCESS   Final Result      CT angiogram of the [place] within normal limits.      CT-HEAD W/O   Final Result      Negative noncontrast CT scan of the head / brain.             Impression and Medical Decision Makin.  Heart failure with reduced ejection fraction with recent echo suggesting EF is slightly lower than prior.  [place] heart class I.  Patient is euvolemic.  - Continue with increased dose of metoprolol succinate 100 mg p.o. daily  - Can increase losartan to 50 mg p.o. daily as patient's blood pressure is a little high  - Patient should follow-up with his outpatient cardiologist Dr. Chaney 3 to 4 weeks postdischarge    2.  History of paroxysmal atrial flutter, currently sinus tachycardia.  - Continue with metoprolol succinate 100 mg p.o. daily    3.  Continue with Xarelto for paroxysmal atrial  fibrillation.    Clinically, patient is euvolemic and stable from the cardiac standpoint during this hospitalization.  Cardiology will sign off at this time.  Please have patient follow-up in cardiology after discharge and about 3 to 4 weeks for guideline directed medical therapy titration.    Ericka Everett M.D.  Cardiologist, [facility] [place]       Please note that this dictation was created using voice recognition software. I have made every reasonable attempt to correct obvious errors, but it is possible there are errors of grammar and possibly content that I did not discover before finalizing the note.

## 2023-07-07 NOTE — CARE PLAN
The patient is Stable - Low risk of patient condition declining or worsening    Shift Goals  Clinical Goals: neuro checks, NIHSS, lumbar puncture, pain management  Patient Goals: manage pain  Family Goals: jelly    Progress made toward(s) clinical / shift goals:      Problem: Knowledge Deficit - Stroke Education  Goal: Patient's knowledge of stroke and risk factors will improve  Description: Target End Date:  1-3 days or as soon as patient condition allows    Document in Patient Education    1.  Stroke education booklet provided  2.  Education regarding EMS activation, need for follow up, medication prescribed at discharge, risk factors for stroke/lifestyle modifications, warning signs and symptoms of stroke provided  Outcome: Progressing  Note: Pt updated on POC to include Q4 neuro, NIHSS, lumbar puncture, and cardiology follow up. All questions answered to patient satisfaction.      Problem: Pain - Standard  Goal: Alleviation of pain or a reduction in pain to the patient’s comfort goal  Description: Target End Date:  Prior to discharge or change in level of care    Document on Vitals flowsheet    1.  Document pain using the appropriate pain scale per order or unit policy  2.  Educate and implement non-pharmacologic comfort measures (i.e. relaxation, distraction, massage, cold/heat therapy, etc.)  3.  Pain management medications as ordered  4.  Reassess pain after pain med administration per policy  5.  If opiods administered assess patient's response to pain medication is appropriate per POSS sedation scale  6.  Follow pain management plan developed in collaboration with patient and interdisciplinary team (including palliative care or pain specialists if applicable)  Outcome: Progressing     Problem: Neuro Status  Goal: Neuro status will remain stable or improve  Description: Target End Date:  Prior to discharge or change in level of care    Document on Neuro assessment in the Assessment flowsheet    1.  Assess and  monitor neurologic status per provider order/protocol/unit policy  2.  Assess level of consciousness and orientation  3.  Assess for speech, dysarthria, dysphagia, facial symmetry  4.  Assess visual field, eye movements, gaze preference, pupil reaction and size  5.  Assess muscle strength and motor response in all four extremities  6.  Assess for sensation (numbness and tingling)  7.  Assess basic neuro reflexes (cough, gag, corneal)  8.  Identify changes in neuro status and report to provider for testing/treatment orders  Outcome: Progressing     Problem: Mobility - Stroke  Goal: Patient's capacity to carry out activities will improve  Description: Target End Date:  Prior to discharge or change in level of care    1.  Assess for barriers to mobility/activity  2.  Implement activity per interdisciplinary team recommendations  3.  Target activity level identified and patient/family/caregiver aware of goal  4.  Provide assistive devices  5.  Instruct patient/caregiver on proper use of assistive/adaptive devices  6.  Schedule activities and rest periods to decrease effects of fatigue  7.  Encourage mobilization to extent of ability  8.  Maintain proper body alignment  9.  Provide adequate pain management to allow progressive mobilization  10. Implement pace maker precautions as needed  Outcome: Progressing       Patient is not progressing towards the following goals:

## 2023-07-07 NOTE — PROGRESS NOTES
Telemetry Shift Summary     Rhythm NSR  HR Range 87-95  Ectopy r PVC  Measurements  0.17/ 0.08/ 0.32  Per strip printed 0400     Normal Values  Rhythm SR  HR Range    Measurements 0.12-0.20 / 0.06-0.10  / 0.30-0.52

## 2023-07-07 NOTE — PROGRESS NOTES
Telemetry Shift Summary     Rhythm: SR/ST  Rate:   Measurements: .14/.10/.32  Ectopy (reported by Monitor Tech): r PAC     Normal Values  Rhythm: Sinus  HR:   Measurements: 0.12-0.20/0.06-0.10/0.30-0.52

## 2023-07-07 NOTE — DISCHARGE PLANNING
MARCELL Walker met with pt bedside to offer Community Care Management services.   Housing: No barriers.  Transportation: No barriers. Pt reports to be an active .  Food: No barriers.   Finances: No barriers.   PCP Follow up Appointment: Pt wishes to see cardiology before setting up PCP follow up appointment.   MARCELL Walker provided CCM contact information. CHW will not continue to follow due to pt stating no needs.     Community Health Worker Intake    Identified barriers to none.  Resources provided to N/A.  Contact information provided to Noe Hickey.  Has PCP appointment scheduled: Pt wishes to wait.  Inpatient assessment completed.  Did the patient receive medications post discharge: Yes    Plan:  Due to pt stating no needs at this time, CHW will not continue to follow at this time.

## 2023-07-08 ENCOUNTER — APPOINTMENT (OUTPATIENT)
Dept: RADIOLOGY | Facility: MEDICAL CENTER | Age: 63
DRG: 193 | End: 2023-07-08
Attending: INTERNAL MEDICINE
Payer: MEDICAID

## 2023-07-08 LAB
ANION GAP SERPL CALC-SCNC: 9 MMOL/L (ref 7–16)
BUN SERPL-MCNC: 18 MG/DL (ref 8–22)
CALCIUM SERPL-MCNC: 9.5 MG/DL (ref 8.4–10.2)
CHLORIDE SERPL-SCNC: 100 MMOL/L (ref 96–112)
CO2 SERPL-SCNC: 27 MMOL/L (ref 20–33)
CREAT SERPL-MCNC: 1.22 MG/DL (ref 0.5–1.4)
ERYTHROCYTE [DISTWIDTH] IN BLOOD BY AUTOMATED COUNT: 49.9 FL (ref 35.9–50)
GFR SERPLBLD CREATININE-BSD FMLA CKD-EPI: 66 ML/MIN/1.73 M 2
GLUCOSE SERPL-MCNC: 119 MG/DL (ref 65–99)
HCT VFR BLD AUTO: 50.7 % (ref 42–52)
HGB BLD-MCNC: 14.9 G/DL (ref 14–18)
MAGNESIUM SERPL-MCNC: 2 MG/DL (ref 1.5–2.5)
MCH RBC QN AUTO: 21.4 PG (ref 27–33)
MCHC RBC AUTO-ENTMCNC: 29.4 G/DL (ref 32.3–36.5)
MCV RBC AUTO: 72.7 FL (ref 81.4–97.8)
PLATELET # BLD AUTO: 433 K/UL (ref 164–446)
PMV BLD AUTO: 9.4 FL (ref 9–12.9)
POTASSIUM SERPL-SCNC: 4.7 MMOL/L (ref 3.6–5.5)
RBC # BLD AUTO: 6.97 M/UL (ref 4.7–6.1)
SODIUM SERPL-SCNC: 136 MMOL/L (ref 135–145)
WBC # BLD AUTO: 11.8 K/UL (ref 4.8–10.8)

## 2023-07-08 PROCEDURE — A9270 NON-COVERED ITEM OR SERVICE: HCPCS | Performed by: INTERNAL MEDICINE

## 2023-07-08 PROCEDURE — 80048 BASIC METABOLIC PNL TOTAL CA: CPT

## 2023-07-08 PROCEDURE — 94760 N-INVAS EAR/PLS OXIMETRY 1: CPT

## 2023-07-08 PROCEDURE — 700102 HCHG RX REV CODE 250 W/ 637 OVERRIDE(OP): Performed by: INTERNAL MEDICINE

## 2023-07-08 PROCEDURE — 36415 COLL VENOUS BLD VENIPUNCTURE: CPT

## 2023-07-08 PROCEDURE — 94669 MECHANICAL CHEST WALL OSCILL: CPT

## 2023-07-08 PROCEDURE — 770020 HCHG ROOM/CARE - TELE (206)

## 2023-07-08 PROCEDURE — 700111 HCHG RX REV CODE 636 W/ 250 OVERRIDE (IP): Mod: JZ | Performed by: INTERNAL MEDICINE

## 2023-07-08 PROCEDURE — 700111 HCHG RX REV CODE 636 W/ 250 OVERRIDE (IP): Mod: JZ | Performed by: HOSPITALIST

## 2023-07-08 PROCEDURE — 99232 SBSQ HOSP IP/OBS MODERATE 35: CPT | Performed by: INTERNAL MEDICINE

## 2023-07-08 PROCEDURE — 83735 ASSAY OF MAGNESIUM: CPT

## 2023-07-08 PROCEDURE — A9270 NON-COVERED ITEM OR SERVICE: HCPCS | Performed by: HOSPITALIST

## 2023-07-08 PROCEDURE — 85027 COMPLETE CBC AUTOMATED: CPT

## 2023-07-08 PROCEDURE — 70544 MR ANGIOGRAPHY HEAD W/O DYE: CPT

## 2023-07-08 PROCEDURE — 700102 HCHG RX REV CODE 250 W/ 637 OVERRIDE(OP): Performed by: HOSPITALIST

## 2023-07-08 RX ORDER — CALCIUM CARBONATE 500 MG/1
500-1000 TABLET, CHEWABLE ORAL EVERY 4 HOURS PRN
Status: DISCONTINUED | OUTPATIENT
Start: 2023-07-08 | End: 2023-07-09 | Stop reason: HOSPADM

## 2023-07-08 RX ORDER — LORAZEPAM 0.5 MG/1
0.5 TABLET ORAL EVERY 4 HOURS PRN
Status: DISCONTINUED | OUTPATIENT
Start: 2023-07-08 | End: 2023-07-09 | Stop reason: HOSPADM

## 2023-07-08 RX ORDER — BUTALBITAL, ACETAMINOPHEN AND CAFFEINE 50; 325; 40 MG/1; MG/1; MG/1
2 TABLET ORAL EVERY 6 HOURS PRN
Status: DISCONTINUED | OUTPATIENT
Start: 2023-07-08 | End: 2023-07-09 | Stop reason: HOSPADM

## 2023-07-08 RX ORDER — OMEPRAZOLE 20 MG/1
40 CAPSULE, DELAYED RELEASE ORAL 2 TIMES DAILY
Status: DISCONTINUED | OUTPATIENT
Start: 2023-07-08 | End: 2023-07-09 | Stop reason: HOSPADM

## 2023-07-08 RX ORDER — DEXAMETHASONE SODIUM PHOSPHATE 4 MG/ML
4 INJECTION, SOLUTION INTRA-ARTICULAR; INTRALESIONAL; INTRAMUSCULAR; INTRAVENOUS; SOFT TISSUE EVERY 6 HOURS
Status: COMPLETED | OUTPATIENT
Start: 2023-07-08 | End: 2023-07-09

## 2023-07-08 RX ADMIN — DEXAMETHASONE SODIUM PHOSPHATE 4 MG: 4 INJECTION INTRA-ARTICULAR; INTRALESIONAL; INTRAMUSCULAR; INTRAVENOUS; SOFT TISSUE at 17:16

## 2023-07-08 RX ADMIN — ONDANSETRON 4 MG: 2 INJECTION INTRAMUSCULAR; INTRAVENOUS at 08:21

## 2023-07-08 RX ADMIN — HYDROMORPHONE HYDROCHLORIDE 1 MG: 1 INJECTION, SOLUTION INTRAMUSCULAR; INTRAVENOUS; SUBCUTANEOUS at 08:21

## 2023-07-08 RX ADMIN — HYDROMORPHONE HYDROCHLORIDE 1 MG: 1 INJECTION, SOLUTION INTRAMUSCULAR; INTRAVENOUS; SUBCUTANEOUS at 21:25

## 2023-07-08 RX ADMIN — LOSARTAN POTASSIUM 25 MG: 25 TABLET, FILM COATED ORAL at 05:17

## 2023-07-08 RX ADMIN — CALCIUM CARBONATE (ANTACID) CHEW TAB 500 MG 1000 MG: 500 CHEW TAB at 08:08

## 2023-07-08 RX ADMIN — DEXAMETHASONE SODIUM PHOSPHATE 4 MG: 4 INJECTION INTRA-ARTICULAR; INTRALESIONAL; INTRAMUSCULAR; INTRAVENOUS; SOFT TISSUE at 11:36

## 2023-07-08 RX ADMIN — SENNOSIDES AND DOCUSATE SODIUM 2 TABLET: 50; 8.6 TABLET ORAL at 17:16

## 2023-07-08 RX ADMIN — ACETAMINOPHEN 650 MG: 325 TABLET ORAL at 00:25

## 2023-07-08 RX ADMIN — RIVAROXABAN 20 MG: 20 TABLET, FILM COATED ORAL at 17:16

## 2023-07-08 RX ADMIN — HYDROMORPHONE HYDROCHLORIDE 1 MG: 1 INJECTION, SOLUTION INTRAMUSCULAR; INTRAVENOUS; SUBCUTANEOUS at 04:05

## 2023-07-08 RX ADMIN — HYDROMORPHONE HYDROCHLORIDE 1 MG: 1 INJECTION, SOLUTION INTRAMUSCULAR; INTRAVENOUS; SUBCUTANEOUS at 11:36

## 2023-07-08 RX ADMIN — OMEPRAZOLE 40 MG: 20 CAPSULE, DELAYED RELEASE ORAL at 05:18

## 2023-07-08 RX ADMIN — BUTALBITAL, ACETAMINOPHEN AND CAFFEINE 1 TABLET: 325; 50; 40 TABLET ORAL at 08:22

## 2023-07-08 RX ADMIN — HYDROMORPHONE HYDROCHLORIDE 1 MG: 1 INJECTION, SOLUTION INTRAMUSCULAR; INTRAVENOUS; SUBCUTANEOUS at 18:28

## 2023-07-08 RX ADMIN — HYDROMORPHONE HYDROCHLORIDE 1 MG: 1 INJECTION, SOLUTION INTRAMUSCULAR; INTRAVENOUS; SUBCUTANEOUS at 00:24

## 2023-07-08 RX ADMIN — SENNOSIDES AND DOCUSATE SODIUM 2 TABLET: 50; 8.6 TABLET ORAL at 05:18

## 2023-07-08 RX ADMIN — OMEPRAZOLE 40 MG: 20 CAPSULE, DELAYED RELEASE ORAL at 17:16

## 2023-07-08 RX ADMIN — HYDROMORPHONE HYDROCHLORIDE 1 MG: 1 INJECTION, SOLUTION INTRAMUSCULAR; INTRAVENOUS; SUBCUTANEOUS at 15:38

## 2023-07-08 RX ADMIN — BUTALBITAL, ACETAMINOPHEN AND CAFFEINE 2 TABLET: 325; 50; 40 TABLET ORAL at 11:36

## 2023-07-08 ASSESSMENT — ENCOUNTER SYMPTOMS
TREMORS: 0
NERVOUS/ANXIOUS: 0
CHILLS: 0
SHORTNESS OF BREATH: 0
BLURRED VISION: 0
CLAUDICATION: 0
HEADACHES: 1
FEVER: 0
DIZZINESS: 0
WEAKNESS: 0
SPEECH CHANGE: 0
ABDOMINAL PAIN: 0
HEARTBURN: 0
MYALGIAS: 0
PHOTOPHOBIA: 0
TINGLING: 0
DIARRHEA: 0
DEPRESSION: 0
VOMITING: 0
COUGH: 0
SENSORY CHANGE: 0
INSOMNIA: 0
CONSTIPATION: 0

## 2023-07-08 ASSESSMENT — PAIN DESCRIPTION - PAIN TYPE: TYPE: ACUTE PAIN

## 2023-07-08 NOTE — CARE PLAN
The patient is Stable - Low risk of patient condition declining or worsening    Shift Goals  Clinical Goals: Manage pain, MRI  Patient Goals: pain managent, go home  Family Goals: jelly    Progress made toward(s) clinical / shift goals:      Problem: Knowledge Deficit - Standard  Goal: Patient and family/care givers will demonstrate understanding of plan of care, disease process/condition, diagnostic tests and medications  Description: Target End Date:  1-3 days or as soon as patient condition allows    Document in Patient Education    1.  Patient and family/caregiver oriented to unit, equipment, visitation policy and means for communicating concern  2.  Complete/review Learning Assessment  3.  Assess knowledge level of disease process/condition, treatment plan, diagnostic tests and medications  4.  Explain disease process/condition, treatment plan, diagnostic tests and medications  Outcome: Progressing  Note: Pt updated on POC to include, neuro checks, NIHSS, MRI, and pain management.      Problem: Mobility - Stroke  Goal: Patient's capacity to carry out activities will improve  Description: Target End Date:  Prior to discharge or change in level of care    1.  Assess for barriers to mobility/activity  2.  Implement activity per interdisciplinary team recommendations  3.  Target activity level identified and patient/family/caregiver aware of goal  4.  Provide assistive devices  5.  Instruct patient/caregiver on proper use of assistive/adaptive devices  6.  Schedule activities and rest periods to decrease effects of fatigue  7.  Encourage mobilization to extent of ability  8.  Maintain proper body alignment  9.  Provide adequate pain management to allow progressive mobilization  10. Implement pace maker precautions as needed  Outcome: Progressing  Note: Pt has no visible neurological deficits, ambulates butler with no assistance.      Problem: Neuro Status  Goal: Neuro status will remain stable or improve  Description:  Target End Date:  Prior to discharge or change in level of care    Document on Neuro assessment in the Assessment flowsheet    1.  Assess and monitor neurologic status per provider order/protocol/unit policy  2.  Assess level of consciousness and orientation  3.  Assess for speech, dysarthria, dysphagia, facial symmetry  4.  Assess visual field, eye movements, gaze preference, pupil reaction and size  5.  Assess muscle strength and motor response in all four extremities  6.  Assess for sensation (numbness and tingling)  7.  Assess basic neuro reflexes (cough, gag, corneal)  8.  Identify changes in neuro status and report to provider for testing/treatment orders  Outcome: Progressing  Note: NIHSS score of 0, no neuro deficit during Q4 neuro checks.      Problem: Psychosocial - Patient Condition  Goal: Patient's ability to verbalize feelings about condition will improve  Description: Target End Date:  Prior to discharge or change in level of care    1.  Discuss coping with medical condition and its effects  2.  Encourage patient participation in care  3.  Encourage acknowledgement of body changes and accompanying emotions  4.  Perform depression screening  Outcome: Progressing     Problem: Knowledge Deficit - Stroke Education  Goal: Patient's knowledge of stroke and risk factors will improve  Description: Target End Date:  1-3 days or as soon as patient condition allows    Document in Patient Education    1.  Stroke education booklet provided  2.  Education regarding EMS activation, need for follow up, medication prescribed at discharge, risk factors for stroke/lifestyle modifications, warning signs and symptoms of stroke provided  Outcome: Progressing     Problem: Pain - Standard  Goal: Alleviation of pain or a reduction in pain to the patient’s comfort goal  Description: Target End Date:  Prior to discharge or change in level of care    Document on Vitals flowsheet    1.  Document pain using the appropriate pain  scale per order or unit policy  2.  Educate and implement non-pharmacologic comfort measures (i.e. relaxation, distraction, massage, cold/heat therapy, etc.)  3.  Pain management medications as ordered  4.  Reassess pain after pain med administration per policy  5.  If opiods administered assess patient's response to pain medication is appropriate per POSS sedation scale  6.  Follow pain management plan developed in collaboration with patient and interdisciplinary team (including palliative care or pain specialists if applicable)  Outcome: Progressing  Note: Pt c/o migraines that are untouched by all pharmacological methods, imagine ordered.        Patient is not progressing towards the following goals:

## 2023-07-08 NOTE — PROGRESS NOTES
Hospital Medicine Daily Progress Note    Date of Service  7/8/2023    Chief Complaint  Noe Hickey is a 62 y.o. male admitted 7/4/2023 with cough, headache, left-sided facial numbness and blurred vision.    Hospital Course  62 y.o. male with a past medical history of chronic systolic heart failure, paroxysmal atrial fibrillation, chronic obstructive pulmonary disease not currently on oxygen and tobacco use who presented 7/4/2023 with cough, headache, left-sided facial numbness and left-sided blurry vision.  Symptoms started on the day of admission, ascending onset severe headache with left-sided lip cheek numbness and tingling.  In addition the patient doubled up to blurry vision of the left eye.  Symptoms lasted for about 45 minutes.  Patient's continued to have severe headache and despite a normal MRI MRV was completed which does show a left transverse venous sinus thrombosis.  I did briefly discuss this with neurology Dr. Vang who recommended continued anticoagulation and symptom management.    Interval Problem Update  7/8 patient still with significant elevated head pain, MRV done today which did show the left transverse sinus thrombus and likely the source of his symptoms.  He had been off anticoagulation as they ran out where he was living and I suspect this was the etiology of developing the clot.  I have restarted him back on Xarelto and it will take a few days for his symptoms to improve.  Have also started him on high-dose dexamethasone 4 mg IV every 6 hours x4 doses to get the inflammation down related to his headache.  I have also increased his Fioricet to help with pain control.  Because of the steroids have also ordered some Ativan to help him sleep tonight.    I have discussed this patient's plan of care and discharge plan at IDT rounds today with Case Management, Nursing, Nursing leadership, and other members of the IDT team.    Consultants/Specialty  none    Code Status  Full  Code    Disposition  The patient is not medically cleared for discharge to home or a post-acute facility.  Anticipate discharge to: home with close outpatient follow-up    I have placed the appropriate orders for post-discharge needs.    Review of Systems  Review of Systems   Constitutional:  Negative for chills and fever.   HENT:  Negative for congestion.    Eyes:  Negative for blurred vision and photophobia.   Respiratory:  Negative for cough and shortness of breath.    Cardiovascular:  Negative for chest pain, claudication and leg swelling.   Gastrointestinal:  Negative for abdominal pain, constipation, diarrhea, heartburn and vomiting.   Genitourinary:  Negative for dysuria and hematuria.   Musculoskeletal:  Negative for joint pain and myalgias.   Skin:  Negative for itching and rash.   Neurological:  Positive for headaches. Negative for dizziness, tingling, tremors, sensory change, speech change and weakness.   Psychiatric/Behavioral:  Negative for depression. The patient is not nervous/anxious and does not have insomnia.         Physical Exam  Temp:  [36.3 °C (97.3 °F)-37.1 °C (98.7 °F)] 37.1 °C (98.7 °F)  Pulse:  [] 105  Resp:  [18-20] 18  BP: (113-146)/() 121/75  SpO2:  [87 %-98 %] 89 %    Physical Exam  Vitals and nursing note reviewed.   Constitutional:       General: He is not in acute distress.     Appearance: Normal appearance.   HENT:      Head: Normocephalic and atraumatic.   Eyes:      General: No scleral icterus.     Extraocular Movements: Extraocular movements intact.   Cardiovascular:      Rate and Rhythm: Normal rate and regular rhythm.      Pulses: Normal pulses.      Heart sounds: Normal heart sounds. No murmur heard.  Pulmonary:      Effort: Pulmonary effort is normal. No respiratory distress.      Breath sounds: Normal breath sounds. No wheezing, rhonchi or rales.   Abdominal:      General: Abdomen is flat. Bowel sounds are normal. There is no distension.      Palpations: Abdomen  is soft.      Tenderness: There is no rebound.   Musculoskeletal:         General: No swelling or tenderness.      Cervical back: Normal range of motion and neck supple.   Lymphadenopathy:      Cervical: No cervical adenopathy.   Skin:     Coloration: Skin is not jaundiced.      Findings: No erythema.   Neurological:      General: No focal deficit present.      Mental Status: He is alert and oriented to person, place, and time. Mental status is at baseline.      Cranial Nerves: No cranial nerve deficit.   Psychiatric:         Mood and Affect: Mood normal.         Behavior: Behavior normal.         Fluids    Intake/Output Summary (Last 24 hours) at 7/8/2023 1529  Last data filed at 7/8/2023 0900  Gross per 24 hour   Intake 1490 ml   Output --   Net 1490 ml       Laboratory  Recent Labs     07/06/23 0049 07/07/23 0222 07/08/23 0528   WBC 12.5* 11.6* 11.8*   RBC 5.91 6.19* 6.97*   HEMOGLOBIN 12.7* 13.3* 14.9   HEMATOCRIT 43.5 45.7 50.7   MCV 73.6* 73.8* 72.7*   MCH 21.5* 21.5* 21.4*   MCHC 29.2* 29.1* 29.4*   RDW 50.8* 51.9* 49.9   PLATELETCT 367 317 433   MPV 9.7 9.6 9.4     Recent Labs     07/06/23 0049 07/07/23 0222 07/08/23 0528   SODIUM 136 136 136   POTASSIUM 4.8 4.8 4.7   CHLORIDE 104 104 100   CO2 24 22 27   GLUCOSE 96 76 119*   BUN 17 14 18   CREATININE 1.28 1.09 1.22   CALCIUM 8.3* 9.0 9.5                   Imaging  MR-VENOGRAM (MRV) HEAD   Final Result      1.  Patent dominant caliber right transverse-sigmoid sinus and right internal jugular vein.   2.  Nondominant occluded left transverse sinus consistent with dural venous sinus thrombosis of indeterminate age.      DX-LUMBAR PUNCTURE FOR DIAGNOSIS   Final Result      Fluoroscopic-guided lumbar puncture with 11 mL specimen sent to the laboratory for analysis      Elevated opening pressure, 27 cm.      EC-ECHOCARDIOGRAM COMPLETE W/ CONT   Final Result      MR-BRAIN-W/O   Final Result         No acute process.      Age-related volume loss.       DX-CHEST-PORTABLE (1 VIEW)   Final Result         1.  Linear patchy opacity in the left lung base, similar to prior, atelectasis versus infection   2.  Similar elevation of the left hemidiaphragm.      CT-CEREBRAL PERFUSION ANALYSIS   Final Result      1.  Cerebral blood flow less than 30% likely representing completed infarct = 0 mL.      2.  T Max more than 6 seconds likely representing combination of completed infarct and ischemia = 0 mL.      3.  Mismatched volume likely representing ischemic brain/penumbra = None      4.  Please note that the cerebral perfusion was performed on the limited brain tissue around the basal ganglia region. Infarct/ischemia outside the CT perfusion sections can be missed in this study.      CT-CTA NECK WITH & W/O-POST PROCESSING   Final Result      CT angiogram of the neck within normal limits.      CT-CTA HEAD WITH & W/O-POST PROCESS   Final Result      CT angiogram of the [place] within normal limits.      CT-HEAD W/O   Final Result      Negative noncontrast CT scan of the head / brain.              Assessment/Plan  * Lobar pneumonia (HCC)- (present on admission)  Assessment & Plan  X-rays show evidence for left lower lobe pneumonia versus atelectasis.  Leukocytosis 17,000 on admission, improving  Started on Zosyn in the emergency room, switch to ceftriaxone, frequency and dose adjusted.    Headache  Assessment & Plan  No longer has any focal symptoms.  Does have a history of cluster headache, however this is not consistent with a cluster headache presentation.  Empiric steroids to help reduce inflammation related to headache x24 hours  MRI without acute findings  LP was done and normal  MRV showed a left-sided dural venous sinus thrombosis, age-indeterminate  Restart Xarelto    Left facial numbness and left blurry vision- (present on admission)  Assessment & Plan  Admit to Neuro, with continuous cardiac monitoring  CT, showed no acute infarction, or hemorrhage   MRI of  brain with no acute findings  Like related to the dural venous sinus thrombosis    Tobacco dependence- (present on admission)  Assessment & Plan  Continue smoking cessation    Chronic systolic congestive heart failure (HCC)- (present on admission)  Assessment & Plan  Not currently in exacerbation  Continue home metoprolol  Repeat echocardiogram-echocardiogram showing EF of 30%, reduced from previous study.   Discussed with cardiology, follow recommendations-Will need outpatient follow-up  Continue metoprolol and losartan    Paroxysmal atrial flutter (HCC)- (present on admission)  Assessment & Plan  Continue home metoprolol with hold parameters  Resume Xarelto     COPD (chronic obstructive pulmonary disease) (HCC)- (present on admission)  Assessment & Plan  Not currently in exacerbation  Counseled to stop tobacco  Oxygen as needed, Respiratory protocol, Bronchodilators, Incentive spirometry     Leukocytosis- (present on admission)  Assessment & Plan  Looking back at patient's admissions, this has been a chronic issue  He will need follow-up with hematology to make sure eventual bone marrow can be done to rule out any type of chronic leukemia    Gastroesophageal reflux disease without esophagitis- (present on admission)  Assessment & Plan  Resume home omeprazole         VTE prophylaxis: therapeutic anticoagulation with Xarelto    I have performed a physical exam and reviewed and updated ROS and Plan today (7/8/2023). In review of yesterday's note (7/7/2023), there are no changes except as documented above.

## 2023-07-08 NOTE — CARE PLAN
The patient is Stable - Low risk of patient condition declining or worsening    Shift Goals  Clinical Goals: Pain mgmt  Patient Goals: Decrease pain.  Family Goals: jelly    Progress made toward(s) clinical / shift goals:    Problem: Pain - Standard  Goal: Alleviation of pain or a reduction in pain to the patient’s comfort goal  Outcome: Progressing   Pain treated with meds.    Problem: Knowledge Deficit - Stroke Education  Goal: Patient's knowledge of stroke and risk factors will improve  Outcome: Progressing   POC discussed with pt. Pt states understanding.     Patient is not progressing towards the following goals:

## 2023-07-08 NOTE — PROGRESS NOTES
Telemetry Shift Summary     Rhythm: SR  HR: 90s  Ectopy:     Measurements: .14/.08/.34    Normal Values  Rhythm: SR  HR:   Measurements: 0.12-0.20/0.08-0.10/0.30-0.52

## 2023-07-08 NOTE — ASSESSMENT & PLAN NOTE
Looking back at patient's admissions, this has been a chronic issue  He will need follow-up with hematology to make sure eventual bone marrow can be done to rule out any type of chronic leukemia

## 2023-07-09 VITALS
WEIGHT: 182.76 LBS | RESPIRATION RATE: 18 BRPM | OXYGEN SATURATION: 95 % | BODY MASS INDEX: 27.07 KG/M2 | HEIGHT: 69 IN | HEART RATE: 87 BPM | SYSTOLIC BLOOD PRESSURE: 126 MMHG | DIASTOLIC BLOOD PRESSURE: 81 MMHG | TEMPERATURE: 98.2 F

## 2023-07-09 LAB
ANION GAP SERPL CALC-SCNC: 11 MMOL/L (ref 7–16)
BACTERIA BLD CULT: NORMAL
BACTERIA BLD CULT: NORMAL
BUN SERPL-MCNC: 19 MG/DL (ref 8–22)
CALCIUM SERPL-MCNC: 9.2 MG/DL (ref 8.4–10.2)
CHLORIDE SERPL-SCNC: 100 MMOL/L (ref 96–112)
CO2 SERPL-SCNC: 24 MMOL/L (ref 20–33)
CREAT SERPL-MCNC: 1.11 MG/DL (ref 0.5–1.4)
ERYTHROCYTE [DISTWIDTH] IN BLOOD BY AUTOMATED COUNT: 49.1 FL (ref 35.9–50)
GFR SERPLBLD CREATININE-BSD FMLA CKD-EPI: 74 ML/MIN/1.73 M 2
GLUCOSE SERPL-MCNC: 140 MG/DL (ref 65–99)
HCT VFR BLD AUTO: 44.8 % (ref 42–52)
HGB BLD-MCNC: 13.3 G/DL (ref 14–18)
MCH RBC QN AUTO: 21.3 PG (ref 27–33)
MCHC RBC AUTO-ENTMCNC: 29.7 G/DL (ref 32.3–36.5)
MCV RBC AUTO: 71.7 FL (ref 81.4–97.8)
PLATELET # BLD AUTO: 468 K/UL (ref 164–446)
PMV BLD AUTO: 10 FL (ref 9–12.9)
POTASSIUM SERPL-SCNC: 5.2 MMOL/L (ref 3.6–5.5)
RBC # BLD AUTO: 6.25 M/UL (ref 4.7–6.1)
SIGNIFICANT IND 70042: NORMAL
SIGNIFICANT IND 70042: NORMAL
SITE SITE: NORMAL
SITE SITE: NORMAL
SODIUM SERPL-SCNC: 135 MMOL/L (ref 135–145)
SOURCE SOURCE: NORMAL
SOURCE SOURCE: NORMAL
WBC # BLD AUTO: 11.1 K/UL (ref 4.8–10.8)
WNV IGG CSF IA-ACNC: 0.24 IV
WNV IGM CSF IA-ACNC: 0 IV

## 2023-07-09 PROCEDURE — 700111 HCHG RX REV CODE 636 W/ 250 OVERRIDE (IP): Mod: JZ | Performed by: HOSPITALIST

## 2023-07-09 PROCEDURE — 700111 HCHG RX REV CODE 636 W/ 250 OVERRIDE (IP): Mod: JZ | Performed by: INTERNAL MEDICINE

## 2023-07-09 PROCEDURE — A9270 NON-COVERED ITEM OR SERVICE: HCPCS | Performed by: INTERNAL MEDICINE

## 2023-07-09 PROCEDURE — 700102 HCHG RX REV CODE 250 W/ 637 OVERRIDE(OP): Performed by: INTERNAL MEDICINE

## 2023-07-09 PROCEDURE — 94760 N-INVAS EAR/PLS OXIMETRY 1: CPT

## 2023-07-09 PROCEDURE — 99239 HOSP IP/OBS DSCHRG MGMT >30: CPT | Performed by: INTERNAL MEDICINE

## 2023-07-09 PROCEDURE — 700102 HCHG RX REV CODE 250 W/ 637 OVERRIDE(OP): Performed by: HOSPITALIST

## 2023-07-09 PROCEDURE — 85027 COMPLETE CBC AUTOMATED: CPT

## 2023-07-09 PROCEDURE — 80048 BASIC METABOLIC PNL TOTAL CA: CPT

## 2023-07-09 PROCEDURE — 36415 COLL VENOUS BLD VENIPUNCTURE: CPT

## 2023-07-09 PROCEDURE — A9270 NON-COVERED ITEM OR SERVICE: HCPCS | Performed by: HOSPITALIST

## 2023-07-09 RX ORDER — BUTALBITAL, ACETAMINOPHEN AND CAFFEINE 50; 325; 40 MG/1; MG/1; MG/1
2 TABLET ORAL EVERY 6 HOURS PRN
Qty: 30 TABLET | Refills: 0 | Status: SHIPPED | OUTPATIENT
Start: 2023-07-09 | End: 2023-07-14

## 2023-07-09 RX ORDER — METOPROLOL SUCCINATE 100 MG/1
100 TABLET, EXTENDED RELEASE ORAL DAILY
Qty: 30 TABLET | Refills: 2 | Status: SHIPPED | OUTPATIENT
Start: 2023-07-10 | End: 2023-09-10

## 2023-07-09 RX ORDER — LOSARTAN POTASSIUM 25 MG/1
25 TABLET ORAL DAILY
Qty: 30 TABLET | Refills: 2 | Status: SHIPPED | OUTPATIENT
Start: 2023-07-10 | End: 2023-09-10

## 2023-07-09 RX ADMIN — OMEPRAZOLE 40 MG: 20 CAPSULE, DELAYED RELEASE ORAL at 04:48

## 2023-07-09 RX ADMIN — HYDROMORPHONE HYDROCHLORIDE 1 MG: 1 INJECTION, SOLUTION INTRAMUSCULAR; INTRAVENOUS; SUBCUTANEOUS at 08:25

## 2023-07-09 RX ADMIN — METOPROLOL SUCCINATE 100 MG: 100 TABLET, EXTENDED RELEASE ORAL at 04:59

## 2023-07-09 RX ADMIN — HYDROMORPHONE HYDROCHLORIDE 1 MG: 1 INJECTION, SOLUTION INTRAMUSCULAR; INTRAVENOUS; SUBCUTANEOUS at 04:47

## 2023-07-09 RX ADMIN — DEXAMETHASONE SODIUM PHOSPHATE 4 MG: 4 INJECTION INTRA-ARTICULAR; INTRALESIONAL; INTRAMUSCULAR; INTRAVENOUS; SOFT TISSUE at 00:32

## 2023-07-09 RX ADMIN — ONDANSETRON 4 MG: 2 INJECTION INTRAMUSCULAR; INTRAVENOUS at 01:40

## 2023-07-09 RX ADMIN — HYDROMORPHONE HYDROCHLORIDE 1 MG: 1 INJECTION, SOLUTION INTRAMUSCULAR; INTRAVENOUS; SUBCUTANEOUS at 00:32

## 2023-07-09 RX ADMIN — LOSARTAN POTASSIUM 25 MG: 25 TABLET, FILM COATED ORAL at 04:48

## 2023-07-09 RX ADMIN — DEXAMETHASONE SODIUM PHOSPHATE 4 MG: 4 INJECTION INTRA-ARTICULAR; INTRALESIONAL; INTRAMUSCULAR; INTRAVENOUS; SOFT TISSUE at 04:47

## 2023-07-09 RX ADMIN — SENNOSIDES AND DOCUSATE SODIUM 2 TABLET: 50; 8.6 TABLET ORAL at 04:48

## 2023-07-09 ASSESSMENT — PAIN DESCRIPTION - PAIN TYPE
TYPE: ACUTE PAIN
TYPE: ACUTE PAIN

## 2023-07-09 ASSESSMENT — FIBROSIS 4 INDEX: FIB4 SCORE: 0.6

## 2023-07-09 NOTE — DISCHARGE SUMMARY
Discharge Summary    CHIEF COMPLAINT ON ADMISSION  Chief Complaint   Patient presents with    Headache    Numbness     L side of face and lips started 45 min ago    Chest Pain     Started 45 min ago    Blurred Vision     L eye started 45 minutes         Reason for Admission  Chestpain:Left side numbness:[place]*     Admission Date  7/4/2023    CODE STATUS  Full Code    HPI & HOSPITAL COURSE  62 y.o. male with a past medical history of chronic systolic heart failure, paroxysmal atrial fibrillation, chronic obstructive pulmonary disease not currently on oxygen and tobacco use who presented 7/4/2023 with cough, headache, left-sided facial numbness and left-sided blurry vision.  Symptoms started on the day of admission, ascending onset severe headache with left-sided lip cheek numbness and tingling.  In addition the patient doubled up to blurry vision of the left eye.  Symptoms lasted for about 45 minutes.  Patient's continued to have severe headache and despite a normal MRI MRV was completed which does show a left transverse venous sinus thrombosis.  I did briefly discuss this with neurology Dr. Vang who recommended continued anticoagulation and symptom management.  Patient's head pain did seem to improve with the initiation of steroid.  He is requesting discharge from the hospital at this time.  He has prescriptions for the higher dose metoprolol, new prescription for the losartan and refill for the Xarelto.  Return to work note also provided to the patient.  He will follow-up with his primary care practitioner and his cardiologist.  Patient appropriate for discharge at this time.      Patient is still having elevated white cell count.  He will follow-up with his PCP and if white count still remains elevated then he needs to follow-up with oncology for bone marrow biopsy given his prolonged elevation in WBC count.  In this setting he did have acute infection with pneumonia and the acute finding of the transverse  sinus thrombosis however WBC count should return to normal in a couple of weeks.  If it does not then he does need bone marrow biopsy.    Therefore, he is discharged in good and stable condition to home with close outpatient follow-up.    The patient met 2-midnight criteria for an inpatient stay at the time of discharge.    Discharge Date  7/9/2023    FOLLOW UP ITEMS POST DISCHARGE  PCP and cardiology    DISCHARGE DIAGNOSES  Principal Problem:    Lobar pneumonia (HCC) (POA: Yes)  Active Problems:    Gastroesophageal reflux disease without esophagitis (POA: Yes)    Leukocytosis (POA: Yes)    COPD (chronic obstructive pulmonary disease) (HCC) (POA: Yes)    Paroxysmal atrial flutter (HCC) (POA: Yes)    Chronic systolic congestive heart failure (HCC) (POA: Yes)    Tobacco dependence (POA: Yes)    Left facial numbness and left blurry vision (POA: Yes)    Headache (POA: Unknown)  Resolved Problems:    * No resolved hospital problems. *      FOLLOW UP  Future Appointments   Date Time Provider Department Center   9/13/2023  4:20 PM RONI Catalan None   10/3/2023  1:15 PM EFRAÍN Rodriguez None     [facility]  [address] [facility] [address]  [phone]    Follow up        MEDICATIONS ON DISCHARGE     Medication List        START taking these medications        Instructions   butalbital/apap/caffeine -40 mg Tabs  Commonly known as: Fioricet   Take 2 Tablets by mouth every 6 hours as needed for Headache for up to 5 days.  Dose: 2 Tablet     losartan 25 MG Tabs  Start taking on: July 10, 2023  Commonly known as: Cozaar   Take 1 Tablet by mouth every day.  Dose: 25 mg            CHANGE how you take these medications        Instructions   metoprolol  MG Tb24  Start taking on: July 10, 2023  What changed:   medication strength  how much to take  Commonly known as: Toprol XL   Take 1 Tablet by mouth every day.  Dose: 100 mg            CONTINUE taking these medications         Instructions   chlorproMAZINE 50 MG Tabs  Commonly known as: Thorazine   Take 50 mg by mouth 2 times a day as needed. Indications: Nausea and Vomiting  Dose: 50 mg     hydrOXYzine HCl 25 MG Tabs  Commonly known as: Atarax   25 mg at bedtime as needed (sleep).  Dose: 25 mg     ondansetron 4 MG Tbdp  Commonly known as: Zofran ODT   Take 4 mg by mouth 2 times a day as needed for Nausea/Vomiting.  Dose: 4 mg     pantoprazole 40 MG Tbec  Commonly known as: Protonix   Take 1 Tablet by mouth every day. Take daily for 30 days post ablation then can reduce to as needed again.  Dose: 40 mg     rivaroxaban 20 MG Tabs tablet  Commonly known as: Xarelto   Take 1 Tablet by mouth with dinner.  Dose: 20 mg            STOP taking these medications      amoxicillin-clavulanate 875-125 MG Tabs  Commonly known as: Augmentin     doxycycline monohydrate 100 MG tablet  Commonly known as: Adoxa     Vitamin D (Ergocalciferol) 28395 units Caps              Allergies  Allergies   Allergen Reactions    Morphine Rash and Itching     Rash/ difficulty breathing       DIET  Orders Placed This Encounter   Procedures    Diet Order Diet: Cardiac (double portions); Tray Modifications (optional): Double Portions     Standing Status:   Standing     Number of Occurrences:   1     Order Specific Question:   Diet:     Answer:   Cardiac [6]     Comments:   double portions     Order Specific Question:   Tray Modifications (optional)     Answer:   Double Portions    Discontinue Diet Tray     Standing Status:   Standing     Number of Occurrences:   1       ACTIVITY  As tolerated.  Weight bearing as tolerated    CONSULTATIONS  Cardiology-Dr. Everett    PROCEDURES  None    LABORATORY  Lab Results   Component Value Date    SODIUM 135 07/09/2023    POTASSIUM 5.2 07/09/2023    CHLORIDE 100 07/09/2023    CO2 24 07/09/2023    GLUCOSE 140 (H) 07/09/2023    BUN 19 07/09/2023    CREATININE 1.11 07/09/2023        Lab Results   Component Value Date    WBC 11.1 (H) 07/09/2023     HEMOGLOBIN 13.3 (L) 07/09/2023    HEMATOCRIT 44.8 07/09/2023    PLATELETCT 468 (H) 07/09/2023        Total time of the discharge process exceeds 38 minutes.

## 2023-07-09 NOTE — PROGRESS NOTES
Charge Nurse Rounding Note    Bedside rounding completed to address quality of care and overall patient experience.    Patient Satisfaction addressed including staff responsiveness. Patient/family are aware of the POC and any questions answered. Thorough safety education completed including use of call light prior to all mobility throughout the entirety of the hospital stay.     Patient/family aware of time of next Hourly Round.    No further questions/concerns currently.     Additional Notes: n/a

## 2023-07-09 NOTE — CARE PLAN
The patient is Stable - Low risk of patient condition declining or worsening    Shift Goals  Clinical Goals: Pain control  Patient Goals: Pain control  Family Goals: jelly    Progress made toward(s) clinical / shift goals:    Problem: Pain - Standard  Goal: Alleviation of pain or a reduction in pain to the patient’s comfort goal  Outcome: Progressing   Pts pain controlled with IV meds.   Problem: Knowledge Deficit - Stroke Education  Goal: Patient's knowledge of stroke and risk factors will improve  Outcome: Progressing   POC discussed with pt. Pt states understanding.     Patient is not progressing towards the following goals:

## 2023-07-09 NOTE — PROGRESS NOTES
Telemetry Shift Summary     Rhythm: SR 90s  HR:   Ectopy:     Measurements: .14/.08/.32    Normal Values  Rhythm: SR  HR:   Measurements: 0.12-0.20/0.08-0.10/0.30-0.52

## 2023-07-10 ENCOUNTER — TELEPHONE (OUTPATIENT)
Dept: HEALTH INFORMATION MANAGEMENT | Facility: OTHER | Age: 63
End: 2023-07-10

## 2023-07-10 LAB
BACTERIA CSF CULT: NORMAL
GRAM STN SPEC: NORMAL
SIGNIFICANT IND 70042: NORMAL
SITE SITE: NORMAL
SOURCE SOURCE: NORMAL

## 2023-07-14 LAB — EKG IMPRESSION: NORMAL

## 2023-07-24 NOTE — CARE PLAN
Problem: Knowledge Deficit  Goal: Knowledge of disease process/condition, treatment plan, diagnostic tests, and medications will improve  Outcome: PROGRESSING AS EXPECTED  Education provided. Tests, labs, medications reviewed. Questions answered.          Your blood count (CBC) is unremarkable.    Your sugar number (Glucose) is within normal limits.  Your A1c is 5.4% - improved since last check.   Rest of your electrolytes are unremarkable.    Your kidney (BUN, Creatinine and GFT) function is unremarkable.   Your liver (AST, ALT) function is unremarkable.  Your bilirubin is mildly elevated, most likely from fasting prior to your bloodwork.     Your Cholesterol is NORMAL. Continue to focus on low fat, high fiber foods and aerobic exericse (huffing & puffing) for at least 20 minutes most days of the week.    Your Thyroid numbers are normal.

## 2023-07-25 ENCOUNTER — APPOINTMENT (OUTPATIENT)
Dept: CARDIOLOGY | Facility: MEDICAL CENTER | Age: 63
End: 2023-07-25
Attending: INTERNAL MEDICINE
Payer: MEDICAID

## 2023-08-09 NOTE — ASSESSMENT & PLAN NOTE
Administrations This Visit       ALLERGEN EXTRACT 0.35 mL       Admin Date  08/09/2023  16:41 Action  Given Dose  0.35 mL Route  SubCUTAneous Site  Arm Left Administered By  Valdez Benson RN    Ordering Provider: GENOVEVA Puckett    Patient Supplied?: Yes                  Patient tolerated injection well. Patient advised to wait 20 minutes in the office following the injection. No signs/symptoms of reaction noted after 20 minutes. Occurred POD #1, associated cardiogenic pulmonary edema - improving  s/p BiPAP and high flow nasal cannula flow  RT/O2 protocol  Continue forced diuresis  Limit sedatives  Encourage incentive spirometry, out of bed to chair

## 2023-09-10 ENCOUNTER — APPOINTMENT (OUTPATIENT)
Dept: RADIOLOGY | Facility: MEDICAL CENTER | Age: 63
DRG: 177 | End: 2023-09-10
Attending: EMERGENCY MEDICINE
Payer: MEDICAID

## 2023-09-10 ENCOUNTER — HOSPITAL ENCOUNTER (INPATIENT)
Facility: MEDICAL CENTER | Age: 63
LOS: 3 days | DRG: 177 | End: 2023-09-13
Attending: EMERGENCY MEDICINE | Admitting: INTERNAL MEDICINE
Payer: MEDICAID

## 2023-09-10 DIAGNOSIS — R09.02 HYPOXIA: ICD-10-CM

## 2023-09-10 DIAGNOSIS — R07.9 CHEST PAIN, UNSPECIFIED TYPE: ICD-10-CM

## 2023-09-10 DIAGNOSIS — E61.1 IRON DEFICIENCY: ICD-10-CM

## 2023-09-10 DIAGNOSIS — U07.1 COVID: ICD-10-CM

## 2023-09-10 DIAGNOSIS — I50.32 CHRONIC HEART FAILURE WITH PRESERVED EJECTION FRACTION (HCC): ICD-10-CM

## 2023-09-10 PROBLEM — J96.90 RESPIRATORY FAILURE (HCC): Status: ACTIVE | Noted: 2023-09-10

## 2023-09-10 LAB
ABO GROUP BLD: NORMAL
ALBUMIN SERPL BCP-MCNC: 3.8 G/DL (ref 3.2–4.9)
ALBUMIN SERPL BCP-MCNC: 4 G/DL (ref 3.2–4.9)
ALBUMIN/GLOB SERPL: 1.3 G/DL
ALBUMIN/GLOB SERPL: 1.4 G/DL
ALP SERPL-CCNC: 43 U/L (ref 30–99)
ALP SERPL-CCNC: 44 U/L (ref 30–99)
ALT SERPL-CCNC: 72 U/L (ref 2–50)
ALT SERPL-CCNC: 77 U/L (ref 2–50)
ANION GAP SERPL CALC-SCNC: 10 MMOL/L (ref 7–16)
ANION GAP SERPL CALC-SCNC: 11 MMOL/L (ref 7–16)
AST SERPL-CCNC: 43 U/L (ref 12–45)
AST SERPL-CCNC: 51 U/L (ref 12–45)
BASOPHILS # BLD AUTO: 0.6 % (ref 0–1.8)
BASOPHILS # BLD: 0.13 K/UL (ref 0–0.12)
BILIRUB SERPL-MCNC: 0.4 MG/DL (ref 0.1–1.5)
BILIRUB SERPL-MCNC: 0.4 MG/DL (ref 0.1–1.5)
BLD GP AB SCN SERPL QL: NORMAL
BUN SERPL-MCNC: 17 MG/DL (ref 8–22)
BUN SERPL-MCNC: 17 MG/DL (ref 8–22)
CALCIUM ALBUM COR SERPL-MCNC: 9 MG/DL (ref 8.5–10.5)
CALCIUM ALBUM COR SERPL-MCNC: 9.5 MG/DL (ref 8.5–10.5)
CALCIUM SERPL-MCNC: 9 MG/DL (ref 8.5–10.5)
CALCIUM SERPL-MCNC: 9.3 MG/DL (ref 8.5–10.5)
CHLORIDE SERPL-SCNC: 102 MMOL/L (ref 96–112)
CHLORIDE SERPL-SCNC: 99 MMOL/L (ref 96–112)
CHOLEST SERPL-MCNC: 212 MG/DL (ref 100–199)
CO2 SERPL-SCNC: 22 MMOL/L (ref 20–33)
CO2 SERPL-SCNC: 22 MMOL/L (ref 20–33)
CREAT SERPL-MCNC: 1.04 MG/DL (ref 0.5–1.4)
CREAT SERPL-MCNC: 1.27 MG/DL (ref 0.5–1.4)
CRP SERPL HS-MCNC: 1.43 MG/DL (ref 0–0.75)
EKG IMPRESSION: NORMAL
EOSINOPHIL # BLD AUTO: 0.3 K/UL (ref 0–0.51)
EOSINOPHIL NFR BLD: 1.4 % (ref 0–6.9)
ERYTHROCYTE [DISTWIDTH] IN BLOOD BY AUTOMATED COUNT: 52.1 FL (ref 35.9–50)
GFR SERPLBLD CREATININE-BSD FMLA CKD-EPI: 63 ML/MIN/1.73 M 2
GFR SERPLBLD CREATININE-BSD FMLA CKD-EPI: 80 ML/MIN/1.73 M 2
GLOBULIN SER CALC-MCNC: 2.8 G/DL (ref 1.9–3.5)
GLOBULIN SER CALC-MCNC: 3.2 G/DL (ref 1.9–3.5)
GLUCOSE SERPL-MCNC: 115 MG/DL (ref 65–99)
GLUCOSE SERPL-MCNC: 90 MG/DL (ref 65–99)
HCT VFR BLD AUTO: 46.5 % (ref 42–52)
HDLC SERPL-MCNC: 21 MG/DL
HGB BLD-MCNC: 14.4 G/DL (ref 14–18)
IMM GRANULOCYTES # BLD AUTO: 0.4 K/UL (ref 0–0.11)
IMM GRANULOCYTES NFR BLD AUTO: 1.9 % (ref 0–0.9)
INR PPP: 1.72 (ref 0.87–1.13)
LDLC SERPL CALC-MCNC: 166 MG/DL
LYMPHOCYTES # BLD AUTO: 0.59 K/UL (ref 1–4.8)
LYMPHOCYTES NFR BLD: 2.8 % (ref 22–41)
MCH RBC QN AUTO: 21.2 PG (ref 27–33)
MCHC RBC AUTO-ENTMCNC: 31 G/DL (ref 32.3–36.5)
MCV RBC AUTO: 68.4 FL (ref 81.4–97.8)
MONOCYTES # BLD AUTO: 2.43 K/UL (ref 0–0.85)
MONOCYTES NFR BLD AUTO: 11.7 % (ref 0–13.4)
NEUTROPHILS # BLD AUTO: 16.99 K/UL (ref 1.82–7.42)
NEUTROPHILS NFR BLD: 81.6 % (ref 44–72)
NRBC # BLD AUTO: 0.03 K/UL
NRBC BLD-RTO: 0.1 /100 WBC (ref 0–0.2)
NT-PROBNP SERPL IA-MCNC: 301 PG/ML (ref 0–125)
PLATELET # BLD AUTO: 515 K/UL (ref 164–446)
PMV BLD AUTO: 10.2 FL (ref 9–12.9)
POTASSIUM SERPL-SCNC: 4.9 MMOL/L (ref 3.6–5.5)
POTASSIUM SERPL-SCNC: 5.7 MMOL/L (ref 3.6–5.5)
PROCALCITONIN SERPL-MCNC: 0.1 NG/ML
PROT SERPL-MCNC: 6.6 G/DL (ref 6–8.2)
PROT SERPL-MCNC: 7.2 G/DL (ref 6–8.2)
PROTHROMBIN TIME: 20.4 SEC (ref 12–14.6)
RBC # BLD AUTO: 6.8 M/UL (ref 4.7–6.1)
RH BLD: NORMAL
SODIUM SERPL-SCNC: 132 MMOL/L (ref 135–145)
SODIUM SERPL-SCNC: 134 MMOL/L (ref 135–145)
TRIGL SERPL-MCNC: 125 MG/DL (ref 0–149)
TROPONIN T SERPL-MCNC: 13 NG/L (ref 6–19)
TROPONIN T SERPL-MCNC: 14 NG/L (ref 6–19)
WBC # BLD AUTO: 20.8 K/UL (ref 4.8–10.8)

## 2023-09-10 PROCEDURE — 80053 COMPREHEN METABOLIC PANEL: CPT

## 2023-09-10 PROCEDURE — 99406 BEHAV CHNG SMOKING 3-10 MIN: CPT

## 2023-09-10 PROCEDURE — 86850 RBC ANTIBODY SCREEN: CPT

## 2023-09-10 PROCEDURE — 36415 COLL VENOUS BLD VENIPUNCTURE: CPT

## 2023-09-10 PROCEDURE — 86140 C-REACTIVE PROTEIN: CPT

## 2023-09-10 PROCEDURE — 85610 PROTHROMBIN TIME: CPT

## 2023-09-10 PROCEDURE — 700102 HCHG RX REV CODE 250 W/ 637 OVERRIDE(OP): Performed by: INTERNAL MEDICINE

## 2023-09-10 PROCEDURE — 700117 HCHG RX CONTRAST REV CODE 255: Mod: UD | Performed by: EMERGENCY MEDICINE

## 2023-09-10 PROCEDURE — A9270 NON-COVERED ITEM OR SERVICE: HCPCS | Performed by: INTERNAL MEDICINE

## 2023-09-10 PROCEDURE — 99285 EMERGENCY DEPT VISIT HI MDM: CPT

## 2023-09-10 PROCEDURE — 96376 TX/PRO/DX INJ SAME DRUG ADON: CPT

## 2023-09-10 PROCEDURE — 84145 PROCALCITONIN (PCT): CPT

## 2023-09-10 PROCEDURE — 86901 BLOOD TYPING SEROLOGIC RH(D): CPT

## 2023-09-10 PROCEDURE — 80061 LIPID PANEL: CPT

## 2023-09-10 PROCEDURE — 96375 TX/PRO/DX INJ NEW DRUG ADDON: CPT

## 2023-09-10 PROCEDURE — 84484 ASSAY OF TROPONIN QUANT: CPT

## 2023-09-10 PROCEDURE — 99406 BEHAV CHNG SMOKING 3-10 MIN: CPT | Performed by: INTERNAL MEDICINE

## 2023-09-10 PROCEDURE — 93005 ELECTROCARDIOGRAM TRACING: CPT

## 2023-09-10 PROCEDURE — 700111 HCHG RX REV CODE 636 W/ 250 OVERRIDE (IP): Mod: UD | Performed by: EMERGENCY MEDICINE

## 2023-09-10 PROCEDURE — 74175 CTA ABDOMEN W/CONTRAST: CPT

## 2023-09-10 PROCEDURE — 83880 ASSAY OF NATRIURETIC PEPTIDE: CPT

## 2023-09-10 PROCEDURE — 85025 COMPLETE CBC W/AUTO DIFF WBC: CPT

## 2023-09-10 PROCEDURE — 96374 THER/PROPH/DIAG INJ IV PUSH: CPT

## 2023-09-10 PROCEDURE — 99223 1ST HOSP IP/OBS HIGH 75: CPT | Mod: 25 | Performed by: INTERNAL MEDICINE

## 2023-09-10 PROCEDURE — 770020 HCHG ROOM/CARE - TELE (206)

## 2023-09-10 PROCEDURE — 93005 ELECTROCARDIOGRAM TRACING: CPT | Performed by: EMERGENCY MEDICINE

## 2023-09-10 PROCEDURE — 86900 BLOOD TYPING SEROLOGIC ABO: CPT

## 2023-09-10 RX ORDER — OXYCODONE HYDROCHLORIDE 5 MG/1
5 TABLET ORAL
Status: DISCONTINUED | OUTPATIENT
Start: 2023-09-10 | End: 2023-09-11

## 2023-09-10 RX ORDER — ENOXAPARIN SODIUM 100 MG/ML
40 INJECTION SUBCUTANEOUS DAILY
Status: DISCONTINUED | OUTPATIENT
Start: 2023-09-10 | End: 2023-09-10

## 2023-09-10 RX ORDER — CHLORPROMAZINE HYDROCHLORIDE 50 MG/1
50 TABLET, FILM COATED ORAL 2 TIMES DAILY PRN
Status: DISCONTINUED | OUTPATIENT
Start: 2023-09-10 | End: 2023-09-11

## 2023-09-10 RX ORDER — LABETALOL HYDROCHLORIDE 5 MG/ML
10 INJECTION, SOLUTION INTRAVENOUS EVERY 4 HOURS PRN
Status: DISCONTINUED | OUTPATIENT
Start: 2023-09-10 | End: 2023-09-13 | Stop reason: HOSPADM

## 2023-09-10 RX ORDER — DEXAMETHASONE 4 MG/1
6 TABLET ORAL DAILY
Status: DISCONTINUED | OUTPATIENT
Start: 2023-09-10 | End: 2023-09-11

## 2023-09-10 RX ORDER — PROMETHAZINE HYDROCHLORIDE 25 MG/1
12.5-25 TABLET ORAL EVERY 4 HOURS PRN
Status: DISCONTINUED | OUTPATIENT
Start: 2023-09-10 | End: 2023-09-13 | Stop reason: HOSPADM

## 2023-09-10 RX ORDER — OXYCODONE HYDROCHLORIDE 5 MG/1
2.5 TABLET ORAL
Status: DISCONTINUED | OUTPATIENT
Start: 2023-09-10 | End: 2023-09-11

## 2023-09-10 RX ORDER — HYDROXYZINE HYDROCHLORIDE 25 MG/1
25 TABLET, FILM COATED ORAL NIGHTLY PRN
Status: DISCONTINUED | OUTPATIENT
Start: 2023-09-10 | End: 2023-09-13 | Stop reason: HOSPADM

## 2023-09-10 RX ORDER — PROCHLORPERAZINE EDISYLATE 5 MG/ML
5-10 INJECTION INTRAMUSCULAR; INTRAVENOUS EVERY 4 HOURS PRN
Status: DISCONTINUED | OUTPATIENT
Start: 2023-09-10 | End: 2023-09-13 | Stop reason: HOSPADM

## 2023-09-10 RX ORDER — LOSARTAN POTASSIUM 25 MG/1
25 TABLET ORAL DAILY
Status: DISCONTINUED | OUTPATIENT
Start: 2023-09-11 | End: 2023-09-11

## 2023-09-10 RX ORDER — POLYETHYLENE GLYCOL 3350 17 G/17G
1 POWDER, FOR SOLUTION ORAL
Status: DISCONTINUED | OUTPATIENT
Start: 2023-09-10 | End: 2023-09-13 | Stop reason: HOSPADM

## 2023-09-10 RX ORDER — ONDANSETRON 2 MG/ML
4 INJECTION INTRAMUSCULAR; INTRAVENOUS ONCE
Status: COMPLETED | OUTPATIENT
Start: 2023-09-10 | End: 2023-09-10

## 2023-09-10 RX ORDER — HYDROMORPHONE HYDROCHLORIDE 1 MG/ML
1 INJECTION, SOLUTION INTRAMUSCULAR; INTRAVENOUS; SUBCUTANEOUS ONCE
Status: COMPLETED | OUTPATIENT
Start: 2023-09-10 | End: 2023-09-10

## 2023-09-10 RX ORDER — METOPROLOL SUCCINATE 50 MG/1
100 TABLET, EXTENDED RELEASE ORAL DAILY
Status: DISCONTINUED | OUTPATIENT
Start: 2023-09-11 | End: 2023-09-13 | Stop reason: HOSPADM

## 2023-09-10 RX ORDER — ONDANSETRON 2 MG/ML
4 INJECTION INTRAMUSCULAR; INTRAVENOUS EVERY 4 HOURS PRN
Status: DISCONTINUED | OUTPATIENT
Start: 2023-09-10 | End: 2023-09-13 | Stop reason: HOSPADM

## 2023-09-10 RX ORDER — GUAIFENESIN/DEXTROMETHORPHAN 100-10MG/5
10 SYRUP ORAL EVERY 6 HOURS PRN
Status: DISCONTINUED | OUTPATIENT
Start: 2023-09-10 | End: 2023-09-13 | Stop reason: HOSPADM

## 2023-09-10 RX ORDER — HYDROMORPHONE HYDROCHLORIDE 1 MG/ML
0.25 INJECTION, SOLUTION INTRAMUSCULAR; INTRAVENOUS; SUBCUTANEOUS
Status: DISCONTINUED | OUTPATIENT
Start: 2023-09-10 | End: 2023-09-11

## 2023-09-10 RX ORDER — ONDANSETRON 4 MG/1
4 TABLET, ORALLY DISINTEGRATING ORAL EVERY 4 HOURS PRN
Status: DISCONTINUED | OUTPATIENT
Start: 2023-09-10 | End: 2023-09-13 | Stop reason: HOSPADM

## 2023-09-10 RX ORDER — ASPIRIN 300 MG/1
300 SUPPOSITORY RECTAL DAILY
Status: DISCONTINUED | OUTPATIENT
Start: 2023-09-11 | End: 2023-09-11

## 2023-09-10 RX ORDER — PROMETHAZINE HYDROCHLORIDE 25 MG/1
12.5-25 SUPPOSITORY RECTAL EVERY 4 HOURS PRN
Status: DISCONTINUED | OUTPATIENT
Start: 2023-09-10 | End: 2023-09-13 | Stop reason: HOSPADM

## 2023-09-10 RX ORDER — CHOLECALCIFEROL (VITAMIN D3) 1250 MCG
50000 CAPSULE ORAL
COMMUNITY

## 2023-09-10 RX ORDER — OMEPRAZOLE 20 MG/1
20 CAPSULE, DELAYED RELEASE ORAL DAILY
Status: DISCONTINUED | OUTPATIENT
Start: 2023-09-11 | End: 2023-09-13 | Stop reason: HOSPADM

## 2023-09-10 RX ORDER — NICOTINE 21 MG/24HR
21 PATCH, TRANSDERMAL 24 HOURS TRANSDERMAL
Status: DISCONTINUED | OUTPATIENT
Start: 2023-09-11 | End: 2023-09-13 | Stop reason: HOSPADM

## 2023-09-10 RX ORDER — ACETAMINOPHEN 325 MG/1
650 TABLET ORAL EVERY 6 HOURS PRN
Status: DISCONTINUED | OUTPATIENT
Start: 2023-09-10 | End: 2023-09-11

## 2023-09-10 RX ORDER — AMOXICILLIN 250 MG
2 CAPSULE ORAL 2 TIMES DAILY
Status: DISCONTINUED | OUTPATIENT
Start: 2023-09-10 | End: 2023-09-13 | Stop reason: HOSPADM

## 2023-09-10 RX ORDER — ASPIRIN 325 MG
325 TABLET ORAL DAILY
Status: DISCONTINUED | OUTPATIENT
Start: 2023-09-11 | End: 2023-09-11

## 2023-09-10 RX ORDER — BISACODYL 10 MG
10 SUPPOSITORY, RECTAL RECTAL
Status: DISCONTINUED | OUTPATIENT
Start: 2023-09-10 | End: 2023-09-13 | Stop reason: HOSPADM

## 2023-09-10 RX ORDER — ASPIRIN 81 MG/1
324 TABLET, CHEWABLE ORAL DAILY
Status: DISCONTINUED | OUTPATIENT
Start: 2023-09-11 | End: 2023-09-11

## 2023-09-10 RX ADMIN — OXYCODONE HYDROCHLORIDE 5 MG: 5 TABLET ORAL at 23:51

## 2023-09-10 RX ADMIN — DEXAMETHASONE 6 MG: 4 TABLET ORAL at 23:24

## 2023-09-10 RX ADMIN — ONDANSETRON 4 MG: 2 INJECTION INTRAMUSCULAR; INTRAVENOUS at 21:15

## 2023-09-10 RX ADMIN — ONDANSETRON 4 MG: 2 INJECTION INTRAMUSCULAR; INTRAVENOUS at 19:49

## 2023-09-10 RX ADMIN — HYDROMORPHONE HYDROCHLORIDE 1 MG: 1 INJECTION, SOLUTION INTRAMUSCULAR; INTRAVENOUS; SUBCUTANEOUS at 21:15

## 2023-09-10 RX ADMIN — IOHEXOL 100 ML: 350 INJECTION, SOLUTION INTRAVENOUS at 20:45

## 2023-09-10 RX ADMIN — HYDROMORPHONE HYDROCHLORIDE 1 MG: 1 INJECTION, SOLUTION INTRAMUSCULAR; INTRAVENOUS; SUBCUTANEOUS at 19:49

## 2023-09-10 ASSESSMENT — COGNITIVE AND FUNCTIONAL STATUS - GENERAL
MOBILITY SCORE: 24
SUGGESTED CMS G CODE MODIFIER MOBILITY: CH
DAILY ACTIVITIY SCORE: 24
SUGGESTED CMS G CODE MODIFIER DAILY ACTIVITY: CH

## 2023-09-10 ASSESSMENT — FIBROSIS 4 INDEX
FIB4 SCORE: 0.6
FIB4 SCORE: 0.62

## 2023-09-10 ASSESSMENT — PAIN DESCRIPTION - PAIN TYPE: TYPE: ACUTE PAIN

## 2023-09-11 ENCOUNTER — APPOINTMENT (OUTPATIENT)
Dept: RADIOLOGY | Facility: MEDICAL CENTER | Age: 63
DRG: 177 | End: 2023-09-11
Payer: MEDICAID

## 2023-09-11 ENCOUNTER — APPOINTMENT (OUTPATIENT)
Dept: RADIOLOGY | Facility: MEDICAL CENTER | Age: 63
DRG: 177 | End: 2023-09-11
Attending: INTERNAL MEDICINE
Payer: MEDICAID

## 2023-09-11 PROBLEM — U07.1 PNEUMONIA DUE TO COVID-19 VIRUS: Status: ACTIVE | Noted: 2023-09-11

## 2023-09-11 PROBLEM — J12.82 PNEUMONIA DUE TO COVID-19 VIRUS: Status: ACTIVE | Noted: 2023-09-11

## 2023-09-11 LAB
ACANTHOCYTES BLD QL SMEAR: NORMAL
ALBUMIN SERPL BCP-MCNC: 4 G/DL (ref 3.2–4.9)
ALBUMIN/GLOB SERPL: 1.2 G/DL
ALP SERPL-CCNC: 44 U/L (ref 30–99)
ALT SERPL-CCNC: 76 U/L (ref 2–50)
ANION GAP SERPL CALC-SCNC: 11 MMOL/L (ref 7–16)
ANION GAP SERPL CALC-SCNC: 13 MMOL/L (ref 7–16)
ANISOCYTOSIS BLD QL SMEAR: ABNORMAL
AST SERPL-CCNC: 44 U/L (ref 12–45)
BASOPHILS # BLD AUTO: 0.5 % (ref 0–1.8)
BASOPHILS # BLD: 0.08 K/UL (ref 0–0.12)
BILIRUB SERPL-MCNC: 0.4 MG/DL (ref 0.1–1.5)
BUN SERPL-MCNC: 20 MG/DL (ref 8–22)
BUN SERPL-MCNC: 27 MG/DL (ref 8–22)
BURR CELLS BLD QL SMEAR: NORMAL
CALCIUM ALBUM COR SERPL-MCNC: 8.8 MG/DL (ref 8.5–10.5)
CALCIUM SERPL-MCNC: 8.8 MG/DL (ref 8.5–10.5)
CALCIUM SERPL-MCNC: 8.9 MG/DL (ref 8.5–10.5)
CHLORIDE SERPL-SCNC: 98 MMOL/L (ref 96–112)
CHLORIDE SERPL-SCNC: 98 MMOL/L (ref 96–112)
CO2 SERPL-SCNC: 20 MMOL/L (ref 20–33)
CO2 SERPL-SCNC: 22 MMOL/L (ref 20–33)
COMMENT 1642: NORMAL
CREAT SERPL-MCNC: 1.17 MG/DL (ref 0.5–1.4)
CREAT SERPL-MCNC: 1.27 MG/DL (ref 0.5–1.4)
CRP SERPL HS-MCNC: 1.84 MG/DL (ref 0–0.75)
EKG IMPRESSION: NORMAL
EOSINOPHIL # BLD AUTO: 0.01 K/UL (ref 0–0.51)
EOSINOPHIL NFR BLD: 0.1 % (ref 0–6.9)
ERYTHROCYTE [DISTWIDTH] IN BLOOD BY AUTOMATED COUNT: 53.1 FL (ref 35.9–50)
ERYTHROCYTE [SEDIMENTATION RATE] IN BLOOD BY WESTERGREN METHOD: 1 MM/HOUR (ref 0–20)
FLUAV RNA SPEC QL NAA+PROBE: NEGATIVE
FLUBV RNA SPEC QL NAA+PROBE: NEGATIVE
GFR SERPLBLD CREATININE-BSD FMLA CKD-EPI: 63 ML/MIN/1.73 M 2
GFR SERPLBLD CREATININE-BSD FMLA CKD-EPI: 70 ML/MIN/1.73 M 2
GLOBULIN SER CALC-MCNC: 3.4 G/DL (ref 1.9–3.5)
GLUCOSE SERPL-MCNC: 123 MG/DL (ref 65–99)
GLUCOSE SERPL-MCNC: 140 MG/DL (ref 65–99)
HCT VFR BLD AUTO: 50 % (ref 42–52)
HGB BLD-MCNC: 14.8 G/DL (ref 14–18)
HIV 1+2 AB+HIV1 P24 AG SERPL QL IA: NORMAL
HYPOCHROMIA BLD QL SMEAR: ABNORMAL
IMM GRANULOCYTES # BLD AUTO: 0.23 K/UL (ref 0–0.11)
IMM GRANULOCYTES NFR BLD AUTO: 1.4 % (ref 0–0.9)
LYMPHOCYTES # BLD AUTO: 0.37 K/UL (ref 1–4.8)
LYMPHOCYTES NFR BLD: 2.3 % (ref 22–41)
MACROCYTES BLD QL SMEAR: ABNORMAL
MCH RBC QN AUTO: 20.6 PG (ref 27–33)
MCHC RBC AUTO-ENTMCNC: 29.6 G/DL (ref 32.3–36.5)
MCV RBC AUTO: 69.6 FL (ref 81.4–97.8)
MICROCYTES BLD QL SMEAR: ABNORMAL
MONOCYTES # BLD AUTO: 0.41 K/UL (ref 0–0.85)
MONOCYTES NFR BLD AUTO: 2.5 % (ref 0–13.4)
MORPHOLOGY BLD-IMP: NORMAL
NEUTROPHILS # BLD AUTO: 15.12 K/UL (ref 1.82–7.42)
NEUTROPHILS NFR BLD: 93.2 % (ref 44–72)
NRBC # BLD AUTO: 0.02 K/UL
NRBC BLD-RTO: 0.1 /100 WBC (ref 0–0.2)
OVALOCYTES BLD QL SMEAR: NORMAL
PLATELET # BLD AUTO: 479 K/UL (ref 164–446)
PLATELET BLD QL SMEAR: NORMAL
PMV BLD AUTO: 9.6 FL (ref 9–12.9)
POIKILOCYTOSIS BLD QL SMEAR: NORMAL
POLYCHROMASIA BLD QL SMEAR: NORMAL
POTASSIUM SERPL-SCNC: 5.7 MMOL/L (ref 3.6–5.5)
POTASSIUM SERPL-SCNC: 5.9 MMOL/L (ref 3.6–5.5)
PROT SERPL-MCNC: 7.4 G/DL (ref 6–8.2)
RBC # BLD AUTO: 7.18 M/UL (ref 4.7–6.1)
RBC BLD AUTO: PRESENT
RSV RNA SPEC QL NAA+PROBE: NEGATIVE
SARS-COV-2 RNA RESP QL NAA+PROBE: DETECTED
SCCMEC + MECA PNL NOSE NAA+PROBE: NEGATIVE
SCHISTOCYTES BLD QL SMEAR: NORMAL
SODIUM SERPL-SCNC: 131 MMOL/L (ref 135–145)
SODIUM SERPL-SCNC: 131 MMOL/L (ref 135–145)
SPECIMEN SOURCE: ABNORMAL
TARGETS BLD QL SMEAR: NORMAL
WBC # BLD AUTO: 16.2 K/UL (ref 4.8–10.8)

## 2023-09-11 PROCEDURE — 700101 HCHG RX REV CODE 250: Performed by: INTERNAL MEDICINE

## 2023-09-11 PROCEDURE — 70544 MR ANGIOGRAPHY HEAD W/O DYE: CPT

## 2023-09-11 PROCEDURE — 700111 HCHG RX REV CODE 636 W/ 250 OVERRIDE (IP)

## 2023-09-11 PROCEDURE — 70450 CT HEAD/BRAIN W/O DYE: CPT

## 2023-09-11 PROCEDURE — 94640 AIRWAY INHALATION TREATMENT: CPT

## 2023-09-11 PROCEDURE — 700102 HCHG RX REV CODE 250 W/ 637 OVERRIDE(OP)

## 2023-09-11 PROCEDURE — 700102 HCHG RX REV CODE 250 W/ 637 OVERRIDE(OP): Performed by: INTERNAL MEDICINE

## 2023-09-11 PROCEDURE — 86140 C-REACTIVE PROTEIN: CPT

## 2023-09-11 PROCEDURE — 87040 BLOOD CULTURE FOR BACTERIA: CPT

## 2023-09-11 PROCEDURE — 770001 HCHG ROOM/CARE - MED/SURG/GYN PRIV*

## 2023-09-11 PROCEDURE — 87641 MR-STAPH DNA AMP PROBE: CPT

## 2023-09-11 PROCEDURE — 80048 BASIC METABOLIC PNL TOTAL CA: CPT

## 2023-09-11 PROCEDURE — 85025 COMPLETE CBC W/AUTO DIFF WBC: CPT

## 2023-09-11 PROCEDURE — 700111 HCHG RX REV CODE 636 W/ 250 OVERRIDE (IP): Performed by: INTERNAL MEDICINE

## 2023-09-11 PROCEDURE — A9270 NON-COVERED ITEM OR SERVICE: HCPCS

## 2023-09-11 PROCEDURE — 70553 MRI BRAIN STEM W/O & W/DYE: CPT

## 2023-09-11 PROCEDURE — 93010 ELECTROCARDIOGRAM REPORT: CPT | Performed by: INTERNAL MEDICINE

## 2023-09-11 PROCEDURE — G0475 HIV COMBINATION ASSAY: HCPCS

## 2023-09-11 PROCEDURE — 99233 SBSQ HOSP IP/OBS HIGH 50: CPT | Mod: GC | Performed by: INTERNAL MEDICINE

## 2023-09-11 PROCEDURE — 94669 MECHANICAL CHEST WALL OSCILL: CPT

## 2023-09-11 PROCEDURE — 80053 COMPREHEN METABOLIC PANEL: CPT

## 2023-09-11 PROCEDURE — 8E0ZXY6 ISOLATION: ICD-10-PCS | Performed by: INTERNAL MEDICINE

## 2023-09-11 PROCEDURE — A9579 GAD-BASE MR CONTRAST NOS,1ML: HCPCS

## 2023-09-11 PROCEDURE — A9270 NON-COVERED ITEM OR SERVICE: HCPCS | Performed by: INTERNAL MEDICINE

## 2023-09-11 PROCEDURE — 93005 ELECTROCARDIOGRAM TRACING: CPT | Performed by: INTERNAL MEDICINE

## 2023-09-11 PROCEDURE — 85652 RBC SED RATE AUTOMATED: CPT

## 2023-09-11 PROCEDURE — 70543 MRI ORBT/FAC/NCK W/O &W/DYE: CPT

## 2023-09-11 PROCEDURE — 700117 HCHG RX CONTRAST REV CODE 255

## 2023-09-11 PROCEDURE — C9803 HOPD COVID-19 SPEC COLLECT: HCPCS | Performed by: INTERNAL MEDICINE

## 2023-09-11 PROCEDURE — 36415 COLL VENOUS BLD VENIPUNCTURE: CPT

## 2023-09-11 PROCEDURE — 0241U HCHG SARS-COV-2 COVID-19 NFCT DS RESP RNA 4 TRGT MIC: CPT

## 2023-09-11 PROCEDURE — C9399 UNCLASSIFIED DRUGS OR BIOLOG: HCPCS

## 2023-09-11 RX ORDER — OXYCODONE HYDROCHLORIDE 5 MG/1
2.5 TABLET ORAL
Status: DISCONTINUED | OUTPATIENT
Start: 2023-09-11 | End: 2023-09-13 | Stop reason: HOSPADM

## 2023-09-11 RX ORDER — DEXAMETHASONE 4 MG/1
4 TABLET ORAL ONCE
Status: COMPLETED | OUTPATIENT
Start: 2023-09-11 | End: 2023-09-11

## 2023-09-11 RX ORDER — ACETAMINOPHEN 500 MG
1000 TABLET ORAL EVERY 8 HOURS
Status: DISCONTINUED | OUTPATIENT
Start: 2023-09-11 | End: 2023-09-13 | Stop reason: HOSPADM

## 2023-09-11 RX ORDER — AZITHROMYCIN 250 MG/1
500 TABLET, FILM COATED ORAL DAILY
Status: COMPLETED | OUTPATIENT
Start: 2023-09-11 | End: 2023-09-13

## 2023-09-11 RX ORDER — IPRATROPIUM BROMIDE AND ALBUTEROL SULFATE 2.5; .5 MG/3ML; MG/3ML
3 SOLUTION RESPIRATORY (INHALATION)
Status: DISCONTINUED | OUTPATIENT
Start: 2023-09-11 | End: 2023-09-12

## 2023-09-11 RX ORDER — DEXAMETHASONE 4 MG/1
10 TABLET ORAL DAILY
Status: DISCONTINUED | OUTPATIENT
Start: 2023-09-12 | End: 2023-09-12

## 2023-09-11 RX ORDER — FUROSEMIDE 10 MG/ML
40 INJECTION INTRAMUSCULAR; INTRAVENOUS ONCE
Status: COMPLETED | OUTPATIENT
Start: 2023-09-11 | End: 2023-09-11

## 2023-09-11 RX ORDER — OXYCODONE HYDROCHLORIDE 5 MG/1
5 TABLET ORAL
Status: DISCONTINUED | OUTPATIENT
Start: 2023-09-11 | End: 2023-09-13 | Stop reason: HOSPADM

## 2023-09-11 RX ORDER — HYDROMORPHONE HYDROCHLORIDE 1 MG/ML
0.5 INJECTION, SOLUTION INTRAMUSCULAR; INTRAVENOUS; SUBCUTANEOUS
Status: DISCONTINUED | OUTPATIENT
Start: 2023-09-11 | End: 2023-09-13 | Stop reason: HOSPADM

## 2023-09-11 RX ADMIN — HYDROMORPHONE HYDROCHLORIDE 0.5 MG: 1 INJECTION, SOLUTION INTRAMUSCULAR; INTRAVENOUS; SUBCUTANEOUS at 21:57

## 2023-09-11 RX ADMIN — OXYCODONE HYDROCHLORIDE 5 MG: 5 TABLET ORAL at 06:33

## 2023-09-11 RX ADMIN — AZITHROMYCIN DIHYDRATE 500 MG: 250 TABLET ORAL at 02:49

## 2023-09-11 RX ADMIN — ACETAMINOPHEN 1000 MG: 500 TABLET ORAL at 13:32

## 2023-09-11 RX ADMIN — OMEPRAZOLE 20 MG: 20 CAPSULE, DELAYED RELEASE ORAL at 05:25

## 2023-09-11 RX ADMIN — IPRATROPIUM BROMIDE AND ALBUTEROL SULFATE 3 ML: 2.5; .5 SOLUTION RESPIRATORY (INHALATION) at 13:50

## 2023-09-11 RX ADMIN — METOPROLOL SUCCINATE 100 MG: 50 TABLET, EXTENDED RELEASE ORAL at 05:24

## 2023-09-11 RX ADMIN — OXYCODONE HYDROCHLORIDE 5 MG: 5 TABLET ORAL at 02:52

## 2023-09-11 RX ADMIN — IPRATROPIUM BROMIDE AND ALBUTEROL SULFATE 3 ML: 2.5; .5 SOLUTION RESPIRATORY (INHALATION) at 19:00

## 2023-09-11 RX ADMIN — OXYCODONE HYDROCHLORIDE 5 MG: 5 TABLET ORAL at 20:08

## 2023-09-11 RX ADMIN — HYDROMORPHONE HYDROCHLORIDE 0.5 MG: 1 INJECTION, SOLUTION INTRAMUSCULAR; INTRAVENOUS; SUBCUTANEOUS at 18:03

## 2023-09-11 RX ADMIN — FUROSEMIDE 40 MG: 10 INJECTION INTRAMUSCULAR; INTRAVENOUS at 13:33

## 2023-09-11 RX ADMIN — SODIUM ZIRCONIUM CYCLOSILICATE 10 G: 5 POWDER, FOR SUSPENSION ORAL at 18:08

## 2023-09-11 RX ADMIN — IPRATROPIUM BROMIDE AND ALBUTEROL SULFATE 3 ML: 2.5; .5 SOLUTION RESPIRATORY (INHALATION) at 10:42

## 2023-09-11 RX ADMIN — GADOTERIDOL 20 ML: 279.3 INJECTION, SOLUTION INTRAVENOUS at 21:31

## 2023-09-11 RX ADMIN — OXYCODONE HYDROCHLORIDE 5 MG: 5 TABLET ORAL at 16:42

## 2023-09-11 RX ADMIN — HYDROMORPHONE HYDROCHLORIDE 0.25 MG: 1 INJECTION, SOLUTION INTRAMUSCULAR; INTRAVENOUS; SUBCUTANEOUS at 09:16

## 2023-09-11 RX ADMIN — DEXAMETHASONE 4 MG: 4 TABLET ORAL at 09:58

## 2023-09-11 RX ADMIN — LOSARTAN POTASSIUM 25 MG: 25 TABLET, FILM COATED ORAL at 05:24

## 2023-09-11 RX ADMIN — OXYCODONE HYDROCHLORIDE 5 MG: 5 TABLET ORAL at 10:04

## 2023-09-11 RX ADMIN — HYDROMORPHONE HYDROCHLORIDE 0.25 MG: 1 INJECTION, SOLUTION INTRAMUSCULAR; INTRAVENOUS; SUBCUTANEOUS at 00:59

## 2023-09-11 RX ADMIN — HYDROMORPHONE HYDROCHLORIDE 0.25 MG: 1 INJECTION, SOLUTION INTRAMUSCULAR; INTRAVENOUS; SUBCUTANEOUS at 05:21

## 2023-09-11 RX ADMIN — HYDROMORPHONE HYDROCHLORIDE 0.5 MG: 1 INJECTION, SOLUTION INTRAMUSCULAR; INTRAVENOUS; SUBCUTANEOUS at 11:28

## 2023-09-11 RX ADMIN — CEFTRIAXONE SODIUM 2000 MG: 10 INJECTION, POWDER, FOR SOLUTION INTRAVENOUS at 02:49

## 2023-09-11 RX ADMIN — IPRATROPIUM BROMIDE AND ALBUTEROL SULFATE 3 ML: 2.5; .5 SOLUTION RESPIRATORY (INHALATION) at 07:06

## 2023-09-11 ASSESSMENT — PAIN DESCRIPTION - PAIN TYPE
TYPE: ACUTE PAIN

## 2023-09-11 ASSESSMENT — ENCOUNTER SYMPTOMS
SENSORY CHANGE: 0
SPUTUM PRODUCTION: 0
HEADACHES: 1
ABDOMINAL PAIN: 0
TINGLING: 0
CHILLS: 1
DOUBLE VISION: 0
HEARTBURN: 0
ORTHOPNEA: 0
MYALGIAS: 1
SHORTNESS OF BREATH: 1
NAUSEA: 0
FEVER: 0
FLANK PAIN: 0
COUGH: 0
HALLUCINATIONS: 0
BACK PAIN: 0
VOMITING: 0
WEIGHT LOSS: 0
COUGH: 1
HEADACHES: 0
PALPITATIONS: 0
SPEECH CHANGE: 0
CHILLS: 0
HEMOPTYSIS: 0
BRUISES/BLEEDS EASILY: 0
DIZZINESS: 0
NECK PAIN: 0
NERVOUS/ANXIOUS: 0
DOUBLE VISION: 1
POLYDIPSIA: 0
BLURRED VISION: 0
TREMORS: 0
FOCAL WEAKNESS: 0
PHOTOPHOBIA: 0

## 2023-09-11 ASSESSMENT — FIBROSIS 4 INDEX: FIB4 SCORE: 0.66

## 2023-09-11 ASSESSMENT — LIFESTYLE VARIABLES
TOTAL SCORE: 0
EVER HAD A DRINK FIRST THING IN THE MORNING TO STEADY YOUR NERVES TO GET RID OF A HANGOVER: NO
HAVE YOU EVER FELT YOU SHOULD CUT DOWN ON YOUR DRINKING: NO
TOTAL SCORE: 0
AVERAGE NUMBER OF DAYS PER WEEK YOU HAVE A DRINK CONTAINING ALCOHOL: 0
TOTAL SCORE: 0
CONSUMPTION TOTAL: NEGATIVE
ALCOHOL_USE: NO
HAVE PEOPLE ANNOYED YOU BY CRITICIZING YOUR DRINKING: NO
HOW MANY TIMES IN THE PAST YEAR HAVE YOU HAD 5 OR MORE DRINKS IN A DAY: 0
EVER FELT BAD OR GUILTY ABOUT YOUR DRINKING: NO
ON A TYPICAL DAY WHEN YOU DRINK ALCOHOL HOW MANY DRINKS DO YOU HAVE: 0
SUBSTANCE_ABUSE: 0

## 2023-09-11 ASSESSMENT — CHA2DS2 SCORE
AGE 75 OR GREATER: NO
DIABETES: NO
CHF OR LEFT VENTRICULAR DYSFUNCTION: YES
HYPERTENSION: NO
SEX: MALE
PRIOR STROKE OR TIA OR THROMBOEMBOLISM: NO
VASCULAR DISEASE: YES
CHA2DS2 VASC SCORE: 2
AGE 65 TO 74: NO

## 2023-09-11 ASSESSMENT — COPD QUESTIONNAIRES
DURING THE PAST 4 WEEKS HOW MUCH DID YOU FEEL SHORT OF BREATH: MOST  OR ALL OF THE TIME
COPD SCREENING SCORE: 6
HAVE YOU SMOKED AT LEAST 100 CIGARETTES IN YOUR ENTIRE LIFE: YES
DO YOU EVER COUGH UP ANY MUCUS OR PHLEGM?: NO/ONLY WITH OCCASIONAL COLDS OR INFECTIONS

## 2023-09-11 NOTE — ASSESSMENT & PLAN NOTE
Resolved   Most likely secondary to acute COVID-19 infection.  - Decadron 6mg  - Wean off oxygen as tolerated

## 2023-09-11 NOTE — ED PROVIDER NOTES
"  CHIEF COMPLAINT  Chief Complaint   Patient presents with    Shortness of Breath     Tested positive for covid today, symptoms started this morning with fatigue / SOB / HA / body aches, denies respiratory hx    Chest Pain     Center of chest radiating down to abdomen starting today, gradual onset, constant and progressively worse, described as 'tight, squeezing', hx aortic valve replacement and ascending aortic dissection both repaired on 1/5/23.    Headache     Starting today, denies normal hx of such, no neuro deficits noted in triage       LIMITATION TO HISTORY   Select: Patient is significant uncomfortable.  Stating that \"I am in a lot of pain\".  Patient gives most of the history but states it is hard to breathe and talk.    HPI    Noe Hickey is a 62 y.o. male with a history of anticoagulation second either aortic valve or possible atrial flutter fibrillation with a history of atrial flutter ablation in June and aortic valve replacement in January and mesh of aortic aneurysm.  Comes in today because of chest pain.  It is pressure-like sensation on tightness in his chest nonradiating not going to his neck does not go his arm described as squeezing.  Not rating to the back.    It is associate with shortness of breath shortness breath started today was short of difficult breathing feels like his chest is tight hard to get deep breaths.  Nothing makes it worse thinks better associated chest discomfort which is hard to discern between lung and heart.  He has tested positive for COVID with a home test    His history of aortic replacement and blood pressure monitoring of greater than 20 it was 21 between the right and left a patient was as mentioned and I was brought into as a possible code aorta    OUTSIDE HISTORIAN(S):  Select: No outside historians    EXTERNAL RECORDS REVIEWED  Select: Other     Cosigned by: Alex Webster M.D. at 6/12/2023  4:18 PM   Seen in afternoon same day discharge EP rounds.  " S/P ablation of symptomatic paroxysmal Afib and flutter by Dr Webster with pulmonary vein isolation, LA roof line and CTI with bidirectional block for typical atrial flutter.        Conclusions per Dr Webster's Op Note dated 6/12/23:  Electrophysiological Findings:  Sinus cycle length 766 msec  Intervals- A-H 97 msec  H-V 35 msec  QRS 88 msec   msec   msec  AV block  ms  Total RF time: 1285 sec  Fluoro time: 0 min  Complications: None  Impression:  1. Atrial fibrillation, paroxysmal  2. Successful isolation of all four pulmonary veins  3. Successful LA roof line for additional afib ablation  4. Typical atrial flutter, successful CTI ablation with bidirectional block     Monitored rhythm has been sinus rhythm during his monitored recovery.  Vital signs are stable. He has ambulated without difficulty and his Right femoral access site remains C/D/I without evidence of hematoma, significant ecchymosis or pain.  Remainder of exam WDL.      I have provided education about importance of esophageal prophylaxis post ablation, he takes Protonix as needed.  We discussed once daily dosing x next 30 days.          Medication List          CHANGE how you take these medications         Instructions   metoprolol SR 50 MG Tb24  What changed: when to take this  Commonly known as: TOPROL XL    Take 1 Tablet by mouth every day.  Dose: 50 mg      pantoprazole 40 MG Tbec  What changed:   when to take this  reasons to take this  additional instructions  Commonly known as: PROTONIX                    ADMISSION DATE: 1/5/2023     DISCHARGE DATE: 1/11/23     ADMITTING DIAGNOSES: severe symptomatic aortic stenosis due to bicuspid valve, ascending aortic aneurysm, HTN, obesity      DISCHARGE DIAGNOSES: severe symptomatic aortic stenosis due to bicuspid valve, ascending aortic aneurysm, HTN, obesity      PROCEDURES PERFORMED: Dr Goyal 1/5/22  AORTIC VALVE REPLACEMENT 27mm Inspiris tissue valve, ASCENDING AORTIC ANEURYSM REPAIR with  32mm Hemashield graft AND TRANSESOPHAGEAL ECHOCARDIOGRAM.      HISTORY OF PRESENT ILLNESS:  The patient is a 62 y.o. male with a past medical history of GERD, migraines, 40-pack-year tobacco use, ascending aortic aneurysm 4.7 cm, aortic stenosis, a flutter with RVR. Complains of shortness of breath; coming on with shorter and shorter distances; now he cannot walk 10 steps without feeling dyspnea. Patient denies chest pain, syncope, edema and PND.  He still smokes 5-6 cigarettes per day. States he is motivated to quit.     HOSPITAL COURSE:   POD 1 Crticially ill, on bipap.  On epi-wean off prefer levo, SR currently.  Ventricular arrhythmia this am-torsades, electrolytes repleted, defibrillation x 1, sinus restored.  Neuro intact.  Wounds CDI.  Moderate output from chest tubes.  Hgb 11.5. Creatinine increasing 2.02 UO 500ml overnight.  K 5.9.  Additional lasix given this AM.  Ionized Ca 0.8-replete.  Mg 2.7 Echo shows preservation of EF and LV function.  Plan: Trend serial electrolytes and replete.  Lasix, trend creatinine, UO- consult neph if no improvement or decreasing UO. Wean bipap as tolerated.  Transition from epi to norepi for vasopressor support to avoid arrhythmias.  Avoid fluids with potassium.      REVIEW OF SYSTEMS  Neuro he is a headache frontal throbbing.  Occurred at the same time.  Nonradiating.  No associated weakness or numbness    PAST MEDICAL HISTORY  Past Medical History:   Diagnosis Date    Arrhythmia     Breath shortness 06/09/2023    prior to 1/2023 surgery    Cyclic vomiting syndrome     Elevated hemidiaphragm - LEFT post AVR 01/04/2023    Fracture     Headache, classical migraine     Heart burn     Heart valve disease 06/09/2023    heart surgery in 1/2023    Indigestion     Pneumonia due to infectious organism 01/20/2023    Snoring        FAMILY HISTORY  No family history on file.    SOCIAL HISTORY  Social History     Tobacco Use    Smoking status: Every Day     Current packs/day: 0.50      Average packs/day: 0.5 packs/day for 40.0 years (20.0 ttl pk-yrs)     Types: Cigarettes    Smokeless tobacco: Never    Tobacco comments:     Down to 5 cig daily   Vaping Use    Vaping Use: Never used   Substance Use Topics    Alcohol use: No    Drug use: Not Currently     Comment: past problems with narcotics not since 2019     Social History     Substance and Sexual Activity   Drug Use Not Currently    Comment: past problems with narcotics not since 2019       SURGICAL HISTORY  Past Surgical History:   Procedure Laterality Date    AORTIC VALVE REPLACEMENT  1/5/2023    Procedure: AORTIC VALVE REPLACEMENT, ASCENDING AORTIC ANEURYSM REPAIR AND TRANSESOPHAGEAL ECHOCARDIOGRAM.;  Surgeon: Serena Goyal M.D.;  Location: SURGERY [facility];  Service: Cardiac    AORTIC ASCENDING DISSECTION  1/5/2023    Procedure: REPAIR, ANEURYSM OR DISSECTION, AORTA, ASCENDING;  Surgeon: Serena Goyal M.D.;  Location: SURGERY [facility];  Service: Cardiac    ECHOCARDIOGRAM, TRANSESOPHAGEAL, INTRAOPERATIVE  1/5/2023    Procedure: ECHOCARDIOGRAM, TRANSESOPHAGEAL, INTRAOPERATIVE.;  Surgeon: Serena Goyal M.D.;  Location: SURGERY [facility];  Service: Cardiac    IRRIGATION & DEBRIDEMENT ORTHO Right 09/19/2017    Procedure: IRRIGATION & DEBRIDEMENT ORTHO-THIGH;  Surgeon: Corbin Rivera M.D.;  Location: [facility];  Service: Orthopedics    SHOULDER HEMICAP RESURFACING Right 10/12/2015    Procedure: RIGHT SHOULDER RESURFACING;  Surgeon: Javon Doll M.D.;  Location: [facility];  Service:     SHOULDER ARTHROSCOPY      x3    SHOULDER SURGERY      replacement right       CURRENT MEDICATIONS    Current Facility-Administered Medications:     HYDROmorphone (Dilaudid) injection 1 mg, 1 mg, Intravenous, Once, Noe Virk M.D.    ondansetron (Zofran) syringe/vial injection 4 mg, 4 mg, Intravenous, Once, Noe Virk M.D.    Current Outpatient Medications:     metoprolol SR (TOPROL XL) 100 MG  "TABLET SR 24 HR, Take 1 Tablet by mouth every day., Disp: 30 Tablet, Rfl: 2    rivaroxaban (XARELTO) 20 MG Tab tablet, Take 1 Tablet by mouth with dinner., Disp: 30 Tablet, Rfl: 2    losartan (COZAAR) 25 MG Tab, Take 1 Tablet by mouth every day., Disp: 30 Tablet, Rfl: 2    chlorproMAZINE (THORAZINE) 50 MG Tab, Take 50 mg by mouth 2 times a day as needed. Indications: Nausea and Vomiting, Disp: , Rfl:     ondansetron (ZOFRAN ODT) 4 MG TABLET DISPERSIBLE, Take 4 mg by mouth 2 times a day as needed for Nausea/Vomiting., Disp: , Rfl:     pantoprazole (PROTONIX) 40 MG Tablet Delayed Response, Take 1 Tablet by mouth every day. Take daily for 30 days post ablation then can reduce to as needed again., Disp: , Rfl:     hydrOXYzine HCl (ATARAX) 25 MG Tab, 25 mg at bedtime as needed (sleep)., Disp: , Rfl:     ALLERGIES  Allergies   Allergen Reactions    Morphine Rash and Itching     Rash/ difficulty breathing       PHYSICAL EXAM  VITAL SIGNS: BP (!) 143/83   Pulse (!) 104   Temp 37.6 °C (99.7 °F) (Temporal)   Resp (!) 49   Ht 1.753 m (5' 9\")   Wt 93 kg (205 lb)   SpO2 91%   BMI 30.27 kg/m²   Reviewed and noted.  Patient's elevated blood pressure tachycardic.  Borderline temperature  Constitutional: Well developed, Well nourished, uncomfortable appearing..  HENT: Normocephalic, atraumatic, bilateral external ears normal, No intraoral erythema, edema, exudate  Eyes: PERRLA, conjunctiva pink, no scleral icterus.   Cardiovascular: Regular rate and rhythm.  Positive 2 out of 6 murmur.  s.  No dependent edema or calf tenderness  Respiratory: Lungs clear to auscultation bilaterally. No wheezes, rales, or rhonchi.  Abdominal:  Abdomen soft, non-tender, non distended. No rebound, or guarding.    Skin: No erythema, no rash. No wounds or bruising.  Genitourinary: No costovertebral angle tenderness.   Musculoskeletal: no deformities.   Neurologic: Alert, no facial droop noted. All extra ocular muscles intact. Moves all extremities " with out weakness noted  Psychiatric: Affect normal, Judgment normal, Mood normal.         MEDICAL DECISION MAKING:  PROBLEMS EVALUATED THIS VISIT:  Chest pain.  At this point rate that could be ischemic could be from the lungs it could be from lung tightness pericarditis myocarditis among others.  Dissection is also in differential.  PE must be considered although patient has mother other complaints at this time  Shortness of breath.  He is COVID-positive.  This point patient has decreased air movement but he is not hypoxic.  With no rhonchi or rales noted.           PLAN:  We will continue code aorta with aortogram.  Dilaudid for pain  Zofran for nausea  CBC with for hematologic abnormality.  Metabolic CMP to look for metabolic abnormality  Troponin look for cardiac issues that is MI myocarditis pericarditis  Coags the patient is on blood thinners      RISK:  Patient is here with COVID and after evaluation subsequent hypoxia.  We were concerned about aortic dissection which was determined to be negative by CT scan.  But because of his COVID hypoxia patient is at severe risk of decompensating.  We must make sure the patient does become hypoxic or goes into arrest and therefore will be admitting the patient for further evaluation, pulmonary toilet observation.        RESULTS    LABS Ordered and Reviewed by Me:  Results for orders placed or performed during the hospital encounter of 09/10/23   CBC w/ Differential   Result Value Ref Range    WBC 20.8 (H) 4.8 - 10.8 K/uL    RBC 6.80 (H) 4.70 - 6.10 M/uL    Hemoglobin 14.4 14.0 - 18.0 g/dL    Hematocrit 46.5 42.0 - 52.0 %    MCV 68.4 (L) 81.4 - 97.8 fL    MCH 21.2 (L) 27.0 - 33.0 pg    MCHC 31.0 (L) 32.3 - 36.5 g/dL    RDW 52.1 (H) 35.9 - 50.0 fL    Platelet Count 515 (H) 164 - 446 K/uL    MPV 10.2 9.0 - 12.9 fL    Neutrophils-Polys 81.60 (H) 44.00 - 72.00 %    Lymphocytes 2.80 (L) 22.00 - 41.00 %    Monocytes 11.70 0.00 - 13.40 %    Eosinophils 1.40 0.00 - 6.90 %     Basophils 0.60 0.00 - 1.80 %    Immature Granulocytes 1.90 (H) 0.00 - 0.90 %    Nucleated RBC 0.10 0.00 - 0.20 /100 WBC    Neutrophils (Absolute) 16.99 (H) 1.82 - 7.42 K/uL    Lymphs (Absolute) 0.59 (L) 1.00 - 4.80 K/uL    Monos (Absolute) 2.43 (H) 0.00 - 0.85 K/uL    Eos (Absolute) 0.30 0.00 - 0.51 K/uL    Baso (Absolute) 0.13 (H) 0.00 - 0.12 K/uL    Immature Granulocytes (abs) 0.40 (H) 0.00 - 0.11 K/uL    NRBC (Absolute) 0.03 K/uL   Complete Metabolic Panel (CMP)   Result Value Ref Range    Sodium 134 (L) 135 - 145 mmol/L    Potassium 4.9 3.6 - 5.5 mmol/L    Chloride 102 96 - 112 mmol/L    Co2 22 20 - 33 mmol/L    Anion Gap 10.0 7.0 - 16.0    Glucose 115 (H) 65 - 99 mg/dL    Bun 17 8 - 22 mg/dL    Creatinine 1.04 0.50 - 1.40 mg/dL    Calcium 9.3 8.5 - 10.5 mg/dL    Correct Calcium 9.5 8.5 - 10.5 mg/dL    AST(SGOT) 43 12 - 45 U/L    ALT(SGPT) 72 (H) 2 - 50 U/L    Alkaline Phosphatase 43 30 - 99 U/L    Total Bilirubin 0.4 0.1 - 1.5 mg/dL    Albumin 3.8 3.2 - 4.9 g/dL    Total Protein 6.6 6.0 - 8.2 g/dL    Globulin 2.8 1.9 - 3.5 g/dL    A-G Ratio 1.4 g/dL   Troponin - STAT Once   Result Value Ref Range    Troponin T 13 6 - 19 ng/L   PT/INR   Result Value Ref Range    PT 20.4 (H) 12.0 - 14.6 sec    INR 1.72 (H) 0.87 - 1.13   COD - Adult (Type and Screen)   Result Value Ref Range    ABO Grouping Only O     Rh Grouping Only NEG     Antibody Screen-Cod NEG    ESTIMATED GFR   Result Value Ref Range    GFR (CKD-EPI) 80 >60 mL/min/1.73 m 2   EKG   Result Value Ref Range    Report       [facility] Emergency Dept.    Test Date:  2023-09-10  Pt Name:    NOE TOPETE               Department: ER  MRN:        [id number]                      Room:  Gender:     Male                         Technician: [id number]  :        1960                   Requested By:ER TRIAGE PROTOCOL  Order #:    [id number]                    Reading MD: Noe PABON MD    Measurements  Intervals                                 Axis  Rate:       103                          P:          106  IN:         164                          QRS:        30  QRSD:       85                           T:          59  QT:         302  QTc:        396    Interpretive Statements  Sinus tachycardia  Rate of 103  Intervals normal IN.  Normal QRS.  Normal QTc  Axis normal  Ischemia: No ST segment elevations noted.  No ST segment depressions that are  significant noted  Impression abnormal with no acute ischemic findings.  No significant changes  from July 4, 2023 EKG.  Electronically S igned On 09- 19:22:21 PDT by Noe PABON MD           RADIOLOGY      Radiologist interpretation:   CT-CTA COMPLETE THORACOABDOMINAL AORTA   Final Result         1.  No aortic aneurysm or dissection identified.   2.  Hepatomegaly   3.  Diverticulosis   4.  Atherosclerosis and atherosclerotic coronary artery disease   5.  Linear consolidation in the left lung base suggesting atelectasis, component of infiltrate is not excluded                  ED COURSE:    ED Observation Status? No   No noted need for observation for developing issue    INTERVENTIONS BY ME:  Medications   HYDROmorphone (Dilaudid) injection 1 mg (has no administration in time range)   ondansetron (Zofran) syringe/vial injection 4 mg (has no administration in time range)       Response on recheck:  Stable.        FINAL DISPO PLAN   Poke to the hospitalist to admit him [place] toilet, treatment, oxygen, supportive care      CONDITION: Guarded.     FINAL IMPRESSION  1. COVID    2. Hypoxia    3. Chest pain, unspecified type

## 2023-09-11 NOTE — HOSPITAL COURSE
Noe Hickey is a 61yo male with a PMHx of a recently diagnosed dural venous sinus thrombosis (7/2023) on Xarelto, systolic HF with EF of 40%, repair of aortic dissection in 5/2023, and COPD not on home oxygen who presented for a subacute onset of left-sided throbbing headache, malaise, shortness of breath and myalgias. He was tachycardic in the ED and requiring 2L of O2 to maintain adequate saturations. Patient was additionally complaining of  chest pain and underwent a CTA of the thoracoabdominal aorta which demonstrated no concern for a dissection but did show a linear consolidation in the left lung base. Patient was initiated on 10 days of Decradon as well as Ceftriaxone and Azithromycin for suspected superimposed bacterial pneumonia. MRI of the brain and orbit were ordered alongside MRV due to the patient's headache and history of dural venous sinus thrombosis which were all unremarkable for any acute processes.

## 2023-09-11 NOTE — ED NOTES
Patient complaining of shortness of breath and headache, patient sats on room air 88%. Patient put on 2L oxygen via nasal canula and voalt ERP for pain medication.

## 2023-09-11 NOTE — ASSESSMENT & PLAN NOTE
Resolving   Probably pleuritic secondary to COVID-19 pneumonia  CTA negative for dissection  EKG and troponin reassuring  - Scheduled tylenol for pain relief

## 2023-09-11 NOTE — ED NOTES
Bedside report received from ALLEN Bullock. Patient AAOx4, GCS 15, breathing even but labored on room air, RR 24/mnt. Patient connected to the monitor. No risk for fall.

## 2023-09-11 NOTE — PROGRESS NOTES
4 Eyes Skin Assessment Completed by Xuan, ALLEN and ALLEN Gerard.    Head WDL  Ears WDL  Nose WDL  Mouth WDL  Neck WDL  Breast/Chest WDL  Shoulder Blades WDL  Spine WDL  (R) Arm/Elbow/Hand WDL  (L) Arm/Elbow/Hand WDL  Abdomen WDL  Groin WDL  Scrotum/Coccyx/Buttocks WDL  (R) Leg WDL  (L) Leg WDL  (R) Heel/Foot/Toe WDL  (L) Heel/Foot/Toe WDL          Devices In Places Tele Box, Pulse Ox, and Nasal Cannula      Interventions In Place NC W/Ear Foams and Pillows    Possible Skin Injury No    Pictures Uploaded Into Epic N/A  Wound Consult Placed N/A  RN Wound Prevention Protocol Ordered No

## 2023-09-11 NOTE — ED NOTES
Med Rec complete per patient  No oral antibiotics in the last 30 days   Allergies reviewed  Preferred Pharmacy: [facility] on [address]

## 2023-09-11 NOTE — ASSESSMENT & PLAN NOTE
Patient reports he has smoked half a pack a day for a while.  - Tobacco cessation counseling prior to discharge

## 2023-09-11 NOTE — ASSESSMENT & PLAN NOTE
Patient found to be positive for COVID-19, presented with shortness of breath, and hypoxia.  CTA demonstrated left linear consolidation. Patient additionally had WBC of 20 on presentation. This could possibly be due to a bacterial superinfection.  - Continue Ceftriaxone 2g and Azithro 500mg

## 2023-09-11 NOTE — PROGRESS NOTES
[facility] Internal Medicine Daily Progress Note    Date of Service  9/11/2023    R Team: R IM Green Team   Attending: Alejandro Mcmullen M.d.  Senior Resident: Dr. Parker  Intern:  Dr. Curtis  Contact Number: [phone]    Chief Complaint  Noe Hickey is a 62 y.o. male admitted 9/10/2023 with acute hypoxic respiratory failure.     Hospital Course  Noe Hickey is a 63yo male with a PMHx of a recently diagnosed dural venous sinus thrombosis (7/2023) on Xarelto, systolic HF with EF of 40%, repair of aortic dissection in 5/2023, and COPD not on home oxygen who presented for a subacute onset of left-sided throbbing headache, malaise, shortness of breath and myalgias. He was tachycardic in the ED and requiring 2L of O2 to maintain adequate saturations. Patient was additionally complaining of  chest pain and underwent a CTA of the thoracoabdominal aorta which demonstrated no concern for a dissection but did show a linear consolidation in the left lung base. Patient was initiated on 10 days of Decradon as well as Ceftriaxone and Azithromycin for suspected superimposed bacterial pneumonia. MRI of the brain and orbit were ordered alongside MRV due to the patient's headache and history of dural venous sinus thrombosis.     Interval Problem Update  Patient reports he has been left-sided throbbing retro-orbital headache that has been progressive in nature.  He also states he has pain with movement of the left eye along with occasional diplopia. He denied any nausea vomiting.     Stat MRI brain and orbit along with MRV ordered.  Xarelto held.    I have discussed this patient's plan of care and discharge plan at IDT rounds today with Case Management, Nursing, Nursing leadership, and other members of the IDT team.    Consultants/Specialty  None    Code Status  Full Code    Disposition  The patient is not medically cleared for discharge to home or a post-acute facility.      I have placed the appropriate orders for  "post-discharge needs.    Review of Systems  Review of Systems   Constitutional:  Positive for chills and malaise/fatigue. Negative for fever.   Eyes:  Positive for double vision.   Respiratory:  Positive for shortness of breath. Negative for cough and sputum production.    Cardiovascular:  Negative for chest pain, palpitations and leg swelling.   Gastrointestinal:  Negative for abdominal pain.   Genitourinary:  Negative for dysuria.   Musculoskeletal:  Positive for myalgias.   Neurological:  Positive for headaches. Negative for dizziness, tingling, tremors, sensory change, speech change and focal weakness.        Physical Exam  Temp:  [36.5 °C (97.7 °F)-37.6 °C (99.7 °F)] 36.5 °C (97.7 °F)  Pulse:  [] 74  Resp:  [17-49] 20  BP: (119-159)/(65-88) 134/81  SpO2:  [88 %-97 %] 97 %    Physical Exam  Constitutional:       Appearance: He is not ill-appearing.   HENT:      Head: Normocephalic.      Mouth/Throat:      Mouth: Mucous membranes are moist.   Eyes:      General: No scleral icterus.        Right eye: No discharge.         Left eye: No discharge.   Cardiovascular:      Rate and Rhythm: Normal rate and regular rhythm.      Pulses: Normal pulses.      Heart sounds: Normal heart sounds.   Pulmonary:      Effort: Pulmonary effort is normal.      Breath sounds: Normal breath sounds.   Abdominal:      Tenderness: There is no abdominal tenderness.   Musculoskeletal:         General: Normal range of motion.      Cervical back: Normal range of motion.      Right lower leg: No edema.      Left lower leg: No edema.   Skin:     General: Skin is warm and dry.   Neurological:      General: No focal deficit present.      Mental Status: He is alert.      Cranial Nerves: No cranial nerve deficit.      Sensory: No sensory deficit.      Motor: No weakness.      Comments: \"Irritation\" with movement of the left eye.   Moderate tenderness to palpation over temporal artery.    Psychiatric:         Mood and Affect: Mood normal. "         Fluids  No intake or output data in the 24 hours ending 09/11/23 0958    Laboratory  Recent Labs     09/10/23  1905 09/11/23  0818   WBC 20.8* 16.2*   RBC 6.80* 7.18*   HEMOGLOBIN 14.4 14.8   HEMATOCRIT 46.5 50.0   MCV 68.4* 69.6*   MCH 21.2* 20.6*   MCHC 31.0* 29.6*   RDW 52.1* 53.1*   PLATELETCT 515* 479*   MPV 10.2 9.6     Recent Labs     09/10/23  0319 09/10/23  1905 09/11/23  0818   SODIUM 132* 134* 131*   POTASSIUM 5.7* 4.9 5.9*   CHLORIDE 99 102 98   CO2 22 22 20   GLUCOSE 90 115* 123*   BUN 17 17 20   CREATININE 1.27 1.04 1.17   CALCIUM 9.0 9.3 8.8     Recent Labs     09/10/23  1905   INR 1.72*         Recent Labs     09/10/23  0319   TRIGLYCERIDE 125   HDL 21*   *       Imaging  CT-HEAD W/O   Final Result         1.  No acute intracranial abnormality.   2.  Atherosclerosis.         CT-CTA COMPLETE THORACOABDOMINAL AORTA   Final Result         1.  No aortic aneurysm or dissection identified.   2.  Hepatomegaly   3.  Diverticulosis   4.  Atherosclerosis and atherosclerotic coronary artery disease   5.  Linear consolidation in the left lung base suggesting atelectasis, component of infiltrate is not excluded            MR-BRAIN-WITH & W/O    (Results Pending)   MR-VENOGRAM (MRV) HEAD    (Results Pending)   MR-ORBITS,FACE,NECK-WITH&W/O & SEQUENCES    (Results Pending)        Assessment/Plan  Problem Representation:    * Acute hypoxic respiratory failure- (present on admission)  Assessment & Plan  Most likely secondary to acute COVID-19 infection.  - Decadron 10mg  - Wean off oxygen as tolerated     Pneumonia due to COVID-19 virus  Assessment & Plan  Patient found to be positive for COVID-19, presented with shortness of breath, and hypoxia.  CTA demonstrated left linear consolidation. Patient additionally had WBC of 20 on presentation. This could possibly be due to a bacterial superinfection.  - Continue Ceftriaxone 2G and Azithro 500mg       Headache- (present on admission)  Assessment &  Plan  Patient reports having left sided throbbing retro-orbital pain alongside ophthalmoplegia. Cranial nerves II-XII are grossly intact and he does not have any focal neurologic deficit. With history of dural venous sinus thrombosis in 7/2023, a septic dural sinus thrombosis cannot be ruled out. Giant cell arteritis is also on the differential as patient has pain alongside temporal artery. Other differentials include bleed, cavernous sinus thrombosis vs headache due to Covid.  - Stat MRI brain and orbit w and w/o contrast ordered alongside MRV  - Increasing dose of Decadron to 10 mg   - Holding Xarelto until bleed has been ruled out       Chronic systolic congestive heart failure (HCC)- (present on admission)  Assessment & Plan  Currently not in exacerbation  - Monitor volume status     Chest pain- (present on admission)  Assessment & Plan  Resolving   Probably pleuritic secondary to COVID-19 pneumonia  CTA negative for dissection  EKG and troponin reassuring  - Scheduled tylenol for pain relief    Tobacco abuse- (present on admission)  Assessment & Plan  Patient reports he has smoked half a pack a day for a while.  - Tobacco cessation counseling prior to discharge       COPD (chronic obstructive pulmonary disease) (HCC)- (present on admission)  Assessment & Plan  Albuterol as needed  Smoking cessation counseling    Paroxysmal atrial fibrillation (HCC)- (present on admission)  Assessment & Plan  Holding Xarelto until bleed has been ruled out. Continue Metoprolol SR 100mg daily.     Leukocytosis, thrombocytosis, erythrocytosis, lymphopenia- (present on admission)  Assessment & Plan  Per chart review, patient has had chronically elevated wbc, rbc and platelet counts. With history of dural venous sinus thrombosis, this could possibly be a myeloproliferative disorder.   - Consider establishing care with oncology outpatient     Gastroesophageal reflux disease without esophagitis- (present on admission)  Assessment &  Plan  Continue PPI    Ascending aortic aneurysm repair- (present on admission)  Assessment & Plan  Repeat CTA today is reassuring         VTE prophylaxis: SCDs/TEDs    I have performed a physical exam and reviewed and updated ROS and Plan today (9/11/2023). In review of yesterday's note (9/10/2023), there are no changes except as documented above.

## 2023-09-11 NOTE — ED TRIAGE NOTES
"Chief Complaint   Patient presents with    Shortness of Breath     Tested positive for covid today, symptoms started this morning with fatigue / SOB / HA / body aches, denies respiratory hx    Chest Pain     Center of chest radiating down to abdomen starting today, gradual onset, constant and progressively worse, described as 'tight, squeezing', hx aortic valve replacement and ascending aortic dissection both repaired on 1/5/23.    Headache     Starting today, denies normal hx of such, no neuro deficits noted in triage     Pt ambulatory to triage for above complaints, VSS on RA, GCS 15, NAD.    Charge RN notified and pt roomed immediately.    Pt returned to [facility]. Educated on triage process and to inform staff of any changes.     BP (!) 159/82   Pulse (!) 102   Temp 37.6 °C (99.7 °F) (Temporal)   Resp (!) 22   Ht 1.753 m (5' 9\")   Wt 93 kg (205 lb)   SpO2 94%   BMI 30.27 kg/m²     "

## 2023-09-11 NOTE — H&P
Hospital Medicine History & Physical Note    Date of Service  9/10/2023    Primary Care Physician  CYNDI Hewitt.        Code Status  Full Code    Chief Complaint  Chief Complaint   Patient presents with    Shortness of Breath     Tested positive for covid today, symptoms started this morning with fatigue / SOB / HA / body aches, denies respiratory hx    Chest Pain     Center of chest radiating down to abdomen starting today, gradual onset, constant and progressively worse, described as 'tight, squeezing', hx aortic valve replacement and ascending aortic dissection both repaired on 1/5/23.    Headache     Starting today, denies normal hx of such, no neuro deficits noted in triage       History of Presenting Illness  Noe Hickey is a 62 y.o. male with past medical history of aortic valve replacement, systolic heart failure with EF 40%, A-fib ablation, chronic anticoagulation with Xarelto, aortic dissection repair in May 2023, nicotine dependence, COPD not on oxygen, who presented 9/10/2023 with complaints of throbbing left-sided headache, malaise, body aches, chest tightness worsening on inspiration radiating to the abdomen, subjective shortness of breath, mild dry cough.  He noted to have mild fever 99.7, tachycardia 102 and requires 2 L nasal cannula due to desaturation to 88%.  When I saw the patient he stated that chest pain resolved, but he continued having headache rated 7 out of 10.  He reportedly had positive COVID test today.  He was evaluated with CTA of thoracoabdominal aorta which was negative for dissection.  There is a linear consolidation in left lung base.    I discussed the plan of care with patient and ERP .    Review of Systems  Review of Systems   Constitutional:  Positive for malaise/fatigue. Negative for chills, fever and weight loss.   HENT:  Negative for ear pain, hearing loss and tinnitus.    Eyes:  Negative for blurred vision, double vision and photophobia.    Respiratory:  Positive for cough and shortness of breath. Negative for hemoptysis and sputum production.    Cardiovascular:  Positive for chest pain. Negative for palpitations and orthopnea.   Gastrointestinal:  Negative for heartburn, nausea and vomiting.   Genitourinary:  Negative for dysuria, flank pain, frequency and hematuria.   Musculoskeletal:  Positive for joint pain and myalgias. Negative for back pain and neck pain.   Skin:  Negative for itching and rash.   Neurological:  Negative for tremors, speech change, focal weakness and headaches.   Endo/Heme/Allergies:  Negative for environmental allergies and polydipsia. Does not bruise/bleed easily.   Psychiatric/Behavioral:  Negative for hallucinations and substance abuse. The patient is not nervous/anxious.        Past Medical History   has a past medical history of Arrhythmia, Breath shortness (06/09/2023), Cyclic vomiting syndrome, Elevated hemidiaphragm - LEFT post AVR (01/04/2023), Fracture, Headache, classical migraine, Heart burn, Heart valve disease (06/09/2023), Indigestion, Pneumonia due to infectious organism (01/20/2023), and Snoring.    Surgical History   has a past surgical history that includes shoulder arthroscopy; shoulder hemicap resurfacing (Right, 10/12/2015); irrigation & debridement ortho (Right, 09/19/2017); shoulder surgery; aortic valve replacement (1/5/2023); aortic ascending dissection (1/5/2023); and echocardiogram, transesophageal, intraoperative (1/5/2023).     Family History  family history is not on file.   Family history reviewed with patient. There is no family history that is pertinent to the chief complaint.     Social History   reports that he has been smoking cigarettes. He has a 20.0 pack-year smoking history. He has never used smokeless tobacco. He reports that he does not currently use drugs. He reports that he does not drink alcohol.    Allergies  Allergies   Allergen Reactions    Morphine Rash and Itching     Rash/  difficulty breathing       Medications  Prior to Admission Medications   Prescriptions Last Dose Informant Patient Reported? Taking?   chlorproMAZINE (THORAZINE) 50 MG Tab  Patient Yes No   Sig: Take 50 mg by mouth 2 times a day as needed. Indications: Nausea and Vomiting   hydrOXYzine HCl (ATARAX) 25 MG Tab  Patient Yes No   Si mg at bedtime as needed (sleep).   losartan (COZAAR) 25 MG Tab   No No   Sig: Take 1 Tablet by mouth every day.   metoprolol SR (TOPROL XL) 100 MG TABLET SR 24 HR   No No   Sig: Take 1 Tablet by mouth every day.   ondansetron (ZOFRAN ODT) 4 MG TABLET DISPERSIBLE  Patient Yes No   Sig: Take 4 mg by mouth 2 times a day as needed for Nausea/Vomiting.   pantoprazole (PROTONIX) 40 MG Tablet Delayed Response  Patient Yes No   Sig: Take 1 Tablet by mouth every day. Take daily for 30 days post ablation then can reduce to as needed again.   rivaroxaban (XARELTO) 20 MG Tab tablet   No No   Sig: Take 1 Tablet by mouth with dinner.      Facility-Administered Medications: None       Physical Exam  Temp:  [37.4 °C (99.4 °F)-37.6 °C (99.7 °F)] 37.4 °C (99.4 °F)  Pulse:  [] 90  Resp:  [18-49] 18  BP: (132-159)/(76-83) 137/76  SpO2:  [88 %-94 %] 94 %  Blood Pressure: 137/76   Temperature: 37.4 °C (99.4 °F)   Pulse: 90   Respiration: 18   Pulse Oximetry: 94 %       Physical Exam  Vitals and nursing note reviewed.   Constitutional:       General: He is not in acute distress.     Appearance: Normal appearance.      Comments: Appears uncomfortable   HENT:      Head: Normocephalic and atraumatic.      Nose: Nose normal.      Mouth/Throat:      Mouth: Mucous membranes are moist.   Eyes:      Extraocular Movements: Extraocular movements intact.      Pupils: Pupils are equal, round, and reactive to light.   Cardiovascular:      Rate and Rhythm: Normal rate and regular rhythm.   Pulmonary:      Effort: Pulmonary effort is normal.      Breath sounds: Normal breath sounds.   Abdominal:      General: Abdomen  "is flat. There is no distension.      Tenderness: There is no abdominal tenderness.   Musculoskeletal:         General: No swelling or deformity. Normal range of motion.      Cervical back: Normal range of motion and neck supple.   Skin:     General: Skin is warm and dry.   Neurological:      General: No focal deficit present.      Mental Status: He is alert and oriented to person, place, and time.   Psychiatric:         Mood and Affect: Mood normal.         Behavior: Behavior normal.         Laboratory:  Recent Labs     09/10/23  1905   WBC 20.8*   RBC 6.80*   HEMOGLOBIN 14.4   HEMATOCRIT 46.5   MCV 68.4*   MCH 21.2*   MCHC 31.0*   RDW 52.1*   PLATELETCT 515*   MPV 10.2     Recent Labs     09/10/23  1905   SODIUM 134*   POTASSIUM 4.9   CHLORIDE 102   CO2 22   GLUCOSE 115*   BUN 17   CREATININE 1.04   CALCIUM 9.3     Recent Labs     09/10/23  1905   ALTSGPT 72*   ASTSGOT 43   ALKPHOSPHAT 43   TBILIRUBIN 0.4   GLUCOSE 115*     Recent Labs     09/10/23  1905   INR 1.72*     No results for input(s): \"NTPROBNP\" in the last 72 hours.      Recent Labs     09/10/23  1905   TROPONINT 13       Imaging:  CT-CTA COMPLETE THORACOABDOMINAL AORTA   Final Result         1.  No aortic aneurysm or dissection identified.   2.  Hepatomegaly   3.  Diverticulosis   4.  Atherosclerosis and atherosclerotic coronary artery disease   5.  Linear consolidation in the left lung base suggesting atelectasis, component of infiltrate is not excluded                    Assessment/Plan:  Justification for Admission Status  I anticipate this patient will require at least two midnights for appropriate medical management, necessitating inpatient admission because respiratory failure with hypoxia    Patient will need a Telemetry bed on MEDICAL service .  The need is secondary to respiratory failure with hypoxia.    * Acute hypoxic respiratory failure- (present on admission)  Assessment & Plan   secondary to COVID-19 pneumonia with underlying " COPD  Treatment as above  Wean off oxygen as tolerated    Pneumonia due to COVID-19 virus  Assessment & Plan  Patient reported positive COVID test, presented with shortness of breath, hypoxia.  Recheck ordered  CTA showed left lower lung consolidation  CRP 1.43.  WBC 20  Started empirically on ceftriaxone, azithromycin for possible bacterial superinfection  Decadron 6 mg daily for 10 days for COVID-19 with hypoxia  Follow-up with CRP, procalcitonin  Droplet, contact, eye protection      Headache- (present on admission)  Assessment & Plan  Patient was admitted with headache, blurred vision in 7/2023  Was diagnosed with dural venous sinus thrombosis  (MRVnondominant occluded left transverse sinus consistent with dural venous sinus thrombosis of indeterminate age)  Today reports worsening of headache.  I ordered CT head without contrast to rule out bleed, holding Xarelto for now      Chronic systolic congestive heart failure (HCC)- (present on admission)  Assessment & Plan  Not in exacerbation  Monitor volume status    Chest pain- (present on admission)  Assessment & Plan  Probably pleuritic secondary to COVID-19 pneumonia  CTA negative for dissection  EKG and troponin reassuring  Scheduled Tylenol    Tobacco abuse- (present on admission)  Assessment & Plan  Spent approx 5 mins on Tobacco cessation education . Discussed options of nicotine patch, medical treatment with wellbutrin and chantix. Discussed the benefits of quitting smoking and risks of continued smoking including cardiovascular disease, cancer and COPD.   Code 12007      COPD (chronic obstructive pulmonary disease) (HCC)- (present on admission)  Assessment & Plan  Albuterol as needed  Smoking cessation counseling    Paroxysmal atrial fibrillation (HCC)- (present on admission)  Assessment & Plan  Resume Xarelto, metoprolol    Leukocytosis, thrombocytosis, erythrocytosis, lymphopenia- (present on admission)  Assessment & Plan  It appears patient has chronic  leukocytosis and it has been worsening.  Additionally thrombocytosis and erythrocytosis noted.  There is no splenomegaly, abscesses or lymphadenopathy on CTA of thoracoabdominal aorta.  We will check HIV screen due to absolute lymphopenia.  Follow blood culture  Consider hematology input regarding possible need for bone marrow biopsy versus outpatient hematology follow-up    Gastroesophageal reflux disease without esophagitis- (present on admission)  Assessment & Plan  Continue PPI    Ascending aortic aneurysm repair- (present on admission)  Assessment & Plan  Repeat CTA today is reassuring        VTE prophylaxis: therapeutic anticoagulation with Xarelto

## 2023-09-11 NOTE — CARE PLAN
The patient is Stable - Low risk of patient condition declining or worsening     Shift Goals: pain mgmt, IV/PO abx, resp status  Clinical Goals: Blood Cultures, ABX, respiratory status  Patient Goals: Headache relief, breathe better    Progress made toward(s) clinical / shift goals:      Problem: Knowledge Deficit - Standard  Goal: Patient and family/care givers will demonstrate understanding of plan of care, disease process/condition, diagnostic tests and medications  9/11/2023 0209 by Xuan Burt, R.N.  Outcome: Progressing     Problem: Pain - Standard  Goal: Alleviation of pain or a reduction in pain to the patient’s comfort goal  9/11/2023 0209 by Xuan Burt, R.N.  Outcome: Progressing

## 2023-09-11 NOTE — ASSESSMENT & PLAN NOTE
Per chart review, patient has had chronically elevated wbc, rbc and platelet counts. With history of dural venous sinus thrombosis, this could possibly be a myeloproliferative disorder.   - Consider establishing care with oncology outpatient    supervision

## 2023-09-11 NOTE — ASSESSMENT & PLAN NOTE
Patient reports having left sided throbbing retro-orbital pain that is improving clinically. Cranial nerves II-XII are grossly intact and he does not have any focal neurologic deficits. MRI brain/orbit and MRV were all unremarkable for any acute processes. His ESR was wnl which makes temporal arteritis unlikely. This is likely due to acute Covid infection.  - Scheduled tylenol   - Monitor for any neurologic changes on exam

## 2023-09-11 NOTE — ED NOTES
Checked on bed, connected to monitor, breathing on 2L oxygen via nasal canula.  Vital signs is stable.   Denied any new complaints. No current needs identified.  Gurney in low position, side rail up for pt safety. Call light within reach.

## 2023-09-12 PROBLEM — N17.9 AKI (ACUTE KIDNEY INJURY) (HCC): Status: ACTIVE | Noted: 2023-09-12

## 2023-09-12 LAB
ALBUMIN SERPL BCP-MCNC: 4.1 G/DL (ref 3.2–4.9)
ALBUMIN/GLOB SERPL: 1.4 G/DL
ALP SERPL-CCNC: 40 U/L (ref 30–99)
ALT SERPL-CCNC: 67 U/L (ref 2–50)
ANION GAP SERPL CALC-SCNC: 11 MMOL/L (ref 7–16)
ANION GAP SERPL CALC-SCNC: 11 MMOL/L (ref 7–16)
AST SERPL-CCNC: 39 U/L (ref 12–45)
BASOPHILS # BLD AUTO: 0.1 % (ref 0–1.8)
BASOPHILS # BLD: 0.01 K/UL (ref 0–0.12)
BILIRUB SERPL-MCNC: 0.3 MG/DL (ref 0.1–1.5)
BUN SERPL-MCNC: 31 MG/DL (ref 8–22)
BUN SERPL-MCNC: 32 MG/DL (ref 8–22)
CALCIUM ALBUM COR SERPL-MCNC: 8.4 MG/DL (ref 8.5–10.5)
CALCIUM SERPL-MCNC: 8.1 MG/DL (ref 8.5–10.5)
CALCIUM SERPL-MCNC: 8.5 MG/DL (ref 8.5–10.5)
CHLORIDE SERPL-SCNC: 97 MMOL/L (ref 96–112)
CHLORIDE SERPL-SCNC: 97 MMOL/L (ref 96–112)
CO2 SERPL-SCNC: 24 MMOL/L (ref 20–33)
CO2 SERPL-SCNC: 24 MMOL/L (ref 20–33)
CREAT SERPL-MCNC: 1.27 MG/DL (ref 0.5–1.4)
CREAT SERPL-MCNC: 1.53 MG/DL (ref 0.5–1.4)
EOSINOPHIL # BLD AUTO: 0 K/UL (ref 0–0.51)
EOSINOPHIL NFR BLD: 0 % (ref 0–6.9)
ERYTHROCYTE [DISTWIDTH] IN BLOOD BY AUTOMATED COUNT: 54.2 FL (ref 35.9–50)
GFR SERPLBLD CREATININE-BSD FMLA CKD-EPI: 51 ML/MIN/1.73 M 2
GFR SERPLBLD CREATININE-BSD FMLA CKD-EPI: 63 ML/MIN/1.73 M 2
GLOBULIN SER CALC-MCNC: 3 G/DL (ref 1.9–3.5)
GLUCOSE SERPL-MCNC: 139 MG/DL (ref 65–99)
GLUCOSE SERPL-MCNC: 163 MG/DL (ref 65–99)
HCT VFR BLD AUTO: 46.6 % (ref 42–52)
HEMOCCULT STL QL: NEGATIVE
HGB BLD-MCNC: 14.1 G/DL (ref 14–18)
IMM GRANULOCYTES # BLD AUTO: 0.13 K/UL (ref 0–0.11)
IMM GRANULOCYTES NFR BLD AUTO: 1 % (ref 0–0.9)
LYMPHOCYTES # BLD AUTO: 0.66 K/UL (ref 1–4.8)
LYMPHOCYTES NFR BLD: 5.1 % (ref 22–41)
MAGNESIUM SERPL-MCNC: 2.2 MG/DL (ref 1.5–2.5)
MCH RBC QN AUTO: 21.3 PG (ref 27–33)
MCHC RBC AUTO-ENTMCNC: 30.3 G/DL (ref 32.3–36.5)
MCV RBC AUTO: 70.3 FL (ref 81.4–97.8)
MONOCYTES # BLD AUTO: 0.85 K/UL (ref 0–0.85)
MONOCYTES NFR BLD AUTO: 6.6 % (ref 0–13.4)
NEUTROPHILS # BLD AUTO: 11.18 K/UL (ref 1.82–7.42)
NEUTROPHILS NFR BLD: 87.2 % (ref 44–72)
NRBC # BLD AUTO: 0 K/UL
NRBC BLD-RTO: 0 /100 WBC (ref 0–0.2)
PLATELET # BLD AUTO: 519 K/UL (ref 164–446)
PMV BLD AUTO: 10.1 FL (ref 9–12.9)
POTASSIUM SERPL-SCNC: 5.4 MMOL/L (ref 3.6–5.5)
POTASSIUM SERPL-SCNC: 5.6 MMOL/L (ref 3.6–5.5)
PROT SERPL-MCNC: 7.1 G/DL (ref 6–8.2)
RBC # BLD AUTO: 6.63 M/UL (ref 4.7–6.1)
SODIUM SERPL-SCNC: 132 MMOL/L (ref 135–145)
SODIUM SERPL-SCNC: 132 MMOL/L (ref 135–145)
WBC # BLD AUTO: 12.8 K/UL (ref 4.8–10.8)

## 2023-09-12 PROCEDURE — 700102 HCHG RX REV CODE 250 W/ 637 OVERRIDE(OP): Performed by: INTERNAL MEDICINE

## 2023-09-12 PROCEDURE — A9270 NON-COVERED ITEM OR SERVICE: HCPCS

## 2023-09-12 PROCEDURE — 83735 ASSAY OF MAGNESIUM: CPT

## 2023-09-12 PROCEDURE — 85025 COMPLETE CBC W/AUTO DIFF WBC: CPT

## 2023-09-12 PROCEDURE — 700111 HCHG RX REV CODE 636 W/ 250 OVERRIDE (IP): Mod: JZ | Performed by: INTERNAL MEDICINE

## 2023-09-12 PROCEDURE — 700111 HCHG RX REV CODE 636 W/ 250 OVERRIDE (IP)

## 2023-09-12 PROCEDURE — 770001 HCHG ROOM/CARE - MED/SURG/GYN PRIV*

## 2023-09-12 PROCEDURE — 80048 BASIC METABOLIC PNL TOTAL CA: CPT

## 2023-09-12 PROCEDURE — 94669 MECHANICAL CHEST WALL OSCILL: CPT

## 2023-09-12 PROCEDURE — 99233 SBSQ HOSP IP/OBS HIGH 50: CPT | Mod: GC | Performed by: INTERNAL MEDICINE

## 2023-09-12 PROCEDURE — 94760 N-INVAS EAR/PLS OXIMETRY 1: CPT

## 2023-09-12 PROCEDURE — 700105 HCHG RX REV CODE 258

## 2023-09-12 PROCEDURE — A9270 NON-COVERED ITEM OR SERVICE: HCPCS | Performed by: INTERNAL MEDICINE

## 2023-09-12 PROCEDURE — 700102 HCHG RX REV CODE 250 W/ 637 OVERRIDE(OP)

## 2023-09-12 PROCEDURE — 36415 COLL VENOUS BLD VENIPUNCTURE: CPT

## 2023-09-12 PROCEDURE — 700101 HCHG RX REV CODE 250: Performed by: INTERNAL MEDICINE

## 2023-09-12 PROCEDURE — 82272 OCCULT BLD FECES 1-3 TESTS: CPT

## 2023-09-12 PROCEDURE — 94640 AIRWAY INHALATION TREATMENT: CPT

## 2023-09-12 PROCEDURE — 80053 COMPREHEN METABOLIC PANEL: CPT

## 2023-09-12 RX ORDER — SODIUM CHLORIDE 9 MG/ML
INJECTION, SOLUTION INTRAVENOUS CONTINUOUS
Status: DISCONTINUED | OUTPATIENT
Start: 2023-09-12 | End: 2023-09-12

## 2023-09-12 RX ORDER — METOPROLOL SUCCINATE 100 MG/1
100 TABLET, EXTENDED RELEASE ORAL DAILY
Qty: 30 TABLET | Refills: 2 | Status: CANCELLED | OUTPATIENT
Start: 2023-09-12

## 2023-09-12 RX ORDER — IPRATROPIUM BROMIDE AND ALBUTEROL SULFATE 2.5; .5 MG/3ML; MG/3ML
3 SOLUTION RESPIRATORY (INHALATION)
Status: DISCONTINUED | OUTPATIENT
Start: 2023-09-12 | End: 2023-09-13 | Stop reason: HOSPADM

## 2023-09-12 RX ORDER — DEXAMETHASONE 4 MG/1
6 TABLET ORAL DAILY
Status: DISCONTINUED | OUTPATIENT
Start: 2023-09-13 | End: 2023-09-13 | Stop reason: HOSPADM

## 2023-09-12 RX ADMIN — SENNOSIDES AND DOCUSATE SODIUM 2 TABLET: 50; 8.6 TABLET ORAL at 16:49

## 2023-09-12 RX ADMIN — OXYCODONE HYDROCHLORIDE 5 MG: 5 TABLET ORAL at 09:51

## 2023-09-12 RX ADMIN — OXYCODONE HYDROCHLORIDE 5 MG: 5 TABLET ORAL at 14:35

## 2023-09-12 RX ADMIN — ONDANSETRON 4 MG: 2 INJECTION INTRAMUSCULAR; INTRAVENOUS at 09:45

## 2023-09-12 RX ADMIN — ONDANSETRON 4 MG: 2 INJECTION INTRAMUSCULAR; INTRAVENOUS at 20:48

## 2023-09-12 RX ADMIN — OXYCODONE HYDROCHLORIDE 5 MG: 5 TABLET ORAL at 00:13

## 2023-09-12 RX ADMIN — ONDANSETRON 4 MG: 2 INJECTION INTRAMUSCULAR; INTRAVENOUS at 00:13

## 2023-09-12 RX ADMIN — HYDROMORPHONE HYDROCHLORIDE 0.5 MG: 1 INJECTION, SOLUTION INTRAMUSCULAR; INTRAVENOUS; SUBCUTANEOUS at 12:37

## 2023-09-12 RX ADMIN — RIVAROXABAN 20 MG: 20 TABLET, FILM COATED ORAL at 16:49

## 2023-09-12 RX ADMIN — OXYCODONE HYDROCHLORIDE 5 MG: 5 TABLET ORAL at 20:40

## 2023-09-12 RX ADMIN — HYDROMORPHONE HYDROCHLORIDE 0.5 MG: 1 INJECTION, SOLUTION INTRAMUSCULAR; INTRAVENOUS; SUBCUTANEOUS at 01:20

## 2023-09-12 RX ADMIN — AZITHROMYCIN DIHYDRATE 500 MG: 250 TABLET ORAL at 04:44

## 2023-09-12 RX ADMIN — OXYCODONE HYDROCHLORIDE 5 MG: 5 TABLET ORAL at 04:44

## 2023-09-12 RX ADMIN — HYDROMORPHONE HYDROCHLORIDE 0.5 MG: 1 INJECTION, SOLUTION INTRAMUSCULAR; INTRAVENOUS; SUBCUTANEOUS at 06:16

## 2023-09-12 RX ADMIN — DEXAMETHASONE 10 MG: 4 TABLET ORAL at 06:17

## 2023-09-12 RX ADMIN — PROMETHAZINE HYDROCHLORIDE 25 MG: 25 TABLET ORAL at 13:09

## 2023-09-12 RX ADMIN — CEFTRIAXONE SODIUM 2000 MG: 10 INJECTION, POWDER, FOR SOLUTION INTRAVENOUS at 04:52

## 2023-09-12 RX ADMIN — IPRATROPIUM BROMIDE AND ALBUTEROL SULFATE 3 ML: 2.5; .5 SOLUTION RESPIRATORY (INHALATION) at 07:08

## 2023-09-12 RX ADMIN — HYDROMORPHONE HYDROCHLORIDE 0.5 MG: 1 INJECTION, SOLUTION INTRAMUSCULAR; INTRAVENOUS; SUBCUTANEOUS at 22:48

## 2023-09-12 RX ADMIN — OMEPRAZOLE 20 MG: 20 CAPSULE, DELAYED RELEASE ORAL at 04:44

## 2023-09-12 RX ADMIN — HYDROMORPHONE HYDROCHLORIDE 0.5 MG: 1 INJECTION, SOLUTION INTRAMUSCULAR; INTRAVENOUS; SUBCUTANEOUS at 16:49

## 2023-09-12 RX ADMIN — SENNOSIDES AND DOCUSATE SODIUM 2 TABLET: 50; 8.6 TABLET ORAL at 04:43

## 2023-09-12 RX ADMIN — SODIUM CHLORIDE: 9 INJECTION, SOLUTION INTRAVENOUS at 09:45

## 2023-09-12 RX ADMIN — METOPROLOL SUCCINATE 100 MG: 50 TABLET, EXTENDED RELEASE ORAL at 04:44

## 2023-09-12 RX ADMIN — IPRATROPIUM BROMIDE AND ALBUTEROL SULFATE 3 ML: 2.5; .5 SOLUTION RESPIRATORY (INHALATION) at 10:48

## 2023-09-12 ASSESSMENT — ENCOUNTER SYMPTOMS
SPEECH CHANGE: 0
DOUBLE VISION: 0
DIZZINESS: 0
COUGH: 0
SENSORY CHANGE: 0
CHILLS: 0
FEVER: 0
FOCAL WEAKNESS: 0
SPUTUM PRODUCTION: 0
TREMORS: 0
HEADACHES: 1
ABDOMINAL PAIN: 0
MYALGIAS: 0
SHORTNESS OF BREATH: 0
PALPITATIONS: 0
BLURRED VISION: 0
TINGLING: 0

## 2023-09-12 ASSESSMENT — PAIN DESCRIPTION - PAIN TYPE
TYPE: ACUTE PAIN

## 2023-09-12 ASSESSMENT — FIBROSIS 4 INDEX: FIB4 SCORE: 0.58

## 2023-09-12 NOTE — PROGRESS NOTES
Received report from previous shift RN, assumed care of patient. Patient is A&Ox4, vital signs are stable, on 2L O2. Patient reports headache pain at this time, medicated per MAR. Sitting up in bed for breakfast. Call light and belongings within reach. Bed in lowest/locked position. Hourly rounding in place.

## 2023-09-12 NOTE — DISCHARGE SUMMARY
[facility] Internal Medicine Discharge Summary    Attending: Jonah De La Vega M.d.  Senior Resident: Dr. Parker  Contact Number: [phone]    CHIEF COMPLAINT ON ADMISSION  Chief Complaint   Patient presents with    Shortness of Breath     Tested positive for covid today, symptoms started this morning with fatigue / SOB / HA / body aches, denies respiratory hx    Chest Pain     Center of chest radiating down to abdomen starting today, gradual onset, constant and progressively worse, described as 'tight, squeezing', hx aortic valve replacement and ascending aortic dissection both repaired on 1/5/23.    Headache     Starting today, denies normal hx of such, no neuro deficits noted in triage       Reason for Admission  SOB; Chest Pain     Admission Date  9/10/2023    CODE STATUS  Full Code    HPI & HOSPITAL COURSE  Noe Hickey is a 63yo male with a PMHx of a recently diagnosed dural venous sinus thrombosis (7/2023) on Xarelto, systolic HF with EF of 40%, repair of aortic dissection in 5/2023, and COPD not on home oxygen who presented for a subacute onset of left-sided throbbing headache, malaise, shortness of breath and myalgias. He was tachycardic in the ED and requiring 2L of O2 to maintain adequate saturations. Patient was additionally complaining of  chest pain and underwent a CTA of the thoracoabdominal aorta which demonstrated no concern for a dissection but did show a linear consolidation in the left lung base. Patient was initiated on 10 days of Decradon as well as Ceftriaxone and Azithromycin for suspected superimposed bacterial pneumonia. MRI of the brain and orbit were ordered alongside MRV due to the patient's headache and history of dural venous sinus thrombosis which were all unremarkable for any acute processes.  Patient's hospital course was complicated by hyperkalemia and an acute kidney injury which was improved after receiving IV fluids and potassium normalized on day of discharge.  Patient's white  blood cell trended down, patient was on room air on day of discharge.  Patient will be discharged with 10 days of Decadron.  Patient to follow-up with outpatient oncology and primary care, Gastroenterology for further Anemia and colonoscopy work up. Prescribed Iron for deficiency without anemia.    Therefore, he is discharged in fair and stable condition to home with close outpatient follow-up.    The patient met 2-midnight criteria for an inpatient stay at the time of discharge.    Discharge Date  09/12/2023    Physical Exam on Day of Discharge  Physical Exam  Constitutional:       Appearance: He is not ill-appearing.   HENT:      Head: Normocephalic.      Mouth/Throat:      Mouth: Mucous membranes are moist.   Eyes:      General: No scleral icterus.        Right eye: No discharge.         Left eye: No discharge.   Cardiovascular:      Rate and Rhythm: Normal rate and regular rhythm.      Pulses: Normal pulses.      Heart sounds: Normal heart sounds.   Pulmonary:      Effort: Pulmonary effort is normal.      Breath sounds: Normal breath sounds.   Abdominal:      Tenderness: There is no abdominal tenderness.   Musculoskeletal:         General: Normal range of motion.      Cervical back: Normal range of motion.      Right lower leg: No edema.      Left lower leg: No edema.   Skin:     General: Skin is warm and dry.   Neurological:      General: No focal deficit present.      Mental Status: He is alert.      Cranial Nerves: No cranial nerve deficit.      Sensory: No sensory deficit.      Motor: No weakness.   Psychiatric:         Mood and Affect: Mood normal.         FOLLOW UP ITEMS POST DISCHARGE  Primary care, Cardiology, Oncology    DISCHARGE DIAGNOSES  Principal Problem:    Acute hypoxic respiratory failure (POA: Yes)  Active Problems:    Ascending aortic aneurysm repair (POA: Yes)    Gastroesophageal reflux disease without esophagitis (POA: Yes)    Hyperkalemia (POA: Unknown)    Leukocytosis, thrombocytosis,  erythrocytosis, lymphopenia (POA: Yes)    Paroxysmal atrial fibrillation (HCC) (POA: Yes)    COPD (chronic obstructive pulmonary disease) (HCC) (POA: Yes)    Tobacco abuse (POA: Yes)    Chest pain (POA: Yes)    Chronic systolic congestive heart failure (HCC) (POA: Yes)    Headache (POA: Yes)    Pneumonia due to COVID-19 virus (POA: Unknown)    JENNY (acute kidney injury) (HCC) (POA: Unknown)  Resolved Problems:    * No resolved hospital problems. *      FOLLOW UP  Future Appointments   Date Time Provider Department Center   10/3/2023  1:15 PM EFRAÍN Rodriguez [facility] None     EFRAÍN Hewitt  [address] [facility] [id number] [phone]    Follow up        MEDICATIONS ON DISCHARGE     Medication List        START taking these medications        Instructions   dexamethasone 6 MG Tabs  Commonly known as: Decadron   Take 1 Tablet by mouth every day for 6 days.  Dose: 6 mg     ferrous sulfate 325 (65 Fe) MG tablet   Take 1 Tablet by mouth every 48 hours.  Dose: 325 mg            CONTINUE taking these medications        Instructions   hydrOXYzine HCl 25 MG Tabs  Commonly known as: Atarax   Take 25-50 mg by mouth at bedtime as needed (sleep).  Dose: 25-50 mg     ondansetron 4 MG Tbdp  Commonly known as: Zofran ODT   Take 4 mg by mouth 2 times a day as needed for Nausea/Vomiting.  Dose: 4 mg     pantoprazole 40 MG Tbec  Commonly known as: Protonix   Take 1 Tablet by mouth every day. Take daily for 30 days post ablation then can reduce to as needed again.  Dose: 40 mg     rivaroxaban 20 MG Tabs tablet  Commonly known as: Xarelto   Take 1 Tablet by mouth with dinner.  Dose: 20 mg     Vitamin D3 1.25 MG (06929 UT) Caps   Take 50,000 Units by mouth every 7 days.  Dose: 50,000 Units            STOP taking these medications      asa/apap/caffeine 250-250-65 MG Tabs  Commonly known as: Excedrin              Allergies  Allergies   Allergen Reactions    Morphine Rash and Itching     Rash/ difficulty breathing        DIET  Orders Placed This Encounter   Procedures    Diet Order Diet: Renal     Standing Status:   Standing     Number of Occurrences:   1     Order Specific Question:   Diet:     Answer:   Renal [8]       ACTIVITY  As tolerated.  Weight bearing as tolerated    CONSULTATIONS      PROCEDURES      LABORATORY  Lab Results   Component Value Date    SODIUM 133 (L) 09/13/2023    POTASSIUM 5.2 09/13/2023    CHLORIDE 99 09/13/2023    CO2 23 09/13/2023    GLUCOSE 136 (H) 09/13/2023    BUN 28 (H) 09/13/2023    CREATININE 1.22 09/13/2023        Lab Results   Component Value Date    WBC 11.5 (H) 09/13/2023    HEMOGLOBIN 13.7 (L) 09/13/2023    HEMATOCRIT 45.5 09/13/2023    PLATELETCT 555 (H) 09/13/2023        Total time of the discharge process exceeds 34 minutes.

## 2023-09-12 NOTE — ASSESSMENT & PLAN NOTE
Patient found to have potassium level of 5.9 that has been trending down with one time doses of Lasix and Lokelma. Unclear etiology at this point.  - Continue to monitor  - Holding Losartan

## 2023-09-12 NOTE — CARE PLAN
The patient is Stable - Low risk of patient condition declining or worsening    Shift Goals: MRI, pain mgmt  Clinical Goals: pain management, MRI  Patient Goals: pain management    Progress made toward(s) clinical / shift goals:      Problem: Knowledge Deficit - Standard  Goal: Patient and family/care givers will demonstrate understanding of plan of care, disease process/condition, diagnostic tests and medications  Outcome: Progressing     Problem: Pain - Standard  Goal: Alleviation of pain or a reduction in pain to the patient’s comfort goal  Outcome: Progressing

## 2023-09-12 NOTE — ASSESSMENT & PLAN NOTE
Patient found to have Cr of 1.53 above from baseline of 1.1-1.2. This is likely prerenal due to use of Lasix.  - NS@83mL/hr   - Repeat BMP in afternoon

## 2023-09-12 NOTE — PROGRESS NOTES
Report received, assumed care.   1245- Pt voiced frustration that PRN Dilaudid was not given one hour after PRN Oxycodone. Allowed pt to voice his needs and explained to pt that nurse will do frequent rounding to make sure pt pain control has been met. T.O from Dr De La Vega gave order for a one time dose of IV Dilaudid now.  Call light with in reach of pt.

## 2023-09-12 NOTE — CARE PLAN
Problem: Knowledge Deficit - Standard  Goal: Patient and family/care givers will demonstrate understanding of plan of care, disease process/condition, diagnostic tests and medications  Outcome: Progressing     Problem: Pain - Standard  Goal: Alleviation of pain or a reduction in pain to the patient’s comfort goal  Outcome: Progressing     Problem: Knowledge Deficit - Standard  Goal: Patient and family/care givers will demonstrate understanding of plan of care, disease process/condition, diagnostic tests and medications  Outcome: Progressing     Problem: Pain - Standard  Goal: Alleviation of pain or a reduction in pain to the patient’s comfort goal  Outcome: Progressing   The patient is Stable - Low risk of patient condition declining or worsening    Shift Goals  Clinical Goals: pain management, MRI  Patient Goals: pain management    Progress made toward(s) clinical / shift goals:  Isolation precaution in place. Pain management, rest, hydration.     Patient is not progressing towards the following goals:

## 2023-09-12 NOTE — PROGRESS NOTES
[facility] Internal Medicine Daily Progress Note    Date of Service  9/12/2023    R Team: R IM Green Team   Attending: Jonah De La Vega M.d.  Senior Resident: Dr. Parker  Intern:  Dr. Curtis  Contact Number: [phone]    Chief Complaint  Noe Hickey is a 62 y.o. male admitted 9/10/2023 with acute hypoxic respiratory failure.     Hospital Course  Noe Hickey is a 63yo male with a PMHx of a recently diagnosed dural venous sinus thrombosis (7/2023) on Xarelto, systolic HF with EF of 40%, repair of aortic dissection in 5/2023, and COPD not on home oxygen who presented for a subacute onset of left-sided throbbing headache, malaise, shortness of breath and myalgias. He was tachycardic in the ED and requiring 2L of O2 to maintain adequate saturations. Patient was additionally complaining of  chest pain and underwent a CTA of the thoracoabdominal aorta which demonstrated no concern for a dissection but did show a linear consolidation in the left lung base. Patient was initiated on 10 days of Decradon as well as Ceftriaxone and Azithromycin for suspected superimposed bacterial pneumonia. MRI of the brain and orbit were ordered alongside MRV due to the patient's headache and history of dural venous sinus thrombosis which were all unremarkable for any acute processes.     Interval Problem Update  NAEO. Patient reports his headache is doing a little bit better since yesterday. He denies any blurry vision or opthalmoplegia today. He denies any shortness of breath. He was saturating adequately on room air this morning.   Results from MRI orbit/brain and MRV were all unremarkable for any acute processes.   Patient found to have JENNY in am and was started on IV fluids.     I have discussed this patient's plan of care and discharge plan at IDT rounds today with Case Management, Nursing, Nursing leadership, and other members of the IDT team.    Consultants/Specialty  None    Code Status  Full Code    Disposition  The  patient is not medically cleared for discharge to home or a post-acute facility.      I have placed the appropriate orders for post-discharge needs.    Review of Systems  Review of Systems   Constitutional:  Negative for chills, fever and malaise/fatigue.   Eyes:  Negative for blurred vision and double vision.   Respiratory:  Negative for cough, sputum production and shortness of breath.    Cardiovascular:  Negative for chest pain, palpitations and leg swelling.   Gastrointestinal:  Negative for abdominal pain.   Genitourinary:  Negative for dysuria.   Musculoskeletal:  Negative for myalgias.   Neurological:  Positive for headaches. Negative for dizziness, tingling, tremors, sensory change, speech change and focal weakness.        Physical Exam  Temp:  [36.5 °C (97.7 °F)-36.9 °C (98.4 °F)] 36.8 °C (98.2 °F)  Pulse:  [63-94] 74  Resp:  [16-20] 16  BP: (106-125)/(61-83) 113/76  SpO2:  [92 %-96 %] 94 %    Physical Exam  Constitutional:       Appearance: He is not ill-appearing.   HENT:      Head: Normocephalic.      Mouth/Throat:      Mouth: Mucous membranes are moist.   Eyes:      General: No scleral icterus.        Right eye: No discharge.         Left eye: No discharge.   Cardiovascular:      Rate and Rhythm: Normal rate and regular rhythm.      Pulses: Normal pulses.      Heart sounds: Normal heart sounds.   Pulmonary:      Effort: Pulmonary effort is normal.      Breath sounds: Normal breath sounds.   Abdominal:      Tenderness: There is no abdominal tenderness.   Musculoskeletal:         General: Normal range of motion.      Cervical back: Normal range of motion.      Right lower leg: No edema.      Left lower leg: No edema.   Skin:     General: Skin is warm and dry.   Neurological:      General: No focal deficit present.      Mental Status: He is alert.      Cranial Nerves: No cranial nerve deficit.      Sensory: No sensory deficit.      Motor: No weakness.   Psychiatric:         Mood and Affect: Mood normal.          Fluids    Intake/Output Summary (Last 24 hours) at 9/12/2023 1234  Last data filed at 9/12/2023 1100  Gross per 24 hour   Intake 760 ml   Output --   Net 760 ml       Laboratory  Recent Labs     09/10/23  1905 09/11/23  0818 09/12/23  0019   WBC 20.8* 16.2* 12.8*   RBC 6.80* 7.18* 6.63*   HEMOGLOBIN 14.4 14.8 14.1   HEMATOCRIT 46.5 50.0 46.6   MCV 68.4* 69.6* 70.3*   MCH 21.2* 20.6* 21.3*   MCHC 31.0* 29.6* 30.3*   RDW 52.1* 53.1* 54.2*   PLATELETCT 515* 479* 519*   MPV 10.2 9.6 10.1       Recent Labs     09/11/23  0818 09/11/23  1342 09/12/23  0019   SODIUM 131* 131* 132*   POTASSIUM 5.9* 5.7* 5.6*   CHLORIDE 98 98 97   CO2 20 22 24   GLUCOSE 123* 140* 139*   BUN 20 27* 31*   CREATININE 1.17 1.27 1.53*   CALCIUM 8.8 8.9 8.5       Recent Labs     09/10/23  1905   INR 1.72*           Recent Labs     09/10/23  0319   TRIGLYCERIDE 125   HDL 21*   *         Imaging  MR-ORBITS,FACE,NECK-WITH&W/O & SEQUENCES   Final Result         Normal MRI of the orbits.      MR-VENOGRAM (MRV) HEAD   Final Result         Normal intracranial MR venogram. Left transverse sinus is hypoplastic but demonstrated to be patent on the accompanying contrast-enhanced MRI and previous CT angiogram of the head.      MR-BRAIN-WITH & W/O   Final Result         Age-related volume loss and chronic microvascular ischemic changes.      No acute process.         CT-HEAD W/O   Final Result         1.  No acute intracranial abnormality.   2.  Atherosclerosis.         CT-CTA COMPLETE THORACOABDOMINAL AORTA   Final Result         1.  No aortic aneurysm or dissection identified.   2.  Hepatomegaly   3.  Diverticulosis   4.  Atherosclerosis and atherosclerotic coronary artery disease   5.  Linear consolidation in the left lung base suggesting atelectasis, component of infiltrate is not excluded                   Assessment/Plan  Problem Representation:    * Acute hypoxic respiratory failure- (present on admission)  Assessment & Plan  Resolved    Most likely secondary to acute COVID-19 infection.  - Decadron 6mg  - Wean off oxygen as tolerated     JENNY (acute kidney injury) (HCC)  Assessment & Plan  Patient found to have Cr of 1.53 above from baseline of 1.1-1.2. This is likely prerenal due to use of Lasix.  - NS@83mL/hr   - Repeat BMP in afternoon     Pneumonia due to COVID-19 virus  Assessment & Plan  Patient found to be positive for COVID-19, presented with shortness of breath, and hypoxia.  CTA demonstrated left linear consolidation. Patient additionally had WBC of 20 on presentation. This could possibly be due to a bacterial superinfection.  - Continue Ceftriaxone 2g and Azithro 500mg       Headache- (present on admission)  Assessment & Plan  Patient reports having left sided throbbing retro-orbital pain that is improving clinically. Cranial nerves II-XII are grossly intact and he does not have any focal neurologic deficits. MRI brain/orbit and MRV were all unremarkable for any acute processes. His ESR was wnl which makes temporal arteritis unlikely. This is likely due to acute Covid infection.  - Scheduled tylenol   - Monitor for any neurologic changes on exam       Chronic systolic congestive heart failure (HCC)- (present on admission)  Assessment & Plan  Currently not in exacerbation  - Monitor volume status     Chest pain- (present on admission)  Assessment & Plan  Resolving   Probably pleuritic secondary to COVID-19 pneumonia  CTA negative for dissection  EKG and troponin reassuring  - Scheduled tylenol for pain relief    Tobacco abuse- (present on admission)  Assessment & Plan  Patient reports he has smoked half a pack a day for a while.  - Tobacco cessation counseling prior to discharge       COPD (chronic obstructive pulmonary disease) ([facility])- (present on admission)  Assessment & Plan  Albuterol as needed  Smoking cessation counseling    Paroxysmal atrial fibrillation ([facility])- (present on admission)  Assessment & Plan  Continue Metoprolol SR 100mg  daily and Xarelto.     Leukocytosis, thrombocytosis, erythrocytosis, lymphopenia- (present on admission)  Assessment & Plan  Per chart review, patient has had chronically elevated wbc, rbc and platelet counts. With history of dural venous sinus thrombosis, this could possibly be a myeloproliferative disorder.   - Consider establishing care with oncology outpatient     Hyperkalemia  Assessment & Plan  Patient found to have potassium level of 5.9 that has been trending down with one time doses of Lasix and Lokelma. Unclear etiology at this point.  - Continue to monitor  - Holding Losartan     Gastroesophageal reflux disease without esophagitis- (present on admission)  Assessment & Plan  Continue PPI    Ascending aortic aneurysm repair- (present on admission)  Assessment & Plan  Repeat CTA today is reassuring         VTE prophylaxis: SCDs/TEDs    I have performed a physical exam and reviewed and updated ROS and Plan today (9/12/2023). In review of yesterday's note (9/11/2023), there are no changes except as documented above.

## 2023-09-13 VITALS
TEMPERATURE: 98.6 F | HEART RATE: 82 BPM | SYSTOLIC BLOOD PRESSURE: 119 MMHG | WEIGHT: 201.28 LBS | DIASTOLIC BLOOD PRESSURE: 82 MMHG | OXYGEN SATURATION: 91 % | HEIGHT: 69 IN | RESPIRATION RATE: 18 BRPM | BODY MASS INDEX: 29.81 KG/M2

## 2023-09-13 LAB
ANION GAP SERPL CALC-SCNC: 11 MMOL/L (ref 7–16)
BASOPHILS # BLD AUTO: 0.1 % (ref 0–1.8)
BASOPHILS # BLD: 0.01 K/UL (ref 0–0.12)
BUN SERPL-MCNC: 28 MG/DL (ref 8–22)
CALCIUM SERPL-MCNC: 8.6 MG/DL (ref 8.5–10.5)
CHLORIDE SERPL-SCNC: 99 MMOL/L (ref 96–112)
CO2 SERPL-SCNC: 23 MMOL/L (ref 20–33)
CREAT SERPL-MCNC: 1.22 MG/DL (ref 0.5–1.4)
CRP SERPL HS-MCNC: 0.61 MG/DL (ref 0–0.75)
EOSINOPHIL # BLD AUTO: 0 K/UL (ref 0–0.51)
EOSINOPHIL NFR BLD: 0 % (ref 0–6.9)
ERYTHROCYTE [DISTWIDTH] IN BLOOD BY AUTOMATED COUNT: 53.8 FL (ref 35.9–50)
FERRITIN SERPL-MCNC: 23.4 NG/ML (ref 22–322)
GFR SERPLBLD CREATININE-BSD FMLA CKD-EPI: 66 ML/MIN/1.73 M 2
GLUCOSE SERPL-MCNC: 136 MG/DL (ref 65–99)
HCT VFR BLD AUTO: 45.5 % (ref 42–52)
HGB BLD-MCNC: 13.7 G/DL (ref 14–18)
IMM GRANULOCYTES # BLD AUTO: 0.08 K/UL (ref 0–0.11)
IMM GRANULOCYTES NFR BLD AUTO: 0.7 % (ref 0–0.9)
IRON SATN MFR SERPL: 5 % (ref 15–55)
IRON SERPL-MCNC: 21 UG/DL (ref 50–180)
LYMPHOCYTES # BLD AUTO: 0.55 K/UL (ref 1–4.8)
LYMPHOCYTES NFR BLD: 4.8 % (ref 22–41)
MCH RBC QN AUTO: 21.2 PG (ref 27–33)
MCHC RBC AUTO-ENTMCNC: 30.1 G/DL (ref 32.3–36.5)
MCV RBC AUTO: 70.5 FL (ref 81.4–97.8)
MONOCYTES # BLD AUTO: 0.93 K/UL (ref 0–0.85)
MONOCYTES NFR BLD AUTO: 8.1 % (ref 0–13.4)
NEUTROPHILS # BLD AUTO: 9.88 K/UL (ref 1.82–7.42)
NEUTROPHILS NFR BLD: 86.3 % (ref 44–72)
NRBC # BLD AUTO: 0 K/UL
NRBC BLD-RTO: 0 /100 WBC (ref 0–0.2)
PLATELET # BLD AUTO: 555 K/UL (ref 164–446)
PMV BLD AUTO: 10 FL (ref 9–12.9)
POTASSIUM SERPL-SCNC: 5.2 MMOL/L (ref 3.6–5.5)
RBC # BLD AUTO: 6.45 M/UL (ref 4.7–6.1)
SODIUM SERPL-SCNC: 133 MMOL/L (ref 135–145)
TIBC SERPL-MCNC: 408 UG/DL (ref 250–450)
UIBC SERPL-MCNC: 387 UG/DL (ref 110–370)
WBC # BLD AUTO: 11.5 K/UL (ref 4.8–10.8)

## 2023-09-13 PROCEDURE — 99239 HOSP IP/OBS DSCHRG MGMT >30: CPT | Mod: GC | Performed by: INTERNAL MEDICINE

## 2023-09-13 PROCEDURE — 82728 ASSAY OF FERRITIN: CPT

## 2023-09-13 PROCEDURE — 700102 HCHG RX REV CODE 250 W/ 637 OVERRIDE(OP)

## 2023-09-13 PROCEDURE — A9270 NON-COVERED ITEM OR SERVICE: HCPCS | Performed by: INTERNAL MEDICINE

## 2023-09-13 PROCEDURE — A9270 NON-COVERED ITEM OR SERVICE: HCPCS

## 2023-09-13 PROCEDURE — 85025 COMPLETE CBC W/AUTO DIFF WBC: CPT

## 2023-09-13 PROCEDURE — 700102 HCHG RX REV CODE 250 W/ 637 OVERRIDE(OP): Performed by: INTERNAL MEDICINE

## 2023-09-13 PROCEDURE — 86140 C-REACTIVE PROTEIN: CPT

## 2023-09-13 PROCEDURE — 36415 COLL VENOUS BLD VENIPUNCTURE: CPT

## 2023-09-13 PROCEDURE — 700111 HCHG RX REV CODE 636 W/ 250 OVERRIDE (IP)

## 2023-09-13 PROCEDURE — 700111 HCHG RX REV CODE 636 W/ 250 OVERRIDE (IP): Mod: JZ | Performed by: INTERNAL MEDICINE

## 2023-09-13 PROCEDURE — 83550 IRON BINDING TEST: CPT

## 2023-09-13 PROCEDURE — 80048 BASIC METABOLIC PNL TOTAL CA: CPT

## 2023-09-13 PROCEDURE — 83540 ASSAY OF IRON: CPT

## 2023-09-13 RX ORDER — DEXAMETHASONE 6 MG/1
6 TABLET ORAL DAILY
Qty: 6 TABLET | Refills: 0 | Status: SHIPPED | OUTPATIENT
Start: 2023-09-13 | End: 2023-09-19

## 2023-09-13 RX ORDER — FERROUS SULFATE 325(65) MG
325 TABLET ORAL
Qty: 30 TABLET | Refills: 0 | Status: SHIPPED | OUTPATIENT
Start: 2023-09-13

## 2023-09-13 RX ADMIN — AZITHROMYCIN DIHYDRATE 500 MG: 250 TABLET ORAL at 05:33

## 2023-09-13 RX ADMIN — SENNOSIDES AND DOCUSATE SODIUM 2 TABLET: 50; 8.6 TABLET ORAL at 05:34

## 2023-09-13 RX ADMIN — HYDROMORPHONE HYDROCHLORIDE 0.5 MG: 1 INJECTION, SOLUTION INTRAMUSCULAR; INTRAVENOUS; SUBCUTANEOUS at 05:34

## 2023-09-13 RX ADMIN — ONDANSETRON 4 MG: 2 INJECTION INTRAMUSCULAR; INTRAVENOUS at 05:34

## 2023-09-13 RX ADMIN — DEXAMETHASONE 6 MG: 4 TABLET ORAL at 05:33

## 2023-09-13 RX ADMIN — OXYCODONE HYDROCHLORIDE 5 MG: 5 TABLET ORAL at 04:06

## 2023-09-13 RX ADMIN — OXYCODONE HYDROCHLORIDE 5 MG: 5 TABLET ORAL at 08:56

## 2023-09-13 RX ADMIN — METOPROLOL SUCCINATE 100 MG: 50 TABLET, EXTENDED RELEASE ORAL at 05:33

## 2023-09-13 RX ADMIN — OMEPRAZOLE 20 MG: 20 CAPSULE, DELAYED RELEASE ORAL at 05:33

## 2023-09-13 ASSESSMENT — PAIN DESCRIPTION - PAIN TYPE: TYPE: ACUTE PAIN

## 2023-09-13 ASSESSMENT — FIBROSIS 4 INDEX: FIB4 SCORE: 0.54

## 2023-09-13 NOTE — PROGRESS NOTES
Received in room talking to miah STOREY, COVID isolation, on RA, up adlib in room, POC dsicussed by claribel STOREY plans.

## 2023-09-13 NOTE — CARE PLAN
Problem: Knowledge Deficit - Standard  Goal: Patient and family/care givers will demonstrate understanding of plan of care, disease process/condition, diagnostic tests and medications  Outcome: Progressing  Note: DC plans     Problem: Pain - Standard  Goal: Alleviation of pain or a reduction in pain to the patient’s comfort goal  Outcome: Not Met  Note: Received IV prn pain meds   The patient is Stable - Low risk of patient condition declining or worsening    Shift Goals  Clinical Goals: Pain mangement  Patient Goals: Pain control, go home    Progress made toward(s) clinical / shift goals:  dc plans    Patient is not progressing towards the following goals:      Problem: Pain - Standard  Goal: Alleviation of pain or a reduction in pain to the patient’s comfort goal  Outcome: Not Met  Note: Received IV prn pain meds

## 2023-09-13 NOTE — PROGRESS NOTES
Bedside report received. Pt A&Ox4. Complains of a headache. Will medicate per MAR. Pt is medical. POC discussed with pt. Pt verbalizes understanding. Call light and belongings within reach. Bed locked and in lowest position. Alarm and fall precautions in place.

## 2023-09-13 NOTE — CARE PLAN
Problem: Knowledge Deficit - Standard  Goal: Patient and family/care givers will demonstrate understanding of plan of care, disease process/condition, diagnostic tests and medications  Outcome: Progressing  Note: Discuss and review POC with patient/family. Updated pts whiteboard. All questions answered at this time. Re-educate as needed.       Problem: Pain - Standard  Goal: Alleviation of pain or a reduction in pain to the patient’s comfort goal  Outcome: Progressing  Note: Assess and monitor for pain. Provide pharmacological and non-pharmacological interventions as appropriate. Re-evaluate and continue to monitor.      The patient is Stable - Low risk of patient condition declining or worsening    Shift Goals  Clinical Goals: Pain mangement  Patient Goals: Pain control, go home    Progress made toward(s) clinical / shift goals:  Cr trending down    Patient is not progressing towards the following goals:

## 2023-09-15 NOTE — PROGRESS NOTES
Monitor Summary: SR 60s-80s

## 2023-09-19 ENCOUNTER — PATIENT OUTREACH (OUTPATIENT)
Dept: HEALTH INFORMATION MANAGEMENT | Facility: OTHER | Age: 63
End: 2023-09-19
Payer: MEDICAID

## 2023-09-19 NOTE — PROGRESS NOTES
CHW Denise contacted pt to offer Community Care Management services post discharge. Pt states no needs at this time. Pt states he received all medications, has a PCP appointment scheduled and has no barriers with housing, transportation, food, or finances. Pt kindly declines CCM contact information at this time. Due to pt stating no needs, CHW will not continue to follow at this time.     Community Health Worker Intake    Identified barriers to none.  Resources provided to N/A.  Contact information provided to Noe Hickey : Pt declines.  Has PCP appointment scheduled for next week.  Outpatient assessment completed.  Did the patient receive medications post discharge: Yes    Plan:  Due to pt stating no needs at this time, CHW will not continue to follow.

## 2023-10-03 ENCOUNTER — APPOINTMENT (OUTPATIENT)
Dept: CARDIOLOGY | Facility: MEDICAL CENTER | Age: 63
End: 2023-10-03
Attending: NURSE PRACTITIONER
Payer: MEDICAID

## 2023-11-14 PROBLEM — M70.21 OLECRANON BURSITIS OF RIGHT ELBOW: Status: ACTIVE | Noted: 2023-11-14

## 2024-02-02 ENCOUNTER — TELEPHONE (OUTPATIENT)
Dept: VASCULAR LAB | Facility: MEDICAL CENTER | Age: 64
End: 2024-02-02
Payer: MEDICAID

## 2024-02-02 NOTE — TELEPHONE ENCOUNTER
Pt is overdue for anticoagulation appt.    Called patient but he disconnected the call upon informing of him of where I was calling from.  Last INR check was 2023 and appears that [facility] was going to follow due to insurance coverage. However, per chart review, appears he has been transitioned to Xarelto and this is managed by cardiology.    Will discharge from [facility]. Referral to  tomorrow.    Regina Ortega, RosalindaD   Libtayo Counseling- I discussed with the patient the risks of Libtayo including but not limited to nausea, vomiting, diarrhea, and bone or muscle pain.  The patient verbalized understanding of the proper use and possible adverse effects of Libtayo.  All of the patient's questions and concerns were addressed.

## 2024-04-04 ENCOUNTER — OFFICE VISIT (OUTPATIENT)
Dept: CARDIOLOGY | Facility: MEDICAL CENTER | Age: 64
End: 2024-04-04
Attending: NURSE PRACTITIONER
Payer: MEDICAID

## 2024-04-04 VITALS
BODY MASS INDEX: 31.1 KG/M2 | DIASTOLIC BLOOD PRESSURE: 88 MMHG | SYSTOLIC BLOOD PRESSURE: 132 MMHG | HEART RATE: 90 BPM | RESPIRATION RATE: 18 BRPM | HEIGHT: 69 IN | WEIGHT: 210 LBS | OXYGEN SATURATION: 93 %

## 2024-04-04 DIAGNOSIS — I50.22 CHRONIC SYSTOLIC CONGESTIVE HEART FAILURE (HCC): ICD-10-CM

## 2024-04-04 DIAGNOSIS — I50.32 CHRONIC HEART FAILURE WITH PRESERVED EJECTION FRACTION (HCC): ICD-10-CM

## 2024-04-04 DIAGNOSIS — R06.02 SHORTNESS OF BREATH: Primary | ICD-10-CM

## 2024-04-04 DIAGNOSIS — I48.0 PAROXYSMAL ATRIAL FIBRILLATION (HCC): ICD-10-CM

## 2024-04-04 PROCEDURE — 99214 OFFICE O/P EST MOD 30 MIN: CPT | Performed by: NURSE PRACTITIONER

## 2024-04-04 PROCEDURE — 3075F SYST BP GE 130 - 139MM HG: CPT | Performed by: NURSE PRACTITIONER

## 2024-04-04 PROCEDURE — 99212 OFFICE O/P EST SF 10 MIN: CPT | Performed by: NURSE PRACTITIONER

## 2024-04-04 PROCEDURE — 3079F DIAST BP 80-89 MM HG: CPT | Performed by: NURSE PRACTITIONER

## 2024-04-04 PROCEDURE — 93005 ELECTROCARDIOGRAM TRACING: CPT | Performed by: NURSE PRACTITIONER

## 2024-04-04 RX ORDER — METOPROLOL SUCCINATE 25 MG/1
25 TABLET, EXTENDED RELEASE ORAL DAILY
Qty: 90 TABLET | Refills: 3 | Status: SHIPPED | OUTPATIENT
Start: 2024-04-04

## 2024-04-04 RX ORDER — LOSARTAN POTASSIUM 25 MG/1
25 TABLET ORAL DAILY
Qty: 90 TABLET | Refills: 3 | Status: SHIPPED | OUTPATIENT
Start: 2024-04-04

## 2024-04-04 ASSESSMENT — ENCOUNTER SYMPTOMS
NEUROLOGICAL NEGATIVE: 1
DEPRESSION: 0
GASTROINTESTINAL NEGATIVE: 1
WHEEZING: 0
SHORTNESS OF BREATH: 1
BRUISES/BLEEDS EASILY: 0
HALLUCINATIONS: 0
NERVOUS/ANXIOUS: 0
CLAUDICATION: 0
SENSORY CHANGE: 0
MUSCULOSKELETAL NEGATIVE: 1
ORTHOPNEA: 0
PND: 0
PALPITATIONS: 0
EYES NEGATIVE: 1
NAUSEA: 0
DIZZINESS: 0

## 2024-04-04 ASSESSMENT — FIBROSIS 4 INDEX: FIB4 SCORE: 0.55

## 2024-04-04 NOTE — PROGRESS NOTES
Cardiology Follow up Note    DOS: 4/4/2024  [id number]  Noe De Diosner    Chief complaint/Reason for consult: shortness of breath    HPI: Pt is a 63 y.o. male who presents to the clinic today in follow up for profound dyspnea. Patient has a past medical history significant for but not limited to: h/o ascending aortic aneurysm repair, GERD, chronic combined systolic/diastolic grade 2 HF, PAF S/P ablation, chronic anticoagulation. Patient comes in today with profound fatigue and dyspnea over months. The walk from the lobby to the exam room caused him to become short of breath. Patient has always been athletic and finding his workouts complicated by dyspnea. Echocardiogram last July shows an LVEF 30% and grade 2 diastolic HF along with mild mitral valve regurgitation. Immediate stat echocardiogram, chest xray, referral to HF team and medication adjustments.       Past Medical History:   Diagnosis Date    Arrhythmia     Breath shortness 06/09/2023    prior to 1/2023 surgery    Cyclic vomiting syndrome     Elevated hemidiaphragm - LEFT post AVR 01/04/2023    Fracture     Headache, classical migraine     Heart burn     Heart valve disease 06/09/2023    heart surgery in 1/2023    Indigestion     Pneumonia due to infectious organism 01/20/2023    Snoring        Past Surgical History:   Procedure Laterality Date    AORTIC VALVE REPLACEMENT  1/5/2023    Procedure: AORTIC VALVE REPLACEMENT, ASCENDING AORTIC ANEURYSM REPAIR AND TRANSESOPHAGEAL ECHOCARDIOGRAM.;  Surgeon: Serena Goyal M.D.;  Location: [facility];  Service: Cardiac    AORTIC ASCENDING DISSECTION  1/5/2023    Procedure: REPAIR, ANEURYSM OR DISSECTION, AORTA, ASCENDING;  Surgeon: Serena Goyal M.D.;  Location: [facility];  Service: Cardiac    ECHOCARDIOGRAM, TRANSESOPHAGEAL, INTRAOPERATIVE  1/5/2023    Procedure: ECHOCARDIOGRAM, TRANSESOPHAGEAL, INTRAOPERATIVE.;  Surgeon: Serena Goyal M.D.;  Location: [facility];  Service:  Cardiac    IRRIGATION & DEBRIDEMENT ORTHO Right 09/19/2017    Procedure: IRRIGATION & DEBRIDEMENT ORTHO-THIGH;  Surgeon: Corbin Rivera M.D.;  Location: SURGERY [facility];  Service: Orthopedics    SHOULDER HEMICAP RESURFACING Right 10/12/2015    Procedure: RIGHT SHOULDER RESURFACING;  Surgeon: Javon Doll M.D.;  Location: SURGERY [facility];  Service:     SHOULDER ARTHROSCOPY      x3    SHOULDER SURGERY      replacement right       Social History     Socioeconomic History    Marital status:      Spouse name: Not on file    Number of children: Not on file    Years of education: Not on file    Highest education level: Not on file   Occupational History    Not on file   Tobacco Use    Smoking status: Every Day     Current packs/day: 0.50     Average packs/day: 0.5 packs/day for 40.0 years (20.0 ttl pk-yrs)     Types: Cigarettes    Smokeless tobacco: Never    Tobacco comments:     Down to 5 cig daily   Vaping Use    Vaping Use: Never used   Substance and Sexual Activity    Alcohol use: No    Drug use: Not Currently     Comment: past problems with narcotics not since 2019    Sexual activity: Not on file   Other Topics Concern    Not on file   Social History Narrative    Not on file     Social Determinants of Health     Financial Resource Strain: Low Risk  (7/7/2023)    Overall Financial Resource Strain (CARDIA)     Difficulty of Paying Living Expenses: Not hard at all   Food Insecurity: No Food Insecurity (7/7/2023)    Hunger Vital Sign     Worried About Running Out of Food in the Last Year: Never true     Ran Out of Food in the Last Year: Never true   Transportation Needs: No Transportation Needs (7/7/2023)    PRAPARE - Transportation     Lack of Transportation (Medical): No     Lack of Transportation (Non-Medical): No   Physical Activity: Not on file   Stress: Not on file   Social Connections: Not on file   Intimate Partner Violence: Not on file   Housing Stability: Low Risk  (7/7/2023)    Housing  Stability Vital Sign     Unable to Pay for Housing in the Last Year: No     Number of Places Lived in the Last Year: 1     Unstable Housing in the Last Year: No       No family history on file.    Allergies   Allergen Reactions    Morphine Itching and Rash     Rash/ difficulty breathing       Current Outpatient Medications   Medication Sig Dispense Refill    rivaroxaban (XARELTO) 20 MG Tab tablet Take 20 mg by mouth with dinner.      metoprolol SR (TOPROL XL) 25 MG TABLET SR 24 HR Take 1 Tablet by mouth every day. 90 Tablet 3    losartan (COZAAR) 25 MG Tab Take 1 Tablet by mouth every day. 90 Tablet 3    ferrous sulfate 325 (65 Fe) MG tablet Take 1 Tablet by mouth every 48 hours. 30 Tablet 0    Cholecalciferol (VITAMIN D3) 1.25 MG (04487 UT) Cap Take 50,000 Units by mouth every 7 days.      ondansetron (ZOFRAN ODT) 4 MG TABLET DISPERSIBLE Take 4 mg by mouth 2 times a day as needed for Nausea/Vomiting.       No current facility-administered medications for this visit.       Vitals:    04/04/24 1551   BP: 132/88   Pulse: 90   Resp: 18   SpO2: 93%         Review of Systems   Constitutional:  Positive for malaise/fatigue.   HENT: Negative.     Eyes: Negative.    Respiratory:  Positive for shortness of breath. Negative for wheezing.    Cardiovascular:  Positive for chest pain. Negative for palpitations, orthopnea, claudication, leg swelling and PND.   Gastrointestinal: Negative.  Negative for nausea.   Genitourinary: Negative.    Musculoskeletal: Negative.    Skin: Negative.    Neurological: Negative.  Negative for dizziness and sensory change.   Endo/Heme/Allergies: Negative.  Does not bruise/bleed easily.   Psychiatric/Behavioral:  Negative for depression and hallucinations. The patient is not nervous/anxious.         Prior echo results reviewed: 7/2023: LVEF 30% grade 2 diastolic and mild mitral regurgitation    Prior cath/stress results reviewed: 2022 cath show no obstructive disease with mild 20% plaque in LAD and  RCA    EKG interpreted by me: Sinus     Physical Exam  Constitutional:       Appearance: Normal appearance.   HENT:      Head: Normocephalic.   Eyes:      Pupils: Pupils are equal, round, and reactive to light.   Neck:      Vascular: No JVD.   Cardiovascular:      Rate and Rhythm: Normal rate and regular rhythm.      Pulses: Normal pulses.      Heart sounds: Normal heart sounds.   Pulmonary:      Effort: Pulmonary effort is normal.      Breath sounds: Normal breath sounds.   Abdominal:      General: Abdomen is flat.      Palpations: Abdomen is soft.   Musculoskeletal:      Cervical back: Normal range of motion.      Right lower leg: No edema.      Left lower leg: No edema.   Skin:     General: Skin is warm and dry.   Neurological:      Mental Status: He is alert and oriented to person, place, and time.   Psychiatric:         Mood and Affect: Mood normal.         Behavior: Behavior normal.          Data:  Lipids:   Lab Results   Component Value Date/Time    CHOLSTRLTOT 212 (H) 09/10/2023 03:19 AM    TRIGLYCERIDE 125 09/10/2023 03:19 AM    HDL 21 (A) 09/10/2023 03:19 AM     (H) 09/10/2023 03:19 AM        BMP:  Lab Results   Component Value Date/Time    SODIUM 133 (L) 09/13/2023 0046    POTASSIUM 5.2 09/13/2023 0046    CHLORIDE 99 09/13/2023 0046    CO2 23 09/13/2023 0046    GLUCOSE 136 (H) 09/13/2023 0046    BUN 28 (H) 09/13/2023 0046    CREATININE 1.22 09/13/2023 0046    CALCIUM 8.6 09/13/2023 0046    ANION 11.0 09/13/2023 0046       GFR:  Lab Results   Component Value Date/Time    IFAFRICA 66.14 08/27/2021 2047    IF[place] 198.42 08/27/2021 2047    IFNOTAFR 54.66 08/27/2021 2047    IFNOTAFR 163.98 08/27/2021 2047        TSH:   Lab Results   Component Value Date/Time    TSHULTRASEN 6.010 (H) 02/02/2023 0400       MAGNESIUM:  Lab Results   Component Value Date/Time    MAGNESIUM 2.2 09/12/2023 0019    MAGNESIUM 2.0 07/08/2023 0528    MAGNESIUM 2.1 07/06/2023 0049        THYROXINE (T4):   No results found for:  "\"FREEDIR\"     CBC:   Lab Results   Component Value Date/Time    WBC 11.5 (H) 09/13/2023 12:46 AM    RBC 6.45 (H) 09/13/2023 12:46 AM    HEMOGLOBIN 13.7 (L) 09/13/2023 12:46 AM    HEMATOCRIT 45.5 09/13/2023 12:46 AM    MCV 70.5 (L) 09/13/2023 12:46 AM    MCH 21.2 (L) 09/13/2023 12:46 AM    MCHC 30.1 (L) 09/13/2023 12:46 AM    RDW 53.8 (H) 09/13/2023 12:46 AM    PLATELETCT 555 (H) 09/13/2023 12:46 AM    MPV 10.0 09/13/2023 12:46 AM    NEUTSPOLYS 86.30 (H) 09/13/2023 12:46 AM    LYMPHOCYTES 4.80 (L) 09/13/2023 12:46 AM    MONOCYTES 8.10 09/13/2023 12:46 AM    EOSINOPHILS 0.00 09/13/2023 12:46 AM    BASOPHILS 0.10 09/13/2023 12:46 AM    IMMGRAN 0.70 09/13/2023 12:46 AM    NRBC 0.00 09/13/2023 12:46 AM    NEUTS 9.88 (H) 09/13/2023 12:46 AM    LYMPHS 0.55 (L) 09/13/2023 12:46 AM    LYMPHS 60 07/07/2023 02:38 PM    MONOS 0.93 (H) 09/13/2023 12:46 AM    EOS 0.00 09/13/2023 12:46 AM    BASO 0.01 09/13/2023 12:46 AM    IMMGRANAB 0.08 09/13/2023 12:46 AM    NRBCAB 0.00 09/13/2023 12:46 AM        CBC w/o DIFF  Lab Results   Component Value Date/Time    WBC 11.5 (H) 09/13/2023 12:46 AM    RBC 6.45 (H) 09/13/2023 12:46 AM    HEMOGLOBIN 13.7 (L) 09/13/2023 12:46 AM    MCV 70.5 (L) 09/13/2023 12:46 AM    MCH 21.2 (L) 09/13/2023 12:46 AM    MCHC 30.1 (L) 09/13/2023 12:46 AM    RDW 53.8 (H) 09/13/2023 12:46 AM    MPV 10.0 09/13/2023 12:46 AM       LIVER:  Lab Results   Component Value Date/Time    ALKPHOSPHAT 40 09/12/2023 12:19 AM    ASTSGOT 39 09/12/2023 12:19 AM    ALTSGPT 67 (H) 09/12/2023 12:19 AM    TBILIRUBIN 0.3 09/12/2023 12:19 AM       BNP:  No results found for: \"BNPBTYPENAT\"    PT/INR:  Lab Results   Component Value Date/Time    PROTHROMBTM 20.4 (H) 09/10/2023 07:05 PM    PROTHROMBTM 13.1 07/04/2023 04:23 PM    PROTHROMBTM 14.8 (H) 06/13/2023 10:45 AM    INR 1.72 (H) 09/10/2023 07:05 PM    INR 0.94 07/04/2023 04:23 PM    INR 1.18 (H) 06/13/2023 10:45 AM             Impression/Plan:  Chronic systolic congestive heart " failure (HCC)   - start metoprolol 25mg nightly; oxygen optimization and rate control   - start losartan 25mg daily   - BP goal less than 130/80   - stat echocardiogram   - establish with HF team   - possible need for defibrillator if EF still below 35%    Shortness of breath   - stat chest xray   - PFT   -        Lifestyle Modification for better heart health care as well as beneficial for prevention of worsening cardiac disease. Lifestyle modification discussed includes diet and reduction of sodium. Patients should eat more of a Mediterranean diet and be very careful with how much processed and fast food they eat. Excessive sodium intake can result in fluid retention which can add additional strain to patients cardiac system. Weight loss can also help long term with cardiac health. For patients with BMI above 25kg/m2, studies have shown a greater chance of progression of disease as well as recurrence after interventions. Smoking and vaping should be stopped. Moderation on caffeine and alcohol. Excessive alcohol or caffeine can result in reactions and antagonize the heart system. Patient that fit the matrix for sleep apnea should be referred for a formal study and should try to be compliant with treatment for sleep apnea. Stay hydrated and stay active. Studies have shown that staying physically active can help heart health. Exercise goals should be trying to maintain 120-200 minutes per week of exercise.      A total of 36 minutes of time was spent on day of encounter reviewing medical record, performing history and examination, counseling, ordering medication/test/consults, collaborating with referring service, and documentation.    Jhon Sams [facility]  Cardiac Electrophysiology

## 2024-04-05 ENCOUNTER — HOSPITAL ENCOUNTER (OUTPATIENT)
Dept: CARDIOLOGY | Facility: MEDICAL CENTER | Age: 64
End: 2024-04-05
Attending: NURSE PRACTITIONER
Payer: MEDICAID

## 2024-04-05 DIAGNOSIS — I50.32 CHRONIC HEART FAILURE WITH PRESERVED EJECTION FRACTION (HCC): ICD-10-CM

## 2024-04-05 LAB
LV EJECT FRACT  99904: 70
LV EJECT FRACT MOD 2C 99903: 69.16
LV EJECT FRACT MOD 4C 99902: 68.06
LV EJECT FRACT MOD BP 99901: 63.63

## 2024-04-05 PROCEDURE — 93306 TTE W/DOPPLER COMPLETE: CPT | Mod: 26 | Performed by: INTERNAL MEDICINE

## 2024-04-05 PROCEDURE — 93306 TTE W/DOPPLER COMPLETE: CPT

## 2024-04-05 NOTE — ASSESSMENT & PLAN NOTE
- start metoprolol 25mg nightly; oxygen optimization and rate control   - start losartan 25mg daily   - BP goal less than 130/80   - stat echocardiogram   - establish with HF team   - possible need for defibrillator if EF still below 35%

## 2024-04-08 ENCOUNTER — TELEPHONE (OUTPATIENT)
Dept: CARDIOLOGY | Facility: MEDICAL CENTER | Age: 64
End: 2024-04-08
Payer: MEDICAID

## 2024-04-08 DIAGNOSIS — R06.02 SOB (SHORTNESS OF BREATH): ICD-10-CM

## 2024-04-08 NOTE — TELEPHONE ENCOUNTER
S/w patient regarding Echo results and MT recommendations. Patient verbalized he would like to proceed with Cardiac MRI, order placed as STAT, gave imaging department number for patient to call.  Patient verbalized understanding.

## 2024-04-08 NOTE — TELEPHONE ENCOUNTER
----- Message from EFRAÍN Theodore sent at 4/8/2024  8:29 AM PDT -----  Will you call this patient and tell him that based on this imagine study it would be highly helpful to order another study specifically looking for cardiac problems that could be the result of his shortness of breath. Cardiac MRI is what I would order. If he would prefer to wait until he sees Dr. Ryan that is fine bu this additional study may help provide valuable information to start a treatment strategy

## 2024-04-11 ENCOUNTER — APPOINTMENT (OUTPATIENT)
Dept: RADIOLOGY | Facility: MEDICAL CENTER | Age: 64
DRG: 981 | End: 2024-04-11
Attending: STUDENT IN AN ORGANIZED HEALTH CARE EDUCATION/TRAINING PROGRAM
Payer: MEDICAID

## 2024-04-11 ENCOUNTER — HOSPITAL ENCOUNTER (INPATIENT)
Facility: MEDICAL CENTER | Age: 64
LOS: 13 days | DRG: 981 | End: 2024-04-24
Attending: STUDENT IN AN ORGANIZED HEALTH CARE EDUCATION/TRAINING PROGRAM | Admitting: INTERNAL MEDICINE
Payer: MEDICAID

## 2024-04-11 DIAGNOSIS — J18.9 COMMUNITY ACQUIRED PNEUMONIA, UNSPECIFIED LATERALITY: ICD-10-CM

## 2024-04-11 DIAGNOSIS — R07.9 ACUTE CHEST PAIN: ICD-10-CM

## 2024-04-11 DIAGNOSIS — A41.9 SEPSIS, DUE TO UNSPECIFIED ORGANISM, UNSPECIFIED WHETHER ACUTE ORGAN DYSFUNCTION PRESENT (HCC): ICD-10-CM

## 2024-04-11 DIAGNOSIS — I50.23 ACUTE ON CHRONIC SYSTOLIC HEART FAILURE (HCC): ICD-10-CM

## 2024-04-11 DIAGNOSIS — D72.829 LEUKOCYTOSIS, UNSPECIFIED TYPE: ICD-10-CM

## 2024-04-11 DIAGNOSIS — J96.01 ACUTE RESPIRATORY FAILURE WITH HYPOXIA (HCC): ICD-10-CM

## 2024-04-11 DIAGNOSIS — L02.611 ABSCESS OF RIGHT FOOT: ICD-10-CM

## 2024-04-11 DIAGNOSIS — J44.9 CHRONIC OBSTRUCTIVE PULMONARY DISEASE, UNSPECIFIED COPD TYPE (HCC): ICD-10-CM

## 2024-04-11 DIAGNOSIS — R09.02 HYPOXEMIA: ICD-10-CM

## 2024-04-11 DIAGNOSIS — R51.9 ACUTE NONINTRACTABLE HEADACHE, UNSPECIFIED HEADACHE TYPE: ICD-10-CM

## 2024-04-11 DIAGNOSIS — R49.0 DYSPHONIA: ICD-10-CM

## 2024-04-11 LAB
ALBUMIN SERPL BCP-MCNC: 4 G/DL (ref 3.2–4.9)
ALBUMIN/GLOB SERPL: 1.5 G/DL
ALP SERPL-CCNC: 33 U/L (ref 30–99)
ALT SERPL-CCNC: 48 U/L (ref 2–50)
AMPHET UR QL SCN: NEGATIVE
ANION GAP SERPL CALC-SCNC: 12 MMOL/L (ref 7–16)
ANISOCYTOSIS BLD QL SMEAR: ABNORMAL
AST SERPL-CCNC: 47 U/L (ref 12–45)
BARBITURATES UR QL SCN: NEGATIVE
BASOPHILS # BLD AUTO: 0 % (ref 0–1.8)
BASOPHILS # BLD: 0 K/UL (ref 0–0.12)
BENZODIAZ UR QL SCN: NEGATIVE
BILIRUB SERPL-MCNC: 0.6 MG/DL (ref 0.1–1.5)
BUN SERPL-MCNC: 21 MG/DL (ref 8–22)
BZE UR QL SCN: NEGATIVE
CALCIUM ALBUM COR SERPL-MCNC: 9.2 MG/DL (ref 8.5–10.5)
CALCIUM SERPL-MCNC: 9.2 MG/DL (ref 8.5–10.5)
CANNABINOIDS UR QL SCN: NEGATIVE
CHLORIDE SERPL-SCNC: 100 MMOL/L (ref 96–112)
CO2 SERPL-SCNC: 22 MMOL/L (ref 20–33)
CREAT SERPL-MCNC: 1.06 MG/DL (ref 0.5–1.4)
EKG IMPRESSION: NORMAL
EOSINOPHIL # BLD AUTO: 0.24 K/UL (ref 0–0.51)
EOSINOPHIL NFR BLD: 0.9 % (ref 0–6.9)
ERYTHROCYTE [DISTWIDTH] IN BLOOD BY AUTOMATED COUNT: 48.5 FL (ref 35.9–50)
FENTANYL UR QL: NEGATIVE
GFR SERPLBLD CREATININE-BSD FMLA CKD-EPI: 78 ML/MIN/1.73 M 2
GLOBULIN SER CALC-MCNC: 2.7 G/DL (ref 1.9–3.5)
GLUCOSE SERPL-MCNC: 92 MG/DL (ref 65–99)
HCT VFR BLD AUTO: 41.7 % (ref 42–52)
HGB BLD-MCNC: 13 G/DL (ref 14–18)
HYPOCHROMIA BLD QL SMEAR: ABNORMAL
INR PPP: 1.13 (ref 0.87–1.13)
LACTATE SERPL-SCNC: 1.3 MMOL/L (ref 0.5–2)
LACTATE SERPL-SCNC: 1.4 MMOL/L (ref 0.5–2)
LYMPHOCYTES # BLD AUTO: 0.67 K/UL (ref 1–4.8)
LYMPHOCYTES NFR BLD: 2.5 % (ref 22–41)
MANUAL DIFF BLD: NORMAL
MCH RBC QN AUTO: 20.4 PG (ref 27–33)
MCHC RBC AUTO-ENTMCNC: 31.2 G/DL (ref 32.3–36.5)
MCV RBC AUTO: 65.5 FL (ref 81.4–97.8)
METAMYELOCYTES NFR BLD MANUAL: 0.8 %
METHADONE UR QL SCN: NEGATIVE
MICROCYTES BLD QL SMEAR: ABNORMAL
MONOCYTES # BLD AUTO: 1.36 K/UL (ref 0–0.85)
MONOCYTES NFR BLD AUTO: 5.1 % (ref 0–13.4)
MORPHOLOGY BLD-IMP: NORMAL
NEUTROPHILS # BLD AUTO: 24.13 K/UL (ref 1.82–7.42)
NEUTROPHILS NFR BLD: 89.8 % (ref 44–72)
NEUTS BAND NFR BLD MANUAL: 0.9 % (ref 0–10)
NRBC # BLD AUTO: 0.03 K/UL
NRBC BLD-RTO: 0.1 /100 WBC (ref 0–0.2)
NT-PROBNP SERPL IA-MCNC: 255 PG/ML (ref 0–125)
OPIATES UR QL SCN: NEGATIVE
OXYCODONE UR QL SCN: NEGATIVE
PCP UR QL SCN: NEGATIVE
PLATELET # BLD AUTO: 552 K/UL (ref 164–446)
PLATELET BLD QL SMEAR: NORMAL
POIKILOCYTOSIS BLD QL SMEAR: NORMAL
POTASSIUM SERPL-SCNC: 4.5 MMOL/L (ref 3.6–5.5)
PROPOXYPH UR QL SCN: NEGATIVE
PROT SERPL-MCNC: 6.7 G/DL (ref 6–8.2)
PROTHROMBIN TIME: 14.7 SEC (ref 12–14.6)
RBC # BLD AUTO: 6.37 M/UL (ref 4.7–6.1)
RBC BLD AUTO: PRESENT
SCHISTOCYTES BLD QL SMEAR: NORMAL
SODIUM SERPL-SCNC: 134 MMOL/L (ref 135–145)
TARGETS BLD QL SMEAR: NORMAL
TROPONIN T SERPL-MCNC: 17 NG/L (ref 6–19)
WBC # BLD AUTO: 26.6 K/UL (ref 4.8–10.8)

## 2024-04-11 PROCEDURE — 700111 HCHG RX REV CODE 636 W/ 250 OVERRIDE (IP): Mod: UD | Performed by: STUDENT IN AN ORGANIZED HEALTH CARE EDUCATION/TRAINING PROGRAM

## 2024-04-11 PROCEDURE — 99223 1ST HOSP IP/OBS HIGH 75: CPT | Performed by: INTERNAL MEDICINE

## 2024-04-11 PROCEDURE — 80307 DRUG TEST PRSMV CHEM ANLYZR: CPT

## 2024-04-11 PROCEDURE — 99285 EMERGENCY DEPT VISIT HI MDM: CPT

## 2024-04-11 PROCEDURE — 96376 TX/PRO/DX INJ SAME DRUG ADON: CPT

## 2024-04-11 PROCEDURE — 80053 COMPREHEN METABOLIC PANEL: CPT

## 2024-04-11 PROCEDURE — 36415 COLL VENOUS BLD VENIPUNCTURE: CPT

## 2024-04-11 PROCEDURE — 96367 TX/PROPH/DG ADDL SEQ IV INF: CPT

## 2024-04-11 PROCEDURE — 700105 HCHG RX REV CODE 258: Performed by: STUDENT IN AN ORGANIZED HEALTH CARE EDUCATION/TRAINING PROGRAM

## 2024-04-11 PROCEDURE — 700117 HCHG RX CONTRAST REV CODE 255: Mod: UD | Performed by: STUDENT IN AN ORGANIZED HEALTH CARE EDUCATION/TRAINING PROGRAM

## 2024-04-11 PROCEDURE — 85027 COMPLETE CBC AUTOMATED: CPT

## 2024-04-11 PROCEDURE — 70498 CT ANGIOGRAPHY NECK: CPT

## 2024-04-11 PROCEDURE — 85610 PROTHROMBIN TIME: CPT

## 2024-04-11 PROCEDURE — A9270 NON-COVERED ITEM OR SERVICE: HCPCS | Performed by: INTERNAL MEDICINE

## 2024-04-11 PROCEDURE — 770020 HCHG ROOM/CARE - TELE (206)

## 2024-04-11 PROCEDURE — 93005 ELECTROCARDIOGRAM TRACING: CPT | Performed by: STUDENT IN AN ORGANIZED HEALTH CARE EDUCATION/TRAINING PROGRAM

## 2024-04-11 PROCEDURE — 83605 ASSAY OF LACTIC ACID: CPT

## 2024-04-11 PROCEDURE — 71275 CT ANGIOGRAPHY CHEST: CPT

## 2024-04-11 PROCEDURE — 83880 ASSAY OF NATRIURETIC PEPTIDE: CPT

## 2024-04-11 PROCEDURE — 87040 BLOOD CULTURE FOR BACTERIA: CPT | Mod: 91

## 2024-04-11 PROCEDURE — 84484 ASSAY OF TROPONIN QUANT: CPT

## 2024-04-11 PROCEDURE — 93005 ELECTROCARDIOGRAM TRACING: CPT

## 2024-04-11 PROCEDURE — 96365 THER/PROPH/DIAG IV INF INIT: CPT

## 2024-04-11 PROCEDURE — 700102 HCHG RX REV CODE 250 W/ 637 OVERRIDE(OP): Performed by: INTERNAL MEDICINE

## 2024-04-11 PROCEDURE — 70496 CT ANGIOGRAPHY HEAD: CPT

## 2024-04-11 PROCEDURE — 96375 TX/PRO/DX INJ NEW DRUG ADDON: CPT

## 2024-04-11 PROCEDURE — 700111 HCHG RX REV CODE 636 W/ 250 OVERRIDE (IP): Performed by: INTERNAL MEDICINE

## 2024-04-11 PROCEDURE — 85007 BL SMEAR W/DIFF WBC COUNT: CPT

## 2024-04-11 RX ORDER — AZITHROMYCIN 500 MG/5ML
500 INJECTION, POWDER, LYOPHILIZED, FOR SOLUTION INTRAVENOUS EVERY 24 HOURS
Status: DISCONTINUED | OUTPATIENT
Start: 2024-04-12 | End: 2024-04-11

## 2024-04-11 RX ORDER — METOPROLOL SUCCINATE 25 MG/1
25 TABLET, EXTENDED RELEASE ORAL DAILY
Status: DISCONTINUED | OUTPATIENT
Start: 2024-04-12 | End: 2024-04-24 | Stop reason: HOSPADM

## 2024-04-11 RX ORDER — OXYCODONE HYDROCHLORIDE 5 MG/1
2.5 TABLET ORAL
Status: DISCONTINUED | OUTPATIENT
Start: 2024-04-11 | End: 2024-04-13

## 2024-04-11 RX ORDER — OMEPRAZOLE 20 MG/1
20 CAPSULE, DELAYED RELEASE ORAL 2 TIMES DAILY
COMMUNITY

## 2024-04-11 RX ORDER — AZITHROMYCIN 250 MG/1
500 TABLET, FILM COATED ORAL DAILY
Qty: 4 TABLET | Refills: 0 | Status: COMPLETED | OUTPATIENT
Start: 2024-04-12 | End: 2024-04-13

## 2024-04-11 RX ORDER — SODIUM CHLORIDE, SODIUM LACTATE, POTASSIUM CHLORIDE, AND CALCIUM CHLORIDE .6; .31; .03; .02 G/100ML; G/100ML; G/100ML; G/100ML
500 INJECTION, SOLUTION INTRAVENOUS
Status: DISCONTINUED | OUTPATIENT
Start: 2024-04-11 | End: 2024-04-21

## 2024-04-11 RX ORDER — AZITHROMYCIN 500 MG/1
500 INJECTION, POWDER, LYOPHILIZED, FOR SOLUTION INTRAVENOUS ONCE
Status: COMPLETED | OUTPATIENT
Start: 2024-04-11 | End: 2024-04-11

## 2024-04-11 RX ORDER — ACETAMINOPHEN 500 MG
1000 TABLET ORAL EVERY 6 HOURS PRN
Status: DISCONTINUED | OUTPATIENT
Start: 2024-04-16 | End: 2024-04-20

## 2024-04-11 RX ORDER — HYDROMORPHONE HYDROCHLORIDE 1 MG/ML
1 INJECTION, SOLUTION INTRAMUSCULAR; INTRAVENOUS; SUBCUTANEOUS ONCE
Status: COMPLETED | OUTPATIENT
Start: 2024-04-11 | End: 2024-04-11

## 2024-04-11 RX ORDER — ONDANSETRON 4 MG/1
4 TABLET, FILM COATED ORAL 2 TIMES DAILY PRN
COMMUNITY
End: 2024-05-16

## 2024-04-11 RX ORDER — ACETAMINOPHEN 500 MG
1000 TABLET ORAL EVERY 6 HOURS
Status: DISPENSED | OUTPATIENT
Start: 2024-04-11 | End: 2024-04-16

## 2024-04-11 RX ORDER — LORATADINE 10 MG/1
10 TABLET ORAL DAILY
COMMUNITY
End: 2024-04-26

## 2024-04-11 RX ORDER — FERROUS SULFATE 325(65) MG
325 TABLET ORAL
Status: DISCONTINUED | OUTPATIENT
Start: 2024-04-11 | End: 2024-04-11

## 2024-04-11 RX ORDER — HYDROMORPHONE HYDROCHLORIDE 1 MG/ML
0.25 INJECTION, SOLUTION INTRAMUSCULAR; INTRAVENOUS; SUBCUTANEOUS
Status: DISCONTINUED | OUTPATIENT
Start: 2024-04-11 | End: 2024-04-13

## 2024-04-11 RX ORDER — OXYCODONE HYDROCHLORIDE 5 MG/1
5 TABLET ORAL
Status: DISCONTINUED | OUTPATIENT
Start: 2024-04-11 | End: 2024-04-13

## 2024-04-11 RX ORDER — LOSARTAN POTASSIUM 25 MG/1
25 TABLET ORAL DAILY
Status: DISCONTINUED | OUTPATIENT
Start: 2024-04-12 | End: 2024-04-24 | Stop reason: HOSPADM

## 2024-04-11 RX ORDER — PROCHLORPERAZINE EDISYLATE 5 MG/ML
10 INJECTION INTRAMUSCULAR; INTRAVENOUS ONCE
Status: COMPLETED | OUTPATIENT
Start: 2024-04-11 | End: 2024-04-11

## 2024-04-11 RX ADMIN — HYDROMORPHONE HYDROCHLORIDE 1 MG: 1 INJECTION, SOLUTION INTRAMUSCULAR; INTRAVENOUS; SUBCUTANEOUS at 19:27

## 2024-04-11 RX ADMIN — HYDROMORPHONE HYDROCHLORIDE 1 MG: 1 INJECTION, SOLUTION INTRAMUSCULAR; INTRAVENOUS; SUBCUTANEOUS at 17:22

## 2024-04-11 RX ADMIN — AMPICILLIN AND SULBACTAM 3 G: 1; 2 INJECTION, POWDER, FOR SOLUTION INTRAMUSCULAR; INTRAVENOUS at 19:30

## 2024-04-11 RX ADMIN — IOHEXOL 130 ML: 350 INJECTION, SOLUTION INTRAVENOUS at 17:15

## 2024-04-11 RX ADMIN — PROCHLORPERAZINE EDISYLATE 10 MG: 5 INJECTION INTRAMUSCULAR; INTRAVENOUS at 19:27

## 2024-04-11 RX ADMIN — HYDROMORPHONE HYDROCHLORIDE 0.25 MG: 1 INJECTION, SOLUTION INTRAMUSCULAR; INTRAVENOUS; SUBCUTANEOUS at 23:30

## 2024-04-11 RX ADMIN — AZITHROMYCIN 500 MG: 500 INJECTION, POWDER, LYOPHILIZED, FOR SOLUTION INTRAVENOUS at 20:15

## 2024-04-11 RX ADMIN — ACETAMINOPHEN 1000 MG: 500 TABLET, FILM COATED ORAL at 19:25

## 2024-04-11 RX ADMIN — OXYCODONE 5 MG: 5 TABLET ORAL at 22:06

## 2024-04-11 RX ADMIN — ACETAMINOPHEN 1000 MG: 500 TABLET, FILM COATED ORAL at 23:30

## 2024-04-11 ASSESSMENT — CHA2DS2 SCORE
PRIOR STROKE OR TIA OR THROMBOEMBOLISM: NO
HYPERTENSION: NO
CHA2DS2 VASC SCORE: 2
AGE 65 TO 74: NO
VASCULAR DISEASE: YES
SEX: MALE
AGE 75 OR GREATER: NO
CHF OR LEFT VENTRICULAR DYSFUNCTION: YES
DIABETES: NO

## 2024-04-11 ASSESSMENT — COGNITIVE AND FUNCTIONAL STATUS - GENERAL
SUGGESTED CMS G CODE MODIFIER DAILY ACTIVITY: CH
DAILY ACTIVITIY SCORE: 24
MOBILITY SCORE: 24
SUGGESTED CMS G CODE MODIFIER MOBILITY: CH

## 2024-04-11 ASSESSMENT — ENCOUNTER SYMPTOMS
DIZZINESS: 0
SHORTNESS OF BREATH: 1
FEVER: 0
CHILLS: 0
ORTHOPNEA: 1
COUGH: 1
WHEEZING: 1
HEADACHES: 0

## 2024-04-11 ASSESSMENT — LIFESTYLE VARIABLES
TOTAL SCORE: 0
EVER HAD A DRINK FIRST THING IN THE MORNING TO STEADY YOUR NERVES TO GET RID OF A HANGOVER: NO
ON A TYPICAL DAY WHEN YOU DRINK ALCOHOL HOW MANY DRINKS DO YOU HAVE: 0
ALCOHOL_USE: NO
AVERAGE NUMBER OF DAYS PER WEEK YOU HAVE A DRINK CONTAINING ALCOHOL: 0
TOTAL SCORE: 0
EVER FELT BAD OR GUILTY ABOUT YOUR DRINKING: NO
TOTAL SCORE: 0
HOW MANY TIMES IN THE PAST YEAR HAVE YOU HAD 5 OR MORE DRINKS IN A DAY: 0
HAVE PEOPLE ANNOYED YOU BY CRITICIZING YOUR DRINKING: NO
DOES PATIENT WANT TO STOP DRINKING: NO
CONSUMPTION TOTAL: NEGATIVE
HAVE YOU EVER FELT YOU SHOULD CUT DOWN ON YOUR DRINKING: NO

## 2024-04-11 ASSESSMENT — FIBROSIS 4 INDEX
FIB4 SCORE: 0.55
FIB4 SCORE: 0.79

## 2024-04-11 ASSESSMENT — PAIN DESCRIPTION - PAIN TYPE
TYPE: ACUTE PAIN

## 2024-04-11 NOTE — ED TRIAGE NOTES
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     Aorta screening of 1, charge RN notified, pt roomed.     /79   Pulse 86   Temp 36.3 °C (97.3 °F) (Temporal)   Resp (!) 24   Ht 1.753 m (5' 9\")   Wt 95.3 kg (210 lb)   SpO2 93%   BMI 31.01 kg/m²     "

## 2024-04-12 PROBLEM — D50.9 MICROCYTIC ANEMIA: Status: ACTIVE | Noted: 2024-04-12

## 2024-04-12 LAB
ANION GAP SERPL CALC-SCNC: 7 MMOL/L (ref 7–16)
ANISOCYTOSIS BLD QL SMEAR: ABNORMAL
BASOPHILS # BLD AUTO: 0 % (ref 0–1.8)
BASOPHILS # BLD: 0 K/UL (ref 0–0.12)
BUN SERPL-MCNC: 26 MG/DL (ref 8–22)
CALCIUM SERPL-MCNC: 8.7 MG/DL (ref 8.5–10.5)
CHLORIDE SERPL-SCNC: 100 MMOL/L (ref 96–112)
CO2 SERPL-SCNC: 26 MMOL/L (ref 20–33)
CREAT SERPL-MCNC: 1.4 MG/DL (ref 0.5–1.4)
EOSINOPHIL # BLD AUTO: 0.22 K/UL (ref 0–0.51)
EOSINOPHIL NFR BLD: 0.9 % (ref 0–6.9)
ERYTHROCYTE [DISTWIDTH] IN BLOOD BY AUTOMATED COUNT: 50.4 FL (ref 35.9–50)
GFR SERPLBLD CREATININE-BSD FMLA CKD-EPI: 56 ML/MIN/1.73 M 2
GLUCOSE SERPL-MCNC: 84 MG/DL (ref 65–99)
HCT VFR BLD AUTO: 40.5 % (ref 42–52)
HGB BLD-MCNC: 12.2 G/DL (ref 14–18)
HYPOCHROMIA BLD QL SMEAR: ABNORMAL
LYMPHOCYTES # BLD AUTO: 0.22 K/UL (ref 1–4.8)
LYMPHOCYTES NFR BLD: 0.9 % (ref 22–41)
MANUAL DIFF BLD: NORMAL
MCH RBC QN AUTO: 20.3 PG (ref 27–33)
MCHC RBC AUTO-ENTMCNC: 30.1 G/DL (ref 32.3–36.5)
MCV RBC AUTO: 67.4 FL (ref 81.4–97.8)
MICROCYTES BLD QL SMEAR: ABNORMAL
MONOCYTES # BLD AUTO: 2.56 K/UL (ref 0–0.85)
MONOCYTES NFR BLD AUTO: 10.4 % (ref 0–13.4)
MORPHOLOGY BLD-IMP: NORMAL
NEUTROPHILS # BLD AUTO: 21.6 K/UL (ref 1.82–7.42)
NEUTROPHILS NFR BLD: 87.8 % (ref 44–72)
NRBC # BLD AUTO: 0.03 K/UL
NRBC BLD-RTO: 0.1 /100 WBC (ref 0–0.2)
PLATELET # BLD AUTO: 474 K/UL (ref 164–446)
PLATELET BLD QL SMEAR: NORMAL
PMV BLD AUTO: 9.7 FL (ref 9–12.9)
POIKILOCYTOSIS BLD QL SMEAR: NORMAL
POTASSIUM SERPL-SCNC: 5.4 MMOL/L (ref 3.6–5.5)
PROCALCITONIN SERPL-MCNC: 0.07 NG/ML
RBC # BLD AUTO: 6.01 M/UL (ref 4.7–6.1)
RBC BLD AUTO: PRESENT
SCHISTOCYTES BLD QL SMEAR: NORMAL
SODIUM SERPL-SCNC: 133 MMOL/L (ref 135–145)
TARGETS BLD QL SMEAR: NORMAL
TROPONIN T SERPL-MCNC: 21 NG/L (ref 6–19)
WBC # BLD AUTO: 24.6 K/UL (ref 4.8–10.8)

## 2024-04-12 PROCEDURE — 84484 ASSAY OF TROPONIN QUANT: CPT

## 2024-04-12 PROCEDURE — 36415 COLL VENOUS BLD VENIPUNCTURE: CPT

## 2024-04-12 PROCEDURE — 99222 1ST HOSP IP/OBS MODERATE 55: CPT | Performed by: INTERNAL MEDICINE

## 2024-04-12 PROCEDURE — A9270 NON-COVERED ITEM OR SERVICE: HCPCS | Mod: JZ | Performed by: INTERNAL MEDICINE

## 2024-04-12 PROCEDURE — 700111 HCHG RX REV CODE 636 W/ 250 OVERRIDE (IP): Mod: JZ | Performed by: INTERNAL MEDICINE

## 2024-04-12 PROCEDURE — 99233 SBSQ HOSP IP/OBS HIGH 50: CPT | Performed by: STUDENT IN AN ORGANIZED HEALTH CARE EDUCATION/TRAINING PROGRAM

## 2024-04-12 PROCEDURE — 700105 HCHG RX REV CODE 258: Performed by: INTERNAL MEDICINE

## 2024-04-12 PROCEDURE — 700102 HCHG RX REV CODE 250 W/ 637 OVERRIDE(OP): Mod: JZ | Performed by: INTERNAL MEDICINE

## 2024-04-12 PROCEDURE — A9270 NON-COVERED ITEM OR SERVICE: HCPCS | Performed by: INTERNAL MEDICINE

## 2024-04-12 PROCEDURE — 97165 OT EVAL LOW COMPLEX 30 MIN: CPT

## 2024-04-12 PROCEDURE — 700111 HCHG RX REV CODE 636 W/ 250 OVERRIDE (IP): Performed by: INTERNAL MEDICINE

## 2024-04-12 PROCEDURE — 85027 COMPLETE CBC AUTOMATED: CPT

## 2024-04-12 PROCEDURE — 97535 SELF CARE MNGMENT TRAINING: CPT

## 2024-04-12 PROCEDURE — 700102 HCHG RX REV CODE 250 W/ 637 OVERRIDE(OP): Performed by: INTERNAL MEDICINE

## 2024-04-12 PROCEDURE — 84145 PROCALCITONIN (PCT): CPT

## 2024-04-12 PROCEDURE — 97163 PT EVAL HIGH COMPLEX 45 MIN: CPT

## 2024-04-12 PROCEDURE — 85007 BL SMEAR W/DIFF WBC COUNT: CPT

## 2024-04-12 PROCEDURE — 770020 HCHG ROOM/CARE - TELE (206)

## 2024-04-12 PROCEDURE — 80048 BASIC METABOLIC PNL TOTAL CA: CPT

## 2024-04-12 RX ORDER — FUROSEMIDE 10 MG/ML
20 INJECTION INTRAMUSCULAR; INTRAVENOUS ONCE
Status: COMPLETED | OUTPATIENT
Start: 2024-04-12 | End: 2024-04-12

## 2024-04-12 RX ORDER — POTASSIUM CHLORIDE 20 MEQ/1
40 TABLET, EXTENDED RELEASE ORAL ONCE
Status: COMPLETED | OUTPATIENT
Start: 2024-04-12 | End: 2024-04-12

## 2024-04-12 RX ADMIN — OXYCODONE 5 MG: 5 TABLET ORAL at 12:35

## 2024-04-12 RX ADMIN — ACETAMINOPHEN 1000 MG: 500 TABLET, FILM COATED ORAL at 06:04

## 2024-04-12 RX ADMIN — HYDROMORPHONE HYDROCHLORIDE 0.25 MG: 1 INJECTION, SOLUTION INTRAMUSCULAR; INTRAVENOUS; SUBCUTANEOUS at 08:34

## 2024-04-12 RX ADMIN — AMPICILLIN AND SULBACTAM 3 G: 1; 2 INJECTION, POWDER, FOR SOLUTION INTRAMUSCULAR; INTRAVENOUS at 20:22

## 2024-04-12 RX ADMIN — OXYCODONE 5 MG: 5 TABLET ORAL at 06:46

## 2024-04-12 RX ADMIN — ACETAMINOPHEN 1000 MG: 500 TABLET, FILM COATED ORAL at 12:35

## 2024-04-12 RX ADMIN — OXYCODONE 5 MG: 5 TABLET ORAL at 23:40

## 2024-04-12 RX ADMIN — OXYCODONE 5 MG: 5 TABLET ORAL at 03:26

## 2024-04-12 RX ADMIN — AMPICILLIN AND SULBACTAM 3 G: 1; 2 INJECTION, POWDER, FOR SOLUTION INTRAMUSCULAR; INTRAVENOUS at 03:24

## 2024-04-12 RX ADMIN — METOPROLOL SUCCINATE 25 MG: 25 TABLET, EXTENDED RELEASE ORAL at 06:04

## 2024-04-12 RX ADMIN — AZITHROMYCIN DIHYDRATE 500 MG: 250 TABLET, FILM COATED ORAL at 06:04

## 2024-04-12 RX ADMIN — AMPICILLIN AND SULBACTAM 3 G: 1; 2 INJECTION, POWDER, FOR SOLUTION INTRAMUSCULAR; INTRAVENOUS at 08:37

## 2024-04-12 RX ADMIN — OXYCODONE 5 MG: 5 TABLET ORAL at 18:21

## 2024-04-12 RX ADMIN — AMPICILLIN AND SULBACTAM 3 G: 1; 2 INJECTION, POWDER, FOR SOLUTION INTRAMUSCULAR; INTRAVENOUS at 14:59

## 2024-04-12 RX ADMIN — ACETAMINOPHEN 1000 MG: 500 TABLET, FILM COATED ORAL at 23:39

## 2024-04-12 RX ADMIN — LOSARTAN POTASSIUM 25 MG: 50 TABLET, FILM COATED ORAL at 06:04

## 2024-04-12 RX ADMIN — FUROSEMIDE 20 MG: 10 INJECTION INTRAMUSCULAR; INTRAVENOUS at 20:17

## 2024-04-12 RX ADMIN — HYDROMORPHONE HYDROCHLORIDE 0.25 MG: 1 INJECTION, SOLUTION INTRAMUSCULAR; INTRAVENOUS; SUBCUTANEOUS at 19:59

## 2024-04-12 RX ADMIN — POTASSIUM CHLORIDE 40 MEQ: 1500 TABLET, EXTENDED RELEASE ORAL at 20:17

## 2024-04-12 RX ADMIN — RIVAROXABAN 20 MG: 20 TABLET, FILM COATED ORAL at 17:22

## 2024-04-12 RX ADMIN — HYDROMORPHONE HYDROCHLORIDE 0.25 MG: 1 INJECTION, SOLUTION INTRAMUSCULAR; INTRAVENOUS; SUBCUTANEOUS at 14:57

## 2024-04-12 ASSESSMENT — COGNITIVE AND FUNCTIONAL STATUS - GENERAL
CLIMB 3 TO 5 STEPS WITH RAILING: A LITTLE
DAILY ACTIVITIY SCORE: 24
SUGGESTED CMS G CODE MODIFIER DAILY ACTIVITY: CH
MOBILITY SCORE: 23
SUGGESTED CMS G CODE MODIFIER MOBILITY: CI

## 2024-04-12 ASSESSMENT — PATIENT HEALTH QUESTIONNAIRE - PHQ9
1. LITTLE INTEREST OR PLEASURE IN DOING THINGS: NOT AT ALL
2. FEELING DOWN, DEPRESSED, IRRITABLE, OR HOPELESS: NOT AT ALL
SUM OF ALL RESPONSES TO PHQ9 QUESTIONS 1 AND 2: 0

## 2024-04-12 ASSESSMENT — PAIN DESCRIPTION - PAIN TYPE
TYPE: ACUTE PAIN

## 2024-04-12 ASSESSMENT — GAIT ASSESSMENTS
GAIT LEVEL OF ASSIST: SUPERVISED
DEVIATION: ANTALGIC
DISTANCE (FEET): 200

## 2024-04-12 ASSESSMENT — ENCOUNTER SYMPTOMS
SHORTNESS OF BREATH: 1
WEAKNESS: 1

## 2024-04-12 ASSESSMENT — FIBROSIS 4 INDEX: FIB4 SCORE: 0.92

## 2024-04-12 ASSESSMENT — ACTIVITIES OF DAILY LIVING (ADL): TOILETING: INDEPENDENT

## 2024-04-12 NOTE — ASSESSMENT & PLAN NOTE
ECHO as noted above  No clear e/o VOL at this time  Defer diuretics  Consider cards consult and outpatient cardiac MRI

## 2024-04-12 NOTE — ASSESSMENT & PLAN NOTE
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 ?diaphragm motion abnormal. SNIFF noted limited but nonparadoxical motion of the L diaphragm.   S/p ENT scope 4/15 unremarkable. Does not think dyspnea is related to vocal cords  Anxiety   Speech therapy to evaluate vocal cord dysfucntion

## 2024-04-12 NOTE — H&P
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 male who presented 4/11/2024 with AAA repair in January 2023, combined systolic and diastolic congestive heart failure, paroxysmal atrial fibrillation status post ablation that was stopped on his Xarelto 1 week ago and ongoing tobacco abuse who presented to the hospital above chief complaint.  Patient's been struggling with worsening shortness of breath and dyspnea on exertion for the last 3 to 4 months and had a repeat echocardiogram done in early April of this year that showed an improvement in EF from 30 to 70% for possible hypertrophic cardiomyopathy and an atypical variant.  Cardiac MRI was recommended at that time.  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    In the emergency room he was a code aorta and a stat CTA aorta was performed with contrast that showed normal AAA repair with no new aneurysmal dilatation or aneurysm.  It did show a left-sided basilar pneumonia with a white blood cell count of 27,000 for left shift.  He was given empiric IV Unasyn and azithromycin.  Patient continues to have ongoing left-sided chest pain and ongoing cough and has been trying to quit tobacco.  Denies any nausea nausea vomiting fevers or chills.  Patient did state that he denies any new lower extremity swelling.  He does not endorse any  obvious sick contacts.  He was told to stop his Xarelto approximately 1 week ago.    I personally spoke with Dr. Ochoa of the ER physician group in detail about the patient's case.    I discussed the plan of care with patient.    Review of Systems  Review of Systems   Constitutional:  Negative for chills and fever.   Respiratory:  Positive for cough, shortness of breath and wheezing.    Cardiovascular:  Positive for chest pain and orthopnea (?). Negative for leg swelling.   Neurological:  Negative for dizziness and headaches.       Past Medical History   has a past medical history of Arrhythmia, Breath shortness (06/09/2023), Cyclic vomiting syndrome, Elevated hemidiaphragm - LEFT post AVR (01/04/2023), Fracture, Headache, classical migraine, Heart burn, Heart valve disease (06/09/2023), Indigestion, Pneumonia due to infectious organism (01/20/2023), and Snoring.    Surgical History   has a past surgical history that includes shoulder arthroscopy; shoulder hemicap resurfacing (Right, 10/12/2015); irrigation & debridement ortho (Right, 09/19/2017); shoulder surgery; aortic valve replacement (1/5/2023); aortic ascending dissection (1/5/2023); and echocardiogram, transesophageal, intraoperative (1/5/2023).     Family History  family history is not on file.   Family history reviewed with patient. There is no family history that is pertinent to the chief complaint.     Social History   reports that he has been smoking cigarettes. He has a 20 pack-year smoking history. He has never used smokeless tobacco. He reports that he does not currently use drugs. He reports that he does not drink alcohol.    Allergies  Allergies   Allergen Reactions    Morphine Shortness of Breath, Rash and Itching             Medications  Prior to Admission Medications   Prescriptions Last Dose Informant Patient Reported? Taking?   Cholecalciferol (VITAMIN D3) 1.25 MG (95109 UT) Cap  Patient Yes No   Sig: Take 50,000 Units by mouth every 7 days.    ferrous sulfate 325 (65 Fe) MG tablet   No No   Sig: Take 1 Tablet by mouth every 48 hours.   losartan (COZAAR) 25 MG Tab   No No   Sig: Take 1 Tablet by mouth every day.   metoprolol SR (TOPROL XL) 25 MG TABLET SR 24 HR   No No   Sig: Take 1 Tablet by mouth every day.   ondansetron (ZOFRAN ODT) 4 MG TABLET DISPERSIBLE  Patient Yes No   Sig: Take 4 mg by mouth 2 times a day as needed for Nausea/Vomiting.   rivaroxaban (XARELTO) 20 MG Tab tablet   Yes No   Sig: Take 20 mg by mouth with dinner.      Facility-Administered Medications: None       Physical Exam  Temp:  [36.3 °C (97.3 °F)] 36.3 °C (97.3 °F)  Pulse:  [74-91] 81  Resp:  [18-24] 20  BP: (101-138)/(51-79) 101/51  SpO2:  [85 %-97 %] 95 %  Blood Pressure: 130/78   Temperature: 36.3 °C (97.3 °F)   Pulse: 87   Respiration: 18   Pulse Oximetry: (!) 87 %       Physical Exam  Vitals and nursing note reviewed.   Constitutional:       General: He is not in acute distress.     Appearance: He is well-developed.      Comments: Appears somewhat uncomfortable  Muscular   HENT:      Head: Normocephalic and atraumatic.      Nose:      Comments: NC in place     Mouth/Throat:      Pharynx: No oropharyngeal exudate.   Eyes:      General: No scleral icterus.     Pupils: Pupils are equal, round, and reactive to light.   Neck:      Thyroid: No thyromegaly.   Cardiovascular:      Rate and Rhythm: Normal rate and regular rhythm.      Heart sounds: Normal heart sounds. No murmur heard.  Pulmonary:      Effort: Pulmonary effort is normal. No respiratory distress.      Breath sounds: Wheezing (mild), rhonchi (L lung base) and rales present.   Abdominal:      General: Bowel sounds are normal. There is no distension.      Palpations: Abdomen is soft.      Tenderness: There is no abdominal tenderness. There is no guarding or rebound.   Musculoskeletal:         General: No tenderness. Normal range of motion.      Cervical back: Normal range of motion and neck supple.      Right lower  leg: No edema.      Left lower leg: No edema.      Comments: Warm peripherally B/L LE's   Skin:     General: Skin is warm and dry.      Findings: No rash.   Neurological:      Mental Status: He is alert and oriented to person, place, and time.      Cranial Nerves: No cranial nerve deficit.         Laboratory:  Recent Labs     04/11/24  1632   WBC 26.6*   RBC 6.37*   HEMOGLOBIN 13.0*   HEMATOCRIT 41.7*   MCV 65.5*   MCH 20.4*   MCHC 31.2*   RDW 48.5   PLATELETCT 552*     Recent Labs     04/11/24  1632   SODIUM 134*   POTASSIUM 4.5   CHLORIDE 100   CO2 22   GLUCOSE 92   BUN 21   CREATININE 1.06   CALCIUM 9.2     Recent Labs     04/11/24  1632   ALTSGPT 48   ASTSGOT 47*   ALKPHOSPHAT 33   TBILIRUBIN 0.6   GLUCOSE 92         Recent Labs     04/11/24  1632   NTPROBNP 255*         Recent Labs     04/11/24  1632   TROPONINT 17       Imaging:  CT-CTA COMPLETE THORACOABDOMINAL AORTA   Final Result      1.  Surgical changes of the aortic valve and ascending aorta.   2.  No aortic aneurysm or dissection identified.   3.  Mild/moderate atherosclerotic changes.   4.  Punctate, nonobstructing right renal stone.   5.  Elevation of the left hemidiaphragm with adjacent basilar consolidations, likely atelectasis. Pneumonia not excluded.   6.  Distal colonic diverticulosis.   7.  Hepatomegaly.                  CT-CTA HEAD WITH & W/O-POST PROCESS   Final Result      1.  Severely suboptimal exam due to photon starvation.   2.  No gross acute intracranial abnormality.   3.  No proximal vessel occlusion.      CT-CTA NECK WITH & W/O-POST PROCESSING   Final Result   Addendum (preliminary) 1 of 1   100 mL Omnipaque 300 given IV.      Final      No acute arterial abnormality of the neck.          I personally reviewed the cbc, cmp, LA, UA    I personally reviewed the 12-lead EKG as noted above    Assessment/Plan:  Justification for Admission Status  I anticipate this patient will require at least two midnights for appropriate medical  management, necessitating inpatient admission because sepsis related to CAP and ARF with hypoxia    Patient will need a Telemetry bed on MEDICAL service .  The need is secondary to above.    * Community acquired pneumonia of left lower lobe of lung- (present on admission)  Assessment & Plan  As noted above  Impressive WBC with left shift of 27K    Shortness of breath- (present on admission)  Assessment & Plan  Has been occurring and progressive in nature for 3-4 months  Recently s/b cards and repeated ECHO which showed in an interval normalization of his LVEF to 70% but shows possible elements of HOCM and cardiac MRI was recommended  -volume status seems ok  Consider cards consult and pulm consult    Acute hypoxic respiratory failure- (present on admission)  Assessment & Plan  Likely related to PNA  Could consider concurrent PE given lack of xarelto for the last week, but suspicion is much lower for this  Couldn't do CTA chest likely for about 48 hours given initial CTA aorta  Try to wean  Treat PNA as above  No clear e/o hypervolemic status at this time    Tobacco dependence- (present on admission)  Assessment & Plan  Trying to cut back, encourage full cessation    Chronic systolic congestive heart failure (HCC)- (present on admission)  Assessment & Plan  ECHO as noted above  No clear e/o VOL at this time  Defer diuretics  Consider cards consult and outpatient cardiac MRI    Sepsis (HCC)- (present on admission)  Assessment & Plan  This is Sepsis Present on admission  SIRS criteria identified on my evaluation include: Tachypnea, with respirations greater than 20 per minute and Leukocytosis, with WBC greater than 12,000  Clinical indicators of end organ dysfunction include Acute Respiratory Failure - (mechanical ventilation or BiPap or PaO2/FiO2 ratio < 300)  Source is pulmonary  Sepsis protocol initiated  Crystalloid Fluid Administration: Resuscitation volume of 1 liter ordered. Reason that resuscitation volume of  less than 30ml/kg was ordered concern for causing harm given CHF  IV antibiotics as appropriate for source of sepsis  Reassessment: I have reassessed the patient's hemodynamic status    ARF with hypoxia, RA saturation 87%  CTA Aorta shows L basilar consolidation, CAP  Continue empiric IV unasyn and oral azithromycin  F/U sputum cultures and blood cultures  Judicious IVF's    Secondary hypercoagulable state (HCC)- (present on admission)  Assessment & Plan  Restart xarelto 20 mg qhs  Consider doing a loading dose  Unclear why he stopped it for the last week    Paroxysmal atrial fibrillation (HCC)- (present on admission)  Assessment & Plan  In NSR  Xarelto as above  Continue outpatient toprol XL    Gastroesophageal reflux disease without esophagitis- (present on admission)  Assessment & Plan  Continue omeprazole 20 mg daily    Ascending aortic aneurysm repair- (present on admission)  Assessment & Plan  CTA aorta shows intact repair and no new aneurysmal dilation        VTE prophylaxis: therapeutic anticoagulation with xarelto 20 mg qhs

## 2024-04-12 NOTE — ASSESSMENT & PLAN NOTE
No Signs of bleeding  Check iron profile c/w JARROD  Iron supplements  Outpatient GI referral. Patient eeports colonoscopy years ago in [place].

## 2024-04-12 NOTE — THERAPY
Physical Therapy   Initial Evaluation     Patient Name: Noe Hickey  Age:  63 y.o., Sex:  male  Medical Record #: [id number]  Today's Date: 4/12/2024     Precautions  Precautions: Swallow Precautions    Assessment  Patient is 63 y.o. male presenting w/ sharp and constant L chest pain radiating to epigastric region in addition of SOB. Hx of aortic aneurysm repair and aortic valve replacement in Jan 2023.  Additional factors influencing patient status / progress including tobacco dependence, chronic systolic CHF, sepsis, secondary hypercoagulable state, paroxysmal A-fib, gastroesophageal reflux disease, and acute L hamstring strain.      Pt was agreeable to the evaluation session detailed below. Pt demonstrated an appropriate level of functional mobility for safe return home w/ daily mobility provided by nursing staff. Pt somewhat limited in today's session due to recent L hamstring strain but declined outpatient PT referral. Patient will not be actively followed for physical therapy services at this time, however may be seen if requested by physician for 1 more visit within 30 days to address any discharge or equipment needs.      Plan    Physical Therapy Initial Treatment Plan   Duration: Discharge Needs Only    DC Equipment Recommendations: None  Discharge Recommendations: Anticipate that the patient will have no further physical therapy needs after discharge from the hospital     Objective     04/12/24 0817   Initial Contact Note    Initial Contact Note Order Received and Verified, Physical Therapy Evaluation in Progress with Full Report to Follow.   Precautions   Precautions Swallow Precautions   Vitals   Pulse 86   Patient BP Position Sitting   Blood Pressure 117/80   Pulse Oximetry 98 %   O2 (LPM) 2   O2 Delivery Device Nasal Cannula   Vitals Comments O2 removed for ambulation, SpO2 following ambulation: 88% w/o increase following 1 minute of sitting, O2 replaced at 2L w/ SpO2 at 98%   Pain   Pain  Scales 0 to 10 Scale    Intervention Rest   Pain 0 - 10 Group   Location Leg   Location Orientation Left   Therapist Pain Assessment During Activity  (L hamstring pain w/ bed mobility and ambulation)   Prior Living Situation   Prior Services Home-Independent   Housing / Facility 1 Story Apartment / Condo   Steps Into Home 1   Steps In Home 0   Rail None   Equipment Owned None   Lives with - Patient's Self Care Capacity Alone and Able to Care For Self   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   Prior Level of Functional Mobility   Bed Mobility Independent   Transfer Status Independent   Ambulation Independent   Ambulation Distance community   Assistive Devices Used None   Stairs Independent   Comments pt works out regularily consisting mostly of lifting weights, sustained a L hamstring strain while lifting a few days ago that is currently affecting his mobility, discussion about OP PT and/or DME but pt declined   History of Falls   History of Falls No   Cognition    Cognition / Consciousness WDL   Comments pt pleasant and cooperative   Passive ROM Lower Body   Passive ROM Lower Body WDL   Active ROM Lower Body    Active ROM Lower Body  WDL   Strength Lower Body   Lower Body Strength  X   Comments Grossly BLE: 5/5 except L knee flexion limited by pain   Sensation Lower Body   Lower Extremity Sensation   WDL   Comments no c/o numbness of tingling   Coordination Lower Body    Coordination Lower Body  WDL   Other Treatments   Other Treatments Provided Reinforced daily mobility and discussed pain modalities for strained hamstring   Balance Assessment   Sitting Balance (Static) Good   Sitting Balance (Dynamic) Good   Standing Balance (Static) Fair +   Standing Balance (Dynamic) Fair +   Weight Shift Sitting Good   Weight Shift Standing Fair   Comments seated in chair and at EOB; standing w/o an AD, decreased WBing duration on LLE due to pain   Bed Mobility    Supine to Sit Supervised   Sit to Supine Supervised    Scooting Supervised   Rolling Supervised   Comments HOB flat w/o railings; increased time during sit>supine for LLE management   Gait Analysis   Gait Level Of Assist Supervised   Assistive Device None   Distance (Feet) 200   # of Times Distance was Traveled 1   Deviation Antalgic   # of Stairs Climbed 0  (deferred due to LLE pain)   Weight Bearing Status no restrictions   Comments Pt SOB at end of ambulation; v/c for PSL   Functional Mobility   Sit to Stand Supervised   Bed, Chair, Wheelchair Transfer Supervised   Transfer Method Stand Step   Mobility chair>bed>hallway ambulation>chair   Comments STS x1 time from chair, STS x1 time from EOB   6 Clicks Assessment - How much HELP from from another person do you currently need... (If the patient hasn't done an activity recently, how much help from another person do you think he/she would need if he/she tried?)   Turning from your back to your side while in a flat bed without using bedrails? 4   Moving from lying on your back to sitting on the side of a flat bed without using bedrails? 4   Moving to and from a bed to a chair (including a wheelchair)? 4   Standing up from a chair using your arms (e.g., wheelchair, or bedside chair)? 4   Walking in hospital room? 4   Climbing 3-5 steps with a railing? 3   6 clicks Mobility Score 23   Activity Tolerance   Sitting in Chair pre and post session   Sitting Edge of Bed 1 minute   Standing 10 minutes   Patient / Family Goals    Patient / Family Goal #1 return home   Education Group   Education Provided Role of Physical Therapist   Role of Physical Therapist Patient Response Patient;Eager;Explanation;Demonstration;Verbal Demonstration;Action Demonstration   Physical Therapy Initial Treatment Plan    Duration Discharge Needs Only   Anticipated Discharge Equipment and Recommendations   DC Equipment Recommendations None   Discharge Recommendations Anticipate that the patient will have no further physical therapy needs after  discharge from the hospital   Interdisciplinary Plan of Care Collaboration   IDT Collaboration with  Nursing;Occupational Therapist   Patient Position at End of Therapy Seated;Call Light within Reach;Tray Table within Reach;Phone within Reach  (chair alarm not in place pre session)   Collaboration Comments RN notified; discussed pt presenation w/ OT prior to eval   Session Information   Date / Session Number  4/12- Eval done, DC needs only

## 2024-04-12 NOTE — PROGRESS NOTES
Received report from RN, pt care assumed, VSS, pt assessment complete. Pt AAOx4, with 8/10 of pain at this time. No signs of acute distress noted at this time. Plan of care discussed with pt and verbalizes no questions. Pt denies any additional needs at this time. Bed locked/in lowest position, pt educated on fall risk and verbalized understanding, call light within reach, hourly rounding initiated.

## 2024-04-12 NOTE — ED PROVIDER NOTES
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  Patient has a history of aortic aneurysm repair, heart failure with ejection fraction of 30%, paroxysmal A-fib status post ablation on chronic anticoagulation.    HPI/ROS  LIMITATION TO HISTORY   Select: : None  OUTSIDE HISTORIAN(S):    Noe Hickey is a 63 y.o. 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  Patient reports that the pain was left-sided of the chest radiating to the epigastric area.  Patient reports associated severe shortness of breath.  Patient denies unilateral weakness or numbness, slurred speech.  Patient also endorses severe headache.  Patient reports he has been having progressive shortness of breath for the past 3 to 4 months.  Patient reports feeling nauseous and clammy.  Patient reports having some blurry vision earlier today as well.    PAST MEDICAL HISTORY   has a past medical history of Arrhythmia, Breath shortness (06/09/2023), Cyclic vomiting syndrome, Elevated hemidiaphragm - LEFT post AVR (01/04/2023), Fracture, Headache, classical migraine, Heart burn, Heart valve disease (06/09/2023), Indigestion, Pneumonia due to infectious organism (01/20/2023), and Snoring.    SURGICAL HISTORY   has a past surgical history that includes shoulder arthroscopy; shoulder hemicap resurfacing (Right, 10/12/2015); irrigation & debridement ortho (Right, 09/19/2017); shoulder surgery; aortic valve replacement (1/5/2023); aortic ascending dissection (1/5/2023); and echocardiogram, transesophageal,  "intraoperative (1/5/2023).    FAMILY HISTORY  No family history on file.    SOCIAL HISTORY  Social History     Tobacco Use    Smoking status: Every Day     Current packs/day: 0.50     Average packs/day: 0.5 packs/day for 40.0 years (20.0 ttl pk-yrs)     Types: Cigarettes    Smokeless tobacco: Never    Tobacco comments:     Down to 5 cig daily   Vaping Use    Vaping Use: Never used   Substance and Sexual Activity    Alcohol use: No    Drug use: Not Currently     Comment: past problems with narcotics not since 2019    Sexual activity: Not on file       CURRENT MEDICATIONS  Home Medications       Reviewed by Alexandra Pearson (Pharmacy Tech) on 04/11/24 at 1905  Med List Status: Complete     Medication Last Dose Status   asa/apap/caffeine (EXCEDRIN) 250-250-65 MG Tab 4/11/2024 Active   loratadine (CLARITIN) 10 MG Tab 4/11/2024 Active   losartan (COZAAR) 25 MG Tab 4/10/2024 Active   metoprolol SR (TOPROL XL) 25 MG TABLET SR 24 HR 4/11/2024 Active   omeprazole (PRILOSEC) 20 MG delayed-release capsule 4/11/2024 Active   ondansetron (ZOFRAN) 4 MG Tab tablet PRN Active   rivaroxaban (XARELTO) 20 MG Tab tablet 4/9/2024 Active                    ALLERGIES  Allergies   Allergen Reactions    Morphine Shortness of Breath, Rash and Itching             PHYSICAL EXAM  VITAL SIGNS: /78   Pulse 87   Temp 36.3 °C (97.3 °F) (Temporal)   Resp 18   Ht 1.753 m (5' 9\")   Wt 95.3 kg (210 lb)   SpO2 (!) 87%   BMI 31.01 kg/m²    Vitals and nursing note reviewed.   Constitutional:       Comments: Patient is lying in bed supine, uncomfortable appearing  HENT:      Head: Normocephalic and atraumatic.   Eyes:      Extraocular Movements: Extraocular movements intact.      Conjunctiva/sclera: Conjunctivae normal.      Pupils: Pupils are equal, round, and reactive to light.   Cardiovascular:      Pulses: Normal pulses.      Comments: HR 87  Pulmonary:      Effort: Pulmonary effort is normal. No respiratory distress.   Abdominal:      " Comments: Abdomen is soft, non-tender, non-distended  Musculoskeletal:         General: No swelling. Normal range of motion.      Cervical back: Normal range of motion. No rigidity.   Skin:     General: Skin is warm and dry.      Capillary Refill: Capillary refill takes less than 2 seconds.   Neurological:      Mental Status: Alert.  No gaze deviation, no facial droop, bilateral nystagmus.  Moving all extremities 5 out of 5 strength, sensation light touch grossly intact in the bilateral upper and lower extremities.      EKG/LABS  No evidence of acute ischemia  I have independently interpreted this EKG    RADIOLOGY/PROCEDURES   I have independently interpreted the diagnostic imaging associated with this visit and am waiting the final reading from the radiologist.   My preliminary interpretation is as follows: No evidence of acute aortic injury    Radiologist interpretation:  CT-CTA COMPLETE THORACOABDOMINAL AORTA   Final Result      1.  Surgical changes of the aortic valve and ascending aorta.   2.  No aortic aneurysm or dissection identified.   3.  Mild/moderate atherosclerotic changes.   4.  Punctate, nonobstructing right renal stone.   5.  Elevation of the left hemidiaphragm with adjacent basilar consolidations, likely atelectasis. Pneumonia not excluded.   6.  Distal colonic diverticulosis.   7.  Hepatomegaly.                  CT-CTA HEAD WITH & W/O-POST PROCESS   Final Result      1.  Severely suboptimal exam due to photon starvation.   2.  No gross acute intracranial abnormality.   3.  No proximal vessel occlusion.      CT-CTA NECK WITH & W/O-POST PROCESSING   Final Result   Addendum (preliminary) 1 of 1   100 mL Omnipaque 300 given IV.      Final      No acute arterial abnormality of the neck.          COURSE & MEDICAL DECISION MAKING    ASSESSMENT, COURSE AND PLAN  Care Narrative: Initial troponin negative, EKG nonischemic, ACS less likely at this time but troponin will be trended.  BNP is elevated but down  from his prior, heart failure exacerbation possible.  Urine drug screen pending.  CTA chest abdomen pelvis of the aorta to rule out acute aortic injury.  CTA head to evaluate for acute intracranial process such as large vessel occlusion, stroke, hemorrhage.  Dilaudid given for pain control.  Disposition pending workup.    Electronically signed by: Keshawn Ochoa M.D., 4/11/2024 5:46 PM    CTA of the thoracoabdominal aorta demonstrates no acute aortic injury.  Patient with tachypnea, leukocytosis, hypoxemia and possible pneumonia on thoracic CT.  IV antibiotics started, patient admitted to hospital medicine for hypoxemia and unexplained chest pain.  Pulmonary embolism within the differential, will defer CT pulmonary angiography to hospital medicine service due to recent large contrast load.    This dictation has been created using voice recognition software. I am continuously working with the software to minimize the number of voice recognition errors and I have made every attempt to manually correct the errors within my dictation. However errors  related to this voice recognition software may still exist and should be interpreted within the appropriate context.     Electronically signed by: Keshawn Ochoa M.D., 4/11/2024 7:16 PM      CRITICAL CARE  The very real possibilty of a deterioration of this patient's condition required the highest level of my preparedness for sudden, emergent intervention.  I provided critical care services, which included medication orders, frequent reevaluations of the patient's condition and response to treatment, ordering and reviewing test results, and discussing the case with various consultants.  The critical care time associated with the care of the patient was 41 minutes. Review chart for interventions. This time is exclusive of any other billable procedures.     Sepsis: Infection was suspected 6:45 PM (Time). Sepsis pathway was initiated. Fluids not needed (no  hypotension or lactate greater than or equal to 4). Antibiotics were given per protocol.        Heart score 4      FINAL DIAGNOSIS  1. Acute chest pain    2. Acute nonintractable headache, unspecified headache type    3. Hypoxemia    4. Sepsis, due to unspecified organism, unspecified whether acute organ dysfunction present (HCC)    5. Community acquired pneumonia, unspecified laterality           Electronically signed by: Kehsawn Ochoa M.D., 4/11/2024 5:42 PM

## 2024-04-12 NOTE — ED NOTES
Pt on Zoll/cardiac monitor During CT scan. Pt on room air prior to CT scan per chart. While in CT scan lying flat patient stated he felt SOB to the CT tech, this RN checked the Zoll and patient room air was fluctuating from 82-87% and the Zoll was not alarming or alerting staff of patient oxygen status being low, patient placed on 2L NC and titrated up to 4L NC to keep oxygen saturation above 90% while lying flat in CT scan. Upon arriving back to ED room blue 22 pt had oxygen trial on room air and had a room air oxygen saturation of 90-92% while sitting up. Pt placed back on cardiac monitor.

## 2024-04-12 NOTE — PROGRESS NOTES
4 Eyes Skin Assessment Completed by Letty SMITH RN and DILEEP Michael.    Head WDL  Ears WDL  Nose WDL  Mouth WDL  Neck WDL  Breast/Chest Scar  Shoulder Blades WDL  Spine WDL  (R) Arm/Elbow/Hand WDL  (L) Arm/Elbow/Hand WDL  Abdomen Scar  Groin WDL  Scrotum/Coccyx/Buttocks WDL  (R) Leg WDL  (L) Leg WDL  (R) Heel/Foot/Toe WDL  (L) Heel/Foot/Toe WDL          Devices In Places Tele Box and Pulse Ox      Interventions In Place NC W/Ear Foams and Pillows    Possible Skin Injury No    Pictures Uploaded Into Epic N/A  Wound Consult Placed N/A  RN Wound Prevention Protocol Ordered No

## 2024-04-12 NOTE — ASSESSMENT & PLAN NOTE
Unclear etiology.   CTA negative for PE  Suspect undiagnosed COPD exacerbation. He has significant smoking history. Also MARIELENA possible contributing   Continue IS, duoneb and dulera  Pulmonary following

## 2024-04-12 NOTE — CARE PLAN
The patient is Stable - Low risk of patient condition declining or worsening    Shift Goals  Clinical Goals: monitor chest pain  Patient Goals: rest, comfort    Progress made toward(s) clinical / shift goals:      Problem: Pain - Standard  Goal: Alleviation of pain or a reduction in pain to the patient’s comfort goal  Outcome: Progressing, pt having continuous chest pain. Giving PRN medications to keep comfortable enough to sleep during the night.      Problem: Hemodynamics  Goal: Patient's hemodynamics, fluid balance and neurologic status will be stable or improve  Outcome: Progressing     Problem: Respiratory  Goal: Patient will achieve/maintain optimum respiratory ventilation and gas exchange  Outcome: Progressing, pt is sustaining in the 90's on 2L. Will continue to titrate back to room air baseline.     Problem: Knowledge Deficit - Standard  Goal: Patient and family/care givers will demonstrate understanding of plan of care, disease process/condition, diagnostic tests and medications  Outcome: Progressing     Problem: Communication  Goal: The ability to communicate needs accurately and effectively will improve  Outcome: Progressing     Problem: Discharge Barriers/Planning  Goal: Patient's continuum of care needs are met  Outcome: Progressing     Problem: Mobility  Goal: Patient's capacity to carry out activities will improve  Outcome: Progressing     Problem: Self Care  Goal: Patient will have the ability to perform ADLs independently or with assistance (bathe, groom, dress, toilet and feed)  Outcome: Progressing       Patient is not progressing towards the following goals:

## 2024-04-12 NOTE — NON-PROVIDER
This note is intended for the purposes of medical student education and feedback only.   Please refer to the documentation by this patient's assigned medical practitioner for details of care and plans.    Medical Student Admitting History and Physical  Note Author: Farheen Madrid MS4  Date: 4/12/2024    Date of Admission: 4/11/2024  Attending: Dr. Bailey  Medical Student: Farheen Madrid, MS4    ID/CC: Noe Hickey is a 63 y.o. male with a PMH of aortic aneurism and aortic valve repair sx Jan 2023, HF with LVEF30%, and Afib post-ablation who presented with acute worsening of shortness of breath and chest pain. He was admitted on 4/11/2024 with concern for community acquired pneumonia and acute hypoxic respiratory failure.    SUBJECTIVE  HPI  Patient initially began feeling shortness of breath with exertion 6-8 months ago. He has felt a worsening over the last 3-4 months. He now has to stop and rest for several minutes throughout his workouts. He went to a cardiology consult on 4/4/24 and saw Jhon Trinidad. Prior echo in July 2023 showed LVEF of 30%. Repeat echo on 4/5/24 showed improved LVEF of 70%. He was started on metoprolol and losartan. He did stop his xarelto a few days ago. A cardiac MRI was ordered for further evaluation of concern of thickened myocardium on echo.    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 He was evaluated for possible MI, troponins were negative and EKG did not show signs of ischemia.CT did not show a repeat aortic aneurism, intracranial abnormalities or neck abnormalities. He did have a hypoxic episode during CT where his O2 saturation decreased to 82-87% and he required 4LO2 via NC. CTA to be reconsidered as inpatient due to high contrast dose in ED.  He had an impressive leukocytosis of 27K with a left shift. Concern for possible pneumonia. Patient was started on unasyn and azithromycin  and sepsis protocol. Xarelto was re-started.     ROS  Positives: shortness of breath, dyspnea on exertion, tachycardia, decreased chest pain, frontal headache, left heel numbness.  Negatives: No fever/chills, N/V, abdominal pain, vision changes, sore throat/cough/sputum/hemoptysis. No lightheadedness/dizziness, focal weakness.     PMHx   has a past medical history of Arrhythmia, Breath shortness (06/09/2023), Cyclic vomiting syndrome, Elevated hemidiaphragm - LEFT post AVR (01/04/2023), Fracture, Headache, classical migraine, Heart burn, Heart valve disease (06/09/2023), Indigestion, Pneumonia due to infectious organism (01/20/2023), and Snoring.    He has no past medical history of Anesthesia, Asthma, Cancer (HCC), Carcinoma in situ of respiratory system, Cataract, Cold, Coughing blood, Dental disorder, Disorder of thyroid, Glaucoma, Heart murmur, Hemorrhagic disorder (HCC), Hepatitis A, Hepatitis B, Hepatitis C, Hiatus hernia syndrome, High cholesterol, Hypertension, Myocardial infarct (HCC), Pacemaker, Personal history of venous thrombosis and embolism, Psychiatric problem, Rheumatic fever, Seizure (HCC), Sleep apnea, Stroke (HCC), Tuberculosis, Urinary bladder disorder, or Urinary incontinence.  Immunization History   Administered Date(s) Administered    Immune Globulin IVIG - HISTORICAL DATA 01/19/2023    PFIZER PURPLE CAP SARS-COV-2 VACCINATION (12+) 07/18/2021, 08/12/2021    QUANTIFERON GOLD - HISTORICAL DATA 01/19/2023    Tdap Vaccine 10/13/2014, 05/23/2017    Tuberculin Skin Test 01/19/2023     Allergies  Allergies   Allergen Reactions    Morphine Shortness of Breath, Rash and Itching           Surgical Hx   has a past surgical history that includes shoulder arthroscopy; shoulder hemicap resurfacing (Right, 10/12/2015); irrigation & debridement ortho (Right, 09/19/2017); shoulder surgery; aortic valve replacement (1/5/2023); aortic ascending dissection (1/5/2023); and echocardiogram, transesophageal,  "intraoperative (1/5/2023).    Medications:  Prior to Admission Medications   Prescriptions Last Dose Informant Patient Reported? Taking?   asa/apap/caffeine (EXCEDRIN) 250-250-65 MG Tab 4/11/2024 at 0830 Patient Yes Yes   Sig: Take 2 Tablets by mouth 1 time a day as needed for Headache.   loratadine (CLARITIN) 10 MG Tab 4/11/2024 at 0500 Patient Yes Yes   Sig: Take 10 mg by mouth every day.   losartan (COZAAR) 25 MG Tab 4/10/2024 at 2030 Patient No Yes   Sig: Take 1 Tablet by mouth every day.   metoprolol SR (TOPROL XL) 25 MG TABLET SR 24 HR 4/11/2024 at 0500 Patient No Yes   Sig: Take 1 Tablet by mouth every day.   omeprazole (PRILOSEC) 20 MG delayed-release capsule 4/11/2024 at 0500 Patient Yes Yes   Sig: Take 20 mg by mouth 2 times a day.   ondansetron (ZOFRAN) 4 MG Tab tablet PRN at PRN Patient Yes Yes   Sig: Take 4 mg by mouth every 6 hours as needed for Nausea/Vomiting.   rivaroxaban (XARELTO) 20 MG Tab tablet 4/9/2024 at 1700 Patient Yes No   Sig: Take 20 mg by mouth with dinner.      Facility-Administered Medications: None     Family Hx:  No family history on file.    Social Hx:  Work: large equipment  Exercise: regularly lifts weights at a gym  Illicit Drugs: anabolic steroids    OBJECTIVE   Physical Exam:  /80   Pulse 86   Temp 37 °C (98.6 °F) (Temporal)   Resp 20   Ht 1.753 m (5' 9.02\")   Wt 95 kg (209 lb 7 oz)   SpO2 98%     Intake/Output Summary (Last 24 hours) at 4/12/2024 1243  Last data filed at 4/12/2024 0541  Gross per 24 hour   Intake --   Output 650 ml   Net -650 ml       General: Well developed, well nourished, male, appears stated age. In mild respiratory distress. Only able to speak 3 word sentences.  HEENT: Normocephalic, atraumatic. EOM grossly intact, PERRLA. Mucous membranes moist. Elevated JVD 11-12cm H2O.  Cardio: Normal S1 and S2. Regular rate and rhythm. 3/6 systolic murmur best heard over the aortic and pulmonic valve spaces. No rubs, or gallops.  Pulmonary: Lungs are " clear to auscultation bilaterally. No wheezes, rales, or rhonchi.  Abdomen: Normoactive bowel sounds. Abdomen is soft and nondistended. No masses. No tenderness to palpation in all four quadrants.   MSK: Normal ROM. Extremities well-perfused. No LE edema.  Neuro: A&Ox4. CN II-XII grossly intact. Strength and sensation intact throughout.   Skin: No rash, lesions, or skin ulcers. No jaundice, ecchymoses, or petechiae.   Psych: Appropriate mood and affect.    Lab Results:  Recent Labs     04/11/24 1632 04/12/24 0221   WBC 26.6* 24.6*   RBC 6.37* 6.01   HEMOGLOBIN 13.0* 12.2*   HEMATOCRIT 41.7* 40.5*   MCV 65.5* 67.4*   MCH 20.4* 20.3*   RDW 48.5 50.4*   PLATELETCT 552* 474*   MPV  --  9.7   NEUTSPOLYS 89.80* 87.80*   LYMPHOCYTES 2.50* 0.90*   MONOCYTES 5.10 10.40   EOSINOPHILS 0.90 0.90   BASOPHILS 0.00 0.00   RBCMORPHOLO Present Present     Recent Labs     04/11/24 1632 04/12/24 0221   SODIUM 134* 133*   POTASSIUM 4.5 5.4   CHLORIDE 100 100   CO2 22 26   BUN 21 26*   CREATININE 1.06 1.40   CALCIUM 9.2 8.7   ALBUMIN 4.0  --      Estimated GFR/CRCL = Estimated Creatinine Clearance: 60.6 mL/min (by C-G formula based on SCr of 1.4 mg/dL).  Recent Labs     04/11/24 1632 04/12/24 0221   GLUCOSE 92 84     Recent Labs     04/11/24 1632 04/11/24 2125   ASTSGOT 47*  --    ALTSGPT 48  --    TBILIRUBIN 0.6  --    ALKPHOSPHAT 33  --    GLOBULIN 2.7  --    INR  --  1.13           Recent Labs     04/11/24 2125   INR 1.13     Microbiology Results:  Blood cultures - no growth to date, drawn on 4/11/24    Imaging Results:  CT-CTA COMPLETE THORACOABDOMINAL AORTA   Final Result      1.  Surgical changes of the aortic valve and ascending aorta.   2.  No aortic aneurysm or dissection identified.   3.  Mild/moderate atherosclerotic changes.   4.  Punctate, nonobstructing right renal stone.   5.  Elevation of the left hemidiaphragm with adjacent basilar consolidations, likely atelectasis. Pneumonia not excluded.   6.  Distal colonic  diverticulosis.   7.  Hepatomegaly.                  CT-CTA HEAD WITH & W/O-POST PROCESS   Final Result      1.  Severely suboptimal exam due to photon starvation.   2.  No gross acute intracranial abnormality.   3.  No proximal vessel occlusion.      CT-CTA NECK WITH & W/O-POST PROCESSING   Final Result   Addendum (preliminary) 1 of 1   100 mL Omnipaque 300 given IV.      Final      No acute arterial abnormality of the neck.        EKG  Sinus rhythm, no ST changes or signs of ischemia    Current Medications    Current Facility-Administered Medications:     losartan (Cozaar) tablet 25 mg, 25 mg, Oral, DAILY, Milad Parker M.D., 25 mg at 04/12/24 0604    metoprolol SR (Toprol XL) tablet 25 mg, 25 mg, Oral, DAILY, Milad Parker M.D., 25 mg at 04/12/24 0604    rivaroxaban (Xarelto) tablet 20 mg, 20 mg, Oral, PM MEAL, Milad Parker M.D.    LR (Bolus) infusion 500 mL, 500 mL, Intravenous, Once PRN, Milad Parker M.D.    ampicillin/sulbactam (Unasyn) 3 g in  mL IVPB, 3 g, Intravenous, Q6HR, Milad Parker M.D., Stopped at 04/12/24 0907    azithromycin (Zithromax) tablet 500 mg, 500 mg, Oral, DAILY, Milad Parker M.D., 500 mg at 04/12/24 0604    Pharmacy Consult Request ...Pain Management Review 1 Each, 1 Each, Other, PHARMACY TO DOSE, Milad Parker M.D.    acetaminophen (Tylenol) tablet 1,000 mg, 1,000 mg, Oral, Q6HRS, 1,000 mg at 04/12/24 1235 **FOLLOWED BY** [START ON 4/16/2024] acetaminophen (Tylenol) tablet 1,000 mg, 1,000 mg, Oral, Q6HRS PRN, Milad Parker M.D.    oxyCODONE immediate-release (Roxicodone) tablet 2.5 mg, 2.5 mg, Oral, Q3HRS PRN **OR** oxyCODONE immediate-release (Roxicodone) tablet 5 mg, 5 mg, Oral, Q3HRS PRN, 5 mg at 04/12/24 1235 **OR** HYDROmorphone (Dilaudid) injection 0.25 mg, 0.25 mg, Intravenous, Q3HRS PRN, Milad Parker M.D., 0.25 mg at 04/12/24 0884    ASSESSMENT/PLAN  Noe Raudel Edelmira is a 63 y.o. male with a  PMH of aortic aneurism and aortic  valve repair sx Jan 2023, HF with LVEF30%, and Afib post-ablation who presented with acute worsening of shortness of breath and chest pain. He was admitted on 4/11/2024 with concern for community acquired pneumonia and acute hypoxic respiratory failure.    #Shortness of breath  Patient reports abnormal breathing since his aortic aneurism repair and aortic valve replacement surgery last year. Worsening shortness of breath over the last 6-8 months, with increased severity over the last 3-4 months. He visited with outpatient cardiology on 4/4/24 which showed improvement of his EF from 30% to 70% and a thickened myocardium. Patient has a 20 pack-year smoking history and anabolic steroid use. Multiple etiologies and contributors likely. With improved LVEF of 70% consider HFpEF. With prior open heart surgery, consider constrictive post-operative pericarditis. Presentation is not consistent with ACS. Patient underwent prior cardiac cath. Unlikely further development of arterial occlusion within short amount of time, but possible contribution of anabolic steroid. Cardiac MRI will help further evaluate. Also consider pulmonary contributions of suspected pneumonia, possible undiagnosed COPD. Unlikely valvular dysfunction from echo findings. PE has not been ruled out. Possible myocarditis. Considered amyloidosis, possible future work up.   Discontinued xarelto for a few days. Unable to perform CTA due to contrast load.Consider V/Q scan. Re-start xarelto   Cardiac MRI to evaluate for possible cause of myocardial enlargement.  Lasix 40 to remove excess fluid. Evaluate for improvement of shortness of breath.  Supplemental oxygen.Continue to monitor.    #Community acquired pneumonia  #Acute hypoxic respiratory failure  Elevated WBC, findings on CT possible atelectasis, acute shortness of breath, acute hypoxia. It is reasonable to continue a course of antibiotics.  Continue unasyn and azithromycin  Re-evaluate for improvement of  leukocytosis  Supplemental oxygen with monitoring  Consider discontinuation if significant improvement with lasix    #leukocytosis  Elevated WBC of 27K with a left shift. Patient mentioned his WBCs have been elevated in every hospitalization since his surgery.  Likely infectious etiology  Consider further evaluation of WBC morphology to evaluate for possible malignancies  Continue to evaluate BMP for progression of leukocytosis.    #Paroxysmal atrial fibrillation s/p ablation  Prior long-term use of xarelto. Discontinued for a few days.  No sign of recurrent afib on EKG  Continue xarelto   Continue outpatient management    #GERD  Continue omeprazol    #s/p AAA repair and   No sign of repeat AAA on CT. Repair is in place and functioning correctly.     Thank you for allowing me to evaluate and learn from this patient.    Farheen Madrid, MS4

## 2024-04-12 NOTE — ASSESSMENT & PLAN NOTE
Patient has discontinued Xarelto for the past few days due to misunderstanding by his cardiology provider  Resumed Xarelto

## 2024-04-12 NOTE — CONSULTS
Cardiology Initial Consult Note    Date of note:    4/12/2024      Consulting Physician: Olga Mendez M.D.    Patient ID:    Name:   Noe Hickey   YOB: 1960  Age:   63 y.o.  male   MRN:   [id number]      Reason for Consultation: dyspnea on exertion    HPI:  Noe Hickey is a 63 y.o.-year-old male with a history of h/o bicuspid aortic valve s/p aortic valve replacement and ascending aortic aneurysm repair, previously cardiomyopathy, GERD, paroxysmal atrial fibrillation s/p s/p ablation, smoking who presents with worsening [facility].     He reports since his open heart surgery in January of 2023 he has had progressive dyspnea on exertion without palpitations, weight gain, LE swelling, or other symptoms. Last week he saw Jhon Sams in our clinic who ordered an echocardiogram which showed a thickened myocardium but improvement in his LVEF from 30% previous to 70%. A cardiac MRI was ordered to evaluate the thickened myocardium.     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 He was evaluated for possible MI, troponins were negative and EKG did not show signs of ischemia.CT did not show a repeat aortic aneurism, intracranial abnormalities or neck abnormalities. CTA to be reconsidered as inpatient due to high contrast dose in ED.  He had an impressive leukocytosis of 27K with a left shift. CXR showed . Concern for possible pneumonia. Patient was started on unasyn and azithromycin and sepsis protocol. Xarelto was re-started.      Of note, he uses anabolic steroids        ROS  + malaise and fatigue, generalized weakness.     Constitution: Negative for chills, fever and night sweats.   HENT: Negative for nosebleeds.    Eyes: Negative for vision loss in left eye and vision loss in right eye.   Respiratory: Negative for hemoptysis.    Gastrointestinal: Negative for hematemesis, hematochezia and  melena.   Genitourinary: Negative for hematuria.   Neurological: Negative for focal weakness, numbness and paresthesias.          All others reviewed and negative.      Past Medical History:   Diagnosis Date    Arrhythmia     Breath shortness 06/09/2023    prior to 1/2023 surgery    Cyclic vomiting syndrome     Elevated hemidiaphragm - LEFT post AVR 01/04/2023    Fracture     Headache, classical migraine     Heart burn     Heart valve disease 06/09/2023    heart surgery in 1/2023    Indigestion     Pneumonia due to infectious organism 01/20/2023    Snoring        Past Surgical History:   Procedure Laterality Date    AORTIC VALVE REPLACEMENT  1/5/2023    Procedure: AORTIC VALVE REPLACEMENT, ASCENDING AORTIC ANEURYSM REPAIR AND TRANSESOPHAGEAL ECHOCARDIOGRAM.;  Surgeon: Serena Goyal M.D.;  Location: SURGERY [facility];  Service: Cardiac    AORTIC ASCENDING DISSECTION  1/5/2023    Procedure: REPAIR, ANEURYSM OR DISSECTION, AORTA, ASCENDING;  Surgeon: Serena Goyal M.D.;  Location: SURGERY [facility];  Service: Cardiac    ECHOCARDIOGRAM, TRANSESOPHAGEAL, INTRAOPERATIVE  1/5/2023    Procedure: ECHOCARDIOGRAM, TRANSESOPHAGEAL, INTRAOPERATIVE.;  Surgeon: Serena Goyal M.D.;  Location: SURGERY [facility];  Service: Cardiac    IRRIGATION & DEBRIDEMENT ORTHO Right 09/19/2017    Procedure: IRRIGATION & DEBRIDEMENT ORTHO-THIGH;  Surgeon: Corbin Rivera M.D.;  Location: [facility];  Service: Orthopedics    SHOULDER HEMICAP RESURFACING Right 10/12/2015    Procedure: RIGHT SHOULDER RESURFACING;  Surgeon: Javon Doll M.D.;  Location: [facility];  Service:     SHOULDER ARTHROSCOPY      x3    SHOULDER SURGERY      replacement right         Medications Prior to Admission   Medication Sig Dispense Refill Last Dose    ondansetron (ZOFRAN) 4 MG Tab tablet Take 4 mg by mouth every 6 hours as needed for Nausea/Vomiting.   PRN at PRN    loratadine (CLARITIN) 10 MG Tab Take 10 mg by mouth every day.    4/11/2024 at 0500    omeprazole (PRILOSEC) 20 MG delayed-release capsule Take 20 mg by mouth 2 times a day.   4/11/2024 at 0500    asa/apap/caffeine (EXCEDRIN) 250-250-65 MG Tab Take 2 Tablets by mouth 1 time a day as needed for Headache.   4/11/2024 at 0830    metoprolol SR (TOPROL XL) 25 MG TABLET SR 24 HR Take 1 Tablet by mouth every day. 90 Tablet 3 4/11/2024 at 0500    losartan (COZAAR) 25 MG Tab Take 1 Tablet by mouth every day. 90 Tablet 3 4/10/2024 at 2030    rivaroxaban (XARELTO) 20 MG Tab tablet Take 20 mg by mouth with dinner.   4/9/2024 at 1700           Allergies   Allergen Reactions    Morphine Shortness of Breath, Rash and Itching               History reviewed. No pertinent family history. No FH of SCD.       Social History     Socioeconomic History    Marital status:      Spouse name: Not on file    Number of children: Not on file    Years of education: Not on file    Highest education level: Not on file   Occupational History    Not on file   Tobacco Use    Smoking status: Every Day     Current packs/day: 0.50     Average packs/day: 0.5 packs/day for 40.0 years (20.0 ttl pk-yrs)     Types: Cigarettes    Smokeless tobacco: Never    Tobacco comments:     Down to 5 cig daily   Vaping Use    Vaping Use: Never used   Substance and Sexual Activity    Alcohol use: No    Drug use: Not Currently     Comment: past problems with narcotics not since 2019    Sexual activity: Not on file   Other Topics Concern    Not on file   Social History Narrative    Not on file     Social Determinants of Health     Financial Resource Strain: Low Risk  (7/7/2023)    Overall Financial Resource Strain (CARDIA)     Difficulty of Paying Living Expenses: Not hard at all   Food Insecurity: No Food Insecurity (7/7/2023)    Hunger Vital Sign     Worried About Running Out of Food in the Last Year: Never true     Ran Out of Food in the Last Year: Never true   Transportation Needs: No Transportation Needs (7/7/2023)    PRAPARE -  "Transportation     Lack of Transportation (Medical): No     Lack of Transportation (Non-Medical): No   Physical Activity: Not on file   Stress: Not on file   Social Connections: Not on file   Intimate Partner Violence: Not on file   Housing Stability: Low Risk  (7/7/2023)    Housing Stability Vital Sign     Unable to Pay for Housing in the Last Year: No     Number of Places Lived in the Last Year: 1     Unstable Housing in the Last Year: No         Physical Exam  Body mass index is 30.91 kg/m².  /80   Pulse 86   Temp 37 °C (98.6 °F) (Temporal)   Resp 20   Ht 1.753 m (5' 9.02\")   Wt 95 kg (209 lb 7 oz)   SpO2 98%   Vitals:    04/12/24 0400 04/12/24 0744 04/12/24 0809 04/12/24 0851   BP: 102/70 125/74  117/80   Pulse: 74 77 77 86   Resp: 18 20     Temp: 36.5 °C (97.7 °F) 37 °C (98.6 °F)     TempSrc: Temporal Temporal     SpO2: 91% 90% 98% 98%   Weight: 95 kg (209 lb 7 oz)      Height:         Oxygen Therapy:  Pulse Oximetry: 98 %, O2 (LPM): 2, O2 Delivery Device: Nasal Cannula    General: No apparent distress  Eyes: nl conjunctiva  ENT: NC noted.   Neck: JVP 11-12 cm H2O, no carotid bruits  Lungs: normal respiratory effort, CTAB  Heart: RRR, 3/6 systolic murmur,  no rubs or gallops, no edema bilateral lower extremities. No LV/RV heave on cardiac palpatation. 2+ bilateral radial pulses.  2+ bilateral dp pulses.   Abdomen: soft, non tender, non distended, no masses, normal bowel sounds.  No HSM.  Extremities/MSK: no clubbing, no cyanosis  Neurological: No focal sensory deficits  Psychiatric: Appropriate affect, A/O x 3  Skin: Warm extremities        Labs (personally reviewed and notable for):   WBC 24      Cardiac Imaging and Procedures Review:    EKG dated 4/11/2024: My personal interpretation is NSR, non-specific st changes.     Echo dated 4/5/2024:   CONCLUSIONS  Compared to the prior study on 7/5/2023, LV systolic function is now   hyperdynamic with high flow state. Recommend RHC as clinically "   indicated.  Normal LV size and hyperdynamic systolic function with estimated LVEF   70%   Features of apical variant hypertrophic cardiomyopathy noted. Recommend   cardiac MRI.  Normal RV size with reduced systolic function  Known bioprosthetic aortic valve with mild increase in transvalvular   gradient: Vmax 3.1m/s. No PVL or AI.  DVI 0.5 with AT < 100 suggestive of normal aortic prosthetic valve and   increased velocity is probably due to high flow. High estimated CO   9L/min.  Normal IVC size  No pericardial effusion    Echo personally reviewed.     ECHOCARDIOGRAM:   12/5/22 [facility]  Conclusion   1. The left ventricle is normal in size with low-normal systolic function and an ejection fraction of 50-55% by Abreu's biplane. No distinct wall motion abnormalities noted. Global peak strain of -12.0%. Moderate concentric left ventricular hypertrophy.   2. Left atrium is mildly dilated with a LA volume index of 36 mL/m2. Right atrium is mildly dilated with an area of 24 cm2.     3. The aortic valve is severely sclerotic. It is difficult to visualize aortic valve leaflets. moderate aortic stenosis with an AV max of 3.43 m/s, peak gradient 47 mmHg, mean gradient 29 mmHg, POPPY 1.0 cm2, and velocity ratio 0.27. Mild regurgitation.   4. Mild mitral regurgitation.   5. The ascending aorta is dilated at 4.7 cm.   6. Right ventricle is mildly dilated with preserved systolic function; RVSP is elevated at 44 mmHg with a RAP of 8 mmHg.       ANGIOGRAM:   12/7/22 [facility]  Findings:     Right coronary artery: Dominant but relatively small after the posterior descending. 20% ostial narrowing.     Left main trunk: Normal.     Left anterior descending: normal other than 20% plaque in the mid third     Circumflex: Supplies a large obtuse marginal branch which is normal     Left ventriculogram: Normal volume in diastole and in systole. Normal left ventricular systolic function is present. There is no left ventricular outflow  gradient.      Radiology test Review:  CT aorta:      1.  Surgical changes of the aortic valve and ascending aorta.  2.  No aortic aneurysm or dissection identified.  3.  Mild/moderate atherosclerotic changes.  4.  Punctate, nonobstructing right renal stone.  5.  Elevation of the left hemidiaphragm with adjacent basilar consolidations, likely atelectasis. Pneumonia not excluded.  6.  Distal colonic diverticulosis.  7.  Hepatomegaly.    Op Note 1/5/2023:  AORTIC VALVE REPLACEMENT 27mm Inspiris tissue valve, ASCENDING AORTIC ANEURYSM REPAIR with 32mm Hemashield graft AND TRANSESOPHAGEAL ECHOCARDIOGRAM.       Impression and Medical Decision Making:  # Dyspnea - likely multifactorial with component of PNA, diastolic heart failure. Concern for potential amyloid or constrictive pericarditis after his CT surgery. Likely also has underlying undiagnosed COPD  # Paroxysmal atrial fibrillation s/p ablation by Dr. Webster 6/12/20243.   # Bicuspid aortic valve s/p bioprosthetic aortic valve replacement and aortic aneurysm repair 1/5/2023.   # Leukocytosis, persistent  # Acute on chronic diastolic heart failure, NYHA class III, stage C  # Acute hypoxemic respiratory failure  # PNA      Recommendations:  # lasix IV x 1, reassess volume status tomorrow.   # outpatient PFTs, cardiac MRI  # will check SPEP/UPEP to r/o amyloid  # likely will benefit from outpatient hematology evaluation for persistent leukocytosis.   # discussed decreasing anabolic steroid use.   # if symptoms don't improve, could r/o PE.       Thank you for allowing me to participate in the care of this patient, I will continue to follow.  Please contact me with any questions.      Melvin Bailey MD  Cardiologist, [facility]   [phone]

## 2024-04-12 NOTE — PROGRESS NOTES
Hospital Medicine Daily Progress Note    Date of Service  4/12/2024    Chief Complaint  Noe Hickey is a 63 y.o. male admitted 4/11/2024 with chest pain and sob    Hospital Course  63 y.o. male with hx of AAA repair in January 2023, combined systolic and diastolic congestive heart failure, paroxysmal atrial fibrillation status post ablation on Xarelto, however discontinued for the past few days due to misunderstanding by cardiology provider, ongoing tobacco abuse, who presented 4/11/2024 with sob for past one month. He was just seen cardiology 4/4 and echo notes EF 70%, which has improved from 30% 7/5/23 and there is features of apical variant hypertrophic cardiomyopathy.  Patient was scheduled to have cardiac MRI and stress test next week.  However he presented with worsening shortness of breath and chest pain.    In the emergency room he was a code aorta and a stat CTA aorta was performed with contrast that showed normal AAA repair with no new aneurysmal dilatation or aneurysm. It did show a left-sided basilar pneumonia with a white blood cell count of 27,000 for left shift.   Patient is started with antibiotics    Interval Problem Update  I have seen and examined patient at bedside.    Patient is sitting in the chair.  He is currently on 2 L oxygen  He is noted to have profound shortness of breath  Reports body weight stable.  Denies fever, chills, coughing  No swelling noted in bilateral lower extremity  Continue IV Unasyn and azithromycin  Check procalcitonin, which returned negative  I have reviewed his previous medical record  Given his long standing history of shortness of breath, chest pain, have consulted discussed with cardiology Dr. Bailey.  Check iron profile    I have discussed this patient's plan of care and discharge plan at IDT rounds today with Case Management, Nursing, Nursing leadership, and other members of the IDT team.    Consultants/Specialty  cardiology    Code Status  Full  Code    Disposition  The patient is not medically cleared for discharge to home or a post-acute facility.      I have placed the appropriate orders for post-discharge needs.    Review of Systems  Review of Systems   Constitutional:  Positive for malaise/fatigue.   Respiratory:  Positive for shortness of breath.    Cardiovascular:  Positive for chest pain.   Neurological:  Positive for weakness.   All other systems reviewed and are negative.       Physical Exam  Temp:  [36.3 °C (97.3 °F)-37 °C (98.6 °F)] 37 °C (98.6 °F)  Pulse:  [70-91] 86  Resp:  [18-24] 20  BP: (100-138)/(51-80) 117/80  SpO2:  [85 %-98 %] 98 %    Physical Exam  Vitals and nursing note reviewed.   Constitutional:       Appearance: Normal appearance. He is obese. He is ill-appearing.   HENT:      Head: Normocephalic and atraumatic.      Mouth/Throat:      Pharynx: Oropharynx is clear.   Eyes:      Pupils: Pupils are equal, round, and reactive to light.   Neck:      Vascular: No carotid bruit.   Cardiovascular:      Rate and Rhythm: Normal rate and regular rhythm.   Pulmonary:      Effort: Pulmonary effort is normal. No respiratory distress.      Breath sounds: Normal breath sounds. No wheezing.      Comments: Diminished breath sound bilateral  Abdominal:      General: Abdomen is flat. Bowel sounds are normal.      Palpations: Abdomen is soft. There is no mass.   Musculoskeletal:         General: Normal range of motion.      Cervical back: Neck supple.   Skin:     General: Skin is warm and dry.   Neurological:      General: No focal deficit present.      Mental Status: He is alert and oriented to person, place, and time.   Psychiatric:         Mood and Affect: Mood normal.         Behavior: Behavior normal.         Fluids    Intake/Output Summary (Last 24 hours) at 4/12/2024 1412  Last data filed at 4/12/2024 0541  Gross per 24 hour   Intake --   Output 650 ml   Net -650 ml       Laboratory  Recent Labs     04/11/24  1632 04/12/24  0221   WBC 26.6*  24.6*   RBC 6.37* 6.01   HEMOGLOBIN 13.0* 12.2*   HEMATOCRIT 41.7* 40.5*   MCV 65.5* 67.4*   MCH 20.4* 20.3*   MCHC 31.2* 30.1*   RDW 48.5 50.4*   PLATELETCT 552* 474*   MPV  --  9.7     Recent Labs     04/11/24  1632 04/12/24  0221   SODIUM 134* 133*   POTASSIUM 4.5 5.4   CHLORIDE 100 100   CO2 22 26   GLUCOSE 92 84   BUN 21 26*   CREATININE 1.06 1.40   CALCIUM 9.2 8.7     Recent Labs     04/11/24  2125   INR 1.13               Imaging  CT-CTA COMPLETE THORACOABDOMINAL AORTA   Final Result      1.  Surgical changes of the aortic valve and ascending aorta.   2.  No aortic aneurysm or dissection identified.   3.  Mild/moderate atherosclerotic changes.   4.  Punctate, nonobstructing right renal stone.   5.  Elevation of the left hemidiaphragm with adjacent basilar consolidations, likely atelectasis. Pneumonia not excluded.   6.  Distal colonic diverticulosis.   7.  Hepatomegaly.                  CT-CTA HEAD WITH & W/O-POST PROCESS   Final Result      1.  Severely suboptimal exam due to photon starvation.   2.  No gross acute intracranial abnormality.   3.  No proximal vessel occlusion.      CT-CTA NECK WITH & W/O-POST PROCESSING   Final Result   Addendum (preliminary) 1 of 1   100 mL Omnipaque 300 given IV.      Final      No acute arterial abnormality of the neck.           Assessment/Plan  * Community acquired pneumonia of left lower lobe of lung- (present on admission)  Assessment & Plan  Denies fever, chills.  Noted profound leukocytosis on admission  Continue IV Unasyn and azithromycin    Microcytic anemia  Assessment & Plan  No Signs of bleeding  Check iron profile, B12  Continue monitoring     Shortness of breath- (present on admission)  Assessment & Plan  Has been occurring and progressive in nature for few months  Recently s/b cards and repeated ECHO which showed in an interval normalization of his LVEF to 70% but shows possible there is features of apical variant hypertrophic cardiomyopath  Patient has been  scheduled for outpatient cardiac MRI and stress test  I have consulted and discussed with cardiology    Acute hypoxic respiratory failure- (present on admission)  Assessment & Plan  Likely related to PNA  Could consider concurrent PE given lack of xarelto for the last week? Discussed with radiology, unable to check pulmonary artery from CTA aorta.   Try to wean  Continue iv Unasyn and azithromycin       Tobacco dependence- (present on admission)  Assessment & Plan  Trying to cut back, encourage full cessation    Sepsis (HCC)- (present on admission)  Assessment & Plan  This is Sepsis Present on admission  SIRS criteria identified on my evaluation include: Tachypnea, with respirations greater than 20 per minute and Leukocytosis, with WBC greater than 12,000  Clinical indicators of end organ dysfunction include Acute Respiratory Failure - (mechanical ventilation or BiPap or PaO2/FiO2 ratio < 300)  Source is pulmonary  Sepsis protocol initiated  Crystalloid Fluid Administration: Resuscitation volume of 1 liter ordered. Reason that resuscitation volume of less than 30ml/kg was ordered concern for causing harm given CHF  IV antibiotics as appropriate for source of sepsis  Reassessment: I have reassessed the patient's hemodynamic status    ARF with hypoxia, RA saturation 87%  CTA Aorta shows L basilar consolidation, CAP  Continue empiric IV unasyn and oral azithromycin  F/U sputum cultures and blood cultures  Judicious IVF's    Heart failure with improved ejection fraction (HFimpEF) (HCC)- (present on admission)  Assessment & Plan  Echo 4/5/24 notes EF 70%, which has improved from EF 30% 7/5/23   Continue losartan, Toprol   Monitoring respiratory status  I&O, daily weight    Secondary hypercoagulable state (HCC)- (present on admission)  Assessment & Plan  Patient has discontinued Xarelto for the past few days due to misunderstanding by his cardiology provider  Resumed Xarelto      Paroxysmal atrial fibrillation (HCC)-  (present on admission)  Assessment & Plan  In NSR  Xarelto as above  Continue outpatient toprol XL    Gastroesophageal reflux disease without esophagitis- (present on admission)  Assessment & Plan  Continue omeprazole 20 mg daily    Ascending aortic aneurysm repair- (present on admission)  Assessment & Plan  CTA aorta shows intact repair and no new aneurysmal dilation         VTE prophylaxis: xarelto    I have performed a physical exam and reviewed and updated ROS and Plan today (4/12/2024). In review of yesterday's note (4/11/2024), there are no changes except as documented above.    Patient is has a high medical complexity, complex decision making and is at high risk for complication, morbidity, and mortality.  I spent 52 minutes, reviewing the chart, obtaining and/or reviewing separately obtained history. Performing a medically appropriate examination and evaluation.  Counseling and educating the patient. Ordering and reviewing medications, tests, or procedures.  Discussing the case with cardiology.  Documenting clinical information in EPIC. Independently interpreting results and communicating results to patient. Discussing future disposition of care with patient, RN and case management.  This does not include time spent on separately billable procedures/tests.

## 2024-04-12 NOTE — ASSESSMENT & PLAN NOTE
Echo 4/5/24 notes EF 70%, which has improved from EF 30% 7/5/23   Continue losartan, Toprol   Monitoring respiratory status  I&O, daily weight

## 2024-04-12 NOTE — THERAPY
"Occupational Therapy   Initial Evaluation     Patient Name: Noe Hickey  Age:  63 y.o., Sex:  male  Medical Record #: [id number]  Today's Date: 4/12/2024     Precautions  Precautions: Swallow Precautions    Assessment  Patient is 63 y.o. male presenting w/ sharp and constant L chest pain radiating to epigastric region in addition of SOB. Hx of aortic aneurysm repair and aortic valve replacement in Jan 2023.  Additional factors influencing patient status / progress including tobacco dependence, chronic systolic CHF, sepsis, secondary hypercoagulable state, paroxysmal A-fib, gastroesophageal reflux disease, and acute L hamstring strain.    Pt seen for OT eval. Pt performing functional mobility and ADLs at supervised level, limited due to activity tolerance and prior L hamstring injury. Pt close to baseline level of functioning. Will be safe to dc home when medically stable. No further inpt OT indicated.         Plan    Occupational Therapy Initial Treatment Plan   Duration: (P) Evaluation only    DC Equipment Recommendations: (P) None  Discharge Recommendations: (P) Anticipate that the patient will have no further occupational therapy needs after discharge from the hospital     Subjective    \"I recently tore my L hamstring\"      Objective       04/12/24 0809   Prior Living Situation   Prior Services Home-Independent   Housing / Facility 1 Story Apartment / Condo   Steps Into Home 1   Bathroom Set up Walk In Shower;Bathtub / Shower Combination   Equipment Owned None   Lives with - Patient's Self Care Capacity Alone and Able to Care For Self   Prior Level of ADL Function   Self Feeding Independent   Grooming / Hygiene Independent   Bathing Independent   Dressing Independent   Toileting Independent   Prior Level of IADL Function   Medication Management Independent   Laundry Independent   Kitchen Mobility Independent   Finances Independent   Home Management Independent   Shopping Independent   Prior Level Of " Mobility Independent Without Device in Community   Driving / Transportation Driving Independent   Occupation (Pre-Hospital Vocational) Employed Full Time   Comments works with heavy machinery   History of Falls   History of Falls No   Precautions   Precautions Swallow Precautions   Vitals   Pulse 77   Pulse Oximetry 98 %   O2 (LPM) 2   O2 Delivery Device Nasal Cannula   Pain   Intervention Declines   Pain 0 - 10 Group   Location Leg   Location Orientation Left   Therapist Pain Assessment During Activity;Post Activity Pain Same as Prior to Activity;Nurse Notified;2   Cognition    Cognition / Consciousness WDL   Comments pleasant and cooperative throughout, able to make needs known   Passive ROM Upper Body   Passive ROM Upper Body WDL   Active ROM Upper Body   Active ROM Upper Body  WDL   Dominant Hand Right   Strength Upper Body   Upper Body Strength  WDL   Sensation Upper Body   Upper Extremity Sensation  WDL   Upper Body Muscle Tone   Upper Body Muscle Tone  WDL   Neurological Concerns   Neurological Concerns No   Coordination Upper Body   Coordination WDL   Balance Assessment   Sitting Balance (Static) Good   Sitting Balance (Dynamic) Good   Standing Balance (Static) Fair +   Standing Balance (Dynamic) Fair +   Weight Shift Sitting Good   Weight Shift Standing Fair   Comments no AD   Bed Mobility    Supine to Sit Supervised   Sit to Supine Supervised   Scooting Supervised   Rolling Supervised   ADL Assessment   Eating Independent   Grooming Independent   Bathing Supervision   Upper Body Dressing Supervision   Lower Body Dressing Supervision   Toileting   (declined)   Functional Mobility   Sit to Stand Supervised   Bed, Chair, Wheelchair Transfer Supervised   Toilet Transfers Supervised   Transfer Method Stand Step   Comments no AD   Visual Perception   Visual Perception  WDL   Activity Tolerance   Sitting in Chair post session   Sitting Edge of Bed 3 min   Standing 5 min   Education Group   Education Provided  Energy Conservation;Role of Occupational Therapist   Role of Occupational Therapist Patient Response Patient;Acceptance;Demonstration;Verbal Demonstration   Energy Conservation Patient Response Patient;Acceptance;Explanation;Verbal Demonstration   Occupational Therapy Initial Treatment Plan    Duration Evaluation only   Problem List   Problem List Decreased Activity Tolerance   Anticipated Discharge Equipment and Recommendations   DC Equipment Recommendations None   Discharge Recommendations Anticipate that the patient will have no further occupational therapy needs after discharge from the hospital   Interdisciplinary Plan of Care Collaboration   IDT Collaboration with  Nursing;Physical Therapist   Patient Position at End of Therapy Seated;Call Light within Reach;Tray Table within Reach   Collaboration Comments RN notified   Session Information   Date / Session Number  4/12, OT eval only

## 2024-04-12 NOTE — ED NOTES
Assumed care of patient, patient bedside report received from RN Stephon . Pt AAO X 4 , respirations even and unlabored, on 2 liters o2 via nasal  canula  . Introduced self as pt RN, POC discussed, call light in reach, Oxygen safety measures in place and RN traced the line, Fall risk interventions in place.

## 2024-04-12 NOTE — RESPIRATORY CARE
" EDUCATION by COPD CLINICAL EDUCATOR  4/12/2024 at 12:36 PM by Arabella Pulido, RRT     Patient interviewed by education team. Patient does not have a history or diagnosis of COPD. Patient is a smoker.  Therefore, smoking cessation education done. Smoking Cessation Intervention and education offered but declined .   COPD Assessment  COPD Clinical Specialists ONLY  COPD Education Initiated: Yes--Short Intervention (denies COPD smoker and is \"doing on his own\" declined info and S/C ( no pft no official dx no meds))  Is this a COPD exacerbation patient?: No  Interdisciplinary Rounds: Attendance at Rounds (30 Min)    PFT Results  No results found for: \"PFT\"    Meds to Beds        MY COPD ACTION PLAN     It is recommended that patients and physicians /healthcare providers complete this action plan together. This plan should be discussed at each physician visit and updated as needed.    The green, yellow and red zones show groups of symptoms of COPD. This list of symptoms is not comprehensive, and you may experience other symptoms. In the \"Actions\" column, your healthcare provider has recommended actions for you to take based on your symptoms.    Patient Name: Noe Hickey   YOB: 1960   Last Updated on:     Green Zone:  I am doing well today Actions     Usual activitiy and exercise level   Take daily medications     Usual amounts of cough and phlegm/mucus   Use oxygen as prescribed     Sleep well at night   Continue regular exercise/diet plan     Appetite is good   At all times avoid cigarette smoke, inhaled irritants     Daily Medications (these medications are taken every day):                Yellow Zone:  I am having a bad day or a COPD flare Actions     More breathless than usual   Continue daily medications     I have less energy for my daily activities   Use quick relief inhaler as ordered     Increased or thicker phlegm/mucus   Use oxygen as prescribed     Using quick relief " "inhaler/nebulizer more often   Get plenty of rest     Swelling of ankles more than usual   Use pursed lip breathing     More coughing than usual   At all times avoid cigarette smoke, inhaled irritants     I feel like I have a \"chest cold\"     Poor sleep and my symptoms woke me up     My appetite is not good     My medicine is not helping      Call provider immediately if symptoms don’t improve     Continue daily medications, add rescue medications:               Medications to be used during a flare up, (as Discussed with Provider):              Red Zone:  I need urgent medical care Actions     Severe shortness of breath even at rest   Call 911 or seek medical care immediately     Not able to do any activity because of breathing      Fever or shaking chills      Feeling confused or very drowsy       Chest pains      Coughing up blood                  "

## 2024-04-12 NOTE — ED NOTES
Med rec completed per patient at bedside.  Allergies reviewed with patient.    Outpatient antibiotics within the last 30 days: none.    ANTICOAGULATION: patient is on XARELTO; last dose 4/9/2024 at around 17:00.    Patient's preferred pharmacy: [facility] in [employer] on [address] in [place].

## 2024-04-12 NOTE — ED NOTES
Data strip from joon pulled and utilized. Data strip showed patient oxygen as recorded by joon ranged from 85-87% on room air per vitals charted from 4083-2236, this was the time the CT tech notified this RN the patient felt SOB but the Zoll was not alerting staff of low oxygen level. New BP recorded at 1704 per vitals charted and the patient had been placed on oxygen at this time by this RN and the new oxygen level while on 4L NC was 97%. Printed data strip has sticker placed on it and placed in patient chart.

## 2024-04-12 NOTE — ASSESSMENT & PLAN NOTE
Denies fever, chills.  Noted profound leukocytosis on admission.    Likely foot abscess as etiology  Procalcitonin negative  Unasyn and doxycycline

## 2024-04-12 NOTE — ED NOTES
Patient transferred to the floor accompanied by ,  patient AAO X4, respirations even and unlabored on 2 liters O2 via nasal canula, patient in possession of personal  belongings.

## 2024-04-13 ENCOUNTER — APPOINTMENT (OUTPATIENT)
Dept: RADIOLOGY | Facility: MEDICAL CENTER | Age: 64
DRG: 981 | End: 2024-04-13
Attending: STUDENT IN AN ORGANIZED HEALTH CARE EDUCATION/TRAINING PROGRAM
Payer: MEDICAID

## 2024-04-13 LAB
ANION GAP SERPL CALC-SCNC: 11 MMOL/L (ref 7–16)
BUN SERPL-MCNC: 22 MG/DL (ref 8–22)
CALCIUM SERPL-MCNC: 8.7 MG/DL (ref 8.5–10.5)
CHLORIDE SERPL-SCNC: 98 MMOL/L (ref 96–112)
CO2 SERPL-SCNC: 25 MMOL/L (ref 20–33)
CREAT SERPL-MCNC: 1.01 MG/DL (ref 0.5–1.4)
ERYTHROCYTE [DISTWIDTH] IN BLOOD BY AUTOMATED COUNT: 48 FL (ref 35.9–50)
EST. AVERAGE GLUCOSE BLD GHB EST-MCNC: 117 MG/DL
FERRITIN SERPL-MCNC: 15.3 NG/ML (ref 22–322)
GFR SERPLBLD CREATININE-BSD FMLA CKD-EPI: 83 ML/MIN/1.73 M 2
GLUCOSE SERPL-MCNC: 76 MG/DL (ref 65–99)
HBA1C MFR BLD: 5.7 % (ref 4–5.6)
HCT VFR BLD AUTO: 38.2 % (ref 42–52)
HGB BLD-MCNC: 11.7 G/DL (ref 14–18)
IRON SATN MFR SERPL: 4 % (ref 15–55)
IRON SERPL-MCNC: 18 UG/DL (ref 50–180)
MAGNESIUM SERPL-MCNC: 2 MG/DL (ref 1.5–2.5)
MCH RBC QN AUTO: 20.5 PG (ref 27–33)
MCHC RBC AUTO-ENTMCNC: 30.6 G/DL (ref 32.3–36.5)
MCV RBC AUTO: 66.9 FL (ref 81.4–97.8)
PHOSPHATE SERPL-MCNC: 3 MG/DL (ref 2.5–4.5)
PLATELET # BLD AUTO: 506 K/UL (ref 164–446)
PMV BLD AUTO: 10.5 FL (ref 9–12.9)
POTASSIUM SERPL-SCNC: 5.3 MMOL/L (ref 3.6–5.5)
RBC # BLD AUTO: 5.71 M/UL (ref 4.7–6.1)
SODIUM SERPL-SCNC: 134 MMOL/L (ref 135–145)
TIBC SERPL-MCNC: 415 UG/DL (ref 250–450)
UIBC SERPL-MCNC: 397 UG/DL (ref 110–370)
WBC # BLD AUTO: 21.4 K/UL (ref 4.8–10.8)

## 2024-04-13 PROCEDURE — 83550 IRON BINDING TEST: CPT

## 2024-04-13 PROCEDURE — 700111 HCHG RX REV CODE 636 W/ 250 OVERRIDE (IP)

## 2024-04-13 PROCEDURE — 700111 HCHG RX REV CODE 636 W/ 250 OVERRIDE (IP): Mod: JZ | Performed by: INTERNAL MEDICINE

## 2024-04-13 PROCEDURE — 84165 PROTEIN E-PHORESIS SERUM: CPT

## 2024-04-13 PROCEDURE — 700102 HCHG RX REV CODE 250 W/ 637 OVERRIDE(OP): Performed by: INTERNAL MEDICINE

## 2024-04-13 PROCEDURE — 83735 ASSAY OF MAGNESIUM: CPT

## 2024-04-13 PROCEDURE — 770020 HCHG ROOM/CARE - TELE (206)

## 2024-04-13 PROCEDURE — 71275 CT ANGIOGRAPHY CHEST: CPT

## 2024-04-13 PROCEDURE — 84100 ASSAY OF PHOSPHORUS: CPT

## 2024-04-13 PROCEDURE — 83036 HEMOGLOBIN GLYCOSYLATED A1C: CPT

## 2024-04-13 PROCEDURE — 82728 ASSAY OF FERRITIN: CPT

## 2024-04-13 PROCEDURE — 85027 COMPLETE CBC AUTOMATED: CPT

## 2024-04-13 PROCEDURE — A9270 NON-COVERED ITEM OR SERVICE: HCPCS | Performed by: INTERNAL MEDICINE

## 2024-04-13 PROCEDURE — 700105 HCHG RX REV CODE 258: Performed by: INTERNAL MEDICINE

## 2024-04-13 PROCEDURE — 94640 AIRWAY INHALATION TREATMENT: CPT

## 2024-04-13 PROCEDURE — 700102 HCHG RX REV CODE 250 W/ 637 OVERRIDE(OP): Performed by: STUDENT IN AN ORGANIZED HEALTH CARE EDUCATION/TRAINING PROGRAM

## 2024-04-13 PROCEDURE — 83540 ASSAY OF IRON: CPT

## 2024-04-13 PROCEDURE — 36415 COLL VENOUS BLD VENIPUNCTURE: CPT

## 2024-04-13 PROCEDURE — 99233 SBSQ HOSP IP/OBS HIGH 50: CPT | Performed by: STUDENT IN AN ORGANIZED HEALTH CARE EDUCATION/TRAINING PROGRAM

## 2024-04-13 PROCEDURE — 80048 BASIC METABOLIC PNL TOTAL CA: CPT

## 2024-04-13 PROCEDURE — 84155 ASSAY OF PROTEIN SERUM: CPT

## 2024-04-13 PROCEDURE — 700117 HCHG RX CONTRAST REV CODE 255: Performed by: STUDENT IN AN ORGANIZED HEALTH CARE EDUCATION/TRAINING PROGRAM

## 2024-04-13 PROCEDURE — 700111 HCHG RX REV CODE 636 W/ 250 OVERRIDE (IP): Performed by: STUDENT IN AN ORGANIZED HEALTH CARE EDUCATION/TRAINING PROGRAM

## 2024-04-13 PROCEDURE — 99233 SBSQ HOSP IP/OBS HIGH 50: CPT | Performed by: INTERNAL MEDICINE

## 2024-04-13 PROCEDURE — 700101 HCHG RX REV CODE 250: Performed by: STUDENT IN AN ORGANIZED HEALTH CARE EDUCATION/TRAINING PROGRAM

## 2024-04-13 PROCEDURE — A9270 NON-COVERED ITEM OR SERVICE: HCPCS | Performed by: STUDENT IN AN ORGANIZED HEALTH CARE EDUCATION/TRAINING PROGRAM

## 2024-04-13 RX ORDER — IPRATROPIUM BROMIDE AND ALBUTEROL SULFATE 2.5; .5 MG/3ML; MG/3ML
3 SOLUTION RESPIRATORY (INHALATION)
Status: DISCONTINUED | OUTPATIENT
Start: 2024-04-13 | End: 2024-04-14

## 2024-04-13 RX ORDER — AMOXICILLIN AND CLAVULANATE POTASSIUM 875; 125 MG/1; MG/1
1 TABLET, FILM COATED ORAL EVERY 12 HOURS
Status: COMPLETED | OUTPATIENT
Start: 2024-04-13 | End: 2024-04-16

## 2024-04-13 RX ORDER — HYDROMORPHONE HYDROCHLORIDE 1 MG/ML
0.5 INJECTION, SOLUTION INTRAMUSCULAR; INTRAVENOUS; SUBCUTANEOUS
Status: DISCONTINUED | OUTPATIENT
Start: 2024-04-13 | End: 2024-04-14

## 2024-04-13 RX ORDER — FUROSEMIDE 10 MG/ML
40 INJECTION INTRAMUSCULAR; INTRAVENOUS ONCE
Status: COMPLETED | OUTPATIENT
Start: 2024-04-13 | End: 2024-04-13

## 2024-04-13 RX ORDER — OMEPRAZOLE 20 MG/1
20 CAPSULE, DELAYED RELEASE ORAL 2 TIMES DAILY
Status: DISCONTINUED | OUTPATIENT
Start: 2024-04-13 | End: 2024-04-24 | Stop reason: HOSPADM

## 2024-04-13 RX ORDER — FERROUS SULFATE 325(65) MG
325 TABLET ORAL
Status: DISCONTINUED | OUTPATIENT
Start: 2024-04-14 | End: 2024-04-24 | Stop reason: HOSPADM

## 2024-04-13 RX ORDER — FUROSEMIDE 10 MG/ML
40 INJECTION INTRAMUSCULAR; INTRAVENOUS DAILY
Status: DISCONTINUED | OUTPATIENT
Start: 2024-04-13 | End: 2024-04-13

## 2024-04-13 RX ORDER — HYDROMORPHONE HYDROCHLORIDE 1 MG/ML
0.5 INJECTION, SOLUTION INTRAMUSCULAR; INTRAVENOUS; SUBCUTANEOUS ONCE
Status: COMPLETED | OUTPATIENT
Start: 2024-04-13 | End: 2024-04-13

## 2024-04-13 RX ORDER — BENZONATATE 100 MG/1
100 CAPSULE ORAL 3 TIMES DAILY PRN
Status: DISCONTINUED | OUTPATIENT
Start: 2024-04-13 | End: 2024-04-24 | Stop reason: HOSPADM

## 2024-04-13 RX ORDER — IPRATROPIUM BROMIDE AND ALBUTEROL SULFATE 2.5; .5 MG/3ML; MG/3ML
3 SOLUTION RESPIRATORY (INHALATION)
Status: DISCONTINUED | OUTPATIENT
Start: 2024-04-13 | End: 2024-04-13

## 2024-04-13 RX ORDER — HYDROCODONE BITARTRATE AND ACETAMINOPHEN 10; 325 MG/1; MG/1
1 TABLET ORAL EVERY 6 HOURS PRN
Status: DISCONTINUED | OUTPATIENT
Start: 2024-04-13 | End: 2024-04-16

## 2024-04-13 RX ORDER — ATORVASTATIN CALCIUM 40 MG/1
40 TABLET, FILM COATED ORAL
Status: DISCONTINUED | OUTPATIENT
Start: 2024-04-13 | End: 2024-04-24 | Stop reason: HOSPADM

## 2024-04-13 RX ADMIN — AMOXICILLIN AND CLAVULANATE POTASSIUM 1 TABLET: 875; 125 TABLET, FILM COATED ORAL at 17:39

## 2024-04-13 RX ADMIN — OXYCODONE 5 MG: 5 TABLET ORAL at 05:07

## 2024-04-13 RX ADMIN — HYDROMORPHONE HYDROCHLORIDE 0.25 MG: 1 INJECTION, SOLUTION INTRAMUSCULAR; INTRAVENOUS; SUBCUTANEOUS at 10:42

## 2024-04-13 RX ADMIN — HYDROMORPHONE HYDROCHLORIDE 0.25 MG: 1 INJECTION, SOLUTION INTRAMUSCULAR; INTRAVENOUS; SUBCUTANEOUS at 01:09

## 2024-04-13 RX ADMIN — HYDROMORPHONE HYDROCHLORIDE 0.25 MG: 1 INJECTION, SOLUTION INTRAMUSCULAR; INTRAVENOUS; SUBCUTANEOUS at 16:28

## 2024-04-13 RX ADMIN — BENZONATATE 100 MG: 100 CAPSULE ORAL at 14:11

## 2024-04-13 RX ADMIN — OMEPRAZOLE 20 MG: 20 CAPSULE, DELAYED RELEASE ORAL at 17:38

## 2024-04-13 RX ADMIN — FUROSEMIDE 40 MG: 10 INJECTION INTRAMUSCULAR; INTRAVENOUS at 17:38

## 2024-04-13 RX ADMIN — HYDROCODONE BITARTRATE AND ACETAMINOPHEN 1 TABLET: 10; 325 TABLET ORAL at 19:53

## 2024-04-13 RX ADMIN — HYDROMORPHONE HYDROCHLORIDE 0.5 MG: 1 INJECTION, SOLUTION INTRAMUSCULAR; INTRAVENOUS; SUBCUTANEOUS at 23:30

## 2024-04-13 RX ADMIN — HYDROMORPHONE HYDROCHLORIDE 0.5 MG: 1 INJECTION, SOLUTION INTRAMUSCULAR; INTRAVENOUS; SUBCUTANEOUS at 21:09

## 2024-04-13 RX ADMIN — IPRATROPIUM BROMIDE AND ALBUTEROL SULFATE 3 ML: 2.5; .5 SOLUTION RESPIRATORY (INHALATION) at 20:18

## 2024-04-13 RX ADMIN — METOPROLOL SUCCINATE 25 MG: 25 TABLET, EXTENDED RELEASE ORAL at 05:07

## 2024-04-13 RX ADMIN — AZITHROMYCIN DIHYDRATE 500 MG: 250 TABLET, FILM COATED ORAL at 05:07

## 2024-04-13 RX ADMIN — OXYCODONE 5 MG: 5 TABLET ORAL at 08:56

## 2024-04-13 RX ADMIN — RIVAROXABAN 20 MG: 20 TABLET, FILM COATED ORAL at 17:40

## 2024-04-13 RX ADMIN — ACETAMINOPHEN 1000 MG: 500 TABLET, FILM COATED ORAL at 05:07

## 2024-04-13 RX ADMIN — AMPICILLIN AND SULBACTAM 3 G: 1; 2 INJECTION, POWDER, FOR SOLUTION INTRAMUSCULAR; INTRAVENOUS at 08:56

## 2024-04-13 RX ADMIN — AMPICILLIN AND SULBACTAM 3 G: 1; 2 INJECTION, POWDER, FOR SOLUTION INTRAMUSCULAR; INTRAVENOUS at 03:22

## 2024-04-13 RX ADMIN — IOHEXOL 100 ML: 350 INJECTION, SOLUTION INTRAVENOUS at 18:05

## 2024-04-13 RX ADMIN — ATORVASTATIN CALCIUM 40 MG: 40 TABLET, FILM COATED ORAL at 21:10

## 2024-04-13 ASSESSMENT — FIBROSIS 4 INDEX: FIB4 SCORE: 0.92

## 2024-04-13 ASSESSMENT — PAIN DESCRIPTION - PAIN TYPE
TYPE: ACUTE PAIN

## 2024-04-13 ASSESSMENT — ENCOUNTER SYMPTOMS
SHORTNESS OF BREATH: 1
WEAKNESS: 1

## 2024-04-13 NOTE — PROGRESS NOTES
Dr. Mendez notified of patient's right leg swelling. Per MD, order bilateral lower extremity ultrasound. Order read back and confirmed with MD.

## 2024-04-13 NOTE — DISCHARGE PLANNING
Received Choice form at 1529  [facility]  Referral sent per Choice form @ No pending DME O2 order  Scanned DME O2 choice form in media.

## 2024-04-13 NOTE — PROGRESS NOTES
Hospital Medicine Daily Progress Note    Date of Service  4/13/2024    Chief Complaint  Noe Hickey is a 63 y.o. male admitted 4/11/2024 with chest pain and sob    Hospital Course  63 y.o. male with hx of AAA repair in January 2023, combined systolic and diastolic congestive heart failure, paroxysmal atrial fibrillation status post ablation on Xarelto, however discontinued for the past few days due to misunderstanding by cardiology provider, ongoing tobacco abuse, who presented 4/11/2024 with sob for past one month. He was just seen cardiology 4/4 and echo notes EF 70%, which has improved from 30% 7/5/23 and there is features of apical variant hypertrophic cardiomyopathy.  Patient was scheduled to have cardiac MRI and stress test next week.  However he presented with worsening shortness of breath and chest pain.    In the emergency room he was a code aorta and a stat CTA aorta was performed with contrast that showed normal AAA repair with no new aneurysmal dilatation or aneurysm. It did show a left-sided basilar pneumonia with a white blood cell count of 27,000 for left shift.   Patient is started with antibiotics. Of note, patient is taking anabolic steroid. Procal negative.     Interval Problem Update  I have seen and examined patient at bedside.    Patient is sitting in the chair.  He is currently on 2 L oxygen  Still having sob and BLE swelling  I have ordered doppler US  Patient is taking anabolic steroid (reportedly meds from his friend), that could explain leukocytosis. His procal is negative.   Lengthy discussion with patient the risks of long term steroid use. I advised patient to slow wean off. He voiced understanding.   Iron profile c/w JARROD, ordered oral iron pills  Check CTA chest    I have discussed this patient's plan of care and discharge plan at IDT rounds today with Case Management, Nursing, Nursing leadership, and other members of the IDT  team.    Consultants/Specialty  cardiology    Code Status  Full Code    Disposition  The patient is not medically cleared for discharge to home or a post-acute facility.      I have placed the appropriate orders for post-discharge needs.    Review of Systems  Review of Systems   Constitutional:  Positive for malaise/fatigue.   Respiratory:  Positive for shortness of breath.    Cardiovascular:  Positive for chest pain.   Neurological:  Positive for weakness.   All other systems reviewed and are negative.       Physical Exam  Temp:  [36.7 °C (98.1 °F)-37.2 °C (99 °F)] 36.7 °C (98.1 °F)  Pulse:  [74-83] 81  Resp:  [12-21] 12  BP: (120-134)/(78-82) 134/82  SpO2:  [94 %-99 %] 99 %    Physical Exam  Vitals and nursing note reviewed.   Constitutional:       Appearance: Normal appearance. He is obese. He is ill-appearing.   HENT:      Head: Normocephalic and atraumatic.      Mouth/Throat:      Pharynx: Oropharynx is clear.   Eyes:      Pupils: Pupils are equal, round, and reactive to light.   Neck:      Vascular: No carotid bruit.   Cardiovascular:      Rate and Rhythm: Normal rate and regular rhythm.   Pulmonary:      Effort: Pulmonary effort is normal. No respiratory distress.      Breath sounds: Normal breath sounds. No wheezing.      Comments: Diminished breath sound bilateral  Abdominal:      General: Abdomen is flat. Bowel sounds are normal.      Palpations: Abdomen is soft. There is no mass.   Musculoskeletal:         General: Normal range of motion.      Cervical back: Neck supple.   Skin:     General: Skin is warm and dry.   Neurological:      General: No focal deficit present.      Mental Status: He is alert and oriented to person, place, and time.   Psychiatric:         Mood and Affect: Mood normal.         Behavior: Behavior normal.         Fluids    Intake/Output Summary (Last 24 hours) at 4/13/2024 1348  Last data filed at 4/13/2024 1000  Gross per 24 hour   Intake 1279.65 ml   Output 2050 ml   Net -770.35 ml        Laboratory  Recent Labs     04/11/24  1632 04/12/24  0221 04/13/24  0534   WBC 26.6* 24.6* 21.4*   RBC 6.37* 6.01 5.71   HEMOGLOBIN 13.0* 12.2* 11.7*   HEMATOCRIT 41.7* 40.5* 38.2*   MCV 65.5* 67.4* 66.9*   MCH 20.4* 20.3* 20.5*   MCHC 31.2* 30.1* 30.6*   RDW 48.5 50.4* 48.0   PLATELETCT 552* 474* 506*   MPV  --  9.7 10.5     Recent Labs     04/11/24  1632 04/12/24  0221 04/13/24  0534   SODIUM 134* 133* 134*   POTASSIUM 4.5 5.4 5.3   CHLORIDE 100 100 98   CO2 22 26 25   GLUCOSE 92 84 76   BUN 21 26* 22   CREATININE 1.06 1.40 1.01   CALCIUM 9.2 8.7 8.7     Recent Labs     04/11/24 2125   INR 1.13               Imaging  CT-CTA COMPLETE THORACOABDOMINAL AORTA   Final Result      1.  Surgical changes of the aortic valve and ascending aorta.   2.  No aortic aneurysm or dissection identified.   3.  Mild/moderate atherosclerotic changes.   4.  Punctate, nonobstructing right renal stone.   5.  Elevation of the left hemidiaphragm with adjacent basilar consolidations, likely atelectasis. Pneumonia not excluded.   6.  Distal colonic diverticulosis.   7.  Hepatomegaly.                  CT-CTA HEAD WITH & W/O-POST PROCESS   Final Result      1.  Severely suboptimal exam due to photon starvation.   2.  No gross acute intracranial abnormality.   3.  No proximal vessel occlusion.      CT-CTA NECK WITH & W/O-POST PROCESSING   Final Result   Addendum (preliminary) 1 of 1   100 mL Omnipaque 300 given IV.      Final      No acute arterial abnormality of the neck.      US-EXTREMITY VENOUS LOWER BILAT    (Results Pending)        Assessment/Plan  * Community acquired pneumonia of left lower lobe of lung- (present on admission)  Assessment & Plan  Denies fever, chills.  Noted profound leukocytosis on admission.  However he is taking anabolic steroids at home, which could explain leukocytosis  Procalcitonin negative  Given his symptoms has been present for months, ?  PNA  Continue empirical abx with Augmentin    Microcytic  anemia  Assessment & Plan  No Signs of bleeding  Check iron profile c/w JARROD  Iron supplements    Shortness of breath- (present on admission)  Assessment & Plan  Has been occurring and progressive in nature for few months  Recently s/b cards and repeated ECHO which showed in an interval normalization of his LVEF to 70% but shows possible there is features of apical variant hypertrophic cardiomyopath  Patient has been scheduled for outpatient cardiac MRI and stress test  I have consulted and discussed with cardiology  SPEP/UPEP pending to r/o amyloid   Check CTA chest and doppler BLE    Acute hypoxic respiratory failure- (present on admission)  Assessment & Plan  Unclear etiology.   Could consider concurrent PE given lack of xarelto for the last week? Discussed with radiology, unable to check pulmonary artery from CTA aorta. Will order CTA chest given longstanding of SOB  Wean O2 as tolerated       Tobacco dependence- (present on admission)  Assessment & Plan  Trying to cut back, encourage full cessation    Sepsis (HCC)- (present on admission)  Assessment & Plan  This is Sepsis Present on admission  SIRS criteria identified on my evaluation include: Tachypnea, with respirations greater than 20 per minute and Leukocytosis, with WBC greater than 12,000  Clinical indicators of end organ dysfunction include Acute Respiratory Failure - (mechanical ventilation or BiPap or PaO2/FiO2 ratio < 300)  Source is pulmonary  Sepsis protocol initiated  Crystalloid Fluid Administration: Resuscitation volume of 1 liter ordered. Reason that resuscitation volume of less than 30ml/kg was ordered concern for causing harm given CHF  IV antibiotics as appropriate for source of sepsis  Reassessment: I have reassessed the patient's hemodynamic status    ARF with hypoxia, RA saturation 87%  CTA Aorta shows L basilar consolidation, CAP  Continue empiric IV unasyn and oral azithromycin  F/U sputum cultures and blood cultures  Judicious  IVF's    Heart failure with improved ejection fraction (HFimpEF) (HCC)- (present on admission)  Assessment & Plan  Echo 4/5/24 notes EF 70%, which has improved from EF 30% 7/5/23   Continue losartan, Toprol   Monitoring respiratory status  I&O, daily weight    Secondary hypercoagulable state (HCC)- (present on admission)  Assessment & Plan  Patient has discontinued Xarelto for the past few days due to misunderstanding by his cardiology provider  Resumed Xarelto      Paroxysmal atrial fibrillation (HCC)- (present on admission)  Assessment & Plan  In NSR  Xarelto as above  Continue outpatient toprol XL    Leukocytosis, thrombocytosis, erythrocytosis, lymphopenia- (present on admission)  Assessment & Plan  Persistent leukocytosis for years. Patient is taking anabolic steroid, which could explain leukocytosis. However he said he has been taking steroid only a few months. He has persistent leucocytosis for year  Lengthy discussion with patient the risks of long term steroid use. I advised patient to slow wean off. He voiced understanding.   Hematology outpatient referral for further evaluation    Gastroesophageal reflux disease without esophagitis- (present on admission)  Assessment & Plan  Continue omeprazole 20 mg daily    Ascending aortic aneurysm repair- (present on admission)  Assessment & Plan  CTA aorta shows intact repair and no new aneurysmal dilation         VTE prophylaxis: xarelto    I have performed a physical exam and reviewed and updated ROS and Plan today (4/13/2024). In review of yesterday's note (4/12/2024), there are no changes except as documented above.    Patient is has a high medical complexity, complex decision making and is at high risk for complication, morbidity, and mortality.  I spent 53 minutes, reviewing the chart, obtaining and/or reviewing separately obtained history. Performing a medically appropriate examination and evaluation.  Counseling and educating the patient. Ordering and  reviewing medications, tests, or procedures.  Discussing the case with cardiology.  Documenting clinical information in EPIC. Independently interpreting results and communicating results to patient. Discussing future disposition of care with patient, RN and case management.  This does not include time spent on separately billable procedures/tests.

## 2024-04-13 NOTE — PROGRESS NOTES
Cardiology Follow-up Consult Note    Date of Service:    4/13/2024      Consulting Physician: Olga Mendez M.D.    Patient ID:    Name:   Noe Hickey   YOB: 1960  Age:   63 y.o.  male   MRN:   [id number]      Reason for Consultation: SOB    HPI/Patient ID:    Noe Hickey is a 63 y.o.-year-old male with a history of h/o bicuspid aortic valve s/p aortic valve replacement and ascending aortic aneurysm repair, previous cardiomyopathy, GERD, paroxysmal atrial fibrillation s/p s/p ablation, smoking, and anabolic steroid use who presented with worsening PENN.      He reports since his open heart surgery in January of 2023 he has had progressive dyspnea on exertion without palpitations, weight gain, LE swelling, or other symptoms. Last week he saw Jhon Sams in our clinic who ordered an echocardiogram which showed a thickened myocardium but improvement in his LVEF from 30% previous to 70%. A cardiac MRI was ordered to evaluate the thickened myocardium.      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 He was evaluated for possible MI, troponins were negative and EKG did not show signs of ischemia.CT did not show a repeat aortic aneurism, intracranial abnormalities or neck abnormalities. CTA to be reconsidered as inpatient due to high contrast dose in ED.  He had an impressive leukocytosis of 27K with a left shift. CXR showed . Concern for possible pneumonia. Patient was started on unasyn and azithromycin and sepsis protocol. Xarelto was re-started.      Of note, he uses anabolic steroids    Interim Events:  Worsening SOB today, also nausea, vomiting and abdominal pain after oxycodone ingestion, and worsening right leg swelling and pain.         Past medical, surgical, social, and family history reviewed and unchanged from admission except as noted in assessment and plan.    Medications:  "Reviewed in MAR      Allergies   Allergen Reactions    Morphine Shortness of Breath, Rash and Itching                 Physical Exam  Body mass index is 31.24 kg/m².  /88   Pulse 84   Temp 37.3 °C (99.1 °F) (Temporal)   Resp 14   Ht 1.753 m (5' 9.02\")   Wt 96 kg (211 lb 10.3 oz)   SpO2 94%   Vitals:    04/13/24 0418 04/13/24 0732 04/13/24 1200 04/13/24 1507   BP: 126/79 134/82  138/88   Pulse: 74 83 81 84   Resp: 18 (!) 21 12 14   Temp: 37.2 °C (99 °F) 37 °C (98.6 °F) 36.7 °C (98.1 °F) 37.3 °C (99.1 °F)   TempSrc: Temporal Temporal Temporal Temporal   SpO2: 99% 99%  94%   Weight: 96 kg (211 lb 10.3 oz)      Height:         Oxygen Therapy:  Pulse Oximetry: 94 %, O2 (LPM): 0 (1L NC available for comfort.), O2 Delivery Device: None - Room Air    General: in significant pain and respiratory distress.   Eyes: nl conjunctiva  ENT: NC noted.   Neck: JVP 12-13 cm H2O,  Lungs: increased resp effort, audible wheezes throughout.   Heart: RRR, 3/6 systolic murmur,  no rubs or gallops, 1+ edema bilateral lower extremities R>L. No LV/RV heave on cardiac palpatation. 2+ bilateral radial pulses.  2+ bilateral dp pulses.   Abdomen: soft, non tender, non distended, no masses, normal bowel sounds.  No HSM.  Extremities/MSK: no clubbing, no cyanosis  Neurological: No focal sensory deficits  Psychiatric: Appropriate affect, A/O x 3  Skin: Warm extremities    Exam repeated in full and unchanged except for as noted above.      Labs (personally reviewed and notable for):   Lab Results   Component Value Date/Time    SODIUM 134 (L) 04/13/2024 05:34 AM    POTASSIUM 5.3 04/13/2024 05:34 AM    CHLORIDE 98 04/13/2024 05:34 AM    CO2 25 04/13/2024 05:34 AM    GLUCOSE 76 04/13/2024 05:34 AM    BUN 22 04/13/2024 05:34 AM    CREATININE 1.01 04/13/2024 05:34 AM    BUNCREATRAT 27.3 (H) 05/19/2023 12:03 AM      Lab Results   Component Value Date/Time    WBC 21.4 (H) 04/13/2024 05:34 AM    RBC 5.71 04/13/2024 05:34 AM    HEMOGLOBIN 11.7 (L) " 04/13/2024 05:34 AM    HEMATOCRIT 38.2 (L) 04/13/2024 05:34 AM    MCV 66.9 (L) 04/13/2024 05:34 AM    MCH 20.5 (L) 04/13/2024 05:34 AM    MCHC 30.6 (L) 04/13/2024 05:34 AM    MPV 10.5 04/13/2024 05:34 AM    NEUTSPOLYS 87.80 (H) 04/12/2024 02:21 AM    LYMPHOCYTES 0.90 (L) 04/12/2024 02:21 AM    MONOCYTES 10.40 04/12/2024 02:21 AM    EOSINOPHILS 0.90 04/12/2024 02:21 AM    BASOPHILS 0.00 04/12/2024 02:21 AM    HYPOCHROMIA 2+ (A) 04/12/2024 02:21 AM    ANISOCYTOSIS 2+ (A) 04/12/2024 02:21 AM            Cardiac Imaging and Procedures Review:    EKG dated 4/11/2024: My personal interpretation is NSR, non-specific st changes.      Echo dated 4/5/2024:   CONCLUSIONS  Compared to the prior study on 7/5/2023, LV systolic function is now   hyperdynamic with high flow state. Recommend RHC as clinically   indicated.  Normal LV size and hyperdynamic systolic function with estimated LVEF   70%   Features of apical variant hypertrophic cardiomyopathy noted. Recommend   cardiac MRI.  Normal RV size with reduced systolic function  Known bioprosthetic aortic valve with mild increase in transvalvular   gradient: Vmax 3.1m/s. No PVL or AI.  DVI 0.5 with AT < 100 suggestive of normal aortic prosthetic valve and   increased velocity is probably due to high flow. High estimated CO   9L/min.  Normal IVC size  No pericardial effusion     Echo personally reviewed.      ECHOCARDIOGRAM:   12/5/22 [facility]  Conclusion   1. The left ventricle is normal in size with low-normal systolic function and an ejection fraction of 50-55% by Abreu's biplane. No distinct wall motion abnormalities noted. Global peak strain of -12.0%. Moderate concentric left ventricular hypertrophy.   2. Left atrium is mildly dilated with a LA volume index of 36 mL/m2. Right atrium is mildly dilated with an area of 24 cm2.     3. The aortic valve is severely sclerotic. It is difficult to visualize aortic valve leaflets. moderate aortic stenosis with an AV max of 3.43 m/s, peak  gradient 47 mmHg, mean gradient 29 mmHg, POPPY 1.0 cm2, and velocity ratio 0.27. Mild regurgitation.   4. Mild mitral regurgitation.   5. The ascending aorta is dilated at 4.7 cm.   6. Right ventricle is mildly dilated with preserved systolic function; RVSP is elevated at 44 mmHg with a RAP of 8 mmHg.       ANGIOGRAM:   12/7/22 [facility]  Findings:     Right coronary artery: Dominant but relatively small after the posterior descending. 20% ostial narrowing.     Left main trunk: Normal.     Left anterior descending: normal other than 20% plaque in the mid third     Circumflex: Supplies a large obtuse marginal branch which is normal     Left ventriculogram: Normal volume in diastole and in systole. Normal left ventricular systolic function is present. There is no left ventricular outflow gradient.        Radiology test Review:  CT aorta:        1.  Surgical changes of the aortic valve and ascending aorta.  2.  No aortic aneurysm or dissection identified.  3.  Mild/moderate atherosclerotic changes.  4.  Punctate, nonobstructing right renal stone.  5.  Elevation of the left hemidiaphragm with adjacent basilar consolidations, likely atelectasis. Pneumonia not excluded.  6.  Distal colonic diverticulosis.  7.  Hepatomegaly.     Op Note 1/5/2023:  AORTIC VALVE REPLACEMENT 27mm Inspiris tissue valve, ASCENDING AORTIC ANEURYSM REPAIR with 32mm Hemashield graft AND TRANSESOPHAGEAL ECHOCARDIOGRAM.         Impression and Medical Decision Making:  # Dyspnea - likely multifactorial with component of PNA, diastolic heart failure. Concern for potential amyloid or constrictive pericarditis after his CT surgery. Likely also has underlying undiagnosed COPD  # Paroxysmal atrial fibrillation s/p ablation by Dr. Webster 6/12/20243.   # Bicuspid aortic valve s/p bioprosthetic aortic valve replacement and aortic aneurysm repair 1/5/2023.   # Leukocytosis, persistent  # Acute on chronic diastolic heart failure, NYHA class III, stage C  # Acute  hypoxemic respiratory failure  # PNA        Recommendations:  # lasix IV x another dose, will increase to 40mg IV.   # aggressive neubulizer therapy  # agree with CTA, venous ultrasound to r/o thrombosis in the setting of his steroid use.   # ischemia doesn't appear to be primary problem right now, however may need to r/o ischemia again in the setting of dyslipidemia and steroid use despite negative cath in 2021. Already on NOAC, will start statin.   # outpatient PFTs, cardiac MRI  # will check SPEP/UPEP to r/o amyloid  # likely will benefit from outpatient hematology evaluation for persistent leukocytosis.   # discussed decreasing anabolic steroid use.     Discussed with Dr. Mendez.     Thank you for allowing me to participate in the care of this patient, I will continue to follow.  Please contact me with any questions.      Melvin Bailey MD  Cardiologist, [facility] [place]   [phone]

## 2024-04-13 NOTE — PROGRESS NOTES
Received bedside report from RN, pt care assumed, VSS, pt assessment complete. Pt AAOx4, with 8/10 of pain at this time. No signs of acute distress noted at this time. Plan of care discussed with pt and verbalizes no questions. Pt denies any additional needs at this time. Bed locked/in lowest position, pt educated on fall risk and verbalized understanding, call light within reach, hourly rounding initiated.

## 2024-04-13 NOTE — DISCHARGE PLANNING
Case Management Discharge Planning    Admission Date: 4/11/2024  GMLOS: 5.1  ALOS: 2    6-Clicks ADL Score: 24  6-Clicks Mobility Score: 23      Anticipated Discharge Dispo:  home self care    LMSW obtained choice for DME if O2 will be needed prior to DC.

## 2024-04-13 NOTE — CARE PLAN
The patient is Stable - Low risk of patient condition declining or worsening    Shift Goals  Clinical Goals: diurese, IV abx  Patient Goals: pain control    Progress made toward(s) clinical / shift goals:      Problem: Pain - Standard  Goal: Alleviation of pain or a reduction in pain to the patient’s comfort goal  Outcome: Progressing     Problem: Hemodynamics  Goal: Patient's hemodynamics, fluid balance and neurologic status will be stable or improve  Outcome: Progressing, pt is staying comfortable with PRN medication and also with the use of heat packs      Problem: Fluid Volume  Goal: Fluid volume balance will be maintained  Outcome: Progressing     Problem: Knowledge Deficit - Standard  Goal: Patient and family/care givers will demonstrate understanding of plan of care, disease process/condition, diagnostic tests and medications  Outcome: Progressing     Problem: Communication  Goal: The ability to communicate needs accurately and effectively will improve  Outcome: Progressing     Problem: Discharge Barriers/Planning  Goal: Patient's continuum of care needs are met  Outcome: Progressing     Problem: Mobility  Goal: Patient's capacity to carry out activities will improve  Outcome: Progressing     Problem: Self Care  Goal: Patient will have the ability to perform ADLs independently or with assistance (bathe, groom, dress, toilet and feed)  Outcome: Progressing       Patient is not progressing towards the following goals:

## 2024-04-14 ENCOUNTER — APPOINTMENT (OUTPATIENT)
Dept: RADIOLOGY | Facility: MEDICAL CENTER | Age: 64
DRG: 981 | End: 2024-04-14
Attending: STUDENT IN AN ORGANIZED HEALTH CARE EDUCATION/TRAINING PROGRAM
Payer: MEDICAID

## 2024-04-14 LAB
ANION GAP SERPL CALC-SCNC: 12 MMOL/L (ref 7–16)
BUN SERPL-MCNC: 22 MG/DL (ref 8–22)
CALCIUM SERPL-MCNC: 8.7 MG/DL (ref 8.5–10.5)
CHLORIDE SERPL-SCNC: 95 MMOL/L (ref 96–112)
CO2 SERPL-SCNC: 27 MMOL/L (ref 20–33)
CREAT SERPL-MCNC: 0.94 MG/DL (ref 0.5–1.4)
ERYTHROCYTE [DISTWIDTH] IN BLOOD BY AUTOMATED COUNT: 49.4 FL (ref 35.9–50)
GFR SERPLBLD CREATININE-BSD FMLA CKD-EPI: 90 ML/MIN/1.73 M 2
GLUCOSE SERPL-MCNC: 69 MG/DL (ref 65–99)
HCT VFR BLD AUTO: 37.4 % (ref 42–52)
HGB BLD-MCNC: 11.3 G/DL (ref 14–18)
MAGNESIUM SERPL-MCNC: 2 MG/DL (ref 1.5–2.5)
MCH RBC QN AUTO: 20.2 PG (ref 27–33)
MCHC RBC AUTO-ENTMCNC: 30.2 G/DL (ref 32.3–36.5)
MCV RBC AUTO: 66.8 FL (ref 81.4–97.8)
PHOSPHATE SERPL-MCNC: 3.4 MG/DL (ref 2.5–4.5)
PLATELET # BLD AUTO: 486 K/UL (ref 164–446)
POTASSIUM SERPL-SCNC: 4.9 MMOL/L (ref 3.6–5.5)
RBC # BLD AUTO: 5.6 M/UL (ref 4.7–6.1)
SODIUM SERPL-SCNC: 134 MMOL/L (ref 135–145)
WBC # BLD AUTO: 19.4 K/UL (ref 4.8–10.8)

## 2024-04-14 PROCEDURE — 94669 MECHANICAL CHEST WALL OSCILL: CPT

## 2024-04-14 PROCEDURE — 36415 COLL VENOUS BLD VENIPUNCTURE: CPT

## 2024-04-14 PROCEDURE — 700102 HCHG RX REV CODE 250 W/ 637 OVERRIDE(OP): Performed by: INTERNAL MEDICINE

## 2024-04-14 PROCEDURE — 94760 N-INVAS EAR/PLS OXIMETRY 1: CPT

## 2024-04-14 PROCEDURE — 700101 HCHG RX REV CODE 250: Performed by: STUDENT IN AN ORGANIZED HEALTH CARE EDUCATION/TRAINING PROGRAM

## 2024-04-14 PROCEDURE — 770020 HCHG ROOM/CARE - TELE (206)

## 2024-04-14 PROCEDURE — 83735 ASSAY OF MAGNESIUM: CPT

## 2024-04-14 PROCEDURE — 99233 SBSQ HOSP IP/OBS HIGH 50: CPT | Performed by: STUDENT IN AN ORGANIZED HEALTH CARE EDUCATION/TRAINING PROGRAM

## 2024-04-14 PROCEDURE — 99232 SBSQ HOSP IP/OBS MODERATE 35: CPT | Performed by: INTERNAL MEDICINE

## 2024-04-14 PROCEDURE — 85027 COMPLETE CBC AUTOMATED: CPT

## 2024-04-14 PROCEDURE — A9270 NON-COVERED ITEM OR SERVICE: HCPCS | Performed by: INTERNAL MEDICINE

## 2024-04-14 PROCEDURE — A9270 NON-COVERED ITEM OR SERVICE: HCPCS | Performed by: STUDENT IN AN ORGANIZED HEALTH CARE EDUCATION/TRAINING PROGRAM

## 2024-04-14 PROCEDURE — 94640 AIRWAY INHALATION TREATMENT: CPT

## 2024-04-14 PROCEDURE — 93970 EXTREMITY STUDY: CPT

## 2024-04-14 PROCEDURE — 700111 HCHG RX REV CODE 636 W/ 250 OVERRIDE (IP): Performed by: STUDENT IN AN ORGANIZED HEALTH CARE EDUCATION/TRAINING PROGRAM

## 2024-04-14 PROCEDURE — 700102 HCHG RX REV CODE 250 W/ 637 OVERRIDE(OP): Performed by: STUDENT IN AN ORGANIZED HEALTH CARE EDUCATION/TRAINING PROGRAM

## 2024-04-14 PROCEDURE — 84100 ASSAY OF PHOSPHORUS: CPT

## 2024-04-14 PROCEDURE — 93970 EXTREMITY STUDY: CPT | Mod: 26 | Performed by: INTERNAL MEDICINE

## 2024-04-14 PROCEDURE — 80048 BASIC METABOLIC PNL TOTAL CA: CPT

## 2024-04-14 RX ORDER — FERROUS SULFATE 325(65) MG
325 TABLET ORAL
Qty: 30 TABLET | Refills: 0 | OUTPATIENT
Start: 2024-04-15

## 2024-04-14 RX ORDER — FUROSEMIDE 20 MG/1
20 TABLET ORAL DAILY
Qty: 30 TABLET | Refills: 0 | OUTPATIENT
Start: 2024-04-14 | End: 2024-05-14

## 2024-04-14 RX ORDER — AMOXICILLIN AND CLAVULANATE POTASSIUM 875; 125 MG/1; MG/1
1 TABLET, FILM COATED ORAL EVERY 12 HOURS
Qty: 4 TABLET | Refills: 0 | OUTPATIENT
Start: 2024-04-14 | End: 2024-04-16

## 2024-04-14 RX ORDER — ALBUTEROL SULFATE 90 UG/1
2 AEROSOL, METERED RESPIRATORY (INHALATION) EVERY 6 HOURS PRN
Qty: 8.5 G | Refills: 0 | OUTPATIENT
Start: 2024-04-14

## 2024-04-14 RX ORDER — IPRATROPIUM BROMIDE AND ALBUTEROL SULFATE 2.5; .5 MG/3ML; MG/3ML
3 SOLUTION RESPIRATORY (INHALATION)
Status: DISCONTINUED | OUTPATIENT
Start: 2024-04-14 | End: 2024-04-15

## 2024-04-14 RX ORDER — ATORVASTATIN CALCIUM 40 MG/1
40 TABLET, FILM COATED ORAL
Qty: 30 TABLET | Refills: 0 | OUTPATIENT
Start: 2024-04-14

## 2024-04-14 RX ORDER — POTASSIUM CHLORIDE 750 MG/1
10 TABLET, EXTENDED RELEASE ORAL DAILY
Qty: 30 EACH | Refills: 0 | OUTPATIENT
Start: 2024-04-14 | End: 2024-05-14

## 2024-04-14 RX ORDER — IPRATROPIUM BROMIDE AND ALBUTEROL SULFATE 2.5; .5 MG/3ML; MG/3ML
3 SOLUTION RESPIRATORY (INHALATION)
Status: DISCONTINUED | OUTPATIENT
Start: 2024-04-14 | End: 2024-04-16

## 2024-04-14 RX ADMIN — HYDROCODONE BITARTRATE AND ACETAMINOPHEN 1 TABLET: 10; 325 TABLET ORAL at 20:22

## 2024-04-14 RX ADMIN — FERROUS SULFATE TAB 325 MG (65 MG ELEMENTAL FE) 325 MG: 325 (65 FE) TAB at 08:32

## 2024-04-14 RX ADMIN — ACETAMINOPHEN 1000 MG: 500 TABLET, FILM COATED ORAL at 04:52

## 2024-04-14 RX ADMIN — MOMETASONE FUROATE AND FORMOTEROL FUMARATE DIHYDRATE 2 PUFF: 50; 5 AEROSOL RESPIRATORY (INHALATION) at 09:06

## 2024-04-14 RX ADMIN — IPRATROPIUM BROMIDE AND ALBUTEROL SULFATE 3 ML: 2.5; .5 SOLUTION RESPIRATORY (INHALATION) at 04:19

## 2024-04-14 RX ADMIN — HYDROMORPHONE HYDROCHLORIDE 0.5 MG: 1 INJECTION, SOLUTION INTRAMUSCULAR; INTRAVENOUS; SUBCUTANEOUS at 03:19

## 2024-04-14 RX ADMIN — METOPROLOL SUCCINATE 25 MG: 25 TABLET, EXTENDED RELEASE ORAL at 04:53

## 2024-04-14 RX ADMIN — AMOXICILLIN AND CLAVULANATE POTASSIUM 1 TABLET: 875; 125 TABLET, FILM COATED ORAL at 16:18

## 2024-04-14 RX ADMIN — OMEPRAZOLE 20 MG: 20 CAPSULE, DELAYED RELEASE ORAL at 04:52

## 2024-04-14 RX ADMIN — HYDROCODONE BITARTRATE AND ACETAMINOPHEN 1 TABLET: 10; 325 TABLET ORAL at 14:55

## 2024-04-14 RX ADMIN — IPRATROPIUM BROMIDE AND ALBUTEROL SULFATE 3 ML: 2.5; .5 SOLUTION RESPIRATORY (INHALATION) at 09:06

## 2024-04-14 RX ADMIN — ACETAMINOPHEN 1000 MG: 500 TABLET, FILM COATED ORAL at 00:21

## 2024-04-14 RX ADMIN — LOSARTAN POTASSIUM 25 MG: 50 TABLET, FILM COATED ORAL at 04:53

## 2024-04-14 RX ADMIN — IPRATROPIUM BROMIDE AND ALBUTEROL SULFATE 3 ML: 2.5; .5 SOLUTION RESPIRATORY (INHALATION) at 14:45

## 2024-04-14 RX ADMIN — AMOXICILLIN AND CLAVULANATE POTASSIUM 1 TABLET: 875; 125 TABLET, FILM COATED ORAL at 04:52

## 2024-04-14 RX ADMIN — RIVAROXABAN 20 MG: 20 TABLET, FILM COATED ORAL at 16:18

## 2024-04-14 RX ADMIN — HYDROMORPHONE HYDROCHLORIDE 0.5 MG: 1 INJECTION, SOLUTION INTRAMUSCULAR; INTRAVENOUS; SUBCUTANEOUS at 10:50

## 2024-04-14 RX ADMIN — ATORVASTATIN CALCIUM 40 MG: 40 TABLET, FILM COATED ORAL at 20:22

## 2024-04-14 RX ADMIN — OMEPRAZOLE 20 MG: 20 CAPSULE, DELAYED RELEASE ORAL at 16:18

## 2024-04-14 RX ADMIN — IPRATROPIUM BROMIDE AND ALBUTEROL SULFATE 3 ML: 2.5; .5 SOLUTION RESPIRATORY (INHALATION) at 18:07

## 2024-04-14 RX ADMIN — HYDROCODONE BITARTRATE AND ACETAMINOPHEN 1 TABLET: 10; 325 TABLET ORAL at 08:40

## 2024-04-14 RX ADMIN — HYDROCODONE BITARTRATE AND ACETAMINOPHEN 1 TABLET: 10; 325 TABLET ORAL at 02:18

## 2024-04-14 ASSESSMENT — PAIN DESCRIPTION - PAIN TYPE
TYPE: ACUTE PAIN

## 2024-04-14 ASSESSMENT — COPD QUESTIONNAIRES
DURING THE PAST 4 WEEKS HOW MUCH DID YOU FEEL SHORT OF BREATH: MOST  OR ALL OF THE TIME
HAVE YOU SMOKED AT LEAST 100 CIGARETTES IN YOUR ENTIRE LIFE: YES
COPD SCREENING SCORE: 7
DO YOU EVER COUGH UP ANY MUCUS OR PHLEGM?: NO/ONLY WITH OCCASIONAL COLDS OR INFECTIONS

## 2024-04-14 ASSESSMENT — FIBROSIS 4 INDEX: FIB4 SCORE: 0.89

## 2024-04-14 ASSESSMENT — ENCOUNTER SYMPTOMS
SHORTNESS OF BREATH: 1
WEAKNESS: 1

## 2024-04-14 NOTE — CARE PLAN
Problem: Bronchoconstriction  Goal: Improve in air movement and diminished wheezing  Description: Target End Date:  2 to 3 days    1.  Implement inhaled treatments  2.  Evaluate and manage medication effects  4/14/2024 1656 by Stacey Farfan, RRT  Outcome: Progressing  4/14/2024 1656 by Stacey Farfan, RRT  Outcome: Progressing   SVN DUO [id number]  Problem: Bronchopulmonary Hygiene  Goal: Increase mobilization of retained secretions  Description: Target End Date:  2 to 3 days    1.  Perform bronchopulmonary therapy as indicated by assessment  2.  Perform airway suctioning  3.  Perform actions to maintain patient airway  Outcome: Progressing   FLUTTER Q6WA

## 2024-04-14 NOTE — PROGRESS NOTES
Hospital Medicine Daily Progress Note    Date of Service  4/14/2024    Chief Complaint  Noe Hickey is a 63 y.o. male admitted 4/11/2024 with chest pain and sob    Hospital Course  63 y.o. male with hx of AAA repair in January 2023, combined systolic and diastolic congestive heart failure, paroxysmal atrial fibrillation status post ablation on Xarelto, however discontinued for the past few days due to misunderstanding by cardiology provider, ongoing tobacco abuse, who presented 4/11/2024 with sob for past one month. He was just seen cardiology 4/4 and echo notes EF 70%, which has improved from 30% 7/5/23 and there is features of apical variant hypertrophic cardiomyopathy.  Patient was scheduled to have cardiac MRI and stress test next week.  However he presented with worsening shortness of breath and chest pain.    In the emergency room he was a code aorta and a stat CTA aorta was performed with contrast that showed normal AAA repair with no new aneurysmal dilatation or aneurysm. It did show a left-sided basilar pneumonia with a white blood cell count of 27,000 for left shift.   Patient is started with antibiotics. Of note, patient is taking anabolic steroid. Procal negative.   CTA chest no PE  Doppler BLE no signs of DVT    Interval Problem Update  I have seen and examined patient at bedside.    Wife at bedside  Off oxygen  Walking O2 today  Patient reports sob has been improving significantly after breathing treatments  Continue duoneb and dulera  Suspect undiagnosed COPD and MARIELENA   I have referred outpatient pulmonary and sleep medicine  Smoking cessation advised agin  Iron deficiency anemia. Reports colonoscopy years ago in [place]. I have referred GI   Continue iron pills  Persistent leukocytosis, I have referred hematology  PCP referral as well   CTA reviewed no evidence of PE  Discussed with cardiology Dr. Alvarado    I have discussed this patient's plan of care and discharge plan at IDT rounds  today with Case Management, Nursing, Nursing leadership, and other members of the IDT team.    Consultants/Specialty  cardiology    Code Status  Full Code    Disposition  The patient is not medically cleared for discharge to home or a post-acute facility.      I have placed the appropriate orders for post-discharge needs.    Review of Systems  Review of Systems   Constitutional:  Positive for malaise/fatigue.   Respiratory:  Positive for shortness of breath.    Cardiovascular:  Positive for chest pain.   Neurological:  Positive for weakness.   All other systems reviewed and are negative.       Physical Exam  Temp:  [36.2 °C (97.2 °F)-37.2 °C (99 °F)] 37.1 °C (98.8 °F)  Pulse:  [74-93] 86  Resp:  [11-23] 18  BP: (126-148)/(79-83) 127/79  SpO2:  [91 %-98 %] 96 %    Physical Exam  Vitals and nursing note reviewed.   Constitutional:       Appearance: Normal appearance. He is obese. He is ill-appearing.   HENT:      Head: Normocephalic and atraumatic.      Mouth/Throat:      Pharynx: Oropharynx is clear.   Eyes:      Pupils: Pupils are equal, round, and reactive to light.   Neck:      Vascular: No carotid bruit.   Cardiovascular:      Rate and Rhythm: Normal rate and regular rhythm.   Pulmonary:      Effort: Pulmonary effort is normal. No respiratory distress.      Breath sounds: Normal breath sounds. No wheezing.      Comments: Diminished breath sound bilateral  Abdominal:      General: Abdomen is flat. Bowel sounds are normal.      Palpations: Abdomen is soft. There is no mass.   Musculoskeletal:         General: Normal range of motion.      Cervical back: Neck supple.   Skin:     General: Skin is warm and dry.   Neurological:      General: No focal deficit present.      Mental Status: He is alert and oriented to person, place, and time.   Psychiatric:         Mood and Affect: Mood normal.         Behavior: Behavior normal.         Fluids    Intake/Output Summary (Last 24 hours) at 4/14/2024 1521  Last data filed at  4/14/2024 0800  Gross per 24 hour   Intake 400 ml   Output 375 ml   Net 25 ml       Laboratory  Recent Labs     04/12/24 0221 04/13/24  0534 04/14/24  0050   WBC 24.6* 21.4* 19.4*   RBC 6.01 5.71 5.60   HEMOGLOBIN 12.2* 11.7* 11.3*   HEMATOCRIT 40.5* 38.2* 37.4*   MCV 67.4* 66.9* 66.8*   MCH 20.3* 20.5* 20.2*   MCHC 30.1* 30.6* 30.2*   RDW 50.4* 48.0 49.4   PLATELETCT 474* 506* 486*   MPV 9.7 10.5  --      Recent Labs     04/12/24 0221 04/13/24  0534 04/14/24  0050   SODIUM 133* 134* 134*   POTASSIUM 5.4 5.3 4.9   CHLORIDE 100 98 95*   CO2 26 25 27   GLUCOSE 84 76 69   BUN 26* 22 22   CREATININE 1.40 1.01 0.94   CALCIUM 8.7 8.7 8.7     Recent Labs     04/11/24 2125   INR 1.13               Imaging  US-EXTREMITY VENOUS LOWER BILAT   Final Result      CT-CTA CHEST PULMONARY ARTERY W/ RECONS   Final Result         1. No CT evidence of pulmonary embolism.      2. Airspace opacity in the left lower lobe, likely atelectasis      CT-CTA COMPLETE THORACOABDOMINAL AORTA   Final Result      1.  Surgical changes of the aortic valve and ascending aorta.   2.  No aortic aneurysm or dissection identified.   3.  Mild/moderate atherosclerotic changes.   4.  Punctate, nonobstructing right renal stone.   5.  Elevation of the left hemidiaphragm with adjacent basilar consolidations, likely atelectasis. Pneumonia not excluded.   6.  Distal colonic diverticulosis.   7.  Hepatomegaly.                  CT-CTA HEAD WITH & W/O-POST PROCESS   Final Result      1.  Severely suboptimal exam due to photon starvation.   2.  No gross acute intracranial abnormality.   3.  No proximal vessel occlusion.      CT-CTA NECK WITH & W/O-POST PROCESSING   Final Result   Addendum (preliminary) 1 of 1   100 mL Omnipaque 300 given IV.      Final      No acute arterial abnormality of the neck.           Assessment/Plan  * Community acquired pneumonia of left lower lobe of lung- (present on admission)  Assessment & Plan  Denies fever, chills.  Noted profound  leukocytosis on admission.  However he is taking anabolic steroids at home, which could explain leukocytosis  Procalcitonin negative  Given his symptoms has been present for months, ?  PNA  Continue empirical abx with Augmentin    Microcytic anemia  Assessment & Plan  No Signs of bleeding  Check iron profile c/w JARROD  Iron supplements  Outpatient GI referral. Patient eeports colonoscopy years ago in [place].     Shortness of breath- (present on admission)  Assessment & Plan  Has been occurring and progressive in nature for few months  Recently s/b cards and repeated ECHO which showed in an interval normalization of his LVEF to 70% but shows possible there is features of apical variant hypertrophic cardiomyopath  Patient has been scheduled for outpatient cardiac MRI and stress test  I have consulted and discussed with cardiology  SPEP/UPEP pending to r/o amyloid   Check CTA chest and doppler BLE    Acute hypoxic respiratory failure- (present on admission)  Assessment & Plan  Unclear etiology.   CTA negative for PE  Suspect undiagnosed COPD exacerbation. He has significant smoking history. Also MARIELENA possible contributing   Continue IS, duoneb and dulera  Outpatient pulmonary and sleep medicine referral     Tobacco dependence- (present on admission)  Assessment & Plan  Trying to cut back, encourage full cessation    Sepsis (HCC)- (present on admission)  Assessment & Plan  This is Sepsis Present on admission  SIRS criteria identified on my evaluation include: Tachypnea, with respirations greater than 20 per minute and Leukocytosis, with WBC greater than 12,000  Clinical indicators of end organ dysfunction include Acute Respiratory Failure - (mechanical ventilation or BiPap or PaO2/FiO2 ratio < 300)  Source is pulmonary  Sepsis protocol initiated  Crystalloid Fluid Administration: Resuscitation volume of 1 liter ordered. Reason that resuscitation volume of less than 30ml/kg was ordered concern for causing harm given  CHF  IV antibiotics as appropriate for source of sepsis  Reassessment: I have reassessed the patient's hemodynamic status    ARF with hypoxia, RA saturation 87%  CTA Aorta shows L basilar consolidation, CAP  Continue empiric IV unasyn and oral azithromycin  F/U sputum cultures and blood cultures  Judicious IVF's    Heart failure with improved ejection fraction (HFimpEF) ([facility])- (present on admission)  Assessment & Plan  Echo 4/5/24 notes EF 70%, which has improved from EF 30% 7/5/23   Continue losartan, Toprol   Monitoring respiratory status  I&O, daily weight    Secondary hypercoagulable state (HCC)- (present on admission)  Assessment & Plan  Patient has discontinued Xarelto for the past few days due to misunderstanding by his cardiology provider  Resumed Xarelto      Paroxysmal atrial fibrillation (HCC)- (present on admission)  Assessment & Plan  In NSR  Xarelto as above  Continue outpatient toprol XL    Leukocytosis, thrombocytosis, erythrocytosis, lymphopenia- (present on admission)  Assessment & Plan  Persistent leukocytosis for years. Patient is taking anabolic steroid, which could explain leukocytosis. However he said he has been taking steroid only a few months. He has persistent leucocytosis for year  Lengthy discussion with patient the risks of long term steroid use. I advised patient to slow wean off. He voiced understanding.   Hematology outpatient referral for further evaluation    Gastroesophageal reflux disease without esophagitis- (present on admission)  Assessment & Plan  Continue omeprazole 20 mg daily    Ascending aortic aneurysm repair- (present on admission)  Assessment & Plan  CTA aorta shows intact repair and no new aneurysmal dilation         VTE prophylaxis: xarelto    I have performed a physical exam and reviewed and updated ROS and Plan today (4/14/2024). In review of yesterday's note (4/13/2024), there are no changes except as documented above.    Patient is has a high medical complexity,  complex decision making and is at high risk for complication, morbidity, and mortality.  I spent 51 minutes, reviewing the chart, obtaining and/or reviewing separately obtained history. Performing a medically appropriate examination and evaluation.  Counseling and educating the patient. Ordering and reviewing medications, tests, or procedures.  Discussing the case with cardiology.  Documenting clinical information in EPIC. Independently interpreting results and communicating results to patient. Discussing future disposition of care with patient, RN and case management.  This does not include time spent on separately billable procedures/tests.

## 2024-04-14 NOTE — CARE PLAN
The patient is Stable - Low risk of patient condition declining or worsening    Shift Goals  Clinical Goals: monitor o2, control pain  Patient Goals: discharge without o2  Family Goals: HUGH    Progress made toward(s) clinical / shift goals:    Problem: Respiratory  Goal: Patient will achieve/maintain optimum respiratory ventilation and gas exchange  Description: Target End Date:  Prior to discharge or change in level of care    Document on Assessment flowsheet    1.  Assess and monitor rate, rhythm, depth and effort of respiration  2.  Breath sounds assessed qshift and/or as needed  3.  Assess O2 saturation, administer/titrate oxygen as ordered  4.  Position patient for maximum ventilatory efficiency  5.  Turn, cough, and deep breath with splinting to improve effectiveness  6.  Collaborate with RT to administer medication/treatments per order  7.  Encourage use of incentive spirometer and encourage patient to cough after use and utilize splinting techniques if applicable  8.  Airway suctioning  9.  Monitor sputum production for changes in color, consistency and frequency  10. Perform frequent oral hygiene  11. Alternate physical activity with rest periods  Outcome: Not Met  Flowsheets  Taken 4/14/2024 1621 by Fabio Tinoco R.N.  O2 Delivery Device: Silicone Nasal Cannula  Taken 4/14/2024 0912 by Stacey Farfan RRT  Incentive Spirometer Volume: 1250 mL  Taken 4/14/2024 0800 by Fabio Tinoco R.N.  Incentive Spirometer: Effective  Deep Breathe and Cough: Performs Correctly  Note: Pt is still needing 02 this even. O2 sat on RA was 83%. Pt needed to be titrated to 1 lpm to maintain a o2 sat of 93%.         Patient is not progressing towards the following goals:      Problem: Respiratory  Goal: Patient will achieve/maintain optimum respiratory ventilation and gas exchange  Description: Target End Date:  Prior to discharge or change in level of care    Document on Assessment flowsheet    1.  Assess and monitor  rate, rhythm, depth and effort of respiration  2.  Breath sounds assessed qshift and/or as needed  3.  Assess O2 saturation, administer/titrate oxygen as ordered  4.  Position patient for maximum ventilatory efficiency  5.  Turn, cough, and deep breath with splinting to improve effectiveness  6.  Collaborate with RT to administer medication/treatments per order  7.  Encourage use of incentive spirometer and encourage patient to cough after use and utilize splinting techniques if applicable  8.  Airway suctioning  9.  Monitor sputum production for changes in color, consistency and frequency  10. Perform frequent oral hygiene  11. Alternate physical activity with rest periods  Outcome: Not Met  Flowsheets  Taken 4/14/2024 1621 by Fabio Tinoco, R.N.  O2 Delivery Device: Silicone Nasal Cannula  Taken 4/14/2024 0912 by Stacey Farfan RRT  Incentive Spirometer Volume: 1250 mL  Taken 4/14/2024 0800 by Fabio Tinoco, R.N.  Incentive Spirometer: Effective  Deep Breathe and Cough: Performs Correctly  Note: Pt is still needing 02 this even. O2 sat on RA was 83%. Pt needed to be titrated to 1 lpm to maintain a o2 sat of 93%.

## 2024-04-14 NOTE — CARE PLAN
The patient is Watcher - Medium risk of patient condition declining or worsening    Shift Goals  Clinical Goals: promote oxygenation; fluid balance; and pain management  Patient Goals: pain management  Family Goals: HUGH    Progress made toward(s) clinical / shift goals:    Problem: Pain - Standard  Goal: Alleviation of pain or a reduction in pain to the patient’s comfort goal  Outcome: Progressing     Problem: Respiratory  Goal: Patient will achieve/maintain optimum respiratory ventilation and gas exchange  Outcome: Progressing     Problem: Communication  Goal: The ability to communicate needs accurately and effectively will improve  Outcome: Progressing     Problem: Nutrition  Goal: Patient's nutritional and fluid intake will be adequate or improve  Outcome: Progressing     Problem: Risk for Aspiration  Goal: Patient's risk for aspiration will be absent or decrease  Outcome: Progressing     Problem: Mobility  Goal: Patient's capacity to carry out activities will improve  Outcome: Progressing     Problem: Self Care  Goal: Patient will have the ability to perform ADLs independently or with assistance (bathe, groom, dress, toilet and feed)  Outcome: Progressing       Patient is not progressing towards the following goals:

## 2024-04-15 ENCOUNTER — APPOINTMENT (OUTPATIENT)
Dept: RADIOLOGY | Facility: MEDICAL CENTER | Age: 64
DRG: 981 | End: 2024-04-15
Payer: MEDICAID

## 2024-04-15 PROBLEM — R49.0 DYSPHONIA: Status: ACTIVE | Noted: 2024-04-15

## 2024-04-15 LAB
ALBUMIN SERPL BCP-MCNC: 3.7 G/DL (ref 3.2–4.9)
ALBUMIN/GLOB SERPL: 1.3 G/DL
ALP SERPL-CCNC: 31 U/L (ref 30–99)
ALT SERPL-CCNC: 51 U/L (ref 2–50)
AMPHET UR QL SCN: NEGATIVE
ANION GAP SERPL CALC-SCNC: 12 MMOL/L (ref 7–16)
ANION GAP SERPL CALC-SCNC: 12 MMOL/L (ref 7–16)
ARTERIAL PATENCY WRIST A: ABNORMAL
AST SERPL-CCNC: 57 U/L (ref 12–45)
BARBITURATES UR QL SCN: NEGATIVE
BASE EXCESS BLDA CALC-SCNC: 4 MMOL/L (ref -4–3)
BASOPHILS # BLD AUTO: 0.7 % (ref 0–1.8)
BASOPHILS # BLD: 0.13 K/UL (ref 0–0.12)
BENZODIAZ UR QL SCN: NEGATIVE
BILIRUB SERPL-MCNC: 0.6 MG/DL (ref 0.1–1.5)
BODY TEMPERATURE: ABNORMAL DEGREES
BUN SERPL-MCNC: 20 MG/DL (ref 8–22)
BUN SERPL-MCNC: 21 MG/DL (ref 8–22)
BZE UR QL SCN: NEGATIVE
CALCIUM ALBUM COR SERPL-MCNC: 8.8 MG/DL (ref 8.5–10.5)
CALCIUM SERPL-MCNC: 8.6 MG/DL (ref 8.5–10.5)
CALCIUM SERPL-MCNC: 8.7 MG/DL (ref 8.5–10.5)
CANNABINOIDS UR QL SCN: NEGATIVE
CHLORIDE SERPL-SCNC: 98 MMOL/L (ref 96–112)
CHLORIDE SERPL-SCNC: 98 MMOL/L (ref 96–112)
CO2 BLDA-SCNC: 30 MMOL/L (ref 20–33)
CO2 SERPL-SCNC: 25 MMOL/L (ref 20–33)
CO2 SERPL-SCNC: 25 MMOL/L (ref 20–33)
CREAT SERPL-MCNC: 0.88 MG/DL (ref 0.5–1.4)
CREAT SERPL-MCNC: 1.01 MG/DL (ref 0.5–1.4)
DELSYS IDSYS: ABNORMAL
EKG IMPRESSION: NORMAL
EOSINOPHIL # BLD AUTO: 0.48 K/UL (ref 0–0.51)
EOSINOPHIL NFR BLD: 2.7 % (ref 0–6.9)
ERYTHROCYTE [DISTWIDTH] IN BLOOD BY AUTOMATED COUNT: 48.3 FL (ref 35.9–50)
ERYTHROCYTE [DISTWIDTH] IN BLOOD BY AUTOMATED COUNT: 49.4 FL (ref 35.9–50)
FENTANYL UR QL: NEGATIVE
GFR SERPLBLD CREATININE-BSD FMLA CKD-EPI: 83 ML/MIN/1.73 M 2
GFR SERPLBLD CREATININE-BSD FMLA CKD-EPI: 96 ML/MIN/1.73 M 2
GLOBULIN SER CALC-MCNC: 2.9 G/DL (ref 1.9–3.5)
GLUCOSE SERPL-MCNC: 83 MG/DL (ref 65–99)
GLUCOSE SERPL-MCNC: 85 MG/DL (ref 65–99)
HCO3 BLDA-SCNC: 28.8 MMOL/L (ref 17–25)
HCT VFR BLD AUTO: 35.9 % (ref 42–52)
HCT VFR BLD AUTO: 36.7 % (ref 42–52)
HGB BLD-MCNC: 10.9 G/DL (ref 14–18)
HGB BLD-MCNC: 11 G/DL (ref 14–18)
IMM GRANULOCYTES # BLD AUTO: 0.25 K/UL (ref 0–0.11)
IMM GRANULOCYTES NFR BLD AUTO: 1.4 % (ref 0–0.9)
LACTATE BLD-SCNC: 1.2 MMOL/L (ref 0.5–2)
LACTATE SERPL-SCNC: 1.4 MMOL/L (ref 0.5–2)
LPM ILPM: 5 LPM
LYMPHOCYTES # BLD AUTO: 1.39 K/UL (ref 1–4.8)
LYMPHOCYTES NFR BLD: 7.7 % (ref 22–41)
MCH RBC QN AUTO: 19.8 PG (ref 27–33)
MCH RBC QN AUTO: 20.1 PG (ref 27–33)
MCHC RBC AUTO-ENTMCNC: 30 G/DL (ref 32.3–36.5)
MCHC RBC AUTO-ENTMCNC: 30.4 G/DL (ref 32.3–36.5)
MCV RBC AUTO: 65.3 FL (ref 81.4–97.8)
MCV RBC AUTO: 67 FL (ref 81.4–97.8)
METHADONE UR QL SCN: NEGATIVE
MONOCYTES # BLD AUTO: 1.89 K/UL (ref 0–0.85)
MONOCYTES NFR BLD AUTO: 10.5 % (ref 0–13.4)
NEUTROPHILS # BLD AUTO: 13.9 K/UL (ref 1.82–7.42)
NEUTROPHILS NFR BLD: 77 % (ref 44–72)
NRBC # BLD AUTO: 0.03 K/UL
NRBC BLD-RTO: 0.2 /100 WBC (ref 0–0.2)
NT-PROBNP SERPL IA-MCNC: 326 PG/ML (ref 0–125)
OPIATES UR QL SCN: POSITIVE
OXYCODONE UR QL SCN: POSITIVE
PCO2 BLDA: 41.8 MMHG (ref 26–37)
PCO2 TEMP ADJ BLDA: 42.2 MMHG (ref 26–37)
PCP UR QL SCN: NEGATIVE
PH BLDA: 7.45 [PH] (ref 7.4–7.5)
PH TEMP ADJ BLDA: 7.44 [PH] (ref 7.4–7.5)
PLATELET # BLD AUTO: 457 K/UL (ref 164–446)
PLATELET # BLD AUTO: 537 K/UL (ref 164–446)
PMV BLD AUTO: 10.1 FL (ref 9–12.9)
PMV BLD AUTO: 9.7 FL (ref 9–12.9)
PO2 BLDA: 118 MMHG (ref 64–87)
PO2 TEMP ADJ BLDA: 119 MMHG (ref 64–87)
POTASSIUM SERPL-SCNC: 4.6 MMOL/L (ref 3.6–5.5)
POTASSIUM SERPL-SCNC: 4.7 MMOL/L (ref 3.6–5.5)
PROCALCITONIN SERPL-MCNC: 0.08 NG/ML
PROPOXYPH UR QL SCN: NEGATIVE
PROT SERPL-MCNC: 6.6 G/DL (ref 6–8.2)
RBC # BLD AUTO: 5.48 M/UL (ref 4.7–6.1)
RBC # BLD AUTO: 5.5 M/UL (ref 4.7–6.1)
SAO2 % BLDA: 99 % (ref 93–99)
SODIUM SERPL-SCNC: 135 MMOL/L (ref 135–145)
SODIUM SERPL-SCNC: 135 MMOL/L (ref 135–145)
SPECIMEN DRAWN FROM PATIENT: ABNORMAL
TROPONIN T SERPL-MCNC: 19 NG/L (ref 6–19)
WBC # BLD AUTO: 17.2 K/UL (ref 4.8–10.8)
WBC # BLD AUTO: 18 K/UL (ref 4.8–10.8)

## 2024-04-15 PROCEDURE — 80048 BASIC METABOLIC PNL TOTAL CA: CPT

## 2024-04-15 PROCEDURE — 80053 COMPREHEN METABOLIC PANEL: CPT

## 2024-04-15 PROCEDURE — 700102 HCHG RX REV CODE 250 W/ 637 OVERRIDE(OP)

## 2024-04-15 PROCEDURE — 700101 HCHG RX REV CODE 250

## 2024-04-15 PROCEDURE — 93005 ELECTROCARDIOGRAM TRACING: CPT

## 2024-04-15 PROCEDURE — 700102 HCHG RX REV CODE 250 W/ 637 OVERRIDE(OP): Performed by: INTERNAL MEDICINE

## 2024-04-15 PROCEDURE — 93010 ELECTROCARDIOGRAM REPORT: CPT | Performed by: INTERNAL MEDICINE

## 2024-04-15 PROCEDURE — A9270 NON-COVERED ITEM OR SERVICE: HCPCS | Performed by: INTERNAL MEDICINE

## 2024-04-15 PROCEDURE — 80307 DRUG TEST PRSMV CHEM ANLYZR: CPT

## 2024-04-15 PROCEDURE — 85027 COMPLETE CBC AUTOMATED: CPT

## 2024-04-15 PROCEDURE — 700102 HCHG RX REV CODE 250 W/ 637 OVERRIDE(OP): Performed by: STUDENT IN AN ORGANIZED HEALTH CARE EDUCATION/TRAINING PROGRAM

## 2024-04-15 PROCEDURE — 700111 HCHG RX REV CODE 636 W/ 250 OVERRIDE (IP): Performed by: STUDENT IN AN ORGANIZED HEALTH CARE EDUCATION/TRAINING PROGRAM

## 2024-04-15 PROCEDURE — 84484 ASSAY OF TROPONIN QUANT: CPT

## 2024-04-15 PROCEDURE — A9270 NON-COVERED ITEM OR SERVICE: HCPCS

## 2024-04-15 PROCEDURE — 99222 1ST HOSP IP/OBS MODERATE 55: CPT | Mod: GC | Performed by: INTERNAL MEDICINE

## 2024-04-15 PROCEDURE — 99233 SBSQ HOSP IP/OBS HIGH 50: CPT | Performed by: STUDENT IN AN ORGANIZED HEALTH CARE EDUCATION/TRAINING PROGRAM

## 2024-04-15 PROCEDURE — 71045 X-RAY EXAM CHEST 1 VIEW: CPT

## 2024-04-15 PROCEDURE — 84145 PROCALCITONIN (PCT): CPT

## 2024-04-15 PROCEDURE — 94669 MECHANICAL CHEST WALL OSCILL: CPT

## 2024-04-15 PROCEDURE — 700111 HCHG RX REV CODE 636 W/ 250 OVERRIDE (IP): Mod: JZ

## 2024-04-15 PROCEDURE — 94640 AIRWAY INHALATION TREATMENT: CPT

## 2024-04-15 PROCEDURE — 83880 ASSAY OF NATRIURETIC PEPTIDE: CPT

## 2024-04-15 PROCEDURE — 36415 COLL VENOUS BLD VENIPUNCTURE: CPT

## 2024-04-15 PROCEDURE — A9270 NON-COVERED ITEM OR SERVICE: HCPCS | Performed by: STUDENT IN AN ORGANIZED HEALTH CARE EDUCATION/TRAINING PROGRAM

## 2024-04-15 PROCEDURE — 700101 HCHG RX REV CODE 250: Performed by: STUDENT IN AN ORGANIZED HEALTH CARE EDUCATION/TRAINING PROGRAM

## 2024-04-15 PROCEDURE — 83605 ASSAY OF LACTIC ACID: CPT | Mod: 91

## 2024-04-15 PROCEDURE — 82803 BLOOD GASES ANY COMBINATION: CPT

## 2024-04-15 PROCEDURE — 85025 COMPLETE CBC W/AUTO DIFF WBC: CPT

## 2024-04-15 PROCEDURE — 76000 FLUOROSCOPY <1 HR PHYS/QHP: CPT

## 2024-04-15 PROCEDURE — 770020 HCHG ROOM/CARE - TELE (206)

## 2024-04-15 PROCEDURE — 94760 N-INVAS EAR/PLS OXIMETRY 1: CPT

## 2024-04-15 RX ORDER — IPRATROPIUM BROMIDE AND ALBUTEROL SULFATE 2.5; .5 MG/3ML; MG/3ML
3 SOLUTION RESPIRATORY (INHALATION)
Status: DISCONTINUED | OUTPATIENT
Start: 2024-04-15 | End: 2024-04-16

## 2024-04-15 RX ORDER — DIPHENHYDRAMINE HYDROCHLORIDE 50 MG/ML
25 INJECTION INTRAMUSCULAR; INTRAVENOUS ONCE
Status: COMPLETED | OUTPATIENT
Start: 2024-04-15 | End: 2024-04-15

## 2024-04-15 RX ORDER — OXYMETAZOLINE HYDROCHLORIDE 0.05 G/100ML
2 SPRAY NASAL 2 TIMES DAILY
Status: DISCONTINUED | OUTPATIENT
Start: 2024-04-15 | End: 2024-04-15

## 2024-04-15 RX ORDER — METHYLPREDNISOLONE SODIUM SUCCINATE 40 MG/ML
40 INJECTION, POWDER, LYOPHILIZED, FOR SOLUTION INTRAMUSCULAR; INTRAVENOUS EVERY 6 HOURS
Qty: 3 ML | Refills: 0 | Status: COMPLETED | OUTPATIENT
Start: 2024-04-15 | End: 2024-04-15

## 2024-04-15 RX ORDER — BUTALBITAL, ACETAMINOPHEN AND CAFFEINE 50; 325; 40 MG/1; MG/1; MG/1
1 TABLET ORAL EVERY 6 HOURS PRN
Status: DISCONTINUED | OUTPATIENT
Start: 2024-04-15 | End: 2024-04-21

## 2024-04-15 RX ORDER — METHYLPREDNISOLONE SODIUM SUCCINATE 40 MG/ML
40 INJECTION, POWDER, LYOPHILIZED, FOR SOLUTION INTRAMUSCULAR; INTRAVENOUS ONCE
Status: COMPLETED | OUTPATIENT
Start: 2024-04-15 | End: 2024-04-15

## 2024-04-15 RX ORDER — HYDROMORPHONE HYDROCHLORIDE 1 MG/ML
0.5 INJECTION, SOLUTION INTRAMUSCULAR; INTRAVENOUS; SUBCUTANEOUS EVERY 4 HOURS PRN
Status: DISCONTINUED | OUTPATIENT
Start: 2024-04-15 | End: 2024-04-19

## 2024-04-15 RX ORDER — HYDROMORPHONE HYDROCHLORIDE 1 MG/ML
0.5 INJECTION, SOLUTION INTRAMUSCULAR; INTRAVENOUS; SUBCUTANEOUS
Status: DISCONTINUED | OUTPATIENT
Start: 2024-04-15 | End: 2024-04-15

## 2024-04-15 RX ORDER — MAGNESIUM SULFATE HEPTAHYDRATE 40 MG/ML
2 INJECTION, SOLUTION INTRAVENOUS ONCE
Status: COMPLETED | OUTPATIENT
Start: 2024-04-15 | End: 2024-04-15

## 2024-04-15 RX ORDER — ONDANSETRON 4 MG/1
4 TABLET, ORALLY DISINTEGRATING ORAL EVERY 4 HOURS PRN
Status: DISCONTINUED | OUTPATIENT
Start: 2024-04-15 | End: 2024-04-24 | Stop reason: HOSPADM

## 2024-04-15 RX ORDER — HYDROXYZINE HYDROCHLORIDE 25 MG/1
25 TABLET, FILM COATED ORAL 3 TIMES DAILY PRN
Status: DISCONTINUED | OUTPATIENT
Start: 2024-04-15 | End: 2024-04-24 | Stop reason: HOSPADM

## 2024-04-15 RX ORDER — HYDROMORPHONE HYDROCHLORIDE 1 MG/ML
0.5 INJECTION, SOLUTION INTRAMUSCULAR; INTRAVENOUS; SUBCUTANEOUS ONCE
Status: COMPLETED | OUTPATIENT
Start: 2024-04-15 | End: 2024-04-15

## 2024-04-15 RX ORDER — OXYMETAZOLINE HYDROCHLORIDE 0.05 G/100ML
2 SPRAY NASAL 2 TIMES DAILY
Status: DISPENSED | OUTPATIENT
Start: 2024-04-15 | End: 2024-04-18

## 2024-04-15 RX ADMIN — ONDANSETRON 4 MG: 4 TABLET, ORALLY DISINTEGRATING ORAL at 15:27

## 2024-04-15 RX ADMIN — LOSARTAN POTASSIUM 25 MG: 50 TABLET, FILM COATED ORAL at 04:18

## 2024-04-15 RX ADMIN — METHYLPREDNISOLONE SODIUM SUCCINATE 40 MG: 40 INJECTION, POWDER, FOR SOLUTION INTRAMUSCULAR; INTRAVENOUS at 14:00

## 2024-04-15 RX ADMIN — IPRATROPIUM BROMIDE AND ALBUTEROL SULFATE 3 ML: 2.5; .5 SOLUTION RESPIRATORY (INHALATION) at 11:01

## 2024-04-15 RX ADMIN — FERROUS SULFATE TAB 325 MG (65 MG ELEMENTAL FE) 325 MG: 325 (65 FE) TAB at 08:23

## 2024-04-15 RX ADMIN — BENZONATATE 100 MG: 100 CAPSULE ORAL at 09:00

## 2024-04-15 RX ADMIN — RACEPINEPHRINE HYDROCHLORIDE 0.5 ML: 11.25 SOLUTION RESPIRATORY (INHALATION) at 02:23

## 2024-04-15 RX ADMIN — OXYMETAZOLINE HCL 2 SPRAY: 0.05 SPRAY NASAL at 16:48

## 2024-04-15 RX ADMIN — AMOXICILLIN AND CLAVULANATE POTASSIUM 1 TABLET: 875; 125 TABLET, FILM COATED ORAL at 16:48

## 2024-04-15 RX ADMIN — MOMETASONE FUROATE AND FORMOTEROL FUMARATE DIHYDRATE 2 PUFF: 100; 5 AEROSOL RESPIRATORY (INHALATION) at 16:48

## 2024-04-15 RX ADMIN — HYDROMORPHONE HYDROCHLORIDE 0.5 MG: 1 INJECTION, SOLUTION INTRAMUSCULAR; INTRAVENOUS; SUBCUTANEOUS at 16:57

## 2024-04-15 RX ADMIN — HYDROCODONE BITARTRATE AND ACETAMINOPHEN 1 TABLET: 10; 325 TABLET ORAL at 21:43

## 2024-04-15 RX ADMIN — MAGNESIUM SULFATE HEPTAHYDRATE 2 G: 2 INJECTION, SOLUTION INTRAVENOUS at 02:25

## 2024-04-15 RX ADMIN — HYDROXYZINE HYDROCHLORIDE 25 MG: 25 TABLET, FILM COATED ORAL at 14:00

## 2024-04-15 RX ADMIN — HYDROCODONE BITARTRATE AND ACETAMINOPHEN 1 TABLET: 10; 325 TABLET ORAL at 15:26

## 2024-04-15 RX ADMIN — HYDROMORPHONE HYDROCHLORIDE 0.5 MG: 1 INJECTION, SOLUTION INTRAMUSCULAR; INTRAVENOUS; SUBCUTANEOUS at 23:00

## 2024-04-15 RX ADMIN — RIVAROXABAN 20 MG: 20 TABLET, FILM COATED ORAL at 16:48

## 2024-04-15 RX ADMIN — IPRATROPIUM BROMIDE AND ALBUTEROL SULFATE 3 ML: .5; 3 SOLUTION RESPIRATORY (INHALATION) at 02:07

## 2024-04-15 RX ADMIN — DIPHENHYDRAMINE HYDROCHLORIDE 25 MG: 50 INJECTION, SOLUTION INTRAMUSCULAR; INTRAVENOUS at 02:20

## 2024-04-15 RX ADMIN — ATORVASTATIN CALCIUM 40 MG: 40 TABLET, FILM COATED ORAL at 21:44

## 2024-04-15 RX ADMIN — METHYLPREDNISOLONE SODIUM SUCCINATE 40 MG: 40 INJECTION, POWDER, FOR SOLUTION INTRAMUSCULAR; INTRAVENOUS at 21:43

## 2024-04-15 RX ADMIN — METOPROLOL SUCCINATE 25 MG: 25 TABLET, EXTENDED RELEASE ORAL at 04:18

## 2024-04-15 RX ADMIN — HYDROCODONE BITARTRATE AND ACETAMINOPHEN 1 TABLET: 10; 325 TABLET ORAL at 05:59

## 2024-04-15 RX ADMIN — METHYLPREDNISOLONE SODIUM SUCCINATE 40 MG: 40 INJECTION, POWDER, FOR SOLUTION INTRAMUSCULAR; INTRAVENOUS at 02:20

## 2024-04-15 RX ADMIN — IPRATROPIUM BROMIDE AND ALBUTEROL SULFATE 3 ML: 2.5; .5 SOLUTION RESPIRATORY (INHALATION) at 18:54

## 2024-04-15 RX ADMIN — IPRATROPIUM BROMIDE AND ALBUTEROL SULFATE 3 ML: 2.5; .5 SOLUTION RESPIRATORY (INHALATION) at 02:17

## 2024-04-15 RX ADMIN — MOMETASONE FUROATE AND FORMOTEROL FUMARATE DIHYDRATE 2 PUFF: 100; 5 AEROSOL RESPIRATORY (INHALATION) at 03:15

## 2024-04-15 RX ADMIN — HYDROMORPHONE HYDROCHLORIDE 0.5 MG: 1 INJECTION, SOLUTION INTRAMUSCULAR; INTRAVENOUS; SUBCUTANEOUS at 11:44

## 2024-04-15 RX ADMIN — HYDROMORPHONE HYDROCHLORIDE 0.5 MG: 1 INJECTION, SOLUTION INTRAMUSCULAR; INTRAVENOUS; SUBCUTANEOUS at 02:58

## 2024-04-15 RX ADMIN — METHYLPREDNISOLONE SODIUM SUCCINATE 40 MG: 40 INJECTION, POWDER, FOR SOLUTION INTRAMUSCULAR; INTRAVENOUS at 08:23

## 2024-04-15 RX ADMIN — OMEPRAZOLE 20 MG: 20 CAPSULE, DELAYED RELEASE ORAL at 04:18

## 2024-04-15 RX ADMIN — IPRATROPIUM BROMIDE AND ALBUTEROL SULFATE 3 ML: 2.5; .5 SOLUTION RESPIRATORY (INHALATION) at 06:07

## 2024-04-15 RX ADMIN — HYDROMORPHONE HYDROCHLORIDE 0.5 MG: 1 INJECTION, SOLUTION INTRAMUSCULAR; INTRAVENOUS; SUBCUTANEOUS at 07:10

## 2024-04-15 RX ADMIN — OMEPRAZOLE 20 MG: 20 CAPSULE, DELAYED RELEASE ORAL at 16:48

## 2024-04-15 RX ADMIN — AMOXICILLIN AND CLAVULANATE POTASSIUM 1 TABLET: 875; 125 TABLET, FILM COATED ORAL at 04:18

## 2024-04-15 RX ADMIN — IPRATROPIUM BROMIDE AND ALBUTEROL SULFATE 3 ML: 2.5; .5 SOLUTION RESPIRATORY (INHALATION) at 14:24

## 2024-04-15 RX ADMIN — IPRATROPIUM BROMIDE AND ALBUTEROL SULFATE 3 ML: 2.5; .5 SOLUTION RESPIRATORY (INHALATION) at 22:39

## 2024-04-15 ASSESSMENT — ENCOUNTER SYMPTOMS
NAUSEA: 1
DIAPHORESIS: 1
CHILLS: 0
PND: 0
MUSCULOSKELETAL NEGATIVE: 1
ABDOMINAL PAIN: 0
SPUTUM PRODUCTION: 1
CLAUDICATION: 0
FEVER: 0
HEARTBURN: 0
DIARRHEA: 0
SHORTNESS OF BREATH: 1
WEAKNESS: 1
EYES NEGATIVE: 1
COUGH: 1
VOMITING: 1
ORTHOPNEA: 1
CONSTIPATION: 0
HEMOPTYSIS: 0
NERVOUS/ANXIOUS: 1
WHEEZING: 1
NEUROLOGICAL NEGATIVE: 1
WEIGHT LOSS: 0
PALPITATIONS: 0

## 2024-04-15 ASSESSMENT — PAIN DESCRIPTION - PAIN TYPE
TYPE: ACUTE PAIN

## 2024-04-15 ASSESSMENT — FIBROSIS 4 INDEX: FIB4 SCORE: 0.95

## 2024-04-15 NOTE — PROGRESS NOTES
"This morning around 0200, the CNA, Deyanira, went into the patient's room, and this RN heard the patient exclaim \"I can't breathe\". The RN ran into the room to assess the patient, his O2 saturation was at 95%, but he was tachypneic, dyspneic, and his skin was flushed red. Respiratory therapy was called, Ivy GARIBAY was notified, and RRT called. Patient's conditioned has significantly improved, and the the CICU RRT will follow up with him on day shift.  "

## 2024-04-15 NOTE — PROGRESS NOTES
Rapid Response Summary     Rapid response called at 0212 for: Shortness of breath  and Increased oxygen demand     VS: Tachypneic  (See Vitals Flowsheet)  Additional info: progressive SOB, increasing O2 demands, flushed chest, audible wheezing  MD Paged: Jose GARIBAY  Interventions:    Imaging/Tests: Chest X-ray and EKG    Labs: CBC, BMP, Lactic Acid, ABG , and Troponin   Medications:  SOLU-medrol, benadryl, magnesium, dilaudid and Racemic Epi   Other: PIV started   Disposition: Improved with rapid response team interventions. Primary RN updated on plan of care. Transfer not indicated at this time.  and Rapid team will continue to follow the patient. CICU Rapid RNs to follow

## 2024-04-15 NOTE — PROGRESS NOTES
NOC HOSPITALIST CROSS COVER    RAPID RESPONSE called for shortness of breath, increased work of breathing and gasping for air.    Patient was seen and examined emergently at bedside.  Patient was in obvious acute respiratory distress, with accessory muscle use, stridor, and audible wheezes throughout his lungs. His chest was also noted to be red.  RT at bedside was actively giving a DuoNeb treatment.  Patient's SpO2 was 99% on 4.5 L.  He was unable to talk in complete sentences and had audible wheezing when attempting to answer questions.  He was minimally tachycardic.  He was complaining of severe shortness of breath and chest pain.    Orders placed for stat racemic epi nebulization, IV Solu-Medrol, IV Benadryl, and anaphylaxis kit brought to bedside.  Following DuoNebs x 2 and racemic epi x 1, the patient's breathing did improve.  His inspiratory wheezes were no longer audible across the room and he no longer had stridor.  Additionally he was given 2 g IV mag.    Review of charting indicates no new medications were given prior to this event.  The patient had been given Dulera inhaler, but this was after his shortness of breath started, not prior.    Stat chest x-ray demonstrated left basilar atelectasis.  Pro-Andreas was negative at 0.08.  Stat ABG resulted with pCO2 of 41.8, bicarb of 28.8, with a base excess of 4, and a pH of 7.44 indicating compensated respiratory acidosis.  The patient does have a presumed new diagnosis of COPD.  He is pending PFTs outpatient.  Twelve-lead EKG demonstrating sinus rhythm, rate 84, without significant ST elevations or depressions.  He does have some trace ST elevation in anterior leads.    Vitals:    04/15/24 0400   BP:    Pulse: 88   Resp: 18   Temp: 37.2 °C (99 °F)   SpO2:      Plan:  # Stridor  -Unclear etiology - bronchospasm versus anaphylaxis??  -DuoNebs every 4 hours  -Racemic epi duonebs as needed for stridor/wheezing  -IV Solu-Medrol every 6 hours x 3 doses to reduce risk  of recurrence  -Continuous pulse oximetry  -Closely monitor on telemetry    Patient improved significantly following interventions.  Low threshold for transfer to ICU versus IMC if patient develops recurrence of stridor.       -----------------------------------------------------------------------------------------------------------    Electronically signed by:  Jose العلي, ROBERTO, APRN, ALEXANDERP-BC  Hospitalist Services

## 2024-04-15 NOTE — DISCHARGE PLANNING
In the case of an emergency, pt's legal NOK is ex-wife Do Hickey     RNCM met with pt at bedside and obtained the information used in this assessment. Pt verified accuracy of facesheet. Pt lives in a single story apt alone.   Pt uses [facility]. Prior to current hospitalization, pt was completely independent in ADLS/IADLS. Pt drives and is able to attend necessary MD appointments.   Pt has a good support system. Pt admits hx of substance  (meth but states clean for 4+ yrs) and denies any dx of mh.States he has 02 concentrator at ex-wife's home from [place]. Good home support from ex-wife.        Care Transition Team Assessment    Information Source:pt  Orientation Level: Oriented X4  Information Given By: Patient  Informant's Name: Noe  Who is responsible for making decisions for patient? : Patient         Elopement Risk  Legal Hold: No  Ambulatory or Self Mobile in Wheelchair: Yes  Disoriented: No  Psychiatric Symptoms: None  History of Wandering: No  Elopement this Admit: No  Vocalizing Wanting to Leave: No  Displays Behaviors, Body Language Wanting to Leave: No-Not at Risk for Elopement  Elopement Risk: Not at Risk for Elopement    Interdisciplinary Discharge Planning  Does Admitting Nurse Feel This Could be a Complex Discharge?: No  Primary Care Physician: none  Lives with - Patient's Self Care Capacity: Alone and Able to Care For Self  Patient or legal guardian wants to designate a caregiver: No  Support Systems: Family Member(s)  Housing / Facility: 1 Story Apartment / Condo  Do You Take your Prescribed Medications Regularly: Yes  Mobility Issues: No  Prior Services: None  Durable Medical Equipment: Unknown  DME Provider / Phone: [facility]    Discharge Preparedness  What is your plan after discharge?: Home with help  What are your discharge supports?: Other (comment)  Prior Functional Level: Ambulatory  Difficulity with ADLs: None  Difficulity with IADLs: None    Functional  Assesment  Prior Functional Level: Ambulatory    Finances  Prescription Coverage: Yes    Vision / Hearing Impairment  Vision Impairment : No  Hearing Impairment : No              Domestic Abuse  Have you ever been the victim of abuse or violence?: No  Physical Abuse or Sexual Abuse: No  Verbal Abuse or Emotional Abuse: No  Possible Abuse/Neglect Reported to:: Not Applicable    Psychological Assessment  History of Substance Abuse: Methamphetamine  History of Psychiatric Problems: No    Discharge Risks or Barriers  Discharge risks or barriers?: No PCP  Patient risk factors: No PCP    Anticipated Discharge Information  Discharge Disposition: Discharged to home/self care (01)

## 2024-04-15 NOTE — CARE PLAN
The patient is Watcher - Medium risk of patient condition declining or worsening    Shift Goals  Clinical Goals: promote oxygenation; prevent infection; pain management  Patient Goals: wean off oxygen  Family Goals: HUGH    Progress made toward(s) clinical / shift goals:    Problem: Pain - Standard  Goal: Alleviation of pain or a reduction in pain to the patient’s comfort goal  Outcome: Progressing     Problem: Respiratory  Goal: Patient will achieve/maintain optimum respiratory ventilation and gas exchange  Outcome: Progressing     Problem: Psychosocial  Goal: Patient's level of anxiety will decrease  Outcome: Progressing     Problem: Nutrition  Goal: Patient's nutritional and fluid intake will be adequate or improve  Outcome: Progressing     Problem: Risk for Aspiration  Goal: Patient's risk for aspiration will be absent or decrease  Outcome: Progressing     Problem: Self Care  Goal: Patient will have the ability to perform ADLs independently or with assistance (bathe, groom, dress, toilet and feed)  Outcome: Progressing     Problem: Mobility  Goal: Patient's capacity to carry out activities will improve  Outcome: Progressing       Patient is not progressing towards the following goals:

## 2024-04-15 NOTE — PROGRESS NOTES
Patient is complaining of a 9/10 pain in his head. RN reached out to Lila GARIBAY, who gave permission via written order on voalte to administer the patient's PRN [place] 45 minutes earlier than is ordered per MAR.

## 2024-04-15 NOTE — DISCHARGE PLANNING
Case Management Discharge Planning    Admission Date: 4/11/2024  GMLOS: 5.1  ALOS: 4    6-Clicks ADL Score: 24  6-Clicks Mobility Score: 23      Anticipated Discharge Dispo: Discharge Disposition: Discharged to home/self care (01)    DME Needed: No    Action(s) Taken: Updated Provider/Nurse on Discharge Plan and DC Assessment Complete (See below)    Escalations Completed: None    Medically Clear: No    Next Steps: Discharge plans to be determined. Pulmonary consult pending .    Barriers to Discharge: Medical clearance    Is the patient up for discharge tomorrow: No

## 2024-04-15 NOTE — PROGRESS NOTES
Received bedside report from RN, pt care assumed, VSS, pt assessment complete. Pt AAOx4, with 5/10 head of pain at this time, pt medicated per MAR. No signs of acute distress noted at this time. Plan of care discussed with pt and verbalizes no questions. Pt denies any additional needs at this time. Bed locked/in lowest position, pt educated on fall risk and verbalized understanding, call light within reach, hourly rounding initiated. Pt ambulating in room. Pt on RA sating 94%.

## 2024-04-15 NOTE — ASSESSMENT & PLAN NOTE
Acute onset, hoarse voice  S/p ENT scope 4/15 unremarkable. Does not think dyspnea is related to vocal cords

## 2024-04-15 NOTE — CODE DOCUMENTATION
Rapid response team called for shortness of breath. Concern for possible allergic reaction. Lung sounds wheezing with poor airflow. Given breathing treatment, racemic epi, benadryl and solumedrol. Improvement in lung sounds and overall work of breathing. Second PIV established.

## 2024-04-15 NOTE — PROGRESS NOTES
Impetigo  Impetigo is a common bacterial infection of the.skin that can appear on many parts of the body. It can happen to anyone, of any age, but is more common in children. for this reason, it used to be called \"school sores.\"  Causes  It's normal to get scrapes on your body from activity or from scratching your skin. The skin normally has bacteria on it. Sometimes an inpetigo infection can start on healthy skin. But, it usually starts when there is an injury to the skin, or break in the skin. Although nothing usually happens, the bacteria normall on the skin can cause infection. This is the most common way people get impetigo.  Impetigo is very contagious. So, once there is an infection, it needs to be treated so it doesn't get worse, spread to other areas, or to other people. Impetigo can easily be passed to other family members, friends, schoolmates, or co-workers, through screatching, rubbing, or touching an infected area. Common causes include:  · Scratches  · Bites  · Injury to skin  · Insect bites  · Contaminated other skin problems, such as eczema  · After a cold  · From another contaminated person  Symptoms  There is often a skin injury like a scratch, scrape, or insect bite that may have gone unnoticed or been ignored before the infection began. Symptoms of impetigo include:  Red, inflamed area or rash  One or many red bumps  The bumps turn into blisters filled with yellow fluid or pus  Blisters rupture or leak causing honey-colored crusting or scabbing over the area  Skin sores that spread to other surrounding areas  Home care  The following guidelines will help you care for your infection at home:  Wound care  · Trim fingernails and cover sores with an adhesive bandage, if necessary, to prevent scratching. Picking at the sores may leave a scar.  · If the infection is on or around your lips, don't lick or chew on the sores. This will make the infection worse.  · If a bandage or dressing is used, you  Hospital Medicine Daily Progress Note    Date of Service  4/15/2024    Chief Complaint  Noe Hickey is a 63 y.o. male admitted 4/11/2024 with chest pain and sob    Hospital Course  63 y.o. male with hx of AAA repair in January 2023, combined systolic and diastolic congestive heart failure, paroxysmal atrial fibrillation status post ablation on Xarelto, however discontinued for the past few days due to misunderstanding by cardiology provider, ongoing tobacco abuse, who presented 4/11/2024 with sob for past one month. He was just seen cardiology 4/4 and echo notes EF 70%, which has improved from 30% 7/5/23 and there is features of apical variant hypertrophic cardiomyopathy.  Patient was scheduled to have cardiac MRI and stress test next week.  However he presented with worsening shortness of breath and chest pain.    In the emergency room he was a code aorta and a stat CTA aorta was performed with contrast that showed normal AAA repair with no new aneurysmal dilatation or aneurysm. It did show a left-sided basilar pneumonia with a white blood cell count of 27,000 for left shift.   Patient is started with antibiotics. Of note, patient is taking anabolic steroid. Procal negative.   CTA chest no PE  Doppler BLE no signs of DVT  Noted worsening stridor and horse voice on 4/15, pulmonary and ENT consulted    Interval Problem Update  I have seen and examined patient at bedside.    He was noted to have worsening shortness of breath with stridor, hoarse voice overnight.  He reports he was unable to breeze when he woke up  His voice became more hoarse  I have consulted pulmonary.  Discussed with pulmonary, will get fluoroscopic sniff test to evaluate for diaphragmatic  excursion.   For his dysphonia, I have consulted and discussed with Dr. Finley.   Continue duoneb, dulera and steroid  Patient reports significant headache, I have ordered fioricet prn. No neurologic deficit  Vomiting, zofran prn    I have discussed  can put a nonstick or nonadhesive dressing over it.  · Wash hands (yours and your child's) often. This will avoid spreading the infection to other parts of the body and to other children. Do not let your child share washcloths, towels, pillows, sheets, or clothes with others. Wash these items in hot water before using again.  · Clean the area several times a day. You don't want to scrub the area. The best way to do this is to soak the sores in warm, soapy water until they get soft enough to be wiped away. This will help remove the crust that forms from the dried liquid. In areas that you can't soak, like the mouth or face, you can put a clean, warm (not hot) washcloth over the infected are for 5 to 10 minutes at a time, until the scabs soften enough to remove.  Medications  · You can use acetaminophen or ibuprofen for pain, fever, fussiness, or discomfort, unless another medicine was prescribed. In infants over 6 months of age, you may use ibuprofen instead of  acetaminophen. You can also alternate them, or use both together. They work differently and are a different class of medicines, so taking them together is not an overdose. If your child has chronic liver or kidney disease or even had a stomach ulcer or gastrointestinal bleeding or they are taking blood thinner medications, talk with your doctor before using thee medicines.  · Aspirin should never be used in anyone under 18 years of age who is ill with a fever. It may cause severe liver damage.  · If you were given antibiotics, take them until they are used up. It is important to finish the antibiotics even if the wound looks better to make sure the infection has cleared.  Follow-up care  Follow up with your doctor or this facility if the sores continue to spread after three days of treatment. It will take about 7 to 10 days to heal completely.  Your child should stay out of school until completing 2 full days of antibiotic treatment.  When to seek medical  this patient's plan of care and discharge plan at IDT rounds today with Case Management, Nursing, Nursing leadership, and other members of the IDT team.    Consultants/Specialty  cardiology    Code Status  Full Code    Disposition  The patient is not medically cleared for discharge to home or a post-acute facility.      I have placed the appropriate orders for post-discharge needs.    Review of Systems  Review of Systems   Constitutional:  Positive for malaise/fatigue.   Respiratory:  Positive for shortness of breath.    Cardiovascular:  Positive for chest pain.   Neurological:  Positive for weakness.   All other systems reviewed and are negative.       Physical Exam  Temp:  [36.7 °C (98 °F)-37.4 °C (99.3 °F)] 36.7 °C (98 °F)  Pulse:  [75-94] 88  Resp:  [16-30] 16  BP: (128-148)/(79-96) 128/85  SpO2:  [83 %-100 %] 96 %    Physical Exam  Vitals and nursing note reviewed.   Constitutional:       Appearance: Normal appearance. He is obese. He is ill-appearing.   HENT:      Head: Normocephalic and atraumatic.      Mouth/Throat:      Pharynx: Oropharynx is clear.   Eyes:      Pupils: Pupils are equal, round, and reactive to light.   Neck:      Vascular: No carotid bruit.   Cardiovascular:      Rate and Rhythm: Normal rate and regular rhythm.   Pulmonary:      Effort: Pulmonary effort is normal. No respiratory distress.      Breath sounds: Normal breath sounds. No wheezing.      Comments: Diminished breath sound bilateral  Abdominal:      General: Abdomen is flat. Bowel sounds are normal.      Palpations: Abdomen is soft. There is no mass.   Musculoskeletal:         General: Normal range of motion.      Cervical back: Neck supple.   Skin:     General: Skin is warm and dry.   Neurological:      General: No focal deficit present.      Mental Status: He is alert and oriented to person, place, and time.   Psychiatric:         Mood and Affect: Mood normal.         Behavior: Behavior normal.         Fluids    Intake/Output  Summary (Last 24 hours) at 4/15/2024 1546  Last data filed at 4/15/2024 0715  Gross per 24 hour   Intake --   Output 200 ml   Net -200 ml       Laboratory  Recent Labs     04/13/24  0534 04/14/24  0050 04/15/24  0053 04/15/24  0231   WBC 21.4* 19.4* 17.2* 18.0*   RBC 5.71 5.60 5.48 5.50   HEMOGLOBIN 11.7* 11.3* 11.0* 10.9*   HEMATOCRIT 38.2* 37.4* 36.7* 35.9*   MCV 66.9* 66.8* 67.0* 65.3*   MCH 20.5* 20.2* 20.1* 19.8*   MCHC 30.6* 30.2* 30.0* 30.4*   RDW 48.0 49.4 49.4 48.3   PLATELETCT 506* 486* 457* 537*   MPV 10.5  --  9.7 10.1     Recent Labs     04/14/24  0050 04/15/24  0053 04/15/24  0231   SODIUM 134* 135 135   POTASSIUM 4.9 4.6 4.7   CHLORIDE 95* 98 98   CO2 27 25 25   GLUCOSE 69 85 83   BUN 22 21 20   CREATININE 0.94 1.01 0.88   CALCIUM 8.7 8.7 8.6                     Imaging  DX-CHEST-PORTABLE (1 VIEW)   Final Result         1.  Left basilar atelectasis or subtle infiltrate.   2.  Atherosclerosis      US-EXTREMITY VENOUS LOWER BILAT   Final Result      CT-CTA CHEST PULMONARY ARTERY W/ RECONS   Final Result         1. No CT evidence of pulmonary embolism.      2. Airspace opacity in the left lower lobe, likely atelectasis      CT-CTA COMPLETE THORACOABDOMINAL AORTA   Final Result      1.  Surgical changes of the aortic valve and ascending aorta.   2.  No aortic aneurysm or dissection identified.   3.  Mild/moderate atherosclerotic changes.   4.  Punctate, nonobstructing right renal stone.   5.  Elevation of the left hemidiaphragm with adjacent basilar consolidations, likely atelectasis. Pneumonia not excluded.   6.  Distal colonic diverticulosis.   7.  Hepatomegaly.                  CT-CTA HEAD WITH & W/O-POST PROCESS   Final Result      1.  Severely suboptimal exam due to photon starvation.   2.  No gross acute intracranial abnormality.   3.  No proximal vessel occlusion.      CT-CTA NECK WITH & W/O-POST PROCESSING   Final Result   Addendum (preliminary) 1 of 1   100 mL Omnipaque 300 given IV.      Final  care  Get prompt medical attention if any of the following occur:  · Increasing number of sores or spreading areas of redness after two days of treatment with antibiotics  · Increasing swelling, or pain  · Fever of 100.4°F (38°C) oral or 101.4°F (38.5°C) rectal or higher, not better with fever medication  · Increased amounts of fluid or pus coming from the sores  · Unusual drowsiness, weakness, or change in behavior  · Loss of appetite or vomiting  © 8125-1676 [employer]. [address] [place] [address], [place] [address]. All rights reserved. This information is not intended as a substitute for professional medical care. Always follow your healthcare professional's instructions.              No acute arterial abnormality of the neck.      DX SNIFF TEST    (Results Pending)        Assessment/Plan  * Community acquired pneumonia of left lower lobe of lung- (present on admission)  Assessment & Plan  Denies fever, chills.  Noted profound leukocytosis on admission.  However he is taking anabolic steroids at home, which could explain leukocytosis  Procalcitonin negative  Given his symptoms has been present for months, ?  PNA  Continue empirical abx with Augmentin    Dysphonia  Assessment & Plan  Acute onset, hoarse voice  I have consulted and discussed with ENT    Microcytic anemia  Assessment & Plan  No Signs of bleeding  Check iron profile c/w JARROD  Iron supplements  Outpatient GI referral. Patient eeports colonoscopy years ago in [place].     Shortness of breath- (present on admission)  Assessment & Plan  Has been occurring and progressive in nature for few months  Recently s/b cards and repeated ECHO which showed in an interval normalization of his LVEF to 70% but shows possible there is features of apical variant hypertrophic cardiomyopath  Patient has been scheduled for outpatient cardiac MRI and stress test  I have consulted and discussed with cardiology  SPEP/UPEP pending to r/o amyloid   Check CTA chest and doppler BLE negative for PE and DVT  4/15: worsening sob even with duoneb and dulera   I have consulted and discussed with pulmonary, will get fluoroscopic sniff test to evaluate for diaphragmatic  excursion.   ENT consulted for dsyphonia    Acute hypoxic respiratory failure- (present on admission)  Assessment & Plan  Unclear etiology.   CTA negative for PE  Suspect undiagnosed COPD exacerbation. He has significant smoking history. Also MARIELENA possible contributing   Continue IS, duoneb and dulera  Outpatient pulmonary and sleep medicine referral     Tobacco dependence- (present on admission)  Assessment & Plan  Trying to cut back, encourage full cessation    Sepsis (HCC)- (present on  admission)  Assessment & Plan  This is Sepsis Present on admission  SIRS criteria identified on my evaluation include: Tachypnea, with respirations greater than 20 per minute and Leukocytosis, with WBC greater than 12,000  Clinical indicators of end organ dysfunction include Acute Respiratory Failure - (mechanical ventilation or BiPap or PaO2/FiO2 ratio < 300)  Source is pulmonary  Sepsis protocol initiated  Crystalloid Fluid Administration: Resuscitation volume of 1 liter ordered. Reason that resuscitation volume of less than 30ml/kg was ordered concern for causing harm given CHF  IV antibiotics as appropriate for source of sepsis  Reassessment: I have reassessed the patient's hemodynamic status    ARF with hypoxia, RA saturation 87%  CTA Aorta shows L basilar consolidation, CAP  Continue empiric IV unasyn and oral azithromycin  F/U sputum cultures and blood cultures  Judicious IVF's    Heart failure with improved ejection fraction (HFimpEF) (HCC)- (present on admission)  Assessment & Plan  Echo 4/5/24 notes EF 70%, which has improved from EF 30% 7/5/23   Continue losartan, Toprol   Monitoring respiratory status  I&O, daily weight    Secondary hypercoagulable state (HCC)- (present on admission)  Assessment & Plan  Patient has discontinued Xarelto for the past few days due to misunderstanding by his cardiology provider  Resumed Xarelto      Paroxysmal atrial fibrillation (HCC)- (present on admission)  Assessment & Plan  In NSR  Xarelto as above  Continue outpatient toprol XL    Leukocytosis, thrombocytosis, erythrocytosis, lymphopenia- (present on admission)  Assessment & Plan  Persistent leukocytosis for years. Patient is taking anabolic steroid, which could explain leukocytosis. However he said he has been taking steroid only a few months. He has persistent leucocytosis for year  Lengthy discussion with patient the risks of long term steroid use. I advised patient to slow wean off. He voiced understanding.    Hematology outpatient referral for further evaluation    Gastroesophageal reflux disease without esophagitis- (present on admission)  Assessment & Plan  Continue omeprazole 20 mg daily    Ascending aortic aneurysm repair- (present on admission)  Assessment & Plan  CTA aorta shows intact repair and no new aneurysmal dilation         VTE prophylaxis: xarelto    I have performed a physical exam and reviewed and updated ROS and Plan today (4/15/2024). In review of yesterday's note (4/14/2024), there are no changes except as documented above.    CRITICAL CARE    Patient is critically ill. I have personally seen and evaluated patient in room as internal medicine hospitalist services.  The patient continues to have: worsening shortness of breath  The vital organ system that is affected is the: pulmonary  If untreated there is a high chance of deterioration into: respiratory failure, Septic shock, cardiovascular collapse, cardiac arrest, and eventually death.   I provided critical care services, which included medication orders, frequent reevaluations of the patient's condition and response to treatment, ordering and reviewing test results, and discussing the case with various consultants.  Review chart for interventions.  The critical that has been undertaken is medically complex.   There has been no overlap in critical care time.   Critical Care Time not including procedures: 42 minutes    I have discussed with RN and other consultants about patient's plan.

## 2024-04-15 NOTE — CONSULTS
"Pulmonary Consultation      Date of Service: 4/15/2024    Date of Admission:  4/11/2024  4:14 PM    Consulting Provider:  Olga Mendez M.D.     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 male with a past medical history of AAA s/p repair in 2023, HFrEF, PAF S/P ablation and on Xarelto, and chronic tobacco use disorder, who presented to the ED on 4/11 w/ complaints of chest pain and worsening shortness of breath for the past month or so. He reports never having felt at baseline since AAA repair in 01/2023, has been getting increasingly SOB since then but has gotten worse in the past month. He also reports hoarseness of voice, it is unclear exactly when this started, pt states \"this is new\" but unable to tell if it started within the last few weeks/months or started a year ago but has progressively worsened in the past month.     His recent outpatient echo showed improved EF, some myocardial thickening, so is awaiting outpatient Cardiac MRI and was also going to get a stress test as outpatient but developed worsening SOB hence came to the ED. Stat CTA Aorta in the ED showed normal AAA repair but also showed a left basilar PNA. He was started on Abx Unasyn + Azithro for CAP.  Other work up for SOB and hypoxia include neg CTA chest for PE and neg doppler US of the BLE. He has never had PFTs in the past, but was on atleast two inhalers in the past likely due to COPD. He was started on Duonebs and Dulera this admission and patient endorses most improvement in sxs with the inhalers than anything else.     Pulm consulted because of persistent SOB natalya after an overnight episode of shortness of breath, worsening work of breathing that occurred " "around 2am earlier today. He was noted to have acute respiratory distress, stridor, and audible wheezes, and chest was noted to be \"red\". O2 sats were 99% on 4.5L but he was unable to talk in complete sentences during the episode. Stat racemic epi neb x1 and Duonebs x 2 were given. His breathing improved and stridor resolved with these treatments. No new meds were started prior to this event. ABG showed 7.44/41.8/118/28.8. He was continued on IV solumedrol Q6h x 3 doses to prevent recurrences.     Has hoarseness of voice, unclear timeline of when it started but patient believes this is new/recent and has never seen ENT before.     Smoking history - Smoked 1PPD for 40 years, started cutting down 5 years ago. Now smokes 2-3 cigs/day.     Review of Systems   Constitutional:  Positive for diaphoresis and malaise/fatigue. Negative for chills, fever and weight loss.   HENT:          Hoarse voice x 1 year, recently worsened?   Eyes: Negative.    Respiratory:  Positive for cough, sputum production, shortness of breath and wheezing. Negative for hemoptysis.    Cardiovascular:  Positive for orthopnea and leg swelling (mild). Negative for chest pain, palpitations, claudication and PND.   Gastrointestinal:  Positive for nausea and vomiting. Negative for abdominal pain, constipation, diarrhea and heartburn.   Genitourinary: Negative.    Musculoskeletal: Negative.    Neurological: Negative.    Endo/Heme/Allergies: Negative.    Psychiatric/Behavioral:  The patient is nervous/anxious.        Home Medications       Reviewed by Alexandra Pearson (Pharmacy Tech) on 04/11/24 at 1905  Med List Status: Complete     Medication Last Dose Status   asa/apap/caffeine (EXCEDRIN) 250-250-65 MG Tab 4/11/2024 Active   loratadine (CLARITIN) 10 MG Tab 4/11/2024 Active   losartan (COZAAR) 25 MG Tab 4/10/2024 Active   metoprolol SR (TOPROL XL) 25 MG TABLET SR 24 HR 4/11/2024 Active   omeprazole (PRILOSEC) 20 MG delayed-release capsule 4/11/2024 " Active   ondansetron (ZOFRAN) 4 MG Tab tablet PRN Active   rivaroxaban (XARELTO) 20 MG Tab tablet 4/9/2024 Active                    Social History     Tobacco Use    Smoking status: Every Day     Current packs/day: 0.50     Average packs/day: 0.5 packs/day for 40.0 years (20.0 ttl pk-yrs)     Types: Cigarettes    Smokeless tobacco: Never    Tobacco comments:     Down to 5 cig daily   Vaping Use    Vaping Use: Never used   Substance Use Topics    Alcohol use: No    Drug use: Not Currently     Comment: past problems with narcotics not since 2019        Past Medical History:   Diagnosis Date    Arrhythmia     Breath shortness 06/09/2023    prior to 1/2023 surgery    Cyclic vomiting syndrome     Elevated hemidiaphragm - LEFT post AVR 01/04/2023    Fracture     Headache, classical migraine     Heart burn     Heart valve disease 06/09/2023    heart surgery in 1/2023    Indigestion     Pneumonia due to infectious organism 01/20/2023    Snoring        Past Surgical History:   Procedure Laterality Date    AORTIC VALVE REPLACEMENT  1/5/2023    Procedure: AORTIC VALVE REPLACEMENT, ASCENDING AORTIC ANEURYSM REPAIR AND TRANSESOPHAGEAL ECHOCARDIOGRAM.;  Surgeon: Serena Goyal M.D.;  Location: [facility];  Service: Cardiac    AORTIC ASCENDING DISSECTION  1/5/2023    Procedure: REPAIR, ANEURYSM OR DISSECTION, AORTA, ASCENDING;  Surgeon: Serena Goyal M.D.;  Location: [facility];  Service: Cardiac    ECHOCARDIOGRAM, TRANSESOPHAGEAL, INTRAOPERATIVE  1/5/2023    Procedure: ECHOCARDIOGRAM, TRANSESOPHAGEAL, INTRAOPERATIVE.;  Surgeon: Serena Goyal M.D.;  Location: [facility];  Service: Cardiac    IRRIGATION & DEBRIDEMENT ORTHO Right 09/19/2017    Procedure: IRRIGATION & DEBRIDEMENT ORTHO-THIGH;  Surgeon: Corbin Rivera M.D.;  Location: [facility];  Service: Orthopedics    SHOULDER HEMICAP RESURFACING Right 10/12/2015    Procedure: RIGHT SHOULDER RESURFACING;  Surgeon: Javon Doll,  "M.D.;  Location: [facility];  Service:     SHOULDER ARTHROSCOPY      x3    SHOULDER SURGERY      replacement right       Allergies: Morphine    History reviewed. No pertinent family history.    Pulmonary-Specific Vital Signs  Vitals  Blood Pressure: 129/86  Temperature: 36.8 °C (98.2 °F)  Temp src: Temporal  Pulse: 82  Respiration: 18  Pulse Oximetry: 93 %  Height: 175.3 cm (5' 9.02\")  Weight: 97.8 kg (215 lb 9.8 oz)    Physical Examination  Physical Exam  Vitals and nursing note reviewed.   Constitutional:       General: He is not in acute distress.     Appearance: He is overweight. He is ill-appearing.   HENT:      Head: Normocephalic and atraumatic.      Comments: Hoarse voice     Mouth/Throat:      Mouth: Mucous membranes are moist.   Eyes:      Pupils: Pupils are equal, round, and reactive to light.   Cardiovascular:      Rate and Rhythm: Normal rate and regular rhythm.      Pulses: Normal pulses.      Heart sounds: Murmur (systolic) heard.      No gallop.   Pulmonary:      Effort: No respiratory distress.      Breath sounds: Decreased air movement (left lung base) present. No stridor. Examination of the left-lower field reveals decreased breath sounds. Decreased breath sounds present. No wheezing, rhonchi or rales.      Comments: Right lung expansion > left lung expansion.   Abdominal:      General: Abdomen is flat. Bowel sounds are normal. There is no distension.      Palpations: Abdomen is soft.      Tenderness: There is no abdominal tenderness.   Musculoskeletal:         General: Normal range of motion.      Cervical back: Normal range of motion and neck supple.      Right lower leg: Edema present.      Left lower leg: Edema present.      Comments: 1+ pitting pedal edema bilaterally   Skin:     General: Skin is warm and dry.      Capillary Refill: Capillary refill takes 2 to 3 seconds.   Neurological:      General: No focal deficit present.      Mental Status: He is alert and oriented to person, " place, and time. Mental status is at baseline.      Cranial Nerves: No cranial nerve deficit.      Motor: No weakness.   Psychiatric:         Mood and Affect: Mood normal.         Behavior: Behavior normal.         Thought Content: Thought content normal.         Judgment: Judgment normal.      Comments: Slightly anxious             Intake/Output Summary (Last 24 hours) at 4/15/2024 0957  Last data filed at 4/15/2024 0715  Gross per 24 hour   Intake --   Output 200 ml   Net -200 ml       Recent Labs     04/14/24  0050 04/15/24  0053 04/15/24  0231   WBC 19.4* 17.2* 18.0*   NEUTSPOLYS  --   --  77.00*   LYMPHOCYTES  --   --  7.70*   MONOCYTES  --   --  10.50   EOSINOPHILS  --   --  2.70   BASOPHILS  --   --  0.70   ASTSGOT  --   --  57*   ALTSGPT  --   --  51*   ALKPHOSPHAT  --   --  31   TBILIRUBIN  --   --  0.6     Recent Labs     04/13/24  0534 04/14/24  0050 04/15/24  0053 04/15/24  0231   SODIUM 134* 134* 135 135   POTASSIUM 5.3 4.9 4.6 4.7   CHLORIDE 98 95* 98 98   CO2 25 27 25 25   BUN 22 22 21 20   CREATININE 1.01 0.94 1.01 0.88   MAGNESIUM 2.0 2.0  --   --    PHOSPHORUS 3.0 3.4  --   --    CALCIUM 8.7 8.7 8.7 8.6     Recent Labs     04/14/24  0050 04/15/24  0053 04/15/24  0231   ALTSGPT  --   --  51*   ASTSGOT  --   --  57*   ALKPHOSPHAT  --   --  31   TBILIRUBIN  --   --  0.6   GLUCOSE 69 85 83     Recent Labs     04/15/24  0234   ISTATAPH 7.446   ISTATAPCO2 41.8*   ISTATAPO2 118*   ISTATATCO2 30   [id number] 99   ISTATARTHCO3 28.8*   ISTATARTBE 4*   ISTATTEMP 99.0 F   ISTATSPEC Arterial   ISTATAPHTC 7.443   [id number] 119*     DX-CHEST-PORTABLE (1 VIEW)   Final Result         1.  Left basilar atelectasis or subtle infiltrate.   2.  Atherosclerosis      US-EXTREMITY VENOUS LOWER BILAT   Final Result      CT-CTA CHEST PULMONARY ARTERY W/ RECONS   Final Result         1. No CT evidence of pulmonary embolism.      2. Airspace opacity in the left lower lobe, likely atelectasis      CT-CTA COMPLETE  THORACOABDOMINAL AORTA   Final Result      1.  Surgical changes of the aortic valve and ascending aorta.   2.  No aortic aneurysm or dissection identified.   3.  Mild/moderate atherosclerotic changes.   4.  Punctate, nonobstructing right renal stone.   5.  Elevation of the left hemidiaphragm with adjacent basilar consolidations, likely atelectasis. Pneumonia not excluded.   6.  Distal colonic diverticulosis.   7.  Hepatomegaly.                  CT-CTA HEAD WITH & W/O-POST PROCESS   Final Result      1.  Severely suboptimal exam due to photon starvation.   2.  No gross acute intracranial abnormality.   3.  No proximal vessel occlusion.      CT-CTA NECK WITH & W/O-POST PROCESSING   Final Result   Addendum (preliminary) 1 of 1   100 mL Omnipaque 300 given IV.      Final      No acute arterial abnormality of the neck.          Patient Active Problem List   Diagnosis    Ascending aortic aneurysm repair    Gastroesophageal reflux disease without esophagitis    Hyperkalemia    Leukocytosis, thrombocytosis, erythrocytosis, lymphopenia    Pneumonia    Acute on chronic systolic heart failure (HCC)    Paroxysmal atrial fibrillation (HCC)    COPD (chronic obstructive pulmonary disease) (HCC)    Elevated hemidiaphragm - s/p post AVR    Paroxysmal atrial flutter (HCC)    Tobacco abuse    Secondary hypercoagulable state (HCC)    Nonobstructive atherosclerosis of coronary artery    Chest pain    Opioid use, unspecified, in remission    Goals of care, counseling/discussion    Heart failure with improved ejection fraction (HFimpEF) (HCC)    Atrial fibrillation (HCC)    H/O cardiac radiofrequency ablation    Sepsis (HCC)    Pneumonia due to infectious organism    Right inguinal pain    Lobar pneumonia (HCC)    Tobacco dependence    Left facial numbness and left blurry vision    Headache    Acute hypoxic respiratory failure    Pneumonia due to COVID-19 virus    JENNY (acute kidney injury) (HCC)    Olecranon bursitis of right elbow     Shortness of breath    Community acquired pneumonia of left lower lobe of lung    Microcytic anemia       Assessment and Recommendations:    # Acute hypoxic respiratory failure  # Presumed COPD  # Smoker  # CAP  - Unclear etiology of persistent SOB, could partly be due to underlying COPD in the setting of 40+ pack year history of smoking. However, he was noted to have diminished breath sounds in the left lower lung and diminished lung expansion in the lower aspect. Will get fluoroscopic sniff test to evaluate for diaphragmatic excursion.   - ENT consult for dysphonia  - Agree with continuing inhalers for presumed COPD. Formal PFTs after discharge.   - On Unasyn for CAP, completed 3d of Azithromycin  - Pulm will continue to follow    # Dysphonia  - Pt reports his hoarse voice is new/recent. Unclear etiology and timeline, recommend ENT consult.       The patient was seen and plan discussed with my attending, Dr. Castañeda and hospitalist Dr. Mendez.     Thank you for the consult.     Dr. Fanta Lindo MD   [facility] IM PGY 2 Resident    Please refer to Dr. Castañeda's attestation for additional comments/recommendations.

## 2024-04-15 NOTE — PROGRESS NOTES
Cardiology Follow-up Consult Note    Date of Service:    4/14/2024      Consulting Physician: Olga Mendez M.D.    Patient ID:    Name:   Noe Hickey   YOB: 1960  Age:   63 y.o.  male   MRN:   [id number]      Reason for Consultation: SOB    HPI/Patient ID:    Noe Hickey is a 63 y.o.-year-old male with a history of h/o bicuspid aortic valve s/p aortic valve replacement and ascending aortic aneurysm repair, previous cardiomyopathy, GERD, paroxysmal atrial fibrillation s/p s/p ablation, smoking, and anabolic steroid use who presented with worsening PENN.      He reports since his open heart surgery in January of 2023 he has had progressive dyspnea on exertion without palpitations, weight gain, LE swelling, or other symptoms. Last week he saw Jhon Sams in our clinic who ordered an echocardiogram which showed a thickened myocardium but improvement in his LVEF from 30% previous to 70%. A cardiac MRI was ordered to evaluate the thickened myocardium.      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 He was evaluated for possible MI, troponins were negative and EKG did not show signs of ischemia.CT did not show a repeat aortic aneurism, intracranial abnormalities or neck abnormalities. CTA to be reconsidered as inpatient due to high contrast dose in ED.  He had an impressive leukocytosis of 27K with a left shift. CXR showed . Concern for possible pneumonia. Patient was started on unasyn and azithromycin and sepsis protocol. Xarelto was re-started.      Of note, he uses anabolic steroids    Interim Events:  Improved SOB after inhalers, worsening again this afternoon.     CT PE and LE DVT ultrasounds negative.     Past medical, surgical, social, and family history reviewed and unchanged from admission except as noted in assessment and plan.    Medications: Reviewed in MAR      Allergies  "  Allergen Reactions    Morphine Shortness of Breath, Rash and Itching                 Physical Exam  Body mass index is 30.95 kg/m².  /79   Pulse 81   Temp 37.3 °C (99.1 °F) (Temporal)   Resp 18   Ht 1.753 m (5' 9.02\")   Wt 95.1 kg (209 lb 10.5 oz)   SpO2 93%   Vitals:    04/14/24 1520 04/14/24 1620 04/14/24 1621 04/14/24 1807   BP: 134/79      Pulse: 80   81   Resp: 19   18   Temp: 37.3 °C (99.1 °F)      TempSrc: Temporal      SpO2: 90% (!) 83% 93% 93%   Weight:       Height:         Oxygen Therapy:  Pulse Oximetry: 93 %, O2 (LPM): 1, O2 Delivery Device: Silicone Nasal Cannula    General: mild respiratory distress.   Eyes: nl conjunctiva  Neck: JVP 8-9 cm H2O,  Lungs: mildly increased resp effort, CTAB posteriorly.   Heart: RRR, 3/6 systolic murmur,  no rubs or gallops, 1+ edema bilateral lower extremities R>L. No LV/RV heave on cardiac palpatation. 2+ bilateral radial pulses.  2+ bilateral dp pulses.   Abdomen: soft, non tender, non distended, no masses, normal bowel sounds.  No HSM.  Extremities/MSK: no clubbing, no cyanosis  Neurological: No focal sensory deficits  Psychiatric: Appropriate affect, A/O x 3  Skin: Warm extremities    Exam repeated in full and unchanged except for as noted above.      Labs (personally reviewed and notable for):   Lab Results   Component Value Date/Time    SODIUM 134 (L) 04/14/2024 12:50 AM    POTASSIUM 4.9 04/14/2024 12:50 AM    CHLORIDE 95 (L) 04/14/2024 12:50 AM    CO2 27 04/14/2024 12:50 AM    GLUCOSE 69 04/14/2024 12:50 AM    BUN 22 04/14/2024 12:50 AM    CREATININE 0.94 04/14/2024 12:50 AM    BUNCREATRAT 27.3 (H) 05/19/2023 12:03 AM      Lab Results   Component Value Date/Time    WBC 19.4 (H) 04/14/2024 12:50 AM    RBC 5.60 04/14/2024 12:50 AM    HEMOGLOBIN 11.3 (L) 04/14/2024 12:50 AM    HEMATOCRIT 37.4 (L) 04/14/2024 12:50 AM    MCV 66.8 (L) 04/14/2024 12:50 AM    MCH 20.2 (L) 04/14/2024 12:50 AM    MCHC 30.2 (L) 04/14/2024 12:50 AM    MPV 10.5 04/13/2024 05:34 " AM    NEUTSPOLYS 87.80 (H) 04/12/2024 02:21 AM    LYMPHOCYTES 0.90 (L) 04/12/2024 02:21 AM    MONOCYTES 10.40 04/12/2024 02:21 AM    EOSINOPHILS 0.90 04/12/2024 02:21 AM    BASOPHILS 0.00 04/12/2024 02:21 AM    HYPOCHROMIA 2+ (A) 04/12/2024 02:21 AM    ANISOCYTOSIS 2+ (A) 04/12/2024 02:21 AM            Cardiac Imaging and Procedures Review:    EKG dated 4/11/2024: My personal interpretation is NSR, non-specific st changes.      Echo dated 4/5/2024:   CONCLUSIONS  Compared to the prior study on 7/5/2023, LV systolic function is now   hyperdynamic with high flow state. Recommend RHC as clinically   indicated.  Normal LV size and hyperdynamic systolic function with estimated LVEF   70%   Features of apical variant hypertrophic cardiomyopathy noted. Recommend   cardiac MRI.  Normal RV size with reduced systolic function  Known bioprosthetic aortic valve with mild increase in transvalvular   gradient: Vmax 3.1m/s. No PVL or AI.  DVI 0.5 with AT < 100 suggestive of normal aortic prosthetic valve and   increased velocity is probably due to high flow. High estimated CO   9L/min.  Normal IVC size  No pericardial effusion     Echo personally reviewed.      ECHOCARDIOGRAM:   12/5/22 [facility]  Conclusion   1. The left ventricle is normal in size with low-normal systolic function and an ejection fraction of 50-55% by Abreu's biplane. No distinct wall motion abnormalities noted. Global peak strain of -12.0%. Moderate concentric left ventricular hypertrophy.   2. Left atrium is mildly dilated with a LA volume index of 36 mL/m2. Right atrium is mildly dilated with an area of 24 cm2.     3. The aortic valve is severely sclerotic. It is difficult to visualize aortic valve leaflets. moderate aortic stenosis with an AV max of 3.43 m/s, peak gradient 47 mmHg, mean gradient 29 mmHg, POPPY 1.0 cm2, and velocity ratio 0.27. Mild regurgitation.   4. Mild mitral regurgitation.   5. The ascending aorta is dilated at 4.7 cm.   6. Right ventricle is  mildly dilated with preserved systolic function; RVSP is elevated at 44 mmHg with a RAP of 8 mmHg.       ANGIOGRAM:   12/7/22 [facility]  Findings:     Right coronary artery: Dominant but relatively small after the posterior descending. 20% ostial narrowing.     Left main trunk: Normal.     Left anterior descending: normal other than 20% plaque in the mid third     Circumflex: Supplies a large obtuse marginal branch which is normal     Left ventriculogram: Normal volume in diastole and in systole. Normal left ventricular systolic function is present. There is no left ventricular outflow gradient.        Radiology test Review:  CT aorta:        1.  Surgical changes of the aortic valve and ascending aorta.  2.  No aortic aneurysm or dissection identified.  3.  Mild/moderate atherosclerotic changes.  4.  Punctate, nonobstructing right renal stone.  5.  Elevation of the left hemidiaphragm with adjacent basilar consolidations, likely atelectasis. Pneumonia not excluded.  6.  Distal colonic diverticulosis.  7.  Hepatomegaly.     Op Note 1/5/2023:  AORTIC VALVE REPLACEMENT 27mm Inspiris tissue valve, ASCENDING AORTIC ANEURYSM REPAIR with 32mm Hemashield graft AND TRANSESOPHAGEAL ECHOCARDIOGRAM.         Impression and Medical Decision Making:  # Dyspnea - likely multifactorial with component of PNA, diastolic heart failure. Concern for potential amyloid or constrictive pericarditis after his CT surgery. Likely also has underlying undiagnosed COPD  # Acute hypoxemic respiratory failure secondary to above.   # Paroxysmal atrial fibrillation s/p ablation by Dr. Webster 6/12/20243.   # Bicuspid aortic valve s/p bioprosthetic aortic valve replacement and aortic aneurysm repair 1/5/2023.   # Leukocytosis, persistent  # Acute on chronic diastolic heart failure, NYHA class III, stage C    # PNA        Recommendations:  # lasix 20mg PO daily, kcl 10mEq PO daily, extra doses prn weight gain >2 pounds in one day or LE swelling.   # aggressive  neubulizer therapy, consider more intensive COPD therapy for continued SOB as his inhalers seem to be helping the most  # ischemia doesn't appear to be primary problem right now, however may need to r/o ischemia again in the setting of dyslipidemia and steroid use despite negative cath in 2021. Already on NOAC, will start statin.   # outpatient PFTs, cardiac MRI  # f/u SPEP/UPEP to r/o amyloid  # likely will benefit from outpatient hematology evaluation for persistent leukocytosis.   # discussed abstinence from anabolic steroid use.     Discussed with Dr. Mendez.     Thank you for allowing me to participate in the care of this patient, Cardiology will sign off.  Please contact me with any questions.      Melvin Bailey MD  Cardiologist, [facility] [place]   [phone]

## 2024-04-16 LAB
ALBUMIN SERPL ELPH-MCNC: 3.52 G/DL (ref 3.75–5.01)
ALPHA1 GLOB SERPL ELPH-MCNC: 0.41 G/DL (ref 0.19–0.46)
ALPHA2 GLOB SERPL ELPH-MCNC: 1.06 G/DL (ref 0.48–1.05)
ANION GAP SERPL CALC-SCNC: 10 MMOL/L (ref 7–16)
B-GLOBULIN SERPL ELPH-MCNC: 0.89 G/DL (ref 0.48–1.1)
BACTERIA BLD CULT: NORMAL
BACTERIA BLD CULT: NORMAL
BUN SERPL-MCNC: 24 MG/DL (ref 8–22)
CALCIUM SERPL-MCNC: 8.5 MG/DL (ref 8.5–10.5)
CHLORIDE SERPL-SCNC: 97 MMOL/L (ref 96–112)
CO2 SERPL-SCNC: 26 MMOL/L (ref 20–33)
CREAT SERPL-MCNC: 0.98 MG/DL (ref 0.5–1.4)
ERYTHROCYTE [DISTWIDTH] IN BLOOD BY AUTOMATED COUNT: 50.1 FL (ref 35.9–50)
GAMMA GLOB SERPL ELPH-MCNC: 0.82 G/DL (ref 0.62–1.51)
GFR SERPLBLD CREATININE-BSD FMLA CKD-EPI: 86 ML/MIN/1.73 M 2
GLUCOSE SERPL-MCNC: 141 MG/DL (ref 65–99)
HCT VFR BLD AUTO: 35.2 % (ref 42–52)
HGB BLD-MCNC: 10.3 G/DL (ref 14–18)
INTERPRETATION SERPL IFE-IMP: ABNORMAL
MAGNESIUM SERPL-MCNC: 2.2 MG/DL (ref 1.5–2.5)
MCH RBC QN AUTO: 19.8 PG (ref 27–33)
MCHC RBC AUTO-ENTMCNC: 29.3 G/DL (ref 32.3–36.5)
MCV RBC AUTO: 67.6 FL (ref 81.4–97.8)
MONOCLON BAND OBS SERPL: ABNORMAL
MONOCLONAL PROTEIN NL11656: ABNORMAL G/DL
PATHOLOGY STUDY: ABNORMAL
PHOSPHATE SERPL-MCNC: 2.5 MG/DL (ref 2.5–4.5)
PLATELET # BLD AUTO: 517 K/UL (ref 164–446)
PMV BLD AUTO: 9.7 FL (ref 9–12.9)
POTASSIUM SERPL-SCNC: 5.3 MMOL/L (ref 3.6–5.5)
PROT SERPL-MCNC: 6.7 G/DL (ref 6.3–8.2)
RBC # BLD AUTO: 5.21 M/UL (ref 4.7–6.1)
SIGNIFICANT IND 70042: NORMAL
SIGNIFICANT IND 70042: NORMAL
SITE SITE: NORMAL
SITE SITE: NORMAL
SODIUM SERPL-SCNC: 133 MMOL/L (ref 135–145)
SOURCE SOURCE: NORMAL
SOURCE SOURCE: NORMAL
WBC # BLD AUTO: 17 K/UL (ref 4.8–10.8)

## 2024-04-16 PROCEDURE — 36415 COLL VENOUS BLD VENIPUNCTURE: CPT

## 2024-04-16 PROCEDURE — 700102 HCHG RX REV CODE 250 W/ 637 OVERRIDE(OP): Performed by: STUDENT IN AN ORGANIZED HEALTH CARE EDUCATION/TRAINING PROGRAM

## 2024-04-16 PROCEDURE — 770020 HCHG ROOM/CARE - TELE (206)

## 2024-04-16 PROCEDURE — A9270 NON-COVERED ITEM OR SERVICE: HCPCS | Performed by: STUDENT IN AN ORGANIZED HEALTH CARE EDUCATION/TRAINING PROGRAM

## 2024-04-16 PROCEDURE — 84100 ASSAY OF PHOSPHORUS: CPT

## 2024-04-16 PROCEDURE — 85027 COMPLETE CBC AUTOMATED: CPT

## 2024-04-16 PROCEDURE — 700102 HCHG RX REV CODE 250 W/ 637 OVERRIDE(OP): Performed by: INTERNAL MEDICINE

## 2024-04-16 PROCEDURE — 700101 HCHG RX REV CODE 250

## 2024-04-16 PROCEDURE — 83735 ASSAY OF MAGNESIUM: CPT

## 2024-04-16 PROCEDURE — 99233 SBSQ HOSP IP/OBS HIGH 50: CPT | Performed by: STUDENT IN AN ORGANIZED HEALTH CARE EDUCATION/TRAINING PROGRAM

## 2024-04-16 PROCEDURE — A9270 NON-COVERED ITEM OR SERVICE: HCPCS | Performed by: INTERNAL MEDICINE

## 2024-04-16 PROCEDURE — 99233 SBSQ HOSP IP/OBS HIGH 50: CPT | Mod: GC | Performed by: INTERNAL MEDICINE

## 2024-04-16 PROCEDURE — 94669 MECHANICAL CHEST WALL OSCILL: CPT

## 2024-04-16 PROCEDURE — 700101 HCHG RX REV CODE 250: Performed by: STUDENT IN AN ORGANIZED HEALTH CARE EDUCATION/TRAINING PROGRAM

## 2024-04-16 PROCEDURE — 94640 AIRWAY INHALATION TREATMENT: CPT

## 2024-04-16 PROCEDURE — 80048 BASIC METABOLIC PNL TOTAL CA: CPT

## 2024-04-16 PROCEDURE — 700111 HCHG RX REV CODE 636 W/ 250 OVERRIDE (IP): Performed by: STUDENT IN AN ORGANIZED HEALTH CARE EDUCATION/TRAINING PROGRAM

## 2024-04-16 RX ORDER — IPRATROPIUM BROMIDE AND ALBUTEROL SULFATE 2.5; .5 MG/3ML; MG/3ML
3 SOLUTION RESPIRATORY (INHALATION)
Status: DISCONTINUED | OUTPATIENT
Start: 2024-04-16 | End: 2024-04-17

## 2024-04-16 RX ORDER — HYDROCODONE BITARTRATE AND ACETAMINOPHEN 10; 325 MG/1; MG/1
1 TABLET ORAL
Status: DISCONTINUED | OUTPATIENT
Start: 2024-04-16 | End: 2024-04-19

## 2024-04-16 RX ORDER — ALBUTEROL SULFATE 90 UG/1
2 AEROSOL, METERED RESPIRATORY (INHALATION)
Status: DISCONTINUED | OUTPATIENT
Start: 2024-04-16 | End: 2024-04-17

## 2024-04-16 RX ORDER — ALPRAZOLAM 0.25 MG/1
0.25 TABLET ORAL 4 TIMES DAILY PRN
Status: DISCONTINUED | OUTPATIENT
Start: 2024-04-16 | End: 2024-04-21

## 2024-04-16 RX ADMIN — ALPRAZOLAM 0.25 MG: 0.25 TABLET ORAL at 16:12

## 2024-04-16 RX ADMIN — IPRATROPIUM BROMIDE AND ALBUTEROL SULFATE 3 ML: .5; 3 SOLUTION RESPIRATORY (INHALATION) at 23:41

## 2024-04-16 RX ADMIN — HYDROMORPHONE HYDROCHLORIDE 0.5 MG: 1 INJECTION, SOLUTION INTRAMUSCULAR; INTRAVENOUS; SUBCUTANEOUS at 14:10

## 2024-04-16 RX ADMIN — HYDROXYZINE HYDROCHLORIDE 25 MG: 25 TABLET, FILM COATED ORAL at 19:30

## 2024-04-16 RX ADMIN — HYDROMORPHONE HYDROCHLORIDE 0.5 MG: 1 INJECTION, SOLUTION INTRAMUSCULAR; INTRAVENOUS; SUBCUTANEOUS at 20:55

## 2024-04-16 RX ADMIN — FERROUS SULFATE TAB 325 MG (65 MG ELEMENTAL FE) 325 MG: 325 (65 FE) TAB at 08:31

## 2024-04-16 RX ADMIN — MOMETASONE FUROATE AND FORMOTEROL FUMARATE DIHYDRATE 2 PUFF: 100; 5 AEROSOL RESPIRATORY (INHALATION) at 16:29

## 2024-04-16 RX ADMIN — ATORVASTATIN CALCIUM 40 MG: 40 TABLET, FILM COATED ORAL at 20:49

## 2024-04-16 RX ADMIN — ALBUTEROL SULFATE 2 PUFF: 90 AEROSOL, METERED RESPIRATORY (INHALATION) at 20:49

## 2024-04-16 RX ADMIN — HYDROCODONE BITARTRATE AND ACETAMINOPHEN 1 TABLET: 10; 325 TABLET ORAL at 12:52

## 2024-04-16 RX ADMIN — IPRATROPIUM BROMIDE AND ALBUTEROL SULFATE 3 ML: 2.5; .5 SOLUTION RESPIRATORY (INHALATION) at 13:20

## 2024-04-16 RX ADMIN — HYDROXYZINE HYDROCHLORIDE 25 MG: 25 TABLET, FILM COATED ORAL at 13:11

## 2024-04-16 RX ADMIN — IPRATROPIUM BROMIDE AND ALBUTEROL SULFATE 3 ML: 2.5; .5 SOLUTION RESPIRATORY (INHALATION) at 02:19

## 2024-04-16 RX ADMIN — HYDROXYZINE HYDROCHLORIDE 25 MG: 25 TABLET, FILM COATED ORAL at 02:10

## 2024-04-16 RX ADMIN — BENZONATATE 100 MG: 100 CAPSULE ORAL at 18:30

## 2024-04-16 RX ADMIN — LOSARTAN POTASSIUM 25 MG: 50 TABLET, FILM COATED ORAL at 04:43

## 2024-04-16 RX ADMIN — HYDROCODONE BITARTRATE AND ACETAMINOPHEN 1 TABLET: 10; 325 TABLET ORAL at 19:31

## 2024-04-16 RX ADMIN — HYDROCODONE BITARTRATE AND ACETAMINOPHEN 1 TABLET: 10; 325 TABLET ORAL at 03:53

## 2024-04-16 RX ADMIN — OMEPRAZOLE 20 MG: 20 CAPSULE, DELAYED RELEASE ORAL at 04:43

## 2024-04-16 RX ADMIN — IPRATROPIUM BROMIDE AND ALBUTEROL SULFATE 3 ML: 2.5; .5 SOLUTION RESPIRATORY (INHALATION) at 06:41

## 2024-04-16 RX ADMIN — RIVAROXABAN 20 MG: 20 TABLET, FILM COATED ORAL at 16:29

## 2024-04-16 RX ADMIN — IPRATROPIUM BROMIDE AND ALBUTEROL SULFATE 3 ML: 2.5; .5 SOLUTION RESPIRATORY (INHALATION) at 18:28

## 2024-04-16 RX ADMIN — METOPROLOL SUCCINATE 25 MG: 25 TABLET, EXTENDED RELEASE ORAL at 04:44

## 2024-04-16 RX ADMIN — MOMETASONE FUROATE AND FORMOTEROL FUMARATE DIHYDRATE 2 PUFF: 100; 5 AEROSOL RESPIRATORY (INHALATION) at 04:44

## 2024-04-16 RX ADMIN — AMOXICILLIN AND CLAVULANATE POTASSIUM 1 TABLET: 875; 125 TABLET, FILM COATED ORAL at 04:43

## 2024-04-16 RX ADMIN — ACETAMINOPHEN 1000 MG: 500 TABLET, FILM COATED ORAL at 04:43

## 2024-04-16 RX ADMIN — IPRATROPIUM BROMIDE AND ALBUTEROL SULFATE 3 ML: 2.5; .5 SOLUTION RESPIRATORY (INHALATION) at 10:25

## 2024-04-16 RX ADMIN — OMEPRAZOLE 20 MG: 20 CAPSULE, DELAYED RELEASE ORAL at 16:29

## 2024-04-16 RX ADMIN — OXYMETAZOLINE HCL 2 SPRAY: 0.05 SPRAY NASAL at 04:44

## 2024-04-16 ASSESSMENT — ENCOUNTER SYMPTOMS
MUSCULOSKELETAL NEGATIVE: 1
WHEEZING: 0
HEARTBURN: 0
ABDOMINAL PAIN: 0
CHILLS: 0
COUGH: 0
NEUROLOGICAL NEGATIVE: 1
DIAPHORESIS: 1
WEAKNESS: 1
CLAUDICATION: 0
FEVER: 0
NAUSEA: 0
PALPITATIONS: 0
WEIGHT LOSS: 0
DIARRHEA: 0
SPUTUM PRODUCTION: 0
NERVOUS/ANXIOUS: 1
ORTHOPNEA: 1
EYES NEGATIVE: 1
HEMOPTYSIS: 0
VOMITING: 0
PND: 0
CONSTIPATION: 0
SHORTNESS OF BREATH: 1

## 2024-04-16 ASSESSMENT — PAIN DESCRIPTION - PAIN TYPE
TYPE: ACUTE PAIN

## 2024-04-16 ASSESSMENT — FIBROSIS 4 INDEX: FIB4 SCORE: 0.99

## 2024-04-16 NOTE — CONSULTS
CC: hoarseness    HPI: Noe Hickey is a 63 y.o. male with about a year of hoarseness. Had cardiac surgery about a year ago and has had persistent hoarseness and dyspnea since then. Admitted for COPD exacerbation and also has CHF. Worsened stridor and hoarseness overnight. Pulm following.    Past Medical History:   Diagnosis Date    Arrhythmia     Breath shortness 06/09/2023    prior to 1/2023 surgery    Cyclic vomiting syndrome     Elevated hemidiaphragm - LEFT post AVR 01/04/2023    Fracture     Headache, classical migraine     Heart burn     Heart valve disease 06/09/2023    heart surgery in 1/2023    Indigestion     Pneumonia due to infectious organism 01/20/2023    Snoring      Past Surgical History:   Procedure Laterality Date    AORTIC VALVE REPLACEMENT  1/5/2023    Procedure: AORTIC VALVE REPLACEMENT, ASCENDING AORTIC ANEURYSM REPAIR AND TRANSESOPHAGEAL ECHOCARDIOGRAM.;  Surgeon: Serena Goyal M.D.;  Location: [facility];  Service: Cardiac    AORTIC ASCENDING DISSECTION  1/5/2023    Procedure: REPAIR, ANEURYSM OR DISSECTION, AORTA, ASCENDING;  Surgeon: Serena Goyal M.D.;  Location: [facility];  Service: Cardiac    ECHOCARDIOGRAM, TRANSESOPHAGEAL, INTRAOPERATIVE  1/5/2023    Procedure: ECHOCARDIOGRAM, TRANSESOPHAGEAL, INTRAOPERATIVE.;  Surgeon: Serena Goyal M.D.;  Location: [facility];  Service: Cardiac    IRRIGATION & DEBRIDEMENT ORTHO Right 09/19/2017    Procedure: IRRIGATION & DEBRIDEMENT ORTHO-THIGH;  Surgeon: Corbin Rivera M.D.;  Location: [facility];  Service: Orthopedics    SHOULDER HEMICAP RESURFACING Right 10/12/2015    Procedure: RIGHT SHOULDER RESURFACING;  Surgeon: Javon Doll M.D.;  Location: [facility];  Service:     SHOULDER ARTHROSCOPY      x3    SHOULDER SURGERY      replacement right     No current facility-administered medications on file prior to encounter.     Current Outpatient Medications on File Prior to  "Encounter   Medication Sig Dispense Refill    ondansetron (ZOFRAN) 4 MG Tab tablet Take 4 mg by mouth every 6 hours as needed for Nausea/Vomiting.      loratadine (CLARITIN) 10 MG Tab Take 10 mg by mouth every day.      omeprazole (PRILOSEC) 20 MG delayed-release capsule Take 20 mg by mouth 2 times a day.      asa/apap/caffeine (EXCEDRIN) 250-250-65 MG Tab Take 2 Tablets by mouth 1 time a day as needed for Headache.      metoprolol SR (TOPROL XL) 25 MG TABLET SR 24 HR Take 1 Tablet by mouth every day. 90 Tablet 3    losartan (COZAAR) 25 MG Tab Take 1 Tablet by mouth every day. 90 Tablet 3    rivaroxaban (XARELTO) 20 MG Tab tablet Take 20 mg by mouth with dinner.       Allergies   Allergen Reactions    Morphine Shortness of Breath, Rash and Itching           Family and Occupational History     Socioeconomic History    Marital status:      Spouse name: Not on file    Number of children: Not on file    Years of education: Not on file    Highest education level: Not on file   Occupational History    Not on file         O:  BP (!) 150/98   Pulse 86   Temp 36.3 °C (97.3 °F) (Temporal)   Resp 20   Ht 1.753 m (5' 9.02\")   Wt 97.8 kg (215 lb 9.8 oz)   SpO2 95%   Gen: interactive and appropriate  Pulm: increased WOB, no stridor  Face: symmetric, no edema, facial strength intact bilaterally  Eyes: conjunctiva clear, no chemosis. Vision grossly intact bilaterally  Ears: pinna normal. Hearing grossly intact  Nose: patent, with moist mucosa. no purulence or edema.   OC/OP: clear, no masses. Floor of mouth soft and flat  Neck: flat, no discrete masses  Neuro: cranial nerves grossly intact bilaterally. Mood appropriate      PROCEDURE: Flexible fiberoptic laryngoscopy.   Consent was obtained. Scope passed into the right nares, nasal cavity and nasopharynx clear. Oropharynx, hypopharynx, supraglottis, and glottis all clear without any lesions or masses. Moderate edema. Symmetric vocal fold motion bilaterally. Difficult " to assess motion well given increased work of breathing, but airway is patent and arytenoids are mobile bilaterally. No stridor. Subglottis not well visualized      Recent Labs     04/13/24  0534 04/14/24  0050 04/15/24  0053 04/15/24  0231   WBC 21.4* 19.4* 17.2* 18.0*   RBC 5.71 5.60 5.48 5.50   HEMOGLOBIN 11.7* 11.3* 11.0* 10.9*   HEMATOCRIT 38.2* 37.4* 36.7* 35.9*   MCV 66.9* 66.8* 67.0* 65.3*   MCH 20.5* 20.2* 20.1* 19.8*   MCHC 30.6* 30.2* 30.0* 30.4*   RDW 48.0 49.4 49.4 48.3   PLATELETCT 506* 486* 457* 537*   MPV 10.5  --  9.7 10.1     Recent Labs     04/14/24  0050 04/15/24  0053 04/15/24  0231   SODIUM 134* 135 135   POTASSIUM 4.9 4.6 4.7   CHLORIDE 95* 98 98   CO2 27 25 25   GLUCOSE 69 85 83   BUN 22 21 20   CREATININE 0.94 1.01 0.88   CALCIUM 8.7 8.7 8.6               A/P:Noe Hickey is a 63 y.o. male with hoarseness, COPD, CHF, active pneumonia, GERD. Scope today essentially clear. Has some edema but likely from coughing and increased WOB. Do not think that dyspnea is related to vocal cords. Will defer management to hospitalist and pulm. If hoarseness persists after other issues are well controlled, would recommend speech therapy. No ENT follow up indicated.     Thank you for the consultation. Please call with questions or concerns.    Madeleine Finley M.D.  [phone]

## 2024-04-16 NOTE — CARE PLAN
The patient is Stable - Low risk of patient condition declining or worsening    Shift Goals  Clinical Goals: steriods, monitor oxygen demands, breathing tx  Patient Goals: d/c home  Family Goals: jelly    Progress made toward(s) clinical / shift goals:    Problem: Pain - Standard  Goal: Alleviation of pain or a reduction in pain to the patient’s comfort goal  Description: Target End Date:  Prior to discharge or change in level of care    Document on Vitals flowsheet    1.  Document pain using the appropriate pain scale per order or unit policy  2.  Educate and implement non-pharmacologic comfort measures (i.e. relaxation, distraction, massage, cold/heat therapy, etc.)  3.  Pain management medications as ordered  4.  Reassess pain after pain med administration per policy  5.  If opiods administered assess patient's response to pain medication is appropriate per POSS sedation scale  6.  Follow pain management plan developed in collaboration with patient and interdisciplinary team (including palliative care or pain specialists if applicable)  Outcome: Not Met  Flowsheets  Taken 4/15/2024 1657 by Fabio Tinoco R.N.  Pain Rating Scale (NPRS): 9  Taken 4/14/2024 2222 by Rose Montgomery R.ALICIA  Non Verbal Scale: Sleeping  Note: Pt is still requiring dilaudid for breakthrough pain.        Patient is not progressing towards the following goals:      Problem: Pain - Standard  Goal: Alleviation of pain or a reduction in pain to the patient’s comfort goal  Description: Target End Date:  Prior to discharge or change in level of care    Document on Vitals flowsheet    1.  Document pain using the appropriate pain scale per order or unit policy  2.  Educate and implement non-pharmacologic comfort measures (i.e. relaxation, distraction, massage, cold/heat therapy, etc.)  3.  Pain management medications as ordered  4.  Reassess pain after pain med administration per policy  5.  If opiods administered assess patient's response to  pain medication is appropriate per POSS sedation scale  6.  Follow pain management plan developed in collaboration with patient and interdisciplinary team (including palliative care or pain specialists if applicable)  Outcome: Not Met  Flowsheets  Taken 4/15/2024 1657 by Fabio Tinoco RROSA M.  Pain Rating Scale (NPRS): 9  Taken 4/14/2024 2222 by Rose Montgomery R.N.  Non Verbal Scale: Sleeping  Note: Pt is still requiring dilaudid for breakthrough pain.      Problem: Discharge Barriers/Planning  Goal: Patient's continuum of care needs are met  Description: Target End Date:  Prior to discharge or change in level of care    1.  Identify potential discharge barriers on admission and throughout hospitalization  2.  Collaborate with Case Management, , Clinical Educators, Navigators and others on the transitional care team to meet discharge needs  3.  Involve patient/family/caregivers in setting and prioritizing goals for hospitalization and discharge  4.  Ensure Flu vaccinations are addressed  5.  Inquire if patient is interested in the Meds to Bed program  6.  Ensure patient and family/caregiver are able to demonstrate use of equipment as prescribed  7.  Ensure patient and family/caregiver can verbalize understanding of patient education  8.  Explain discharge instructions and medication reconciliation to patient and family/caregiver  Outcome: Not Met

## 2024-04-16 NOTE — CARE PLAN
The patient is Watcher - Medium risk of patient condition declining or worsening    Shift Goals  Clinical Goals: promote oxygenation; improve airway clearance  Patient Goals: discharge; pain management  Family Goals: HUGH    Progress made toward(s) clinical / shift goals:    Problem: Pain - Standard  Goal: Alleviation of pain or a reduction in pain to the patient’s comfort goal  Outcome: Progressing     Problem: Hemodynamics  Goal: Patient's hemodynamics, fluid balance and neurologic status will be stable or improve  Outcome: Progressing     Problem: Respiratory  Goal: Patient will achieve/maintain optimum respiratory ventilation and gas exchange  Outcome: Progressing     Problem: Mobility  Goal: Patient's capacity to carry out activities will improve  Outcome: Progressing     Problem: Self Care  Goal: Patient will have the ability to perform ADLs independently or with assistance (bathe, groom, dress, toilet and feed)  Outcome: Progressing     Problem: Infection - Standard  Goal: Patient will remain free from infection  Outcome: Progressing     Problem: Knowledge Deficit - COPD  Goal: Patient/significant other demonstrates understanding of disease process, utilization of the Action Plan, medications and discharge instruction  Outcome: Progressing       Patient is not progressing towards the following goals:

## 2024-04-16 NOTE — PROGRESS NOTES
"Staff assist was called on pt due to pt presenting with seizure like activity according to another RN such as shaking and pt turning red in the face. When This RN arrived to the pt's room he was sitting on the edge of bed. Pt presented tachypneic, tremorous, and was stating he felt like he \"couldn't breathe.\" A set of VS were taken. Pt was placed on 2lpm of o2 due to a o2 sat of 88%, which titrated pt's o2 to 93%. Pt was also given atarax and respiratory services were called. Dr Mendez was notified and she came to the bedside.    "

## 2024-04-16 NOTE — PROGRESS NOTES
Received bedside report from RN, pt care assumed, VSS, pt assessment complete. Pt AAOx4, with no pain at this time. No signs of acute distress noted at this time. Plan of care discussed with pt and verbalizes no questions. Pt denies any additional needs at this time. Bed locked/in lowest position, pt educated on fall risk and verbalized understanding, call light within reach, hourly rounding initiated.

## 2024-04-16 NOTE — PROGRESS NOTES
Hospital Medicine Daily Progress Note    Date of Service  4/16/2024    Chief Complaint  Noe Hickey is a 63 y.o. male admitted 4/11/2024 with chest pain and sob    Hospital Course  63 y.o. male with hx of AAA repair in January 2023, combined systolic and diastolic congestive heart failure, paroxysmal atrial fibrillation status post ablation on Xarelto, however discontinued for the past few days due to misunderstanding by cardiology provider, ongoing tobacco abuse, who presented 4/11/2024 with sob for past one month. He was just seen cardiology 4/4 and echo notes EF 70%, which has improved from 30% 7/5/23 and there is features of apical variant hypertrophic cardiomyopathy.  Patient was scheduled to have cardiac MRI and stress test next week.  However he presented with worsening shortness of breath and chest pain.    In the emergency room he was a code aorta and a stat CTA aorta was performed with contrast that showed normal AAA repair with no new aneurysmal dilatation or aneurysm. It did show a left-sided basilar pneumonia with a white blood cell count of 27,000 for left shift.   Patient is started with antibiotics. Of note, patient is taking anabolic steroid. Procal negative.   CTA chest no PE  Doppler BLE no signs of DVT  Noted worsening stridor and horse voice on 4/15, pulmonary and ENT consulted  S/p ENT scope 4/15 unremarkable. Does not think dyspnea is related to vocal cords  SNIFF noted limited but nonparadoxical motion of the L diaphragm.     Interval Problem Update  I have seen and examined patient at bedside.    No overnight events  Still very sob. Sat O2 90% while laying without talking, however O2 sat drop while speaking and walking  ENT did see him yesterday, unremarkable   SNIFF limited but nonparadoxical motion of the L diaphragm.   Anxiety, on atarax prn  Continue duoneb dulera   Patient reports significant headache, I have ordered fioricet prn. No neurologic deficit  Vomiting, zofran  prn    I have discussed this patient's plan of care and discharge plan at IDT rounds today with Case Management, Nursing, Nursing leadership, and other members of the IDT team.    Consultants/Specialty  cardiology    Code Status  Full Code    Disposition  The patient is not medically cleared for discharge to home or a post-acute facility.      I have placed the appropriate orders for post-discharge needs.    Review of Systems  Review of Systems   Constitutional:  Positive for malaise/fatigue.   Respiratory:  Positive for shortness of breath.    Cardiovascular:  Positive for chest pain.   Neurological:  Positive for weakness.   All other systems reviewed and are negative.       Physical Exam  Temp:  [36.3 °C (97.3 °F)-37.1 °C (98.8 °F)] 36.9 °C (98.4 °F)  Pulse:  [76-93] 92  Resp:  [16-24] 20  BP: (128-150)/() 128/81  SpO2:  [91 %-98 %] 92 %    Physical Exam  Vitals and nursing note reviewed.   Constitutional:       Appearance: Normal appearance. He is obese. He is ill-appearing.   HENT:      Head: Normocephalic and atraumatic.      Mouth/Throat:      Pharynx: Oropharynx is clear.   Eyes:      Pupils: Pupils are equal, round, and reactive to light.   Neck:      Vascular: No carotid bruit.   Cardiovascular:      Rate and Rhythm: Normal rate and regular rhythm.   Pulmonary:      Effort: Pulmonary effort is normal. No respiratory distress.      Breath sounds: Normal breath sounds. No wheezing.      Comments: Diminished breath sound bilateral  Abdominal:      General: Abdomen is flat. Bowel sounds are normal.      Palpations: Abdomen is soft. There is no mass.   Musculoskeletal:         General: Normal range of motion.      Cervical back: Neck supple.   Skin:     General: Skin is warm and dry.   Neurological:      General: No focal deficit present.      Mental Status: He is alert and oriented to person, place, and time.   Psychiatric:         Mood and Affect: Mood normal.         Behavior: Behavior normal.          Fluids    Intake/Output Summary (Last 24 hours) at 4/16/2024 1521  Last data filed at 4/16/2024 0900  Gross per 24 hour   Intake 960 ml   Output 1000 ml   Net -40 ml       Laboratory  Recent Labs     04/15/24  0053 04/15/24  0231 04/16/24  0057   WBC 17.2* 18.0* 17.0*   RBC 5.48 5.50 5.21   HEMOGLOBIN 11.0* 10.9* 10.3*   HEMATOCRIT 36.7* 35.9* 35.2*   MCV 67.0* 65.3* 67.6*   MCH 20.1* 19.8* 19.8*   MCHC 30.0* 30.4* 29.3*   RDW 49.4 48.3 50.1*   PLATELETCT 457* 537* 517*   MPV 9.7 10.1 9.7     Recent Labs     04/15/24  0053 04/15/24  0231 04/16/24  0057   SODIUM 135 135 133*   POTASSIUM 4.6 4.7 5.3   CHLORIDE 98 98 97   CO2 25 25 26   GLUCOSE 85 83 141*   BUN 21 20 24*   CREATININE 1.01 0.88 0.98   CALCIUM 8.7 8.6 8.5                     Imaging  DX SNIFF TEST   Final Result      Limited but nonparadoxical motion of the LEFT diaphragm      DX-CHEST-PORTABLE (1 VIEW)   Final Result         1.  Left basilar atelectasis or subtle infiltrate.   2.  Atherosclerosis      US-EXTREMITY VENOUS LOWER BILAT   Final Result      CT-CTA CHEST PULMONARY ARTERY W/ RECONS   Final Result         1. No CT evidence of pulmonary embolism.      2. Airspace opacity in the left lower lobe, likely atelectasis      CT-CTA COMPLETE THORACOABDOMINAL AORTA   Final Result      1.  Surgical changes of the aortic valve and ascending aorta.   2.  No aortic aneurysm or dissection identified.   3.  Mild/moderate atherosclerotic changes.   4.  Punctate, nonobstructing right renal stone.   5.  Elevation of the left hemidiaphragm with adjacent basilar consolidations, likely atelectasis. Pneumonia not excluded.   6.  Distal colonic diverticulosis.   7.  Hepatomegaly.                  CT-CTA HEAD WITH & W/O-POST PROCESS   Final Result      1.  Severely suboptimal exam due to photon starvation.   2.  No gross acute intracranial abnormality.   3.  No proximal vessel occlusion.      CT-CTA NECK WITH & W/O-POST PROCESSING   Final Result   Addendum  (preliminary) 1 of 1   100 mL Omnipaque 300 given IV.      Final      No acute arterial abnormality of the neck.           Assessment/Plan  * Shortness of breath- (present on admission)  Assessment & Plan  Has been occurring and progressive in nature for few months  Recently s/b cards and repeated ECHO which showed in an interval normalization of his LVEF to 70% but shows possible there is features of apical variant hypertrophic cardiomyopath  Patient has been scheduled for outpatient cardiac MRI and stress test  Check CTA chest and doppler BLE negative for PE and DVT  SPEP/UPEP pending to r/o amyloid   Cardiology rec outpatient cardiac MRI and stress test    Pulmonary consulted. ?diaphragm motion abnormal. SNIFF noted limited but nonparadoxical motion of the L diaphragm.   S/p ENT scope 4/15 unremarkable. Does not think dyspnea is related to vocal cords  Anxiety   Speech therapy      Dysphonia  Assessment & Plan  Acute onset, hoarse voice  S/p ENT scope 4/15 unremarkable. Does not think dyspnea is related to vocal cords    Microcytic anemia  Assessment & Plan  No Signs of bleeding  Check iron profile c/w JARROD  Iron supplements  Outpatient GI referral. Patient eeports colonoscopy years ago in [place].     Community acquired pneumonia of left lower lobe of lung- (present on admission)  Assessment & Plan  Denies fever, chills.  Noted profound leukocytosis on admission.  However he is taking anabolic steroids at home, which could explain leukocytosis  Procalcitonin negative  Given his symptoms has been present for months, ?  PNA  Continue empirical abx with Augmentin    Acute hypoxic respiratory failure- (present on admission)  Assessment & Plan  Unclear etiology.   CTA negative for PE  Suspect undiagnosed COPD exacerbation. He has significant smoking history. Also MARIELENA possible contributing   Continue IS, duoneb and dulera  Pulmonary following     Tobacco dependence- (present on admission)  Assessment & Plan  Trying  to cut back, encourage full cessation    Sepsis (HCC)- (present on admission)  Assessment & Plan  This is Sepsis Present on admission  SIRS criteria identified on my evaluation include: Tachypnea, with respirations greater than 20 per minute and Leukocytosis, with WBC greater than 12,000  Clinical indicators of end organ dysfunction include Acute Respiratory Failure - (mechanical ventilation or BiPap or PaO2/FiO2 ratio < 300)  Source is pulmonary  Sepsis protocol initiated  Crystalloid Fluid Administration: Resuscitation volume of 1 liter ordered. Reason that resuscitation volume of less than 30ml/kg was ordered concern for causing harm given CHF  IV antibiotics as appropriate for source of sepsis  Reassessment: I have reassessed the patient's hemodynamic status    ARF with hypoxia, RA saturation 87%  CTA Aorta shows L basilar consolidation, CAP  Continue empiric IV unasyn and oral azithromycin  F/U sputum cultures and blood cultures  Judicious IVF's    Heart failure with improved ejection fraction (HFimpEF) (HCC)- (present on admission)  Assessment & Plan  Echo 4/5/24 notes EF 70%, which has improved from EF 30% 7/5/23   Continue losartan, Toprol   Monitoring respiratory status  I&O, daily weight    Secondary hypercoagulable state (HCC)- (present on admission)  Assessment & Plan  Patient has discontinued Xarelto for the past few days due to misunderstanding by his cardiology provider  Resumed Xarelto      Paroxysmal atrial fibrillation (HCC)- (present on admission)  Assessment & Plan  In NSR  Xarelto as above  Continue outpatient toprol XL    Leukocytosis, thrombocytosis, erythrocytosis, lymphopenia- (present on admission)  Assessment & Plan  Persistent leukocytosis for years. Patient is taking anabolic steroid, which could explain leukocytosis. However he said he has been taking steroid only a few months. He has persistent leucocytosis for year  Lengthy discussion with patient the risks of long term steroid use. I  advised patient to slow wean off. He voiced understanding.   Hematology outpatient referral for further evaluation    Gastroesophageal reflux disease without esophagitis- (present on admission)  Assessment & Plan  Continue omeprazole 20 mg daily    Ascending aortic aneurysm repair- (present on admission)  Assessment & Plan  CTA aorta shows intact repair and no new aneurysmal dilation         VTE prophylaxis: xarelto    I have performed a physical exam and reviewed and updated ROS and Plan today (4/16/2024). In review of yesterday's note (4/15/2024), there are no changes except as documented above.    CRITICAL CARE    Patient is critically ill. I have personally seen and evaluated patient in room as internal medicine hospitalist services.  The patient continues to have: worsening shortness of breath  The vital organ system that is affected is the: pulmonary  If untreated there is a high chance of deterioration into: respiratory failure, Septic shock, cardiovascular collapse, cardiac arrest, and eventually death.   I provided critical care services, which included medication orders, frequent reevaluations of the patient's condition and response to treatment, ordering and reviewing test results, and discussing the case with various consultants.  Review chart for interventions.  The critical that has been undertaken is medically complex.   There has been no overlap in critical care time.   Critical Care Time not including procedures: 49 minutes    I have discussed with RN and other consultants about patient's plan.

## 2024-04-16 NOTE — DOCUMENTATION QUERY
[facility]                                                                       Query Response Note      PATIENT:               JUSTIN TOPETE  ACCT #:                  [id number]  MRN:                     [id number]  :                      1960  ADMIT DATE:       2024 4:14 PM  DISCH DATE:          RESPONDING  PROVIDER #:        [id number]           QUERY TEXT:    Sepsis with pulmonary source and SIRS of tachypnea and leukocytosis is documented in the Medical Record.  Per Progress Notes, procal was negative and anabolic steroid could explain leukocytosis.  After further study, can the status of the diagnosis of sepsis be clarified?      Sepsis - real or suspected infection plus 2 or more SIRS criteria + organ dysfunction related to sepsis    Temp <96.8 or >101  HR >90  RR >20  WBC <4,000 or > 12,000 or >10% bandemia         The patient's Clinical Indicators include:  Progress Notes:  Sepsis POA: SIRS: tachypnea, leukocytosis.  End organ dysfunction includes Acute respiratory failure.  Source: pulmonary  Community acquired pneumonia of LLL of lung:  Noted profound leukocytosis on admission, however he is taking anabolic steroids at home, which could explain leukocytosis.  Procal negative.  Symptoms present for months, ? PNA   WBC 26.6   Lactic acid 1.3   Procalcitonin 0.07  Admit vitals: 129/79, 86, 24, 97.3F, 93% RA, 85% on RA -> 97% on 4L  Risk Factors: ? PNA, suspect undiagnosed COPD  Treatment: Augmentin, Unasyn, Zithromax    Thank you,  Davida Gutierrez RN, BSN, CCDS  Clinical   Connect via [employer] Messenger  Options provided:   -- Sepsis is ruled out   -- Sepsis confirmed   -- Other explanation, Please specify      Query created by: Davida Gutierrez on 2024 9:44 AM    RESPONSE TEXT:    Sepsis is ruled out          Electronically signed by:  KENYETTA FERREIRA MD 2024 1:35 PM

## 2024-04-16 NOTE — PROGRESS NOTES
Bedside report received, assumed care of patient. Sitting up in chair. A&O x4. Tele monitoring in place. Educated on fall risk, all fall precautions in place. Call light within reach, bed locked and in lowest position, denied other needs at this time.

## 2024-04-16 NOTE — PROGRESS NOTES
"Pulmonary Consultation      Date of Service: 4/16/2024    Date of Admission:  4/11/2024  4:14 PM    Consulting Provider:  Olga Mendez M.D.     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 male with a past medical history of AAA s/p repair in 2023, HFrEF, PAF S/P ablation and on Xarelto, and chronic tobacco use disorder, who presented to the ED on 4/11 w/ complaints of chest pain and worsening shortness of breath for the past month or so. He reports never having felt at baseline since AAA repair in 01/2023, has been getting increasingly SOB since then but has gotten worse in the past month. He also reports hoarseness of voice, it is unclear exactly when this started, pt states \"this is new\" but unable to tell if it started within the last few weeks/months or started a year ago but has progressively worsened in the past month.      His recent outpatient echo showed improved EF, some myocardial thickening, so is awaiting outpatient Cardiac MRI and was also going to get a stress test as outpatient but developed worsening SOB hence came to the ED. Stat CTA Aorta in the ED showed normal AAA repair but also showed a left basilar PNA. He was started on Abx Unasyn + Azithro for CAP.  Other work up for SOB and hypoxia include neg CTA chest for PE and neg doppler US of the BLE. He has never had PFTs in the past, but was on atleast two inhalers in the past likely due to COPD. He was started on Duonebs and Dulera this admission and patient endorses most improvement in sxs with the inhalers than anything else.      Pulm consulted because of persistent SOB natalya after an overnight episode of shortness of breath, worsening work of breathing that occurred " "around 2am earlier today. He was noted to have acute respiratory distress, stridor, and audible wheezes, and chest was noted to be \"red\". O2 sats were 99% on 4.5L but he was unable to talk in complete sentences during the episode. Stat racemic epi neb x1 and Duonebs x 2 were given. His breathing improved and stridor resolved with these treatments. No new meds were started prior to this event. ABG showed 7.44/41.8/118/28.8. He was continued on IV solumedrol Q6h x 3 doses to prevent recurrences.      Has hoarseness of voice, unclear timeline of when it started but patient believes this is new/recent and has never seen ENT before.      Smoking history - Smoked 1PPD for 40 years, started cutting down 5 years ago. Now smokes 2-3 cigs/day.     Daily interval updates:    4/17 - Sniff test done yest, results today show limited but non paradoxical movement of the left hemidiaphragm. ENT saw patient yest, moderate edema (?of vocal cords) noted but symmetric vocal fold motion bilaterally on their evaluation, recommended SLP consult but no other interventions from ENT standpoint.   Today, pt reports no changes in sxs, anxiety is worse, and feels even more short of breath whenever his anxiety peaks.   Pt reported another episode of acute SOB that lasted a few mins, feels like he passed out and had a seizure. Endorses increased anxiety. Discussed with primary RN - who said they don't think he ever lost consciousness, had stable vitals, and a different RN noted some \"generalized shaking\" but not seizure like activity. He felt better with atarax and another breathing treatment.     Review of Systems   Constitutional:  Positive for diaphoresis and malaise/fatigue. Negative for chills, fever and weight loss.   HENT:          Hoarse voice x 1 year, progressively worsening   Eyes: Negative.    Respiratory:  Positive for shortness of breath. Negative for cough, hemoptysis, sputum production and wheezing.    Cardiovascular:  Positive for " orthopnea and leg swelling (mild). Negative for chest pain, palpitations, claudication and PND.   Gastrointestinal:  Negative for abdominal pain, constipation, diarrhea, heartburn, nausea and vomiting.   Genitourinary: Negative.    Musculoskeletal: Negative.    Neurological: Negative.    Endo/Heme/Allergies: Negative.    Psychiatric/Behavioral:  The patient is nervous/anxious.        Home Medications       Reviewed by Alexandra Pearson (Pharmacy Tech) on 04/11/24 at 1905  Med List Status: Complete     Medication Last Dose Status   asa/apap/caffeine (EXCEDRIN) 250-250-65 MG Tab 4/11/2024 Active   loratadine (CLARITIN) 10 MG Tab 4/11/2024 Active   losartan (COZAAR) 25 MG Tab 4/10/2024 Active   metoprolol SR (TOPROL XL) 25 MG TABLET SR 24 HR 4/11/2024 Active   omeprazole (PRILOSEC) 20 MG delayed-release capsule 4/11/2024 Active   ondansetron (ZOFRAN) 4 MG Tab tablet PRN Active   rivaroxaban (XARELTO) 20 MG Tab tablet 4/9/2024 Active                    Social History     Tobacco Use    Smoking status: Every Day     Current packs/day: 0.50     Average packs/day: 0.5 packs/day for 40.0 years (20.0 ttl pk-yrs)     Types: Cigarettes    Smokeless tobacco: Never    Tobacco comments:     Down to 5 cig daily   Vaping Use    Vaping Use: Never used   Substance Use Topics    Alcohol use: No    Drug use: Not Currently     Comment: past problems with narcotics not since 2019        Past Medical History:   Diagnosis Date    Arrhythmia     Breath shortness 06/09/2023    prior to 1/2023 surgery    Cyclic vomiting syndrome     Elevated hemidiaphragm - LEFT post AVR 01/04/2023    Fracture     Headache, classical migraine     Heart burn     Heart valve disease 06/09/2023    heart surgery in 1/2023    Indigestion     Pneumonia due to infectious organism 01/20/2023    Snoring        Past Surgical History:   Procedure Laterality Date    AORTIC VALVE REPLACEMENT  1/5/2023    Procedure: AORTIC VALVE REPLACEMENT, ASCENDING AORTIC ANEURYSM  "REPAIR AND TRANSESOPHAGEAL ECHOCARDIOGRAM.;  Surgeon: Serena Goyal M.D.;  Location: SURGERY [facility];  Service: Cardiac    AORTIC ASCENDING DISSECTION  1/5/2023    Procedure: REPAIR, ANEURYSM OR DISSECTION, AORTA, ASCENDING;  Surgeon: Serena Goyal M.D.;  Location: SURGERY [facility];  Service: Cardiac    ECHOCARDIOGRAM, TRANSESOPHAGEAL, INTRAOPERATIVE  1/5/2023    Procedure: ECHOCARDIOGRAM, TRANSESOPHAGEAL, INTRAOPERATIVE.;  Surgeon: Serena Goyal M.D.;  Location: SURGERY [facility];  Service: Cardiac    IRRIGATION & DEBRIDEMENT ORTHO Right 09/19/2017    Procedure: IRRIGATION & DEBRIDEMENT ORTHO-THIGH;  Surgeon: Corbin Rivera M.D.;  Location: SURGERY [facility];  Service: Orthopedics    SHOULDER HEMICAP RESURFACING Right 10/12/2015    Procedure: RIGHT SHOULDER RESURFACING;  Surgeon: Javon Doll M.D.;  Location: SURGERY [facility];  Service:     SHOULDER ARTHROSCOPY      x3    SHOULDER SURGERY      replacement right       Allergies: Morphine    History reviewed. No pertinent family history.    Pulmonary-Specific Vital Signs  Vitals  Blood Pressure: 128/81  Temperature: 36.9 °C (98.4 °F)  Temp src: Temporal  Pulse: 92  Respiration: 20  Pulse Oximetry: 92 %  Height: 175.3 cm (5' 9.02\")  Weight: 96.4 kg (212 lb 8.4 oz)    Physical Examination  Physical Exam  Vitals and nursing note reviewed.   Constitutional:       General: He is not in acute distress.     Appearance: He is overweight. He is ill-appearing.   HENT:      Head: Normocephalic and atraumatic.      Comments: Hoarse voice     Mouth/Throat:      Mouth: Mucous membranes are moist.   Eyes:      Pupils: Pupils are equal, round, and reactive to light.   Cardiovascular:      Rate and Rhythm: Normal rate and regular rhythm.      Pulses: Normal pulses.      Heart sounds: Murmur (systolic) heard.      No gallop.   Pulmonary:      Effort: No respiratory distress.      Breath sounds: Decreased air movement (left lung base) present. No stridor. " Examination of the left-lower field reveals decreased breath sounds. Decreased breath sounds present. No wheezing, rhonchi or rales.      Comments: Right lung expansion > left lung expansion.   Abdominal:      General: Abdomen is flat. Bowel sounds are normal. There is no distension.      Palpations: Abdomen is soft.      Tenderness: There is no abdominal tenderness.   Musculoskeletal:         General: Normal range of motion.      Cervical back: Normal range of motion and neck supple.      Right lower leg: Edema present.      Left lower leg: Edema present.      Comments: 1+ pitting pedal edema bilaterally   Skin:     General: Skin is warm and dry.      Capillary Refill: Capillary refill takes 2 to 3 seconds.   Neurological:      General: No focal deficit present.      Mental Status: He is alert and oriented to person, place, and time. Mental status is at baseline.      Cranial Nerves: No cranial nerve deficit.      Motor: No weakness.   Psychiatric:         Mood and Affect: Mood normal.         Behavior: Behavior normal.         Thought Content: Thought content normal.         Judgment: Judgment normal.      Comments: Slightly anxious             Intake/Output Summary (Last 24 hours) at 4/16/2024 1626  Last data filed at 4/16/2024 0900  Gross per 24 hour   Intake 960 ml   Output 1000 ml   Net -40 ml       Recent Labs     04/15/24  0053 04/15/24  0231 04/16/24 0057   WBC 17.2* 18.0* 17.0*   NEUTSPOLYS  --  77.00*  --    LYMPHOCYTES  --  7.70*  --    MONOCYTES  --  10.50  --    EOSINOPHILS  --  2.70  --    BASOPHILS  --  0.70  --    ASTSGOT  --  57*  --    ALTSGPT  --  51*  --    ALKPHOSPHAT  --  31  --    TBILIRUBIN  --  0.6  --      Recent Labs     04/14/24  0050 04/15/24  0053 04/15/24  0231 04/16/24  0057   SODIUM 134* 135 135 133*   POTASSIUM 4.9 4.6 4.7 5.3   CHLORIDE 95* 98 98 97   CO2 27 25 25 26   BUN 22 21 20 24*   CREATININE 0.94 1.01 0.88 0.98   MAGNESIUM 2.0  --   --  2.2   PHOSPHORUS 3.4  --   --  2.5    CALCIUM 8.7 8.7 8.6 8.5     Recent Labs     04/15/24  0053 04/15/24  0231 04/16/24  0057   ALTSGPT  --  51*  --    ASTSGOT  --  57*  --    ALKPHOSPHAT  --  31  --    TBILIRUBIN  --  0.6  --    GLUCOSE 85 83 141*     Recent Labs     04/15/24  0234   ISTATAPH 7.446   ISTATAPCO2 41.8*   ISTATAPO2 118*   ISTATATCO2 30   UGPIIOD0GSN 99   ISTATARTHCO3 28.8*   ISTATARTBE 4*   ISTATTEMP 99.0 F   ISTATSPEC Arterial   ISTATAPHTC 7.443   CTAQSLGI1IZ 119*     DX SNIFF TEST   Final Result      Limited but nonparadoxical motion of the LEFT diaphragm      DX-CHEST-PORTABLE (1 VIEW)   Final Result         1.  Left basilar atelectasis or subtle infiltrate.   2.  Atherosclerosis      US-EXTREMITY VENOUS LOWER BILAT   Final Result      CT-CTA CHEST PULMONARY ARTERY W/ RECONS   Final Result         1. No CT evidence of pulmonary embolism.      2. Airspace opacity in the left lower lobe, likely atelectasis      CT-CTA COMPLETE THORACOABDOMINAL AORTA   Final Result      1.  Surgical changes of the aortic valve and ascending aorta.   2.  No aortic aneurysm or dissection identified.   3.  Mild/moderate atherosclerotic changes.   4.  Punctate, nonobstructing right renal stone.   5.  Elevation of the left hemidiaphragm with adjacent basilar consolidations, likely atelectasis. Pneumonia not excluded.   6.  Distal colonic diverticulosis.   7.  Hepatomegaly.                  CT-CTA HEAD WITH & W/O-POST PROCESS   Final Result      1.  Severely suboptimal exam due to photon starvation.   2.  No gross acute intracranial abnormality.   3.  No proximal vessel occlusion.      CT-CTA NECK WITH & W/O-POST PROCESSING   Final Result   Addendum (preliminary) 1 of 1   100 mL Omnipaque 300 given IV.      Final      No acute arterial abnormality of the neck.          Patient Active Problem List   Diagnosis    Ascending aortic aneurysm repair    Gastroesophageal reflux disease without esophagitis    Hyperkalemia    Leukocytosis, thrombocytosis,  erythrocytosis, lymphopenia    Pneumonia    Acute on chronic systolic heart failure (HCC)    Paroxysmal atrial fibrillation (HCC)    COPD (chronic obstructive pulmonary disease) (HCC)    Elevated hemidiaphragm - s/p post AVR    Paroxysmal atrial flutter (HCC)    Tobacco abuse    Secondary hypercoagulable state (HCC)    Nonobstructive atherosclerosis of coronary artery    Chest pain    Opioid use, unspecified, in remission    Goals of care, counseling/discussion    Heart failure with improved ejection fraction (HFimpEF) ([facility])    Atrial fibrillation ([facility])    H/O cardiac radiofrequency ablation    Sepsis (HCC)    Pneumonia due to infectious organism    Right inguinal pain    Lobar pneumonia (HCC)    Tobacco dependence    Left facial numbness and left blurry vision    Headache    Acute hypoxic respiratory failure    Pneumonia due to COVID-19 virus    JENNY (acute kidney injury) ([facility])    Olecranon bursitis of right elbow    Shortness of breath    Community acquired pneumonia of left lower lobe of lung    Microcytic anemia    Dysphonia       Assessment and Recommendations:    # Acute hypoxic respiratory failure  # Presumed COPD  # Smoker  # 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 However, underlying presumed COPD in the setting of 40+ pack years hx of smoking could be a contributing factor.   - He doesn't have any wheezing on exam, no indication for continuing steroids at this time.   - Treated empirically for CAP w/ 5d of Unasyn/Augmentin and 3d of Azithromycin  - Agree with continuing inhalers for presumed COPD. Formal PFTs after discharge.   - Needs both supine and sitting PFTs, pulm rehab, SLP therapy, and referral to thoracic surgery for further work up/management.   - Pulm will  continue to follow while inpatient     # Dysphonia  - Although he had a fairly normal ENT exam per consult 4/15, it is still possible that this is related to vocal cord dysfunction from phrenic nerve dysfunction.   - SLP consult placed    The patient was seen and plan discussed with my attending, Dr. Castañeda and hospitalist Dr. Mendez.    Thank you for the consult.     Dr. Fanta Lindo MD   [facility] IM PGY 2 Resident    Please refer to Dr. Castañeda's attestation for additional comments/recommendations.

## 2024-04-16 NOTE — CARE PLAN
Problem: Bronchoconstriction  Goal: Improve in air movement and diminished wheezing  Description: Target End Date:  2 to 3 days    1.  Implement inhaled treatments  2.  Evaluate and manage medication effects  Outcome: Progressing     Duoneb QID      Problem: Bronchopulmonary Hygiene  Goal: Increase mobilization of retained secretions  Description: Target End Date:  2 to 3 days    1.  Perform bronchopulmonary therapy as indicated by assessment  2.  Perform airway suctioning  3.  Perform actions to maintain patient airway  Outcome: Progressing     Flutter BID

## 2024-04-16 NOTE — CARE PLAN
Problem: Bronchoconstriction  Goal: Improve in air movement and diminished wheezing  Description: Target End Date:  2 to 3 days    1.  Implement inhaled treatments  2.  Evaluate and manage medication effects  Outcome: Progressing   SVN DUO Q4    Problem: Bronchopulmonary Hygiene  Goal: Increase mobilization of retained secretions  Description: Target End Date:  2 to 3 days    1.  Perform bronchopulmonary therapy as indicated by assessment  2.  Perform airway suctioning  3.  Perform actions to maintain patient airway  Outcome: Progressing   FLUTTER Q4  IS 1500

## 2024-04-17 ENCOUNTER — APPOINTMENT (OUTPATIENT)
Dept: RADIOLOGY | Facility: MEDICAL CENTER | Age: 64
End: 2024-04-17
Attending: NURSE PRACTITIONER
Payer: MEDICAID

## 2024-04-17 ENCOUNTER — APPOINTMENT (OUTPATIENT)
Dept: RADIOLOGY | Facility: MEDICAL CENTER | Age: 64
DRG: 981 | End: 2024-04-17
Payer: MEDICAID

## 2024-04-17 DIAGNOSIS — J98.6 ELEVATED HEMIDIAPHRAGM: Chronic | ICD-10-CM

## 2024-04-17 LAB
ANION GAP SERPL CALC-SCNC: 10 MMOL/L (ref 7–16)
ANION GAP SERPL CALC-SCNC: 11 MMOL/L (ref 7–16)
BUN SERPL-MCNC: 27 MG/DL (ref 8–22)
BUN SERPL-MCNC: 30 MG/DL (ref 8–22)
CALCIUM SERPL-MCNC: 8.6 MG/DL (ref 8.5–10.5)
CALCIUM SERPL-MCNC: 8.8 MG/DL (ref 8.5–10.5)
CHLORIDE SERPL-SCNC: 96 MMOL/L (ref 96–112)
CHLORIDE SERPL-SCNC: 98 MMOL/L (ref 96–112)
CO2 SERPL-SCNC: 27 MMOL/L (ref 20–33)
CO2 SERPL-SCNC: 30 MMOL/L (ref 20–33)
CREAT SERPL-MCNC: 1.09 MG/DL (ref 0.5–1.4)
CREAT SERPL-MCNC: 1.12 MG/DL (ref 0.5–1.4)
ERYTHROCYTE [DISTWIDTH] IN BLOOD BY AUTOMATED COUNT: 51 FL (ref 35.9–50)
GFR SERPLBLD CREATININE-BSD FMLA CKD-EPI: 73 ML/MIN/1.73 M 2
GFR SERPLBLD CREATININE-BSD FMLA CKD-EPI: 76 ML/MIN/1.73 M 2
GLUCOSE SERPL-MCNC: 108 MG/DL (ref 65–99)
GLUCOSE SERPL-MCNC: 127 MG/DL (ref 65–99)
HCT VFR BLD AUTO: 35.2 % (ref 42–52)
HGB BLD-MCNC: 10.6 G/DL (ref 14–18)
MAGNESIUM SERPL-MCNC: 2.1 MG/DL (ref 1.5–2.5)
MCH RBC QN AUTO: 20.5 PG (ref 27–33)
MCHC RBC AUTO-ENTMCNC: 30.1 G/DL (ref 32.3–36.5)
MCV RBC AUTO: 68 FL (ref 81.4–97.8)
PHOSPHATE SERPL-MCNC: 3 MG/DL (ref 2.5–4.5)
PLATELET # BLD AUTO: 567 K/UL (ref 164–446)
PMV BLD AUTO: 10.1 FL (ref 9–12.9)
POTASSIUM SERPL-SCNC: 5 MMOL/L (ref 3.6–5.5)
POTASSIUM SERPL-SCNC: 5.9 MMOL/L (ref 3.6–5.5)
RBC # BLD AUTO: 5.18 M/UL (ref 4.7–6.1)
SODIUM SERPL-SCNC: 136 MMOL/L (ref 135–145)
SODIUM SERPL-SCNC: 136 MMOL/L (ref 135–145)
WBC # BLD AUTO: 23.2 K/UL (ref 4.8–10.8)

## 2024-04-17 PROCEDURE — 71046 X-RAY EXAM CHEST 2 VIEWS: CPT

## 2024-04-17 PROCEDURE — 94640 AIRWAY INHALATION TREATMENT: CPT

## 2024-04-17 PROCEDURE — 99232 SBSQ HOSP IP/OBS MODERATE 35: CPT | Mod: GC | Performed by: INTERNAL MEDICINE

## 2024-04-17 PROCEDURE — A9270 NON-COVERED ITEM OR SERVICE: HCPCS | Performed by: STUDENT IN AN ORGANIZED HEALTH CARE EDUCATION/TRAINING PROGRAM

## 2024-04-17 PROCEDURE — A9270 NON-COVERED ITEM OR SERVICE: HCPCS

## 2024-04-17 PROCEDURE — 36415 COLL VENOUS BLD VENIPUNCTURE: CPT

## 2024-04-17 PROCEDURE — 85027 COMPLETE CBC AUTOMATED: CPT

## 2024-04-17 PROCEDURE — 700101 HCHG RX REV CODE 250: Performed by: STUDENT IN AN ORGANIZED HEALTH CARE EDUCATION/TRAINING PROGRAM

## 2024-04-17 PROCEDURE — 700111 HCHG RX REV CODE 636 W/ 250 OVERRIDE (IP): Performed by: STUDENT IN AN ORGANIZED HEALTH CARE EDUCATION/TRAINING PROGRAM

## 2024-04-17 PROCEDURE — A9270 NON-COVERED ITEM OR SERVICE: HCPCS | Performed by: INTERNAL MEDICINE

## 2024-04-17 PROCEDURE — 83735 ASSAY OF MAGNESIUM: CPT

## 2024-04-17 PROCEDURE — 700102 HCHG RX REV CODE 250 W/ 637 OVERRIDE(OP): Performed by: INTERNAL MEDICINE

## 2024-04-17 PROCEDURE — 84100 ASSAY OF PHOSPHORUS: CPT

## 2024-04-17 PROCEDURE — 80048 BASIC METABOLIC PNL TOTAL CA: CPT

## 2024-04-17 PROCEDURE — 99233 SBSQ HOSP IP/OBS HIGH 50: CPT | Performed by: STUDENT IN AN ORGANIZED HEALTH CARE EDUCATION/TRAINING PROGRAM

## 2024-04-17 PROCEDURE — 700101 HCHG RX REV CODE 250: Performed by: INTERNAL MEDICINE

## 2024-04-17 PROCEDURE — 700102 HCHG RX REV CODE 250 W/ 637 OVERRIDE(OP): Performed by: STUDENT IN AN ORGANIZED HEALTH CARE EDUCATION/TRAINING PROGRAM

## 2024-04-17 PROCEDURE — 770020 HCHG ROOM/CARE - TELE (206)

## 2024-04-17 PROCEDURE — 700102 HCHG RX REV CODE 250 W/ 637 OVERRIDE(OP)

## 2024-04-17 PROCEDURE — 92523 SPEECH SOUND LANG COMPREHEN: CPT

## 2024-04-17 RX ORDER — IPRATROPIUM BROMIDE AND ALBUTEROL SULFATE 2.5; .5 MG/3ML; MG/3ML
3 SOLUTION RESPIRATORY (INHALATION)
Status: DISCONTINUED | OUTPATIENT
Start: 2024-04-17 | End: 2024-04-19 | Stop reason: ALTCHOICE

## 2024-04-17 RX ORDER — FUROSEMIDE 10 MG/ML
40 INJECTION INTRAMUSCULAR; INTRAVENOUS ONCE
Status: COMPLETED | OUTPATIENT
Start: 2024-04-17 | End: 2024-04-17

## 2024-04-17 RX ADMIN — HYDROMORPHONE HYDROCHLORIDE 0.5 MG: 1 INJECTION, SOLUTION INTRAMUSCULAR; INTRAVENOUS; SUBCUTANEOUS at 21:20

## 2024-04-17 RX ADMIN — OMEPRAZOLE 20 MG: 20 CAPSULE, DELAYED RELEASE ORAL at 04:10

## 2024-04-17 RX ADMIN — HYDROCODONE BITARTRATE AND ACETAMINOPHEN 1 TABLET: 10; 325 TABLET ORAL at 06:38

## 2024-04-17 RX ADMIN — HYDROMORPHONE HYDROCHLORIDE 0.5 MG: 1 INJECTION, SOLUTION INTRAMUSCULAR; INTRAVENOUS; SUBCUTANEOUS at 07:53

## 2024-04-17 RX ADMIN — HYDROXYZINE HYDROCHLORIDE 25 MG: 25 TABLET, FILM COATED ORAL at 04:10

## 2024-04-17 RX ADMIN — MOMETASONE FUROATE AND FORMOTEROL FUMARATE DIHYDRATE 2 PUFF: 100; 5 AEROSOL RESPIRATORY (INHALATION) at 04:10

## 2024-04-17 RX ADMIN — HYDROMORPHONE HYDROCHLORIDE 0.5 MG: 1 INJECTION, SOLUTION INTRAMUSCULAR; INTRAVENOUS; SUBCUTANEOUS at 14:04

## 2024-04-17 RX ADMIN — IPRATROPIUM BROMIDE AND ALBUTEROL SULFATE 3 ML: 2.5; .5 SOLUTION RESPIRATORY (INHALATION) at 07:08

## 2024-04-17 RX ADMIN — HYDROCODONE BITARTRATE AND ACETAMINOPHEN 1 TABLET: 10; 325 TABLET ORAL at 20:04

## 2024-04-17 RX ADMIN — FUROSEMIDE 40 MG: 10 INJECTION INTRAMUSCULAR; INTRAVENOUS at 08:06

## 2024-04-17 RX ADMIN — ALBUTEROL SULFATE 2 PUFF: 90 AEROSOL, METERED RESPIRATORY (INHALATION) at 04:10

## 2024-04-17 RX ADMIN — METOPROLOL SUCCINATE 25 MG: 25 TABLET, EXTENDED RELEASE ORAL at 04:10

## 2024-04-17 RX ADMIN — FERROUS SULFATE TAB 325 MG (65 MG ELEMENTAL FE) 325 MG: 325 (65 FE) TAB at 04:11

## 2024-04-17 RX ADMIN — RIVAROXABAN 20 MG: 20 TABLET, FILM COATED ORAL at 17:24

## 2024-04-17 RX ADMIN — ATORVASTATIN CALCIUM 40 MG: 40 TABLET, FILM COATED ORAL at 21:20

## 2024-04-17 RX ADMIN — IPRATROPIUM BROMIDE AND ALBUTEROL SULFATE 3 ML: 2.5; .5 SOLUTION RESPIRATORY (INHALATION) at 10:15

## 2024-04-17 RX ADMIN — OMEPRAZOLE 20 MG: 20 CAPSULE, DELAYED RELEASE ORAL at 17:24

## 2024-04-17 RX ADMIN — HYDROCODONE BITARTRATE AND ACETAMINOPHEN 1 TABLET: 10; 325 TABLET ORAL at 01:22

## 2024-04-17 RX ADMIN — HYDROCODONE BITARTRATE AND ACETAMINOPHEN 1 TABLET: 10; 325 TABLET ORAL at 04:09

## 2024-04-17 RX ADMIN — OXYMETAZOLINE HCL 2 SPRAY: 0.05 SPRAY NASAL at 17:23

## 2024-04-17 RX ADMIN — IPRATROPIUM BROMIDE AND ALBUTEROL SULFATE 3 ML: 2.5; .5 SOLUTION RESPIRATORY (INHALATION) at 22:24

## 2024-04-17 RX ADMIN — MOMETASONE FUROATE AND FORMOTEROL FUMARATE DIHYDRATE 2 PUFF: 100; 5 AEROSOL RESPIRATORY (INHALATION) at 17:24

## 2024-04-17 RX ADMIN — ALPRAZOLAM 0.25 MG: 0.25 TABLET ORAL at 20:12

## 2024-04-17 RX ADMIN — OXYMETAZOLINE HCL 2 SPRAY: 0.05 SPRAY NASAL at 04:10

## 2024-04-17 ASSESSMENT — ENCOUNTER SYMPTOMS
EYES NEGATIVE: 1
DIAPHORESIS: 1
CLAUDICATION: 0
FEVER: 0
ABDOMINAL PAIN: 0
NEUROLOGICAL NEGATIVE: 1
SHORTNESS OF BREATH: 1
HEMOPTYSIS: 0
SPUTUM PRODUCTION: 0
WHEEZING: 0
WEIGHT LOSS: 0
NERVOUS/ANXIOUS: 1
WEAKNESS: 1
COUGH: 0
ORTHOPNEA: 1
CONSTIPATION: 0
PALPITATIONS: 0
MUSCULOSKELETAL NEGATIVE: 1
NAUSEA: 0
CHILLS: 0
PND: 0
HEARTBURN: 0
DIARRHEA: 0
VOMITING: 0

## 2024-04-17 ASSESSMENT — PAIN DESCRIPTION - PAIN TYPE
TYPE: ACUTE PAIN

## 2024-04-17 ASSESSMENT — FIBROSIS 4 INDEX: FIB4 SCORE: 0.9

## 2024-04-17 NOTE — CARE PLAN
The patient is Stable - Low risk of patient condition declining or worsening    Shift Goals  Clinical Goals: Monitor VS, monitor respiratory status  Patient Goals: pain control  Family Goals: anxiety control    Progress made toward(s) clinical / shift goals:    Problem: Knowledge Deficit - Standard  Goal: Patient and family/care givers will demonstrate understanding of plan of care, disease process/condition, diagnostic tests and medications  Description: Target End Date:  1-3 days or as soon as patient condition allows    Document in Patient Education    1.  Patient and family/caregiver oriented to unit, equipment, visitation policy and means for communicating concern  2.  Complete/review Learning Assessment  3.  Assess knowledge level of disease process/condition, treatment plan, diagnostic tests and medications  4.  Explain disease process/condition, treatment plan, diagnostic tests and medications  Outcome: Progressing  Note: Pt is updated of plan of care at bedside shift report, pt's questions and concerns are addressed. Pts barriers to discharge are addressed.        Problem: Psychosocial  Goal: Patient's ability to verbalize feelings about condition will improve  Description: Target End Date:  Prior to discharge or change in level of care    1.  Discuss coping with medical condition and its effects  2.  Encourage patient participation in care  3.  Encourage acknowledgement of body changes and accompanying emotions  4.  Perform depression screening  Outcome: Progressing  Flowsheets (Taken 4/17/2024 4273)  Condition Will Improve:   Discussed coping with medical condition and its effects   Encouraged patient participation in care   Encouraged acknowledgement of body changes and emotions

## 2024-04-17 NOTE — PROGRESS NOTES
"Pulmonary Progress note      Date of Service: 4/16/2024    Date of Admission:  4/11/2024  4:14 PM    Consulting Provider:  Olga Mendez M.D.     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 male with a past medical history of AAA s/p repair in 2023, HFrEF, PAF S/P ablation and on Xarelto, and chronic tobacco use disorder, who presented to the ED on 4/11 w/ complaints of chest pain and worsening shortness of breath for the past month or so. He reports never having felt at baseline since AAA repair in 01/2023, has been getting increasingly SOB since then but has gotten worse in the past month. He also reports hoarseness of voice, it is unclear exactly when this started, pt states \"this is new\" but unable to tell if it started within the last few weeks/months or started a year ago but has progressively worsened in the past month.      His recent outpatient echo showed improved EF, some myocardial thickening, so is awaiting outpatient Cardiac MRI and was also going to get a stress test as outpatient but developed worsening SOB hence came to the ED. Stat CTA Aorta in the ED showed normal AAA repair but also showed a left basilar PNA. He was started on Abx Unasyn + Azithro for CAP.  Other work up for SOB and hypoxia include neg CTA chest for PE and neg doppler US of the BLE. He has never had PFTs in the past, but was on atleast two inhalers in the past likely due to COPD. He was started on Duonebs and Dulera this admission and patient endorses most improvement in sxs with the inhalers than anything else.      Pulm consulted because of persistent SOB natalya after an overnight episode of shortness of breath, worsening work of breathing that occurred " "around 2am earlier today. He was noted to have acute respiratory distress, stridor, and audible wheezes, and chest was noted to be \"red\". O2 sats were 99% on 4.5L but he was unable to talk in complete sentences during the episode. Stat racemic epi neb x1 and Duonebs x 2 were given. His breathing improved and stridor resolved with these treatments. No new meds were started prior to this event. ABG showed 7.44/41.8/118/28.8. He was continued on IV solumedrol Q6h x 3 doses to prevent recurrences.      Has hoarseness of voice, unclear timeline of when it started but patient believes this is new/recent and has never seen ENT before.      Smoking history - Smoked 1PPD for 40 years, started cutting down 5 years ago. Now smokes 2-3 cigs/day.     Daily interval updates:    4/16 - Sniff test done yest, results today show limited but non paradoxical movement of the left hemidiaphragm. ENT saw patient yest, moderate edema (?of vocal cords) noted but symmetric vocal fold motion bilaterally on their evaluation, recommended SLP consult but no other interventions from ENT standpoint.   Today, pt reports no changes in sxs, anxiety is worse, and feels even more short of breath whenever his anxiety peaks.   Pt reported another episode of acute SOB that lasted a few mins, feels like he passed out and had a seizure. Endorses increased anxiety. Discussed with primary RN - who said they don't think he ever lost consciousness, had stable vitals, and a different RN noted some \"generalized shaking\" but not seizure like activity. He felt better with atarax and another breathing treatment.     4/17 - No change in sxs per patient, still on 2-3L O2. 2V CXR done today looks fairly normal. WBC uptrending, unclear etiology but doesn't seem to be pulm etiology. SLP saw patient today, recommend outpatient f/u.   Has significant tenderness to touch, swelling, and some redness over the right foot. ?Cellulitis. I've let Dr. Mendez, primary hospitalist " know about this.     Review of Systems   Constitutional:  Positive for diaphoresis and malaise/fatigue. Negative for chills, fever and weight loss.   HENT:          Hoarse voice x 1 year, progressively worsening   Eyes: Negative.    Respiratory:  Positive for shortness of breath. Negative for cough, hemoptysis, sputum production and wheezing.    Cardiovascular:  Positive for orthopnea and leg swelling (mild). Negative for chest pain, palpitations, claudication and PND.   Gastrointestinal:  Negative for abdominal pain, constipation, diarrhea, heartburn, nausea and vomiting.   Genitourinary: Negative.    Musculoskeletal: Negative.    Neurological: Negative.    Endo/Heme/Allergies: Negative.    Psychiatric/Behavioral:  The patient is nervous/anxious.        Home Medications       Reviewed by Alexandra Pearson (Pharmacy Tech) on 04/11/24 at 1905  Med List Status: Complete     Medication Last Dose Status   asa/apap/caffeine (EXCEDRIN) 250-250-65 MG Tab 4/11/2024 Active   loratadine (CLARITIN) 10 MG Tab 4/11/2024 Active   losartan (COZAAR) 25 MG Tab 4/10/2024 Active   metoprolol SR (TOPROL XL) 25 MG TABLET SR 24 HR 4/11/2024 Active   omeprazole (PRILOSEC) 20 MG delayed-release capsule 4/11/2024 Active   ondansetron (ZOFRAN) 4 MG Tab tablet PRN Active   rivaroxaban (XARELTO) 20 MG Tab tablet 4/9/2024 Active                    Social History     Tobacco Use    Smoking status: Every Day     Current packs/day: 0.50     Average packs/day: 0.5 packs/day for 40.0 years (20.0 ttl pk-yrs)     Types: Cigarettes    Smokeless tobacco: Never    Tobacco comments:     Down to 5 cig daily   Vaping Use    Vaping Use: Never used   Substance Use Topics    Alcohol use: No    Drug use: Not Currently     Comment: past problems with narcotics not since 2019        Past Medical History:   Diagnosis Date    Arrhythmia     Breath shortness 06/09/2023    prior to 1/2023 surgery    Cyclic vomiting syndrome     Elevated hemidiaphragm - LEFT post AVR  "01/04/2023    Fracture     Headache, classical migraine     Heart burn     Heart valve disease 06/09/2023    heart surgery in 1/2023    Indigestion     Pneumonia due to infectious organism 01/20/2023    Snoring        Past Surgical History:   Procedure Laterality Date    AORTIC VALVE REPLACEMENT  1/5/2023    Procedure: AORTIC VALVE REPLACEMENT, ASCENDING AORTIC ANEURYSM REPAIR AND TRANSESOPHAGEAL ECHOCARDIOGRAM.;  Surgeon: Serena Goyal M.D.;  Location: SURGERY [facility];  Service: Cardiac    AORTIC ASCENDING DISSECTION  1/5/2023    Procedure: REPAIR, ANEURYSM OR DISSECTION, AORTA, ASCENDING;  Surgeon: Serena Goyal M.D.;  Location: SURGERY [facility];  Service: Cardiac    ECHOCARDIOGRAM, TRANSESOPHAGEAL, INTRAOPERATIVE  1/5/2023    Procedure: ECHOCARDIOGRAM, TRANSESOPHAGEAL, INTRAOPERATIVE.;  Surgeon: Serena Goyal M.D.;  Location: SURGERY [facility];  Service: Cardiac    IRRIGATION & DEBRIDEMENT ORTHO Right 09/19/2017    Procedure: IRRIGATION & DEBRIDEMENT ORTHO-THIGH;  Surgeon: Corbin Rivera M.D.;  Location: SURGERY [facility];  Service: Orthopedics    SHOULDER HEMICAP RESURFACING Right 10/12/2015    Procedure: RIGHT SHOULDER RESURFACING;  Surgeon: Javon Doll M.D.;  Location: SURGERY [facility];  Service:     SHOULDER ARTHROSCOPY      x3    SHOULDER SURGERY      replacement right       Allergies: Morphine    History reviewed. No pertinent family history.    Pulmonary-Specific Vital Signs  Vitals  Blood Pressure: 128/81  Temperature: 36.9 °C (98.4 °F)  Temp src: Temporal  Pulse: 92  Respiration: 20  Pulse Oximetry: 92 %  Height: 175.3 cm (5' 9.02\")  Weight: 96.4 kg (212 lb 8.4 oz)    Physical Examination  Physical Exam  Vitals and nursing note reviewed.   Constitutional:       General: He is not in acute distress.     Appearance: He is overweight. He is ill-appearing.   HENT:      Head: Normocephalic and atraumatic.      Comments: Hoarse voice     Mouth/Throat:      Mouth: Mucous " membranes are moist.   Eyes:      Pupils: Pupils are equal, round, and reactive to light.   Cardiovascular:      Rate and Rhythm: Normal rate and regular rhythm.      Pulses: Normal pulses.      Heart sounds: Murmur (systolic) heard.      No gallop.   Pulmonary:      Effort: No respiratory distress.      Breath sounds: Decreased air movement (left lung base) present. No stridor. Examination of the left-lower field reveals decreased breath sounds. Decreased breath sounds present. No wheezing, rhonchi or rales.      Comments: Right lung expansion > left lung expansion.   Abdominal:      General: Abdomen is flat. Bowel sounds are normal. There is no distension.      Palpations: Abdomen is soft.      Tenderness: There is no abdominal tenderness.   Musculoskeletal:         General: Normal range of motion.      Cervical back: Normal range of motion and neck supple.      Right lower leg: Edema present.      Left lower leg: Edema present.      Comments: 1+ pitting pedal edema bilaterally   Skin:     General: Skin is warm and dry.      Capillary Refill: Capillary refill takes 2 to 3 seconds.   Neurological:      General: No focal deficit present.      Mental Status: He is alert and oriented to person, place, and time. Mental status is at baseline.      Cranial Nerves: No cranial nerve deficit.      Motor: No weakness.   Psychiatric:         Mood and Affect: Mood normal.         Behavior: Behavior normal.         Thought Content: Thought content normal.         Judgment: Judgment normal.      Comments: Slightly anxious             Intake/Output Summary (Last 24 hours) at 4/16/2024 1626  Last data filed at 4/16/2024 0900  Gross per 24 hour   Intake 960 ml   Output 1000 ml   Net -40 ml       Recent Labs     04/15/24  0231 04/16/24  0057 04/17/24  0041   WBC 18.0* 17.0* 23.2*   NEUTSPOLYS 77.00*  --   --    LYMPHOCYTES 7.70*  --   --    MONOCYTES 10.50  --   --    EOSINOPHILS 2.70  --   --    BASOPHILS 0.70  --   --    ASTSGOT  57*  --   --    ALTSGPT 51*  --   --    ALKPHOSPHAT 31  --   --    TBILIRUBIN 0.6  --   --      Recent Labs     04/15/24  0231 04/16/24  0057 04/17/24  0041   SODIUM 135 133* 136   POTASSIUM 4.7 5.3 5.9*   CHLORIDE 98 97 98   CO2 25 26 27   BUN 20 24* 27*   CREATININE 0.88 0.98 1.12   MAGNESIUM  --  2.2 2.1   PHOSPHORUS  --  2.5 3.0   CALCIUM 8.6 8.5 8.6     Recent Labs     04/15/24  0231 04/16/24  0057 04/17/24  0041   ALTSGPT 51*  --   --    ASTSGOT 57*  --   --    ALKPHOSPHAT 31  --   --    TBILIRUBIN 0.6  --   --    GLUCOSE 83 141* 108*     Recent Labs     04/15/24  0234   ISTATAPH 7.446   ISTATAPCO2 41.8*   ISTATAPO2 118*   ISTATATCO2 30   HGDITIF2MOC 99   ISTATARTHCO3 28.8*   ISTATARTBE 4*   ISTATTEMP 99.0 F   ISTATSPEC Arterial   ISTATAPHTC 7.443   NNLQTSWE8LC 119*     DX SNIFF TEST   Final Result      Limited but nonparadoxical motion of the LEFT diaphragm      DX-CHEST-PORTABLE (1 VIEW)   Final Result         1.  Left basilar atelectasis or subtle infiltrate.   2.  Atherosclerosis      US-EXTREMITY VENOUS LOWER BILAT   Final Result      CT-CTA CHEST PULMONARY ARTERY W/ RECONS   Final Result         1. No CT evidence of pulmonary embolism.      2. Airspace opacity in the left lower lobe, likely atelectasis      CT-CTA COMPLETE THORACOABDOMINAL AORTA   Final Result      1.  Surgical changes of the aortic valve and ascending aorta.   2.  No aortic aneurysm or dissection identified.   3.  Mild/moderate atherosclerotic changes.   4.  Punctate, nonobstructing right renal stone.   5.  Elevation of the left hemidiaphragm with adjacent basilar consolidations, likely atelectasis. Pneumonia not excluded.   6.  Distal colonic diverticulosis.   7.  Hepatomegaly.                  CT-CTA HEAD WITH & W/O-POST PROCESS   Final Result      1.  Severely suboptimal exam due to photon starvation.   2.  No gross acute intracranial abnormality.   3.  No proximal vessel occlusion.      CT-CTA NECK WITH & W/O-POST PROCESSING   Final  Result   Addendum (preliminary) 1 of 1   100 mL Omnipaque 300 given IV.      Final      No acute arterial abnormality of the neck.          Patient Active Problem List   Diagnosis    Ascending aortic aneurysm repair    Gastroesophageal reflux disease without esophagitis    Hyperkalemia    Leukocytosis, thrombocytosis, erythrocytosis, lymphopenia    Pneumonia    Acute on chronic systolic heart failure (HCC)    Paroxysmal atrial fibrillation (HCC)    COPD (chronic obstructive pulmonary disease) (HCC)    Elevated hemidiaphragm - s/p post AVR    Paroxysmal atrial flutter (HCC)    Tobacco abuse    Secondary hypercoagulable state (HCC)    Nonobstructive atherosclerosis of coronary artery    Chest pain    Opioid use, unspecified, in remission    Goals of care, counseling/discussion    Heart failure with improved ejection fraction (HFimpEF) (HCC)    Atrial fibrillation (HCC)    H/O cardiac radiofrequency ablation    Sepsis (HCC)    Pneumonia due to infectious organism    Right inguinal pain    Lobar pneumonia (HCC)    Tobacco dependence    Left facial numbness and left blurry vision    Headache    Acute hypoxic respiratory failure    Pneumonia due to COVID-19 virus    JENNY (acute kidney injury) (HCC)    Olecranon bursitis of right elbow    Shortness of breath    Community acquired pneumonia of left lower lobe of lung    Microcytic anemia    Dysphonia       Assessment and Recommendations:    # Acute hypoxic respiratory failure  # Presumed COPD  # Smoker  # 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 However, underlying presumed COPD in the setting of 40+ pack years hx of smoking could be a contributing factor.   - He doesn't have any wheezing on exam, no indication for continuing steroids at  this time.   - Treated empirically for CAP w/ 5d of Unasyn/Augmentin and 3d of Azithromycin  - Agree with continuing inhalers for presumed COPD. Formal PFTs after discharge   - Needs both supine and sitting PFTs, pulm rehab, SLP therapy, and referral to thoracic surgery for further work up/management.   - Pulm will sign off today     # Dysphonia  - Although he had a fairly normal ENT exam per consult 4/15, it is still possible that this is related to vocal cord dysfunction from phrenic nerve dysfunction.   - SLP outpatient consult    The patient was seen and plan discussed with my attending, Dr. Castañeda and hospitalist Dr. Mendez.    Thank you for the consult.     Dr. Fanta Lindo MD   [facility] PGY 2 Resident    Please refer to Dr. Castañeda's attestation for additional comments/recommendations.

## 2024-04-17 NOTE — PROGRESS NOTES
Hospital Medicine Daily Progress Note    Date of Service  4/17/2024    Chief Complaint  Noe Hickey is a 63 y.o. male admitted 4/11/2024 with chest pain and sob    Hospital Course  63 y.o. male with hx of AAA repair in January 2023, combined systolic and diastolic congestive heart failure, paroxysmal atrial fibrillation status post ablation on Xarelto, however discontinued for the past few days due to misunderstanding by cardiology provider, ongoing tobacco abuse, who presented 4/11/2024 with sob for past one month. He was just seen cardiology 4/4 and echo notes EF 70%, which has improved from 30% 7/5/23 and there is features of apical variant hypertrophic cardiomyopathy.  Patient was scheduled to have cardiac MRI and stress test next week.  However he presented with worsening shortness of breath and chest pain.    In the emergency room he was a code aorta and a stat CTA aorta was performed with contrast that showed normal AAA repair with no new aneurysmal dilatation or aneurysm. It did show a left-sided basilar pneumonia with a white blood cell count of 27,000 for left shift.   Patient is started with antibiotics. Of note, patient is taking anabolic steroid. Procal negative.   CTA chest no PE  Doppler BLE no signs of DVT  Noted worsening stridor and horse voice on 4/15, pulmonary and ENT consulted  S/p ENT scope 4/15 unremarkable. Does not think dyspnea is related to vocal cords  SNIFF noted limited but nonparadoxical motion of the L diaphragm.     Interval Problem Update  I have seen and examined patient at bedside.    Still very sob. Sat O2 90% while laying without talking, however O2 sat drop while speaking and walking  Speech evaluation for vocal cord dysfunction  Continue duoneb, dulera   Anxiety, on Xanax prn  Patient reports significant headache, I have ordered fioricet prn. No neurologic deficit  K 5.9 this am. I ordered one dose of iv lasix. K 5.0.  Discussed with pulmonary     I have discussed  this patient's plan of care and discharge plan at IDT rounds today with Case Management, Nursing, Nursing leadership, and other members of the IDT team.    Consultants/Specialty  cardiology  Pulmonary  ENT    Code Status  Full Code    Disposition  The patient is not medically cleared for discharge to home or a post-acute facility.      I have placed the appropriate orders for post-discharge needs.    Review of Systems  Review of Systems   Constitutional:  Positive for malaise/fatigue.   Respiratory:  Positive for shortness of breath.    Cardiovascular:  Positive for chest pain.   Neurological:  Positive for weakness.   All other systems reviewed and are negative.       Physical Exam  Temp:  [36.7 °C (98.1 °F)-37.4 °C (99.3 °F)] 36.9 °C (98.5 °F)  Pulse:  [80-95] 80  Resp:  [16-20] 16  BP: (127-135)/(77-95) 127/77  SpO2:  [92 %-96 %] 95 %    Physical Exam  Vitals and nursing note reviewed.   Constitutional:       Appearance: Normal appearance. He is obese. He is ill-appearing.   HENT:      Head: Normocephalic and atraumatic.      Mouth/Throat:      Pharynx: Oropharynx is clear.   Eyes:      Pupils: Pupils are equal, round, and reactive to light.   Neck:      Vascular: No carotid bruit.   Cardiovascular:      Rate and Rhythm: Normal rate and regular rhythm.   Pulmonary:      Effort: Pulmonary effort is normal. No respiratory distress.      Breath sounds: Normal breath sounds. No wheezing.      Comments: Diminished breath sound bilateral  Abdominal:      General: Abdomen is flat. Bowel sounds are normal.      Palpations: Abdomen is soft. There is no mass.   Musculoskeletal:         General: Normal range of motion.      Cervical back: Neck supple.   Skin:     General: Skin is warm and dry.   Neurological:      General: No focal deficit present.      Mental Status: He is alert and oriented to person, place, and time.   Psychiatric:         Mood and Affect: Mood normal.         Behavior: Behavior normal.          Fluids    Intake/Output Summary (Last 24 hours) at 4/17/2024 1420  Last data filed at 4/17/2024 1114  Gross per 24 hour   Intake 1520 ml   Output 1950 ml   Net -430 ml       Laboratory  Recent Labs     04/15/24  0231 04/16/24  0057 04/17/24  0041   WBC 18.0* 17.0* 23.2*   RBC 5.50 5.21 5.18   HEMOGLOBIN 10.9* 10.3* 10.6*   HEMATOCRIT 35.9* 35.2* 35.2*   MCV 65.3* 67.6* 68.0*   MCH 19.8* 19.8* 20.5*   MCHC 30.4* 29.3* 30.1*   RDW 48.3 50.1* 51.0*   PLATELETCT 537* 517* 567*   MPV 10.1 9.7 10.1     Recent Labs     04/16/24  0057 04/17/24  0041 04/17/24  1117   SODIUM 133* 136 136   POTASSIUM 5.3 5.9* 5.0   CHLORIDE 97 98 96   CO2 26 27 30   GLUCOSE 141* 108* 127*   BUN 24* 27* 30*   CREATININE 0.98 1.12 1.09   CALCIUM 8.5 8.6 8.8                     Imaging  DX SNIFF TEST   Final Result      Limited but nonparadoxical motion of the LEFT diaphragm      DX-CHEST-PORTABLE (1 VIEW)   Final Result         1.  Left basilar atelectasis or subtle infiltrate.   2.  Atherosclerosis      US-EXTREMITY VENOUS LOWER BILAT   Final Result      CT-CTA CHEST PULMONARY ARTERY W/ RECONS   Final Result         1. No CT evidence of pulmonary embolism.      2. Airspace opacity in the left lower lobe, likely atelectasis      CT-CTA COMPLETE THORACOABDOMINAL AORTA   Final Result      1.  Surgical changes of the aortic valve and ascending aorta.   2.  No aortic aneurysm or dissection identified.   3.  Mild/moderate atherosclerotic changes.   4.  Punctate, nonobstructing right renal stone.   5.  Elevation of the left hemidiaphragm with adjacent basilar consolidations, likely atelectasis. Pneumonia not excluded.   6.  Distal colonic diverticulosis.   7.  Hepatomegaly.                  CT-CTA HEAD WITH & W/O-POST PROCESS   Final Result      1.  Severely suboptimal exam due to photon starvation.   2.  No gross acute intracranial abnormality.   3.  No proximal vessel occlusion.      CT-CTA NECK WITH & W/O-POST PROCESSING   Final Result    Addendum (preliminary) 1 of 1   100 mL Omnipaque 300 given IV.      Final      No acute arterial abnormality of the neck.      DX-CHEST-2 VIEWS    (Results Pending)        Assessment/Plan  * Shortness of breath- (present on admission)  Assessment & Plan  Has been occurring and progressive in nature for few months  Recently s/b cards and repeated ECHO which showed in an interval normalization of his LVEF to 70% but shows possible there is features of apical variant hypertrophic cardiomyopath  Patient has been scheduled for outpatient cardiac MRI and stress test  Check CTA chest and doppler BLE negative for PE and DVT  SPEP/UPEP pending to r/o amyloid   Cardiology rec outpatient cardiac MRI and stress test    Pulmonary consulted. ?diaphragm motion abnormal. SNIFF noted limited but nonparadoxical motion of the L diaphragm.   S/p ENT scope 4/15 unremarkable. Does not think dyspnea is related to vocal cords  Anxiety   Speech therapy to evaluate vocal cord dysfucntion      Dysphonia  Assessment & Plan  Acute onset, hoarse voice  S/p ENT scope 4/15 unremarkable. Does not think dyspnea is related to vocal cords    Microcytic anemia  Assessment & Plan  No Signs of bleeding  Check iron profile c/w JARROD  Iron supplements  Outpatient GI referral. Patient eeports colonoscopy years ago in [place].     Community acquired pneumonia of left lower lobe of lung- (present on admission)  Assessment & Plan  Denies fever, chills.  Noted profound leukocytosis on admission.  However he is taking anabolic steroids at home, which could explain leukocytosis  Procalcitonin negative  Given his symptoms has been present for months, ?  PNA  Continue empirical abx with Augmentin    Acute hypoxic respiratory failure- (present on admission)  Assessment & Plan  Unclear etiology.   CTA negative for PE  Suspect undiagnosed COPD exacerbation. He has significant smoking history. Also MARIELENA possible contributing   Continue IS, duoneb and  dulera  Pulmonary following     Tobacco dependence- (present on admission)  Assessment & Plan  Trying to cut back, encourage full cessation    Sepsis (HCC)- (present on admission)  Assessment & Plan  This is Sepsis Present on admission  SIRS criteria identified on my evaluation include: Tachypnea, with respirations greater than 20 per minute and Leukocytosis, with WBC greater than 12,000  Clinical indicators of end organ dysfunction include Acute Respiratory Failure - (mechanical ventilation or BiPap or PaO2/FiO2 ratio < 300)  Source is pulmonary  Sepsis protocol initiated  Crystalloid Fluid Administration: Resuscitation volume of 1 liter ordered. Reason that resuscitation volume of less than 30ml/kg was ordered concern for causing harm given CHF  IV antibiotics as appropriate for source of sepsis  Reassessment: I have reassessed the patient's hemodynamic status    ARF with hypoxia, RA saturation 87%  CTA Aorta shows L basilar consolidation, CAP  Continue empiric IV unasyn and oral azithromycin  F/U sputum cultures and blood cultures  Judicious IVF's    Heart failure with improved ejection fraction (HFimpEF) (HCC)- (present on admission)  Assessment & Plan  Echo 4/5/24 notes EF 70%, which has improved from EF 30% 7/5/23   Continue losartan, Toprol   Monitoring respiratory status  I&O, daily weight    Secondary hypercoagulable state (HCC)- (present on admission)  Assessment & Plan  Patient has discontinued Xarelto for the past few days due to misunderstanding by his cardiology provider  Resumed Xarelto      Paroxysmal atrial fibrillation (HCC)- (present on admission)  Assessment & Plan  In NSR  Xarelto as above  Continue outpatient toprol XL    Leukocytosis, thrombocytosis, erythrocytosis, lymphopenia- (present on admission)  Assessment & Plan  Persistent leukocytosis for years. Patient is taking anabolic steroid, which could explain leukocytosis. However he said he has been taking steroid only a few months. He has  persistent leucocytosis for year  Lengthy discussion with patient the risks of long term steroid use. I advised patient to slow wean off. He voiced understanding.   Hematology outpatient referral for further evaluation    Gastroesophageal reflux disease without esophagitis- (present on admission)  Assessment & Plan  Continue omeprazole 20 mg daily    Ascending aortic aneurysm repair- (present on admission)  Assessment & Plan  CTA aorta shows intact repair and no new aneurysmal dilation         VTE prophylaxis: xarelto    I have performed a physical exam and reviewed and updated ROS and Plan today (4/17/2024). In review of yesterday's note (4/16/2024), there are no changes except as documented above.  Patient is has a high medical complexity, complex decision making and is at high risk for complication, morbidity, and mortality.  I spent 53 minutes, reviewing the chart, obtaining and/or reviewing separately obtained history. Performing a medically appropriate examination and evaluation.  Counseling and educating the patient. Ordering and reviewing medications, tests, or procedures.  Discussing the case with pulmonary.  Documenting clinical information in EPIC. Independently interpreting results and communicating results to patient. Discussing future disposition of care with patient, RN and case management.  This does not include time spent on separately billable procedures/tests.

## 2024-04-17 NOTE — CARE PLAN
Problem: Bronchoconstriction  Goal: Improve in air movement and diminished wheezing  Description: Target End Date:  2 to 3 days    1.  Implement inhaled treatments  2.  Evaluate and manage medication effects  Outcome: Progressing   Duo QID

## 2024-04-17 NOTE — PROGRESS NOTES
Telemetry Report  Rhythm Sinus Rhythm  Heart Rate 96  Ectopy    OR 0.156  QRS 0.084  QT 0.387              Per telemetry room monitor

## 2024-04-17 NOTE — THERAPY
"Speech Language Pathology   Communication Evaluation     Patient Name: Noe Hickey  AGE:  63 y.o., SEX:  male  Medical Record #: [id number]  Date of Service: 4/17/2024      History of Present Illness    62 y/o admitted on 4/11 for chest pain, SOB, dyspnea. Not seen by SLP before at [facility].    Per ENT on 4/15: \"Symmetric vocal fold motion bilaterally. Difficult to assess motion well given increased work of breathing, but airway is patent and arytenoids are mobile bilaterally... Scope today essentially clear. Has some edema but likely from coughing and increased WOB. 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       Assessment  Voice was perceived to be hoarse/gravely which he endorsed was worse than baseline. He endorsed an acute change in vocal quality s/p heart surgery in 2023. WOB noted at rest and while speaking, but pt with appropriate breath/voice coordination for speech at the sentence level. WOB did not appear to get worse during speech tasks, however, appeared to become exacerbated with mobility. Cough was perceived to be strong/effective and pt declined coughing out secretions during coughing bouts (usually occurring after respiratory treatments).     Pt endorsed good intake of liquids, but consumes more milk than water. Discussed recommendation for increased water to milk intake. Discussed recommendation for vocal rest, including whispering, when not communicating with staff. Also educated pt on preference for gentle " throat clear vs hard cough to reduce vocal fold trauma and he verbalized good understanding of education for strategies. All questions answered by pt prior to leaving room and MD at bedside.      Clinical Impressions  Pt is presenting with acute on chronic changes in vocal quality s/p heart surgery in 2023. Voice would be better assessed in the outpatient setting with laryngostroboscopy as unfortunately it is not available in this acute care setting. Acute on chronic voice changes are best managed at this time with water hygiene, vocal rest, and reducing VF trauma with gentle throat clearing vs coughing. Pt will benefit from an OP SLP referral for further voice therapy management. No further acute SLP needs at this time.         SLP Treatment Plan  Treatment Plan: None Indicated  SLP Frequency: N/A - Evaluation Only         Anticipated Discharge Needs  Discharge Recommendations: Recommend outpatient speech therapy services  Therapy Recommendations Upon DC: Other (See Comments) (voice)             Dori Garber, SLP

## 2024-04-17 NOTE — CARE PLAN
Problem: Pain - Standard  Goal: Alleviation of pain or a reduction in pain to the patient’s comfort goal  Outcome: Not Progressing     Problem: Hemodynamics  Goal: Patient's hemodynamics, fluid balance and neurologic status will be stable or improve  Outcome: Progressing     Problem: Fluid Volume  Goal: Fluid volume balance will be maintained  Outcome: Progressing     Problem: Respiratory  Goal: Patient will achieve/maintain optimum respiratory ventilation and gas exchange  Outcome: Progressing     Problem: Knowledge Deficit - Standard  Goal: Patient and family/care givers will demonstrate understanding of plan of care, disease process/condition, diagnostic tests and medications  Outcome: Progressing   The patient is Stable - Low risk of patient condition declining or worsening    Shift Goals  Clinical Goals: Stable hemodynamics, pain management  Patient Goals: pain control  Family Goals: HUGH         Patient is not progressing towards the following goals:      Problem: Pain - Standard  Goal: Alleviation of pain or a reduction in pain to the patient’s comfort goal  Outcome: Not Progressing

## 2024-04-17 NOTE — PROGRESS NOTES
Pt is very anxious and is repositioning often, very aggressive in conversation and stating that his call light doesn't get answered for 1.5 hours. Stating that he wants to leave. RN supervisor made aware and talked with pt. According to pt, he pressed his call like during what seemed like a grand mal seizure but the seizure was only in his right leg. He also said that his pain regimen has not been controlling his pain. Hospitalist was contacted for increase the frequency of the pts pain meds per his request. Hospitalist was also contacted for a K+ of 5.9. Pt is oriented x4, call light in reach, refusing fall precautions.

## 2024-04-18 ENCOUNTER — APPOINTMENT (OUTPATIENT)
Dept: RADIOLOGY | Facility: MEDICAL CENTER | Age: 64
DRG: 981 | End: 2024-04-18
Attending: STUDENT IN AN ORGANIZED HEALTH CARE EDUCATION/TRAINING PROGRAM
Payer: MEDICAID

## 2024-04-18 PROBLEM — M79.604 DIFFUSE PAIN IN RIGHT LOWER EXTREMITY: Status: ACTIVE | Noted: 2024-04-18

## 2024-04-18 LAB
ERYTHROCYTE [DISTWIDTH] IN BLOOD BY AUTOMATED COUNT: 49.4 FL (ref 35.9–50)
HCT VFR BLD AUTO: 34.5 % (ref 42–52)
HGB BLD-MCNC: 10.8 G/DL (ref 14–18)
MCH RBC QN AUTO: 20.8 PG (ref 27–33)
MCHC RBC AUTO-ENTMCNC: 31.3 G/DL (ref 32.3–36.5)
MCV RBC AUTO: 66.3 FL (ref 81.4–97.8)
PLATELET # BLD AUTO: 460 K/UL (ref 164–446)
PMV BLD AUTO: 10.3 FL (ref 9–12.9)
RBC # BLD AUTO: 5.2 M/UL (ref 4.7–6.1)
WBC # BLD AUTO: 18.9 K/UL (ref 4.8–10.8)

## 2024-04-18 PROCEDURE — 700111 HCHG RX REV CODE 636 W/ 250 OVERRIDE (IP)

## 2024-04-18 PROCEDURE — 94760 N-INVAS EAR/PLS OXIMETRY 1: CPT

## 2024-04-18 PROCEDURE — 700102 HCHG RX REV CODE 250 W/ 637 OVERRIDE(OP): Performed by: INTERNAL MEDICINE

## 2024-04-18 PROCEDURE — A9270 NON-COVERED ITEM OR SERVICE: HCPCS

## 2024-04-18 PROCEDURE — 700117 HCHG RX CONTRAST REV CODE 255: Performed by: STUDENT IN AN ORGANIZED HEALTH CARE EDUCATION/TRAINING PROGRAM

## 2024-04-18 PROCEDURE — A9270 NON-COVERED ITEM OR SERVICE: HCPCS | Performed by: INTERNAL MEDICINE

## 2024-04-18 PROCEDURE — A9270 NON-COVERED ITEM OR SERVICE: HCPCS | Performed by: STUDENT IN AN ORGANIZED HEALTH CARE EDUCATION/TRAINING PROGRAM

## 2024-04-18 PROCEDURE — 700102 HCHG RX REV CODE 250 W/ 637 OVERRIDE(OP): Performed by: STUDENT IN AN ORGANIZED HEALTH CARE EDUCATION/TRAINING PROGRAM

## 2024-04-18 PROCEDURE — 700102 HCHG RX REV CODE 250 W/ 637 OVERRIDE(OP)

## 2024-04-18 PROCEDURE — 770020 HCHG ROOM/CARE - TELE (206)

## 2024-04-18 PROCEDURE — 73701 CT LOWER EXTREMITY W/DYE: CPT | Mod: RT

## 2024-04-18 PROCEDURE — 700101 HCHG RX REV CODE 250: Performed by: INTERNAL MEDICINE

## 2024-04-18 PROCEDURE — 99233 SBSQ HOSP IP/OBS HIGH 50: CPT | Performed by: STUDENT IN AN ORGANIZED HEALTH CARE EDUCATION/TRAINING PROGRAM

## 2024-04-18 PROCEDURE — 94640 AIRWAY INHALATION TREATMENT: CPT

## 2024-04-18 PROCEDURE — 700111 HCHG RX REV CODE 636 W/ 250 OVERRIDE (IP): Mod: JZ | Performed by: STUDENT IN AN ORGANIZED HEALTH CARE EDUCATION/TRAINING PROGRAM

## 2024-04-18 PROCEDURE — 700105 HCHG RX REV CODE 258: Performed by: STUDENT IN AN ORGANIZED HEALTH CARE EDUCATION/TRAINING PROGRAM

## 2024-04-18 PROCEDURE — 36415 COLL VENOUS BLD VENIPUNCTURE: CPT

## 2024-04-18 PROCEDURE — 85027 COMPLETE CBC AUTOMATED: CPT

## 2024-04-18 RX ORDER — HYDROMORPHONE HYDROCHLORIDE 1 MG/ML
0.5 INJECTION, SOLUTION INTRAMUSCULAR; INTRAVENOUS; SUBCUTANEOUS ONCE
Status: COMPLETED | OUTPATIENT
Start: 2024-04-18 | End: 2024-04-18

## 2024-04-18 RX ADMIN — ALPRAZOLAM 0.25 MG: 0.25 TABLET ORAL at 16:41

## 2024-04-18 RX ADMIN — AMPICILLIN AND SULBACTAM 3 G: 1; 2 INJECTION, POWDER, FOR SOLUTION INTRAMUSCULAR; INTRAVENOUS at 18:01

## 2024-04-18 RX ADMIN — HYDROMORPHONE HYDROCHLORIDE 0.5 MG: 1 INJECTION, SOLUTION INTRAMUSCULAR; INTRAVENOUS; SUBCUTANEOUS at 01:27

## 2024-04-18 RX ADMIN — IPRATROPIUM BROMIDE AND ALBUTEROL SULFATE 3 ML: 2.5; .5 SOLUTION RESPIRATORY (INHALATION) at 07:03

## 2024-04-18 RX ADMIN — HYDROCODONE BITARTRATE AND ACETAMINOPHEN 1 TABLET: 10; 325 TABLET ORAL at 12:55

## 2024-04-18 RX ADMIN — FERROUS SULFATE TAB 325 MG (65 MG ELEMENTAL FE) 325 MG: 325 (65 FE) TAB at 08:09

## 2024-04-18 RX ADMIN — HYDROMORPHONE HYDROCHLORIDE 0.5 MG: 1 INJECTION, SOLUTION INTRAMUSCULAR; INTRAVENOUS; SUBCUTANEOUS at 11:54

## 2024-04-18 RX ADMIN — HYDROCODONE BITARTRATE AND ACETAMINOPHEN 1 TABLET: 10; 325 TABLET ORAL at 08:09

## 2024-04-18 RX ADMIN — MOMETASONE FUROATE AND FORMOTEROL FUMARATE DIHYDRATE 2 PUFF: 100; 5 AEROSOL RESPIRATORY (INHALATION) at 04:31

## 2024-04-18 RX ADMIN — HYDROMORPHONE HYDROCHLORIDE 0.5 MG: 1 INJECTION, SOLUTION INTRAMUSCULAR; INTRAVENOUS; SUBCUTANEOUS at 04:30

## 2024-04-18 RX ADMIN — IPRATROPIUM BROMIDE AND ALBUTEROL SULFATE 3 ML: 2.5; .5 SOLUTION RESPIRATORY (INHALATION) at 23:48

## 2024-04-18 RX ADMIN — BENZONATATE 100 MG: 100 CAPSULE ORAL at 03:26

## 2024-04-18 RX ADMIN — ATORVASTATIN CALCIUM 40 MG: 40 TABLET, FILM COATED ORAL at 21:04

## 2024-04-18 RX ADMIN — METOPROLOL SUCCINATE 25 MG: 25 TABLET, EXTENDED RELEASE ORAL at 04:29

## 2024-04-18 RX ADMIN — OMEPRAZOLE 20 MG: 20 CAPSULE, DELAYED RELEASE ORAL at 04:29

## 2024-04-18 RX ADMIN — OMEPRAZOLE 20 MG: 20 CAPSULE, DELAYED RELEASE ORAL at 16:41

## 2024-04-18 RX ADMIN — HYDROCODONE BITARTRATE AND ACETAMINOPHEN 1 TABLET: 10; 325 TABLET ORAL at 00:19

## 2024-04-18 RX ADMIN — HYDROMORPHONE HYDROCHLORIDE 0.5 MG: 1 INJECTION, SOLUTION INTRAMUSCULAR; INTRAVENOUS; SUBCUTANEOUS at 06:45

## 2024-04-18 RX ADMIN — ALPRAZOLAM 0.25 MG: 0.25 TABLET ORAL at 03:21

## 2024-04-18 RX ADMIN — HYDROCODONE BITARTRATE AND ACETAMINOPHEN 1 TABLET: 10; 325 TABLET ORAL at 03:21

## 2024-04-18 RX ADMIN — HYDROMORPHONE HYDROCHLORIDE 0.5 MG: 1 INJECTION, SOLUTION INTRAMUSCULAR; INTRAVENOUS; SUBCUTANEOUS at 16:41

## 2024-04-18 RX ADMIN — IOHEXOL 100 ML: 350 INJECTION, SOLUTION INTRAVENOUS at 04:15

## 2024-04-18 RX ADMIN — HYDROMORPHONE HYDROCHLORIDE 0.5 MG: 1 INJECTION, SOLUTION INTRAMUSCULAR; INTRAVENOUS; SUBCUTANEOUS at 21:04

## 2024-04-18 RX ADMIN — IPRATROPIUM BROMIDE AND ALBUTEROL SULFATE 3 ML: 2.5; .5 SOLUTION RESPIRATORY (INHALATION) at 14:55

## 2024-04-18 RX ADMIN — HYDROCODONE BITARTRATE AND ACETAMINOPHEN 1 TABLET: 10; 325 TABLET ORAL at 16:28

## 2024-04-18 RX ADMIN — MOMETASONE FUROATE AND FORMOTEROL FUMARATE DIHYDRATE 2 PUFF: 100; 5 AEROSOL RESPIRATORY (INHALATION) at 16:42

## 2024-04-18 RX ADMIN — OXYMETAZOLINE HCL 2 SPRAY: 0.05 SPRAY NASAL at 04:32

## 2024-04-18 RX ADMIN — HYDROCODONE BITARTRATE AND ACETAMINOPHEN 1 TABLET: 10; 325 TABLET ORAL at 19:28

## 2024-04-18 ASSESSMENT — PAIN DESCRIPTION - PAIN TYPE
TYPE: ACUTE PAIN

## 2024-04-18 ASSESSMENT — ENCOUNTER SYMPTOMS
SHORTNESS OF BREATH: 1
WEAKNESS: 1

## 2024-04-18 ASSESSMENT — FIBROSIS 4 INDEX: FIB4 SCORE: 1.11

## 2024-04-18 NOTE — CARE PLAN
The patient is Stable - Low risk of patient condition declining or worsening    Shift Goals  Clinical Goals: pain management, VSS  Patient Goals: pain control  Family Goals: HUGH    Progress made toward(s) clinical / shift goals:      Problem: Hemodynamics  Goal: Patient's hemodynamics, fluid balance and neurologic status will be stable or improve  Outcome: Progressing     Problem: Fluid Volume  Goal: Fluid volume balance will be maintained  Outcome: Progressing     Problem: Psychosocial  Goal: Patient's level of anxiety will decrease  Outcome: Progressing     Problem: Self Care  Goal: Patient will have the ability to perform ADLs independently or with assistance (bathe, groom, dress, toilet and feed)  Outcome: Progressing       Patient is not progressing towards the following goals:  Patient medicated per MAR. After Dilaudid patient states 7/10, no comfort with Thomson. Patient repositioned for comfort with right leg on pillow.    Problem: Pain - Standard  Goal: Alleviation of pain or a reduction in pain to the patient’s comfort goal  Outcome: Not Progressing

## 2024-04-18 NOTE — ASSESSMENT & PLAN NOTE
RLE pain and swelling. Negative DVT.   Ordered CT RLE and CT R foot. Discussed with radiology noted Intramuscular hematoma in the posterior thigh muscular compartment, small intramuscular hematoma in anterior thigh musculature, subcutaneous hematoma along the dorsal foot and diffuse subcutaneous from the proximal thigh extending distally to the foot.  I have discussed with orthopedics, s/p I&D 4/19 and noted R dorsal foot abscess.  Pain management

## 2024-04-18 NOTE — PROGRESS NOTES
Hospital Medicine Daily Progress Note    Date of Service  4/18/2024    Chief Complaint  Noe Hickey is a 63 y.o. male admitted 4/11/2024 with chest pain and sob    Hospital Course  63 y.o. male with hx of AAA repair in January 2023, combined systolic and diastolic congestive heart failure, paroxysmal atrial fibrillation status post ablation on Xarelto, however discontinued for the past few days due to misunderstanding by cardiology provider, ongoing tobacco abuse, who presented 4/11/2024 with sob for past one month. He was just seen cardiology 4/4 and echo notes EF 70%, which has improved from 30% 7/5/23 and there is features of apical variant hypertrophic cardiomyopathy.  Patient was scheduled to have cardiac MRI and stress test next week.  However he presented with worsening shortness of breath and chest pain.    In the emergency room he was a code aorta and a stat CTA aorta was performed with contrast that showed normal AAA repair with no new aneurysmal dilatation or aneurysm. It did show a left-sided basilar pneumonia with a white blood cell count of 27,000 for left shift.   Patient is started with antibiotics. Of note, patient is taking anabolic steroid. Procal negative.   CTA chest no PE  Doppler BLE no signs of DVT  Noted worsening stridor and horse voice on 4/15, pulmonary and ENT consulted  S/p ENT scope 4/15 unremarkable. Does not think dyspnea is related to vocal cords  SNIFF noted limited but nonparadoxical motion of the L diaphragm.     Reports RLE pain and swelling. Negative DVT. Pain is out of proportion. Ordered CT RLE and CT R foot. Discussed with radiology noted Intramuscular hematoma in the posterior thigh muscular compartment, small intramuscular hematoma in anterior thigh musculature, subcutaneous hematoma along the dorsal foot and diffuse subcutaneous if I distracted him from the proximal thigh extending distally to the foot    Interval Problem Update  I have seen and examined  patient at bedside.    RLE pain and swelling. Negative DVT. Pain is out of proportion. Ordered CT RLE and CT R foot. Discussed with radiology noted Intramuscular hematoma in the posterior thigh muscular compartment, small intramuscular hematoma in anterior thigh musculature, subcutaneous hematoma along the dorsal foot and diffuse subcutaneous if I distracted him from the proximal thigh extending distally to the foot  I have discussed with orthopedics, planning OR this pm  NPO   Hold xarelto  Start with iv antibiotics.     I have discussed this patient's plan of care and discharge plan at IDT rounds today with Case Management, Nursing, Nursing leadership, and other members of the IDT team.    Consultants/Specialty  cardiology  Pulmonary  ENT  orthopedics    Code Status  Full Code    Disposition  The patient is not medically cleared for discharge to home or a post-acute facility.      I have placed the appropriate orders for post-discharge needs.    Review of Systems  Review of Systems   Constitutional:  Positive for malaise/fatigue.   Respiratory:  Positive for shortness of breath.    Cardiovascular:  Positive for chest pain.   Neurological:  Positive for weakness.   All other systems reviewed and are negative.       Physical Exam  Temp:  [36.7 °C (98.1 °F)-37.2 °C (99 °F)] 37.2 °C (99 °F)  Pulse:  [71-98] 90  Resp:  [16-20] 20  BP: (116-165)/(75-93) 132/77  SpO2:  [91 %-98 %] 97 %    Physical Exam  Vitals and nursing note reviewed.   Constitutional:       Appearance: Normal appearance. He is obese. He is ill-appearing.   HENT:      Head: Normocephalic and atraumatic.      Mouth/Throat:      Pharynx: Oropharynx is clear.   Eyes:      Pupils: Pupils are equal, round, and reactive to light.   Neck:      Vascular: No carotid bruit.   Cardiovascular:      Rate and Rhythm: Normal rate and regular rhythm.   Pulmonary:      Effort: Pulmonary effort is normal. No respiratory distress.      Breath sounds: Normal breath  sounds. No wheezing.      Comments: Diminished breath sound bilateral  Abdominal:      General: Abdomen is flat. Bowel sounds are normal.      Palpations: Abdomen is soft. There is no mass.   Musculoskeletal:         General: Swelling and tenderness present. Normal range of motion.      Cervical back: Neck supple.      Right lower leg: Edema present.   Skin:     General: Skin is warm and dry.   Neurological:      General: No focal deficit present.      Mental Status: He is alert and oriented to person, place, and time.   Psychiatric:         Mood and Affect: Mood normal.         Behavior: Behavior normal.         Fluids    Intake/Output Summary (Last 24 hours) at 4/18/2024 1556  Last data filed at 4/17/2024 2000  Gross per 24 hour   Intake 240 ml   Output 400 ml   Net -160 ml       Laboratory  Recent Labs     04/16/24  0057 04/17/24  0041 04/18/24  0441   WBC 17.0* 23.2* 18.9*   RBC 5.21 5.18 5.20   HEMOGLOBIN 10.3* 10.6* 10.8*   HEMATOCRIT 35.2* 35.2* 34.5*   MCV 67.6* 68.0* 66.3*   MCH 19.8* 20.5* 20.8*   MCHC 29.3* 30.1* 31.3*   RDW 50.1* 51.0* 49.4   PLATELETCT 517* 567* 460*   MPV 9.7 10.1 10.3     Recent Labs     04/16/24  0057 04/17/24  0041 04/17/24  1117   SODIUM 133* 136 136   POTASSIUM 5.3 5.9* 5.0   CHLORIDE 97 98 96   CO2 26 27 30   GLUCOSE 141* 108* 127*   BUN 24* 27* 30*   CREATININE 0.98 1.12 1.09   CALCIUM 8.5 8.6 8.8                     Imaging  CT-EXTREMITY, LOWER WITH RIGHT   Final Result         1.  Intramuscular hematoma in the posterior thigh muscular compartment, evaluation for component of compartment syndrome recommended as clinically appropriate.   2.  Possible small fluid collection in the anterior thigh musculature which is isodense to muscle, appearance suggesting small intramuscular hematoma.   3.  Intermediate density fluid collection along the dorsal foot, appearance favoring subcutaneous hematoma   4.  Diffuse subcutaneous fat stranding from the proximal thigh extending distally to  the foot, compatible with edema and/or cellulitis.      These findings were discussed with the patient's clinician, Olga Mendez, on 4/18/2024 7:58 AM.      DX-CHEST-2 VIEWS   Final Result      1.  Median sternotomy and aortic valve replacement.   2.  Ongoing elevation of the left hemidiaphragm and subsegmental atelectatic changes at the left lung base. Concordant with prior study and CT-CTA of the chest      DX SNIFF TEST   Final Result      Limited but nonparadoxical motion of the LEFT diaphragm      DX-CHEST-PORTABLE (1 VIEW)   Final Result         1.  Left basilar atelectasis or subtle infiltrate.   2.  Atherosclerosis      US-EXTREMITY VENOUS LOWER BILAT   Final Result      CT-CTA CHEST PULMONARY ARTERY W/ RECONS   Final Result         1. No CT evidence of pulmonary embolism.      2. Airspace opacity in the left lower lobe, likely atelectasis      CT-CTA COMPLETE THORACOABDOMINAL AORTA   Final Result      1.  Surgical changes of the aortic valve and ascending aorta.   2.  No aortic aneurysm or dissection identified.   3.  Mild/moderate atherosclerotic changes.   4.  Punctate, nonobstructing right renal stone.   5.  Elevation of the left hemidiaphragm with adjacent basilar consolidations, likely atelectasis. Pneumonia not excluded.   6.  Distal colonic diverticulosis.   7.  Hepatomegaly.                  CT-CTA HEAD WITH & W/O-POST PROCESS   Final Result      1.  Severely suboptimal exam due to photon starvation.   2.  No gross acute intracranial abnormality.   3.  No proximal vessel occlusion.      CT-CTA NECK WITH & W/O-POST PROCESSING   Final Result   Addendum (preliminary) 1 of 1   100 mL Omnipaque 300 given IV.      Final      No acute arterial abnormality of the neck.           Assessment/Plan  * Shortness of breath- (present on admission)  Assessment & Plan  Has been occurring and progressive in nature for few months  Recently s/b cards and repeated ECHO which showed in an interval normalization of his  LVEF to 70% but shows possible there is features of apical variant hypertrophic cardiomyopath  Patient has been scheduled for outpatient cardiac MRI and stress test  Check CTA chest and doppler BLE negative for PE and DVT  SPEP/UPEP pending to r/o amyloid   Cardiology rec outpatient cardiac MRI and stress test    Pulmonary consulted. ?diaphragm motion abnormal. SNIFF noted limited but nonparadoxical motion of the L diaphragm.   S/p ENT scope 4/15 unremarkable. Does not think dyspnea is related to vocal cords  Anxiety   Speech therapy to evaluate vocal cord dysfucntion      Diffuse pain in right lower extremity  Assessment & Plan  RLE pain and swelling. Negative DVT. Pain is out of proportion.   Ordered CT RLE and CT R foot. Discussed with radiology noted Intramuscular hematoma in the posterior thigh muscular compartment, small intramuscular hematoma in anterior thigh musculature, subcutaneous hematoma along the dorsal foot and diffuse subcutaneous if I distracted him from the proximal thigh extending distally to the foot.  I have discussed with orthopedics, planning OR this pm  NPO   Hold xarelto  Start with iv antibiotics.     Dysphonia  Assessment & Plan  Acute onset, hoarse voice  S/p ENT scope 4/15 unremarkable. Does not think dyspnea is related to vocal cords    Microcytic anemia  Assessment & Plan  No Signs of bleeding  Check iron profile c/w JARROD  Iron supplements  Outpatient GI referral. Patient eeports colonoscopy years ago in [place].     Community acquired pneumonia of left lower lobe of lung- (present on admission)  Assessment & Plan  Denies fever, chills.  Noted profound leukocytosis on admission.  However he is taking anabolic steroids at home, which could explain leukocytosis  Procalcitonin negative  Given his symptoms has been present for months, ?  PNA  Continue empirical abx with Augmentin    Acute hypoxic respiratory failure- (present on admission)  Assessment & Plan  Unclear etiology.   CTA  negative for PE  Suspect undiagnosed COPD exacerbation. He has significant smoking history. Also MARIELENA possible contributing   Continue IS, duoneb and dulera  Pulmonary following     Tobacco dependence- (present on admission)  Assessment & Plan  Trying to cut back, encourage full cessation    Sepsis (HCC)- (present on admission)  Assessment & Plan  This is Sepsis Present on admission  SIRS criteria identified on my evaluation include: Tachypnea, with respirations greater than 20 per minute and Leukocytosis, with WBC greater than 12,000  Clinical indicators of end organ dysfunction include Acute Respiratory Failure - (mechanical ventilation or BiPap or PaO2/FiO2 ratio < 300)  Source is pulmonary  Sepsis protocol initiated  Crystalloid Fluid Administration: Resuscitation volume of 1 liter ordered. Reason that resuscitation volume of less than 30ml/kg was ordered concern for causing harm given CHF  IV antibiotics as appropriate for source of sepsis  Reassessment: I have reassessed the patient's hemodynamic status    ARF with hypoxia, RA saturation 87%  CTA Aorta shows L basilar consolidation, CAP  Continue empiric IV unasyn and oral azithromycin  F/U sputum cultures and blood cultures  Judicious IVF's    Heart failure with improved ejection fraction (HFimpEF) (HCC)- (present on admission)  Assessment & Plan  Echo 4/5/24 notes EF 70%, which has improved from EF 30% 7/5/23   Continue losartan, Toprol   Monitoring respiratory status  I&O, daily weight    Secondary hypercoagulable state (HCC)- (present on admission)  Assessment & Plan  Patient has discontinued Xarelto for the past few days due to misunderstanding by his cardiology provider  Resumed Xarelto      Paroxysmal atrial fibrillation (HCC)- (present on admission)  Assessment & Plan  In NSR  Xarelto as above  Continue outpatient toprol XL    Leukocytosis, thrombocytosis, erythrocytosis, lymphopenia- (present on admission)  Assessment & Plan  Persistent leukocytosis  for years. Patient is taking anabolic steroid, which could explain leukocytosis. However he said he has been taking steroid only a few months. He has persistent leucocytosis for year  Lengthy discussion with patient the risks of long term steroid use. I advised patient to slow wean off. He voiced understanding.   Hematology outpatient referral for further evaluation    Gastroesophageal reflux disease without esophagitis- (present on admission)  Assessment & Plan  Continue omeprazole 20 mg daily    Ascending aortic aneurysm repair- (present on admission)  Assessment & Plan  CTA aorta shows intact repair and no new aneurysmal dilation         VTE prophylaxis: hold for procedure    CRITICAL CARE    Patient is critically ill. I have personally seen and evaluated patient in room as internal medicine hospitalist services.  The patient continues to have: RLE pain and swelling, concerning compartment syndrome  The vital organ system that is affected is the: Muscle skeletal system  If untreated there is a high chance of deterioration into: worsening RLE ischemia, cardiovascular collapse, cardiac arrest, and eventually death.   I provided critical care services, which included medication orders, frequent reevaluations of the patient's condition and response to treatment, ordering and reviewing test results, and discussing the case with various consultants.  Review chart for interventions.  The critical that has been undertaken is medically complex.   There has been no overlap in critical care time.   Critical Care Time not including procedures: 51 minutes    I have discussed with RN and other consultants about patient's plan.

## 2024-04-18 NOTE — PROGRESS NOTES
Bedside report received at 1900 and  assumed care of patient. Resting in bed, endorsing 9/10 pain. Medicated per MAR. A&O x4 on 3L NC. Tele monitoring in place. Educated on fall risk, all fall precautions in place. Call light within reach, bed locked and in lowest position, denied other needs at this time. Hourly rounding in place.

## 2024-04-19 ENCOUNTER — ANESTHESIA (OUTPATIENT)
Dept: SURGERY | Facility: MEDICAL CENTER | Age: 64
DRG: 981 | End: 2024-04-19
Payer: MEDICAID

## 2024-04-19 ENCOUNTER — APPOINTMENT (OUTPATIENT)
Dept: RADIOLOGY | Facility: MEDICAL CENTER | Age: 64
DRG: 981 | End: 2024-04-19
Attending: STUDENT IN AN ORGANIZED HEALTH CARE EDUCATION/TRAINING PROGRAM
Payer: MEDICAID

## 2024-04-19 ENCOUNTER — ANESTHESIA EVENT (OUTPATIENT)
Dept: SURGERY | Facility: MEDICAL CENTER | Age: 64
DRG: 981 | End: 2024-04-19
Payer: MEDICAID

## 2024-04-19 PROBLEM — L02.611 ABSCESS OF RIGHT FOOT: Status: ACTIVE | Noted: 2024-04-19

## 2024-04-19 LAB
ANION GAP SERPL CALC-SCNC: 11 MMOL/L (ref 7–16)
BUN SERPL-MCNC: 25 MG/DL (ref 8–22)
CALCIUM SERPL-MCNC: 8.7 MG/DL (ref 8.5–10.5)
CHLORIDE SERPL-SCNC: 97 MMOL/L (ref 96–112)
CO2 SERPL-SCNC: 27 MMOL/L (ref 20–33)
CREAT SERPL-MCNC: 0.87 MG/DL (ref 0.5–1.4)
ERYTHROCYTE [DISTWIDTH] IN BLOOD BY AUTOMATED COUNT: 50.3 FL (ref 35.9–50)
GFR SERPLBLD CREATININE-BSD FMLA CKD-EPI: 96 ML/MIN/1.73 M 2
GLUCOSE SERPL-MCNC: 79 MG/DL (ref 65–99)
GRAM STN SPEC: NORMAL
HCT VFR BLD AUTO: 33.9 % (ref 42–52)
HGB BLD-MCNC: 10.1 G/DL (ref 14–18)
MCH RBC QN AUTO: 20.2 PG (ref 27–33)
MCHC RBC AUTO-ENTMCNC: 29.8 G/DL (ref 32.3–36.5)
MCV RBC AUTO: 67.7 FL (ref 81.4–97.8)
PLATELET # BLD AUTO: 455 K/UL (ref 164–446)
PMV BLD AUTO: 10.2 FL (ref 9–12.9)
POTASSIUM SERPL-SCNC: 5.1 MMOL/L (ref 3.6–5.5)
PROCALCITONIN SERPL-MCNC: 0.2 NG/ML
RBC # BLD AUTO: 5.01 M/UL (ref 4.7–6.1)
SCCMEC + MECA PNL NOSE NAA+PROBE: NEGATIVE
SIGNIFICANT IND 70042: NORMAL
SITE SITE: NORMAL
SODIUM SERPL-SCNC: 135 MMOL/L (ref 135–145)
SOURCE SOURCE: NORMAL
WBC # BLD AUTO: 16.2 K/UL (ref 4.8–10.8)

## 2024-04-19 PROCEDURE — 87070 CULTURE OTHR SPECIMN AEROBIC: CPT

## 2024-04-19 PROCEDURE — 770020 HCHG ROOM/CARE - TELE (206)

## 2024-04-19 PROCEDURE — A9270 NON-COVERED ITEM OR SERVICE: HCPCS

## 2024-04-19 PROCEDURE — 700102 HCHG RX REV CODE 250 W/ 637 OVERRIDE(OP): Performed by: INTERNAL MEDICINE

## 2024-04-19 PROCEDURE — 28003 TREATMENT OF FOOT INFECTION: CPT | Mod: RT | Performed by: ORTHOPAEDIC SURGERY

## 2024-04-19 PROCEDURE — 160048 HCHG OR STATISTICAL LEVEL 1-5: Performed by: ORTHOPAEDIC SURGERY

## 2024-04-19 PROCEDURE — 99233 SBSQ HOSP IP/OBS HIGH 50: CPT | Performed by: STUDENT IN AN ORGANIZED HEALTH CARE EDUCATION/TRAINING PROGRAM

## 2024-04-19 PROCEDURE — 700102 HCHG RX REV CODE 250 W/ 637 OVERRIDE(OP): Performed by: STUDENT IN AN ORGANIZED HEALTH CARE EDUCATION/TRAINING PROGRAM

## 2024-04-19 PROCEDURE — 160035 HCHG PACU - 1ST 60 MINS PHASE I: Performed by: ORTHOPAEDIC SURGERY

## 2024-04-19 PROCEDURE — 99222 1ST HOSP IP/OBS MODERATE 55: CPT | Mod: 25 | Performed by: ORTHOPAEDIC SURGERY

## 2024-04-19 PROCEDURE — A9270 NON-COVERED ITEM OR SERVICE: HCPCS | Performed by: INTERNAL MEDICINE

## 2024-04-19 PROCEDURE — 160009 HCHG ANES TIME/MIN: Performed by: ORTHOPAEDIC SURGERY

## 2024-04-19 PROCEDURE — 36415 COLL VENOUS BLD VENIPUNCTURE: CPT

## 2024-04-19 PROCEDURE — 700101 HCHG RX REV CODE 250: Performed by: STUDENT IN AN ORGANIZED HEALTH CARE EDUCATION/TRAINING PROGRAM

## 2024-04-19 PROCEDURE — 160036 HCHG PACU - EA ADDL 30 MINS PHASE I: Performed by: ORTHOPAEDIC SURGERY

## 2024-04-19 PROCEDURE — 87075 CULTR BACTERIA EXCEPT BLOOD: CPT

## 2024-04-19 PROCEDURE — 700111 HCHG RX REV CODE 636 W/ 250 OVERRIDE (IP): Performed by: STUDENT IN AN ORGANIZED HEALTH CARE EDUCATION/TRAINING PROGRAM

## 2024-04-19 PROCEDURE — 87641 MR-STAPH DNA AMP PROBE: CPT

## 2024-04-19 PROCEDURE — 160027 HCHG SURGERY MINUTES - 1ST 30 MINS LEVEL 2: Performed by: ORTHOPAEDIC SURGERY

## 2024-04-19 PROCEDURE — 84145 PROCALCITONIN (PCT): CPT

## 2024-04-19 PROCEDURE — 700105 HCHG RX REV CODE 258: Performed by: STUDENT IN AN ORGANIZED HEALTH CARE EDUCATION/TRAINING PROGRAM

## 2024-04-19 PROCEDURE — 700102 HCHG RX REV CODE 250 W/ 637 OVERRIDE(OP)

## 2024-04-19 PROCEDURE — 51798 US URINE CAPACITY MEASURE: CPT

## 2024-04-19 PROCEDURE — 700105 HCHG RX REV CODE 258

## 2024-04-19 PROCEDURE — 85027 COMPLETE CBC AUTOMATED: CPT

## 2024-04-19 PROCEDURE — 160002 HCHG RECOVERY MINUTES (STAT): Performed by: ORTHOPAEDIC SURGERY

## 2024-04-19 PROCEDURE — A9270 NON-COVERED ITEM OR SERVICE: HCPCS | Performed by: STUDENT IN AN ORGANIZED HEALTH CARE EDUCATION/TRAINING PROGRAM

## 2024-04-19 PROCEDURE — 71045 X-RAY EXAM CHEST 1 VIEW: CPT

## 2024-04-19 PROCEDURE — 87205 SMEAR GRAM STAIN: CPT

## 2024-04-19 PROCEDURE — 80048 BASIC METABOLIC PNL TOTAL CA: CPT

## 2024-04-19 PROCEDURE — 0KBV0ZZ EXCISION OF RIGHT FOOT MUSCLE, OPEN APPROACH: ICD-10-PCS | Performed by: ORTHOPAEDIC SURGERY

## 2024-04-19 RX ORDER — HYDROMORPHONE HYDROCHLORIDE 1 MG/ML
0.5 INJECTION, SOLUTION INTRAMUSCULAR; INTRAVENOUS; SUBCUTANEOUS ONCE
Status: COMPLETED | OUTPATIENT
Start: 2024-04-19 | End: 2024-04-19

## 2024-04-19 RX ORDER — IPRATROPIUM BROMIDE AND ALBUTEROL SULFATE 2.5; .5 MG/3ML; MG/3ML
3 SOLUTION RESPIRATORY (INHALATION)
Status: DISCONTINUED | OUTPATIENT
Start: 2024-04-19 | End: 2024-04-19 | Stop reason: HOSPADM

## 2024-04-19 RX ORDER — HALOPERIDOL 5 MG/ML
1 INJECTION INTRAMUSCULAR
Status: DISCONTINUED | OUTPATIENT
Start: 2024-04-19 | End: 2024-04-19 | Stop reason: HOSPADM

## 2024-04-19 RX ORDER — PHENYLEPHRINE HCL IN 0.9% NACL 0.5 MG/5ML
SYRINGE (ML) INTRAVENOUS PRN
Status: DISCONTINUED | OUTPATIENT
Start: 2024-04-19 | End: 2024-04-19 | Stop reason: SURG

## 2024-04-19 RX ORDER — SODIUM CHLORIDE 9 MG/ML
INJECTION, SOLUTION INTRAVENOUS CONTINUOUS
Status: DISCONTINUED | OUTPATIENT
Start: 2024-04-19 | End: 2024-04-20

## 2024-04-19 RX ORDER — OXYCODONE HYDROCHLORIDE 10 MG/1
10 TABLET ORAL
Status: DISCONTINUED | OUTPATIENT
Start: 2024-04-19 | End: 2024-04-20

## 2024-04-19 RX ORDER — LIDOCAINE HYDROCHLORIDE 20 MG/ML
INJECTION, SOLUTION EPIDURAL; INFILTRATION; INTRACAUDAL; PERINEURAL PRN
Status: DISCONTINUED | OUTPATIENT
Start: 2024-04-19 | End: 2024-04-19 | Stop reason: SURG

## 2024-04-19 RX ORDER — SODIUM CHLORIDE, SODIUM LACTATE, POTASSIUM CHLORIDE, CALCIUM CHLORIDE 600; 310; 30; 20 MG/100ML; MG/100ML; MG/100ML; MG/100ML
INJECTION, SOLUTION INTRAVENOUS
Status: DISCONTINUED | OUTPATIENT
Start: 2024-04-19 | End: 2024-04-19 | Stop reason: SURG

## 2024-04-19 RX ORDER — ONDANSETRON 2 MG/ML
INJECTION INTRAMUSCULAR; INTRAVENOUS PRN
Status: DISCONTINUED | OUTPATIENT
Start: 2024-04-19 | End: 2024-04-19 | Stop reason: SURG

## 2024-04-19 RX ORDER — OXYCODONE HCL 5 MG/5 ML
5 SOLUTION, ORAL ORAL
Status: COMPLETED | OUTPATIENT
Start: 2024-04-19 | End: 2024-04-19

## 2024-04-19 RX ORDER — HYDROMORPHONE HYDROCHLORIDE 1 MG/ML
0.5 INJECTION, SOLUTION INTRAMUSCULAR; INTRAVENOUS; SUBCUTANEOUS
Status: DISCONTINUED | OUTPATIENT
Start: 2024-04-19 | End: 2024-04-20

## 2024-04-19 RX ORDER — OXYCODONE HCL 5 MG/5 ML
10 SOLUTION, ORAL ORAL
Status: COMPLETED | OUTPATIENT
Start: 2024-04-19 | End: 2024-04-19

## 2024-04-19 RX ORDER — HYDROMORPHONE HYDROCHLORIDE 1 MG/ML
0.4 INJECTION, SOLUTION INTRAMUSCULAR; INTRAVENOUS; SUBCUTANEOUS
Status: DISCONTINUED | OUTPATIENT
Start: 2024-04-19 | End: 2024-04-19 | Stop reason: HOSPADM

## 2024-04-19 RX ORDER — SODIUM CHLORIDE, SODIUM LACTATE, POTASSIUM CHLORIDE, CALCIUM CHLORIDE 600; 310; 30; 20 MG/100ML; MG/100ML; MG/100ML; MG/100ML
INJECTION, SOLUTION INTRAVENOUS CONTINUOUS
Status: DISCONTINUED | OUTPATIENT
Start: 2024-04-19 | End: 2024-04-19 | Stop reason: HOSPADM

## 2024-04-19 RX ORDER — ONDANSETRON 2 MG/ML
4 INJECTION INTRAMUSCULAR; INTRAVENOUS
Status: DISCONTINUED | OUTPATIENT
Start: 2024-04-19 | End: 2024-04-19 | Stop reason: HOSPADM

## 2024-04-19 RX ORDER — CEFAZOLIN SODIUM 1 G/3ML
INJECTION, POWDER, FOR SOLUTION INTRAMUSCULAR; INTRAVENOUS PRN
Status: DISCONTINUED | OUTPATIENT
Start: 2024-04-19 | End: 2024-04-19 | Stop reason: SURG

## 2024-04-19 RX ORDER — MEPERIDINE HYDROCHLORIDE 25 MG/ML
12.5 INJECTION INTRAMUSCULAR; INTRAVENOUS; SUBCUTANEOUS
Status: DISCONTINUED | OUTPATIENT
Start: 2024-04-19 | End: 2024-04-19 | Stop reason: HOSPADM

## 2024-04-19 RX ORDER — HYDROMORPHONE HYDROCHLORIDE 1 MG/ML
0.1 INJECTION, SOLUTION INTRAMUSCULAR; INTRAVENOUS; SUBCUTANEOUS
Status: DISCONTINUED | OUTPATIENT
Start: 2024-04-19 | End: 2024-04-19 | Stop reason: HOSPADM

## 2024-04-19 RX ORDER — OXYCODONE HYDROCHLORIDE 15 MG/1
15 TABLET ORAL
Status: DISCONTINUED | OUTPATIENT
Start: 2024-04-19 | End: 2024-04-20

## 2024-04-19 RX ORDER — HYDROMORPHONE HYDROCHLORIDE 1 MG/ML
0.2 INJECTION, SOLUTION INTRAMUSCULAR; INTRAVENOUS; SUBCUTANEOUS
Status: DISCONTINUED | OUTPATIENT
Start: 2024-04-19 | End: 2024-04-19 | Stop reason: HOSPADM

## 2024-04-19 RX ORDER — ALBUTEROL SULFATE 90 UG/1
2 AEROSOL, METERED RESPIRATORY (INHALATION) EVERY 4 HOURS PRN
Status: DISCONTINUED | OUTPATIENT
Start: 2024-04-19 | End: 2024-04-24 | Stop reason: HOSPADM

## 2024-04-19 RX ORDER — DIPHENHYDRAMINE HYDROCHLORIDE 50 MG/ML
12.5 INJECTION INTRAMUSCULAR; INTRAVENOUS
Status: DISCONTINUED | OUTPATIENT
Start: 2024-04-19 | End: 2024-04-19 | Stop reason: HOSPADM

## 2024-04-19 RX ADMIN — SODIUM CHLORIDE: 9 INJECTION, SOLUTION INTRAVENOUS at 02:37

## 2024-04-19 RX ADMIN — FENTANYL CITRATE 50 MCG: 50 INJECTION, SOLUTION INTRAMUSCULAR; INTRAVENOUS at 10:57

## 2024-04-19 RX ADMIN — HYDROMORPHONE HYDROCHLORIDE 0.5 MG: 1 INJECTION, SOLUTION INTRAMUSCULAR; INTRAVENOUS; SUBCUTANEOUS at 08:22

## 2024-04-19 RX ADMIN — AMPICILLIN AND SULBACTAM 3 G: 1; 2 INJECTION, POWDER, FOR SOLUTION INTRAMUSCULAR; INTRAVENOUS at 00:16

## 2024-04-19 RX ADMIN — MOMETASONE FUROATE AND FORMOTEROL FUMARATE DIHYDRATE 2 PUFF: 100; 5 AEROSOL RESPIRATORY (INHALATION) at 04:27

## 2024-04-19 RX ADMIN — MOMETASONE FUROATE AND FORMOTEROL FUMARATE DIHYDRATE 2 PUFF: 100; 5 AEROSOL RESPIRATORY (INHALATION) at 17:21

## 2024-04-19 RX ADMIN — HYDROMORPHONE HYDROCHLORIDE 0.5 MG: 1 INJECTION, SOLUTION INTRAMUSCULAR; INTRAVENOUS; SUBCUTANEOUS at 18:15

## 2024-04-19 RX ADMIN — PROPOFOL 200 MG: 10 INJECTION, EMULSION INTRAVENOUS at 11:00

## 2024-04-19 RX ADMIN — BENZONATATE 100 MG: 100 CAPSULE ORAL at 14:41

## 2024-04-19 RX ADMIN — HYDROMORPHONE HYDROCHLORIDE 0.5 MG: 1 INJECTION, SOLUTION INTRAMUSCULAR; INTRAVENOUS; SUBCUTANEOUS at 09:46

## 2024-04-19 RX ADMIN — ONDANSETRON 4 MG: 2 INJECTION INTRAMUSCULAR; INTRAVENOUS at 11:06

## 2024-04-19 RX ADMIN — SODIUM CHLORIDE: 9 INJECTION, SOLUTION INTRAVENOUS at 20:49

## 2024-04-19 RX ADMIN — ALBUTEROL SULFATE 2 PUFF: 90 AEROSOL, METERED RESPIRATORY (INHALATION) at 09:42

## 2024-04-19 RX ADMIN — ATORVASTATIN CALCIUM 40 MG: 40 TABLET, FILM COATED ORAL at 20:46

## 2024-04-19 RX ADMIN — OMEPRAZOLE 20 MG: 20 CAPSULE, DELAYED RELEASE ORAL at 17:20

## 2024-04-19 RX ADMIN — HYDROCODONE BITARTRATE AND ACETAMINOPHEN 1 TABLET: 10; 325 TABLET ORAL at 17:01

## 2024-04-19 RX ADMIN — HYDROMORPHONE HYDROCHLORIDE 0.5 MG: 1 INJECTION, SOLUTION INTRAMUSCULAR; INTRAVENOUS; SUBCUTANEOUS at 04:13

## 2024-04-19 RX ADMIN — ONDANSETRON 4 MG: 4 TABLET, ORALLY DISINTEGRATING ORAL at 17:21

## 2024-04-19 RX ADMIN — SODIUM CHLORIDE, POTASSIUM CHLORIDE, SODIUM LACTATE AND CALCIUM CHLORIDE: 600; 310; 30; 20 INJECTION, SOLUTION INTRAVENOUS at 10:54

## 2024-04-19 RX ADMIN — HYDROCODONE BITARTRATE AND ACETAMINOPHEN 1 TABLET: 10; 325 TABLET ORAL at 20:56

## 2024-04-19 RX ADMIN — AMPICILLIN AND SULBACTAM 3 G: 1; 2 INJECTION, POWDER, FOR SOLUTION INTRAMUSCULAR; INTRAVENOUS at 04:20

## 2024-04-19 RX ADMIN — VANCOMYCIN HYDROCHLORIDE 2500 MG: 5 INJECTION, POWDER, LYOPHILIZED, FOR SOLUTION INTRAVENOUS at 17:06

## 2024-04-19 RX ADMIN — HYDROMORPHONE HYDROCHLORIDE 0.5 MG: 1 INJECTION, SOLUTION INTRAMUSCULAR; INTRAVENOUS; SUBCUTANEOUS at 14:19

## 2024-04-19 RX ADMIN — OMEPRAZOLE 20 MG: 20 CAPSULE, DELAYED RELEASE ORAL at 04:13

## 2024-04-19 RX ADMIN — HYDROCODONE BITARTRATE AND ACETAMINOPHEN 1 TABLET: 10; 325 TABLET ORAL at 00:01

## 2024-04-19 RX ADMIN — Medication 100 MCG: at 11:04

## 2024-04-19 RX ADMIN — HYDROCODONE BITARTRATE AND ACETAMINOPHEN 1 TABLET: 10; 325 TABLET ORAL at 03:16

## 2024-04-19 RX ADMIN — HYDROCODONE BITARTRATE AND ACETAMINOPHEN 1 TABLET: 10; 325 TABLET ORAL at 06:40

## 2024-04-19 RX ADMIN — CEFAZOLIN 2 G: 1 INJECTION, POWDER, FOR SOLUTION INTRAMUSCULAR; INTRAVENOUS at 11:03

## 2024-04-19 RX ADMIN — LIDOCAINE HYDROCHLORIDE 100 MG: 20 INJECTION, SOLUTION EPIDURAL; INFILTRATION; INTRACAUDAL at 11:00

## 2024-04-19 RX ADMIN — HYDROMORPHONE HYDROCHLORIDE 0.5 MG: 1 INJECTION, SOLUTION INTRAMUSCULAR; INTRAVENOUS; SUBCUTANEOUS at 01:04

## 2024-04-19 RX ADMIN — IPRATROPIUM BROMIDE AND ALBUTEROL SULFATE 3 ML: .5; 3 SOLUTION RESPIRATORY (INHALATION) at 12:17

## 2024-04-19 RX ADMIN — METOPROLOL SUCCINATE 25 MG: 25 TABLET, EXTENDED RELEASE ORAL at 04:25

## 2024-04-19 RX ADMIN — AMPICILLIN AND SULBACTAM 3 G: 1; 2 INJECTION, POWDER, FOR SOLUTION INTRAMUSCULAR; INTRAVENOUS at 20:50

## 2024-04-19 RX ADMIN — OXYCODONE HYDROCHLORIDE 5 MG: 5 SOLUTION ORAL at 12:10

## 2024-04-19 RX ADMIN — HYDROMORPHONE HYDROCHLORIDE 0.5 MG: 1 INJECTION, SOLUTION INTRAMUSCULAR; INTRAVENOUS; SUBCUTANEOUS at 22:16

## 2024-04-19 ASSESSMENT — PAIN DESCRIPTION - PAIN TYPE
TYPE: ACUTE PAIN
TYPE: ACUTE PAIN
TYPE: ACUTE PAIN;SURGICAL PAIN
TYPE: SURGICAL PAIN

## 2024-04-19 ASSESSMENT — ENCOUNTER SYMPTOMS
WEAKNESS: 1
SHORTNESS OF BREATH: 1

## 2024-04-19 ASSESSMENT — FIBROSIS 4 INDEX: FIB4 SCORE: 1.11

## 2024-04-19 NOTE — OR NURSING
1116: Pt arrived from OR, handoff received from anesthesiologist and RN. Patient asleep, breathing even and unlabored. Vitals stable. RLE dressing C/D/I, toes pink/warm.       1145: Patient awake, oriented x4, moving all extremities. Denies pain. CMS intact.     1215: Medicated for pain per MAR. Patient with congested cough, expiratory wheezes noted, duo neb administrated. Wife updated on status.     1230: Wheezing improved. Patient tolerating 3L oxygen via cannula.     1300: Patient sleeping, wakes easily. On continuous CO2 monitoring. Per anesthesia, congested cough was present prior to surgery, okay to send patient back to tele.     1320:  given to T7 RNCandace. Dressing remains C/D/I.     1335: Patient transported to T705, on monitor.  Tele box in place.

## 2024-04-19 NOTE — ANESTHESIA PREPROCEDURE EVALUATION
Case: [id number] Date/Time: 04/19/24 1015    Procedure: INCISION AND DRAINAGE, LEG (Left: Leg Upper)    Anesthesia type: General    Pre-op diagnosis: foot abscess    Location: [facility] OR 04 / SURGERY [facility]    Surgeons: Mitch Bowie M.D.            Admitted for respiratory failure 2/2 COPD exacerbation vs PNA vs CHF exacerbation.   Pulm following.  Elevated palmira diaphragm.    H/o aortic aneurysm s/p repair, s/p AVR.  Eval by cardiology.  EF 70%, normal bioprosthetic valve function.    S/p eval by ENT for vocal cord dysfunction - unremarkable.    Foot abscess, septic.      Relevant Problems   PULMONARY   (positive) COPD (chronic obstructive pulmonary disease) (HCC)   (positive) Community acquired pneumonia of left lower lobe of lung   (positive) Lobar pneumonia (HCC)   (positive) Pneumonia   (positive) Pneumonia due to COVID-19 virus   (positive) Pneumonia due to infectious organism   (positive) Shortness of breath      NEURO   (positive) Headache      CARDIAC   (positive) Atrial fibrillation (HCC)   (positive) Nonobstructive atherosclerosis of coronary artery   (positive) Paroxysmal atrial fibrillation (HCC)   (positive) Paroxysmal atrial flutter (HCC)      GI   (positive) Gastroesophageal reflux disease without esophagitis         (positive) JENNY (acute kidney injury) (HCC)       Physical Exam    Airway   Mallampati: II  TM distance: >3 FB  Neck ROM: full       Cardiovascular - normal exam  Rhythm: regular  Rate: normal  (-) murmur     Dental - normal exam           Pulmonary - normal exam  Breath sounds clear to auscultation     Abdominal    Neurological - normal exam                   Anesthesia Plan    ASA 3- EMERGENT   ASA physical status 3 criteria: a thrombophilic disease requiring anticoagulation and COPD - poorly controlledASA physical status emergent criteria: acutely contaminated wound or identified infection source, acute deteriorating condition due to infection and sepsis    Plan - general        Airway plan will be LMA          Induction: intravenous    Postoperative Plan: Postoperative administration of opioids is intended.    Pertinent diagnostic labs and testing reviewed    Informed Consent:    Anesthetic plan and risks discussed with patient.    Use of blood products discussed with: patient whom consented to blood products.

## 2024-04-19 NOTE — PROGRESS NOTES
Hospital Medicine Daily Progress Note    Date of Service  4/19/2024    Chief Complaint  Noe Hickey is a 63 y.o. male admitted 4/11/2024 with chest pain and sob    Hospital Course  63 y.o. male with hx of AAA repair in January 2023, combined systolic and diastolic congestive heart failure, paroxysmal atrial fibrillation status post ablation on Xarelto, however discontinued for the past few days due to misunderstanding by cardiology provider, ongoing tobacco abuse, who presented 4/11/2024 with sob for past one month. He was just seen cardiology 4/4 and echo notes EF 70%, which has improved from 30% 7/5/23 and there is features of apical variant hypertrophic cardiomyopathy.  Patient was scheduled to have cardiac MRI and stress test next week.  However he presented with worsening shortness of breath and chest pain.    In the emergency room he was a code aorta and a stat CTA aorta was performed with contrast that showed normal AAA repair with no new aneurysmal dilatation or aneurysm. It did show a left-sided basilar pneumonia with a white blood cell count of 27,000 for left shift.   Patient is started with antibiotics. Of note, patient is taking anabolic steroid. Procal negative.   CTA chest no PE  Doppler BLE no signs of DVT  Noted worsening stridor and horse voice on 4/15, pulmonary and ENT consulted  S/p ENT scope 4/15 unremarkable. Does not think dyspnea is related to vocal cords  SNIFF noted limited but nonparadoxical motion of the L diaphragm.     Reports RLE pain and swelling. Negative DVT. Pain is out of proportion. Ordered CT RLE and CT R foot. Discussed with radiology noted Intramuscular hematoma in the posterior thigh muscular compartment, small intramuscular hematoma in anterior thigh musculature, subcutaneous hematoma along the dorsal foot and diffuse subcutaneous if I distracted him from the proximal thigh extending distally to the foot. Orthopedics was consulted. S/p I&D 4/19.     Interval  Problem Update  I have seen and examined patient at bedside.    Significant R foot pain, swelling  S/p I&D 4/19 noted large amount of purulent fluid   Continue iv Unasyn  Check MRSA swab  I have started vancomycin, monitoring renal function and trough  I have consulted and discussed with ID Dr. Johnson  Multimodal pain control    I have discussed this patient's plan of care and discharge plan at IDT rounds today with Case Management, Nursing, Nursing leadership, and other members of the IDT team.    Consultants/Specialty  cardiology  Pulmonary  ENT  Orthopedics  ID    Code Status  Full Code    Disposition  The patient is not medically cleared for discharge to home or a post-acute facility.      I have placed the appropriate orders for post-discharge needs.    Review of Systems  Review of Systems   Constitutional:  Positive for malaise/fatigue.   Respiratory:  Positive for shortness of breath.    Cardiovascular:  Positive for chest pain.   Neurological:  Positive for weakness.   All other systems reviewed and are negative.       Physical Exam  Temp:  [36.6 °C (97.9 °F)-37.2 °C (99 °F)] 36.6 °C (97.9 °F)  Pulse:  [74-90] 89  Resp:  [13-24] 16  BP: (108-155)/(55-95) 131/75  SpO2:  [90 %-99 %] 92 %    Physical Exam  Vitals and nursing note reviewed.   Constitutional:       Appearance: Normal appearance. He is obese. He is ill-appearing.   HENT:      Head: Normocephalic and atraumatic.      Mouth/Throat:      Pharynx: Oropharynx is clear.   Eyes:      Pupils: Pupils are equal, round, and reactive to light.   Neck:      Vascular: No carotid bruit.   Cardiovascular:      Rate and Rhythm: Normal rate and regular rhythm.   Pulmonary:      Effort: Pulmonary effort is normal. No respiratory distress.      Breath sounds: Normal breath sounds. No wheezing.      Comments: Diminished breath sound bilateral  Abdominal:      General: Abdomen is flat. Bowel sounds are normal.      Palpations: Abdomen is soft. There is no mass.    Musculoskeletal:         General: Swelling and tenderness present. Normal range of motion.      Cervical back: Neck supple.      Right lower leg: Edema present.   Skin:     General: Skin is warm and dry.   Neurological:      General: No focal deficit present.      Mental Status: He is alert and oriented to person, place, and time.   Psychiatric:         Mood and Affect: Mood normal.         Behavior: Behavior normal.         Fluids    Intake/Output Summary (Last 24 hours) at 4/19/2024 1512  Last data filed at 4/19/2024 1450  Gross per 24 hour   Intake 270 ml   Output 605 ml   Net -335 ml       Laboratory  Recent Labs     04/17/24  0041 04/18/24  0441 04/19/24  0400   WBC 23.2* 18.9* 16.2*   RBC 5.18 5.20 5.01   HEMOGLOBIN 10.6* 10.8* 10.1*   HEMATOCRIT 35.2* 34.5* 33.9*   MCV 68.0* 66.3* 67.7*   MCH 20.5* 20.8* 20.2*   MCHC 30.1* 31.3* 29.8*   RDW 51.0* 49.4 50.3*   PLATELETCT 567* 460* 455*   MPV 10.1 10.3 10.2     Recent Labs     04/17/24  0041 04/17/24  1117 04/19/24  0400   SODIUM 136 136 135   POTASSIUM 5.9* 5.0 5.1   CHLORIDE 98 96 97   CO2 27 30 27   GLUCOSE 108* 127* 79   BUN 27* 30* 25*   CREATININE 1.12 1.09 0.87   CALCIUM 8.6 8.8 8.7                     Imaging  DX-CHEST-PORTABLE (1 VIEW)   Final Result      Cardiomegaly.      CT-EXTREMITY, LOWER WITH RIGHT   Final Result         1.  Intramuscular hematoma in the posterior thigh muscular compartment, evaluation for component of compartment syndrome recommended as clinically appropriate.   2.  Possible small fluid collection in the anterior thigh musculature which is isodense to muscle, appearance suggesting small intramuscular hematoma.   3.  Intermediate density fluid collection along the dorsal foot, appearance favoring subcutaneous hematoma   4.  Diffuse subcutaneous fat stranding from the proximal thigh extending distally to the foot, compatible with edema and/or cellulitis.      These findings were discussed with the patient's clinician, Olga QUEVEDO  Andrea, on 4/18/2024 7:58 AM.      DX-CHEST-2 VIEWS   Final Result      1.  Median sternotomy and aortic valve replacement.   2.  Ongoing elevation of the left hemidiaphragm and subsegmental atelectatic changes at the left lung base. Concordant with prior study and CT-CTA of the chest      DX SNIFF TEST   Final Result      Limited but nonparadoxical motion of the LEFT diaphragm      DX-CHEST-PORTABLE (1 VIEW)   Final Result         1.  Left basilar atelectasis or subtle infiltrate.   2.  Atherosclerosis      US-EXTREMITY VENOUS LOWER BILAT   Final Result      CT-CTA CHEST PULMONARY ARTERY W/ RECONS   Final Result         1. No CT evidence of pulmonary embolism.      2. Airspace opacity in the left lower lobe, likely atelectasis      CT-CTA COMPLETE THORACOABDOMINAL AORTA   Final Result      1.  Surgical changes of the aortic valve and ascending aorta.   2.  No aortic aneurysm or dissection identified.   3.  Mild/moderate atherosclerotic changes.   4.  Punctate, nonobstructing right renal stone.   5.  Elevation of the left hemidiaphragm with adjacent basilar consolidations, likely atelectasis. Pneumonia not excluded.   6.  Distal colonic diverticulosis.   7.  Hepatomegaly.                  CT-CTA HEAD WITH & W/O-POST PROCESS   Final Result      1.  Severely suboptimal exam due to photon starvation.   2.  No gross acute intracranial abnormality.   3.  No proximal vessel occlusion.      CT-CTA NECK WITH & W/O-POST PROCESSING   Final Result   Addendum (preliminary) 1 of 1   100 mL Omnipaque 300 given IV.      Final      No acute arterial abnormality of the neck.           Assessment/Plan  * Shortness of breath- (present on admission)  Assessment & Plan  Has been occurring and progressive in nature for few months  Recently s/b cards and repeated ECHO which showed in an interval normalization of his LVEF to 70% but shows possible there is features of apical variant hypertrophic cardiomyopath  Patient has been scheduled for  outpatient cardiac MRI and stress test  Check CTA chest and doppler BLE negative for PE and DVT  SPEP/UPEP pending to r/o amyloid   Cardiology rec outpatient cardiac MRI and stress test    Pulmonary consulted. ?diaphragm motion abnormal. SNIFF noted limited but nonparadoxical motion of the L diaphragm.   S/p ENT scope 4/15 unremarkable. Does not think dyspnea is related to vocal cords  Anxiety   Speech therapy to evaluate vocal cord dysfucntion      Abscess of right foot  Assessment & Plan  R foot pain and swelling. S/p I&D 4/19 noted large amount of purulent fluid   Check MRSA swab  I have started vancomycin, monitoring renal function and trough  Continue iv Unasyn  I have consulted and discussed with ID Dr. Johnson  Multimodal pain control    Diffuse pain in right lower extremity  Assessment & Plan  RLE pain and swelling. Negative DVT. Pain is out of proportion.   Ordered CT RLE and CT R foot. Discussed with radiology noted Intramuscular hematoma in the posterior thigh muscular compartment, small intramuscular hematoma in anterior thigh musculature, subcutaneous hematoma along the dorsal foot and diffuse subcutaneous if I distracted him from the proximal thigh extending distally to the foot.  I have discussed with orthopedics, s/p I&D 4/19 and noted R dorsal foot abscess. See above    Dysphonia  Assessment & Plan  Acute onset, hoarse voice  S/p ENT scope 4/15 unremarkable. Does not think dyspnea is related to vocal cords    Microcytic anemia  Assessment & Plan  No Signs of bleeding  Check iron profile c/w JARROD  Iron supplements  Outpatient GI referral. Patient eeports colonoscopy years ago in [place].     Community acquired pneumonia of left lower lobe of lung- (present on admission)  Assessment & Plan  Denies fever, chills.  Noted profound leukocytosis on admission.  However he is taking anabolic steroids at home, which could explain leukocytosis  Procalcitonin negative  Given his symptoms has been present  for months, ?  PNA  Continue empirical abx with Augmentin    Acute hypoxic respiratory failure- (present on admission)  Assessment & Plan  Unclear etiology.   CTA negative for PE  Suspect undiagnosed COPD exacerbation. He has significant smoking history. Also MARIELENA possible contributing   Continue IS, duoneb and dulera  Pulmonary following     Tobacco dependence- (present on admission)  Assessment & Plan  Trying to cut back, encourage full cessation    Sepsis (HCC)- (present on admission)  Assessment & Plan  This is Sepsis Present on admission  SIRS criteria identified on my evaluation include: Tachypnea, with respirations greater than 20 per minute and Leukocytosis, with WBC greater than 12,000  Clinical indicators of end organ dysfunction include Acute Respiratory Failure - (mechanical ventilation or BiPap or PaO2/FiO2 ratio < 300)  Source is pulmonary  Sepsis protocol initiated  Crystalloid Fluid Administration: Resuscitation volume of 1 liter ordered. Reason that resuscitation volume of less than 30ml/kg was ordered concern for causing harm given CHF  IV antibiotics as appropriate for source of sepsis  Reassessment: I have reassessed the patient's hemodynamic status    ARF with hypoxia, RA saturation 87%  CTA Aorta shows L basilar consolidation, CAP  Continue empiric IV unasyn and oral azithromycin  F/U sputum cultures and blood cultures  Judicious IVF's    Heart failure with improved ejection fraction (HFimpEF) (HCC)- (present on admission)  Assessment & Plan  Echo 4/5/24 notes EF 70%, which has improved from EF 30% 7/5/23   Continue losartan, Toprol   Monitoring respiratory status  I&O, daily weight    Secondary hypercoagulable state (HCC)- (present on admission)  Assessment & Plan  Patient has discontinued Xarelto for the past few days due to misunderstanding by his cardiology provider  Resumed Xarelto      Paroxysmal atrial fibrillation (HCC)- (present on admission)  Assessment & Plan  In NSR  Xarelto as  above  Continue outpatient toprol XL    Leukocytosis, thrombocytosis, erythrocytosis, lymphopenia- (present on admission)  Assessment & Plan  Persistent leukocytosis for years. Patient is taking anabolic steroid, which could explain leukocytosis. However he said he has been taking steroid only a few months. He has persistent leucocytosis for year  Lengthy discussion with patient the risks of long term steroid use. I advised patient to slow wean off. He voiced understanding.   Hematology outpatient referral for further evaluation    Gastroesophageal reflux disease without esophagitis- (present on admission)  Assessment & Plan  Continue omeprazole 20 mg daily    Ascending aortic aneurysm repair- (present on admission)  Assessment & Plan  CTA aorta shows intact repair and no new aneurysmal dilation         VTE prophylaxis: hold for procedure    Patient is has a high medical complexity, complex decision making and is at high risk for complication, morbidity, and mortality.  I spent 53 minutes, reviewing the chart, obtaining and/or reviewing separately obtained history. Performing a medically appropriate examination and evaluation.  Counseling and educating the patient. Ordering and reviewing medications, tests, or procedures.  Discussing the case with ID and orthopedics.  Documenting clinical information in EPIC. Independently interpreting results and communicating results to patient. Discussing future disposition of care with patient, RN and case management.  This does not include time spent on separately billable procedures/tests.

## 2024-04-19 NOTE — PROGRESS NOTES
Bedside report received at 1900 and assumed care of patient. Patient sitting up on the side of the bed endorsing 10/10 pain in right leg. Medicated per MAR. A&O x4 on 3L NC. Tele monitoring in place. Educated on fall risk, all fall precautions in place. Call light within reach, bed locked and in lowest position, denied other needs at this time. Hourly rounding in place.

## 2024-04-19 NOTE — CONSULTS
4/19/2024    The patient was seen at the request of Dr Mendez    HPI: Noe Hickey is a 63 y.o. male who presents with complaints of pain to right foot.  This started last week after no trauma.  The pain is 5/10 and is described as sharp.  The pain is made worse by palpation of the area and made better by rest and immobilization. He had a hamstring tear in the gym 2 weeks ago    Past Medical History:   Diagnosis Date    Arrhythmia     Breath shortness 06/09/2023    prior to 1/2023 surgery    Cyclic vomiting syndrome     Elevated hemidiaphragm - LEFT post AVR 01/04/2023    Fracture     Headache, classical migraine     Heart burn     Heart valve disease 06/09/2023    heart surgery in 1/2023    Indigestion     Pneumonia due to infectious organism 01/20/2023    Snoring        Past Surgical History:   Procedure Laterality Date    AORTIC VALVE REPLACEMENT  1/5/2023    Procedure: AORTIC VALVE REPLACEMENT, ASCENDING AORTIC ANEURYSM REPAIR AND TRANSESOPHAGEAL ECHOCARDIOGRAM.;  Surgeon: Serena Goyal M.D.;  Location: [facility];  Service: Cardiac    AORTIC ASCENDING DISSECTION  1/5/2023    Procedure: REPAIR, ANEURYSM OR DISSECTION, AORTA, ASCENDING;  Surgeon: Serena Goyal M.D.;  Location: [facility];  Service: Cardiac    ECHOCARDIOGRAM, TRANSESOPHAGEAL, INTRAOPERATIVE  1/5/2023    Procedure: ECHOCARDIOGRAM, TRANSESOPHAGEAL, INTRAOPERATIVE.;  Surgeon: Serena Goyal M.D.;  Location: [facility];  Service: Cardiac    IRRIGATION & DEBRIDEMENT ORTHO Right 09/19/2017    Procedure: IRRIGATION & DEBRIDEMENT ORTHO-THIGH;  Surgeon: Corbin Rivera M.D.;  Location: [facility];  Service: Orthopedics    SHOULDER HEMICAP RESURFACING Right 10/12/2015    Procedure: RIGHT SHOULDER RESURFACING;  Surgeon: Javon Doll M.D.;  Location: [facility];  Service:     SHOULDER ARTHROSCOPY      x3    SHOULDER SURGERY      replacement right       Medications  No current  facility-administered medications on file prior to encounter.     Current Outpatient Medications on File Prior to Encounter   Medication Sig Dispense Refill    ondansetron (ZOFRAN) 4 MG Tab tablet Take 4 mg by mouth every 6 hours as needed for Nausea/Vomiting.      loratadine (CLARITIN) 10 MG Tab Take 10 mg by mouth every day.      omeprazole (PRILOSEC) 20 MG delayed-release capsule Take 20 mg by mouth 2 times a day.      asa/apap/caffeine (EXCEDRIN) 250-250-65 MG Tab Take 2 Tablets by mouth 1 time a day as needed for Headache.      metoprolol SR (TOPROL XL) 25 MG TABLET SR 24 HR Take 1 Tablet by mouth every day. 90 Tablet 3    losartan (COZAAR) 25 MG Tab Take 1 Tablet by mouth every day. 90 Tablet 3    rivaroxaban (XARELTO) 20 MG Tab tablet Take 20 mg by mouth with dinner.         Allergies  Morphine    ROS  Right foot pain. All other systems were reviewed and found to be negative    History reviewed. No pertinent family history.    Social History     Socioeconomic History    Marital status:    Tobacco Use    Smoking status: Every Day     Current packs/day: 0.50     Average packs/day: 0.5 packs/day for 40.0 years (20.0 ttl pk-yrs)     Types: Cigarettes    Smokeless tobacco: Never    Tobacco comments:     Down to 5 cig daily   Vaping Use    Vaping Use: Never used   Substance and Sexual Activity    Alcohol use: No    Drug use: Not Currently     Comment: past problems with narcotics not since 2019     Social Determinants of Health     Financial Resource Strain: Low Risk  (7/7/2023)    Overall Financial Resource Strain (CARDIA)     Difficulty of Paying Living Expenses: Not hard at all   Food Insecurity: No Food Insecurity (7/7/2023)    Hunger Vital Sign     Worried About Running Out of Food in the Last Year: Never true     Ran Out of Food in the Last Year: Never true   Transportation Needs: No Transportation Needs (7/7/2023)    PRAPARE - Transportation     Lack of Transportation (Medical): No     Lack of  "Transportation (Non-Medical): No   Housing Stability: Low Risk  (7/7/2023)    Housing Stability Vital Sign     Unable to Pay for Housing in the Last Year: No     Number of Places Lived in the Last Year: 1     Unstable Housing in the Last Year: No       Physical Exam  Vitals  /75   Pulse 81   Temp 36.8 °C (98.2 °F) (Temporal)   Resp 18   Ht 1.753 m (5' 9.02\")   Wt 95.7 kg (210 lb 15.7 oz)   SpO2 95%   General: Well Developed, Well Nourished, Age appropriate appearance  HEENT: Normocephalic, atraumatic  Psych: Normal mood and affect  Neck: Supple, nontender, no masses  Lungs: Breathing unlabored, No audible wheezing  Heart: Regular heart rate and rhythm  Abdomen: Soft, NT, ND  Neuro: Sensation grossly intact to BUE and BLE, moving all four extremities  Skin: Intact, no open wounds  Vascular: 2+DP/PT, Capillary refill <2 seconds  MSK: Right foot pain, thigh soft, no erythema      Radiographs:  Right dorsal foot abscess  Right partial hamstring tear    CT-EXTREMITY, LOWER WITH RIGHT   Final Result         1.  Intramuscular hematoma in the posterior thigh muscular compartment, evaluation for component of compartment syndrome recommended as clinically appropriate.   2.  Possible small fluid collection in the anterior thigh musculature which is isodense to muscle, appearance suggesting small intramuscular hematoma.   3.  Intermediate density fluid collection along the dorsal foot, appearance favoring subcutaneous hematoma   4.  Diffuse subcutaneous fat stranding from the proximal thigh extending distally to the foot, compatible with edema and/or cellulitis.      These findings were discussed with the patient's clinician, Olga Mendez, on 4/18/2024 7:58 AM.      DX-CHEST-2 VIEWS   Final Result      1.  Median sternotomy and aortic valve replacement.   2.  Ongoing elevation of the left hemidiaphragm and subsegmental atelectatic changes at the left lung base. Concordant with prior study and CT-CTA of the chest "      DX SNIFF TEST   Final Result      Limited but nonparadoxical motion of the LEFT diaphragm      DX-CHEST-PORTABLE (1 VIEW)   Final Result         1.  Left basilar atelectasis or subtle infiltrate.   2.  Atherosclerosis      US-EXTREMITY VENOUS LOWER BILAT   Final Result      CT-CTA CHEST PULMONARY ARTERY W/ RECONS   Final Result         1. No CT evidence of pulmonary embolism.      2. Airspace opacity in the left lower lobe, likely atelectasis      CT-CTA COMPLETE THORACOABDOMINAL AORTA   Final Result      1.  Surgical changes of the aortic valve and ascending aorta.   2.  No aortic aneurysm or dissection identified.   3.  Mild/moderate atherosclerotic changes.   4.  Punctate, nonobstructing right renal stone.   5.  Elevation of the left hemidiaphragm with adjacent basilar consolidations, likely atelectasis. Pneumonia not excluded.   6.  Distal colonic diverticulosis.   7.  Hepatomegaly.                  CT-CTA HEAD WITH & W/O-POST PROCESS   Final Result      1.  Severely suboptimal exam due to photon starvation.   2.  No gross acute intracranial abnormality.   3.  No proximal vessel occlusion.      CT-CTA NECK WITH & W/O-POST PROCESSING   Final Result   Addendum (preliminary) 1 of 1   100 mL Omnipaque 300 given IV.      Final      No acute arterial abnormality of the neck.          Laboratory Values  Recent Labs     04/17/24  0041 04/18/24  0441 04/19/24  0400   WBC 23.2* 18.9* 16.2*   RBC 5.18 5.20 5.01   HEMOGLOBIN 10.6* 10.8* 10.1*   HEMATOCRIT 35.2* 34.5* 33.9*   MCV 68.0* 66.3* 67.7*   MCH 20.5* 20.8* 20.2*   MCHC 30.1* 31.3* 29.8*   RDW 51.0* 49.4 50.3*   PLATELETCT 567* 460* 455*   MPV 10.1 10.3 10.2     Recent Labs     04/17/24  0041 04/17/24  1117 04/19/24  0400   SODIUM 136 136 135   POTASSIUM 5.9* 5.0 5.1   CHLORIDE 98 96 97   CO2 27 30 27   GLUCOSE 108* 127* 79   BUN 27* 30* 25*             Impression:Right hamstring tear and right foot dorsal abscess     Plan:We discussed the diagnosis and findings  with the patient at length.  We reviewed possible non operative and operative interventions and the risks and benefits of each of these.  he had a chance to ask questions and all of these were answered to his satisfaction. The patient chose to proceed with  I&D foot including all indicated procedures. Risks and benefits of surgery were discussed which include but are not limited to bleeding, infection, neurovascular damage, malunion, nonunion, instability, limb length discrepancy, DVT, PE, MI, Stroke and death. They understand these risks and wish to proceed.

## 2024-04-19 NOTE — CARE PLAN
Problem: Pain - Standard  Goal: Alleviation of pain or a reduction in pain to the patient’s comfort goal  Outcome: Progressing     Problem: Hemodynamics  Goal: Patient's hemodynamics, fluid balance and neurologic status will be stable or improve  Outcome: Progressing     Problem: Fluid Volume  Goal: Fluid volume balance will be maintained  Outcome: Progressing   The patient is Stable - Low risk of patient condition declining or worsening    Shift Goals  Clinical Goals: pain management, NPO midnight, VSS, rest  Patient Goals: pain control, surgery  Family Goals: HUGH    Progress made toward(s) clinical / shift goals:  VSS, pain controlled, rest    Patient is not progressing towards the following goals:

## 2024-04-19 NOTE — ANESTHESIA TIME REPORT
Anesthesia Start and Stop Event Times       Date Time Event    4/19/2024 1054 Ready for Procedure     1054 Anesthesia Start     1120 Anesthesia Stop          Responsible Staff  04/19/24      Name Role Begin End    Juan Mcnela M.D. Anesth 1054 1120          Overtime Reason:  no overtime (within assigned shift)    Comments:

## 2024-04-19 NOTE — OP REPORT
DATE: 4/19/2024    PREOPERATIVE DIAGNOSIS: Right dorsal foot abscess    POSTOPERATIVE DIAGNOSIS: Same    PROCEDURE PERFORMED:  Irrigation and debridement of  right dorsal foot abscess                                                      SURGEON: Mitch Bowie M.D.     ASSISTANT: None     ANESTHESIOLOGIST: Fredis    ANESTHESIA: General    ESTIMATED BLOOD LOSS: 0 mL    INDICATIONS: The patient is a 63 y.o. male with a right foot abscess after unknown trauma.  I discussed the risks and benefits of the procedure, including the risks of infection, wound healing complication, neurovascular injury, need for repeat irrigation and debridement and the medical risks of anesthesia including DVT, PE, MI, stroke, and death.  Benefits include eradication of infection and resolution of sepsis.  Alternatives to surgery were also discussed, including non-operative management.  The patient signed the informed consent and the operative extremity was marked.      PROCEDURE:  The patient underwent anesthesia, and was positioned supine on the operating room table and all bony prominences were well padded.  Preoperative antibiotics were held until cultures could be obtained. Sequential compression devices were employed. The correct operative site was prepped and draped into a sterile field. A procedural pause was conducted to verify correct patient, correct extremity, presence of the surgeons initials on the operative extremity.    A 3 cm incision was made over the abscess. A large amount of purulent fluid was encountered and sent to the lab for culture analysis. The abscess cavity was debrided in excisional fashion with knife and rongeur of skin, subcutaneous tissue, and underlying muscle down to healthy tissue. Abscess measured 4 x 6. The  wound was irrigated with 3 L of saline solution. The wound was closed with 2-0 Nylon in the skin.  Sterile dressings were applied.     The patient tolerated the procedure well. There were no apparent  complications. All sponge, needle, and instrument counts were correct on two separate occasions. They were awakened, extubated, and transferred to the recovery room in satisfactory condition.       Post-Operative Plan:    1.  The patient should remain full weightbearing through heel  on their operative extremity.  Gait aids (crutch or crutches, cane, walker) may be used as needed, and may be discontinued when no longer required.  2.  IV antibiotics - will be given postoperatively and adjusted by the Infectious Disease service as dictated by culture results.  3.  DVT prophylaxis - SCD's and Lovenox 40 mg SQ daily while inpatient.  The patient may transition to Aspirin 325 mg PO BID as an outpatient  4.  Discharge planning   ____________________________________   Mitch Bowie M.D.   DD: 4/19/2024  11:18 AM

## 2024-04-19 NOTE — ANESTHESIA PROCEDURE NOTES
Airway    Date/Time: 4/19/2024 11:02 AM    Performed by: Juan Mcneal M.D.  Authorized by: Juan Mcneal M.D.    Location:  OR  Urgency:  Elective  Indications for Airway Management:  Anesthesia      Spontaneous Ventilation: absent    Sedation Level:  Deep  Preoxygenated: Yes    Final Airway Type:  Supraglottic airway  Final Supraglottic Airway:  Standard LMA    SGA Size:  5  Number of Attempts at Approach:  1

## 2024-04-19 NOTE — CARE PLAN
The patient is Stable - Low risk of patient condition declining or worsening    Shift Goals  Clinical Goals: pain management, NPO midnight, VSS, rest  Patient Goals: pain control, surgery  Family Goals: HUGH    Progress made toward(s) clinical / shift goals:      Problem: Hemodynamics  Goal: Patient's hemodynamics, fluid balance and neurologic status will be stable or improve  Outcome: Progressing     Problem: Respiratory  Goal: Patient will achieve/maintain optimum respiratory ventilation and gas exchange  Outcome: Progressing   Patient remains on 3L NC. O2 sats in low 90's.  Problem: Knowledge Deficit - Standard  Goal: Patient and family/care givers will demonstrate understanding of plan of care, disease process/condition, diagnostic tests and medications  Outcome: Progressing     Problem: Psychosocial  Goal: Patient's level of anxiety will decrease  Outcome: Progressing  Goal: Patient's ability to verbalize feelings about condition will improve  Outcome: Progressing       Patient is not progressing towards the following goals:    Patient has not been comfortable enough to rest this shift and has not reached his comfort goal. Pain remains at 7-10/10 with medication per MAR. Patient to have surgery this am.    Problem: Pain - Standard  Goal: Alleviation of pain or a reduction in pain to the patient’s comfort goal  Outcome: Not Progressing

## 2024-04-19 NOTE — DISCHARGE PLANNING
Case Management Discharge Planning    Admission Date: 4/11/2024  GMLOS: 3.6  ALOS: 8    6-Clicks ADL Score: 24  6-Clicks Mobility Score: 23      Anticipated Discharge Dispo: Discharge Disposition: Discharged to home/self care (01)    DME Needed: No    Action(s) Taken: Updated Provider/Nurse on Discharge Plan    Pt was discussed in IDT rounds. Pt is still not medically celared. I&D done today.    Escalations Completed: None    Medically Clear: No    Next Steps: CM will continue to assist Pt with discharge needs.      Barriers to Discharge: Medical clearance    Is the patient up for discharge tomorrow: No

## 2024-04-19 NOTE — ASSESSMENT & PLAN NOTE
R foot pain and swelling. S/p I&D 4/19 noted large amount of purulent fluid   4/19 foot wound culture no growth  MRSA swab negative  Unasyn  ID consulting  Multimodal pain control

## 2024-04-19 NOTE — PROGRESS NOTES
Pharmacy Vancomycin Kinetics Note for 4/19/2024     63 y.o. male on Vancomycin day # 1     Vancomycin Indication (AUC Dosing): Skin/skin structure infection    Provider specified end date: 04/24/24    Active Antibiotics (From admission, onward)      Ordered     Ordering Provider       Fri Apr 19, 2024  3:56 PM    04/19/24 1556  vancomycin (Vancocin) 1,250 mg in  mL IVPB  (vancomycin (VANCOCIN) IV (LD + Maintenance))  EVERY 12 HOURS        Note to Pharmacy: Dose per Pharmacokinetic Protocol    Olga Mendez M.D.    04/19/24 1556  vancomycin (Vancocin) 2,500 mg in  mL IVPB  (vancomycin (VANCOCIN) IV (LD + Maintenance))  ONCE        Note to Pharmacy: Dose per Pharmacokinetic Protocol    Olga Mendez M.D.       Fri Apr 19, 2024  3:11 PM    04/19/24 1511  MD Alert...Vancomycin per Pharmacy  PHARMACY TO DOSE        Question:  Indication(s) for vancomycin?  Answer:  Skin and soft tissue infection    Olga Mendez M.D.       Thu Apr 18, 2024  4:09 PM    04/18/24 1609  ampicillin/sulbactam (Unasyn) 3 g in  mL IVPB  EVERY 6 HOURS         Olga Mnedez M.D.            Dosing Weight: 95.7 kg (210 lb 15.7 oz)      Admission History: Admitted on 4/11/2024 for Community acquired pneumonia of left lower lobe of lung [J18.9]  Pertinent history: R foot pain and swelling reported 4/18, CT completed and R foot abscess noted. Patient was started on Unasyn on 4/18 and Vancomycin added 4/19 s/p I&D with large amount of purulent fluid. ID being consulted for further antibiotic management. Pending MRSA PCR and culture results for definitive therapy.    Allergies:     Morphine     Pertinent cultures to date:     Results       Procedure Component Value Units Date/Time    MRSA By PCR (Amp) [[id number]]     Order Status: No result Specimen: Respirate from Nares     CULTURE WOUND W/ GRAM STAIN [[id number]] Collected: 04/19/24 1106    Order Status: No result Specimen: Wound Updated: 04/19/24 1204     Significant Indicator NEG      "Source WND     Site Right foot     Culture Result -     Gram Stain Result -    Narrative:      Surgery - swabs received    Anaerobic Culture [[id number]] Collected: 24 1106    Order Status: No result Specimen: Wound Updated: 24 1204     Significant Indicator NEG     Source WND     Site Right foot     Culture Result -    Narrative:      Surgery - swabs received    Blood Culture - Draw one from central line and one from peripheral site [[id number]] Collected: 24    Order Status: Completed Specimen: Blood from Peripheral Updated: 24 2100     Significant Indicator NEG     Source BLD     Site PERIPHERAL     Culture Result No growth after 5 days of incubation.    Narrative:      Left Hand    Blood Culture - Draw one from central line and one from peripheral site [[id number]] Collected: 24    Order Status: Completed Specimen: Blood from Line Updated: 24 2100     Significant Indicator NEG     Source BLD     Site Peripheral     Culture Result No growth after 5 days of incubation.    Narrative:      Left AC            Labs:     Estimated Creatinine Clearance: 97.9 mL/min (by C-G formula based on SCr of 0.87 mg/dL).  Recent Labs     24  0041 24  0441 24  0400   WBC 23.2* 18.9* 16.2*     Recent Labs     24  0041 24  1117 24  0400   BUN 27* 30* 25*   CREATININE 1.12 1.09 0.87       Intake/Output Summary (Last 24 hours) at 2024 1603  Last data filed at 2024 1450  Gross per 24 hour   Intake 270 ml   Output 605 ml   Net -335 ml      /75   Pulse 89   Temp 36.6 °C (97.9 °F)   Resp 16   Ht 1.753 m (5' 9.02\")   Wt 95.7 kg (210 lb 15.7 oz)   SpO2 89%  Temp (24hrs), Av.9 °C (98.5 °F), Min:36.6 °C (97.9 °F), Max:37.1 °C (98.8 °F)      List concerns for Vancomycin clearance:     BUN/Scr ratio greater than 20:1;Obesity    Pharmacokinetics:     AUC kinetics:   Ke (hr ^-1): 0.0857 hr^-1  Half life: 8.09 hr  Clearance: 5.23  Estimated TDD: " 2615  Estimated Dose: 1100  Estimated interval: 10.1    A/P:     -  Vancomycin dose: 2500 mg (~26 mg/kg) x1 loading dose, followed by 1250 mg (~13 mg/kg) Q12H    -  Next vancomycin level(s): after 4th or 5th dose    -  Predicted vancomycin AUC from initial AUC test calculator: 478 mg·hr/L    -  Comments: Vancomycin started empirically for R foot abscess. Clearance concerns as noted above. Pending MRSA PCR and culture results. ID to evaluate. Pharmacy will continue to follow.     Adrienne Ferro, PharmD

## 2024-04-19 NOTE — ANESTHESIA POSTPROCEDURE EVALUATION
Patient: Noe Hickey    Procedure Summary       Date: 04/19/24 Room / Location: [facility] 04 / SURGERY [facility]    Anesthesia Start: 1054 Anesthesia Stop: 1120    Procedure: INCISION AND DRAINAGE, LEG (Left: Leg Upper) Diagnosis: (foot abscess)    Surgeons: Mitch Bowie M.D. Responsible Provider: Juan Mcneal M.D.    Anesthesia Type: general ASA Status: 3 - Emergent            Final Anesthesia Type: general  Last vitals  BP   Blood Pressure: 129/64    Temp   37.1 °C (98.7 °F)    Pulse   80   Resp   14    SpO2   95 %      Anesthesia Post Evaluation    Patient location during evaluation: PACU  Patient participation: complete - patient participated  Level of consciousness: awake and alert    Airway patency: patent  Anesthetic complications: no  Cardiovascular status: hemodynamically stable  Respiratory status: acceptable  Hydration status: euvolemic    PONV: none          No notable events documented.     Nurse Pain Score: 2 (NPRS)

## 2024-04-19 NOTE — CARE PLAN
The patient is Stable - Low risk of patient condition declining or worsening    Shift Goals  Clinical Goals: pain management, NPO midnight, VSS, rest  Patient Goals: pain control, surgery  Family Goals: HUGH    Progress made toward(s) clinical / shift goals:  education done on POC, all questions and concerns were addressed.       Problem: Pain - Standard  Goal: Alleviation of pain or a reduction in pain to the patient’s comfort goal  Outcome: Progressing     Problem: Knowledge Deficit - Standard  Goal: Patient and family/care givers will demonstrate understanding of plan of care, disease process/condition, diagnostic tests and medications  Outcome: Progressing

## 2024-04-20 ENCOUNTER — APPOINTMENT (OUTPATIENT)
Dept: RADIOLOGY | Facility: MEDICAL CENTER | Age: 64
DRG: 981 | End: 2024-04-20
Attending: STUDENT IN AN ORGANIZED HEALTH CARE EDUCATION/TRAINING PROGRAM
Payer: MEDICAID

## 2024-04-20 LAB
ANION GAP SERPL CALC-SCNC: 10 MMOL/L (ref 7–16)
BUN SERPL-MCNC: 24 MG/DL (ref 8–22)
CALCIUM SERPL-MCNC: 8.4 MG/DL (ref 8.5–10.5)
CHLORIDE SERPL-SCNC: 98 MMOL/L (ref 96–112)
CO2 SERPL-SCNC: 28 MMOL/L (ref 20–33)
CREAT SERPL-MCNC: 0.96 MG/DL (ref 0.5–1.4)
ERYTHROCYTE [DISTWIDTH] IN BLOOD BY AUTOMATED COUNT: 51 FL (ref 35.9–50)
FLUAV RNA SPEC QL NAA+PROBE: NEGATIVE
FLUBV RNA SPEC QL NAA+PROBE: NEGATIVE
GFR SERPLBLD CREATININE-BSD FMLA CKD-EPI: 88 ML/MIN/1.73 M 2
GLUCOSE SERPL-MCNC: 90 MG/DL (ref 65–99)
HCT VFR BLD AUTO: 32.5 % (ref 42–52)
HGB BLD-MCNC: 9.7 G/DL (ref 14–18)
MAGNESIUM SERPL-MCNC: 2.1 MG/DL (ref 1.5–2.5)
MCH RBC QN AUTO: 20.3 PG (ref 27–33)
MCHC RBC AUTO-ENTMCNC: 29.8 G/DL (ref 32.3–36.5)
MCV RBC AUTO: 68.1 FL (ref 81.4–97.8)
PHOSPHATE SERPL-MCNC: 3.2 MG/DL (ref 2.5–4.5)
PLATELET # BLD AUTO: 467 K/UL (ref 164–446)
PMV BLD AUTO: 10 FL (ref 9–12.9)
POTASSIUM SERPL-SCNC: 5.4 MMOL/L (ref 3.6–5.5)
RBC # BLD AUTO: 4.77 M/UL (ref 4.7–6.1)
RSV RNA SPEC QL NAA+PROBE: NEGATIVE
SARS-COV-2 RNA RESP QL NAA+PROBE: NOTDETECTED
SODIUM SERPL-SCNC: 136 MMOL/L (ref 135–145)
SPECIMEN SOURCE: NORMAL
WBC # BLD AUTO: 17.8 K/UL (ref 4.8–10.8)

## 2024-04-20 PROCEDURE — A9270 NON-COVERED ITEM OR SERVICE: HCPCS | Performed by: INTERNAL MEDICINE

## 2024-04-20 PROCEDURE — 94640 AIRWAY INHALATION TREATMENT: CPT

## 2024-04-20 PROCEDURE — A9270 NON-COVERED ITEM OR SERVICE: HCPCS | Performed by: STUDENT IN AN ORGANIZED HEALTH CARE EDUCATION/TRAINING PROGRAM

## 2024-04-20 PROCEDURE — 700102 HCHG RX REV CODE 250 W/ 637 OVERRIDE(OP): Performed by: INTERNAL MEDICINE

## 2024-04-20 PROCEDURE — 700102 HCHG RX REV CODE 250 W/ 637 OVERRIDE(OP): Performed by: STUDENT IN AN ORGANIZED HEALTH CARE EDUCATION/TRAINING PROGRAM

## 2024-04-20 PROCEDURE — 99255 IP/OBS CONSLTJ NEW/EST HI 80: CPT | Performed by: INTERNAL MEDICINE

## 2024-04-20 PROCEDURE — 80048 BASIC METABOLIC PNL TOTAL CA: CPT

## 2024-04-20 PROCEDURE — 83735 ASSAY OF MAGNESIUM: CPT

## 2024-04-20 PROCEDURE — 85027 COMPLETE CBC AUTOMATED: CPT

## 2024-04-20 PROCEDURE — 700111 HCHG RX REV CODE 636 W/ 250 OVERRIDE (IP)

## 2024-04-20 PROCEDURE — A9270 NON-COVERED ITEM OR SERVICE: HCPCS

## 2024-04-20 PROCEDURE — 36415 COLL VENOUS BLD VENIPUNCTURE: CPT

## 2024-04-20 PROCEDURE — 0241U HCHG SARS-COV-2 COVID-19 NFCT DS RESP RNA 4 TRGT MIC: CPT

## 2024-04-20 PROCEDURE — 700105 HCHG RX REV CODE 258: Performed by: STUDENT IN AN ORGANIZED HEALTH CARE EDUCATION/TRAINING PROGRAM

## 2024-04-20 PROCEDURE — 700111 HCHG RX REV CODE 636 W/ 250 OVERRIDE (IP): Performed by: STUDENT IN AN ORGANIZED HEALTH CARE EDUCATION/TRAINING PROGRAM

## 2024-04-20 PROCEDURE — 99232 SBSQ HOSP IP/OBS MODERATE 35: CPT | Performed by: STUDENT IN AN ORGANIZED HEALTH CARE EDUCATION/TRAINING PROGRAM

## 2024-04-20 PROCEDURE — 84100 ASSAY OF PHOSPHORUS: CPT

## 2024-04-20 PROCEDURE — 770020 HCHG ROOM/CARE - TELE (206)

## 2024-04-20 PROCEDURE — 700102 HCHG RX REV CODE 250 W/ 637 OVERRIDE(OP)

## 2024-04-20 RX ORDER — OXYCODONE HYDROCHLORIDE 10 MG/1
10 TABLET ORAL
Status: DISCONTINUED | OUTPATIENT
Start: 2024-04-20 | End: 2024-04-21

## 2024-04-20 RX ORDER — ACETAMINOPHEN 500 MG
1000 TABLET ORAL 3 TIMES DAILY
Status: DISCONTINUED | OUTPATIENT
Start: 2024-04-20 | End: 2024-04-21

## 2024-04-20 RX ORDER — OXYCODONE HYDROCHLORIDE 15 MG/1
15 TABLET ORAL
Status: DISCONTINUED | OUTPATIENT
Start: 2024-04-20 | End: 2024-04-21

## 2024-04-20 RX ORDER — HYDROMORPHONE HYDROCHLORIDE 1 MG/ML
1 INJECTION, SOLUTION INTRAMUSCULAR; INTRAVENOUS; SUBCUTANEOUS
Status: DISCONTINUED | OUTPATIENT
Start: 2024-04-20 | End: 2024-04-21

## 2024-04-20 RX ADMIN — HYDROMORPHONE HYDROCHLORIDE 1 MG: 1 INJECTION, SOLUTION INTRAMUSCULAR; INTRAVENOUS; SUBCUTANEOUS at 22:01

## 2024-04-20 RX ADMIN — METOPROLOL SUCCINATE 25 MG: 25 TABLET, EXTENDED RELEASE ORAL at 04:18

## 2024-04-20 RX ADMIN — ACETAMINOPHEN 1000 MG: 500 TABLET, FILM COATED ORAL at 04:20

## 2024-04-20 RX ADMIN — ONDANSETRON 4 MG: 4 TABLET, ORALLY DISINTEGRATING ORAL at 13:35

## 2024-04-20 RX ADMIN — AMPICILLIN AND SULBACTAM 3 G: 1; 2 INJECTION, POWDER, FOR SOLUTION INTRAMUSCULAR; INTRAVENOUS at 13:38

## 2024-04-20 RX ADMIN — HYDROMORPHONE HYDROCHLORIDE 0.5 MG: 1 INJECTION, SOLUTION INTRAMUSCULAR; INTRAVENOUS; SUBCUTANEOUS at 07:54

## 2024-04-20 RX ADMIN — OMEPRAZOLE 20 MG: 20 CAPSULE, DELAYED RELEASE ORAL at 04:18

## 2024-04-20 RX ADMIN — FERROUS SULFATE TAB 325 MG (65 MG ELEMENTAL FE) 325 MG: 325 (65 FE) TAB at 07:55

## 2024-04-20 RX ADMIN — AMPICILLIN AND SULBACTAM 3 G: 1; 2 INJECTION, POWDER, FOR SOLUTION INTRAMUSCULAR; INTRAVENOUS at 00:29

## 2024-04-20 RX ADMIN — RIVAROXABAN 20 MG: 20 TABLET, FILM COATED ORAL at 17:44

## 2024-04-20 RX ADMIN — AMPICILLIN AND SULBACTAM 3 G: 1; 2 INJECTION, POWDER, FOR SOLUTION INTRAMUSCULAR; INTRAVENOUS at 08:05

## 2024-04-20 RX ADMIN — HYDROMORPHONE HYDROCHLORIDE 1 MG: 1 INJECTION, SOLUTION INTRAMUSCULAR; INTRAVENOUS; SUBCUTANEOUS at 17:44

## 2024-04-20 RX ADMIN — HYDROMORPHONE HYDROCHLORIDE 0.5 MG: 1 INJECTION, SOLUTION INTRAMUSCULAR; INTRAVENOUS; SUBCUTANEOUS at 02:42

## 2024-04-20 RX ADMIN — OXYCODONE HYDROCHLORIDE 15 MG: 15 TABLET ORAL at 16:19

## 2024-04-20 RX ADMIN — ALPRAZOLAM 0.25 MG: 0.25 TABLET ORAL at 13:52

## 2024-04-20 RX ADMIN — ALBUTEROL SULFATE 2 PUFF: 90 AEROSOL, METERED RESPIRATORY (INHALATION) at 21:59

## 2024-04-20 RX ADMIN — MOMETASONE FUROATE AND FORMOTEROL FUMARATE DIHYDRATE 2 PUFF: 100; 5 AEROSOL RESPIRATORY (INHALATION) at 04:22

## 2024-04-20 RX ADMIN — HYDROMORPHONE HYDROCHLORIDE 1 MG: 1 INJECTION, SOLUTION INTRAMUSCULAR; INTRAVENOUS; SUBCUTANEOUS at 12:37

## 2024-04-20 RX ADMIN — OXYCODONE HYDROCHLORIDE 15 MG: 15 TABLET ORAL at 00:27

## 2024-04-20 RX ADMIN — OMEPRAZOLE 20 MG: 20 CAPSULE, DELAYED RELEASE ORAL at 17:44

## 2024-04-20 RX ADMIN — OXYCODONE HYDROCHLORIDE 15 MG: 15 TABLET ORAL at 10:36

## 2024-04-20 RX ADMIN — ACETAMINOPHEN 1000 MG: 500 TABLET, FILM COATED ORAL at 16:19

## 2024-04-20 RX ADMIN — ACETAMINOPHEN 1000 MG: 500 TABLET, FILM COATED ORAL at 08:09

## 2024-04-20 RX ADMIN — AMPICILLIN AND SULBACTAM 3 G: 1; 2 INJECTION, POWDER, FOR SOLUTION INTRAMUSCULAR; INTRAVENOUS at 17:53

## 2024-04-20 RX ADMIN — VANCOMYCIN HYDROCHLORIDE 1250 MG: 5 INJECTION, POWDER, LYOPHILIZED, FOR SOLUTION INTRAVENOUS at 05:14

## 2024-04-20 RX ADMIN — OXYCODONE HYDROCHLORIDE 15 MG: 15 TABLET ORAL at 05:09

## 2024-04-20 RX ADMIN — MOMETASONE FUROATE AND FORMOTEROL FUMARATE DIHYDRATE 2 PUFF: 100; 5 AEROSOL RESPIRATORY (INHALATION) at 17:44

## 2024-04-20 RX ADMIN — OXYCODONE HYDROCHLORIDE 15 MG: 15 TABLET ORAL at 20:15

## 2024-04-20 RX ADMIN — ATORVASTATIN CALCIUM 40 MG: 40 TABLET, FILM COATED ORAL at 20:15

## 2024-04-20 ASSESSMENT — PAIN DESCRIPTION - PAIN TYPE
TYPE: ACUTE PAIN
TYPE: ACUTE PAIN;SURGICAL PAIN
TYPE: ACUTE PAIN

## 2024-04-20 ASSESSMENT — ENCOUNTER SYMPTOMS
WEAKNESS: 1
SHORTNESS OF BREATH: 1

## 2024-04-20 ASSESSMENT — FIBROSIS 4 INDEX: FIB4 SCORE: 1.09

## 2024-04-20 NOTE — PROGRESS NOTES
Bedside report received at 1900 and assumed care of patient. Resting in bed, endorsing 6/10 pain. Repositioned for comfort. A&O x4 on 4L oxymask.. Tele monitoring in place. Educated on fall risk, all fall precautions in place. Call light within reach, bed locked and in lowest position, denied other needs at this time. Hourly rounding in place.

## 2024-04-20 NOTE — PROCEDURES
VAT at bedside to access for midline order, recommend PIV for time being as we do not have confirmation from ID on length of IV ABX course. ID to see patient this morning and recommend PICC vs MIDline. Bedside RN aware and  Aware. VAT to follow up.

## 2024-04-20 NOTE — CARE PLAN
Problem: Pain - Standard  Goal: Alleviation of pain or a reduction in pain to the patient’s comfort goal  Outcome: Progressing     Problem: Hemodynamics  Goal: Patient's hemodynamics, fluid balance and neurologic status will be stable or improve  Outcome: Progressing     Problem: Fluid Volume  Goal: Fluid volume balance will be maintained  Outcome: Progressing     Problem: Urinary - Renal Perfusion  Goal: Ability to achieve and maintain adequate renal perfusion and functioning will improve  Outcome: Progressing     Problem: Respiratory  Goal: Patient will achieve/maintain optimum respiratory ventilation and gas exchange  Outcome: Progressing     Problem: Mechanical Ventilation  Goal: Safe management of artificial airway and ventilation  Outcome: Progressing  Goal: Successful weaning off mechanical ventilator, spontaneously maintains adequate gas exchange  Outcome: Progressing  Goal: Patient will be able to express needs and understand communication  Outcome: Progressing     Problem: Physical Regulation  Goal: Diagnostic test results will improve  Outcome: Progressing  Goal: Signs and symptoms of infection will decrease  Outcome: Progressing     Problem: Knowledge Deficit - Standard  Goal: Patient and family/care givers will demonstrate understanding of plan of care, disease process/condition, diagnostic tests and medications  Outcome: Progressing     Problem: Psychosocial  Goal: Patient's level of anxiety will decrease  Outcome: Progressing  Goal: Patient's ability to verbalize feelings about condition will improve  Outcome: Progressing  Goal: Patient's ability to re-evaluate and adapt role responsibilities will improve  Outcome: Progressing  Goal: Patient and family will demonstrate ability to cope with life altering diagnosis and/or procedure  Outcome: Progressing  Goal: Spiritual and cultural needs incorporated into hospitalization  Outcome: Progressing     Problem: Communication  Goal: The ability to  communicate needs accurately and effectively will improve  Outcome: Progressing     Problem: Discharge Barriers/Planning  Goal: Patient's continuum of care needs are met  Outcome: Progressing     Problem: Chest Tube Management  Goal: Complications related to chest tube will be avoided or minimized  Outcome: Progressing     Problem: Dysphagia  Goal: Dysphagia will improve  Outcome: Progressing     Problem: Risk for Aspiration  Goal: Patient's risk for aspiration will be absent or decrease  Outcome: Progressing     Problem: Nutrition  Goal: Patient's nutritional and fluid intake will be adequate or improve  Outcome: Progressing  Goal: Enteral nutrition will be maintained or improve  Outcome: Progressing  Goal: Enteral nutrition will be maintained or improve  Outcome: Progressing     Problem: Urinary Elimination  Goal: Establish and maintain regular urinary output  Outcome: Progressing     Problem: Bowel Elimination  Goal: Establish and maintain regular bowel function  Outcome: Progressing     Problem: Gastrointestinal Irritability  Goal: Nausea and vomiting will be absent or improve  Outcome: Progressing  Goal: Diarrhea will be absent or improved  Outcome: Progressing     Problem: Rectal Tube  Goal: Fecal output will be contained and skin will remain free from irritation  Outcome: Progressing     Problem: Mobility  Goal: Patient's capacity to carry out activities will improve  Outcome: Progressing     Problem: Self Care  Goal: Patient will have the ability to perform ADLs independently or with assistance (bathe, groom, dress, toilet and feed)  Outcome: Progressing     Problem: Infection - Standard  Goal: Patient will remain free from infection  Outcome: Progressing     Problem: Wound/ / Incision Healing  Goal: Patient's wound/surgical incision will decrease in size and heals properly  Outcome: Progressing     Problem: Knowledge Deficit - COPD  Goal: Patient/significant other demonstrates understanding of disease  process, utilization of the Action Plan, medications and discharge instruction  Outcome: Progressing     Problem: Risk for Infection - COPD  Goal: Patient will remain free from signs and symptoms of infection  Outcome: Progressing     Problem: Nutrition - Advanced  Goal: Patient will display progressive weight gain toward goal have adequate food and fluid intake  Outcome: Progressing   The patient is Watcher - Medium risk of patient condition declining or worsening    Shift Goals  Clinical Goals: VSS, IV ABX, pain management  Patient Goals: Pain control  Family Goals: Updates    Progress made toward(s) clinical / shift goals:  VSS, IV ABX, pain management    Patient is not progressing towards the following goals:

## 2024-04-20 NOTE — PROGRESS NOTES
WOB continues to be increased. VSS with 4 lpm via mask,  notified. CXR, negative. Will continue to monitor.

## 2024-04-20 NOTE — CONSULTS
[facility] INFECTIOUS DISEASES INPATIENT CONSULT NOTE     Date of Service: 4/20/2024    Consult Requested By: Luis Miguel Johnson M.D.    Reason for Consultation: Foot abscess    Chief Complaint: Foot infection    History of Present Illness:     Noe Hickey is a 63 y.o. male admitted 4/11/2024.  Extensive review of documentation from multiple specialties performed in generating this HPI.  Patient with known history of AAA repair in January 2023, systolic and diastolic congestive heart failure (EF now improved to 70%, some concern for apical variant hypertrophic cardiomyopathy), paroxysmal A-fib status post ablation, stop Xarelto a week prior to admission, tobacco abuse, patient initially presented with chest pain, CTA was negative for aneurysm, but did note a left-sided opacity, suspected atelectatic changes on imaging and he was noted to have a white blood cell count of 27,000.  Of note, patient does take anabolic steroids.  He began to develop worsening stridor and hoarse voice on 4/15, pulmonology and ENT working this up, also developed right foot pain and swelling.  CT scan with intramuscular hematoma in the posterior thigh muscular compartment and anterior thigh musculature, subcutaneous hematoma along the dorsal foot.  Patient notes that he did tear his hamstrings at the gym a few weeks prior.  Patient with elevated temperatures up to 99.3, had leukocytosis as high as 23.2.  Patient was taken to the OR by orthopedics on 4/19, underwent I&D and a large amount of purulent fluid was encountered, underwent excisional debridement down to subcutaneous tissue.  Cultures pending, blood cultures negative.    On further questioning, the timeline is somewhat ambiguous.  Patient notes that he has had shortness of breath ever since his AAA repair in January 2023 and he may have had off-and-on hoarseness in the voice and a dry cough but most recently has gotten significantly worse.  Some congestion as well this  time.  Also, patient was doing shrugs when he had the hamstring tear with hematoma and the following days when the foot pain and swelling began and patient feels that the foot infection is an infected hematoma and a continuation of the lower extremity tear but of multiple muscle groups.    Review of Systems:  All other systems reviewed and are negative expect as noted in HPI    Past Medical History:   Diagnosis Date    Arrhythmia     Breath shortness 06/09/2023    prior to 1/2023 surgery    Cyclic vomiting syndrome     Elevated hemidiaphragm - LEFT post AVR 01/04/2023    Fracture     Headache, classical migraine     Heart burn     Heart valve disease 06/09/2023    heart surgery in 1/2023    Indigestion     Pneumonia due to infectious organism 01/20/2023    Snoring        Past Surgical History:   Procedure Laterality Date    INCISION AND DRAINAGE GENERAL Left 4/19/2024    Procedure: INCISION AND DRAINAGE, LEG;  Surgeon: Mitch Bowie M.D.;  Location: [facility];  Service: Orthopedics    AORTIC VALVE REPLACEMENT  1/5/2023    Procedure: AORTIC VALVE REPLACEMENT, ASCENDING AORTIC ANEURYSM REPAIR AND TRANSESOPHAGEAL ECHOCARDIOGRAM.;  Surgeon: Serena Goyal M.D.;  Location: [facility];  Service: Cardiac    AORTIC ASCENDING DISSECTION  1/5/2023    Procedure: REPAIR, ANEURYSM OR DISSECTION, AORTA, ASCENDING;  Surgeon: Serena Goyal M.D.;  Location: [facility];  Service: Cardiac    ECHOCARDIOGRAM, TRANSESOPHAGEAL, INTRAOPERATIVE  1/5/2023    Procedure: ECHOCARDIOGRAM, TRANSESOPHAGEAL, INTRAOPERATIVE.;  Surgeon: Serena Goyal M.D.;  Location: [facility];  Service: Cardiac    IRRIGATION & DEBRIDEMENT ORTHO Right 09/19/2017    Procedure: IRRIGATION & DEBRIDEMENT ORTHO-THIGH;  Surgeon: Corbin Rivera M.D.;  Location: [facility];  Service: Orthopedics    SHOULDER HEMICAP RESURFACING Right 10/12/2015    Procedure: RIGHT SHOULDER RESURFACING;  Surgeon: Javon Doll,  M.D.;  Location: [facility];  Service:     SHOULDER ARTHROSCOPY      x3    SHOULDER SURGERY      replacement right       History reviewed. No pertinent family history.    Social History     Socioeconomic History    Marital status:      Spouse name: Not on file    Number of children: Not on file    Years of education: Not on file    Highest education level: Not on file   Occupational History    Not on file   Tobacco Use    Smoking status: Every Day     Current packs/day: 0.50     Average packs/day: 0.5 packs/day for 40.0 years (20.0 ttl pk-yrs)     Types: Cigarettes    Smokeless tobacco: Never    Tobacco comments:     Down to 5 cig daily   Vaping Use    Vaping Use: Never used   Substance and Sexual Activity    Alcohol use: No    Drug use: Not Currently     Comment: past problems with narcotics not since 2019    Sexual activity: Not on file   Other Topics Concern    Not on file   Social History Narrative    Not on file     Social Determinants of Health     Financial Resource Strain: Low Risk  (7/7/2023)    Overall Financial Resource Strain (CARDIA)     Difficulty of Paying Living Expenses: Not hard at all   Food Insecurity: No Food Insecurity (7/7/2023)    Hunger Vital Sign     Worried About Running Out of Food in the Last Year: Never true     Ran Out of Food in the Last Year: Never true   Transportation Needs: No Transportation Needs (7/7/2023)    PRAPARE - Transportation     Lack of Transportation (Medical): No     Lack of Transportation (Non-Medical): No   Physical Activity: Not on file   Stress: Not on file   Social Connections: Not on file   Intimate Partner Violence: Not on file   Housing Stability: Low Risk  (7/7/2023)    Housing Stability Vital Sign     Unable to Pay for Housing in the Last Year: No     Number of Places Lived in the Last Year: 1     Unstable Housing in the Last Year: No       Allergies   Allergen Reactions    Morphine Shortness of Breath, Rash and Itching              Medications:    Current Facility-Administered Medications:     acetaminophen (Tylenol) tablet 1,000 mg, 1,000 mg, Oral, TID, Luis Miguel Johnson M.D., 1,000 mg at 04/20/24 0809    oxyCODONE immediate release (Roxicodone) tablet 10 mg, 10 mg, Oral, Q3HRS PRN **OR** oxycodone (Oxy-IR) immediate release tablet 15 mg, 15 mg, Oral, Q3HRS PRN **OR** HYDROmorphone (Dilaudid) injection 1 mg, 1 mg, Intravenous, Q3HRS PRN, Luis Miguel Johnson M.D.    NS infusion, , Intravenous, Continuous, THANH Scales.P.R.N., Last Rate: 100 mL/hr at 04/19/24 2049, New Bag at 04/19/24 2049    albuterol inhaler 2 Puff, 2 Puff, Inhalation, Q4HRS PRN, Olga Mendez M.D., 2 Puff at 04/19/24 0942    Pharmacy Consult Request ...Pain Management Review 1 Each, 1 Each, Other, PHARMACY TO DOSE, THANH Scales.P.R.N.    ampicillin/sulbactam (Unasyn) 3 g in  mL IVPB, 3 g, Intravenous, Q6HRS, Olga Mendez M.D., Last Rate: 200 mL/hr at 04/20/24 0805, 3 g at 04/20/24 0805    ALPRAZolam (Xanax) tablet 0.25 mg, 0.25 mg, Oral, 4X/DAY PRN, Olga Mendez M.D., 0.25 mg at 04/18/24 1641    hydrOXYzine HCl (Atarax) tablet 25 mg, 25 mg, Oral, TID PRN, Olga Mendez M.D., 25 mg at 04/17/24 0410    ondansetron (Zofran ODT) dispertab 4 mg, 4 mg, Oral, Q4HRS PRN, Olga Mendez M.D., 4 mg at 04/19/24 1721    butalbital/apap/caffeine (Fioricet) -40 mg per tablet 1 Tablet, 1 Tablet, Oral, Q6HRS PRN, Olga Mendez M.D.    Respiratory Therapy Consult, , Nebulization, Continuous RT, Olga Mendez M.D.    mometasone-formoterol (Dulera) 100-5 MCG/ACT inhaler 2 Puff, 2 Puff, Inhalation, BID, Olga Mendez M.D., 2 Puff at 04/20/24 0422    ferrous sulfate tablet 325 mg, 325 mg, Oral, QDAY with Breakfast, Olga Mendez M.D., 325 mg at 04/20/24 0755    benzonatate (Tessalon) capsule 100 mg, 100 mg, Oral, TID PRN, Olga Mendez M.D., 100 mg at 04/19/24 1441    omeprazole (PriLOSEC) capsule 20 mg, 20 mg, Oral, BID, Olga Mendez M.D., 20 mg at  "24    atorvastatin (Lipitor) tablet 40 mg, 40 mg, Oral, QHS, Melvin Bailey M.D., 40 mg at 24    [Held by provider] losartan (Cozaar) tablet 25 mg, 25 mg, Oral, DAILY, Olga Mendez M.D., 25 mg at 24    metoprolol SR (Toprol XL) tablet 25 mg, 25 mg, Oral, DAILY, Milad Parker M.D., 25 mg at 24    [Held by provider] rivaroxaban (Xarelto) tablet 20 mg, 20 mg, Oral, PM MEAL, Milad Parker M.D., 20 mg at 24 1724    LR (Bolus) infusion 500 mL, 500 mL, Intravenous, Once PRN, Milad Parker M.D.    Physical Exam:   Vital Signs: BP (!) 145/68   Pulse 78   Temp 37.1 °C (98.8 °F) (Temporal)   Resp 16   Ht 1.753 m (5' 9.02\")   Wt 99.5 kg (219 lb 5.7 oz)   SpO2 96%   BMI 32.38 kg/m²   Temp  Av.9 °C (98.5 °F)  Min: 36.2 °C (97.2 °F)  Max: 37.4 °C (99.3 °F)  Vital signs reviewed  Constitutional: Patient is oriented to person, place, and time. Appears well-developed and well-nourished. No distress but appears uncomfortable due to constant coughing  Head: Atraumatic, normocephalic  Eyes: Conjunctivae normal, EOM intact   Mouth/Throat: Lips without lesions  Neck: Neck supple. No masses/lymphadenopathy  Cardiovascular: Normal rate, regular rhythm, no pedal edema.  Pulmonary/Chest: Hoarse voice, mild accessory muscle use, dry cough  Abdominal: Non distended  Musculoskeletal: Obvious hematoma right lower extremity, right foot with clean and dry dressing  Neurological: Alert and oriented to person, place, and time. No gross cranial nerve deficit. No focal neural deficit noted  Skin: As above  Psychiatric: Normal mood and affect. Behavior is normal.     LABS:  Recent Labs     24  0441 24  0400 24  0041   WBC 18.9* 16.2* 17.8*      Recent Labs     24  0441 24  0400 24  0041   HEMOGLOBIN 10.8* 10.1* 9.7*   HEMATOCRIT 34.5* 33.9* 32.5*   MCV 66.3* 67.7* 68.1*   MCH 20.8* 20.2* 20.3*   PLATELETCT 460* 455* 467*       Recent Labs     " "04/17/24  1117 04/19/24  0400 04/20/24  0041   SODIUM 136 135 136   POTASSIUM 5.0 5.1 5.4   CHLORIDE 96 97 98   CO2 30 27 28   CREATININE 1.09 0.87 0.96        No results for input(s): \"ALBUMIN\" in the last 72 hours.    Invalid input(s): \"AST\", \"ALT\", \"ALKPHOS\", \"BILITOT\", \"TOTALBILIRUB\", \"BILIRUBINTOT\", \"BILIRUBINDIR\", \"BILIRUBININD\", \"ALKALINEPHOS\"     MICRO:  No results found for: \"BLOODCULTU\", \"BLDCULT\", \"BCHOLD\"     Latest pertinent labs were reviewed    IMAGING STUDIES:  As above    Hospital Course/Assessment:   Noe Hickey is a 63 y.o. male patient with known history of AAA repair in January 2023, systolic and diastolic congestive heart failure (EF now improved to 70%, some concern for apical variant hypertrophic cardiomyopathy), paroxysmal A-fib status post ablation, stop Xarelto a week prior to admission, anabolic steroid use, tobacco abuse, patient initially presented with chest pain, CTA was negative for aneurysm.  Timeline is somewhat ambiguous but patient has been dealing with shortness of breath issues since the above AAA repair, also off-and-on issues with hoarseness of voice and a dry cough, however significantly worsened over the past few days.  Around the same time, patient states that he also tore his right hamstrings while doing shrugs in the gym with resultant hematomas in multiple muscle groups and while he was being worked up for his respiratory complaints, he was also noted to have a right foot abscess.    Patient was taken to the OR by orthopedics on 4/19, underwent I&D of the right foot and a large amount of purulent fluid was encountered, underwent excisional debridement down to subcutaneous tissue.    At this point, it does seem like his pulmonary complaints and the foot abscess are unrelated however mechanism of injury causing hematomas of multiple muscle groups of the right lower extremity from the thigh down to the foot while doing shrugs in the gym and superinfection " resulting in an abscess in the right foot do appear unusual.  A potential systemic vascular infection is on the differential.  Patient states that he does oral anabolic steroids but no IV or inhalational drug use.  He does seem to have an elevated left hemidiaphragm, perhaps phrenic nerve injury following the AAA repair last year which could potentially explain the shortness of breath and hoarseness of voice.  From an ID standpoint, will focus on the right foot abscess.    Pertinent Diagnoses:  Right foot abscess  Thigh musculature hematomas  History of AAA repair  Suspected hypertrophic cardiomyopathy  Paroxysmal A-fib  Anabolic steroid use  Tobacco use  Bioprosthetic aortic valve in place    Plan:   -Okay to continue Unasyn for now, follow pending OR cultures  -Hepatitis C antibody in a.m.  -Follow-up pending blood cultures x 2.  Patient does have a bioprosthetic aortic valve  -SARS-CoV-2, flu, RSV PCR    Disposition: Unable to determine at this time  Need for PICC line: Unable to determine at this time    Plan was discussed with the primary team, Dr. Blanco.  ID will follow.  This illness poses a threat to limb function    Alireza Johnson M.D.    Please note that this dictation was created using voice recognition software. I have worked with technical experts from [facility] to optimize the interface.  I have made every reasonable attempt to correct obvious errors, but there may be errors of grammar and possibly content that I did not discover before finalizing the note.

## 2024-04-20 NOTE — PROGRESS NOTES
Primary RN found patient without oxygen nasal cannula on. Patient's oxygen saturation was 70% on room air. Patient placed back on oxygen, recovering to 95%. Attending notified of clinical update.

## 2024-04-20 NOTE — PROGRESS NOTES
Hospital Medicine Daily Progress Note    Date of Service  4/20/2024    Chief Complaint  Noe Hickey is a 63 y.o. male admitted 4/11/2024 with chest pain and sob    Hospital Course  63 y.o. male with hx of AAA repair in January 2023, combined systolic and diastolic congestive heart failure, paroxysmal atrial fibrillation status post ablation on Xarelto, however discontinued for the past few days due to misunderstanding by cardiology provider, ongoing tobacco abuse, who presented 4/11/2024 with sob for past one month. He was just seen cardiology 4/4 and echo notes EF 70%, which has improved from 30% 7/5/23 and there is features of apical variant hypertrophic cardiomyopathy.  Patient was scheduled to have cardiac MRI and stress test next week.  However he presented with worsening shortness of breath and chest pain.    In the emergency room he was a code aorta and a stat CTA aorta was performed with contrast that showed normal AAA repair with no new aneurysmal dilatation or aneurysm. It did show a left-sided basilar pneumonia with a white blood cell count of 27,000 for left shift.   Patient is started with antibiotics. Of note, patient is taking anabolic steroid. Procal negative.   CTA chest no PE  Doppler BLE no signs of DVT  Noted worsening stridor and horse voice on 4/15, pulmonary and ENT consulted  S/p ENT scope 4/15 unremarkable. Does not think dyspnea is related to vocal cords  SNIFF noted limited but nonparadoxical motion of the L diaphragm.     Reports RLE pain and swelling. Negative DVT. Pain is out of proportion. Ordered CT RLE and CT R foot. Discussed with radiology noted Intramuscular hematoma in the posterior thigh muscular compartment, small intramuscular hematoma in anterior thigh musculature, subcutaneous hematoma along the dorsal foot and diffuse subcutaneous if I distracted him from the proximal thigh extending distally to the foot.     Orthopedic surgery following first patient underwent  irrigation and debridement of right dorsal foot abscess on 4/19/2024.     Interval Problem Update  I have seen and examined patient at bedside.    Reporting surgical site pain  Stable vitals  On 6L nasal cannula  Leukocytosis 17 today  MRSA negative  4/11 Blood cultures with no growth to date  4/19 Right foot OR cultures with no growth to date  Discontinue IV vancomycin  Continue IV Unasyn  ID following, appreciate recommendations  Pending COVID/Flu/RSV  Labs on AM    I have discussed this patient's plan of care and discharge plan at IDT rounds today with Case Management, Nursing, Nursing leadership, and other members of the IDT team.    Consultants/Specialty  cardiology  Pulmonary  ENT  Orthopedics  ID    Code Status  Full Code    Disposition  The patient is not medically cleared for discharge to home or a post-acute facility.     I have placed the appropriate orders for post-discharge needs.    Review of Systems  Review of Systems   Constitutional:  Positive for malaise/fatigue.   Respiratory:  Positive for shortness of breath.    Cardiovascular:  Positive for chest pain.   Neurological:  Positive for weakness.   All other systems reviewed and are negative.       Physical Exam  Temp:  [36.6 °C (97.9 °F)-37.1 °C (98.8 °F)] 37.1 °C (98.8 °F)  Pulse:  [74-89] 78  Resp:  [13-24] 16  BP: (108-158)/(55-92) 145/68  SpO2:  [89 %-99 %] 96 %    Physical Exam  Vitals and nursing note reviewed.   Constitutional:       Appearance: Normal appearance. He is obese. He is ill-appearing.   HENT:      Head: Normocephalic and atraumatic.      Mouth/Throat:      Pharynx: Oropharynx is clear.   Eyes:      Pupils: Pupils are equal, round, and reactive to light.   Neck:      Vascular: No carotid bruit.   Cardiovascular:      Rate and Rhythm: Normal rate and regular rhythm.   Pulmonary:      Effort: Pulmonary effort is normal. No respiratory distress.      Breath sounds: Normal breath sounds. No wheezing.      Comments: Diminished breath  sound bilateral  Abdominal:      General: Abdomen is flat. Bowel sounds are normal.      Palpations: Abdomen is soft. There is no mass.   Musculoskeletal:         General: Swelling and tenderness present. Normal range of motion.      Cervical back: Neck supple.      Right lower leg: Edema present.   Skin:     General: Skin is warm and dry.   Neurological:      General: No focal deficit present.      Mental Status: He is alert and oriented to person, place, and time.   Psychiatric:         Mood and Affect: Mood normal.         Behavior: Behavior normal.         Fluids    Intake/Output Summary (Last 24 hours) at 4/20/2024 0746  Last data filed at 4/20/2024 0200  Gross per 24 hour   Intake 460 ml   Output 1255 ml   Net -795 ml       Laboratory  Recent Labs     04/18/24  0441 04/19/24  0400 04/20/24  0041   WBC 18.9* 16.2* 17.8*   RBC 5.20 5.01 4.77   HEMOGLOBIN 10.8* 10.1* 9.7*   HEMATOCRIT 34.5* 33.9* 32.5*   MCV 66.3* 67.7* 68.1*   MCH 20.8* 20.2* 20.3*   MCHC 31.3* 29.8* 29.8*   RDW 49.4 50.3* 51.0*   PLATELETCT 460* 455* 467*   MPV 10.3 10.2 10.0     Recent Labs     04/17/24  1117 04/19/24  0400 04/20/24  0041   SODIUM 136 135 136   POTASSIUM 5.0 5.1 5.4   CHLORIDE 96 97 98   CO2 30 27 28   GLUCOSE 127* 79 90   BUN 30* 25* 24*   CREATININE 1.09 0.87 0.96   CALCIUM 8.8 8.7 8.4*                     Imaging  DX-CHEST-PORTABLE (1 VIEW)   Final Result      Cardiomegaly.      CT-EXTREMITY, LOWER WITH RIGHT   Final Result         1.  Intramuscular hematoma in the posterior thigh muscular compartment, evaluation for component of compartment syndrome recommended as clinically appropriate.   2.  Possible small fluid collection in the anterior thigh musculature which is isodense to muscle, appearance suggesting small intramuscular hematoma.   3.  Intermediate density fluid collection along the dorsal foot, appearance favoring subcutaneous hematoma   4.  Diffuse subcutaneous fat stranding from the proximal thigh extending  distally to the foot, compatible with edema and/or cellulitis.      These findings were discussed with the patient's clinician, Olga Mendez, on 4/18/2024 7:58 AM.      DX-CHEST-2 VIEWS   Final Result      1.  Median sternotomy and aortic valve replacement.   2.  Ongoing elevation of the left hemidiaphragm and subsegmental atelectatic changes at the left lung base. Concordant with prior study and CT-CTA of the chest      DX SNIFF TEST   Final Result      Limited but nonparadoxical motion of the LEFT diaphragm      DX-CHEST-PORTABLE (1 VIEW)   Final Result         1.  Left basilar atelectasis or subtle infiltrate.   2.  Atherosclerosis      US-EXTREMITY VENOUS LOWER BILAT   Final Result      CT-CTA CHEST PULMONARY ARTERY W/ RECONS   Final Result         1. No CT evidence of pulmonary embolism.      2. Airspace opacity in the left lower lobe, likely atelectasis      CT-CTA COMPLETE THORACOABDOMINAL AORTA   Final Result      1.  Surgical changes of the aortic valve and ascending aorta.   2.  No aortic aneurysm or dissection identified.   3.  Mild/moderate atherosclerotic changes.   4.  Punctate, nonobstructing right renal stone.   5.  Elevation of the left hemidiaphragm with adjacent basilar consolidations, likely atelectasis. Pneumonia not excluded.   6.  Distal colonic diverticulosis.   7.  Hepatomegaly.                  CT-CTA HEAD WITH & W/O-POST PROCESS   Final Result      1.  Severely suboptimal exam due to photon starvation.   2.  No gross acute intracranial abnormality.   3.  No proximal vessel occlusion.      CT-CTA NECK WITH & W/O-POST PROCESSING   Final Result   Addendum (preliminary) 1 of 1   100 mL Omnipaque 300 given IV.      Final      No acute arterial abnormality of the neck.           Assessment/Plan  * Shortness of breath- (present on admission)  Assessment & Plan  Has been occurring and progressive in nature for few months  Recently s/b cards and repeated ECHO which showed in an interval  normalization of his LVEF to 70% but shows possible there is features of apical variant hypertrophic cardiomyopath  Patient has been scheduled for outpatient cardiac MRI and stress test  Check CTA chest and doppler BLE negative for PE and DVT  SPEP/UPEP pending to r/o amyloid   Cardiology rec outpatient cardiac MRI and stress test    Pulmonary consulted. ?diaphragm motion abnormal. SNIFF noted limited but nonparadoxical motion of the L diaphragm.   S/p ENT scope 4/15 unremarkable. Does not think dyspnea is related to vocal cords  Anxiety   Speech therapy to evaluate vocal cord dysfucntion      Abscess of right foot  Assessment & Plan  R foot pain and swelling. S/p I&D 4/19 noted large amount of purulent fluid   Check MRSA swab  I have started vancomycin, monitoring renal function and trough  Continue iv Unasyn  I have consulted and discussed with ID Dr. Johnson  Multimodal pain control    Diffuse pain in right lower extremity  Assessment & Plan  RLE pain and swelling. Negative DVT. Pain is out of proportion.   Ordered CT RLE and CT R foot. Discussed with radiology noted Intramuscular hematoma in the posterior thigh muscular compartment, small intramuscular hematoma in anterior thigh musculature, subcutaneous hematoma along the dorsal foot and diffuse subcutaneous if I distracted him from the proximal thigh extending distally to the foot.  I have discussed with orthopedics, s/p I&D 4/19 and noted R dorsal foot abscess. See above    Dysphonia  Assessment & Plan  Acute onset, hoarse voice  S/p ENT scope 4/15 unremarkable. Does not think dyspnea is related to vocal cords    Microcytic anemia  Assessment & Plan  No Signs of bleeding  Check iron profile c/w JARROD  Iron supplements  Outpatient GI referral. Patient eeports colonoscopy years ago in [place].     Community acquired pneumonia of left lower lobe of lung- (present on admission)  Assessment & Plan  Denies fever, chills.  Noted profound leukocytosis on  admission.  However he is taking anabolic steroids at home, which could explain leukocytosis  Procalcitonin negative  Given his symptoms has been present for months, ?  PNA  Continue empirical abx with Augmentin    Acute hypoxic respiratory failure- (present on admission)  Assessment & Plan  Unclear etiology.   CTA negative for PE  Suspect undiagnosed COPD exacerbation. He has significant smoking history. Also MARIELENA possible contributing   Continue IS, duoneb and dulera  Pulmonary following     Tobacco dependence- (present on admission)  Assessment & Plan  Trying to cut back, encourage full cessation    Sepsis (HCC)- (present on admission)  Assessment & Plan  This is Sepsis Present on admission  SIRS criteria identified on my evaluation include: Tachypnea, with respirations greater than 20 per minute and Leukocytosis, with WBC greater than 12,000  Clinical indicators of end organ dysfunction include Acute Respiratory Failure - (mechanical ventilation or BiPap or PaO2/FiO2 ratio < 300)  Source is pulmonary  Sepsis protocol initiated  Crystalloid Fluid Administration: Resuscitation volume of 1 liter ordered. Reason that resuscitation volume of less than 30ml/kg was ordered concern for causing harm given CHF  IV antibiotics as appropriate for source of sepsis  Reassessment: I have reassessed the patient's hemodynamic status    ARF with hypoxia, RA saturation 87%  CTA Aorta shows L basilar consolidation, CAP  Continue empiric IV unasyn and oral azithromycin  F/U sputum cultures and blood cultures  Judicious IVF's    Heart failure with improved ejection fraction (HFimpEF) (HCC)- (present on admission)  Assessment & Plan  Echo 4/5/24 notes EF 70%, which has improved from EF 30% 7/5/23   Continue losartan, Toprol   Monitoring respiratory status  I&O, daily weight    Secondary hypercoagulable state (HCC)- (present on admission)  Assessment & Plan  Patient has discontinued Xarelto for the past few days due to misunderstanding  by his cardiology provider  Resumed Xarelto      Paroxysmal atrial fibrillation (HCC)- (present on admission)  Assessment & Plan  In NSR  Xarelto as above  Continue outpatient toprol XL    Leukocytosis, thrombocytosis, erythrocytosis, lymphopenia- (present on admission)  Assessment & Plan  Persistent leukocytosis for years. Patient is taking anabolic steroid, which could explain leukocytosis. However he said he has been taking steroid only a few months. He has persistent leucocytosis for year  Lengthy discussion with patient the risks of long term steroid use. I advised patient to slow wean off. He voiced understanding.   Hematology outpatient referral for further evaluation    Gastroesophageal reflux disease without esophagitis- (present on admission)  Assessment & Plan  Continue omeprazole 20 mg daily    Ascending aortic aneurysm repair- (present on admission)  Assessment & Plan  CTA aorta shows intact repair and no new aneurysmal dilation         VTE prophylaxis: Xarelto    Patient is has a high medical complexity, complex decision making and is at high risk for complication, morbidity, and mortality.  I spent 53 minutes, reviewing the chart, obtaining and/or reviewing separately obtained history. Performing a medically appropriate examination and evaluation.  Counseling and educating the patient. Ordering and reviewing medications, tests, or procedures.  Discussing the case with ID and orthopedics.  Documenting clinical information in EPIC. Independently interpreting results and communicating results to patient. Discussing future disposition of care with patient, RN and case management.  This does not include time spent on separately billable procedures/tests.

## 2024-04-20 NOTE — PROGRESS NOTES
Received report from ALLEN Molina. Pt arrived back on the unit, awake and alert. Telemetry monitor in place. VSS. Post Op VSs implemented. Right foot dressing CDI, pedal pulse strong. Conitnues to require 4lmp via mask, MD aware. CXR pending.

## 2024-04-21 ENCOUNTER — APPOINTMENT (OUTPATIENT)
Dept: RADIOLOGY | Facility: MEDICAL CENTER | Age: 64
DRG: 981 | End: 2024-04-21
Attending: STUDENT IN AN ORGANIZED HEALTH CARE EDUCATION/TRAINING PROGRAM
Payer: MEDICAID

## 2024-04-21 LAB
ALBUMIN SERPL BCP-MCNC: 3.8 G/DL (ref 3.2–4.9)
ALBUMIN/GLOB SERPL: 1.4 G/DL
ALP SERPL-CCNC: 36 U/L (ref 30–99)
ALT SERPL-CCNC: 71 U/L (ref 2–50)
ANION GAP SERPL CALC-SCNC: 7 MMOL/L (ref 7–16)
ANISOCYTOSIS BLD QL SMEAR: ABNORMAL
AST SERPL-CCNC: 71 U/L (ref 12–45)
BASE EXCESS BLDA CALC-SCNC: 4 MMOL/L (ref -4–3)
BASOPHILS # BLD AUTO: 0.4 % (ref 0–1.8)
BASOPHILS # BLD: 0.08 K/UL (ref 0–0.12)
BILIRUB SERPL-MCNC: 0.7 MG/DL (ref 0.1–1.5)
BODY TEMPERATURE: 36.9 CENTIGRADE
BUN SERPL-MCNC: 24 MG/DL (ref 8–22)
CALCIUM ALBUM COR SERPL-MCNC: 8.9 MG/DL (ref 8.5–10.5)
CALCIUM SERPL-MCNC: 8.7 MG/DL (ref 8.5–10.5)
CHLORIDE SERPL-SCNC: 97 MMOL/L (ref 96–112)
CO2 SERPL-SCNC: 32 MMOL/L (ref 20–33)
COMMENT 1642: NORMAL
CREAT SERPL-MCNC: 1.11 MG/DL (ref 0.5–1.4)
EOSINOPHIL # BLD AUTO: 0.33 K/UL (ref 0–0.51)
EOSINOPHIL NFR BLD: 1.8 % (ref 0–6.9)
ERYTHROCYTE [DISTWIDTH] IN BLOOD BY AUTOMATED COUNT: 53.7 FL (ref 35.9–50)
GFR SERPLBLD CREATININE-BSD FMLA CKD-EPI: 74 ML/MIN/1.73 M 2
GLOBULIN SER CALC-MCNC: 2.8 G/DL (ref 1.9–3.5)
GLUCOSE SERPL-MCNC: 98 MG/DL (ref 65–99)
HCO3 BLDA-SCNC: 31 MMOL/L (ref 17–25)
HCT VFR BLD AUTO: 34.4 % (ref 42–52)
HGB BLD-MCNC: 9.9 G/DL (ref 14–18)
HYPOCHROMIA BLD QL SMEAR: ABNORMAL
IMM GRANULOCYTES # BLD AUTO: 0.68 K/UL (ref 0–0.11)
IMM GRANULOCYTES NFR BLD AUTO: 3.6 % (ref 0–0.9)
INHALED O2 FLOW RATE: 3 L/MIN (ref 2–10)
INHALED O2 FLOW RATE: ABNORMAL L/MIN
LYMPHOCYTES # BLD AUTO: 1.06 K/UL (ref 1–4.8)
LYMPHOCYTES NFR BLD: 5.7 % (ref 22–41)
MCH RBC QN AUTO: 20.2 PG (ref 27–33)
MCHC RBC AUTO-ENTMCNC: 28.8 G/DL (ref 32.3–36.5)
MCV RBC AUTO: 70.2 FL (ref 81.4–97.8)
MICROCYTES BLD QL SMEAR: ABNORMAL
MONOCYTES # BLD AUTO: 1.78 K/UL (ref 0–0.85)
MONOCYTES NFR BLD AUTO: 9.5 % (ref 0–13.4)
MORPHOLOGY BLD-IMP: NORMAL
NEUTROPHILS # BLD AUTO: 14.79 K/UL (ref 1.82–7.42)
NEUTROPHILS NFR BLD: 79 % (ref 44–72)
NRBC # BLD AUTO: 0.13 K/UL
NRBC BLD-RTO: 0.7 /100 WBC (ref 0–0.2)
PCO2 BLDA: 56.7 MMHG (ref 26–37)
PCO2 TEMP ADJ BLDA: 56.5 MMHG (ref 26–37)
PH BLDA: 7.35 [PH] (ref 7.4–7.5)
PH TEMP ADJ BLDA: 7.35 [PH] (ref 7.4–7.5)
PLATELET # BLD AUTO: 492 K/UL (ref 164–446)
PLATELET BLD QL SMEAR: NORMAL
PMV BLD AUTO: 10.3 FL (ref 9–12.9)
PO2 BLDA: 55.5 MMHG (ref 64–87)
PO2 TEMP ADJ BLDA: 55.1 MMHG (ref 64–87)
POIKILOCYTOSIS BLD QL SMEAR: NORMAL
POTASSIUM SERPL-SCNC: 5.4 MMOL/L (ref 3.6–5.5)
PROT SERPL-MCNC: 6.6 G/DL (ref 6–8.2)
RBC # BLD AUTO: 4.9 M/UL (ref 4.7–6.1)
RBC BLD AUTO: PRESENT
SAO2 % BLDA: 84.5 % (ref 93–99)
SCHISTOCYTES BLD QL SMEAR: NORMAL
SODIUM SERPL-SCNC: 136 MMOL/L (ref 135–145)
WBC # BLD AUTO: 18.7 K/UL (ref 4.8–10.8)

## 2024-04-21 PROCEDURE — 700105 HCHG RX REV CODE 258: Performed by: STUDENT IN AN ORGANIZED HEALTH CARE EDUCATION/TRAINING PROGRAM

## 2024-04-21 PROCEDURE — A9270 NON-COVERED ITEM OR SERVICE: HCPCS | Performed by: STUDENT IN AN ORGANIZED HEALTH CARE EDUCATION/TRAINING PROGRAM

## 2024-04-21 PROCEDURE — 770000 HCHG ROOM/CARE - INTERMEDIATE ICU *

## 2024-04-21 PROCEDURE — 700111 HCHG RX REV CODE 636 W/ 250 OVERRIDE (IP): Mod: JZ | Performed by: STUDENT IN AN ORGANIZED HEALTH CARE EDUCATION/TRAINING PROGRAM

## 2024-04-21 PROCEDURE — 700102 HCHG RX REV CODE 250 W/ 637 OVERRIDE(OP): Performed by: INTERNAL MEDICINE

## 2024-04-21 PROCEDURE — 700102 HCHG RX REV CODE 250 W/ 637 OVERRIDE(OP): Performed by: STUDENT IN AN ORGANIZED HEALTH CARE EDUCATION/TRAINING PROGRAM

## 2024-04-21 PROCEDURE — 80053 COMPREHEN METABOLIC PANEL: CPT

## 2024-04-21 PROCEDURE — 82803 BLOOD GASES ANY COMBINATION: CPT

## 2024-04-21 PROCEDURE — 700101 HCHG RX REV CODE 250: Performed by: INTERNAL MEDICINE

## 2024-04-21 PROCEDURE — A9270 NON-COVERED ITEM OR SERVICE: HCPCS | Performed by: INTERNAL MEDICINE

## 2024-04-21 PROCEDURE — 700111 HCHG RX REV CODE 636 W/ 250 OVERRIDE (IP): Performed by: STUDENT IN AN ORGANIZED HEALTH CARE EDUCATION/TRAINING PROGRAM

## 2024-04-21 PROCEDURE — 85025 COMPLETE CBC W/AUTO DIFF WBC: CPT

## 2024-04-21 PROCEDURE — 99232 SBSQ HOSP IP/OBS MODERATE 35: CPT | Performed by: STUDENT IN AN ORGANIZED HEALTH CARE EDUCATION/TRAINING PROGRAM

## 2024-04-21 PROCEDURE — 71045 X-RAY EXAM CHEST 1 VIEW: CPT

## 2024-04-21 PROCEDURE — 99254 IP/OBS CNSLTJ NEW/EST MOD 60: CPT | Performed by: EMERGENCY MEDICINE

## 2024-04-21 RX ORDER — OXYCODONE HYDROCHLORIDE 10 MG/1
10 TABLET ORAL EVERY 6 HOURS PRN
Status: DISCONTINUED | OUTPATIENT
Start: 2024-04-21 | End: 2024-04-24 | Stop reason: HOSPADM

## 2024-04-21 RX ORDER — ACETAMINOPHEN 500 MG
1000 TABLET ORAL 3 TIMES DAILY PRN
Status: DISCONTINUED | OUTPATIENT
Start: 2024-04-21 | End: 2024-04-24 | Stop reason: HOSPADM

## 2024-04-21 RX ORDER — ALPRAZOLAM 0.25 MG/1
0.25 TABLET ORAL
Status: COMPLETED | OUTPATIENT
Start: 2024-04-21 | End: 2024-04-21

## 2024-04-21 RX ORDER — METHYLPREDNISOLONE SODIUM SUCCINATE 125 MG/2ML
125 INJECTION, POWDER, LYOPHILIZED, FOR SOLUTION INTRAMUSCULAR; INTRAVENOUS EVERY 8 HOURS
Status: DISCONTINUED | OUTPATIENT
Start: 2024-04-21 | End: 2024-04-24 | Stop reason: HOSPADM

## 2024-04-21 RX ORDER — OXYCODONE HYDROCHLORIDE 10 MG/1
10 TABLET ORAL
Status: DISCONTINUED | OUTPATIENT
Start: 2024-04-21 | End: 2024-04-21

## 2024-04-21 RX ORDER — HYDROMORPHONE HYDROCHLORIDE 1 MG/ML
1 INJECTION, SOLUTION INTRAMUSCULAR; INTRAVENOUS; SUBCUTANEOUS EVERY 6 HOURS PRN
Status: DISCONTINUED | OUTPATIENT
Start: 2024-04-21 | End: 2024-04-21

## 2024-04-21 RX ORDER — HYDROMORPHONE HYDROCHLORIDE 1 MG/ML
0.5 INJECTION, SOLUTION INTRAMUSCULAR; INTRAVENOUS; SUBCUTANEOUS EVERY 6 HOURS PRN
Status: DISCONTINUED | OUTPATIENT
Start: 2024-04-21 | End: 2024-04-24 | Stop reason: HOSPADM

## 2024-04-21 RX ORDER — HYDROMORPHONE HYDROCHLORIDE 1 MG/ML
1 INJECTION, SOLUTION INTRAMUSCULAR; INTRAVENOUS; SUBCUTANEOUS ONCE
Status: COMPLETED | OUTPATIENT
Start: 2024-04-21 | End: 2024-04-21

## 2024-04-21 RX ORDER — DOXYCYCLINE 100 MG/1
100 TABLET ORAL EVERY 12 HOURS
Status: DISCONTINUED | OUTPATIENT
Start: 2024-04-21 | End: 2024-04-24 | Stop reason: HOSPADM

## 2024-04-21 RX ORDER — IPRATROPIUM BROMIDE AND ALBUTEROL SULFATE 2.5; .5 MG/3ML; MG/3ML
3 SOLUTION RESPIRATORY (INHALATION) ONCE
Status: COMPLETED | OUTPATIENT
Start: 2024-04-21 | End: 2024-04-21

## 2024-04-21 RX ADMIN — BENZONATATE 100 MG: 100 CAPSULE ORAL at 02:55

## 2024-04-21 RX ADMIN — AMPICILLIN AND SULBACTAM 3 G: 1; 2 INJECTION, POWDER, FOR SOLUTION INTRAMUSCULAR; INTRAVENOUS at 12:31

## 2024-04-21 RX ADMIN — HYDROMORPHONE HYDROCHLORIDE 1 MG: 1 INJECTION, SOLUTION INTRAMUSCULAR; INTRAVENOUS; SUBCUTANEOUS at 23:34

## 2024-04-21 RX ADMIN — AMPICILLIN AND SULBACTAM 3 G: 1; 2 INJECTION, POWDER, FOR SOLUTION INTRAMUSCULAR; INTRAVENOUS at 18:19

## 2024-04-21 RX ADMIN — AMPICILLIN AND SULBACTAM 3 G: 1; 2 INJECTION, POWDER, FOR SOLUTION INTRAMUSCULAR; INTRAVENOUS at 00:01

## 2024-04-21 RX ADMIN — OXYCODONE HYDROCHLORIDE 10 MG: 10 TABLET ORAL at 22:07

## 2024-04-21 RX ADMIN — HYDROXYZINE HYDROCHLORIDE 25 MG: 25 TABLET, FILM COATED ORAL at 07:23

## 2024-04-21 RX ADMIN — HYDROMORPHONE HYDROCHLORIDE 1 MG: 1 INJECTION, SOLUTION INTRAMUSCULAR; INTRAVENOUS; SUBCUTANEOUS at 04:40

## 2024-04-21 RX ADMIN — OMEPRAZOLE 20 MG: 20 CAPSULE, DELAYED RELEASE ORAL at 18:09

## 2024-04-21 RX ADMIN — OXYCODONE HYDROCHLORIDE 15 MG: 15 TABLET ORAL at 02:52

## 2024-04-21 RX ADMIN — OMEPRAZOLE 20 MG: 20 CAPSULE, DELAYED RELEASE ORAL at 05:27

## 2024-04-21 RX ADMIN — ALPRAZOLAM 0.25 MG: 0.25 TABLET ORAL at 23:01

## 2024-04-21 RX ADMIN — RIVAROXABAN 20 MG: 20 TABLET, FILM COATED ORAL at 18:09

## 2024-04-21 RX ADMIN — METHYLPREDNISOLONE SODIUM SUCCINATE 125 MG: 125 INJECTION, POWDER, FOR SOLUTION INTRAMUSCULAR; INTRAVENOUS at 22:07

## 2024-04-21 RX ADMIN — ALBUTEROL SULFATE 2 PUFF: 90 AEROSOL, METERED RESPIRATORY (INHALATION) at 17:58

## 2024-04-21 RX ADMIN — HYDROXYZINE HYDROCHLORIDE 25 MG: 25 TABLET, FILM COATED ORAL at 12:30

## 2024-04-21 RX ADMIN — AMPICILLIN AND SULBACTAM 3 G: 1; 2 INJECTION, POWDER, FOR SOLUTION INTRAMUSCULAR; INTRAVENOUS at 05:34

## 2024-04-21 RX ADMIN — ALBUTEROL SULFATE 2 PUFF: 90 AEROSOL, METERED RESPIRATORY (INHALATION) at 02:59

## 2024-04-21 RX ADMIN — ACETAMINOPHEN 1000 MG: 500 TABLET, FILM COATED ORAL at 18:08

## 2024-04-21 RX ADMIN — METOPROLOL SUCCINATE 25 MG: 25 TABLET, EXTENDED RELEASE ORAL at 05:27

## 2024-04-21 RX ADMIN — ACETAMINOPHEN 1000 MG: 500 TABLET, FILM COATED ORAL at 05:27

## 2024-04-21 RX ADMIN — FERROUS SULFATE TAB 325 MG (65 MG ELEMENTAL FE) 325 MG: 325 (65 FE) TAB at 07:23

## 2024-04-21 RX ADMIN — MOMETASONE FUROATE AND FORMOTEROL FUMARATE DIHYDRATE 2 PUFF: 100; 5 AEROSOL RESPIRATORY (INHALATION) at 18:00

## 2024-04-21 RX ADMIN — HYDROMORPHONE HYDROCHLORIDE 0.5 MG: 1 INJECTION, SOLUTION INTRAMUSCULAR; INTRAVENOUS; SUBCUTANEOUS at 20:54

## 2024-04-21 RX ADMIN — IPRATROPIUM BROMIDE AND ALBUTEROL SULFATE 3 ML: 2.5; .5 SOLUTION RESPIRATORY (INHALATION) at 18:14

## 2024-04-21 RX ADMIN — HYDROMORPHONE HYDROCHLORIDE 1 MG: 1 INJECTION, SOLUTION INTRAMUSCULAR; INTRAVENOUS; SUBCUTANEOUS at 08:31

## 2024-04-21 RX ADMIN — ATORVASTATIN CALCIUM 40 MG: 40 TABLET, FILM COATED ORAL at 20:55

## 2024-04-21 RX ADMIN — MOMETASONE FUROATE AND FORMOTEROL FUMARATE DIHYDRATE 2 PUFF: 100; 5 AEROSOL RESPIRATORY (INHALATION) at 07:22

## 2024-04-21 RX ADMIN — DOXYCYCLINE 100 MG: 100 TABLET, FILM COATED ORAL at 20:55

## 2024-04-21 RX ADMIN — HYDROXYZINE HYDROCHLORIDE 25 MG: 25 TABLET, FILM COATED ORAL at 20:55

## 2024-04-21 RX ADMIN — OXYCODONE HYDROCHLORIDE 15 MG: 15 TABLET ORAL at 12:30

## 2024-04-21 RX ADMIN — OXYCODONE HYDROCHLORIDE 15 MG: 15 TABLET ORAL at 07:23

## 2024-04-21 ASSESSMENT — ENCOUNTER SYMPTOMS
CHILLS: 0
COUGH: 1
SPUTUM PRODUCTION: 1
FEVER: 0
SHORTNESS OF BREATH: 1
WEAKNESS: 1

## 2024-04-21 ASSESSMENT — FIBROSIS 4 INDEX
FIB4 SCORE: 1.1
FIB4 SCORE: 1.1

## 2024-04-21 ASSESSMENT — PAIN DESCRIPTION - PAIN TYPE
TYPE: ACUTE PAIN

## 2024-04-21 NOTE — PROGRESS NOTES
Monitor summary:        Rhythm: SR  Rate: 70-86  Ectopy: (r)PVC  Measurements: .17/.07/.39        12hr chart check

## 2024-04-21 NOTE — PROGRESS NOTES
Report received from day shift RN. Patient sitting on edge of bed. Patient A&Ox4. Pain reported in RLE; prn pain medication ordered. Bed locked in lowest position. Patient instructed to call before getting out of bed. Call light within reach.

## 2024-04-21 NOTE — PROGRESS NOTES
"PT was found disoriented putting his tray and linen basket in front of the door. When asked what the pt was doing, he stated \"placing this in front of my door.\" When asked why he stated he \"just needed too.\" Upon further observation, pt was found to have no oxygen on with RA sats in the low 60's, pt immediately placed back on o2 at 4l NC. Once oxygen stabilized at 92%, pt was advised that another ambulating O2 was required per MD orders. While ambulating the unit, pt was noted to have a shuffling gait, a hard time keeping his eyes open, and responses to questions asked became inappropriate for the question. Pt required 4L NC to ambulate 300 ft maintaining o2 sats in the 90s. Pt returned to room, instructed to keep oxygen on and to call when getting up. Pt verbalized understanding. Charge RN and Primary RN notified of pt condition.   "

## 2024-04-21 NOTE — CARE PLAN
The patient is Stable - Low risk of patient condition declining or worsening    Shift Goals  Clinical Goals: Monitor VS, pain, rest  Patient Goals: Pain control  Family Goals: HUGH    Progress made toward(s) clinical / shift goals:      Problem: Hemodynamics  Goal: Patient's hemodynamics, fluid balance and neurologic status will be stable or improve  Outcome: Progressing     Problem: Pain - Standard  Goal: Alleviation of pain or a reduction in pain to the patient’s comfort goal  Outcome: Progressing     Problem: Fluid Volume  Goal: Fluid volume balance will be maintained  Outcome: Progressing       Patient is not progressing towards the following goals:

## 2024-04-21 NOTE — PROGRESS NOTES
Hospital Medicine Daily Progress Note    Date of Service  4/21/2024    Chief Complaint  Noe Hickey is a 63 y.o. male admitted 4/11/2024 with chest pain and sob    Hospital Course  63 y.o. male with hx of AAA repair in January 2023, combined systolic and diastolic congestive heart failure, paroxysmal atrial fibrillation status post ablation on Xarelto, however discontinued for the past few days due to misunderstanding by cardiology provider, ongoing tobacco abuse, who presented 4/11/2024 with sob for past one month. He was just seen cardiology 4/4 and echo notes EF 70%, which has improved from 30% 7/5/23 and there is features of apical variant hypertrophic cardiomyopathy.  Patient was scheduled to have cardiac MRI and stress test next week.  However he presented with worsening shortness of breath and chest pain.    In the emergency room he was a code aorta and a stat CTA aorta was performed with contrast that showed normal AAA repair with no new aneurysmal dilatation or aneurysm. It did show a left-sided basilar pneumonia with a white blood cell count of 27,000 for left shift.   Patient is started with antibiotics. Of note, patient is taking anabolic steroid. Procal negative.   CTA chest no PE  Doppler BLE no signs of DVT  Noted worsening stridor and horse voice on 4/15, pulmonary and ENT consulted  S/p ENT scope 4/15 unremarkable. Does not think dyspnea is related to vocal cords  SNIFF noted limited but nonparadoxical motion of the L diaphragm.     Reports RLE pain and swelling. Negative DVT. Pain is out of proportion. Ordered CT RLE and CT R foot. Discussed with radiology noted Intramuscular hematoma in the posterior thigh muscular compartment, small intramuscular hematoma in anterior thigh musculature, subcutaneous hematoma along the dorsal foot and diffuse subcutaneous if I distracted him from the proximal thigh extending distally to the foot.     Orthopedic surgery following first patient underwent  irrigation and debridement of right dorsal foot abscess on 4/19/2024.     Interval Problem Update  I have seen and examined patient at bedside.    Surgical site pain improving daily  Still having dyspnea  Patient more sleepy today, easily arousable and able to participate in interview.  Stable vitals  On 3L nasal cannula, titrate   Leukocytosis at 18.7 today  Cr trending up slowly  4/11 Blood cultures with no growth to date  4/19 Right foot OR cultures with no growth to date  Continue IV Unasyn  ID following, appreciate recommendations  Labs on AM    I have discussed this patient's plan of care and discharge plan at IDT rounds today with Case Management, Nursing, Nursing leadership, and other members of the IDT team.    Consultants/Specialty  cardiology  Pulmonary  ENT  Orthopedics  ID    Code Status  Full Code    Disposition  The patient is not medically cleared for discharge to home or a post-acute facility.     I have placed the appropriate orders for post-discharge needs.    Review of Systems  Review of Systems   Constitutional:  Positive for malaise/fatigue.   Respiratory:  Positive for shortness of breath.    Cardiovascular:  Positive for chest pain.   Neurological:  Positive for weakness.   All other systems reviewed and are negative.       Physical Exam  Temp:  [36.3 °C (97.3 °F)-37.3 °C (99.1 °F)] 36.7 °C (98.1 °F)  Pulse:  [65-76] 68  Resp:  [16-18] 16  BP: (123-146)/(65-93) 131/81  SpO2:  [92 %-99 %] 95 %    Physical Exam  Vitals and nursing note reviewed.   Constitutional:       Appearance: Normal appearance. He is obese. He is ill-appearing.   HENT:      Head: Normocephalic and atraumatic.      Mouth/Throat:      Pharynx: Oropharynx is clear.   Eyes:      Pupils: Pupils are equal, round, and reactive to light.   Neck:      Vascular: No carotid bruit.   Cardiovascular:      Rate and Rhythm: Normal rate and regular rhythm.   Pulmonary:      Effort: Pulmonary effort is normal. No respiratory distress.       Breath sounds: Normal breath sounds. No wheezing.      Comments: Diminished breath sound bilateral  Abdominal:      General: Abdomen is flat. Bowel sounds are normal.      Palpations: Abdomen is soft. There is no mass.   Musculoskeletal:         General: Swelling and tenderness present. Normal range of motion.      Cervical back: Neck supple.      Right lower leg: Edema present.   Skin:     General: Skin is warm and dry.   Neurological:      General: No focal deficit present.      Mental Status: He is alert and oriented to person, place, and time.   Psychiatric:         Mood and Affect: Mood normal.         Behavior: Behavior normal.         Fluids    Intake/Output Summary (Last 24 hours) at 4/21/2024 1204  Last data filed at 4/21/2024 0400  Gross per 24 hour   Intake 720 ml   Output 1845 ml   Net -1125 ml       Laboratory  Recent Labs     04/19/24  0400 04/20/24  0041 04/21/24  0006   WBC 16.2* 17.8* 18.7*   RBC 5.01 4.77 4.90   HEMOGLOBIN 10.1* 9.7* 9.9*   HEMATOCRIT 33.9* 32.5* 34.4*   MCV 67.7* 68.1* 70.2*   MCH 20.2* 20.3* 20.2*   MCHC 29.8* 29.8* 28.8*   RDW 50.3* 51.0* 53.7*   PLATELETCT 455* 467* 492*   MPV 10.2 10.0 10.3     Recent Labs     04/19/24  0400 04/20/24  0041 04/21/24  0006   SODIUM 135 136 136   POTASSIUM 5.1 5.4 5.4   CHLORIDE 97 98 97   CO2 27 28 32   GLUCOSE 79 90 98   BUN 25* 24* 24*   CREATININE 0.87 0.96 1.11   CALCIUM 8.7 8.4* 8.7                     Imaging  DX-CHEST-LIMITED (1 VIEW)   Final Result      1.  Cardiomegaly.      2.  Bibasilar atelectasis.      IR-US GUIDED PIV   Final Result    Ultrasound-guided PERIPHERAL IV INSERTION performed by    qualified nursing staff as above.      DX-CHEST-PORTABLE (1 VIEW)   Final Result      Cardiomegaly.      CT-EXTREMITY, LOWER WITH RIGHT   Final Result         1.  Intramuscular hematoma in the posterior thigh muscular compartment, evaluation for component of compartment syndrome recommended as clinically appropriate.   2.  Possible small fluid  collection in the anterior thigh musculature which is isodense to muscle, appearance suggesting small intramuscular hematoma.   3.  Intermediate density fluid collection along the dorsal foot, appearance favoring subcutaneous hematoma   4.  Diffuse subcutaneous fat stranding from the proximal thigh extending distally to the foot, compatible with edema and/or cellulitis.      These findings were discussed with the patient's clinician, Olga Mendez, on 4/18/2024 7:58 AM.      DX-CHEST-2 VIEWS   Final Result      1.  Median sternotomy and aortic valve replacement.   2.  Ongoing elevation of the left hemidiaphragm and subsegmental atelectatic changes at the left lung base. Concordant with prior study and CT-CTA of the chest      DX SNIFF TEST   Final Result      Limited but nonparadoxical motion of the LEFT diaphragm      DX-CHEST-PORTABLE (1 VIEW)   Final Result         1.  Left basilar atelectasis or subtle infiltrate.   2.  Atherosclerosis      US-EXTREMITY VENOUS LOWER BILAT   Final Result      CT-CTA CHEST PULMONARY ARTERY W/ RECONS   Final Result         1. No CT evidence of pulmonary embolism.      2. Airspace opacity in the left lower lobe, likely atelectasis      CT-CTA COMPLETE THORACOABDOMINAL AORTA   Final Result      1.  Surgical changes of the aortic valve and ascending aorta.   2.  No aortic aneurysm or dissection identified.   3.  Mild/moderate atherosclerotic changes.   4.  Punctate, nonobstructing right renal stone.   5.  Elevation of the left hemidiaphragm with adjacent basilar consolidations, likely atelectasis. Pneumonia not excluded.   6.  Distal colonic diverticulosis.   7.  Hepatomegaly.                  CT-CTA HEAD WITH & W/O-POST PROCESS   Final Result      1.  Severely suboptimal exam due to photon starvation.   2.  No gross acute intracranial abnormality.   3.  No proximal vessel occlusion.      CT-CTA NECK WITH & W/O-POST PROCESSING   Final Result   Addendum (preliminary) 1 of 1   100 mL  Omnipaque 300 given IV.      Final      No acute arterial abnormality of the neck.           Assessment/Plan  * Abscess of right foot  Assessment & Plan  R foot pain and swelling. S/p I&D 4/19 noted large amount of purulent fluid   Check MRSA swab  I have started vancomycin, monitoring renal function and trough  Continue iv Unasyn  I have consulted and discussed with ID Dr. Johnson  Multimodal pain control    Diffuse pain in right lower extremity  Assessment & Plan  RLE pain and swelling. Negative DVT. Pain is out of proportion.   Ordered CT RLE and CT R foot. Discussed with radiology noted Intramuscular hematoma in the posterior thigh muscular compartment, small intramuscular hematoma in anterior thigh musculature, subcutaneous hematoma along the dorsal foot and diffuse subcutaneous if I distracted him from the proximal thigh extending distally to the foot.  I have discussed with orthopedics, s/p I&D 4/19 and noted R dorsal foot abscess. See above    Dysphonia  Assessment & Plan  Acute onset, hoarse voice  S/p ENT scope 4/15 unremarkable. Does not think dyspnea is related to vocal cords    Microcytic anemia  Assessment & Plan  No Signs of bleeding  Check iron profile c/w JARROD  Iron supplements  Outpatient GI referral. Patient eeports colonoscopy years ago in [place].     Community acquired pneumonia of left lower lobe of lung- (present on admission)  Assessment & Plan  Denies fever, chills.  Noted profound leukocytosis on admission.  However he is taking anabolic steroids at home, which could explain leukocytosis  Procalcitonin negative  Given his symptoms has been present for months, ?  PNA  Continue empirical abx with Augmentin    Shortness of breath- (present on admission)  Assessment & Plan  Has been occurring and progressive in nature for few months  Recently s/b cards and repeated ECHO which showed in an interval normalization of his LVEF to 70% but shows possible there is features of apical variant  hypertrophic cardiomyopath  Patient has been scheduled for outpatient cardiac MRI and stress test  Check CTA chest and doppler BLE negative for PE and DVT  SPEP/UPEP pending to r/o amyloid   Cardiology rec outpatient cardiac MRI and stress test    Pulmonary consulted. ?diaphragm motion abnormal. SNIFF noted limited but nonparadoxical motion of the L diaphragm.   S/p ENT scope 4/15 unremarkable. Does not think dyspnea is related to vocal cords  Anxiety   Speech therapy to evaluate vocal cord dysfucntion      Acute hypoxic respiratory failure- (present on admission)  Assessment & Plan  Unclear etiology.   CTA negative for PE  Suspect undiagnosed COPD exacerbation. He has significant smoking history. Also MARIELENA possible contributing   Continue IS, duoneb and dulera  Pulmonary following     Tobacco dependence- (present on admission)  Assessment & Plan  Trying to cut back, encourage full cessation    Sepsis ([facility])- (present on admission)  Assessment & Plan  This is Sepsis Present on admission  SIRS criteria identified on my evaluation include: Tachypnea, with respirations greater than 20 per minute and Leukocytosis, with WBC greater than 12,000  Clinical indicators of end organ dysfunction include Acute Respiratory Failure - (mechanical ventilation or BiPap or PaO2/FiO2 ratio < 300)  Source is pulmonary  Sepsis protocol initiated  Crystalloid Fluid Administration: Resuscitation volume of 1 liter ordered. Reason that resuscitation volume of less than 30ml/kg was ordered concern for causing harm given CHF  IV antibiotics as appropriate for source of sepsis  Reassessment: I have reassessed the patient's hemodynamic status    ARF with hypoxia, RA saturation 87%  CTA Aorta shows L basilar consolidation, CAP  Continue empiric IV unasyn and oral azithromycin  F/U sputum cultures and blood cultures  Judicious IVF's    Heart failure with improved ejection fraction (HFimpEF) ([facility])- (present on admission)  Assessment & Plan  Echo  4/5/24 notes EF 70%, which has improved from EF 30% 7/5/23   Continue losartan, Toprol   Monitoring respiratory status  I&O, daily weight    Secondary hypercoagulable state (HCC)- (present on admission)  Assessment & Plan  Patient has discontinued Xarelto for the past few days due to misunderstanding by his cardiology provider  Resumed Xarelto      Paroxysmal atrial fibrillation (HCC)- (present on admission)  Assessment & Plan  In NSR  Xarelto as above  Continue outpatient toprol XL    Leukocytosis, thrombocytosis, erythrocytosis, lymphopenia- (present on admission)  Assessment & Plan  Persistent leukocytosis for years. Patient is taking anabolic steroid, which could explain leukocytosis. However he said he has been taking steroid only a few months. He has persistent leucocytosis for year  Lengthy discussion with patient the risks of long term steroid use. I advised patient to slow wean off. He voiced understanding.   Hematology outpatient referral for further evaluation    Gastroesophageal reflux disease without esophagitis- (present on admission)  Assessment & Plan  Continue omeprazole 20 mg daily    Ascending aortic aneurysm repair- (present on admission)  Assessment & Plan  CTA aorta shows intact repair and no new aneurysmal dilation         VTE prophylaxis: Xarelto    Patient is has a high medical complexity, complex decision making and is at high risk for complication, morbidity, and mortality.  I spent 53 minutes, reviewing the chart, obtaining and/or reviewing separately obtained history. Performing a medically appropriate examination and evaluation.  Counseling and educating the patient. Ordering and reviewing medications, tests, or procedures.  Discussing the case with ID and orthopedics.  Documenting clinical information in EPIC. Independently interpreting results and communicating results to patient. Discussing future disposition of care with patient, RN and case management.  This does not include time  spent on separately billable procedures/tests.

## 2024-04-22 LAB
ALBUMIN SERPL BCP-MCNC: 3.5 G/DL (ref 3.2–4.9)
ALBUMIN/GLOB SERPL: 1.1 G/DL
ALP SERPL-CCNC: 40 U/L (ref 30–99)
ALT SERPL-CCNC: 62 U/L (ref 2–50)
ANION GAP SERPL CALC-SCNC: 10 MMOL/L (ref 7–16)
AST SERPL-CCNC: 58 U/L (ref 12–45)
BACTERIA WND AEROBE CULT: NORMAL
BASOPHILS # BLD AUTO: 0.5 % (ref 0–1.8)
BASOPHILS # BLD: 0.11 K/UL (ref 0–0.12)
BILIRUB SERPL-MCNC: 0.9 MG/DL (ref 0.1–1.5)
BUN SERPL-MCNC: 30 MG/DL (ref 8–22)
CALCIUM ALBUM COR SERPL-MCNC: 8.9 MG/DL (ref 8.5–10.5)
CALCIUM SERPL-MCNC: 8.5 MG/DL (ref 8.5–10.5)
CHLORIDE SERPL-SCNC: 98 MMOL/L (ref 96–112)
CO2 SERPL-SCNC: 27 MMOL/L (ref 20–33)
CREAT SERPL-MCNC: 1.04 MG/DL (ref 0.5–1.4)
EOSINOPHIL # BLD AUTO: 0.06 K/UL (ref 0–0.51)
EOSINOPHIL NFR BLD: 0.3 % (ref 0–6.9)
ERYTHROCYTE [DISTWIDTH] IN BLOOD BY AUTOMATED COUNT: 52.6 FL (ref 35.9–50)
GFR SERPLBLD CREATININE-BSD FMLA CKD-EPI: 80 ML/MIN/1.73 M 2
GLOBULIN SER CALC-MCNC: 3.2 G/DL (ref 1.9–3.5)
GLUCOSE SERPL-MCNC: 145 MG/DL (ref 65–99)
GRAM STN SPEC: NORMAL
HCT VFR BLD AUTO: 33.7 % (ref 42–52)
HCV AB SER QL: NORMAL
HGB BLD-MCNC: 10 G/DL (ref 14–18)
IMM GRANULOCYTES # BLD AUTO: 0.86 K/UL (ref 0–0.11)
IMM GRANULOCYTES NFR BLD AUTO: 4.1 % (ref 0–0.9)
LYMPHOCYTES # BLD AUTO: 0.54 K/UL (ref 1–4.8)
LYMPHOCYTES NFR BLD: 2.6 % (ref 22–41)
MAGNESIUM SERPL-MCNC: 2 MG/DL (ref 1.5–2.5)
MCH RBC QN AUTO: 20.4 PG (ref 27–33)
MCHC RBC AUTO-ENTMCNC: 29.7 G/DL (ref 32.3–36.5)
MCV RBC AUTO: 68.8 FL (ref 81.4–97.8)
MONOCYTES # BLD AUTO: 0.26 K/UL (ref 0–0.85)
MONOCYTES NFR BLD AUTO: 1.2 % (ref 0–13.4)
NEUTROPHILS # BLD AUTO: 19.15 K/UL (ref 1.82–7.42)
NEUTROPHILS NFR BLD: 91.3 % (ref 44–72)
NRBC # BLD AUTO: 0.11 K/UL
NRBC BLD-RTO: 0.5 /100 WBC (ref 0–0.2)
PLATELET # BLD AUTO: 444 K/UL (ref 164–446)
PMV BLD AUTO: 9.8 FL (ref 9–12.9)
POTASSIUM SERPL-SCNC: 5.5 MMOL/L (ref 3.6–5.5)
POTASSIUM SERPL-SCNC: 6 MMOL/L (ref 3.6–5.5)
PROT SERPL-MCNC: 6.7 G/DL (ref 6–8.2)
RBC # BLD AUTO: 4.9 M/UL (ref 4.7–6.1)
SIGNIFICANT IND 70042: NORMAL
SITE SITE: NORMAL
SODIUM SERPL-SCNC: 135 MMOL/L (ref 135–145)
SOURCE SOURCE: NORMAL
WBC # BLD AUTO: 21 K/UL (ref 4.8–10.8)

## 2024-04-22 PROCEDURE — 700111 HCHG RX REV CODE 636 W/ 250 OVERRIDE (IP): Mod: JZ | Performed by: STUDENT IN AN ORGANIZED HEALTH CARE EDUCATION/TRAINING PROGRAM

## 2024-04-22 PROCEDURE — 700102 HCHG RX REV CODE 250 W/ 637 OVERRIDE(OP): Performed by: INTERNAL MEDICINE

## 2024-04-22 PROCEDURE — A9270 NON-COVERED ITEM OR SERVICE: HCPCS | Performed by: INTERNAL MEDICINE

## 2024-04-22 PROCEDURE — 86803 HEPATITIS C AB TEST: CPT

## 2024-04-22 PROCEDURE — 770020 HCHG ROOM/CARE - TELE (206)

## 2024-04-22 PROCEDURE — 700111 HCHG RX REV CODE 636 W/ 250 OVERRIDE (IP): Performed by: STUDENT IN AN ORGANIZED HEALTH CARE EDUCATION/TRAINING PROGRAM

## 2024-04-22 PROCEDURE — 80053 COMPREHEN METABOLIC PANEL: CPT

## 2024-04-22 PROCEDURE — 700102 HCHG RX REV CODE 250 W/ 637 OVERRIDE(OP): Performed by: STUDENT IN AN ORGANIZED HEALTH CARE EDUCATION/TRAINING PROGRAM

## 2024-04-22 PROCEDURE — A9270 NON-COVERED ITEM OR SERVICE: HCPCS | Performed by: STUDENT IN AN ORGANIZED HEALTH CARE EDUCATION/TRAINING PROGRAM

## 2024-04-22 PROCEDURE — 99233 SBSQ HOSP IP/OBS HIGH 50: CPT | Performed by: INTERNAL MEDICINE

## 2024-04-22 PROCEDURE — 99232 SBSQ HOSP IP/OBS MODERATE 35: CPT | Performed by: HOSPITALIST

## 2024-04-22 PROCEDURE — 700105 HCHG RX REV CODE 258: Performed by: HOSPITALIST

## 2024-04-22 PROCEDURE — 700111 HCHG RX REV CODE 636 W/ 250 OVERRIDE (IP): Performed by: HOSPITALIST

## 2024-04-22 PROCEDURE — 85025 COMPLETE CBC W/AUTO DIFF WBC: CPT

## 2024-04-22 PROCEDURE — 83735 ASSAY OF MAGNESIUM: CPT

## 2024-04-22 PROCEDURE — 84132 ASSAY OF SERUM POTASSIUM: CPT

## 2024-04-22 PROCEDURE — 700105 HCHG RX REV CODE 258: Performed by: STUDENT IN AN ORGANIZED HEALTH CARE EDUCATION/TRAINING PROGRAM

## 2024-04-22 RX ORDER — FUROSEMIDE 10 MG/ML
20 INJECTION INTRAMUSCULAR; INTRAVENOUS ONCE
Status: COMPLETED | OUTPATIENT
Start: 2024-04-22 | End: 2024-04-22

## 2024-04-22 RX ORDER — SODIUM CHLORIDE 9 MG/ML
1000 INJECTION, SOLUTION INTRAVENOUS ONCE
Status: COMPLETED | OUTPATIENT
Start: 2024-04-22 | End: 2024-04-23

## 2024-04-22 RX ADMIN — OXYCODONE HYDROCHLORIDE 10 MG: 10 TABLET ORAL at 10:19

## 2024-04-22 RX ADMIN — HYDROMORPHONE HYDROCHLORIDE 0.5 MG: 1 INJECTION, SOLUTION INTRAMUSCULAR; INTRAVENOUS; SUBCUTANEOUS at 23:50

## 2024-04-22 RX ADMIN — OMEPRAZOLE 20 MG: 20 CAPSULE, DELAYED RELEASE ORAL at 05:34

## 2024-04-22 RX ADMIN — HYDROXYZINE HYDROCHLORIDE 25 MG: 25 TABLET, FILM COATED ORAL at 07:30

## 2024-04-22 RX ADMIN — ACETAMINOPHEN 1000 MG: 500 TABLET, FILM COATED ORAL at 19:53

## 2024-04-22 RX ADMIN — OXYCODONE HYDROCHLORIDE 10 MG: 10 TABLET ORAL at 22:28

## 2024-04-22 RX ADMIN — AMPICILLIN AND SULBACTAM 3 G: 1; 2 INJECTION, POWDER, FOR SOLUTION INTRAMUSCULAR; INTRAVENOUS at 23:54

## 2024-04-22 RX ADMIN — RIVAROXABAN 20 MG: 20 TABLET, FILM COATED ORAL at 17:40

## 2024-04-22 RX ADMIN — HYDROMORPHONE HYDROCHLORIDE 0.5 MG: 1 INJECTION, SOLUTION INTRAMUSCULAR; INTRAVENOUS; SUBCUTANEOUS at 02:52

## 2024-04-22 RX ADMIN — ALBUTEROL SULFATE 2 PUFF: 90 AEROSOL, METERED RESPIRATORY (INHALATION) at 11:23

## 2024-04-22 RX ADMIN — SODIUM CHLORIDE 1000 ML: 9 INJECTION, SOLUTION INTRAVENOUS at 22:34

## 2024-04-22 RX ADMIN — METOPROLOL SUCCINATE 25 MG: 25 TABLET, EXTENDED RELEASE ORAL at 05:35

## 2024-04-22 RX ADMIN — MOMETASONE FUROATE AND FORMOTEROL FUMARATE DIHYDRATE 2 PUFF: 100; 5 AEROSOL RESPIRATORY (INHALATION) at 05:35

## 2024-04-22 RX ADMIN — OXYCODONE HYDROCHLORIDE 10 MG: 10 TABLET ORAL at 16:31

## 2024-04-22 RX ADMIN — HYDROMORPHONE HYDROCHLORIDE 0.5 MG: 1 INJECTION, SOLUTION INTRAMUSCULAR; INTRAVENOUS; SUBCUTANEOUS at 17:24

## 2024-04-22 RX ADMIN — FUROSEMIDE 20 MG: 10 INJECTION INTRAMUSCULAR; INTRAVENOUS at 13:39

## 2024-04-22 RX ADMIN — METHYLPREDNISOLONE SODIUM SUCCINATE 125 MG: 125 INJECTION, POWDER, FOR SOLUTION INTRAMUSCULAR; INTRAVENOUS at 21:25

## 2024-04-22 RX ADMIN — DOXYCYCLINE 100 MG: 100 TABLET, FILM COATED ORAL at 05:34

## 2024-04-22 RX ADMIN — METHYLPREDNISOLONE SODIUM SUCCINATE 125 MG: 125 INJECTION, POWDER, FOR SOLUTION INTRAMUSCULAR; INTRAVENOUS at 05:36

## 2024-04-22 RX ADMIN — FERROUS SULFATE TAB 325 MG (65 MG ELEMENTAL FE) 325 MG: 325 (65 FE) TAB at 08:20

## 2024-04-22 RX ADMIN — OMEPRAZOLE 20 MG: 20 CAPSULE, DELAYED RELEASE ORAL at 17:40

## 2024-04-22 RX ADMIN — HYDROXYZINE HYDROCHLORIDE 25 MG: 25 TABLET, FILM COATED ORAL at 15:18

## 2024-04-22 RX ADMIN — ATORVASTATIN CALCIUM 40 MG: 40 TABLET, FILM COATED ORAL at 21:25

## 2024-04-22 RX ADMIN — HYDROMORPHONE HYDROCHLORIDE 0.5 MG: 1 INJECTION, SOLUTION INTRAMUSCULAR; INTRAVENOUS; SUBCUTANEOUS at 11:18

## 2024-04-22 RX ADMIN — OXYCODONE HYDROCHLORIDE 10 MG: 10 TABLET ORAL at 03:25

## 2024-04-22 RX ADMIN — AMPICILLIN AND SULBACTAM 3 G: 1; 2 INJECTION, POWDER, FOR SOLUTION INTRAMUSCULAR; INTRAVENOUS at 13:40

## 2024-04-22 RX ADMIN — AMPICILLIN AND SULBACTAM 3 G: 1; 2 INJECTION, POWDER, FOR SOLUTION INTRAMUSCULAR; INTRAVENOUS at 05:46

## 2024-04-22 RX ADMIN — METHYLPREDNISOLONE SODIUM SUCCINATE 125 MG: 125 INJECTION, POWDER, FOR SOLUTION INTRAMUSCULAR; INTRAVENOUS at 13:39

## 2024-04-22 RX ADMIN — DOXYCYCLINE 100 MG: 100 TABLET, FILM COATED ORAL at 17:40

## 2024-04-22 RX ADMIN — MOMETASONE FUROATE AND FORMOTEROL FUMARATE DIHYDRATE 2 PUFF: 100; 5 AEROSOL RESPIRATORY (INHALATION) at 17:31

## 2024-04-22 RX ADMIN — LOSARTAN POTASSIUM 25 MG: 50 TABLET, FILM COATED ORAL at 05:36

## 2024-04-22 RX ADMIN — AMPICILLIN AND SULBACTAM 3 G: 1; 2 INJECTION, POWDER, FOR SOLUTION INTRAMUSCULAR; INTRAVENOUS at 17:47

## 2024-04-22 ASSESSMENT — PAIN DESCRIPTION - PAIN TYPE
TYPE: ACUTE PAIN
TYPE: ACUTE PAIN;SURGICAL PAIN
TYPE: ACUTE PAIN
TYPE: ACUTE PAIN;SURGICAL PAIN
TYPE: ACUTE PAIN;SURGICAL PAIN
TYPE: ACUTE PAIN
TYPE: ACUTE PAIN;SURGICAL PAIN
TYPE: ACUTE PAIN
TYPE: ACUTE PAIN;SURGICAL PAIN
TYPE: ACUTE PAIN

## 2024-04-22 ASSESSMENT — ENCOUNTER SYMPTOMS
NAUSEA: 0
SORE THROAT: 0
SHORTNESS OF BREATH: 1
COUGH: 1
NERVOUS/ANXIOUS: 0
FEVER: 0

## 2024-04-22 ASSESSMENT — FIBROSIS 4 INDEX: FIB4 SCORE: 1.06

## 2024-04-22 NOTE — PROGRESS NOTES
4 Eyes Skin Assessment Completed by ALLEN Alvarez and ALLEN Ramirez.    Head WDL  Ears Redness and Blanching  Nose WDL  Mouth WDL  Neck Redness and Blanching  Breast/Chest Redness and Blanching  Shoulder Blades Redness and Blanching, scattered scabs  Spine Redness and Blanching  (R) Arm/Elbow/Hand Redness, Blanching, Bruising, and Scab  (L) Arm/Elbow/Hand Redness, Blanching, Bruising, and Scab  Abdomen Redness, Blanching, and Scar  Groin WDL  Scrotum/Coccyx/Buttocks WDL  (R) Leg Redness, Blanching, Swelling, Shiny, Edema, and Incision,   (L) Leg Scab and Bruising  (R) Heel/Foot/Toe Redness, Blanching, Swelling, and Edema, wrapped in Ace Bandage  (L) Heel/Foot/Toe WDL          Devices In Places ECG, Blood Pressure Cuff, Pulse Ox, and Oxy Mask      Interventions In Place Q2 Turns and Low Air Loss Mattress    Possible Skin Injury No    Pictures Uploaded Into Epic N/A  Wound Consult Placed N/A  RN Wound Prevention Protocol Ordered No

## 2024-04-22 NOTE — PROGRESS NOTES
At 1835 primary RN initiated RRT for patient's low oxygen saturation. Patient was presenting at 80% on 10 Liters H.F. nasal cannula. Patient endorsed 10 out of 10 pain in right lower extremity. Patient was dyspneic was fluctuating level of consciousness. Patient evaluated and safely transferred to higher level of care.

## 2024-04-22 NOTE — PROGRESS NOTES
Earlier this morning, was informed nursing staff that patient wants to leave AGAINST MEDICAL ADVICE.  I immediately presented to bedside where they witnessed the patient aggressively putting on his close.  I was informed by nursing staff, that the patient desatted down to as low as the 60s without the supplemental oxygen  I explained to the patient the risk of leaving the hospital, and the risk of undergoing cardiopulmonary arrest given his high oxygen demands.  Patient is alert and oriented x 4.  He is capable of making his own medical decisions.  He states that he is going to be able to make it out the door.  I proceeded to explain to the patient numerous times the risk of death should he leave AGAINST MEDICAL ADVICE.  Patient refusing to stay here.

## 2024-04-22 NOTE — PROGRESS NOTES
Patient transferred to R116 in bed on monitor.    Oriented to room and call light in reach with bed alarm on. Patient cell phone at bedside and within reach. Bag of clothing with shoes placed on closet.

## 2024-04-22 NOTE — CARE PLAN
Problem: Pain - Standard  Goal: Alleviation of pain or a reduction in pain to the patient’s comfort goal  Outcome: Progressing     Problem: Hemodynamics  Goal: Patient's hemodynamics, fluid balance and neurologic status will be stable or improve  Outcome: Progressing     Problem: Fluid Volume  Goal: Fluid volume balance will be maintained  Outcome: Progressing     Problem: Urinary - Renal Perfusion  Goal: Ability to achieve and maintain adequate renal perfusion and functioning will improve  Outcome: Progressing     Problem: Respiratory  Goal: Patient will achieve/maintain optimum respiratory ventilation and gas exchange  Outcome: Progressing     Problem: Mechanical Ventilation  Goal: Safe management of artificial airway and ventilation  Outcome: Progressing  Goal: Successful weaning off mechanical ventilator, spontaneously maintains adequate gas exchange  Outcome: Progressing  Goal: Patient will be able to express needs and understand communication  Outcome: Progressing     Problem: Physical Regulation  Goal: Diagnostic test results will improve  Outcome: Progressing  Goal: Signs and symptoms of infection will decrease  Outcome: Progressing     Problem: Knowledge Deficit - Standard  Goal: Patient and family/care givers will demonstrate understanding of plan of care, disease process/condition, diagnostic tests and medications  Outcome: Progressing     Problem: Psychosocial  Goal: Patient's level of anxiety will decrease  Outcome: Progressing  Goal: Patient's ability to verbalize feelings about condition will improve  Outcome: Progressing  Goal: Patient's ability to re-evaluate and adapt role responsibilities will improve  Outcome: Progressing  Goal: Patient and family will demonstrate ability to cope with life altering diagnosis and/or procedure  Outcome: Progressing  Goal: Spiritual and cultural needs incorporated into hospitalization  Outcome: Progressing     Problem: Communication  Goal: The ability to  communicate needs accurately and effectively will improve  Outcome: Progressing     Problem: Discharge Barriers/Planning  Goal: Patient's continuum of care needs are met  Outcome: Progressing     Problem: Chest Tube Management  Goal: Complications related to chest tube will be avoided or minimized  Outcome: Progressing     Problem: Dysphagia  Goal: Dysphagia will improve  Outcome: Progressing     Problem: Risk for Aspiration  Goal: Patient's risk for aspiration will be absent or decrease  Outcome: Progressing     Problem: Nutrition  Goal: Patient's nutritional and fluid intake will be adequate or improve  Outcome: Progressing  Goal: Enteral nutrition will be maintained or improve  Outcome: Progressing  Goal: Enteral nutrition will be maintained or improve  Outcome: Progressing     Problem: Urinary Elimination  Goal: Establish and maintain regular urinary output  Outcome: Progressing     Problem: Bowel Elimination  Goal: Establish and maintain regular bowel function  Outcome: Progressing     Problem: Gastrointestinal Irritability  Goal: Nausea and vomiting will be absent or improve  Outcome: Progressing  Goal: Diarrhea will be absent or improved  Outcome: Progressing     Problem: Rectal Tube  Goal: Fecal output will be contained and skin will remain free from irritation  Outcome: Progressing     Problem: Mobility  Goal: Patient's capacity to carry out activities will improve  Outcome: Progressing     Problem: Self Care  Goal: Patient will have the ability to perform ADLs independently or with assistance (bathe, groom, dress, toilet and feed)  Outcome: Progressing     Problem: Infection - Standard  Goal: Patient will remain free from infection  Outcome: Progressing     Problem: Wound/ / Incision Healing  Goal: Patient's wound/surgical incision will decrease in size and heals properly  Outcome: Progressing     Problem: Knowledge Deficit - COPD  Goal: Patient/significant other demonstrates understanding of disease  process, utilization of the Action Plan, medications and discharge instruction  Outcome: Progressing     Problem: Risk for Infection - COPD  Goal: Patient will remain free from signs and symptoms of infection  Outcome: Progressing     Problem: Nutrition - Advanced  Goal: Patient will display progressive weight gain toward goal have adequate food and fluid intake  Outcome: Progressing   The patient is Watcher - Medium risk of patient condition declining or worsening    Shift Goals  Clinical Goals: VSS, pain management, IV ABX, rest  Patient Goals: Pain control  Family Goals: HUGH    Progress made toward(s) clinical / shift goals:  VSS, IV ABX, rest    Patient is not progressing towards the following goals:

## 2024-04-22 NOTE — PROGRESS NOTES
"*Late Entry*    - Approximately 0030, patient acutely agitated. Patient continues to repeatedly remove oxymask with SpO2 in the 80's. Patient stating he \"can't do this\" anymore and is requesting to leave AMA. Education provided to patient regarding his condition and need for hospitalization. CRN notified of patient situation.     - 0037: MD Pires contacted via Voalte regarding the patient's situation and request to see a MD and leave AMA.     - 0038: Spoke to MD Pires on phone regarding situation. MD states he will come to see the patient as soon as he is able.     - Approximately 0040: Patient notified that MD will be at the bedside as soon as he is able. Patient now stating that \"it isn't about seeing a MD anymore\" and that it just \"isn't working\" in reference to his hospitalization. Patient continues to state that he wants to leave AMA. Education provided to the patient regarding his need for hospitalization, and risks regarding leaving AMA. Patient continues to verbalize his want to leave AMA, but is not physically attempting to leave at this time.   "

## 2024-04-22 NOTE — PROGRESS NOTES
Patient was seen and assessed at bedside earlier this morning, at approximately 2:30 AM.  Patient was extremely hostile and aggressive.  He constantly states that he has not seen a physician since Thursday.  He believes that he is not counting better.  Patient was shouting that he is not getting proper care.  I continue to explain the patient, that he is on appropriate IV antibiotics for his right foot abscess    Patient continues to show that he is not getting appropriate pain medications that all his pain medications have been discontinued.  I patiently explained to the patient that he does have as needed IV and oral pain medication.     Furthermore, patient continues to threaten to leave AGAINST MEDICAL ADVICE.  Explained to the patient that given his supplemental oxygen requirements, that this would be unsafe.  Explained to him the risks of leaving its medical advice.

## 2024-04-22 NOTE — PROGRESS NOTES
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    Hospital Course  Noe Hickey is a 63 y.o. male with a history of aortic aneurysm repair and aortic valve replacement (for bicuspid aortic valve 1/5/2023), combined systolic and diastolic heart failure, paroxysmal atrial fibrillation status post ablation and stopped of his Xarelto 1 week ago, ongoing tobacco use.  He was admitted 4/11/2024 with worsening shortness of breath over the prior 3 to 4 months.  An echocardiogram this year showed an improvement of his heart function with an EF of 30 which increased to 70% with possible hypertrophic cardiomyopathy.  Patient presented due to having chest pain while operating having machinery at work.  He had left-sided chest pain worsened with movement and deep breathing.  Workup in the ER showed a CTA aorta showed no dilation or aneurysm but did show concern of left-sided basilar pneumonia he did have a elevated white blood cell count of 27,000.  Patient was initiated on antibiotics.  Patient was admitted for community-acquired pneumonia left lower lung with acute on chronic respiratory failure.  During the patient's hospitalization he was seen by orthopedic surgery and infectious disease with concern of right hamstring tear and a right foot dorsal abscess.  The patient did have I&D of the right dorsal foot abscess on 4/19/2024.  Wound cultures and blood cultures from 4/19 and 4/11 showed no growth.  Pulmonary and ENT evaluated the patient during hospitalization for vocal dysfunction with no significant recommendations.    Interval Problem Update  4/22: WBC:21, Hgb:10, K:6. Repeat K:5.5.  On 5L nasal canula.  Speech clear. Alert.  States he does use anabolic steroids.      I have discussed this patient's plan of care and discharge plan at IDT rounds today with Case Management, Nursing, Nursing leadership, and other members of the  IDT team.    Consultants/Specialty  infectious disease    Code Status  Full Code    Disposition  The patient is not medically cleared for discharge to home or a post-acute facility.      I have placed the appropriate orders for post-discharge needs.    Review of Systems  Review of Systems   Constitutional:  Positive for malaise/fatigue. Negative for fever.   HENT:  Negative for sore throat.    Respiratory:  Positive for cough and shortness of breath.    Cardiovascular:  Negative for leg swelling.   Gastrointestinal:  Negative for nausea.   Psychiatric/Behavioral:  The patient is not nervous/anxious.         Physical Exam  Temp:  [36.4 °C (97.5 °F)-37 °C (98.6 °F)] 37 °C (98.6 °F)  Pulse:  [75-96] 79  Resp:  [13-32] 18  BP: (117-160)/(62-86) 117/72  SpO2:  [90 %-98 %] 93 %    Physical Exam  Vitals reviewed.   Constitutional:       Appearance: Normal appearance. He is obese. He is not diaphoretic.   HENT:      Head: Normocephalic and atraumatic.      Nose: Nose normal.      Mouth/Throat:      Mouth: Mucous membranes are moist.      Pharynx: No oropharyngeal exudate.   Eyes:      General: No scleral icterus.        Right eye: No discharge.         Left eye: No discharge.      Extraocular Movements: Extraocular movements intact.      Conjunctiva/sclera: Conjunctivae normal.   Cardiovascular:      Rate and Rhythm: Normal rate and regular rhythm.      Pulses:           Radial pulses are 2+ on the right side and 2+ on the left side.        Dorsalis pedis pulses are 2+ on the right side and 2+ on the left side.      Heart sounds: No murmur heard.  Pulmonary:      Effort: Pulmonary effort is normal. No respiratory distress.      Breath sounds: Rhonchi (left base) present. No wheezing or rales.   Abdominal:      General: Bowel sounds are normal. There is no distension.      Palpations: Abdomen is soft.   Musculoskeletal:         General: No swelling or tenderness.      Cervical back: Neck supple. No muscular tenderness.       Right lower leg: No edema.      Left lower leg: No edema.   Lymphadenopathy:      Cervical: No cervical adenopathy.   Skin:     Coloration: Skin is not jaundiced or pale.   Neurological:      General: No focal deficit present.      Mental Status: He is alert and oriented to person, place, and time. Mental status is at baseline.      Cranial Nerves: No cranial nerve deficit.   Psychiatric:         Mood and Affect: Mood normal.         Behavior: Behavior normal.         Fluids    Intake/Output Summary (Last 24 hours) at 4/22/2024 2215  Last data filed at 4/22/2024 1931  Gross per 24 hour   Intake 1180 ml   Output 2575 ml   Net -1395 ml       Laboratory  Recent Labs     04/20/24  0041 04/21/24  0006 04/22/24  0552   WBC 17.8* 18.7* 21.0*   RBC 4.77 4.90 4.90   HEMOGLOBIN 9.7* 9.9* 10.0*   HEMATOCRIT 32.5* 34.4* 33.7*   MCV 68.1* 70.2* 68.8*   MCH 20.3* 20.2* 20.4*   MCHC 29.8* 28.8* 29.7*   RDW 51.0* 53.7* 52.6*   PLATELETCT 467* 492* 444   MPV 10.0 10.3 9.8     Recent Labs     04/20/24  0041 04/21/24  0006 04/22/24  0552 04/22/24  1331   SODIUM 136 136 135  --    POTASSIUM 5.4 5.4 6.0* 5.5   CHLORIDE 98 97 98  --    CO2 28 32 27  --    GLUCOSE 90 98 145*  --    BUN 24* 24* 30*  --    CREATININE 0.96 1.11 1.04  --    CALCIUM 8.4* 8.7 8.5  --                    Imaging  DX-CHEST-LIMITED (1 VIEW)   Final Result      1.  Cardiomegaly.      2.  Bibasilar atelectasis.      IR-US GUIDED PIV   Final Result    Ultrasound-guided PERIPHERAL IV INSERTION performed by    qualified nursing staff as above.      DX-CHEST-PORTABLE (1 VIEW)   Final Result      Cardiomegaly.      CT-EXTREMITY, LOWER WITH RIGHT   Final Result         1.  Intramuscular hematoma in the posterior thigh muscular compartment, evaluation for component of compartment syndrome recommended as clinically appropriate.   2.  Possible small fluid collection in the anterior thigh musculature which is isodense to muscle, appearance suggesting small intramuscular  hematoma.   3.  Intermediate density fluid collection along the dorsal foot, appearance favoring subcutaneous hematoma   4.  Diffuse subcutaneous fat stranding from the proximal thigh extending distally to the foot, compatible with edema and/or cellulitis.      These findings were discussed with the patient's clinician, Olga Mendez, on 4/18/2024 7:58 AM.      DX-CHEST-2 VIEWS   Final Result      1.  Median sternotomy and aortic valve replacement.   2.  Ongoing elevation of the left hemidiaphragm and subsegmental atelectatic changes at the left lung base. Concordant with prior study and CT-CTA of the chest      DX SNIFF TEST   Final Result      Limited but nonparadoxical motion of the LEFT diaphragm      DX-CHEST-PORTABLE (1 VIEW)   Final Result         1.  Left basilar atelectasis or subtle infiltrate.   2.  Atherosclerosis      US-EXTREMITY VENOUS LOWER BILAT   Final Result      CT-CTA CHEST PULMONARY ARTERY W/ RECONS   Final Result         1. No CT evidence of pulmonary embolism.      2. Airspace opacity in the left lower lobe, likely atelectasis      CT-CTA COMPLETE THORACOABDOMINAL AORTA   Final Result      1.  Surgical changes of the aortic valve and ascending aorta.   2.  No aortic aneurysm or dissection identified.   3.  Mild/moderate atherosclerotic changes.   4.  Punctate, nonobstructing right renal stone.   5.  Elevation of the left hemidiaphragm with adjacent basilar consolidations, likely atelectasis. Pneumonia not excluded.   6.  Distal colonic diverticulosis.   7.  Hepatomegaly.                  CT-CTA HEAD WITH & W/O-POST PROCESS   Final Result      1.  Severely suboptimal exam due to photon starvation.   2.  No gross acute intracranial abnormality.   3.  No proximal vessel occlusion.      CT-CTA NECK WITH & W/O-POST PROCESSING   Final Result   Addendum (preliminary) 1 of 1   100 mL Omnipaque 300 given IV.      Final      No acute arterial abnormality of the neck.           Assessment/Plan  *  Abscess of right foot  Assessment & Plan  R foot pain and swelling. S/p I&D 4/19 noted large amount of purulent fluid   4/19 foot wound culture no growth  MRSA swab negative  Unasyn  ID consulting  Multimodal pain control    Diffuse pain in right lower extremity  Assessment & Plan  RLE pain and swelling. Negative DVT.   Ordered CT RLE and CT R foot. Discussed with radiology noted Intramuscular hematoma in the posterior thigh muscular compartment, small intramuscular hematoma in anterior thigh musculature, subcutaneous hematoma along the dorsal foot and diffuse subcutaneous from the proximal thigh extending distally to the foot.  I have discussed with orthopedics, s/p I&D 4/19 and noted R dorsal foot abscess.  Pain management    Dysphonia  Assessment & Plan  Acute onset, hoarse voice  S/p ENT scope 4/15 unremarkable. Does not think dyspnea is related to vocal cords    Microcytic anemia  Assessment & Plan  No Signs of bleeding  Check iron profile c/w JARROD  Iron supplements  Outpatient GI referral. Patient eeports colonoscopy years ago in [place].     Community acquired pneumonia of left lower lobe of lung- (present on admission)  Assessment & Plan  Denies fever, chills.  Noted profound leukocytosis on admission.    Likely foot abscess as etiology  Procalcitonin negative  Unasyn and doxycycline    Shortness of breath- (present on admission)  Assessment & Plan  Has been occurring and progressive in nature for few months  Recently s/b cards and repeated ECHO which showed in an interval normalization of his LVEF to 70% but shows possible there is features of apical variant hypertrophic cardiomyopath  Patient has been scheduled for outpatient cardiac MRI and stress test  Check CTA chest and doppler BLE negative for PE and DVT  SPEP/UPEP pending to r/o amyloid   Cardiology rec outpatient cardiac MRI and stress test    Pulmonary consulted. ?diaphragm motion abnormal. SNIFF noted limited but nonparadoxical motion of the L  diaphragm.   S/p ENT scope 4/15 unremarkable. Does not think dyspnea is related to vocal cords  Anxiety   Speech therapy to evaluate vocal cord dysfucntion      Acute hypoxic respiratory failure- (present on admission)  Assessment & Plan  Unclear etiology.   CTA negative for PE  Suspect undiagnosed COPD exacerbation. He has significant smoking history. Also MARIELENA possible contributing   Continue IS, duoneb and dulera  Pulmonary following     Tobacco dependence- (present on admission)  Assessment & Plan  Trying to cut back, encourage full cessation    Sepsis (HCC)- (present on admission)  Assessment & Plan  Foot abscess  Antibitoics   ID consulting    Heart failure with improved ejection fraction (HFimpEF) ([facility])- (present on admission)  Assessment & Plan  Echo 4/5/24 notes EF 70%, which has improved from EF 30% 7/5/23   Continue losartan, Toprol   Monitoring respiratory status  I&O, daily weight    Secondary hypercoagulable state (HCC)- (present on admission)  Assessment & Plan  Patient has discontinued Xarelto for the past few days due to misunderstanding by his cardiology provider  Resumed Xarelto      Paroxysmal atrial fibrillation (HCC)- (present on admission)  Assessment & Plan  In NSR  Xarelto as above  Continue outpatient toprol XL    Leukocytosis, thrombocytosis, erythrocytosis, lymphopenia- (present on admission)  Assessment & Plan  Right foot abscess as likely etiology  Monitor cbc and vitals.  4/22 wbc:21<18.7<17.8    Hyperkalemia- (present on admission)  Assessment & Plan  4/22 am K:6 repeat 5.5  IV fluids and lasix.  Monitor on tele and am labs    Gastroesophageal reflux disease without esophagitis- (present on admission)  Assessment & Plan  Continue omeprazole 20 mg daily    Ascending aortic aneurysm repair- (present on admission)  Assessment & Plan  CTA aorta shows intact repair and no new aneurysmal dilation         VTE prophylaxis:    therapeutic anticoagulation with xarelto 20 mg daily witih  meals

## 2024-04-22 NOTE — CARE PLAN
The patient is Watcher - Medium risk of patient condition declining or worsening    Shift Goals  Clinical Goals: oxygenation/ventilation stability,  Patient Goals: pain control, speak to MD regarding plan of care  Family Goals: HUGH, none present    Progress made toward(s) clinical / shift goals:  ***    Patient is not progressing towards the following goals:

## 2024-04-22 NOTE — PROGRESS NOTES
"Patient made multiple threats to leave AMA over night. Stated to staff and MD \"this is the only way I get your attention\" MD Pires to bedside to see patient multiple times to talk patient down as well as this RN. Patient states he is not getting his pain medication and is being mistreated, multiple staff members have explained to the patient the PRN time frame of pain medications, after each event patient is given pain medication per MD and is then tearful and apologetic with staff. At 0640 patient once again states he is leaving AMA is and that this is \"the only way I can get your attention\" patient is agitated and verbally aggressive with staff, MD Pires on the phone trying to speak to patient, patient refuses. MD Pires to bedside, eventually convinces patient to stay. Patient once again tearful and apologetic with staff.  "

## 2024-04-22 NOTE — PROGRESS NOTES
"Patient became increasingly agitated with patients RN, requesting to leave AMA, this RN to bedside to speak to patient, patient stating he is \"in distress and not being treated due to pain medications being held\" This RN escalated to MD. Patient increasingly agitated with staff, security called to help de-escalate patient as well as NAM. MD unheld patients medications in MAR and after speaking to NAM patient became willing to stay in the unit. Patient medicated with PRN pain and anxiety medications (see MAR). Patient apologetic to staff, still requesting to see an MD at some point.   "

## 2024-04-22 NOTE — PROGRESS NOTES
Infectious Disease Progress Note    Author: Dyan Spain M.D. Date & Time of service: 2024  11:47 AM    Chief Complaint:  Foot abscess    Interval History:  63 y.o. 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    Review of Systems:  Review of Systems   Constitutional:  Negative for fever.   Cardiovascular:  Positive for leg swelling.       Hemodynamics:  Temp (24hrs), Av.9 °C (98.5 °F), Min:36.3 °C (97.3 °F), Max:37.4 °C (99.3 °F)  Temperature: 36.8 °C (98.2 °F)  Pulse  Av.7  Min: 65  Max: 98   Blood Pressure: (!) 144/86       Physical Exam:  Physical Exam  Vitals and nursing note reviewed.   Constitutional:       Appearance: He is ill-appearing. He is not diaphoretic.   Cardiovascular:      Rate and Rhythm: Normal rate.   Pulmonary:      Effort: Pulmonary effort is normal. No respiratory distress.   Musculoskeletal:      Right lower leg: Edema present.      Left lower leg: Edema present.   Skin:     Coloration: Skin is pale. Skin is not jaundiced.      Findings: Bruising and erythema present.         Meds:    Current Facility-Administered Medications:     oxyCODONE immediate-release **OR** [DISCONTINUED] oxyCODONE immediate-release **OR** [DISCONTINUED] HYDROmorphone    HYDROmorphone    acetaminophen    methylPREDNISolone    doxycycline monohydrate    albuterol    ampicillin-sulbactam (UNASYN) IV    hydrOXYzine HCl    ondansetron    Respiratory Therapy Consult    mometasone-formoterol    ferrous sulfate    benzonatate    omeprazole    atorvastatin    losartan    metoprolol SR     rivaroxaban    Labs:  Recent Labs     04/20/24  0041 04/21/24  0006 04/22/24  0552   WBC 17.8* 18.7* 21.0*   RBC 4.77 4.90 4.90   HEMOGLOBIN 9.7* 9.9* 10.0*   HEMATOCRIT 32.5* 34.4* 33.7*   MCV 68.1* 70.2* 68.8*   MCH 20.3* 20.2* 20.4*   RDW 51.0* 53.7* 52.6*   PLATELETCT 467* 492* 444   MPV 10.0 10.3 9.8   NEUTSPOLYS  --  79.00* 91.30*   LYMPHOCYTES  --  5.70* 2.60*   MONOCYTES  --  9.50 1.20   EOSINOPHILS  --  1.80 0.30   BASOPHILS  --  0.40 0.50   RBCMORPHOLO  --  Present  --      Recent Labs     04/20/24  0041 04/21/24  0006 04/22/24  0552   SODIUM 136 136 135   POTASSIUM 5.4 5.4 6.0*   CHLORIDE 98 97 98   CO2 28 32 27   GLUCOSE 90 98 145*   BUN 24* 24* 30*     Recent Labs     04/20/24  0041 04/21/24  0006 04/22/24  0552   ALBUMIN  --  3.8 3.5   TBILIRUBIN  --  0.7 0.9   ALKPHOSPHAT  --  36 40   TOTPROTEIN  --  6.6 6.7   ALTSGPT  --  71* 62*   ASTSGOT  --  71* 58*   CREATININE 0.96 1.11 1.04       Imaging:  DX-CHEST-LIMITED (1 VIEW)    Result Date: 4/21/2024 4/21/2024 9:06 AM HISTORY/REASON FOR EXAM: Shortness of breath TECHNIQUE/EXAM DESCRIPTION AND NUMBER OF VIEWS: Single AP view of the chest. COMPARISON: 4/19/2024 FINDINGS: There is bibasilar atelectasis. The heart is enlarged. There is no pleural effusion. There is chronic elevation left hemidiaphragm. Postsurgical changes are again seen.     1.  Cardiomegaly. 2.  Bibasilar atelectasis.    IR-US GUIDED PIV    Result Date: 4/20/2024  EXAMINATION:                                                                    HISTORY/REASON FOR EXAM:  Ultrasound Guided PIV.  TECHNIQUE/EXAM DESCRIPTION AND NUMBER OF VIEWS:  Peripheral IV insertion with ultrasound guidance.  The procedure was prepared using maximal sterile barrier technique including sterile gown, mask, cap, and donning of sterile gloves following appropriate hand hygiene and/or sterile scrub. Patient skin site was prepped with 2% Chlorhexidine solution.   FINDINGS: Peripheral IV insertion with Ultrasound  Guidance was performed by qualified imaging nursing staff without the assistance of a Radiologist.      Ultrasound-guided PERIPHERAL IV INSERTION performed by qualified nursing staff as above.    DX-CHEST-PORTABLE (1 VIEW)    Result Date: 4/19/2024 4/19/2024 2:33 PM HISTORY/REASON FOR EXAM: Cough TECHNIQUE/EXAM DESCRIPTION AND NUMBER OF VIEWS: Single portable view of the chest. COMPARISON: None FINDINGS: There is chronic elevation the left hemidiaphragm. There is no evidence of focal consolidation or evidence of pulmonary edema. There is no pleural effusion. The heart is enlarged. Postsurgical changes are again seen. There surgical changes the right shoulder.     Cardiomegaly.    CT-EXTREMITY, LOWER WITH RIGHT    Result Date: 4/18/2024 4/18/2024 3:54 AM HISTORY/REASON FOR EXAM:  Swelling and pain. TECHNIQUE/EXAM DESCRIPTION AND NUMBER OF VIEWS: CT scan of the RIGHT lower extremity with contrast, with reconstructions. Thin helical 3 mm sections were obtained from the distal femur through the proximal tibia/fibula. Sagittal and coronal multiplanar reconstructions were generated from the axial images. A total of 100 mL of Omnipaque 350 nonionic contrast was administered  IV without complication. Up to date radiation dose reduction adjustments have been utilized to meet ALARA standards for radiation dose reduction. COMPARISON: None. FINDINGS: There is diffuse subcutaneous fat stranding extending from the proximal thigh distally through the leg and onto the dorsum of the foot. Associated mild skin thickening is seen. There is possible subtle ill-defined fluid collection in the anterior thigh compartment musculature measuring 4.1 x 2.0 cm which appears near isodense to adjacent muscle. There is 4.2 x 5.9 cm slightly hyperdense collection identified in the posterior thigh muscular compartment. There is intermediate density subcutaneous fluid collection along the dorsal foot measuring 4.8 x 1.2 cm. Visualized portions  of the small bowel and colon appear grossly unremarkable. The bladder appears within normal limits. The bony structures are intact without visualized fracture, subluxation, or dislocation identified.     1.  Intramuscular hematoma in the posterior thigh muscular compartment, evaluation for component of compartment syndrome recommended as clinically appropriate. 2.  Possible small fluid collection in the anterior thigh musculature which is isodense to muscle, appearance suggesting small intramuscular hematoma. 3.  Intermediate density fluid collection along the dorsal foot, appearance favoring subcutaneous hematoma 4.  Diffuse subcutaneous fat stranding from the proximal thigh extending distally to the foot, compatible with edema and/or cellulitis. These findings were discussed with the patient's clinician, Olga Mendez, on 4/18/2024 7:58 AM.    DX-CHEST-2 VIEWS    Result Date: 4/17/2024 4/17/2024 2:35 PM HISTORY/REASON FOR EXAM:  Shortness of Breath; increasing WBC, high risk for aspiration. TECHNIQUE/EXAM DESCRIPTION AND NUMBER OF VIEWS: Two views of the chest. COMPARISON:  1 view chest 4/15/2024, CT-CTA of the chest pulmonary arteries study 4/13/2024 FINDINGS: The soft tissues and bony structures again show right shoulder arthroplasty. Right distal clavicle resection (Simona procedure). Sternal wires again noted and aortic valve replacement. The heart and mediastinal structures are otherwise within normal limits. Pulmonary vascularity is upper normal with perhaps some minimal peribronchial cuffing on the left. There is elevation of left hemidiaphragm again noted. Subsegmental atelectatic changes at the left lung base blurring the left hemidiaphragm. The right lung field is clear. The upper and midlung zones are clear bilaterally. There is no effusion or pneumothorax.     1.  Median sternotomy and aortic valve replacement. 2.  Ongoing elevation of the left hemidiaphragm and subsegmental atelectatic changes at  the left lung base. Concordant with prior study and CT-CTA of the chest    DX SNIFF TEST    Result Date: 2024  4/15/2024 4:03 PM HISTORY/REASON FOR EXAM:  Decreased chest expansion on the left side TECHNIQUE/EXAM DESCRIPTION AND NUMBER OF VIEWS: Fluoroscopic sniff test/diaphragm fluoroscopy COMPARISON: Chest radiograph from 4/15/2024 Fluoroscopy time: 12 seconds Images: 1; unfortunately the technologist did not save the images Dose: 1.347 Gcm2 FINDINGS: There is limited motion of the LEFT diaphragm relative to the RIGHT common inhalation. There is no paradoxical motion.     Limited but nonparadoxical motion of the LEFT diaphragm    DX-CHEST-PORTABLE (1 VIEW)    Result Date: 4/15/2024    4/15/2024 2:22 AM HISTORY/REASON FOR EXAM: Shortness of Breath TECHNIQUE/EXAM DESCRIPTION:  Single AP view of the chest. COMPARISON: 2023 FINDINGS: Overlying cardiac leads are present. The cardiac silhouette appears within normal limits. Atherosclerotic calcification of the aorta is noted.  The central pulmonary vasculature appears normal. Asymmetric elevation of the left hemidiaphragm is noted.  Hazy linear density in the left lung base is observed. No significant pleural effusions are identified. The bony structures appear age-appropriate.     1.  Left basilar atelectasis or subtle infiltrate. 2.  Atherosclerosis    US-EXTREMITY VENOUS LOWER BILAT    Result Date: 2024   Vascular Laboratory  CONCLUSIONS  Bilateral lower extremity venous duplex imaging.  No deep venous thrombosis.  JUSTIN TOPETE  Exam Date:     2024 09:38  Room #:     Inpatient  Priority:     Routine  Ht (in):             Wt (lb):  Ordering Physician:        KENYETTA FERREIRA  Referring Physician:       [id number]  Sonographer:               Priya Choi RVT  Study Type:                Complete Bilateral  Technical Quality:         Adequate  Age:    63    Gender:     M  MRN:    [id number]  :    1960      BSA:  Indications:     Localized  swelling, mass and lump, right lower limb  CPT Codes:       16148  ICD Codes:       R22.41  History:         Swelling and significant bruising of right thigh from torn                   hamstring/thigh muscle. No prior duplex  Limitations:  PROCEDURES:  Bilateral lower extremity venous duplex imaging.  The following venous structures were evaluated: common femoral, deep  femoral, proximal great saphenous, femoral, popliteal, peroneal, and  posterior tibial veins.  Serial compression, color, and spectral Doppler flow evaluations were  performed.  FINDINGS:  Bilateral lower extremities.  No deep venous thrombosis.  All veins demonstrate complete color filling and compressibility with  normal venous flow dynamics including spontaneous flow and respiratory  phasicity.  Incidental finding: Avascular structure with mixed heterogeneous material  in the distal thigh measuring 3.7 x 3.6 cm consistent with tissue damage.  Keysha Jameson  (Electronically Signed)  Final Date:      14 April 2024                   13:38    CT-CTA CHEST PULMONARY ARTERY W/ RECONS    Result Date: 4/13/2024 4/13/2024 5:50 PM HISTORY/REASON FOR EXAM:  PE? Short of breath TECHNIQUE/EXAM DESCRIPTION: CT angiogram scan for pulmonary embolism with contrast, with reconstructions. 1.25 mm helical sections were obtained from the lung apices through the lung bases following the rapid bolus administration of 100 mL of Omnipaque 350 nonionic contrast. Thin-section overlapping reconstruction interval was utilized. Coronal reconstructions were generated from the axial data. MIP post processing was performed and utilized for the interpretation. Low dose optimization technique was utilized for this CT exam including automated exposure control and adjustment of the mA and/or kV according to patient size. COMPARISON: 1/29/23 FINDINGS: Pulmonary Embolism: No. Main Pulmonary Arteries: No. Segmental branches: No. Subsegmental branches: No. Additional Comments:  None. Lungs: Airspace opacities in the left lower lobe. Airway: Patent. Pleura: No pleural effusion or pneumothorax. Nodes: No enlarged mediastinal or hilar lymph nodes. Additional findings: Thoracic aorta and great vessels:  No aneurysm. Heart and pericardium: Aortic valve prosthesis. There is no coronary artery calcification. No pericardial effusion. Thoracic spine:  No fracture or malalignment. No compression deformity. Chest wall:   Unremarkable. Visualized upper abdomen: No acute abnormality.     1. No CT evidence of pulmonary embolism. 2. Airspace opacity in the left lower lobe, likely atelectasis    CT-CTA COMPLETE THORACOABDOMINAL AORTA    Result Date: 4/11/2024 4/11/2024 4:49 PM HISTORY/REASON FOR EXAM:  CHEST PAIN. TECHNIQUE/EXAM DESCRIPTION: CT angiogram without and with contrast, with reconstructions. Initial precontrast thick helical sections were obtained from the top of the aortic arch through the diaphragmatic domes. Following this, thin-section postcontrast helical images were obtained from the lung apices through the iliac crests following the bolus administration of 100 mL of nonionic Omnipaque 350 contrast.  CT angiographic technique was utilized. Parasagittal reconstructed images of the aorta were generated utilizing multiplanar volume reformat technique. Low dose optimization technique was utilized for this CT exam including automated exposure control and adjustment of the mA and/or kV according to patient size. COMPARISON: 9/10/2023. FINDINGS: Aorta: Prior aortic valve replacement. Surgical changes of the ascending aorta again noted. Mild to moderate atherosclerotic changes. Pre-contrast images demonstrate no evidence of displaced calcified intima or intramural hematoma. Aortic arch: Branching pattern is conventional. Diameter: The ascending and descending aorta are of normal caliber. Dissection: Absent. Celiac artery origin: Widely patent. Superior mesenteric artery origin: Widely patent.  Renal artery origins: Widely patent. Inferior mesenteric artery: Patent. Common iliac arteries: Nonaneurysmal and patent. Heart: Normal size. No pericardial effusion. Coronary artery calcifications. 3D angiographic/MIP images of the vasculature confirm the vascular findings as described above. Lungs: Elevation of the left hemidiaphragm with adjacent basilar consolidations. Liver: Homogeneous enhancement. No masses identified. Enlarged. Biliary system: No intrahepatic or extrahepatic ductal dilatation. The gallbladder is grossly unremarkable. Spleen: Unremarkable. Adrenal glands: Unremarkable. Pancreas: Unremarkable. Kidneys: Symmetric enhancement. No solid mass is identified. Punctate, nonobstructing right renal stone. Bowel: Distal colonic diverticulosis. No inflammation. No pneumoperitoneum. Ascites: None. Lymph nodes: No adenopathy is identified. Bones: Unremarkable. Median sternotomy wires. Right shoulder arthroplasty.     1.  Surgical changes of the aortic valve and ascending aorta. 2.  No aortic aneurysm or dissection identified. 3.  Mild/moderate atherosclerotic changes. 4.  Punctate, nonobstructing right renal stone. 5.  Elevation of the left hemidiaphragm with adjacent basilar consolidations, likely atelectasis. Pneumonia not excluded. 6.  Distal colonic diverticulosis. 7.  Hepatomegaly.     CT-CTA HEAD WITH & W/O-POST PROCESS    Result Date: 4/11/2024 4/11/2024 4:49 PM HISTORY/REASON FOR EXAM:  hx mass, worning mental status TECHNIQUE/EXAM DESCRIPTION: CT angiogram of the [place] without and with contrast.  Initial precontrast images were obtained of the head from the skull base through the vertex.  Postcontrast images were obtained of the [place] following the power injection of nonionic contrast at 5.0 mL/sec. Thin-section helical images were obtained with overlapping reconstruction interval. Coronal and sagittal multiplanar volume reformats were generated.  3D angiographic images were  reviewed on PACS.  Maximum intensity projection (MIP) images were generated and reviewed. 100 mL of Omnipaque 350 nonionic contrast was injected intravenously. Low dose optimization technique was utilized for this CT exam including automated exposure control and adjustment of the mA and/or kV according to patient size. COMPARISON:  9/11/2023. FINDINGS: Severely suboptimal exam due to photon starvation. The posterior circulation shows the distal vertebral arteries to be patent. The vertebrobasilar confluence is intact. The basilar artery is patent. No aneurysm or occlusive lesion is evident. The anterior circulation shows no stenotic or occlusive lesion. No aneurysm is evident about the [place]. The calvaria are normal in appearance. Mastoid air cells and visualized paranasal sinuses are clear. The visualized portions of the orbits are normal in appearance. The brain parenchyma is normal in appearance. There is no hemorrhage.  There is no evidence for acute infarct. The ventricular system is normal in size and position. There are no extra-axial fluid collection present. 3D angiographic/MIP images of the vasculature confirm the vascular findings as described above.     1.  Severely suboptimal exam due to photon starvation. 2.  No gross acute intracranial abnormality. 3.  No proximal vessel occlusion.    CT-CTA NECK WITH & W/O-POST PROCESSING    Addendum Date: 4/11/2024    100 mL Omnipaque 300 given IV.    Result Date: 4/11/2024 4/11/2024 4:49 PM HISTORY/REASON FOR EXAM:  hx mass, worning mental status TECHNIQUE/EXAM DESCRIPTION: CT angiogram of the neck with contrast. Postcontrast images were obtained of the neck from the great vessels through the skull base following the power injection of nonionic contrast at 5.0 mL/sec. Thin-section helical images were obtained with overlapping reconstruction interval. Coronal and oblique multiplanar volume reformats were generated. Cervical internal carotid artery  percent stenosis is calculated using the standard method according to the NASCET criteria wherein a segment of uniform caliber mid or distal cervical internal carotid is used as the reference denominator. 3D angiographic images were reviewed on PACS.  Maximum intensity projection (MIP) images were generated and reviewed mL of Omnipaque 350 nonionic contrast was injected intravenously. Low dose optimization technique was utilized for this CT exam including automated exposure control and adjustment of the mA and/or kV according to patient size. COMPARISON:  CT neck 7/4/2023. FINDINGS: Aortic arch: conventional branching pattern. Similar appearance of the anterior aspect of the ascending aorta from prior exam There is atherosclerotic plaque of the aorta. Right common carotid artery: Patent Right internal carotid artery: Normal in appearance Left common carotid artery is patent. Left internal carotid artery: Atherosclerotic plaque without significant stenosis (less than 50%). The right vertebral artery is patent without dissection or stenosis. The left vertebral artery is patent without dissection or stenosis. Vertebrobasilar confluence: The vertebrobasilar confluence appears normal. Lung apices are clear The soft tissues of the neck are within normal limits. 3D angiographic/MIP images of the vasculature confirm the vascular findings as described above.     No acute arterial abnormality of the neck.    EC-ECHOCARDIOGRAM COMPLETE W/O CONT    Result Date: 4/5/2024  Transthoracic Echo Report Echocardiography Laboratory CONCLUSIONS Compared to the prior study on 7/5/2023, LV systolic function is now hyperdynamic with high flow state. Recommend RHC as clinically indicated. Normal LV size and hyperdynamic systolic function with estimated LVEF 70% Features of apical variant hypertrophic cardiomyopathy noted. Recommend cardiac MRI. Normal RV size with reduced systolic function Known bioprosthetic aortic valve with mild increase  in transvalvular gradient: Vmax 3.1m/s. No PVL or AI. DVI 0.5 with AT < 100 suggestive of normal aortic prosthetic valve and increased velocity is probably due to high flow. High estimated CO 9L/min. Normal IVC size No pericardial effusion W LV EF:  70    % FINDINGS Left Ventricle Features of hypertrophic cardiomyopathy (apical varient). Normal left ventricular systolic function. The left ventricular ejection fraction is visually estimated to be 70%. Normal regional wall motion.  Indeterminate LV diastolic function Right Ventricle The right ventricle is normal in size. Reduced right ventricular systolic function. Right Atrium Normal right atrial size. Normal inferior vena cava size and inspiratory collapse. Left Atrium Normal left atrial size. Left atrial volume index is 22.78 ml/m sq. Mitral Valve Structurally normal mitral valve without significant stenosis or regurgitation. Aortic Valve Known bioprosthetic aortic valve that is functioning normally with increased transvalvular gradients. No aortic insufficiency. Transvalvular gradients are - Peak:  37.57 mmHg,  Mean:  24.22 mmHg. Acceleration time is  69 ms. DVI 0.5 with AT < 100 suggestive of normal prosthetic valve and increased velocity due to high flow. Tricuspid Valve No tricuspid stenosis. Mild tricuspid regurgitation.  Inadequate TR jet to estimate RVSP Pulmonic Valve No pulmonic stenosis. Trace pulmonic insufficiency. Pericardium No pericardial effusion. Aorta Normal aortic root for body surface area. Abdullahi Saunders MD (Electronically Signed) Final Date:     05 April 2024                 15:49      Micro:  Results       Procedure Component Value Units Date/Time    CULTURE WOUND W/ GRAM STAIN [[id number]] Collected: 04/19/24 1106    Order Status: Completed Specimen: Wound Updated: 04/22/24 0705     Significant Indicator NEG     Source WND     Site Right foot     Culture Result No growth at 72 hours.     Gram Stain Result Few WBCs.  No organisms seen.       Narrative:      Surgery - swabs received    Anaerobic Culture [[id number]] Collected: 04/19/24 1106    Order Status: Completed Specimen: Wound Updated: 04/22/24 0705     Significant Indicator NEG     Source WND     Site Right foot     Culture Result Culture in progress.    Narrative:      Surgery - swabs received    CoV-2, Flu A/B, And RSV by PCR ([employer]) [[id number]] Collected: 04/20/24 1341    Order Status: Completed Specimen: Respirate from Nasopharyngeal Updated: 04/20/24 1453     Influenza virus A RNA Negative     Influenza virus B, PCR Negative     RSV, PCR Negative     SARS-CoV-2 by PCR NotDetected     Comment: [facility] providers: PLEASE REFER TO DE-ESCALATION AND RETESTING PROTOCOL  on [email or web address]    **The [employer] GeneXpert Xpress SARS-CoV-2 RT-PCR Test has been made  available for use under the Emergency Use Authorization (EUA) only.          SARS-CoV-2 Source NP Swab    MRSA By PCR ([facility]) [[id number]] Collected: 04/19/24 1700    Order Status: Completed Specimen: Respirate from Nares Updated: 04/19/24 1942     MRSA by PCR Negative    GRAM STAIN [[id number]] Collected: 04/19/24 1106    Order Status: Completed Specimen: Wound Updated: 04/19/24 1919     Significant Indicator .     Source WND     Site Right foot     Gram Stain Result Few WBCs.  No organisms seen.      Narrative:      Surgery - swabs received    Blood Culture - Draw one from central line and one from peripheral site [[id number]] Collected: 04/11/24 1907    Order Status: Completed Specimen: Blood from Peripheral Updated: 04/16/24 2100     Significant Indicator NEG     Source BLD     Site PERIPHERAL     Culture Result No growth after 5 days of incubation.    Narrative:      Left Hand    Blood Culture - Draw one from central line and one from peripheral site [[id number]] Collected: 04/11/24 1907    Order Status: Completed Specimen: Blood from Line Updated: 04/16/24 2100     Significant Indicator NEG     Source BLD     Site Peripheral     Culture  "Result No growth after 5 days of incubation.    Narrative:      Left AC            Assessment:  Active Hospital Problems    Diagnosis     *Abscess of right foot [L02.611]     Diffuse pain in right lower extremity [M79.604]     Dysphonia [R49.0]     Microcytic anemia [D50.9]     Community acquired pneumonia of left lower lobe of lung [J18.9]     Shortness of breath [R06.02]     Acute hypoxic respiratory failure [J96.90]     Tobacco dependence [F17.200]     Sepsis (HCC) [A41.9]     Heart failure with improved ejection fraction (HFimpEF) ([facility]) [I50.32]     Secondary hypercoagulable state (HCC) [D68.69]     Paroxysmal atrial fibrillation ([facility]) [I48.0]     Leukocytosis, thrombocytosis, erythrocytosis, lymphopenia [D72.829]     Gastroesophageal reflux disease without esophagitis [K21.9]     Ascending aortic aneurysm repair [Z98.890, Z86.79]      Right foot abscess  -s/p I and D 4/19 \"3 cm incision was made over the abscess. A large amount of purulent fluid was encountered \"  -cult neg  Thigh musculature hematomas  History of AAA repair  Suspected hypertrophic cardiomyopathy  Paroxysmal A-fib  Anabolic steroid use  Tobacco use  Bioprosthetic aortic valve in place  -blood cultures neg  -TTE neg for vegetations     Plan:   -Continue Unasyn for now  -Hepatitis C antibody ordered  -Follow-up blood cultures x 2 neg to date  -SARS-CoV-2, flu, RSV PCR negative    PICC-no  Midline TBD-likely oral option    "

## 2024-04-22 NOTE — PROGRESS NOTES
Assumed care of patient this morning. Patient attempting to leave AMA. MD at bedside. Pt agreeable to stay for now. Placed on 5L oxymask. Was on 10L HF NC saturating in the high 70%s. Alert and oriented

## 2024-04-22 NOTE — CONSULTS
Critical Care Consultation    Date of consult: 4/21/2024    Referring Physician  Luis Miguel Johnson*    Reason for Consultation  encephalopathy    History of Presenting Illness  63 y.o. male with history of ascending aortic aneurysm repair, AVR, left hemidiaphragm paresis, HFimpEF, pAF on rivaroxaban, who presented on 4/11 with dyspnea, chest pain, and right lower extremity pain and swelling.  He underwent a CT angiogram which demonstrated left basilar pneumonia, and no evidence of PE.  His DVT ultrasound was negative.      He underwent CT of his right lower extremity and was found to have left thigh hematomas, and possible hematoma of the foot.  He ultimately underwent I&D with orthopedic surgery on 4/19.  Cultures continue to be negative.    Due to further dyspnea and stridor, he underwent a sniff test which demonstrated limited motion of the left hemidiaphragm relative to the right.    Intensivist was consulted on 4/21 for encephalopathy.  On meeting him, he is sleeping, with saturations of 68% on 2 L nasal cannula.  He arouses and is tachypneic.  He is complaining of mild dyspnea.  He states that he has smoked for about 40 years, but does not carry a diagnosis of COPD or emphysema.  He is oriented to renown, self, date, situation.    Code Status  Full Code    Review of Systems  Review of Systems   Constitutional:  Negative for chills and fever.   Respiratory:  Positive for cough, sputum production and shortness of breath.    Cardiovascular:  Positive for leg swelling. Negative for chest pain.   Genitourinary:  Negative for dysuria.       Past Medical History   has a past medical history of Arrhythmia, Breath shortness (06/09/2023), Cyclic vomiting syndrome, Elevated hemidiaphragm - LEFT post AVR (01/04/2023), Fracture, Headache, classical migraine, Heart burn, Heart valve disease (06/09/2023), Indigestion, Pneumonia due to infectious organism (01/20/2023), and Snoring.    He has no past medical history of  Anesthesia, Asthma, Cancer (HCC), Carcinoma in situ of respiratory system, Cataract, Cold, Coughing blood, Dental disorder, Disorder of thyroid, Glaucoma, Heart murmur, Hemorrhagic disorder (HCC), Hepatitis A, Hepatitis B, Hepatitis C, Hiatus hernia syndrome, High cholesterol, Hypertension, Myocardial infarct (HCC), Pacemaker, Personal history of venous thrombosis and embolism, Psychiatric problem, Rheumatic fever, Seizure (HCC), Sleep apnea, Stroke (HCC), Tuberculosis, Urinary bladder disorder, or Urinary incontinence.    Surgical History   has a past surgical history that includes shoulder arthroscopy; shoulder hemicap resurfacing (Right, 10/12/2015); irrigation & debridement ortho (Right, 09/19/2017); shoulder surgery; aortic valve replacement (1/5/2023); aortic ascending dissection (1/5/2023); echocardiogram, transesophageal, intraoperative (1/5/2023); and incision and drainage general (Left, 4/19/2024).    Family History  family history is not on file.    Social History   reports that he has been smoking cigarettes. He has a 20 pack-year smoking history. He has never used smokeless tobacco. He reports that he does not currently use drugs. He reports that he does not drink alcohol.    Medications  Home Medications       Reviewed by Alexandra Pearson (Pharmacy Tech) on 04/11/24 at 1905  Med List Status: Complete     Medication Last Dose Status   asa/apap/caffeine (EXCEDRIN) 250-250-65 MG Tab 4/11/2024 Active   loratadine (CLARITIN) 10 MG Tab 4/11/2024 Active   losartan (COZAAR) 25 MG Tab 4/10/2024 Active   metoprolol SR (TOPROL XL) 25 MG TABLET SR 24 HR 4/11/2024 Active   omeprazole (PRILOSEC) 20 MG delayed-release capsule 4/11/2024 Active   ondansetron (ZOFRAN) 4 MG Tab tablet PRN Active   rivaroxaban (XARELTO) 20 MG Tab tablet 4/9/2024 Active                  Current Facility-Administered Medications   Medication Dose Route Frequency Provider Last Rate Last Admin    oxyCODONE immediate release (Roxicodone)  tablet 10 mg  10 mg Oral Q6HRS PRN Luis Miguel Johnson M.D.        [Held by provider] HYDROmorphone (Dilaudid) injection 0.5 mg  0.5 mg Intravenous Q6HRS PRN Luis Miguel Johnson M.D.        acetaminophen (Tylenol) tablet 1,000 mg  1,000 mg Oral TID PRN Luis Miguel Johnson M.D.   1,000 mg at 04/21/24 1808    methylPREDNISolone sod succ (SOLU-MEDROL) 125 MG injection 125 mg  125 mg Intravenous Q8HRS Luis Miguel Johnson M.D.        doxycycline monohydrate (Adoxa) tablet 100 mg  100 mg Oral Q12HRS Luis Miguel Johnson M.D.        albuterol inhaler 2 Puff  2 Puff Inhalation Q4HRS PRN Olga Mendez M.D.   2 Puff at 04/21/24 1758    ampicillin/sulbactam (Unasyn) 3 g in  mL IVPB  3 g Intravenous Q6HRS Luis Miguel Johnson M.D.   Stopped at 04/21/24 1849    [Held by provider] hydrOXYzine HCl (Atarax) tablet 25 mg  25 mg Oral TID PRN Olga Mendez M.D.   25 mg at 04/21/24 1230    ondansetron (Zofran ODT) dispertab 4 mg  4 mg Oral Q4HRS PRN Olga Mendez M.D.   4 mg at 04/20/24 1335    Respiratory Therapy Consult   Nebulization Continuous RT Olga Mendez M.D.        mometasone-formoterol (Dulera) 100-5 MCG/ACT inhaler 2 Puff  2 Puff Inhalation BID Olga Mendez M.D.   2 Puff at 04/21/24 1800    ferrous sulfate tablet 325 mg  325 mg Oral QDAY with Breakfast Olga Mendez M.D.   325 mg at 04/21/24 0723    benzonatate (Tessalon) capsule 100 mg  100 mg Oral TID PRN Olga Mendez M.D.   100 mg at 04/21/24 0255    omeprazole (PriLOSEC) capsule 20 mg  20 mg Oral BID Olga Mendez M.D.   20 mg at 04/21/24 1809    atorvastatin (Lipitor) tablet 40 mg  40 mg Oral QHS Melvin Bailey M.D.   40 mg at 04/20/24 2015    losartan (Cozaar) tablet 25 mg  25 mg Oral DAILY Luis Miguel Johnson M.D.   25 mg at 04/16/24 0443    metoprolol SR (Toprol XL) tablet 25 mg  25 mg Oral DAILY Milad Parker M.D.   25 mg at 04/21/24 0527    rivaroxaban (Xarelto) tablet 20 mg  20 mg Oral PM MEAL Luis Miguel CESAR  Francis Johnson M.D.   20 mg at 04/21/24 1808       Allergies  Allergies   Allergen Reactions    Morphine Shortness of Breath, Rash and Itching             Vital Signs last 24 hours  Temp:  [36.3 °C (97.3 °F)-37.4 °C (99.3 °F)] 37.2 °C (99 °F)  Pulse:  [65-92] 84  Resp:  [16-25] 25  BP: (125-166)/() 144/82  SpO2:  [91 %-100 %] 96 %    Physical Exam  Physical Exam  Vitals and nursing note reviewed.   Constitutional:       General: He is not in acute distress.     Appearance: He is not toxic-appearing.   HENT:      Head: Normocephalic and atraumatic.   Cardiovascular:      Rate and Rhythm: Normal rate and regular rhythm.   Pulmonary:      Effort: Respiratory distress present.      Comments: Tachypnea, wheezing  Abdominal:      General: There is no distension.      Palpations: Abdomen is soft.   Musculoskeletal:      Comments: Right thigh edema and ecchymoses.  Right lower extremity edema and erythema.  Foot wrapped in Ace   Skin:     General: Skin is warm.   Neurological:      Mental Status: He is alert.      Comments: Awake, conversant, fluent speech  Moves all four extremities  Moves himself to sit up at the side of the bed         Fluids    Intake/Output Summary (Last 24 hours) at 4/21/2024 1956  Last data filed at 4/21/2024 1633  Gross per 24 hour   Intake 240 ml   Output 1825 ml   Net -1585 ml       Laboratory  Recent Results (from the past 48 hour(s))   CBC WITHOUT DIFFERENTIAL    Collection Time: 04/20/24 12:41 AM   Result Value Ref Range    WBC 17.8 (H) 4.8 - 10.8 K/uL    RBC 4.77 4.70 - 6.10 M/uL    Hemoglobin 9.7 (L) 14.0 - 18.0 g/dL    Hematocrit 32.5 (L) 42.0 - 52.0 %    MCV 68.1 (L) 81.4 - 97.8 fL    MCH 20.3 (L) 27.0 - 33.0 pg    MCHC 29.8 (L) 32.3 - 36.5 g/dL    RDW 51.0 (H) 35.9 - 50.0 fL    Platelet Count 467 (H) 164 - 446 K/uL    MPV 10.0 9.0 - 12.9 fL   Basic Metabolic Panel    Collection Time: 04/20/24 12:41 AM   Result Value Ref Range    Sodium 136 135 - 145 mmol/L    Potassium 5.4 3.6 - 5.5  mmol/L    Chloride 98 96 - 112 mmol/L    Co2 28 20 - 33 mmol/L    Glucose 90 65 - 99 mg/dL    Bun 24 (H) 8 - 22 mg/dL    Creatinine 0.96 0.50 - 1.40 mg/dL    Calcium 8.4 (L) 8.5 - 10.5 mg/dL    Anion Gap 10.0 7.0 - 16.0   MAGNESIUM    Collection Time: 04/20/24 12:41 AM   Result Value Ref Range    Magnesium 2.1 1.5 - 2.5 mg/dL   PHOSPHORUS    Collection Time: 04/20/24 12:41 AM   Result Value Ref Range    Phosphorus 3.2 2.5 - 4.5 mg/dL   ESTIMATED GFR    Collection Time: 04/20/24 12:41 AM   Result Value Ref Range    GFR (CKD-EPI) 88 >60 mL/min/1.73 m 2   CoV-2, Flu A/B, And RSV by PCR ("[employer]")    Collection Time: 04/20/24  1:41 PM    Specimen: Nasopharyngeal; Respirate   Result Value Ref Range    Influenza virus A RNA Negative Negative    Influenza virus B, PCR Negative Negative    RSV, PCR Negative Negative    SARS-CoV-2 by PCR NotDetected     SARS-CoV-2 Source NP Swab    Comp Metabolic Panel    Collection Time: 04/21/24 12:06 AM   Result Value Ref Range    Sodium 136 135 - 145 mmol/L    Potassium 5.4 3.6 - 5.5 mmol/L    Chloride 97 96 - 112 mmol/L    Co2 32 20 - 33 mmol/L    Anion Gap 7.0 7.0 - 16.0    Glucose 98 65 - 99 mg/dL    Bun 24 (H) 8 - 22 mg/dL    Creatinine 1.11 0.50 - 1.40 mg/dL    Calcium 8.7 8.5 - 10.5 mg/dL    Correct Calcium 8.9 8.5 - 10.5 mg/dL    AST(SGOT) 71 (H) 12 - 45 U/L    ALT(SGPT) 71 (H) 2 - 50 U/L    Alkaline Phosphatase 36 30 - 99 U/L    Total Bilirubin 0.7 0.1 - 1.5 mg/dL    Albumin 3.8 3.2 - 4.9 g/dL    Total Protein 6.6 6.0 - 8.2 g/dL    Globulin 2.8 1.9 - 3.5 g/dL    A-G Ratio 1.4 g/dL   CBC WITH DIFFERENTIAL    Collection Time: 04/21/24 12:06 AM   Result Value Ref Range    WBC 18.7 (H) 4.8 - 10.8 K/uL    RBC 4.90 4.70 - 6.10 M/uL    Hemoglobin 9.9 (L) 14.0 - 18.0 g/dL    Hematocrit 34.4 (L) 42.0 - 52.0 %    MCV 70.2 (L) 81.4 - 97.8 fL    MCH 20.2 (L) 27.0 - 33.0 pg    MCHC 28.8 (L) 32.3 - 36.5 g/dL    RDW 53.7 (H) 35.9 - 50.0 fL    Platelet Count 492 (H) 164 - 446 K/uL    MPV 10.3 9.0  - 12.9 fL    Neutrophils-Polys 79.00 (H) 44.00 - 72.00 %    Lymphocytes 5.70 (L) 22.00 - 41.00 %    Monocytes 9.50 0.00 - 13.40 %    Eosinophils 1.80 0.00 - 6.90 %    Basophils 0.40 0.00 - 1.80 %    Immature Granulocytes 3.60 (H) 0.00 - 0.90 %    Nucleated RBC 0.70 (H) 0.00 - 0.20 /100 WBC    Neutrophils (Absolute) 14.79 (H) 1.82 - 7.42 K/uL    Lymphs (Absolute) 1.06 1.00 - 4.80 K/uL    Monos (Absolute) 1.78 (H) 0.00 - 0.85 K/uL    Eos (Absolute) 0.33 0.00 - 0.51 K/uL    Baso (Absolute) 0.08 0.00 - 0.12 K/uL    Immature Granulocytes (abs) 0.68 (H) 0.00 - 0.11 K/uL    NRBC (Absolute) 0.13 K/uL    Hypochromia 2+ (A)     Anisocytosis 1+     Microcytosis 2+ (A)    ESTIMATED GFR    Collection Time: 04/21/24 12:06 AM   Result Value Ref Range    GFR (CKD-EPI) 74 >60 mL/min/1.73 m 2   PLATELET ESTIMATE    Collection Time: 04/21/24 12:06 AM   Result Value Ref Range    Plt Estimation Increased    MORPHOLOGY    Collection Time: 04/21/24 12:06 AM   Result Value Ref Range    RBC Morphology Present     Poikilocytosis 1+     Schistocytes 1+    PERIPHERAL SMEAR REVIEW    Collection Time: 04/21/24 12:06 AM   Result Value Ref Range    Peripheral Smear Review see below    DIFFERENTIAL COMMENT    Collection Time: 04/21/24 12:06 AM   Result Value Ref Range    Comments-Diff see below    ABG - LAB    Collection Time: 04/21/24  4:05 PM   Result Value Ref Range    Ph 7.35 (L) 7.40 - 7.50    Pco2 56.7 (HH) 26.0 - 37.0 mmHg    Po2 55.5 (L) 64.0 - 87.0 mmHg    O2 Saturation 84.5 (L) 93.0 - 99.0 %    Hco3 31 (H) 17 - 25 mmol/L    Base Excess 4 (H) -4 - 3 mmol/L    Body Temp 36.9 Centigrade    O2 Therapy 3L     O2 Therapy 3.0 2.0 - 10.0 L/min    Ph -TC 7.35 (L) 7.40 - 7.50    Pco2 -TC 56.5 (HH) 26.0 - 37.0 mmHg    Po2 -TC 55.1 (L) 64.0 - 87.0 mmHg       Imaging  Chest x-ray with bibasilar atelectasis, and elevated left hemidiaphragm    Assessment/Plan  Acute hypoxic respiratory failure- (present on admission)  Assessment & Plan  Respiratory  failure likely multifactorial including community-acquired pneumonia, left hemidiaphragm paresis, hypervolemia, possible obstructive airways disease.  He does have wheezing on exam at present.  He completed 3 days of azithromycin for atypical coverage and 3 days of steroids.   VBG this afternoon with chronic hypercapnic respiratory failure.    Do not suspect PE given negative CT angiogram on 4/11, and therapeutic anticoagulation with rivaroxaban.  His cardiomyopathy appears to be improved, though he does still have mild peripheral edema  Consider steroids and albuterol/ipratropium for COPD exacerbation  Consider BiPAP for left diaphragm paresis  Consider holding beta-blocker given wheezing  Okay for IMCU  Discussed with Dr. Blanco        Discussed with bedside RN, rapid response RN, RT.    Consultation: 65 minutes    Ulysses Crystal MD  Critical Care Medicine  7:57 PM

## 2024-04-22 NOTE — PROGRESS NOTES
*Late Entry*     MD Pires to bedside to speak to patient. Per MD, to give Dilaudid 0.5 now for pain see MAR. To notify MD of patient's pain in 30 minutes.       - 0323: Patient's pain reassessed. Patient reports 8.5/10 pain to RLE. MD Pires updated. Per MD, to give Oxycodone 10mg now. See MAR.

## 2024-04-22 NOTE — ASSESSMENT & PLAN NOTE
Respiratory failure likely multifactorial including community-acquired pneumonia, left hemidiaphragm paresis, hypervolemia, possible obstructive airways disease.  He does have wheezing on exam at present.  He completed 3 days of azithromycin for atypical coverage and 3 days of steroids.   VBG this afternoon with chronic hypercapnic respiratory failure.    Do not suspect PE given negative CT angiogram on 4/11, and therapeutic anticoagulation with rivaroxaban.  His cardiomyopathy appears to be improved, though he does still have mild peripheral edema  Consider steroids and albuterol/ipratropium for COPD exacerbation  Consider BiPAP for left diaphragm paresis  Consider holding beta-blocker given wheezing  Okay for IMCU  Discussed with Dr. Blanco

## 2024-04-22 NOTE — PROGRESS NOTES
"Pt agitated and removing medical equipment, stating he is \"ready to leave\". Oxy mask already removed, prior to removal of pulse ox, last observed O2 saturation is 80%. Pt educated on risk to leaving against medical advice. Pt states \"if I die, I die\". This RN contacted MD Pires by phone, and attempt to give phone to pt to talk with him. Pt refuses to talk with MD. MD Pires comes to bedside within a few minutes. Pt refuses to talk with him. MD explains risks to pt. Pt is actively getting dressed, refusing to stay, and begins walking out the door.   MD Pires attempts again to convince patient of risks to leaving against medical advice. Pt. Decides to stay for now.     "

## 2024-04-22 NOTE — CARE PLAN
The patient is Watcher - Medium risk of patient condition declining or worsening    Shift Goals  Clinical Goals: oxygenation/ventilation stability,  Patient Goals: pain control, speak to MD regarding plan of care  Family Goals: HUGH, none present    Progress made toward(s) clinical / shift goals:      Problem: Hemodynamics  Goal: Patient's hemodynamics, fluid balance and neurologic status will be stable or improve  Outcome: Progressing     Patient is not progressing towards the following goals:    Problem: Pain - Standard  Goal: Alleviation of pain or a reduction in pain to the patient’s comfort goal  4/22/2024 0748 by Ashley Garcia RSaschaN.  Outcome: Not Progressing    Problem: Respiratory  Goal: Patient will achieve/maintain optimum respiratory ventilation and gas exchange  Outcome: Not Progressing     Problem: Knowledge Deficit - Standard  Goal: Patient and family/care givers will demonstrate understanding of plan of care, disease process/condition, diagnostic tests and medications  Outcome: Not Progressing

## 2024-04-23 ENCOUNTER — APPOINTMENT (OUTPATIENT)
Dept: RADIOLOGY | Facility: MEDICAL CENTER | Age: 64
DRG: 981 | End: 2024-04-23
Attending: STUDENT IN AN ORGANIZED HEALTH CARE EDUCATION/TRAINING PROGRAM
Payer: MEDICAID

## 2024-04-23 LAB
ALBUMIN SERPL BCP-MCNC: 3.7 G/DL (ref 3.2–4.9)
ALBUMIN/GLOB SERPL: 1.3 G/DL
ALP SERPL-CCNC: 41 U/L (ref 30–99)
ALT SERPL-CCNC: 54 U/L (ref 2–50)
ANION GAP SERPL CALC-SCNC: 11 MMOL/L (ref 7–16)
AST SERPL-CCNC: 54 U/L (ref 12–45)
BASOPHILS # BLD AUTO: 0.1 % (ref 0–1.8)
BASOPHILS # BLD: 0.02 K/UL (ref 0–0.12)
BILIRUB SERPL-MCNC: 0.8 MG/DL (ref 0.1–1.5)
BUN SERPL-MCNC: 27 MG/DL (ref 8–22)
CALCIUM ALBUM COR SERPL-MCNC: 8.7 MG/DL (ref 8.5–10.5)
CALCIUM SERPL-MCNC: 8.5 MG/DL (ref 8.5–10.5)
CHLORIDE SERPL-SCNC: 96 MMOL/L (ref 96–112)
CO2 SERPL-SCNC: 30 MMOL/L (ref 20–33)
CREAT SERPL-MCNC: 0.89 MG/DL (ref 0.5–1.4)
EOSINOPHIL # BLD AUTO: 0 K/UL (ref 0–0.51)
EOSINOPHIL NFR BLD: 0 % (ref 0–6.9)
ERYTHROCYTE [DISTWIDTH] IN BLOOD BY AUTOMATED COUNT: 54.1 FL (ref 35.9–50)
GFR SERPLBLD CREATININE-BSD FMLA CKD-EPI: 95 ML/MIN/1.73 M 2
GLOBULIN SER CALC-MCNC: 2.9 G/DL (ref 1.9–3.5)
GLUCOSE SERPL-MCNC: 128 MG/DL (ref 65–99)
HCT VFR BLD AUTO: 34.6 % (ref 42–52)
HGB BLD-MCNC: 10.1 G/DL (ref 14–18)
IMM GRANULOCYTES # BLD AUTO: 0.5 K/UL (ref 0–0.11)
IMM GRANULOCYTES NFR BLD AUTO: 3.2 % (ref 0–0.9)
LYMPHOCYTES # BLD AUTO: 0.49 K/UL (ref 1–4.8)
LYMPHOCYTES NFR BLD: 3.1 % (ref 22–41)
MCH RBC QN AUTO: 20.4 PG (ref 27–33)
MCHC RBC AUTO-ENTMCNC: 29.2 G/DL (ref 32.3–36.5)
MCV RBC AUTO: 70 FL (ref 81.4–97.8)
MONOCYTES # BLD AUTO: 0.4 K/UL (ref 0–0.85)
MONOCYTES NFR BLD AUTO: 2.6 % (ref 0–13.4)
NEUTROPHILS # BLD AUTO: 14.21 K/UL (ref 1.82–7.42)
NEUTROPHILS NFR BLD: 91 % (ref 44–72)
NRBC # BLD AUTO: 0.09 K/UL
NRBC BLD-RTO: 0.6 /100 WBC (ref 0–0.2)
PLATELET # BLD AUTO: 414 K/UL (ref 164–446)
PMV BLD AUTO: 10.4 FL (ref 9–12.9)
POTASSIUM SERPL-SCNC: 5 MMOL/L (ref 3.6–5.5)
PROT SERPL-MCNC: 6.6 G/DL (ref 6–8.2)
RBC # BLD AUTO: 4.94 M/UL (ref 4.7–6.1)
SODIUM SERPL-SCNC: 137 MMOL/L (ref 135–145)
WBC # BLD AUTO: 15.6 K/UL (ref 4.8–10.8)

## 2024-04-23 PROCEDURE — 770020 HCHG ROOM/CARE - TELE (206)

## 2024-04-23 PROCEDURE — 700111 HCHG RX REV CODE 636 W/ 250 OVERRIDE (IP): Mod: JZ | Performed by: STUDENT IN AN ORGANIZED HEALTH CARE EDUCATION/TRAINING PROGRAM

## 2024-04-23 PROCEDURE — 80053 COMPREHEN METABOLIC PANEL: CPT

## 2024-04-23 PROCEDURE — 99232 SBSQ HOSP IP/OBS MODERATE 35: CPT | Performed by: STUDENT IN AN ORGANIZED HEALTH CARE EDUCATION/TRAINING PROGRAM

## 2024-04-23 PROCEDURE — A9270 NON-COVERED ITEM OR SERVICE: HCPCS | Performed by: INTERNAL MEDICINE

## 2024-04-23 PROCEDURE — 700102 HCHG RX REV CODE 250 W/ 637 OVERRIDE(OP): Performed by: STUDENT IN AN ORGANIZED HEALTH CARE EDUCATION/TRAINING PROGRAM

## 2024-04-23 PROCEDURE — A9270 NON-COVERED ITEM OR SERVICE: HCPCS | Performed by: STUDENT IN AN ORGANIZED HEALTH CARE EDUCATION/TRAINING PROGRAM

## 2024-04-23 PROCEDURE — 99233 SBSQ HOSP IP/OBS HIGH 50: CPT | Performed by: INTERNAL MEDICINE

## 2024-04-23 PROCEDURE — 94660 CPAP INITIATION&MGMT: CPT

## 2024-04-23 PROCEDURE — 85025 COMPLETE CBC W/AUTO DIFF WBC: CPT

## 2024-04-23 PROCEDURE — 700105 HCHG RX REV CODE 258: Performed by: STUDENT IN AN ORGANIZED HEALTH CARE EDUCATION/TRAINING PROGRAM

## 2024-04-23 PROCEDURE — 700102 HCHG RX REV CODE 250 W/ 637 OVERRIDE(OP): Performed by: INTERNAL MEDICINE

## 2024-04-23 PROCEDURE — 700111 HCHG RX REV CODE 636 W/ 250 OVERRIDE (IP): Performed by: STUDENT IN AN ORGANIZED HEALTH CARE EDUCATION/TRAINING PROGRAM

## 2024-04-23 RX ORDER — OXYCODONE HYDROCHLORIDE 10 MG/1
10 TABLET ORAL ONCE
Status: COMPLETED | OUTPATIENT
Start: 2024-04-23 | End: 2024-04-23

## 2024-04-23 RX ADMIN — ACETAMINOPHEN 1000 MG: 500 TABLET, FILM COATED ORAL at 19:40

## 2024-04-23 RX ADMIN — MOMETASONE FUROATE AND FORMOTEROL FUMARATE DIHYDRATE 2 PUFF: 100; 5 AEROSOL RESPIRATORY (INHALATION) at 16:47

## 2024-04-23 RX ADMIN — OXYCODONE HYDROCHLORIDE 10 MG: 10 TABLET ORAL at 10:19

## 2024-04-23 RX ADMIN — METHYLPREDNISOLONE SODIUM SUCCINATE 125 MG: 125 INJECTION, POWDER, FOR SOLUTION INTRAMUSCULAR; INTRAVENOUS at 13:56

## 2024-04-23 RX ADMIN — OXYCODONE HYDROCHLORIDE 10 MG: 10 TABLET ORAL at 16:58

## 2024-04-23 RX ADMIN — MOMETASONE FUROATE AND FORMOTEROL FUMARATE DIHYDRATE 2 PUFF: 100; 5 AEROSOL RESPIRATORY (INHALATION) at 06:16

## 2024-04-23 RX ADMIN — HYDROMORPHONE HYDROCHLORIDE 0.5 MG: 1 INJECTION, SOLUTION INTRAMUSCULAR; INTRAVENOUS; SUBCUTANEOUS at 23:09

## 2024-04-23 RX ADMIN — DOXYCYCLINE 100 MG: 100 TABLET, FILM COATED ORAL at 16:58

## 2024-04-23 RX ADMIN — OMEPRAZOLE 20 MG: 20 CAPSULE, DELAYED RELEASE ORAL at 05:25

## 2024-04-23 RX ADMIN — METOPROLOL SUCCINATE 25 MG: 25 TABLET, EXTENDED RELEASE ORAL at 05:25

## 2024-04-23 RX ADMIN — RIVAROXABAN 20 MG: 20 TABLET, FILM COATED ORAL at 16:58

## 2024-04-23 RX ADMIN — HYDROXYZINE HYDROCHLORIDE 25 MG: 25 TABLET, FILM COATED ORAL at 18:02

## 2024-04-23 RX ADMIN — OXYCODONE HYDROCHLORIDE 10 MG: 10 TABLET ORAL at 05:33

## 2024-04-23 RX ADMIN — AMPICILLIN AND SULBACTAM 3 G: 1; 2 INJECTION, POWDER, FOR SOLUTION INTRAMUSCULAR; INTRAVENOUS at 23:26

## 2024-04-23 RX ADMIN — OXYCODONE HYDROCHLORIDE 10 MG: 10 TABLET ORAL at 14:19

## 2024-04-23 RX ADMIN — HYDROMORPHONE HYDROCHLORIDE 0.5 MG: 1 INJECTION, SOLUTION INTRAMUSCULAR; INTRAVENOUS; SUBCUTANEOUS at 16:55

## 2024-04-23 RX ADMIN — AMPICILLIN AND SULBACTAM 3 G: 1; 2 INJECTION, POWDER, FOR SOLUTION INTRAMUSCULAR; INTRAVENOUS at 05:30

## 2024-04-23 RX ADMIN — METHYLPREDNISOLONE SODIUM SUCCINATE 125 MG: 125 INJECTION, POWDER, FOR SOLUTION INTRAMUSCULAR; INTRAVENOUS at 05:27

## 2024-04-23 RX ADMIN — AMPICILLIN AND SULBACTAM 3 G: 1; 2 INJECTION, POWDER, FOR SOLUTION INTRAMUSCULAR; INTRAVENOUS at 14:00

## 2024-04-23 RX ADMIN — DOXYCYCLINE 100 MG: 100 TABLET, FILM COATED ORAL at 05:25

## 2024-04-23 RX ADMIN — ALBUTEROL SULFATE 2 PUFF: 90 AEROSOL, METERED RESPIRATORY (INHALATION) at 19:38

## 2024-04-23 RX ADMIN — METHYLPREDNISOLONE SODIUM SUCCINATE 125 MG: 125 INJECTION, POWDER, FOR SOLUTION INTRAMUSCULAR; INTRAVENOUS at 21:53

## 2024-04-23 RX ADMIN — OMEPRAZOLE 20 MG: 20 CAPSULE, DELAYED RELEASE ORAL at 16:58

## 2024-04-23 RX ADMIN — HYDROMORPHONE HYDROCHLORIDE 0.5 MG: 1 INJECTION, SOLUTION INTRAMUSCULAR; INTRAVENOUS; SUBCUTANEOUS at 10:17

## 2024-04-23 RX ADMIN — FERROUS SULFATE TAB 325 MG (65 MG ELEMENTAL FE) 325 MG: 325 (65 FE) TAB at 07:07

## 2024-04-23 RX ADMIN — AMPICILLIN AND SULBACTAM 3 G: 1; 2 INJECTION, POWDER, FOR SOLUTION INTRAMUSCULAR; INTRAVENOUS at 16:57

## 2024-04-23 RX ADMIN — ATORVASTATIN CALCIUM 40 MG: 40 TABLET, FILM COATED ORAL at 21:53

## 2024-04-23 RX ADMIN — HYDROXYZINE HYDROCHLORIDE 25 MG: 25 TABLET, FILM COATED ORAL at 01:16

## 2024-04-23 RX ADMIN — HYDROXYZINE HYDROCHLORIDE 25 MG: 25 TABLET, FILM COATED ORAL at 10:19

## 2024-04-23 ASSESSMENT — ENCOUNTER SYMPTOMS
SORE THROAT: 0
FEVER: 0
COUGH: 1
NAUSEA: 0
SHORTNESS OF BREATH: 1
NERVOUS/ANXIOUS: 0

## 2024-04-23 ASSESSMENT — PAIN DESCRIPTION - PAIN TYPE: TYPE: ACUTE PAIN

## 2024-04-23 ASSESSMENT — FIBROSIS 4 INDEX: FIB4 SCORE: 1.14

## 2024-04-23 NOTE — PROGRESS NOTES
4 Eyes Skin Assessment Completed by ALLEN Vora and DILEEP Mike.     Head WDL  Ears Redness and Blanching  Nose WDL  Mouth WDL  Neck Redness and Blanching  Breast/Chest Redness and Blanching  Shoulder Blades Redness and Blanching, scattered scabs  Spine Redness and Blanching  (R) Arm/Elbow/Hand Redness, Blanching, Bruising, and Scab  (L) Arm/Elbow/Hand Redness, Blanching, Bruising, and Scab  Abdomen Redness, Blanching, and Scar  Groin WDL  Scrotum/Coccyx/Buttocks WDL  (R) Leg Redness, Blanching, Swelling, Shiny, Edema, and Incision,   (L) Leg Scab and Bruising  (R) Heel/Foot/Toe Redness, Blanching, Swelling, and Edema, wrapped in Ace Bandage  (L) Heel/Foot/Toe WDL              Devices In Places Tele box, Pulse Ox, and nasal canula        Interventions In Place Patient turns self     Possible Skin Injury No     Pictures Uploaded Into Epic N/A  Wound Consult Placed N/A  RN Wound Prevention Protocol Ordered No

## 2024-04-23 NOTE — PROGRESS NOTES
Infectious Disease Progress Note    Author: Dyan Spain M.D. Date & Time of service: 2024  1:03 PM    Chief Complaint:  Foot abscess    Interval History:  63 y.o. 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    Review of Systems:  Review of Systems   Constitutional:  Negative for fever.   Cardiovascular:  Positive for leg swelling.       Hemodynamics:  Temp (24hrs), Av.8 °C (98.2 °F), Min:36.4 °C (97.5 °F), Max:37 °C (98.6 °F)  Temperature: 37 °C (98.6 °F), Monitored Temp: 36.5 °C (97.7 °F)  Pulse  Av.5  Min: 65  Max: 98   Blood Pressure: (!) 165/86       Physical Exam:  Physical Exam  Vitals and nursing note reviewed.   Constitutional:       General: He is not in acute distress.     Appearance: He is ill-appearing. He is not toxic-appearing or diaphoretic.   Cardiovascular:      Rate and Rhythm: Normal rate.   Pulmonary:      Effort: Pulmonary effort is normal. No respiratory distress.   Abdominal:      General: There is no distension.   Musculoskeletal:      Right lower leg: Edema present.      Left lower leg: Edema present.   Skin:     Coloration: Skin is pale. Skin is not jaundiced.      Findings: Bruising and erythema present.         Meds:    Current Facility-Administered Medications:     oxyCODONE immediate-release **OR** [DISCONTINUED] oxyCODONE immediate-release **OR** [DISCONTINUED] HYDROmorphone    HYDROmorphone    acetaminophen    methylPREDNISolone    doxycycline monohydrate    albuterol    ampicillin-sulbactam  (UNASYN) IV    hydrOXYzine HCl    ondansetron    Respiratory Therapy Consult    mometasone-formoterol    ferrous sulfate    benzonatate    omeprazole    atorvastatin    [Held by provider] losartan    metoprolol SR    rivaroxaban    Labs:  Recent Labs     04/21/24  0006 04/22/24  0552 04/23/24  0014   WBC 18.7* 21.0* 15.6*   RBC 4.90 4.90 4.94   HEMOGLOBIN 9.9* 10.0* 10.1*   HEMATOCRIT 34.4* 33.7* 34.6*   MCV 70.2* 68.8* 70.0*   MCH 20.2* 20.4* 20.4*   RDW 53.7* 52.6* 54.1*   PLATELETCT 492* 444 414   MPV 10.3 9.8 10.4   NEUTSPOLYS 79.00* 91.30* 91.00*   LYMPHOCYTES 5.70* 2.60* 3.10*   MONOCYTES 9.50 1.20 2.60   EOSINOPHILS 1.80 0.30 0.00   BASOPHILS 0.40 0.50 0.10   RBCMORPHOLO Present  --   --      Recent Labs     04/21/24  0006 04/22/24  0552 04/22/24  1331 04/23/24  0014   SODIUM 136 135  --  137   POTASSIUM 5.4 6.0* 5.5 5.0   CHLORIDE 97 98  --  96   CO2 32 27  --  30   GLUCOSE 98 145*  --  128*   BUN 24* 30*  --  27*     Recent Labs     04/21/24  0006 04/22/24  0552 04/23/24  0014   ALBUMIN 3.8 3.5 3.7   TBILIRUBIN 0.7 0.9 0.8   ALKPHOSPHAT 36 40 41   TOTPROTEIN 6.6 6.7 6.6   ALTSGPT 71* 62* 54*   ASTSGOT 71* 58* 54*   CREATININE 1.11 1.04 0.89       Imaging:  DX-CHEST-LIMITED (1 VIEW)    Result Date: 4/21/2024 4/21/2024 9:06 AM HISTORY/REASON FOR EXAM: Shortness of breath TECHNIQUE/EXAM DESCRIPTION AND NUMBER OF VIEWS: Single AP view of the chest. COMPARISON: 4/19/2024 FINDINGS: There is bibasilar atelectasis. The heart is enlarged. There is no pleural effusion. There is chronic elevation left hemidiaphragm. Postsurgical changes are again seen.     1.  Cardiomegaly. 2.  Bibasilar atelectasis.    IR-US GUIDED PIV    Result Date: 4/20/2024  EXAMINATION:                                                                    HISTORY/REASON FOR EXAM:  Ultrasound Guided PIV.  TECHNIQUE/EXAM DESCRIPTION AND NUMBER OF VIEWS:  Peripheral IV insertion with ultrasound guidance.  The procedure was prepared using maximal  sterile barrier technique including sterile gown, mask, cap, and donning of sterile gloves following appropriate hand hygiene and/or sterile scrub. Patient skin site was prepped with 2% Chlorhexidine solution.   FINDINGS: Peripheral IV insertion with Ultrasound Guidance was performed by qualified imaging nursing staff without the assistance of a Radiologist.      Ultrasound-guided PERIPHERAL IV INSERTION performed by qualified nursing staff as above.    DX-CHEST-PORTABLE (1 VIEW)    Result Date: 4/19/2024 4/19/2024 2:33 PM HISTORY/REASON FOR EXAM: Cough TECHNIQUE/EXAM DESCRIPTION AND NUMBER OF VIEWS: Single portable view of the chest. COMPARISON: None FINDINGS: There is chronic elevation the left hemidiaphragm. There is no evidence of focal consolidation or evidence of pulmonary edema. There is no pleural effusion. The heart is enlarged. Postsurgical changes are again seen. There surgical changes the right shoulder.     Cardiomegaly.    CT-EXTREMITY, LOWER WITH RIGHT    Result Date: 4/18/2024 4/18/2024 3:54 AM HISTORY/REASON FOR EXAM:  Swelling and pain. TECHNIQUE/EXAM DESCRIPTION AND NUMBER OF VIEWS: CT scan of the RIGHT lower extremity with contrast, with reconstructions. Thin helical 3 mm sections were obtained from the distal femur through the proximal tibia/fibula. Sagittal and coronal multiplanar reconstructions were generated from the axial images. A total of 100 mL of Omnipaque 350 nonionic contrast was administered  IV without complication. Up to date radiation dose reduction adjustments have been utilized to meet ALARA standards for radiation dose reduction. COMPARISON: None. FINDINGS: There is diffuse subcutaneous fat stranding extending from the proximal thigh distally through the leg and onto the dorsum of the foot. Associated mild skin thickening is seen. There is possible subtle ill-defined fluid collection in the anterior thigh compartment musculature measuring 4.1 x 2.0 cm which appears near  isodense to adjacent muscle. There is 4.2 x 5.9 cm slightly hyperdense collection identified in the posterior thigh muscular compartment. There is intermediate density subcutaneous fluid collection along the dorsal foot measuring 4.8 x 1.2 cm. Visualized portions of the small bowel and colon appear grossly unremarkable. The bladder appears within normal limits. The bony structures are intact without visualized fracture, subluxation, or dislocation identified.     1.  Intramuscular hematoma in the posterior thigh muscular compartment, evaluation for component of compartment syndrome recommended as clinically appropriate. 2.  Possible small fluid collection in the anterior thigh musculature which is isodense to muscle, appearance suggesting small intramuscular hematoma. 3.  Intermediate density fluid collection along the dorsal foot, appearance favoring subcutaneous hematoma 4.  Diffuse subcutaneous fat stranding from the proximal thigh extending distally to the foot, compatible with edema and/or cellulitis. These findings were discussed with the patient's clinician, Olga Mendez, on 4/18/2024 7:58 AM.    DX-CHEST-2 VIEWS    Result Date: 4/17/2024 4/17/2024 2:35 PM HISTORY/REASON FOR EXAM:  Shortness of Breath; increasing WBC, high risk for aspiration. TECHNIQUE/EXAM DESCRIPTION AND NUMBER OF VIEWS: Two views of the chest. COMPARISON:  1 view chest 4/15/2024, CT-CTA of the chest pulmonary arteries study 4/13/2024 FINDINGS: The soft tissues and bony structures again show right shoulder arthroplasty. Right distal clavicle resection (Simona procedure). Sternal wires again noted and aortic valve replacement. The heart and mediastinal structures are otherwise within normal limits. Pulmonary vascularity is upper normal with perhaps some minimal peribronchial cuffing on the left. There is elevation of left hemidiaphragm again noted. Subsegmental atelectatic changes at the left lung base blurring the left hemidiaphragm.  The right lung field is clear. The upper and midlung zones are clear bilaterally. There is no effusion or pneumothorax.     1.  Median sternotomy and aortic valve replacement. 2.  Ongoing elevation of the left hemidiaphragm and subsegmental atelectatic changes at the left lung base. Concordant with prior study and CT-CTA of the chest    DX SNIFF TEST    Result Date: 4/16/2024  4/15/2024 4:03 PM HISTORY/REASON FOR EXAM:  Decreased chest expansion on the left side TECHNIQUE/EXAM DESCRIPTION AND NUMBER OF VIEWS: Fluoroscopic sniff test/diaphragm fluoroscopy COMPARISON: Chest radiograph from 4/15/2024 Fluoroscopy time: 12 seconds Images: 1; unfortunately the technologist did not save the images Dose: 1.347 Gcm2 FINDINGS: There is limited motion of the LEFT diaphragm relative to the RIGHT common inhalation. There is no paradoxical motion.     Limited but nonparadoxical motion of the LEFT diaphragm    DX-CHEST-PORTABLE (1 VIEW)    Result Date: 4/15/2024    4/15/2024 2:22 AM HISTORY/REASON FOR EXAM: Shortness of Breath TECHNIQUE/EXAM DESCRIPTION:  Single AP view of the chest. COMPARISON: July 4, 2023 FINDINGS: Overlying cardiac leads are present. The cardiac silhouette appears within normal limits. Atherosclerotic calcification of the aorta is noted.  The central pulmonary vasculature appears normal. Asymmetric elevation of the left hemidiaphragm is noted.  Hazy linear density in the left lung base is observed. No significant pleural effusions are identified. The bony structures appear age-appropriate.     1.  Left basilar atelectasis or subtle infiltrate. 2.  Atherosclerosis    US-EXTREMITY VENOUS LOWER BILAT    Result Date: 4/14/2024   Vascular Laboratory  CONCLUSIONS  Bilateral lower extremity venous duplex imaging.  No deep venous thrombosis.  JUSTIN TOPETE  Exam Date:     04/14/2024 09:38  Room #:     Inpatient  Priority:     Routine  Ht (in):             Wt (lb):  Ordering Physician:        KENYETTA FERREIRA   Referring Physician:       [id number] Mcgarry  Sonographer:               Priya Choi RVOSMANY  Study Type:                Complete Bilateral  Technical Quality:         Adequate  Age:    63    Gender:     M  MRN:    [id number]  :    1960      BSA:  Indications:     Localized swelling, mass and lump, right lower limb  CPT Codes:       68807  ICD Codes:       R22.41  History:         Swelling and significant bruising of right thigh from torn                   hamstring/thigh muscle. No prior duplex  Limitations:  PROCEDURES:  Bilateral lower extremity venous duplex imaging.  The following venous structures were evaluated: common femoral, deep  femoral, proximal great saphenous, femoral, popliteal, peroneal, and  posterior tibial veins.  Serial compression, color, and spectral Doppler flow evaluations were  performed.  FINDINGS:  Bilateral lower extremities.  No deep venous thrombosis.  All veins demonstrate complete color filling and compressibility with  normal venous flow dynamics including spontaneous flow and respiratory  phasicity.  Incidental finding: Avascular structure with mixed heterogeneous material  in the distal thigh measuring 3.7 x 3.6 cm consistent with tissue damage.  Keysha QUESADA To  (Electronically Signed)  Final Date:      2024                   13:38    CT-CTA CHEST PULMONARY ARTERY W/ RECONS    Result Date: 2024 5:50 PM HISTORY/REASON FOR EXAM:  PE? Short of breath TECHNIQUE/EXAM DESCRIPTION: CT angiogram scan for pulmonary embolism with contrast, with reconstructions. 1.25 mm helical sections were obtained from the lung apices through the lung bases following the rapid bolus administration of 100 mL of Omnipaque 350 nonionic contrast. Thin-section overlapping reconstruction interval was utilized. Coronal reconstructions were generated from the axial data. MIP post processing was performed and utilized for the interpretation. Low dose optimization technique was utilized  for this CT exam including automated exposure control and adjustment of the mA and/or kV according to patient size. COMPARISON: 1/29/23 FINDINGS: Pulmonary Embolism: No. Main Pulmonary Arteries: No. Segmental branches: No. Subsegmental branches: No. Additional Comments: None. Lungs: Airspace opacities in the left lower lobe. Airway: Patent. Pleura: No pleural effusion or pneumothorax. Nodes: No enlarged mediastinal or hilar lymph nodes. Additional findings: Thoracic aorta and great vessels:  No aneurysm. Heart and pericardium: Aortic valve prosthesis. There is no coronary artery calcification. No pericardial effusion. Thoracic spine:  No fracture or malalignment. No compression deformity. Chest wall:   Unremarkable. Visualized upper abdomen: No acute abnormality.     1. No CT evidence of pulmonary embolism. 2. Airspace opacity in the left lower lobe, likely atelectasis    CT-CTA COMPLETE THORACOABDOMINAL AORTA    Result Date: 4/11/2024 4/11/2024 4:49 PM HISTORY/REASON FOR EXAM:  CHEST PAIN. TECHNIQUE/EXAM DESCRIPTION: CT angiogram without and with contrast, with reconstructions. Initial precontrast thick helical sections were obtained from the top of the aortic arch through the diaphragmatic domes. Following this, thin-section postcontrast helical images were obtained from the lung apices through the iliac crests following the bolus administration of 100 mL of nonionic Omnipaque 350 contrast.  CT angiographic technique was utilized. Parasagittal reconstructed images of the aorta were generated utilizing multiplanar volume reformat technique. Low dose optimization technique was utilized for this CT exam including automated exposure control and adjustment of the mA and/or kV according to patient size. COMPARISON: 9/10/2023. FINDINGS: Aorta: Prior aortic valve replacement. Surgical changes of the ascending aorta again noted. Mild to moderate atherosclerotic changes. Pre-contrast images demonstrate no evidence of  displaced calcified intima or intramural hematoma. Aortic arch: Branching pattern is conventional. Diameter: The ascending and descending aorta are of normal caliber. Dissection: Absent. Celiac artery origin: Widely patent. Superior mesenteric artery origin: Widely patent. Renal artery origins: Widely patent. Inferior mesenteric artery: Patent. Common iliac arteries: Nonaneurysmal and patent. Heart: Normal size. No pericardial effusion. Coronary artery calcifications. 3D angiographic/MIP images of the vasculature confirm the vascular findings as described above. Lungs: Elevation of the left hemidiaphragm with adjacent basilar consolidations. Liver: Homogeneous enhancement. No masses identified. Enlarged. Biliary system: No intrahepatic or extrahepatic ductal dilatation. The gallbladder is grossly unremarkable. Spleen: Unremarkable. Adrenal glands: Unremarkable. Pancreas: Unremarkable. Kidneys: Symmetric enhancement. No solid mass is identified. Punctate, nonobstructing right renal stone. Bowel: Distal colonic diverticulosis. No inflammation. No pneumoperitoneum. Ascites: None. Lymph nodes: No adenopathy is identified. Bones: Unremarkable. Median sternotomy wires. Right shoulder arthroplasty.     1.  Surgical changes of the aortic valve and ascending aorta. 2.  No aortic aneurysm or dissection identified. 3.  Mild/moderate atherosclerotic changes. 4.  Punctate, nonobstructing right renal stone. 5.  Elevation of the left hemidiaphragm with adjacent basilar consolidations, likely atelectasis. Pneumonia not excluded. 6.  Distal colonic diverticulosis. 7.  Hepatomegaly.     CT-CTA HEAD WITH & W/O-POST PROCESS    Result Date: 4/11/2024 4/11/2024 4:49 PM HISTORY/REASON FOR EXAM:  hx mass, worning mental status TECHNIQUE/EXAM DESCRIPTION: CT angiogram of the [place] without and with contrast.  Initial precontrast images were obtained of the head from the skull base through the vertex.  Postcontrast images were  obtained of the [place] following the power injection of nonionic contrast at 5.0 mL/sec. Thin-section helical images were obtained with overlapping reconstruction interval. Coronal and sagittal multiplanar volume reformats were generated.  3D angiographic images were reviewed on PACS.  Maximum intensity projection (MIP) images were generated and reviewed. 100 mL of Omnipaque 350 nonionic contrast was injected intravenously. Low dose optimization technique was utilized for this CT exam including automated exposure control and adjustment of the mA and/or kV according to patient size. COMPARISON:  9/11/2023. FINDINGS: Severely suboptimal exam due to photon starvation. The posterior circulation shows the distal vertebral arteries to be patent. The vertebrobasilar confluence is intact. The basilar artery is patent. No aneurysm or occlusive lesion is evident. The anterior circulation shows no stenotic or occlusive lesion. No aneurysm is evident about the [place]. The calvaria are normal in appearance. Mastoid air cells and visualized paranasal sinuses are clear. The visualized portions of the orbits are normal in appearance. The brain parenchyma is normal in appearance. There is no hemorrhage.  There is no evidence for acute infarct. The ventricular system is normal in size and position. There are no extra-axial fluid collection present. 3D angiographic/MIP images of the vasculature confirm the vascular findings as described above.     1.  Severely suboptimal exam due to photon starvation. 2.  No gross acute intracranial abnormality. 3.  No proximal vessel occlusion.    CT-CTA NECK WITH & W/O-POST PROCESSING    Addendum Date: 4/11/2024    100 mL Omnipaque 300 given IV.    Result Date: 4/11/2024 4/11/2024 4:49 PM HISTORY/REASON FOR EXAM:  hx mass, worning mental status TECHNIQUE/EXAM DESCRIPTION: CT angiogram of the neck with contrast. Postcontrast images were obtained of the neck from the great vessels  through the skull base following the power injection of nonionic contrast at 5.0 mL/sec. Thin-section helical images were obtained with overlapping reconstruction interval. Coronal and oblique multiplanar volume reformats were generated. Cervical internal carotid artery percent stenosis is calculated using the standard method according to the NASCET criteria wherein a segment of uniform caliber mid or distal cervical internal carotid is used as the reference denominator. 3D angiographic images were reviewed on PACS.  Maximum intensity projection (MIP) images were generated and reviewed mL of Omnipaque 350 nonionic contrast was injected intravenously. Low dose optimization technique was utilized for this CT exam including automated exposure control and adjustment of the mA and/or kV according to patient size. COMPARISON:  CT neck 7/4/2023. FINDINGS: Aortic arch: conventional branching pattern. Similar appearance of the anterior aspect of the ascending aorta from prior exam There is atherosclerotic plaque of the aorta. Right common carotid artery: Patent Right internal carotid artery: Normal in appearance Left common carotid artery is patent. Left internal carotid artery: Atherosclerotic plaque without significant stenosis (less than 50%). The right vertebral artery is patent without dissection or stenosis. The left vertebral artery is patent without dissection or stenosis. Vertebrobasilar confluence: The vertebrobasilar confluence appears normal. Lung apices are clear The soft tissues of the neck are within normal limits. 3D angiographic/MIP images of the vasculature confirm the vascular findings as described above.     No acute arterial abnormality of the neck.    EC-ECHOCARDIOGRAM COMPLETE W/O CONT    Result Date: 4/5/2024  Transthoracic Echo Report Echocardiography Laboratory CONCLUSIONS Compared to the prior study on 7/5/2023, LV systolic function is now hyperdynamic with high flow state. Recommend RHC as  clinically indicated. Normal LV size and hyperdynamic systolic function with estimated LVEF 70% Features of apical variant hypertrophic cardiomyopathy noted. Recommend cardiac MRI. Normal RV size with reduced systolic function Known bioprosthetic aortic valve with mild increase in transvalvular gradient: Vmax 3.1m/s. No PVL or AI. DVI 0.5 with AT < 100 suggestive of normal aortic prosthetic valve and increased velocity is probably due to high flow. High estimated CO 9L/min. Normal IVC size No pericardial effusion W LV EF:  70    % FINDINGS Left Ventricle Features of hypertrophic cardiomyopathy (apical varient). Normal left ventricular systolic function. The left ventricular ejection fraction is visually estimated to be 70%. Normal regional wall motion.  Indeterminate LV diastolic function Right Ventricle The right ventricle is normal in size. Reduced right ventricular systolic function. Right Atrium Normal right atrial size. Normal inferior vena cava size and inspiratory collapse. Left Atrium Normal left atrial size. Left atrial volume index is 22.78 ml/m sq. Mitral Valve Structurally normal mitral valve without significant stenosis or regurgitation. Aortic Valve Known bioprosthetic aortic valve that is functioning normally with increased transvalvular gradients. No aortic insufficiency. Transvalvular gradients are - Peak:  37.57 mmHg,  Mean:  24.22 mmHg. Acceleration time is  69 ms. DVI 0.5 with AT < 100 suggestive of normal prosthetic valve and increased velocity due to high flow. Tricuspid Valve No tricuspid stenosis. Mild tricuspid regurgitation.  Inadequate TR jet to estimate RVSP Pulmonic Valve No pulmonic stenosis. Trace pulmonic insufficiency. Pericardium No pericardial effusion. Aorta Normal aortic root for body surface area. Abdullahi Saunders MD (Electronically Signed) Final Date:     05 April 2024                 15:49      Micro:  Results       Procedure Component Value Units Date/Time    CULTURE WOUND W/  GRAM STAIN [[id number]] Collected: 04/19/24 1106    Order Status: Completed Specimen: Wound Updated: 04/22/24 0705     Significant Indicator NEG     Source WND     Site Right foot     Culture Result No growth at 72 hours.     Gram Stain Result Few WBCs.  No organisms seen.      Narrative:      Surgery - swabs received    Anaerobic Culture [[id number]] Collected: 04/19/24 1106    Order Status: Completed Specimen: Wound Updated: 04/22/24 0705     Significant Indicator NEG     Source WND     Site Right foot     Culture Result Culture in progress.    Narrative:      Surgery - swabs received    CoV-2, Flu A/B, And RSV by PCR ([employer] (Droppy[employer]id) [[id number]] Collected: 04/20/24 1341    Order Status: Completed Specimen: Respirate from Nasopharyngeal Updated: 04/20/24 1453     Influenza virus A RNA Negative     Influenza virus B, PCR Negative     RSV, PCR Negative     SARS-CoV-2 by PCR NotDetected     Comment: [facility] providers: PLEASE REFER TO DE-ESCALATION AND RETESTING PROTOCOL  on [email or web address]    **The [employer] GeneXpert Xpress SARS-CoV-2 RT-PCR Test has been made  available for use under the Emergency Use Authorization (EUA) only.          SARS-CoV-2 Source NP Swab    MRSA By PCR (Amp) [[id number]] Collected: 04/19/24 1700    Order Status: Completed Specimen: Respirate from Nares Updated: 04/19/24 1942     MRSA by PCR Negative    GRAM STAIN [[id number]] Collected: 04/19/24 1106    Order Status: Completed Specimen: Wound Updated: 04/19/24 1919     Significant Indicator .     Source WND     Site Right foot     Gram Stain Result Few WBCs.  No organisms seen.      Narrative:      Surgery - swabs received    Blood Culture - Draw one from central line and one from peripheral site [[id number]] Collected: 04/11/24 1907    Order Status: Completed Specimen: Blood from Peripheral Updated: 04/16/24 2100     Significant Indicator NEG     Source BLD     Site PERIPHERAL     Culture Result No growth after 5 days of incubation.    Narrative:       "Left Hand    Blood Culture - Draw one from central line and one from peripheral site [[id number]] Collected: 04/11/24 1907    Order Status: Completed Specimen: Blood from Line Updated: 04/16/24 2100     Significant Indicator NEG     Source BLD     Site Peripheral     Culture Result No growth after 5 days of incubation.    Narrative:      Left AC            Assessment:  Active Hospital Problems    Diagnosis     *Abscess of right foot [L02.611]     Diffuse pain in right lower extremity [M79.604]     Dysphonia [R49.0]     Microcytic anemia [D50.9]     Community acquired pneumonia of left lower lobe of lung [J18.9]     Shortness of breath [R06.02]     Acute hypoxic respiratory failure [J96.90]     Tobacco dependence [F17.200]     Sepsis (HCC) [A41.9]     Heart failure with improved ejection fraction (HFimpEF) ([facility]) [I50.32]     Secondary hypercoagulable state ([facility]) [D68.69]     Paroxysmal atrial fibrillation ([facility]) [I48.0]     Leukocytosis, thrombocytosis, erythrocytosis, lymphopenia [D72.829]     Gastroesophageal reflux disease without esophagitis [K21.9]     Ascending aortic aneurysm repair [Z98.890, Z86.79]      Right foot abscess  -s/p I and D 4/19 \"3 cm incision was made over the abscess. A large amount of purulent fluid was encountered \"  -cult neg  Thigh musculature hematomas  History of AAA repair  Suspected hypertrophic cardiomyopathy  Paroxysmal A-fib  Anabolic steroid use  Tobacco use  Bioprosthetic aortic valve in place  -blood cultures neg  -TTE neg for vegetations     Plan:   -Continue Unasyn for now  Can switch to oral Augmentin at discharge to complete 14 days from date of surgery  -Hepatitis C antibody negative  -Follow-up blood cultures x 2 neg to date  -SARS-CoV-2, flu, RSV PCR negative    PICC-no  Midline -no    Will sign off-please reconsult if needed    "

## 2024-04-23 NOTE — DISCHARGE PLANNING
Case Management Discharge Planning    Admission Date: 4/11/2024  GMLOS: 9  ALOS: 12    6-Clicks ADL Score: 24  6-Clicks Mobility Score: 23      Anticipated Discharge Dispo: Discharge Disposition: Discharged to home/self care (01)    DME Needed: No  Pending home O2 eval    Action(s) Taken: Updated Provider/Nurse on Discharge Plan    Discussed during rounds. Pt on 2.5 LPM O2. Per MD, plan to wean O2, home O2 eval tomorrow.   Previous note mentioned pt with [facility]. RN SISI called [facility], spoke to Yaneth. Per [facility], pt is not currently on service with them for home oxygen.    Escalations Completed: None    Medically Clear: No    Next Steps:   Home oxygen eval and DME o2 order, if needed.   Follow up with treatment team for medical clearance and discharge planning needs.    Barriers to Discharge: Medical clearance and DME    Is the patient up for discharge tomorrow: No

## 2024-04-23 NOTE — PROGRESS NOTES
Report received from night RN at 0700. PT seen at bedside and pt care assumed. Pt is A&Ox4, on 2L NC, states pain is 8/10. PIV flushed and intact. PT is SR on telemetry monitor. PT is standby assist.     Plan of care reviewed with pt, call light, phone, and personal belongings within reach. Bed alarm on, and bed in low locked position. All pt's needs met at this time.     Bedside report received from off going RN/tech: ALLEN Trejo, assumed care of patient.     Fall Risk Score: LOW RISK  Fall risk interventions in place: Place yellow fall risk ID band on patient, Provide patient/family education based on risk assessment, Educate patient/family to call staff for assistance when getting out of bed, Place fall precaution signage outside patient door, Utilize bed/chair fall alarm, and Bed alarm connected correctly  Bed type: Regular (Clement Score less than 17 interventions in place)  Patient on cardiac monitor: Yes  IVF/IV medications: Not Applicable   Oxygen: How many liters 2L and traced line to wall  Bedside sitter: Not Applicable   Isolation: Not applicable

## 2024-04-23 NOTE — CARE PLAN
The patient is Stable - Low risk of patient condition declining or worsening    Shift Goals  Clinical Goals: Pain control, Monitor resp status  Patient Goals: Rest, pain control  Family Goals: HUGH    Progress made toward(s) clinical / shift goals:        Problem: Pain - Standard  Goal: Alleviation of pain or a reduction in pain to the patient’s comfort goal  Outcome: Progressing  Flowsheets (Taken 4/23/2024 0150)  Pain Rating Scale (NPRS): 5  Note: Pain assessed using the 0-10 pain scale. Pt experiencing 5/10 pain in right Foot. Pt educated on non-pharmacological interventions including repositioning, distraction and heat/cold packs.       Problem: Knowledge Deficit - Standard  Goal: Patient and family/care givers will demonstrate understanding of plan of care, disease process/condition, diagnostic tests and medications  Outcome: Progressing  Note: Pt educated on POC and disease processes. Pt verbalized understanding of medication regimen and interventions.

## 2024-04-23 NOTE — PROGRESS NOTES
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    Hospital Course  Noe Hickey is a 63 y.o. male with a history of aortic aneurysm repair and aortic valve replacement (for bicuspid aortic valve 1/5/2023), combined systolic and diastolic heart failure, paroxysmal atrial fibrillation status post ablation and stopped of his Xarelto 1 week ago, ongoing tobacco use.  He was admitted 4/11/2024 with worsening shortness of breath over the prior 3 to 4 months.  An echocardiogram this year showed an improvement of his heart function with an EF of 30 which increased to 70% with possible hypertrophic cardiomyopathy.  Patient presented due to having chest pain while operating having machinery at work.  He had left-sided chest pain worsened with movement and deep breathing.  Workup in the ER showed a CTA aorta showed no dilation or aneurysm but did show concern of left-sided basilar pneumonia he did have a elevated white blood cell count of 27,000.  Patient was initiated on antibiotics.  Patient was admitted for community-acquired pneumonia left lower lung with acute on chronic respiratory failure.  During the patient's hospitalization he was seen by orthopedic surgery and infectious disease with concern of right hamstring tear and a right foot dorsal abscess.  The patient did have I&D of the right dorsal foot abscess on 4/19/2024.  Wound cultures and blood cultures from 4/19 and 4/11 showed no growth.  Pulmonary and ENT evaluated the patient during hospitalization for vocal dysfunction with no significant recommendations.    Interval Problem Update  No acute events overnight.  Patient feels improved and stable today.  He is on 2.5L oxygen, continue to wean as tolerated.  Home oxygen evaluation tomorrow morning.  Continue unasyn for right foot abscess. Okay to transition to augmentin on discharge for total 14 day antibiotic course per  ID.  WBC improving to 15k today.  Patient reports thigh hematoma related swelling is improving, he is tolerating xarelto well for hx a fib.  Anticipate discharge home tomorrow if patient remains hemodynamically stable.      I have discussed this patient's plan of care and discharge plan at IDT rounds today with Case Management, Nursing, Nursing leadership, and other members of the IDT team.    Consultants/Specialty  infectious disease    Code Status  Full Code    Disposition  Medically Cleared  I have placed the appropriate orders for post-discharge needs.    Review of Systems  Review of Systems   Constitutional:  Positive for malaise/fatigue. Negative for fever.   HENT:  Negative for sore throat.    Respiratory:  Positive for cough and shortness of breath.    Cardiovascular:  Negative for leg swelling.   Gastrointestinal:  Negative for nausea.   Psychiatric/Behavioral:  The patient is not nervous/anxious.         Physical Exam  Temp:  [36.5 °C (97.7 °F)-37 °C (98.6 °F)] 37 °C (98.6 °F)  Pulse:  [76-80] 78  Resp:  [16-25] 18  BP: (117-165)/(62-93) 165/86  SpO2:  [92 %-98 %] 92 %    Physical Exam  Vitals reviewed.   Constitutional:       Appearance: Normal appearance. He is obese. He is not diaphoretic.   HENT:      Head: Normocephalic and atraumatic.      Nose: Nose normal.      Mouth/Throat:      Mouth: Mucous membranes are moist.      Pharynx: No oropharyngeal exudate.   Eyes:      General: No scleral icterus.        Right eye: No discharge.         Left eye: No discharge.      Extraocular Movements: Extraocular movements intact.      Conjunctiva/sclera: Conjunctivae normal.   Cardiovascular:      Rate and Rhythm: Normal rate and regular rhythm.      Pulses:           Radial pulses are 2+ on the right side and 2+ on the left side.        Dorsalis pedis pulses are 2+ on the right side and 2+ on the left side.      Heart sounds: No murmur heard.  Pulmonary:      Effort: Pulmonary effort is normal. No respiratory  distress.      Breath sounds: Rhonchi (left base) present. No wheezing or rales.   Abdominal:      General: Bowel sounds are normal. There is no distension.      Palpations: Abdomen is soft.   Musculoskeletal:         General: No swelling or tenderness.      Cervical back: Neck supple. No muscular tenderness.      Right lower leg: No edema.      Left lower leg: No edema.   Lymphadenopathy:      Cervical: No cervical adenopathy.   Skin:     Coloration: Skin is not jaundiced or pale.   Neurological:      General: No focal deficit present.      Mental Status: He is alert and oriented to person, place, and time. Mental status is at baseline.      Cranial Nerves: No cranial nerve deficit.   Psychiatric:         Mood and Affect: Mood normal.         Behavior: Behavior normal.         Fluids    Intake/Output Summary (Last 24 hours) at 4/23/2024 1534  Last data filed at 4/23/2024 1124  Gross per 24 hour   Intake 700 ml   Output 1250 ml   Net -550 ml       Laboratory  Recent Labs     04/21/24  0006 04/22/24  0552 04/23/24  0014   WBC 18.7* 21.0* 15.6*   RBC 4.90 4.90 4.94   HEMOGLOBIN 9.9* 10.0* 10.1*   HEMATOCRIT 34.4* 33.7* 34.6*   MCV 70.2* 68.8* 70.0*   MCH 20.2* 20.4* 20.4*   MCHC 28.8* 29.7* 29.2*   RDW 53.7* 52.6* 54.1*   PLATELETCT 492* 444 414   MPV 10.3 9.8 10.4     Recent Labs     04/21/24  0006 04/22/24  0552 04/22/24  1331 04/23/24  0014   SODIUM 136 135  --  137   POTASSIUM 5.4 6.0* 5.5 5.0   CHLORIDE 97 98  --  96   CO2 32 27  --  30   GLUCOSE 98 145*  --  128*   BUN 24* 30*  --  27*   CREATININE 1.11 1.04  --  0.89   CALCIUM 8.7 8.5  --  8.5                   Imaging  DX-CHEST-LIMITED (1 VIEW)   Final Result      1.  Cardiomegaly.      2.  Bibasilar atelectasis.      IR-US GUIDED PIV   Final Result    Ultrasound-guided PERIPHERAL IV INSERTION performed by    qualified nursing staff as above.      DX-CHEST-PORTABLE (1 VIEW)   Final Result      Cardiomegaly.      CT-EXTREMITY, LOWER WITH RIGHT   Final Result          1.  Intramuscular hematoma in the posterior thigh muscular compartment, evaluation for component of compartment syndrome recommended as clinically appropriate.   2.  Possible small fluid collection in the anterior thigh musculature which is isodense to muscle, appearance suggesting small intramuscular hematoma.   3.  Intermediate density fluid collection along the dorsal foot, appearance favoring subcutaneous hematoma   4.  Diffuse subcutaneous fat stranding from the proximal thigh extending distally to the foot, compatible with edema and/or cellulitis.      These findings were discussed with the patient's clinician, Olga Mendez, on 4/18/2024 7:58 AM.      DX-CHEST-2 VIEWS   Final Result      1.  Median sternotomy and aortic valve replacement.   2.  Ongoing elevation of the left hemidiaphragm and subsegmental atelectatic changes at the left lung base. Concordant with prior study and CT-CTA of the chest      DX SNIFF TEST   Final Result      Limited but nonparadoxical motion of the LEFT diaphragm      DX-CHEST-PORTABLE (1 VIEW)   Final Result         1.  Left basilar atelectasis or subtle infiltrate.   2.  Atherosclerosis      US-EXTREMITY VENOUS LOWER BILAT   Final Result      CT-CTA CHEST PULMONARY ARTERY W/ RECONS   Final Result         1. No CT evidence of pulmonary embolism.      2. Airspace opacity in the left lower lobe, likely atelectasis      CT-CTA COMPLETE THORACOABDOMINAL AORTA   Final Result      1.  Surgical changes of the aortic valve and ascending aorta.   2.  No aortic aneurysm or dissection identified.   3.  Mild/moderate atherosclerotic changes.   4.  Punctate, nonobstructing right renal stone.   5.  Elevation of the left hemidiaphragm with adjacent basilar consolidations, likely atelectasis. Pneumonia not excluded.   6.  Distal colonic diverticulosis.   7.  Hepatomegaly.                  CT-CTA HEAD WITH & W/O-POST PROCESS   Final Result      1.  Severely suboptimal exam due to photon  starvation.   2.  No gross acute intracranial abnormality.   3.  No proximal vessel occlusion.      CT-CTA NECK WITH & W/O-POST PROCESSING   Final Result   Addendum (preliminary) 1 of 1   100 mL Omnipaque 300 given IV.      Final      No acute arterial abnormality of the neck.           Assessment/Plan  * Abscess of right foot  Assessment & Plan  R foot pain and swelling. S/p I&D 4/19 noted large amount of purulent fluid   4/19 foot wound culture no growth  MRSA swab negative  Unasyn  ID consulting  Multimodal pain control    Diffuse pain in right lower extremity  Assessment & Plan  RLE pain and swelling. Negative DVT.   Ordered CT RLE and CT R foot. Discussed with radiology noted Intramuscular hematoma in the posterior thigh muscular compartment, small intramuscular hematoma in anterior thigh musculature, subcutaneous hematoma along the dorsal foot and diffuse subcutaneous from the proximal thigh extending distally to the foot.  I have discussed with orthopedics, s/p I&D 4/19 and noted R dorsal foot abscess.  Pain management    Dysphonia  Assessment & Plan  Acute onset, hoarse voice  S/p ENT scope 4/15 unremarkable. Does not think dyspnea is related to vocal cords    Microcytic anemia  Assessment & Plan  No Signs of bleeding  Check iron profile c/w JARROD  Iron supplements  Outpatient GI referral. Patient eeports colonoscopy years ago in [place].     Community acquired pneumonia of left lower lobe of lung- (present on admission)  Assessment & Plan  Denies fever, chills.  Noted profound leukocytosis on admission.    Likely foot abscess as etiology  Procalcitonin negative  Unasyn and doxycycline    Shortness of breath- (present on admission)  Assessment & Plan  Has been occurring and progressive in nature for few months  Recently s/b cards and repeated ECHO which showed in an interval normalization of his LVEF to 70% but shows possible there is features of apical variant hypertrophic cardiomyopath  Patient has been  scheduled for outpatient cardiac MRI and stress test  Check CTA chest and doppler BLE negative for PE and DVT  SPEP/UPEP pending to r/o amyloid   Cardiology rec outpatient cardiac MRI and stress test    Pulmonary consulted. ?diaphragm motion abnormal. SNIFF noted limited but nonparadoxical motion of the L diaphragm.   S/p ENT scope 4/15 unremarkable. Does not think dyspnea is related to vocal cords  Anxiety   Speech therapy to evaluate vocal cord dysfucntion      Acute hypoxic respiratory failure- (present on admission)  Assessment & Plan  Unclear etiology.   CTA negative for PE  Suspect undiagnosed COPD exacerbation. He has significant smoking history. Also MARIELENA possible contributing   Continue IS, duoneb and dulera  Pulmonary following     Tobacco dependence- (present on admission)  Assessment & Plan  Trying to cut back, encourage full cessation    Sepsis (HCC)- (present on admission)  Assessment & Plan  Foot abscess  Antibitoics   ID consulting    Heart failure with improved ejection fraction (HFimpEF) (HCC)- (present on admission)  Assessment & Plan  Echo 4/5/24 notes EF 70%, which has improved from EF 30% 7/5/23   Continue losartan, Toprol   Monitoring respiratory status  I&O, daily weight    Secondary hypercoagulable state (HCC)- (present on admission)  Assessment & Plan  Patient has discontinued Xarelto for the past few days due to misunderstanding by his cardiology provider  Resumed Xarelto      Paroxysmal atrial fibrillation (HCC)- (present on admission)  Assessment & Plan  In NSR  Xarelto as above  Continue outpatient toprol XL    Leukocytosis, thrombocytosis, erythrocytosis, lymphopenia- (present on admission)  Assessment & Plan  Right foot abscess as likely etiology  Monitor cbc and vitals.  4/22 wbc:21<18.7<17.8  improving    Hyperkalemia- (present on admission)  Assessment & Plan  4/22 am K:6 repeat 5.5  IV fluids and lasix.  resolved    Gastroesophageal reflux disease without esophagitis- (present on  admission)  Assessment & Plan  Continue omeprazole 20 mg daily    Ascending aortic aneurysm repair- (present on admission)  Assessment & Plan  CTA aorta shows intact repair and no new aneurysmal dilation         VTE prophylaxis: VTE Selection

## 2024-04-23 NOTE — CARE PLAN
Problem: Pain - Standard  Goal: Alleviation of pain or a reduction in pain to the patient’s comfort goal  Outcome: Progressing     Problem: Fall Risk  Goal: Patient will remain free from falls  Outcome: Progressing   The patient is Stable - Low risk of patient condition declining or worsening    Shift Goals  Clinical Goals: Remain free of falls, no skin breakdown  Patient Goals: Rest  Family Goals: HUGH    Progress made toward(s) clinical / shift goals:      Pt educated on plan of care, pt verbalizes understanding, reinforcement needed. Pt educated on pain/anxiety scales, alleviating factors, pain/anxiety medications, and side effects, and need for pain/anxiety reassessment, pt pain/anxiety controlled at this time, reinforcement needed. Pt educated on the need to reposition frequently and remain dry to avoid skin breakdown, reinforcement needed, no further skin breakdown during shift. Fall risk education provided to pt, pt educated about the need to call for assistance while attempting to ambulate, reinforcement needed, pt remains free of falls this shift.

## 2024-04-24 ENCOUNTER — APPOINTMENT (OUTPATIENT)
Dept: CARDIOLOGY | Facility: MEDICAL CENTER | Age: 64
DRG: 981 | End: 2024-04-24
Attending: STUDENT IN AN ORGANIZED HEALTH CARE EDUCATION/TRAINING PROGRAM
Payer: MEDICAID

## 2024-04-24 ENCOUNTER — PHARMACY VISIT (OUTPATIENT)
Dept: PHARMACY | Facility: MEDICAL CENTER | Age: 64
End: 2024-04-24
Payer: COMMERCIAL

## 2024-04-24 VITALS
TEMPERATURE: 98.8 F | OXYGEN SATURATION: 90 % | HEIGHT: 69 IN | SYSTOLIC BLOOD PRESSURE: 144 MMHG | BODY MASS INDEX: 33.04 KG/M2 | DIASTOLIC BLOOD PRESSURE: 95 MMHG | HEART RATE: 74 BPM | WEIGHT: 223.11 LBS | RESPIRATION RATE: 20 BRPM

## 2024-04-24 LAB
ALBUMIN SERPL BCP-MCNC: 3.6 G/DL (ref 3.2–4.9)
ALBUMIN/GLOB SERPL: 1.3 G/DL
ALP SERPL-CCNC: 39 U/L (ref 30–99)
ALT SERPL-CCNC: 53 U/L (ref 2–50)
ANION GAP SERPL CALC-SCNC: 11 MMOL/L (ref 7–16)
AST SERPL-CCNC: 45 U/L (ref 12–45)
BACTERIA SPEC ANAEROBE CULT: NORMAL
BASOPHILS # BLD AUTO: 0.2 % (ref 0–1.8)
BASOPHILS # BLD: 0.04 K/UL (ref 0–0.12)
BILIRUB SERPL-MCNC: 0.8 MG/DL (ref 0.1–1.5)
BUN SERPL-MCNC: 24 MG/DL (ref 8–22)
CALCIUM ALBUM COR SERPL-MCNC: 8.6 MG/DL (ref 8.5–10.5)
CALCIUM SERPL-MCNC: 8.3 MG/DL (ref 8.5–10.5)
CHLORIDE SERPL-SCNC: 97 MMOL/L (ref 96–112)
CO2 SERPL-SCNC: 28 MMOL/L (ref 20–33)
CREAT SERPL-MCNC: 0.87 MG/DL (ref 0.5–1.4)
EOSINOPHIL # BLD AUTO: 0 K/UL (ref 0–0.51)
EOSINOPHIL NFR BLD: 0 % (ref 0–6.9)
ERYTHROCYTE [DISTWIDTH] IN BLOOD BY AUTOMATED COUNT: 54.5 FL (ref 35.9–50)
GFR SERPLBLD CREATININE-BSD FMLA CKD-EPI: 96 ML/MIN/1.73 M 2
GLOBULIN SER CALC-MCNC: 2.8 G/DL (ref 1.9–3.5)
GLUCOSE SERPL-MCNC: 162 MG/DL (ref 65–99)
HCT VFR BLD AUTO: 35.9 % (ref 42–52)
HGB BLD-MCNC: 10.5 G/DL (ref 14–18)
IMM GRANULOCYTES # BLD AUTO: 0.24 K/UL (ref 0–0.11)
IMM GRANULOCYTES NFR BLD AUTO: 1.5 % (ref 0–0.9)
LYMPHOCYTES # BLD AUTO: 0.33 K/UL (ref 1–4.8)
LYMPHOCYTES NFR BLD: 2.1 % (ref 22–41)
MCH RBC QN AUTO: 20.2 PG (ref 27–33)
MCHC RBC AUTO-ENTMCNC: 29.2 G/DL (ref 32.3–36.5)
MCV RBC AUTO: 68.9 FL (ref 81.4–97.8)
MONOCYTES # BLD AUTO: 0.38 K/UL (ref 0–0.85)
MONOCYTES NFR BLD AUTO: 2.4 % (ref 0–13.4)
NEUTROPHILS # BLD AUTO: 15.05 K/UL (ref 1.82–7.42)
NEUTROPHILS NFR BLD: 93.8 % (ref 44–72)
NRBC # BLD AUTO: 0.16 K/UL
NRBC BLD-RTO: 1 /100 WBC (ref 0–0.2)
NT-PROBNP SERPL IA-MCNC: 1568 PG/ML (ref 0–125)
PLATELET # BLD AUTO: 486 K/UL (ref 164–446)
PMV BLD AUTO: 10.5 FL (ref 9–12.9)
POTASSIUM SERPL-SCNC: 4.7 MMOL/L (ref 3.6–5.5)
PROT SERPL-MCNC: 6.4 G/DL (ref 6–8.2)
RBC # BLD AUTO: 5.21 M/UL (ref 4.7–6.1)
SIGNIFICANT IND 70042: NORMAL
SITE SITE: NORMAL
SODIUM SERPL-SCNC: 136 MMOL/L (ref 135–145)
SOURCE SOURCE: NORMAL
WBC # BLD AUTO: 16 K/UL (ref 4.8–10.8)

## 2024-04-24 PROCEDURE — 700102 HCHG RX REV CODE 250 W/ 637 OVERRIDE(OP): Performed by: STUDENT IN AN ORGANIZED HEALTH CARE EDUCATION/TRAINING PROGRAM

## 2024-04-24 PROCEDURE — A9270 NON-COVERED ITEM OR SERVICE: HCPCS | Performed by: STUDENT IN AN ORGANIZED HEALTH CARE EDUCATION/TRAINING PROGRAM

## 2024-04-24 PROCEDURE — 700111 HCHG RX REV CODE 636 W/ 250 OVERRIDE (IP): Performed by: STUDENT IN AN ORGANIZED HEALTH CARE EDUCATION/TRAINING PROGRAM

## 2024-04-24 PROCEDURE — 83880 ASSAY OF NATRIURETIC PEPTIDE: CPT

## 2024-04-24 PROCEDURE — 700111 HCHG RX REV CODE 636 W/ 250 OVERRIDE (IP): Mod: JZ | Performed by: STUDENT IN AN ORGANIZED HEALTH CARE EDUCATION/TRAINING PROGRAM

## 2024-04-24 PROCEDURE — 700102 HCHG RX REV CODE 250 W/ 637 OVERRIDE(OP): Performed by: INTERNAL MEDICINE

## 2024-04-24 PROCEDURE — 99239 HOSP IP/OBS DSCHRG MGMT >30: CPT | Performed by: STUDENT IN AN ORGANIZED HEALTH CARE EDUCATION/TRAINING PROGRAM

## 2024-04-24 PROCEDURE — 700105 HCHG RX REV CODE 258: Performed by: STUDENT IN AN ORGANIZED HEALTH CARE EDUCATION/TRAINING PROGRAM

## 2024-04-24 PROCEDURE — A9270 NON-COVERED ITEM OR SERVICE: HCPCS | Performed by: INTERNAL MEDICINE

## 2024-04-24 PROCEDURE — RXMED WILLOW AMBULATORY MEDICATION CHARGE: Performed by: STUDENT IN AN ORGANIZED HEALTH CARE EDUCATION/TRAINING PROGRAM

## 2024-04-24 PROCEDURE — 80053 COMPREHEN METABOLIC PANEL: CPT

## 2024-04-24 PROCEDURE — 85025 COMPLETE CBC W/AUTO DIFF WBC: CPT

## 2024-04-24 RX ORDER — OXYCODONE HYDROCHLORIDE 10 MG/1
10 TABLET ORAL EVERY 6 HOURS PRN
Qty: 10 TABLET | Refills: 0 | Status: SHIPPED | OUTPATIENT
Start: 2024-04-24 | End: 2024-04-29

## 2024-04-24 RX ORDER — ATORVASTATIN CALCIUM 40 MG/1
40 TABLET, FILM COATED ORAL
Qty: 30 TABLET | Refills: 0 | Status: SHIPPED | OUTPATIENT
Start: 2024-04-24

## 2024-04-24 RX ORDER — ALBUTEROL SULFATE 90 UG/1
2 AEROSOL, METERED RESPIRATORY (INHALATION) EVERY 6 HOURS PRN
Qty: 8.5 G | Refills: 0 | Status: SHIPPED | OUTPATIENT
Start: 2024-04-24

## 2024-04-24 RX ORDER — METHYLPREDNISOLONE 4 MG/1
TABLET ORAL
Qty: 21 TABLET | Refills: 0 | Status: ON HOLD | OUTPATIENT
Start: 2024-04-24 | End: 2024-05-21

## 2024-04-24 RX ORDER — BUDESONIDE AND FORMOTEROL FUMARATE DIHYDRATE 80; 4.5 UG/1; UG/1
2 AEROSOL RESPIRATORY (INHALATION) 2 TIMES DAILY
Qty: 10.2 G | Refills: 0 | Status: ON HOLD | OUTPATIENT
Start: 2024-04-24 | End: 2024-05-21

## 2024-04-24 RX ORDER — AMOXICILLIN AND CLAVULANATE POTASSIUM 875; 125 MG/1; MG/1
1 TABLET, FILM COATED ORAL 2 TIMES DAILY
Qty: 18 TABLET | Refills: 0 | Status: ACTIVE | OUTPATIENT
Start: 2024-04-24 | End: 2024-04-29

## 2024-04-24 RX ADMIN — OMEPRAZOLE 20 MG: 20 CAPSULE, DELAYED RELEASE ORAL at 05:51

## 2024-04-24 RX ADMIN — HYDROXYZINE HYDROCHLORIDE 25 MG: 25 TABLET, FILM COATED ORAL at 02:08

## 2024-04-24 RX ADMIN — DOXYCYCLINE 100 MG: 100 TABLET, FILM COATED ORAL at 05:51

## 2024-04-24 RX ADMIN — MOMETASONE FUROATE AND FORMOTEROL FUMARATE DIHYDRATE 2 PUFF: 100; 5 AEROSOL RESPIRATORY (INHALATION) at 05:54

## 2024-04-24 RX ADMIN — FERROUS SULFATE TAB 325 MG (65 MG ELEMENTAL FE) 325 MG: 325 (65 FE) TAB at 10:04

## 2024-04-24 RX ADMIN — HYDROMORPHONE HYDROCHLORIDE 0.5 MG: 1 INJECTION, SOLUTION INTRAMUSCULAR; INTRAVENOUS; SUBCUTANEOUS at 05:48

## 2024-04-24 RX ADMIN — OXYCODONE HYDROCHLORIDE 10 MG: 10 TABLET ORAL at 08:41

## 2024-04-24 RX ADMIN — METOPROLOL SUCCINATE 25 MG: 25 TABLET, EXTENDED RELEASE ORAL at 05:51

## 2024-04-24 RX ADMIN — OXYCODONE HYDROCHLORIDE 10 MG: 10 TABLET ORAL at 00:03

## 2024-04-24 RX ADMIN — AMPICILLIN AND SULBACTAM 3 G: 1; 2 INJECTION, POWDER, FOR SOLUTION INTRAMUSCULAR; INTRAVENOUS at 05:57

## 2024-04-24 RX ADMIN — METHYLPREDNISOLONE SODIUM SUCCINATE 125 MG: 125 INJECTION, POWDER, FOR SOLUTION INTRAMUSCULAR; INTRAVENOUS at 05:48

## 2024-04-24 ASSESSMENT — FIBROSIS 4 INDEX: FIB4 SCORE: 1.14

## 2024-04-24 ASSESSMENT — PAIN DESCRIPTION - PAIN TYPE: TYPE: ACUTE PAIN

## 2024-04-24 NOTE — DISCHARGE SUMMARY
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 male with a history of aortic aneurysm repair and aortic valve replacement (for bicuspid aortic valve 1/5/2023), combined systolic and diastolic heart failure, paroxysmal atrial fibrillation status post ablation and stopped of his Xarelto 1 week ago, ongoing tobacco use.  He was admitted 4/11/2024 with worsening shortness of breath over the prior 3 to 4 months as well as leg pain and swelling. An echocardiogram this year showed an improvement of his heart function with an EF of 30 which increased to 70% with possible hypertrophic cardiomyopathy.  Patient presented due to having chest pain while operating having machinery at work.  He had left-sided chest pain worsened with movement and deep breathing.  Workup in the ER showed a CTA aorta showed no dilation or aneurysm but did show concern of left-sided basilar pneumonia he did have a elevated white blood cell count of 27,000.  Patient was initiated on antibiotics.  Patient was admitted for community-acquired pneumonia left lower lung with acute on chronic respiratory failure.  During the patient's hospitalization he was seen by orthopedic surgery and infectious disease with concern of right hamstring tear and a right foot dorsal abscess.  The patient did have I&D of the right dorsal foot abscess on 4/19/2024.  Wound cultures and blood cultures from 4/19 and 4/11 showed no growth. Infectious disease recommend oral augmentin for total antibiotic 2 week course. Pulmonary and ENT evaluated the patient  during hospitalization for vocal dysfunction with no acute interventions needed. He is tolerating home xarelto well with improvement in thigh hematoma and swelling. He is to follow up with cardiology as outpatient for possible cardiac MRI and stress testing. He is also to follow up with pulmonology for possible undiagnosed COPD. He is not requiring oxygen at time of discharge.    Therefore, he is discharged in fair and stable condition to home with close outpatient follow-up.    The patient met 2-midnight criteria for an inpatient stay at the time of discharge.    Discharge Date  4/24/2024    FOLLOW UP ITEMS POST DISCHARGE  Take medications as prescribed.  Follow up with PCP, cardiology, pulmonology.    DISCHARGE DIAGNOSES  Principal Problem:    Abscess of right foot (POA: Unknown)  Active Problems:    Ascending aortic aneurysm repair (POA: Yes)    Gastroesophageal reflux disease without esophagitis (POA: Yes)    Hyperkalemia (POA: Yes)    Leukocytosis, thrombocytosis, erythrocytosis, lymphopenia (POA: Yes)    Paroxysmal atrial fibrillation (HCC) (POA: Yes)    Secondary hypercoagulable state (HCC) (POA: Yes)    Heart failure with improved ejection fraction (HFimpEF) (HCC) (POA: Yes)    Sepsis (HCC) (POA: Yes)    Tobacco dependence (POA: Yes)    Acute hypoxic respiratory failure (POA: Yes)    Shortness of breath (POA: Yes)    Community acquired pneumonia of left lower lobe of lung (POA: Yes)    Microcytic anemia (POA: Unknown)    Dysphonia (POA: Unknown)    Diffuse pain in right lower extremity (POA: Unknown)  Resolved Problems:    * No resolved hospital problems. *      FOLLOW UP  Future Appointments   Date Time Provider Department Center   4/26/2024  2:10 PM Farheen Wright M.D. [facility] [place]   4/30/2024  1:30 PM Keysha Jameson M.D. [facility] None   5/4/2024  8:30 AM [facility] None   6/4/2024  3:40 PM Efren Maradiaga D.O. [facility] None     No follow-up provider specified.    MEDICATIONS ON  DISCHARGE     Medication List        START taking these medications        Instructions   albuterol 108 (90 Base) MCG/ACT Aers inhalation aerosol   Inhale 2 Puffs every 6 hours as needed for Shortness of Breath.  Dose: 2 Puff     amoxicillin-clavulanate 875-125 MG Tabs  Commonly known as: Augmentin   Take 1 Tablet by mouth 2 times a day for 9 days.  Dose: 1 Tablet     atorvastatin 40 MG Tabs  Commonly known as: Lipitor   Take 1 Tablet by mouth at bedtime.  Dose: 40 mg     budesonide-formoterol 80-4.5 MCG/ACT Aero  Commonly known as: Symbicort   Inhale 2 Puffs 2 times a day.  Dose: 2 Puff     methylPREDNISolone 4 MG Tbpk  Commonly known as: Medrol Dosepak   Follow schedule on package instructions.     oxyCODONE immediate release 10 MG immediate release tablet  Commonly known as: Roxicodone   Take 1 Tablet by mouth every 6 hours as needed for Severe Pain for up to 5 days.  Dose: 10 mg            CONTINUE taking these medications        Instructions   asa/apap/caffeine 250-250-65 MG Tabs  Commonly known as: Excedrin   Take 2 Tablets by mouth 1 time a day as needed for Headache.  Dose: 2 Tablet     loratadine 10 MG Tabs  Commonly known as: Claritin   Take 10 mg by mouth every day.  Dose: 10 mg     losartan 25 MG Tabs  Commonly known as: Cozaar   Take 1 Tablet by mouth every day.  Dose: 25 mg     metoprolol SR 25 MG Tb24  Commonly known as: Toprol XL   Take 1 Tablet by mouth every day.  Dose: 25 mg     omeprazole 20 MG delayed-release capsule  Commonly known as: PriLOSEC   Take 20 mg by mouth 2 times a day.  Dose: 20 mg     ondansetron 4 MG Tabs tablet  Commonly known as: Zofran   Take 4 mg by mouth every 6 hours as needed for Nausea/Vomiting.  Dose: 4 mg     Xarelto 20 MG Tabs tablet  Generic drug: rivaroxaban   Take 20 mg by mouth with dinner.  Dose: 20 mg              Allergies  Allergies   Allergen Reactions    Morphine Shortness of Breath, Rash and Itching             DIET  No orders of the defined types were placed  in this encounter.      ACTIVITY  As tolerated.  Weight bearing as tolerated    CONSULTATIONS  Cardiology  Pulmonary  ENT  Orthopedic surgery  Infectious disease    PROCEDURES  DATE: 4/19/2024     PREOPERATIVE DIAGNOSIS: Right dorsal foot abscess     POSTOPERATIVE DIAGNOSIS: Same     PROCEDURE PERFORMED:  Irrigation and debridement of  right dorsal foot abscess                                                       SURGEON: Mitch Bowie M.D.      ASSISTANT: Ting          ANESTHESIOLOGIST: Fredis     ANESTHESIA: General     ESTIMATED BLOOD LOSS: 0 mL     INDICATIONS: The patient is a 63 y.o. male with a right foot abscess after unknown trauma.  I discussed the risks and benefits of the procedure, including the risks of infection, wound healing complication, neurovascular injury, need for repeat irrigation and debridement and the medical risks of anesthesia including DVT, PE, MI, stroke, and death.  Benefits include eradication of infection and resolution of sepsis.  Alternatives to surgery were also discussed, including non-operative management.  The patient signed the informed consent and the operative extremity was marked.        PROCEDURE:  The patient underwent anesthesia, and was positioned supine on the operating room table and all bony prominences were well padded.  Preoperative antibiotics were held until cultures could be obtained. Sequential compression devices were employed. The correct operative site was prepped and draped into a sterile field. A procedural pause was conducted to verify correct patient, correct extremity, presence of the surgeons initials on the operative extremity.     A 3 cm incision was made over the abscess. A large amount of purulent fluid was encountered and sent to the lab for culture analysis. The abscess cavity was debrided in excisional fashion with knife and rongeur of skin, subcutaneous tissue, and underlying muscle down to healthy tissue. Abscess measured 4 x 6. The  wound  was irrigated with 3 L of saline solution. The wound was closed with 2-0 Nylon in the skin.  Sterile dressings were applied.      The patient tolerated the procedure well. There were no apparent complications. All sponge, needle, and instrument counts were correct on two separate occasions. They were awakened, extubated, and transferred to the recovery room in satisfactory condition.         Post-Operative Plan:     1.  The patient should remain full weightbearing through heel  on their operative extremity.  Gait aids (crutch or crutches, cane, walker) may be used as needed, and may be discontinued when no longer required.  2.  IV antibiotics - will be given postoperatively and adjusted by the Infectious Disease service as dictated by culture results.  3.  DVT prophylaxis - SCD's and Lovenox 40 mg SQ daily while inpatient.  The patient may transition to Aspirin 325 mg PO BID as an outpatient  4.  Discharge planning       LABORATORY  Lab Results   Component Value Date    SODIUM 136 04/24/2024    POTASSIUM 4.7 04/24/2024    CHLORIDE 97 04/24/2024    CO2 28 04/24/2024    GLUCOSE 162 (H) 04/24/2024    BUN 24 (H) 04/24/2024    CREATININE 0.87 04/24/2024        Lab Results   Component Value Date    WBC 16.0 (H) 04/24/2024    HEMOGLOBIN 10.5 (L) 04/24/2024    HEMATOCRIT 35.9 (L) 04/24/2024    PLATELETCT 486 (H) 04/24/2024        Total time of the discharge process exceeds 34 minutes.

## 2024-04-24 NOTE — CARE PLAN
The patient is Stable - Low risk of patient condition declining or worsening    Shift Goals  Clinical Goals: Monitor Respitory, remain free from falls  Patient Goals: Rest  Family Goals: HUGH    Progress made toward(s) clinical / shift goals:        Problem: Pain - Standard  Goal: Alleviation of pain or a reduction in pain to the patient’s comfort goal  Outcome: Progressing  Note: Pain assessed using the 0-10 pain scale. Pt experiencing 8/10 pain in Foot. Pt educated on non-pharmacological interventions including repositioning, distraction and heat/cold packs. Patient pain controlled with medication.      Problem: Knowledge Deficit - Standard  Goal: Patient and family/care givers will demonstrate understanding of plan of care, disease process/condition, diagnostic tests and medications  Outcome: Progressing  Note: Pt educated on POC and disease processes. Pt verbalized understanding of medication regimen and interventions.

## 2024-04-24 NOTE — PROGRESS NOTES
Patient arrived to MD terrence. MD paper work, meds, and follow up appointments reviewed with patient. Questions addressed. Ride home by self. Updated on new heart failure appointment.

## 2024-04-24 NOTE — PROGRESS NOTES
Monitor Summary  Rhythm: Normal Sinus Rhythm  Rate: 75-88  Ectopy: PVCs  .15 / .09 / .36

## 2024-04-25 ENCOUNTER — PATIENT MESSAGE (OUTPATIENT)
Dept: MEDICAL GROUP | Facility: CLINIC | Age: 64
End: 2024-04-25
Payer: MEDICAID

## 2024-04-25 LAB — EKG IMPRESSION: NORMAL

## 2024-04-26 ENCOUNTER — APPOINTMENT (OUTPATIENT)
Dept: RADIOLOGY | Facility: MEDICAL CENTER | Age: 64
End: 2024-04-26
Attending: EMERGENCY MEDICINE
Payer: MEDICAID

## 2024-04-26 ENCOUNTER — HOSPITAL ENCOUNTER (INPATIENT)
Facility: MEDICAL CENTER | Age: 64
LOS: 3 days | End: 2024-04-29
Attending: EMERGENCY MEDICINE | Admitting: FAMILY MEDICINE
Payer: MEDICAID

## 2024-04-26 ENCOUNTER — OFFICE VISIT (OUTPATIENT)
Dept: MEDICAL GROUP | Facility: CLINIC | Age: 64
End: 2024-04-26
Attending: STUDENT IN AN ORGANIZED HEALTH CARE EDUCATION/TRAINING PROGRAM
Payer: MEDICAID

## 2024-04-26 VITALS
SYSTOLIC BLOOD PRESSURE: 134 MMHG | BODY MASS INDEX: 31.1 KG/M2 | RESPIRATION RATE: 19 BRPM | DIASTOLIC BLOOD PRESSURE: 98 MMHG | HEIGHT: 69 IN | WEIGHT: 210 LBS | HEART RATE: 92 BPM | OXYGEN SATURATION: 96 %

## 2024-04-26 DIAGNOSIS — J96.01 ACUTE HYPOXEMIC RESPIRATORY FAILURE (HCC): ICD-10-CM

## 2024-04-26 DIAGNOSIS — R07.9 CHEST PAIN, UNSPECIFIED TYPE: ICD-10-CM

## 2024-04-26 DIAGNOSIS — J96.01 ACUTE RESPIRATORY FAILURE WITH HYPOXIA (HCC): ICD-10-CM

## 2024-04-26 PROBLEM — J18.9 PNEUMONIA: Status: RESOLVED | Noted: 2023-01-20 | Resolved: 2024-04-26

## 2024-04-26 PROBLEM — I10 HYPERTENSION: Status: ACTIVE | Noted: 2024-04-26

## 2024-04-26 PROBLEM — E78.5 HYPERLIPIDEMIA: Status: ACTIVE | Noted: 2024-04-26

## 2024-04-26 PROBLEM — U07.1 PNEUMONIA DUE TO COVID-19 VIRUS: Status: RESOLVED | Noted: 2023-09-11 | Resolved: 2024-04-26

## 2024-04-26 PROBLEM — A41.9 SEPSIS (HCC): Status: RESOLVED | Noted: 2023-06-13 | Resolved: 2024-04-26

## 2024-04-26 PROBLEM — N17.9 AKI (ACUTE KIDNEY INJURY) (HCC): Status: RESOLVED | Noted: 2023-09-12 | Resolved: 2024-04-26

## 2024-04-26 PROBLEM — R09.02 HYPOXIA: Status: ACTIVE | Noted: 2024-04-26

## 2024-04-26 PROBLEM — D72.829 LEUKOCYTOSIS: Status: RESOLVED | Noted: 2023-01-06 | Resolved: 2024-04-26

## 2024-04-26 PROBLEM — J12.82 PNEUMONIA DUE TO COVID-19 VIRUS: Status: RESOLVED | Noted: 2023-09-11 | Resolved: 2024-04-26

## 2024-04-26 PROBLEM — J18.9 PNEUMONIA DUE TO INFECTIOUS ORGANISM: Status: RESOLVED | Noted: 2023-06-13 | Resolved: 2024-04-26

## 2024-04-26 LAB
ALBUMIN SERPL BCP-MCNC: 3.5 G/DL (ref 3.2–4.9)
ALBUMIN/GLOB SERPL: 1.4 G/DL
ALP SERPL-CCNC: 38 U/L (ref 30–99)
ALT SERPL-CCNC: 47 U/L (ref 2–50)
ANION GAP SERPL CALC-SCNC: 10 MMOL/L (ref 7–16)
AST SERPL-CCNC: 39 U/L (ref 12–45)
BASOPHILS # BLD AUTO: 0.1 % (ref 0–1.8)
BASOPHILS # BLD: 0.02 K/UL (ref 0–0.12)
BILIRUB SERPL-MCNC: 1 MG/DL (ref 0.1–1.5)
BUN SERPL-MCNC: 20 MG/DL (ref 8–22)
CALCIUM ALBUM COR SERPL-MCNC: 8.6 MG/DL (ref 8.5–10.5)
CALCIUM SERPL-MCNC: 8.2 MG/DL (ref 8.5–10.5)
CHLORIDE SERPL-SCNC: 100 MMOL/L (ref 96–112)
CO2 SERPL-SCNC: 26 MMOL/L (ref 20–33)
CREAT SERPL-MCNC: 0.91 MG/DL (ref 0.5–1.4)
EKG IMPRESSION: NORMAL
EOSINOPHIL # BLD AUTO: 0.08 K/UL (ref 0–0.51)
EOSINOPHIL NFR BLD: 0.4 % (ref 0–6.9)
ERYTHROCYTE [DISTWIDTH] IN BLOOD BY AUTOMATED COUNT: 54.5 FL (ref 35.9–50)
GFR SERPLBLD CREATININE-BSD FMLA CKD-EPI: 94 ML/MIN/1.73 M 2
GLOBULIN SER CALC-MCNC: 2.5 G/DL (ref 1.9–3.5)
GLUCOSE SERPL-MCNC: 113 MG/DL (ref 65–99)
HCT VFR BLD AUTO: 40.5 % (ref 42–52)
HGB BLD-MCNC: 12.3 G/DL (ref 14–18)
IMM GRANULOCYTES # BLD AUTO: 0.2 K/UL (ref 0–0.11)
IMM GRANULOCYTES NFR BLD AUTO: 1.1 % (ref 0–0.9)
LYMPHOCYTES # BLD AUTO: 0.89 K/UL (ref 1–4.8)
LYMPHOCYTES NFR BLD: 4.7 % (ref 22–41)
MAGNESIUM SERPL-MCNC: 2.1 MG/DL (ref 1.5–2.5)
MCH RBC QN AUTO: 20.6 PG (ref 27–33)
MCHC RBC AUTO-ENTMCNC: 30.4 G/DL (ref 32.3–36.5)
MCV RBC AUTO: 68 FL (ref 81.4–97.8)
MONOCYTES # BLD AUTO: 1.12 K/UL (ref 0–0.85)
MONOCYTES NFR BLD AUTO: 5.9 % (ref 0–13.4)
NEUTROPHILS # BLD AUTO: 16.72 K/UL (ref 1.82–7.42)
NEUTROPHILS NFR BLD: 87.8 % (ref 44–72)
NRBC # BLD AUTO: 0.13 K/UL
NRBC BLD-RTO: 0.7 /100 WBC (ref 0–0.2)
NT-PROBNP SERPL IA-MCNC: 1080 PG/ML (ref 0–125)
PLATELET # BLD AUTO: 398 K/UL (ref 164–446)
POTASSIUM SERPL-SCNC: 4.8 MMOL/L (ref 3.6–5.5)
PROT SERPL-MCNC: 6 G/DL (ref 6–8.2)
RBC # BLD AUTO: 5.96 M/UL (ref 4.7–6.1)
SODIUM SERPL-SCNC: 136 MMOL/L (ref 135–145)
TROPONIN T SERPL-MCNC: 20 NG/L (ref 6–19)
TROPONIN T SERPL-MCNC: 22 NG/L (ref 6–19)
WBC # BLD AUTO: 19 K/UL (ref 4.8–10.8)

## 2024-04-26 PROCEDURE — 770020 HCHG ROOM/CARE - TELE (206)

## 2024-04-26 PROCEDURE — 36415 COLL VENOUS BLD VENIPUNCTURE: CPT

## 2024-04-26 PROCEDURE — 99285 EMERGENCY DEPT VISIT HI MDM: CPT

## 2024-04-26 PROCEDURE — A9270 NON-COVERED ITEM OR SERVICE: HCPCS | Performed by: HEALTH CARE PROVIDER

## 2024-04-26 PROCEDURE — 80053 COMPREHEN METABOLIC PANEL: CPT

## 2024-04-26 PROCEDURE — 94760 N-INVAS EAR/PLS OXIMETRY 1: CPT

## 2024-04-26 PROCEDURE — 84484 ASSAY OF TROPONIN QUANT: CPT

## 2024-04-26 PROCEDURE — 96375 TX/PRO/DX INJ NEW DRUG ADDON: CPT

## 2024-04-26 PROCEDURE — 99222 1ST HOSP IP/OBS MODERATE 55: CPT | Mod: GC | Performed by: FAMILY MEDICINE

## 2024-04-26 PROCEDURE — 700111 HCHG RX REV CODE 636 W/ 250 OVERRIDE (IP): Mod: JZ | Performed by: HEALTH CARE PROVIDER

## 2024-04-26 PROCEDURE — 700102 HCHG RX REV CODE 250 W/ 637 OVERRIDE(OP): Performed by: HEALTH CARE PROVIDER

## 2024-04-26 PROCEDURE — 85025 COMPLETE CBC W/AUTO DIFF WBC: CPT

## 2024-04-26 PROCEDURE — 700111 HCHG RX REV CODE 636 W/ 250 OVERRIDE (IP): Mod: UD | Performed by: EMERGENCY MEDICINE

## 2024-04-26 PROCEDURE — 96374 THER/PROPH/DIAG INJ IV PUSH: CPT

## 2024-04-26 PROCEDURE — 96376 TX/PRO/DX INJ SAME DRUG ADON: CPT

## 2024-04-26 PROCEDURE — 93005 ELECTROCARDIOGRAM TRACING: CPT | Performed by: EMERGENCY MEDICINE

## 2024-04-26 PROCEDURE — 83880 ASSAY OF NATRIURETIC PEPTIDE: CPT

## 2024-04-26 PROCEDURE — 700101 HCHG RX REV CODE 250: Mod: UD | Performed by: EMERGENCY MEDICINE

## 2024-04-26 PROCEDURE — 71045 X-RAY EXAM CHEST 1 VIEW: CPT

## 2024-04-26 PROCEDURE — 94640 AIRWAY INHALATION TREATMENT: CPT

## 2024-04-26 PROCEDURE — 83735 ASSAY OF MAGNESIUM: CPT

## 2024-04-26 RX ORDER — ONDANSETRON 4 MG/1
4 TABLET, ORALLY DISINTEGRATING ORAL EVERY 4 HOURS PRN
Status: CANCELLED | OUTPATIENT
Start: 2024-04-26

## 2024-04-26 RX ORDER — ONDANSETRON 2 MG/ML
4 INJECTION INTRAMUSCULAR; INTRAVENOUS ONCE
Status: COMPLETED | OUTPATIENT
Start: 2024-04-26 | End: 2024-04-26

## 2024-04-26 RX ORDER — ONDANSETRON 2 MG/ML
4 INJECTION INTRAMUSCULAR; INTRAVENOUS EVERY 4 HOURS PRN
Status: CANCELLED | OUTPATIENT
Start: 2024-04-26

## 2024-04-26 RX ORDER — PROMETHAZINE HYDROCHLORIDE 25 MG/1
12.5-25 TABLET ORAL EVERY 4 HOURS PRN
Status: CANCELLED | OUTPATIENT
Start: 2024-04-26

## 2024-04-26 RX ORDER — PROMETHAZINE HYDROCHLORIDE 25 MG/1
12.5-25 TABLET ORAL EVERY 4 HOURS PRN
Status: DISCONTINUED | OUTPATIENT
Start: 2024-04-26 | End: 2024-04-29 | Stop reason: HOSPADM

## 2024-04-26 RX ORDER — PROMETHAZINE HYDROCHLORIDE 25 MG/1
12.5-25 SUPPOSITORY RECTAL EVERY 4 HOURS PRN
Status: DISCONTINUED | OUTPATIENT
Start: 2024-04-26 | End: 2024-04-29 | Stop reason: HOSPADM

## 2024-04-26 RX ORDER — METOPROLOL SUCCINATE 25 MG/1
25 TABLET, EXTENDED RELEASE ORAL DAILY
Status: DISCONTINUED | OUTPATIENT
Start: 2024-04-27 | End: 2024-04-29 | Stop reason: HOSPADM

## 2024-04-26 RX ORDER — AMOXICILLIN AND CLAVULANATE POTASSIUM 875; 125 MG/1; MG/1
1 TABLET, FILM COATED ORAL 2 TIMES DAILY
Status: DISCONTINUED | OUTPATIENT
Start: 2024-04-26 | End: 2024-04-27

## 2024-04-26 RX ORDER — ONDANSETRON 2 MG/ML
4 INJECTION INTRAMUSCULAR; INTRAVENOUS EVERY 4 HOURS PRN
Status: DISCONTINUED | OUTPATIENT
Start: 2024-04-26 | End: 2024-04-29 | Stop reason: HOSPADM

## 2024-04-26 RX ORDER — FUROSEMIDE 10 MG/ML
40 INJECTION INTRAMUSCULAR; INTRAVENOUS
Status: DISCONTINUED | OUTPATIENT
Start: 2024-04-26 | End: 2024-04-28

## 2024-04-26 RX ORDER — OXYCODONE HYDROCHLORIDE 10 MG/1
10 TABLET ORAL EVERY 6 HOURS PRN
Status: DISCONTINUED | OUTPATIENT
Start: 2024-04-26 | End: 2024-04-29 | Stop reason: HOSPADM

## 2024-04-26 RX ORDER — ONDANSETRON 4 MG/1
4 TABLET, ORALLY DISINTEGRATING ORAL EVERY 4 HOURS PRN
Status: DISCONTINUED | OUTPATIENT
Start: 2024-04-26 | End: 2024-04-29 | Stop reason: HOSPADM

## 2024-04-26 RX ORDER — IPRATROPIUM BROMIDE AND ALBUTEROL SULFATE 2.5; .5 MG/3ML; MG/3ML
3 SOLUTION RESPIRATORY (INHALATION)
Status: DISCONTINUED | OUTPATIENT
Start: 2024-04-26 | End: 2024-04-29 | Stop reason: HOSPADM

## 2024-04-26 RX ORDER — HYDROMORPHONE HYDROCHLORIDE 1 MG/ML
0.5 INJECTION, SOLUTION INTRAMUSCULAR; INTRAVENOUS; SUBCUTANEOUS
Status: COMPLETED | OUTPATIENT
Start: 2024-04-26 | End: 2024-04-27

## 2024-04-26 RX ORDER — OMEPRAZOLE 20 MG/1
20 CAPSULE, DELAYED RELEASE ORAL 2 TIMES DAILY
Status: DISCONTINUED | OUTPATIENT
Start: 2024-04-26 | End: 2024-04-29 | Stop reason: HOSPADM

## 2024-04-26 RX ORDER — ACETAMINOPHEN 325 MG/1
650 TABLET ORAL EVERY 6 HOURS PRN
Status: DISCONTINUED | OUTPATIENT
Start: 2024-04-26 | End: 2024-04-29 | Stop reason: HOSPADM

## 2024-04-26 RX ORDER — PROCHLORPERAZINE EDISYLATE 5 MG/ML
5-10 INJECTION INTRAMUSCULAR; INTRAVENOUS EVERY 4 HOURS PRN
Status: DISCONTINUED | OUTPATIENT
Start: 2024-04-26 | End: 2024-04-29 | Stop reason: HOSPADM

## 2024-04-26 RX ORDER — PROMETHAZINE HYDROCHLORIDE 25 MG/1
12.5-25 SUPPOSITORY RECTAL EVERY 4 HOURS PRN
Status: CANCELLED | OUTPATIENT
Start: 2024-04-26

## 2024-04-26 RX ORDER — ATORVASTATIN CALCIUM 40 MG/1
40 TABLET, FILM COATED ORAL
Status: DISCONTINUED | OUTPATIENT
Start: 2024-04-26 | End: 2024-04-29 | Stop reason: HOSPADM

## 2024-04-26 RX ORDER — ACETAMINOPHEN 325 MG/1
650 TABLET ORAL EVERY 6 HOURS PRN
Status: CANCELLED | OUTPATIENT
Start: 2024-04-26

## 2024-04-26 RX ORDER — PROCHLORPERAZINE EDISYLATE 5 MG/ML
5-10 INJECTION INTRAMUSCULAR; INTRAVENOUS EVERY 4 HOURS PRN
Status: CANCELLED | OUTPATIENT
Start: 2024-04-26

## 2024-04-26 RX ORDER — IPRATROPIUM BROMIDE AND ALBUTEROL SULFATE 2.5; .5 MG/3ML; MG/3ML
3 SOLUTION RESPIRATORY (INHALATION)
Status: COMPLETED | OUTPATIENT
Start: 2024-04-26 | End: 2024-04-26

## 2024-04-26 RX ORDER — HYDROMORPHONE HYDROCHLORIDE 1 MG/ML
0.5 INJECTION, SOLUTION INTRAMUSCULAR; INTRAVENOUS; SUBCUTANEOUS ONCE
Status: COMPLETED | OUTPATIENT
Start: 2024-04-26 | End: 2024-04-26

## 2024-04-26 RX ORDER — LOSARTAN POTASSIUM 25 MG/1
25 TABLET ORAL DAILY
Status: DISCONTINUED | OUTPATIENT
Start: 2024-04-27 | End: 2024-04-29 | Stop reason: HOSPADM

## 2024-04-26 RX ADMIN — OXYCODONE HYDROCHLORIDE 10 MG: 10 TABLET ORAL at 20:42

## 2024-04-26 RX ADMIN — AMOXICILLIN AND CLAVULANATE POTASSIUM 1 TABLET: 875; 125 TABLET, FILM COATED ORAL at 21:26

## 2024-04-26 RX ADMIN — IPRATROPIUM BROMIDE AND ALBUTEROL SULFATE 3 ML: 2.5; .5 SOLUTION RESPIRATORY (INHALATION) at 15:39

## 2024-04-26 RX ADMIN — HYDROMORPHONE HYDROCHLORIDE 0.5 MG: 1 INJECTION, SOLUTION INTRAMUSCULAR; INTRAVENOUS; SUBCUTANEOUS at 18:24

## 2024-04-26 RX ADMIN — FUROSEMIDE 40 MG: 10 INJECTION INTRAMUSCULAR; INTRAVENOUS at 20:43

## 2024-04-26 RX ADMIN — ONDANSETRON 4 MG: 2 INJECTION INTRAMUSCULAR; INTRAVENOUS at 16:46

## 2024-04-26 RX ADMIN — ATORVASTATIN CALCIUM 40 MG: 40 TABLET, FILM COATED ORAL at 20:42

## 2024-04-26 RX ADMIN — OMEPRAZOLE 20 MG: 20 CAPSULE, DELAYED RELEASE ORAL at 20:42

## 2024-04-26 RX ADMIN — HYDROMORPHONE HYDROCHLORIDE 0.5 MG: 1 INJECTION, SOLUTION INTRAMUSCULAR; INTRAVENOUS; SUBCUTANEOUS at 23:46

## 2024-04-26 RX ADMIN — HYDROMORPHONE HYDROCHLORIDE 0.5 MG: 1 INJECTION, SOLUTION INTRAMUSCULAR; INTRAVENOUS; SUBCUTANEOUS at 16:49

## 2024-04-26 ASSESSMENT — CHA2DS2 SCORE
SEX: MALE
AGE 65 TO 74: NO
VASCULAR DISEASE: YES
AGE 75 OR GREATER: NO
HYPERTENSION: YES
CHF OR LEFT VENTRICULAR DYSFUNCTION: YES
DIABETES: NO
PRIOR STROKE OR TIA OR THROMBOEMBOLISM: NO
CHA2DS2 VASC SCORE: 3

## 2024-04-26 ASSESSMENT — PAIN DESCRIPTION - PAIN TYPE
TYPE: ACUTE PAIN
TYPE: ACUTE PAIN

## 2024-04-26 ASSESSMENT — FIBROSIS 4 INDEX
FIB4 SCORE: 0.81
FIB4 SCORE: 0.91
FIB4 SCORE: 0.81

## 2024-04-26 NOTE — ED TRIAGE NOTES
Pt was BIBA from home for  Chief Complaint   Patient presents with    Shortness of Breath     x1.5 weeks    Chest Pain     Started last thursday    Leg Swelling     Pt reports he was hospitalized last Thursday and released this Wednesday for new onset CHF. Pt reports he has felt SOB with chest pan since his admission. Normally does not wear O2, currently on 2L NC. RA saturation=88%. GCS=15.

## 2024-04-26 NOTE — ED PROVIDER NOTES
ER Provider Note    Scribed for Raudel Sams M.D. by Brandon Vazquez. 4/26/2024   3:03 PM    Primary Care Provider: Farheen Wright M.D.    CHIEF COMPLAINT  Chief Complaint   Patient presents with    Shortness of Breath     x1.5 weeks    Chest Pain     Started last thursday    Leg Swelling     EXTERNAL RECORDS REVIEWED  Inpatient Notes Patient came in on the 11 for chest pain and shortness of breath. He had a PE study that showed pneumonia and was discharged on the 24th. Patient has extensive past medical history including congestive heart failure, and is on Xarelto.        HPI/ROS  LIMITATION TO HISTORY   Select: : None  OUTSIDE HISTORIAN(S):  None    Noe Hickey is a 63 y.o. male who presents to the ED for evaluation of worsening shortness of breath and chest pain onset last night, as advised by his physician today. Patient reports that he has not felt 100% at his baseline since his discharge form the hospital two days ago, but last night his chest pain, shortness of breath, and lower extremity pain and swelling worsened. Today, patient attended his scheduled appointment with family practice, where he was sent to the [facility] ED for further evaluation. He reports difficulty breathing, talking, and chest pain that have been present since his Aorta repair in January of 2023. He locates his current chest pain to the left center of his chest. He denies any fevers. Patient reports that he has been taking all breathing treatments, blood thinners, steroids, and other medications that were prescribed after his recent discharge. He last saw cardiology on the 11 of this month, where he had some tests performed, and has more scheduled. Patient has a hematoma to his left leg, which he reports is the result of straining his hamstring while in the gym. He has had ultrasounds of his leg previously performed.     PAST MEDICAL HISTORY  Past Medical History:   Diagnosis Date    Arrhythmia     Breath shortness  06/09/2023    prior to 1/2023 surgery    Cyclic vomiting syndrome     Elevated hemidiaphragm - LEFT post AVR 01/04/2023    Fracture     Headache, classical migraine     Heart burn     Heart valve disease 06/09/2023    heart surgery in 1/2023    Hyperlipidemia 4/26/2024    Hypertension 4/26/2024    Indigestion     Pneumonia due to infectious organism 01/20/2023    Snoring        SURGICAL HISTORY  Past Surgical History:   Procedure Laterality Date    INCISION AND DRAINAGE GENERAL Left 4/19/2024    Procedure: INCISION AND DRAINAGE, LEG;  Surgeon: Mitch Bowie M.D.;  [facility];  Service: Orthopedics    AORTIC VALVE REPLACEMENT  1/5/2023    Procedure: AORTIC VALVE REPLACEMENT, ASCENDING AORTIC ANEURYSM REPAIR AND TRANSESOPHAGEAL ECHOCARDIOGRAM.;  Surgeon: Serena Goyal M.D.;  Location: [facility];  Service: Cardiac    AORTIC ASCENDING DISSECTION  1/5/2023    Procedure: REPAIR, ANEURYSM OR DISSECTION, AORTA, ASCENDING;  Surgeon: Serena Goyal M.D.;  Location: [facility];  Service: Cardiac    ECHOCARDIOGRAM, TRANSESOPHAGEAL, INTRAOPERATIVE  1/5/2023    Procedure: ECHOCARDIOGRAM, TRANSESOPHAGEAL, INTRAOPERATIVE.;  Surgeon: Serena Goyal M.D.;  Location: [facility];  Service: Cardiac    IRRIGATION & DEBRIDEMENT ORTHO Right 09/19/2017    Procedure: IRRIGATION & DEBRIDEMENT ORTHO-THIGH;  Surgeon: Corbin Rivera M.D.;  Location: [facility];  Service: Orthopedics    SHOULDER HEMICAP RESURFACING Right 10/12/2015    Procedure: RIGHT SHOULDER RESURFACING;  Surgeon: Javon Doll M.D.;  Location: [facility];  Service:     SHOULDER ARTHROSCOPY      x3    SHOULDER SURGERY      replacement right       FAMILY HISTORY  History reviewed. No pertinent family history.    SOCIAL HISTORY   reports that he has been smoking cigarettes. He has a 20 pack-year smoking history. He has never used smokeless tobacco. He reports that he does not currently use drugs.  "He reports that he does not drink alcohol.    CURRENT MEDICATIONS  Current Discharge Medication List        CONTINUE these medications which have NOT CHANGED    Details   Multiple Vitamins-Minerals (ONE DAILY MENS PO) Take 1 Tablet by mouth every day.      MILK THISTLE PO Take 1 Capsule by mouth every day.      albuterol 108 (90 Base) MCG/ACT Aero Soln inhalation aerosol Inhale 2 Puffs every 6 hours as needed for Shortness of Breath.  Qty: 8.5 g, Refills: 0    Associated Diagnoses: Chronic obstructive pulmonary disease, unspecified COPD type ([facility])      budesonide-formoterol (SYMBICORT) 80-4.5 MCG/ACT Aerosol Inhale 2 Puffs 2 times a day.  Qty: 10.2 g, Refills: 0    Associated Diagnoses: Chronic obstructive pulmonary disease, unspecified COPD type ([facility])      methylPREDNISolone (MEDROL DOSEPAK) 4 MG Tablet Therapy Pack Follow schedule on package instructions.  Qty: 21 Tablet, Refills: 0    Associated Diagnoses: Chronic obstructive pulmonary disease, unspecified COPD type ([facility])      oxyCODONE immediate release (ROXICODONE) 10 MG immediate release tablet Take 1 Tablet by mouth every 6 hours as needed for Severe Pain for up to 5 days.  Qty: 10 Tablet, Refills: 0    Associated Diagnoses: Acute chest pain      atorvastatin (LIPITOR) 40 MG Tab Take 1 Tablet by mouth at bedtime.  Qty: 30 Tablet, Refills: 0    Associated Diagnoses: Acute on chronic systolic heart failure (HCC)      amoxicillin-clavulanate (AUGMENTIN) 875-125 MG Tab Take 1 Tablet by mouth 2 times a day for 9 days.  Qty: 18 Tablet, Refills: 0    Associated Diagnoses: Abscess of right foot      ondansetron (ZOFRAN) 4 MG Tab tablet Take 4 mg by mouth 2 times a day as needed for Nausea/Vomiting.      omeprazole (PRILOSEC) 20 MG delayed-release capsule Take 20 mg by mouth 2 times a day.      rivaroxaban (XARELTO) 20 MG Tab tablet Take 20 mg by mouth every day. \"Takes in am\"      metoprolol SR (TOPROL XL) 25 MG TABLET SR 24 HR Take 1 Tablet by mouth every day.  Qty: " "90 Tablet, Refills: 3    Associated Diagnoses: Chronic heart failure with preserved ejection fraction (HCC)      losartan (COZAAR) 25 MG Tab Take 1 Tablet by mouth every day.  Qty: 90 Tablet, Refills: 3    Associated Diagnoses: Chronic heart failure with preserved ejection fraction (HCC)             ALLERGIES  Allergies   Allergen Reactions    Morphine Shortness of Breath and Rash              PHYSICAL EXAM  BP (!) 144/96   Pulse 86   Temp 36.6 °C (97.8 °F) (Temporal)   Resp (!) 33   Ht 1.753 m (5' 9\")   Wt 95.3 kg (210 lb)   SpO2 96%   BMI 31.01 kg/m²    Constitutional: Well developed, Well nourished, no apparent distress.  HENT: Normocephalic, Atraumatic   Eyes: PERRLA, EOMI, Conjunctiva normal, No discharge.   Neck: No tenderness, Supple, No stridor.   Cardiovascular: Normal heart rate, Normal rhythm.   Thorax & Lungs: Moderate respiratory distress, Horse voice, Lungs can't wheezing, tachypnea, decreased air movement. Tenderness across the anterior chest wall.   Abdomen: Soft, No tenderness, No masses.   Skin: Warm, Dry, No rash.    Musculoskeletal: No major deformities noted. Right leg with old appearing ecchymosis and soft tissue welling throughout. Left leg with 2+ pitting edema.   Neurologic: Awake, alert. Moves all extremities spontaneously.  Psychiatric: Affect normal, Judgment normal, Mood normal.      DIAGNOSTIC STUDIES    Labs:   Results for orders placed or performed during the hospital encounter of 04/26/24   CBC with Differential   Result Value Ref Range    WBC 19.0 (H) 4.8 - 10.8 K/uL    RBC 5.96 4.70 - 6.10 M/uL    Hemoglobin 12.3 (L) 14.0 - 18.0 g/dL    Hematocrit 40.5 (L) 42.0 - 52.0 %    MCV 68.0 (L) 81.4 - 97.8 fL    MCH 20.6 (L) 27.0 - 33.0 pg    MCHC 30.4 (L) 32.3 - 36.5 g/dL    RDW 54.5 (H) 35.9 - 50.0 fL    Platelet Count 398 164 - 446 K/uL    Neutrophils-Polys 87.80 (H) 44.00 - 72.00 %    Lymphocytes 4.70 (L) 22.00 - 41.00 %    Monocytes 5.90 0.00 - 13.40 %    Eosinophils 0.40 0.00 - " 6.90 %    Basophils 0.10 0.00 - 1.80 %    Immature Granulocytes 1.10 (H) 0.00 - 0.90 %    Nucleated RBC 0.70 (H) 0.00 - 0.20 /100 WBC    Neutrophils (Absolute) 16.72 (H) 1.82 - 7.42 K/uL    Lymphs (Absolute) 0.89 (L) 1.00 - 4.80 K/uL    Monos (Absolute) 1.12 (H) 0.00 - 0.85 K/uL    Eos (Absolute) 0.08 0.00 - 0.51 K/uL    Baso (Absolute) 0.02 0.00 - 0.12 K/uL    Immature Granulocytes (abs) 0.20 (H) 0.00 - 0.11 K/uL    NRBC (Absolute) 0.13 K/uL   Complete Metabolic Panel (CMP)   Result Value Ref Range    Sodium 136 135 - 145 mmol/L    Potassium 4.8 3.6 - 5.5 mmol/L    Chloride 100 96 - 112 mmol/L    Co2 26 20 - 33 mmol/L    Anion Gap 10.0 7.0 - 16.0    Glucose 113 (H) 65 - 99 mg/dL    Bun 20 8 - 22 mg/dL    Creatinine 0.91 0.50 - 1.40 mg/dL    Calcium 8.2 (L) 8.5 - 10.5 mg/dL    Correct Calcium 8.6 8.5 - 10.5 mg/dL    AST(SGOT) 39 12 - 45 U/L    ALT(SGPT) 47 2 - 50 U/L    Alkaline Phosphatase 38 30 - 99 U/L    Total Bilirubin 1.0 0.1 - 1.5 mg/dL    Albumin 3.5 3.2 - 4.9 g/dL    Total Protein 6.0 6.0 - 8.2 g/dL    Globulin 2.5 1.9 - 3.5 g/dL    A-G Ratio 1.4 g/dL   Troponins NOW   Result Value Ref Range    Troponin T 20 (H) 6 - 19 ng/L   Troponins in two (2) hours   Result Value Ref Range    Troponin T 22 (H) 6 - 19 ng/L   proBrain Natriuretic Peptide, NT   Result Value Ref Range    NT-proBNP 1080 (H) 0 - 125 pg/mL   ESTIMATED GFR   Result Value Ref Range    GFR (CKD-EPI) 94 >60 mL/min/1.73 m 2   MAGNESIUM   Result Value Ref Range    Magnesium 2.1 1.5 - 2.5 mg/dL   EKG   Result Value Ref Range    Report       [facility] Emergency Dept.    Test Date:  2024  Pt Name:    JUSTIN TOPETE               Department: ER  MRN:        [id number]                      Room:       [facility]  Gender:     Male                         Technician: [id number]  :        1960                   Requested By:ER TRIAGE PROTOCOL  Order #:    [id number]                    Reading MD: LALITO GASTON,  MD    Measurements  Intervals                                Axis  Rate:       76                           P:          47  FL:         139                          QRS:        -1  QRSD:       88                           T:          63  QT:         378  QTc:        426    Interpretive Statements  Sinus rhythm  Compared to ECG 04/15/2024 02:28:01  ST (T wave) deviation no longer present  Electronically Signed On 04- 17:34:07 PDT by LALITO GASTON MD         Radiology:   This attending emergency physician has independently interpreted the diagnostic imaging associated with this visit and is awaiting the final reading from the radiologist.   Preliminary interpretation is a follows: No pneumonia  Radiologist interpretation:   DX-CHEST-PORTABLE (1 VIEW)   Final Result      No acute cardiac or pulmonary abnormalities are identified.      US-EXTREMITY VENOUS LOWER BILAT    (Results Pending)   EC-ECHOCARDIOGRAM COMPLETE W/O CONT    (Results Pending)          COURSE & MEDICAL DECISION MAKING     Chest Pain: Heart score: 3      INITIAL ASSESSMENT, COURSE AND PLAN  Differential diagnoses include but not limited to: CHF, COPD, ACS, Chest wall pain.      Care Narrative: Patient is coming in with chest pain, shortness of breath, the patient does seem somewhat fluid overloaded, continued pain, workup as per above, the patient is now requiring oxygen.  Discussed case with [facility] family practice for hospitalization.    3:03 PM - Patient was evaluated at bedside. Patient presents today with shortness of breath, chest pain, and leg swelling. Patient has a significant past medical history, including CHF, see HPI for further details. Ordered for EKG, Troponin now, CMP, CBC with differential, proBrain natriuretic peptide, and DX-Chest to evaluate. The patient will be medicated with Dilaudid 0.5 mg injection for his symptoms. Patient verbalizes understanding and support with my plan of care.     7:21 PM - I discussed the  patient's case and the above findings with Dr. Barrett (hospitalist) who agrees to admit the patient.        DISPOSITION AND DISCUSSIONS    I have discussed management of the patient with the following physicians and SHAUN's:  Dr. Barrett (Hospitalist)    Discussion of management with other [facility] or appropriate source(s): None     DISPOSITION:  Patient will be hospitalized by Dr. Barrett in guarded condition.    FINAL DIAGNOSIS  1. Chest pain, unspecified type    2. Acute respiratory failure with hypoxia (HCC)         Brandon FLETCHER (Scriblu), am scribing for, and in the presence of, Raudel Sams M.D..    Electronically signed by: Brandon Vazquez (Ramoniblu), 4/26/2024    IRaudel M.D. personally performed the services described in this documentation, as scribed by Brandon Vazquez in my presence, and it is both accurate and complete.      The note accurately reflects work and decisions made by me.  Raudel Sams M.D.  4/26/2024  10:17 PM

## 2024-04-26 NOTE — PROGRESS NOTES
This patient was seen, evaluated, and care plan was developed with the resident. I agree with the assessment and plan as outlined in the resident's note.   Report electronically signed by:    Geoff Espinoza MD   Family and Community [facility]  [facility]  [facility]

## 2024-04-26 NOTE — PROGRESS NOTES
Subjective:     CC: Acute hypoxemic respiratory failure    HPI:   Noe presents today with     Problem   Acute Hypoxemic Respiratory Failure (Hcc)    Patient is a 63-year-old male with a past medical history significant for COPD, aortic valve replacement, left lower lobe pneumonia (on Augmentin), HFpEF (EF 70% 4/24), GERD, HC, A-fib (on AC) presents to the clinic today for persistent chest pain and shortness of breath.  The patient reports that he was recently diagnosed with pneumonia for which he is being treated with Augmentin.  He also has a history of COPD and HFpEF.  While in the hospital, he reports getting intermittent diuresis however was not discharged with diuretics.  Today, he reports persistent shortness of breath and chest pain.  He reports leg pain and swelling bilaterally.         Current Facility-Administered Medications Ordered in Epic   Medication Dose Route Frequency Provider Last Rate Last Admin    HYDROmorphone (Dilaudid) injection 0.5 mg  0.5 mg Intravenous Q30 MIN PRN Raudel Sams M.D.   0.5 mg at 04/26/24 1824    acetaminophen (Tylenol) tablet 650 mg  650 mg Oral Q6HRS PRN Mario Lares D.O.        ondansetron (Zofran) syringe/vial injection 4 mg  4 mg Intravenous Q4HRS PRN Mario Lares D.O.        ondansetron (Zofran ODT) dispertab 4 mg  4 mg Oral Q4HRS PRN Mario Lares D.O.        promethazine (Phenergan) tablet 12.5-25 mg  12.5-25 mg Oral Q4HRS PRN Mario Lares D.O.        promethazine (Phenergan) suppository 12.5-25 mg  12.5-25 mg Rectal Q4HRS PRN Mario Lares D.O.        prochlorperazine (Compazine) injection 5-10 mg  5-10 mg Intravenous Q4HRS PRN Mario Lares D.O.        amoxicillin-clavulanate (Augmentin) 875-125 MG per tablet 1 Tablet  1 Tablet Oral BID Raudel Wolf M.D.   1 Tablet at 04/27/24 0520    atorvastatin (Lipitor) tablet 40 mg  40 mg Oral QHS Raudel Wolf M.D.   40 mg at 04/26/24 2042    fluticasone-umeclidinium-vilanterol  "(Trelegy Ellipta) 100-62.5-25 mcg/act inhaler 1 Puff  1 Puff Inhalation QDAILY (RT) Raudel Wolf M.D.        losartan (Cozaar) tablet 25 mg  25 mg Oral DAILY Raudel Wolf M.D.   25 mg at 04/27/24 0520    ipratropium-albuterol (DUONEB) nebulizer solution  3 mL Nebulization Q4H PRN (RT) Raudel Wolf M.D.        metoprolol SR (Toprol XL) tablet 25 mg  25 mg Oral DAILY Raudel Wolf M.D.   25 mg at 04/27/24 0520    omeprazole (PriLOSEC) capsule 20 mg  20 mg Oral BID Raudel Wolf M.D.   20 mg at 04/27/24 0520    oxyCODONE immediate release (Roxicodone) tablet 10 mg  10 mg Oral Q6HRS PRN Raudel Wolf M.D.   10 mg at 04/27/24 0329    rivaroxaban (Xarelto) tablet 20 mg  20 mg Oral DAILY Raudel Wolf M.D.   20 mg at 04/27/24 0520    furosemide (Lasix) injection 40 mg  40 mg Intravenous BID DIURETIC Raudel Wolf M.D.   40 mg at 04/27/24 0524     No current [facility]-ordered outpatient medications on file.     ROS negative unless stated in HPI.    Objective:     Exam:  BP (!) 134/98 (BP Location: Right arm, Patient Position: Sitting, BP Cuff Size: Adult)   Pulse 92   Resp 19   Ht 1.753 m (5' 9\")   Wt 95.3 kg (210 lb)   SpO2 96%   BMI 31.01 kg/m²  Body mass index is 31.01 kg/m².    Physical Exam  Constitutional:       General: He is in acute distress.      Appearance: Normal appearance. He is ill-appearing.   Cardiovascular:      Rate and Rhythm: Normal rate and regular rhythm.      Heart sounds: Murmur heard.      Systolic murmur is present with a grade of 3/6.   Pulmonary:      Effort: Tachypnea and respiratory distress present.      Breath sounds: No wheezing.      Comments: Absent lung sounds at left base, otherwise clear. Shallow respirations.   Musculoskeletal:      Right lower leg: 3+ Pitting Edema present.      Left lower leg: 3+ Pitting Edema present.   Skin:     General: Skin is warm and dry.   Neurological:      General: No focal deficit present.      Mental Status: He is alert " and oriented to person, place, and time.   Psychiatric:         Behavior: Behavior normal.           Pertinent labs and imaging reviewed by me.      Assessment & Plan:     63 y.o. male with the following -     Acute hypoxemic respiratory failure (HCC)  While undergoing continuous pulse oximetry monitoring, the patient's oxygen saturation decreased to 90% with tachycardia at approximately 110.  The patient was counseled on the possible differential diagnoses contributing to his symptoms and that he would ultimately benefit from emergency evaluation and management in the ED. The patient was counseled on the high likelihood of admission. He expressed understanding and willingness to be evaluated in the emergency department. He was willing to be transported via ambulance.   [facility] contacted. Care transferred to [facility] personnel.        Return in about 1 week (around 5/3/2024).

## 2024-04-26 NOTE — ED NOTES
Medication history reviewed with patient at bedside.   Med rec is complete  Allergies reviewed.   Patient was prescribed a 9 day course of  Augmentin 875/125mg bid on 4/24/24- last dose 4/26 am.  Anticoagulants: Xarelto 20mg- last dose 4/26/24 am.    Taj Govea PhT

## 2024-04-27 ENCOUNTER — APPOINTMENT (OUTPATIENT)
Dept: CARDIOLOGY | Facility: MEDICAL CENTER | Age: 64
End: 2024-04-27
Payer: MEDICAID

## 2024-04-27 ENCOUNTER — APPOINTMENT (OUTPATIENT)
Dept: RADIOLOGY | Facility: MEDICAL CENTER | Age: 64
End: 2024-04-27
Payer: MEDICAID

## 2024-04-27 LAB
ALBUMIN SERPL BCP-MCNC: 3.3 G/DL (ref 3.2–4.9)
ALBUMIN/GLOB SERPL: 1.3 G/DL
ALP SERPL-CCNC: 34 U/L (ref 30–99)
ALT SERPL-CCNC: 38 U/L (ref 2–50)
ANION GAP SERPL CALC-SCNC: 12 MMOL/L (ref 7–16)
AST SERPL-CCNC: 34 U/L (ref 12–45)
BASOPHILS # BLD AUTO: 0.1 % (ref 0–1.8)
BASOPHILS # BLD: 0.02 K/UL (ref 0–0.12)
BILIRUB SERPL-MCNC: 1 MG/DL (ref 0.1–1.5)
BUN SERPL-MCNC: 20 MG/DL (ref 8–22)
CALCIUM ALBUM COR SERPL-MCNC: 8.8 MG/DL (ref 8.5–10.5)
CALCIUM SERPL-MCNC: 8.2 MG/DL (ref 8.5–10.5)
CHLORIDE SERPL-SCNC: 96 MMOL/L (ref 96–112)
CO2 SERPL-SCNC: 27 MMOL/L (ref 20–33)
CREAT SERPL-MCNC: 1.01 MG/DL (ref 0.5–1.4)
EOSINOPHIL # BLD AUTO: 0.16 K/UL (ref 0–0.51)
EOSINOPHIL NFR BLD: 1 % (ref 0–6.9)
ERYTHROCYTE [DISTWIDTH] IN BLOOD BY AUTOMATED COUNT: 54.4 FL (ref 35.9–50)
GFR SERPLBLD CREATININE-BSD FMLA CKD-EPI: 83 ML/MIN/1.73 M 2
GLOBULIN SER CALC-MCNC: 2.5 G/DL (ref 1.9–3.5)
GLUCOSE SERPL-MCNC: 83 MG/DL (ref 65–99)
HCT VFR BLD AUTO: 39.5 % (ref 42–52)
HGB BLD-MCNC: 11.9 G/DL (ref 14–18)
IMM GRANULOCYTES # BLD AUTO: 0.19 K/UL (ref 0–0.11)
IMM GRANULOCYTES NFR BLD AUTO: 1.2 % (ref 0–0.9)
LV EJECT FRACT  99904: 60
LV EJECT FRACT MOD 2C 99903: 63.4
LV EJECT FRACT MOD 4C 99902: 67.57
LV EJECT FRACT MOD BP 99901: 69.08
LYMPHOCYTES # BLD AUTO: 1.23 K/UL (ref 1–4.8)
LYMPHOCYTES NFR BLD: 7.6 % (ref 22–41)
MCH RBC QN AUTO: 20.4 PG (ref 27–33)
MCHC RBC AUTO-ENTMCNC: 30.1 G/DL (ref 32.3–36.5)
MCV RBC AUTO: 67.8 FL (ref 81.4–97.8)
MONOCYTES # BLD AUTO: 1.28 K/UL (ref 0–0.85)
MONOCYTES NFR BLD AUTO: 7.9 % (ref 0–13.4)
NEUTROPHILS # BLD AUTO: 13.32 K/UL (ref 1.82–7.42)
NEUTROPHILS NFR BLD: 82.2 % (ref 44–72)
NRBC # BLD AUTO: 0.13 K/UL
NRBC BLD-RTO: 0.8 /100 WBC (ref 0–0.2)
PLATELET # BLD AUTO: 384 K/UL (ref 164–446)
POTASSIUM SERPL-SCNC: 4.5 MMOL/L (ref 3.6–5.5)
PROT SERPL-MCNC: 5.8 G/DL (ref 6–8.2)
RBC # BLD AUTO: 5.83 M/UL (ref 4.7–6.1)
SODIUM SERPL-SCNC: 135 MMOL/L (ref 135–145)
WBC # BLD AUTO: 16.2 K/UL (ref 4.8–10.8)

## 2024-04-27 PROCEDURE — 93970 EXTREMITY STUDY: CPT | Mod: 26 | Performed by: INTERNAL MEDICINE

## 2024-04-27 PROCEDURE — 85025 COMPLETE CBC W/AUTO DIFF WBC: CPT

## 2024-04-27 PROCEDURE — 93306 TTE W/DOPPLER COMPLETE: CPT

## 2024-04-27 PROCEDURE — 93922 UPR/L XTREMITY ART 2 LEVELS: CPT | Mod: 26 | Performed by: INTERNAL MEDICINE

## 2024-04-27 PROCEDURE — 700111 HCHG RX REV CODE 636 W/ 250 OVERRIDE (IP): Performed by: EMERGENCY MEDICINE

## 2024-04-27 PROCEDURE — 700117 HCHG RX CONTRAST REV CODE 255

## 2024-04-27 PROCEDURE — 700111 HCHG RX REV CODE 636 W/ 250 OVERRIDE (IP)

## 2024-04-27 PROCEDURE — 770020 HCHG ROOM/CARE - TELE (206)

## 2024-04-27 PROCEDURE — 87015 SPECIMEN INFECT AGNT CONCNTJ: CPT

## 2024-04-27 PROCEDURE — 700101 HCHG RX REV CODE 250

## 2024-04-27 PROCEDURE — 80053 COMPREHEN METABOLIC PANEL: CPT

## 2024-04-27 PROCEDURE — 700111 HCHG RX REV CODE 636 W/ 250 OVERRIDE (IP): Mod: JZ | Performed by: HEALTH CARE PROVIDER

## 2024-04-27 PROCEDURE — 700102 HCHG RX REV CODE 250 W/ 637 OVERRIDE(OP): Performed by: HEALTH CARE PROVIDER

## 2024-04-27 PROCEDURE — 93922 UPR/L XTREMITY ART 2 LEVELS: CPT

## 2024-04-27 PROCEDURE — A9270 NON-COVERED ITEM OR SERVICE: HCPCS | Performed by: HEALTH CARE PROVIDER

## 2024-04-27 PROCEDURE — 93306 TTE W/DOPPLER COMPLETE: CPT | Mod: 26 | Performed by: INTERNAL MEDICINE

## 2024-04-27 PROCEDURE — 87040 BLOOD CULTURE FOR BACTERIA: CPT | Mod: 91

## 2024-04-27 PROCEDURE — 99232 SBSQ HOSP IP/OBS MODERATE 35: CPT | Mod: GC | Performed by: FAMILY MEDICINE

## 2024-04-27 PROCEDURE — 87077 CULTURE AEROBIC IDENTIFY: CPT

## 2024-04-27 PROCEDURE — 93970 EXTREMITY STUDY: CPT

## 2024-04-27 PROCEDURE — 36415 COLL VENOUS BLD VENIPUNCTURE: CPT

## 2024-04-27 PROCEDURE — 71275 CT ANGIOGRAPHY CHEST: CPT

## 2024-04-27 RX ORDER — HYDROMORPHONE HYDROCHLORIDE 1 MG/ML
0.5 INJECTION, SOLUTION INTRAMUSCULAR; INTRAVENOUS; SUBCUTANEOUS
Status: DISCONTINUED | OUTPATIENT
Start: 2024-04-27 | End: 2024-04-29

## 2024-04-27 RX ORDER — HYDROMORPHONE HYDROCHLORIDE 1 MG/ML
0.5 INJECTION, SOLUTION INTRAMUSCULAR; INTRAVENOUS; SUBCUTANEOUS ONCE
Status: COMPLETED | OUTPATIENT
Start: 2024-04-27 | End: 2024-04-27

## 2024-04-27 RX ADMIN — HYDROMORPHONE HYDROCHLORIDE 0.5 MG: 1 INJECTION, SOLUTION INTRAMUSCULAR; INTRAVENOUS; SUBCUTANEOUS at 11:53

## 2024-04-27 RX ADMIN — METOPROLOL SUCCINATE 25 MG: 25 TABLET, FILM COATED, EXTENDED RELEASE ORAL at 05:20

## 2024-04-27 RX ADMIN — FLUTICASONE FUROATE, UMECLIDINIUM BROMIDE AND VILANTEROL TRIFENATATE 1 PUFF: 100; 62.5; 25 POWDER RESPIRATORY (INHALATION) at 14:53

## 2024-04-27 RX ADMIN — FUROSEMIDE 40 MG: 10 INJECTION INTRAMUSCULAR; INTRAVENOUS at 05:24

## 2024-04-27 RX ADMIN — ATORVASTATIN CALCIUM 40 MG: 40 TABLET, FILM COATED ORAL at 21:47

## 2024-04-27 RX ADMIN — OMEPRAZOLE 20 MG: 20 CAPSULE, DELAYED RELEASE ORAL at 05:20

## 2024-04-27 RX ADMIN — LOSARTAN POTASSIUM 25 MG: 25 TABLET, FILM COATED ORAL at 05:20

## 2024-04-27 RX ADMIN — HYDROMORPHONE HYDROCHLORIDE 0.5 MG: 1 INJECTION, SOLUTION INTRAMUSCULAR; INTRAVENOUS; SUBCUTANEOUS at 06:15

## 2024-04-27 RX ADMIN — FUROSEMIDE 40 MG: 10 INJECTION INTRAMUSCULAR; INTRAVENOUS at 17:30

## 2024-04-27 RX ADMIN — RIVAROXABAN 20 MG: 20 TABLET, FILM COATED ORAL at 05:20

## 2024-04-27 RX ADMIN — OMEPRAZOLE 20 MG: 20 CAPSULE, DELAYED RELEASE ORAL at 17:29

## 2024-04-27 RX ADMIN — IOHEXOL 65 ML: 350 INJECTION, SOLUTION INTRAVENOUS at 14:45

## 2024-04-27 RX ADMIN — CEFTRIAXONE SODIUM 2000 MG: 10 INJECTION, POWDER, FOR SOLUTION INTRAVENOUS at 11:53

## 2024-04-27 RX ADMIN — HUMAN ALBUMIN MICROSPHERES AND PERFLUTREN 3 ML: 10; .22 INJECTION, SOLUTION INTRAVENOUS at 17:30

## 2024-04-27 RX ADMIN — OXYCODONE HYDROCHLORIDE 10 MG: 10 TABLET ORAL at 03:29

## 2024-04-27 RX ADMIN — OXYCODONE HYDROCHLORIDE 10 MG: 10 TABLET ORAL at 15:45

## 2024-04-27 RX ADMIN — OXYCODONE HYDROCHLORIDE 10 MG: 10 TABLET ORAL at 10:22

## 2024-04-27 RX ADMIN — OXYCODONE HYDROCHLORIDE 10 MG: 10 TABLET ORAL at 21:47

## 2024-04-27 RX ADMIN — AMOXICILLIN AND CLAVULANATE POTASSIUM 1 TABLET: 875; 125 TABLET, FILM COATED ORAL at 05:20

## 2024-04-27 ASSESSMENT — COGNITIVE AND FUNCTIONAL STATUS - GENERAL
SUGGESTED CMS G CODE MODIFIER DAILY ACTIVITY: CH
MOBILITY SCORE: 24
DAILY ACTIVITIY SCORE: 24
SUGGESTED CMS G CODE MODIFIER MOBILITY: CH

## 2024-04-27 ASSESSMENT — PAIN DESCRIPTION - PAIN TYPE
TYPE: ACUTE PAIN

## 2024-04-27 NOTE — ASSESSMENT & PLAN NOTE
While undergoing continuous pulse oximetry monitoring, the patient's oxygen saturation decreased to 90% with tachycardia at approximately 110.  The patient was counseled on the possible differential diagnoses contributing to his symptoms and that he would ultimately benefit from emergency evaluation and management in the ED. The patient was counseled on the high likelihood of admission. He expressed understanding and willingness to be evaluated in the emergency department. He was willing to be transported via ambulance.   [facility] contacted. Care transferred to [facility] personnel.

## 2024-04-27 NOTE — ASSESSMENT & PLAN NOTE
S/p I&D to dorsal right foot by orthopedics during last hospitalization, cultures without growth. Dr. Soares (ID) consulted, recommended 2 weeks of Augmentin, to end 5/3. Now with increased pain in the right lower extremity diffusely. US of right foot with complex fluid collection.  Orthopedic surgery was consulted.  - MRI of right foot with contrast per orthopedic recommendations  - Will continue to keep patient n.p.o. with prophylaxis held at this time  -Continue IV Ceftriaxone pending culture results  -Blood cultures drawn prior to initiation of IV antibiotics, 1 of 2 with growth of gram positive cocci suspicious for strep species. Will follow   -MRSA swab ordered, pending  -Bilateral LE arterial US ordered, negative for peripheral arterial disease bilaterally  -Bilateral LE ultrasound negative for DVT

## 2024-04-27 NOTE — CARE PLAN
The patient is Stable - Low risk of patient condition declining or worsening    Shift Goals  Clinical Goals: VSS, pain management  Patient Goals: pain, rest  Family Goals: HUGH    Progress made toward(s) clinical / shift goals:      Problem: Pain - Standard  Goal: Alleviation of pain or a reduction in pain to the patient’s comfort goal  Outcome: Progressing     Problem: Knowledge Deficit - Standard  Goal: Patient and family/care givers will demonstrate understanding of plan of care, disease process/condition, diagnostic tests and medications  Outcome: Progressing     Problem: Hemodynamics  Goal: Patient's hemodynamics, fluid balance and neurologic status will be stable or improve  Outcome: Progressing     Problem: Respiratory  Goal: Patient will achieve/maintain optimum respiratory ventilation and gas exchange  Outcome: Progressing     Problem: Fluid Volume  Goal: Fluid volume balance will be maintained  Outcome: Progressing     Problem: Mobility  Goal: Patient's capacity to carry out activities will improve  Outcome: Progressing     Problem: Self Care  Goal: Patient will have the ability to perform ADLs independently or with assistance (bathe, groom, dress, toilet and feed)  Outcome: Progressing     Problem: Infection - Standard  Goal: Patient will remain free from infection  Outcome: Progressing     Problem: Wound/ / Incision Healing  Goal: Patient's wound/surgical incision will decrease in size and heals properly  Outcome: Progressing       Patient transferred as higher level of care, A&Ox4 and understands all tests, medications, and procedures. Patient's pain managed per MAR. Patient able to ambulate with SBA to bathroom. Continues to require 2L NC to maintain oxygen saturation greater than 90%.

## 2024-04-27 NOTE — H&P
[facility] MEDICINE H&P        PATIENT ID:  NAME:  Noe Hickey  MRN:               [id number]  YOB: 1960    Date of Admission: 4/26/2024     Attending: Ирина Barrett M.d.    Resident: Mario Lares D.O.    Primary Care Physician:  Farheen Wright M.D.    CC:  Shortness of breath     HPI: Noe Hickey is a 63 y.o. male with a history of ascending aortic aneurysm repair and TAVR in 2013, HTN, HLD, chronic left hemidiaphragm paresis, HFimpEF (70% EF), pAF on rivaroxaban, and tobacco use disorder who presented with shortness of breath and chest pain.     Patient was just admitted to the hospital from 4/11-4/24 for acute hypoxemic respiratory failure in the setting of acute community acquired pneumonia and leukocytosis to 27. He had also been complaining of acute right leg pain and swelling secondary to a hamstring tear and right foot dorsal abscess for which he underwent s/p I&D by ortho (Dr. Bowie) on 4/19. Cultures returned negative; ID was consulted and recommended 2 weeks of oral Augmentin to end on 5/3. He was seen by cardiology who recommended potential for outpatient cardiac MRI/stress testing with new concern for HOCM on echo from 4/5 despite improvement in EF. Pulmonology recommended outpatient PFTs for questionable component of COPD; patient states this was scheduled for 4/27. ENT was consulted for chronic hoarseness with negative fluoroscopy. Ultimately, he was weaned off of oxygen and treated for pneumonia and underlying COPD with azithromycin, prednisone, and placed on albuterol, Spiriva, and a medrol dose enedelia on discharge. He has been taking these in addition to the Augmentin.     Since discharge, however, he feels that his breathing and pain in his right leg have gotten worse. He states that doing any activity including talking makes him feel shot of breath; he is not sure if he has experienced orthopnea but has been experiencing shortness of breath when walking short  distances. He is reporting mid sternal chest pain but states this is chronic and has been unchanged since his hospitalization. He has been taking the steroid pack and his inhalers as prescribed but they have not been helping. He reports that the pain is worse in his right leg, in the thigh with extension to the top of the foot. He additionally reports new swelling in the left foot. He has been able to walk around since discharge from the hospital, reports that he has been going to the gym and he was able to go to work yesterday. He has been compliant with this Xarelto.       ER Course:  In the ED, patient was afebrile with RR up to 22, systolic BP in the 150s, requiring 2L of oxygen to maintain sats >90%. Labs showed leukocytosis to 19 (from 27 during last hospitalization), slightly decreased hemoglobin to 12.3 (from 10.5) with MCV 68. CMP unremarkable.  Troponin was elevated to 20, stable to 22. EKG without signs of STEMI or arrhythmia. BNP 1080 from 1568 on day of discharge (4/24). CXR negative except for chronic left hemidiaphragm paralysis. His examination revealed clear lung sounds and diffuse tenderness to the right lower extremity with new edema to the right foot.      REVIEW OF SYSTEMS:   Ten systems reviewed and were negative except as noted in the HPI.                PAST MEDICAL HISTORY:  Past Medical History:   Diagnosis Date    Arrhythmia     Breath shortness 06/09/2023    prior to 1/2023 surgery    Cyclic vomiting syndrome     Elevated hemidiaphragm - LEFT post AVR 01/04/2023    Fracture     Headache, classical migraine     Heart burn     Heart valve disease 06/09/2023    heart surgery in 1/2023    Hyperlipidemia 4/26/2024    Hypertension 4/26/2024    Indigestion     Pneumonia due to infectious organism 01/20/2023    Snoring        PAST SURGICAL HISTORY:  Past Surgical History:   Procedure Laterality Date    INCISION AND DRAINAGE GENERAL Left 4/19/2024    Procedure: INCISION AND DRAINAGE, LEG;   Surgeon: Mitch Bowie M.D.;  Location: SURGERY [facility];  Service: Orthopedics    AORTIC VALVE REPLACEMENT  1/5/2023    Procedure: AORTIC VALVE REPLACEMENT, ASCENDING AORTIC ANEURYSM REPAIR AND TRANSESOPHAGEAL ECHOCARDIOGRAM.;  Surgeon: Serena Goyal M.D.;  Location: SURGERY [facility];  Service: Cardiac    AORTIC ASCENDING DISSECTION  1/5/2023    Procedure: REPAIR, ANEURYSM OR DISSECTION, AORTA, ASCENDING;  Surgeon: Serena Goyal M.D.;  Location: SURGERY [facility];  Service: Cardiac    ECHOCARDIOGRAM, TRANSESOPHAGEAL, INTRAOPERATIVE  1/5/2023    Procedure: ECHOCARDIOGRAM, TRANSESOPHAGEAL, INTRAOPERATIVE.;  Surgeon: Serena Goyal M.D.;  Location: SURGERY [facility];  Service: Cardiac    IRRIGATION & DEBRIDEMENT ORTHO Right 09/19/2017    Procedure: IRRIGATION & DEBRIDEMENT ORTHO-THIGH;  Surgeon: Corbin Rivera M.D.;  Location: [facility];  Service: Orthopedics    SHOULDER HEMICAP RESURFACING Right 10/12/2015    Procedure: RIGHT SHOULDER RESURFACING;  Surgeon: Javon Doll M.D.;  Location: SURGERY [facility];  Service:     SHOULDER ARTHROSCOPY      x3    SHOULDER SURGERY      replacement right       FAMILY HISTORY:  History reviewed. No pertinent family history.    SOCIAL HISTORY:   Pt lives in [place]   Smoking daily; reports 20 pack year history   Etoh use denies   Drug use denies; history of anabolic steroid use per chart review     DIET:   Orders Placed This Encounter   Procedures    Diet Order Diet: Cardiac     Standing Status:   Standing     Number of Occurrences:   1     Order Specific Question:   Diet:     Answer:   Cardiac [6]       ALLERGIES:  Allergies   Allergen Reactions    Morphine Shortness of Breath and Rash             OUTPATIENT MEDICATIONS:    Current Facility-Administered Medications:     HYDROmorphone (Dilaudid) injection 0.5 mg, 0.5 mg, Intravenous, Q30 MIN PRN, Raudel Sams M.D., 0.5 mg at 04/26/24 1824    acetaminophen (Tylenol) tablet 650 mg, 650 mg,  Oral, Q6HRS PRN, Mario Lares D.O.    ondansetron (Zofran) syringe/vial injection 4 mg, 4 mg, Intravenous, Q4HRS PRN, TALI Thayer.O.    ondansetron (Zofran ODT) dispertab 4 mg, 4 mg, Oral, Q4HRS PRN, Mario Lares D.O.    promethazine (Phenergan) tablet 12.5-25 mg, 12.5-25 mg, Oral, Q4HRS PRN, Mario Lares, D.O.    promethazine (Phenergan) suppository 12.5-25 mg, 12.5-25 mg, Rectal, Q4HRS PRN, TALI Thayer.O.    prochlorperazine (Compazine) injection 5-10 mg, 5-10 mg, Intravenous, Q4HRS PRN, Mario Lares D.O.    amoxicillin-clavulanate (Augmentin) 875-125 MG per tablet 1 Tablet, 1 Tablet, Oral, BID, Raudel Wolf M.D.    atorvastatin (Lipitor) tablet 40 mg, 40 mg, Oral, QHS, Raudel Wolf M.D., 40 mg at 04/26/24 2042    fluticasone-umeclidinium-vilanterol (Trelegy Ellipta) 100-62.5-25 mcg/act inhaler 1 Puff, 1 Puff, Inhalation, QDAILY (RT), Raudel Wolf M.D.    [START ON 4/27/2024] losartan (Cozaar) tablet 25 mg, 25 mg, Oral, DAILY, Raudel Wolf M.D.    ipratropium-albuterol (DUONEB) nebulizer solution, 3 mL, Nebulization, Q4H PRN (RT), Raudel Wolf M.D.    [START ON 4/27/2024] metoprolol SR (Toprol XL) tablet 25 mg, 25 mg, Oral, DAILY, Raudel Wolf M.D.    omeprazole (PriLOSEC) capsule 20 mg, 20 mg, Oral, BID, Raudel Wolf M.D., 20 mg at 04/26/24 2042    oxyCODONE immediate release (Roxicodone) tablet 10 mg, 10 mg, Oral, Q6HRS PRN, Raudel Wolf M.D., 10 mg at 04/26/24 2042    [START ON 4/27/2024] rivaroxaban (Xarelto) tablet 20 mg, 20 mg, Oral, DAILY, Raudel Wolf M.D.    furosemide (Lasix) injection 40 mg, 40 mg, Intravenous, BID DIURETIC, Raudel Wolf M.D., 40 mg at 04/26/24 2043    PHYSICAL EXAM:  Vitals:    04/26/24 1700 04/26/24 1824 04/26/24 1900 04/26/24 1931   BP: 133/80 (!) 150/75 (!) 143/82 (!) 137/92   Pulse: 84 88 76 75   Resp: 16 17 15 14   Temp:       TempSrc:       SpO2: 93% 94% 93% 95%   Weight:       Height:       ,  Temp (24hrs), Av.6 °C (97.8 °F), Min:36.6 °C (97.8 °F), Max:36.6 °C (97.8 °F)  , Pulse Oximetry: 95 %, O2 (LPM): 2, O2 Delivery Device: Nasal Cannula    General: Pt resting in NAD, cooperative   Skin:  Pink, warm and dry.  No rashes  HEENT: NC/AT. PERRL. EOMI. MMM. No nasal discharge. Oropharynx nonerythematous without exudate/plaques  Neck:  Supple without lymphadenopathy or rigidity. No JVD   Lungs:  Symmetrical. Good air movement with clear lungs bilaterally. No wheezing. On 2L by NC. Cardiovascular:  S1/S2 RRR. Systolic murmur heard in all fields.   Abdomen:  Abdomen is soft NT/ND. +BS. No masses noted.  Extremities:  RLE: Diffuse ecchymosis from mid-thigh to mid shin, where it wraps postero-medially along the right lower extremity to the medial foot. 1+ pitting edema to distal shin involving the dorsum of the right foot. Sutures intact, no dehiscence or drainage. No significant erythema or fluctuance. Tender diffusely in thigh, popliteal region, and foot. Minimal tenderness in calf.  DP/PT pulses intact.  LLE: No erythema or skin changes. 1+ pitting edema involving the left foot with mild tenderness. Pulses intact.   Spine:  Straight without vertebral anomalies.  CNS:  Muscle tone is normal. Cranial nerves II-XII grossly intact. 2+ DTRs.         LAB TESTS:   Recent Labs     24  0331 24  1643   WBC 16.0* 19.0*   RBC 5.21 5.96   HEMOGLOBIN 10.5* 12.3*   HEMATOCRIT 35.9* 40.5*   MCV 68.9* 68.0*   MCH 20.2* 20.6*   RDW 54.5* 54.5*   PLATELETCT 486* 398   MPV 10.5  --    NEUTSPOLYS 93.80* 87.80*   LYMPHOCYTES 2.10* 4.70*   MONOCYTES 2.40 5.90   EOSINOPHILS 0.00 0.40   BASOPHILS 0.20 0.10         Recent Labs     24  0331 24  1643   SODIUM 136 136   POTASSIUM 4.7 4.8   CHLORIDE 97 100   CO2 28 26   BUN 24* 20   CREATININE 0.87 0.91   CALCIUM 8.3* 8.2*   MAGNESIUM  --  2.1   ALBUMIN 3.6 3.5       CULTURES:   Results       ** No results found for the last 168 hours. **             IMAGES:  DX-CHEST-PORTABLE (1 VIEW)   Final Result      No acute cardiac or pulmonary abnormalities are identified.      US-EXTREMITY VENOUS LOWER BILAT    (Results Pending)       CONSULTS:   None     ASSESSMENT/PLAN: 63 y.o. male admitted for:    * Hypoxia- (present on admission)  Assessment & Plan  Patient admitted form 4/11-4/24 for acute hypoxemic respiratory failure in the setting of acute CAP, and acute leg pain and swelling due to hamstring tear and right foot dorsal abscess s/p I&D by ortho. Last echo 4/5/24 showed improved EF to 70% from 30% with possible hypertrophic cardiomyopathy.  Workup at that time showed negative CTA aorta but confirmed CAP with WBC up to 27.  Patient was initiated on antibiotics.  He was seen by cardiology, pulmonology, ENT, orthopedics, and ID with plan for outpatient cardiac MRI and stress testing. He is also to follow up with pulmonology for possible undiagnosed COPD. Discharged from hospital on 4/24 with albuterol and Spiriva, treated with a 3 day burst of prednisone + medrol dose enedelia on adischarge and 3 days of azithromycin during last hospitalization.     Patient returns to ED with worsening shortness of breath since discharge, requiring 2L in the ED, as well as increased pain in his right leg and new swelling of the left foot. Labs showed leukocytosis to 19 (from 27 during last hospitalization), has been taking his medrol dose enedelia. Troponin 20 to 22. EKG without signs of STEMI or arrhythmia. BNP 1080 from 1568 on day of discharge (4/24). CXR negative.     Hypoxia thought to be multifactorial, etiologies include ongoing fluid overload with elevation in BNP >1000 (however improved over the past several days from 1500), underlying COPD/emphysema causing exacerbation, new PE (Wells score 3.5, though not tachycardic), versus acute cardiac etiology such as MI. Lungs clear on examination on initial evaluation, no wheezing, good air movement but requiring oxygen. Area of  abscess to right foot stable but tender throughout with new edema to the left foot.    Plan:  -Admit to telemetry  -Bilateral LE US to r/o DVT  -Trend troponin, TTE ordered. Day team to reassess need.  -Trend leukocytosis; likely reactive due to steroid use  -Continue Augmentin BID for recent I&D of right foot; course to end 5/3  -IV lasix 40mg BID, follow I/Os; day team to continue titration   -Discontinue home albuterol and Spiriva; will add on PRN duonebs and Trelegy for additional treatment with [facility] inpatient. Day team to reassess response. Day team to continue steroids if deemed necessary (medrol dose enedelia held).  -Provide oxygen supplementation to keep saturations >90%, wean as tolerated  -If true concern for COPD exacerbation recommend titration of oxygen to keep saturations 88-92% moving forward. Home O2 eval if unable to wean  -Recommend close follow up with cardiology and pulmonology outpatient  -Cardiac diet while inpatient    Abscess of right foot- (present on admission)  Assessment & Plan  S/p I&D to dorsal right foot by orthopedics during last hospitalization, cultures without growth. Dr. Soares (ID) consulted, recommended 2 weeks of Augmentin, to end 5/3. Now with increased pain in the right lower extremity diffusely.   -Continue Augmentin BID  -Follow bilateral LE DVT ultrasound     Diffuse pain in right lower extremity- (present on admission)  Assessment & Plan  -Follow bilateral LE DVT ultrasound     Heart failure with improved ejection fraction (HFimpEF) ([facility])- (present on admission)  Assessment & Plan  TTE 4/5/2024 with improved EF to 70% from 30%, concern for HOCM. Cardiology consulted during last hospitalization with plan at that time for likely outpatient cardiac MRI/stress test. Troponin on admission 20, 22.     Plan:  -With new leg swelling and increased dyspnea will re-order TTE: day team to assess need +/- stress testing during this hospitalization   -Continue home metoprolol      Hypertension- (present on admission)  Assessment & Plan  -Continue home Losartan and Metoprolol    Hyperlipidemia- (present on admission)  Assessment & Plan  -Continue home statin    Dysphonia- (present on admission)  Assessment & Plan  Had cardiac surgery about a year ago and has had persistent hoarseness and dyspnea since then. Dr. Finley (ENT) was consulted during last hospitalization, completed scope at that time which was unremarkable. Did not think that dyspnea is related to vocal cords, thus deferred management to hospitalist and pulmonology. If hoarseness persists after other issues are well controlled, would recommend speech therapy. No ENT follow up was indicated at that time.  -Continue to monitor      Tobacco dependence- (present on admission)  Assessment & Plan  Questionable contributing factor of COPD/emphysema with 20 pack year smoking history. Pulmonology consulted during last hospitalization, with outpatient follow up to have PFTs completed, patient states this is scheduled for 4/27.  -Continue inhalers inpatient   -Smoking cessation counseling ordered    Opioid use, unspecified, in remission- (present on admission)  Assessment & Plan  -Will continue home Roxicodone 10mg every 6 hours PRN for pain, day team to reassess     Paroxysmal atrial flutter (HCC)- (present on admission)  Assessment & Plan  -Continue home Xarelto 20mg daily     Elevated hemidiaphragm - s/p post AVR- (present on admission)  Assessment & Plan  Also seen on CXR this admission. ?Contribution to dyspnea but appears stable.    GERD (gastroesophageal reflux disease)- (present on admission)  Assessment & Plan  Continue home omeprazole     Ascending aortic aneurysm repair- (present on admission)  Assessment & Plan  Completed in 2013 per pt also with TAVR. Since then has had a degree of shortness of breath and chronic chest pain. Chest pain stable, however, shortness of breath worsening since discharge. CTA Aorta during last  admission without aortic dilation.         Core Measures:  Fluids: None   Lines: PIV   Abx: Augmentin   Diet: Cardiac  PPX:  Xarelto   CODE STATUS: Full code    DISPO:  Inpatient       Mario Lares D.O. PGY-1  [facility]

## 2024-04-27 NOTE — PROGRESS NOTES
[facility] PROGRESS NOTE     Attending: Ирина Barrett M.d.  Senior Resident: Luis Armando Oleary M.D. (PGY-3)  [place] Resident: Caryl Pineda M.D. (PGY-1)  PATIENT: Noe iHckey; [id number]; 1960    Hospital Day # Hospital Day: 2    ID: 63 y.o. male with past medical history of ascending aortic aneurysm repair and TAVR in 2013, HTN, HLD, chronic left hemidiaphragm paresis, HFimpEF (70% EF), pAF on rivaroxaban, and tobacco use disorder was admitted on 4/26/2024 for shortness of breath and chest pain possibly secondary to CHF exacerbation vs. PE, and RLE pain and discoloration most likely in setting of cellulitis.    24 Hour Events: No acute events overnight     Subjective: Patient with continued pain in right lower extremity, exquisitely tender to palpation. Also with shortness of breath worsened with talking, denies association with exertion or deep inhalation. Also reporting sharp, stabbing chest pain localized over the central and left side of chest. Again denies association with exertion or deep inhalation.     OBJECTIVE:  Temp:  [36.5 °C (97.7 °F)-36.9 °C (98.4 °F)] 36.6 °C (97.9 °F)  Pulse:  [71-92] 73  Resp:  [14-33] 16  BP: ()/() 91/62  SpO2:  [80 %-97 %] 86 %    Intake/Output Summary (Last 24 hours) at 4/27/2024 1248  Last data filed at 4/26/2024 2115  Gross per 24 hour   Intake --   Output 200 ml   Net -200 ml       PE:  Gen: Mild respiratory distress, uncomfortable appearing, sitting upright at bedside  HEENT: normocephalic, atraumatic, EOMI  Pulm: clear to auscultation bilaterally, mild respiratory distress, increased work of breathing, was not on nasal cannula at the time I was at bedside  Cardio: RRR, no R/G, systolic murmur  Abdom: soft, nontender, nondistended, normoactive bowel sounds in all quadrants  Ext: Left lower extremity with 2+pitting edema to ankle, Right lower extremity with dusky discoloration from foot to just below knee, exquisitely tender to light palpation,  "surgical wound from 4/19 I&D intact with sutures in place, no increased erythema or drainage, 2+ pulses bilaterally  Neuro: Grossly intact    LABS:  Recent Labs     04/26/24  1643 04/27/24  0124   WBC 19.0* 16.2*   RBC 5.96 5.83   HEMOGLOBIN 12.3* 11.9*   HEMATOCRIT 40.5* 39.5*   MCV 68.0* 67.8*   MCH 20.6* 20.4*   RDW 54.5* 54.4*   PLATELETCT 398 384   NEUTSPOLYS 87.80* 82.20*   LYMPHOCYTES 4.70* 7.60*   MONOCYTES 5.90 7.90   EOSINOPHILS 0.40 1.00   BASOPHILS 0.10 0.10     Recent Labs     04/26/24  1643 04/27/24  0124   SODIUM 136 135   POTASSIUM 4.8 4.5   CHLORIDE 100 96   CO2 26 27   BUN 20 20   CREATININE 0.91 1.01   CALCIUM 8.2* 8.2*   MAGNESIUM 2.1  --    ALBUMIN 3.5 3.3     Estimated GFR/CRCL = Estimated Creatinine Clearance: 80.6 mL/min (by C-G formula based on SCr of 1.01 mg/dL).  Recent Labs     04/26/24  1643 04/27/24  0124   GLUCOSE 113* 83     Recent Labs     04/26/24  1643 04/27/24  0124   ASTSGOT 39 34   ALTSGPT 47 38   TBILIRUBIN 1.0 1.0   ALKPHOSPHAT 38 34   GLOBULIN 2.5 2.5             No results for input(s): \"INR\", \"APTT\", \"FIBRINOGEN\" in the last 72 hours.    Invalid input(s): \"DIMER\"    MICROBIOLOGY:   No results found for: \"BLOODCULTU\", \"BLDCULT\", \"BCHOLD\"     IMAGING:   US-EXTREMITY VENOUS LOWER BILAT   Final Result      US-SUE SINGLE LEVEL BILAT   Final Result      DX-CHEST-PORTABLE (1 VIEW)   Final Result      No acute cardiac or pulmonary abnormalities are identified.      EC-ECHOCARDIOGRAM COMPLETE W/O CONT    (Results Pending)   CT-CTA CHEST PULMONARY ARTERY W/ RECONS    (Results Pending)       MEDS:  Current Facility-Administered Medications   Medication Last Admin    cefTRIAXone (Rocephin) syringe 2,000 mg 2,000 mg at 04/27/24 1153    acetaminophen (Tylenol) tablet 650 mg      ondansetron (Zofran) syringe/vial injection 4 mg      ondansetron (Zofran ODT) dispertab 4 mg      promethazine (Phenergan) tablet 12.5-25 mg      promethazine (Phenergan) suppository 12.5-25 mg      " prochlorperazine (Compazine) injection 5-10 mg      atorvastatin (Lipitor) tablet 40 mg 40 mg at 04/26/24 2042    fluticasone-umeclidinium-vilanterol (Trelegy Ellipta) 100-62.5-25 mcg/act inhaler 1 Puff      losartan (Cozaar) tablet 25 mg 25 mg at 04/27/24 0520    ipratropium-albuterol (DUONEB) nebulizer solution      metoprolol SR (Toprol XL) tablet 25 mg 25 mg at 04/27/24 0520    omeprazole (PriLOSEC) capsule 20 mg 20 mg at 04/27/24 0520    oxyCODONE immediate release (Roxicodone) tablet 10 mg 10 mg at 04/27/24 1022    rivaroxaban (Xarelto) tablet 20 mg 20 mg at 04/27/24 0520    furosemide (Lasix) injection 40 mg 40 mg at 04/27/24 0524       PROBLEM LIST:  Problem Noted   Hypoxia 4/26/2024   Abscess of Right Foot 4/19/2024   Diffuse Pain in Right Lower Extremity 4/18/2024   Opioid Use, Unspecified, in Remission 5/16/2023   Heart Failure With Improved Ejection Fraction (Hfimpef) ([facility]) 5/16/2023       ASSESSMENT/PLAN: 63 y.o. male with past medical history of ascending aortic aneurysm repair and TAVR in 2013, HTN, HLD, chronic left hemidiaphragm paresis, HFimpEF (70% EF), pAF on rivaroxaban, and tobacco use disorder was admitted on 4/26/2024 for shortness of breath and chest pain possibly secondary to CHF exacerbation vs. PE, and RLE pain and discoloration most likely in setting of cellulitis.    * Hypoxia- (present on admission)  Assessment & Plan  Patient admitted form 4/11-4/24 for acute hypoxemic respiratory failure in the setting of acute CAP, and acute leg pain and swelling due to hamstring tear and right foot dorsal abscess s/p I&D by ortho. Last echo 4/5/24 showed improved EF to 70% from 30% with possible hypertrophic cardiomyopathy.  Workup at that time showed negative CTA aorta but confirmed CAP with WBC up to 27.  Patient was initiated on antibiotics.  He was seen by cardiology, pulmonology, ENT, orthopedics, and ID with plan for outpatient cardiac MRI and stress testing. He is also to follow up with  pulmonology for possible undiagnosed COPD. Discharged from hospital on 4/24 with albuterol and Spiriva, treated with a 3 day burst of prednisone + medrol dose enedelia on adischarge and 3 days of azithromycin during last hospitalization.     Patient returns to ED with worsening shortness of breath since discharge, requiring 2L in the ED, as well as increased pain in his right leg and new swelling of the left foot. Labs showed leukocytosis to 19 (from 27 during last hospitalization), has been taking his medrol dose enedelia. Troponin 20 to 22. EKG without signs of STEMI or arrhythmia. BNP 1080 from 1568 on day of discharge (4/24). CXR negative.     Hypoxia thought to be multifactorial, etiologies include ongoing fluid overload with elevation in BNP >1000 (however improved over the past several days from 1500), underlying COPD/emphysema causing exacerbation, new PE (Wells score 3.5, though not tachycardic), versus acute cardiac etiology such as MI. Lungs clear on examination on initial evaluation, no wheezing, good air movement but requiring oxygen. Area of abscess to right foot stable but tender throughout with new edema to the left foot.    Plan:  -Continue telemetry  -Bilateral LE US negative for DVT  -Ordered CTA Chest to rule out PE  -Trend troponin, TTE ordered  -Trend leukocytosis; likely reactive due to steroid use  -IV lasix 40mg BID, follow I/Os  -Discontinue home albuterol and Spiriva; will add on PRN duonebs and Trelegy for additional treatment with [facility] inpatient.   -Provide oxygen supplementation to keep saturations >90%, wean as tolerated  -If true concern for COPD exacerbation recommend titration of oxygen to keep saturations 88-92% moving forward. Home O2 eval if unable to wean  -Recommend close follow up with cardiology and pulmonology outpatient  -Cardiac diet while inpatient    Hypertension- (present on admission)  Assessment & Plan  -Continue home Losartan and Metoprolol    Hyperlipidemia- (present on  "admission)  Assessment & Plan  -Continue home statin    Abscess of right foot- (present on admission)  Assessment & Plan  S/p I&D to dorsal right foot by orthopedics during last hospitalization, cultures without growth. Dr. Soares (ID) consulted, recommended 2 weeks of Augmentin, to end 5/3. Now with increased pain in the right lower extremity diffusely. Concern at this time is for cellulitis in setting of wound infection.  -Discontinue Augmentin BID, will proceed with IV Ceftriaxone  -Blood cultures drawn prior to initiation of IV antibiotics  -MRSA swab ordered  -Bilateral LE arterial US ordered, negative for peripheral arterial disease bilaterally  -Bilateral LE ultrasound negative for DVT    Diffuse pain in right lower extremity- (present on admission)  Assessment & Plan  See \"Abscess of right foot\" for more details  -Bilateral LE DVT ultrasound negative    Dysphonia- (present on admission)  Assessment & Plan  Had cardiac surgery about a year ago and has had persistent hoarseness and dyspnea since then. Dr. Finley (ENT) was consulted during last hospitalization, completed scope at that time which was unremarkable. Did not think that dyspnea is related to vocal cords, thus deferred management to hospitalist and pulmonology. If hoarseness persists after other issues are well controlled, would recommend speech therapy. No ENT follow up was indicated at that time.  -Continue to monitor      Tobacco dependence- (present on admission)  Assessment & Plan  Questionable contributing factor of COPD/emphysema with 20 pack year smoking history. Pulmonology consulted during last hospitalization, with outpatient follow up to have PFTs completed, patient states this is scheduled for 4/27.  -Continue inhalers inpatient   -Smoking cessation counseling ordered    Heart failure with improved ejection fraction (HFimpEF) ([facility])- (present on admission)  Assessment & Plan  TTE 4/5/2024 with improved EF to 70% from 30%, concern for " HOCM. Cardiology consulted during last hospitalization with plan at that time for likely outpatient cardiac MRI/stress test. Troponin on admission 20, 22.     Plan:  -With new leg swelling and increased dyspnea will re-order TTE   -Continue home metoprolol     Opioid use, unspecified, in remission- (present on admission)  Assessment & Plan  -Will continue home Roxicodone 10mg every 6 hours PRN for pain     Paroxysmal atrial flutter ([facility])- (present on admission)  Assessment & Plan  -Continue home Xarelto 20mg daily     Elevated hemidiaphragm - s/p post AVR- (present on admission)  Assessment & Plan  Also seen on CXR this admission. ?Contribution to dyspnea but appears stable.    GERD (gastroesophageal reflux disease)- (present on admission)  Assessment & Plan  Continue home omeprazole     Ascending aortic aneurysm repair- (present on admission)  Assessment & Plan  Completed in 2013 per pt also with TAVR. Since then has had a degree of shortness of breath and chronic chest pain. Chest pain stable, however, shortness of breath worsening since discharge. CTA Aorta during last admission without aortic dilation.       Core Measures:  Fluids: None  Lines: PIV  Abx: IV Ceftriaxone  Diet: Cardiac  PPX: SCDs/TEDs, continuing home Xarelto 20mg daily      #DISPOSITION  - Inpatient for management of hypoxia and IV antibiotics    Caryl Pineda M.D.  PGY-1  [facility]

## 2024-04-27 NOTE — ASSESSMENT & PLAN NOTE
Patient admitted from 4/11-4/24 for acute hypoxemic respiratory failure in the setting of acute CAP, and acute leg pain and swelling due to hamstring tear and right foot dorsal abscess s/p I&D by ortho. Plans after last admit for outpatient cardiac MRI and stress testing. He is also to follow up with pulmonology for possible undiagnosed COPD. Discharged from hospital on 4/24 with albuterol and Spiriva, treated with a 3 day burst of prednisone + medrol dose endeelia on discharge and 3 days of azithromycin during last hospitalization. Resolved hypoxia this visit with diuresis and inhalers.   -Continue telemetry  -Bilateral LE US negative for DVT  -CTA Chest negative for PE  -TTE showed normal LV systolic function, LVEF of 60%  -IV lasix titrate to euvolemia   -PRN duonebs and Trelegy for additional treatment with [facility] inpatient.   -Provide oxygen supplementation to keep saturations >90%, wean as tolerated  -Recommend close follow up with cardiology and pulmonology outpatient  -Cardiac diet while inpatient

## 2024-04-27 NOTE — PROGRESS NOTES
Received patient from ED. Assessment complete. Patient is alert and oriented. On oxygen 3l. Medicated for pain. 2RN skin assessment complete. Call light within reach.

## 2024-04-27 NOTE — CARE PLAN
The patient is Watcher - Medium risk of patient condition declining or worsening    Shift Goals  Clinical Goals: pain management; CT/US  Patient Goals: comfort/pain management  Family Goals: HUGH    Progress made toward(s) clinical / shift goals:    Problem: Pain - Standard  Goal: Alleviation of pain or a reduction in pain to the patient’s comfort goal  Outcome: Progressing     Problem: Knowledge Deficit - Standard  Goal: Patient and family/care givers will demonstrate understanding of plan of care, disease process/condition, diagnostic tests and medications  Outcome: Progressing       Patient is not progressing towards the following goals:

## 2024-04-27 NOTE — PROGRESS NOTES
Patient received from [place] [id number], transferred to [place] [id number]. Pt care assumed, VSS, pt assessment complete. Pt AAOx4, with complaints of 8/10 pain at this time. No signs of acute distress noted at this time. Plan of care discussed with pt and verbalizes no questions. Pt denies any additional needs at this time. Bed locked/in lowest position, pt educated on fall risk and verbalized understanding, call light within reach, hourly rounding initiated.

## 2024-04-27 NOTE — PROGRESS NOTES
4 Eyes Skin Assessment Completed by ALLEN Santos and ALLEN Aguirre.    Head WDL  Ears WDL  Nose WDL  Mouth WDL  Neck WDL  Breast/Chest WDL  Shoulder Blades WDL  Spine WDL  (R) Arm/Elbow/Hand WDL  (L) Arm/Elbow/Hand WDL  Abdomen WDL  Groin WDL  Scrotum/Coccyx/Buttocks WDL  (R) Leg WDL  (L) Leg Redness and Swelling  (R) Heel/Foot/Toe Redness and Edema Stiches  (L) Heel/Foot/Toe Redness and Swelling          Devices In Places Blood Pressure Cuff      Interventions In Place Pillows    Possible Skin Injury No    Pictures Uploaded Into Epic Yes  Wound Consult Placed yes  RN Wound Prevention Protocol Ordered Yes

## 2024-04-27 NOTE — ASSESSMENT & PLAN NOTE
"TTE 4/5/2024 with improved EF to 70% from 30%, concern for HOCM. Cardiology consulted during last hospitalization with plan at that time for likely outpatient cardiac MRI/stress test. Troponin on admission 20, 22. TTE showed LVEF of 60%, decreased from 70% on last TTE, otherwise decreased right ventricle systolic function.     Plan:  -Continue home metoprolol   -See plan for diuresis under \"Hypoxia\"  "

## 2024-04-27 NOTE — ASSESSMENT & PLAN NOTE
Completed in 2013 per pt also with TAVR. Since then has had a degree of shortness of breath and chronic chest pain. Chest pain stable, however, shortness of breath worsening since discharge. CTA Aorta during last admission without aortic dilation.

## 2024-04-27 NOTE — PROGRESS NOTES
4 Eyes Skin Assessment Completed by DILEEP Felix and ALLEN Gilbert.    Head WDL  Ears WDL  Nose WDL  Mouth WDL  Neck WDL  Breast/Chest WDL  Shoulder Blades WDL  Spine WDL  (R) Arm/Elbow/Hand WDL  (L) Arm/Elbow/Hand WDL  Abdomen WDL  Groin WDL  Scrotum/Coccyx/Buttocks WDL  (R) Leg WDL  (L) Leg WDL  (R) Heel/Foot/Toe Redness, Swelling, and Scab, Stitches  (L) Heel/Foot/Toe Redness and Swelling          Devices In Places Tele Box and Nasal Cannula      Interventions In Place Gray Ear Foams    Possible Skin Injury No    Pictures Uploaded Into Epic Yes  Wound Consult Placed N/A  RN Wound Prevention Protocol Ordered No

## 2024-04-27 NOTE — ASSESSMENT & PLAN NOTE
Had cardiac surgery about a year ago and has had persistent hoarseness and dyspnea since then. Dr. Finley (ENT) was consulted during last hospitalization, completed scope at that time which was unremarkable. Did not think that dyspnea is related to vocal cords, thus deferred management to hospitalist and pulmonology. If hoarseness persists after other issues are well controlled, would recommend speech therapy. No ENT follow up was indicated at that time.  -Continue to monitor

## 2024-04-28 ENCOUNTER — APPOINTMENT (OUTPATIENT)
Dept: RADIOLOGY | Facility: MEDICAL CENTER | Age: 64
End: 2024-04-28
Payer: MEDICAID

## 2024-04-28 PROBLEM — R78.81 BACTEREMIA: Status: ACTIVE | Noted: 2024-04-28

## 2024-04-28 LAB
ANION GAP SERPL CALC-SCNC: 9 MMOL/L (ref 7–16)
BACTERIA BLD CULT: NORMAL
BASOPHILS # BLD AUTO: 0.2 % (ref 0–1.8)
BASOPHILS # BLD: 0.05 K/UL (ref 0–0.12)
BUN SERPL-MCNC: 23 MG/DL (ref 8–22)
CALCIUM SERPL-MCNC: 9 MG/DL (ref 8.5–10.5)
CHLORIDE SERPL-SCNC: 96 MMOL/L (ref 96–112)
CO2 SERPL-SCNC: 33 MMOL/L (ref 20–33)
CREAT SERPL-MCNC: 0.99 MG/DL (ref 0.5–1.4)
EOSINOPHIL # BLD AUTO: 0.33 K/UL (ref 0–0.51)
EOSINOPHIL NFR BLD: 1.4 % (ref 0–6.9)
ERYTHROCYTE [DISTWIDTH] IN BLOOD BY AUTOMATED COUNT: 55.2 FL (ref 35.9–50)
GFR SERPLBLD CREATININE-BSD FMLA CKD-EPI: 85 ML/MIN/1.73 M 2
GLUCOSE SERPL-MCNC: 100 MG/DL (ref 65–99)
HCT VFR BLD AUTO: 43.7 % (ref 42–52)
HGB BLD-MCNC: 13.1 G/DL (ref 14–18)
IMM GRANULOCYTES # BLD AUTO: 0.31 K/UL (ref 0–0.11)
IMM GRANULOCYTES NFR BLD AUTO: 1.3 % (ref 0–0.9)
LYMPHOCYTES # BLD AUTO: 1.21 K/UL (ref 1–4.8)
LYMPHOCYTES NFR BLD: 5.1 % (ref 22–41)
MCH RBC QN AUTO: 20.5 PG (ref 27–33)
MCHC RBC AUTO-ENTMCNC: 30 G/DL (ref 32.3–36.5)
MCV RBC AUTO: 68.4 FL (ref 81.4–97.8)
MONOCYTES # BLD AUTO: 2.2 K/UL (ref 0–0.85)
MONOCYTES NFR BLD AUTO: 9.2 % (ref 0–13.4)
NEUTROPHILS # BLD AUTO: 19.86 K/UL (ref 1.82–7.42)
NEUTROPHILS NFR BLD: 82.8 % (ref 44–72)
NRBC # BLD AUTO: 0.04 K/UL
NRBC BLD-RTO: 0.2 /100 WBC (ref 0–0.2)
PLATELET # BLD AUTO: 298 K/UL (ref 164–446)
POTASSIUM SERPL-SCNC: 5 MMOL/L (ref 3.6–5.5)
RBC # BLD AUTO: 6.39 M/UL (ref 4.7–6.1)
SIGNIFICANT IND 70042: NORMAL
SITE SITE: NORMAL
SODIUM SERPL-SCNC: 138 MMOL/L (ref 135–145)
SOURCE SOURCE: NORMAL
WBC # BLD AUTO: 24 K/UL (ref 4.8–10.8)

## 2024-04-28 PROCEDURE — 36415 COLL VENOUS BLD VENIPUNCTURE: CPT

## 2024-04-28 PROCEDURE — 770020 HCHG ROOM/CARE - TELE (206)

## 2024-04-28 PROCEDURE — 700111 HCHG RX REV CODE 636 W/ 250 OVERRIDE (IP)

## 2024-04-28 PROCEDURE — A9270 NON-COVERED ITEM OR SERVICE: HCPCS | Performed by: HEALTH CARE PROVIDER

## 2024-04-28 PROCEDURE — 85025 COMPLETE CBC W/AUTO DIFF WBC: CPT

## 2024-04-28 PROCEDURE — 700111 HCHG RX REV CODE 636 W/ 250 OVERRIDE (IP): Mod: JZ | Performed by: HEALTH CARE PROVIDER

## 2024-04-28 PROCEDURE — 76882 US LMTD JT/FCL EVL NVASC XTR: CPT | Mod: RT

## 2024-04-28 PROCEDURE — 94664 DEMO&/EVAL PT USE INHALER: CPT

## 2024-04-28 PROCEDURE — 80048 BASIC METABOLIC PNL TOTAL CA: CPT

## 2024-04-28 PROCEDURE — 700102 HCHG RX REV CODE 250 W/ 637 OVERRIDE(OP): Performed by: HEALTH CARE PROVIDER

## 2024-04-28 PROCEDURE — 99232 SBSQ HOSP IP/OBS MODERATE 35: CPT | Mod: GC | Performed by: FAMILY MEDICINE

## 2024-04-28 PROCEDURE — 99406 BEHAV CHNG SMOKING 3-10 MIN: CPT

## 2024-04-28 PROCEDURE — 700105 HCHG RX REV CODE 258

## 2024-04-28 PROCEDURE — 700101 HCHG RX REV CODE 250

## 2024-04-28 RX ORDER — FUROSEMIDE 10 MG/ML
40 INJECTION INTRAMUSCULAR; INTRAVENOUS
Status: DISCONTINUED | OUTPATIENT
Start: 2024-04-29 | End: 2024-04-29

## 2024-04-28 RX ORDER — SODIUM CHLORIDE 9 MG/ML
INJECTION, SOLUTION INTRAVENOUS CONTINUOUS
Status: DISCONTINUED | OUTPATIENT
Start: 2024-04-28 | End: 2024-04-28

## 2024-04-28 RX ADMIN — HYDROMORPHONE HYDROCHLORIDE 0.5 MG: 1 INJECTION, SOLUTION INTRAMUSCULAR; INTRAVENOUS; SUBCUTANEOUS at 18:53

## 2024-04-28 RX ADMIN — OMEPRAZOLE 20 MG: 20 CAPSULE, DELAYED RELEASE ORAL at 17:12

## 2024-04-28 RX ADMIN — OXYCODONE HYDROCHLORIDE 10 MG: 10 TABLET ORAL at 23:16

## 2024-04-28 RX ADMIN — LOSARTAN POTASSIUM 25 MG: 25 TABLET, FILM COATED ORAL at 04:56

## 2024-04-28 RX ADMIN — METOPROLOL SUCCINATE 25 MG: 25 TABLET, FILM COATED, EXTENDED RELEASE ORAL at 04:56

## 2024-04-28 RX ADMIN — OXYCODONE HYDROCHLORIDE 10 MG: 10 TABLET ORAL at 17:12

## 2024-04-28 RX ADMIN — FLUTICASONE FUROATE, UMECLIDINIUM BROMIDE AND VILANTEROL TRIFENATATE 1 PUFF: 100; 62.5; 25 POWDER RESPIRATORY (INHALATION) at 08:14

## 2024-04-28 RX ADMIN — OXYCODONE HYDROCHLORIDE 10 MG: 10 TABLET ORAL at 04:09

## 2024-04-28 RX ADMIN — FUROSEMIDE 40 MG: 10 INJECTION INTRAMUSCULAR; INTRAVENOUS at 04:55

## 2024-04-28 RX ADMIN — HYDROMORPHONE HYDROCHLORIDE 0.5 MG: 1 INJECTION, SOLUTION INTRAMUSCULAR; INTRAVENOUS; SUBCUTANEOUS at 12:28

## 2024-04-28 RX ADMIN — HYDROMORPHONE HYDROCHLORIDE 0.5 MG: 1 INJECTION, SOLUTION INTRAMUSCULAR; INTRAVENOUS; SUBCUTANEOUS at 00:02

## 2024-04-28 RX ADMIN — SODIUM CHLORIDE: 9 INJECTION, SOLUTION INTRAVENOUS at 15:25

## 2024-04-28 RX ADMIN — CEFTRIAXONE SODIUM 2000 MG: 10 INJECTION, POWDER, FOR SOLUTION INTRAVENOUS at 04:56

## 2024-04-28 RX ADMIN — RIVAROXABAN 20 MG: 20 TABLET, FILM COATED ORAL at 04:56

## 2024-04-28 RX ADMIN — HYDROMORPHONE HYDROCHLORIDE 0.5 MG: 1 INJECTION, SOLUTION INTRAMUSCULAR; INTRAVENOUS; SUBCUTANEOUS at 22:17

## 2024-04-28 RX ADMIN — ATORVASTATIN CALCIUM 40 MG: 40 TABLET, FILM COATED ORAL at 21:08

## 2024-04-28 RX ADMIN — HYDROMORPHONE HYDROCHLORIDE 0.5 MG: 1 INJECTION, SOLUTION INTRAMUSCULAR; INTRAVENOUS; SUBCUTANEOUS at 08:13

## 2024-04-28 RX ADMIN — OXYCODONE HYDROCHLORIDE 10 MG: 10 TABLET ORAL at 10:47

## 2024-04-28 RX ADMIN — OMEPRAZOLE 20 MG: 20 CAPSULE, DELAYED RELEASE ORAL at 04:56

## 2024-04-28 ASSESSMENT — PAIN SCALES - PAIN ASSESSMENT IN ADVANCED DEMENTIA (PAINAD)
BODYLANGUAGE: RELAXED
CONSOLABILITY: NO NEED TO CONSOLE
TOTALSCORE: 5
CONSOLABILITY: DISTRACTED OR REASSURED BY VOICE/TOUCH
BREATHING: NORMAL
BODYLANGUAGE: TENSE, DISTRESSED PACING, FIDGETING
CONSOLABILITY: DISTRACTED OR REASSURED BY VOICE/TOUCH
BODYLANGUAGE: TENSE, DISTRESSED PACING, FIDGETING
FACIALEXPRESSION: SAD, FRIGHTENED, FROWN
BREATHING: NORMAL
BREATHING: NORMAL
FACIALEXPRESSION: FACIAL GRIMACING
TOTALSCORE: 1
NEGVOCALIZATION: OCCASIONAL MOAN/GROAN, LOW SPEECH, NEGATIVE/DISAPPROVING QUALITY
TOTALSCORE: 3
CONSOLABILITY: NO NEED TO CONSOLE
NEGVOCALIZATION: OCCASIONAL MOAN/GROAN, LOW SPEECH, NEGATIVE/DISAPPROVING QUALITY
TOTALSCORE: 3
FACIALEXPRESSION: SAD, FRIGHTENED, FROWN
BODYLANGUAGE: TENSE, DISTRESSED PACING, FIDGETING
FACIALEXPRESSION: SAD, FRIGHTENED, FROWN
BREATHING: NORMAL

## 2024-04-28 ASSESSMENT — PAIN SCALES - WONG BAKER
WONGBAKER_NUMERICALRESPONSE: HURTS EVEN MORE

## 2024-04-28 ASSESSMENT — PAIN DESCRIPTION - PAIN TYPE
TYPE: ACUTE PAIN;CHRONIC PAIN
TYPE: ACUTE PAIN
TYPE: ACUTE PAIN;CHRONIC PAIN
TYPE: ACUTE PAIN
TYPE: ACUTE PAIN

## 2024-04-28 ASSESSMENT — FIBROSIS 4 INDEX: FIB4 SCORE: 0.92

## 2024-04-28 NOTE — PROGRESS NOTES
Received bedside report from RN, pt care assumed, VSS, pt assessment complete. Pt AAOx4, with 9/10 of pain at this time, educated on oxycodone due at 2100 and provider notified of persistent pain. No signs of acute distress noted at this time. Plan of care discussed with pt and verbalizes no questions. Pt denies any additional needs at this time. Bed locked/in lowest position, pt educated on fall risk and verbalized understanding, call light within reach, hourly rounding initiated.

## 2024-04-28 NOTE — PROGRESS NOTES
[facility] [place] FAMILY MEDICINE PROGRESS NOTE     Attending: Ирина Barrett M.d.  Senior Resident: Agapito Mcnair M.D. (PGY-2)  [place] Resident: Caryl Pineda M.D. (PGY-1)  PATIENT: Noe Hickey; [id number]; 1960    Hospital Day # Hospital Day: 3    ID: 63 y.o. male with past medical history of ascending aortic aneurysm repair and TAVR in 2013, HTN, HLD, chronic left hemidiaphragm paresis, HFimpEF (70% EF), pAF on rivaroxaban, and tobacco use disorder was admitted on 4/26/2024 for shortness of breath and chest pain possibly secondary to CHF exacerbation vs. PE, and RLE pain and discoloration most likely in setting of cellulitis.    24 Hour Events: No acute events overnight. Echocardiogram completed overnight, shows normal LV systolic function with LVEF 60%. Prior Echo from 4/05/24 showed hyperdynamic LV function with LVEF 70%.     Subjective: Patient with improvement in bilateral lower extremity swelling, right lower extremity less discolored and painful this morning. Patient reports improvement in shortness of breath as well. Continues to have significant pain in his right lower extremity, has been getting Dilaudid 0.5mg and oxycodone 10mg as needed which helps with the pain.     OBJECTIVE:  Temp:  [36.4 °C (97.5 °F)-36.6 °C (97.9 °F)] 36.4 °C (97.5 °F)  Pulse:  [71-82] 79  Resp:  [16-19] 18  BP: ()/(62-80) 119/72  SpO2:  [83 %-98 %] 93 %    Intake/Output Summary (Last 24 hours) at 4/27/2024 1248  Last data filed at 4/26/2024 2115  Gross per 24 hour   Intake --   Output 200 ml   Net -200 ml       PE:  Gen: Mild respiratory distress, uncomfortable appearing, sitting upright at bedside  HEENT: normocephalic, atraumatic, EOMI  Pulm: clear to auscultation bilaterally, mild respiratory distress, increased work of breathing, was not on nasal cannula at the time I was at bedside  Cardio: RRR, no R/G, systolic murmur  Abdom: soft, nontender, nondistended, normoactive bowel sounds in all quadrants  Ext: No left lower  "extremity edema. Right lower extremity with no discoloration, right dorsal aspect of foot continues to have tenderness to light palpation, surgical wound from 4/19 I&D intact with sutures in place, no increased erythema or drainage, 2+ pulses bilaterally.   Neuro: Grossly intact    LABS:  Recent Labs     04/26/24 1643 04/27/24 0124 04/28/24  0647   WBC 19.0* 16.2* 24.0*   RBC 5.96 5.83 6.39*   HEMOGLOBIN 12.3* 11.9* 13.1*   HEMATOCRIT 40.5* 39.5* 43.7   MCV 68.0* 67.8* 68.4*   MCH 20.6* 20.4* 20.5*   RDW 54.5* 54.4* 55.2*   PLATELETCT 398 384 298   NEUTSPOLYS 87.80* 82.20* 82.80*   LYMPHOCYTES 4.70* 7.60* 5.10*   MONOCYTES 5.90 7.90 9.20   EOSINOPHILS 0.40 1.00 1.40   BASOPHILS 0.10 0.10 0.20     Recent Labs     04/26/24 1643 04/27/24  0124 04/28/24  0647   SODIUM 136 135 138   POTASSIUM 4.8 4.5 5.0   CHLORIDE 100 96 96   CO2 26 27 33   BUN 20 20 23*   CREATININE 0.91 1.01 0.99   CALCIUM 8.2* 8.2* 9.0   MAGNESIUM 2.1  --   --    ALBUMIN 3.5 3.3  --      Estimated GFR/CRCL = Estimated Creatinine Clearance: 82.2 mL/min (by C-G formula based on SCr of 0.99 mg/dL).  Recent Labs     04/26/24 1643 04/27/24 0124 04/28/24  0647   GLUCOSE 113* 83 100*     Recent Labs     04/26/24 1643 04/27/24  0124   ASTSGOT 39 34   ALTSGPT 47 38   TBILIRUBIN 1.0 1.0   ALKPHOSPHAT 38 34   GLOBULIN 2.5 2.5             No results for input(s): \"INR\", \"APTT\", \"FIBRINOGEN\" in the last 72 hours.    Invalid input(s): \"DIMER\"    MICROBIOLOGY:   No results found for: \"BLOODCULTU\", \"BLDCULT\", \"BCHOLD\"     IMAGING:   EC-ECHOCARDIOGRAM COMPLETE W/ CONT   Final Result      CT-CTA CHEST PULMONARY ARTERY W/ RECONS   Final Result         1. No evidence for pulmonary embolism.   2. Elevation of the left hemidiaphragm with associated left basilar atelectasis.   3. Atherosclerosis including coronary artery disease.            US-EXTREMITY VENOUS LOWER BILAT   Final Result      US-SUE SINGLE LEVEL BILAT   Final Result      DX-CHEST-PORTABLE (1 VIEW) "   Final Result      No acute cardiac or pulmonary abnormalities are identified.      US-EXTREMITY NON VASCULAR UNILATERAL RIGHT    (Results Pending)       MEDS:  Current Facility-Administered Medications   Medication Last Admin    [START ON 4/29/2024] furosemide (Lasix) injection 40 mg      cefTRIAXone (Rocephin) syringe 2,000 mg 2,000 mg at 04/28/24 0456    HYDROmorphone (Dilaudid) injection 0.5 mg 0.5 mg at 04/28/24 0813    acetaminophen (Tylenol) tablet 650 mg      ondansetron (Zofran) syringe/vial injection 4 mg      ondansetron (Zofran ODT) dispertab 4 mg      promethazine (Phenergan) tablet 12.5-25 mg      promethazine (Phenergan) suppository 12.5-25 mg      prochlorperazine (Compazine) injection 5-10 mg      atorvastatin (Lipitor) tablet 40 mg 40 mg at 04/27/24 2147    fluticasone-umeclidinium-vilanterol (Trelegy Ellipta) 100-62.5-25 mcg/act inhaler 1 Puff 1 Puff at 04/28/24 0814    losartan (Cozaar) tablet 25 mg 25 mg at 04/28/24 0456    ipratropium-albuterol (DUONEB) nebulizer solution      metoprolol SR (Toprol XL) tablet 25 mg 25 mg at 04/28/24 0456    omeprazole (PriLOSEC) capsule 20 mg 20 mg at 04/28/24 0456    oxyCODONE immediate release (Roxicodone) tablet 10 mg 10 mg at 04/28/24 1047    rivaroxaban (Xarelto) tablet 20 mg 20 mg at 04/28/24 0456       PROBLEM LIST:  Problem Noted   Bacteremia 4/28/2024   Hypoxia 4/26/2024   Abscess of Right Foot 4/19/2024   Diffuse Pain in Right Lower Extremity 4/18/2024   Opioid Use, Unspecified, in Remission 5/16/2023   Heart Failure With Improved Ejection Fraction (Hfimpef) ([facility]) 5/16/2023       ASSESSMENT/PLAN: 63 y.o. male with past medical history of ascending aortic aneurysm repair and TAVR in 2013, HTN, HLD, chronic left hemidiaphragm paresis, HFimpEF (70% EF), pAF on rivaroxaban, and tobacco use disorder was admitted on 4/26/2024 for shortness of breath and chest pain possibly secondary to CHF exacerbation vs. PE, and RLE pain and discoloration most likely in  setting of cellulitis.    * Hypoxia- (present on admission)  Assessment & Plan  Patient admitted from 4/11-4/24 for acute hypoxemic respiratory failure in the setting of acute CAP, and acute leg pain and swelling due to hamstring tear and right foot dorsal abscess s/p I&D by ortho. Last echo 4/5/24 showed improved EF to 70% from 30% with possible hypertrophic cardiomyopathy.  Workup at that time showed negative CTA aorta but confirmed CAP with WBC up to 27.  Patient was initiated on antibiotics.  He was seen by cardiology, pulmonology, ENT, orthopedics, and ID with plan for outpatient cardiac MRI and stress testing. He is also to follow up with pulmonology for possible undiagnosed COPD. Discharged from hospital on 4/24 with albuterol and Spiriva, treated with a 3 day burst of prednisone + medrol dose enedelia on discharge and 3 days of azithromycin during last hospitalization.     Patient returns to ED with worsening shortness of breath since discharge, requiring 2L in the ED, as well as increased pain in his right leg and new swelling of the left foot. Labs showed leukocytosis to 19 (from 27 during last hospitalization), has been taking his medrol dose enedelia. Troponin 20 to 22. EKG without signs of STEMI or arrhythmia. BNP 1080 from 1568 on day of discharge (4/24). CXR negative.     Hypoxia thought to be multifactorial, etiologies include ongoing fluid overload with elevation in BNP >1000 (however improved over the past several days from 1500), underlying COPD/emphysema causing exacerbation, new PE (Wells score 3.5, though not tachycardic), versus acute cardiac etiology such as MI. Lungs clear on examination on initial evaluation, no wheezing, good air movement but requiring oxygen. Area of abscess to right foot stable but tender throughout with new edema to the left foot.    Plan:  -Continue telemetry  -Bilateral LE US negative for DVT  -CTA Chest negative for PE  -TTE showed normal LV systolic function, LVEF of  60%  -Trend leukocytosis; likely reactive due to steroid use  -IV lasix 40mg decreased to daily given improvement in lower extremity edema  -Discontinue home albuterol and Spiriva; will add on PRN duonebs and Trelegy for additional treatment with [facility] inpatient.   -Provide oxygen supplementation to keep saturations >90%, wean as tolerated  -Recommend close follow up with cardiology and pulmonology outpatient  -Cardiac diet while inpatient    Bacteremia  Assessment & Plan  Patient with 1 out of 2 blood cultures drawn 4/27 resulting positive today for gram positive cocci, possible strep species. Will continue to monitor for growth of cultures. Of concern is that patient has history of AVR in 2023 with prosthetic valve. Patient currently on IV ceftriaxone initiated after obtaining initial blood cultures. May possibly need ABBY to evaluate for prosthetic valve endocarditis pending results of cultures. TTE from this admission on 4/27 showed known bioprosthetic aortic valve that is functioning normally with   normal transvalvular gradients, LVEF 60% compared to previous TTE which showed 70% LVEF.     Hypertension- (present on admission)  Assessment & Plan  -Continue home Losartan and Metoprolol    Hyperlipidemia- (present on admission)  Assessment & Plan  -Continue home statin    Abscess of right foot- (present on admission)  Assessment & Plan  S/p I&D to dorsal right foot by orthopedics during last hospitalization, cultures without growth. Dr. Soares (ID) consulted, recommended 2 weeks of Augmentin, to end 5/3. Now with increased pain in the right lower extremity diffusely. Concern at this time is for cellulitis in setting of wound infection.  -Continue IV Ceftriaxone pending culture results  -Blood cultures drawn prior to initiation of IV antibiotics, 1 of 2 with growth of gram positive cocci suspicious for strep species.  -MRSA swab ordered, pending  -Bilateral LE arterial US ordered, negative for peripheral arterial  "disease bilaterally  -Bilateral LE ultrasound negative for DVT  -Unilateral right LE soft tissue ultrasound ordered to evaluate for possible continued abscess    Diffuse pain in right lower extremity- (present on admission)  Assessment & Plan  See \"Abscess of right foot\" for more details  -Bilateral LE DVT ultrasound negative    Dysphonia- (present on admission)  Assessment & Plan  Had cardiac surgery about a year ago and has had persistent hoarseness and dyspnea since then. Dr. Finley (ENT) was consulted during last hospitalization, completed scope at that time which was unremarkable. Did not think that dyspnea is related to vocal cords, thus deferred management to hospitalist and pulmonology. If hoarseness persists after other issues are well controlled, would recommend speech therapy. No ENT follow up was indicated at that time.  -Continue to monitor      Tobacco dependence- (present on admission)  Assessment & Plan  Questionable contributing factor of COPD/emphysema with 20 pack year smoking history. Pulmonology consulted during last hospitalization, with outpatient follow up to have PFTs completed, patient states this is scheduled for 4/27.  -Continue inhalers inpatient   -Smoking cessation counseling ordered    Heart failure with improved ejection fraction (HFimpEF) ([facility])- (present on admission)  Assessment & Plan  TTE 4/5/2024 with improved EF to 70% from 30%, concern for HOCM. Cardiology consulted during last hospitalization with plan at that time for likely outpatient cardiac MRI/stress test. Troponin on admission 20, 22. TTE showed LVEF of 60%, decreased from 70% on last TTE, otherwise decreased right ventricle systolic function.     Plan:  -Continue home metoprolol   -See plan for diuresis under \"Hypoxia\"    Opioid use, unspecified, in remission- (present on admission)  Assessment & Plan  -Will continue home Roxicodone 10mg every 6 hours PRN for pain     Paroxysmal atrial flutter (HCC)- (present on " admission)  Assessment & Plan  -Continue home Xarelto 20mg daily     Elevated hemidiaphragm - s/p post AVR- (present on admission)  Assessment & Plan  Also seen on CXR this admission. ?Contribution to dyspnea but appears stable.    GERD (gastroesophageal reflux disease)- (present on admission)  Assessment & Plan  Continue home omeprazole     Ascending aortic aneurysm repair- (present on admission)  Assessment & Plan  Completed in 2013 per pt also with TAVR. Since then has had a degree of shortness of breath and chronic chest pain. Chest pain stable, however, shortness of breath worsening since discharge. CTA Aorta during last admission without aortic dilation.     Core Measures:  Fluids: None  Lines: PIV  Abx: IV Ceftriaxone  Diet: Cardiac  PPX: SCDs/TEDs, continuing home Xarelto 20mg daily    #DISPOSITION  - Inpatient for management of hypoxia and IV antibiotics    Caryl Pineda M.D.  PGY-1  [facility]

## 2024-04-28 NOTE — RESPIRATORY CARE
"  COPD EDUCATION by COPD CLINICAL EDUCATOR  4/28/2024  at  3:34 PM by Antonieta Fregoso, RRT     Patient interviewed by education team.  Patient declined or is unable to participate in the full program.  Therefore, a short intervention has been conducted.  A comprehensive packet including information about COPD, types of treatments to manage their disease and safe home Oxygen usage was provided and reviewed with patient at the bedside.        Smoking Cessation Intervention and education completed, 5 minutes spent on smoking cessation education with patient.        COPD Screen  COPD Risk Screening  Do you have a history of COPD?: Yes  Do you have a Pulmonologist?: No (Upcoming appoint to [facility])    COPD Assessment  COPD Clinical Specialists ONLY  COPD Education Initiated: Yes--Short Intervention (Pt readmission for non-respiratory related DX- Meet briefly w/ pt who seemed mostly uninterested. Declined handout + educational material at this time.)  Is this a COPD exacerbation patient?: No  DME Company: N/A  DME Equipment Type: None at this time - Per PT  Physician Name: Farheen Wright M.D.  Pulmonary Follow Up Appointment: 06/04/24  Appt Time: 1540  Pulmonologist Name: [facility] - (Hospital F/U appoint from last admission )  Referrals Initiated: Yes  Pulmonary Rehab: N/A  Smoking Cessation: Yes  $ Smoking Cessation 3-10 Minutes: Symptomatic (Pt admits to current smoking - states he \"only\" smokes minimially / around 3-4 cigarrettes QD. Pt agreeable to brief discussion regarding cessation - declined handouts at this time)  Hospice: N/A  Home Health Care:  (TBD)  Mobile Urgent Care Services: N/A  Geriatric Specialty Group: N/A  Private In-Home Care Agency: N/A  $ Demo/Eval of SVN's, MDI's and Aerosols: Yes (Reviewed respiratory medications with PT who reports he cant recall name of current inhalers- review of chart from last hospital stay = Symbicort + Albuterol)  (OP) Pulmonary Function Testing:  " "(Scheduled 5/4 via [facility])  Interdisciplinary Rounds: Attendance at Rounds (30 Min)    PFT Results    No results found for: \"PFT\"    Meds to Beds        MY COPD ACTION PLAN     It is recommended that patients and physicians /healthcare providers complete this action plan together. This plan should be discussed at each physician visit and updated as needed.    The green, yellow and red zones show groups of symptoms of COPD. This list of symptoms is not comprehensive, and you may experience other symptoms. In the \"Actions\" column, your healthcare provider has recommended actions for you to take based on your symptoms.    Patient Name: Noe Hickey   YOB: 1960   Last Updated on: 4/28/2024  3:19 PM   Green Zone:  I am doing well today Actions     Usual activitiy and exercise level   Take daily medications     Usual amounts of cough and phlegm/mucus   Use oxygen as prescribed     Sleep well at night   Continue regular exercise/diet plan     Appetite is good   At all times avoid cigarette smoke, inhaled irritants     Daily Medications (these medications are taken every day):   Budesonide-Formoterol Fumarate (Symbicort) 2 Puffs Twice daily     Additional Information:  Use as directed     Yellow Zone:  I am having a bad day or a COPD flare Actions     More breathless than usual   Continue daily medications     I have less energy for my daily activities   Use quick relief inhaler as ordered     Increased or thicker phlegm/mucus   Use oxygen as prescribed     Using quick relief inhaler/nebulizer more often   Get plenty of rest     Swelling of ankles more than usual   Use pursed lip breathing     More coughing than usual   At all times avoid cigarette smoke, inhaled irritants     I feel like I have a \"chest cold\"     Poor sleep and my symptoms woke me up     My appetite is not good     My medicine is not helping      Call provider immediately if symptoms don’t improve     Continue daily medications, " add rescue medications:   Albuterol 2 Puffs Every 6 hours PRN       Medications to be used during a flare up, (as Discussed with Provider):           Additional Information:  Use as directed and with spacer     Red Zone:  I need urgent medical care Actions     Severe shortness of breath even at rest   Call 911 or seek medical care immediately     Not able to do any activity because of breathing      Fever or shaking chills      Feeling confused or very drowsy       Chest pains      Coughing up blood

## 2024-04-28 NOTE — CARE PLAN
The patient is Stable - Low risk of patient condition declining or worsening    Shift Goals  Clinical Goals: pain managment  Patient Goals: rest, pain relief  Family Goals: HUGH    Progress made toward(s) clinical / shift goals:      Problem: Pain - Standard  Goal: Alleviation of pain or a reduction in pain to the patient’s comfort goal  4/28/2024 0130 by Ofelia Chen R.N.  Outcome: Progressing  4/28/2024 0130 by Ofelia Chen R.N.  Outcome: Progressing     Problem: Knowledge Deficit - Standard  Goal: Patient and family/care givers will demonstrate understanding of plan of care, disease process/condition, diagnostic tests and medications  4/28/2024 0130 by Ofelia Chen R.N.  Outcome: Progressing  4/28/2024 0130 by Ofelia Chen R.N.  Outcome: Progressing     Problem: Hemodynamics  Goal: Patient's hemodynamics, fluid balance and neurologic status will be stable or improve  Outcome: Progressing     Problem: Respiratory  Goal: Patient will achieve/maintain optimum respiratory ventilation and gas exchange  4/28/2024 0130 by Ofelia Chen R.N.  Outcome: Progressing  4/28/2024 0130 by Ofelia Chen R.N.  Outcome: Progressing     Problem: Mobility  Goal: Patient's capacity to carry out activities will improve  4/28/2024 0130 by Ofelia Chen R.N.  Outcome: Progressing  4/28/2024 0130 by Ofelia Chen R.N.  Outcome: Progressing     Problem: Self Care  Goal: Patient will have the ability to perform ADLs independently or with assistance (bathe, groom, dress, toilet and feed)  4/28/2024 0130 by Ofelia Chen R.N.  Outcome: Progressing  4/28/2024 0130 by Ofelia Chen R.N.  Outcome: Progressing     Problem: Infection - Standard  Goal: Patient will remain free from infection  Outcome: Progressing     Problem: Wound/ / Incision Healing  Goal: Patient's wound/surgical incision will decrease in size and heals properly  Outcome: Progressing       Patient is A&Ox4 and understands all tests, medications,  and procedures. Patient ambulate well and having consistent urine output. Continuing to require 3L NC, maintaining oxygen saturation greater than 90%. Pain treated per MAR for foot pain.

## 2024-04-28 NOTE — ASSESSMENT & PLAN NOTE
Patient with 1 out of 2 blood cultures drawn 4/27 resulting positive today for gram positive cocci, possible strep species. Will continue to monitor for growth of cultures. Of concern is that patient has history of AVR in 2023 with prosthetic valve. Patient currently on IV ceftriaxone initiated after obtaining initial blood cultures. May possibly need ABBY to evaluate for prosthetic valve endocarditis pending results of cultures. TTE from this admission on 4/27 showed known bioprosthetic aortic valve that is functioning normally with   normal transvalvular gradients, LVEF 60% compared to previous TTE which showed 70% LVEF.

## 2024-04-28 NOTE — PROGRESS NOTES
Bedside report received by RN and patient care assumed. Tele Box in place, patient is A&O4, resting in bed, and on 2.5LNC. Patient states 10/10 pain. Fall precautions in place and patient educated on use of call light for assistance. Pt updated on POC with no questions or concerns. Hourly monitoring initiated.

## 2024-04-29 ENCOUNTER — HOSPITAL ENCOUNTER (INPATIENT)
Dept: RADIOLOGY | Facility: MEDICAL CENTER | Age: 64
End: 2024-04-29
Admitting: FAMILY MEDICINE
Payer: MEDICAID

## 2024-04-29 ENCOUNTER — PATIENT OUTREACH (OUTPATIENT)
Dept: HEALTH INFORMATION MANAGEMENT | Facility: OTHER | Age: 64
End: 2024-04-29
Payer: MEDICAID

## 2024-04-29 VITALS
BODY MASS INDEX: 27.85 KG/M2 | HEIGHT: 69 IN | OXYGEN SATURATION: 97 % | HEART RATE: 80 BPM | WEIGHT: 188.05 LBS | DIASTOLIC BLOOD PRESSURE: 81 MMHG | SYSTOLIC BLOOD PRESSURE: 131 MMHG | RESPIRATION RATE: 18 BRPM | TEMPERATURE: 97 F

## 2024-04-29 DIAGNOSIS — L02.611 ABSCESS OF RIGHT FOOT: ICD-10-CM

## 2024-04-29 LAB
ANION GAP SERPL CALC-SCNC: 9 MMOL/L (ref 7–16)
ANISOCYTOSIS BLD QL SMEAR: ABNORMAL
BACTERIA BLD CULT: ABNORMAL
BACTERIA BLD CULT: ABNORMAL
BASOPHILS # BLD AUTO: 0.1 % (ref 0–1.8)
BASOPHILS # BLD: 0.03 K/UL (ref 0–0.12)
BUN SERPL-MCNC: 20 MG/DL (ref 8–22)
CALCIUM SERPL-MCNC: 8.9 MG/DL (ref 8.5–10.5)
CHLORIDE SERPL-SCNC: 98 MMOL/L (ref 96–112)
CO2 SERPL-SCNC: 28 MMOL/L (ref 20–33)
COMMENT 1642: NORMAL
CREAT SERPL-MCNC: 0.86 MG/DL (ref 0.5–1.4)
EOSINOPHIL # BLD AUTO: 0.29 K/UL (ref 0–0.51)
EOSINOPHIL NFR BLD: 1.4 % (ref 0–6.9)
ERYTHROCYTE [DISTWIDTH] IN BLOOD BY AUTOMATED COUNT: 57.3 FL (ref 35.9–50)
GFR SERPLBLD CREATININE-BSD FMLA CKD-EPI: 96 ML/MIN/1.73 M 2
GLUCOSE SERPL-MCNC: 77 MG/DL (ref 65–99)
HCT VFR BLD AUTO: 42 % (ref 42–52)
HGB BLD-MCNC: 12.4 G/DL (ref 14–18)
HYPOCHROMIA BLD QL SMEAR: ABNORMAL
IMM GRANULOCYTES # BLD AUTO: 0.31 K/UL (ref 0–0.11)
IMM GRANULOCYTES NFR BLD AUTO: 1.5 % (ref 0–0.9)
LG PLATELETS BLD QL SMEAR: NORMAL
LYMPHOCYTES # BLD AUTO: 1.21 K/UL (ref 1–4.8)
LYMPHOCYTES NFR BLD: 5.8 % (ref 22–41)
MCH RBC QN AUTO: 20.2 PG (ref 27–33)
MCHC RBC AUTO-ENTMCNC: 29.5 G/DL (ref 32.3–36.5)
MCV RBC AUTO: 68.5 FL (ref 81.4–97.8)
MICROCYTES BLD QL SMEAR: ABNORMAL
MONOCYTES # BLD AUTO: 1.82 K/UL (ref 0–0.85)
MONOCYTES NFR BLD AUTO: 8.7 % (ref 0–13.4)
MORPHOLOGY BLD-IMP: NORMAL
NEUTROPHILS # BLD AUTO: 17.25 K/UL (ref 1.82–7.42)
NEUTROPHILS NFR BLD: 82.5 % (ref 44–72)
NRBC # BLD AUTO: 0 K/UL
NRBC BLD-RTO: 0 /100 WBC (ref 0–0.2)
PLATELET # BLD AUTO: 285 K/UL (ref 164–446)
PLATELET BLD QL SMEAR: NORMAL
POIKILOCYTOSIS BLD QL SMEAR: NORMAL
POTASSIUM SERPL-SCNC: 4.7 MMOL/L (ref 3.6–5.5)
RBC # BLD AUTO: 6.13 M/UL (ref 4.7–6.1)
RBC BLD AUTO: PRESENT
SCHISTOCYTES BLD QL SMEAR: NORMAL
SIGNIFICANT IND 70042: ABNORMAL
SITE SITE: ABNORMAL
SODIUM SERPL-SCNC: 135 MMOL/L (ref 135–145)
SOURCE SOURCE: ABNORMAL
WBC # BLD AUTO: 20.9 K/UL (ref 4.8–10.8)

## 2024-04-29 PROCEDURE — 700111 HCHG RX REV CODE 636 W/ 250 OVERRIDE (IP)

## 2024-04-29 PROCEDURE — 36415 COLL VENOUS BLD VENIPUNCTURE: CPT

## 2024-04-29 PROCEDURE — 700101 HCHG RX REV CODE 250

## 2024-04-29 PROCEDURE — 99238 HOSP IP/OBS DSCHRG MGMT 30/<: CPT | Mod: GC | Performed by: FAMILY MEDICINE

## 2024-04-29 PROCEDURE — 85025 COMPLETE CBC W/AUTO DIFF WBC: CPT

## 2024-04-29 PROCEDURE — 700102 HCHG RX REV CODE 250 W/ 637 OVERRIDE(OP): Performed by: HEALTH CARE PROVIDER

## 2024-04-29 PROCEDURE — A9270 NON-COVERED ITEM OR SERVICE: HCPCS | Performed by: HEALTH CARE PROVIDER

## 2024-04-29 PROCEDURE — 80048 BASIC METABOLIC PNL TOTAL CA: CPT

## 2024-04-29 RX ORDER — HYDROMORPHONE HYDROCHLORIDE 1 MG/ML
1 INJECTION, SOLUTION INTRAMUSCULAR; INTRAVENOUS; SUBCUTANEOUS
Status: DISCONTINUED | OUTPATIENT
Start: 2024-04-29 | End: 2024-04-29 | Stop reason: HOSPADM

## 2024-04-29 RX ORDER — DOXYCYCLINE 100 MG/1
100 CAPSULE ORAL 2 TIMES DAILY
Qty: 28 CAPSULE | Refills: 0 | Status: ACTIVE | OUTPATIENT
Start: 2024-04-29 | End: 2024-04-30 | Stop reason: SDUPTHER

## 2024-04-29 RX ORDER — AMOXICILLIN AND CLAVULANATE POTASSIUM 875; 125 MG/1; MG/1
1 TABLET, FILM COATED ORAL 2 TIMES DAILY
Qty: 28 TABLET | Refills: 0 | Status: ACTIVE | OUTPATIENT
Start: 2024-04-29 | End: 2024-04-30 | Stop reason: SDUPTHER

## 2024-04-29 RX ADMIN — HYDROMORPHONE HYDROCHLORIDE 0.5 MG: 1 INJECTION, SOLUTION INTRAMUSCULAR; INTRAVENOUS; SUBCUTANEOUS at 04:49

## 2024-04-29 RX ADMIN — HYDROMORPHONE HYDROCHLORIDE 1 MG: 1 INJECTION, SOLUTION INTRAMUSCULAR; INTRAVENOUS; SUBCUTANEOUS at 12:09

## 2024-04-29 RX ADMIN — METOPROLOL SUCCINATE 25 MG: 25 TABLET, FILM COATED, EXTENDED RELEASE ORAL at 05:52

## 2024-04-29 RX ADMIN — FLUTICASONE FUROATE, UMECLIDINIUM BROMIDE AND VILANTEROL TRIFENATATE 1 PUFF: 100; 62.5; 25 POWDER RESPIRATORY (INHALATION) at 08:27

## 2024-04-29 RX ADMIN — CEFTRIAXONE SODIUM 2000 MG: 10 INJECTION, POWDER, FOR SOLUTION INTRAVENOUS at 05:54

## 2024-04-29 RX ADMIN — OMEPRAZOLE 20 MG: 20 CAPSULE, DELAYED RELEASE ORAL at 05:52

## 2024-04-29 RX ADMIN — RIVAROXABAN 20 MG: 20 TABLET, FILM COATED ORAL at 05:52

## 2024-04-29 RX ADMIN — OXYCODONE HYDROCHLORIDE 10 MG: 10 TABLET ORAL at 05:52

## 2024-04-29 RX ADMIN — FUROSEMIDE 40 MG: 10 INJECTION INTRAMUSCULAR; INTRAVENOUS at 05:54

## 2024-04-29 RX ADMIN — HYDROMORPHONE HYDROCHLORIDE 0.5 MG: 1 INJECTION, SOLUTION INTRAMUSCULAR; INTRAVENOUS; SUBCUTANEOUS at 01:41

## 2024-04-29 RX ADMIN — HYDROMORPHONE HYDROCHLORIDE 1 MG: 1 INJECTION, SOLUTION INTRAMUSCULAR; INTRAVENOUS; SUBCUTANEOUS at 08:38

## 2024-04-29 RX ADMIN — LOSARTAN POTASSIUM 25 MG: 25 TABLET, FILM COATED ORAL at 05:52

## 2024-04-29 ASSESSMENT — PAIN DESCRIPTION - PAIN TYPE
TYPE: ACUTE PAIN

## 2024-04-29 ASSESSMENT — FIBROSIS 4 INDEX: FIB4 SCORE: 1.18

## 2024-04-29 NOTE — PROGRESS NOTES
Report received from NOC RN. Patient A&Ox4, complaints of 10/10 right foot pain. Will medicate for pain when PRN is available. Patient on room air, denies SOB. Patient updated on POC. All fall precautions in place, refusing to wear treaded socks. Bed is locked in lowest position. Personal belongings and call light within reach. Care ongoing.

## 2024-04-29 NOTE — CARE PLAN
The patient is Stable - Low risk of patient condition declining or worsening    Shift Goals  Clinical Goals: Pain Management, Abx Therapy  Patient Goals: Comort with movement, pain control  Family Goals: HUGH    Progress made toward(s) clinical / shift goals:        Problem: Pain - Standard  Goal: Alleviation of pain or a reduction in pain to the patient’s comfort goal  Outcome: Progressing  Patient educated to pain scale system. Patient encouraged to verbalize discomfort. Patient taught about non-pharmacological pain management. Patient is comfortable at this time without statements of discomfort or pain.     Problem: Respiratory  Goal: Patient will achieve/maintain optimum respiratory ventilation and gas exchange  Outcome: Progressing  Patient respiratory status assessed. Patient educated on importance of coughing and deep breathing. Deep breaths are encouraged. Educated on how ambulation and movement can affect respiratory status. Patient indicates understanding.       Patient is not progressing towards the following goals: NA

## 2024-04-29 NOTE — DISCHARGE PLANNING
Community Health Worker Intake    Identified no barriers.  Resources not provided.  Contact information provided to Noe Hickey  Has PCP appointment scheduled for   Inpatient assessment completed.    CHW met with pt at bedside to introduce Community Care Management. Pt states he just moved into a new first floor apartment. Pt states he is an active  with a working vehicle so he has no barriers to transportation. Pt is active with PCP RAMSES RODRIGUEZ and was sent to the hospital after an appointment with her. Pt requested CHW make a hospital follow up appointment for any day after 4:00 PM. Pt reports no barriers to food, or medications. Pt reports no DME use at home.     Plan:  CHW made pt a hospital follow up appointment with PCP for 5/7/24 at 4:00 PM  Follow up reminder put in follow up section of AVS  CHW will follow up with pt post discharge to insure no more barriers have come up.

## 2024-04-29 NOTE — PROGRESS NOTES
Report received from offgoing RN    Pt A&Ox4, resting in bed. No complaints at this time.    Bed on lowest setting, call light in reach.

## 2024-04-29 NOTE — DISCHARGE PLANNING
Case Management Discharge Planning    Admission Date: 4/26/2024  GMLOS: 3.9  ALOS: 3    6-Clicks ADL Score: 24  6-Clicks Mobility Score: 24      Anticipated Discharge Dispo: Discharge Disposition: Discharged to home/self care (01)    DME Needed: No    Action(s) Taken: Updated Provider/Nurse on Discharge Plan and DC Assessment Complete (See below)    No CM needs identified as of this time.    Escalations Completed: None    Medically Clear: No    Next Steps: CM will continue to assist Pt with discharge needs.      Barriers to Discharge: Medical clearance    Is the patient up for discharge tomorrow: No      Care Transition Team Assessment  RN SISI met with Pt at bedside to obtain assessment informations. Demographics verified. RN CM introduced self and purpose of visit.   Pt lives alone and able to care for self on the 1st floor apartment with 1 step to main entrance  Pt has friends for support.  Pt has RAMSES RODRIGUEZ M.D. for PCP  Pt was independent with ADLs prior to hospitalization.      Information Source  Orientation Level: Oriented X4  Information Given By: Patient  Who is responsible for making decisions for patient? : Patient      Elopement Risk  Legal Hold: No  Ambulatory or Self Mobile in Wheelchair: Yes  Disoriented: No  Psychiatric Symptoms: None  History of Wandering: No  Elopement this Admit: No  Vocalizing Wanting to Leave: No  Displays Behaviors, Body Language Wanting to Leave: No-Not at Risk for Elopement  Elopement Risk: Not at Risk for Elopement    Interdisciplinary Discharge Planning  Primary Care Physician: RAMSES RODRIGUEZ M.D.  Lives with - Patient's Self Care Capacity: Alone and Able to Care For Self  Patient or legal guardian wants to designate a caregiver: No  Support Systems: Friends / Neighbors  Housing / Facility: 1 Story Apartment / Condo (with 1 step to main entrance)  Durable Medical Equipment: Not Applicable    Discharge Preparedness  What is your plan after discharge?: Home with  help  What are your discharge supports?: Other (comment) (friends)  Prior Functional Level: Ambulatory, Independent with Activities of Daily Living, Independent with Medication Management    Functional Assesment  Prior Functional Level: Ambulatory, Independent with Activities of Daily Living, Independent with Medication Management    Finances  Financial Barriers to Discharge: No  Prescription Coverage: Yes    Vision / Hearing Impairment  Vision Impairment : No  Hearing Impairment : No      Domestic Abuse  Have you ever been the victim of abuse or violence?: No  Physical Abuse or Sexual Abuse: No  Verbal Abuse or Emotional Abuse: No  Possible Abuse/Neglect Reported to:: Not Applicable    Psychological Assessment  History of Substance Abuse: None (Pt denies)  History of Psychiatric Problems: No (Pt denies)         Anticipated Discharge Information  Discharge Disposition: Discharged to home/self care (01)

## 2024-04-29 NOTE — DISCHARGE SUMMARY
"[facility] Discharge Summary    Attending: Luis Armando Mejia  Senior Resident: Dr. Agapito Mcnair  Intern:  Dr. Caryl Pineda   Contact Number: [phone]    CHIEF COMPLAINT ON ADMISSION  Chief Complaint   Patient presents with    Shortness of Breath     x1.5 weeks    Chest Pain     Started last thursday    Leg Swelling       Reason for Admission  ems     Admission Date  4/26/2024    CODE STATUS  Full Code    HPI from H&P:     \"Noe Hickey is a 63 y.o. male with a history of ascending aortic aneurysm repair and TAVR in 2013, HTN, HLD, chronic left hemidiaphragm paresis, HFimpEF (70% EF), pAF on rivaroxaban, and tobacco use disorder who presented with shortness of breath and chest pain.      Patient was just admitted to the hospital from 4/11-4/24 for acute hypoxemic respiratory failure in the setting of acute community acquired pneumonia and leukocytosis to 27. He had also been complaining of acute right leg pain and swelling secondary to a hamstring tear and right foot dorsal abscess for which he underwent s/p I&D by ortho (Dr. Bowie) on 4/19. Cultures returned negative; ID was consulted and recommended 2 weeks of oral Augmentin to end on 5/3. He was seen by cardiology who recommended potential for outpatient cardiac MRI/stress testing with new concern for HOCM on echo from 4/5 despite improvement in EF. Pulmonology recommended outpatient PFTs for questionable component of COPD; patient states this was scheduled for 4/27. ENT was consulted for chronic hoarseness with negative fluoroscopy. Ultimately, he was weaned off of oxygen and treated for pneumonia and underlying COPD with azithromycin, prednisone, and placed on albuterol, Spiriva, and a medrol dose enedelia on discharge. He has been taking these in addition to the Augmentin.      Since discharge, however, he feels that his breathing and pain in his right leg have gotten worse. He states that doing any activity including talking makes him feel shot " "of breath; he is not sure if he has experienced orthopnea but has been experiencing shortness of breath when walking short distances. He is reporting mid sternal chest pain but states this is chronic and has been unchanged since his hospitalization. He has been taking the steroid pack and his inhalers as prescribed but they have not been helping. He reports that the pain is worse in his right leg, in the thigh with extension to the top of the foot. He additionally reports new swelling in the left foot. He has been able to walk around since discharge from the hospital, reports that he has been going to the gym and he was able to go to work yesterday. He has been compliant with this Xarelto.         ER Course:  In the ED, patient was afebrile with RR up to 22, systolic BP in the 150s, requiring 2L of oxygen to maintain sats >90%. Labs showed leukocytosis to 19 (from 27 during last hospitalization), slightly decreased hemoglobin to 12.3 (from 10.5) with MCV 68. CMP unremarkable.  Troponin was elevated to 20, stable to 22. EKG without signs of STEMI or arrhythmia. BNP 1080 from 1568 on day of discharge (4/24). CXR negative except for chronic left hemidiaphragm paralysis. His examination revealed clear lung sounds and diffuse tenderness to the right lower extremity with new edema to the right foot. \"     HOSPITAL COURSE  Patient was admitted for lower extremity swelling and significant right foot pain.  He underwent arterial and venous imaging of his lower extremities which showed no occult abnormality or occlusion.  He did have a right foot ultrasound that revealed a complex fluid collection concerning for abscess.  Orthopedic surgery was consulted and recommended MRI with contrast of foot.  Patient left AGAINST MEDICAL ADVICE while awaiting MRI.  Patient was fully aware that if this is an abscess versus osteomyelitis that this infection likely will lead to worsening infection including but not limited to sepsis, loss " of limb, loss of life.  Patient confirms he would like to leave AGAINST MEDICAL ADVICE and states that is because he needs to get back to work and he will lose his job if he does not.  In an effort to provide best medical care possible given patient determined to leave AGAINST MEDICAL ADVICE I spoke with pharmacy and ordered patient another 2 weeks of Augmentin along with doxycycline in order to help try and keep infection at bay.  Patient is aware he needs to follow with infectious disease as well as orthopedic surgery. Patient is aware he needs MRI of the foot with contrast and I have ordered it for him to complete outpatient in an effort to provide him the greatest opportunity for a meaningful recovery. Patient is aware this is not a replacement for inpatient treatment which is the official medical recommendation. I reminded patient that he should go to immediately to ED for any worsening or persistence of symptoms and that I gave my strongest recommendation that he stay inpatient for treatment as he is at very high risk of deterioration.    Therefore, he is discharged in guarded and stable condition against medcial advice.    The patient met 2-midnight criteria for an inpatient stay at the time of discharge.    Discharge Date  4/29/2024    Physical Exam on Day of Discharge  Gen: Resting comfortably in bed in no acute distress  HEENT: normocephalic, atraumatic, EOMI  Pulm: clear to auscultation bilaterally, normal work of breathing  Cardio: RRR, no R/G, systolic murmur  Abdom: soft, nontender, nondistended, normoactive bowel sounds in all quadrants  Ext: No left lower extremity edema.  Right lower extremity without pitting edema.  Right dorsum of foot significantly erythematous and swollen with severe TTP on light palpation.  No draining or fluctuance.  2+ pulses bilaterally.   Neuro: No focal deficit    FOLLOW UP ITEMS POST DISCHARGE  -Patient instructed to return immediately to ED   -MRI foot without contrast    -Blood cultures   -Follow with Orthopedic Surgery for abscess vs osteo   -Follow with infectious disease for abscess vs osteo   -Follow with PCP   DISCHARGE DIAGNOSES  Principal Problem:    Hypoxia (POA: Yes)  Active Problems:    Ascending aortic aneurysm repair (POA: Yes)    GERD (gastroesophageal reflux disease) (POA: Yes)    Elevated hemidiaphragm - s/p post AVR (Chronic) (POA: Yes)    Paroxysmal atrial flutter (HCC) (POA: Yes)    Opioid use, unspecified, in remission (POA: Yes)    Heart failure with improved ejection fraction (HFimpEF) (HCC) (POA: Yes)    Tobacco dependence (POA: Yes)    Dysphonia (POA: Yes)    Diffuse pain in right lower extremity (POA: Yes)    Abscess of right foot (POA: Yes)    Hyperlipidemia (POA: Yes)    Hypertension (POA: Yes)    Bacteremia (POA: Unknown)  Resolved Problems:    * No resolved hospital problems. *      FOLLOW UP  Future Appointments   Date Time Provider Department Center   4/30/2024  1:30 PM Keysha Jameson M.D. [facility] None   5/4/2024  8:30 AM [facility] None   5/7/2024  4:00 PM Italo Langley M.D. [facility]   6/4/2024  3:40 PM Efren Maradiaga D.O. [facility] None     Italo Langley M.D.  [address]  [facility] [address]  [phone]    Follow up on 5/7/2024  Please go to [facility] follow up appointment on 05/07/24 at 4:00 PM      MEDICATIONS ON DISCHARGE     Medication List        START taking these medications        Instructions   doxycycline 100 MG capsule  Commonly known as: Monodox   Take 1 Capsule by mouth 2 times a day for 14 days.  Dose: 100 mg            CHANGE how you take these medications        Instructions   methylPREDNISolone 4 MG Tbpk  What changed:   how much to take  how to take this  when to take this  Commonly known as: Medrol Dosepak   Follow schedule on package instructions.            CONTINUE taking these medications        Instructions   albuterol 108 (90 Base) MCG/ACT Aers inhalation aerosol   Inhale 2 Puffs  "every 6 hours as needed for Shortness of Breath.  Dose: 2 Puff     amoxicillin-clavulanate 875-125 MG Tabs  Commonly known as: Augmentin   Take 1 Tablet by mouth 2 times a day for 14 days.  Dose: 1 Tablet     atorvastatin 40 MG Tabs  Commonly known as: Lipitor   Take 1 Tablet by mouth at bedtime.  Dose: 40 mg     budesonide-formoterol 80-4.5 MCG/ACT Aero  Commonly known as: Symbicort   Inhale 2 Puffs 2 times a day.  Dose: 2 Puff     losartan 25 MG Tabs  Commonly known as: Cozaar   Take 1 Tablet by mouth every day.  Dose: 25 mg     metoprolol SR 25 MG Tb24  Commonly known as: Toprol XL   Take 1 Tablet by mouth every day.  Dose: 25 mg     MILK THISTLE PO   Take 1 Capsule by mouth every day.  Dose: 1 Capsule     omeprazole 20 MG delayed-release capsule  Commonly known as: PriLOSEC   Take 20 mg by mouth 2 times a day.  Dose: 20 mg     ondansetron 4 MG Tabs tablet  Commonly known as: Zofran   Take 4 mg by mouth 2 times a day as needed for Nausea/Vomiting.  Dose: 4 mg     ONE DAILY MENS PO   Take 1 Tablet by mouth every day.  Dose: 1 Tablet     oxyCODONE immediate release 10 MG immediate release tablet  Commonly known as: Roxicodone   Take 1 Tablet by mouth every 6 hours as needed for Severe Pain for up to 5 days.  Dose: 10 mg     Xarelto 20 MG Tabs tablet  Generic drug: rivaroxaban   Take 20 mg by mouth every day. \"Takes in am\"  Dose: 20 mg              Allergies  Allergies   Allergen Reactions    Morphine Shortness of Breath and Rash             DIET  Orders Placed This Encounter   Procedures    Diet Order Diet: Regular     Standing Status:   Standing     Number of Occurrences:   1     Order Specific Question:   Diet:     Answer:   Regular [1]    Diet NPO Restrict to: Sips with Medications     Standing Status:   Standing     Number of Occurrences:   8     Order Specific Question:   Diet NPO Restrict to:     Answer:   Sips with Medications [3]       ACTIVITY  As tolerated.  Weight bearing as " tolerated    CONSULTATIONS  Orthopedic Surgery     PROCEDURES  None     LABORATORY  Lab Results   Component Value Date    SODIUM 135 04/29/2024    POTASSIUM 4.7 04/29/2024    CHLORIDE 98 04/29/2024    CO2 28 04/29/2024    GLUCOSE 77 04/29/2024    BUN 20 04/29/2024    CREATININE 0.86 04/29/2024        Lab Results   Component Value Date    WBC 20.9 (H) 04/29/2024    HEMOGLOBIN 12.4 (L) 04/29/2024    HEMATOCRIT 42.0 04/29/2024    PLATELETCT 285 04/29/2024        Total time of the discharge process exceeds 25 minutes.

## 2024-04-29 NOTE — PROGRESS NOTES
Monitor summary:    SR 73-89  PVC, PAC  .16/.08/.35                 [Routine Follow-Up] : a routine follow-up visit for [Allergy Evaluation/ Skin Testing] : allergy evaluation and or skin testing [To Food] : allergy to food [Mother] : mother

## 2024-04-29 NOTE — PROGRESS NOTES
[facility] [place] [facility] [place]     Attending: Luis Armando Mejia MD   Senior Resident: Agapito Mcnair M.D. (PGY-2)  [place] Resident: Caryl Pineda M.D. (PGY-1)  PATIENT: Noe Hickey; [id number]; 1960    Hospital Day # Hospital Day: 4    24 Hour Events: No acute events overnight.     Subjective: Patient reports that his pain has remained uncontrolled.  Her pH that is only in his right foot.  Otherwise states he feels well without any nausea, vomiting, abdominal pain, diarrhea, fever, chills, shortness of breath, chest pain, heart palpitations.  Reports that his leg swelling is still present but is less than yesterday.    OBJECTIVE:  Temp:  [36.1 °C (97 °F)-37.2 °C (98.9 °F)] 36.1 °C (97 °F)  Pulse:  [70-99] 80  Resp:  [16-19] 18  BP: (103-161)/(64-99) 131/81  SpO2:  [91 %-99 %] 97 %    PE:   Gen: Resting comfortably in bed in no acute distress  HEENT: normocephalic, atraumatic, EOMI  Pulm: clear to auscultation bilaterally, normal work of breathing  Cardio: RRR, no R/G, systolic murmur  Abdom: soft, nontender, nondistended, normoactive bowel sounds in all quadrants  Ext: No left lower extremity edema.  Right lower extremity without pitting edema.  Right dorsum of foot significantly erythematous and swollen with severe TTP on light palpation.  No draining or fluctuance.  2+ pulses bilaterally.   Neuro: No focal deficit    LABS:  Reviewed    IMAGING:   Reviewed    PROBLEM LIST:  Problem Noted   Hypoxia 4/26/2024   Abscess of Right Foot 4/19/2024   Paroxysmal Atrial Flutter (Hcc) 4/4/2023         ASSESSMENT/PLAN:     * Hypoxia- (present on admission)  Assessment & Plan  Patient admitted from 4/11-4/24 for acute hypoxemic respiratory failure in the setting of acute CAP, and acute leg pain and swelling due to hamstring tear and right foot dorsal abscess s/p I&D by ortho. Plans after last admit for outpatient cardiac MRI and stress testing. He is also to follow up with pulmonology for possible undiagnosed COPD.  Discharged from hospital on 4/24 with albuterol and Spiriva, treated with a 3 day burst of prednisone + medrol dose enedelia on discharge and 3 days of azithromycin during last hospitalization. Resolved hypoxia this visit with diuresis and inhalers.   -Continue telemetry  -Bilateral LE US negative for DVT  -CTA Chest negative for PE  -TTE showed normal LV systolic function, LVEF of 60%  -IV lasix titrate to euvolemia   -PRN duonebs and Trelegy for additional treatment with [facility] inpatient.   -Provide oxygen supplementation to keep saturations >90%, wean as tolerated  -Recommend close follow up with cardiology and pulmonology outpatient  -Cardiac diet while inpatient    Bacteremia  Assessment & Plan  Patient with 1 out of 2 blood cultures drawn 4/27 resulting positive today for gram positive cocci, possible strep species. Will continue to monitor for growth of cultures. Of concern is that patient has history of AVR in 2023 with prosthetic valve. Patient currently on IV ceftriaxone initiated after obtaining initial blood cultures. May possibly need ABBY to evaluate for prosthetic valve endocarditis pending results of cultures. TTE from this admission on 4/27 showed known bioprosthetic aortic valve that is functioning normally with   normal transvalvular gradients, LVEF 60% compared to previous TTE which showed 70% LVEF.     Hypertension- (present on admission)  Assessment & Plan  -Continue home Losartan and Metoprolol    Hyperlipidemia- (present on admission)  Assessment & Plan  -Continue home statin    Abscess of right foot- (present on admission)  Assessment & Plan  S/p I&D to dorsal right foot by orthopedics during last hospitalization, cultures without growth. Dr. Soares (ID) consulted, recommended 2 weeks of Augmentin, to end 5/3. Now with increased pain in the right lower extremity diffusely. US of right foot with complex fluid collection.  Orthopedic surgery was consulted.  - MRI of right foot with contrast per  "orthopedic recommendations  - Will continue to keep patient n.p.o. with prophylaxis held at this time  -Continue IV Ceftriaxone pending culture results  -Blood cultures drawn prior to initiation of IV antibiotics, 1 of 2 with growth of gram positive cocci suspicious for strep species. Will follow   -MRSA swab ordered, pending  -Bilateral LE arterial US ordered, negative for peripheral arterial disease bilaterally  -Bilateral LE ultrasound negative for DVT    Diffuse pain in right lower extremity- (present on admission)  Assessment & Plan  See \"Abscess of right foot\" for more details  -Bilateral LE DVT ultrasound negative    Dysphonia- (present on admission)  Assessment & Plan  Had cardiac surgery about a year ago and has had persistent hoarseness and dyspnea since then. Dr. Filney (ENT) was consulted during last hospitalization, completed scope at that time which was unremarkable. Did not think that dyspnea is related to vocal cords, thus deferred management to hospitalist and pulmonology. If hoarseness persists after other issues are well controlled, would recommend speech therapy. No ENT follow up was indicated at that time.  -Continue to monitor      Tobacco dependence- (present on admission)  Assessment & Plan  Questionable contributing factor of COPD/emphysema with 20 pack year smoking history. Pulmonology consulted during last hospitalization, with outpatient follow up to have PFTs completed, patient states this is scheduled for 4/27.  -Continue inhalers inpatient   -Smoking cessation counseling ordered    Heart failure with improved ejection fraction (HFimpEF) ([facility])- (present on admission)  Assessment & Plan  TTE 4/5/2024 with improved EF to 70% from 30%, concern for HOCM. Cardiology consulted during last hospitalization with plan at that time for likely outpatient cardiac MRI/stress test. Troponin on admission 20, 22. TTE showed LVEF of 60%, decreased from 70% on last TTE, otherwise decreased right " "ventricle systolic function.     Plan:  -Continue home metoprolol   -See plan for diuresis under \"Hypoxia\"    Opioid use, unspecified, in remission- (present on admission)  Assessment & Plan  -Will continue home Roxicodone 10mg every 6 hours PRN for pain     Paroxysmal atrial flutter (HCC)- (present on admission)  Assessment & Plan  -Holding home Xarelto 20mg daily in preparation for possible surgery      Elevated hemidiaphragm - s/p post AVR- (present on admission)  Assessment & Plan  Also seen on CXR this admission. ?Contribution to dyspnea but appears stable.    GERD (gastroesophageal reflux disease)- (present on admission)  Assessment & Plan  Continue home omeprazole     Ascending aortic aneurysm repair- (present on admission)  Assessment & Plan  Completed in 2013 per pt also with TAVR. Since then has had a degree of shortness of breath and chronic chest pain. Chest pain stable, however, shortness of breath worsening since discharge. CTA Aorta during last admission without aortic dilation.     Core Measures:  Fluids: MIVF if NPO for sx   Lines: PIV  Abx: IV Ceftriaxone  Diet: Cardiac  PPX: SCDs/TEDs, holding chemoprophylaxis in setting of anticipated surgery     #DISPOSITION  - Inpatient for abscess    Agapito Mcnair M.D.  PGY-2  [facility]       "

## 2024-04-29 NOTE — PROGRESS NOTES
patient has chosen to leave the hospital against medical advice. The attending physician has not discharged the patient. Patient is not a risk to himself or others. I have discussed with the patient the following:  Physician has not determined patient is ready for discharge, Risks and consequences of leaving the hospital too soon, and Benefit of continued hospitalization.      Discharge against medical advice form has been Signed.      Patient is a greater than 60 years old  and a referral to  was made.    Attending physician has been notified.        Patient was given the option to wait for ABX but patient continues to refuses.

## 2024-04-30 ENCOUNTER — OFFICE VISIT (OUTPATIENT)
Dept: MEDICAL GROUP | Facility: CLINIC | Age: 64
End: 2024-04-30
Payer: MEDICAID

## 2024-04-30 ENCOUNTER — TELEPHONE (OUTPATIENT)
Dept: HEALTH INFORMATION MANAGEMENT | Facility: OTHER | Age: 64
End: 2024-04-30
Payer: MEDICAID

## 2024-04-30 VITALS
BODY MASS INDEX: 27.77 KG/M2 | HEART RATE: 92 BPM | SYSTOLIC BLOOD PRESSURE: 108 MMHG | TEMPERATURE: 98.2 F | RESPIRATION RATE: 28 BRPM | HEIGHT: 69 IN | DIASTOLIC BLOOD PRESSURE: 72 MMHG | OXYGEN SATURATION: 96 %

## 2024-04-30 DIAGNOSIS — L02.611 ABSCESS OF RIGHT FOOT: ICD-10-CM

## 2024-04-30 RX ORDER — KETOROLAC TROMETHAMINE 30 MG/ML
30 INJECTION, SOLUTION INTRAMUSCULAR; INTRAVENOUS ONCE
Status: COMPLETED | OUTPATIENT
Start: 2024-04-30 | End: 2024-04-30

## 2024-04-30 RX ORDER — AMOXICILLIN AND CLAVULANATE POTASSIUM 875; 125 MG/1; MG/1
1 TABLET, FILM COATED ORAL 2 TIMES DAILY
Qty: 28 TABLET | Refills: 0 | Status: SHIPPED | OUTPATIENT
Start: 2024-04-30 | End: 2024-05-14

## 2024-04-30 RX ORDER — DOXYCYCLINE 100 MG/1
100 CAPSULE ORAL 2 TIMES DAILY
Qty: 28 CAPSULE | Refills: 0 | Status: SHIPPED | OUTPATIENT
Start: 2024-04-30 | End: 2024-05-14

## 2024-04-30 RX ADMIN — KETOROLAC TROMETHAMINE 30 MG: 30 INJECTION, SOLUTION INTRAMUSCULAR; INTRAVENOUS at 17:13

## 2024-04-30 RX ADMIN — KETOROLAC TROMETHAMINE 30 MG: 30 INJECTION, SOLUTION INTRAMUSCULAR; INTRAVENOUS at 17:14

## 2024-04-30 NOTE — PROGRESS NOTES
This note is formatted in an APSO format, for additional subjective and objective evaluation please scroll to the bottom of the note.    CC:  Chief Complaint   Patient presents with    Foot Pain     Right foot     Breathing Problem       Assessment/Plan:  Problem List Items Addressed This Visit       Abscess of right foot     Patient left hospital AMA yesterday, he was scheduled to have an MRI done in the hospital at the time of his leaving.  There is high concern for osteomyelitis.  Patient has had abscess in his right foot, previous I&D.  At the clinic today, right foot is clearly infected, red, exquisitely tender to palpation diffusely, swollen and tight.  No systemic symptoms.  Patient is clearly in extreme pain and desires narcotic pain meds.  Patient reports that he cannot go to the hospital as he feels he will lose his job.  He does not believe that he is a candidate for FMLA.  -Strongly encourage patient to return to hospital as he has a foot infection that could result in losing his foot and/or death.  -Patient requesting Oxy to treat pain at home, my concern is that if we mask worsening symptoms with narcotic pain meds it could delay treatment.  Patient became extremely angry and left the office.  -2 weeks of antibiotics were prescribed by Dr. Mcnair upon patient leaving [place], however he never picked them up.  We will resend these prescriptions to his pharmacy  -Gave 1 g IM Rocephin in the clinic  -Gave 60 mg IM Toradol in the clinic  -Ordered stat MRI right foot  -Strongly advised going to the ER, patient declined.         Relevant Orders    MR-FOOT-WITH RIGHT      Orders Placed This Encounter    MR-FOOT-WITH RIGHT    amoxicillin-clavulanate (AUGMENTIN) 875-125 MG Tab    doxycycline (MONODOX) 100 MG capsule    ketorolac (Toradol) injection 30 mg    ketorolac (Toradol) injection 30 mg    cefTRIAXone (Rocephin) 1 g in lidocaine (Xylocaine) 1 % 4 mL for IM use       Return for : Go to ER today..      LABS:  Results reviewed and discussed with the patient, questions answered.    HISTORY OF PRESENT ILLNESS: Patient is a 63 y.o. male established patient who presents today with:    Problem   Abscess of Right Foot       1. Abscess of right foot  MR-FOOT-WITH RIGHT          Patient Active Problem List    Diagnosis Date Noted    Bacteremia 04/28/2024    Acute hypoxemic respiratory failure (HCC) 04/26/2024    Hypoxia 04/26/2024    Hyperlipidemia 04/26/2024    Hypertension 04/26/2024    Abscess of right foot 04/19/2024    Diffuse pain in right lower extremity 04/18/2024    Dysphonia 04/15/2024    Microcytic anemia 04/12/2024    Community acquired pneumonia of left lower lobe of lung 04/11/2024    Shortness of breath 04/04/2024    Olecranon bursitis of right elbow 11/14/2023    Acute hypoxic respiratory failure 09/10/2023    Headache 07/05/2023    Lobar pneumonia (HCC) 07/04/2023    Tobacco dependence 07/04/2023    Left facial numbness and left blurry vision 07/04/2023    Right inguinal pain 06/15/2023    Atrial fibrillation (HCC) 06/12/2023    H/O cardiac radiofrequency ablation 06/12/2023    Opioid use, unspecified, in remission 05/16/2023    Goals of care, counseling/discussion 05/16/2023    Heart failure with improved ejection fraction (HFimpEF) ([facility]) 05/16/2023    Chest pain 05/15/2023    Paroxysmal atrial flutter ([facility]) 04/04/2023    Tobacco abuse 04/04/2023    Secondary hypercoagulable state ([facility]) 04/04/2023    Nonobstructive atherosclerosis of coronary artery 04/04/2023    COPD (chronic obstructive pulmonary disease) ([facility]) 01/21/2023    Acute on chronic systolic heart failure (HCC) 01/20/2023    Paroxysmal atrial fibrillation ([facility]) 01/20/2023    GERD (gastroesophageal reflux disease) 01/05/2023    Hyperkalemia 01/05/2023    Elevated hemidiaphragm - s/p post AVR 01/04/2023    Ascending aortic aneurysm repair 09/19/2017      Allergies:Morphine    Current Outpatient Medications   Medication Sig Dispense Refill     "amoxicillin-clavulanate (AUGMENTIN) 875-125 MG Tab Take 1 Tablet by mouth 2 times a day for 14 days. 28 Tablet 0    doxycycline (MONODOX) 100 MG capsule Take 1 Capsule by mouth 2 times a day for 14 days. 28 Capsule 0    albuterol 108 (90 Base) MCG/ACT Aero Soln inhalation aerosol Inhale 2 Puffs every 6 hours as needed for Shortness of Breath. 8.5 g 0    Multiple Vitamins-Minerals (ONE DAILY MENS PO) Take 1 Tablet by mouth every day.      MILK THISTLE PO Take 1 Capsule by mouth every day.      budesonide-formoterol (SYMBICORT) 80-4.5 MCG/ACT Aerosol Inhale 2 Puffs 2 times a day. 10.2 g 0    methylPREDNISolone (MEDROL DOSEPAK) 4 MG Tablet Therapy Pack Follow schedule on package instructions. (Patient taking differently: Take 4 mg by mouth every day. Follow schedule on package instructions.) 21 Tablet 0    atorvastatin (LIPITOR) 40 MG Tab Take 1 Tablet by mouth at bedtime. 30 Tablet 0    ondansetron (ZOFRAN) 4 MG Tab tablet Take 4 mg by mouth 2 times a day as needed for Nausea/Vomiting.      omeprazole (PRILOSEC) 20 MG delayed-release capsule Take 20 mg by mouth 2 times a day.      rivaroxaban (XARELTO) 20 MG Tab tablet Take 20 mg by mouth every day. \"Takes in am\"      metoprolol SR (TOPROL XL) 25 MG TABLET SR 24 HR Take 1 Tablet by mouth every day. 90 Tablet 3    losartan (COZAAR) 25 MG Tab Take 1 Tablet by mouth every day. 90 Tablet 3     No current facility-administered medications for this visit.       Social History     Tobacco Use    Smoking status: Every Day     Current packs/day: 0.50     Average packs/day: 0.5 packs/day for 40.0 years (20.0 ttl pk-yrs)     Types: Cigarettes    Smokeless tobacco: Never    Tobacco comments:     2-3 a day   Vaping Use    Vaping Use: Never used   Substance Use Topics    Alcohol use: No    Drug use: Not Currently     Comment: past problems with narcotics not since 2019     Social History     Social History Narrative    Not on file       No family history on file.    Exam:    BP " "108/72 (BP Location: Left arm, Patient Position: Sitting, BP Cuff Size: Adult)   Pulse 92   Temp 36.8 °C (98.2 °F) (Temporal)   Resp (!) 28   Ht 1.753 m (5' 9\")   SpO2 96%   BMI 27.77 kg/m²  Body mass index is 27.77 kg/m².    Gen: Patient sitting in chair, in obvious pain and distress.  Chest: No deformities, Equal chest expansion  Lungs: Normal effort, CTA bilaterally.  CV: Regular rate and rhythm.  Murmur appreciated  Ext: Left lower extremity normal.  Right lower extremity erythematous, edematous, skin tight, warm, exquisitely tender to palpation diffusely including on dorsum and posterior foot.  Site of dorsal I&D sutures are in place.  No pus or exudate noted from that wound.  Patient limping while walking.  Neuro: Non-focal. A&Ox4.  Psych: Angry, agitated, tearful.  Patient left the clinic room in anger, throwing tissues on the grown, after being denied narcotic pain medications, however then returned for Toradol and Rocephin intramuscular injections.  He left again in anger.      Italo Langley MD  [facility] Resident    "

## 2024-04-30 NOTE — PROGRESS NOTES
"Community Health Worker Intake    Identified barriers to pain management.  Resources not provided.  Contact information provided to Noe Raudel Hickey   Has PCP appointment scheduled for 4/30/24  Outpatient assessment completed.  Did the patient receive medications post discharge: Yes    CHW called pt to follow up post AMA discharge. Pt was very angry on the phone about his experience here, complaining of waiting too long for an MRI, not seeing doctors enough, and his pain not being addressed.  Pt stated \"you guys didn't fucking do anything.\" I attempted to provide pt with the Service Excellence number, but pt would not allow me to speak. Pt then told CHW to not call back.     Plan:  CHW closing pt out from program due to pt declining any services.   "

## 2024-05-01 ENCOUNTER — HOSPITAL ENCOUNTER (OUTPATIENT)
Dept: RADIOLOGY | Facility: MEDICAL CENTER | Age: 64
End: 2024-05-01
Payer: MEDICAID

## 2024-05-01 DIAGNOSIS — L02.611 ABSCESS OF RIGHT FOOT: ICD-10-CM

## 2024-05-01 RX ADMIN — GADOTERIDOL 20 ML: 279.3 INJECTION, SOLUTION INTRAVENOUS at 12:36

## 2024-05-01 NOTE — ASSESSMENT & PLAN NOTE
Patient left hospital AMA yesterday, he was scheduled to have an MRI done in the hospital at the time of his leaving.  There is high concern for osteomyelitis.  Patient has had abscess in his right foot, previous I&D.  At the clinic today, right foot is clearly infected, red, exquisitely tender to palpation diffusely, swollen and tight.  No systemic symptoms.  Patient is clearly in extreme pain and desires narcotic pain meds.  Patient reports that he cannot go to the hospital as he feels he will lose his job.  He does not believe that he is a candidate for FMLA.  -Strongly encourage patient to return to hospital as he has a foot infection that could result in losing his foot and/or death.  -Patient requesting Oxy to treat pain at home, my concern is that if we mask worsening symptoms with narcotic pain meds it could delay treatment.  Patient became extremely angry and left the office.  -2 weeks of antibiotics were prescribed by Dr. Mcnair upon patient leaving [place], however he never picked them up.  We will resend these prescriptions to his pharmacy  -Gave 1 g IM Rocephin in the clinic  -Gave 60 mg IM Toradol in the clinic  -Ordered stat MRI right foot  -Strongly advised going to the ER, patient declined.

## 2024-05-02 LAB
BACTERIA BLD CULT: NORMAL
SIGNIFICANT IND 70042: NORMAL
SITE SITE: NORMAL
SOURCE SOURCE: NORMAL

## 2024-05-06 DIAGNOSIS — L02.611 ABSCESS OF RIGHT FOOT: ICD-10-CM

## 2024-05-09 ENCOUNTER — TELEPHONE (OUTPATIENT)
Dept: MEDICAL GROUP | Facility: CLINIC | Age: 64
End: 2024-05-09
Payer: MEDICAID

## 2024-05-09 ENCOUNTER — TELEPHONE (OUTPATIENT)
Dept: CARDIOLOGY | Facility: MEDICAL CENTER | Age: 64
End: 2024-05-09
Payer: MEDICAID

## 2024-05-09 DIAGNOSIS — L02.611 ABSCESS OF RIGHT FOOT: ICD-10-CM

## 2024-05-09 NOTE — TELEPHONE ENCOUNTER
05/09/24  Called patient, discussed his condition on the phone.  We discussed his MRI result showed no evidence of osteomyelitis but abscess was seen.  He reports that condition of his foot has not worsened but he has not had significant improvement either.  He denies any systemic symptoms.  -Referral to surgery/podiatry  -Again, advised patient to go to the hospital as this is still an emergency. Pt understands risks but would like to manage this outpatient.  -Provided education on signs to look for for potential worsening

## 2024-05-09 NOTE — TELEPHONE ENCOUNTER
MT    Caller: Noe Hickey    Topic/issue: Patient called to get his MRI results.    Please advise.     Callback Number: [phone]    Thank you,     Floresita GALLAGHER

## 2024-05-09 NOTE — TELEPHONE ENCOUNTER
VOICEMAIL  1. Caller Name: Noe Hickey  Call Back Number: [phone] (home)       2. Message: Patient called requesting MRI results. Please advise    3. Patient approves office to leave a detailed voicemail/MyChart message: yes

## 2024-05-14 ENCOUNTER — APPOINTMENT (OUTPATIENT)
Dept: CARDIOLOGY | Facility: MEDICAL CENTER | Age: 64
End: 2024-05-14
Attending: NURSE PRACTITIONER
Payer: MEDICAID

## 2024-05-16 ENCOUNTER — APPOINTMENT (OUTPATIENT)
Dept: RADIOLOGY | Facility: MEDICAL CENTER | Age: 64
DRG: 190 | End: 2024-05-16
Payer: MEDICAID

## 2024-05-16 ENCOUNTER — APPOINTMENT (OUTPATIENT)
Dept: RADIOLOGY | Facility: MEDICAL CENTER | Age: 64
DRG: 190 | End: 2024-05-16
Attending: EMERGENCY MEDICINE
Payer: MEDICAID

## 2024-05-16 ENCOUNTER — HOSPITAL ENCOUNTER (INPATIENT)
Facility: MEDICAL CENTER | Age: 64
LOS: 5 days | DRG: 190 | End: 2024-05-21
Attending: EMERGENCY MEDICINE | Admitting: FAMILY MEDICINE
Payer: MEDICAID

## 2024-05-16 DIAGNOSIS — J98.6 HEMIDIAPHRAGM PARALYSIS: ICD-10-CM

## 2024-05-16 DIAGNOSIS — M79.671 RIGHT FOOT PAIN: ICD-10-CM

## 2024-05-16 DIAGNOSIS — J44.9 CHRONIC OBSTRUCTIVE PULMONARY DISEASE, UNSPECIFIED COPD TYPE (HCC): ICD-10-CM

## 2024-05-16 DIAGNOSIS — R06.09 DYSPNEA ON EXERTION: ICD-10-CM

## 2024-05-16 LAB
ALBUMIN SERPL BCP-MCNC: 3.8 G/DL (ref 3.2–4.9)
ALBUMIN/GLOB SERPL: 1.4 G/DL
ALP SERPL-CCNC: 34 U/L (ref 30–99)
ALT SERPL-CCNC: 48 U/L (ref 2–50)
ANION GAP SERPL CALC-SCNC: 11 MMOL/L (ref 7–16)
ANISOCYTOSIS BLD QL SMEAR: ABNORMAL
AST SERPL-CCNC: 41 U/L (ref 12–45)
BASOPHILS # BLD AUTO: 0 % (ref 0–1.8)
BASOPHILS # BLD: 0 K/UL (ref 0–0.12)
BILIRUB SERPL-MCNC: 0.5 MG/DL (ref 0.1–1.5)
BUN SERPL-MCNC: 18 MG/DL (ref 8–22)
CALCIUM ALBUM COR SERPL-MCNC: 9.3 MG/DL (ref 8.5–10.5)
CALCIUM SERPL-MCNC: 9.1 MG/DL (ref 8.5–10.5)
CHLORIDE SERPL-SCNC: 102 MMOL/L (ref 96–112)
CO2 SERPL-SCNC: 22 MMOL/L (ref 20–33)
CREAT SERPL-MCNC: 0.8 MG/DL (ref 0.5–1.4)
EKG IMPRESSION: NORMAL
EOSINOPHIL # BLD AUTO: 0.64 K/UL (ref 0–0.51)
EOSINOPHIL NFR BLD: 3.5 % (ref 0–6.9)
ERYTHROCYTE [DISTWIDTH] IN BLOOD BY AUTOMATED COUNT: 56.3 FL (ref 35.9–50)
GFR SERPLBLD CREATININE-BSD FMLA CKD-EPI: 99 ML/MIN/1.73 M 2
GLOBULIN SER CALC-MCNC: 2.7 G/DL (ref 1.9–3.5)
GLUCOSE SERPL-MCNC: 80 MG/DL (ref 65–99)
HCT VFR BLD AUTO: 40.1 % (ref 42–52)
HGB BLD-MCNC: 12.2 G/DL (ref 14–18)
HYPOCHROMIA BLD QL SMEAR: ABNORMAL
LACTATE SERPL-SCNC: 1.1 MMOL/L (ref 0.5–2)
LYMPHOCYTES # BLD AUTO: 1.89 K/UL (ref 1–4.8)
LYMPHOCYTES NFR BLD: 10.4 % (ref 22–41)
MAGNESIUM SERPL-MCNC: 1.9 MG/DL (ref 1.5–2.5)
MANUAL DIFF BLD: NORMAL
MCH RBC QN AUTO: 20.4 PG (ref 27–33)
MCHC RBC AUTO-ENTMCNC: 30.4 G/DL (ref 32.3–36.5)
MCV RBC AUTO: 66.9 FL (ref 81.4–97.8)
METAMYELOCYTES NFR BLD MANUAL: 0.9 %
MICROCYTES BLD QL SMEAR: ABNORMAL
MONOCYTES # BLD AUTO: 0.47 K/UL (ref 0–0.85)
MONOCYTES NFR BLD AUTO: 2.6 % (ref 0–13.4)
MORPHOLOGY BLD-IMP: NORMAL
NEUTROPHILS # BLD AUTO: 15.03 K/UL (ref 1.82–7.42)
NEUTROPHILS NFR BLD: 81.7 % (ref 44–72)
NEUTS BAND NFR BLD MANUAL: 0.9 % (ref 0–10)
NRBC # BLD AUTO: 0.07 K/UL
NRBC BLD-RTO: 0.4 /100 WBC (ref 0–0.2)
NT-PROBNP SERPL IA-MCNC: 513 PG/ML (ref 0–125)
PLATELET # BLD AUTO: 768 K/UL (ref 164–446)
PLATELET BLD QL SMEAR: NORMAL
PMV BLD AUTO: 9.6 FL (ref 9–12.9)
POIKILOCYTOSIS BLD QL SMEAR: NORMAL
POTASSIUM SERPL-SCNC: 4.6 MMOL/L (ref 3.6–5.5)
PROCALCITONIN SERPL-MCNC: 0.08 NG/ML
PROT SERPL-MCNC: 6.5 G/DL (ref 6–8.2)
RBC # BLD AUTO: 5.99 M/UL (ref 4.7–6.1)
RBC BLD AUTO: PRESENT
SCHISTOCYTES BLD QL SMEAR: NORMAL
SODIUM SERPL-SCNC: 135 MMOL/L (ref 135–145)
TROPONIN T SERPL-MCNC: 21 NG/L (ref 6–19)
TROPONIN T SERPL-MCNC: 24 NG/L (ref 6–19)
TROPONIN T SERPL-MCNC: 25 NG/L (ref 6–19)
WBC # BLD AUTO: 18.2 K/UL (ref 4.8–10.8)

## 2024-05-16 PROCEDURE — 99222 1ST HOSP IP/OBS MODERATE 55: CPT | Mod: GC | Performed by: FAMILY MEDICINE

## 2024-05-16 RX ORDER — ACETAMINOPHEN 500 MG
1000 TABLET ORAL EVERY 6 HOURS PRN
Status: DISCONTINUED | OUTPATIENT
Start: 2024-05-21 | End: 2024-05-20

## 2024-05-16 RX ORDER — AMOXICILLIN AND CLAVULANATE POTASSIUM 875; 125 MG/1; MG/1
1 TABLET, FILM COATED ORAL 2 TIMES DAILY
Status: ON HOLD | COMMUNITY
Start: 2024-05-02 | End: 2024-05-21

## 2024-05-16 RX ORDER — AZITHROMYCIN 500 MG/5ML
500 INJECTION, POWDER, LYOPHILIZED, FOR SOLUTION INTRAVENOUS EVERY 24 HOURS
Status: DISCONTINUED | OUTPATIENT
Start: 2024-05-17 | End: 2024-05-16

## 2024-05-16 RX ORDER — SUCRALFATE 1 G/1
1 TABLET ORAL
COMMUNITY
Start: 2024-05-15

## 2024-05-16 RX ORDER — IPRATROPIUM BROMIDE AND ALBUTEROL SULFATE 2.5; .5 MG/3ML; MG/3ML
3 SOLUTION RESPIRATORY (INHALATION)
Status: DISCONTINUED | OUTPATIENT
Start: 2024-05-16 | End: 2024-05-17

## 2024-05-16 RX ORDER — HYDROMORPHONE HYDROCHLORIDE 1 MG/ML
0.5 INJECTION, SOLUTION INTRAMUSCULAR; INTRAVENOUS; SUBCUTANEOUS ONCE
Status: COMPLETED | OUTPATIENT
Start: 2024-05-16 | End: 2024-05-16

## 2024-05-16 RX ORDER — BUDESONIDE AND FORMOTEROL FUMARATE DIHYDRATE 80; 4.5 UG/1; UG/1
2 AEROSOL RESPIRATORY (INHALATION) 2 TIMES DAILY
Status: DISCONTINUED | OUTPATIENT
Start: 2024-05-16 | End: 2024-05-16

## 2024-05-16 RX ORDER — ONDANSETRON 4 MG/1
4 TABLET, ORALLY DISINTEGRATING ORAL EVERY 6 HOURS PRN
Status: ON HOLD | COMMUNITY
Start: 2024-05-15 | End: 2024-05-21

## 2024-05-16 RX ORDER — NICOTINE 21 MG/24HR
21 PATCH, TRANSDERMAL 24 HOURS TRANSDERMAL
Status: DISCONTINUED | OUTPATIENT
Start: 2024-05-17 | End: 2024-05-21 | Stop reason: HOSPADM

## 2024-05-16 RX ORDER — SUCRALFATE 1 G/1
1 TABLET ORAL EVERY 12 HOURS
Status: DISCONTINUED | OUTPATIENT
Start: 2024-05-16 | End: 2024-05-21 | Stop reason: HOSPADM

## 2024-05-16 RX ORDER — OXYCODONE HYDROCHLORIDE 10 MG/1
10 TABLET ORAL
Status: DISCONTINUED | OUTPATIENT
Start: 2024-05-16 | End: 2024-05-17

## 2024-05-16 RX ORDER — PREDNISONE 20 MG/1
40 TABLET ORAL DAILY
Qty: 10 TABLET | Refills: 0 | Status: DISCONTINUED | OUTPATIENT
Start: 2024-05-16 | End: 2024-05-16

## 2024-05-16 RX ORDER — ACETAMINOPHEN 500 MG
1000 TABLET ORAL EVERY 6 HOURS
Status: DISCONTINUED | OUTPATIENT
Start: 2024-05-16 | End: 2024-05-20

## 2024-05-16 RX ORDER — OMEPRAZOLE 20 MG/1
20 CAPSULE, DELAYED RELEASE ORAL 2 TIMES DAILY
Status: DISCONTINUED | OUTPATIENT
Start: 2024-05-16 | End: 2024-05-21 | Stop reason: HOSPADM

## 2024-05-16 RX ORDER — HYDROMORPHONE HYDROCHLORIDE 1 MG/ML
0.25 INJECTION, SOLUTION INTRAMUSCULAR; INTRAVENOUS; SUBCUTANEOUS ONCE
Status: COMPLETED | OUTPATIENT
Start: 2024-05-16 | End: 2024-05-16

## 2024-05-16 RX ORDER — PREDNISONE 20 MG/1
40 TABLET ORAL DAILY
Status: DISCONTINUED | OUTPATIENT
Start: 2024-05-17 | End: 2024-05-18

## 2024-05-16 RX ORDER — LOSARTAN POTASSIUM 25 MG/1
25 TABLET ORAL EVERY EVENING
Status: DISCONTINUED | OUTPATIENT
Start: 2024-05-16 | End: 2024-05-21 | Stop reason: HOSPADM

## 2024-05-16 RX ORDER — AZITHROMYCIN 250 MG/1
500 TABLET, FILM COATED ORAL ONCE
Status: COMPLETED | OUTPATIENT
Start: 2024-05-16 | End: 2024-05-16

## 2024-05-16 RX ORDER — OXYCODONE HYDROCHLORIDE 5 MG/1
5 TABLET ORAL
Status: DISCONTINUED | OUTPATIENT
Start: 2024-05-16 | End: 2024-05-17

## 2024-05-16 RX ORDER — LABETALOL HYDROCHLORIDE 5 MG/ML
10 INJECTION, SOLUTION INTRAVENOUS EVERY 4 HOURS PRN
Status: DISCONTINUED | OUTPATIENT
Start: 2024-05-16 | End: 2024-05-21 | Stop reason: HOSPADM

## 2024-05-16 RX ORDER — DOXYCYCLINE 100 MG/1
100 CAPSULE ORAL 2 TIMES DAILY
Status: ON HOLD | COMMUNITY
Start: 2024-05-14 | End: 2024-05-21

## 2024-05-16 RX ORDER — HYDROMORPHONE HYDROCHLORIDE 1 MG/ML
0.5 INJECTION, SOLUTION INTRAMUSCULAR; INTRAVENOUS; SUBCUTANEOUS
Status: DISCONTINUED | OUTPATIENT
Start: 2024-05-16 | End: 2024-05-17

## 2024-05-16 RX ORDER — AZITHROMYCIN 250 MG/1
500 TABLET, FILM COATED ORAL DAILY
Qty: 4 TABLET | Refills: 0 | Status: COMPLETED | OUTPATIENT
Start: 2024-05-17 | End: 2024-05-19

## 2024-05-16 RX ORDER — ATORVASTATIN CALCIUM 40 MG/1
40 TABLET, FILM COATED ORAL
Status: DISCONTINUED | OUTPATIENT
Start: 2024-05-16 | End: 2024-05-21 | Stop reason: HOSPADM

## 2024-05-16 RX ADMIN — OMEPRAZOLE 20 MG: 20 CAPSULE, DELAYED RELEASE ORAL at 20:42

## 2024-05-16 RX ADMIN — FENTANYL CITRATE 50 MCG: 50 INJECTION, SOLUTION INTRAMUSCULAR; INTRAVENOUS at 14:42

## 2024-05-16 RX ADMIN — ACETAMINOPHEN 1000 MG: 500 TABLET, FILM COATED ORAL at 23:14

## 2024-05-16 RX ADMIN — ATORVASTATIN CALCIUM 40 MG: 40 TABLET, FILM COATED ORAL at 20:42

## 2024-05-16 RX ADMIN — AMPICILLIN AND SULBACTAM 3 G: 1; 2 INJECTION, POWDER, FOR SOLUTION INTRAMUSCULAR; INTRAVENOUS at 19:32

## 2024-05-16 RX ADMIN — AZITHROMYCIN DIHYDRATE 500 MG: 250 TABLET, FILM COATED ORAL at 15:18

## 2024-05-16 RX ADMIN — SUCRALFATE 1 G: 1 TABLET ORAL at 20:42

## 2024-05-16 RX ADMIN — FENTANYL CITRATE 100 MCG: 50 INJECTION, SOLUTION INTRAMUSCULAR; INTRAVENOUS at 16:54

## 2024-05-16 RX ADMIN — ACETAMINOPHEN 1000 MG: 500 TABLET, FILM COATED ORAL at 19:31

## 2024-05-16 RX ADMIN — IPRATROPIUM BROMIDE AND ALBUTEROL SULFATE 3 ML: 2.5; .5 SOLUTION RESPIRATORY (INHALATION) at 23:33

## 2024-05-16 RX ADMIN — HYDROMORPHONE HYDROCHLORIDE 0.25 MG: 1 INJECTION, SOLUTION INTRAMUSCULAR; INTRAVENOUS; SUBCUTANEOUS at 20:41

## 2024-05-16 RX ADMIN — HYDROMORPHONE HYDROCHLORIDE 0.5 MG: 1 INJECTION, SOLUTION INTRAMUSCULAR; INTRAVENOUS; SUBCUTANEOUS at 22:45

## 2024-05-16 RX ADMIN — HYDROMORPHONE HYDROCHLORIDE 0.5 MG: 1 INJECTION, SOLUTION INTRAMUSCULAR; INTRAVENOUS; SUBCUTANEOUS at 19:32

## 2024-05-16 RX ADMIN — LOSARTAN POTASSIUM 25 MG: 25 TABLET, FILM COATED ORAL at 20:41

## 2024-05-16 RX ADMIN — PREDNISONE 40 MG: 20 TABLET ORAL at 19:32

## 2024-05-16 RX ADMIN — AMPICILLIN AND SULBACTAM 3 G: 1; 2 INJECTION, POWDER, FOR SOLUTION INTRAMUSCULAR; INTRAVENOUS at 14:44

## 2024-05-16 RX ADMIN — IOHEXOL 80 ML: 350 INJECTION, SOLUTION INTRAVENOUS at 16:20

## 2024-05-16 SDOH — ECONOMIC STABILITY: TRANSPORTATION INSECURITY
IN THE PAST 12 MONTHS, HAS LACK OF RELIABLE TRANSPORTATION KEPT YOU FROM MEDICAL APPOINTMENTS, MEETINGS, WORK OR FROM GETTING THINGS NEEDED FOR DAILY LIVING?: NO

## 2024-05-16 ASSESSMENT — COGNITIVE AND FUNCTIONAL STATUS - GENERAL
SUGGESTED CMS G CODE MODIFIER MOBILITY: CJ
DRESSING REGULAR UPPER BODY CLOTHING: A LITTLE
CLIMB 3 TO 5 STEPS WITH RAILING: A LITTLE
MOBILITY SCORE: 22
SUGGESTED CMS G CODE MODIFIER DAILY ACTIVITY: CI
DRESSING REGULAR UPPER BODY CLOTHING: A LITTLE
SUGGESTED CMS G CODE MODIFIER MOBILITY: CJ
MOBILITY SCORE: 22
WALKING IN HOSPITAL ROOM: A LITTLE
WALKING IN HOSPITAL ROOM: A LITTLE
CLIMB 3 TO 5 STEPS WITH RAILING: A LITTLE
DAILY ACTIVITIY SCORE: 23

## 2024-05-16 ASSESSMENT — LIFESTYLE VARIABLES
HAVE YOU EVER FELT YOU SHOULD CUT DOWN ON YOUR DRINKING: NO
EVER HAD A DRINK FIRST THING IN THE MORNING TO STEADY YOUR NERVES TO GET RID OF A HANGOVER: NO
TOTAL SCORE: 0
ALCOHOL_USE: NO
DOES PATIENT WANT TO STOP DRINKING: NO
EVER FELT BAD OR GUILTY ABOUT YOUR DRINKING: NO
TOTAL SCORE: 0
ON A TYPICAL DAY WHEN YOU DRINK ALCOHOL HOW MANY DRINKS DO YOU HAVE: 0
HAVE PEOPLE ANNOYED YOU BY CRITICIZING YOUR DRINKING: NO
HOW MANY TIMES IN THE PAST YEAR HAVE YOU HAD 5 OR MORE DRINKS IN A DAY: 0
AVERAGE NUMBER OF DAYS PER WEEK YOU HAVE A DRINK CONTAINING ALCOHOL: 0
CONSUMPTION TOTAL: NEGATIVE
TOTAL SCORE: 0

## 2024-05-16 ASSESSMENT — SOCIAL DETERMINANTS OF HEALTH (SDOH)
WITHIN THE PAST 12 MONTHS, THE FOOD YOU BOUGHT JUST DIDN'T LAST AND YOU DIDN'T HAVE MONEY TO GET MORE: NEVER TRUE
WITHIN THE PAST 12 MONTHS, YOU WORRIED THAT YOUR FOOD WOULD RUN OUT BEFORE YOU GOT THE MONEY TO BUY MORE: NEVER TRUE
WITHIN THE LAST YEAR, HAVE YOU BEEN AFRAID OF YOUR PARTNER OR EX-PARTNER?: NO
WITHIN THE LAST YEAR, HAVE YOU BEEN KICKED, HIT, SLAPPED, OR OTHERWISE PHYSICALLY HURT BY YOUR PARTNER OR EX-PARTNER?: NO
WITHIN THE LAST YEAR, HAVE YOU BEEN HUMILIATED OR EMOTIONALLY ABUSED IN OTHER WAYS BY YOUR PARTNER OR EX-PARTNER?: NO
WITHIN THE LAST YEAR, HAVE TO BEEN RAPED OR FORCED TO HAVE ANY KIND OF SEXUAL ACTIVITY BY YOUR PARTNER OR EX-PARTNER?: NO
IN THE PAST 12 MONTHS, HAS THE ELECTRIC, GAS, OIL, OR WATER COMPANY THREATENED TO SHUT OFF SERVICE IN YOUR HOME?: NO

## 2024-05-16 ASSESSMENT — PAIN DESCRIPTION - PAIN TYPE
TYPE: ACUTE PAIN
TYPE: ACUTE PAIN

## 2024-05-16 ASSESSMENT — CHA2DS2 SCORE
SEX: MALE
CHA2DS2 VASC SCORE: 3
DIABETES: NO
HYPERTENSION: YES
VASCULAR DISEASE: YES
AGE 75 OR GREATER: NO
AGE 65 TO 74: NO
PRIOR STROKE OR TIA OR THROMBOEMBOLISM: NO
CHF OR LEFT VENTRICULAR DYSFUNCTION: YES

## 2024-05-16 ASSESSMENT — FIBROSIS 4 INDEX
FIB4 SCORE: 0.49
FIB4 SCORE: 1.24

## 2024-05-16 NOTE — ED PROVIDER NOTES
ED Provider Note    CHIEF COMPLAINT  Chief Complaint   Patient presents with    Shortness of Breath     Symptoms started last night.     Chest Pain     L side    Wound Check     R foot. Stitches in place, pt unclear when they need to be removed. Redness and swelling. MRI on 5/1.       EXTERNAL RECORDS REVIEWED  Outpatient Notes   discharge planning, respiratory care    HPI/ROS  LIMITATION TO HISTORY   Select: : None  OUTSIDE HISTORIAN(S):  None    Noe Hickey is a 63 y.o. male who presents here for evaluation of shortness of breath and chest pain.  Patient states he has history of the same, and was admitted not too long ago for the same.  States has had some shortness of breath over the last few days.  No fever or chills, no vomiting.  He has had a mild productive cough as well.  Patient has no radiation of pain to the back.  Patient also states he recently had surgery on his foot for an abscess, is following up with a rock for the same.  Patient states he is currently on antibiotics so he is not sure which 1.    PAST MEDICAL HISTORY   has a past medical history of Arrhythmia, Breath shortness (06/09/2023), Cyclic vomiting syndrome, Elevated hemidiaphragm - LEFT post AVR (01/04/2023), Fracture, Headache, classical migraine, Heart burn, Heart valve disease (06/09/2023), Hyperlipidemia (4/26/2024), Hypertension (4/26/2024), Indigestion, Pneumonia due to infectious organism (01/20/2023), and Snoring.    SURGICAL HISTORY   has a past surgical history that includes shoulder arthroscopy; shoulder hemicap resurfacing (Right, 10/12/2015); irrigation & debridement ortho (Right, 09/19/2017); shoulder surgery; aortic valve replacement (1/5/2023); aortic ascending dissection (1/5/2023); echocardiogram, transesophageal, intraoperative (1/5/2023); and incision and drainage general (Left, 4/19/2024).    FAMILY HISTORY  History reviewed. No pertinent family history.    SOCIAL HISTORY  Social History     Tobacco Use     "Smoking status: Every Day     Current packs/day: 0.50     Average packs/day: 0.5 packs/day for 40.0 years (20.0 ttl pk-yrs)     Types: Cigarettes    Smokeless tobacco: Never    Tobacco comments:     2-3 a day   Vaping Use    Vaping status: Never Used   Substance and Sexual Activity    Alcohol use: No    Drug use: Not Currently     Comment: past problems with narcotics not since 2019    Sexual activity: Not on file       CURRENT MEDICATIONS  Home Medications       Reviewed by Yanelis Abreu R.N. (Registered Nurse) on 05/16/24 at 1234  Med List Status: Not Addressed     Medication Last Dose Status   albuterol 108 (90 Base) MCG/ACT Aero Soln inhalation aerosol  Active   atorvastatin (LIPITOR) 40 MG Tab  Active   budesonide-formoterol (SYMBICORT) 80-4.5 MCG/ACT Aerosol  Active   losartan (COZAAR) 25 MG Tab  Active   methylPREDNISolone (MEDROL DOSEPAK) 4 MG Tablet Therapy Pack  Active   metoprolol SR (TOPROL XL) 25 MG TABLET SR 24 HR  Active   MILK THISTLE PO  Active   Multiple Vitamins-Minerals (ONE DAILY MENS PO)  Active   omeprazole (PRILOSEC) 20 MG delayed-release capsule  Active   ondansetron (ZOFRAN) 4 MG Tab tablet  Active   rivaroxaban (XARELTO) 20 MG Tab tablet  Active                  Audit from Redirected Encounters    **Home medications have not yet been reviewed for this encounter**         ALLERGIES  Allergies   Allergen Reactions    Morphine Shortness of Breath and Rash             PHYSICAL EXAM  VITAL SIGNS: /72   Pulse 79   Temp 36.5 °C (97.7 °F) (Temporal)   Resp 19   Ht 1.753 m (5' 9\")   Wt 89 kg (196 lb 3.4 oz)   SpO2 95%   BMI 28.98 kg/m²    Constitutional: Well developed, well nourished.  Mild acute distress.  HEENT: Normocephalic, atraumatic. Posterior pharynx clear and moist.  Eyes:  EOMI. Normal sclera.  Neck: Supple, Full range of motion, nontender.  Chest/Pulmonary:  diminished breath sounds, equal expansion  Cardio: Regular rate and rhythm with no murmur.   Abdomen: Soft, " nontender. No peritoneal signs. No guarding. No palpable masses.  Back: No CVA tenderness, nontender midline, no step offs.  Musculoskeletal: No deformity, no edema, neurovascular intact.  Right lower extremity; sutures in place, no erythema, mild tenderness to suture site, no erythema, neurovascular tact distally.  Neuro: Clear speech, appropriate, cooperative, cranial nerves II-XII grossly intact.  Psych: Anxious mood and affect      EKG/LABS  Results for orders placed or performed during the hospital encounter of 05/16/24   CBC with Differential   Result Value Ref Range    WBC 18.2 (H) 4.8 - 10.8 K/uL    RBC 5.99 4.70 - 6.10 M/uL    Hemoglobin 12.2 (L) 14.0 - 18.0 g/dL    Hematocrit 40.1 (L) 42.0 - 52.0 %    MCV 66.9 (L) 81.4 - 97.8 fL    MCH 20.4 (L) 27.0 - 33.0 pg    MCHC 30.4 (L) 32.3 - 36.5 g/dL    RDW 56.3 (H) 35.9 - 50.0 fL    Platelet Count 768 (H) 164 - 446 K/uL    MPV 9.6 9.0 - 12.9 fL    Neutrophils-Polys 81.70 (H) 44.00 - 72.00 %    Lymphocytes 10.40 (L) 22.00 - 41.00 %    Monocytes 2.60 0.00 - 13.40 %    Eosinophils 3.50 0.00 - 6.90 %    Basophils 0.00 0.00 - 1.80 %    Nucleated RBC 0.40 (H) 0.00 - 0.20 /100 WBC    Neutrophils (Absolute) 15.03 (H) 1.82 - 7.42 K/uL    Lymphs (Absolute) 1.89 1.00 - 4.80 K/uL    Monos (Absolute) 0.47 0.00 - 0.85 K/uL    Eos (Absolute) 0.64 (H) 0.00 - 0.51 K/uL    Baso (Absolute) 0.00 0.00 - 0.12 K/uL    NRBC (Absolute) 0.07 K/uL    Hypochromia 2+ (A)     Anisocytosis 1+     Microcytosis 1+    Comp Metabolic Panel   Result Value Ref Range    Sodium 135 135 - 145 mmol/L    Potassium 4.6 3.6 - 5.5 mmol/L    Chloride 102 96 - 112 mmol/L    Co2 22 20 - 33 mmol/L    Anion Gap 11.0 7.0 - 16.0    Glucose 80 65 - 99 mg/dL    Bun 18 8 - 22 mg/dL    Creatinine 0.80 0.50 - 1.40 mg/dL    Calcium 9.1 8.5 - 10.5 mg/dL    Correct Calcium 9.3 8.5 - 10.5 mg/dL    AST(SGOT) 41 12 - 45 U/L    ALT(SGPT) 48 2 - 50 U/L    Alkaline Phosphatase 34 30 - 99 U/L    Total Bilirubin 0.5 0.1 - 1.5  mg/dL    Albumin 3.8 3.2 - 4.9 g/dL    Total Protein 6.5 6.0 - 8.2 g/dL    Globulin 2.7 1.9 - 3.5 g/dL    A-G Ratio 1.4 g/dL   ESTIMATED GFR   Result Value Ref Range    GFR (CKD-EPI) 99 >60 mL/min/1.73 m 2   DIFFERENTIAL MANUAL   Result Value Ref Range    Bands-Stabs 0.90 0.00 - 10.00 %    Metamyelocytes 0.90 %    Manual Diff Status PERFORMED    PERIPHERAL SMEAR REVIEW   Result Value Ref Range    Peripheral Smear Review see below    PLATELET ESTIMATE   Result Value Ref Range    Plt Estimation Increased    MORPHOLOGY   Result Value Ref Range    RBC Morphology Present     Poikilocytosis 1+     Schistocytes 1+    LACTIC ACID   Result Value Ref Range    Lactic Acid 1.1 0.5 - 2.0 mmol/L   proBrain Natriuretic Peptide, NT   Result Value Ref Range    NT-proBNP 513 (H) 0 - 125 pg/mL   TROPONIN   Result Value Ref Range    Troponin T 25 (H) 6 - 19 ng/L   TROPONIN   Result Value Ref Range    Troponin T 21 (H) 6 - 19 ng/L   MAGNESIUM   Result Value Ref Range    Magnesium 1.9 1.5 - 2.5 mg/dL   PROCALCITONIN   Result Value Ref Range    Procalcitonin 0.08 <0.25 ng/mL   EKG   Result Value Ref Range    Report       [facility] Emergency Dept.    Test Date:  2024  Pt Name:    JUSTIN TOPETE               Department: ER  MRN:        [id number]                      Room:  Gender:     Male                         Technician: [id number]  :        1960                   Requested By:ER TRIAGE PROTOCOL  Order #:    [id number]                    Reading MD:    Measurements  Intervals                                Axis  Rate:       83                           P:          56  NV:         158                          QRS:        -11  QRSD:       76                           T:          23  QT:         349  QTc:        410    Interpretive Statements  Sinus rhythm  Compared to ECG 2024 15:00:52  No significant changes       EKG; normal sinus rhythm at 83.  No ST elevation, no ST depression.  QTc is 410.  Compared  to EKG from 4/26/2024.    RADIOLOGY/PROCEDURES   I have independently interpreted the diagnostic imaging associated with this visit and am waiting the final reading from the radiologist.   My preliminary interpretation is as follows: see below     Radiologist interpretation:  CT-CTA CHEST PULMONARY ARTERY W/ RECONS   Final Result      1.  No large central or segmental pulmonary embolus   2.  Cardiomegaly with enlarged pulmonary arteries which could indicate pulmonary arterial hypertension   3.  Persistently elevated LEFT diaphragm with overlying atelectasis            DX-CHEST-PORTABLE (1 VIEW)   Final Result      Unchanged elevation of left hemidiaphragm with associated atelectasis and/or consolidation. Underlying infection is possible.      CT-FOOT WITH PLUS RECONS RIGHT    (Results Pending)       COURSE & MEDICAL DECISION MAKING    ASSESSMENT, COURSE AND PLAN  Care Narrative: This is a 63-year-old male here for evaluation of cough chest pain and shortness of breath.  Patient will be admitted to the hospital service for stress, and has been seen by [facility] family.  They have requested a CT scan of the foot which they will follow-up.      DISPOSITION AND DISCUSSIONS  I have discussed management of the patient with the following physicians and SHAUN's: None      FINAL DIAGNOSIS  1. Dyspnea on exertion    2.      Right foot pain       Electronically signed by: Rnee Araujo D.O., 5/16/2024 3:45 PM

## 2024-05-16 NOTE — ED NOTES
Patient vital signs rechecked as documented. Patient states he has had 10 minutes of 10/10 chest pain and he feels his condition is worsening. Charge RN notified, pt is to be roomed at this time.

## 2024-05-17 ENCOUNTER — APPOINTMENT (OUTPATIENT)
Dept: RADIOLOGY | Facility: MEDICAL CENTER | Age: 64
DRG: 190 | End: 2024-05-17
Payer: MEDICAID

## 2024-05-17 ENCOUNTER — APPOINTMENT (OUTPATIENT)
Dept: RADIOLOGY | Facility: MEDICAL CENTER | Age: 64
DRG: 190 | End: 2024-05-17
Attending: STUDENT IN AN ORGANIZED HEALTH CARE EDUCATION/TRAINING PROGRAM
Payer: MEDICAID

## 2024-05-17 LAB
ALBUMIN SERPL BCP-MCNC: 3.8 G/DL (ref 3.2–4.9)
ALBUMIN/GLOB SERPL: 1.5 G/DL
ALP SERPL-CCNC: 33 U/L (ref 30–99)
ALT SERPL-CCNC: 41 U/L (ref 2–50)
ANION GAP SERPL CALC-SCNC: 11 MMOL/L (ref 7–16)
AST SERPL-CCNC: 33 U/L (ref 12–45)
BASOPHILS # BLD AUTO: 0.9 % (ref 0–1.8)
BASOPHILS # BLD: 0.14 K/UL (ref 0–0.12)
BILIRUB SERPL-MCNC: 0.7 MG/DL (ref 0.1–1.5)
BUN SERPL-MCNC: 19 MG/DL (ref 8–22)
CALCIUM ALBUM COR SERPL-MCNC: 8.8 MG/DL (ref 8.5–10.5)
CALCIUM SERPL-MCNC: 8.6 MG/DL (ref 8.5–10.5)
CHLORIDE SERPL-SCNC: 99 MMOL/L (ref 96–112)
CO2 SERPL-SCNC: 24 MMOL/L (ref 20–33)
CREAT SERPL-MCNC: 1.02 MG/DL (ref 0.5–1.4)
EOSINOPHIL # BLD AUTO: 0.1 K/UL (ref 0–0.51)
EOSINOPHIL NFR BLD: 0.6 % (ref 0–6.9)
ERYTHROCYTE [DISTWIDTH] IN BLOOD BY AUTOMATED COUNT: 58.1 FL (ref 35.9–50)
FLUAV RNA SPEC QL NAA+PROBE: NEGATIVE
FLUBV RNA SPEC QL NAA+PROBE: NEGATIVE
GFR SERPLBLD CREATININE-BSD FMLA CKD-EPI: 82 ML/MIN/1.73 M 2
GLOBULIN SER CALC-MCNC: 2.5 G/DL (ref 1.9–3.5)
GLUCOSE SERPL-MCNC: 132 MG/DL (ref 65–99)
HCT VFR BLD AUTO: 41.3 % (ref 42–52)
HGB BLD-MCNC: 12.2 G/DL (ref 14–18)
IMM GRANULOCYTES # BLD AUTO: 0.64 K/UL (ref 0–0.11)
IMM GRANULOCYTES NFR BLD AUTO: 3.9 % (ref 0–0.9)
LYMPHOCYTES # BLD AUTO: 0.44 K/UL (ref 1–4.8)
LYMPHOCYTES NFR BLD: 2.7 % (ref 22–41)
MCH RBC QN AUTO: 20.2 PG (ref 27–33)
MCHC RBC AUTO-ENTMCNC: 29.5 G/DL (ref 32.3–36.5)
MCV RBC AUTO: 68.4 FL (ref 81.4–97.8)
MONOCYTES # BLD AUTO: 0.29 K/UL (ref 0–0.85)
MONOCYTES NFR BLD AUTO: 1.8 % (ref 0–13.4)
NEUTROPHILS # BLD AUTO: 14.61 K/UL (ref 1.82–7.42)
NEUTROPHILS NFR BLD: 90.1 % (ref 44–72)
NRBC # BLD AUTO: 0.08 K/UL
NRBC BLD-RTO: 0.5 /100 WBC (ref 0–0.2)
PLATELET # BLD AUTO: 796 K/UL (ref 164–446)
PMV BLD AUTO: 9.4 FL (ref 9–12.9)
POTASSIUM SERPL-SCNC: 5.2 MMOL/L (ref 3.6–5.5)
PROT SERPL-MCNC: 6.3 G/DL (ref 6–8.2)
RBC # BLD AUTO: 6.04 M/UL (ref 4.7–6.1)
RSV RNA SPEC QL NAA+PROBE: NEGATIVE
SARS-COV-2 RNA RESP QL NAA+PROBE: NOTDETECTED
SODIUM SERPL-SCNC: 134 MMOL/L (ref 135–145)
SPECIMEN SOURCE: NORMAL
WBC # BLD AUTO: 16.2 K/UL (ref 4.8–10.8)

## 2024-05-17 PROCEDURE — 99232 SBSQ HOSP IP/OBS MODERATE 35: CPT | Mod: GC | Performed by: FAMILY MEDICINE

## 2024-05-17 RX ORDER — IPRATROPIUM BROMIDE AND ALBUTEROL SULFATE 2.5; .5 MG/3ML; MG/3ML
3 SOLUTION RESPIRATORY (INHALATION)
Status: DISCONTINUED | OUTPATIENT
Start: 2024-05-17 | End: 2024-05-20

## 2024-05-17 RX ORDER — HYDROMORPHONE HYDROCHLORIDE 1 MG/ML
1 INJECTION, SOLUTION INTRAMUSCULAR; INTRAVENOUS; SUBCUTANEOUS
Status: DISCONTINUED | OUTPATIENT
Start: 2024-05-17 | End: 2024-05-21

## 2024-05-17 RX ORDER — HYDROMORPHONE HYDROCHLORIDE 1 MG/ML
1 INJECTION, SOLUTION INTRAMUSCULAR; INTRAVENOUS; SUBCUTANEOUS ONCE
Status: DISCONTINUED | OUTPATIENT
Start: 2024-05-17 | End: 2024-05-17

## 2024-05-17 RX ORDER — HYDROMORPHONE HYDROCHLORIDE 1 MG/ML
0.5 INJECTION, SOLUTION INTRAMUSCULAR; INTRAVENOUS; SUBCUTANEOUS
Status: DISCONTINUED | OUTPATIENT
Start: 2024-05-17 | End: 2024-05-17

## 2024-05-17 RX ORDER — IPRATROPIUM BROMIDE AND ALBUTEROL SULFATE 2.5; .5 MG/3ML; MG/3ML
3 SOLUTION RESPIRATORY (INHALATION)
Status: DISCONTINUED | OUTPATIENT
Start: 2024-05-17 | End: 2024-05-21 | Stop reason: HOSPADM

## 2024-05-17 RX ORDER — OXYCODONE HYDROCHLORIDE 5 MG/1
5 TABLET ORAL
Status: DISCONTINUED | OUTPATIENT
Start: 2024-05-17 | End: 2024-05-17

## 2024-05-17 RX ORDER — OXYCODONE HYDROCHLORIDE 10 MG/1
10 TABLET ORAL
Status: DISCONTINUED | OUTPATIENT
Start: 2024-05-17 | End: 2024-05-21 | Stop reason: HOSPADM

## 2024-05-17 RX ORDER — OXYCODONE HYDROCHLORIDE 5 MG/1
5 TABLET ORAL
Status: DISCONTINUED | OUTPATIENT
Start: 2024-05-17 | End: 2024-05-21

## 2024-05-17 RX ORDER — OXYCODONE HYDROCHLORIDE 10 MG/1
10 TABLET ORAL
Status: DISCONTINUED | OUTPATIENT
Start: 2024-05-17 | End: 2024-05-17

## 2024-05-17 RX ADMIN — OXYCODONE HYDROCHLORIDE 10 MG: 10 TABLET ORAL at 16:50

## 2024-05-17 RX ADMIN — OXYCODONE HYDROCHLORIDE 10 MG: 10 TABLET ORAL at 12:37

## 2024-05-17 RX ADMIN — OXYCODONE HYDROCHLORIDE 10 MG: 10 TABLET ORAL at 05:19

## 2024-05-17 RX ADMIN — OMEPRAZOLE 20 MG: 20 CAPSULE, DELAYED RELEASE ORAL at 05:20

## 2024-05-17 RX ADMIN — SUCRALFATE 1 G: 1 TABLET ORAL at 17:59

## 2024-05-17 RX ADMIN — AMPICILLIN AND SULBACTAM 3 G: 1; 2 INJECTION, POWDER, FOR SOLUTION INTRAMUSCULAR; INTRAVENOUS at 12:41

## 2024-05-17 RX ADMIN — MOMETASONE FUROATE AND FORMOTEROL FUMARATE DIHYDRATE 2 PUFF: 100; 5 AEROSOL RESPIRATORY (INHALATION) at 09:35

## 2024-05-17 RX ADMIN — IPRATROPIUM BROMIDE AND ALBUTEROL SULFATE 3 ML: 2.5; .5 SOLUTION RESPIRATORY (INHALATION) at 15:41

## 2024-05-17 RX ADMIN — MOMETASONE FUROATE AND FORMOTEROL FUMARATE DIHYDRATE 2 PUFF: 100; 5 AEROSOL RESPIRATORY (INHALATION) at 18:07

## 2024-05-17 RX ADMIN — IPRATROPIUM BROMIDE AND ALBUTEROL SULFATE 3 ML: 2.5; .5 SOLUTION RESPIRATORY (INHALATION) at 10:58

## 2024-05-17 RX ADMIN — ATORVASTATIN CALCIUM 40 MG: 40 TABLET, FILM COATED ORAL at 20:13

## 2024-05-17 RX ADMIN — ACETAMINOPHEN 1000 MG: 500 TABLET, FILM COATED ORAL at 05:19

## 2024-05-17 RX ADMIN — IPRATROPIUM BROMIDE AND ALBUTEROL SULFATE 3 ML: 2.5; .5 SOLUTION RESPIRATORY (INHALATION) at 07:30

## 2024-05-17 RX ADMIN — AMPICILLIN AND SULBACTAM 3 G: 1; 2 INJECTION, POWDER, FOR SOLUTION INTRAMUSCULAR; INTRAVENOUS at 08:21

## 2024-05-17 RX ADMIN — HYDROMORPHONE HYDROCHLORIDE 0.5 MG: 1 INJECTION, SOLUTION INTRAMUSCULAR; INTRAVENOUS; SUBCUTANEOUS at 10:46

## 2024-05-17 RX ADMIN — IOHEXOL 100 ML: 350 INJECTION, SOLUTION INTRAVENOUS at 00:35

## 2024-05-17 RX ADMIN — OXYCODONE HYDROCHLORIDE 10 MG: 10 TABLET ORAL at 01:16

## 2024-05-17 RX ADMIN — ACETAMINOPHEN 1000 MG: 500 TABLET, FILM COATED ORAL at 12:36

## 2024-05-17 RX ADMIN — HYDROMORPHONE HYDROCHLORIDE 0.5 MG: 1 INJECTION, SOLUTION INTRAMUSCULAR; INTRAVENOUS; SUBCUTANEOUS at 13:57

## 2024-05-17 RX ADMIN — OXYCODONE HYDROCHLORIDE 10 MG: 10 TABLET ORAL at 20:13

## 2024-05-17 RX ADMIN — PREDNISONE 40 MG: 20 TABLET ORAL at 05:19

## 2024-05-17 RX ADMIN — IPRATROPIUM BROMIDE AND ALBUTEROL SULFATE 3 ML: 2.5; .5 SOLUTION RESPIRATORY (INHALATION) at 18:08

## 2024-05-17 RX ADMIN — HYDROMORPHONE HYDROCHLORIDE 1 MG: 1 INJECTION, SOLUTION INTRAMUSCULAR; INTRAVENOUS; SUBCUTANEOUS at 21:07

## 2024-05-17 RX ADMIN — HYDROMORPHONE HYDROCHLORIDE 0.5 MG: 1 INJECTION, SOLUTION INTRAMUSCULAR; INTRAVENOUS; SUBCUTANEOUS at 06:43

## 2024-05-17 RX ADMIN — OXYCODONE HYDROCHLORIDE 10 MG: 10 TABLET ORAL at 09:34

## 2024-05-17 RX ADMIN — AMPICILLIN AND SULBACTAM 3 G: 1; 2 INJECTION, POWDER, FOR SOLUTION INTRAMUSCULAR; INTRAVENOUS at 18:17

## 2024-05-17 RX ADMIN — HYDROMORPHONE HYDROCHLORIDE 0.5 MG: 1 INJECTION, SOLUTION INTRAMUSCULAR; INTRAVENOUS; SUBCUTANEOUS at 14:28

## 2024-05-17 RX ADMIN — LOSARTAN POTASSIUM 25 MG: 25 TABLET, FILM COATED ORAL at 18:07

## 2024-05-17 RX ADMIN — HYDROMORPHONE HYDROCHLORIDE 1 MG: 1 INJECTION, SOLUTION INTRAMUSCULAR; INTRAVENOUS; SUBCUTANEOUS at 18:09

## 2024-05-17 RX ADMIN — OMEPRAZOLE 20 MG: 20 CAPSULE, DELAYED RELEASE ORAL at 18:07

## 2024-05-17 RX ADMIN — SUCRALFATE 1 G: 1 TABLET ORAL at 05:20

## 2024-05-17 RX ADMIN — HYDROMORPHONE HYDROCHLORIDE 0.5 MG: 1 INJECTION, SOLUTION INTRAMUSCULAR; INTRAVENOUS; SUBCUTANEOUS at 02:21

## 2024-05-17 RX ADMIN — IPRATROPIUM BROMIDE AND ALBUTEROL SULFATE 3 ML: 2.5; .5 SOLUTION RESPIRATORY (INHALATION) at 01:58

## 2024-05-17 RX ADMIN — AZITHROMYCIN DIHYDRATE 500 MG: 250 TABLET, FILM COATED ORAL at 18:07

## 2024-05-17 ASSESSMENT — PAIN DESCRIPTION - PAIN TYPE
TYPE: ACUTE PAIN

## 2024-05-17 ASSESSMENT — COPD QUESTIONNAIRES
COPD SCREENING SCORE: 7
HAVE YOU SMOKED AT LEAST 100 CIGARETTES IN YOUR ENTIRE LIFE: YES
DO YOU EVER COUGH UP ANY MUCUS OR PHLEGM?: NO/ONLY WITH OCCASIONAL COLDS OR INFECTIONS
DURING THE PAST 4 WEEKS HOW MUCH DID YOU FEEL SHORT OF BREATH: MOST  OR ALL OF THE TIME

## 2024-05-17 NOTE — ASSESSMENT & PLAN NOTE
"S/p Unasyn administration  S/p removal of sutures  Orthopedics consulted and recommend: \"No advanced imaging at this time and no surgical intervention. If the patient has continued worsening pain over his hospital stay while being treated for his COPD exacerbation MRI with contrast could potentially be helpful for further evaluation.\"      " oral

## 2024-05-17 NOTE — ED NOTES
Pt complaining of pain 6/10 to foot. Dilaudid just given 1 hr ago along w tylenol. There are no other PRN orders to give. Dr. Diaz notified.

## 2024-05-17 NOTE — ED NOTES
Medication history reviewed with patient at bedside.   Med rec is complete  Allergies reviewed.     Patient was prescribed a 14 day course of augmentin 875/125mg bid on 5/2/24- this was completed per patient.     Anticoagulants: Xarelto 20mg- last dose 5/15/24 afternoon.    Taj Govea PhT

## 2024-05-17 NOTE — PROGRESS NOTES
[facility] FAMILY MEDICINE PROGRESS NOTE     Attending:   Enid Maria MD    Resident:   Katy Mustafa MD PGY-3    PATIENT:   Noe Hickey; [id number]; 1960    PATIENT ID:  Noe Hickey is a 63 y.o. male with a history of ascending aortic aneurysm repair and TAVR in 2013, HTN, HLD, chronic left hemidiaphragm paresis, HFimpEF (70% EF), pAF on rivaroxaban, and tobacco use disorder admitted 5/16/2024 for dyspnea on exertion and foot wound.    SUBJECTIVE:   No acute events overnight, vital signs stable. Patient has remained afebrile.  Patient reports continued pain of the right foot that is not well-controlled with current pain medications.  He states they initially work however then he has recurrence of pain prior to next dose due.  Patient also reports some improvement of shortness of breath with breathing treatments.    OBJECTIVE:  Vitals:    05/17/24 0221 05/17/24 0419 05/17/24 0519 05/17/24 0620   BP:  123/72     Pulse:  85     Resp: 17 16 18 16   Temp:  36.5 °C (97.7 °F)     TempSrc:  Temporal     SpO2:  93%     Weight:       Height:           Intake/Output Summary (Last 24 hours) at 5/17/2024 0705  Last data filed at 5/17/2024 0115  Gross per 24 hour   Intake 240 ml   Output 1175 ml   Net -935 ml       PHYSICAL EXAM:   General: No acute distress, afebrile, resting comfortably, conversational but having to stop during sentences, hoarse voice  HEENT: NC/AT. EOMI.   Cardiovascular: RRR without murmurs  Respiratory: CTAB, no appreciable wheezing  Abdomen: soft, nontender, nondistended  EXT:  DANG, no edema.  Right dorsal aspect of foot with healed incision however embedded sutures present.  Mild surrounding erythema.  Extremely tender to palpation, unable to evaluate for fluctuance due to patient's pain.  Skin: Mild erythema surrounding healed incision site of right dorsal foot.  Neuro: Non-focal, alert and orientated       LABS:  Recent Labs     05/16/24  1242 05/17/24  0126   WBC 18.2* 16.2*   RBC 5.99  "6.04   HEMOGLOBIN 12.2* 12.2*   HEMATOCRIT 40.1* 41.3*   MCV 66.9* 68.4*   MCH 20.4* 20.2*   RDW 56.3* 58.1*   PLATELETCT 768* 796*   MPV 9.6 9.4   NEUTSPOLYS 81.70* 90.10*   LYMPHOCYTES 10.40* 2.70*   MONOCYTES 2.60 1.80   EOSINOPHILS 3.50 0.60   BASOPHILS 0.00 0.90   RBCMORPHOLO Present  --      Recent Labs     05/16/24  1242 05/16/24  1721 05/17/24  0126   SODIUM 135  --  134*   POTASSIUM 4.6  --  5.2   CHLORIDE 102  --  99   CO2 22  --  24   BUN 18  --  19   CREATININE 0.80  --  1.02   CALCIUM 9.1  --  8.6   MAGNESIUM  --  1.9  --    ALBUMIN 3.8  --  3.8     Estimated GFR/CRCL = Estimated Creatinine Clearance: 80.7 mL/min (by C-G formula based on SCr of 1.02 mg/dL).  Recent Labs     05/16/24  1242 05/17/24  0126   GLUCOSE 80 132*     Recent Labs     05/16/24  1242 05/17/24  0126   ASTSGOT 41 33   ALTSGPT 48 41   TBILIRUBIN 0.5 0.7   ALKPHOSPHAT 34 33   GLOBULIN 2.7 2.5             No results for input(s): \"INR\", \"APTT\", \"FIBRINOGEN\" in the last 72 hours.    Invalid input(s): \"DIMER\"    IMAGING:   CT-FOOT WITH PLUS RECONS RIGHT   Final Result         1. Subcutaneous edema involving the dorsal aspect of the foot.   2. Scattered degenerative changes.   3. No definite bone destruction or soft tissue gas.      CT-CTA CHEST PULMONARY ARTERY W/ RECONS   Final Result      1.  No large central or segmental pulmonary embolus   2.  Cardiomegaly with enlarged pulmonary arteries which could indicate pulmonary arterial hypertension   3.  Persistently elevated LEFT diaphragm with overlying atelectasis            DX-CHEST-PORTABLE (1 VIEW)   Final Result      Unchanged elevation of left hemidiaphragm with associated atelectasis and/or consolidation. Underlying infection is possible.          CULTURES:   Results       Procedure Component Value Units Date/Time    Culture Respiratory W/ GRM STN [[id number]]     Order Status: Canceled Specimen: Respirate from Sputum     BLOOD CULTURE x2 [[id number]] Collected: 05/16/24 1440    Order " Status: Sent Specimen: Blood from Peripheral Updated: 05/16/24 1522    BLOOD CULTURE x2 [[id number]] Collected: 05/16/24 1350    Order Status: Sent Specimen: Blood from Peripheral Updated: 05/16/24 1521            MEDS:  Current Facility-Administered Medications   Medication Last Admin    Respiratory Therapy Consult      ipratropium-albuterol (DUONEB) nebulizer solution 3 mL at 05/17/24 0158    ipratropium-albuterol (DUONEB) nebulizer solution      labetalol (Normodyne/Trandate) injection 10 mg      nicotine (Nicoderm) 21 MG/24HR 21 mg      And    nicotine polacrilex (Nicorette) 2 MG piece 2 mg      Pharmacy Consult Request ...Pain Management Review 1 Each      acetaminophen (Tylenol) tablet 1,000 mg 1,000 mg at 05/17/24 0519    Followed by    [START ON 5/21/2024] acetaminophen (Tylenol) tablet 1,000 mg      oxyCODONE immediate-release (Roxicodone) tablet 5 mg      Or    oxyCODONE immediate release (Roxicodone) tablet 10 mg 10 mg at 05/17/24 0519    Or    HYDROmorphone (Dilaudid) injection 0.5 mg 0.5 mg at 05/17/24 0643    atorvastatin (Lipitor) tablet 40 mg 40 mg at 05/16/24 2042    losartan (Cozaar) tablet 25 mg 25 mg at 05/16/24 2041    omeprazole (PriLOSEC) capsule 20 mg 20 mg at 05/17/24 0520    sucralfate (Carafate) tablet 1 g 1 g at 05/17/24 0520    [Held by provider] rivaroxaban (Xarelto) tablet 20 mg      mometasone-formoterol (Dulera) 100-5 MCG/ACT inhaler 2 Puff      azithromycin (Zithromax) tablet 500 mg      predniSONE (Deltasone) tablet 40 mg 40 mg at 05/17/24 0519       ASSESSMENT/PLAN: 63 y.o. male admitted for:    * Dyspnea on exertion- (present on admission)  Assessment & Plan  Patient with 3-day history of dyspnea on exertion.  Patient was previously admitted 4/11-4/24 for acute hypoxemic respiratory failure in the setting of acute community acquired pneumonia and leukocytosis to 27. He was seen by cardiology who recommended potential for outpatient cardiac MRI/stress testing with new concern for  HOCM on echo from 4/5 despite improvement in EF. Pulmonology recommended outpatient PFTs for questionable component of COPD.  Patient was not able to show up to these appointments.  Patient speaking in short sentences on exam, but saturating well on room air and lungs are clear to auscultation.  Patient does not appear acutely ill, patient likely has COPD exacerbation with extensive smoking history.  Did have mildly elevated BNP, though was not fluid overloaded on exam, do not feel this is due to CHF exacerbation.    Plan  -Continue azithromycin and prednisone  -Will consider starting CAP treatment if oxygen demands increase, or if clinical condition deteriorates  -scheduled duoneb q4hrs  -Dulera BID  -Titrate oxygen to a goal of 88 to 92%  -Will need PFTs as outpatient to formally assess for COPD, unable to perform inpatient    Hypertension- (present on admission)  Assessment & Plan  Chronic, stable.     Plan  -Continue home losartan  -As needed labetalol for SBP greater than 180 or DBP greater than 110    Abscess of right foot- (present on admission)  Assessment & Plan  Pt admitted 4/11-4/24 with acute right leg pain and swelling secondary to a hamstring tear and right foot dorsal abscess for which he underwent s/p I&D by ortho (Dr. Bowie) on 4/19. Cultures returned negative; ID was consulted and recommended 2 weeks of oral Augmentin to end on 5/3 which he was compliant with. Pt readmitted 4/29 for rt lower extremity swelling and significant right foot pain. He underwent arterial and venous imaging of his lower extremities which showed no occult abnormality or occlusion. He did have a right foot ultrasound that revealed a complex fluid collection concerning for abscess. Orthopedic surgery was consulted and recommended MRI with contrast of foot. Patient left AMA while awaiting MRI. Pt had an MRI as outpatient of his right lower extremity 5/1 that showed dorsal subcutaneous fluid collection involving the mid  "foot which measures 5 x 4 x 0.9 cm in size. There is some peripheral soft tissue enhancement in that area as well as metallic artifact consistent with recent drainage. Differential diagnosis includes hematoma and abscess.  CT without evidence of abscess.  Ultrasound 5/17: Abscess versus hematoma.    Plan:  -Continue Unasyn every 6 hours  -Consult orthopedics  -Ultrasound 5/17 showed fluid collection: Hematoma versus abscess  -Holding Eliquis out of concern for hematoma collection of dorsal aspect of right foot-consider restarting 5/18        Atrial fibrillation (HCC)- (present on admission)  Assessment & Plan  Chronic.  In sinus rhythm and normal rate on admission.    Plan  - Holding home Eliquis out of concern for hematoma collection of right foot dorsal surface.  -Patient on metoprolol at home but took self off of medication as it was \"tearing of stomach\"  -Patient will need outpatient cardiology follow-up     Acute on chronic systolic heart failure (HCC)- (present on admission)  Assessment & Plan  Most recent echo 4/26/2024 showed the left ventricular ejection fraction is visually estimated to be 60%. Normal regional wall motion.  Patient does not appear fluid overloaded on exam, dyspnea likely in the setting of COPD exacerbation.    Plan  - Will hold off on Lasix treatment at this time, consider if concern for exacerbation  - Consider repeat echo if worsening signs of exacerbation  -Continue home losartan.  Patient was prescribed metoprolol for heart failure but states that it \"tears up his stomach\" and has taken himself off of this medication.  He had an appointment with cardiology but was not able to make this appointment.  -Can consider restarting a alternative beta-blocker on discharge, or as outpatient as part of GDMT.         Core Measures:   Fluids: None  Lines: PIV  Abx: Unasyn  DVT prophylaxis: Holding due to concern for hematoma  Code Status: Full code    Disposition: Inpatient for continued IV " antibiotics, pending further workup.    Katy Mustafa MD   PGY-3 Family Medicine Resident   [facility]

## 2024-05-17 NOTE — H&P
As the [facility] Family Medicine Senior, I was present with Dr. Diaz during the admission interview and supervised his medical decision making thereafter.    Aniket Linder MD

## 2024-05-17 NOTE — ED NOTES
Bedside report received from off going RN Neeta ZAVALA, assumed care of patient.  POC discussed with patient. Call light within reach, all needs addressed at this time.     Fall risk interventions in place: Move the patient closer to the nurse's station, Patient's personal possessions are with in their safe reach, Place socks on patient, Place fall risk sign on patient's door, Give patient urinal if applicable, Keep floor surfaces clean and dry, and Accompanied to restroom (all applicable per [place] Fall risk assessment)   Continuous monitoring: Cardiac Leads, Pulse Ox, or Blood Pressure  IVF/IV medications: Not Applicable   Oxygen: How many liters 2L, Traced the line to wall oxygen, and No oxygen tank in room  Bedside sitter: Not Applicable   Isolation: Not Applicable

## 2024-05-17 NOTE — ED NOTES
Pt aox4, skin pink warm and dry, airway patent, rr even and unlabored. Vitals stable. Pt in NAD at this time with no new complaints. Transports with transport team in stable condition on 2lpm oxygen.

## 2024-05-17 NOTE — ASSESSMENT & PLAN NOTE
Concern for acute COPD exacerbation  S/p azithromycin for 3 days  Continue prednisone for total of 5 days  Continue supplemental oxygen as clinically indicated  Will continue to work with social workers in order to arrange home oxygen  Patient to undergo PFTs outpatient.  Continue scheduled Trelegy Ellipta

## 2024-05-17 NOTE — ED NOTES
Bedside report received from off going RN/tech: ALLEN Burns assumed care of patient.  POC discussed with patient. Call light within reach, all needs addressed at this time.       Fall risk interventions in place: Keep floor surfaces clean and dry (all applicable per [place] Fall risk assessment)   Continuous monitoring: Cardiac Leads, Pulse Ox, or Blood Pressure  IVF/IV medications: Not Applicable   Oxygen: How many liters 2L  Bedside sitter: Not applicable  Isolation: Not Applicable

## 2024-05-17 NOTE — H&P
[facility] MEDICINE H&P        PATIENT ID:  NAME:  Noe Hickey  MRN:               [id number]  YOB: 1960    Date of Admission: 5/16/2024     Attending: Enid Maria MD    Resident: Cristal Diaz D.O.    Primary Care Physician:  Farheen Wright M.D.    CC: Dyspnea on exertion    HPI: Noe Hickey is a 63 y.o. male who presented with dyspnea on exertion.  Patient states this is worsened over the last 3 days.  His dyspnea is worse with speaking and exerting himself.  And is better at rest.  Denies any fevers, chills, cough, or chest pain associated.  He does have a 50+ year ppd, he currently smokes about 8 cigarettes a day.  Denies history of COPD.    Does have significant pain in his right foot where incision site is.  He still has sutures in place, and is unsure when he is post to get those removed.  His pain is much worse when touched and when he ambulates.  No numbness or tingling associated.  Does note swelling to bilateral feet but not 1 side more than the other. Has been ambulating on foot     ERCourse:  Vitals significant for BP of 166/91 at highest, and RR highest 24, Nasal cannula placed but not on any supplemental O2, SpO2 95% and above.  Lactic acid WNL  Procalc 0.08    Troponin 25,21  CMP WNL    CT-CTA chest:   No large central or segmental pulmonary embolus  2.  Cardiomegaly with enlarged pulmonary arteries which could indicate pulmonary arterial hypertension  3.  Persistently elevated LEFT diaphragm with overlying atelectasis    DX-chest   Unchanged elevation of left hemidiaphragm with associated atelectasis and/or consolidation. Underlying infection is possible.       REVIEW OF SYSTEMS:   Ten systems reviewed and were negative except as noted in the HPI.                PAST MEDICAL HISTORY:  Past Medical History:   Diagnosis Date    Arrhythmia     Breath shortness 06/09/2023    prior to 1/2023 surgery    Cyclic vomiting syndrome     Elevated hemidiaphragm  - LEFT post AVR 01/04/2023    Fracture     Headache, classical migraine     Heart burn     Heart valve disease 06/09/2023    heart surgery in 1/2023    Hyperlipidemia 4/26/2024    Hypertension 4/26/2024    Indigestion     Pneumonia due to infectious organism 01/20/2023    Snoring        PAST SURGICAL HISTORY:  Past Surgical History:   Procedure Laterality Date    INCISION AND DRAINAGE GENERAL Left 4/19/2024    Procedure: INCISION AND DRAINAGE, LEG;  Surgeon: Mitch Bowie M.D.;  Location: SURGERY [facility];  Service: Orthopedics    AORTIC VALVE REPLACEMENT  1/5/2023    Procedure: AORTIC VALVE REPLACEMENT, ASCENDING AORTIC ANEURYSM REPAIR AND TRANSESOPHAGEAL ECHOCARDIOGRAM.;  Surgeon: Serena Goyal M.D.;  Location: SURGERY [facility];  Service: Cardiac    AORTIC ASCENDING DISSECTION  1/5/2023    Procedure: REPAIR, ANEURYSM OR DISSECTION, AORTA, ASCENDING;  Surgeon: Serena Goyal M.D.;  Location: SURGERY [facility];  Service: Cardiac    ECHOCARDIOGRAM, TRANSESOPHAGEAL, INTRAOPERATIVE  1/5/2023    Procedure: ECHOCARDIOGRAM, TRANSESOPHAGEAL, INTRAOPERATIVE.;  Surgeon: Serena Goyal M.D.;  Location: [facility];  Service: Cardiac    IRRIGATION & DEBRIDEMENT ORTHO Right 09/19/2017    Procedure: IRRIGATION & DEBRIDEMENT ORTHO-THIGH;  Surgeon: Corbin Rivera M.D.;  Location: [facility];  Service: Orthopedics    SHOULDER HEMICAP RESURFACING Right 10/12/2015    Procedure: RIGHT SHOULDER RESURFACING;  Surgeon: Javon Doll M.D.;  Location: [facility];  Service:     SHOULDER ARTHROSCOPY      x3    SHOULDER SURGERY      replacement right       FAMILY HISTORY:  History reviewed. No pertinent family history.    SOCIAL HISTORY:   Smoking  50+ year ppd, he currently smokes about 8 cigarettes a day.  Etoh use denies  Drug use denies    DIET:   Orders Placed This Encounter   Procedures    Diet Order Diet: Cardiac     Standing Status:   Standing     Number of Occurrences:   1      Order Specific Question:   Diet:     Answer:   Cardiac [6]       ALLERGIES:  Allergies   Allergen Reactions    Morphine Shortness of Breath and Rash             OUTPATIENT MEDICATIONS:    Current Facility-Administered Medications:     Respiratory Therapy Consult, , Nebulization, Continuous RT, Cristal Diaz D.O.    ipratropium-albuterol (DUONEB) nebulizer solution, 3 mL, Nebulization, Q4HRS (RT), Cristal Diaz D.O.    ipratropium-albuterol (DUONEB) nebulizer solution, 3 mL, Nebulization, Q2HRS PRN (RT), Cristal Diaz D.O.    labetalol (Normodyne/Trandate) injection 10 mg, 10 mg, Intravenous, Q4HRS PRN, Cristal Diaz D.O.    [START ON 5/17/2024] nicotine (Nicoderm) 21 MG/24HR 21 mg, 21 mg, Transdermal, Daily-0600 **AND** Nicotine Replacement Patient Education Materials, , , Once **AND** nicotine polacrilex (Nicorette) 2 MG piece 2 mg, 2 mg, Oral, Q HOUR PRN, Cristal Diaz D.O.    Pharmacy Consult Request ...Pain Management Review 1 Each, 1 Each, Other, PHARMACY TO DOSE, Cristal Diaz D.O.    acetaminophen (Tylenol) tablet 1,000 mg, 1,000 mg, Oral, Q6HRS, 1,000 mg at 05/16/24 1931 **FOLLOWED BY** [START ON 5/21/2024] acetaminophen (Tylenol) tablet 1,000 mg, 1,000 mg, Oral, Q6HRS PRN, Cristal Diaz D.O.    oxyCODONE immediate-release (Roxicodone) tablet 5 mg, 5 mg, Oral, Q3HRS PRN **OR** oxyCODONE immediate release (Roxicodone) tablet 10 mg, 10 mg, Oral, Q3HRS PRN **OR** HYDROmorphone (Dilaudid) injection 0.5 mg, 0.5 mg, Intravenous, Q3HRS PRN, ELLIOTT ClementeO., 0.5 mg at 05/16/24 1932    atorvastatin (Lipitor) tablet 40 mg, 40 mg, Oral, QHS, Cristal Diaz D.O., 40 mg at 05/16/24 2042    losartan (Cozaar) tablet 25 mg, 25 mg, Oral, Q EVENING, Cristal Diaz D.O., 25 mg at 05/16/24 2041    omeprazole (PriLOSEC) capsule 20 mg, 20 mg, Oral, BID, TALI Clemente.O., 20 mg at 05/16/24 2042    sucralfate (Carafate) tablet 1 g, 1 g, Oral, Q12HRS,  Cristal Diaz D.O., 1 g at 24    [Held by provider] rivaroxaban (Xarelto) tablet 20 mg, 20 mg, Oral, DAILY, Cristal Diaz D.O.    [START ON 2024] mometasone-formoterol (Dulera) 100-5 MCG/ACT inhaler 2 Puff, 2 Puff, Inhalation, BID, Cristal Diaz D.O.    [START ON 2024] azithromycin (Zithromax) tablet 500 mg, 500 mg, Oral, DAILY, Cristal Diaz D.O.    [START ON 2024] predniSONE (Deltasone) tablet 40 mg, 40 mg, Oral, DAILY, Cristal Diaz D.O.    PHYSICAL EXAM:  Vitals:    24   BP: (!) 165/72 (!) 147/63 139/84 121/72   Pulse: 77 76 77 79   Resp:    19   Temp:    36.5 °C (97.7 °F)   TempSrc:    Temporal   SpO2: 96% 93% 94% 95%   Weight:    89 kg (196 lb 3.4 oz)   Height:       , Temp (24hrs), Av.8 °C (98.2 °F), Min:36.5 °C (97.7 °F), Max:37.1 °C (98.8 °F)  , Pulse Oximetry: 95 %, O2 (LPM): 3, O2 Delivery Device: Nasal Cannula    General: Pt resting in mild distress secondary to dyspnea, cooperative. Speaking in short sentences secondary to dyspnea.  Skin:  Pink, warm and dry.  No rashes  HEENT: NC/AT. PERRL. EOMI. MMM. No nasal discharge. Oropharynx nonerythematous without exudate/plaques  Neck:  Supple without lymphadenopathy or rigidity. No JVD   Lungs:  Symmetrical.  CTAB with no W/R/R.  Good air movement   Cardiovascular:  S1/S2 RRR without M/R/G.  Abdomen:  Abdomen is soft NT/ND. +BS. No masses noted.   Extremities: Dorsal aspect of right foot with linear incision measuring 3 cm that is closed with sutures, appear embedded.  Not hot to touch.  Minimal erythema surrounding incision site. Pain out of proportion to exam. Full range of motion. 2+ pulses in all extremities. No pitting edema.  Spine:  Straight without vertebral anomalies.  CNS:  Muscle tone is normal. Cranial nerves II-XII grossly intact. 2+ DTRs.         LAB TESTS:   Recent Labs     24  1242   WBC 18.2*   RBC 5.99   HEMOGLOBIN  12.2*   HEMATOCRIT 40.1*   MCV 66.9*   MCH 20.4*   RDW 56.3*   PLATELETCT 768*   MPV 9.6   NEUTSPOLYS 81.70*   LYMPHOCYTES 10.40*   MONOCYTES 2.60   EOSINOPHILS 3.50   BASOPHILS 0.00   RBCMORPHOLO Present         Recent Labs     05/16/24  1242 05/16/24  1721   SODIUM 135  --    POTASSIUM 4.6  --    CHLORIDE 102  --    CO2 22  --    BUN 18  --    CREATININE 0.80  --    CALCIUM 9.1  --    MAGNESIUM  --  1.9   ALBUMIN 3.8  --        CULTURES:   Results       Procedure Component Value Units Date/Time    Culture Respiratory W/ GRM STN [[id number]]     Order Status: Canceled Specimen: Respirate from Sputum     BLOOD CULTURE x2 [[id number]] Collected: 05/16/24 1440    Order Status: Sent Specimen: Blood from Peripheral Updated: 05/16/24 1522    BLOOD CULTURE x2 [[id number]] Collected: 05/16/24 1350    Order Status: Sent Specimen: Blood from Peripheral Updated: 05/16/24 1521            IMAGES:  CT-CTA CHEST PULMONARY ARTERY W/ RECONS   Final Result      1.  No large central or segmental pulmonary embolus   2.  Cardiomegaly with enlarged pulmonary arteries which could indicate pulmonary arterial hypertension   3.  Persistently elevated LEFT diaphragm with overlying atelectasis            DX-CHEST-PORTABLE (1 VIEW)   Final Result      Unchanged elevation of left hemidiaphragm with associated atelectasis and/or consolidation. Underlying infection is possible.      CT-FOOT WITH PLUS RECONS RIGHT    (Results Pending)       CONSULTS:   none    ASSESSMENT/PLAN: 63 y.o. male admitted for COPD exacerbation vs CHF exacerbation.    * Dyspnea on exertion- (present on admission)  Assessment & Plan  Patient with 3-day history of dyspnea on exertion.  Patient was previously admitted 4/11-4/24 for acute hypoxemic respiratory failure in the setting of acute community acquired pneumonia and leukocytosis to 27. He was seen by cardiology who recommended potential for outpatient cardiac MRI/stress testing with new concern for HOCM on echo from 4/5  despite improvement in EF. Pulmonology recommended outpatient PFTs for questionable component of COPD.  Patient was not able to show up to these appointments.  Patient speaking in short sentences on exam, but saturating well on room air and lungs are clear to auscultation.  Patient does not appear acutely ill, patient likely has COPD exacerbation with extensive smoking history.  Did have mildly elevated BNP, though was not fluid overloaded on exam, do not feel this is due to CHF exacerbation.    Plan  -Will start patient on azithromycin and prednisone for anti-inflammatory purposes.    - Will consider starting CAP treatment if oxygen demands increase, or if clinical condition deteriorates  -scheduled duoneb q4hrs  -Dulera BID  -Titrate oxygen to a goal of 88 to 92%  -Will need PFTs as outpatient to formally assess for COPD    Abscess of right foot- (present on admission)  Assessment & Plan  Pt admitted 4/11-4/24 with acute right leg pain and swelling secondary to a hamstring tear and right foot dorsal abscess for which he underwent s/p I&D by ortho (Dr. Bowie) on 4/19. Cultures returned negative; ID was consulted and recommended 2 weeks of oral Augmentin to end on 5/3 which he was compliant with. Pt readmitted 4/29 for rt lower extremity swelling and significant right foot pain. He underwent arterial and venous imaging of his lower extremities which showed no occult abnormality or occlusion. He did have a right foot ultrasound that revealed a complex fluid collection concerning for abscess. Orthopedic surgery was consulted and recommended MRI with contrast of foot. Patient left AMA while awaiting MRI. Pt had an MRI as outpatient of his right lower extremity 5/1 that showed dorsal subcutaneous fluid collection involving the mid foot which measures 5 x 4 x 0.9 cm in size. There is some peripheral soft tissue enhancement in that area as well as metallic artifact consistent with recent drainage. Differential  "diagnosis includes hematoma and abscess.    Plan  -Day team to consult Ortho for concern of hematoma vs abscess. He has also had sutures in for the last 4 weeks that are now embedded. He is well-appearing and finished course of Augmentin.  Foot is not hot to touch on exam and is minimally erythematous. Likely in the setting of hematoma as pt is on Eliquis chronically for A-fib  -Holding Eliquis out of concern for hematoma collection of dorsal aspect of right foot  -Will consider restarting Eliquis after Ortho evaluation  -concern for compartment syndrome due to pain out of proportion on exam, repeat CT right foot pending        Acute on chronic systolic heart failure (HCC)- (present on admission)  Assessment & Plan  Most recent echo 4/26/2024 showed the left ventricular ejection fraction is visually estimated to be 60%. Normal regional wall motion.  Patient does not appear fluid overloaded on exam, dyspnea likely in the setting of COPD exacerbation.    Plan  - Will hold off on Lasix treatment at this time  - Repeat echo not indicated at this time, could consider repeat echo if clinical condition worsens/if patient becomes fluid overloaded  -Continue home losartan.  Patient was prescribed metoprolol for heart failure but states that it \"tears up his stomach\" and has taken himself off of this medication.  He had an appointment with cardiology but was not able to make this appointment.  -Can consider restarting a alternative beta-blocker on discharge, or as outpatient as part of GDMT.     Hypertension- (present on admission)  Assessment & Plan  Chronic, stable.     Plan  -Continue home losartan  -As needed labetalol for SBP greater than 180 or DBP greater than 110    Atrial fibrillation (HCC)- (present on admission)  Assessment & Plan  Chronic.  In sinus rhythm and normal rate on admission.    Plan  - Holding home Eliquis out of concern for hematoma collection of right foot dorsal surface.  -Patient on metoprolol at " "home but took self off of medication as it was \"tearing of stomach\"  -Patient will need outpatient cardiology follow-up        .  Core Measures:  IV Fluids: PO hydration   Antbx: Azithromycin   DVT ppx: holding lovenox in the setting of potential hematoma on dorsal aspect of right foot following procedure  Code status: full code     Cristal Diaz D.O., PGY-1  [facility]    "

## 2024-05-17 NOTE — ED NOTES
Assist RN: Pt to S633/01 via transport. Pt with all belongings in possession. Pt is medical and on 2L NC of oxygen. Report has been given to S6 RN.

## 2024-05-17 NOTE — CARE PLAN
The patient is Stable - Low risk of patient condition declining or worsening    Shift Goals  Clinical Goals: Patient's pain will be <5 throughout shift  Patient Goals: Sleep and rest    Progress made toward(s) clinical / shift goals:    Patient is alert and oriented x4, able to communicate needs. Patient's pain is controlled with medication, rest and repositioning. Patient with dyspnea on exertion, denies shortness of breath at rest and denies chest pain throughout shift. Patient self motivated in using IS, started at 1200 and now at 1500.Patient's bed alarm is on, bed in low position, call light within reach.

## 2024-05-17 NOTE — DISCHARGE PLANNING
Care Transition Team Assessment    This RN CM completed assessment at bedside with patient A/Ox4. Patient lives in a 1 story apartment with no stairs into the house. Noe has no supports in the [place] area, spouse and patient are  and 2 adult children live in [place].      Patient is currently working full time for [employer] and is concerned about not being able to work on discharge and the impact on his ability to pay rent. Discussed with patient the process for applying for disability thru his PCP on discharge.     Previous to admission patient has not required any DME and was independent with all ADLs, and IADLs, no HH or Home oxygen. Patient is currently on 2L oxygen.     Information Source  Orientation Level: Oriented X4  Information Given By: Patient  Informant's Name: Noe  Who is responsible for making decisions for patient? : Patient    Readmission Evaluation  Is this a readmission?: Yes - unplanned readmission  Why do you think you were readmitted?: foot swelling worsening  Was an appointment arranged for you prior to discharge?: Yes, attended appointment  Were there new prescriptions you were supposed to fill after you were discharged?: Yes, prescriptions filled  Did you understand your discharge instructions?: Yes  Did you have enough support after your last discharge?: Yes    Elopement Risk  Legal Hold: No  Ambulatory or Self Mobile in Wheelchair: No-Not an Elopement Risk  Disoriented: No  Psychiatric Symptoms: None  History of Wandering: No  Elopement this Admit: No  Vocalizing Wanting to Leave: No  Displays Behaviors, Body Language Wanting to Leave: No-Not at Risk for Elopement    Interdisciplinary Discharge Planning  Lives with - Patient's Self Care Capacity: Alone and Able to Care For Self  Patient or legal guardian wants to designate a caregiver: No  Support Systems: None  Housing / Facility: 1 Story Apartment / Condo  Durable Medical Equipment: Not Applicable    Discharge  Preparedness  What is your plan after discharge?: Home with help  What are your discharge supports?: Other (comment)  Prior Functional Level: Ambulatory  Difficulity with ADLs: Walking  Difficulty with ADLs Comment: right foot pain  Difficulity with IADLs: None    Functional Assesment  Prior Functional Level: Ambulatory    Finances  Financial Barriers to Discharge: No  Source of Income: Employed  Prescription Coverage: Yes    Vision / Hearing Impairment  Vision Impairment : No  Hearing Impairment : No    Advance Directive  Advance Directive?: None    Domestic Abuse  Have you ever been the victim of abuse or violence?: No  Possible Abuse/Neglect Reported to:: Not Applicable    Psychological Assessment  History of Substance Abuse: None  History of Psychiatric Problems: No  Non-compliant with Treatment: Yes  Newly Diagnosed Illness: No    Discharge Risks or Barriers  Discharge risks or barriers?: Lives alone, no community support  Patient risk factors: Lack of outside supports, Lives alone and no community support, Vulnerable adult    Anticipated Discharge Information  Discharge Disposition: Discharged to home/self care (01)  Discharge Address: [address] [place] [id number]  Discharge Contact Phone Number: [phone]

## 2024-05-17 NOTE — RESPIRATORY CARE
"COPD EDUCATION by COPD CLINICAL EDUCATOR  (Phone: [phone])  5/17/2024 at 12:37 PM by Patty Connor, MIGUEL     Patient seen by the education team to complete Block 1 of a 2-part series. Reference material about the program was given to patient along with our contact information.  Part #1 includes: What is COPD (how the lungs work), common treatments for COPD, the difference between \"rescue\" medications and \"daily\" medications, bronchial hygiene with an explanation of COPD Action Plan. We reviewed the appropriate medication techniques -including a demonstration. Patient has never been formally diagnosed with COPD, had an outpatient PFT scheduled earlier this month but missed the appointment. We discussed the importance of getting the pulmonary function testing done to insure we are treating his breathing issues appropriately, patient understands and is open to having the PFT appointment rescheduled, preferably on a Saturday due to his work schedule.       COPD Screen  COPD Risk Screening  Do you have a history of COPD?: No  COPD Population Screener  During the past 4 weeks, how much did you feel short of breath?: Most  or all of the time  Do you ever cough up any mucus or phlegm?: No/only with occasional colds or infections  In the past 12 months, you do less than you used to because of your breathing problems: Agree  Have you smoked at least 100 cigarettes in your entire life?: Yes  How old are you?: 60+  COPD Screening Score: 7  COPD Coordinator Recommended: Yes    COPD Assessment  COPD Clinical Specialists ONLY  COPD Education Initiated: Yes--Full Intervention (Pt admits he is not good about listening to our advice or reading his discharge paperwork, but open to calls post d/c. Given copies of the COPD and Smoking Cessation literature, will reschedule OP PFT for a Saturday again.)  COPD Education Session 1: Yes  Is this a COPD exacerbation patient?: Yes (per H&P, order set used, [facility] attending)  DME Company: none " "prior  DME Equipment Type: none prior  Physician Follow Up Appointment:  (declined apt assistance)  Physician Name: Farheen Wright M.D.  Pulmonary Follow Up Appointment: 06/04/24  Appt Time: 1540  Pulmonologist Name: Efren Maradiaga D.O.  Referrals Initiated: Yes  Pulmonary Rehab: N/A  Smoking Cessation: Declined  Hospice: N/A  Home Health Care: N/A  Mobile Urgent Care Services: Declined  Geriatric Specialty Group: N/A  Private In-Home Care Agency: N/A  $ Demo/Eval of SVN's, MDI's and Aerosols: Yes (reviewed meds)  (OP) Pulmonary Function Testing:  (pending OP)  Interdisciplinary Rounds: Attendance at Rounds (30 Min)    PFT Results    No results found for: \"PFT\"    Meds to Beds  [facility] provides bedside medication delivery for all eligible patients at discharge.  Would you like to opt out of this program for any reason?: No - Stay Opted In     MY COPD ACTION PLAN     It is recommended that patients and physicians /healthcare providers complete this action plan together. This plan should be discussed at each physician visit and updated as needed.    The green, yellow and red zones show groups of symptoms of COPD. This list of symptoms is not comprehensive, and you may experience other symptoms. In the \"Actions\" column, your healthcare provider has recommended actions for you to take based on your symptoms.    Patient Name: Noe Hickey   YOB: 1960   Last Updated on: 4/28/2024  3:19 PM   Green Zone:  I am doing well today Actions     Usual activitiy and exercise level   Take daily medications     Usual amounts of cough and phlegm/mucus   Use oxygen as prescribed     Sleep well at night   Continue regular exercise/diet plan     Appetite is good   At all times avoid cigarette smoke, inhaled irritants     Daily Medications (these medications are taken every day):   Budesonide-Formoterol Fumarate (Symbicort) 2 Puffs Twice daily     Additional Information:  Use the a spacer, rinse mouth after " "taking    Yellow Zone:  I am having a bad day or a COPD flare Actions     More breathless than usual   Continue daily medications     I have less energy for my daily activities   Use quick relief inhaler as ordered     Increased or thicker phlegm/mucus   Use oxygen as prescribed     Using quick relief inhaler/nebulizer more often   Get plenty of rest     Swelling of ankles more than usual   Use pursed lip breathing     More coughing than usual   At all times avoid cigarette smoke, inhaled irritants     I feel like I have a \"chest cold\"     Poor sleep and my symptoms woke me up     My appetite is not good     My medicine is not helping      Call provider immediately if symptoms don’t improve     Continue daily medications, add rescue medications:   Albuterol 2 Puffs Every 6 hours PRN       Medications to be used during a flare up, (as Discussed with Provider):           Additional Information:  Use with a spacer    Red Zone:  I need urgent medical care Actions     Severe shortness of breath even at rest   Call 911 or seek medical care immediately     Not able to do any activity because of breathing      Fever or shaking chills      Feeling confused or very drowsy       Chest pains      Coughing up blood                  "

## 2024-05-17 NOTE — PROGRESS NOTES
4 Eyes Skin Assessment Completed by ALLEN Lucas and ALLEN Valente.    Head WDL  Ears WDL  Nose WDL  Mouth WDL  Neck WDL  Breast/Chest Scar  Shoulder Blades WDL  Spine WDL  (R) Arm/Elbow/Hand Redness and Blanching  (L) Arm/Elbow/Hand Redness and Blanching  Abdomen Scar  Groin WDL  Scrotum/Coccyx/Buttocks WDL  (R) Leg WDL  (L) Leg WDL  (R) Heel/Foot/Toe Redness and Swelling; suture present on anterior foot  (L) Heel/Foot/Toe WDL          Devices In Places Nasal Cannula;       Interventions In Place NC W/Ear Foams and Pillows    Possible Skin Injury No    Pictures Uploaded Into Epic Yes  Wound Consult Placed N/A  RN Wound Prevention Protocol Ordered Yes

## 2024-05-17 NOTE — ASSESSMENT & PLAN NOTE
Echocardiogram completed and reassuring  Will consider GDMT as clinically indicated, SGLT2i on discharge  Maintain euvolemia

## 2024-05-18 PROBLEM — L08.9 RIGHT FOOT INFECTION: Status: ACTIVE | Noted: 2024-04-19

## 2024-05-18 PROCEDURE — 99222 1ST HOSP IP/OBS MODERATE 55: CPT | Performed by: ORTHOPAEDIC SURGERY

## 2024-05-18 PROCEDURE — 99232 SBSQ HOSP IP/OBS MODERATE 35: CPT | Mod: GC | Performed by: FAMILY MEDICINE

## 2024-05-18 RX ORDER — FERROUS SULFATE 325(65) MG
325 TABLET ORAL DAILY
Status: DISCONTINUED | OUTPATIENT
Start: 2024-05-18 | End: 2024-05-21 | Stop reason: HOSPADM

## 2024-05-18 RX ORDER — METHYLPREDNISOLONE SODIUM SUCCINATE 125 MG/2ML
62.5 INJECTION, POWDER, LYOPHILIZED, FOR SOLUTION INTRAMUSCULAR; INTRAVENOUS EVERY 6 HOURS
Status: DISCONTINUED | OUTPATIENT
Start: 2024-05-18 | End: 2024-05-18

## 2024-05-18 RX ORDER — PREDNISONE 20 MG/1
40 TABLET ORAL DAILY
Status: DISCONTINUED | OUTPATIENT
Start: 2024-05-19 | End: 2024-05-21 | Stop reason: HOSPADM

## 2024-05-18 RX ORDER — MAGNESIUM SULFATE 1 G/100ML
1 INJECTION INTRAVENOUS ONCE
Status: COMPLETED | OUTPATIENT
Start: 2024-05-18 | End: 2024-05-18

## 2024-05-18 RX ADMIN — SUCRALFATE 1 G: 1 TABLET ORAL at 17:59

## 2024-05-18 RX ADMIN — OXYCODONE HYDROCHLORIDE 10 MG: 10 TABLET ORAL at 21:22

## 2024-05-18 RX ADMIN — METHYLPREDNISOLONE SODIUM SUCCINATE 62.5 MG: 125 INJECTION, POWDER, FOR SOLUTION INTRAMUSCULAR; INTRAVENOUS at 14:52

## 2024-05-18 RX ADMIN — HYDROMORPHONE HYDROCHLORIDE 1 MG: 1 INJECTION, SOLUTION INTRAMUSCULAR; INTRAVENOUS; SUBCUTANEOUS at 19:26

## 2024-05-18 RX ADMIN — OXYCODONE HYDROCHLORIDE 10 MG: 10 TABLET ORAL at 17:58

## 2024-05-18 RX ADMIN — IPRATROPIUM BROMIDE AND ALBUTEROL SULFATE 3 ML: 2.5; .5 SOLUTION RESPIRATORY (INHALATION) at 15:38

## 2024-05-18 RX ADMIN — HYDROMORPHONE HYDROCHLORIDE 1 MG: 1 INJECTION, SOLUTION INTRAMUSCULAR; INTRAVENOUS; SUBCUTANEOUS at 10:35

## 2024-05-18 RX ADMIN — IPRATROPIUM BROMIDE AND ALBUTEROL SULFATE 3 ML: 2.5; .5 SOLUTION RESPIRATORY (INHALATION) at 10:46

## 2024-05-18 RX ADMIN — OMEPRAZOLE 20 MG: 20 CAPSULE, DELAYED RELEASE ORAL at 05:09

## 2024-05-18 RX ADMIN — ACETAMINOPHEN 1000 MG: 500 TABLET, FILM COATED ORAL at 00:18

## 2024-05-18 RX ADMIN — OMEPRAZOLE 20 MG: 20 CAPSULE, DELAYED RELEASE ORAL at 17:58

## 2024-05-18 RX ADMIN — HYDROMORPHONE HYDROCHLORIDE 1 MG: 1 INJECTION, SOLUTION INTRAMUSCULAR; INTRAVENOUS; SUBCUTANEOUS at 23:01

## 2024-05-18 RX ADMIN — LOSARTAN POTASSIUM 25 MG: 25 TABLET, FILM COATED ORAL at 17:59

## 2024-05-18 RX ADMIN — AMPICILLIN AND SULBACTAM 3 G: 1; 2 INJECTION, POWDER, FOR SOLUTION INTRAMUSCULAR; INTRAVENOUS at 15:04

## 2024-05-18 RX ADMIN — MAGNESIUM SULFATE IN DEXTROSE 1 G: 10 INJECTION, SOLUTION INTRAVENOUS at 09:49

## 2024-05-18 RX ADMIN — AMPICILLIN AND SULBACTAM 3 G: 1; 2 INJECTION, POWDER, FOR SOLUTION INTRAMUSCULAR; INTRAVENOUS at 05:12

## 2024-05-18 RX ADMIN — MOMETASONE FUROATE AND FORMOTEROL FUMARATE DIHYDRATE 2 PUFF: 100; 5 AEROSOL RESPIRATORY (INHALATION) at 17:59

## 2024-05-18 RX ADMIN — IPRATROPIUM BROMIDE AND ALBUTEROL SULFATE 3 ML: 2.5; .5 SOLUTION RESPIRATORY (INHALATION) at 00:31

## 2024-05-18 RX ADMIN — AZITHROMYCIN DIHYDRATE 500 MG: 250 TABLET, FILM COATED ORAL at 17:59

## 2024-05-18 RX ADMIN — ACETAMINOPHEN 1000 MG: 500 TABLET, FILM COATED ORAL at 05:15

## 2024-05-18 RX ADMIN — ACETAMINOPHEN 1000 MG: 500 TABLET, FILM COATED ORAL at 17:58

## 2024-05-18 RX ADMIN — HYDROMORPHONE HYDROCHLORIDE 1 MG: 1 INJECTION, SOLUTION INTRAMUSCULAR; INTRAVENOUS; SUBCUTANEOUS at 01:48

## 2024-05-18 RX ADMIN — ACETAMINOPHEN 1000 MG: 500 TABLET, FILM COATED ORAL at 14:38

## 2024-05-18 RX ADMIN — MOMETASONE FUROATE AND FORMOTEROL FUMARATE DIHYDRATE 2 PUFF: 100; 5 AEROSOL RESPIRATORY (INHALATION) at 05:08

## 2024-05-18 RX ADMIN — OXYCODONE HYDROCHLORIDE 10 MG: 10 TABLET ORAL at 00:17

## 2024-05-18 RX ADMIN — METHYLPREDNISOLONE SODIUM SUCCINATE 62.5 MG: 125 INJECTION, POWDER, FOR SOLUTION INTRAMUSCULAR; INTRAVENOUS at 09:11

## 2024-05-18 RX ADMIN — PREDNISONE 40 MG: 20 TABLET ORAL at 05:15

## 2024-05-18 RX ADMIN — IPRATROPIUM BROMIDE AND ALBUTEROL SULFATE 3 ML: 2.5; .5 SOLUTION RESPIRATORY (INHALATION) at 06:44

## 2024-05-18 RX ADMIN — HYDROMORPHONE HYDROCHLORIDE 1 MG: 1 INJECTION, SOLUTION INTRAMUSCULAR; INTRAVENOUS; SUBCUTANEOUS at 07:14

## 2024-05-18 RX ADMIN — FERROUS SULFATE TAB 325 MG (65 MG ELEMENTAL FE) 325 MG: 325 (65 FE) TAB at 19:25

## 2024-05-18 RX ADMIN — HYDROMORPHONE HYDROCHLORIDE 1 MG: 1 INJECTION, SOLUTION INTRAMUSCULAR; INTRAVENOUS; SUBCUTANEOUS at 14:52

## 2024-05-18 RX ADMIN — AMPICILLIN AND SULBACTAM 3 G: 1; 2 INJECTION, POWDER, FOR SOLUTION INTRAMUSCULAR; INTRAVENOUS at 18:07

## 2024-05-18 RX ADMIN — ATORVASTATIN CALCIUM 40 MG: 40 TABLET, FILM COATED ORAL at 21:20

## 2024-05-18 RX ADMIN — IPRATROPIUM BROMIDE AND ALBUTEROL SULFATE 3 ML: 2.5; .5 SOLUTION RESPIRATORY (INHALATION) at 19:06

## 2024-05-18 RX ADMIN — AMPICILLIN AND SULBACTAM 3 G: 1; 2 INJECTION, POWDER, FOR SOLUTION INTRAMUSCULAR; INTRAVENOUS at 00:20

## 2024-05-18 RX ADMIN — OXYCODONE HYDROCHLORIDE 10 MG: 10 TABLET ORAL at 05:16

## 2024-05-18 RX ADMIN — OXYCODONE HYDROCHLORIDE 10 MG: 10 TABLET ORAL at 09:19

## 2024-05-18 RX ADMIN — OXYCODONE HYDROCHLORIDE 10 MG: 10 TABLET ORAL at 13:09

## 2024-05-18 RX ADMIN — SUCRALFATE 1 G: 1 TABLET ORAL at 05:15

## 2024-05-18 ASSESSMENT — PAIN DESCRIPTION - PAIN TYPE
TYPE: ACUTE PAIN

## 2024-05-18 ASSESSMENT — FIBROSIS 4 INDEX: FIB4 SCORE: 0.41

## 2024-05-18 NOTE — RESPIRATORY CARE
"  COPD EDUCATION by COPD CLINICAL EDUCATOR  (Phone: [phone])  5/18/2024 at 11:02 AM by Arabella Pulido, RRT    Patient seen by the education team to complete their final block of education.  This session discussed the signs and symptoms of an exacerbation (flare-up), triggers that can create flare-ups and reiteration of an \"Action Plan\" to reference daily. This will help to categorize their symptoms and utilize appropriate therapy.  Further education included breathing techniques to treat acute symptoms and home Oxygen safety.  Question and answer session followed.  Smoking Cessation was discussed as appropriate to this patient. Again, he was reminded about his upcoming appointments as noted below. He agrees to follow up phone calls after discharge.    COPD Screen  COPD Risk Screening  Do you have a history of COPD?: No  COPD Population Screener  During the past 4 weeks, how much did you feel short of breath?: Most  or all of the time  Do you ever cough up any mucus or phlegm?: No/only with occasional colds or infections  In the past 12 months, you do less than you used to because of your breathing problems: Agree  Have you smoked at least 100 cigarettes in your entire life?: Yes  How old are you?: 60+  COPD Screening Score: 7  COPD Coordinator Recommended: Yes    COPD Assessment  COPD Clinical Specialists ONLY  COPD Education Initiated: Yes--Full Intervention (met to finish discussion -provided space and AP review -requestd Dulera or Symbicort at discharge; encouraged twice daily use andreminded of upcoming appts these were written down and ask pt to bring all inhalers meds to his appts)  COPD Education Session 1: Yes  COPD Education Session 2: Yes  Is this a COPD exacerbation patient?: Yes (per H&P, order set used, [facility] attending)  DME Company: TBD -current on 1 lpm  DME Equipment Type: none prior  Physician Follow Up Appointment:  (declined apt assistance)  Physician Name: Farheen Wright M.D.  Pulmonary " "Follow Up Appointment: 06/04/24  Appt Time: 1540  Pulmonologist Name: Efren Maradiaga D.O.  Referrals Initiated: Yes  Pulmonary Rehab: N/A  Smoking Cessation: Yes (discussion and reinforcement given)  Hospice: N/A  Home Health Care:  (TBD at this encounter)  Mobile Urgent Care Services: Declined  [facility] Group: N/A  Private In-Home Care Agency: N/A  $ Demo/Eval of SVN's, MDI's and Aerosols: Yes (spacer with instruction given)    PFT Results  (OP) Pulmonary Function Testing: Yes (pending our team will assist in rescheduling)  Interdisciplinary Rounds: Attendance at Rounds (30 Min)  No results found for: \"PFT\"    Meds to Beds  [facility] provides bedside medication delivery for all eligible patients at discharge.  Would you like to opt out of this program for any reason?: No - Stay Opted In     MY COPD ACTION PLAN     It is recommended that patients and physicians /healthcare providers complete this action plan together. This plan should be discussed at each physician visit and updated as needed.    The green, yellow and red zones show groups of symptoms of COPD. This list of symptoms is not comprehensive, and you may experience other symptoms. In the \"Actions\" column, your healthcare provider has recommended actions for you to take based on your symptoms.    Patient Name: Noe Hickey   YOB: 1960   Last Updated on: 4/28/2024  3:19 PM   Green Zone:  I am doing well today Actions     Usual activitiy and exercise level   Take daily medications     Usual amounts of cough and phlegm/mucus   Use oxygen as prescribed     Sleep well at night   Continue regular exercise/diet plan     Appetite is good   At all times avoid cigarette smoke, inhaled irritants     Daily Medications (these medications are taken every day):   Budesonide-Formoterol Fumarate (Symbicort) 2 Puffs Twice daily     Additional Information:  Use the a spacer, rinse mouth after taking    Hospital used a similar medication " "called Dulera - DO NOT TAKE BOTH each are taken the same was 2 puffs with spacer twice daily    Yellow Zone:  I am having a bad day or a COPD flare Actions     More breathless than usual   Continue daily medications     I have less energy for my daily activities   Use quick relief inhaler as ordered     Increased or thicker phlegm/mucus   Use oxygen as prescribed     Using quick relief inhaler/nebulizer more often   Get plenty of rest     Swelling of ankles more than usual   Use pursed lip breathing     More coughing than usual   At all times avoid cigarette smoke, inhaled irritants     I feel like I have a \"chest cold\"     Poor sleep and my symptoms woke me up     My appetite is not good     My medicine is not helping      Call provider immediately if symptoms don’t improve     Continue daily medications, add rescue medications:   Albuterol 2 Puffs Every 6 hours PRN       Medications to be used during a flare up, (as Discussed with Provider):           Additional Information:  Use with a spacer    Red Zone:  I need urgent medical care Actions     Severe shortness of breath even at rest   Call 911 or seek medical care immediately     Not able to do any activity because of breathing      Fever or shaking chills      Feeling confused or very drowsy       Chest pains      Coughing up blood                  "

## 2024-05-18 NOTE — PROGRESS NOTES
Assumed care of pt from noc RN. Pt needs met at this time. Bed locked in lowest position. Call light and personal belongings in reach.

## 2024-05-18 NOTE — CARE PLAN
The patient is Stable - Low risk of patient condition declining or worsening    Shift Goals  Clinical Goals: Patient's pain will be <5 throughout shift  Patient Goals: Sleep and rest    Progress made toward(s) clinical / shift goals:    Problem: Pain - Standard  Goal: Alleviation of pain or a reduction in pain to the patient’s comfort goal  Outcome: Progressing     Problem: Respiratory  Goal: Patient will achieve/maintain optimum respiratory ventilation and gas exchange  Outcome: Progressing     Problem: Self Care  Goal: Patient will have the ability to perform ADLs independently or with assistance (bathe, groom, dress, toilet and feed)  Outcome: Progressing     Problem: Knowledge Deficit - COPD  Goal: Patient/significant other demonstrates understanding of disease process, utilization of the Action Plan, medications and discharge instruction  Outcome: Progressing       Patient is not progressing towards the following goals:

## 2024-05-18 NOTE — CARE PLAN
Problem: Bronchoconstriction  Goal: Improve in air movement and diminished wheezing  Description: Target End Date:  2 to 3 days    1.  Implement inhaled treatments  2.  Evaluate and manage medication effects  Outcome: Progressing    Duoneb - QID

## 2024-05-18 NOTE — PROGRESS NOTES
"  [facility] FAMILY MEDICINE PROGRESS NOTE     Attending:   Ирина Barrett MD     Resident:   Brandie Tavarez MD (PGY2)    PATIENT:   Noe Hickey; [id number]; 1960    ID:Noe Hickey is a 63 y.o. male with a history of ascending aortic aneurysm repair and TAVR in 2013, HTN, HLD, chronic left hemidiaphragm paresis, HFimpEF (70% EF), pAF on rivaroxaban, and tobacco use disorder admitted 5/16/2024 for dyspnea on exertion and foot wound.    SUBJECTIVE:   No acute events overnight.  Overnight patient has been vitally stable with exception of on and off oxygen requirements.  Highest O2 demand was 2 L.  Increased work of breathing during today's assessment. Patient states that his foot looks and feels a lot better.  Patient states, \" that he had no idea he had COPD and was not taking any medications\".  No new labs were ordered for today's assessment.  Ortho was consulted and scheduled to see patient today.      OBJECTIVE:  Vitals:    05/18/24 0924 05/18/24 1046 05/18/24 1543 05/18/24 1713   BP: 134/85   (!) 156/90   Pulse: 93 88 94 89   Resp: 17 16 (!) 24 17   Temp: 36.7 °C (98 °F)   36.3 °C (97.3 °F)   TempSrc: Temporal   Temporal   SpO2: 91% 92% 90% 92%   Weight:       Height:         Intake/Output Summary (Last 24 hours) at 5/17/2024 0705  Last data filed at 5/17/2024 0115  Gross per 24 hour   Intake 240 ml   Output 1175 ml   Net -935 ml     PHYSICAL EXAM:   General: No acute distress, afebrile, labored breathing when answering questions during assessment.  Standing without robe on arrival.  Hoarseness of voice.  HEENT: NC/AT. EOMI.   Cardiovascular: RRR without murmurs  Respiratory: CTAB, no appreciable wheezing.  Increased work of breathing  Abdomen: soft, nontender, nondistended  EXT:  DANG, no edema.  Right dorsal aspect of foot with healed incision however embedded sutures present.  Mild surrounding erythema.  No palpable fluctuance.  Exquisitely tender to palpation.    Skin: Mild erythema surrounding healed incision " "site of right dorsal foot.  Neuro: Non-focal, alert and orientated     LABS:  Recent Labs     05/16/24  1242 05/17/24  0126   WBC 18.2* 16.2*   RBC 5.99 6.04   HEMOGLOBIN 12.2* 12.2*   HEMATOCRIT 40.1* 41.3*   MCV 66.9* 68.4*   MCH 20.4* 20.2*   RDW 56.3* 58.1*   PLATELETCT 768* 796*   MPV 9.6 9.4   NEUTSPOLYS 81.70* 90.10*   LYMPHOCYTES 10.40* 2.70*   MONOCYTES 2.60 1.80   EOSINOPHILS 3.50 0.60   BASOPHILS 0.00 0.90   RBCMORPHOLO Present  --      Recent Labs     05/16/24  1242 05/16/24  1721 05/17/24  0126   SODIUM 135  --  134*   POTASSIUM 4.6  --  5.2   CHLORIDE 102  --  99   CO2 22  --  24   BUN 18  --  19   CREATININE 0.80  --  1.02   CALCIUM 9.1  --  8.6   MAGNESIUM  --  1.9  --    ALBUMIN 3.8  --  3.8     Estimated GFR/CRCL = Estimated Creatinine Clearance: 80.7 mL/min (by C-G formula based on SCr of 1.02 mg/dL).  Recent Labs     05/16/24  1242 05/17/24  0126   GLUCOSE 80 132*     Recent Labs     05/16/24  1242 05/17/24  0126   ASTSGOT 41 33   ALTSGPT 48 41   TBILIRUBIN 0.5 0.7   ALKPHOSPHAT 34 33   GLOBULIN 2.7 2.5             No results for input(s): \"INR\", \"APTT\", \"FIBRINOGEN\" in the last 72 hours.    Invalid input(s): \"DIMER\"    IMAGING:   US-EXTREMITY NON VASCULAR UNILATERAL RIGHT   Final Result      Redemonstrated subcutaneous soft tissue collection, decreased in size now measuring 4.6 x 3.7 x 0.7 cm most consistent with hematoma. Abscess is not excluded.      CT-FOOT WITH PLUS RECONS RIGHT   Final Result         1. Subcutaneous edema involving the dorsal aspect of the foot.   2. Scattered degenerative changes.   3. No definite bone destruction or soft tissue gas.      CT-CTA CHEST PULMONARY ARTERY W/ RECONS   Final Result      1.  No large central or segmental pulmonary embolus   2.  Cardiomegaly with enlarged pulmonary arteries which could indicate pulmonary arterial hypertension   3.  Persistently elevated LEFT diaphragm with overlying atelectasis            DX-CHEST-PORTABLE (1 VIEW)   Final Result "      Unchanged elevation of left hemidiaphragm with associated atelectasis and/or consolidation. Underlying infection is possible.        CULTURES:   Results       Procedure Component Value Units Date/Time    CoV-2, Flu A/B, And RSV by PCR ([employer]) [[id number]] Collected: 05/17/24 0205    Order Status: Completed Updated: 05/17/24 1329     Influenza virus A RNA Negative     Influenza virus B, PCR Negative     RSV, PCR Negative     SARS-CoV-2 by PCR NotDetected     Comment: [facility] providers: PLEASE REFER TO DE-ESCALATION AND RETESTING PROTOCOL  on [email or web address]    **The [employer] GeneXpert Xpress SARS-CoV-2 RT-PCR Test has been made  available for use under the Emergency Use Authorization (EUA) only.          SARS-CoV-2 Source NP Swab    CoV-2, Flu A/B, And RSV by PCR ([employer]) [[id number]]     Order Status: No result Specimen: Respirate     BLOOD CULTURE x2 [[id number]] Collected: 05/16/24 1350    Order Status: Completed Specimen: Blood from Peripheral Updated: 05/17/24 0749     Significant Indicator NEG     Source BLD     Site PERIPHERAL     Culture Result No Growth  Note: Blood cultures are incubated for 5 days and  are monitored continuously.Positive blood cultures  are called to the RN and reported as soon as  they are identified.      Narrative:      Right Forearm/Arm    BLOOD CULTURE x2 [[id number]] Collected: 05/16/24 1440    Order Status: Completed Specimen: Blood from Peripheral Updated: 05/17/24 0749     Significant Indicator NEG     Source BLD     Site PERIPHERAL     Culture Result No Growth  Note: Blood cultures are incubated for 5 days and  are monitored continuously.Positive blood cultures  are called to the RN and reported as soon as  they are identified.      Narrative:      Right Forearm/Arm    Culture Respiratory W/ GRM STN [[id number]]     Order Status: Canceled Specimen: Respirate from Sputum           MEDS:  Current Facility-Administered Medications   Medication Last Admin    [START ON 5/19/2024]  predniSONE (Deltasone) tablet 40 mg      ampicillin/sulbactam (Unasyn) 3 g in  mL IVPB Stopped at 05/18/24 1534    oxyCODONE immediate-release (Roxicodone) tablet 5 mg      Or    oxyCODONE immediate release (Roxicodone) tablet 10 mg 10 mg at 05/18/24 1309    Or    HYDROmorphone (Dilaudid) injection 1 mg 1 mg at 05/18/24 1452    ipratropium-albuterol (DUONEB) nebulizer solution 3 mL at 05/18/24 1538    ipratropium-albuterol (DUONEB) nebulizer solution 3 mL at 05/18/24 0031    Respiratory Therapy Consult      labetalol (Normodyne/Trandate) injection 10 mg      nicotine (Nicoderm) 21 MG/24HR 21 mg      And    nicotine polacrilex (Nicorette) 2 MG piece 2 mg      Pharmacy Consult Request ...Pain Management Review 1 Each      acetaminophen (Tylenol) tablet 1,000 mg 1,000 mg at 05/18/24 1438    Followed by    [START ON 5/21/2024] acetaminophen (Tylenol) tablet 1,000 mg      atorvastatin (Lipitor) tablet 40 mg 40 mg at 05/17/24 2013    losartan (Cozaar) tablet 25 mg 25 mg at 05/17/24 1807    omeprazole (PriLOSEC) capsule 20 mg 20 mg at 05/18/24 0509    sucralfate (Carafate) tablet 1 g 1 g at 05/18/24 0515    rivaroxaban (Xarelto) tablet 20 mg      mometasone-formoterol (Dulera) 100-5 MCG/ACT inhaler 2 Puff 2 Puff at 05/18/24 0508    azithromycin (Zithromax) tablet 500 mg 500 mg at 05/17/24 1807       ASSESSMENT/PLAN: 63 y.o. male admitted for:    * Dyspnea on exertion- (present on admission)  Assessment & Plan  Patient with 3-day history of dyspnea on exertion.  Patient was previously admitted 4/11-4/24 for acute hypoxemic respiratory failure in the setting of acute community acquired pneumonia and leukocytosis to 27. He was seen by cardiology who recommended potential for outpatient cardiac MRI/stress testing with new concern for HOCM on echo from 4/5 despite improvement in EF. Pulmonology recommended outpatient PFTs for questionable component of COPD.  Patient was not able to show up to these appointments.  Patient  speaking in short sentences on exam, but saturating well on room air and lungs are clear to auscultation.  Patient does not appear acutely ill, patient likely has COPD exacerbation with extensive smoking history.  Did have mildly elevated BNP, though was not fluid overloaded on exam, do not feel this is due to CHF exacerbation.    Plan  -Continue azithromycin and prednisone  -If O2 demands continue to arise overnight, will consider treatment for CAP  -scheduled duoneb q4hrs  -Dulera BID  -Titrate oxygen to a goal of 88 to 92%  -Will need PFTs as outpatient to formally assess for COPD, unable to perform inpatient    Hypertension- (present on admission)  Assessment & Plan  Chronic, stable.     Plan  -Continue home losartan  -As needed labetalol for SBP greater than 180 or DBP greater than 110    Right foot infection- (present on admission)  Assessment & Plan  Pt admitted 4/11-4/24 with acute right leg pain and swelling secondary to a hamstring tear and right foot dorsal abscess for which he underwent s/p I&D by ortho (Dr. Bowie) on 4/19. Cultures returned negative; ID was consulted and recommended 2 weeks of oral Augmentin to end on 5/3 which he was compliant with. Pt readmitted 4/29 for rt lower extremity swelling and significant right foot pain. He underwent arterial and venous imaging of his lower extremities which showed no occult abnormality or occlusion. He did have a right foot ultrasound that revealed a complex fluid collection concerning for abscess. Orthopedic surgery was consulted and recommended MRI with contrast of foot. Patient left AMA while awaiting MRI. Pt had an MRI as outpatient of his right lower extremity 5/1 that showed dorsal subcutaneous fluid collection involving the mid foot which measures 5 x 4 x 0.9 cm in size. There is some peripheral soft tissue enhancement in that area as well as metallic artifact consistent with recent drainage. Differential diagnosis includes hematoma and abscess.   "CT without evidence of abscess.  Ultrasound 5/17: Abscess versus hematoma.    Plan:  -Continue Unasyn every 6 hours  -Orthopedics consulted and recommend: No advanced imaging at this time and no surgical intervention. If the patient has continued worsening pain over his hospital stay while being treated for his COPD exacerbation MRI with contrast could potentially be helpful for further evaluation. PA to remove sutures today or tomorrow, awaiting confirmation of time.   -Ultrasound 5/17 showed fluid collection: Hematoma versus abscess  -Restart home Xeralto now        Atrial fibrillation (HCC)- (present on admission)  Assessment & Plan  Chronic.  In sinus rhythm and normal rate on admission.    Plan  - Holding home Eliquis out of concern for hematoma collection of right foot dorsal surface.  -Patient on metoprolol at home but took self off of medication as it was \"tearing of stomach\"  -Patient will need outpatient cardiology follow-up     Acute on chronic systolic heart failure (HCC)- (present on admission)  Assessment & Plan  Most recent echo 4/26/2024 showed the left ventricular ejection fraction is visually estimated to be 60%. Normal regional wall motion.  Patient does not appear fluid overloaded on exam, dyspnea likely in the setting of COPD exacerbation.    Plan  - Will hold off on Lasix treatment at this time, consider if concern for exacerbation  - Consider repeat echo if worsening signs of exacerbation  -Continue home losartan.  Patient was prescribed metoprolol for heart failure but states that it \"tears up his stomach\" and has taken himself off of this medication.  He had an appointment with cardiology but was not able to make this appointment.  -Can consider restarting a alternative beta-blocker on discharge, or as outpatient as part of GDMT.       Core Measures:   Fluids: None  Lines: PIV  Abx: Unasyn  DVT prophylaxis: Home Xeralto  Code Status: FULL    Disposition: Inpatient for continued IV " antibiotics, pending further workup.    Brandie Tavarez MD  PGY2 Resident  [facility], Family Medicine

## 2024-05-18 NOTE — CARE PLAN
The patient is Stable - Low risk of patient condition declining or worsening    Shift Goals  Clinical Goals: Pain control at comfort level less than 5/10 within 12 hour shift  Patient Goals: pain control  Family Goals: jelly    Progress made toward(s) clinical / shift goals:  Medicated per MAR for pain complaints, with some relief verbalized. Exertional dyspnea noted, on respiratory treatments, maintained on  and O2 at 2 lpm via NC. Reinforced incentive spirometry. Able to make needs known, call bell placed within easy reach.     Patient is not progressing towards the following goals:      Problem: Pain - Standard  Goal: Alleviation of pain or a reduction in pain to the patient’s comfort goal  Description: Target End Date:  Prior to discharge or change in level of care    Document on Vitals flowsheet    1.  Document pain using the appropriate pain scale per order or unit policy  2.  Educate and implement non-pharmacologic comfort measures (i.e. relaxation, distraction, massage, cold/heat therapy, etc.)  3.  Pain management medications as ordered  4.  Reassess pain after pain med administration per policy  5.  If opiods administered assess patient's response to pain medication is appropriate per POSS sedation scale  6.  Follow pain management plan developed in collaboration with patient and interdisciplinary team (including palliative care or pain specialists if applicable)  Outcome: Not Progressing     Problem: Respiratory  Goal: Patient will achieve/maintain optimum respiratory ventilation and gas exchange  Description: Target End Date:  Prior to discharge or change in level of care    Document on Assessment flowsheet    1.  Assess and monitor rate, rhythm, depth and effort of respiration  2.  Breath sounds assessed qshift and/or as needed  3.  Assess O2 saturation, administer/titrate oxygen as ordered  4.  Position patient for maximum ventilatory efficiency  5.  Turn, cough, and deep breath with splinting to  improve effectiveness  6.  Collaborate with RT to administer medication/treatments per order  7.  Encourage use of incentive spirometer and encourage patient to cough after use and utilize splinting techniques if applicable  8.  Monitor sputum production for changes in color, consistency and frequency  9. Perform frequent oral hygiene  10. Alternate physical activity with rest periods  Outcome: Not Progressing

## 2024-05-18 NOTE — CONSULTS
5/18/2024    HPI: Noe Hickey is a 63 y.o. male who presents with right foot pain and COPD exacerbation.  I was asked to evaluate the patient for his right foot pain.  He had an abscess which was incised and drained about 1 month ago.  About 3 to 4 days ago he began having more pain in the foot.  He presented to the hospital and was also found to have a COPD exacerbation.  On evaluation today he is very short of breath.  Complains of right foot pain.  Denies numbness or tingling in his toes.    Past Medical History:   Diagnosis Date    Arrhythmia     Breath shortness 06/09/2023    prior to 1/2023 surgery    Cyclic vomiting syndrome     Elevated hemidiaphragm - LEFT post AVR 01/04/2023    Fracture     Headache, classical migraine     Heart burn     Heart valve disease 06/09/2023    heart surgery in 1/2023    Hyperlipidemia 4/26/2024    Hypertension 4/26/2024    Indigestion     Pneumonia due to infectious organism 01/20/2023    Snoring        Past Surgical History:   Procedure Laterality Date    INCISION AND DRAINAGE GENERAL Left 4/19/2024    Procedure: INCISION AND DRAINAGE, LEG;  Surgeon: Mitch Bowie M.D.;  Location: [facility];  Service: Orthopedics    AORTIC VALVE REPLACEMENT  1/5/2023    Procedure: AORTIC VALVE REPLACEMENT, ASCENDING AORTIC ANEURYSM REPAIR AND TRANSESOPHAGEAL ECHOCARDIOGRAM.;  Surgeon: Serena Goyal M.D.;  Location: [facility];  Service: Cardiac    AORTIC ASCENDING DISSECTION  1/5/2023    Procedure: REPAIR, ANEURYSM OR DISSECTION, AORTA, ASCENDING;  Surgeon: Serena Goyal M.D.;  Location: [facility];  Service: Cardiac    ECHOCARDIOGRAM, TRANSESOPHAGEAL, INTRAOPERATIVE  1/5/2023    Procedure: ECHOCARDIOGRAM, TRANSESOPHAGEAL, INTRAOPERATIVE.;  Surgeon: Serena Goyal M.D.;  Location: [facility];  Service: Cardiac    IRRIGATION & DEBRIDEMENT ORTHO Right 09/19/2017    Procedure: IRRIGATION & DEBRIDEMENT ORTHO-THIGH;  Surgeon: Corbin QUEVEDO  "RONI Rivera;  Location: SUR[facility]ERY [facility];  Service: Orthopedics    SHOULDER HEMICAP RESURFACING Right 10/12/2015    Procedure: RIGHT SHOULDER RESURFACING;  Surgeon: Javon Doll M.D.;  Location: SURGERY [facility];  Service:     SHOULDER ARTHROSCOPY      x3    SHOULDER SURGERY      replacement right       Medications  No current facility-administered medications on file prior to encounter.     Current Outpatient Medications on File Prior to Encounter   Medication Sig Dispense Refill    doxycycline (MONODOX) 100 MG capsule Take 100 mg by mouth 2 times a day.      ondansetron (ZOFRAN ODT) 4 MG TABLET DISPERSIBLE Take 4 mg by mouth every 6 hours as needed for Nausea/Vomiting.      sucralfate (CARAFATE) 1 GM Tab Take 1 g by mouth 2 times a day.      amoxicillin-clavulanate (AUGMENTIN) 875-125 MG Tab Take 1 Tablet by mouth 2 times a day.      Multiple Vitamins-Minerals (ONE DAILY MENS PO) Take 1 Tablet by mouth every day.      MILK THISTLE PO Take 1 Capsule by mouth every day.      albuterol 108 (90 Base) MCG/ACT Aero Soln inhalation aerosol Inhale 2 Puffs every 6 hours as needed for Shortness of Breath. 8.5 g 0    budesonide-formoterol (SYMBICORT) 80-4.5 MCG/ACT Aerosol Inhale 2 Puffs 2 times a day. 10.2 g 0    atorvastatin (LIPITOR) 40 MG Tab Take 1 Tablet by mouth at bedtime. 30 Tablet 0    omeprazole (PRILOSEC) 20 MG delayed-release capsule Take 20 mg by mouth 2 times a day.      rivaroxaban (XARELTO) 20 MG Tab tablet Take 20 mg by mouth every day. \"Takes in am\"      metoprolol SR (TOPROL XL) 25 MG TABLET SR 24 HR Take 1 Tablet by mouth every day. 90 Tablet 3    losartan (COZAAR) 25 MG Tab Take 1 Tablet by mouth every day. 90 Tablet 3    methylPREDNISolone (MEDROL DOSEPAK) 4 MG Tablet Therapy Pack Follow schedule on package instructions. (Patient taking differently: Take 4 mg by mouth every day. Follow schedule on package instructions.) 21 Tablet 0       Allergies  Morphine    ROS  Negative except as " "indicated in the HPI    History reviewed. No pertinent family history.    Social History     Socioeconomic History    Marital status:    Tobacco Use    Smoking status: Every Day     Current packs/day: 0.50     Average packs/day: 0.5 packs/day for 40.0 years (20.0 ttl pk-yrs)     Types: Cigarettes    Smokeless tobacco: Never    Tobacco comments:     2-3 a day   Vaping Use    Vaping status: Never Used   Substance and Sexual Activity    Alcohol use: No    Drug use: Not Currently     Comment: past problems with narcotics not since 2019     Social Determinants of Health     Financial Resource Strain: Low Risk  (7/7/2023)    Overall Financial Resource Strain (CARDIA)     Difficulty of Paying Living Expenses: Not hard at all   Food Insecurity: No Food Insecurity (5/16/2024)    Hunger Vital Sign     Worried About Running Out of Food in the Last Year: Never true     Ran Out of Food in the Last Year: Never true   Transportation Needs: No Transportation Needs (5/16/2024)    PRAPARE - Transportation     Lack of Transportation (Medical): No     Lack of Transportation (Non-Medical): No   Intimate Partner Violence: Not At Risk (5/16/2024)    Humiliation, Afraid, Rape, and Kick questionnaire     Fear of Current or Ex-Partner: No     Emotionally Abused: No     Physically Abused: No     Sexually Abused: No   Housing Stability: Low Risk  (5/16/2024)    Housing Stability Vital Sign     Unable to Pay for Housing in the Last Year: No     Number of Places Lived in the Last Year: 2     Unstable Housing in the Last Year: No       Physical Exam  Vitals  /85   Pulse 88   Temp 36.7 °C (98 °F) (Temporal)   Resp 16   Ht 1.753 m (5' 9\")   Wt 89 kg (196 lb 3.4 oz)   SpO2 92%   General: NAD  HEENT: Normocephalic, atraumatic  Psych: Normal mood and affect  Neck: No collar in place, normal appearing motion  Lungs: Increased work of breathing which improves when taking deep breaths on nasal cannula  Heart: Regular rate by palpation " of peripheral pulse  Abdomen: Nondistended  MSK:   On inspection right lower extremity his incision appears to be healing well with no drainage.  Sutures are retained.  Tender to palpation over the dorsum of the foot but no obvious fluctuance is noted.  Moves ankle and toes up/down.  Sensation tact light touch throughout the foot.  Foot is warm and perfused.      Radiographs:  CT of the right foot with contrast from 5/16/2024 and bili reviewed by me demonstrates no obvious abscess or evidence of osteomyelitis.    US-EXTREMITY NON VASCULAR UNILATERAL RIGHT   Final Result      Redemonstrated subcutaneous soft tissue collection, decreased in size now measuring 4.6 x 3.7 x 0.7 cm most consistent with hematoma. Abscess is not excluded.      CT-FOOT WITH PLUS RECONS RIGHT   Final Result         1. Subcutaneous edema involving the dorsal aspect of the foot.   2. Scattered degenerative changes.   3. No definite bone destruction or soft tissue gas.      CT-CTA CHEST PULMONARY ARTERY W/ RECONS   Final Result      1.  No large central or segmental pulmonary embolus   2.  Cardiomegaly with enlarged pulmonary arteries which could indicate pulmonary arterial hypertension   3.  Persistently elevated LEFT diaphragm with overlying atelectasis            DX-CHEST-PORTABLE (1 VIEW)   Final Result      Unchanged elevation of left hemidiaphragm with associated atelectasis and/or consolidation. Underlying infection is possible.          Laboratory Values  Recent Labs     05/16/24  1242 05/17/24  0126   WBC 18.2* 16.2*   RBC 5.99 6.04   HEMOGLOBIN 12.2* 12.2*   HEMATOCRIT 40.1* 41.3*   MCV 66.9* 68.4*   MCH 20.4* 20.2*   MCHC 30.4* 29.5*   RDW 56.3* 58.1*   PLATELETCT 768* 796*   MPV 9.6 9.4     Recent Labs     05/16/24  1242 05/17/24  0126   SODIUM 135 134*   POTASSIUM 4.6 5.2   CHLORIDE 102 99   CO2 22 24   GLUCOSE 80 132*   BUN 18 19             Assessment: 63-year-old male who is about 1 month status post right foot I&D.    Plan:        I see no evidence of abscess on his CT scan from 2 days ago.  Fluid collection measured on ultrasound is decreased in size from his MRI on 5/1.  His wound is not draining and appears to be healed.  Recommend no advanced imaging at this time and no surgical intervention.  If the patient has continued worsening pain over his hospital stay while being treated for his COPD exacerbation MRI with contrast could potentially be helpful for further evaluation.      Flaquito Mata MD  Orthopedic Trauma

## 2024-05-19 PROBLEM — D68.59 THROMBOPHILIA (HCC): Status: ACTIVE | Noted: 2024-05-19

## 2024-05-19 LAB
ALBUMIN SERPL BCP-MCNC: 4.1 G/DL (ref 3.2–4.9)
ALBUMIN/GLOB SERPL: 1.5 G/DL
ALP SERPL-CCNC: 34 U/L (ref 30–99)
ALT SERPL-CCNC: 66 U/L (ref 2–50)
ANION GAP SERPL CALC-SCNC: 10 MMOL/L (ref 7–16)
ANION GAP SERPL CALC-SCNC: 11 MMOL/L (ref 7–16)
AST SERPL-CCNC: 62 U/L (ref 12–45)
BASE EXCESS BLDV CALC-SCNC: 2 MMOL/L
BASOPHILS # BLD AUTO: 0.1 % (ref 0–1.8)
BASOPHILS # BLD: 0.01 K/UL (ref 0–0.12)
BILIRUB SERPL-MCNC: 0.8 MG/DL (ref 0.1–1.5)
BODY TEMPERATURE: 36.7 CENTIGRADE
BUN SERPL-MCNC: 30 MG/DL (ref 8–22)
BUN SERPL-MCNC: 36 MG/DL (ref 8–22)
CALCIUM ALBUM COR SERPL-MCNC: 9.1 MG/DL (ref 8.5–10.5)
CALCIUM SERPL-MCNC: 9 MG/DL (ref 8.5–10.5)
CALCIUM SERPL-MCNC: 9.2 MG/DL (ref 8.5–10.5)
CHLORIDE SERPL-SCNC: 95 MMOL/L (ref 96–112)
CHLORIDE SERPL-SCNC: 96 MMOL/L (ref 96–112)
CO2 SERPL-SCNC: 24 MMOL/L (ref 20–33)
CO2 SERPL-SCNC: 25 MMOL/L (ref 20–33)
CREAT SERPL-MCNC: 1.18 MG/DL (ref 0.5–1.4)
CREAT SERPL-MCNC: 1.39 MG/DL (ref 0.5–1.4)
EOSINOPHIL # BLD AUTO: 0 K/UL (ref 0–0.51)
EOSINOPHIL NFR BLD: 0 % (ref 0–6.9)
ERYTHROCYTE [DISTWIDTH] IN BLOOD BY AUTOMATED COUNT: 57.4 FL (ref 35.9–50)
FLUAV RNA SPEC QL NAA+PROBE: NEGATIVE
FLUBV RNA SPEC QL NAA+PROBE: NEGATIVE
GFR SERPLBLD CREATININE-BSD FMLA CKD-EPI: 56 ML/MIN/1.73 M 2
GFR SERPLBLD CREATININE-BSD FMLA CKD-EPI: 69 ML/MIN/1.73 M 2
GLOBULIN SER CALC-MCNC: 2.7 G/DL (ref 1.9–3.5)
GLUCOSE SERPL-MCNC: 119 MG/DL (ref 65–99)
GLUCOSE SERPL-MCNC: 161 MG/DL (ref 65–99)
HCO3 BLDV-SCNC: 27 MMOL/L (ref 24–28)
HCT VFR BLD AUTO: 37.6 % (ref 42–52)
HGB BLD-MCNC: 11.1 G/DL (ref 14–18)
IMM GRANULOCYTES # BLD AUTO: 0.15 K/UL (ref 0–0.11)
IMM GRANULOCYTES NFR BLD AUTO: 1 % (ref 0–0.9)
INHALED O2 FLOW RATE: ABNORMAL L/MIN
LYMPHOCYTES # BLD AUTO: 0.41 K/UL (ref 1–4.8)
LYMPHOCYTES NFR BLD: 2.7 % (ref 22–41)
MCH RBC QN AUTO: 20.3 PG (ref 27–33)
MCHC RBC AUTO-ENTMCNC: 29.5 G/DL (ref 32.3–36.5)
MCV RBC AUTO: 68.7 FL (ref 81.4–97.8)
MONOCYTES # BLD AUTO: 0.36 K/UL (ref 0–0.85)
MONOCYTES NFR BLD AUTO: 2.4 % (ref 0–13.4)
NEUTROPHILS # BLD AUTO: 14.29 K/UL (ref 1.82–7.42)
NEUTROPHILS NFR BLD: 93.8 % (ref 44–72)
NRBC # BLD AUTO: 0.03 K/UL
NRBC BLD-RTO: 0.2 /100 WBC (ref 0–0.2)
PCO2 BLDV: 43.6 MMHG (ref 41–51)
PCO2 TEMP ADJ BLDV: 43 MMHG (ref 41–51)
PH BLDV: 7.4 [PH] (ref 7.31–7.45)
PH TEMP ADJ BLDV: 7.41 [PH] (ref 7.31–7.45)
PLATELET # BLD AUTO: 812 K/UL (ref 164–446)
PMV BLD AUTO: 9.5 FL (ref 9–12.9)
PO2 BLDV: 71.3 MMHG (ref 25–40)
PO2 TEMP ADJ BLDV: 69.9 MMHG (ref 25–40)
POTASSIUM SERPL-SCNC: 5.5 MMOL/L (ref 3.6–5.5)
POTASSIUM SERPL-SCNC: 5.9 MMOL/L (ref 3.6–5.5)
PROT SERPL-MCNC: 6.8 G/DL (ref 6–8.2)
RBC # BLD AUTO: 5.47 M/UL (ref 4.7–6.1)
RSV RNA SPEC QL NAA+PROBE: NEGATIVE
SAO2 % BLDV: 92.1 %
SARS-COV-2 RNA RESP QL NAA+PROBE: NOTDETECTED
SODIUM SERPL-SCNC: 130 MMOL/L (ref 135–145)
SODIUM SERPL-SCNC: 131 MMOL/L (ref 135–145)
SPECIMEN SOURCE: NORMAL
WBC # BLD AUTO: 15.2 K/UL (ref 4.8–10.8)

## 2024-05-19 PROCEDURE — 99232 SBSQ HOSP IP/OBS MODERATE 35: CPT | Mod: GC | Performed by: FAMILY MEDICINE

## 2024-05-19 RX ORDER — SODIUM CHLORIDE 9 MG/ML
500 INJECTION, SOLUTION INTRAVENOUS ONCE
Status: COMPLETED | OUTPATIENT
Start: 2024-05-19 | End: 2024-05-19

## 2024-05-19 RX ADMIN — LOSARTAN POTASSIUM 25 MG: 25 TABLET, FILM COATED ORAL at 16:59

## 2024-05-19 RX ADMIN — HYDROMORPHONE HYDROCHLORIDE 1 MG: 1 INJECTION, SOLUTION INTRAMUSCULAR; INTRAVENOUS; SUBCUTANEOUS at 21:53

## 2024-05-19 RX ADMIN — OXYCODONE HYDROCHLORIDE 10 MG: 10 TABLET ORAL at 16:58

## 2024-05-19 RX ADMIN — OXYCODONE HYDROCHLORIDE 10 MG: 10 TABLET ORAL at 00:50

## 2024-05-19 RX ADMIN — MOMETASONE FUROATE AND FORMOTEROL FUMARATE DIHYDRATE 2 PUFF: 100; 5 AEROSOL RESPIRATORY (INHALATION) at 05:32

## 2024-05-19 RX ADMIN — HYDROMORPHONE HYDROCHLORIDE 1 MG: 1 INJECTION, SOLUTION INTRAMUSCULAR; INTRAVENOUS; SUBCUTANEOUS at 18:04

## 2024-05-19 RX ADMIN — HYDROMORPHONE HYDROCHLORIDE 1 MG: 1 INJECTION, SOLUTION INTRAMUSCULAR; INTRAVENOUS; SUBCUTANEOUS at 13:21

## 2024-05-19 RX ADMIN — RIVAROXABAN 20 MG: 20 TABLET, FILM COATED ORAL at 05:29

## 2024-05-19 RX ADMIN — OXYCODONE HYDROCHLORIDE 10 MG: 10 TABLET ORAL at 08:31

## 2024-05-19 RX ADMIN — PREDNISONE 40 MG: 20 TABLET ORAL at 05:29

## 2024-05-19 RX ADMIN — ACETAMINOPHEN 1000 MG: 500 TABLET, FILM COATED ORAL at 05:28

## 2024-05-19 RX ADMIN — OXYCODONE HYDROCHLORIDE 10 MG: 10 TABLET ORAL at 20:20

## 2024-05-19 RX ADMIN — SUCRALFATE 1 G: 1 TABLET ORAL at 05:30

## 2024-05-19 RX ADMIN — IPRATROPIUM BROMIDE AND ALBUTEROL SULFATE 3 ML: 2.5; .5 SOLUTION RESPIRATORY (INHALATION) at 14:27

## 2024-05-19 RX ADMIN — HYDROMORPHONE HYDROCHLORIDE 1 MG: 1 INJECTION, SOLUTION INTRAMUSCULAR; INTRAVENOUS; SUBCUTANEOUS at 02:00

## 2024-05-19 RX ADMIN — AMPICILLIN AND SULBACTAM 3 G: 1; 2 INJECTION, POWDER, FOR SOLUTION INTRAMUSCULAR; INTRAVENOUS at 05:27

## 2024-05-19 RX ADMIN — IPRATROPIUM BROMIDE AND ALBUTEROL SULFATE 3 ML: 2.5; .5 SOLUTION RESPIRATORY (INHALATION) at 10:12

## 2024-05-19 RX ADMIN — ACETAMINOPHEN 1000 MG: 500 TABLET, FILM COATED ORAL at 16:58

## 2024-05-19 RX ADMIN — OMEPRAZOLE 20 MG: 20 CAPSULE, DELAYED RELEASE ORAL at 16:59

## 2024-05-19 RX ADMIN — AZITHROMYCIN DIHYDRATE 500 MG: 250 TABLET, FILM COATED ORAL at 16:59

## 2024-05-19 RX ADMIN — AMPICILLIN AND SULBACTAM 3 G: 1; 2 INJECTION, POWDER, FOR SOLUTION INTRAMUSCULAR; INTRAVENOUS at 00:35

## 2024-05-19 RX ADMIN — IPRATROPIUM BROMIDE AND ALBUTEROL SULFATE 3 ML: 2.5; .5 SOLUTION RESPIRATORY (INHALATION) at 06:39

## 2024-05-19 RX ADMIN — OXYCODONE HYDROCHLORIDE 10 MG: 10 TABLET ORAL at 03:57

## 2024-05-19 RX ADMIN — IPRATROPIUM BROMIDE AND ALBUTEROL SULFATE 3 ML: 2.5; .5 SOLUTION RESPIRATORY (INHALATION) at 00:58

## 2024-05-19 RX ADMIN — SODIUM CHLORIDE 500 ML: 9 INJECTION, SOLUTION INTRAVENOUS at 17:01

## 2024-05-19 RX ADMIN — OMEPRAZOLE 20 MG: 20 CAPSULE, DELAYED RELEASE ORAL at 05:22

## 2024-05-19 RX ADMIN — ACETAMINOPHEN 1000 MG: 500 TABLET, FILM COATED ORAL at 11:33

## 2024-05-19 RX ADMIN — AMPICILLIN AND SULBACTAM 3 G: 1; 2 INJECTION, POWDER, FOR SOLUTION INTRAMUSCULAR; INTRAVENOUS at 11:40

## 2024-05-19 RX ADMIN — ATORVASTATIN CALCIUM 40 MG: 40 TABLET, FILM COATED ORAL at 20:20

## 2024-05-19 RX ADMIN — IPRATROPIUM BROMIDE AND ALBUTEROL SULFATE 3 ML: 2.5; .5 SOLUTION RESPIRATORY (INHALATION) at 18:25

## 2024-05-19 RX ADMIN — MOMETASONE FUROATE AND FORMOTEROL FUMARATE DIHYDRATE 2 PUFF: 100; 5 AEROSOL RESPIRATORY (INHALATION) at 17:01

## 2024-05-19 RX ADMIN — OXYCODONE HYDROCHLORIDE 10 MG: 10 TABLET ORAL at 11:32

## 2024-05-19 RX ADMIN — HYDROMORPHONE HYDROCHLORIDE 1 MG: 1 INJECTION, SOLUTION INTRAMUSCULAR; INTRAVENOUS; SUBCUTANEOUS at 05:23

## 2024-05-19 RX ADMIN — SUCRALFATE 1 G: 1 TABLET ORAL at 16:59

## 2024-05-19 RX ADMIN — HYDROMORPHONE HYDROCHLORIDE 1 MG: 1 INJECTION, SOLUTION INTRAMUSCULAR; INTRAVENOUS; SUBCUTANEOUS at 09:43

## 2024-05-19 RX ADMIN — AMPICILLIN AND SULBACTAM 3 G: 1; 2 INJECTION, POWDER, FOR SOLUTION INTRAMUSCULAR; INTRAVENOUS at 17:03

## 2024-05-19 ASSESSMENT — PAIN DESCRIPTION - PAIN TYPE
TYPE: ACUTE PAIN

## 2024-05-19 NOTE — CARE PLAN
The patient is Stable - Low risk of patient condition declining or worsening    Shift Goals  Clinical Goals: Pain control at comfort level less than 5/10 within 12 hour shift  Patient Goals: Pain Control, Rest  Family Goals: HUGH    Progress made toward(s) clinical / shift goals:  Patient was verbally aggressive about his pain regimen, wanted to have Dilauded IV all the time, claimed that oxycodone tablet does not provide any relief. Teachings about pharmacologic and non-pharmacologic comfort measures given. He was up most of the night. Exertional dyspnea noted, on respiratory treatments, maintained on . O2 adjusted to 1.5 lpm via NC, saturating well. Reinforced incentive spirometry. Able to make needs known, call bell placed within easy reach. Refused bed alarm despite education and encouragement.    Patient is not progressing towards the following goals:      Problem: Pain - Standard  Goal: Alleviation of pain or a reduction in pain to the patient’s comfort goal  Description: Target End Date:  Prior to discharge or change in level of care    Document on Vitals flowsheet    1.  Document pain using the appropriate pain scale per order or unit policy  2.  Educate and implement non-pharmacologic comfort measures (i.e. relaxation, distraction, massage, cold/heat therapy, etc.)  3.  Pain management medications as ordered  4.  Reassess pain after pain med administration per policy  5.  If opiods administered assess patient's response to pain medication is appropriate per POSS sedation scale  6.  Follow pain management plan developed in collaboration with patient and interdisciplinary team (including palliative care or pain specialists if applicable)  Outcome: Not Progressing     Problem: Respiratory  Goal: Patient will achieve/maintain optimum respiratory ventilation and gas exchange  Description: Target End Date:  Prior to discharge or change in level of care    Document on Assessment flowsheet    1.  Assess and  monitor rate, rhythm, depth and effort of respiration  2.  Breath sounds assessed qshift and/or as needed  3.  Assess O2 saturation, administer/titrate oxygen as ordered  4.  Position patient for maximum ventilatory efficiency  5.  Turn, cough, and deep breath with splinting to improve effectiveness  6.  Collaborate with RT to administer medication/treatments per order  7.  Encourage use of incentive spirometer and encourage patient to cough after use and utilize splinting techniques if applicable  8.  Monitor sputum production for changes in color, consistency and frequency  9. Perform frequent oral hygiene  10. Alternate physical activity with rest periods  Outcome: Not Progressing     Problem: Knowledge Deficit - Standard  Goal: Patient and family/care givers will demonstrate understanding of plan of care, disease process/condition, diagnostic tests and medications  Description: Target End Date:  1-3 days or as soon as patient condition allows    Document in Patient Education    1.  Patient and family/caregiver oriented to unit, equipment, visitation policy and means for communicating concern  2.  Complete/review Learning Assessment  3.  Assess knowledge level of disease process/condition, treatment plan, diagnostic tests and medications  4.  Explain disease process/condition, treatment plan, diagnostic tests and medications  Outcome: Not Progressing     Problem: Ineffective Airway Clearance  Goal: Patient will maintain patent airway with clear/clearing breath sounds  Description: Target End Date:  Prior to discharge or change in level of care    1.   Position head midline with flexion appropriate for age and/or condition  2.   Assist patient to assume a position of comfort  3.   Assess and monitor breath sounds  4.   Encourage abdominal or pursed-lip breathing exercises  5.   Assist with measures to improve effectiveness of cough effort  6.   Demonstrate effective coughing and deep-breathing techniques  7.    Assist patient to turn every 2 hours  8.   Encourage patient to ambulate as tolerated  9.   Keep environmental pollution to a minimum  10. Collaborate with RT to administer medications/treatments per order  11. Provide warm or tepid liquids  Outcome: Not Progressing

## 2024-05-19 NOTE — PROGRESS NOTES
[facility] [place] [facility] [place]     Attending:   Ирина Barrett MD    Resident:   Katy Mustafa MD PGY-3    PATIENT:   Noe Hickey; [id number]; 1960    PATIENT ID:  Noe Hikcey is a 63 y.o. male with a history of ascending aortic aneurysm repair and TAVR in 2013, HTN, HLD, chronic left hemidiaphragm paresis, HFimpEF (70% EF), pAF on rivaroxaban, and tobacco use disorder admitted 5/16/2024 for dyspnea on exertion and foot wound.    SUBJECTIVE:   No acute events overnight, vital signs stable. Continuing to require 1.5L O2 via NC however saturating 98%.  Patient states that he does feel better today.  Patient reports that he has not been compliant with COPD medications at home.  He does report some improvement in pain in the right foot however there does continue to be a problem.    OBJECTIVE:  Vitals:    05/19/24 0332 05/19/24 0357 05/19/24 0523 05/19/24 0639   BP: 130/83      Pulse: 82   88   Resp: 18 19 20 20   Temp: 36.7 °C (98 °F)      TempSrc: Temporal      SpO2: 95%   98%   Weight:       Height:           Intake/Output Summary (Last 24 hours) at 5/19/2024 0704  Last data filed at 5/19/2024 0147  Gross per 24 hour   Intake 340 ml   Output 700 ml   Net -360 ml       PHYSICAL EXAM:   General: No acute distress, afebrile, resting comfortably, conversational but having to take breaths during sentences  HEENT: NC/AT. EOMI.   Cardiovascular: RRR without murmurs  Respiratory: CTAB, no tachypnea or retractions, no wheezing  Abdomen: Nondistended  EXT:  DANG  Skin: Improving erythema right dorsal foot.   Neuro: Non-focal, alert and orientated       LABS:  Recent Labs     05/16/24  1242 05/17/24  0126 05/19/24  0502   WBC 18.2* 16.2* 15.2*   RBC 5.99 6.04 5.47   HEMOGLOBIN 12.2* 12.2* 11.1*   HEMATOCRIT 40.1* 41.3* 37.6*   MCV 66.9* 68.4* 68.7*   MCH 20.4* 20.2* 20.3*   RDW 56.3* 58.1* 57.4*   PLATELETCT 768* 796* 812*   MPV 9.6 9.4 9.5   NEUTSPOLYS 81.70* 90.10* 93.80*   LYMPHOCYTES 10.40* 2.70* 2.70*  "  MONOCYTES 2.60 1.80 2.40   EOSINOPHILS 3.50 0.60 0.00   BASOPHILS 0.00 0.90 0.10   RBCMORPHOLO Present  --   --      Recent Labs     05/16/24  1242 05/16/24  1721 05/17/24  0126 05/19/24  0502   SODIUM 135  --  134* 130*   POTASSIUM 4.6  --  5.2 5.9*   CHLORIDE 102  --  99 95*   CO2 22  --  24 25   BUN 18  --  19 30*   CREATININE 0.80  --  1.02 1.18   CALCIUM 9.1  --  8.6 9.2   MAGNESIUM  --  1.9  --   --    ALBUMIN 3.8  --  3.8 4.1     Estimated GFR/CRCL = Estimated Creatinine Clearance: 70.1 mL/min (by C-G formula based on SCr of 1.18 mg/dL).  Recent Labs     05/16/24  1242 05/17/24  0126 05/19/24  0502   GLUCOSE 80 132* 119*     Recent Labs     05/16/24  1242 05/17/24  0126 05/19/24  0502   ASTSGOT 41 33 62*   ALTSGPT 48 41 66*   TBILIRUBIN 0.5 0.7 0.8   ALKPHOSPHAT 34 33 34   GLOBULIN 2.7 2.5 2.7         Recent Labs     05/19/24  0502   L4AIEMEMO 1.5L     No results for input(s): \"INR\", \"APTT\", \"FIBRINOGEN\" in the last 72 hours.    Invalid input(s): \"DIMER\"    IMAGING:   US-EXTREMITY NON VASCULAR UNILATERAL RIGHT   Final Result      Redemonstrated subcutaneous soft tissue collection, decreased in size now measuring 4.6 x 3.7 x 0.7 cm most consistent with hematoma. Abscess is not excluded.      CT-FOOT WITH PLUS RECONS RIGHT   Final Result         1. Subcutaneous edema involving the dorsal aspect of the foot.   2. Scattered degenerative changes.   3. No definite bone destruction or soft tissue gas.      CT-CTA CHEST PULMONARY ARTERY W/ RECONS   Final Result      1.  No large central or segmental pulmonary embolus   2.  Cardiomegaly with enlarged pulmonary arteries which could indicate pulmonary arterial hypertension   3.  Persistently elevated LEFT diaphragm with overlying atelectasis            DX-CHEST-PORTABLE (1 VIEW)   Final Result      Unchanged elevation of left hemidiaphragm with associated atelectasis and/or consolidation. Underlying infection is possible.          CULTURES:   Results       Procedure " Component Value Units Date/Time    CoV-2, Flu A/B, And RSV by PCR ([employer] [facility]) [[id number]] Collected: 05/17/24 0205    Order Status: Completed Updated: 05/17/24 1329     Influenza virus A RNA Negative     Influenza virus B, PCR Negative     RSV, PCR Negative     SARS-CoV-2 by PCR NotDetected     Comment: [facility] providers: PLEASE REFER TO DE-ESCALATION AND RETESTING PROTOCOL  on [email or web address]    **The [employer] GeneXpert Xpress SARS-CoV-2 RT-PCR Test has been made  available for use under the Emergency Use Authorization (EUA) only.          SARS-CoV-2 Source NP Swab    CoV-2, Flu A/B, And RSV by PCR ([employer] [facility]) [[id number]]     Order Status: No result Specimen: Respirate     BLOOD CULTURE x2 [[id number]] Collected: 05/16/24 1350    Order Status: Completed Specimen: Blood from Peripheral Updated: 05/17/24 0749     Significant Indicator NEG     Source BLD     Site PERIPHERAL     Culture Result No Growth  Note: Blood cultures are incubated for 5 days and  are monitored continuously.Positive blood cultures  are called to the RN and reported as soon as  they are identified.      Narrative:      Right Forearm/Arm    BLOOD CULTURE x2 [[id number]] Collected: 05/16/24 1440    Order Status: Completed Specimen: Blood from Peripheral Updated: 05/17/24 0749     Significant Indicator NEG     Source BLD     Site PERIPHERAL     Culture Result No Growth  Note: Blood cultures are incubated for 5 days and  are monitored continuously.Positive blood cultures  are called to the RN and reported as soon as  they are identified.      Narrative:      Right Forearm/Arm    Culture Respiratory W/ GRM STN [[id number]]     Order Status: Canceled Specimen: Respirate from Sputum             MEDS:  Current Facility-Administered Medications   Medication Last Admin    predniSONE (Deltasone) tablet 40 mg 40 mg at 05/19/24 0529    ferrous sulfate tablet 325 mg 325 mg at 05/18/24 1925    ampicillin/sulbactam (Unasyn) 3 g in  mL IVPB Stopped at 05/19/24 0593     oxyCODONE immediate-release (Roxicodone) tablet 5 mg      Or    oxyCODONE immediate release (Roxicodone) tablet 10 mg 10 mg at 05/19/24 0357    Or    HYDROmorphone (Dilaudid) injection 1 mg 1 mg at 05/19/24 0523    ipratropium-albuterol (DUONEB) nebulizer solution 3 mL at 05/19/24 0639    ipratropium-albuterol (DUONEB) nebulizer solution 3 mL at 05/19/24 0058    Respiratory Therapy Consult      labetalol (Normodyne/Trandate) injection 10 mg      nicotine (Nicoderm) 21 MG/24HR 21 mg      And    nicotine polacrilex (Nicorette) 2 MG piece 2 mg      Pharmacy Consult Request ...Pain Management Review 1 Each      acetaminophen (Tylenol) tablet 1,000 mg 1,000 mg at 05/19/24 0528    Followed by    [START ON 5/21/2024] acetaminophen (Tylenol) tablet 1,000 mg      atorvastatin (Lipitor) tablet 40 mg 40 mg at 05/18/24 2120    losartan (Cozaar) tablet 25 mg 25 mg at 05/18/24 1759    omeprazole (PriLOSEC) capsule 20 mg 20 mg at 05/19/24 0522    sucralfate (Carafate) tablet 1 g 1 g at 05/19/24 0530    rivaroxaban (Xarelto) tablet 20 mg 20 mg at 05/19/24 0529    mometasone-formoterol (Dulera) 100-5 MCG/ACT inhaler 2 Puff 2 Puff at 05/19/24 0532    azithromycin (Zithromax) tablet 500 mg 500 mg at 05/18/24 1759       ASSESSMENT/PLAN: 63 y.o. male admitted for:    * Dyspnea on exertion- (present on admission)  Assessment & Plan  Patient with 3-day history of dyspnea on exertion.  Patient was previously admitted 4/11-4/24 for acute hypoxemic respiratory failure in the setting of acute community acquired pneumonia and leukocytosis to 27. He was seen by cardiology who recommended potential for outpatient cardiac MRI/stress testing with new concern for HOCM on echo from 4/5 despite improvement in EF. Pulmonology recommended outpatient PFTs for questionable component of COPD.  Patient was not able to show up to these appointments.  Patient speaking in short sentences on exam, but saturating well on room air and lungs are clear to  auscultation.  Patient does not appear acutely ill, patient likely has COPD exacerbation with extensive smoking history.  Did have mildly elevated BNP, though was not fluid overloaded on exam, do not feel this is due to CHF exacerbation.    Plan  -Continue azithromycin and prednisone  -If O2 demands continue to arise overnight, will consider treatment for CAP  -scheduled duoneb q4hrs  -Dulera BID  -Titrate oxygen to a goal of 88 to 92%  -Will need PFTs as outpatient to formally assess for COPD, unable to perform inpatient    Hypertension- (present on admission)  Assessment & Plan  Chronic, stable.     Plan  -Continue home losartan  -As needed labetalol for SBP greater than 180 or DBP greater than 110    Right foot infection- (present on admission)  Assessment & Plan  Pt admitted 4/11-4/24 with acute right leg pain and swelling secondary to a hamstring tear and right foot dorsal abscess for which he underwent s/p I&D by ortho (Dr. Bowie) on 4/19. Cultures returned negative; ID was consulted and recommended 2 weeks of oral Augmentin to end on 5/3 which he was compliant with. Pt readmitted 4/29 for rt lower extremity swelling and significant right foot pain. He underwent arterial and venous imaging of his lower extremities which showed no occult abnormality or occlusion. He did have a right foot ultrasound that revealed a complex fluid collection concerning for abscess. Orthopedic surgery was consulted and recommended MRI with contrast of foot. Patient left AMA while awaiting MRI. Pt had an MRI as outpatient of his right lower extremity 5/1 that showed dorsal subcutaneous fluid collection involving the mid foot which measures 5 x 4 x 0.9 cm in size. There is some peripheral soft tissue enhancement in that area as well as metallic artifact consistent with recent drainage. Differential diagnosis includes hematoma and abscess.  CT without evidence of abscess.  Ultrasound 5/17: Abscess versus  "hematoma.    Plan:  -Continue Unasyn every 6 hours  -Orthopedics consulted and recommend: No advanced imaging at this time and no surgical intervention. If the patient has continued worsening pain over his hospital stay while being treated for his COPD exacerbation MRI with contrast could potentially be helpful for further evaluation. PA to remove sutures today or tomorrow, awaiting confirmation of time.   -Ultrasound 5/17 showed fluid collection: Hematoma versus abscess  -Restart home Xeralto now        Atrial fibrillation (HCC)- (present on admission)  Assessment & Plan  Chronic.  In sinus rhythm and normal rate on admission.    Plan  - Holding home Eliquis out of concern for hematoma collection of right foot dorsal surface.  -Patient on metoprolol at home but took self off of medication as it was \"tearing of stomach\"  -Patient will need outpatient cardiology follow-up     Acute on chronic systolic heart failure (HCC)- (present on admission)  Assessment & Plan  Most recent echo 4/26/2024 showed the left ventricular ejection fraction is visually estimated to be 60%. Normal regional wall motion.  Patient does not appear fluid overloaded on exam, dyspnea likely in the setting of COPD exacerbation.    Plan  - Will hold off on Lasix treatment at this time, consider if concern for exacerbation  - Consider repeat echo if worsening signs of exacerbation  -Continue home losartan.  Patient was prescribed metoprolol for heart failure but states that it \"tears up his stomach\" and has taken himself off of this medication.  He had an appointment with cardiology but was not able to make this appointment.  -Can consider restarting a alternative beta-blocker on discharge, or as outpatient as part of GDMT.       Core Measures:   Fluids: None  Lines: PIV  Abx: Unasyn  DVT prophylaxis: on Xarelto  Code Status: Full Code    Disposition: Inpatient, or continue supplemental oxygen and IV antibiotics.    Katy Mustafa MD   PGY-3 " Family Medicine Resident   [facility]

## 2024-05-19 NOTE — FACE TO FACE
"Face to Face Note  -  Durable Medical Equipment    Katy Mustafa M.D. - NPI: [id number]  I certify that this patient is under my care and that they had a durable medical equipment(DME)face to face encounter by myself that meets the physician DME face-to-face encounter requirements with this patient on:    Date of encounter:   Patient:                    MRN:                       YOB: 2025  Noe Hickey  [id number]  1960     The encounter with the patient was in whole, or in part, for the following medical condition, which is the primary reason for durable medical equipment:  COPD    I certify that, based on my findings, the following durable medical equipment is medically necessary:    Oxygen   HOME O2 Saturation Measurements:(Values must be present for Home Oxygen orders)  Room air sat at rest: 84  Room air sat with amb: 80  With liters of O2: 2, O2 sat at rest with O2: 91  With Liters of O2: 3, O2 sat with amb with O2 : 90  Is the patient mobile?: Yes  If patient feels more short of breath, they can go up to 6 liters per minute and contact healthcare provider.    Supporting Symptoms: The patient requires supplemental oxygen, as the following interventions have been tried with limited or no improvement: \"Bronchodilators and/or steroid inhalers.    My Clinical findings support the need for the above equipment due to:  Hypoxia  "

## 2024-05-19 NOTE — CARE PLAN
The patient is Stable - Low risk of patient condition declining or worsening    Shift Goals  Clinical Goals: pt will report a pain level of 5 or less within 12 hour shift  Patient Goals: pain control; rest  Family Goals: jelly    Progress made toward(s) clinical / shift goals:    Problem: Pain - Standard  Goal: Alleviation of pain or a reduction in pain to the patient’s comfort goal  Outcome: Progressing     Problem: Respiratory  Goal: Patient will achieve/maintain optimum respiratory ventilation and gas exchange  Outcome: Progressing     Problem: Self Care  Goal: Patient will have the ability to perform ADLs independently or with assistance (bathe, groom, dress, toilet and feed)  Outcome: Progressing       Patient is not progressing towards the following goals:

## 2024-05-20 ENCOUNTER — APPOINTMENT (OUTPATIENT)
Dept: RADIOLOGY | Facility: MEDICAL CENTER | Age: 64
DRG: 190 | End: 2024-05-20
Attending: STUDENT IN AN ORGANIZED HEALTH CARE EDUCATION/TRAINING PROGRAM
Payer: MEDICAID

## 2024-05-20 LAB
ANION GAP SERPL CALC-SCNC: 12 MMOL/L (ref 7–16)
BUN SERPL-MCNC: 32 MG/DL (ref 8–22)
CALCIUM SERPL-MCNC: 8.8 MG/DL (ref 8.5–10.5)
CHLORIDE SERPL-SCNC: 96 MMOL/L (ref 96–112)
CO2 SERPL-SCNC: 25 MMOL/L (ref 20–33)
CREAT SERPL-MCNC: 1.18 MG/DL (ref 0.5–1.4)
ERYTHROCYTE [DISTWIDTH] IN BLOOD BY AUTOMATED COUNT: 58.3 FL (ref 35.9–50)
GFR SERPLBLD CREATININE-BSD FMLA CKD-EPI: 69 ML/MIN/1.73 M 2
GLUCOSE SERPL-MCNC: 144 MG/DL (ref 65–99)
HCT VFR BLD AUTO: 35.8 % (ref 42–52)
HGB BLD-MCNC: 10.6 G/DL (ref 14–18)
MCH RBC QN AUTO: 20.6 PG (ref 27–33)
MCHC RBC AUTO-ENTMCNC: 29.6 G/DL (ref 32.3–36.5)
MCV RBC AUTO: 69.5 FL (ref 81.4–97.8)
PLATELET # BLD AUTO: 768 K/UL (ref 164–446)
PMV BLD AUTO: 9.4 FL (ref 9–12.9)
POTASSIUM SERPL-SCNC: 5.5 MMOL/L (ref 3.6–5.5)
RBC # BLD AUTO: 5.15 M/UL (ref 4.7–6.1)
SCCMEC + MECA PNL NOSE NAA+PROBE: NEGATIVE
SODIUM SERPL-SCNC: 133 MMOL/L (ref 135–145)
WBC # BLD AUTO: 17.4 K/UL (ref 4.8–10.8)

## 2024-05-20 PROCEDURE — 99232 SBSQ HOSP IP/OBS MODERATE 35: CPT | Mod: GC | Performed by: STUDENT IN AN ORGANIZED HEALTH CARE EDUCATION/TRAINING PROGRAM

## 2024-05-20 RX ORDER — AMOXICILLIN AND CLAVULANATE POTASSIUM 875; 125 MG/1; MG/1
1 TABLET, FILM COATED ORAL EVERY 12 HOURS
Status: DISCONTINUED | OUTPATIENT
Start: 2024-05-20 | End: 2024-05-21 | Stop reason: HOSPADM

## 2024-05-20 RX ORDER — ACETAMINOPHEN 500 MG
1000 TABLET ORAL ONCE
Qty: 2 TABLET | Refills: 0 | Status: COMPLETED | OUTPATIENT
Start: 2024-05-20 | End: 2024-05-20

## 2024-05-20 RX ADMIN — IPRATROPIUM BROMIDE AND ALBUTEROL SULFATE 3 ML: 2.5; .5 SOLUTION RESPIRATORY (INHALATION) at 15:07

## 2024-05-20 RX ADMIN — MOMETASONE FUROATE AND FORMOTEROL FUMARATE DIHYDRATE 2 PUFF: 100; 5 AEROSOL RESPIRATORY (INHALATION) at 06:02

## 2024-05-20 RX ADMIN — AMOXICILLIN AND CLAVULANATE POTASSIUM 1 TABLET: 875; 125 TABLET, FILM COATED ORAL at 11:12

## 2024-05-20 RX ADMIN — HYDROMORPHONE HYDROCHLORIDE 1 MG: 1 INJECTION, SOLUTION INTRAMUSCULAR; INTRAVENOUS; SUBCUTANEOUS at 01:39

## 2024-05-20 RX ADMIN — AMPICILLIN AND SULBACTAM 3 G: 1; 2 INJECTION, POWDER, FOR SOLUTION INTRAMUSCULAR; INTRAVENOUS at 00:22

## 2024-05-20 RX ADMIN — IPRATROPIUM BROMIDE AND ALBUTEROL SULFATE 3 ML: 2.5; .5 SOLUTION RESPIRATORY (INHALATION) at 11:27

## 2024-05-20 RX ADMIN — ACETAMINOPHEN 1000 MG: 500 TABLET, FILM COATED ORAL at 11:13

## 2024-05-20 RX ADMIN — HYDROMORPHONE HYDROCHLORIDE 1 MG: 1 INJECTION, SOLUTION INTRAMUSCULAR; INTRAVENOUS; SUBCUTANEOUS at 22:37

## 2024-05-20 RX ADMIN — FLUTICASONE FUROATE, UMECLIDINIUM BROMIDE AND VILANTEROL TRIFENATATE 1 PUFF: 100; 62.5; 25 POWDER RESPIRATORY (INHALATION) at 17:25

## 2024-05-20 RX ADMIN — HYDROMORPHONE HYDROCHLORIDE 1 MG: 1 INJECTION, SOLUTION INTRAMUSCULAR; INTRAVENOUS; SUBCUTANEOUS at 08:04

## 2024-05-20 RX ADMIN — OMEPRAZOLE 20 MG: 20 CAPSULE, DELAYED RELEASE ORAL at 06:01

## 2024-05-20 RX ADMIN — OXYCODONE HYDROCHLORIDE 10 MG: 10 TABLET ORAL at 11:13

## 2024-05-20 RX ADMIN — SUCRALFATE 1 G: 1 TABLET ORAL at 16:34

## 2024-05-20 RX ADMIN — ATORVASTATIN CALCIUM 40 MG: 40 TABLET, FILM COATED ORAL at 20:37

## 2024-05-20 RX ADMIN — FERROUS SULFATE TAB 325 MG (65 MG ELEMENTAL FE) 325 MG: 325 (65 FE) TAB at 06:01

## 2024-05-20 RX ADMIN — OMEPRAZOLE 20 MG: 20 CAPSULE, DELAYED RELEASE ORAL at 16:33

## 2024-05-20 RX ADMIN — IPRATROPIUM BROMIDE AND ALBUTEROL SULFATE 3 ML: 2.5; .5 SOLUTION RESPIRATORY (INHALATION) at 08:34

## 2024-05-20 RX ADMIN — OXYCODONE HYDROCHLORIDE 10 MG: 10 TABLET ORAL at 16:34

## 2024-05-20 RX ADMIN — OXYCODONE HYDROCHLORIDE 10 MG: 10 TABLET ORAL at 06:07

## 2024-05-20 RX ADMIN — SUCRALFATE 1 G: 1 TABLET ORAL at 06:01

## 2024-05-20 RX ADMIN — LOSARTAN POTASSIUM 25 MG: 25 TABLET, FILM COATED ORAL at 16:34

## 2024-05-20 RX ADMIN — HYDROMORPHONE HYDROCHLORIDE 1 MG: 1 INJECTION, SOLUTION INTRAMUSCULAR; INTRAVENOUS; SUBCUTANEOUS at 18:33

## 2024-05-20 RX ADMIN — PREDNISONE 40 MG: 20 TABLET ORAL at 06:01

## 2024-05-20 RX ADMIN — HYDROMORPHONE HYDROCHLORIDE 1 MG: 1 INJECTION, SOLUTION INTRAMUSCULAR; INTRAVENOUS; SUBCUTANEOUS at 13:19

## 2024-05-20 RX ADMIN — ACETAMINOPHEN 1000 MG: 500 TABLET, FILM COATED ORAL at 06:01

## 2024-05-20 RX ADMIN — OXYCODONE HYDROCHLORIDE 10 MG: 10 TABLET ORAL at 20:44

## 2024-05-20 RX ADMIN — ACETAMINOPHEN 1000 MG: 500 TABLET, FILM COATED ORAL at 00:21

## 2024-05-20 RX ADMIN — RIVAROXABAN 20 MG: 20 TABLET, FILM COATED ORAL at 06:01

## 2024-05-20 RX ADMIN — AMOXICILLIN AND CLAVULANATE POTASSIUM 1 TABLET: 875; 125 TABLET, FILM COATED ORAL at 16:34

## 2024-05-20 RX ADMIN — OXYCODONE HYDROCHLORIDE 10 MG: 10 TABLET ORAL at 00:26

## 2024-05-20 ASSESSMENT — PAIN DESCRIPTION - PAIN TYPE
TYPE: ACUTE PAIN

## 2024-05-20 NOTE — DISCHARGE PLANNING
[facility] has declined patient.  Spoke with Lashawn at [facility], they do accept insurance.  Referral sent to [facility] as per request.

## 2024-05-20 NOTE — ASSESSMENT & PLAN NOTE
Persistent evidence of infiltration versus pneumonia development near left hemidiaphragm elevation  Concern for community-acquired pneumonia  Continue Augmentin

## 2024-05-20 NOTE — CARE PLAN
Problem: Pain - Standard  Goal: Alleviation of pain or a reduction in pain to the patient’s comfort goal  Outcome: Progressing     Problem: Self Care  Goal: Patient will have the ability to perform ADLs independently or with assistance (bathe, groom, dress, toilet and feed)  Outcome: Progressing     Problem: Knowledge Deficit - Standard  Goal: Patient and family/care givers will demonstrate understanding of plan of care, disease process/condition, diagnostic tests and medications  Outcome: Progressing   The patient is Stable - Low risk of patient condition declining or worsening    Shift Goals  Clinical Goals: abx, pain management  Patient Goals: discharge  Family Goals: HUGH    Progress made toward(s) clinical / shift goals:  pt will have 7/10 pain post prn pain medication.     Patient is not progressing towards the following goals:

## 2024-05-20 NOTE — DISCHARGE PLANNING
Spoke with Alie at [facility], they are unable to locate referral.  Referral has been resent to [phone]. [phone]  [facility] has declined patient.  Referral sent to [facility].

## 2024-05-20 NOTE — DISCHARGE SUMMARY
FAMILY MEDICINE PROGRESS NOTE          PATIENT ID:  NAME:  Noe Hickey  MRN:               [id number]  YOB: 1960    Date of Admission: 5/16/2024     Attending: Arnie Sousa M.d.     Resident: Farheen Wright M.D.    Primary Care Physician:  Farheen Wright M.D.    HPI: Noe Hickey is a 63 y.o. male admitted for acute on chronic COPD exacerbation and right foot wound on hospital day 4    Interval Problem Update  Afebrile  Hypoxemic on RA. Resumed RA.  Pain 7-10 with tylenol and opioids  BC NGTD @ 4D    SUBJECTIVE:   No acute events overnight.  Feels hot this morning, moist skin.  Productive cough  Concerned as he said he was having a conversation with John Webb which he realized was a hallucination  Denies recreational drug use for approximately 4 years.  Denies alcohol use  Has not used testosterone supplementation for greater than 1 year    OBJECTIVE:  Temp:  [36.6 °C (97.8 °F)-37.1 °C (98.8 °F)] 36.8 °C (98.2 °F)  Pulse:  [85-97] 88  Resp:  [17-22] 18  BP: (117-159)/(78-90) 159/88  SpO2:  [87 %-95 %] 93 %      Intake/Output Summary (Last 24 hours) at 5/20/2024 0834  Last data filed at 5/19/2024 2124  Gross per 24 hour   Intake 720 ml   Output 200 ml   Net 520 ml       PHYSICAL EXAM:  Physical Exam  Constitutional:       Appearance: Normal appearance.   Cardiovascular:      Rate and Rhythm: Normal rate and regular rhythm.      Heart sounds: No murmur heard.  Pulmonary:      Effort: Pulmonary effort is normal. No respiratory distress.      Breath sounds: Normal breath sounds.      Comments: Absent on left base.   Abdominal:      General: Abdomen is flat. Bowel sounds are normal.      Palpations: Abdomen is soft.   Skin:     General: Skin is warm.   Neurological:      General: No focal deficit present.      Mental Status: He is alert and oriented to person, place, and time.   Psychiatric:         Behavior: Behavior normal.             LABS:  Recent Labs     05/19/24  0502 05/20/24  0053  "  WBC 15.2* 17.4*   RBC 5.47 5.15   HEMOGLOBIN 11.1* 10.6*   HEMATOCRIT 37.6* 35.8*   MCV 68.7* 69.5*   MCH 20.3* 20.6*   RDW 57.4* 58.3*   PLATELETCT 812* 768*   MPV 9.5 9.4   NEUTSPOLYS 93.80*  --    LYMPHOCYTES 2.70*  --    MONOCYTES 2.40  --    EOSINOPHILS 0.00  --    BASOPHILS 0.10  --      Recent Labs     05/19/24  0502 05/19/24  1236 05/20/24  0053   SODIUM 130* 131* 133*   POTASSIUM 5.9* 5.5 5.5   CHLORIDE 95* 96 96   CO2 25 24 25   BUN 30* 36* 32*   CREATININE 1.18 1.39 1.18   CALCIUM 9.2 9.0 8.8   ALBUMIN 4.1  --   --      Estimated GFR/CRCL = Estimated Creatinine Clearance: 70.1 mL/min (by C-G formula based on SCr of 1.18 mg/dL).  Recent Labs     05/19/24  0502 05/19/24  1236 05/20/24  0053   GLUCOSE 119* 161* 144*     Recent Labs     05/19/24  0502   ASTSGOT 62*   ALTSGPT 66*   TBILIRUBIN 0.8   ALKPHOSPHAT 34   GLOBULIN 2.7         Recent Labs     05/19/24  0502   E4OLSPZRN 1.5L     No results for input(s): \"INR\", \"APTT\", \"FIBRINOGEN\" in the last 72 hours.    Invalid input(s): \"DIMER\"      IMAGING:  DX-CHEST-PORTABLE (1 VIEW)   Final Result      Unchanged elevation of the left hemidiaphragm with associated basilar atelectasis and/or consolidation. Underlying infection is possible.      US-EXTREMITY NON VASCULAR UNILATERAL RIGHT   Final Result      Redemonstrated subcutaneous soft tissue collection, decreased in size now measuring 4.6 x 3.7 x 0.7 cm most consistent with hematoma. Abscess is not excluded.      CT-FOOT WITH PLUS RECONS RIGHT   Final Result         1. Subcutaneous edema involving the dorsal aspect of the foot.   2. Scattered degenerative changes.   3. No definite bone destruction or soft tissue gas.      CT-CTA CHEST PULMONARY ARTERY W/ RECONS   Final Result      1.  No large central or segmental pulmonary embolus   2.  Cardiomegaly with enlarged pulmonary arteries which could indicate pulmonary arterial hypertension   3.  Persistently elevated LEFT diaphragm with overlying atelectasis       "      DX-CHEST-PORTABLE (1 VIEW)   Final Result      Unchanged elevation of left hemidiaphragm with associated atelectasis and/or consolidation. Underlying infection is possible.          CULTURES:   Results       Procedure Component Value Units Date/Time    MRSA By PCR (Amp) [[id number]] Collected: 05/20/24 1236    Order Status: Completed Specimen: Respirate from Nares Updated: 05/20/24 1355     MRSA by PCR Negative    CoV-2, Flu A/B, And RSV by PCR (CepAgentekid) [[id number]] Collected: 05/19/24 0630    Order Status: Completed Updated: 05/19/24 0758     Influenza virus A RNA Negative     Influenza virus B, PCR Negative     RSV, PCR Negative     SARS-CoV-2 by PCR NotDetected     Comment: [facility] providers: PLEASE REFER TO DE-ESCALATION AND RETESTING PROTOCOL  on [email or web address]    **The [employer] Xpress SARS-CoV-2 RT-PCR Test has been made  available for use under the Emergency Use Authorization (EUA) only.          SARS-CoV-2 Source NP Swab    CoV-2, Flu A/B, And RSV by PCR (CepAgentekid) [[id number]] Collected: 05/17/24 0205    Order Status: Completed Updated: 05/17/24 1329     Influenza virus A RNA Negative     Influenza virus B, PCR Negative     RSV, PCR Negative     SARS-CoV-2 by PCR NotDetected     Comment: [facility] providers: PLEASE REFER TO DE-ESCALATION AND RETESTING PROTOCOL  on [email or web address]    **The [employer] Xpress SARS-CoV-2 RT-PCR Test has been made  available for use under the Emergency Use Authorization (EUA) only.          SARS-CoV-2 Source NP Swab    CoV-2, Flu A/B, And RSV by PCR (CepAgentekid) [[id number]]     Order Status: Canceled Specimen: Respirate     BLOOD CULTURE x2 [[id number]] Collected: 05/16/24 1350    Order Status: Completed Specimen: Blood from Peripheral Updated: 05/17/24 0749     Significant Indicator NEG     Source BLD     Site PERIPHERAL     Culture Result No Growth  Note: Blood cultures are incubated for 5 days and  are monitored continuously.Positive blood cultures  are called to  the RN and reported as soon as  they are identified.      Narrative:      Right Forearm/Arm    BLOOD CULTURE x2 [[id number]] Collected: 05/16/24 1440    Order Status: Completed Specimen: Blood from Peripheral Updated: 05/17/24 0749     Significant Indicator NEG     Source BLD     Site PERIPHERAL     Culture Result No Growth  Note: Blood cultures are incubated for 5 days and  are monitored continuously.Positive blood cultures  are called to the RN and reported as soon as  they are identified.      Narrative:      Right Forearm/Arm    Culture Respiratory W/ GRM STN [[id number]]     Order Status: Canceled Specimen: Respirate from Sputum             MEDS:  Current Facility-Administered Medications   Medication Last Admin    amoxicillin-clavulanate (Augmentin) 875-125 MG per tablet 1 Tablet 1 Tablet at 05/20/24 1112    fluticasone-umeclidinium-vilanterol (Trelegy Ellipta) 100-62.5-25 mcg/act inhaler 1 Puff      predniSONE (Deltasone) tablet 40 mg 40 mg at 05/20/24 0601    ferrous sulfate tablet 325 mg 325 mg at 05/20/24 0601    oxyCODONE immediate-release (Roxicodone) tablet 5 mg      Or    oxyCODONE immediate release (Roxicodone) tablet 10 mg 10 mg at 05/20/24 1113    Or    HYDROmorphone (Dilaudid) injection 1 mg 1 mg at 05/20/24 1319    ipratropium-albuterol (DUONEB) nebulizer solution 3 mL at 05/20/24 1507    ipratropium-albuterol (DUONEB) nebulizer solution 3 mL at 05/19/24 1012    Respiratory Therapy Consult      labetalol (Normodyne/Trandate) injection 10 mg      nicotine (Nicoderm) 21 MG/24HR 21 mg      And    nicotine polacrilex (Nicorette) 2 MG piece 2 mg      Pharmacy Consult Request ...Pain Management Review 1 Each      atorvastatin (Lipitor) tablet 40 mg 40 mg at 05/19/24 2020    losartan (Cozaar) tablet 25 mg 25 mg at 05/19/24 1659    omeprazole (PriLOSEC) capsule 20 mg 20 mg at 05/20/24 0601    sucralfate (Carafate) tablet 1 g 1 g at 05/20/24 0601    rivaroxaban (Xarelto) tablet 20 mg 20 mg at 05/20/24 0601  "      ASSESSMENT/PLAN:  Noe Hickey is a 63 y.o. male admitted for acute on chronic COPD exacerbation and right foot wound on hospital day 4    * Dyspnea on exertion- (present on admission)  Assessment & Plan  Concern for acute COPD exacerbation  S/p azithromycin for 3 days  Continue prednisone for total of 5 days  Continue supplemental oxygen as clinically indicated  Will continue to work with social workers in order to arrange home oxygen  Patient to undergo PFTs outpatient.  Continue scheduled DuoNebs  Continue scheduled Trelegy Ellipta    Right foot infection- (present on admission)  Assessment & Plan  S/p Unasyn administration  S/p removal of sutures  Orthopedics consulted and recommend: \"No advanced imaging at this time and no surgical intervention. If the patient has continued worsening pain over his hospital stay while being treated for his COPD exacerbation MRI with contrast could potentially be helpful for further evaluation.\"        Acute on chronic systolic heart failure (HCC)- (present on admission)  Assessment & Plan  Echocardiogram completed and reassuring  Will consider GDMT as clinically indicated  Maintain euvolemia    Acute hypoxemic respiratory failure (HCC)- (present on admission)  Assessment & Plan  Persistent evidence of infiltration versus pneumonia development near left hemidiaphragm elevation  Concern for community-acquired pneumonia  Continue Augmentin    Thrombophilia (HCC)- (present on admission)  Assessment & Plan  Monitor    Hypertension- (present on admission)  Assessment & Plan  Continue home losartan    Microcytic anemia- (present on admission)  Assessment & Plan  Continue supplemental iron    Atrial fibrillation (HCC)- (present on admission)  Assessment & Plan  Continue Xarelto         Core Measures:  Fluids: P.o.  Lines: PIV  Abx: Augmentin  Diet: Cardiac  PPX: therapeutic anticoagulation with Xarelto    CODE Status: Full Code      Disposition  Patient is not medically cleared " for discharge.   Anticipate discharge to to home with close outpatient follow-up.  I have placed the appropriate orders for post-discharge needs.      Farheen Wright M.D.    I have personally seen and examined the patient at bedside. I discussed the plan of care with patient.

## 2024-05-20 NOTE — CARE PLAN
Problem: Pain - Standard  Goal: Alleviation of pain or a reduction in pain to the patient’s comfort goal  Outcome: Progressing     Problem: Self Care  Goal: Patient will have the ability to perform ADLs independently or with assistance (bathe, groom, dress, toilet and feed)  Outcome: Progressing   The patient is Stable - Low risk of patient condition declining or worsening    Shift Goals  Clinical Goals: safety, iv abx  Patient Goals: pain management  Family Goals: HUGH    Progress made toward(s) clinical / shift goals:  patient will have 8/10 pain post pain med administration.     Patient is not progressing towards the following goals:

## 2024-05-20 NOTE — CARE PLAN
The patient is Stable - Low risk of patient condition declining or worsening    Shift Goals  Clinical Goals: Pain management, monitor O2  Patient Goals: Rest  Family Goals: HUGH    Progress made toward(s) clinical / shift goals:  Patient alert and oriented, assessment completed. Patient complaining of pain in R foot, administered medications per MAR. Patient up ambulating hallways. Patient O2 sat 87-94 on 2L nc and room air. Patient resting comfortably, bed in lowest position, call light within reach.      Problem: Pain - Standard  Goal: Alleviation of pain or a reduction in pain to the patient’s comfort goal  Outcome: Progressing     Problem: Respiratory  Goal: Patient will achieve/maintain optimum respiratory ventilation and gas exchange  Outcome: Progressing     Problem: Knowledge Deficit - Standard  Goal: Patient and family/care givers will demonstrate understanding of plan of care, disease process/condition, diagnostic tests and medications  Outcome: Progressing       Patient is not progressing towards the following goals:

## 2024-05-20 NOTE — DOCUMENTATION QUERY
"                                                                         [facility]                                                                       Query Response Note      PATIENT:               JUSTIN TOPETE  ACCT #:                  [id number]  MRN:                     [id number]  :                      1960  ADMIT DATE:       2024 1:30 PM  DISCH DATE:          RESPONDING  PROVIDER #:        [id number]           QUERY TEXT:    There is conflicting documentation in the medical record.      Acute on chronic systolic heart failure and no appearance of fluid overload are both documented in the medical record.      Based on treatment, clinical findings and risk factors, can this documentation be further clarified?    The patient's Clinical Indicators include:  62yo with dx of  HTN, paroxysmal atrial fibrillation, COPD exacerbation, acute on chronic systolic heart failure     H&P \"Acute on chronic systolic heart failure.  ...echo 2024...left ejection fraction estimated to be 60%\"   H&P \"Patient does not appear fluid overloaded on exam\"          CXR findings \"There is no effusion or pneumothorax\"    Risk factors: age, paroxysmal atrial fibrillation, HTN, COPD exacerbation  Treatments: holding lasix, labwork, imaging, monitoring  Options provided:   -- Acute on Chronic Systolic heart failure   -- Chronic systolic heart failure   -- Other explanation:other explanation-, please specify   -- Unable to determine      Query created by: Dwayne April on 2024 6:17 AM    RESPONSE TEXT:    Unable to determine          Electronically signed by:  RAYMOND HUNT 2024 3:21 PM              "

## 2024-05-20 NOTE — DISCHARGE PLANNING
Case Management Discharge Planning    Admission Date: 5/16/2024  GMLOS: 2.7  ALOS: 4    6-Clicks ADL Score: 23  6-Clicks Mobility Score: 22      Anticipated Discharge Dispo: Discharge Disposition: Discharged to home/self care (01)  Discharge Address: [address] [address]  Discharge Contact Phone Number: [phone]    DME Needed: Yes    DME Ordered: Yes    Action(s) Taken: DME Face to Face     This RN CM was updated that patient is medically cleared and will need home oxygen. Sent choice for abel and was updated that patient was declined.     [facility] can accept insurance and choice was received and sent to [facility] for [facility] home oxygen.     1417 [facility] called this RN CM and updated that they are unable to accept patient related to insurance. Updated DPA.    1525 This RN CM called [facility] to verify if they can accept insurance and deliver oxygen to bedside today.     Per contact with preferred they can accept patient insurance pending delivery updates. Provided this RN CM direct line and bedside RN contact information.    Escalations Completed: DME Company    Medically Clear: Yes    Next Steps: Pending acceptance and delivery of home oxygen for discharge today.    Barriers to Discharge: DME and Oxygen Delivery    Is the patient up for discharge tomorrow: No

## 2024-05-21 ENCOUNTER — OFFICE VISIT (OUTPATIENT)
Dept: MEDICAL GROUP | Facility: CLINIC | Age: 64
End: 2024-05-21
Payer: MEDICAID

## 2024-05-21 ENCOUNTER — PHARMACY VISIT (OUTPATIENT)
Dept: PHARMACY | Facility: MEDICAL CENTER | Age: 64
End: 2024-05-21
Payer: COMMERCIAL

## 2024-05-21 VITALS
HEART RATE: 79 BPM | HEIGHT: 69 IN | BODY MASS INDEX: 29.99 KG/M2 | WEIGHT: 202.5 LBS | TEMPERATURE: 98.7 F | DIASTOLIC BLOOD PRESSURE: 86 MMHG | SYSTOLIC BLOOD PRESSURE: 140 MMHG | OXYGEN SATURATION: 96 %

## 2024-05-21 VITALS
WEIGHT: 198.41 LBS | DIASTOLIC BLOOD PRESSURE: 77 MMHG | BODY MASS INDEX: 29.39 KG/M2 | HEART RATE: 95 BPM | HEIGHT: 69 IN | TEMPERATURE: 98.4 F | RESPIRATION RATE: 16 BRPM | SYSTOLIC BLOOD PRESSURE: 124 MMHG | OXYGEN SATURATION: 91 %

## 2024-05-21 DIAGNOSIS — M79.671 RIGHT FOOT PAIN: ICD-10-CM

## 2024-05-21 DIAGNOSIS — F33.9 EPISODE OF RECURRENT MAJOR DEPRESSIVE DISORDER, UNSPECIFIED DEPRESSION EPISODE SEVERITY (HCC): ICD-10-CM

## 2024-05-21 DIAGNOSIS — G89.29 OTHER CHRONIC PAIN: ICD-10-CM

## 2024-05-21 PROBLEM — J98.6 HEMIDIAPHRAGM PARALYSIS: Status: ACTIVE | Noted: 2023-01-04

## 2024-05-21 LAB
BACTERIA BLD CULT: NORMAL
BACTERIA BLD CULT: NORMAL
ERYTHROCYTE [DISTWIDTH] IN BLOOD BY AUTOMATED COUNT: 57.9 FL (ref 35.9–50)
HCT VFR BLD AUTO: 38.3 % (ref 42–52)
HGB BLD-MCNC: 11.5 G/DL (ref 14–18)
MCH RBC QN AUTO: 20.6 PG (ref 27–33)
MCHC RBC AUTO-ENTMCNC: 30 G/DL (ref 32.3–36.5)
MCV RBC AUTO: 68.6 FL (ref 81.4–97.8)
PLATELET # BLD AUTO: 767 K/UL (ref 164–446)
PMV BLD AUTO: 9.9 FL (ref 9–12.9)
RBC # BLD AUTO: 5.58 M/UL (ref 4.7–6.1)
SIGNIFICANT IND 70042: NORMAL
SIGNIFICANT IND 70042: NORMAL
SITE SITE: NORMAL
SITE SITE: NORMAL
SOURCE SOURCE: NORMAL
SOURCE SOURCE: NORMAL
WBC # BLD AUTO: 19.4 K/UL (ref 4.8–10.8)

## 2024-05-21 PROCEDURE — RXOTC WILLOW AMBULATORY OTC CHARGE

## 2024-05-21 PROCEDURE — 3077F SYST BP >= 140 MM HG: CPT | Mod: GE

## 2024-05-21 PROCEDURE — 3079F DIAST BP 80-89 MM HG: CPT | Mod: GE

## 2024-05-21 PROCEDURE — 99213 OFFICE O/P EST LOW 20 MIN: CPT | Mod: GE

## 2024-05-21 PROCEDURE — 99238 HOSP IP/OBS DSCHRG MGMT 30/<: CPT | Mod: GC | Performed by: STUDENT IN AN ORGANIZED HEALTH CARE EDUCATION/TRAINING PROGRAM

## 2024-05-21 PROCEDURE — RXMED WILLOW AMBULATORY MEDICATION CHARGE: Performed by: STUDENT IN AN ORGANIZED HEALTH CARE EDUCATION/TRAINING PROGRAM

## 2024-05-21 RX ORDER — OXYCODONE HYDROCHLORIDE 5 MG/1
5 TABLET ORAL EVERY 8 HOURS PRN
Qty: 4 TABLET | Refills: 0 | Status: SHIPPED | OUTPATIENT
Start: 2024-05-21 | End: 2024-05-25

## 2024-05-21 RX ORDER — BUDESONIDE AND FORMOTEROL FUMARATE DIHYDRATE 160; 4.5 UG/1; UG/1
2 AEROSOL RESPIRATORY (INHALATION) 2 TIMES DAILY
Qty: 10.2 G | Refills: 0 | Status: SHIPPED | OUTPATIENT
Start: 2024-05-21 | End: 2024-06-20

## 2024-05-21 RX ORDER — AMOXICILLIN AND CLAVULANATE POTASSIUM 875; 125 MG/1; MG/1
1 TABLET, FILM COATED ORAL EVERY 12 HOURS
Qty: 8 TABLET | Refills: 0 | Status: ACTIVE | OUTPATIENT
Start: 2024-05-21 | End: 2024-05-25

## 2024-05-21 RX ORDER — DOXYCYCLINE HYCLATE 100 MG
100 TABLET ORAL 2 TIMES DAILY
COMMUNITY

## 2024-05-21 RX ORDER — UREA 10 %
325 LOTION (ML) TOPICAL DAILY
Qty: 30 TABLET | Refills: 0 | Status: SHIPPED | OUTPATIENT
Start: 2024-05-22 | End: 2024-06-21

## 2024-05-21 RX ORDER — ONDANSETRON 4 MG/1
4 TABLET, ORALLY DISINTEGRATING ORAL EVERY 6 HOURS PRN
Qty: 10 TABLET | Refills: 0 | Status: SHIPPED | OUTPATIENT
Start: 2024-05-21 | End: 2024-05-31

## 2024-05-21 RX ORDER — ONDANSETRON 4 MG/1
4 TABLET, ORALLY DISINTEGRATING ORAL EVERY 6 HOURS PRN
Qty: 10 TABLET | Refills: 0 | Status: SHIPPED | OUTPATIENT
Start: 2024-05-21

## 2024-05-21 RX ORDER — NICOTINE 21 MG/24HR
1 PATCH, TRANSDERMAL 24 HOURS TRANSDERMAL EVERY 24 HOURS
Qty: 28 PATCH | Refills: 0 | Status: SHIPPED | OUTPATIENT
Start: 2024-05-21 | End: 2024-06-20

## 2024-05-21 RX ORDER — ACETAMINOPHEN 500 MG
1000 TABLET ORAL EVERY 6 HOURS
Status: DISCONTINUED | OUTPATIENT
Start: 2024-05-21 | End: 2024-05-21 | Stop reason: HOSPADM

## 2024-05-21 RX ORDER — PREDNISONE 20 MG/1
40 TABLET ORAL DAILY
Qty: 4 TABLET | Refills: 0 | Status: SHIPPED | OUTPATIENT
Start: 2024-05-22 | End: 2024-05-24

## 2024-05-21 RX ORDER — IBUPROFEN 800 MG/1
800 TABLET ORAL EVERY 8 HOURS
Status: DISCONTINUED | OUTPATIENT
Start: 2024-05-21 | End: 2024-05-21 | Stop reason: HOSPADM

## 2024-05-21 RX ORDER — LORATADINE 10 MG/1
10 TABLET ORAL DAILY
COMMUNITY

## 2024-05-21 RX ORDER — TIOTROPIUM BROMIDE INHALATION SPRAY 3.12 UG/1
2.5 SPRAY, METERED RESPIRATORY (INHALATION) DAILY
Qty: 4 G | Refills: 3 | Status: SHIPPED | OUTPATIENT
Start: 2024-05-21

## 2024-05-21 RX ORDER — NALOXONE HYDROCHLORIDE 4 MG/.1ML
SPRAY NASAL
Qty: 1 EACH | Refills: 3 | Status: SHIPPED | OUTPATIENT
Start: 2024-05-21

## 2024-05-21 RX ADMIN — RIVAROXABAN 20 MG: 20 TABLET, FILM COATED ORAL at 06:16

## 2024-05-21 RX ADMIN — SUCRALFATE 1 G: 1 TABLET ORAL at 06:16

## 2024-05-21 RX ADMIN — PREDNISONE 40 MG: 20 TABLET ORAL at 06:15

## 2024-05-21 RX ADMIN — OXYCODONE HYDROCHLORIDE 10 MG: 10 TABLET ORAL at 01:17

## 2024-05-21 RX ADMIN — IBUPROFEN 800 MG: 800 TABLET, FILM COATED ORAL at 06:16

## 2024-05-21 RX ADMIN — ACETAMINOPHEN 1000 MG: 500 TABLET, FILM COATED ORAL at 06:16

## 2024-05-21 RX ADMIN — FERROUS SULFATE TAB 325 MG (65 MG ELEMENTAL FE) 325 MG: 325 (65 FE) TAB at 06:15

## 2024-05-21 RX ADMIN — FLUTICASONE FUROATE, UMECLIDINIUM BROMIDE AND VILANTEROL TRIFENATATE 1 PUFF: 100; 62.5; 25 POWDER RESPIRATORY (INHALATION) at 07:09

## 2024-05-21 RX ADMIN — OMEPRAZOLE 20 MG: 20 CAPSULE, DELAYED RELEASE ORAL at 06:15

## 2024-05-21 RX ADMIN — AMOXICILLIN AND CLAVULANATE POTASSIUM 1 TABLET: 875; 125 TABLET, FILM COATED ORAL at 06:15

## 2024-05-21 RX ADMIN — OXYCODONE HYDROCHLORIDE 10 MG: 10 TABLET ORAL at 07:53

## 2024-05-21 ASSESSMENT — PAIN DESCRIPTION - PAIN TYPE
TYPE: ACUTE PAIN

## 2024-05-21 ASSESSMENT — FIBROSIS 4 INDEX: FIB4 SCORE: 0.64

## 2024-05-21 NOTE — TELEPHONE ENCOUNTER
Received request via: Pharmacy    Was the patient seen in the last year in this department? Yes    Does the patient have an active prescription (recently filled or refills available) for medication(s) requested? No    Pharmacy Name: [facility]/pharmacy #[id number] - [place], [place] - [address] AT [address]     Does the patient have assisted Plus and need 100 day supply (blood pressure, diabetes and cholesterol meds only)? Patient does not have SCP

## 2024-05-21 NOTE — ASSESSMENT & PLAN NOTE
This appears to be a chronic problem following TAVR in January 2023  Patient evaluated by pulmonology they recommend sniff testing to assess diaphragm  Outpatient pulmonology consultation placed

## 2024-05-21 NOTE — PROGRESS NOTES
To whom it may concern:    Noe Hickey ( 1960) was under my care throughout his hospital admission.     Please consider excusing him from work until 2024 with no restrictions.    Farheen Wright M.D.

## 2024-05-21 NOTE — PROGRESS NOTES
Patient took wheelchair of oxygen equipment with him without notifying the IN lounge staff. Meds to beds were delievered and patient was called. He was waiting at ER. Meds and work note taken to patient via care adie.

## 2024-05-21 NOTE — CARE PLAN
The patient is Stable - Low risk of patient condition declining or worsening    Shift Goals  Clinical Goals: Pain management, monitor O2  Patient Goals: Rest  Family Goals: HUGH    Progress made toward(s) clinical / shift goals:  Patient alert and oriented, assessment completed. Patient in pain, administered medications per MAR. Patient ambulating, O2 sat 87-91% on room air and 1.5L NC. Patient's WBC 19.4. Patient resting comfortably, bed in lowest position, call light within reach.       Problem: Pain - Standard  Goal: Alleviation of pain or a reduction in pain to the patient’s comfort goal  Outcome: Progressing     Problem: Respiratory  Goal: Patient will achieve/maintain optimum respiratory ventilation and gas exchange  Outcome: Progressing     Problem: Knowledge Deficit - Standard  Goal: Patient and family/care givers will demonstrate understanding of plan of care, disease process/condition, diagnostic tests and medications  Outcome: Progressing       Patient is not progressing towards the following goals:

## 2024-05-21 NOTE — CARE PLAN
The patient is Stable - Low risk of patient condition declining or worsening    Shift Goals  Clinical Goals: Discharge patient today  Patient Goals: go home  Family Goals: HUGH    Progress made toward(s) clinical / shift goals:      Patient sent to discharge lounge, oxy tanks and concentrator with patient. Will received meds to beds down stairs. Patient has no needs from nursing at this time. All personal belongings with patient      Patient is not progressing towards the following goals:

## 2024-05-21 NOTE — PROGRESS NOTES
FAMILY MEDICINE PROGRESS NOTE          PATIENT ID:  NAME:  Noe Hickey  MRN:               [id number]  YOB: 1960    Date of Admission: 5/16/2024     Attending: Arnie Sousa MD    Resident: Farheen Wright M.D.    Primary Care Physician:  Farheen Wright M.D.    HPI: Noe Hickey is a 63 y.o. male admitted for acute on chronic COPD exacerbation and right foot wound on hospital day 5    Interval Problem Update  5/20: Remains on room air.  Right foot wound appears to be healing.  Case management continues to coordinate home oxygen.    5/21:  Leukocytosis 17.4 > 19.4, on prednisone for COPD exacerbation  H&H stable  MRSA nares negative  Concern for possible underlying infection on chest x-ray completed 5/20, on Augmentin  Afebrile  Vital signs within normal limits  Remains hypoxemic while on room air, oxygen 90 to 92% while on 1.5 L of oxygen      SUBJECTIVE:   No acute events overnight  Wants to go home    OBJECTIVE:  Temp:  [36.2 °C (97.2 °F)-37.1 °C (98.7 °F)] 37.1 °C (98.7 °F)  Pulse:  [72-95] 79  Resp:  [16-19] 16  BP: (124-140)/(75-86) 140/86  SpO2:  [86 %-96 %] 96 %      Intake/Output Summary (Last 24 hours) at 5/21/2024 0510  Last data filed at 5/21/2024 0406  Gross per 24 hour   Intake 3080 ml   Output 300 ml   Net 2780 ml       PHYSICAL EXAM:  Physical Exam  Constitutional:       Appearance: Normal appearance.   Cardiovascular:      Rate and Rhythm: Normal rate and regular rhythm.   Pulmonary:      Effort: Pulmonary effort is normal. No respiratory distress.      Breath sounds: Normal breath sounds.   Skin:     General: Skin is warm and dry.   Neurological:      General: No focal deficit present.      Mental Status: He is alert and oriented to person, place, and time.   Psychiatric:         Behavior: Behavior normal.         LABS:  Recent Labs     05/19/24  0502 05/20/24  0053 05/21/24  0107   WBC 15.2* 17.4* 19.4*   RBC 5.47 5.15 5.58   HEMOGLOBIN 11.1* 10.6* 11.5*   HEMATOCRIT 37.6*  "35.8* 38.3*   MCV 68.7* 69.5* 68.6*   MCH 20.3* 20.6* 20.6*   RDW 57.4* 58.3* 57.9*   PLATELETCT 812* 768* 767*   MPV 9.5 9.4 9.9   NEUTSPOLYS 93.80*  --   --    LYMPHOCYTES 2.70*  --   --    MONOCYTES 2.40  --   --    EOSINOPHILS 0.00  --   --    BASOPHILS 0.10  --   --      Recent Labs     05/19/24  0502 05/19/24  1236 05/20/24  0053   SODIUM 130* 131* 133*   POTASSIUM 5.9* 5.5 5.5   CHLORIDE 95* 96 96   CO2 25 24 25   BUN 30* 36* 32*   CREATININE 1.18 1.39 1.18   CALCIUM 9.2 9.0 8.8   ALBUMIN 4.1  --   --      Estimated GFR/CRCL = Estimated Creatinine Clearance: 70.1 mL/min (by C-G formula based on SCr of 1.18 mg/dL).  Recent Labs     05/19/24  0502 05/19/24  1236 05/20/24  0053   GLUCOSE 119* 161* 144*     Recent Labs     05/19/24  0502   ASTSGOT 62*   ALTSGPT 66*   TBILIRUBIN 0.8   ALKPHOSPHAT 34   GLOBULIN 2.7         Recent Labs     05/19/24  0502   N2IWBZEFG 1.5L     No results for input(s): \"INR\", \"APTT\", \"FIBRINOGEN\" in the last 72 hours.    Invalid input(s): \"DIMER\"      IMAGING:  DX-CHEST-PORTABLE (1 VIEW)   Final Result      Unchanged elevation of the left hemidiaphragm with associated basilar atelectasis and/or consolidation. Underlying infection is possible.      US-EXTREMITY NON VASCULAR UNILATERAL RIGHT   Final Result      Redemonstrated subcutaneous soft tissue collection, decreased in size now measuring 4.6 x 3.7 x 0.7 cm most consistent with hematoma. Abscess is not excluded.      CT-FOOT WITH PLUS RECONS RIGHT   Final Result         1. Subcutaneous edema involving the dorsal aspect of the foot.   2. Scattered degenerative changes.   3. No definite bone destruction or soft tissue gas.      CT-CTA CHEST PULMONARY ARTERY W/ RECONS   Final Result      1.  No large central or segmental pulmonary embolus   2.  Cardiomegaly with enlarged pulmonary arteries which could indicate pulmonary arterial hypertension   3.  Persistently elevated LEFT diaphragm with overlying atelectasis          "   DX-CHEST-PORTABLE (1 VIEW)   Final Result      Unchanged elevation of left hemidiaphragm with associated atelectasis and/or consolidation. Underlying infection is possible.          CULTURES:   Results       Procedure Component Value Units Date/Time    BLOOD CULTURE x2 [[id number]] Collected: 05/16/24 1440    Order Status: Completed Specimen: Blood from Peripheral Updated: 05/21/24 1700     Significant Indicator NEG     Source BLD     Site PERIPHERAL     Culture Result No growth after 5 days of incubation.    Narrative:      Right Forearm/Arm    BLOOD CULTURE x2 [[id number]] Collected: 05/16/24 1350    Order Status: Completed Specimen: Blood from Peripheral Updated: 05/21/24 1700     Significant Indicator NEG     Source BLD     Site PERIPHERAL     Culture Result No growth after 5 days of incubation.    Narrative:      Right Forearm/Arm    MRSA By PCR ([facility]) [[id number]] Collected: 05/20/24 1236    Order Status: Completed Specimen: Respirate from Nares Updated: 05/20/24 1355     MRSA by PCR Negative    CoV-2, Flu A/B, And RSV by PCR ([facility]) [[id number]] Collected: 05/19/24 0630    Order Status: Completed Updated: 05/19/24 0758     Influenza virus A RNA Negative     Influenza virus B, PCR Negative     RSV, PCR Negative     SARS-CoV-2 by PCR NotDetected     Comment: [facility] providers: PLEASE REFER TO DE-ESCALATION AND RETESTING PROTOCOL  on [email or web address]    **The [employer] SARS-CoV-2 RT-PCR Test has been made  available for use under the Emergency Use Authorization (EUA) only.          SARS-CoV-2 Source NP Swab    CoV-2, Flu A/B, And RSV by PCR ([facility]) [[id number]] Collected: 05/17/24 0205    Order Status: Completed Updated: 05/17/24 1329     Influenza virus A RNA Negative     Influenza virus B, PCR Negative     RSV, PCR Negative     SARS-CoV-2 by PCR NotDetected     Comment: [facility] providers: PLEASE REFER TO DE-ESCALATION AND RETESTING PROTOCOL  on [email or web address]    **The [employer] Xpress  SARS-CoV-2 RT-PCR Test has been made  available for use under the Emergency Use Authorization (EUA) only.          SARS-CoV-2 Source NP Swab    CoV-2, Flu A/B, And RSV by PCR ([employer]) [[id number]]     Order Status: Canceled Specimen: Respirate     Culture Respiratory W/ [facility] [[id number]]     Order Status: Canceled Specimen: Respirate from Sputum             MEDS:  No current facility-administered medications for this encounter.     Current Outpatient Medications   Medication    [START ON 5/22/2024] ferrous sulfate 325 (65 Fe) MG EC tablet    nicotine (NICODERM) 21 MG/24HR PATCH 24 HR    nicotine polacrilex (NICORETTE) 2 MG Gum    [START ON 5/22/2024] predniSONE (DELTASONE) 20 MG Tab    amoxicillin-clavulanate (AUGMENTIN) 875-125 MG Tab    budesonide-formoterol (SYMBICORT) 160-4.5 MCG/ACT Aerosol    ondansetron (ZOFRAN ODT) 4 MG TABLET DISPERSIBLE    ondansetron (ZOFRAN ODT) 4 MG TABLET DISPERSIBLE    ondansetron (ZOFRAN ODT) 4 MG TABLET DISPERSIBLE    tiotropium (SPIRIVA RESPIMAT) 2.5 mcg/Act Aero Soln    sucralfate (CARAFATE) 1 GM Tab    Multiple Vitamins-Minerals (ONE DAILY MENS PO)    MILK THISTLE PO    albuterol 108 (90 Base) MCG/ACT Aero Soln inhalation aerosol    atorvastatin (LIPITOR) 40 MG Tab    omeprazole (PRILOSEC) 20 MG delayed-release capsule    rivaroxaban (XARELTO) 20 MG Tab tablet    metoprolol SR (TOPROL XL) 25 MG TABLET SR 24 HR    losartan (COZAAR) 25 MG Tab    doxycycline (VIBRAMYCIN) 100 MG Tab    loratadine (CLARITIN) 10 MG Tab    oxyCODONE immediate-release (ROXICODONE) 5 MG Tab    Naloxone (NARCAN) 4 MG/0.1ML Liquid       ASSESSMENT/PLAN:  Noe Hickey is a 63 y.o. male admitted for acute on chronic COPD exacerbation and right foot wound on hospital day 5    * Dyspnea on exertion- (present on admission)  Assessment & Plan  Concern for acute COPD exacerbation  S/p azithromycin for 3 days  Continue prednisone for total of 5 days  Continue supplemental oxygen as clinically indicated  Will  "continue to work with social workers in order to arrange home oxygen  Patient to undergo PFTs outpatient.  Continue scheduled Trelegy Ellipta    Right foot infection- (present on admission)  Assessment & Plan  S/p Unasyn administration  S/p removal of sutures  Orthopedics consulted and recommend: \"No advanced imaging at this time and no surgical intervention. If the patient has continued worsening pain over his hospital stay while being treated for his COPD exacerbation MRI with contrast could potentially be helpful for further evaluation.\"        Hoarseness of voice- (present on admission)  Assessment & Plan  Patient was evaluated by ENT during her previous hospital admission and he underwent a scope that was \"essentially clear.\"  There was documented edema likely from coughing and increased work of breathing.  The ENT did not think that this dyspnea was related to vocal cords.  Speech therapy recommended without ENT follow-up.    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  Lines: PIV  Abx: Augmentin  Diet: Cardiac  PPX: therapeutic anticoagulation with Xarelto     CODE Status: Full Code        Disposition  Patient is medically cleared for discharge.   Anticipate discharge to to home with close outpatient follow-up.  I have placed the appropriate orders for post-discharge needs.     Farheen Wright M.D.    I have personally seen and examined the patient at bedside. I discussed the plan of care with patient and .

## 2024-05-21 NOTE — ASSESSMENT & PLAN NOTE
"Patient was evaluated by ENT during her previous hospital admission and he underwent a scope that was \"essentially clear.\"  There was documented edema likely from coughing and increased work of breathing.  The ENT did not think that this dyspnea was related to vocal cords.  Speech therapy recommended without ENT follow-up.  "

## 2024-05-21 NOTE — PROGRESS NOTES
Patient to be discharged today - patient aware and agreeable to plan. D/c instructions reviewed with patient - ?'s/concerns answered. 1 piv removed, waiting on meds to beds, oxygen going home with patient but patient states he only needs it prn, documentation shows an oxygen saturation of 90% or greater on room air on the floor since 0353 today.

## 2024-05-22 ENCOUNTER — TELEPHONE (OUTPATIENT)
Dept: RESPIRATORY THERAPY | Facility: MEDICAL CENTER | Age: 64
End: 2024-05-22
Payer: MEDICAID

## 2024-05-22 NOTE — DISCHARGE SUMMARY
" [facility] [place] DISCHARGE SUMMARY     Attending:   Arnie Sousa MD    Resident:   Farheen Wright MD (PGY-3)    PATIENT: Noe Hickey; [id number]; 1960    ID: 63 y.o. male admitted for PENN, right foot pain.    24 Hour Events: No acute events overnight.    OBJECTIVE:     Vitals:    05/21/24 0710 05/21/24 0749 05/21/24 0753 05/21/24 0930   BP:  124/77     Pulse: 72 95     Resp: 16 18 18 16   Temp:  36.9 °C (98.4 °F)     TempSrc:  Temporal     SpO2: 93% 91%     Weight:       Height:           Intake/Output Summary (Last 24 hours) at 5/21/2024 1929  Last data filed at 5/21/2024 0930  Gross per 24 hour   Intake 2720 ml   Output 300 ml   Net 2420 ml           LABS:  Recent Labs     05/19/24  0502 05/20/24  0053 05/21/24  0107   WBC 15.2* 17.4* 19.4*   RBC 5.47 5.15 5.58   HEMOGLOBIN 11.1* 10.6* 11.5*   HEMATOCRIT 37.6* 35.8* 38.3*   MCV 68.7* 69.5* 68.6*   MCH 20.3* 20.6* 20.6*   RDW 57.4* 58.3* 57.9*   PLATELETCT 812* 768* 767*   MPV 9.5 9.4 9.9   NEUTSPOLYS 93.80*  --   --    LYMPHOCYTES 2.70*  --   --    MONOCYTES 2.40  --   --    EOSINOPHILS 0.00  --   --    BASOPHILS 0.10  --   --      Recent Labs     05/19/24  0502 05/19/24  1236 05/20/24  0053   SODIUM 130* 131* 133*   POTASSIUM 5.9* 5.5 5.5   CHLORIDE 95* 96 96   CO2 25 24 25   BUN 30* 36* 32*   CREATININE 1.18 1.39 1.18   CALCIUM 9.2 9.0 8.8   ALBUMIN 4.1  --   --      Estimated GFR/CRCL = Estimated Creatinine Clearance: 70.1 mL/min (by C-G formula based on SCr of 1.18 mg/dL).  Recent Labs     05/19/24  0502 05/19/24  1236 05/20/24  0053   GLUCOSE 119* 161* 144*     Recent Labs     05/19/24  0502   ASTSGOT 62*   ALTSGPT 66*   TBILIRUBIN 0.8   ALKPHOSPHAT 34   GLOBULIN 2.7         Recent Labs     05/19/24  0502   Q2GFAMABX 1.5L     No results for input(s): \"INR\", \"APTT\", \"FIBRINOGEN\" in the last 72 hours.    Invalid input(s): \"DIMER\"    MICROBIOLOGY:   Results for orders placed or performed during the hospital encounter of 05/16/24   CBC with " Please call the [facility] at [phone], select OPTION #2,  if any of your medications change.  This means: if a med is discontinued, a new med is started, or a dose is changed.  These meds may interact with your warfarin and we may need to adjust your dose.  This is especially important if you start any type of ANTIBIOTIC.    Also, please call if you have any admissions to the hospital, visits to an emergency room, procedures planned, or if any other medical provider has instructed you to hold your warfarin.     Differential   Result Value Ref Range    WBC 18.2 (H) 4.8 - 10.8 K/uL    RBC 5.99 4.70 - 6.10 M/uL    Hemoglobin 12.2 (L) 14.0 - 18.0 g/dL    Hematocrit 40.1 (L) 42.0 - 52.0 %    MCV 66.9 (L) 81.4 - 97.8 fL    MCH 20.4 (L) 27.0 - 33.0 pg    MCHC 30.4 (L) 32.3 - 36.5 g/dL    RDW 56.3 (H) 35.9 - 50.0 fL    Platelet Count 768 (H) 164 - 446 K/uL    MPV 9.6 9.0 - 12.9 fL    Neutrophils-Polys 81.70 (H) 44.00 - 72.00 %    Lymphocytes 10.40 (L) 22.00 - 41.00 %    Monocytes 2.60 0.00 - 13.40 %    Eosinophils 3.50 0.00 - 6.90 %    Basophils 0.00 0.00 - 1.80 %    Nucleated RBC 0.40 (H) 0.00 - 0.20 /100 WBC    Neutrophils (Absolute) 15.03 (H) 1.82 - 7.42 K/uL    Lymphs (Absolute) 1.89 1.00 - 4.80 K/uL    Monos (Absolute) 0.47 0.00 - 0.85 K/uL    Eos (Absolute) 0.64 (H) 0.00 - 0.51 K/uL    Baso (Absolute) 0.00 0.00 - 0.12 K/uL    NRBC (Absolute) 0.07 K/uL    Hypochromia 2+ (A)     Anisocytosis 1+     Microcytosis 1+    Comp Metabolic Panel   Result Value Ref Range    Sodium 135 135 - 145 mmol/L    Potassium 4.6 3.6 - 5.5 mmol/L    Chloride 102 96 - 112 mmol/L    Co2 22 20 - 33 mmol/L    Anion Gap 11.0 7.0 - 16.0    Glucose 80 65 - 99 mg/dL    Bun 18 8 - 22 mg/dL    Creatinine 0.80 0.50 - 1.40 mg/dL    Calcium 9.1 8.5 - 10.5 mg/dL    Correct Calcium 9.3 8.5 - 10.5 mg/dL    AST(SGOT) 41 12 - 45 U/L    ALT(SGPT) 48 2 - 50 U/L    Alkaline Phosphatase 34 30 - 99 U/L    Total Bilirubin 0.5 0.1 - 1.5 mg/dL    Albumin 3.8 3.2 - 4.9 g/dL    Total Protein 6.5 6.0 - 8.2 g/dL    Globulin 2.7 1.9 - 3.5 g/dL    A-G Ratio 1.4 g/dL   ESTIMATED GFR   Result Value Ref Range    GFR (CKD-EPI) 99 >60 mL/min/1.73 m 2   DIFFERENTIAL MANUAL   Result Value Ref Range    Bands-Stabs 0.90 0.00 - 10.00 %    Metamyelocytes 0.90 %    Manual Diff Status PERFORMED    PERIPHERAL SMEAR REVIEW   Result Value Ref Range    Peripheral Smear Review see below    PLATELET ESTIMATE   Result Value Ref Range    Plt Estimation Increased    MORPHOLOGY   Result Value Ref  Range    RBC Morphology Present     Poikilocytosis 1+     Schistocytes 1+    BLOOD CULTURE x2    Specimen: Peripheral; Blood   Result Value Ref Range    Significant Indicator NEG     Source BLD     Site PERIPHERAL     Culture Result No growth after 5 days of incubation.    BLOOD CULTURE x2    Specimen: Peripheral; Blood   Result Value Ref Range    Significant Indicator NEG     Source BLD     Site PERIPHERAL     Culture Result No growth after 5 days of incubation.    LACTIC ACID   Result Value Ref Range    Lactic Acid 1.1 0.5 - 2.0 mmol/L   proBrain Natriuretic Peptide, NT   Result Value Ref Range    NT-proBNP 513 (H) 0 - 125 pg/mL   TROPONIN   Result Value Ref Range    Troponin T 25 (H) 6 - 19 ng/L   TROPONIN   Result Value Ref Range    Troponin T 21 (H) 6 - 19 ng/L   MAGNESIUM   Result Value Ref Range    Magnesium 1.9 1.5 - 2.5 mg/dL   PROCALCITONIN   Result Value Ref Range    Procalcitonin 0.08 <0.25 ng/mL   TROPONIN   Result Value Ref Range    Troponin T 24 (H) 6 - 19 ng/L   CBC with Differential   Result Value Ref Range    WBC 16.2 (H) 4.8 - 10.8 K/uL    RBC 6.04 4.70 - 6.10 M/uL    Hemoglobin 12.2 (L) 14.0 - 18.0 g/dL    Hematocrit 41.3 (L) 42.0 - 52.0 %    MCV 68.4 (L) 81.4 - 97.8 fL    MCH 20.2 (L) 27.0 - 33.0 pg    MCHC 29.5 (L) 32.3 - 36.5 g/dL    RDW 58.1 (H) 35.9 - 50.0 fL    Platelet Count 796 (H) 164 - 446 K/uL    MPV 9.4 9.0 - 12.9 fL    Neutrophils-Polys 90.10 (H) 44.00 - 72.00 %    Lymphocytes 2.70 (L) 22.00 - 41.00 %    Monocytes 1.80 0.00 - 13.40 %    Eosinophils 0.60 0.00 - 6.90 %    Basophils 0.90 0.00 - 1.80 %    Immature Granulocytes 3.90 (H) 0.00 - 0.90 %    Nucleated RBC 0.50 (H) 0.00 - 0.20 /100 WBC    Neutrophils (Absolute) 14.61 (H) 1.82 - 7.42 K/uL    Lymphs (Absolute) 0.44 (L) 1.00 - 4.80 K/uL    Monos (Absolute) 0.29 0.00 - 0.85 K/uL    Eos (Absolute) 0.10 0.00 - 0.51 K/uL    Baso (Absolute) 0.14 (H) 0.00 - 0.12 K/uL    Immature Granulocytes (abs) 0.64 (H) 0.00 - 0.11 K/uL    NRBC  (Absolute) 0.08 K/uL   Comp Metabolic Panel (CMP)   Result Value Ref Range    Sodium 134 (L) 135 - 145 mmol/L    Potassium 5.2 3.6 - 5.5 mmol/L    Chloride 99 96 - 112 mmol/L    Co2 24 20 - 33 mmol/L    Anion Gap 11.0 7.0 - 16.0    Glucose 132 (H) 65 - 99 mg/dL    Bun 19 8 - 22 mg/dL    Creatinine 1.02 0.50 - 1.40 mg/dL    Calcium 8.6 8.5 - 10.5 mg/dL    Correct Calcium 8.8 8.5 - 10.5 mg/dL    AST(SGOT) 33 12 - 45 U/L    ALT(SGPT) 41 2 - 50 U/L    Alkaline Phosphatase 33 30 - 99 U/L    Total Bilirubin 0.7 0.1 - 1.5 mg/dL    Albumin 3.8 3.2 - 4.9 g/dL    Total Protein 6.3 6.0 - 8.2 g/dL    Globulin 2.5 1.9 - 3.5 g/dL    A-G Ratio 1.5 g/dL   ESTIMATED GFR   Result Value Ref Range    GFR (CKD-EPI) 82 >60 mL/min/1.73 m 2   CoV-2, Flu A/B, And RSV by PCR ([facility])   Result Value Ref Range    Influenza virus A RNA Negative Negative    Influenza virus B, PCR Negative Negative    RSV, PCR Negative Negative    SARS-CoV-2 by PCR NotDetected     SARS-CoV-2 Source NP Swab    CBC WITH DIFFERENTIAL   Result Value Ref Range    WBC 15.2 (H) 4.8 - 10.8 K/uL    RBC 5.47 4.70 - 6.10 M/uL    Hemoglobin 11.1 (L) 14.0 - 18.0 g/dL    Hematocrit 37.6 (L) 42.0 - 52.0 %    MCV 68.7 (L) 81.4 - 97.8 fL    MCH 20.3 (L) 27.0 - 33.0 pg    MCHC 29.5 (L) 32.3 - 36.5 g/dL    RDW 57.4 (H) 35.9 - 50.0 fL    Platelet Count 812 (H) 164 - 446 K/uL    MPV 9.5 9.0 - 12.9 fL    Neutrophils-Polys 93.80 (H) 44.00 - 72.00 %    Lymphocytes 2.70 (L) 22.00 - 41.00 %    Monocytes 2.40 0.00 - 13.40 %    Eosinophils 0.00 0.00 - 6.90 %    Basophils 0.10 0.00 - 1.80 %    Immature Granulocytes 1.00 (H) 0.00 - 0.90 %    Nucleated RBC 0.20 0.00 - 0.20 /100 WBC    Neutrophils (Absolute) 14.29 (H) 1.82 - 7.42 K/uL    Lymphs (Absolute) 0.41 (L) 1.00 - 4.80 K/uL    Monos (Absolute) 0.36 0.00 - 0.85 K/uL    Eos (Absolute) 0.00 0.00 - 0.51 K/uL    Baso (Absolute) 0.01 0.00 - 0.12 K/uL    Immature Granulocytes (abs) 0.15 (H) 0.00 - 0.11 K/uL    NRBC (Absolute) 0.03 K/uL   Comp  Metabolic Panel   Result Value Ref Range    Sodium 130 (L) 135 - 145 mmol/L    Potassium 5.9 (H) 3.6 - 5.5 mmol/L    Chloride 95 (L) 96 - 112 mmol/L    Co2 25 20 - 33 mmol/L    Anion Gap 10.0 7.0 - 16.0    Glucose 119 (H) 65 - 99 mg/dL    Bun 30 (H) 8 - 22 mg/dL    Creatinine 1.18 0.50 - 1.40 mg/dL    Calcium 9.2 8.5 - 10.5 mg/dL    Correct Calcium 9.1 8.5 - 10.5 mg/dL    AST(SGOT) 62 (H) 12 - 45 U/L    ALT(SGPT) 66 (H) 2 - 50 U/L    Alkaline Phosphatase 34 30 - 99 U/L    Total Bilirubin 0.8 0.1 - 1.5 mg/dL    Albumin 4.1 3.2 - 4.9 g/dL    Total Protein 6.8 6.0 - 8.2 g/dL    Globulin 2.7 1.9 - 3.5 g/dL    A-G Ratio 1.5 g/dL   VENOUS BLOOD GAS   Result Value Ref Range    Venous Bg Ph 7.40 7.31 - 7.45    Venous Bg Ph Temp Corrected 7.41 7.31 - 7.45    Venous Bg Pco2 43.6 41.0 - 51.0 mmHg    Venous Bg Pco2 Temp Corrected 43.0 41.0 - 51.0 mmHg    Venous Bg Po2 71.3 (H) 25.0 - 40.0 mmHg    Venous Bg Po2 Temp Corrected 69.9 (H) 25.0 - 40.0 mmHg    Venous Bg O2 Saturation 92.1 %    Venous Bg Hco3 27 24 - 28 mmol/L    Venous Bg Base Excess 2 mmol/L    Body Temp 36.7 Centigrade    O2 Therapy 1.5L    ESTIMATED GFR   Result Value Ref Range    GFR (CKD-EPI) 69 >60 mL/min/1.73 m 2   CoV-2, Flu A/B, And RSV by PCR ([facility])   Result Value Ref Range    Influenza virus A RNA Negative Negative    Influenza virus B, PCR Negative Negative    RSV, PCR Negative Negative    SARS-CoV-2 by PCR NotDetected     SARS-CoV-2 Source NP Swab    Basic Metabolic Panel   Result Value Ref Range    Sodium 131 (L) 135 - 145 mmol/L    Potassium 5.5 3.6 - 5.5 mmol/L    Chloride 96 96 - 112 mmol/L    Co2 24 20 - 33 mmol/L    Glucose 161 (H) 65 - 99 mg/dL    Bun 36 (H) 8 - 22 mg/dL    Creatinine 1.39 0.50 - 1.40 mg/dL    Calcium 9.0 8.5 - 10.5 mg/dL    Anion Gap 11.0 7.0 - 16.0   ESTIMATED GFR   Result Value Ref Range    GFR (CKD-EPI) 56 (A) >60 mL/min/1.73 m 2   Basic Metabolic Panel   Result Value Ref Range    Sodium 133 (L) 135 - 145 mmol/L    Potassium  5.5 3.6 - 5.5 mmol/L    Chloride 96 96 - 112 mmol/L    Co2 25 20 - 33 mmol/L    Glucose 144 (H) 65 - 99 mg/dL    Bun 32 (H) 8 - 22 mg/dL    Creatinine 1.18 0.50 - 1.40 mg/dL    Calcium 8.8 8.5 - 10.5 mg/dL    Anion Gap 12.0 7.0 - 16.0   CBC WITHOUT DIFFERENTIAL   Result Value Ref Range    WBC 17.4 (H) 4.8 - 10.8 K/uL    RBC 5.15 4.70 - 6.10 M/uL    Hemoglobin 10.6 (L) 14.0 - 18.0 g/dL    Hematocrit 35.8 (L) 42.0 - 52.0 %    MCV 69.5 (L) 81.4 - 97.8 fL    MCH 20.6 (L) 27.0 - 33.0 pg    MCHC 29.6 (L) 32.3 - 36.5 g/dL    RDW 58.3 (H) 35.9 - 50.0 fL    Platelet Count 768 (H) 164 - 446 K/uL    MPV 9.4 9.0 - 12.9 fL   ESTIMATED GFR   Result Value Ref Range    GFR (CKD-EPI) 69 >60 mL/min/1.73 m 2   MRSA By PCR ([facility])    Specimen: Nares; Respirate   Result Value Ref Range    MRSA by PCR Negative Negative   CBC WITHOUT DIFFERENTIAL   Result Value Ref Range    WBC 19.4 (H) 4.8 - 10.8 K/uL    RBC 5.58 4.70 - 6.10 M/uL    Hemoglobin 11.5 (L) 14.0 - 18.0 g/dL    Hematocrit 38.3 (L) 42.0 - 52.0 %    MCV 68.6 (L) 81.4 - 97.8 fL    MCH 20.6 (L) 27.0 - 33.0 pg    MCHC 30.0 (L) 32.3 - 36.5 g/dL    RDW 57.9 (H) 35.9 - 50.0 fL    Platelet Count 767 (H) 164 - 446 K/uL    MPV 9.9 9.0 - 12.9 fL   EKG   Result Value Ref Range    Report       [facility]    Test Date:  2024  Pt Name:    JUSTIN TOPETE               Department: ER  MRN:        [id number]                      Room:  Gender:     Male                         Technician: [id number]  :        1960                   Requested By:ER TRIAGE PROTOCOL  Order #:    [id number]                    Reading MD:    Measurements  Intervals                                Axis  Rate:       83                           P:          56  MS:         158                          QRS:        -11  QRSD:       76                           T:          23  QT:         349  QTc:        410    Interpretive Statements  Sinus rhythm  Compared to ECG 2024  "15:00:52  No significant changes          IMAGING:   DX-CHEST-PORTABLE (1 VIEW)   Final Result      Unchanged elevation of the left hemidiaphragm with associated basilar atelectasis and/or consolidation. Underlying infection is possible.      US-EXTREMITY NON VASCULAR UNILATERAL RIGHT   Final Result      Redemonstrated subcutaneous soft tissue collection, decreased in size now measuring 4.6 x 3.7 x 0.7 cm most consistent with hematoma. Abscess is not excluded.      CT-FOOT WITH PLUS RECONS RIGHT   Final Result         1. Subcutaneous edema involving the dorsal aspect of the foot.   2. Scattered degenerative changes.   3. No definite bone destruction or soft tissue gas.      CT-CTA CHEST PULMONARY ARTERY W/ RECONS   Final Result      1.  No large central or segmental pulmonary embolus   2.  Cardiomegaly with enlarged pulmonary arteries which could indicate pulmonary arterial hypertension   3.  Persistently elevated LEFT diaphragm with overlying atelectasis            DX-CHEST-PORTABLE (1 VIEW)   Final Result      Unchanged elevation of left hemidiaphragm with associated atelectasis and/or consolidation. Underlying infection is possible.            DISCHARGE SUMMARY:   Brief HPI:  \"Noe Hickey is a 63 y.o. male who presented with dyspnea on exertion.  Patient states this is worsened over the last 3 days.  His dyspnea is worse with speaking and exerting himself.  And is better at rest.  Denies any fevers, chills, cough, or chest pain associated.  He does have a 50+ year ppd, he currently smokes about 8 cigarettes a day.  Denies history of COPD.     Does have significant pain in his right foot where incision site is.  He still has sutures in place, and is unsure when he is post to get those removed.  His pain is much worse when touched and when he ambulates.  No numbness or tingling associated.  Does note swelling to bilateral feet but not 1 side more than the other. Has been ambulating on foot \" per Dr. Diaz " HPI    Diagnosis:  Patient Active Problem List   Diagnosis    Ascending aortic aneurysm repair    GERD (gastroesophageal reflux disease)    Hyperkalemia    Acute on chronic systolic heart failure (HCC)    Paroxysmal atrial fibrillation ([facility])    COPD (chronic obstructive pulmonary disease) ([facility])    Hemidiaphragm paralysis    Paroxysmal atrial flutter ([facility])    Tobacco abuse    Secondary hypercoagulable state ([facility])    Nonobstructive atherosclerosis of coronary artery    Chest pain    Opioid use, unspecified, in remission    Goals of care, counseling/discussion    Heart failure with improved ejection fraction (HFimpEF) ([facility])    Atrial fibrillation ([facility])    H/O cardiac radiofrequency ablation    Right inguinal pain    Lobar pneumonia ([facility])    Tobacco dependence    Left facial numbness and left blurry vision    Headache    Acute hypoxic respiratory failure    Olecranon bursitis of right elbow    Shortness of breath    Community acquired pneumonia of left lower lobe of lung    Microcytic anemia    Hoarseness of voice    Diffuse pain in right lower extremity    Right foot infection    Acute hypoxemic respiratory failure ([facility])    Hypoxia    Hyperlipidemia    Hypertension    Bacteremia    Dyspnea on exertion    Thrombophilia ([facility])        Hospital Course: Patient was initially admitted for dyspnea on exertion secondary to acute exacerbation of COPD.  In regards to his dyspnea on exertion, there was a concern for concurrent COPD exacerbation and community acquired pneumonia.  As such, the patient was started on a steroid burst and antibiotics.  Additionally he was provided new inhalers prior to discharge.  The patient was found to have a persistent oxygen demand.  As such, the patient underwent a home oxygen eval and was provided home oxygen prior to discharge.    Additionally, he had a concern for pain in his right foot with sutures that have been in place since the procedure in April.  There appear to be concern for  persistent cellulitis in his right foot.  As such, the patient was placed on empiric antibiotics.  Additionally, the patient's sutures were removed.  His wound was closely monitored throughout the duration of his hospital stay..    The patient was provided an outpatient appointment provide physician, Dr. Katy Mustafa at [facility].  The patient was encouraged to start his new inhalers, finish oral prednisone, and finish antibiotic regimen.  Additionally, he was encouraged to continue to use his home oxygen continuously and undergo PFTs in the outpatient setting for formal diagnosis of COPD as it has been presumptive with a significant tobacco use disorder history.  Of note, he was provided tobacco cessation patches and gum.    He was also found to have a microcytic anemia for which he was provided a 30-day supply of iron supplementation.    Consultants:      Orthopedic surgery    Procedures:        None    Discharge Criteria:   The patient met the 2-midnight criteria for an inpatient stay at the time of discharge.     Disposition:   Discharge home with close outpatient follow-up    Diet:   Regular    Activity:   As tolerated    Discharge Medications:           Medication List        START taking these medications        Instructions   budesonide-formoterol 160-4.5 MCG/ACT Aero  Commonly known as: Symbicort  Replaces: budesonide-formoterol 80-4.5 MCG/ACT Aero   Inhale 2 Puffs 2 times a day for 30 days.  Dose: 2 Puff     ferrous sulfate 325 (65 Fe) MG EC tablet  Start taking on: May 22, 2024   Take 1 Tablet by mouth every day for 30 days.  Dose: 325 mg     nicotine 21 MG/24HR Pt24  Commonly known as: Nicoderm   Place 1 Patch on the skin every 24 hours for 30 days.  Dose: 1 Patch     nicotine polacrilex 2 MG Gum  Commonly known as: Nicorette   Take 1 Each by mouth every 1 hour as needed for Smoking Cessation (For nicotine urge) for up to 30 days.  Dose: 2 mg     predniSONE 20 MG Tabs  Start taking on: May  22, 2024  Commonly known as: Deltasone   Take 2 Tablets by mouth every day for 2 days.  Dose: 40 mg     Spiriva Respimat 2.5 MCG/ACT Aers  Generic drug: tiotropium   Inhale 2 puffs by mouth every day.  Dose: 2.5 mcg            CHANGE how you take these medications        Instructions   amoxicillin-clavulanate 875-125 MG Tabs  What changed: when to take this  Commonly known as: Augmentin   Take 1 Tablet by mouth every 12 hours for 4 days.  Dose: 1 Tablet     * ondansetron 4 MG Tbdp  What changed: You were already taking a medication with the same name, and this prescription was added. Make sure you understand how and when to take each.  Commonly known as: Zofran ODT   Take 1 Tablet by mouth every 6 hours as needed for Nausea/Vomiting.  Dose: 4 mg     * ondansetron 4 MG Tbdp  What changed: You were already taking a medication with the same name, and this prescription was added. Make sure you understand how and when to take each.  Commonly known as: Zofran ODT   Take 1 Tablet by mouth every 6 hours as needed for Nausea/Vomiting.  Dose: 4 mg     * ondansetron 4 MG Tbdp  What changed: Another medication with the same name was added. Make sure you understand how and when to take each.  Commonly known as: Zofran ODT   Take 1 Tablet by mouth every 6 hours as needed for Nausea/Vomiting for up to 10 days.  Dose: 4 mg           * This list has 3 medication(s) that are the same as other medications prescribed for you. Read the directions carefully, and ask your doctor or other care provider to review them with you.                CONTINUE taking these medications        Instructions   albuterol 108 (90 Base) MCG/ACT Aers inhalation aerosol   Inhale 2 Puffs every 6 hours as needed for Shortness of Breath.  Dose: 2 Puff     atorvastatin 40 MG Tabs  Commonly known as: Lipitor   Take 1 Tablet by mouth at bedtime.  Dose: 40 mg     losartan 25 MG Tabs  Commonly known as: Cozaar   Take 1 Tablet by mouth every day.  Dose: 25 mg    "  metoprolol SR 25 MG Tb24  Commonly known as: Toprol XL   Take 1 Tablet by mouth every day.  Dose: 25 mg     MILK THISTLE PO   Take 1 Capsule by mouth every day.  Dose: 1 Capsule     omeprazole 20 MG delayed-release capsule  Commonly known as: PriLOSEC   Take 20 mg by mouth 2 times a day.  Dose: 20 mg     ONE DAILY MENS PO   Take 1 Tablet by mouth every day.  Dose: 1 Tablet     sucralfate 1 GM Tabs  Commonly known as: Carafate   Take 1 g by mouth 2 times a day.  Dose: 1 g     Xarelto 20 MG Tabs tablet  Generic drug: rivaroxaban   Take 20 mg by mouth every day. \"Takes in am\"  Dose: 20 mg            STOP taking these medications      budesonide-formoterol 80-4.5 MCG/ACT Aero  Commonly known as: Symbicort  Replaced by: budesonide-formoterol 160-4.5 MCG/ACT Aero     doxycycline 100 MG capsule  Commonly known as: Monodox     methylPREDNISolone 4 MG Tbpk  Commonly known as: Medrol Dosepak              Instructions:       The patient was instructed to return to the ER in the event of worsening symptoms. I have counseled the patient on the importance of compliance and the patient has agreed to proceed with all medical recommendations and follow up plan indicated above.   The patient understands that all medications come with benefits and risks. Risks may include permanent injury or death and these risks can be minimized with close reassessment and monitoring.          Please CC: Farheen Wright M.D.   "

## 2024-05-23 NOTE — PROGRESS NOTES
[facility]     PATIENT ID:  NAME:  Noe Hickey  MRN:               [id number]  YOB: 1960    Date: 9:02 PM      Resident: Aniket Linder M.D.    CC:  Bilateral foot pain      HPI: Noe Hickey is a 63 y.o. male who presented after being discharged from the hospital earlier today. He notes a recent history of right foot I and D related to fluid collection related to a hamstring tear which was performed by orthopedics. The  patient states that he has a history of chronic pain in his chest but states his recent hospitalization was related to right foot pain. He states that he was treated with IV opiates while inpatient to control his pain and at that time of discharge his pain was improved but since he has been walking on his feet he has had continued pain. He denies any new trauma, injuries or falls.      No problems updated.    REVIEW OF SYSTEMS:   Ten systems reviewed and were negative except as noted in the HPI.                PROBLEM LIST  Patient Active Problem List   Diagnosis    Ascending aortic aneurysm repair    GERD (gastroesophageal reflux disease)    Hyperkalemia    Acute on chronic systolic heart failure (HCC)    Paroxysmal atrial fibrillation (HCC)    COPD (chronic obstructive pulmonary disease) (HCC)    Hemidiaphragm paralysis    Paroxysmal atrial flutter (HCC)    Tobacco abuse    Secondary hypercoagulable state (HCC)    Nonobstructive atherosclerosis of coronary artery    Chest pain    Opioid use, unspecified, in remission    Goals of care, counseling/discussion    Heart failure with improved ejection fraction (HFimpEF) (HCC)    Atrial fibrillation (HCC)    H/O cardiac radiofrequency ablation    Right inguinal pain    Lobar pneumonia (HCC)    Tobacco dependence    Left facial numbness and left blurry vision    Headache    Acute hypoxic respiratory failure    Olecranon bursitis of right elbow    Shortness of breath    Community acquired pneumonia of left lower lobe of  lung    Microcytic anemia    Hoarseness of voice    Diffuse pain in right lower extremity    Right foot infection    Acute hypoxemic respiratory failure (HCC)    Hypoxia    Hyperlipidemia    Hypertension    Bacteremia    Dyspnea on exertion    Thrombophilia (HCC)        PAST SURGICAL HISTORY:  Past Surgical History:   Procedure Laterality Date    INCISION AND DRAINAGE GENERAL Left 4/19/2024    Procedure: INCISION AND DRAINAGE, LEG;  Surgeon: Mitch Bowie M.D.;  Location: SURGERY [facility];  Service: Orthopedics    AORTIC VALVE REPLACEMENT  1/5/2023    Procedure: AORTIC VALVE REPLACEMENT, ASCENDING AORTIC ANEURYSM REPAIR AND TRANSESOPHAGEAL ECHOCARDIOGRAM.;  Surgeon: Serena Goyal M.D.;  Location: SURGERY [facility];  Service: Cardiac    AORTIC ASCENDING DISSECTION  1/5/2023    Procedure: REPAIR, ANEURYSM OR DISSECTION, AORTA, ASCENDING;  Surgeon: Serena Goyal M.D.;  Location: SURGERY [facility];  Service: Cardiac    ECHOCARDIOGRAM, TRANSESOPHAGEAL, INTRAOPERATIVE  1/5/2023    Procedure: ECHOCARDIOGRAM, TRANSESOPHAGEAL, INTRAOPERATIVE.;  Surgeon: Serena Goyal M.D.;  Location: SURGERY [facility];  Service: Cardiac    IRRIGATION & DEBRIDEMENT ORTHO Right 09/19/2017    Procedure: IRRIGATION & DEBRIDEMENT ORTHO-THIGH;  Surgeon: Corbin Rivera M.D.;  Location: [facility];  Service: Orthopedics    SHOULDER HEMICAP RESURFACING Right 10/12/2015    Procedure: RIGHT SHOULDER RESURFACING;  Surgeon: Javon Doll M.D.;  Location: [facility];  Service:     SHOULDER ARTHROSCOPY      x3    SHOULDER SURGERY      replacement right       FAMILY HISTORY:  No family history on file.    SOCIAL HISTORY:   Social History     Tobacco Use    Smoking status: Every Day     Current packs/day: 0.50     Average packs/day: 0.5 packs/day for 40.0 years (20.0 ttl pk-yrs)     Types: Cigarettes    Smokeless tobacco: Never    Tobacco comments:     2-3 a day   Substance Use Topics    Alcohol use: No        ALLERGIES:  Allergies   Allergen Reactions    Morphine Shortness of Breath and Rash             OUTPATIENT MEDICATIONS:    Current Outpatient Medications:     ferrous sulfate 325 (65 Fe) MG EC tablet, Take 1 Tablet by mouth every day for 30 days., Disp: 30 Tablet, Rfl: 0    nicotine (NICODERM) 21 MG/24HR PATCH 24 HR, Place 1 Patch on the skin every 24 hours for 30 days., Disp: 28 Patch, Rfl: 0    nicotine polacrilex (NICORETTE) 2 MG Gum, Take 1 Each by mouth every 1 hour as needed for Smoking Cessation (For nicotine urge) for up to 30 days., Disp: 220 Each, Rfl: 0    predniSONE (DELTASONE) 20 MG Tab, Take 2 Tablets by mouth every day for 2 days., Disp: 4 Tablet, Rfl: 0    amoxicillin-clavulanate (AUGMENTIN) 875-125 MG Tab, Take 1 Tablet by mouth every 12 hours for 4 days., Disp: 8 Tablet, Rfl: 0    budesonide-formoterol (SYMBICORT) 160-4.5 MCG/ACT Aerosol, Inhale 2 Puffs 2 times a day for 30 days., Disp: 10.2 g, Rfl: 0    ondansetron (ZOFRAN ODT) 4 MG TABLET DISPERSIBLE, Take 1 Tablet by mouth every 6 hours as needed for Nausea/Vomiting for up to 10 days., Disp: 10 Tablet, Rfl: 0    tiotropium (SPIRIVA RESPIMAT) 2.5 mcg/Act Aero Soln, Inhale 2 puffs by mouth every day., Disp: 4 g, Rfl: 3    doxycycline (VIBRAMYCIN) 100 MG Tab, Take 100 mg by mouth 2 times a day., Disp: , Rfl:     loratadine (CLARITIN) 10 MG Tab, Take 10 mg by mouth every day., Disp: , Rfl:     oxyCODONE immediate-release (ROXICODONE) 5 MG Tab, Take 1 Tablet by mouth every 8 hours as needed for Severe Pain for up to 4 days., Disp: 4 Tablet, Rfl: 0    Naloxone (NARCAN) 4 MG/0.1ML Liquid, One spray in one nostril for overdose and call 911., Disp: 1 Each, Rfl: 3    albuterol 108 (90 Base) MCG/ACT Aero Soln inhalation aerosol, Inhale 2 Puffs every 6 hours as needed for Shortness of Breath., Disp: 8.5 g, Rfl: 0    atorvastatin (LIPITOR) 40 MG Tab, Take 1 Tablet by mouth at bedtime., Disp: 30 Tablet, Rfl: 0    rivaroxaban (XARELTO) 20 MG Tab tablet,  "Take 20 mg by mouth every day. \"Takes in am\", Disp: , Rfl:     metoprolol SR (TOPROL XL) 25 MG TABLET SR 24 HR, Take 1 Tablet by mouth every day., Disp: 90 Tablet, Rfl: 3    losartan (COZAAR) 25 MG Tab, Take 1 Tablet by mouth every day., Disp: 90 Tablet, Rfl: 3    ondansetron (ZOFRAN ODT) 4 MG TABLET DISPERSIBLE, Take 1 Tablet by mouth every 6 hours as needed for Nausea/Vomiting., Disp: 10 Tablet, Rfl: 0    ondansetron (ZOFRAN ODT) 4 MG TABLET DISPERSIBLE, Take 1 Tablet by mouth every 6 hours as needed for Nausea/Vomiting., Disp: 10 Tablet, Rfl: 0    sucralfate (CARAFATE) 1 GM Tab, Take 1 g by mouth 2 times a day., Disp: , Rfl:     Multiple Vitamins-Minerals (ONE DAILY MENS PO), Take 1 Tablet by mouth every day., Disp: , Rfl:     MILK THISTLE PO, Take 1 Capsule by mouth every day., Disp: , Rfl:     omeprazole (PRILOSEC) 20 MG delayed-release capsule, Take 20 mg by mouth 2 times a day., Disp: , Rfl:     PHYSICAL EXAM:  Vitals:    05/21/24 1401   BP: (!) 140/86   BP Location: Left arm   Patient Position: Sitting   BP Cuff Size: Adult   Pulse: 79   Temp: 37.1 °C (98.7 °F)   TempSrc: Temporal   SpO2: 96%   Weight: 91.9 kg (202 lb 8 oz)   Height: 1.753 m (5' 9\")       General: Pt resting in NAD, pleasant and cooperative   Skin:  Pink, warm and dry.  HEENT: NC/AT. EOMI.  Lungs:  Symmetrical.  CTAB, good air movement, no adventitious sounds    Cardiovascular:  S1/S2 RRR, no murmurs rubs or gallops   Abdomen:  Abdomen is soft, nontender  Extremities:  Full range of motion. There is trace right lower extremity edema, tenderness to palpation of the dorsal aspect of the right foot, no erythema, no drainage, well healing surgical wound, no fluctuance.   CNS:  Muscle tone is normal. No gross focal neurologic deficits      ASSESSMENT/PLAN:   63 y.o. male who presents to clinic after recent hospitalization. Patient states he is very frustrated as he has had continued pain after discharge from the hospital. Counseled pain on " ibuprofen and acetaminophen therapy for anti-inflammatory effect. Patient states this has not been helpful and he has tried gabapentin in the past without any significant improvement. Patient notes improvement in his pain over the past few days but significant exacerbation when ambulating. Will provide the patient with 4 tabs oxycodone at this time for breakthrough pain until he is able to establish with physiatry for multimodal pain treatment. Given patient's history, Counseled patient on benefits of psychotherapy for chronic pain, patient states he is interested and would like a referral at this time. Patient states he is established with another physician at the clinic, Dr Wright but has been following with Dr. Langley recently and will follow up with one of them for continued management of chronic conditions. Advised patient if pain did not continue to improve or any concern for infection or worsening clinic course he should return to clinic for reevaluation.     Problem List Items Addressed This Visit    None  Visit Diagnoses       Other chronic pain        Relevant Medications    oxyCODONE immediate-release (ROXICODONE) 5 MG Tab    Other Relevant Orders    Referral to Pain Clinic    Referral to Behavioral Health    Right foot pain        Relevant Medications    oxyCODONE immediate-release (ROXICODONE) 5 MG Tab    Naloxone (NARCAN) 4 MG/0.1ML Liquid    Other Relevant Orders    Referral to Physical Therapy    Episode of recurrent major depressive disorder, unspecified depression episode severity (HCC)        Relevant Orders    Referral to Behavioral Health            Aniket Linder M.D.  PGY-3  [facility]

## 2024-05-24 ENCOUNTER — HOSPITAL ENCOUNTER (OUTPATIENT)
Dept: RADIOLOGY | Facility: MEDICAL CENTER | Age: 64
End: 2024-05-24
Attending: NURSE PRACTITIONER
Payer: MEDICAID

## 2024-05-24 DIAGNOSIS — R06.02 SOB (SHORTNESS OF BREATH): ICD-10-CM

## 2024-05-24 RX ORDER — OXYCODONE HYDROCHLORIDE 5 MG/1
5 TABLET ORAL EVERY 8 HOURS PRN
Qty: 4 TABLET | Refills: 0 | OUTPATIENT
Start: 2024-05-24 | End: 2024-05-28

## 2024-05-24 RX ADMIN — GADOBENATE DIMEGLUMINE 20 ML: 529 INJECTION, SOLUTION INTRAVENOUS at 14:30

## 2024-05-31 ENCOUNTER — APPOINTMENT (OUTPATIENT)
Dept: MEDICAL GROUP | Facility: CLINIC | Age: 64
End: 2024-05-31
Payer: MEDICAID

## 2024-06-04 PROBLEM — L02.611 ABSCESS OF RIGHT FOOT: Status: ACTIVE | Noted: 2024-06-04

## 2024-06-05 ENCOUNTER — APPOINTMENT (OUTPATIENT)
Dept: ADMISSIONS | Facility: MEDICAL CENTER | Age: 64
End: 2024-06-05
Attending: ORTHOPAEDIC SURGERY
Payer: MEDICAID

## 2024-06-06 ENCOUNTER — PRE-ADMISSION TESTING (OUTPATIENT)
Dept: ADMISSIONS | Facility: MEDICAL CENTER | Age: 64
End: 2024-06-06
Attending: ORTHOPAEDIC SURGERY
Payer: MEDICAID

## 2024-06-06 ENCOUNTER — ANESTHESIA EVENT (OUTPATIENT)
Dept: SURGERY | Facility: MEDICAL CENTER | Age: 64
End: 2024-06-06
Payer: MEDICAID

## 2024-06-07 ENCOUNTER — ANESTHESIA (OUTPATIENT)
Dept: SURGERY | Facility: MEDICAL CENTER | Age: 64
End: 2024-06-07
Payer: MEDICAID

## 2024-06-07 ENCOUNTER — HOSPITAL ENCOUNTER (OUTPATIENT)
Facility: MEDICAL CENTER | Age: 64
End: 2024-06-07
Attending: ORTHOPAEDIC SURGERY | Admitting: ORTHOPAEDIC SURGERY
Payer: MEDICAID

## 2024-06-07 VITALS
BODY MASS INDEX: 28.87 KG/M2 | OXYGEN SATURATION: 92 % | WEIGHT: 194.89 LBS | HEART RATE: 82 BPM | SYSTOLIC BLOOD PRESSURE: 133 MMHG | TEMPERATURE: 96.1 F | HEIGHT: 69 IN | RESPIRATION RATE: 18 BRPM | DIASTOLIC BLOOD PRESSURE: 86 MMHG

## 2024-06-07 DIAGNOSIS — L02.611 ABSCESS OF RIGHT FOOT: ICD-10-CM

## 2024-06-07 LAB
ANION GAP SERPL CALC-SCNC: 11 MMOL/L (ref 7–16)
BUN SERPL-MCNC: 20 MG/DL (ref 8–22)
CALCIUM SERPL-MCNC: 8.9 MG/DL (ref 8.5–10.5)
CHLORIDE SERPL-SCNC: 102 MMOL/L (ref 96–112)
CO2 SERPL-SCNC: 22 MMOL/L (ref 20–33)
CREAT SERPL-MCNC: 0.87 MG/DL (ref 0.5–1.4)
GFR SERPLBLD CREATININE-BSD FMLA CKD-EPI: 96 ML/MIN/1.73 M 2
GLUCOSE SERPL-MCNC: 86 MG/DL (ref 65–99)
GRAM STN SPEC: NORMAL
GRAM STN SPEC: NORMAL
POTASSIUM SERPL-SCNC: 5.4 MMOL/L (ref 3.6–5.5)
SIGNIFICANT IND 70042: NORMAL
SIGNIFICANT IND 70042: NORMAL
SITE SITE: NORMAL
SITE SITE: NORMAL
SODIUM SERPL-SCNC: 135 MMOL/L (ref 135–145)
SOURCE SOURCE: NORMAL
SOURCE SOURCE: NORMAL

## 2024-06-07 PROCEDURE — 700101 HCHG RX REV CODE 250: Mod: UD | Performed by: STUDENT IN AN ORGANIZED HEALTH CARE EDUCATION/TRAINING PROGRAM

## 2024-06-07 PROCEDURE — 87102 FUNGUS ISOLATION CULTURE: CPT

## 2024-06-07 PROCEDURE — 160046 HCHG PACU - 1ST 60 MINS PHASE II: Performed by: ORTHOPAEDIC SURGERY

## 2024-06-07 PROCEDURE — A9270 NON-COVERED ITEM OR SERVICE: HCPCS | Mod: UD | Performed by: STUDENT IN AN ORGANIZED HEALTH CARE EDUCATION/TRAINING PROGRAM

## 2024-06-07 PROCEDURE — 87075 CULTR BACTERIA EXCEPT BLOOD: CPT | Mod: 91

## 2024-06-07 PROCEDURE — 8968 PR NO CHARGE - PROCEDURE: Mod: 80ROC,RT | Performed by: STUDENT IN AN ORGANIZED HEALTH CARE EDUCATION/TRAINING PROGRAM

## 2024-06-07 PROCEDURE — 87070 CULTURE OTHR SPECIMN AEROBIC: CPT | Mod: 91

## 2024-06-07 PROCEDURE — 160025 RECOVERY II MINUTES (STATS): Performed by: ORTHOPAEDIC SURGERY

## 2024-06-07 PROCEDURE — 87015 SPECIMEN INFECT AGNT CONCNTJ: CPT

## 2024-06-07 PROCEDURE — 160009 HCHG ANES TIME/MIN: Performed by: ORTHOPAEDIC SURGERY

## 2024-06-07 PROCEDURE — 700111 HCHG RX REV CODE 636 W/ 250 OVERRIDE (IP): Mod: JZ,UD | Performed by: STUDENT IN AN ORGANIZED HEALTH CARE EDUCATION/TRAINING PROGRAM

## 2024-06-07 PROCEDURE — 80048 BASIC METABOLIC PNL TOTAL CA: CPT

## 2024-06-07 PROCEDURE — 160048 HCHG OR STATISTICAL LEVEL 1-5: Performed by: ORTHOPAEDIC SURGERY

## 2024-06-07 PROCEDURE — 700105 HCHG RX REV CODE 258: Mod: UD | Performed by: ORTHOPAEDIC SURGERY

## 2024-06-07 PROCEDURE — 700111 HCHG RX REV CODE 636 W/ 250 OVERRIDE (IP): Mod: UD | Performed by: STUDENT IN AN ORGANIZED HEALTH CARE EDUCATION/TRAINING PROGRAM

## 2024-06-07 PROCEDURE — 87205 SMEAR GRAM STAIN: CPT

## 2024-06-07 PROCEDURE — 700102 HCHG RX REV CODE 250 W/ 637 OVERRIDE(OP): Mod: UD | Performed by: STUDENT IN AN ORGANIZED HEALTH CARE EDUCATION/TRAINING PROGRAM

## 2024-06-07 PROCEDURE — 160002 HCHG RECOVERY MINUTES (STAT): Performed by: ORTHOPAEDIC SURGERY

## 2024-06-07 PROCEDURE — 160035 HCHG PACU - 1ST 60 MINS PHASE I: Performed by: ORTHOPAEDIC SURGERY

## 2024-06-07 PROCEDURE — 700101 HCHG RX REV CODE 250: Mod: UD

## 2024-06-07 PROCEDURE — 160027 HCHG SURGERY MINUTES - 1ST 30 MINS LEVEL 2: Performed by: ORTHOPAEDIC SURGERY

## 2024-06-07 PROCEDURE — 28003 TREATMENT OF FOOT INFECTION: CPT | Mod: RT | Performed by: ORTHOPAEDIC SURGERY

## 2024-06-07 PROCEDURE — 160036 HCHG PACU - EA ADDL 30 MINS PHASE I: Performed by: ORTHOPAEDIC SURGERY

## 2024-06-07 RX ORDER — MIDAZOLAM HYDROCHLORIDE 1 MG/ML
INJECTION INTRAMUSCULAR; INTRAVENOUS PRN
Status: DISCONTINUED | OUTPATIENT
Start: 2024-06-07 | End: 2024-06-07 | Stop reason: SURG

## 2024-06-07 RX ORDER — LIDOCAINE HYDROCHLORIDE 20 MG/ML
INJECTION, SOLUTION EPIDURAL; INFILTRATION; INTRACAUDAL; PERINEURAL PRN
Status: DISCONTINUED | OUTPATIENT
Start: 2024-06-07 | End: 2024-06-07 | Stop reason: SURG

## 2024-06-07 RX ORDER — HYDROMORPHONE HYDROCHLORIDE 1 MG/ML
0.4 INJECTION, SOLUTION INTRAMUSCULAR; INTRAVENOUS; SUBCUTANEOUS
Status: DISCONTINUED | OUTPATIENT
Start: 2024-06-07 | End: 2024-06-07 | Stop reason: HOSPADM

## 2024-06-07 RX ORDER — PHENYLEPHRINE HCL IN 0.9% NACL 1 MG/10 ML
SYRINGE (ML) INTRAVENOUS PRN
Status: DISCONTINUED | OUTPATIENT
Start: 2024-06-07 | End: 2024-06-07 | Stop reason: SURG

## 2024-06-07 RX ORDER — ONDANSETRON 2 MG/ML
INJECTION INTRAMUSCULAR; INTRAVENOUS PRN
Status: DISCONTINUED | OUTPATIENT
Start: 2024-06-07 | End: 2024-06-07 | Stop reason: SURG

## 2024-06-07 RX ORDER — CEFAZOLIN SODIUM 1 G/3ML
INJECTION, POWDER, FOR SOLUTION INTRAMUSCULAR; INTRAVENOUS PRN
Status: DISCONTINUED | OUTPATIENT
Start: 2024-06-07 | End: 2024-06-07 | Stop reason: SURG

## 2024-06-07 RX ORDER — IPRATROPIUM BROMIDE AND ALBUTEROL SULFATE 2.5; .5 MG/3ML; MG/3ML
SOLUTION RESPIRATORY (INHALATION)
Status: COMPLETED
Start: 2024-06-07 | End: 2024-06-07

## 2024-06-07 RX ORDER — ONDANSETRON 2 MG/ML
4 INJECTION INTRAMUSCULAR; INTRAVENOUS
Status: COMPLETED | OUTPATIENT
Start: 2024-06-07 | End: 2024-06-07

## 2024-06-07 RX ORDER — CEFAZOLIN SODIUM 1 G/3ML
2 INJECTION, POWDER, FOR SOLUTION INTRAMUSCULAR; INTRAVENOUS ONCE
Status: DISCONTINUED | OUTPATIENT
Start: 2024-06-07 | End: 2024-06-07 | Stop reason: HOSPADM

## 2024-06-07 RX ORDER — DEXAMETHASONE SODIUM PHOSPHATE 4 MG/ML
INJECTION, SOLUTION INTRA-ARTICULAR; INTRALESIONAL; INTRAMUSCULAR; INTRAVENOUS; SOFT TISSUE PRN
Status: DISCONTINUED | OUTPATIENT
Start: 2024-06-07 | End: 2024-06-07 | Stop reason: SURG

## 2024-06-07 RX ORDER — HYDROMORPHONE HYDROCHLORIDE 1 MG/ML
0.2 INJECTION, SOLUTION INTRAMUSCULAR; INTRAVENOUS; SUBCUTANEOUS
Status: DISCONTINUED | OUTPATIENT
Start: 2024-06-07 | End: 2024-06-07 | Stop reason: HOSPADM

## 2024-06-07 RX ORDER — HALOPERIDOL 5 MG/ML
1 INJECTION INTRAMUSCULAR
Status: DISCONTINUED | OUTPATIENT
Start: 2024-06-07 | End: 2024-06-07 | Stop reason: HOSPADM

## 2024-06-07 RX ORDER — IPRATROPIUM BROMIDE AND ALBUTEROL SULFATE 2.5; .5 MG/3ML; MG/3ML
3 SOLUTION RESPIRATORY (INHALATION)
Status: DISCONTINUED | OUTPATIENT
Start: 2024-06-07 | End: 2024-06-07 | Stop reason: HOSPADM

## 2024-06-07 RX ORDER — OXYCODONE HCL 5 MG/5 ML
5 SOLUTION, ORAL ORAL
Status: COMPLETED | OUTPATIENT
Start: 2024-06-07 | End: 2024-06-07

## 2024-06-07 RX ORDER — AMOXICILLIN AND CLAVULANATE POTASSIUM 875; 125 MG/1; MG/1
1 TABLET, FILM COATED ORAL 2 TIMES DAILY
COMMUNITY

## 2024-06-07 RX ORDER — PREDNISONE 20 MG/1
40 TABLET ORAL DAILY
COMMUNITY

## 2024-06-07 RX ORDER — DOXYCYCLINE HYCLATE 100 MG/1
100 CAPSULE ORAL 2 TIMES DAILY
COMMUNITY

## 2024-06-07 RX ORDER — OXYCODONE HYDROCHLORIDE 5 MG/1
5 TABLET ORAL
COMMUNITY

## 2024-06-07 RX ORDER — KETOROLAC TROMETHAMINE 15 MG/ML
INJECTION, SOLUTION INTRAMUSCULAR; INTRAVENOUS PRN
Status: DISCONTINUED | OUTPATIENT
Start: 2024-06-07 | End: 2024-06-07 | Stop reason: SURG

## 2024-06-07 RX ORDER — OXYCODONE HCL 5 MG/5 ML
10 SOLUTION, ORAL ORAL
Status: COMPLETED | OUTPATIENT
Start: 2024-06-07 | End: 2024-06-07

## 2024-06-07 RX ORDER — HYDROMORPHONE HYDROCHLORIDE 1 MG/ML
0.1 INJECTION, SOLUTION INTRAMUSCULAR; INTRAVENOUS; SUBCUTANEOUS
Status: DISCONTINUED | OUTPATIENT
Start: 2024-06-07 | End: 2024-06-07 | Stop reason: HOSPADM

## 2024-06-07 RX ORDER — SODIUM CHLORIDE, SODIUM LACTATE, POTASSIUM CHLORIDE, CALCIUM CHLORIDE 600; 310; 30; 20 MG/100ML; MG/100ML; MG/100ML; MG/100ML
INJECTION, SOLUTION INTRAVENOUS CONTINUOUS
Status: ACTIVE | OUTPATIENT
Start: 2024-06-07 | End: 2024-06-07

## 2024-06-07 RX ADMIN — FENTANYL CITRATE 100 MCG: 50 INJECTION, SOLUTION INTRAMUSCULAR; INTRAVENOUS at 14:11

## 2024-06-07 RX ADMIN — DEXAMETHASONE SODIUM PHOSPHATE 4 MG: 4 INJECTION INTRA-ARTICULAR; INTRALESIONAL; INTRAMUSCULAR; INTRAVENOUS; SOFT TISSUE at 14:16

## 2024-06-07 RX ADMIN — IPRATROPIUM BROMIDE AND ALBUTEROL SULFATE 3 ML: 2.5; .5 SOLUTION RESPIRATORY (INHALATION) at 16:45

## 2024-06-07 RX ADMIN — KETOROLAC TROMETHAMINE 15 MG: 15 INJECTION, SOLUTION INTRAMUSCULAR; INTRAVENOUS at 14:28

## 2024-06-07 RX ADMIN — HYDROMORPHONE HYDROCHLORIDE 0.2 MG: 1 INJECTION, SOLUTION INTRAMUSCULAR; INTRAVENOUS; SUBCUTANEOUS at 15:58

## 2024-06-07 RX ADMIN — Medication 200 MCG: at 14:32

## 2024-06-07 RX ADMIN — ONDANSETRON 4 MG: 2 INJECTION INTRAMUSCULAR; INTRAVENOUS at 14:16

## 2024-06-07 RX ADMIN — Medication 200 MCG: at 14:28

## 2024-06-07 RX ADMIN — FENTANYL CITRATE 50 MCG: 50 INJECTION, SOLUTION INTRAMUSCULAR; INTRAVENOUS at 15:10

## 2024-06-07 RX ADMIN — Medication 200 MCG: at 14:30

## 2024-06-07 RX ADMIN — Medication 200 MCG: at 14:17

## 2024-06-07 RX ADMIN — HYDROMORPHONE HYDROCHLORIDE 0.4 MG: 1 INJECTION, SOLUTION INTRAMUSCULAR; INTRAVENOUS; SUBCUTANEOUS at 16:48

## 2024-06-07 RX ADMIN — LIDOCAINE HYDROCHLORIDE 100 MG: 20 INJECTION, SOLUTION EPIDURAL; INFILTRATION; INTRACAUDAL at 14:11

## 2024-06-07 RX ADMIN — PROPOFOL 200 MG: 10 INJECTION, EMULSION INTRAVENOUS at 14:34

## 2024-06-07 RX ADMIN — OXYCODONE HYDROCHLORIDE 10 MG: 5 SOLUTION ORAL at 15:10

## 2024-06-07 RX ADMIN — HYDROMORPHONE HYDROCHLORIDE 0.4 MG: 1 INJECTION, SOLUTION INTRAMUSCULAR; INTRAVENOUS; SUBCUTANEOUS at 15:39

## 2024-06-07 RX ADMIN — HYDROMORPHONE HYDROCHLORIDE 0.2 MG: 1 INJECTION, SOLUTION INTRAMUSCULAR; INTRAVENOUS; SUBCUTANEOUS at 16:58

## 2024-06-07 RX ADMIN — CEFAZOLIN 2 G: 1 INJECTION, POWDER, FOR SOLUTION INTRAMUSCULAR; INTRAVENOUS at 14:12

## 2024-06-07 RX ADMIN — HYDROMORPHONE HYDROCHLORIDE 0.4 MG: 1 INJECTION, SOLUTION INTRAMUSCULAR; INTRAVENOUS; SUBCUTANEOUS at 15:29

## 2024-06-07 RX ADMIN — SODIUM CHLORIDE, POTASSIUM CHLORIDE, SODIUM LACTATE AND CALCIUM CHLORIDE: 600; 310; 30; 20 INJECTION, SOLUTION INTRAVENOUS at 14:09

## 2024-06-07 RX ADMIN — Medication 100 MCG: at 14:19

## 2024-06-07 RX ADMIN — FENTANYL CITRATE 50 MCG: 50 INJECTION, SOLUTION INTRAMUSCULAR; INTRAVENOUS at 15:15

## 2024-06-07 RX ADMIN — ONDANSETRON 4 MG: 2 INJECTION INTRAMUSCULAR; INTRAVENOUS at 15:10

## 2024-06-07 RX ADMIN — MIDAZOLAM HYDROCHLORIDE 2 MG: 2 INJECTION, SOLUTION INTRAMUSCULAR; INTRAVENOUS at 14:11

## 2024-06-07 RX ADMIN — HYDROMORPHONE HYDROCHLORIDE 0.4 MG: 1 INJECTION, SOLUTION INTRAMUSCULAR; INTRAVENOUS; SUBCUTANEOUS at 15:23

## 2024-06-07 RX ADMIN — PROPOFOL 180 MG: 10 INJECTION, EMULSION INTRAVENOUS at 14:12

## 2024-06-07 ASSESSMENT — PAIN DESCRIPTION - PAIN TYPE
TYPE: SURGICAL PAIN

## 2024-06-07 ASSESSMENT — FIBROSIS 4 INDEX: FIB4 SCORE: 0.64

## 2024-06-07 NOTE — PROGRESS NOTES
Medication history reviewed with PT at bedside    [facility] is complete per PT reporting    Allergies reviewed.     PT was prescribed Augmentin 875mg 14 day course starting 05/26/2024. PT can not confirm or deny if he has taken it or if he is taking it now.  PT was on Doxycycline 100mg 14 day course started 05/14/2024. Last dose was 05/28/2024.    Patient is taking Xarelto 20mg. Last dose was 06/05/2024.    Preferred pharmacy for this visit - [facility] in [facility] in [place] ([phone])

## 2024-06-07 NOTE — ANESTHESIA PREPROCEDURE EVALUATION
Case: [id number] Date/Time: 06/07/24 1345    Procedure: RIGHT FOOT IRRIGATION AND DEBRIDEMENT    Diagnosis: Abscess of right foot [L02.611]    Pre-op diagnosis: Abscess of right foot [L02.611]    Location: [facility] OR 07 / SURGERY [facility]    Surgeons: Oliver Arreguin M.D.          64yo M w/ uncontrolled COPD (w/ multiple recent hospital admissions), AFib s/p ablation, elevated L hemidiaphragm, GERD, HTN, s/p AV replacement and ascending aortic aneurysm repair on 1/2023. Pt appears anxious in preop.    Cardiac MR (5/24/24): no hypertrophic cardiomyopathy.  Relevant Problems   PULMONARY   (positive) COPD (chronic obstructive pulmonary disease) (HCC)   (positive) Community acquired pneumonia of left lower lobe of lung   (positive) Dyspnea on exertion   (positive) Lobar pneumonia (HCC)   (positive) Shortness of breath      NEURO   (positive) Headache      CARDIAC   (positive) Atrial fibrillation (HCC)   (positive) Dyspnea on exertion   (positive) Hypertension   (positive) Nonobstructive atherosclerosis of coronary artery   (positive) Paroxysmal atrial fibrillation (HCC)   (positive) Paroxysmal atrial flutter (HCC)      GI   (positive) GERD (gastroesophageal reflux disease)       Physical Exam    Airway   Mallampati: II  TM distance: >3 FB  Neck ROM: full       Cardiovascular - normal exam  Rhythm: regular  Rate: normal  (-) murmur     Dental - normal exam           Pulmonary - normal exam  Breath sounds clear to auscultation     Abdominal    Neurological - normal exam                   Anesthesia Plan    ASA 3   ASA physical status 3 criteria: COPD - poorly controlled    Plan - general       Airway plan will be LMA          Induction: intravenous    Postoperative Plan: Postoperative administration of opioids is intended.    Pertinent diagnostic labs and testing reviewed    Informed Consent:    Anesthetic plan and risks discussed with patient.    Use of blood products discussed with: patient whom consented to blood  products.

## 2024-06-07 NOTE — ANESTHESIA PROCEDURE NOTES
Airway    Date/Time: 6/7/2024 2:14 PM    Performed by: Mario Alberto Loaiza M.D.  Authorized by: Mario Alberto Loaiza M.D.    Location:  OR  Urgency:  Elective  Indications for Airway Management:  Anesthesia      Spontaneous Ventilation: absent    Sedation Level:  Deep  Preoxygenated: Yes    Mask Difficulty Assessment:  0 - not attempted  Final Airway Type:  Supraglottic airway  Final Supraglottic Airway:  Standard LMA    SGA Size:  4  Number of Attempts at Approach:  1

## 2024-06-07 NOTE — PROGRESS NOTES
Large post-op shoe delivered to pt bedside, RN notified.    Contact traction for any questions or concerns.

## 2024-06-07 NOTE — OP REPORT
Date of operation: 6/7/2024    Service: Orthopaedic Surgery    Surgeon: Oliver Arreguin MD    Assistant:   MD Radha Preston [facility]    Preoperative Diagnosis:   Right dorsal foot infection    Post operative Diagnosis: Same    Procedure Performed:   Right foot irrigation debridement, dorsal and interosseous compartments      Complications: None    Specimens: Right foot deep culture, swab. Right foot deep culture, tissue    Tourniquet Time: 6 minutes    Implants: None      Indication for procedure: The patient is a pleasant 63-year-old male who had previously undergone right foot irrigation and debridement for infection approximate 2 months prior.  He was found to have a reaccumulation of soft tissue swelling in his dorsal foot.  He was treated with antibiotics.  Operative and nonoperative treatment were discussed with the patient.  He elected for operative intervention. Risks of the procedure were discussed with the patient which include but not limited to bleeding, infection, damage to surrounding nerves, tendons, ligaments, other structures, need for future or revision surgery, continued pain, unsightly scar, dissatisfaction with outcome, DVT, stroke, myocardial infarction and even death.  Benefits include improved pain control and improved overall functional outcome.  The patient wished to proceed and the surgical consent forms were signed.    Description of Procedure: On the date of surgery the patient was met in the preoperative area where the operative extremity was identified by myself, confirmed the patient, marked with my initials.  The patient was then brought back to the operating room, placed supine on the operating table.  Anesthesia was undertaken without incident.  A nonsterile thigh tourniquet was placed on the operative thigh, SCD on the contralateral lower extremity.  Operative extremity was prepped and draped in the usual sterile fashion.  Multidisciplinary timeout was  performed to confirm the correct site, correct patient, correct procedure. The patient received antibiotics after cultures were taken. Operative extremity was then elevated for approximately 2 minutes time and the tourniquet was brought to 250mmHg    We began by utilizing the prior dorsal incision over the dorsal midfoot.  This was carefully dissected down.  There is found to be a small area of clear fluid.  A culture and sample of this was taken.  There is found to be devitalized tissue the dorsal aspect of the foot.  The dorsal as well as interosseous compartments were debrided in an excisional fashion with forceps and scissors, rongeur.  An area of the devitalized tissue was then sent as a specimen.  All areas and pockets were explored and there were found to be no further areas that had any other fluid accumulation.  The entire cavity was then debrided with a rongeur as well as a curette.  The tourniquet was let down, good bleeding tissue was found in the foot.  1 g of vancomycin powder was placed into the wound.  The wound was then very loosely closed to allow for any drainage in the point of egressed.  Sterile dressings were applied.  Patient was woken from anesthesia, down to the postoperative gurney and into PACU in stable condition.    Post-Operative Plan: Patient will be weightbearing as tolerated on the right lower extremity in a postoperative shoe.  He will be placed on doxycycline for a 5-day course.  Will follow-up the intraoperative cultures.  He will be seen in the office in 6 to 7 days for clinical evaluation.

## 2024-06-07 NOTE — OR NURSING
1438-Pt arrived from OR, resting calmly with even, unlabored breathing, vss, no pain, no nausea, site CDI and soft to palpation.    1700-RN contacted pt's ex-wife and ride and provided updates, all questions answered.    1645-RN discussing pt condition with Dr. Loaiza, pt states he is in a lot of pain, but difficult to fully medicate with meds per MAR as oxygen sats drop, RN stating concern regarding his COPD, pt lung sounds are moderately diminished and states he is having some trouble breathing.  Orders to give a Duoneb treatment.  Pt okay to go home with 02 sats 89% or above on RA in light of COPD.    RN educated pt on expectations of pain, discussed alternate interventions for pain control going forward and risk vs benefit in relationship to his hx of COPD and respiratory drive with additional opiates.    1756-RN called report to ALLEN Villanueva    8915-Pt transferred in [facility] with transport tech to phase II.  Vss, no pain, no nausea, site CDI and soft to palpation.

## 2024-06-07 NOTE — DISCHARGE INSTRUCTIONS
HOME CARE INSTRUCTIONS    ACTIVITY: Rest and take it easy for the first 24 hours.  A responsible adult is recommended to remain with you during that time.  It is normal to feel sleepy.  We encourage you to not do anything that requires balance, judgment or coordination.    FOR 24 HOURS DO NOT:  Drive, operate machinery or run household appliances.  Drink beer or alcoholic beverages.  Make important decisions or sign legal documents.    SPECIAL INSTRUCTIONS:   Incision and Drainage, Care After    This sheet gives you information about how to care for yourself after your procedure. Your health care provider may also give you more specific instructions. If you have problems or questions, contact your health care provider.  What can I expect after the procedure?  After the procedure, it is common to have:  Pain or discomfort around the incision site.  Blood, fluid, or pus (drainage) from the incision.  Redness and firm skin around the incision site.  Follow these instructions at home:  Medicines  Take over-the-counter and prescription medicines only as told by your health care provider.  If you were prescribed an antibiotic medicine, use or take it as told by your health care provider. Do not stop using the antibiotic even if you start to feel better.  Wound care  Follow instructions from your health care provider about how to take care of your wound. Make sure you:  Wash your hands with soap and water before and after you change your bandage (dressing). If soap and water are not available, use hand .  Change your dressing and packing as told by your health care provider.  If your dressing is dry or stuck when you try to remove it, moisten or wet the dressing with saline or water so that it can be removed without harming your skin or tissues.  If your wound is packed, leave it in place until your health care provider tells you to remove it. To remove the packing, moisten or wet the packing with saline or water  so that it can be removed without harming your skin or tissues.  Leave stitches (sutures), skin glue, or adhesive strips in place. These skin closures may need to stay in place for 2 weeks or longer. If adhesive strip edges start to loosen and curl up, you may trim the loose edges. Do not remove adhesive strips completely unless your health care provider tells you to do that.  Check your wound every day for signs of infection. Check for:  More redness, swelling, or pain.  More fluid or blood.  Warmth.  Pus or a bad smell.  If you were sent home with a drain tube in place, follow instructions from your health care provider about:  How to empty it.  How to care for it at home.    General instructions  Rest the affected area.  Do not take baths, swim, or use a hot tub until your health care provider approves. Ask your health care provider if you may take showers. You may only be allowed to take sponge baths.  Return to your normal activities as told by your health care provider. Ask your health care provider what activities are safe for you. Your health care provider may put you on activity or lifting restrictions.  The incision will continue to drain. It is normal to have some clear or slightly bloody drainage. The amount of drainage should lessen each day.  Do not apply any creams, ointments, or liquids unless you have been told to by your health care provider.  Keep all follow-up visits as told by your health care provider. This is important.  Contact a health care provider if:  Your cyst or abscess returns.  You have more redness, swelling, or pain around your incision.  You have more fluid or blood coming from your incision.  Your incision feels warm to the touch.  You have pus or a bad smell coming from your incision.  You have red streaks above or below the incision site.  Get help right away if:  You have severe pain or bleeding.  You cannot eat or drink without vomiting.  You have a fever or chills.  You have  redness that spreads quickly.  You have decreased urine output.  You become short of breath.  You have chest pain.  You cough up blood.  The affected area becomes numb or starts to tingle.  These symptoms may represent a serious problem that is an emergency. Do not wait to see if the symptoms will go away. Get medical help right away. Call your local emergency services (911 in the [place] Do not drive yourself to the hospital.  Summary  After this procedure, it is common to have fluid, blood, or pus coming from the surgery site.  Follow all home care instructions. You will be told how to take care of your incision, how to check for infection, and how to take medicines.  If you were prescribed an antibiotic medicine, take it as told by your health care provider. Do not stop taking the antibiotic even if you start to feel better.  Contact a health care provider if you have increased redness, swelling, or pain around your incision. Get help right away if you have chest pain, you vomit, you cough up blood, or you have shortness of breath.  Keep all follow-up visits as told by your health care provider. This is important.  DIET: To avoid nausea, slowly advance diet as tolerated, avoiding spicy or greasy foods for the first day.  Add more substantial food to your diet according to your physician's instructions.  Babies can be fed formula or breast milk as soon as they are hungry.  INCREASE FLUIDS AND FIBER TO AVOID CONSTIPATION.    SURGICAL DRESSING/BATHING: keep dressing clean dry and intact until follow - up with DR. Arreguin. May shower after 48 hours avoid surgical      MEDICATIONS: Resume taking daily medication.  Take prescribed pain medication with food.  If no medication is prescribed, you may take non-aspirin pain medication if needed.  PAIN MEDICATION CAN BE VERY CONSTIPATING.  Take a stool softener or laxative such as senokot, pericolace, or milk of magnesia if needed.    Prescription given for no new  prescription . Please continue home medication per  Dr. Ronquillo.  Last pain medication given Toradol - 2:28Pm, oxycodone at 3:10H    A follow-up appointment should be arranged with your doctor in    [phone]   ; call to schedule.    You should CALL YOUR PHYSICIAN if you develop:  Fever greater than 101 degrees F.  Pain not relieved by medication, or persistent nausea or vomiting.  Excessive bleeding (blood soaking through dressing) or unexpected drainage from the wound.  Extreme redness or swelling around the incision site, drainage of pus or foul smelling drainage.  Inability to urinate or empty your bladder within 8 hours.  Problems with breathing or chest pain.    You should call 911 if you develop problems with breathing or chest pain.  If you are unable to contact your doctor or surgical center, you should go to the nearest emergency room or urgent care center.  Physician's telephone #:    [phone]       MILD FLU-LIKE SYMPTOMS ARE NORMAL.  YOU MAY EXPERIENCE GENERALIZED MUSCLE ACHES, THROAT IRRITATION, HEADACHE AND/OR SOME NAUSEA.    If any questions arise, call your doctor.  If your doctor is not available, please feel free to call the Surgical Center at [phone].  The Center is open Monday through Friday from 7AM to 7PM.      A registered nurse may call you a few days after your surgery to see how you are doing after your procedure.    You may also receive a survey in the mail within the next two weeks and we ask that you take a few moments to complete the survey and return it to us.  Our goal is to provide you with very good care and we value your comments.     Depression / Suicide Risk    As you are discharged from this [facility] facility, it is important to learn how to keep safe from harming yourself.    Recognize the warning signs:  Abrupt changes in personality, positive or negative- including increase in energy   Giving away possessions  Change in eating patterns- significant  weight changes-  positive or negative  Change in sleeping patterns- unable to sleep or sleeping all the time   Unwillingness or inability to communicate  Depression  Unusual sadness, discouragement and loneliness  Talk of wanting to die  Neglect of personal appearance   Rebelliousness- reckless behavior  Withdrawal from people/activities they love  Confusion- inability to concentrate     If you or a loved one observes any of these behaviors or has concerns about self-harm, here's what you can do:  Talk about it- your feelings and reasons for harming yourself  Remove any means that you might use to hurt yourself (examples: pills, rope, extension cords, firearm)  Get professional help from the community (Mental Health, Substance Abuse, psychological counseling)  Do not be alone:Call your Safe Contact- someone whom you trust who will be there for you.  Call your local CRISIS HOTLINE 675-5673 or 112-230-1437  Call your local Children's Mobile Crisis Response Team [place] (475) 507-5078 or www.[facility]  Call the toll free National Suicide Prevention Hotlines   National Suicide Prevention Lifeline 917-012-MTDE (0813)  National Hope Line Network 800-SUICIDE (658-7321)    I acknowledge receipt and understanding of these Home Care instructions.

## 2024-06-07 NOTE — ANESTHESIA TIME REPORT
Anesthesia Start and Stop Event Times       Date Time Event    6/7/2024 1350 Ready for Procedure     1409 Anesthesia Start     1441 Anesthesia Stop          Responsible Staff  06/07/24      Name Role Begin End    Min LUIS Loaiza M.D. Anesth 1409 1441          Overtime Reason:  no overtime (within assigned shift)    Comments:                                                          
see chief complaint quote

## 2024-06-08 ENCOUNTER — HOSPITAL ENCOUNTER (EMERGENCY)
Facility: MEDICAL CENTER | Age: 64
End: 2024-06-08
Attending: EMERGENCY MEDICINE
Payer: MEDICAID

## 2024-06-08 VITALS
TEMPERATURE: 98.2 F | DIASTOLIC BLOOD PRESSURE: 79 MMHG | OXYGEN SATURATION: 96 % | RESPIRATION RATE: 14 BRPM | SYSTOLIC BLOOD PRESSURE: 138 MMHG | HEART RATE: 82 BPM | BODY MASS INDEX: 28.88 KG/M2 | HEIGHT: 69 IN | WEIGHT: 195 LBS

## 2024-06-08 DIAGNOSIS — L02.611 ABSCESS OF RIGHT FOOT: ICD-10-CM

## 2024-06-08 DIAGNOSIS — Z98.890 STATUS POST EXCISIONAL DEBRIDEMENT: ICD-10-CM

## 2024-06-08 LAB
FUNGUS SPEC FUNGUS STN: NORMAL
FUNGUS SPEC FUNGUS STN: NORMAL
SIGNIFICANT IND 70042: NORMAL
SIGNIFICANT IND 70042: NORMAL
SITE SITE: NORMAL
SITE SITE: NORMAL
SOURCE SOURCE: NORMAL
SOURCE SOURCE: NORMAL

## 2024-06-08 PROCEDURE — 96372 THER/PROPH/DIAG INJ SC/IM: CPT

## 2024-06-08 PROCEDURE — A9270 NON-COVERED ITEM OR SERVICE: HCPCS | Mod: UD | Performed by: EMERGENCY MEDICINE

## 2024-06-08 PROCEDURE — 99283 EMERGENCY DEPT VISIT LOW MDM: CPT

## 2024-06-08 PROCEDURE — 700102 HCHG RX REV CODE 250 W/ 637 OVERRIDE(OP): Mod: UD | Performed by: EMERGENCY MEDICINE

## 2024-06-08 PROCEDURE — 700111 HCHG RX REV CODE 636 W/ 250 OVERRIDE (IP): Mod: JZ,UD | Performed by: EMERGENCY MEDICINE

## 2024-06-08 RX ORDER — HYDROCODONE BITARTRATE AND ACETAMINOPHEN 5; 325 MG/1; MG/1
2 TABLET ORAL ONCE
Status: DISCONTINUED | OUTPATIENT
Start: 2024-06-08 | End: 2024-06-08

## 2024-06-08 RX ORDER — HYDROCODONE BITARTRATE AND ACETAMINOPHEN 5; 325 MG/1; MG/1
1 TABLET ORAL EVERY 6 HOURS PRN
Qty: 11 TABLET | Refills: 0 | Status: SHIPPED | OUTPATIENT
Start: 2024-06-08 | End: 2024-06-08

## 2024-06-08 RX ORDER — HYDROCODONE BITARTRATE AND ACETAMINOPHEN 5; 325 MG/1; MG/1
2 TABLET ORAL ONCE
Status: COMPLETED | OUTPATIENT
Start: 2024-06-08 | End: 2024-06-08

## 2024-06-08 RX ORDER — HYDROCODONE BITARTRATE AND ACETAMINOPHEN 5; 325 MG/1; MG/1
1 TABLET ORAL EVERY 6 HOURS PRN
Qty: 11 TABLET | Refills: 0 | Status: SHIPPED | OUTPATIENT
Start: 2024-06-08 | End: 2024-06-11

## 2024-06-08 RX ORDER — KETOROLAC TROMETHAMINE 15 MG/ML
15 INJECTION, SOLUTION INTRAMUSCULAR; INTRAVENOUS ONCE
Status: COMPLETED | OUTPATIENT
Start: 2024-06-08 | End: 2024-06-08

## 2024-06-08 RX ADMIN — KETOROLAC TROMETHAMINE 15 MG: 15 INJECTION, SOLUTION INTRAMUSCULAR; INTRAVENOUS at 09:19

## 2024-06-08 RX ADMIN — HYDROCODONE BITARTRATE AND ACETAMINOPHEN 2 TABLET: 5; 325 TABLET ORAL at 07:53

## 2024-06-08 ASSESSMENT — FIBROSIS 4 INDEX: FIB4 SCORE: 0.64

## 2024-06-08 ASSESSMENT — PAIN DESCRIPTION - PAIN TYPE
TYPE: ACUTE PAIN
TYPE: ACUTE PAIN

## 2024-06-08 NOTE — ED NOTES
Rounded on pt. Pt states his pain has gone from a 10/10 to a 9/10.  Pt still appears uncomfortable.  ERP updated.

## 2024-06-08 NOTE — ED PROVIDER NOTES
ED Provider Note    CHIEF COMPLAINT  Chief Complaint   Patient presents with    Foot Pain     Patient was seen at [facility] yesterday for debridement of right foot. Patient complaining of spasms and increasing pain to right foot.       EXTERNAL RECORDS REVIEWED  Inpatient Notes recent admission for dorsal foot infection and Outpatient Notes outpatient surgery yesterday for incision and drainage/debridement of right foot infection by Dr. Arreguin.     HPI/ROS  LIMITATION TO HISTORY     OUTSIDE HISTORIAN(S):      Noe Hickey is a 63 y.o. male who presents to the emergency department chief complaint of right foot pain/spasm.  Had incision drainage and debridement yesterday outpatient by Dr. Arreguin.  Reports that he has been taking ibuprofen Tylenol and gabapentin and has been having increasing pain and increasing spasms in the foot.  No fevers no chills no nausea no vomiting no swelling no diminished sensation in toes no other acute symptom change or concern at this time    PAST MEDICAL HISTORY   has a past medical history of Arrhythmia, Breath shortness (06/09/2023), Cyclic vomiting syndrome, Elevated hemidiaphragm - LEFT post AVR (01/04/2023), Fracture, Headache, classical migraine, Heart burn, Heart valve disease (06/09/2023), Hyperlipidemia (04/26/2024), Hypertension (04/26/2024), Indigestion, Pneumonia due to infectious organism (01/20/2023), and Snoring.    SURGICAL HISTORY   has a past surgical history that includes shoulder arthroscopy (Bilateral); shoulder hemicap resurfacing (Right, 10/12/2015); irrigation & debridement ortho (Right, 09/19/2017); shoulder surgery (Right); aortic valve replacement (01/05/2023); aortic ascending dissection (01/05/2023); echocardiogram, transesophageal, intraoperative (01/05/2023); and incision and drainage general (Left, 04/19/2024).    FAMILY HISTORY  History reviewed. No pertinent family history.    SOCIAL HISTORY  Social History     Tobacco Use    Smoking status:  Former     Current packs/day: 0.00     Average packs/day: 0.5 packs/day for 47.5 years (23.7 ttl pk-yrs)     Types: Cigarettes     Start date: 1976     Quit date: 2024     Years since quittin.0     Passive exposure: Never    Smokeless tobacco: Never    Tobacco comments:     2-3 a day   Vaping Use    Vaping status: Never Used   Substance and Sexual Activity    Alcohol use: No    Drug use: Not Currently     Types: Oral     Comment: past problems with narcotics not since     Sexual activity: Not on file       CURRENT MEDICATIONS  Home Medications       Reviewed by Efrem Denson R.N. (Registered Nurse) on 24 at 0623  Med List Status: Not Addressed     Medication Last Dose Status   acetaminophen (TYLENOL) 500 MG Tab  Active   albuterol 108 (90 Base) MCG/ACT Aero Soln inhalation aerosol  Active   amoxicillin-clavulanate (AUGMENTIN) 875-125 MG Tab  Active   atorvastatin (LIPITOR) 40 MG Tab  Active   budesonide-formoterol (SYMBICORT) 160-4.5 MCG/ACT Aerosol  Active   doxycycline (VIBRAMYCIN) 100 MG Cap  Active   doxycycline (VIBRAMYCIN) 100 MG Tab  Active   ferrous sulfate 325 (65 Fe) MG EC tablet  Active   gabapentin (NEURONTIN) 100 MG Cap  Active   hydrOXYzine pamoate (VISTARIL) 25 MG Cap  Active   ibuprofen (MOTRIN) 600 MG Tab  Active   losartan (COZAAR) 25 MG Tab  Active   metoprolol SR (TOPROL XL) 25 MG TABLET SR 24 HR  Active   MILK THISTLE PO  Active   Multiple Vitamins-Minerals (ONE DAILY MENS PO)  Active   Naloxone (NARCAN) 4 MG/0.1ML Liquid  Active   nicotine (NICODERM) 21 MG/24HR PATCH 24 HR  Active   nicotine polacrilex (NICORETTE) 2 MG Gum  Active   omeprazole (PRILOSEC) 20 MG delayed-release capsule  Active   ondansetron (ZOFRAN ODT) 4 MG TABLET DISPERSIBLE  Active   oxyCODONE immediate-release (ROXICODONE) 5 MG Tab  Active   predniSONE (DELTASONE) 20 MG Tab  Active   rivaroxaban (XARELTO) 20 MG Tab tablet  Active   tiotropium (SPIRIVA RESPIMAT) 2.5 mcg/Act Aero Soln  Active               "      ALLERGIES  Allergies   Allergen Reactions    Morphine Shortness of Breath and Rash     Rash, shortness of breath       PHYSICAL EXAM  VITAL SIGNS: BP (!) 142/76   Pulse 87   Temp 36.6 °C (97.9 °F) (Temporal)   Resp 20   Ht 1.753 m (5' 9\")   Wt 88.5 kg (195 lb)   SpO2 93%   BMI 28.80 kg/m²    Constitutional: Alert and oriented x 3, .  HENT: Normocephalic, Atraumatic, Bilateral external ears normal. Nose normal.   Eyes: Pupils are equal and reactive. Conjunctiva normal, non-icteric.   Heart: Regular rate and rythm,   Lungs: No respiratory distress  GI: Soft nontender nondistended   Skin: Right dorsal foot incisions clean dry and intact there is minimal oozing of blood at the wound edges no purulence no dehiscence there is moderate surrounding swelling and tenderness to palpation/ecchymosis over the dorsum of the foot.  Neurologic: Cranial nerves III through XII grossly intact no sensory deficit no cerebellar dysfunction.   Psychiatric: Appropriate affect for situation        ASSESSMENT, COURSE AND PLAN  Care Narrative: Foot appears appropriate for post operative state.  There is a large amount of swelling over the dorsum and ecchymosis however the compartment does not feel tense, there is minimal oozing blood from the wound there is no purulence erythema warmth.  Patient was given 1 dose of Norco here in the emergency department and had moderate amount of relief.  Was also given a dose of Toradol.  Patient somewhat frustrated and states that medications of minorly taking edge off but is still having significant pain.  Discussed further interventions with patient and patient does not want IV at this time not interested in admission to hospital which I agree is not necessary at this time.  Given prescription for Norco and given instructions to  follow-up with his orthopedist as previously scheduled discharged in stable and improved condition.  Return here for worsening pain redness swelling drainage fevers " chills nausea vomiting any other acute symptom change or concern.        ADDITIONAL PROBLEMS MANAGED      DISPOSITION AND DISCUSSIONS  I have discussed management of the patient with the following physicians and SHAUN's:      Discussion of management with other QHP or appropriate source(s):      Escalation of care considered, and ultimately not performed:Laboratory analysis and diagnostic imaging were both considered however patient had a surgical procedure yesterday and has no indication of infectious process no traumatic events or other acute changes that would prompt imaging.    Barriers to care at this time, including but not limited to: .     Decision tools and prescription drugs considered including, but not limited to: Pain Medications   .  Prescription monitoring program queried and unremarkable.  Patient counseled on the risks of controlled substances including potential risks and benefits proper use alternative treatments, cause the symptoms, provisions of treatment plan, risk of dependence addiction and overdose method safely dispose of the medication, the fact that they would be given no refills from the emergency department.     In prescribing controlled substances to this patient, I certify that I have obtained and reviewed the medical history of Noe Hickey. I have also made a good brian effort to obtain applicable records from other providers who have treated the patient and records demonstrating the following: Remote history of Suboxone use however patient states that this is well in his past and no longer an issue and is having significant pain from postsurgical process. .     I have conducted a physical exam and documented it. I have reviewed Mr. Hickey’s prescription history as maintained by the [place] Prescription Monitoring Program.     I have assessed the patient’s risk for abuse, dependency, and addiction using the validated Opioid Risk Tool available at  "https://www.[employer].com/yylckm-vpcd-cake-ort-narcotic-abuse.     Given the above, I believe the benefits of controlled substance therapy outweigh the risks. The reasons for prescribing controlled substances include non-narcotic, oral analgesic alternatives have been inadequate for pain control. Accordingly, I have discussed the risk and benefits, treatment plan, and alternative therapies with the patient.   /79   Pulse 82   Temp 36.8 °C (98.2 °F) (Temporal)   Resp 14   Ht 1.753 m (5' 9\")   Wt 88.5 kg (195 lb)   SpO2 96%   BMI 28.80 kg/m²       Oliver Arreguin M.D.  [address] [facility] [id number] [phone]      As Scheduled    [facility], Emergency Dept  [facility] [facility] [address] [phone]    in 12-24 hours if symptoms persist, immediately If symptoms worsen, or if you develop any other symptoms or concerns        FINAL DIAGNOSIS  1. Abscess of right foot Inactive   2. Status post excisional debridement Active          Electronically signed by: Rojelio Adams M.D.,      "

## 2024-06-08 NOTE — ED NOTES
Pt has discharge orders. Pt educated on discharge instructions and new prescriptions.  Pt verbalizes understanding.  Follow up appointment made with [facility]. Pt ambulatory to lobby with steady gait. Pt going home with self.

## 2024-06-08 NOTE — ED TRIAGE NOTES
"Chief Complaint   Patient presents with    Foot Pain     Patient was seen at [facility] yesterday for debridement of right foot. Patient complaining of spasms and increasing pain to right foot.       Pt is alert and oriented, speaking in full sentences, follows commands and responds appropriately to questions. Resperations are even and unlabored.      Pt placed in lobby. Pt educated on triage process. Pt encouraged to alert staff for any changes.     Patient and staff wearing appropriate PPE.    BP (!) 142/76   Pulse 87   Temp 36.6 °C (97.9 °F) (Temporal)   Resp 20   Ht 1.753 m (5' 9\")   Wt 88.5 kg (195 lb)   SpO2 93%    "

## 2024-06-08 NOTE — ANESTHESIA POSTPROCEDURE EVALUATION
Patient: Noe Hickey    Procedure Summary       Date: 06/07/24 Room / Location: [facility] 07 / SURGERY [facility]    Anesthesia Start: 1409 Anesthesia Stop: 1441    Procedure: RIGHT FOOT IRRIGATION AND DEBRIDEMENT (Foot) Diagnosis:       Abscess of right foot      (right foot abcess)    Surgeons: Oliver Arreguin M.D. Responsible Provider: Mario Alberto Loaiza M.D.    Anesthesia Type: general ASA Status: 3            Final Anesthesia Type: general  Last vitals  BP   Blood Pressure: 133/86    Temp   (!) 35.6 °C (96.1 °F)    Pulse   82   Resp   18    SpO2   92 %      Anesthesia Post Evaluation    Patient location during evaluation: PACU  Patient participation: complete - patient participated  Level of consciousness: awake and alert    Airway patency: patent  Anesthetic complications: no  Cardiovascular status: hemodynamically stable  Respiratory status: acceptable  Hydration status: euvolemic    PONV: none          No notable events documented.     Nurse Pain Score: 3 (NPRS)

## 2024-06-08 NOTE — OR NURSING
Patient discharge home alert and oriented x 4 walks with a steady gait with his post op shoe weight bearing as tolerated. V/s stable and within normal limits . Belongings returned back to the patient and assisted in getting dress.   1825H discharge instructions given to Sanjuana - ex wife and questions answered satisfactorily

## 2024-06-08 NOTE — ED NOTES
Staff offered wheelchair, patient declined wheelchair escort to room. Patient ambulated to assigned room. Chart up for ERP to see.

## 2024-06-10 LAB
BACTERIA TISS AEROBE CULT: NORMAL
BACTERIA WND AEROBE CULT: NORMAL
GRAM STN SPEC: NORMAL
GRAM STN SPEC: NORMAL
SIGNIFICANT IND 70042: NORMAL
SIGNIFICANT IND 70042: NORMAL
SITE SITE: NORMAL
SITE SITE: NORMAL
SOURCE SOURCE: NORMAL
SOURCE SOURCE: NORMAL

## 2024-06-11 LAB
FUNGUS SPEC CULT: NORMAL
FUNGUS SPEC CULT: NORMAL
FUNGUS SPEC FUNGUS STN: NORMAL
FUNGUS SPEC FUNGUS STN: NORMAL
SIGNIFICANT IND 70042: NORMAL
SIGNIFICANT IND 70042: NORMAL
SITE SITE: NORMAL
SITE SITE: NORMAL
SOURCE SOURCE: NORMAL
SOURCE SOURCE: NORMAL

## 2024-06-21 LAB
BACTERIA SPEC ANAEROBE CULT: NORMAL
BACTERIA SPEC ANAEROBE CULT: NORMAL
SIGNIFICANT IND 70042: NORMAL
SIGNIFICANT IND 70042: NORMAL
SITE SITE: NORMAL
SITE SITE: NORMAL
SOURCE SOURCE: NORMAL
SOURCE SOURCE: NORMAL

## 2024-06-26 ENCOUNTER — TELEPHONE (OUTPATIENT)
Dept: RESPIRATORY THERAPY | Facility: MEDICAL CENTER | Age: 64
End: 2024-06-26
Payer: MEDICAID

## 2024-06-26 NOTE — TELEPHONE ENCOUNTER
"COPD Program 1 month follow up phone call:     How is your breathing? \"Everything is good\"  Do you have any questions regarding your COPD? No  Are you taking your medications every day as prescribed? Yes  Do you have any questions regarding your medications? No  Have you used your Action Plan this month? No  Have you seen your PCP since discharging from the hospital? No    Have you quit smoking? Yes!!   Not smoking at time of follow up? Yes, not smoking!  "

## 2024-08-14 ENCOUNTER — TELEPHONE (OUTPATIENT)
Dept: RESPIRATORY THERAPY | Facility: MEDICAL CENTER | Age: 64
End: 2024-08-14
Payer: MEDICAID

## 2024-08-14 NOTE — TELEPHONE ENCOUNTER
"COPD Program 3 month follow up phone call:     How is your breathing? \"Not great but not bad\"  Do you have any questions regarding your COPD? No  Are you taking your medications every day as prescribed? Yes  Do you have any questions regarding your medications? No  Have you used your Action Plan since the last time we spoke? No  Have you seen your PCP since discharging from the hospital? Yes  Have you seen Pulmonology since discharging from the hospital? Yes  Have you quit smoking? Yes  Not smoking at time of follow up? Yes! Please feel free to contact us at [phone] if you have any questions.  "

## 2024-08-20 ENCOUNTER — HOSPITAL ENCOUNTER (INPATIENT)
Facility: MEDICAL CENTER | Age: 64
LOS: 4 days | DRG: 190 | End: 2024-08-24
Attending: STUDENT IN AN ORGANIZED HEALTH CARE EDUCATION/TRAINING PROGRAM | Admitting: FAMILY MEDICINE
Payer: MEDICAID

## 2024-08-20 ENCOUNTER — APPOINTMENT (OUTPATIENT)
Dept: RADIOLOGY | Facility: MEDICAL CENTER | Age: 64
DRG: 190 | End: 2024-08-20
Attending: STUDENT IN AN ORGANIZED HEALTH CARE EDUCATION/TRAINING PROGRAM
Payer: MEDICAID

## 2024-08-20 DIAGNOSIS — J96.01 ACUTE RESPIRATORY FAILURE WITH HYPOXIA AND HYPERCAPNIA (HCC): ICD-10-CM

## 2024-08-20 DIAGNOSIS — G89.29 CHRONIC FOOT PAIN, LEFT: ICD-10-CM

## 2024-08-20 DIAGNOSIS — I27.20 PULMONARY HYPERTENSION (HCC): ICD-10-CM

## 2024-08-20 DIAGNOSIS — L03.116 CELLULITIS OF LEFT LOWER EXTREMITY: ICD-10-CM

## 2024-08-20 DIAGNOSIS — Z98.890 STATUS POST ASCENDING AORTIC ANEURYSM REPAIR: ICD-10-CM

## 2024-08-20 DIAGNOSIS — M79.672 CHRONIC FOOT PAIN, LEFT: ICD-10-CM

## 2024-08-20 DIAGNOSIS — R79.89 ELEVATED TROPONIN: ICD-10-CM

## 2024-08-20 DIAGNOSIS — I10 PRIMARY HYPERTENSION: ICD-10-CM

## 2024-08-20 DIAGNOSIS — J44.1 COPD EXACERBATION (HCC): ICD-10-CM

## 2024-08-20 DIAGNOSIS — J44.1 ACUTE EXACERBATION OF CHRONIC OBSTRUCTIVE PULMONARY DISEASE (COPD) (HCC): ICD-10-CM

## 2024-08-20 DIAGNOSIS — R07.9 CHEST PAIN, UNSPECIFIED TYPE: ICD-10-CM

## 2024-08-20 DIAGNOSIS — J96.02 ACUTE RESPIRATORY FAILURE WITH HYPOXIA AND HYPERCAPNIA (HCC): ICD-10-CM

## 2024-08-20 DIAGNOSIS — R09.02 HYPOXIA: ICD-10-CM

## 2024-08-20 DIAGNOSIS — M72.2 PLANTAR FASCIITIS OF LEFT FOOT: ICD-10-CM

## 2024-08-20 DIAGNOSIS — Z86.79 STATUS POST ASCENDING AORTIC ANEURYSM REPAIR: ICD-10-CM

## 2024-08-20 LAB
ALBUMIN SERPL BCP-MCNC: 3.8 G/DL (ref 3.2–4.9)
ALBUMIN/GLOB SERPL: 1.7 G/DL
ALP SERPL-CCNC: 31 U/L (ref 30–99)
ALT SERPL-CCNC: 67 U/L (ref 2–50)
ANION GAP SERPL CALC-SCNC: 12 MMOL/L (ref 7–16)
ANISOCYTOSIS BLD QL SMEAR: ABNORMAL
AST SERPL-CCNC: 46 U/L (ref 12–45)
BASOPHILS # BLD AUTO: 0.9 % (ref 0–1.8)
BASOPHILS # BLD: 0.15 K/UL (ref 0–0.12)
BILIRUB SERPL-MCNC: 0.5 MG/DL (ref 0.1–1.5)
BUN SERPL-MCNC: 22 MG/DL (ref 8–22)
CALCIUM ALBUM COR SERPL-MCNC: 9.2 MG/DL (ref 8.5–10.5)
CALCIUM SERPL-MCNC: 9 MG/DL (ref 8.5–10.5)
CHLORIDE SERPL-SCNC: 101 MMOL/L (ref 96–112)
CO2 SERPL-SCNC: 25 MMOL/L (ref 20–33)
CREAT SERPL-MCNC: 1.11 MG/DL (ref 0.5–1.4)
EOSINOPHIL # BLD AUTO: 0.15 K/UL (ref 0–0.51)
EOSINOPHIL NFR BLD: 0.9 % (ref 0–6.9)
ERYTHROCYTE [DISTWIDTH] IN BLOOD BY AUTOMATED COUNT: 47.4 FL (ref 35.9–50)
FLUAV RNA SPEC QL NAA+PROBE: NEGATIVE
FLUBV RNA SPEC QL NAA+PROBE: NEGATIVE
GFR SERPLBLD CREATININE-BSD FMLA CKD-EPI: 74 ML/MIN/1.73 M 2
GLOBULIN SER CALC-MCNC: 2.3 G/DL (ref 1.9–3.5)
GLUCOSE SERPL-MCNC: 85 MG/DL (ref 65–99)
HCT VFR BLD AUTO: 43 % (ref 42–52)
HGB BLD-MCNC: 12.7 G/DL (ref 14–18)
HYPOCHROMIA BLD QL SMEAR: ABNORMAL
LYMPHOCYTES # BLD AUTO: 1 K/UL (ref 1–4.8)
LYMPHOCYTES NFR BLD: 6.2 % (ref 22–41)
MANUAL DIFF BLD: NORMAL
MCH RBC QN AUTO: 19.3 PG (ref 27–33)
MCHC RBC AUTO-ENTMCNC: 29.5 G/DL (ref 32.3–36.5)
MCV RBC AUTO: 65.3 FL (ref 81.4–97.8)
MICROCYTES BLD QL SMEAR: ABNORMAL
MONOCYTES # BLD AUTO: 1 K/UL (ref 0–0.85)
MONOCYTES NFR BLD AUTO: 6.2 % (ref 0–13.4)
MORPHOLOGY BLD-IMP: NORMAL
MYELOCYTES NFR BLD MANUAL: 1.7 %
NEUTROPHILS # BLD AUTO: 13.62 K/UL (ref 1.82–7.42)
NEUTROPHILS NFR BLD: 84.1 % (ref 44–72)
NRBC # BLD AUTO: 0.04 K/UL
NRBC BLD-RTO: 0.2 /100 WBC (ref 0–0.2)
NT-PROBNP SERPL IA-MCNC: 1550 PG/ML (ref 0–125)
PLATELET # BLD AUTO: 462 K/UL (ref 164–446)
PLATELET BLD QL SMEAR: NORMAL
PMV BLD AUTO: 9.7 FL (ref 9–12.9)
POIKILOCYTOSIS BLD QL SMEAR: NORMAL
POLYCHROMASIA BLD QL SMEAR: NORMAL
POTASSIUM SERPL-SCNC: 4.7 MMOL/L (ref 3.6–5.5)
PROT SERPL-MCNC: 6.1 G/DL (ref 6–8.2)
RBC # BLD AUTO: 6.58 M/UL (ref 4.7–6.1)
RBC BLD AUTO: PRESENT
RSV RNA SPEC QL NAA+PROBE: NEGATIVE
SARS-COV-2 RNA RESP QL NAA+PROBE: NOTDETECTED
SCHISTOCYTES BLD QL SMEAR: NORMAL
SODIUM SERPL-SCNC: 138 MMOL/L (ref 135–145)
TARGETS BLD QL SMEAR: NORMAL
TROPONIN T SERPL-MCNC: 22 NG/L (ref 6–19)
TROPONIN T SERPL-MCNC: 25 NG/L (ref 6–19)
WBC # BLD AUTO: 16.2 K/UL (ref 4.8–10.8)

## 2024-08-20 PROCEDURE — 93005 ELECTROCARDIOGRAM TRACING: CPT | Performed by: STUDENT IN AN ORGANIZED HEALTH CARE EDUCATION/TRAINING PROGRAM

## 2024-08-20 PROCEDURE — 85007 BL SMEAR W/DIFF WBC COUNT: CPT

## 2024-08-20 PROCEDURE — 83880 ASSAY OF NATRIURETIC PEPTIDE: CPT

## 2024-08-20 PROCEDURE — 700101 HCHG RX REV CODE 250: Mod: UD | Performed by: STUDENT IN AN ORGANIZED HEALTH CARE EDUCATION/TRAINING PROGRAM

## 2024-08-20 PROCEDURE — 99285 EMERGENCY DEPT VISIT HI MDM: CPT

## 2024-08-20 PROCEDURE — 770006 HCHG ROOM/CARE - MED/SURG/GYN SEMI*

## 2024-08-20 PROCEDURE — 96375 TX/PRO/DX INJ NEW DRUG ADDON: CPT

## 2024-08-20 PROCEDURE — 700102 HCHG RX REV CODE 250 W/ 637 OVERRIDE(OP): Performed by: STUDENT IN AN ORGANIZED HEALTH CARE EDUCATION/TRAINING PROGRAM

## 2024-08-20 PROCEDURE — 84484 ASSAY OF TROPONIN QUANT: CPT

## 2024-08-20 PROCEDURE — A9270 NON-COVERED ITEM OR SERVICE: HCPCS

## 2024-08-20 PROCEDURE — 36415 COLL VENOUS BLD VENIPUNCTURE: CPT

## 2024-08-20 PROCEDURE — 700111 HCHG RX REV CODE 636 W/ 250 OVERRIDE (IP): Mod: JZ,UD | Performed by: STUDENT IN AN ORGANIZED HEALTH CARE EDUCATION/TRAINING PROGRAM

## 2024-08-20 PROCEDURE — 71045 X-RAY EXAM CHEST 1 VIEW: CPT

## 2024-08-20 PROCEDURE — 94640 AIRWAY INHALATION TREATMENT: CPT

## 2024-08-20 PROCEDURE — 80053 COMPREHEN METABOLIC PANEL: CPT

## 2024-08-20 PROCEDURE — 0241U HCHG SARS-COV-2 COVID-19 NFCT DS RESP RNA 4 TRGT ED POC: CPT

## 2024-08-20 PROCEDURE — 85027 COMPLETE CBC AUTOMATED: CPT

## 2024-08-20 PROCEDURE — 700117 HCHG RX CONTRAST REV CODE 255: Mod: UD | Performed by: STUDENT IN AN ORGANIZED HEALTH CARE EDUCATION/TRAINING PROGRAM

## 2024-08-20 PROCEDURE — 700102 HCHG RX REV CODE 250 W/ 637 OVERRIDE(OP)

## 2024-08-20 PROCEDURE — A9270 NON-COVERED ITEM OR SERVICE: HCPCS | Performed by: STUDENT IN AN ORGANIZED HEALTH CARE EDUCATION/TRAINING PROGRAM

## 2024-08-20 PROCEDURE — 71275 CT ANGIOGRAPHY CHEST: CPT

## 2024-08-20 PROCEDURE — 96374 THER/PROPH/DIAG INJ IV PUSH: CPT

## 2024-08-20 RX ORDER — METHYLPREDNISOLONE SODIUM SUCCINATE 125 MG/2ML
125 INJECTION, POWDER, LYOPHILIZED, FOR SOLUTION INTRAMUSCULAR; INTRAVENOUS ONCE
Status: COMPLETED | OUTPATIENT
Start: 2024-08-20 | End: 2024-08-20

## 2024-08-20 RX ORDER — AZITHROMYCIN 250 MG/1
500 TABLET, FILM COATED ORAL DAILY
Status: COMPLETED | OUTPATIENT
Start: 2024-08-21 | End: 2024-08-22

## 2024-08-20 RX ORDER — AZITHROMYCIN 500 MG/1
500 INJECTION, POWDER, LYOPHILIZED, FOR SOLUTION INTRAVENOUS ONCE
Status: COMPLETED | OUTPATIENT
Start: 2024-08-20 | End: 2024-08-20

## 2024-08-20 RX ORDER — OXYCODONE HYDROCHLORIDE 5 MG/1
5 TABLET ORAL EVERY 4 HOURS PRN
Status: DISCONTINUED | OUTPATIENT
Start: 2024-08-20 | End: 2024-08-20

## 2024-08-20 RX ORDER — PREDNISONE 20 MG/1
40 TABLET ORAL DAILY
Status: DISCONTINUED | OUTPATIENT
Start: 2024-08-21 | End: 2024-08-24

## 2024-08-20 RX ORDER — ALBUTEROL SULFATE 5 MG/ML
5 SOLUTION RESPIRATORY (INHALATION) ONCE
Status: COMPLETED | OUTPATIENT
Start: 2024-08-20 | End: 2024-08-20

## 2024-08-20 RX ORDER — CEFAZOLIN 2 G/1
2 INJECTION, POWDER, FOR SOLUTION INTRAMUSCULAR; INTRAVENOUS ONCE
Status: COMPLETED | OUTPATIENT
Start: 2024-08-20 | End: 2024-08-20

## 2024-08-20 RX ORDER — ENOXAPARIN SODIUM 100 MG/ML
40 INJECTION SUBCUTANEOUS DAILY
Status: DISCONTINUED | OUTPATIENT
Start: 2024-08-20 | End: 2024-08-20

## 2024-08-20 RX ORDER — ALBUTEROL SULFATE 5 MG/ML
2.5 SOLUTION RESPIRATORY (INHALATION) ONCE
Status: COMPLETED | OUTPATIENT
Start: 2024-08-20 | End: 2024-08-20

## 2024-08-20 RX ORDER — IPRATROPIUM BROMIDE AND ALBUTEROL SULFATE 2.5; .5 MG/3ML; MG/3ML
3 SOLUTION RESPIRATORY (INHALATION)
Status: DISCONTINUED | OUTPATIENT
Start: 2024-08-20 | End: 2024-08-21

## 2024-08-20 RX ORDER — OXYCODONE HYDROCHLORIDE 10 MG/1
10 TABLET ORAL EVERY 4 HOURS PRN
Status: DISCONTINUED | OUTPATIENT
Start: 2024-08-20 | End: 2024-08-24 | Stop reason: HOSPADM

## 2024-08-20 RX ORDER — OXYCODONE HYDROCHLORIDE 5 MG/1
5 TABLET ORAL EVERY 4 HOURS PRN
Status: DISCONTINUED | OUTPATIENT
Start: 2024-08-20 | End: 2024-08-24 | Stop reason: HOSPADM

## 2024-08-20 RX ORDER — OXYCODONE HYDROCHLORIDE 10 MG/1
10 TABLET ORAL EVERY 4 HOURS PRN
Status: DISCONTINUED | OUTPATIENT
Start: 2024-08-20 | End: 2024-08-20

## 2024-08-20 RX ORDER — ASPIRIN 325 MG
325 TABLET ORAL ONCE
Status: COMPLETED | OUTPATIENT
Start: 2024-08-20 | End: 2024-08-20

## 2024-08-20 RX ADMIN — OXYCODONE HYDROCHLORIDE 10 MG: 10 TABLET ORAL at 23:50

## 2024-08-20 RX ADMIN — CEFAZOLIN 2 G: 2 INJECTION, POWDER, FOR SOLUTION INTRAMUSCULAR; INTRAVENOUS at 20:52

## 2024-08-20 RX ADMIN — IPRATROPIUM BROMIDE 0.5 MG: 0.5 SOLUTION RESPIRATORY (INHALATION) at 17:14

## 2024-08-20 RX ADMIN — METHYLPREDNISOLONE SODIUM SUCCINATE 125 MG: 125 INJECTION, POWDER, FOR SOLUTION INTRAMUSCULAR; INTRAVENOUS at 17:22

## 2024-08-20 RX ADMIN — ASPIRIN 325 MG: 325 TABLET ORAL at 20:51

## 2024-08-20 RX ADMIN — OXYCODONE HYDROCHLORIDE 5 MG: 5 TABLET ORAL at 22:06

## 2024-08-20 RX ADMIN — AZITHROMYCIN MONOHYDRATE 500 MG: 500 INJECTION, POWDER, LYOPHILIZED, FOR SOLUTION INTRAVENOUS at 20:52

## 2024-08-20 RX ADMIN — FENTANYL CITRATE 50 MCG: 50 INJECTION, SOLUTION INTRAMUSCULAR; INTRAVENOUS at 17:53

## 2024-08-20 RX ADMIN — IPRATROPIUM BROMIDE 0.5 MG: 0.5 SOLUTION RESPIRATORY (INHALATION) at 20:52

## 2024-08-20 RX ADMIN — ALBUTEROL SULFATE 2.5 MG: 2.5 SOLUTION RESPIRATORY (INHALATION) at 17:14

## 2024-08-20 RX ADMIN — ALBUTEROL SULFATE 5 MG: 2.5 SOLUTION RESPIRATORY (INHALATION) at 20:52

## 2024-08-20 RX ADMIN — IOHEXOL 100 ML: 350 INJECTION, SOLUTION INTRAVENOUS at 19:25

## 2024-08-20 ASSESSMENT — FIBROSIS 4 INDEX
FIB4 SCORE: 0.64
FIB4 SCORE: 0.78

## 2024-08-20 ASSESSMENT — SOCIAL DETERMINANTS OF HEALTH (SDOH)
WITHIN THE PAST 12 MONTHS, YOU WORRIED THAT YOUR FOOD WOULD RUN OUT BEFORE YOU GOT THE MONEY TO BUY MORE: NEVER TRUE
IN THE PAST 12 MONTHS, HAS THE ELECTRIC, GAS, OIL, OR WATER COMPANY THREATENED TO SHUT OFF SERVICE IN YOUR HOME?: NO
WITHIN THE LAST YEAR, HAVE YOU BEEN AFRAID OF YOUR PARTNER OR EX-PARTNER?: NO
WITHIN THE LAST YEAR, HAVE TO BEEN RAPED OR FORCED TO HAVE ANY KIND OF SEXUAL ACTIVITY BY YOUR PARTNER OR EX-PARTNER?: NO
WITHIN THE LAST YEAR, HAVE YOU BEEN HUMILIATED OR EMOTIONALLY ABUSED IN OTHER WAYS BY YOUR PARTNER OR EX-PARTNER?: NO
WITHIN THE PAST 12 MONTHS, THE FOOD YOU BOUGHT JUST DIDN'T LAST AND YOU DIDN'T HAVE MONEY TO GET MORE: NEVER TRUE
WITHIN THE LAST YEAR, HAVE YOU BEEN KICKED, HIT, SLAPPED, OR OTHERWISE PHYSICALLY HURT BY YOUR PARTNER OR EX-PARTNER?: NO

## 2024-08-20 ASSESSMENT — PAIN DESCRIPTION - PAIN TYPE
TYPE: ACUTE PAIN

## 2024-08-20 ASSESSMENT — COGNITIVE AND FUNCTIONAL STATUS - GENERAL
SUGGESTED CMS G CODE MODIFIER DAILY ACTIVITY: CH
SUGGESTED CMS G CODE MODIFIER MOBILITY: CH
DAILY ACTIVITIY SCORE: 24
MOBILITY SCORE: 24

## 2024-08-20 ASSESSMENT — LIFESTYLE VARIABLES
ON A TYPICAL DAY WHEN YOU DRINK ALCOHOL HOW MANY DRINKS DO YOU HAVE: 0
TOTAL SCORE: 0
TOTAL SCORE: 0
DOES PATIENT WANT TO STOP DRINKING: NO
HOW MANY TIMES IN THE PAST YEAR HAVE YOU HAD 5 OR MORE DRINKS IN A DAY: 0
EVER FELT BAD OR GUILTY ABOUT YOUR DRINKING: NO
HAVE YOU EVER FELT YOU SHOULD CUT DOWN ON YOUR DRINKING: NO
EVER HAD A DRINK FIRST THING IN THE MORNING TO STEADY YOUR NERVES TO GET RID OF A HANGOVER: NO
CONSUMPTION TOTAL: NEGATIVE
HAVE PEOPLE ANNOYED YOU BY CRITICIZING YOUR DRINKING: NO
AVERAGE NUMBER OF DAYS PER WEEK YOU HAVE A DRINK CONTAINING ALCOHOL: 0
TOTAL SCORE: 0
ALCOHOL_USE: NO

## 2024-08-20 NOTE — ED PROVIDER NOTES
ED Provider Note    CHIEF COMPLAINT  Chief Complaint   Patient presents with    Shortness of Breath     BIBA with c/o shortness of breath and chest pain x2 hours. Pt states he was at work and thinks he may have breathing in too much dust at work. Pt states he has noticed progressive increase in shortness of breath over the past week.  Pt received 50mcg fentanyl for pain.  Pt arrives on 4L NC, pt denies use at home.        EXTERNAL RECORDS REVIEWED  Inpatient Notes op report from 6/12/2023 history of symptomatic paroxysmal atrial fibrillation ablation.  Op report from 1/5/2023 repair of aortic stenosis due to bicuspid valve aortic ascending aneurysm    HPI/ROS  LIMITATION TO HISTORY     OUTSIDE HISTORIAN(S):      Noe Hickey is a 63 y.o. male who presents with chest pain, shortness of breath and ankle pain.  Patient says over the past 2 days he has had redness and pain over his left ankle.  Patient says that today he became acutely short of breath with anterior chest pain.    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    SURGICAL HISTORY   has a past surgical history that includes shoulder arthroscopy (Bilateral); shoulder hemicap resurfacing (Right, 10/12/2015); irrigation & debridement ortho (Right, 09/19/2017); shoulder surgery (Right); aortic valve replacement (01/05/2023); aortic ascending dissection (01/05/2023); echocardiogram, transesophageal, intraoperative (01/05/2023); incision and drainage general (Left, 04/19/2024); and irrigation & debridement general (N/A, 6/7/2024).    FAMILY HISTORY  No family history on file.    SOCIAL HISTORY  Social History  "    Tobacco Use    Smoking status: Former     Current packs/day: 0.00     Average packs/day: 0.5 packs/day for 47.5 years (23.7 ttl pk-yrs)     Types: Cigarettes     Start date: 1976     Quit date: 2024     Years since quittin.2     Passive exposure: Never    Smokeless tobacco: Never    Tobacco comments:     2-3 a day   Vaping Use    Vaping status: Never Used   Substance and Sexual Activity    Alcohol use: No    Drug use: Not Currently     Types: Oral     Comment: past problems with narcotics not since     Sexual activity: Not on file       CURRENT MEDICATIONS  Home Medications       Reviewed by Alexandra Rubin (Pharmacy Tech) on 24 at 2152  Med List Status: Complete     Medication Last Dose Status   MILK THISTLE PO 2024 Active   Multiple Vitamins-Minerals (ONE DAILY MENS PO) 2024 Active   rivaroxaban (XARELTO) 20 MG Tab tablet 2024 Active                  Audit from Redirected Encounters    **Home medications have not yet been reviewed for this encounter**         ALLERGIES  Allergies   Allergen Reactions    Morphine Shortness of Breath and Rash     Rash, shortness of breath       PHYSICAL EXAM  VITAL SIGNS: BP (!) 145/95 Comment: RN notified   Pulse 81   Temp 36.6 °C (97.9 °F) (Temporal)   Resp 20   Ht 1.753 m (5' 9\")   Wt 94 kg (207 lb 3.7 oz)   SpO2 96%   BMI 30.60 kg/m²    Constitutional: Chronically ill-appearing  HENT: No signs of trauma, Bilateral external ears normal, Nose normal.   Eyes: Pupils are equal and reactive, Conjunctiva normal, Non-icteric.   Neck: Normal range of motion, No tenderness, Supple, No stridor.   Cardiovascular: Regular rate and rhythm, no murmurs.   Thorax & Lungs: Bilateral wheezing, dyspneic  Abdomen: Bowel sounds normal, Soft, No tenderness, No masses, No pulsatile masses.   Skin: Warm, Dry, No erythema, No rash.   Back: No bony tenderness, No CVA tenderness.   Extremities: Intact distal pulses, No edema, No tenderness, No " cyanosis  Musculoskeletal: Erythema and tenderness along left posterior calf otherwise good range of motion in all major joints. No tenderness to palpation or major deformities noted.   Neurologic: Alert , Normal motor function, Normal sensory function, No focal deficits noted.       EKG/LABS  Labs Reviewed   CBC WITH DIFFERENTIAL - Abnormal; Notable for the following components:       Result Value    WBC 16.2 (*)     RBC 6.58 (*)     Hemoglobin 12.7 (*)     MCV 65.3 (*)     MCH 19.3 (*)     MCHC 29.5 (*)     Platelet Count 462 (*)     Neutrophils-Polys 84.10 (*)     Lymphocytes 6.20 (*)     Neutrophils (Absolute) 13.62 (*)     Monos (Absolute) 1.00 (*)     Baso (Absolute) 0.15 (*)     Microcytosis 2+ (*)     All other components within normal limits   COMP METABOLIC PANEL - Abnormal; Notable for the following components:    AST(SGOT) 46 (*)     ALT(SGPT) 67 (*)     All other components within normal limits   TROPONIN - Abnormal; Notable for the following components:    Troponin T 25 (*)     All other components within normal limits   TROPONIN - Abnormal; Notable for the following components:    Troponin T 22 (*)     All other components within normal limits   PROBRAIN NATRIURETIC PEPTIDE, NT - Abnormal; Notable for the following components:    NT-proBNP 1550 (*)     All other components within normal limits   DIFFERENTIAL MANUAL   PERIPHERAL SMEAR REVIEW   PLATELET ESTIMATE   MORPHOLOGY   ESTIMATED GFR   CBC WITH DIFFERENTIAL   COMP METABOLIC PANEL   POCT COV-2, FLU A/B, RSV BY PCR   POC COV-2, FLU A/B, RSV BY PCR     Results for orders placed or performed during the hospital encounter of 24   EKG   Result Value Ref Range    Report       [facility]    Test Date:  2024  Pt Name:    JUSTIN TOPETE               Department: ER  MRN:        [id number]                      Room:       [facility]  Gender:     Male                         Technician:  :        1960                    Requested By:ER TRIAGE PROTOCOL  Order #:    [id number]                    Reading MD: Neil Goyal    Measurements  Intervals                                Axis  Rate:       81                           P:          73  NJ:         154                          QRS:        12  QRSD:       96                           T:          26  QT:         384  QTc:        446    Interpretive Statements  Interpreted by me: Sinus rhythm, rate 81, stable ST segment changes when  compared to prior no signs of acute ischemia  Electronically Signed On 08- 01:08:01 PDT by Neil Goyal         I have independently interpreted this EKG    RADIOLOGY/PROCEDURES   I have independently interpreted the diagnostic imaging associated with this visit and am waiting the final reading from the radiologist.   My preliminary interpretation is as follows: No pneumothorax    Radiologist interpretation:  CT-CTA COMPLETE THORACOABDOMINAL AORTA   Final Result      1.  Median sternotomy. Nonunion of the sternal defect.   2.  Aortic valve replacement.   3.  No evidence of aortic aneurysm or dissection.   4.  Coronary calcification.   5.  Elevation of the left hemidiaphragm with chronic segmental/subsegmental atelectatic changes at the left lung base.   6.  Calcified old granulomatous disease right lower lobe.                        DX-CHEST-PORTABLE (1 VIEW)   Final Result      1.  Median sternotomy and aortic valve replacement.   2.  Ongoing elevation of left hemidiaphragm with subsegmental atelectatic changes or scarring at the left lung base.      EC-ECHOCARDIOGRAM COMPLETE W/O CONT    (Results Pending)       COURSE & MEDICAL DECISION MAKING    ASSESSMENT, COURSE AND PLAN  Care Narrative: On arrival patient noted to be hypoxic, dyspneic with bilateral wheezing.  Suspect some degree of bronchoconstriction of disease given patient's longstanding history of smoking will initiate trial of bronchodilators and steroids.  With  patient's chest pain and history of aortic aneurysm and valve repair will obtain CTA to assess for dissection as well as central PE.  Patient has signs of cellulitis along his left lower extremity will initiate IV antibiotics.  ECG does have ST segment changes but similar to previous    Blood work notable for leukocytosis, stable elevation of troponin, worsening BNP, and negative viral panel.  CTA negative for dissection or aneurysm.  Patient with improvement after initial bronchodilators patient ordered for second round of treatment.  Given patient's hypoxia, ongoing dyspnea as well as chest pain with worsening BNP spoke with hospitalist regarding admission.              ADDITIONAL PROBLEMS MANAGED      DISPOSITION AND DISCUSSIONS  I have discussed management of the patient with the following physicians and SHAUN's: Hospitalist      FINAL DIAGNOSIS  1. Hypoxia    2. Acute exacerbation of chronic obstructive pulmonary disease (COPD) (HCC)    3. Elevated troponin    4. Chest pain, unspecified type    5. Cellulitis of left lower extremity         Electronically signed by: Neil Goyal D.O., 8/20/2024 4:23 PM

## 2024-08-20 NOTE — ED TRIAGE NOTES
"Chief Complaint   Patient presents with    Shortness of Breath     BIBA with c/o shortness of breath and chest pain x2 hours. Pt states he was at work and thinks he may have breathing in too much dust at work. Pt states he has noticed progressive increase in shortness of breath over the past week.  Pt received 50mcg fentanyl for pain.  Pt arrives on 4L NC, pt denies use at home.      BP (!) 142/75   Pulse 83   Temp 37.4 °C (99.4 °F) (Temporal)   Resp (!) 22   Ht 1.753 m (5' 9\")   Wt 93 kg (205 lb)   SpO2 94%   BMI 30.27 kg/m²     Pt connected to monitor. Chest pain protocol ordered.   "

## 2024-08-21 ENCOUNTER — APPOINTMENT (OUTPATIENT)
Dept: RADIOLOGY | Facility: MEDICAL CENTER | Age: 64
DRG: 190 | End: 2024-08-21
Payer: MEDICAID

## 2024-08-21 PROBLEM — R74.01 TRANSAMINITIS: Status: ACTIVE | Noted: 2024-08-21

## 2024-08-21 PROBLEM — L03.90 CELLULITIS: Status: ACTIVE | Noted: 2024-08-21

## 2024-08-21 PROBLEM — M72.2 PLANTAR FASCIITIS OF LEFT FOOT: Status: ACTIVE | Noted: 2024-08-21

## 2024-08-21 PROBLEM — I48.92 ATRIAL FLUTTER (HCC): Status: ACTIVE | Noted: 2024-08-21

## 2024-08-21 PROBLEM — D64.9 ANEMIA: Status: ACTIVE | Noted: 2024-04-12

## 2024-08-21 PROBLEM — Z79.01 CHRONIC ANTICOAGULATION: Status: ACTIVE | Noted: 2024-08-21

## 2024-08-21 LAB
ALBUMIN SERPL BCP-MCNC: 3.7 G/DL (ref 3.2–4.9)
ALBUMIN/GLOB SERPL: 1.5 G/DL
ALP SERPL-CCNC: 31 U/L (ref 30–99)
ALT SERPL-CCNC: 62 U/L (ref 2–50)
ANION GAP SERPL CALC-SCNC: 12 MMOL/L (ref 7–16)
ANISOCYTOSIS BLD QL SMEAR: ABNORMAL
AST SERPL-CCNC: 40 U/L (ref 12–45)
BASE EXCESS BLDA CALC-SCNC: -1 MMOL/L (ref -4–3)
BASOPHILS # BLD AUTO: 0 % (ref 0–1.8)
BASOPHILS # BLD: 0 K/UL (ref 0–0.12)
BILIRUB SERPL-MCNC: 0.4 MG/DL (ref 0.1–1.5)
BODY TEMPERATURE: 36.7 CENTIGRADE
BUN SERPL-MCNC: 24 MG/DL (ref 8–22)
CALCIUM ALBUM COR SERPL-MCNC: 8.8 MG/DL (ref 8.5–10.5)
CALCIUM SERPL-MCNC: 8.6 MG/DL (ref 8.5–10.5)
CHLORIDE SERPL-SCNC: 99 MMOL/L (ref 96–112)
CO2 SERPL-SCNC: 23 MMOL/L (ref 20–33)
CREAT SERPL-MCNC: 1.29 MG/DL (ref 0.5–1.4)
EKG IMPRESSION: NORMAL
EOSINOPHIL # BLD AUTO: 0 K/UL (ref 0–0.51)
EOSINOPHIL NFR BLD: 0 % (ref 0–6.9)
ERYTHROCYTE [DISTWIDTH] IN BLOOD BY AUTOMATED COUNT: 48.4 FL (ref 35.9–50)
GFR SERPLBLD CREATININE-BSD FMLA CKD-EPI: 62 ML/MIN/1.73 M 2
GLOBULIN SER CALC-MCNC: 2.4 G/DL (ref 1.9–3.5)
GLUCOSE SERPL-MCNC: 307 MG/DL (ref 65–99)
HCO3 BLDA-SCNC: 23 MMOL/L (ref 17–25)
HCT VFR BLD AUTO: 43.4 % (ref 42–52)
HGB BLD-MCNC: 12.7 G/DL (ref 14–18)
HYPOCHROMIA BLD QL SMEAR: ABNORMAL
INHALED O2 FLOW RATE: NORMAL L/MIN
LYMPHOCYTES # BLD AUTO: 0.27 K/UL (ref 1–4.8)
LYMPHOCYTES NFR BLD: 1.7 % (ref 22–41)
MANUAL DIFF BLD: NORMAL
MCH RBC QN AUTO: 19.2 PG (ref 27–33)
MCHC RBC AUTO-ENTMCNC: 29.3 G/DL (ref 32.3–36.5)
MCV RBC AUTO: 65.8 FL (ref 81.4–97.8)
MICROCYTES BLD QL SMEAR: ABNORMAL
MONOCYTES # BLD AUTO: 0 K/UL (ref 0–0.85)
MONOCYTES NFR BLD AUTO: 0 % (ref 0–13.4)
MORPHOLOGY BLD-IMP: NORMAL
MYELOCYTES NFR BLD MANUAL: 0.9 %
NEUTROPHILS # BLD AUTO: 15.58 K/UL (ref 1.82–7.42)
NEUTROPHILS NFR BLD: 96.5 % (ref 44–72)
NEUTS BAND NFR BLD MANUAL: 0.9 % (ref 0–10)
NRBC # BLD AUTO: 0.02 K/UL
NRBC BLD-RTO: 0.1 /100 WBC (ref 0–0.2)
PCO2 BLDA: 35.2 MMHG (ref 26–37)
PH BLDA: 7.43 [PH] (ref 7.4–7.5)
PLATELET # BLD AUTO: 442 K/UL (ref 164–446)
PLATELET BLD QL SMEAR: NORMAL
PO2 BLDA: 72.2 MMHG (ref 64–87)
POIKILOCYTOSIS BLD QL SMEAR: NORMAL
POTASSIUM SERPL-SCNC: 4.8 MMOL/L (ref 3.6–5.5)
PROT SERPL-MCNC: 6.1 G/DL (ref 6–8.2)
RBC # BLD AUTO: 6.6 M/UL (ref 4.7–6.1)
RBC BLD AUTO: PRESENT
SAO2 % BLDA: 93.3 % (ref 93–99)
SCHISTOCYTES BLD QL SMEAR: NORMAL
SODIUM SERPL-SCNC: 134 MMOL/L (ref 135–145)
WBC # BLD AUTO: 16 K/UL (ref 4.8–10.8)

## 2024-08-21 PROCEDURE — 700111 HCHG RX REV CODE 636 W/ 250 OVERRIDE (IP)

## 2024-08-21 PROCEDURE — A9270 NON-COVERED ITEM OR SERVICE: HCPCS

## 2024-08-21 PROCEDURE — 99406 BEHAV CHNG SMOKING 3-10 MIN: CPT

## 2024-08-21 PROCEDURE — 82962 GLUCOSE BLOOD TEST: CPT

## 2024-08-21 PROCEDURE — 700102 HCHG RX REV CODE 250 W/ 637 OVERRIDE(OP)

## 2024-08-21 PROCEDURE — 94664 DEMO&/EVAL PT USE INHALER: CPT

## 2024-08-21 PROCEDURE — 700101 HCHG RX REV CODE 250

## 2024-08-21 PROCEDURE — 94669 MECHANICAL CHEST WALL OSCILL: CPT

## 2024-08-21 PROCEDURE — 82803 BLOOD GASES ANY COMBINATION: CPT

## 2024-08-21 PROCEDURE — 85007 BL SMEAR W/DIFF WBC COUNT: CPT

## 2024-08-21 PROCEDURE — 700105 HCHG RX REV CODE 258

## 2024-08-21 PROCEDURE — 770006 HCHG ROOM/CARE - MED/SURG/GYN SEMI*

## 2024-08-21 PROCEDURE — 85027 COMPLETE CBC AUTOMATED: CPT

## 2024-08-21 PROCEDURE — 94760 N-INVAS EAR/PLS OXIMETRY 1: CPT

## 2024-08-21 PROCEDURE — 80053 COMPREHEN METABOLIC PANEL: CPT

## 2024-08-21 PROCEDURE — 73630 X-RAY EXAM OF FOOT: CPT | Mod: LT

## 2024-08-21 PROCEDURE — 94640 AIRWAY INHALATION TREATMENT: CPT

## 2024-08-21 PROCEDURE — 92950 HEART/LUNG RESUSCITATION CPR: CPT

## 2024-08-21 PROCEDURE — 71045 X-RAY EXAM CHEST 1 VIEW: CPT

## 2024-08-21 RX ORDER — CETIRIZINE HYDROCHLORIDE 10 MG/1
10 TABLET ORAL DAILY
Status: DISCONTINUED | OUTPATIENT
Start: 2024-08-21 | End: 2024-08-24 | Stop reason: HOSPADM

## 2024-08-21 RX ORDER — ONDANSETRON 2 MG/ML
4 INJECTION INTRAMUSCULAR; INTRAVENOUS EVERY 4 HOURS PRN
Status: DISCONTINUED | OUTPATIENT
Start: 2024-08-21 | End: 2024-08-24 | Stop reason: HOSPADM

## 2024-08-21 RX ORDER — IBUPROFEN 400 MG/1
800 TABLET, FILM COATED ORAL EVERY 6 HOURS PRN
Status: DISCONTINUED | OUTPATIENT
Start: 2024-08-21 | End: 2024-08-22

## 2024-08-21 RX ORDER — HYDROXYZINE HYDROCHLORIDE 10 MG/1
10 TABLET, FILM COATED ORAL 3 TIMES DAILY PRN
Status: DISCONTINUED | OUTPATIENT
Start: 2024-08-21 | End: 2024-08-22

## 2024-08-21 RX ORDER — PROCHLORPERAZINE EDISYLATE 5 MG/ML
10 INJECTION INTRAMUSCULAR; INTRAVENOUS EVERY 6 HOURS PRN
Status: DISCONTINUED | OUTPATIENT
Start: 2024-08-21 | End: 2024-08-24 | Stop reason: HOSPADM

## 2024-08-21 RX ORDER — IPRATROPIUM BROMIDE AND ALBUTEROL SULFATE 2.5; .5 MG/3ML; MG/3ML
3 SOLUTION RESPIRATORY (INHALATION)
Status: DISCONTINUED | OUTPATIENT
Start: 2024-08-21 | End: 2024-08-24

## 2024-08-21 RX ORDER — HYDROMORPHONE HYDROCHLORIDE 1 MG/ML
0.5 INJECTION, SOLUTION INTRAMUSCULAR; INTRAVENOUS; SUBCUTANEOUS ONCE
Status: COMPLETED | OUTPATIENT
Start: 2024-08-21 | End: 2024-08-21

## 2024-08-21 RX ORDER — LISINOPRIL 10 MG/1
10 TABLET ORAL
Status: DISCONTINUED | OUTPATIENT
Start: 2024-08-21 | End: 2024-08-24

## 2024-08-21 RX ORDER — IPRATROPIUM BROMIDE AND ALBUTEROL SULFATE 2.5; .5 MG/3ML; MG/3ML
3 SOLUTION RESPIRATORY (INHALATION)
Status: DISCONTINUED | OUTPATIENT
Start: 2024-08-21 | End: 2024-08-22

## 2024-08-21 RX ADMIN — OXYCODONE HYDROCHLORIDE 10 MG: 10 TABLET ORAL at 12:04

## 2024-08-21 RX ADMIN — ONDANSETRON 4 MG: 2 INJECTION INTRAMUSCULAR; INTRAVENOUS at 15:36

## 2024-08-21 RX ADMIN — CEFAZOLIN 2 G: 2 INJECTION, POWDER, FOR SOLUTION INTRAMUSCULAR; INTRAVENOUS at 14:23

## 2024-08-21 RX ADMIN — IPRATROPIUM BROMIDE AND ALBUTEROL SULFATE 3 ML: 2.5; .5 SOLUTION RESPIRATORY (INHALATION) at 03:10

## 2024-08-21 RX ADMIN — CEFAZOLIN 2 G: 2 INJECTION, POWDER, FOR SOLUTION INTRAMUSCULAR; INTRAVENOUS at 05:14

## 2024-08-21 RX ADMIN — PREDNISONE 40 MG: 20 TABLET ORAL at 05:14

## 2024-08-21 RX ADMIN — OXYCODONE HYDROCHLORIDE 10 MG: 10 TABLET ORAL at 17:25

## 2024-08-21 RX ADMIN — Medication 5 MG: at 21:26

## 2024-08-21 RX ADMIN — HYDROMORPHONE HYDROCHLORIDE 0.5 MG: 1 INJECTION, SOLUTION INTRAMUSCULAR; INTRAVENOUS; SUBCUTANEOUS at 09:41

## 2024-08-21 RX ADMIN — IPRATROPIUM BROMIDE AND ALBUTEROL SULFATE 3 ML: 2.5; .5 SOLUTION RESPIRATORY (INHALATION) at 21:14

## 2024-08-21 RX ADMIN — IPRATROPIUM BROMIDE AND ALBUTEROL SULFATE 3 ML: 2.5; .5 SOLUTION RESPIRATORY (INHALATION) at 12:09

## 2024-08-21 RX ADMIN — IPRATROPIUM BROMIDE AND ALBUTEROL SULFATE 3 ML: 2.5; .5 SOLUTION RESPIRATORY (INHALATION) at 14:57

## 2024-08-21 RX ADMIN — OXYCODONE HYDROCHLORIDE 10 MG: 10 TABLET ORAL at 05:33

## 2024-08-21 RX ADMIN — IPRATROPIUM BROMIDE AND ALBUTEROL SULFATE 3 ML: 2.5; .5 SOLUTION RESPIRATORY (INHALATION) at 19:07

## 2024-08-21 RX ADMIN — RIVAROXABAN 20 MG: 20 TABLET, FILM COATED ORAL at 05:14

## 2024-08-21 RX ADMIN — LISINOPRIL 10 MG: 10 TABLET ORAL at 16:40

## 2024-08-21 RX ADMIN — IPRATROPIUM BROMIDE AND ALBUTEROL SULFATE 3 ML: 2.5; .5 SOLUTION RESPIRATORY (INHALATION) at 07:19

## 2024-08-21 RX ADMIN — CETIRIZINE HYDROCHLORIDE 10 MG: 10 TABLET, FILM COATED ORAL at 09:41

## 2024-08-21 RX ADMIN — AZITHROMYCIN DIHYDRATE 500 MG: 250 TABLET ORAL at 17:25

## 2024-08-21 ASSESSMENT — CHA2DS2 SCORE
HYPERTENSION: NO
CHF OR LEFT VENTRICULAR DYSFUNCTION: YES
VASCULAR DISEASE: YES
AGE 65 TO 74: NO
CHA2DS2 VASC SCORE: 2
DIABETES: NO
AGE 75 OR GREATER: NO
PRIOR STROKE OR TIA OR THROMBOEMBOLISM: NO
SEX: MALE

## 2024-08-21 ASSESSMENT — COPD QUESTIONNAIRES
DO YOU EVER COUGH UP ANY MUCUS OR PHLEGM?: YES, A FEW DAYS A WEEK OR MONTH
DURING THE PAST 4 WEEKS HOW MUCH DID YOU FEEL SHORT OF BREATH: SOME OF THE TIME
HAVE YOU SMOKED AT LEAST 100 CIGARETTES IN YOUR ENTIRE LIFE: YES
COPD SCREENING SCORE: 7
DO YOU EVER COUGH UP ANY MUCUS OR PHLEGM?: YES, A FEW DAYS A WEEK OR MONTH
IN THE PAST 12 MONTHS DO YOU DO LESS THAN YOU USED TO BECAUSE OF YOUR BREATHING PROBLEMS: AGREE
HAVE YOU SMOKED AT LEAST 100 CIGARETTES IN YOUR ENTIRE LIFE: YES
COPD SCREENING SCORE: 7
DURING THE PAST 4 WEEKS HOW MUCH DID YOU FEEL SHORT OF BREATH: SOME OF THE TIME

## 2024-08-21 ASSESSMENT — LIFESTYLE VARIABLES: PACK_YEARS: 40

## 2024-08-21 ASSESSMENT — PAIN DESCRIPTION - PAIN TYPE
TYPE: ACUTE PAIN

## 2024-08-21 NOTE — PROGRESS NOTES
Assumed pt care with RN. Pt  transferred to [facility] on 1L nasal cannula, and walked steadily to unit bed. Pt is A&OX4 and states he is in 7/10 pain (medication given, see MAR). Plan of care discussed, no further questions. 2RN skin check and admit profile completed. Personal belongings and call light within reach.

## 2024-08-21 NOTE — ASSESSMENT & PLAN NOTE
Patient presenting with left foot pain.  Initially presumed to be cellulitis so patient received 1 dose Ancef in the ED. upon further questioning this appears to be plantar fasciitis and not cellulitis. He reports right heel pain that is worse when he wakes up in the morning.  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 On exam he is TTP medial aspect of the left heel and plantar aspect of left foot. Increased pain with plantarflexion of the left foot.    Plan:  -Discontinue antibiotics  -Ideally would like to get a patient a night splint this may not be available inpatient  -Ibuprofen 800 mg every 8 hours for pain

## 2024-08-21 NOTE — ASSESSMENT & PLAN NOTE
Not on any hypertensives at home.  Pressure consistently elevated this admission.    -Start lisinopril 10 mg

## 2024-08-21 NOTE — ED NOTES
"Med rec updated and complete.  Allergies reviewed.  Pt confirmed name and date of birth.      Pt stated that he stopped taking all his other medications.    Removed all medications from med rec.  Pt reports that he has an \"active prescription for injectable steroids.  completed per pharmacist.  No injectables located on .    Pt denies antibiotic use in last 30 days.      Home pharmacy   [facility] = [phone]  "

## 2024-08-21 NOTE — ASSESSMENT & PLAN NOTE
Patient has a prior history of heart failure with his last ejection fraction improved at 60% in April 2024.  BNP is increased at 1550. Given lower extremity edema and worsening shortness of breath, there is concern for worsening ejection fraction.  Patient has not followed up with cardiology in several months now.  -Ordered echo  -Consider dose of IV Lasix if oxygen requirement does not improve despite COPD management

## 2024-08-21 NOTE — CARE PLAN
The patient is Watcher - Medium risk of patient condition declining or worsening    Shift Goals  Clinical Goals: Patient will maintain adequate oxygenation saturation of 88% or higher throughout the shift  Patient Goals: Pain will remain controlled at 5/10 or lower throughout the shift  Family Goals: HUGH    Progress made toward(s) clinical / shift goals:      Patient has maintained adequate oxygenation, with SPO2 of 88% or greater throughout the day.     Patient is not progressing towards the following goals:

## 2024-08-21 NOTE — ASSESSMENT & PLAN NOTE
Elevated liver enzymes chronically.  Prior CT has demonstrated hepatomegaly.  No significant alcohol history.  -Recommend outpatient workup of possible nonalcoholic steatohepatitis

## 2024-08-21 NOTE — PROGRESS NOTES
Entered patient's room and found patient at edge of bed with increased WOB, RR 34, SPO2 90% on room air, and audible loud expiratory wheezing. Patient states he feels like it is getting harder and harder to exhale and he reports a new onset of sweating, cool, and clammy skin while he was using the bathroom. Patient's skin observed to be slightly cyanotic. Dr. Fairchild has been notified.

## 2024-08-21 NOTE — ED NOTES
To floor via gurney with transport Deyanira, AOX4 GCS15 on 1L oxygen via NC; all belongings with patient

## 2024-08-21 NOTE — PROGRESS NOTES
4 Eyes Skin Assessment Completed by ALLEN Euceda and ALLEN Guerra.    Head WDL  Ears WDL  Nose WDL  Mouth WDL  Neck WDL  Breast/Chest WDL  Shoulder Blades WDL  Spine WDL  (R) Arm/Elbow/Hand WDL  (L) Arm/Elbow/Hand WDL  Abdomen WDL  Groin WDL  Scrotum/Coccyx/Buttocks WDL  (R) Leg Redness and Swelling  (L) Leg Redness and Swelling  (R) Heel/Foot/Toe Redness, Swelling, and Scab  (L) Heel/Foot/Toe Redness and Swelling          Devices In Places none      Interventions In Place NC W/Ear Foams and Pillows    Possible Skin Injury No    Pictures Uploaded Into Epic N/A  Wound Consult Placed N/A  RN Wound Prevention Protocol Ordered No

## 2024-08-21 NOTE — PROGRESS NOTES
Attending:   Nabila Monge M.d.    Resident:   Sandro Fairchild M.D.    PATIENT:   Noe Hickey; [id number]; 1960    ID:   63 y.o. male admitted for AHRF secondary to COPD exacerbation.     SUBJECTIVE:   No acute events overnight, patient reports his breathing feels about the same this morning however he is off of oxygen satting in the low 90s.  States he otherwise feels well-denies chest pain, headache, difficulty with urination or stool.  He is eating and drinking well, asking for extra food.    Patient reports right heel pain as sharp and burning that seems to be worse when he wakes up in the morning.  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    OBJECTIVE:  Vitals:    08/21/24 0817 08/21/24 1200 08/21/24 1219 08/21/24 1457   BP: (!) 141/97      Pulse: 79 88 88 89   Resp: 16 16 16 18   Temp: 36.5 °C (97.7 °F)      TempSrc: Temporal      SpO2: 89% 88% 88% 99%   Weight:       Height:           Intake/Output Summary (Last 24 hours) at 8/21/2024 1501  Last data filed at 8/21/2024 1000  Gross per 24 hour   Intake 240 ml   Output --   Net 240 ml       PHYSICAL EXAM:  General: No acute distress, afebrile  HEENT: NC/AT. EOMI.   Cardiovascular: RRR without murmurs. Normal capillary refill   Respiratory: Audible wheeze at bedside.  Lung auscultation reveals decreased air movement and expiratory wheezes throughout lung fields.  Patient appears to be in mild respiratory distress with mildly increased work of breathing.  Abdomen: soft, nontender, nondistended, no masses  EXT:  DANG. Trace edema of b/l feet. TTP medial aspect of the left heel and plantar aspect of left foot.  Increased pain with plantarflexion of the left foot.  Skin: No erythema/lesions   Neuro: Non-focal    LABS:  Recent Labs     08/20/24  1608 08/21/24  0004   WBC 16.2* 16.0*   RBC 6.58* 6.60*   HEMOGLOBIN 12.7* 12.7*   HEMATOCRIT 43.0 43.4   MCV 65.3* 65.8*   MCH 19.3* 19.2*   RDW 47.4 48.4  "  PLATELETCT 462* 442   MPV 9.7  --    NEUTSPOLYS 84.10* 96.50*   LYMPHOCYTES 6.20* 1.70*   MONOCYTES 6.20 0.00   EOSINOPHILS 0.90 0.00   BASOPHILS 0.90 0.00   RBCMORPHOLO Present Present     Recent Labs     08/20/24  1608 08/21/24  0004   SODIUM 138 134*   POTASSIUM 4.7 4.8   CHLORIDE 101 99   CO2 25 23   BUN 22 24*   CREATININE 1.11 1.29   CALCIUM 9.0 8.6   ALBUMIN 3.8 3.7     Estimated GFR/CRCL = Estimated Creatinine Clearance: 65.5 mL/min (by C-G formula based on SCr of 1.29 mg/dL).  Recent Labs     08/20/24  1608 08/21/24  0004   GLUCOSE 85 307*     Recent Labs     08/20/24  1608 08/21/24  0004   ASTSGOT 46* 40   ALTSGPT 67* 62*   TBILIRUBIN 0.5 0.4   ALKPHOSPHAT 31 31   GLOBULIN 2.3 2.4         Recent Labs     08/21/24  1423   UBRCZ36B 7.43   CTVYER182Y 35.2   XMLGV726T 72.2   RVKD7YSK 93.3   ARTHCO3 23   [id number] room air   ARTBE -1     No results for input(s): \"INR\", \"APTT\", \"FIBRINOGEN\" in the last 72 hours.    Invalid input(s): \"DIMER\"      IMAGING:  DX-FOOT-COMPLETE 3+ LEFT   Final Result      Osteoarthrosis without definite fracture or dislocation.      CT-CTA COMPLETE THORACOABDOMINAL AORTA   Final Result      1.  Median sternotomy. Nonunion of the sternal defect.   2.  Aortic valve replacement.   3.  No evidence of aortic aneurysm or dissection.   4.  Coronary calcification.   5.  Elevation of the left hemidiaphragm with chronic segmental/subsegmental atelectatic changes at the left lung base.   6.  Calcified old granulomatous disease right lower lobe.                        DX-CHEST-PORTABLE (1 VIEW)   Final Result      1.  Median sternotomy and aortic valve replacement.   2.  Ongoing elevation of left hemidiaphragm with subsegmental atelectatic changes or scarring at the left lung base.      EC-ECHOCARDIOGRAM COMPLETE W/O CONT    (Results Pending)       MEDS:  Current Facility-Administered Medications   Medication Last Admin    Respiratory Therapy Consult      ipratropium-albuterol (DUONEB) " nebulizer solution 3 mL at 08/21/24 1457    ipratropium-albuterol (DUONEB) nebulizer solution 3 mL at 08/21/24 1209    cetirizine (ZyrTEC) tablet 10 mg 10 mg at 08/21/24 0941    fluticasone-umeclidinium-vilanterol (Trelegy Ellipta) 200-62.5-25 mcg/act inhaler 1 Puff      ondansetron (Zofran) syringe/vial injection 4 mg      prochlorperazine (Compazine) injection 10 mg      ibuprofen (Motrin) tablet 800 mg      rivaroxaban (Xarelto) tablet 20 mg 20 mg at 08/21/24 0514    azithromycin (Zithromax) tablet 500 mg      predniSONE (Deltasone) tablet 40 mg 40 mg at 08/21/24 0514    oxyCODONE immediate-release (Roxicodone) tablet 5 mg      Or    oxyCODONE immediate release (Roxicodone) tablet 10 mg 10 mg at 08/21/24 1204       ASSESSMENT/PLAN:    Problem   Copd Exacerbation (Hcc)   Heart Failure With Improved Ejection Fraction (Hfimpef) ([facility])   Atrial Flutter (Hcc)   Plantar Fasciitis of Left Foot   Transaminitis   Anemia       * COPD exacerbation (HCC)- (present on admission)  Assessment & Plan  Patient has presumed COPD (no formal diagnosis) and is experiencing a exacerbation secondary to likely environmental exposure.  He has been noncompliant with his inhalers at home.      Plan:  -DuoNeb every 2 hours as needed  -Prednisone 40 (Day 1/5)  -Azithromycin 500 mg (Day 1/3)  -Started Trelegy inhaler inpatient -plan to discharge patient home with Trelegy plus albuterol inhaler  -Start cetirizine daily for allergies  -COPD order set enacted    Heart failure with improved ejection fraction (HFimpEF) ([facility])- (present on admission)  Assessment & Plan  Patient has a prior history of heart failure with his last ejection fraction improved at 60% in April 2024.  BNP is increased at 1550. Given lower extremity edema and worsening shortness of breath, there is concern for worsening ejection fraction.  Patient has not followed up with cardiology in several months now.  -Ordered echo  -Consider dose of IV Lasix if oxygen requirement does  not improve despite COPD management    Atrial flutter (HCC)  Assessment & Plan  Patient has been on Xarelto for over a year now.  He is s/p bioprosthetic aortic valve and had prior atrial flutter treated with ablation.  -Continue Xarelto inpatient  -Encourage patient continue follow-up with cardiology    Transaminitis  Assessment & Plan  Elevated liver enzymes chronically.  Prior CT has demonstrated hepatomegaly.  No significant alcohol history.  -Recommend outpatient workup of possible nonalcoholic steatohepatitis    Plantar fasciitis of left foot  Assessment & Plan  Patient presenting with left foot pain.  Initially presumed to be cellulitis so patient received 1 dose Ancef in the ED. upon further questioning this appears to be plantar fasciitis and not cellulitis. He reports right heel pain that is worse when he wakes up in the morning.  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 On exam he is TTP medial aspect of the left heel and plantar aspect of left foot. Increased pain with plantarflexion of the left foot.    Plan:  -Discontinue antibiotics  -Ideally would like to get a patient a night splint this may not be available inpatient  -Ibuprofen 800 mg every 8 hours for pain    Anemia- (present on admission)  Assessment & Plan  Patient's hemoglobin chronically decreased.  Iron studies from April 2024 show low ferritin and low iron.  -Will discharge with ferrous sulfate every other day for iron deficiency anemia         Core Measures:  Fluids: None  Lines: IV  Abx: Azithromycin  Diet: Regular  PPX: SCDs    DISPO: Inpatient      CODE STATUS: Full code    Sandro Fairchild M.D.  PGY-2  [facility]

## 2024-08-21 NOTE — PROGRESS NOTES
4 Eyes Skin Assessment Completed by ALLEN Daniel and ALLEN Mckeon.    Head Scab and Bruising  Ears Discoloration  Nose WDL  Mouth WDL  Neck WDL  Breast/Chest Bruising, Discoloration, and Scab  Shoulder Blades WDL  Spine WDL  (R) Arm/Elbow/Hand Redness, Bruising, Swelling, Discoloration, and Edema  (L) Arm/Elbow/Hand Redness, Scab, Discoloration, and Edema  Abdomen WDL  Groin Redness  Scrotum/Coccyx/Buttocks Excoriation and Discoloration  (R) Leg Scab, Swelling, and Edema  (L) Leg Redness, Bruising, Swelling, and Edema  (R) Heel/Foot/Toe Boggy and Swelling  (L) Heel/Foot/Toe Boggy and Swelling      Devices In Places Pulse Ox, SCD's, and Nasal Cannula      Interventions In Place Gray Ear Foams, Heel Mepilex, Sacral Mepilex, TAP System, Pillows, and Q2 Turns    Possible Skin Injury Yes    Pictures Uploaded Into Epic Yes  Wound Consult Placed Yes  RN Wound Prevention Protocol Ordered Yes

## 2024-08-21 NOTE — ASSESSMENT & PLAN NOTE
Patient has been on Xarelto for over a year now.  He is s/p bioprosthetic aortic valve and had prior atrial flutter treated with ablation.  -Continue Xarelto inpatient  -Encourage patient continue follow-up with cardiology

## 2024-08-21 NOTE — ASSESSMENT & PLAN NOTE
Patient has presumed COPD (no formal diagnosis) and is experiencing a exacerbation secondary to likely environmental exposure.  He has been noncompliant with his inhalers at home.      Plan:  -DuoNeb every 2 hours as needed  -Prednisone 40 (Day 1/5)  -Azithromycin 500 mg (Day 1/3)  -Started Trelegy inhaler inpatient -plan to discharge patient home with Trelegy plus albuterol inhaler  -Start cetirizine daily for allergies  -COPD order set enacted

## 2024-08-21 NOTE — CARE PLAN
The patient is Stable - Low risk of patient condition declining or worsening    Shift Goals  Clinical Goals: pain control to comfort level, maintain oxygenation >88%  Patient Goals: pain control, rest  Family Goals: HUGH    Progress made toward(s) clinical / shift goals:        Problem: Knowledge Deficit - Standard  Goal: Patient and family/care givers will demonstrate understanding of plan of care, disease process/condition, diagnostic tests and medications  Outcome: Progressing     Problem: Pain - Standard  Goal: Alleviation of pain or a reduction in pain to the patient’s comfort goal  Outcome: Progressing     Problem: Respiratory  Goal: Patient will achieve/maintain optimum respiratory ventilation and gas exchange  Outcome: Progressing       Patient is not progressing towards the following goals:

## 2024-08-21 NOTE — ED NOTES
Patient states to please disregard this message about her bowling. Report to ALLEN Hennessy.  Pt to CT. On 1L NC.

## 2024-08-21 NOTE — H&P
[facility] MEDICINE H&P        PATIENT ID:  NAME:  Noe Hickey  MRN:               [id number]  YOB: 1960    Date of Admission: 8/20/2024     Attending: Nabila Monge M.d.    Resident: Marva Manzo M.D.    Primary Care Physician:  Caryl Pineda M.D.    CC: Chest pain and shortness of breath    HPI: Noe Hickey is a 63 y.o. 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  Patient reports that his shortness of breath has been present for about 2 months and is progressively worsening.  He was breathing in a lot of dust at work today and felt like this worsened his breathing.  While he was at home taking a shower, he started to experience some chest pressure.  It is still ongoing but has improved with breathing treatments.  Patient denies any radiation of pain.  He denies similar pain in the past.  Patient was not previously compliant with his inhaler medications but started taking them daily a few weeks ago.  He denies a chronic cough but reports increased phlegm production in the past few days.  The patient reports that he also quit smoking a few weeks ago due to this worsening shortness of breath.  He does report some increased bilateral lower extremity edema in the past few days.  The patient denies any orthopnea.  He has been compliant with his Xarelto.    Patient also reports pain of his left heel.  He noticed some redness earlier today.  He denies any skin abrasions over the area.  Patient also denies any systemic symptoms including fevers or chills.      ERCourse:  Patient was tachypneic and had an elevated blood pressure on presentation.  CBC was remarkable for an elevated white count of 16.2 and decreased hemoglobin of 12.7.  CMP showed elevated AST and ALT.  Troponin was elevated at 25 and 22.  BNP was  elevated at 1550.  Chest XR showed no acute abnormalities.  CTA showed no evidence of aortic aneurysm or dissection and calcified old granulomatous disease in the right lower lobe.  Patient received several breathing treatments in the ED, ASA, Ancef, and azithromycin.    REVIEW OF SYSTEMS:   Ten systems reviewed and were negative except as noted in the HPI.                PAST MEDICAL HISTORY:  Past Medical History:   Diagnosis Date    Arrhythmia     Breath shortness 06/09/2023    prior to 1/2023 surgery    Cyclic vomiting syndrome     Elevated hemidiaphragm - LEFT post AVR 01/04/2023    Fracture     Headache, classical migraine     Heart burn     Heart valve disease 06/09/2023    heart surgery in 1/2023    Hyperlipidemia 04/26/2024    medicated    Hypertension 04/26/2024 6/6/2024 pt not currently taking any medication for this at this time    Indigestion     Pneumonia due to infectious organism 01/20/2023    Snoring     6/6/2024 no sleep study done       PAST SURGICAL HISTORY:  Past Surgical History:   Procedure Laterality Date    IRRIGATION & DEBRIDEMENT GENERAL N/A 6/7/2024    Procedure: RIGHT FOOT IRRIGATION AND DEBRIDEMENT;  Surgeon: Oliver Arreguin M.D.;  Location: [facility];  Service: Orthopedics    INCISION AND DRAINAGE GENERAL Left 04/19/2024    Procedure: INCISION AND DRAINAGE, LEG;  Surgeon: Mitch Bowie M.D.;  Location: [facility];  Service: Orthopedics    AORTIC VALVE REPLACEMENT  01/05/2023    Procedure: AORTIC VALVE REPLACEMENT, ASCENDING AORTIC ANEURYSM REPAIR AND TRANSESOPHAGEAL ECHOCARDIOGRAM.;  Surgeon: Serena Goyal M.D.;  Location: [facility];  Service: Cardiac    AORTIC ASCENDING DISSECTION  01/05/2023    Procedure: REPAIR, ANEURYSM OR DISSECTION, AORTA, ASCENDING;  Surgeon: Serena Goyal M.D.;  Location: [facility];  Service: Cardiac    ECHOCARDIOGRAM, TRANSESOPHAGEAL, INTRAOPERATIVE  01/05/2023    Procedure: ECHOCARDIOGRAM,  TRANSESOPHAGEAL, INTRAOPERATIVE.;  Surgeon: Serena Goyal M.D.;  Location: SURGERY [facility];  Service: Cardiac    IRRIGATION & DEBRIDEMENT ORTHO Right 09/19/2017    Procedure: IRRIGATION & DEBRIDEMENT ORTHO-THIGH;  Surgeon: Corbin Rivera M.D.;  Location: SURGERY [facility];  Service: Orthopedics    SHOULDER HEMICAP RESURFACING Right 10/12/2015    Procedure: RIGHT SHOULDER RESURFACING;  Surgeon: Javon Doll M.D.;  Location: SURGERY [facility];  Service:     SHOULDER ARTHROSCOPY Bilateral     x3    SHOULDER SURGERY Right     replacement right       FAMILY HISTORY:  No family history on file.    SOCIAL HISTORY:   Smoking previous 50+ PPD smoker, quit 1 month ago.  Etoh use denies.  Drug use denies.    DIET:   Orders Placed This Encounter   Procedures    Diet Order Diet: Cardiac     Standing Status:   Standing     Number of Occurrences:   1     Order Specific Question:   Diet:     Answer:   Cardiac [6]       ALLERGIES:  Allergies   Allergen Reactions    Morphine Shortness of Breath and Rash     Rash, shortness of breath       OUTPATIENT MEDICATIONS:    Current Facility-Administered Medications:     rivaroxaban (Xarelto) tablet 20 mg, 20 mg, Oral, DAILY, Marva Manzo M.D.    azithromycin (Zithromax) tablet 500 mg, 500 mg, Oral, DAILY, Marva Manzo M.D.    predniSONE (Deltasone) tablet 40 mg, 40 mg, Oral, DAILY, Marva Manzo M.D.    ipratropium-albuterol (DUONEB) nebulizer solution, 3 mL, Nebulization, Q4H PRN (RT), Marva Manzo M.D.    ceFAZolin (Ancef) 2 g in  mL IVPB, 2 g, Intravenous, Q8HRS, Marva Manzo M.D.    oxyCODONE immediate-release (Roxicodone) tablet 5 mg, 5 mg, Oral, Q4HRS PRN **OR** oxyCODONE immediate release (Roxicodone) tablet 10 mg, 10 mg, Oral, Q4HRS PRN, Marva Manzo M.D., 10 mg at 08/20/24 2350    PHYSICAL EXAM:  Vitals:    08/20/24 2054 08/20/24 2100 08/20/24 2135 08/20/24 2145   BP: (!) 151/75 (!) 154/99 (!) 145/95    Pulse: 80 77 81   "  Resp: 20 18 20    Temp:   36.6 °C (97.9 °F)    TempSrc:   Temporal    SpO2: 93% 94% 96%    Weight:    94 kg (207 lb 3.7 oz)   Height:    1.753 m (5' 9\")   , Temp (24hrs), Av °C (98.6 °F), Min:36.6 °C (97.9 °F), Max:37.4 °C (99.4 °F)  , Pulse Oximetry: 96 %, O2 (LPM): 1, O2 Delivery Device: Nasal Cannula    General: Pt resting in NAD, cooperative   Skin:  Pink, warm and dry.  No rashes  HEENT: NC/AT. PERRL. EOMI. MMM. No nasal discharge. Oropharynx nonerythematous without exudate/plaques  Lungs:  Examined after breathing treatment- no wheezing, equal air movement throughout, no crackles. Dyspneic with speaking in 3-4 word sentences.  Cardiovascular:  Grade 2/6 systolic heart murmur. No rubs or gallops. Normal rate and rhythm.  Abdomen:  Abdomen is soft NT/ND. +BS. No masses noted.  Extremities:  Erythema of left heel extending upward to mid calf. 1+ bilateral pitting edema up to ankles. Full range of motion. 2+ pulses in all extremities.   CNS:  Muscle tone is normal. Cranial nerves II-XII grossly intact. 2+ DTRs.         LAB TESTS:   Recent Labs     24  1608   WBC 16.2*   RBC 6.58*   HEMOGLOBIN 12.7*   HEMATOCRIT 43.0   MCV 65.3*   MCH 19.3*   RDW 47.4   PLATELETCT 462*   MPV 9.7   NEUTSPOLYS 84.10*   LYMPHOCYTES 6.20*   MONOCYTES 6.20   EOSINOPHILS 0.90   BASOPHILS 0.90   RBCMORPHOLO Present         Recent Labs     24  1608 24  0004   SODIUM 138 134*   POTASSIUM 4.7 4.8   CHLORIDE 101 99   CO2 25 23   BUN 22 24*   CREATININE 1.11 1.29   CALCIUM 9.0 8.6   ALBUMIN 3.8 3.7       CULTURES:   Results       ** No results found for the last 168 hours. **            IMAGES:  CT-CTA COMPLETE THORACOABDOMINAL AORTA   Final Result      1.  Median sternotomy. Nonunion of the sternal defect.   2.  Aortic valve replacement.   3.  No evidence of aortic aneurysm or dissection.   4.  Coronary calcification.   5.  Elevation of the left hemidiaphragm with chronic segmental/subsegmental atelectatic changes at " the left lung base.   6.  Calcified old granulomatous disease right lower lobe.                        DX-CHEST-PORTABLE (1 VIEW)   Final Result      1.  Median sternotomy and aortic valve replacement.   2.  Ongoing elevation of left hemidiaphragm with subsegmental atelectatic changes or scarring at the left lung base.      EC-ECHOCARDIOGRAM COMPLETE W/O CONT    (Results Pending)       CONSULTS:   None    ASSESSMENT/PLAN: 63 y.o. male with a history of ascending aortic aneurysm repair and TAVR in 2023, presumed COPD, atrial flutter on Xarelto, HFimpEF (EF 60% 4/24), and tobacco use admitted for hypoxia 2/2 COPD exacerbation vs heart failure.    Problem   Cellulitis   Transaminitis   Chronic Anticoagulation   Copd Exacerbation (Hcc)   Anemia   Heart Failure With Improved Ejection Fraction (Hfimpef) ([facility])       COPD exacerbation ([facility])  Patient has presumed COPD and is likely experiencing a exacerbation secondary to environmental exposure.  He has also been noncompliant with his inhalers.  Patient was previously discharged with Trelegy although it is not currently on his MAR.  He is unsure which inhalers he has at home.  -DuoNeb every 4 hours as needed  -Azithromycin 500 mg 3 days for anti-inflammatory properties  -Recommend discharging with corticosteroid/LAMA/LABA given recurrent hospitalizations for COPD  -Day team to consider discussing close follow-up with pulmonology given patient's recurrent hospitalizations  -Hold Spiriva while treating with LUC    Heart failure with improved ejection fraction (HFimpEF) ([facility])  Patient has a prior history of heart failure with his last ejection fraction improved at 60% in April 2024.  BNP is increased at 1550. Given lower extremity edema and worsening shortness of breath, there is concern for worsening ejection fraction.  Patient has not followed up with cardiology in several months now.  -Ordered echo  -No evidence of pleural effusions at this time  -Consider dose of IV Lasix  if oxygen requirement does not improve despite COPD management    Cellulitis  Patient presenting with left heel pain and erythema.  S/p Ancef in the ED for cellulitis.  -Continue Ancef and consider de-escalating to PO abx in AM if improving    Transaminitis  Elevated liver enzymes chronically.  Prior CT has demonstrated hepatomegaly.  -Recommend follow-up for workup of possible nonalcoholic steatohepatitis    Anemia  Patient's hemoglobin chronically decreased.  Iron studies from April 2024 show low ferritin and low iron.  -Recommend discharge with ferrous sulfate every other day for iron deficiency anemia    Chronic anticoagulation  Patient has been on Xarelto for over a year now.  He is s/p bioprosthetic aortic valve and had prior atrial flutter treated with ablation.  -Continue Xarelto inpatient  -Commend patient continue follow-up with cardiology        Marva Manzo M.D.  [facility]

## 2024-08-21 NOTE — ED NOTES
Covid swab collected and placed into POC machine for processing.  Pt requesting pain medication for chest and foot pain.  ERP updated via voalte.

## 2024-08-21 NOTE — ASSESSMENT & PLAN NOTE
Patient's hemoglobin chronically decreased.  Iron studies from April 2024 show low ferritin and low iron.  -Will discharge with ferrous sulfate every other day for iron deficiency anemia

## 2024-08-21 NOTE — RESPIRATORY CARE
"COPD EDUCATION by COPD CLINICAL EDUCATOR  8/21/2024  at  9:37 AM by Aron Macedo, RRT     Patient interviewed by education team.  Patient declined or is unable to participate in the full program.  Therefore, a short intervention has been conducted.  A comprehensive packet including information about COPD, types of treatments to manage their disease and safe home Oxygen usage was provided and reviewed with patient at the bedside.  Messaged attending about prescribing a maintenance and rescue medication for patient and both were ordered for discharge.    Smoking Cessation Intervention and education completed, 3 minutes spent on smoking cessation education with patient.  Provided smoking cessation packet with \"Tips to Quit\" and brochure for \"Free Smoking Cessation Classes\".      COPD Screen  COPD Risk Screening  Do you have a history of COPD?: Yes  Do you have a Pulmonologist?: No  COPD Population Screener  During the past 4 weeks, how much did you feel short of breath?: Some of the time  Do you ever cough up any mucus or phlegm?: Yes, a few days a week or month  In the past 12 months, you do less than you used to because of your breathing problems: Agree  Have you smoked at least 100 cigarettes in your entire life?: Yes  How old are you?: 60+  COPD Screening Score: 7  COPD Coordinator Recommended: Yes  COPD Coordinator Not Recommended: Yes    COPD Assessment  COPD Clinical Specialists ONLY  COPD Education Initiated: Yes--Short Intervention (Gave patient COPD information and smoking cessation booklets, gave patient spacer and instructions on its use. requested follow up appt with pcp through scheduling and messaged attending for out patient RX for both a maintinence and a rescue medication.)  Is this a COPD exacerbation patient?: Yes  DME Company: preferred  DME Equipment Type: home oxygen and nebulizer  Physician Follow Up Appointment:  (requested follow up with pcp with scheduling)  Physician Name: " "White  Pulmonologist Name: none  Referrals Initiated: Yes  Pulmonary Rehab: No  Smoking Cessation: Yes  $ Smoking Cessation 3-10 Minutes: Symptomatic  Smoking Pack Years: 40  Hospice: No  Home Health Care: No  Mobile Urgent Care Services: No  Geriatric Specialty Group: No  Private In-Home Care Agency: No  $ Demo/Eval of SVN's, MDI's and Aerosols: Yes (demo use of spacer)  Interdisciplinary Rounds: Attendance at Rounds (30 Min)    PFT Results        Meds to Beds  [facility] provides bedside medication delivery for all eligible patients at discharge and you have been automatically enrolled in the Meds to Beds Program. Would you like to opt out of this program for any reason?: No - Stay Opted In     MY COPD ACTION PLAN     It is recommended that patients and physicians /healthcare providers complete this action plan together. This plan should be discussed at each physician visit and updated as needed.    The green, yellow and red zones show groups of symptoms of COPD. This list of symptoms is not comprehensive, and you may experience other symptoms. In the \"Actions\" column, your healthcare provider has recommended actions for you to take based on your symptoms.    Patient Name: Noe Hickey   YOB: 1960   Last Updated on: 8/21/2024  9:35 AM   Green Zone:  I am doing well today Actions     Usual activitiy and exercise level   Take daily medications     Usual amounts of cough and phlegm/mucus   Use oxygen as prescribed     Sleep well at night   Continue regular exercise/diet plan     Appetite is good   At all times avoid cigarette smoke, inhaled irritants     Daily Medications (these medications are taken every day):   Budesonide-Formoterol Fumarate (Symbicort) 2 Puffs Twice daily     Additional Information:  Use the a spacer, rinse mouth after taking        Yellow Zone:  I am having a bad day or a COPD flare Actions     More breathless than usual   Continue daily medications     I have less energy " "for my daily activities   Use quick relief inhaler as ordered     Increased or thicker phlegm/mucus   Use oxygen as prescribed     Using quick relief inhaler/nebulizer more often   Get plenty of rest     Swelling of ankles more than usual   Use pursed lip breathing     More coughing than usual   At all times avoid cigarette smoke, inhaled irritants     I feel like I have a \"chest cold\"     Poor sleep and my symptoms woke me up     My appetite is not good     My medicine is not helping      Call provider immediately if symptoms don’t improve     Continue daily medications, add rescue medications:   Albuterol 2 Puffs Every 6 hours PRN       Medications to be used during a flare up, (as Discussed with Provider):           Additional Information:  Use with a spacer    Red Zone:  I need urgent medical care Actions     Severe shortness of breath even at rest   Call 911 or seek medical care immediately     Not able to do any activity because of breathing      Fever or shaking chills      Feeling confused or very drowsy       Chest pains      Coughing up blood                  "

## 2024-08-22 ENCOUNTER — APPOINTMENT (OUTPATIENT)
Dept: RADIOLOGY | Facility: MEDICAL CENTER | Age: 64
DRG: 190 | End: 2024-08-22
Payer: MEDICAID

## 2024-08-22 ENCOUNTER — APPOINTMENT (OUTPATIENT)
Dept: CARDIOLOGY | Facility: MEDICAL CENTER | Age: 64
DRG: 190 | End: 2024-08-22
Payer: MEDICAID

## 2024-08-22 PROBLEM — F41.9 ANXIETY: Status: ACTIVE | Noted: 2024-08-22

## 2024-08-22 PROBLEM — R79.89 ELEVATED LFTS: Status: ACTIVE | Noted: 2024-08-21

## 2024-08-22 PROBLEM — J96.02 ACUTE RESPIRATORY FAILURE WITH HYPOXIA AND HYPERCAPNIA (HCC): Status: ACTIVE | Noted: 2024-04-26

## 2024-08-22 PROBLEM — Z79.01 LONG TERM (CURRENT) USE OF ANTICOAGULANTS: Status: ACTIVE | Noted: 2024-08-22

## 2024-08-22 LAB
ALBUMIN SERPL BCP-MCNC: 3.8 G/DL (ref 3.2–4.9)
ALBUMIN/GLOB SERPL: 1.7 G/DL
ALP SERPL-CCNC: 31 U/L (ref 30–99)
ALT SERPL-CCNC: 183 U/L (ref 2–50)
AMMONIA PLAS-SCNC: 14 UMOL/L (ref 11–45)
AMPHET UR QL SCN: NEGATIVE
ANION GAP SERPL CALC-SCNC: 14 MMOL/L (ref 7–16)
ANION GAP SERPL CALC-SCNC: 14 MMOL/L (ref 7–16)
ANISOCYTOSIS BLD QL SMEAR: ABNORMAL
ARTERIAL PATENCY WRIST A: ABNORMAL
ARTERIAL PATENCY WRIST A: ABNORMAL
AST SERPL-CCNC: 129 U/L (ref 12–45)
BARBITURATES UR QL SCN: NEGATIVE
BASE EXCESS BLDA CALC-SCNC: -1 MMOL/L (ref -4–3)
BASE EXCESS BLDA CALC-SCNC: 2 MMOL/L (ref -4–3)
BASOPHILS # BLD AUTO: 0 % (ref 0–1.8)
BASOPHILS # BLD: 0 K/UL (ref 0–0.12)
BENZODIAZ UR QL SCN: NEGATIVE
BILIRUB SERPL-MCNC: 0.6 MG/DL (ref 0.1–1.5)
BODY TEMPERATURE: ABNORMAL DEGREES
BODY TEMPERATURE: ABNORMAL DEGREES
BUN SERPL-MCNC: 42 MG/DL (ref 8–22)
BUN SERPL-MCNC: 42 MG/DL (ref 8–22)
BZE UR QL SCN: NEGATIVE
CALCIUM ALBUM COR SERPL-MCNC: 8.9 MG/DL (ref 8.5–10.5)
CALCIUM SERPL-MCNC: 8.6 MG/DL (ref 8.5–10.5)
CALCIUM SERPL-MCNC: 8.7 MG/DL (ref 8.5–10.5)
CANNABINOIDS UR QL SCN: NEGATIVE
CHLORIDE SERPL-SCNC: 96 MMOL/L (ref 96–112)
CHLORIDE SERPL-SCNC: 97 MMOL/L (ref 96–112)
CO2 BLDA-SCNC: 28 MMOL/L (ref 20–33)
CO2 BLDA-SCNC: 30 MMOL/L (ref 20–33)
CO2 SERPL-SCNC: 23 MMOL/L (ref 20–33)
CO2 SERPL-SCNC: 24 MMOL/L (ref 20–33)
CREAT SERPL-MCNC: 1.75 MG/DL (ref 0.5–1.4)
CREAT SERPL-MCNC: 1.86 MG/DL (ref 0.5–1.4)
DELSYS IDSYS: ABNORMAL
DELSYS IDSYS: ABNORMAL
EKG IMPRESSION: NORMAL
EOSINOPHIL # BLD AUTO: 0 K/UL (ref 0–0.51)
EOSINOPHIL NFR BLD: 0 % (ref 0–6.9)
ERYTHROCYTE [DISTWIDTH] IN BLOOD BY AUTOMATED COUNT: 48.8 FL (ref 35.9–50)
FENTANYL UR QL: POSITIVE
FLUAV RNA SPEC QL NAA+PROBE: NEGATIVE
FLUBV RNA SPEC QL NAA+PROBE: NEGATIVE
GFR SERPLBLD CREATININE-BSD FMLA CKD-EPI: 40 ML/MIN/1.73 M 2
GFR SERPLBLD CREATININE-BSD FMLA CKD-EPI: 43 ML/MIN/1.73 M 2
GLOBULIN SER CALC-MCNC: 2.3 G/DL (ref 1.9–3.5)
GLUCOSE BLD STRIP.AUTO-MCNC: 125 MG/DL (ref 65–99)
GLUCOSE SERPL-MCNC: 130 MG/DL (ref 65–99)
GLUCOSE SERPL-MCNC: 131 MG/DL (ref 65–99)
HCO3 BLDA-SCNC: 26.5 MMOL/L (ref 17–25)
HCO3 BLDA-SCNC: 28.6 MMOL/L (ref 17–25)
HCT VFR BLD AUTO: 40 % (ref 42–52)
HGB BLD-MCNC: 11.6 G/DL (ref 14–18)
HOROWITZ INDEX BLDA+IHG-RTO: 208 MM[HG]
HYPOCHROMIA BLD QL SMEAR: ABNORMAL
LACTATE BLD-SCNC: 3.1 MMOL/L (ref 0.5–2)
LACTATE SERPL-SCNC: 1.6 MMOL/L (ref 0.5–2)
LACTATE SERPL-SCNC: 3 MMOL/L (ref 0.5–2)
LPM ILPM: 15 LPM
LV EJECT FRACT  99904: 55
LV EJECT FRACT MOD 2C 99903: 56.61
LV EJECT FRACT MOD 4C 99902: 56.15
LYMPHOCYTES # BLD AUTO: 0.7 K/UL (ref 1–4.8)
LYMPHOCYTES NFR BLD: 2.6 % (ref 22–41)
MAGNESIUM SERPL-MCNC: 2.1 MG/DL (ref 1.5–2.5)
MANUAL DIFF BLD: NORMAL
MCH RBC QN AUTO: 19.2 PG (ref 27–33)
MCHC RBC AUTO-ENTMCNC: 29 G/DL (ref 32.3–36.5)
MCV RBC AUTO: 66.2 FL (ref 81.4–97.8)
METHADONE UR QL SCN: NEGATIVE
MICROCYTES BLD QL SMEAR: ABNORMAL
MONOCYTES # BLD AUTO: 1.17 K/UL (ref 0–0.85)
MONOCYTES NFR BLD AUTO: 4.3 % (ref 0–13.4)
MORPHOLOGY BLD-IMP: NORMAL
NEUTROPHILS # BLD AUTO: 25.23 K/UL (ref 1.82–7.42)
NEUTROPHILS NFR BLD: 93.1 % (ref 44–72)
NRBC # BLD AUTO: 0.06 K/UL
NRBC BLD-RTO: 0.2 /100 WBC (ref 0–0.2)
O2/TOTAL GAS SETTING VFR VENT: 25 %
OPIATES UR QL SCN: NEGATIVE
OXYCODONE UR QL SCN: POSITIVE
PCO2 BLDA: 53.8 MMHG (ref 26–37)
PCO2 BLDA: 54.3 MMHG (ref 26–37)
PCO2 TEMP ADJ BLDA: 53.8 MMHG (ref 26–37)
PCO2 TEMP ADJ BLDA: 54.2 MMHG (ref 26–37)
PCP UR QL SCN: NEGATIVE
PH BLDA: 7.3 [PH] (ref 7.4–7.5)
PH BLDA: 7.33 [PH] (ref 7.4–7.5)
PH TEMP ADJ BLDA: 7.3 [PH] (ref 7.4–7.5)
PH TEMP ADJ BLDA: 7.33 [PH] (ref 7.4–7.5)
PHOSPHATE SERPL-MCNC: 5.8 MG/DL (ref 2.5–4.5)
PLATELET # BLD AUTO: 483 K/UL (ref 164–446)
PLATELET BLD QL SMEAR: NORMAL
PMV BLD AUTO: 9.9 FL (ref 9–12.9)
PO2 BLDA: 52 MMHG (ref 64–87)
PO2 BLDA: 72 MMHG (ref 64–87)
PO2 TEMP ADJ BLDA: 52 MMHG (ref 64–87)
PO2 TEMP ADJ BLDA: 71 MMHG (ref 64–87)
POTASSIUM SERPL-SCNC: 5.3 MMOL/L (ref 3.6–5.5)
POTASSIUM SERPL-SCNC: 5.5 MMOL/L (ref 3.6–5.5)
PROCALCITONIN SERPL-MCNC: 0.14 NG/ML
PROPOXYPH UR QL SCN: NEGATIVE
PROT SERPL-MCNC: 6.1 G/DL (ref 6–8.2)
RBC # BLD AUTO: 6.04 M/UL (ref 4.7–6.1)
RBC BLD AUTO: PRESENT
RSV RNA SPEC QL NAA+PROBE: NEGATIVE
SAO2 % BLDA: 83 % (ref 93–99)
SAO2 % BLDA: 92 % (ref 93–99)
SARS-COV-2 RNA RESP QL NAA+PROBE: NOTDETECTED
SCCMEC + MECA PNL NOSE NAA+PROBE: NEGATIVE
SCHISTOCYTES BLD QL SMEAR: NORMAL
SODIUM SERPL-SCNC: 134 MMOL/L (ref 135–145)
SODIUM SERPL-SCNC: 134 MMOL/L (ref 135–145)
SPECIMEN DRAWN FROM PATIENT: ABNORMAL
SPECIMEN DRAWN FROM PATIENT: ABNORMAL
SPECIMEN SOURCE: NORMAL
TROPONIN T SERPL-MCNC: 29 NG/L (ref 6–19)
TROPONIN T SERPL-MCNC: 34 NG/L (ref 6–19)
WBC # BLD AUTO: 27.1 K/UL (ref 4.8–10.8)

## 2024-08-22 PROCEDURE — 87641 MR-STAPH DNA AMP PROBE: CPT

## 2024-08-22 PROCEDURE — 700105 HCHG RX REV CODE 258

## 2024-08-22 PROCEDURE — 93005 ELECTROCARDIOGRAM TRACING: CPT | Performed by: INTERNAL MEDICINE

## 2024-08-22 PROCEDURE — 99291 CRITICAL CARE FIRST HOUR: CPT | Performed by: INTERNAL MEDICINE

## 2024-08-22 PROCEDURE — 94003 VENT MGMT INPAT SUBQ DAY: CPT

## 2024-08-22 PROCEDURE — 93306 TTE W/DOPPLER COMPLETE: CPT | Mod: 26 | Performed by: INTERNAL MEDICINE

## 2024-08-22 PROCEDURE — 85007 BL SMEAR W/DIFF WBC COUNT: CPT

## 2024-08-22 PROCEDURE — 99292 CRITICAL CARE ADDL 30 MIN: CPT | Performed by: INTERNAL MEDICINE

## 2024-08-22 PROCEDURE — A9270 NON-COVERED ITEM OR SERVICE: HCPCS

## 2024-08-22 PROCEDURE — 71045 X-RAY EXAM CHEST 1 VIEW: CPT

## 2024-08-22 PROCEDURE — 94669 MECHANICAL CHEST WALL OSCILL: CPT

## 2024-08-22 PROCEDURE — 80048 BASIC METABOLIC PNL TOTAL CA: CPT

## 2024-08-22 PROCEDURE — 36600 WITHDRAWAL OF ARTERIAL BLOOD: CPT

## 2024-08-22 PROCEDURE — 99222 1ST HOSP IP/OBS MODERATE 55: CPT | Mod: GC | Performed by: FAMILY MEDICINE

## 2024-08-22 PROCEDURE — 85027 COMPLETE CBC AUTOMATED: CPT

## 2024-08-22 PROCEDURE — 700117 HCHG RX CONTRAST REV CODE 255

## 2024-08-22 PROCEDURE — 94640 AIRWAY INHALATION TREATMENT: CPT

## 2024-08-22 PROCEDURE — 84145 PROCALCITONIN (PCT): CPT

## 2024-08-22 PROCEDURE — 82140 ASSAY OF AMMONIA: CPT

## 2024-08-22 PROCEDURE — 93970 EXTREMITY STUDY: CPT

## 2024-08-22 PROCEDURE — 93010 ELECTROCARDIOGRAM REPORT: CPT | Performed by: INTERNAL MEDICINE

## 2024-08-22 PROCEDURE — 700111 HCHG RX REV CODE 636 W/ 250 OVERRIDE (IP)

## 2024-08-22 PROCEDURE — 700102 HCHG RX REV CODE 250 W/ 637 OVERRIDE(OP)

## 2024-08-22 PROCEDURE — 82803 BLOOD GASES ANY COMBINATION: CPT

## 2024-08-22 PROCEDURE — 84100 ASSAY OF PHOSPHORUS: CPT

## 2024-08-22 PROCEDURE — 70450 CT HEAD/BRAIN W/O DYE: CPT

## 2024-08-22 PROCEDURE — 700111 HCHG RX REV CODE 636 W/ 250 OVERRIDE (IP): Mod: JZ

## 2024-08-22 PROCEDURE — 770022 HCHG ROOM/CARE - ICU (200)

## 2024-08-22 PROCEDURE — 71275 CT ANGIOGRAPHY CHEST: CPT

## 2024-08-22 PROCEDURE — 0241U HCHG SARS-COV-2 COVID-19 NFCT DS RESP RNA 4 TRGT MIC: CPT

## 2024-08-22 PROCEDURE — 84484 ASSAY OF TROPONIN QUANT: CPT

## 2024-08-22 PROCEDURE — 83735 ASSAY OF MAGNESIUM: CPT

## 2024-08-22 PROCEDURE — 80307 DRUG TEST PRSMV CHEM ANLYZR: CPT

## 2024-08-22 PROCEDURE — 93306 TTE W/DOPPLER COMPLETE: CPT

## 2024-08-22 PROCEDURE — 700101 HCHG RX REV CODE 250

## 2024-08-22 PROCEDURE — 80053 COMPREHEN METABOLIC PANEL: CPT

## 2024-08-22 PROCEDURE — 83605 ASSAY OF LACTIC ACID: CPT

## 2024-08-22 PROCEDURE — 700111 HCHG RX REV CODE 636 W/ 250 OVERRIDE (IP): Performed by: INTERNAL MEDICINE

## 2024-08-22 PROCEDURE — 94660 CPAP INITIATION&MGMT: CPT

## 2024-08-22 PROCEDURE — 87040 BLOOD CULTURE FOR BACTERIA: CPT | Mod: 91

## 2024-08-22 PROCEDURE — 700101 HCHG RX REV CODE 250: Performed by: INTERNAL MEDICINE

## 2024-08-22 RX ORDER — IPRATROPIUM BROMIDE AND ALBUTEROL SULFATE 2.5; .5 MG/3ML; MG/3ML
3 SOLUTION RESPIRATORY (INHALATION)
Status: DISCONTINUED | OUTPATIENT
Start: 2024-08-22 | End: 2024-08-24

## 2024-08-22 RX ORDER — HYDROMORPHONE HYDROCHLORIDE 1 MG/ML
0.5 INJECTION, SOLUTION INTRAMUSCULAR; INTRAVENOUS; SUBCUTANEOUS ONCE
Status: DISCONTINUED | OUTPATIENT
Start: 2024-08-22 | End: 2024-08-22

## 2024-08-22 RX ORDER — KETOROLAC TROMETHAMINE 15 MG/ML
15 INJECTION, SOLUTION INTRAMUSCULAR; INTRAVENOUS ONCE
Status: COMPLETED | OUTPATIENT
Start: 2024-08-22 | End: 2024-08-23

## 2024-08-22 RX ORDER — SODIUM CHLORIDE, SODIUM LACTATE, POTASSIUM CHLORIDE, AND CALCIUM CHLORIDE .6; .31; .03; .02 G/100ML; G/100ML; G/100ML; G/100ML
1000 INJECTION, SOLUTION INTRAVENOUS ONCE
Status: ACTIVE | OUTPATIENT
Start: 2024-08-22 | End: 2024-08-23

## 2024-08-22 RX ORDER — SODIUM CHLORIDE, SODIUM LACTATE, POTASSIUM CHLORIDE, CALCIUM CHLORIDE 600; 310; 30; 20 MG/100ML; MG/100ML; MG/100ML; MG/100ML
INJECTION, SOLUTION INTRAVENOUS CONTINUOUS
Status: ACTIVE | OUTPATIENT
Start: 2024-08-22 | End: 2024-08-23

## 2024-08-22 RX ORDER — HYDROXYZINE HYDROCHLORIDE 25 MG/1
25 TABLET, FILM COATED ORAL 3 TIMES DAILY PRN
Status: DISCONTINUED | OUTPATIENT
Start: 2024-08-22 | End: 2024-08-24 | Stop reason: HOSPADM

## 2024-08-22 RX ORDER — LIDOCAINE 4 G/G
1 PATCH TOPICAL EVERY 24 HOURS
Status: DISCONTINUED | OUTPATIENT
Start: 2024-08-22 | End: 2024-08-24 | Stop reason: HOSPADM

## 2024-08-22 RX ORDER — HYDROXYZINE HYDROCHLORIDE 25 MG/1
50 TABLET, FILM COATED ORAL 3 TIMES DAILY PRN
Status: DISCONTINUED | OUTPATIENT
Start: 2024-08-22 | End: 2024-08-22

## 2024-08-22 RX ORDER — NAPROXEN 500 MG/1
500 TABLET ORAL 2 TIMES DAILY
Status: DISPENSED | OUTPATIENT
Start: 2024-08-22 | End: 2024-08-24

## 2024-08-22 RX ORDER — LORAZEPAM 2 MG/ML
1 INJECTION INTRAMUSCULAR ONCE
Status: COMPLETED | OUTPATIENT
Start: 2024-08-22 | End: 2024-08-22

## 2024-08-22 RX ADMIN — ONDANSETRON 4 MG: 2 INJECTION INTRAMUSCULAR; INTRAVENOUS at 22:08

## 2024-08-22 RX ADMIN — OXYCODONE HYDROCHLORIDE 10 MG: 10 TABLET ORAL at 13:21

## 2024-08-22 RX ADMIN — IPRATROPIUM BROMIDE AND ALBUTEROL SULFATE 3 ML: 2.5; .5 SOLUTION RESPIRATORY (INHALATION) at 00:38

## 2024-08-22 RX ADMIN — IPRATROPIUM BROMIDE AND ALBUTEROL SULFATE 3 ML: 2.5; .5 SOLUTION RESPIRATORY (INHALATION) at 10:05

## 2024-08-22 RX ADMIN — HYDROXYZINE HYDROCHLORIDE 25 MG: 25 TABLET ORAL at 09:23

## 2024-08-22 RX ADMIN — HUMAN ALBUMIN MICROSPHERES AND PERFLUTREN 3 ML: 10; .22 INJECTION, SOLUTION INTRAVENOUS at 16:51

## 2024-08-22 RX ADMIN — IOHEXOL 100 ML: 350 INJECTION, SOLUTION INTRAVENOUS at 02:03

## 2024-08-22 RX ADMIN — OXYCODONE HYDROCHLORIDE 5 MG: 5 TABLET ORAL at 05:49

## 2024-08-22 RX ADMIN — LORAZEPAM 1 MG: 2 INJECTION INTRAMUSCULAR; INTRAVENOUS at 00:30

## 2024-08-22 RX ADMIN — SODIUM CHLORIDE, POTASSIUM CHLORIDE, SODIUM LACTATE AND CALCIUM CHLORIDE: 600; 310; 30; 20 INJECTION, SOLUTION INTRAVENOUS at 13:26

## 2024-08-22 RX ADMIN — FLUTICASONE FUROATE, UMECLIDINIUM BROMIDE AND VILANTEROL TRIFENATATE 1 PUFF: 200; 62.5; 25 POWDER RESPIRATORY (INHALATION) at 06:53

## 2024-08-22 RX ADMIN — ONDANSETRON 4 MG: 2 INJECTION INTRAMUSCULAR; INTRAVENOUS at 15:52

## 2024-08-22 RX ADMIN — PREDNISONE 40 MG: 20 TABLET ORAL at 05:49

## 2024-08-22 RX ADMIN — SODIUM CHLORIDE, POTASSIUM CHLORIDE, SODIUM LACTATE AND CALCIUM CHLORIDE: 600; 310; 30; 20 INJECTION, SOLUTION INTRAVENOUS at 03:28

## 2024-08-22 RX ADMIN — RIVAROXABAN 20 MG: 20 TABLET, FILM COATED ORAL at 05:48

## 2024-08-22 RX ADMIN — IPRATROPIUM BROMIDE AND ALBUTEROL SULFATE 3 ML: 2.5; .5 SOLUTION RESPIRATORY (INHALATION) at 06:51

## 2024-08-22 RX ADMIN — HYDROXYZINE HYDROCHLORIDE 25 MG: 25 TABLET ORAL at 17:53

## 2024-08-22 RX ADMIN — IPRATROPIUM BROMIDE AND ALBUTEROL SULFATE 3 ML: 2.5; .5 SOLUTION RESPIRATORY (INHALATION) at 18:14

## 2024-08-22 RX ADMIN — Medication 5 MG: at 20:53

## 2024-08-22 RX ADMIN — CETIRIZINE HYDROCHLORIDE 10 MG: 10 TABLET, FILM COATED ORAL at 05:49

## 2024-08-22 RX ADMIN — IPRATROPIUM BROMIDE AND ALBUTEROL SULFATE 3 ML: 2.5; .5 SOLUTION RESPIRATORY (INHALATION) at 03:11

## 2024-08-22 RX ADMIN — SODIUM CHLORIDE, POTASSIUM CHLORIDE, SODIUM LACTATE AND CALCIUM CHLORIDE: 600; 310; 30; 20 INJECTION, SOLUTION INTRAVENOUS at 23:33

## 2024-08-22 RX ADMIN — OXYCODONE HYDROCHLORIDE 10 MG: 10 TABLET ORAL at 09:23

## 2024-08-22 RX ADMIN — AZITHROMYCIN DIHYDRATE 500 MG: 250 TABLET ORAL at 17:53

## 2024-08-22 RX ADMIN — LIDOCAINE 1 PATCH: 4 PATCH TOPICAL at 09:22

## 2024-08-22 RX ADMIN — OXYCODONE HYDROCHLORIDE 10 MG: 10 TABLET ORAL at 17:53

## 2024-08-22 ASSESSMENT — ENCOUNTER SYMPTOMS
WHEEZING: 1
SHORTNESS OF BREATH: 1
CONSTIPATION: 1
LOSS OF CONSCIOUSNESS: 1
FOCAL WEAKNESS: 0
CHILLS: 0
HEADACHES: 1
BLURRED VISION: 0
DIZZINESS: 1
DIZZINESS: 0
PALPITATIONS: 0
DOUBLE VISION: 0
NAUSEA: 0
WEIGHT LOSS: 0
COUGH: 0
FALLS: 1
SPUTUM PRODUCTION: 0
SPUTUM PRODUCTION: 1
LOSS OF CONSCIOUSNESS: 0
FEVER: 0
DIAPHORESIS: 1
ORTHOPNEA: 0
VOMITING: 0
ABDOMINAL PAIN: 0
STRIDOR: 0
WHEEZING: 0
HEARTBURN: 0
SORE THROAT: 0
HEMOPTYSIS: 0
WEAKNESS: 1
PND: 0
DIARRHEA: 0
COUGH: 1
PHOTOPHOBIA: 0

## 2024-08-22 ASSESSMENT — PAIN DESCRIPTION - PAIN TYPE
TYPE: ACUTE PAIN;CHRONIC PAIN
TYPE: ACUTE PAIN
TYPE: ACUTE PAIN;CHRONIC PAIN
TYPE: ACUTE PAIN
TYPE: ACUTE PAIN;CHRONIC PAIN

## 2024-08-22 ASSESSMENT — COPD QUESTIONNAIRES
COPD SCREENING SCORE: 8
DURING THE PAST 4 WEEKS HOW MUCH DID YOU FEEL SHORT OF BREATH: MOST  OR ALL OF THE TIME
HAVE YOU SMOKED AT LEAST 100 CIGARETTES IN YOUR ENTIRE LIFE: YES
DO YOU EVER COUGH UP ANY MUCUS OR PHLEGM?: YES, A FEW DAYS A WEEK OR MONTH

## 2024-08-22 ASSESSMENT — PULMONARY FUNCTION TESTS
EPAP_CMH2O: 8

## 2024-08-22 NOTE — CONSULTS
[facility] Gold team / Critical Care Consult note    Date of consult: 8/22/2024    Referring Physician  Alfredo Hancock D.O.    Reason for Consultation  Code blue, acute on chronic hypoxic respiratory failure    History of Presenting Illness  63 y.o. male who presented 8/20/2024 with chest pain and shortness of breath that started about 2 months ago. He was initially hospitalized under the [facility] hospitalist service for management of COPD exacerbation.     His PMHx is significant for AAA s/p repair in 2023, Aortic stenosis S/P TAVR in 2023, Heart failure with improved ejection fraction (60% in 04/2024), PAF S/P ablation and on Xarelto, chronic tobacco use disorder, and hoarseness of voice and elevated left hemidiaphragm since AAA repair and TAVR. He has been treated for presumed COPD in the setting of 40+ pack years history of smoking but hasn't had formal PFTs done yet (this was ordered during last hospitalization in 04/2024).     During this current hospitalization, he was being treated for COPD exacerbation with duonebs, RT protocol, prednisone 40 x 5d, and Azithromycin. He appears to have required 1L O2 around the time of hospitalization but was weaned down to room air during the day.     On 8/21, around 2340, patient reportedly took a shower and walked back to his bed and had an unwitnessed fall. The RNs heard a thud so rushed in to find him on the floor, blue face, unresponsive, not breathing, and without a pulse. Code blue was called. They did CPR for about 30 seconds following which ROSC was achieved. He was placed on non rebreather mask and was awake by the time we got to bedside. He was initially lethargic and confused, couldn't remember his name or where he was for about 2 mins following which he became more alert and oriented x3 without any additional interventions. Cardiac exam was WNL. Resp exam was w/o any stridor, no wheezing/rhonchi/rales. His other vitals and POC gluocse were WNL. EKG showed normal sinus  rhythm. ABG showed mild hypercarbia hence placed on BiPAP and moved to the ICU unit for closer monitoring. A CT head w/o contrast was ordered since he may have had a head strike when he fell and is on chronic long term anticoagulation with Xarelto and a CTA pulm ordered to rule out acute PE.     I called his spouse Harman, who is listed as his emergency contact and explained the events of this night and that we've transferred him to the ICU for closer monitoring and further evaluation.     Code Status  Full Code    Review of Systems  Review of Systems   Constitutional:  Positive for diaphoresis and malaise/fatigue. Negative for chills, fever and weight loss.   Eyes:  Negative for blurred vision, double vision and photophobia.   Respiratory:  Positive for cough, sputum production, shortness of breath and wheezing. Negative for hemoptysis and stridor.    Cardiovascular:  Positive for chest pain (Worse on deep breathing) and leg swelling. Negative for palpitations and orthopnea.   Gastrointestinal:  Negative for abdominal pain, heartburn, nausea and vomiting.   Genitourinary: Negative.    Musculoskeletal:  Positive for falls.   Neurological:  Positive for dizziness, loss of consciousness, weakness and headaches. Negative for focal weakness.       Past Medical History   has a past medical history of Arrhythmia, Breath shortness (06/09/2023), Cyclic vomiting syndrome, Elevated hemidiaphragm - LEFT post AVR (01/04/2023), Fracture, Headache, classical migraine, Heart burn, Heart valve disease (06/09/2023), Hyperlipidemia (04/26/2024), Hypertension (04/26/2024), Indigestion, Pneumonia due to infectious organism (01/20/2023), and Snoring.    He has no past medical history of Anesthesia, Asthma, Cancer (HCC), Carcinoma in situ of respiratory system, Cataract, Cold, Coughing blood, Dental disorder, Disorder of thyroid, Glaucoma, Heart murmur, Hemorrhagic disorder (HCC), Hepatitis A, Hepatitis B, Hepatitis C, Hiatus hernia  syndrome, Myocardial infarct (HCC), Pacemaker, Personal history of venous thrombosis and embolism, Psychiatric problem, Rheumatic fever, Seizure (HCC), Sleep apnea, Stroke (HCC), Tuberculosis, Urinary bladder disorder, or Urinary incontinence.    Surgical History   has a past surgical history that includes shoulder arthroscopy (Bilateral); shoulder hemicap resurfacing (Right, 10/12/2015); irrigation & debridement ortho (Right, 09/19/2017); shoulder surgery (Right); aortic valve replacement (01/05/2023); aortic ascending dissection (01/05/2023); echocardiogram, transesophageal, intraoperative (01/05/2023); incision and drainage general (Left, 04/19/2024); and irrigation & debridement general (N/A, 6/7/2024).    Family History  family history is not on file.    Social History   reports that he quit smoking about 2 months ago. His smoking use included cigarettes. He started smoking about 47 years ago. He has a 23.7 pack-year smoking history. He has never been exposed to tobacco smoke. He has never used smokeless tobacco. He reports that he does not currently use drugs after having used the following drugs: Oral. He reports that he does not drink alcohol.    Medications  Home Medications       Reviewed by Alexandra Rubin (Pharmacy Tech) on 08/20/24 at 2152  Med List Status: Complete     Medication Last Dose Status   MILK THISTLE PO 8/20/2024 Active   Multiple Vitamins-Minerals (ONE DAILY MENS PO) 8/20/2024 Active   rivaroxaban (XARELTO) 20 MG Tab tablet 8/19/2024 Active                  Audit from Redirected Encounters    **Home medications have not yet been reviewed for this encounter**       Current Facility-Administered Medications   Medication Dose Route Frequency Provider Last Rate Last Admin    LORazepam (Ativan) injection 1 mg  1 mg Intravenous Once Alfredo Hancock D.O.        Respiratory Therapy Consult   Nebulization Continuous RT Sandro Fairchild M.D.        ipratropium-albuterol (DUONEB) nebulizer solution  3  mL Nebulization Q4HRS (RT) Sandro Fairchild M.D.   3 mL at 08/21/24 1907    ipratropium-albuterol (DUONEB) nebulizer solution  3 mL Nebulization Q2HRS PRN (RT) Sandro Fairchild M.D.   3 mL at 08/21/24 2114    cetirizine (ZyrTEC) tablet 10 mg  10 mg Oral DAILY Sandro Fairchild M.D.   10 mg at 08/21/24 0941    fluticasone-umeclidinium-vilanterol (Trelegy Ellipta) 200-62.5-25 mcg/act inhaler 1 Puff  1 Puff Inhalation QDAILY (RT) Sandro Fairchild M.D.        ondansetron (Zofran) syringe/vial injection 4 mg  4 mg Intravenous Q4HRS PRN Sandro Fairchild M.D.   4 mg at 08/21/24 1536    prochlorperazine (Compazine) injection 10 mg  10 mg Intravenous Q6HRS PRN Sandro Fairchild M.D.        ibuprofen (Motrin) tablet 800 mg  800 mg Oral Q6HRS PRN Sandro Fairchild M.D.        lisinopril (Prinivil) tablet 10 mg  10 mg Oral Q DAY Sandro Fairchild M.D.   10 mg at 08/21/24 1640    melatonin tablet 5 mg  5 mg Oral Nightly Pastora Trinidad M.D.   5 mg at 08/21/24 2126    hydrOXYzine HCl (Atarax) tablet 10 mg  10 mg Oral TID PRN Pastora Trinidad M.D.        rivaroxaban (Xarelto) tablet 20 mg  20 mg Oral DAILY Marva Manzo M.D.   20 mg at 08/21/24 0514    azithromycin (Zithromax) tablet 500 mg  500 mg Oral DAILY Marva Manzo M.D.   500 mg at 08/21/24 1725    predniSONE (Deltasone) tablet 40 mg  40 mg Oral DAILY Marva Manzo M.D.   40 mg at 08/21/24 0514    oxyCODONE immediate-release (Roxicodone) tablet 5 mg  5 mg Oral Q4HRS PRN Marva Manzo M.D.        Or    oxyCODONE immediate release (Roxicodone) tablet 10 mg  10 mg Oral Q4HRS PRN Marva Manzo M.D.   10 mg at 08/21/24 9002       Allergies  Allergies   Allergen Reactions    Morphine Shortness of Breath and Rash     Rash, shortness of breath       Vital Signs last 24 hours  Temp:  [36.4 °C (97.6 °F)-36.9 °C (98.5 °F)] 36.9 °C (98.5 °F)  Pulse:  [] 103  Resp:  [16-20] 20  BP: (112-159)/() 112/85  SpO2:  [88 %-99 %] 99 %    Physical  Exam  Physical Exam  Vitals and nursing note reviewed. Exam conducted with a chaperone present.   Constitutional:       General: He is in acute distress.      Appearance: He is ill-appearing.   HENT:      Head: Normocephalic.      Mouth/Throat:      Mouth: Mucous membranes are moist.   Eyes:      General: No scleral icterus.     Extraocular Movements: Extraocular movements intact.      Conjunctiva/sclera: Conjunctivae normal.      Pupils: Pupils are equal, round, and reactive to light.   Cardiovascular:      Rate and Rhythm: Regular rhythm. Tachycardia present.      Pulses: Normal pulses.      Heart sounds: Normal heart sounds. No murmur heard.     No gallop.   Pulmonary:      Effort: Respiratory distress present.      Breath sounds: Normal breath sounds. No wheezing, rhonchi or rales.   Chest:      Chest wall: Tenderness (almost the entire anterior chest wall, somewhat worse on the right side, not localized) present.   Abdominal:      General: Abdomen is flat. Bowel sounds are normal. There is no distension.      Palpations: Abdomen is soft. There is no mass.      Tenderness: There is no abdominal tenderness. There is no guarding.   Musculoskeletal:         General: No tenderness or deformity. Normal range of motion.      Cervical back: Normal range of motion and neck supple.      Right lower leg: No edema.      Left lower leg: No edema.   Skin:     General: Skin is warm and dry.      Capillary Refill: Capillary refill takes less than 2 seconds.   Neurological:      Mental Status: He is alert and oriented to person, place, and time.      Cranial Nerves: Cranial nerves 2-12 are intact. No facial asymmetry.      Sensory: No sensory deficit.      Motor: Motor function is intact. No weakness, abnormal muscle tone or seizure activity.      Deep Tendon Reflexes: Reflexes are normal and symmetric. Babinski sign absent on the right side. Babinski sign absent on the left side.      Comments: Slightly confused (GCS 12) when  we first saw him but quickly became alert and oriented x 3 again (GCS 15). Following all commands.    Psychiatric:         Mood and Affect: Mood is anxious.         Behavior: Behavior normal.         Thought Content: Thought content normal.         Judgment: Judgment normal.         Fluids    Intake/Output Summary (Last 24 hours) at 8/22/2024 0013  Last data filed at 8/21/2024 1900  Gross per 24 hour   Intake 1140 ml   Output --   Net 1140 ml       Laboratory  Recent Results (from the past 48 hour(s))   CBC with Differential    Collection Time: 08/20/24  4:08 PM   Result Value Ref Range    WBC 16.2 (H) 4.8 - 10.8 K/uL    RBC 6.58 (H) 4.70 - 6.10 M/uL    Hemoglobin 12.7 (L) 14.0 - 18.0 g/dL    Hematocrit 43.0 42.0 - 52.0 %    MCV 65.3 (L) 81.4 - 97.8 fL    MCH 19.3 (L) 27.0 - 33.0 pg    MCHC 29.5 (L) 32.3 - 36.5 g/dL    RDW 47.4 35.9 - 50.0 fL    Platelet Count 462 (H) 164 - 446 K/uL    MPV 9.7 9.0 - 12.9 fL    Neutrophils-Polys 84.10 (H) 44.00 - 72.00 %    Lymphocytes 6.20 (L) 22.00 - 41.00 %    Monocytes 6.20 0.00 - 13.40 %    Eosinophils 0.90 0.00 - 6.90 %    Basophils 0.90 0.00 - 1.80 %    Nucleated RBC 0.20 0.00 - 0.20 /100 WBC    Neutrophils (Absolute) 13.62 (H) 1.82 - 7.42 K/uL    Lymphs (Absolute) 1.00 1.00 - 4.80 K/uL    Monos (Absolute) 1.00 (H) 0.00 - 0.85 K/uL    Eos (Absolute) 0.15 0.00 - 0.51 K/uL    Baso (Absolute) 0.15 (H) 0.00 - 0.12 K/uL    NRBC (Absolute) 0.04 K/uL    Hypochromia 1+     Anisocytosis 1+     Microcytosis 2+ (A)    Complete Metabolic Panel (CMP)    Collection Time: 08/20/24  4:08 PM   Result Value Ref Range    Sodium 138 135 - 145 mmol/L    Potassium 4.7 3.6 - 5.5 mmol/L    Chloride 101 96 - 112 mmol/L    Co2 25 20 - 33 mmol/L    Anion Gap 12.0 7.0 - 16.0    Glucose 85 65 - 99 mg/dL    Bun 22 8 - 22 mg/dL    Creatinine 1.11 0.50 - 1.40 mg/dL    Calcium 9.0 8.5 - 10.5 mg/dL    Correct Calcium 9.2 8.5 - 10.5 mg/dL    AST(SGOT) 46 (H) 12 - 45 U/L    ALT(SGPT) 67 (H) 2 - 50 U/L     Alkaline Phosphatase 31 30 - 99 U/L    Total Bilirubin 0.5 0.1 - 1.5 mg/dL    Albumin 3.8 3.2 - 4.9 g/dL    Total Protein 6.1 6.0 - 8.2 g/dL    Globulin 2.3 1.9 - 3.5 g/dL    A-G Ratio 1.7 g/dL   Troponins NOW    Collection Time: 24  4:08 PM   Result Value Ref Range    Troponin T 25 (H) 6 - 19 ng/L   proBrain Natriuretic Peptide, NT    Collection Time: 24  4:08 PM   Result Value Ref Range    NT-proBNP 1550 (H) 0 - 125 pg/mL   DIFFERENTIAL MANUAL    Collection Time: 24  4:08 PM   Result Value Ref Range    Myelocytes 1.70 %    Manual Diff Status PERFORMED    PERIPHERAL SMEAR REVIEW    Collection Time: 24  4:08 PM   Result Value Ref Range    Peripheral Smear Review see below    PLATELET ESTIMATE    Collection Time: 24  4:08 PM   Result Value Ref Range    Plt Estimation Normal    MORPHOLOGY    Collection Time: 24  4:08 PM   Result Value Ref Range    RBC Morphology Present     Polychromia 1+     Poikilocytosis 1+     Schistocytes 1+     Target Cells 1+    ESTIMATED GFR    Collection Time: 24  4:08 PM   Result Value Ref Range    GFR (CKD-EPI) 74 >60 mL/min/1.73 m 2   EKG    Collection Time: 24  4:17 PM   Result Value Ref Range    Report       [facility] Emergency Dept.    Test Date:  2024  Pt Name:    JUSTIN TOPETE               Department: ER  MRN:        [id number]                      Room:       S529  Gender:     Male                         Technician:  :        [id number]                   Requested By:ER TRIAGE PROTOCOL  Order #:    [id number]                    Reading MD: Neil Goyal    Measurements  Intervals                                Axis  Rate:       81                           P:          73  KS:         154                          QRS:        12  QRSD:       96                           T:          26  QT:         384  QTc:        446    Interpretive Statements  Interpreted by me: Sinus rhythm, rate 81, stable ST segment  changes when  compared to prior no signs of acute ischemia  Electronically Signed On 08- 01:08:01 PDT by Neil Goyal     POC CoV-2, FLU A/B, RSV by PCR    Collection Time: 08/20/24  5:33 PM   Result Value Ref Range    POC Influenza A RNA, PCR Negative Negative    POC Influenza B RNA, PCR Negative Negative    POC RSV, by PCR Negative Negative    POC SARS-CoV-2, PCR NotDetected NotDetected   Troponins in two (2) hours    Collection Time: 08/20/24  6:19 PM   Result Value Ref Range    Troponin T 22 (H) 6 - 19 ng/L   CBC with Differential    Collection Time: 08/21/24 12:04 AM   Result Value Ref Range    WBC 16.0 (H) 4.8 - 10.8 K/uL    RBC 6.60 (H) 4.70 - 6.10 M/uL    Hemoglobin 12.7 (L) 14.0 - 18.0 g/dL    Hematocrit 43.4 42.0 - 52.0 %    MCV 65.8 (L) 81.4 - 97.8 fL    MCH 19.2 (L) 27.0 - 33.0 pg    MCHC 29.3 (L) 32.3 - 36.5 g/dL    RDW 48.4 35.9 - 50.0 fL    Platelet Count 442 164 - 446 K/uL    Neutrophils-Polys 96.50 (H) 44.00 - 72.00 %    Lymphocytes 1.70 (L) 22.00 - 41.00 %    Monocytes 0.00 0.00 - 13.40 %    Eosinophils 0.00 0.00 - 6.90 %    Basophils 0.00 0.00 - 1.80 %    Nucleated RBC 0.10 0.00 - 0.20 /100 WBC    Neutrophils (Absolute) 15.58 (H) 1.82 - 7.42 K/uL    Lymphs (Absolute) 0.27 (L) 1.00 - 4.80 K/uL    Monos (Absolute) 0.00 0.00 - 0.85 K/uL    Eos (Absolute) 0.00 0.00 - 0.51 K/uL    Baso (Absolute) 0.00 0.00 - 0.12 K/uL    NRBC (Absolute) 0.02 K/uL    Hypochromia 2+ (A)     Anisocytosis 1+     Microcytosis 2+ (A)    Comp Metabolic Panel (CMP)    Collection Time: 08/21/24 12:04 AM   Result Value Ref Range    Sodium 134 (L) 135 - 145 mmol/L    Potassium 4.8 3.6 - 5.5 mmol/L    Chloride 99 96 - 112 mmol/L    Co2 23 20 - 33 mmol/L    Anion Gap 12.0 7.0 - 16.0    Glucose 307 (H) 65 - 99 mg/dL    Bun 24 (H) 8 - 22 mg/dL    Creatinine 1.29 0.50 - 1.40 mg/dL    Calcium 8.6 8.5 - 10.5 mg/dL    Correct Calcium 8.8 8.5 - 10.5 mg/dL    AST(SGOT) 40 12 - 45 U/L    ALT(SGPT) 62 (H) 2 - 50 U/L    Alkaline  Phosphatase 31 30 - 99 U/L    Total Bilirubin 0.4 0.1 - 1.5 mg/dL    Albumin 3.7 3.2 - 4.9 g/dL    Total Protein 6.1 6.0 - 8.2 g/dL    Globulin 2.4 1.9 - 3.5 g/dL    A-G Ratio 1.5 g/dL   ESTIMATED GFR    Collection Time: 08/21/24 12:04 AM   Result Value Ref Range    GFR (CKD-EPI) 62 >60 mL/min/1.73 m 2   DIFFERENTIAL MANUAL    Collection Time: 08/21/24 12:04 AM   Result Value Ref Range    Bands-Stabs 0.90 0.00 - 10.00 %    Myelocytes 0.90 %    Manual Diff Status PERFORMED    PERIPHERAL SMEAR REVIEW    Collection Time: 08/21/24 12:04 AM   Result Value Ref Range    Peripheral Smear Review see below    PLATELET ESTIMATE    Collection Time: 08/21/24 12:04 AM   Result Value Ref Range    Plt Estimation Increased    MORPHOLOGY    Collection Time: 08/21/24 12:04 AM   Result Value Ref Range    RBC Morphology Present     Poikilocytosis 1+     Schistocytes 1+    ABG - LAB    Collection Time: 08/21/24  2:23 PM   Result Value Ref Range    Ph 7.43 7.40 - 7.50    Pco2 35.2 26.0 - 37.0 mmHg    Po2 72.2 64.0 - 87.0 mmHg    O2 Saturation 93.3 93.0 - 99.0 %    Hco3 23 17 - 25 mmol/L    Base Excess -1 -4 - 3 mmol/L    Body Temp 36.7 Centigrade    O2 Therapy room air        Imaging  CT-HEAD W/O   Final Result         1. No acute intracranial abnormality. No evidence of acute intracranial hemorrhage or mass lesion.                           DX-CHEST-LIMITED (1 VIEW)   Final Result         Mildly increased left basilar atelectasis versus infiltrate.      DX-FOOT-COMPLETE 3+ LEFT   Final Result      Osteoarthrosis without definite fracture or dislocation.      CT-CTA COMPLETE THORACOABDOMINAL AORTA   Final Result      1.  Median sternotomy. Nonunion of the sternal defect.   2.  Aortic valve replacement.   3.  No evidence of aortic aneurysm or dissection.   4.  Coronary calcification.   5.  Elevation of the left hemidiaphragm with chronic segmental/subsegmental atelectatic changes at the left lung base.   6.  Calcified old granulomatous  disease right lower lobe.                        DX-CHEST-PORTABLE (1 VIEW)   Final Result      1.  Median sternotomy and aortic valve replacement.   2.  Ongoing elevation of left hemidiaphragm with subsegmental atelectatic changes or scarring at the left lung base.      EC-ECHOCARDIOGRAM COMPLETE W/O CONT    (Results Pending)   DX-CHEST-PORTABLE (1 VIEW)    (Results Pending)   CT-CTA CHEST PULMONARY ARTERY W/ RECONS    (Results Pending)   US-EXTREMITY VENOUS LOWER BILAT    (Results Pending)       Assessment/Plan  * Acute respiratory failure with hypoxia and hypercapnia (HCC)- (present on admission)  Assessment & Plan    Not on oxygen at baseline but is requiring BiPAP at this time.  Unclear etiology but differential diagnosis includes COPD exacerbation, acute PE, ?  Choking on food.  ABG right after CODE BLUE 7.296/54.3/72/26.5, showing mild hypercarbia indicating respiratory acidosis.  Placed on BiPAP 16/8, 40% with goal saturation 88 to 92%, will recheck ABG in a few hours  Chest x-ray, CBC, procalcitonin.  Will consider empiric CAP coverage if any abnormalities on these.  Blood cultures collected. COVID flu RSV testing ordered.  RT protocol  COPD exacerbation Meds: DuoNebs every 4 hours scheduled with as needed every 2 hours, prednisone x 5 days, azithromycin.  Will get CTA chest to rule out pulmonary embolism.    COPD exacerbation (HCC)- (present on admission)  Assessment & Plan  No prior PFTs, these were ordered during prior hospitalization in 04/2024 but he has not had them done yet.  Presumed COPD in the setting of over 40-pack-year history of smoking.  Per chart review, he is not compliant with Trelegy Ellipta at home.  Currently hospitalized with COPD exacerbation, management as noted under acute hypoxic/hypercarbic respiratory failure with DuoNebs, steroids, azithromycin.  Needs outpatient PFTs after discharge  Smoking cessation    Cardiac arrest (HCC)- (present on admission)  Assessment & Plan  Unclear  etiology for cardiac arrest.  Had an unwitnessed fall and was found down by RN unresponsive with cyanosis and no pulse.  PEA arrest for which CODE BLUE was called.  ROSC was achieved with about 30 seconds of high-quality CPR.  Did not receive any epinephrine or shocks.  Although initially confused, became alert and oriented x 2-3 soon after.  EKG with normal sinus rhythm.  Monitor in ICU, no TTM at this time      Anxiety  Assessment & Plan  Has significant chronic anxiety.  Has hydroxyzine ordered by primary hospitalist team, will continue at a slightly higher dose of 25 mg 3 times daily as needed    Atrial flutter (HCC)  Assessment & Plan  Status post ablation.  On long-term Xarelto, unclear compliance.  EKG with normal sinus rhythm at this time    Heart failure with improved ejection fraction (HFimpEF) (HCC)- (present on admission)  Assessment & Plan  Last echo in April 2024 with EF of 60%.  Does not appear to be fluid overloaded or an acute exacerbation of heart failure at this time.  Monitor.    Long term (current) use of anticoagulants  Assessment & Plan  On Xarelto for atrial flutter, continue    Transaminitis  Assessment & Plan  Chronically elevated liver enzymes.  Mild hepatomegaly on prior CT.  Denies chronic alcohol use.    Monitor for now    Plantar fasciitis of left foot  Assessment & Plan  Needs a splint, either inpatient versus outpatient  Will avoid NSAIDs for now since he is getting contrast for CTA chest    Hypertension- (present on admission)  Assessment & Plan  Will hold lisinopril since he is getting CTA chest with contrast    Anemia- (present on admission)  Assessment & Plan  Chronic, stable, monitor for now    Tobacco dependence- (present on admission)  Assessment & Plan  Over 40 pack years history of smoking, currently smokes a few cigarettes a day has cut down since surgery last year.  Recommend smoking cessation.        Discussed patient condition and risk of morbidity and/or mortality with  Family, RN, RT, Patient, and [facility] on call resident Dr. Trinidad .

## 2024-08-22 NOTE — PROGRESS NOTES
Pt visibly in pain, encouraged pt to take prn oxycodone, pt refuses, states he will only take dilaudid. Resident made aware.

## 2024-08-22 NOTE — PROGRESS NOTES
Code blue called overhead. Went to patient bedside, rapid response team already present with ICU attending Dr. Hancock. Per report patient had collapsed after returning from bathroom, potentially hitting his head on edge of bed. He became pulseless and 30 seconds of compressions by bedside RN was given with ROSC. Patient was altered. He required supplemental oxygen. Stat CT head ordered and patient was transferred to ICU for further management.

## 2024-08-22 NOTE — PROGRESS NOTES
Pt up to chair, Bipap on pt. Pt confused to events of last night, needing reorienting. Chair alarm in place. Call light in reach. Pt reports 5/10 chest pain. Medicated prn with oxycodone. Pt voiding in urinal. 850mL out. LR running at 100mL/hr.

## 2024-08-22 NOTE — PROGRESS NOTES
[facility] Internal Medicine Daily Progress Note    Date of Service  8/22/2024    [facility] Team: [facility] ICU Gold Team   Attending: Coleman Duggan M.d.  Senior Resident: Dr. Irvin Worrell  Contact Number: [phone]    Chief Complaint  Noe Hickey is a 63 y.o. male admitted 8/20/2024 with COPD exacerbation.    Hospital Course  Patient is a 64 yo M with PMH of COPD who was admitted for COPD exacerbation. However, while on the floor, he had an unwitnessed fall and was found by RN to be cyanotic, unresponsive, and in PEA. Code blue was called and after 30s-1min CPR, ROSC was achieved. At that time, patient was transferred to ICU for further care.    Interval Problem Update  - Patient was transferred overnight for 30s-1min of PEA. Likely 2/2 hypoxemia from COPD exacerbation. Now only complaining of CP from compressions.    I have discussed this patient's plan of care and discharge plan at IDT rounds today with Case Management, Nursing, Nursing leadership, and other members of the IDT team.    Consultants/Specialty  critical care    Code Status  Full Code    Disposition  The patient is not medically cleared for discharge to home or a post-acute facility.      I have placed the appropriate orders for post-discharge needs.    Review of Systems  Review of Systems   Constitutional:  Negative for chills, fever and malaise/fatigue.   HENT:  Negative for congestion and sore throat.    Eyes:  Negative for blurred vision and double vision.   Respiratory:  Positive for shortness of breath (Baseline). Negative for cough, hemoptysis, sputum production and wheezing.    Cardiovascular:  Positive for chest pain. Negative for palpitations, orthopnea, leg swelling and PND.   Gastrointestinal:  Positive for constipation. Negative for abdominal pain, diarrhea, heartburn, nausea and vomiting.   Neurological:  Negative for dizziness and loss of consciousness.   All other systems reviewed and are negative.       Physical Exam  Temp:   [36.7 °C (98 °F)-37 °C (98.6 °F)] 37 °C (98.6 °F)  Pulse:  [] 87  Resp:  [12-34] 14  BP: (112-159)/() 129/57  SpO2:  [88 %-100 %] 95 %    Physical Exam  Constitutional:       General: He is not in acute distress.     Appearance: He is obese. He is not ill-appearing, toxic-appearing or diaphoretic.   HENT:      Head: Normocephalic and atraumatic.      Nose: Nose normal. No rhinorrhea.   Eyes:      General: No scleral icterus.        Right eye: No discharge.         Left eye: No discharge.      Extraocular Movements: Extraocular movements intact.      Conjunctiva/sclera: Conjunctivae normal.   Cardiovascular:      Rate and Rhythm: Normal rate and regular rhythm.      Pulses: Normal pulses.      Heart sounds: Normal heart sounds. No murmur heard.     No friction rub. No gallop.   Pulmonary:      Effort: Pulmonary effort is normal. No respiratory distress.      Breath sounds: Normal breath sounds. No stridor. No wheezing, rhonchi or rales.   Abdominal:      General: Bowel sounds are normal. There is distension.      Palpations: Abdomen is soft. There is no mass.      Tenderness: There is abdominal tenderness (Mild diffuse). There is no guarding or rebound.   Musculoskeletal:         General: Normal range of motion.      Cervical back: Normal range of motion. No rigidity.      Right lower leg: No edema.      Left lower leg: No edema.   Skin:     General: Skin is warm and dry.   Neurological:      General: No focal deficit present.      Mental Status: He is oriented to person, place, and time. Mental status is at baseline.      Comments: Alert but somewhat somnolent. Will fall asleep mid sentence.   Psychiatric:         Mood and Affect: Mood normal.         Behavior: Behavior normal.         Fluids    Intake/Output Summary (Last 24 hours) at 8/22/2024 1045  Last data filed at 8/21/2024 1900  Gross per 24 hour   Intake 900 ml   Output --   Net 900 ml       Laboratory  Recent Labs     08/20/24  4408  08/21/24  0004 08/22/24  0140   WBC 16.2* 16.0* 27.1*   RBC 6.58* 6.60* 6.04   HEMOGLOBIN 12.7* 12.7* 11.6*   HEMATOCRIT 43.0 43.4 40.0*   MCV 65.3* 65.8* 66.2*   MCH 19.3* 19.2* 19.2*   MCHC 29.5* 29.3* 29.0*   RDW 47.4 48.4 48.8   PLATELETCT 462* 442 483*   MPV 9.7  --  9.9     Recent Labs     08/21/24  0004 08/22/24  0040 08/22/24  0140   SODIUM 134* 134* 134*   POTASSIUM 4.8 5.3 5.5   CHLORIDE 99 96 97   CO2 23 24 23   GLUCOSE 307* 131* 130*   BUN 24* 42* 42*   CREATININE 1.29 1.86* 1.75*   CALCIUM 8.6 8.7 8.6                   Imaging  US-EXTREMITY VENOUS LOWER BILAT   Final Result      CT-CTA CHEST PULMONARY ARTERY W/ RECONS   Final Result      1.  No pulmonary embolus detected.   2.  Elevation of the left hemidiaphragm with marked atelectasis in the left lower lobe. Please correlate for possibility of aspiration.         DX-CHEST-PORTABLE (1 VIEW)   Final Result      1.  Left basilar atelectasis with elevation of the left hemidiaphragm.   2.  Borderline cardiomegaly. Aortic valve replacement.         CT-HEAD W/O   Final Result         1. No acute intracranial abnormality. No evidence of acute intracranial hemorrhage or mass lesion.                           DX-CHEST-LIMITED (1 VIEW)   Final Result         Mildly increased left basilar atelectasis versus infiltrate.      DX-FOOT-COMPLETE 3+ LEFT   Final Result      Osteoarthrosis without definite fracture or dislocation.      CT-CTA COMPLETE THORACOABDOMINAL AORTA   Final Result      1.  Median sternotomy. Nonunion of the sternal defect.   2.  Aortic valve replacement.   3.  No evidence of aortic aneurysm or dissection.   4.  Coronary calcification.   5.  Elevation of the left hemidiaphragm with chronic segmental/subsegmental atelectatic changes at the left lung base.   6.  Calcified old granulomatous disease right lower lobe.                        DX-CHEST-PORTABLE (1 VIEW)   Final Result      1.  Median sternotomy and aortic valve replacement.   2.  Ongoing  elevation of left hemidiaphragm with subsegmental atelectatic changes or scarring at the left lung base.      EC-ECHOCARDIOGRAM COMPLETE W/O CONT    (Results Pending)        Assessment/Plan  Problem Representation:    * Acute respiratory failure with hypoxia and hypercapnia (HCC)- (present on admission)  Assessment & Plan    Not on oxygen at baseline but is requiring BiPAP at this time.  Unclear etiology but differential diagnosis includes COPD exacerbation, Choking on food.  ABG right after CODE BLUE 7.296/54.3/72/26.5, showing mild hypercarbia indicating respiratory acidosis.  Placed on BiPAP 16/8, 40% with goal saturation 88 to 92%, will recheck ABG in a few hours  Chest x-ray, CBC, procalcitonin.  Will consider empiric CAP coverage if any abnormalities on these.  CT PE negative for PE.  Blood cultures collected. COVID flu RSV testing ordered.  RT protocol  COPD exacerbation Meds: DuoNebs every 4 hours scheduled with as needed every 2 hours, prednisone x 5 days, azithromycin.    Anxiety  Assessment & Plan  Has significant chronic anxiety.  Has hydroxyzine ordered by primary hospitalist team, will continue at a slightly higher dose of 25 mg 3 times daily as needed    Long term (current) use of anticoagulants  Assessment & Plan  On Xarelto for atrial flutter, continue    Atrial flutter (HCC)  Assessment & Plan  Status post ablation.  On long-term Xarelto, unclear compliance.  EKG with normal sinus rhythm at this time    Elevated LFTs  Assessment & Plan  Chronically elevated liver enzymes.  Mild hepatomegaly on prior CT.  Denies chronic alcohol use.    Monitor for now    Plantar fasciitis of left foot  Assessment & Plan  Needs a splint, either inpatient versus outpatient  Will avoid NSAIDs for now since he is getting contrast for CTA chest    COPD exacerbation (HCC)- (present on admission)  Assessment & Plan  No prior PFTs, these were ordered during prior hospitalization in 04/2024 but he has not had them done yet.   Presumed COPD in the setting of over 40-pack-year history of smoking.  Per chart review, he is not compliant with Trelegy Ellipta at home.  Currently hospitalized with COPD exacerbation, management as noted under acute hypoxic/hypercarbic respiratory failure with DuoNebs, steroids, azithromycin.  Needs outpatient PFTs after discharge  Smoking cessation    Primary hypertension- (present on admission)  Assessment & Plan  Will hold lisinopril given JENNY.    Anemia- (present on admission)  Assessment & Plan  Chronic, stable, monitor for now    Tobacco dependence- (present on admission)  Assessment & Plan  Over 40 pack years history of smoking, currently smokes a few cigarettes a day has cut down since surgery last year.  Recommend smoking cessation.    Heart failure with improved ejection fraction (HFimpEF) ([facility])- (present on admission)  Assessment & Plan  Last echo in April 2024 with EF of 60%.  Does not appear to be fluid overloaded or an acute exacerbation of heart failure at this time.  Monitor.    Cardiac arrest (HCC)- (present on admission)  Assessment & Plan  Likely hypoxemic etiology given COPD exacerbation. Had an unwitnessed fall and was found down by RN unresponsive with cyanosis and no pulse.  PEA arrest for which CODE BLUE was called.  ROSC was achieved with about 30 seconds of high-quality CPR.  Did not receive any epinephrine or shocks.  Although initially confused, became alert and oriented x 2-3 soon after.  EKG with normal sinus rhythm.   Monitor in ICU, no TTM at this time           VTE prophylaxis: SCDs/TEDs    I have performed a physical exam and reviewed and updated ROS and Plan today (8/22/2024). In review of yesterday's note (8/21/2024), there are no changes except as documented above.

## 2024-08-22 NOTE — ASSESSMENT & PLAN NOTE
Suspect due to cardiac arrest  Improving  Trend liver enzymes and synthetic function  Avoid hepatotoxins

## 2024-08-22 NOTE — PROGRESS NOTES
"Critical Care Progress Note    Date of admission  8/20/2024    Chief Complaint  63 y.o. male admitted 8/20/2024 with an acute exacerbation of COPD.  He has a history of COPD, atrial flutter, AVR with ascending aortic aneurysm repair, CHF.    Hospital Course      8/22 -    titrating BiPAP for respiratory distress.  Follow neuro exam.  Limit narcotics.      Interval Problem Update  Reviewed last 24 hour events:      SR  BiPAP  98.6  +1140 mL in the last 24      Review of Systems  Review of Systems   Unable to perform ROS: Acuity of condition        Vital Signs for last 24 hours   Temp:  [36.5 °C (97.7 °F)-37 °C (98.6 °F)] 37 °C (98.6 °F)  Pulse:  [] 94  Resp:  [12-31] 19  BP: (112-159)/() 129/57  SpO2:  [88 %-99 %] 97 %    Hemodynamic parameters for last 24 hours       Respiratory Information for the last 24 hours       Physical Exam   Physical Exam  HENT:      Head: Normocephalic.      Comments: He has a \"shiner\" around his left eye  Cardiovascular:      Comments: Sinus rhythm  Pulmonary:      Breath sounds: Rales (Very few crackles) present. No wheezing.   Abdominal:      General: There is no distension.      Tenderness: There is no abdominal tenderness.   Musculoskeletal:      Right lower leg: No edema.      Left lower leg: No edema.   Skin:     General: Skin is warm.   Neurological:      Comments: He is somnolent, but arouses easily.  He is a wee bit confused.         Medications  Current Facility-Administered Medications   Medication Dose Route Frequency Provider Last Rate Last Admin    hydrOXYzine HCl (Atarax) tablet 25 mg  25 mg Oral TID PRN Fanta Lindo M.D.        lactated ringers infusion   Intravenous Continuous Fanta Lindo M.D. 100 mL/hr at 08/22/24 0328 New Bag at 08/22/24 0328    LR (Bolus) infusion 1,000 mL  1,000 mL Intravenous Once Fanta Lindo M.D.   Held at 08/22/24 0230    MD Alert...ICU Electrolyte Replacement per Pharmacy   Other PHARMACY TO DOSE Coleman Duggan M.D.     "    lidocaine (Asperflex) 4 % patch 1 Patch  1 Patch Transdermal Q24HR Coleman Duggan M.D.        Respiratory Therapy Consult   Nebulization Continuous RT Sandro Fairchild M.D.        ipratropium-albuterol (DUONEB) nebulizer solution  3 mL Nebulization Q4HRS (RT) Sandro Fairchild M.D.   3 mL at 08/22/24 0311    ipratropium-albuterol (DUONEB) nebulizer solution  3 mL Nebulization Q2HRS PRN (RT) Sandro Fairchild M.D.   3 mL at 08/21/24 2114    cetirizine (ZyrTEC) tablet 10 mg  10 mg Oral DAILY Sandro Fairchild M.D.   10 mg at 08/22/24 0549    fluticasone-umeclidinium-vilanterol (Trelegy Ellipta) 200-62.5-25 mcg/act inhaler 1 Puff  1 Puff Inhalation QDAILY (RT) Sandro Fairchild M.D.        ondansetron (Zofran) syringe/vial injection 4 mg  4 mg Intravenous Q4HRS PRN Sandro Fairchild M.D.   4 mg at 08/21/24 1536    prochlorperazine (Compazine) injection 10 mg  10 mg Intravenous Q6HRS PRN Sandro Fairchild M.D.        [Held by provider] lisinopril (Prinivil) tablet 10 mg  10 mg Oral Q DAY Sandro Fairchild M.D.   10 mg at 08/21/24 1640    melatonin tablet 5 mg  5 mg Oral Nightly Pastora Trinidad M.D.   5 mg at 08/21/24 2126    rivaroxaban (Xarelto) tablet 20 mg  20 mg Oral DAILY Marva Manzo M.D.   20 mg at 08/22/24 0548    azithromycin (Zithromax) tablet 500 mg  500 mg Oral DAILY Marva Manzo M.D.   500 mg at 08/21/24 1725    predniSONE (Deltasone) tablet 40 mg  40 mg Oral DAILY Marva Manzo M.D.   40 mg at 08/22/24 0549    oxyCODONE immediate-release (Roxicodone) tablet 5 mg  5 mg Oral Q4HRS SILVINO Manzo M.D.   5 mg at 08/22/24 0549    Or    oxyCODONE immediate release (Roxicodone) tablet 10 mg  10 mg Oral Q4HRS SILVINO Manzo M.D.   10 mg at 08/21/24 1725       Fluids    Intake/Output Summary (Last 24 hours) at 8/22/2024 0638  Last data filed at 8/21/2024 1900  Gross per 24 hour   Intake 1140 ml   Output --   Net 1140 ml       Laboratory  Recent Labs     08/21/24  1423  08/22/24  0003 08/22/24  0410   DDXSK52Z 7.43  --   --    [id number] 35.2  --   --    [id number] 72.2  --   --    [id number] 93.3  --   --    ARTHCO3 23  --   --    [id number] room air  --   --    ARTBE -1  --   --    ISTATAPH  --  7.296* 7.334*   ISTATAPCO2  --  54.3* 53.8*   ISTATAPO2  --  72 52*   ISTATATCO2  --  28 30   [id number]  --  92* 83*   ISTATARTHCO3  --  26.5* 28.6*   ISTATARTBE  --  -1 2   ISTATTEMP  --  98.5 F 37.0 C   ISTATFIO2  --   --  25   ISTATSPEC  --  Arterial Arterial   ISTATAPHTC  --  7.297* 7.334*   NSXJEOCH4BX  --  71 52*         Recent Labs     08/21/24  0004 08/22/24  0040 08/22/24  0140   SODIUM 134* 134* 134*   POTASSIUM 4.8 5.3 5.5   CHLORIDE 99 96 97   CO2 23 24 23   BUN 24* 42* 42*   CREATININE 1.29 1.86* 1.75*   MAGNESIUM  --  2.1  --    PHOSPHORUS  --  5.8*  --    CALCIUM 8.6 8.7 8.6     Recent Labs     08/20/24  1608 08/21/24 0004 08/22/24  0040 08/22/24  0140   ALTSGPT 67* 62* 183*  --    ASTSGOT 46* 40 129*  --    ALKPHOSPHAT 31 31 31  --    TBILIRUBIN 0.5 0.4 0.6  --    GLUCOSE 85 307* 131* 130*     Recent Labs     08/20/24  1608 08/21/24  0004 08/22/24  0040 08/22/24  0140   WBC 16.2* 16.0*  --  27.1*   NEUTSPOLYS 84.10* 96.50*  --  93.10*   LYMPHOCYTES 6.20* 1.70*  --  2.60*   MONOCYTES 6.20 0.00  --  4.30   EOSINOPHILS 0.90 0.00  --  0.00   BASOPHILS 0.90 0.00  --  0.00   ASTSGOT 46* 40 129*  --    ALTSGPT 67* 62* 183*  --    ALKPHOSPHAT 31 31 31  --    TBILIRUBIN 0.5 0.4 0.6  --      Recent Labs     08/20/24  1608 08/21/24  0004 08/22/24  0140   RBC 6.58* 6.60* 6.04   HEMOGLOBIN 12.7* 12.7* 11.6*   HEMATOCRIT 43.0 43.4 40.0*   PLATELETCT 462* 442 483*       Imaging  X-Ray:  I have personally reviewed the images and compared with prior images. and My impression is: Elevated left hemidiaphragm  CT:    CTA personally reviewed.  No PE.  Ultrasound:  Lower extremity venous ultrasound without evidence of DVT.    Assessment/Plan  * Acute respiratory failure with hypoxia and  hypercapnia (HCC)- (present on admission)  Assessment & Plan  I am titrating BiPAP for respiratory distress    Cardiac arrest (HCC)- (present on admission)  Assessment & Plan  In-hospital PEA cardiac arrest presumably due to hypoxemia  CTA without evidence of pulmonary embolism    Atrial flutter (HCC)  Assessment & Plan  History of atrial fibrillation/atrial flutter  S/P ablation on 6/12/2023    Elevated LFTs  Assessment & Plan  Suspect due to cardiac arrest  Trend liver enzymes and synthetic function  Avoid hepatotoxins    COPD exacerbation (HCC)- (present on admission)  Assessment & Plan  Presumed COPD - no PFTs available in EMR  Continue prednisone, 40 mg daily  Bronchodilators    Primary hypertension- (present on admission)  Assessment & Plan  Goal SBP less than 160  Continue lisinopril, 10 mg daily    Heart failure with improved ejection fraction (HFimpEF) (HCC)- (present on admission)  Assessment & Plan  History of systolic heart failure  Echocardiogram on 4/27/2024 with recovered LVEF of 60%    Acute encephalopathy- (present on admission)  Assessment & Plan  This may be due to narcotic analgesics as well as hypercarbia  Head CT without acute pathology  Limit narcotics  Check ammonia  Follow neuro exam         VTE:  NOAC  Ulcer: Not Indicated  Lines: None    I have performed a physical exam and reviewed and updated ROS and Plan today (8/22/2024). In review of yesterday's note (8/21/2024), there are no changes except as documented above.     I have assessed and reassessed his respiratory status with the titration of BiPAP, blood pressure, hemodynamics, cardiovascular status and neurologic status.  He is at increased risk for worsening respiratory, cardiovascular and CNS system dysfunction.    Discussed patient condition and risk of morbidity and/or mortality with RN, RT, and Pharmacy    The patient remains critically ill.  Critical care time = 85 minutes in directly providing and coordinating critical care and  extensive data review.  No time overlap and excludes procedures.    The time spent is in addition to the 110 minutes spent by Dr. Hancock earlier today.    Coleman Duggan MD  Pulmonary and Critical Care Medicine

## 2024-08-22 NOTE — PROGRESS NOTES
"Received alert and oriented x 4. Anxious , tachpneic and tachycardia 02 sat at RA 90-93% -329. Called RT for schedule breathing treatment. Relieved after treatment given.Check vitals sign and recorded accordingly and due med given per MAR. Monitor sign and symptoms of respiratory distress and treatment given accordingly per MAR.Medicated per MAR and reassessed every 2 hours per protocol. Call light within reach. Steady on his feet. Needs attended. Continue to monitor.BP (!) 139/91 Comment: RN notified   Pulse (!) 104   Temp 36.7 °C (98.1 °F) (Temporal)   Resp 18   Ht 1.753 m (5' 9\")   Wt 94 kg (207 lb 3.7 oz)   SpO2 95%   BMI 30.60 kg/m² . Pt requested for sleeping and anxiety medication informed MD on call with order made.   "

## 2024-08-22 NOTE — HOSPITAL COURSE
Patient is a 62 yo M with PMH of COPD who was admitted for COPD exacerbation. However, while on the floor, he had an unwitnessed fall and was found by RN to be cyanotic, unresponsive, and in PEA. Code blue was called and after 30s-1min CPR, ROSC was achieved. At that time, patient was transferred to ICU for further care.

## 2024-08-22 NOTE — CARE PLAN
"The patient is Stable - Low risk of patient condition declining or worsening    Shift Goals  Clinical Goals: Pain control, wean oxygen  Patient Goals: Pain control  Family Goals: None present to update    Progress made toward(s) clinical / shift goals:  Goals not met, see below.    Patient is not progressing towards the following goals:Not met      Problem: Pain - Standard  Goal: Alleviation of pain or a reduction in pain to the patient’s comfort goal  Outcome: Not Progressing:  Patient continues to have 10/10 pain by self report throughout the day despite medications and other interventions offered.  Pain is in chest mainly from CPR and previous surgery.  Pt states he has had this pain for 2 years.      Problem: Knowledge Deficit - Standard  Goal: Patient and family/care givers will demonstrate understanding of plan of care, disease process/condition, diagnostic tests and medications  Outcome: Progressing:  Pt does not articulate clear understanding of the plan of care, mostly states this is \"a waste of time\", had required reorientation to reason he is ICU and events that happened last night.      Problem: Respiratory  Goal: Patient will achieve/maintain optimum respiratory ventilation and gas exchange  Outcome: Progressing:  Pt on 2 liters oxygen per nasal cannula today, refusing BiPap when offered by RT.  Very short of breath with activity, talking, moving.      Problem: Self Care  Goal: Patient will have the ability to perform ADLs independently or with assistance (bathe, groom, dress, toilet and feed)  Outcome: Progressing:  Pt able to do his own ADLs regarding feeding, using urinal, brushing teeth when activity is spaced out to decrease shortness of breath and discomfort.              "

## 2024-08-22 NOTE — ASSESSMENT & PLAN NOTE
Has significant chronic anxiety.  Has hydroxyzine ordered by primary hospitalist team, will continue at a slightly higher dose of 25 mg 3 times daily as needed

## 2024-08-22 NOTE — PROGRESS NOTES
Attending Staff Note    I have seen and examined the patient.  I have reviewed the medical record, laboratory data, imaging and all relevant studies.  I have discussed the plan of care with the Internal Medicine Resident and multidisciplinary team, and agree with the note and plan as documented.     63M with active smoker 0.5ppd x 40years, COPD (not on home O2), hx of anabolic steroids use in the past, stopped 1 year ago, Hx of ascending aortic aneurysm repair and TAVR in 2023, hx of aflutter s/p ablation (on xarelto), who was admitted 8/20 for COPD exacerbation. COVID/influenza/RSV negative. Pt was started on nebs and azithromycin    This evening, pt fell unwitnessed and found down by RN unresponsive with cyanotic face, no pulse. PEA. Code blue was called. High quality CPR was started. ~30secs-1 min CPR before ROSC, pt woke up but confused. When I arrived to bedside, pt already achieved ROSC. No epi was given. . ECG with NSR.     On exam, pt well built  Pt moving all extremities. Alert, but confused.   Extremities warm  Lung exam with good air entry. No wheezing.   No edema  Bedside POCUS with grossly normal LV/RV. No pericardial effusion   ABG was obtained post ROSC while pt awake but confused. 7.29/54.     Labs and imaging reviewed.     K 4.8, creatinine 1.29, sodium 134  CBC with WBC 16K, plt 442  CT head obtained this evening - no obvious bleeding  CXR no ptx     Assessment:   In-hospital cardiac arrest - likely due to acute hypoxia - ?PE vs COPD exacerbation vs others.   Acute hypoxic/hypercapneic resp failure.   COPD  Active smoker  Hx of TAVR and ascending aortic aneurysm repair  Hx of aflutter s/p ablation (on xarelto)   Hx of anabolic steroids, quit 1 year ago    Plan:   Start BIPAP 16/8, 40%  Goal sat >88%  CTA chest to rule out PE  Continue ICS/LAMA/LABA  Duoneb Q4H  On prednisone 40mg daily     The patient remains critically ill. Critical care time = 110 mins in directly providing and coordinating  critical care and extensive data review. No time overlap and excludes procedures.       Alfredo Hancock D.O.  Critical Care

## 2024-08-22 NOTE — PROGRESS NOTES
"Pt with ongoing c/o pain in chest from CPR and chronic pain from surgery last year and left foot.  Pt medicated per order and states \"That isn't going to make a difference\".  Reviewed meds with patient and what he uses at home.  Pt states he only takes \"motrin\" and \"deals with it\" stating he \"doesn't want these medications at home but wants to be comfortable while he is here\".  Education provided regarding pain management using multi-modal therapy.  Pt also remains short of breath and refusing Bi Pap per RT.  Pt's wife called and updated on patient condition, states he is not compliant with all his COPD treatments at home.  Explained events of overnight to her as well and plan of care.  "

## 2024-08-22 NOTE — ASSESSMENT & PLAN NOTE
Likely hypoxemic etiology given COPD exacerbation. Had an unwitnessed fall and was found down by RN unresponsive with cyanosis and no pulse.  PEA arrest for which CODE BLUE was called.  ROSC was achieved with about 30 seconds of high-quality CPR.  Did not receive any epinephrine or shocks.  Although initially confused, became alert and oriented x 2-3 soon after.  EKG with normal sinus rhythm.   Monitor in ICU, no TTM at this time

## 2024-08-22 NOTE — PROGRESS NOTES
4 Eyes Skin Assessment Completed by ALLEN Titus and ALLEN Doyle.  Generalized flushed, redness/blanching  Head Blanching and Redness, L eye bruise   Ears WDL  Nose Redness and Blanching  Mouth WDL  Neck WDL  Breast/Chest Scar  Shoulder Blades Redness and Blanching  Spine Redness and Blanching  (R) Arm/Elbow/Hand Redness, Blanching, Scab, and Scar  (L) Arm/Elbow/Hand Redness, Blanching, Scab, and Scar  Abdomen WDL  Groin WDL  Scrotum/Coccyx/Buttocks Redness and Blanching  (R) Leg Scar and Scab, bruising knee  (L) Leg Scar and Scab, bruising knee  (R) Heel/Foot/Toe WDL  (L) Heel/Foot/Toe WDL          Devices In Places Blood Pressure Cuff, Pulse Ox, Miranda, and Nasal Cannula      Interventions In Place Heel Mepilex, Sacral Mepilex, Pillows, Q2 Turns, Low Air Loss Mattress, Heels Loaded W/Pillows, and Pressure Redistribution Mattress    Possible Skin Injury No    Pictures Uploaded Into Epic N/A  Wound Consult Placed N/A  RN Wound Prevention Protocol Ordered Yes

## 2024-08-22 NOTE — ASSESSMENT & PLAN NOTE
This may be due to narcotic analgesics as well as hypercarbia  Head CT without acute pathology  Ammonia is normal  Limit narcotics  Follow neuro exam  Improving

## 2024-08-22 NOTE — DISCHARGE PLANNING
Code Blue     Referral: Code Blue  Patient response     Intervention: MSW responded to code blue. Pts name is felton Hickey (: 1960).  Code blue was activated by bedside RN for patient being found on the floor unresponsive and pulseless. After CPR pt was alert but confused. SW spoke with bedside RN Mary who stated she would contact family member to let them know pt will be transfer to the ICU for a higher level of care.     Plan: SW will remain available if needed.

## 2024-08-22 NOTE — ASSESSMENT & PLAN NOTE
Over 40 pack years history of smoking, currently smokes a few cigarettes a day has cut down since surgery last year.  Recommend smoking cessation.

## 2024-08-22 NOTE — CARE PLAN
Problem: Bronchoconstriction  Goal: Improve in air movement and diminished wheezing  Description: Target End Date:  2 to 3 days    1.  Implement inhaled treatments  2.  Evaluate and manage medication effects  Outcome: Not Met   Q4 Duoneb    Problem: Bronchopulmonary Hygiene  Goal: Increase mobilization of retained secretions  Description: Target End Date:  2 to 3 days    1.  Perform bronchopulmonary therapy as indicated by assessment  2.  Perform airway suctioning  3.  Perform actions to maintain patient airway  Outcome: Not Met   Refused due to his pain

## 2024-08-22 NOTE — CARE PLAN
Problem: Bronchoconstriction  Goal: Improve in air movement and diminished wheezing  Description: Target End Date:  2 to 3 days    1.  Implement inhaled treatments  2.  Evaluate and manage medication effects  Outcome: Not Met   Q4 Duoneb    Problem: Bronchopulmonary Hygiene  Goal: Increase mobilization of retained secretions  Description: Target End Date:  2 to 3 days    1.  Perform bronchopulmonary therapy as indicated by assessment  2.  Perform airway suctioning  3.  Perform actions to maintain patient airway  Outcome: Not Met   Refused due to his pain    NOC BiPAP, 16/8, 45%

## 2024-08-22 NOTE — PROGRESS NOTES
Rapid Response Summary   Code Chang called at 2335 for finding patient unresponsive on ground. Code blue was quickly turned into a Rapid Response for Shortness of breath , Increased oxygen demand , and Altered mental status      Patient collapsed on the ground, striking left head. Patient was found face first on the ground. CPR was initiated and patient was then moved to the bed where CPR was continued. It was estimated that the patient received 30 seconds of compressions. These events were not witnessed by RRT. ROSC was achieved by the time the RRT and intensivists came to bedside. Patient was altered post ROSC, STAT head CT placed, patient transported to CT with RRT RNs and then to [facility].    VS: Hypertensive  (See Vitals Flowsheet) SEE CODE DOCUMENTATION SHEET AS WELL, hypoxia requiring NRB mask  Additional info: Patient collapsed, CPR for 30 seconds, ROSC achieved  MD Paged: MD Hancock  Interventions:    Imaging/Tests: CT and EKG    Labs: ABG    Medications:    Other:   Disposition: Improved with rapid response team interventions. Primary RN updated on plan of care. Patient transferred to ICU.

## 2024-08-22 NOTE — ASSESSMENT & PLAN NOTE
Chronically elevated liver enzymes.  Mild hepatomegaly on prior CT.  Denies chronic alcohol use.    Monitor for now

## 2024-08-22 NOTE — ASSESSMENT & PLAN NOTE
Titrate oxygen to keep SpO2 >89%  Needs outpatient polysomnogram test.  BiPAP at night as needed  Treating for possible COPD component  Nebulizer and inhalers prn  RT per protocol

## 2024-08-22 NOTE — CODE DOCUMENTATION
Patient collapsed on ground, patient was pulseless, apenic, CPR Started for approx. 30seconds. ROSC Achieved. Patient moved back to bed. By the time RRT and intensivist team arrived patient was moving all extremities and spontaneously breathing. Potential head strike.

## 2024-08-22 NOTE — ASSESSMENT & PLAN NOTE
In-hospital PEA cardiac arrest presumably due to hypoxemia  CTA without evidence of pulmonary embolism

## 2024-08-22 NOTE — ASSESSMENT & PLAN NOTE
Last echo in April 2024 with EF of 60%.  Does not appear to be fluid overloaded or an acute exacerbation of heart failure at this time.  Monitor.

## 2024-08-22 NOTE — PROGRESS NOTES
Critical Care Progress Note    Date of admission  8/20/2024    Chief Complaint  63 y.o. male admitted 8/20/2024 with an acute exacerbation of COPD.  He has a history of COPD, atrial flutter, AVR with ascending aortic aneurysm repair, CHF.    Hospital Course      8/22 -    titrating BiPAP for respiratory distress.  Follow neuro exam.  Limit narcotics.  8/23 -    he no longer requires BiPAP for respiratory distress.  His encephalopathy is improved.  Okay to transfer out of ICU.      Interval Problem Update  Reviewed last 24 hour events:      SR  98.2  +1850 mL in the last 24  +2990 mL since admit      Noe is tired this morning.  He has a cough with minimal sputum production.  He denies hemoptysis.  His dyspnea is improving.  He has oxygen at home, but does not use it.  He denies any current abdominal pain, nausea or vomiting.  He has no angina, palpitations or syncope.      Review of Systems  Review of Systems   Constitutional:  Negative for chills, diaphoresis and fever.   HENT:  Negative for congestion, nosebleeds, sinus pain and sore throat.    Eyes:  Negative for pain, discharge and redness.   Respiratory:  Positive for cough. Negative for hemoptysis, shortness of breath and stridor.    Cardiovascular:  Negative for palpitations, orthopnea, leg swelling and PND.   Gastrointestinal:  Negative for abdominal pain, blood in stool, nausea and vomiting.   Genitourinary:  Negative for dysuria, flank pain, hematuria and urgency.   Musculoskeletal:  Negative for myalgias and neck pain.   Skin:  Negative for rash.   Neurological:  Negative for focal weakness, seizures and headaches.   Endo/Heme/Allergies:  Does not bruise/bleed easily.   Psychiatric/Behavioral:  Negative for hallucinations.         Vital Signs for last 24 hours   Temp:  [36.4 °C (97.6 °F)-36.8 °C (98.2 °F)] 36.8 °C (98.2 °F)  Pulse:  [80-97] 80  Resp:  [9-61] 19  BP: (115-166)/() 148/99  SpO2:  [77 %-100 %] 97 %    Hemodynamic parameters for last  "24 hours       Respiratory Information for the last 24 hours       Physical Exam   Physical Exam  Constitutional:       General: He is not in acute distress.     Appearance: He is obese. He is not toxic-appearing or diaphoretic.   HENT:      Head: Normocephalic and atraumatic.      Comments: He has a \"shiner\" around his left eye     Right Ear: External ear normal.      Left Ear: External ear normal.      Nose: Nose normal.      Mouth/Throat:      Mouth: Mucous membranes are moist.   Eyes:      General: No scleral icterus.     Pupils: Pupils are equal, round, and reactive to light.   Cardiovascular:      Comments: Sinus rhythm  Pulmonary:      Effort: Pulmonary effort is normal. No respiratory distress.      Breath sounds: No stridor. Rales (Few scattered crackles) present. No wheezing.   Abdominal:      General: Bowel sounds are normal. There is no distension.      Tenderness: There is no abdominal tenderness. There is no guarding or rebound.   Musculoskeletal:      Cervical back: Normal range of motion.      Right lower leg: No edema.      Left lower leg: No edema.   Skin:     General: Skin is warm.   Neurological:      Comments: He answers questions appropriately.  He has no focal weakness.         Medications  Current Facility-Administered Medications   Medication Dose Route Frequency Provider Last Rate Last Admin    fluticasone-umeclidinium-vilanterol (Trelegy Ellipta) 200-62.5-25 mcg/act inhaler 1 Puff  1 Puff Inhalation DAILY Irvin Worrell M.D.        hydrOXYzine HCl (Atarax) tablet 25 mg  25 mg Oral TID PRN Fanta Lindo M.D.   25 mg at 08/23/24 0321    MD Alert...ICU Electrolyte Replacement per Pharmacy   Other PHARMACY TO DOSE Coleman Duggan M.D.        lidocaine (Asperflex) 4 % patch 1 Patch  1 Patch Transdermal Q24HR Coleman Duggan M.D.   1 Patch at 08/22/24 0922    ipratropium-albuterol (DUONEB) nebulizer solution  3 mL Nebulization 4X/DAY (RT) Coleman Duggan M.D.   3 mL at " 08/22/24 1814    naproxen (Naprosyn) tablet 500 mg  500 mg Oral BID Coleman Duggan M.D.   500 mg at 08/23/24 0505    Respiratory Therapy Consult   Nebulization Continuous RT Sandro Fairchild M.D.        ipratropium-albuterol (DUONEB) nebulizer solution  3 mL Nebulization Q2HRS PRN (RT) Sandro Fairchild M.D.   3 mL at 08/23/24 0522    cetirizine (ZyrTEC) tablet 10 mg  10 mg Oral DAILY Sandro Fairchild M.D.   10 mg at 08/23/24 0505    ondansetron (Zofran) syringe/vial injection 4 mg  4 mg Intravenous Q4HRS PRN Sandro Fairchild M.D.   4 mg at 08/22/24 2208    prochlorperazine (Compazine) injection 10 mg  10 mg Intravenous Q6HRS PRN Sandro Fairchild M.D.        [Held by provider] lisinopril (Prinivil) tablet 10 mg  10 mg Oral Q DAY Sandro Fairchild M.D.   10 mg at 08/21/24 1640    melatonin tablet 5 mg  5 mg Oral Nightly Pastora Trinidad M.D.   5 mg at 08/22/24 2053    rivaroxaban (Xarelto) tablet 20 mg  20 mg Oral DAILY Marva Manzo M.D.   20 mg at 08/23/24 0506    predniSONE (Deltasone) tablet 40 mg  40 mg Oral DAILY Marva Manzo M.D.   40 mg at 08/23/24 0505    oxyCODONE immediate-release (Roxicodone) tablet 5 mg  5 mg Oral Q4HRS PRN Marva Manzo M.D.   5 mg at 08/22/24 0549    Or    oxyCODONE immediate release (Roxicodone) tablet 10 mg  10 mg Oral Q4HRS PRN Marva Manzo M.D.   10 mg at 08/22/24 1753       Fluids    Intake/Output Summary (Last 24 hours) at 8/23/2024 0647  Last data filed at 8/23/2024 0600  Gross per 24 hour   Intake 3449.97 ml   Output 1600 ml   Net 1849.97 ml       Laboratory  Recent Labs     08/21/24  1423 08/22/24  0003 08/22/24  0410   EDMQT20I 7.43  --   --    GAZYKN639T 35.2  --   --    EXKPK354A 72.2  --   --    OECC9HWM 93.3  --   --    ARTHCO3 23  --   --    Q2NTRNUKS room air  --   --    ARTBE -1  --   --    ISTATAPH  --  7.296* 7.334*   ISTATAPCO2  --  54.3* 53.8*   ISTATAPO2  --  72 52*   ISTATATCO2  --  28 30   ABYAVAG1BDP  --  92* 83*   ISTATARTHCO3   --  26.5* 28.6*   ISTATARTBE  --  -1 2   ISTATTEMP  --  98.5 F 37.0 C   ISTATFIO2  --   --  25   ISTATSPEC  --  Arterial Arterial   ISTATAPHTC  --  7.297* 7.334*   EULSCRBI1QI  --  71 52*         Recent Labs     08/22/24  0040 08/22/24  0140 08/23/24  0324   SODIUM 134* 134* 136   POTASSIUM 5.3 5.5 5.6*   CHLORIDE 96 97 100   CO2 24 23 27   BUN 42* 42* 42*   CREATININE 1.86* 1.75* 1.35   MAGNESIUM 2.1  --  2.1   PHOSPHORUS 5.8*  --  4.8*   CALCIUM 8.7 8.6 8.9     Recent Labs     08/21/24  0004 08/22/24  0040 08/22/24  0140 08/23/24  0324   ALTSGPT 62* 183*  --  116*   ASTSGOT 40 129*  --  67*   ALKPHOSPHAT 31 31  --  32   TBILIRUBIN 0.4 0.6  --  0.5   GLUCOSE 307* 131* 130* 109*     Recent Labs     08/21/24  0004 08/22/24  0040 08/22/24  0140 08/23/24  0324   WBC 16.0*  --  27.1* 21.2*   NEUTSPOLYS 96.50*  --  93.10* 87.80*   LYMPHOCYTES 1.70*  --  2.60* 3.50*   MONOCYTES 0.00  --  4.30 7.50   EOSINOPHILS 0.00  --  0.00 0.20   BASOPHILS 0.00  --  0.00 0.10   ASTSGOT 40 129*  --  67*   ALTSGPT 62* 183*  --  116*   ALKPHOSPHAT 31 31  --  32   TBILIRUBIN 0.4 0.6  --  0.5     Recent Labs     08/21/24  0004 08/22/24  0140 08/23/24  0324   RBC 6.60* 6.04 5.96   HEMOGLOBIN 12.7* 11.6* 11.5*   HEMATOCRIT 43.4 40.0* 39.9*   PLATELETCT 442 483* 469*       Imaging  None    Assessment/Plan  * Acute respiratory failure with hypoxia and hypercapnia (HCC)- (present on admission)  Assessment & Plan  He no longer requires BiPAP for respiratory distress  Continue oxygen    Cardiac arrest (HCC)- (present on admission)  Assessment & Plan  In-hospital PEA cardiac arrest presumably due to hypoxemia  CTA without evidence of pulmonary embolism    Pulmonary hypertension (HCC)  Assessment & Plan  RVSP 50 mmHg  WHO group 3    Atrial flutter (HCC)  Assessment & Plan  History of atrial fibrillation/atrial flutter  S/P ablation on 6/12/2023    Elevated LFTs  Assessment & Plan  Suspect due to cardiac arrest  Improving  Trend liver enzymes and  synthetic function  Avoid hepatotoxins    COPD exacerbation (HCC)- (present on admission)  Assessment & Plan  Presumed COPD - no PFTs available in EMR  Continue prednisone, 40 mg daily  Bronchodilators    Primary hypertension- (present on admission)  Assessment & Plan  Goal SBP less than 160  Continue lisinopril, 10 mg daily    Heart failure with improved ejection fraction (HFimpEF) (HCC)- (present on admission)  Assessment & Plan  History of systolic heart failure  Echocardiogram with LVEF of 55%    Acute encephalopathy- (present on admission)  Assessment & Plan  This may be due to narcotic analgesics as well as hypercarbia  Head CT without acute pathology  Ammonia is normal  Limit narcotics  Follow neuro exam  Improving    Tobacco dependence- (present on admission)  Assessment & Plan  Cessation counseling    Hemidiaphragm paralysis- (present on admission)  Assessment & Plan  Chronic left hemidiaphragm paresis         VTE:  NOAC  Ulcer: Not Indicated  Lines: None    I have performed a physical exam and reviewed and updated ROS and Plan today (8/23/2024). In review of yesterday's note (8/22/2024), there are no changes except as documented above.     OK to transfer out of ICU.  [facility] will sign off.  Please call if you have any questions.    Discussed patient condition and risk of morbidity and/or mortality with RN, RT, and Pharmacy    Coleman Duggan MD  Pulmonary and Critical Care Medicine

## 2024-08-22 NOTE — CARE PLAN
Problem: Bronchoconstriction  Goal: Improve in air movement and diminished wheezing  Description: Target End Date:  2 to 3 days    1.  Implement inhaled treatments  2.  Evaluate and manage medication effects  Outcome: Not Met     Problem: Bronchopulmonary Hygiene  Goal: Increase mobilization of retained secretions  Description: Target End Date:  2 to 3 days    1.  Perform bronchopulmonary therapy as indicated by assessment  2.  Perform airway suctioning  3.  Perform actions to maintain patient airway  Outcome: Not Met     Problem: Ventilation  Goal: Ability to achieve and maintain unassisted ventilation or tolerate decreased levels of ventilator support  Description: Target End Date:  4 days     Document on Vent flowsheet    1.  Support and monitor invasive and noninvasive mechanical ventilation  2.  Monitor ventilator weaning response  3.  Perform ventilator associated pneumonia prevention interventions  4.  Manage ventilation therapy by monitoring diagnostic test results  Outcome: Progressing       Respiratory Update    Treatment modality: Q4H Duoneb, QID FV.    Pt. Requiring bipap 14/8, 25% overnight for increased CO2 s/p cardiac arrest.      Pt tolerating current treatments well with no adverse reactions.

## 2024-08-22 NOTE — PROGRESS NOTES
Pain medication offered to pain, pt refuses, education provided on importance of controlling pain to promote breathing. Pt verbalizes understanding and refuses.

## 2024-08-22 NOTE — CARE PLAN
The patient is Stable - Low risk of patient condition declining or worsening    Shift Goals  Clinical Goals: better ventilation and reduce anxiety  Patient Goals: rest and sleep comfortably  Family Goals: HUGH    Progress made toward(s) clinical / shift goals:        Problem: Knowledge Deficit - Standard  Goal: Patient and family/care givers will demonstrate understanding of plan of care, disease process/condition, diagnostic tests and medications  Outcome: Progressing     Problem: Pain - Standard  Goal: Alleviation of pain or a reduction in pain to the patient’s comfort goal  Outcome: Progressing  Note: Pt educated to use pillow to splint chest for discomfort, pt demonstrates effective use of pillow  Pt too drowsy for IV dilaudid, falling asleep during conversation, reinforced to pt      Problem: Respiratory  Goal: Patient will achieve/maintain optimum respiratory ventilation and gas exchange  Outcome: Progressing  Note: Educated pt on need for bipap, pt verbalizes understanding, pt more relaxed and less anxious after ativan, resps non labored      Problem: Knowledge Deficit - COPD  Goal: Patient/significant other demonstrates understanding of disease process, utilization of the Action Plan, medications and discharge instruction  Outcome: Progressing     Pt verbalized understanding of need for CT head, CTA and chest xray to r/out acute findings.

## 2024-08-22 NOTE — PROGRESS NOTES
"Pt had shower and had snack sitting at the edge of the bed, few minutes asked him if he wants his breathing treatment \"he said his fine and soon going home tomorrow\". Suddenly we heard a loud sound like something fell, we ran to his room and we found him down side lying face down hit the foot of the bed side table, bluish in color in his upper extremities and head, gasping and lower extremities red. Call assist activated. No pulse no breathing Code Blue activated, transferred in bed by four staff,  APN on call started CPR  30 sec  1 min and ambu bag in placed. Pulse was 60-64 , rhythm noted at the monitor, Code team came with ICU MD. . New iv access at 20 left hand. Regain conciousnes after. Pt wife Harman called to give updates and talked to the pt. Pt confused and don't know what happened. Pt taken to CT scan and will be transferred to [id number]. Report given to [facility] RN.  "

## 2024-08-23 PROBLEM — M79.672 CHRONIC FOOT PAIN, LEFT: Status: ACTIVE | Noted: 2024-08-23

## 2024-08-23 PROBLEM — G89.29 CHRONIC FOOT PAIN, LEFT: Status: ACTIVE | Noted: 2024-08-23

## 2024-08-23 PROBLEM — I27.20 PULMONARY HYPERTENSION (HCC): Status: ACTIVE | Noted: 2024-08-23

## 2024-08-23 LAB
ALBUMIN SERPL BCP-MCNC: 3.8 G/DL (ref 3.2–4.9)
ALBUMIN/GLOB SERPL: 1.8 G/DL
ALP SERPL-CCNC: 32 U/L (ref 30–99)
ALT SERPL-CCNC: 116 U/L (ref 2–50)
ANION GAP SERPL CALC-SCNC: 9 MMOL/L (ref 7–16)
AST SERPL-CCNC: 67 U/L (ref 12–45)
BASOPHILS # BLD AUTO: 0.1 % (ref 0–1.8)
BASOPHILS # BLD: 0.02 K/UL (ref 0–0.12)
BILIRUB SERPL-MCNC: 0.5 MG/DL (ref 0.1–1.5)
BUN SERPL-MCNC: 42 MG/DL (ref 8–22)
CALCIUM ALBUM COR SERPL-MCNC: 9.1 MG/DL (ref 8.5–10.5)
CALCIUM SERPL-MCNC: 8.9 MG/DL (ref 8.5–10.5)
CHLORIDE SERPL-SCNC: 100 MMOL/L (ref 96–112)
CO2 SERPL-SCNC: 27 MMOL/L (ref 20–33)
CREAT SERPL-MCNC: 1.35 MG/DL (ref 0.5–1.4)
EOSINOPHIL # BLD AUTO: 0.05 K/UL (ref 0–0.51)
EOSINOPHIL NFR BLD: 0.2 % (ref 0–6.9)
ERYTHROCYTE [DISTWIDTH] IN BLOOD BY AUTOMATED COUNT: 49 FL (ref 35.9–50)
ERYTHROCYTE [SEDIMENTATION RATE] IN BLOOD BY WESTERGREN METHOD: 2 MM/HOUR (ref 0–20)
GFR SERPLBLD CREATININE-BSD FMLA CKD-EPI: 58 ML/MIN/1.73 M 2
GLOBULIN SER CALC-MCNC: 2.1 G/DL (ref 1.9–3.5)
GLUCOSE SERPL-MCNC: 109 MG/DL (ref 65–99)
HCT VFR BLD AUTO: 39.9 % (ref 42–52)
HGB BLD-MCNC: 11.5 G/DL (ref 14–18)
IMM GRANULOCYTES # BLD AUTO: 0.19 K/UL (ref 0–0.11)
IMM GRANULOCYTES NFR BLD AUTO: 0.9 % (ref 0–0.9)
LYMPHOCYTES # BLD AUTO: 0.75 K/UL (ref 1–4.8)
LYMPHOCYTES NFR BLD: 3.5 % (ref 22–41)
MAGNESIUM SERPL-MCNC: 2.1 MG/DL (ref 1.5–2.5)
MCH RBC QN AUTO: 19.3 PG (ref 27–33)
MCHC RBC AUTO-ENTMCNC: 28.8 G/DL (ref 32.3–36.5)
MCV RBC AUTO: 66.9 FL (ref 81.4–97.8)
MONOCYTES # BLD AUTO: 1.59 K/UL (ref 0–0.85)
MONOCYTES NFR BLD AUTO: 7.5 % (ref 0–13.4)
NEUTROPHILS # BLD AUTO: 18.55 K/UL (ref 1.82–7.42)
NEUTROPHILS NFR BLD: 87.8 % (ref 44–72)
NRBC # BLD AUTO: 0.03 K/UL
NRBC BLD-RTO: 0.1 /100 WBC (ref 0–0.2)
PHOSPHATE SERPL-MCNC: 4.8 MG/DL (ref 2.5–4.5)
PLATELET # BLD AUTO: 469 K/UL (ref 164–446)
PMV BLD AUTO: 10.2 FL (ref 9–12.9)
POTASSIUM SERPL-SCNC: 5 MMOL/L (ref 3.6–5.5)
POTASSIUM SERPL-SCNC: 5.6 MMOL/L (ref 3.6–5.5)
PROT SERPL-MCNC: 5.9 G/DL (ref 6–8.2)
RBC # BLD AUTO: 5.96 M/UL (ref 4.7–6.1)
SODIUM SERPL-SCNC: 136 MMOL/L (ref 135–145)
URATE SERPL-MCNC: 3.7 MG/DL (ref 2.5–8.3)
WBC # BLD AUTO: 21.2 K/UL (ref 4.8–10.8)

## 2024-08-23 PROCEDURE — A9270 NON-COVERED ITEM OR SERVICE: HCPCS

## 2024-08-23 PROCEDURE — 700102 HCHG RX REV CODE 250 W/ 637 OVERRIDE(OP): Performed by: INTERNAL MEDICINE

## 2024-08-23 PROCEDURE — 80053 COMPREHEN METABOLIC PANEL: CPT

## 2024-08-23 PROCEDURE — 700102 HCHG RX REV CODE 250 W/ 637 OVERRIDE(OP)

## 2024-08-23 PROCEDURE — A9270 NON-COVERED ITEM OR SERVICE: HCPCS | Performed by: INTERNAL MEDICINE

## 2024-08-23 PROCEDURE — 83735 ASSAY OF MAGNESIUM: CPT

## 2024-08-23 PROCEDURE — 94640 AIRWAY INHALATION TREATMENT: CPT

## 2024-08-23 PROCEDURE — 700111 HCHG RX REV CODE 636 W/ 250 OVERRIDE (IP): Mod: JZ

## 2024-08-23 PROCEDURE — 700111 HCHG RX REV CODE 636 W/ 250 OVERRIDE (IP)

## 2024-08-23 PROCEDURE — 770000 HCHG ROOM/CARE - INTERMEDIATE ICU *

## 2024-08-23 PROCEDURE — 700101 HCHG RX REV CODE 250: Performed by: INTERNAL MEDICINE

## 2024-08-23 PROCEDURE — 84100 ASSAY OF PHOSPHORUS: CPT

## 2024-08-23 PROCEDURE — 99233 SBSQ HOSP IP/OBS HIGH 50: CPT | Performed by: INTERNAL MEDICINE

## 2024-08-23 PROCEDURE — 84550 ASSAY OF BLOOD/URIC ACID: CPT

## 2024-08-23 PROCEDURE — 84132 ASSAY OF SERUM POTASSIUM: CPT

## 2024-08-23 PROCEDURE — 85025 COMPLETE CBC W/AUTO DIFF WBC: CPT

## 2024-08-23 PROCEDURE — 700101 HCHG RX REV CODE 250

## 2024-08-23 PROCEDURE — 94669 MECHANICAL CHEST WALL OSCILL: CPT

## 2024-08-23 PROCEDURE — 85652 RBC SED RATE AUTOMATED: CPT

## 2024-08-23 RX ADMIN — KETOROLAC TROMETHAMINE 15 MG: 15 INJECTION, SOLUTION INTRAMUSCULAR; INTRAVENOUS at 00:39

## 2024-08-23 RX ADMIN — OXYCODONE HYDROCHLORIDE 10 MG: 10 TABLET ORAL at 20:30

## 2024-08-23 RX ADMIN — OXYCODONE HYDROCHLORIDE 10 MG: 10 TABLET ORAL at 14:52

## 2024-08-23 RX ADMIN — IPRATROPIUM BROMIDE AND ALBUTEROL SULFATE 3 ML: 2.5; .5 SOLUTION RESPIRATORY (INHALATION) at 14:17

## 2024-08-23 RX ADMIN — HYDROXYZINE HYDROCHLORIDE 25 MG: 25 TABLET ORAL at 16:02

## 2024-08-23 RX ADMIN — NAPROXEN 500 MG: 500 TABLET ORAL at 05:05

## 2024-08-23 RX ADMIN — OXYCODONE HYDROCHLORIDE 10 MG: 10 TABLET ORAL at 10:59

## 2024-08-23 RX ADMIN — HYDROXYZINE HYDROCHLORIDE 25 MG: 25 TABLET ORAL at 03:21

## 2024-08-23 RX ADMIN — Medication 5 MG: at 20:30

## 2024-08-23 RX ADMIN — PREDNISONE 40 MG: 20 TABLET ORAL at 05:05

## 2024-08-23 RX ADMIN — ONDANSETRON 4 MG: 2 INJECTION INTRAMUSCULAR; INTRAVENOUS at 12:19

## 2024-08-23 RX ADMIN — IPRATROPIUM BROMIDE AND ALBUTEROL SULFATE 3 ML: 2.5; .5 SOLUTION RESPIRATORY (INHALATION) at 05:22

## 2024-08-23 RX ADMIN — IPRATROPIUM BROMIDE AND ALBUTEROL SULFATE 3 ML: 2.5; .5 SOLUTION RESPIRATORY (INHALATION) at 18:18

## 2024-08-23 RX ADMIN — RIVAROXABAN 20 MG: 20 TABLET, FILM COATED ORAL at 05:06

## 2024-08-23 RX ADMIN — LIDOCAINE 1 PATCH: 4 PATCH TOPICAL at 10:03

## 2024-08-23 RX ADMIN — CETIRIZINE HYDROCHLORIDE 10 MG: 10 TABLET, FILM COATED ORAL at 05:05

## 2024-08-23 ASSESSMENT — ENCOUNTER SYMPTOMS
DIZZINESS: 0
NERVOUS/ANXIOUS: 0
SORE THROAT: 0
MYALGIAS: 0
DIAPHORESIS: 0
FOCAL WEAKNESS: 0
ORTHOPNEA: 0
COUGH: 1
PND: 0
CHILLS: 0
SINUS PAIN: 0
BLOOD IN STOOL: 0
BRUISES/BLEEDS EASILY: 0
SEIZURES: 0
EYE DISCHARGE: 0
NAUSEA: 0
ABDOMINAL PAIN: 0
NECK PAIN: 0
EYE PAIN: 0
HEMOPTYSIS: 0
FLANK PAIN: 0
STRIDOR: 0
EYE REDNESS: 0
PALPITATIONS: 0
HALLUCINATIONS: 0
SHORTNESS OF BREATH: 0
FEVER: 0
HEADACHES: 0
VOMITING: 0

## 2024-08-23 ASSESSMENT — FIBROSIS 4 INDEX
FIB4 SCORE: 0.85
FIB4 SCORE: 0.85

## 2024-08-23 ASSESSMENT — PAIN DESCRIPTION - PAIN TYPE
TYPE: ACUTE PAIN

## 2024-08-23 NOTE — CARE PLAN
Problem: Knowledge Deficit - Standard  Goal: Patient and family/care givers will demonstrate understanding of plan of care, disease process/condition, diagnostic tests and medications  Description: Target End Date:  1-3 days or as soon as patient condition allows    Document in Patient Education    1.  Patient and family/caregiver oriented to unit, equipment, visitation policy and means for communicating concern  2.  Complete/review Learning Assessment  3.  Assess knowledge level of disease process/condition, treatment plan, diagnostic tests and medications  4.  Explain disease process/condition, treatment plan, diagnostic tests and medications  Outcome: Progressing     Problem: Pain - Standard  Goal: Alleviation of pain or a reduction in pain to the patient’s comfort goal  Description: Target End Date:  Prior to discharge or change in level of care    Document on Vitals flowsheet    1.  Document pain using the appropriate pain scale per order or unit policy  2.  Educate and implement non-pharmacologic comfort measures (i.e. relaxation, distraction, massage, cold/heat therapy, etc.)  3.  Pain management medications as ordered  4.  Reassess pain after pain med administration per policy  5.  If opiods administered assess patient's response to pain medication is appropriate per POSS sedation scale  6.  Follow pain management plan developed in collaboration with patient and interdisciplinary team (including palliative care or pain specialists if applicable)  Outcome: Progressing     Problem: Respiratory  Goal: Patient will achieve/maintain optimum respiratory ventilation and gas exchange  Description: Target End Date:  Prior to discharge or change in level of care    Document on Assessment flowsheet    1.  Assess and monitor rate, rhythm, depth and effort of respiration  2.  Breath sounds assessed qshift and/or as needed  3.  Assess O2 saturation, administer/titrate oxygen as ordered  4.  Position patient for maximum  ventilatory efficiency  5.  Turn, cough, and deep breath with splinting to improve effectiveness  6.  Collaborate with RT to administer medication/treatments per order  7.  Encourage use of incentive spirometer and encourage patient to cough after use and utilize splinting techniques if applicable  8.  Airway suctioning  9.  Monitor sputum production for changes in color, consistency and frequency  10. Perform frequent oral hygiene  11. Alternate physical activity with rest periods  Outcome: Progressing     Problem: Knowledge Deficit - COPD  Goal: Patient/significant other demonstrates understanding of disease process, utilization of the Action Plan, medications and discharge instruction  Description: Target End Date:  1-3 days or as soon as patient condition allows    Document in Patient Education    1.  Discuss the importance of medical follow-up care, periodic chest x-rays, sputum cultures  2.  Review worsening signs and symptoms of COPD flare and exacerbation and its management  3.  Discuss respiratory medications, side effects, adverse reactions  4.  Demonstrate technique for using a metered-dose inhaler (MDI) and utilization of a spacer  5.  Review the COPD Action Plan  6.  Instruct and reinforce the rationale for breathing exercises, coughing effectively, and general conditioning exercises  7.  Stress importance of oral care and dental hygiene  8.  Discuss the importance of avoiding people with active respiratory infections and need for routine influenza and pneumococcal vaccinations  9.  Discuss factors that may trigger condition and encourage patient/significant other to explore ways to control these factors in and around the home and work setting  10. Review the harmful effects of smoking and advise cessation of smoking  11. Provide information about activity limitations and alternating activities with rest periods to prevent fatigue  12. Instruct asthmatic patient of use of peak flow meter, as  appropriate  13. Review oxygen requirements and dosage, safe use of oxygen, and refer to the supplier as indicated  14. Educate patient/family/caregiver on the use of Nasal Intermittent Positive Pressure Ventilation (NIPPV) and possible adverse reactions  Outcome: Progressing     Problem: Risk for Infection - COPD  Goal: Patient will remain free from signs and symptoms of infection  Description: Target End Date:  Prior to discharge or change in level of care    1.  Infection prevention measures  2.  Instruct patient regarding signs and symptoms of infection  3.  Review the importance of breathing exercises, effective cough, frequent position changes, and adequate fluid intake  4.  Recommend rinsing mouth with water and spitting, not swallowing, or use of a spacer on the mouthpiece of inhaled corticosteroids  Outcome: Progressing     Problem: Psychosocial  Goal: Patient's level of anxiety will decrease  Description: Target End Date:  1-3 days or as soon as patient condition allows    1.  Collaborate with patient and family/caregiver to identify triggers and develop strategies to cope with anxiety  2.  Implement stimuli reduction, calming techniques  3.  Pharmacologic management per provider order  4.  Encourage patient/family/care giver participation  5.  Collaborate with interdisciplinary team including Psychologist or Behavioral Health Team as needed  Outcome: Progressing   The patient is Watcher - Medium risk of patient condition declining or worsening    Shift Goals  Clinical Goals: Pain control, Maintain oxygen saturation > 88%  Patient Goals: Pain Control  Family Goals: None present to update    Progress made toward(s) clinical / shift goals:  Progress toward goals as stated above.    Patient is not progressing towards the following goals:

## 2024-08-23 NOTE — CARE PLAN
Problem: Knowledge Deficit - Standard  Goal: Patient and family/care givers will demonstrate understanding of plan of care, disease process/condition, diagnostic tests and medications  Outcome: Progressing     Problem: Pain - Standard  Goal: Alleviation of pain or a reduction in pain to the patient’s comfort goal  Outcome: Progressing     Problem: Respiratory  Goal: Patient will achieve/maintain optimum respiratory ventilation and gas exchange  Outcome: Progressing     Problem: Knowledge Deficit - COPD  Goal: Patient/significant other demonstrates understanding of disease process, utilization of the Action Plan, medications and discharge instruction  Outcome: Progressing     Problem: Risk for Infection - COPD  Goal: Patient will remain free from signs and symptoms of infection  Outcome: Progressing     Problem: Nutrition - Advanced  Goal: Patient will display progressive weight gain toward goal have adequate food and fluid intake  Outcome: Progressing     Problem: Ineffective Airway Clearance  Goal: Patient will maintain patent airway with clear/clearing breath sounds  Outcome: Progressing     Problem: Impaired Gas Exchange  Goal: Patient will demonstrate improved ventilation and adequate oxygenation and participate in treatment regimen within the level of ability/situation.  Outcome: Progressing     Problem: Risk for Aspiration  Goal: Patient's risk for aspiration will be absent or decrease  Outcome: Progressing     Problem: Self Care  Goal: Patient will have the ability to perform ADLs independently or with assistance (bathe, groom, dress, toilet and feed)  Outcome: Progressing     Problem: Psychosocial  Goal: Patient's level of anxiety will decrease  Outcome: Progressing  Goal: Patient's ability to verbalize feelings about condition will improve  Outcome: Progressing  Goal: Patient's ability to re-evaluate and adapt role responsibilities will improve  Outcome: Progressing  Goal: Patient and family will  demonstrate ability to cope with life altering diagnosis and/or procedure  Outcome: Progressing  Goal: Spiritual and cultural needs incorporated into hospitalization  Outcome: Progressing     Problem: Hemodynamics  Goal: Patient's hemodynamics, fluid balance and neurologic status will be stable or improve  Outcome: Progressing     Problem: Mechanical Ventilation  Goal: Safe management of artificial airway and ventilation  Outcome: Progressing  Goal: Successful weaning off mechanical ventilator, spontaneously maintains adequate gas exchange  Outcome: Progressing  Goal: Patient will be able to express needs and understand communication  Outcome: Progressing     Problem: Urinary - Renal Perfusion  Goal: Ability to achieve and maintain adequate renal perfusion and functioning will improve  Outcome: Progressing     Problem: Venous Thromboembolism (VTE) Prevention  Goal: The patient will remain free from venous thromboembolism (VTE)  Outcome: Progressing     Problem: Nutrition  Goal: Patient's nutritional and fluid intake will be adequate or improve  Outcome: Progressing  Goal: Enteral nutrition will be maintained or improve  Outcome: Progressing  Goal: Enteral nutrition will be maintained or improve  Outcome: Progressing     Problem: Urinary Elimination  Goal: Establish and maintain regular urinary output  Outcome: Progressing     Problem: Bowel Elimination  Goal: Establish and maintain regular bowel function  Outcome: Progressing     Problem: Fall Risk  Goal: Patient will remain free from falls  Outcome: Progressing   The patient is Stable - Low risk of patient condition declining or worsening    Shift Goals  Clinical Goals: pain control, o2> 88%  Patient Goals: pain control  Family Goals: None present to update    Progress made toward(s) clinical / shift goals:  yes

## 2024-08-23 NOTE — PROGRESS NOTES
MD notified that patient is complaining of 8-9/10 heel and musculoskeletal chest pain. Patient has used Lidocaine patch and Oxyvodone 10mg with no relief and is requesting something stronger.

## 2024-08-23 NOTE — DOCUMENTATION QUERY
[facility]                                                                       Query Response Note      PATIENT:               JUSTIN TOPETE  ACCT #:                  [id number]  MRN:                     [id number]  :                      1960  ADMIT DATE:       2024 3:58 PM  DISCH DATE:          RESPONDING  PROVIDER #:        [id number]           QUERY TEXT:    Encephalopathy is documented in the Medical Record.     Please specify type.    The patient's Clinical Indicators include:  Clinical Findings:     CT head: No acute intracranial abnormality  : altered, somnolent, confused  : encephalopathy is improved    Risk Factors: Acute respiratory failure with hypoxia and hypercapnia, narcotic analgesics, cardiac arrest  Treatments: CT head, labs, follow neuro exam, limit narcotics, O2 support/BIPAP, CC monitoring      Contact me with any questions.    Thank you for your time and attention,  Arline Srivastava RN, CDI  Connect via email, Voalte or messenger.    Note: If you agree with a listed diagnosis, please remember to include it in your concurrent daily documentation and onto the Discharge Summary.  Options provided:   -- Metabolic encephalopathy   -- Toxic Metabolic encephalopathy   -- Due to medications or drugs   -- Other type of encephalopathy   -- Other explanation, (please specify other explanation)   -- Unable to determine      Query created by: Arline Srivastava on 2024 11:46 AM    RESPONSE TEXT:    Other type of encephalopathy          Electronically signed by:  ROCAEL CARO MD 2024 11:49 AM

## 2024-08-23 NOTE — CARE PLAN
Problem: Bronchoconstriction  Goal: Improve in air movement and diminished wheezing  Description: Target End Date:  2 to 3 days    1.  Implement inhaled treatments  2.  Evaluate and manage medication effects  Outcome: Not Met     Problem: Bronchopulmonary Hygiene  Goal: Increase mobilization of retained secretions  Description: Target End Date:  2 to 3 days    1.  Perform bronchopulmonary therapy as indicated by assessment  2.  Perform airway suctioning  3.  Perform actions to maintain patient airway  Outcome: Not Met       Respiratory Update    Treatment modality: QID Duoneb and FV    Pt tolerating current treatments well with no adverse reactions.

## 2024-08-23 NOTE — ASSESSMENT & PLAN NOTE
States over past six months he has been dealing with severe pain.  Check uric acid.  Question plantar fasciitis vs gout?  He request IV dilaudid and I alerted him he would not needs this acutely in the hospital.  We will try Voltraren gel

## 2024-08-23 NOTE — PROGRESS NOTES
Monitor summary: sinus rhythm.  NC .14 QRS .07  QT .42      Encounter    Hospital Encounter on 8/20/24

## 2024-08-23 NOTE — PROGRESS NOTES
Hospital Medicine Daily Progress Note    Date of Service  8/23/2024    Chief Complaint  Chest pain and short of breath    Hospital Course  Noe Hickey is a 63 y.o. male on anabolic sterois, AAA w/ repair, TAVR 2023, aflutter, tobacco use that was admitted 8/20/2024 with short of breath over the past 2 months.  While at home experienced chest pressure while in the shower.  He was admitted for COPD exacerbations.  During admit he had an unwitnessed fall and was found down and cyanotic, unresponsive and in PEA.  A code was called and after ~1 minute of CPR he obtained ROSC.  He was subsequently in the ICU.    Interval Problem Update  8/23: Transfer out of ICU to telemetry floor.  He complains of chronic left heel pain for which nothing outpatient has helped.  He request IV dilaudid for his pain but we discussed this has been more of a chronic outpatient issue and that I would not recommend IV narcotic for this pain.  I will try Voltaren gel.    I have discussed this patient's plan of care and discharge plan at IDT rounds today with Case Management, Nursing, Nursing leadership, and other members of the IDT team.      Code Status  Full Code    Disposition  The patient is not medically cleared for discharge to home or a post-acute facility.  Anticipate discharge to: home with close outpatient follow-up    I have placed the appropriate orders for post-discharge needs.    Review of Systems  Review of Systems   Constitutional:  Negative for malaise/fatigue.   Cardiovascular:  Negative for chest pain and leg swelling.   Gastrointestinal:  Negative for abdominal pain and nausea.   Musculoskeletal:  Positive for joint pain (left heel and bottom of foot).   Neurological:  Negative for dizziness.   Psychiatric/Behavioral:  The patient is not nervous/anxious.         Physical Exam  Temp:  [36.4 °C (97.6 °F)-36.8 °C (98.2 °F)] 36.8 °C (98.2 °F)  Pulse:  [80-97] 94  Resp:  [9-61] 22  BP: (115-166)/() 142/76  SpO2:   [77 %-97 %] 91 %    Physical Exam  Vitals reviewed.   Constitutional:       Appearance: Normal appearance. He is obese. He is not ill-appearing.   HENT:      Head: Normocephalic and atraumatic.      Mouth/Throat:      Mouth: Mucous membranes are moist.   Eyes:      Extraocular Movements: Extraocular movements intact.      Conjunctiva/sclera: Conjunctivae normal.   Cardiovascular:      Rate and Rhythm: Normal rate and regular rhythm.      Pulses: Normal pulses.      Heart sounds: No murmur heard.  Pulmonary:      Effort: No respiratory distress.      Breath sounds: Normal breath sounds.   Abdominal:      General: There is no distension.      Palpations: Abdomen is soft.   Musculoskeletal:      Cervical back: Neck supple.      Right lower leg: No edema.      Left lower leg: No edema.   Lymphadenopathy:      Cervical: No cervical adenopathy.   Skin:     Coloration: Skin is not jaundiced.      Comments: Left heel and bottom of left foot unremarkable.   Neurological:      General: No focal deficit present.      Mental Status: He is alert. He is disoriented.      Cranial Nerves: No cranial nerve deficit.   Psychiatric:         Mood and Affect: Mood normal.         Behavior: Behavior normal.         Fluids    Intake/Output Summary (Last 24 hours) at 8/23/2024 1338  Last data filed at 8/23/2024 0800  Gross per 24 hour   Intake 2050 ml   Output 1300 ml   Net 750 ml       Laboratory  Recent Labs     08/20/24  1608 08/21/24  0004 08/22/24  0140 08/23/24  0324   WBC 16.2* 16.0* 27.1* 21.2*   RBC 6.58* 6.60* 6.04 5.96   HEMOGLOBIN 12.7* 12.7* 11.6* 11.5*   HEMATOCRIT 43.0 43.4 40.0* 39.9*   MCV 65.3* 65.8* 66.2* 66.9*   MCH 19.3* 19.2* 19.2* 19.3*   MCHC 29.5* 29.3* 29.0* 28.8*   RDW 47.4 48.4 48.8 49.0   PLATELETCT 462* 442 483* 469*   MPV 9.7  --  9.9 10.2     Recent Labs     08/22/24  0040 08/22/24  0140 08/23/24  0324 08/23/24  0735   SODIUM 134* 134* 136  --    POTASSIUM 5.3 5.5 5.6* 5.0   CHLORIDE 96 97 100  --    CO2 24  23 27  --    GLUCOSE 131* 130* 109*  --    BUN 42* 42* 42*  --    CREATININE 1.86* 1.75* 1.35  --    CALCIUM 8.7 8.6 8.9  --                    Imaging  EC-ECHOCARDIOGRAM COMPLETE W/ CONT   Final Result      US-EXTREMITY VENOUS LOWER BILAT   Final Result      CT-CTA CHEST PULMONARY ARTERY W/ RECONS   Final Result      1.  No pulmonary embolus detected.   2.  Elevation of the left hemidiaphragm with marked atelectasis in the left lower lobe. Please correlate for possibility of aspiration.         DX-CHEST-PORTABLE (1 VIEW)   Final Result      1.  Left basilar atelectasis with elevation of the left hemidiaphragm.   2.  Borderline cardiomegaly. Aortic valve replacement.         CT-HEAD W/O   Final Result         1. No acute intracranial abnormality. No evidence of acute intracranial hemorrhage or mass lesion.                           DX-CHEST-LIMITED (1 VIEW)   Final Result         Mildly increased left basilar atelectasis versus infiltrate.      DX-FOOT-COMPLETE 3+ LEFT   Final Result      Osteoarthrosis without definite fracture or dislocation.      CT-CTA COMPLETE THORACOABDOMINAL AORTA   Final Result      1.  Median sternotomy. Nonunion of the sternal defect.   2.  Aortic valve replacement.   3.  No evidence of aortic aneurysm or dissection.   4.  Coronary calcification.   5.  Elevation of the left hemidiaphragm with chronic segmental/subsegmental atelectatic changes at the left lung base.   6.  Calcified old granulomatous disease right lower lobe.                        DX-CHEST-PORTABLE (1 VIEW)   Final Result      1.  Median sternotomy and aortic valve replacement.   2.  Ongoing elevation of left hemidiaphragm with subsegmental atelectatic changes or scarring at the left lung base.           Assessment/Plan  * Acute respiratory failure with hypoxia and hypercapnia (HCC)- (present on admission)  Assessment & Plan  Titrate oxygen to keep SpO2 >89%  Needs outpatient polysomnogram test.  BiPAP at night as  needed  Treating for possible COPD component  Nebulizer and inhalers prn  RT per protocol    Chronic foot pain, left  Assessment & Plan  States over past six months he has been dealing with severe pain.  Check uric acid.  Question plantar fasciitis vs gout?  He request IV dilaudid and I alerted him he would not needs this acutely in the hospital.  We will try Voltraren gel    Pulmonary hypertension (HCC)  Assessment & Plan  Right heart pressures are consistent with moderate pulmonary   hypertension.     Anxiety  Assessment & Plan  Has significant chronic anxiety.  Has hydroxyzine ordered by primary hospitalist team, will continue at a slightly higher dose of 25 mg 3 times daily as needed    Long term (current) use of anticoagulants  Assessment & Plan  On Xarelto for atrial flutter, continue    Atrial flutter ([facility])  Assessment & Plan  Status post ablation.    Xarelto 20mg qpm  Monitor vitals.    Elevated LFTs  Assessment & Plan  Chronically elevated liver enzymes.  Mild hepatomegaly on prior CT.  Denies chronic alcohol use.    Monitor for now    Plantar fasciitis of left foot  Assessment & Plan  See left foot pain note      COPD exacerbation (HCC)- (present on admission)  Assessment & Plan  Nebs, inhalers  Prednisone 40mg q day through 8/26  RT per protocol  Titrate oxygen    Primary hypertension- (present on admission)  Assessment & Plan  Monitor vitals to adjust BP meds  On lisinopril 10mg q day    Anemia- (present on admission)  Assessment & Plan  8/23 Hgb:11.5  Wbc:21.2 <27.1    Tobacco dependence- (present on admission)  Assessment & Plan  Over 40 pack years history of smoking, currently smokes a few cigarettes a day has cut down since surgery last year.  Recommend smoking cessation.    Heart failure with improved ejection fraction (HFimpEF) ([facility])- (present on admission)  Assessment & Plan  Last echo in April 2024 with EF of 60%.  Does not appear to be fluid overloaded or an acute exacerbation of heart failure  at this time.  Monitor.    Acute encephalopathy- (present on admission)  Assessment & Plan  Resolved.  Likely hypoxia induced    Cardiac arrest (HCC)- (present on admission)  Assessment & Plan  Likely hypoxemic etiology given COPD exacerbation. Had an unwitnessed fall and was found down by RN unresponsive with cyanosis and no pulse.  PEA arrest for which CODE BLUE was called.  ROSC was achieved with about 30 seconds of high-quality CPR.  Did not receive any epinephrine or shocks.  Although initially confused, became alert and oriented x 2-3 soon after.  EKG with normal sinus rhythm.   Monitor in ICU, no TTM at this time           VTE prophylaxis:    therapeutic anticoagulation with xarelto 20 mg daily witih meals      I have performed a physical exam and reviewed and updated ROS and Plan today (8/23/2024). In review of yesterday's note (8/22/2024), there are no changes except as documented above.

## 2024-08-24 ENCOUNTER — PHARMACY VISIT (OUTPATIENT)
Dept: PHARMACY | Facility: MEDICAL CENTER | Age: 64
End: 2024-08-24
Payer: COMMERCIAL

## 2024-08-24 VITALS
TEMPERATURE: 97.1 F | OXYGEN SATURATION: 90 % | DIASTOLIC BLOOD PRESSURE: 56 MMHG | BODY MASS INDEX: 30.4 KG/M2 | HEART RATE: 90 BPM | HEIGHT: 69 IN | RESPIRATION RATE: 17 BRPM | SYSTOLIC BLOOD PRESSURE: 117 MMHG | WEIGHT: 205.25 LBS

## 2024-08-24 PROBLEM — J96.01 ACUTE RESPIRATORY FAILURE WITH HYPOXIA AND HYPERCAPNIA (HCC): Status: RESOLVED | Noted: 2024-04-26 | Resolved: 2024-08-24

## 2024-08-24 PROBLEM — J96.02 ACUTE RESPIRATORY FAILURE WITH HYPOXIA AND HYPERCAPNIA (HCC): Status: RESOLVED | Noted: 2024-04-26 | Resolved: 2024-08-24

## 2024-08-24 PROBLEM — G93.40 ACUTE ENCEPHALOPATHY: Status: RESOLVED | Noted: 2023-01-29 | Resolved: 2024-08-24

## 2024-08-24 PROBLEM — I46.9 CARDIAC ARREST (HCC): Status: RESOLVED | Noted: 2023-01-06 | Resolved: 2024-08-24

## 2024-08-24 LAB
ALBUMIN SERPL BCP-MCNC: 3.5 G/DL (ref 3.2–4.9)
ALBUMIN/GLOB SERPL: 1.7 G/DL
ALP SERPL-CCNC: 31 U/L (ref 30–99)
ALT SERPL-CCNC: 106 U/L (ref 2–50)
ANION GAP SERPL CALC-SCNC: 8 MMOL/L (ref 7–16)
AST SERPL-CCNC: 52 U/L (ref 12–45)
BASOPHILS # BLD AUTO: 0.2 % (ref 0–1.8)
BASOPHILS # BLD: 0.03 K/UL (ref 0–0.12)
BILIRUB SERPL-MCNC: 0.5 MG/DL (ref 0.1–1.5)
BUN SERPL-MCNC: 23 MG/DL (ref 8–22)
CALCIUM ALBUM COR SERPL-MCNC: 8.9 MG/DL (ref 8.5–10.5)
CALCIUM SERPL-MCNC: 8.5 MG/DL (ref 8.5–10.5)
CHLORIDE SERPL-SCNC: 99 MMOL/L (ref 96–112)
CO2 SERPL-SCNC: 30 MMOL/L (ref 20–33)
CREAT SERPL-MCNC: 0.85 MG/DL (ref 0.5–1.4)
EOSINOPHIL # BLD AUTO: 0.17 K/UL (ref 0–0.51)
EOSINOPHIL NFR BLD: 1.1 % (ref 0–6.9)
ERYTHROCYTE [DISTWIDTH] IN BLOOD BY AUTOMATED COUNT: 48.5 FL (ref 35.9–50)
GFR SERPLBLD CREATININE-BSD FMLA CKD-EPI: 97 ML/MIN/1.73 M 2
GLOBULIN SER CALC-MCNC: 2.1 G/DL (ref 1.9–3.5)
GLUCOSE SERPL-MCNC: 83 MG/DL (ref 65–99)
HCT VFR BLD AUTO: 41.1 % (ref 42–52)
HGB BLD-MCNC: 11.7 G/DL (ref 14–18)
IMM GRANULOCYTES # BLD AUTO: 0.14 K/UL (ref 0–0.11)
IMM GRANULOCYTES NFR BLD AUTO: 0.9 % (ref 0–0.9)
LYMPHOCYTES # BLD AUTO: 1.34 K/UL (ref 1–4.8)
LYMPHOCYTES NFR BLD: 8.5 % (ref 22–41)
MAGNESIUM SERPL-MCNC: 1.9 MG/DL (ref 1.5–2.5)
MCH RBC QN AUTO: 19.1 PG (ref 27–33)
MCHC RBC AUTO-ENTMCNC: 28.5 G/DL (ref 32.3–36.5)
MCV RBC AUTO: 67.2 FL (ref 81.4–97.8)
MONOCYTES # BLD AUTO: 1.55 K/UL (ref 0–0.85)
MONOCYTES NFR BLD AUTO: 9.8 % (ref 0–13.4)
NEUTROPHILS # BLD AUTO: 12.53 K/UL (ref 1.82–7.42)
NEUTROPHILS NFR BLD: 79.5 % (ref 44–72)
NRBC # BLD AUTO: 0.04 K/UL
NRBC BLD-RTO: 0.3 /100 WBC (ref 0–0.2)
PHOSPHATE SERPL-MCNC: 3.6 MG/DL (ref 2.5–4.5)
PLATELET # BLD AUTO: 438 K/UL (ref 164–446)
PMV BLD AUTO: 9.3 FL (ref 9–12.9)
POTASSIUM SERPL-SCNC: 4.9 MMOL/L (ref 3.6–5.5)
PROT SERPL-MCNC: 5.6 G/DL (ref 6–8.2)
RBC # BLD AUTO: 6.12 M/UL (ref 4.7–6.1)
SODIUM SERPL-SCNC: 137 MMOL/L (ref 135–145)
WBC # BLD AUTO: 15.8 K/UL (ref 4.8–10.8)

## 2024-08-24 PROCEDURE — 84100 ASSAY OF PHOSPHORUS: CPT

## 2024-08-24 PROCEDURE — 700102 HCHG RX REV CODE 250 W/ 637 OVERRIDE(OP): Performed by: HOSPITALIST

## 2024-08-24 PROCEDURE — 80053 COMPREHEN METABOLIC PANEL: CPT

## 2024-08-24 PROCEDURE — 85025 COMPLETE CBC W/AUTO DIFF WBC: CPT

## 2024-08-24 PROCEDURE — A9270 NON-COVERED ITEM OR SERVICE: HCPCS | Performed by: HOSPITALIST

## 2024-08-24 PROCEDURE — 700101 HCHG RX REV CODE 250: Performed by: INTERNAL MEDICINE

## 2024-08-24 PROCEDURE — 83735 ASSAY OF MAGNESIUM: CPT

## 2024-08-24 PROCEDURE — A9270 NON-COVERED ITEM OR SERVICE: HCPCS

## 2024-08-24 PROCEDURE — RXMED WILLOW AMBULATORY MEDICATION CHARGE: Performed by: HOSPITALIST

## 2024-08-24 PROCEDURE — 700102 HCHG RX REV CODE 250 W/ 637 OVERRIDE(OP)

## 2024-08-24 PROCEDURE — 700101 HCHG RX REV CODE 250: Performed by: HOSPITALIST

## 2024-08-24 PROCEDURE — 94640 AIRWAY INHALATION TREATMENT: CPT

## 2024-08-24 PROCEDURE — 700111 HCHG RX REV CODE 636 W/ 250 OVERRIDE (IP)

## 2024-08-24 RX ORDER — MAGNESIUM SULFATE HEPTAHYDRATE 40 MG/ML
2 INJECTION, SOLUTION INTRAVENOUS ONCE
Status: DISCONTINUED | OUTPATIENT
Start: 2024-08-24 | End: 2024-08-24 | Stop reason: HOSPADM

## 2024-08-24 RX ORDER — AMLODIPINE BESYLATE 10 MG/1
5 TABLET ORAL
Status: DISCONTINUED | OUTPATIENT
Start: 2024-08-24 | End: 2024-08-24 | Stop reason: HOSPADM

## 2024-08-24 RX ORDER — IPRATROPIUM BROMIDE AND ALBUTEROL SULFATE 2.5; .5 MG/3ML; MG/3ML
3 SOLUTION RESPIRATORY (INHALATION)
Status: DISCONTINUED | OUTPATIENT
Start: 2024-08-24 | End: 2024-08-24 | Stop reason: HOSPADM

## 2024-08-24 RX ORDER — ALBUTEROL SULFATE 90 UG/1
2 AEROSOL, METERED RESPIRATORY (INHALATION) EVERY 6 HOURS PRN
Qty: 8.5 G | Refills: 6 | Status: SHIPPED | OUTPATIENT
Start: 2024-08-24

## 2024-08-24 RX ORDER — AMLODIPINE BESYLATE 5 MG/1
5 TABLET ORAL DAILY
Qty: 30 TABLET | Refills: 5 | Status: SHIPPED | OUTPATIENT
Start: 2024-08-24

## 2024-08-24 RX ADMIN — AMLODIPINE BESYLATE 5 MG: 10 TABLET ORAL at 09:43

## 2024-08-24 RX ADMIN — HYDROXYZINE HYDROCHLORIDE 25 MG: 25 TABLET ORAL at 01:19

## 2024-08-24 RX ADMIN — OXYCODONE HYDROCHLORIDE 5 MG: 5 TABLET ORAL at 14:34

## 2024-08-24 RX ADMIN — OXYCODONE HYDROCHLORIDE 5 MG: 5 TABLET ORAL at 05:15

## 2024-08-24 RX ADMIN — DICLOFENAC SODIUM 4 G: 10 GEL TOPICAL at 14:35

## 2024-08-24 RX ADMIN — DICLOFENAC SODIUM 4 G: 10 GEL TOPICAL at 08:40

## 2024-08-24 RX ADMIN — OXYCODONE HYDROCHLORIDE 5 MG: 5 TABLET ORAL at 00:20

## 2024-08-24 RX ADMIN — RIVAROXABAN 20 MG: 20 TABLET, FILM COATED ORAL at 05:15

## 2024-08-24 RX ADMIN — IPRATROPIUM BROMIDE AND ALBUTEROL SULFATE 3 ML: 2.5; .5 SOLUTION RESPIRATORY (INHALATION) at 07:13

## 2024-08-24 RX ADMIN — PREDNISONE 40 MG: 20 TABLET ORAL at 05:15

## 2024-08-24 RX ADMIN — CETIRIZINE HYDROCHLORIDE 10 MG: 10 TABLET, FILM COATED ORAL at 05:13

## 2024-08-24 ASSESSMENT — PAIN DESCRIPTION - PAIN TYPE
TYPE: ACUTE PAIN

## 2024-08-24 NOTE — DISCHARGE PLANNING
[id number]  DPA sent O2 order to [facility] per [facility]  Agency/Facility Name: [facility]

## 2024-08-24 NOTE — PROGRESS NOTES
Pt upset about a missing wallet. This RN called spouse(Harman) to ensure belongings were not picked up. Pt belongings found to be in previous room. Belongings retrieved by charge RN. Belongings given and reviewed with pt.  Belongings accounted for: pants, shirt, shoes, socks, keys, wallet. Pt did confirm all belongings were given back. Pt has cell phone at bedside.

## 2024-08-24 NOTE — DISCHARGE PLANNING
Case Management Discharge Planning    Admission Date: 8/20/2024  GMLOS: 3.5  ALOS: 4    6-Clicks ADL Score: 24  6-Clicks Mobility Score: 24      Anticipated Discharge Dispo: Discharge Disposition: Discharged to home/self care (01)  Discharge Address: [address]    [place] [address]    DME Needed: Yes (Home O2)   DME Ordered: No    Action(s) Taken: DC Assessment Complete (See below), Choice obtained, and Referral(s) sent    LMSW met with pt at bedside to complete DC assessment. LMSW Introduced self and department roles. Pt A&Ox4 and able to verify the information on the face sheet. Pt lives alone in a second-floor apartment. Pt verified the address in the chart. Prior to this hospitalization pt was independent at home with ADLs and most IADLs. Pt does not use any DME at baseline. Pt does not have an advance directive. Pt will need MTM transport home.      LMSW received Home O2 choice and faxed to the DPA.     Escalations Completed: None    Medically Clear: Yes    Next Steps: CM to follow up on Home O2 and transportation.     Barriers to Discharge: DME, Oxygen Delivery, and Transportation    Care Transition Team Assessment    Information Source  Orientation Level: Oriented X4  Information Given By: Patient    Readmission Evaluation  Is this a readmission?: No    Elopement Risk  Legal Hold: No  Ambulatory or Self Mobile in Wheelchair: Yes  Disoriented: No  Psychiatric Symptoms: None  History of Wandering: No  Elopement this Admit: No  Vocalizing Wanting to Leave: No  Displays Behaviors, Body Language Wanting to Leave: No-Not at Risk for Elopement  Elopement Risk: Not at Risk for Elopement    Interdisciplinary Discharge Planning  Lives with - Patient's Self Care Capacity: Alone and Able to Care For Self  Patient or legal guardian wants to designate a caregiver: No  Support Systems: Spouse / Significant Other, Family Member(s), Friends / Neighbors  Housing / Facility: 1 Story Apartment / Condo    Discharge  Preparedness  What is your plan after discharge?: Home with help         Finances  Financial Barriers to Discharge: No  Prescription Coverage: Yes    Vision / Hearing Impairment  Vision Impairment : No  Hearing Impairment : No         Advance Directive  Advance Directive?: None    Domestic Abuse  Have you ever been the victim of abuse or violence?: No  Possible Abuse/Neglect Reported to:: Not Applicable              Anticipated Discharge Information  Discharge Disposition: Discharged to home/self care (01)  Discharge Address: [address] [place] [address]  [address] [place] [id number]

## 2024-08-24 NOTE — DISCHARGE PLANNING
1537  DPA spoke with Ana Lilia @ [employer] order was received and will be delivered 2-4 hours/Notified SISI [place]

## 2024-08-24 NOTE — DISCHARGE SUMMARY
Discharge Summary    CHIEF COMPLAINT ON ADMISSION  Chief Complaint   Patient presents with    Shortness of Breath     BIBA with c/o shortness of breath and chest pain x2 hours. Pt states he was at work and thinks he may have breathing in too much dust at work. Pt states he has noticed progressive increase in shortness of breath over the past week.  Pt received 50mcg fentanyl for pain.  Pt arrives on 4L NC, pt denies use at home.        Reason for Admission  Short of breath    Admission Date  8/20/2024    CODE STATUS  Full Code    HPI & HOSPITAL COURSE  Noe Hickey is a 63 y.o. male on anabolic sterois, AAA w/ repair, TAVR 2023, aflutter, tobacco use that was admitted 8/20/2024 with short of breath over the past 2 months.  While at home experienced chest pressure while in the shower.  He was admitted for COPD exacerbations.  During admit he had an unwitnessed fall and was found down and cyanotic, unresponsive and in PEA.  A code was called and after ~1 minute of CPR he obtained ROSC.  He was subsequently in the ICU.    He was monitored in ICU and had improved respiratory status.  He was weaned on his O2 but required O2.  He has a home concentrator but no portable oxygen and no oxygen tank to get home with. Home oxygen arranged.  I discussed smoking cessation and medication compliance.      Therefore, he is discharged in good and stable condition to home with close outpatient follow-up.    The patient met 2-midnight criteria for an inpatient stay at the time of discharge.    Discharge Date  8/24/2024    FOLLOW UP ITEMS POST DISCHARGE  None    DISCHARGE DIAGNOSES  Principal Problem (Resolved):    Acute respiratory failure with hypoxia and hypercapnia (HCC) (POA: Yes)  Active Problems:    Hemidiaphragm paralysis (POA: Yes)    Heart failure with improved ejection fraction (HFimpEF) (HCC) (POA: Yes)    Tobacco dependence (POA: Yes)    Anemia (POA: Yes)    Primary hypertension (POA: Yes)    COPD exacerbation  (HCC) (POA: Yes)    Plantar fasciitis of left foot (POA: Unknown)    Elevated LFTs (POA: Unknown)    Atrial flutter (HCC) (POA: Unknown)    Long term (current) use of anticoagulants (POA: Unknown)    Anxiety (POA: Unknown)    Pulmonary hypertension (HCC) (POA: Unknown)    Chronic foot pain, left (POA: Unknown)  Resolved Problems:    Cardiac arrest (HCC) (POA: Yes)    Acute encephalopathy (POA: Yes)      FOLLOW UP  Future Appointments   Date Time Provider Department Center   9/10/2024  3:00 PM Chary Trotter M.D. [facility] Chantel Pineda M.D.  [address] [facility] [id number] [phone]    Call in 1 week(s)        MEDICATIONS ON DISCHARGE     Medication List        START taking these medications        Instructions   albuterol 108 (90 Base) MCG/ACT Aers inhalation aerosol   Inhale 2 Puffs every 6 hours as needed for Shortness of Breath.  Dose: 2 Puff     amLODIPine 5 MG Tabs  Commonly known as: Norvasc   Take 1 Tablet by mouth every day.  Dose: 5 mg     diclofenac sodium 1 % Gel  Commonly known as: Voltaren   Apply 4 g topically 4 times a day as needed (to left heal pain).  Dose: 4 g            CONTINUE taking these medications        Instructions   MILK THISTLE PO   Take 1 Capsule by mouth every day.  Dose: 1 Capsule     ONE DAILY MENS PO   Take 1 Tablet by mouth every day.  Dose: 1 Tablet     Xarelto 20 MG Tabs tablet  Generic drug: rivaroxaban   Take 20 mg by mouth with dinner.  Dose: 20 mg              Allergies  Allergies   Allergen Reactions    Morphine Shortness of Breath and Rash     Rash, shortness of breath       DIET  Orders Placed This Encounter   Procedures    Diet Order Diet: Cardiac     Standing Status:   Standing     Number of Occurrences:   1     Order Specific Question:   Diet:     Answer:   Cardiac [6]       ACTIVITY  As tolerated.  Weight bearing as tolerated    CONSULTATIONS  Critical Care    PROCEDURES  None    LABORATORY  Lab Results   Component Value Date    SODIUM  137 08/24/2024    POTASSIUM 4.9 08/24/2024    CHLORIDE 99 08/24/2024    CO2 30 08/24/2024    GLUCOSE 83 08/24/2024    BUN 23 (H) 08/24/2024    CREATININE 0.85 08/24/2024        Lab Results   Component Value Date    WBC 15.8 (H) 08/24/2024    HEMOGLOBIN 11.7 (L) 08/24/2024    HEMATOCRIT 41.1 (L) 08/24/2024    PLATELETCT 438 08/24/2024      EC-ECHOCARDIOGRAM COMPLETE W/ CONT   Final Result      US-EXTREMITY VENOUS LOWER BILAT   Final Result      CT-CTA CHEST PULMONARY ARTERY W/ RECONS   Final Result      1.  No pulmonary embolus detected.   2.  Elevation of the left hemidiaphragm with marked atelectasis in the left lower lobe. Please correlate for possibility of aspiration.         DX-CHEST-PORTABLE (1 VIEW)   Final Result      1.  Left basilar atelectasis with elevation of the left hemidiaphragm.   2.  Borderline cardiomegaly. Aortic valve replacement.         CT-HEAD W/O   Final Result         1. No acute intracranial abnormality. No evidence of acute intracranial hemorrhage or mass lesion.                           DX-CHEST-LIMITED (1 VIEW)   Final Result         Mildly increased left basilar atelectasis versus infiltrate.      DX-FOOT-COMPLETE 3+ LEFT   Final Result      Osteoarthrosis without definite fracture or dislocation.      CT-CTA COMPLETE THORACOABDOMINAL AORTA   Final Result      1.  Median sternotomy. Nonunion of the sternal defect.   2.  Aortic valve replacement.   3.  No evidence of aortic aneurysm or dissection.   4.  Coronary calcification.   5.  Elevation of the left hemidiaphragm with chronic segmental/subsegmental atelectatic changes at the left lung base.   6.  Calcified old granulomatous disease right lower lobe.                        DX-CHEST-PORTABLE (1 VIEW)   Final Result      1.  Median sternotomy and aortic valve replacement.   2.  Ongoing elevation of left hemidiaphragm with subsegmental atelectatic changes or scarring at the left lung base.      8/22/2024:  CONCLUSIONS  Compared to the  prior study on 4/27/2024, LV function remains   preserved.  Bioprosthetic aortic valve gradients were also elevated   during prior study.     The left ventricular ejection fraction is visually estimated to be 55%.  The right ventricle is moderately dilated.  Reduced right ventricular   systolic function.  The left atrium is mildly dilated.  Known TAVR aortic valve with borderline elevated gradients at upper   limits of normal.  Mild tricuspid regurgitation.  Right heart pressures are consistent with moderate pulmonary   hypertension.      Total time of the discharge process exceeds 31 minutes.

## 2024-08-24 NOTE — CARE PLAN
Problem: Bronchoconstriction  Goal: Improve in air movement and diminished wheezing  Description: Target End Date:  2 to 3 days    1.  Implement inhaled treatments  2.  Evaluate and manage medication effects  Outcome: Not Met     Problem: Bronchopulmonary Hygiene  Goal: Increase mobilization of retained secretions  Description: Target End Date:  2 to 3 days    1.  Perform bronchopulmonary therapy as indicated by assessment  2.  Perform airway suctioning  3.  Perform actions to maintain patient airway  Outcome: Not Met    Duoneb and Flutter QID

## 2024-08-24 NOTE — DISCHARGE INSTRUCTIONS
Discharge Instructions per Coleman Odonnell D.O.    DIET: healthy balanced diet    ACTIVITY: as tolerates    DIAGNOSIS: Acute COPD exacerbation.    Return to ER if needed      Chronic Obstructive Pulmonary Disease (COPD) is a long-term, progressive lung disease that makes it harder to breathe. It includes chronic bronchitis, emphysema, and refractory (non-reversible) asthma. With COPD, the airways in your lungs become inflamed and thicken, and the tissue where oxygen is exchanged is destroyed. The flow of air in and out of your lungs decreases. When that happens, less oxygen gets into your body tissues, and it becomes harder to get rid of the waste gas carbon dioxide. As the disease gets worse, shortness of breath makes it harder to remain active. There is no cure for COPD, but it is preventable and treatable.    COPD Patient Discharge Instructions    Diet  Follow a low fat, low cholesterol, low salt diet unless instructed otherwise by your Doctor. Read the labels on the back of food products and track your intake of fat, cholesterol and salt.  No smoking  Discontinuing smoking will have the biggest impact on preventing progression of disease.  To participate in [employer]’s Quit Tobacco Program, call [phone] or visit [employer]/QuitTobacco  Oxygen  If your doctor has order that you wear oxygen at home, it is important to wear it as ordered.  Do not smoke, vape, or use e-cigarettes with oxygen on.  Store in an appropriate location: upright in its delvalle or laid flat down, away from open flames and stoves.   Do not use oil-based creams and moisturizers (ie: petroleum products, oil-based lip moisturizers) or aerosol sprays (ie: hair sprays or deodorants) when using your oxygen equipment.  Be careful with tubing placement to prevent yourself and others from tripping.  Medications  Refer to your personalized Action Plan to manage your symptoms.  Warning signs of an exacerbation  Breathing fast and shallow,  worsening shortness of breath (like you just finished exercising)  Chest tightness  Increases in sputum production  Changes in sputum color  Lower oxygen levels than baseline  When to call your doctor  If the warning signs of an exacerbation do not improve  Refer to your personalized Action Plan   Pulmonary Rehab  Your doctor has ordered you a referral to Pulmonary Rehab. Call [phone] to schedule an appointment  Attend your follow-up appointment with your PCP and/or Pulmonologist  Remote Monitoring: At the direction of the remote monitoring on-call provider, you may increase your oxygen by 2 liters above your baseline.     See the educational handout provided by your COPD Navigator for more information. This also explains more about COPD, symptoms of an exacerbation, and some of the tests that you have undergone.    Diet  Resume your normal diet as tolerated.  A diet low in cholesterol, fat, and sodium is recommended for good health.

## 2024-08-24 NOTE — CARE PLAN
The patient is Watcher - Medium risk of patient condition declining or worsening    Shift Goals  Clinical Goals: PAIN CONTROL, o2 > 89%  Patient Goals: pain control  Family Goals: jelly    Progress made toward(s) clinical / shift goals:    Problem: Knowledge Deficit - Standard  Goal: Patient and family/care givers will demonstrate understanding of plan of care, disease process/condition, diagnostic tests and medications  Outcome: Progressing     Problem: Pain - Standard  Goal: Alleviation of pain or a reduction in pain to the patient’s comfort goal  Outcome: Progressing     Problem: Respiratory  Goal: Patient will achieve/maintain optimum respiratory ventilation and gas exchange  Outcome: Progressing     Problem: Self Care  Goal: Patient will have the ability to perform ADLs independently or with assistance (bathe, groom, dress, toilet and feed)  Outcome: Progressing     Problem: Hemodynamics  Goal: Patient's hemodynamics, fluid balance and neurologic status will be stable or improve  Outcome: Progressing     Problem: Fall Risk  Goal: Patient will remain free from falls  Outcome: Progressing       Patient is not progressing towards the following goals:

## 2024-08-24 NOTE — FACE TO FACE
"Face to Face Note  -  Durable Medical Equipment    Coleman Odonnell D.O. - NPI: [id number]  I certify that this patient is under my care and that they had a durable medical equipment(DME)face to face encounter by myself that meets the physician DME face-to-face encounter requirements with this patient on:    Date of encounter:   Patient:                    MRN:                       YOB: 2025  Noe Hickey  [id number]  1960     The encounter with the patient was in whole, or in part, for the following medical condition, which is the primary reason for durable medical equipment:  COPD    I certify that, based on my findings, the following durable medical equipment is medically necessary:    Oxygen   HOME O2 Saturation Measurements:(Values must be present for Home Oxygen orders)  Room air sat at rest: 87  Room air sat with amb: 85  With liters of O2: 2, O2 sat at rest with O2: 95  With Liters of O2: 3, O2 sat with amb with O2 : 96  Is the patient mobile?: Yes  If patient feels more short of breath, they can go up to 6 liters per minute and contact healthcare provider.    Supporting Symptoms: The patient requires supplemental oxygen, as the following interventions have been tried with limited or no improvement: \"Ambulation with oximetry.    My Clinical findings support the need for the above equipment due to:  Hypoxia  "

## 2024-08-24 NOTE — CARE PLAN
The patient is Stable - Low risk of patient condition declining or worsening    Shift Goals  Clinical Goals: pain control; o2 >89%  Patient Goals: pain control; discharge  Family Goals: HUGH    Progress made toward(s) clinical / shift goals:    Problem: Pain - Standard  Goal: Alleviation of pain or a reduction in pain to the patient’s comfort goal  Outcome: Progressing     Problem: Respiratory  Goal: Patient will achieve/maintain optimum respiratory ventilation and gas exchange  Outcome: Progressing     Problem: Knowledge Deficit - COPD  Goal: Patient/significant other demonstrates understanding of disease process, utilization of the Action Plan, medications and discharge instruction  Outcome: Progressing     Problem: Self Care  Goal: Patient will have the ability to perform ADLs independently or with assistance (bathe, groom, dress, toilet and feed)  Outcome: Progressing     Problem: Fall Risk  Goal: Patient will remain free from falls  Outcome: Progressing

## 2024-08-25 NOTE — PROGRESS NOTES
Discharge summary reviewed with patient. Cab services contacted. All belongings, except phone , accounted for. Pt walked down with O2 and cab voucher to await cab.

## 2024-08-26 ENCOUNTER — TELEPHONE (OUTPATIENT)
Dept: MEDICAL GROUP | Facility: CLINIC | Age: 64
End: 2024-08-26
Payer: MEDICAID

## 2024-08-26 DIAGNOSIS — J44.1 COPD EXACERBATION (HCC): ICD-10-CM

## 2024-08-26 NOTE — TELEPHONE ENCOUNTER
Patient called asking if he could get a portable oxygen tank that he would be able to take to work. Patient was seen at the hospital and prescribed oxygen tanks, patient states that he wouldn't be able to take it to work.

## 2024-09-03 ENCOUNTER — APPOINTMENT (OUTPATIENT)
Dept: MEDICAL GROUP | Facility: CLINIC | Age: 64
End: 2024-09-03
Payer: MEDICAID

## 2024-09-03 VITALS
WEIGHT: 207.6 LBS | SYSTOLIC BLOOD PRESSURE: 100 MMHG | HEIGHT: 67 IN | OXYGEN SATURATION: 92 % | DIASTOLIC BLOOD PRESSURE: 62 MMHG | BODY MASS INDEX: 32.58 KG/M2 | TEMPERATURE: 98.2 F | HEART RATE: 82 BPM

## 2024-09-03 DIAGNOSIS — J44.1 COPD EXACERBATION (HCC): ICD-10-CM

## 2024-09-03 DIAGNOSIS — Z23 NEED FOR VACCINATION: ICD-10-CM

## 2024-09-03 DIAGNOSIS — F17.200 TOBACCO DEPENDENCE: ICD-10-CM

## 2024-09-03 PROCEDURE — 99214 OFFICE O/P EST MOD 30 MIN: CPT | Mod: 25,GC

## 2024-09-03 PROCEDURE — 90677 PCV20 VACCINE IM: CPT | Mod: GC

## 2024-09-03 PROCEDURE — 90471 IMMUNIZATION ADMIN: CPT | Mod: GC

## 2024-09-03 RX ORDER — TIOTROPIUM BROMIDE INHALATION SPRAY 3.12 UG/1
5 SPRAY, METERED RESPIRATORY (INHALATION) DAILY
Qty: 2 EACH | Refills: 1 | Status: SHIPPED | OUTPATIENT
Start: 2024-09-03

## 2024-09-03 ASSESSMENT — FIBROSIS 4 INDEX: FIB4 SCORE: 0.74

## 2024-09-03 NOTE — PROGRESS NOTES
Subjective:     CC:   Chief Complaint   Patient presents with    Follow-Up     Hospital follow up, breathing nad blood test results     HPI:   Noe presents today with:    - Hospital follow-up: Patient recently hospitalized 8/20/24-8/24/24 for acute hypoxic respiratory failure. Had episode during hospitalization where he was found collapsed, with PEA, and CPR was started for about 30 sec before RoSC. He was transferred to the ICU at that point. Stabilized and then discharged on 8/24/24. Came home from hospital and felt good for the first 4-5 days. This morning, was feeling more lightheaded and short of breath. 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 He has not used any oxygen since coming home, he states because he did not feel any change with use of oxygen. He states that in the morning his symptoms are worse. Has tried albuterol inhaler when symptoms come on but does not notice much improvement. Does use the oxygen at home when he feels his symptoms come on. Has 40 pack year smoking history, quit smoking 1 month ago. Of note, underwent aortic aneurysm repair and TAVR 1/2023 since then has had shortness of breath. Never had any chest pain or shortness of breath prior to this.  Also has been taking anabolic steroids for muscle gain for about 2 years. His regimen consists of Sustanon 600mg per week, Decadron 600mg/week, and Trenbolone 600mg/week.       Current Outpatient Medications Ordered in Epic   Medication Sig Dispense Refill    tiotropium (SPIRIVA RESPIMAT) 2.5 mcg/Act Aero Soln Inhale 2 Inhalations every day. 2 Each 1    amLODIPine (NORVASC) 5 MG Tab Take 1 Tablet by mouth every day. 30 Tablet 5    albuterol 108 (90 Base) MCG/ACT Aero Soln inhalation aerosol Inhale 2 Puffs every 6 hours as needed for Shortness of Breath. 8.5 g 6    Multiple Vitamins-Minerals (ONE DAILY MENS PO) Take 1 Tablet by mouth  "every day.      MILK THISTLE PO Take 1 Capsule by mouth every day.      rivaroxaban (XARELTO) 20 MG Tab tablet Take 20 mg by mouth with dinner.      diclofenac sodium (VOLTAREN) 1 % Gel Apply 4 g topically 4 times a day as needed (to left heal pain). (Patient not taking: Reported on 9/3/2024) 150 g 5     No current [facility]-ordered facility-administered medications on file.       ROS:  Negative except for above in HPI    Objective:     Exam:  /62 (BP Location: Left arm, Patient Position: Sitting, BP Cuff Size: Adult)   Pulse 82   Temp 36.8 °C (98.2 °F) (Temporal)   Ht 1.702 m (5' 7\")   Wt 94.2 kg (207 lb 9.6 oz)   SpO2 92%   BMI 32.51 kg/m²  Body mass index is 32.51 kg/m².    Gen: Alert and oriented, Leif appearance, speaking full sentences but stops after 1-2 minutes to catch breath.   HEENT: NCAT, MMM, No lymphadenopathy  Lungs: Normal effort, CTA bilaterally, no wheezes, rhonchi, or rales  CV: Regular rate and rhythm. 2/6 systolic murmur noted. No rubs, or gallops. Radial pulses palpable bilat  Abd: Soft, non-distended, no guarding, no rebound, non-tender to palpation  Ext: No clubbing, cyanosis. 2+ pitting edema noted in right lower extremity to distal shin, 1-2+ noted in left lower extremity to distal shin.   Neuro: Non-focal    Labs: Reviewed in chart    Assessment & Plan:     63 y.o. male with the following -     1. COPD exacerbation (HCC)  Patient with recent hospitalization for acute hypoxic respiratory failure and significant cigarette smoking history. Has had negative cardiac workup thus far. Suspicion at this time is for severe COPD with exacerbation during most recent hospitalization. Will order workup for COPD and also begin daily inhaler treatment. Unable to start ICS or oral steroid at this time due to significant anabolic steroid use.   - Begin tiotropium (SPIRIVA RESPIMAT) 2.5 mcg/Act Aero Soln; Inhale 2 Inhalations every day.  Dispense: 2 Each; Refill: 1  - Discussed with patient " importance of using portable oxygen both day and night. Patient states understanding and will attempt to be consistent with use.   - Continue albuterol rescue inhaler as needed  - Ordered PULMONARY FUNCTION TESTS -Test requested: Complete Pulmonary Function Test; Future  - Referral to Pulmonary and Sleep Medicine    2. Need for vaccination  - Pneumococcal Conjugate Vaccine 20-Valent (6 wks+)  - Will require COVID-19 and influenza vaccines    Return in about 4 weeks (around 10/1/2024).    Caryl Pineda M.D.  PGY-2  [facility]

## 2024-09-10 ENCOUNTER — APPOINTMENT (OUTPATIENT)
Dept: INTERNAL MEDICINE | Facility: OTHER | Age: 64
End: 2024-09-10
Payer: MEDICAID

## 2024-09-17 ENCOUNTER — HOSPITAL ENCOUNTER (INPATIENT)
Facility: MEDICAL CENTER | Age: 64
LOS: 2 days | DRG: 192 | End: 2024-09-19
Attending: EMERGENCY MEDICINE | Admitting: FAMILY MEDICINE
Payer: MEDICAID

## 2024-09-17 ENCOUNTER — TELEPHONE (OUTPATIENT)
Dept: MEDICAL GROUP | Facility: CLINIC | Age: 64
End: 2024-09-17
Payer: MEDICAID

## 2024-09-17 ENCOUNTER — APPOINTMENT (OUTPATIENT)
Dept: RADIOLOGY | Facility: MEDICAL CENTER | Age: 64
DRG: 192 | End: 2024-09-17
Attending: EMERGENCY MEDICINE
Payer: MEDICAID

## 2024-09-17 DIAGNOSIS — J98.6 HEMIDIAPHRAGM PARALYSIS: ICD-10-CM

## 2024-09-17 DIAGNOSIS — R06.02 SOB (SHORTNESS OF BREATH) ON EXERTION: ICD-10-CM

## 2024-09-17 DIAGNOSIS — R06.02 SHORTNESS OF BREATH: ICD-10-CM

## 2024-09-17 PROBLEM — D72.829 ELEVATED WHITE BLOOD CELL COUNT: Status: ACTIVE | Noted: 2024-09-17

## 2024-09-17 PROBLEM — D75.839 THROMBOCYTOSIS: Status: ACTIVE | Noted: 2024-09-17

## 2024-09-17 PROBLEM — R07.1 CHEST PAIN ON BREATHING: Status: ACTIVE | Noted: 2024-09-17

## 2024-09-17 LAB
ALBUMIN SERPL BCP-MCNC: 3.9 G/DL (ref 3.2–4.9)
ALBUMIN/GLOB SERPL: 1.8 G/DL
ALP SERPL-CCNC: 35 U/L (ref 30–99)
ALT SERPL-CCNC: 47 U/L (ref 2–50)
ANION GAP SERPL CALC-SCNC: 12 MMOL/L (ref 7–16)
ANISOCYTOSIS BLD QL SMEAR: ABNORMAL
AST SERPL-CCNC: 39 U/L (ref 12–45)
BASOPHILS # BLD AUTO: 0.7 % (ref 0–1.8)
BASOPHILS # BLD: 0.1 K/UL (ref 0–0.12)
BILIRUB SERPL-MCNC: 0.5 MG/DL (ref 0.1–1.5)
BUN SERPL-MCNC: 18 MG/DL (ref 8–22)
CALCIUM ALBUM COR SERPL-MCNC: 8.8 MG/DL (ref 8.5–10.5)
CALCIUM SERPL-MCNC: 8.7 MG/DL (ref 8.5–10.5)
CHLORIDE SERPL-SCNC: 101 MMOL/L (ref 96–112)
CO2 SERPL-SCNC: 24 MMOL/L (ref 20–33)
COMMENT 1642: NORMAL
CREAT SERPL-MCNC: 0.8 MG/DL (ref 0.5–1.4)
EKG IMPRESSION: NORMAL
EOSINOPHIL # BLD AUTO: 0.53 K/UL (ref 0–0.51)
EOSINOPHIL NFR BLD: 3.5 % (ref 0–6.9)
ERYTHROCYTE [DISTWIDTH] IN BLOOD BY AUTOMATED COUNT: 52.5 FL (ref 35.9–50)
GFR SERPLBLD CREATININE-BSD FMLA CKD-EPI: 98 ML/MIN/1.73 M 2
GLOBULIN SER CALC-MCNC: 2.2 G/DL (ref 1.9–3.5)
GLUCOSE SERPL-MCNC: 82 MG/DL (ref 65–99)
HCT VFR BLD AUTO: 45.7 % (ref 42–52)
HGB BLD-MCNC: 13.3 G/DL (ref 14–18)
IMM GRANULOCYTES # BLD AUTO: 0.23 K/UL (ref 0–0.11)
IMM GRANULOCYTES NFR BLD AUTO: 1.5 % (ref 0–0.9)
LYMPHOCYTES # BLD AUTO: 1.26 K/UL (ref 1–4.8)
LYMPHOCYTES NFR BLD: 8.4 % (ref 22–41)
MCH RBC QN AUTO: 19.1 PG (ref 27–33)
MCHC RBC AUTO-ENTMCNC: 29.1 G/DL (ref 32.3–36.5)
MCV RBC AUTO: 65.7 FL (ref 81.4–97.8)
MICROCYTES BLD QL SMEAR: ABNORMAL
MONOCYTES # BLD AUTO: 1.53 K/UL (ref 0–0.85)
MONOCYTES NFR BLD AUTO: 10.1 % (ref 0–13.4)
MORPHOLOGY BLD-IMP: NORMAL
NEUTROPHILS # BLD AUTO: 11.43 K/UL (ref 1.82–7.42)
NEUTROPHILS NFR BLD: 75.8 % (ref 44–72)
NRBC # BLD AUTO: 0 K/UL
NRBC BLD-RTO: 0 /100 WBC (ref 0–0.2)
NT-PROBNP SERPL IA-MCNC: 213 PG/ML (ref 0–125)
OVALOCYTES BLD QL SMEAR: NORMAL
PLATELET # BLD AUTO: 616 K/UL (ref 164–446)
PLATELET BLD QL SMEAR: NORMAL
PMV BLD AUTO: 9.4 FL (ref 9–12.9)
POIKILOCYTOSIS BLD QL SMEAR: NORMAL
POLYCHROMASIA BLD QL SMEAR: NORMAL
POTASSIUM SERPL-SCNC: 4.9 MMOL/L (ref 3.6–5.5)
PROT SERPL-MCNC: 6.1 G/DL (ref 6–8.2)
RBC # BLD AUTO: 6.96 M/UL (ref 4.7–6.1)
RBC BLD AUTO: PRESENT
SCHISTOCYTES BLD QL SMEAR: NORMAL
SODIUM SERPL-SCNC: 137 MMOL/L (ref 135–145)
TROPONIN T SERPL-MCNC: 21 NG/L (ref 6–19)
TROPONIN T SERPL-MCNC: 25 NG/L (ref 6–19)
WBC # BLD AUTO: 15.1 K/UL (ref 4.8–10.8)

## 2024-09-17 PROCEDURE — 96374 THER/PROPH/DIAG INJ IV PUSH: CPT

## 2024-09-17 PROCEDURE — 83880 ASSAY OF NATRIURETIC PEPTIDE: CPT

## 2024-09-17 PROCEDURE — 71045 X-RAY EXAM CHEST 1 VIEW: CPT

## 2024-09-17 PROCEDURE — 700111 HCHG RX REV CODE 636 W/ 250 OVERRIDE (IP): Mod: JZ,UD | Performed by: EMERGENCY MEDICINE

## 2024-09-17 PROCEDURE — 99222 1ST HOSP IP/OBS MODERATE 55: CPT | Mod: GC | Performed by: FAMILY MEDICINE

## 2024-09-17 PROCEDURE — 96375 TX/PRO/DX INJ NEW DRUG ADDON: CPT

## 2024-09-17 PROCEDURE — 770020 HCHG ROOM/CARE - TELE (206)

## 2024-09-17 PROCEDURE — A9270 NON-COVERED ITEM OR SERVICE: HCPCS | Performed by: EMERGENCY MEDICINE

## 2024-09-17 PROCEDURE — 85025 COMPLETE CBC W/AUTO DIFF WBC: CPT

## 2024-09-17 PROCEDURE — 36415 COLL VENOUS BLD VENIPUNCTURE: CPT

## 2024-09-17 PROCEDURE — 93005 ELECTROCARDIOGRAM TRACING: CPT

## 2024-09-17 PROCEDURE — 93005 ELECTROCARDIOGRAM TRACING: CPT | Performed by: EMERGENCY MEDICINE

## 2024-09-17 PROCEDURE — 700102 HCHG RX REV CODE 250 W/ 637 OVERRIDE(OP): Performed by: EMERGENCY MEDICINE

## 2024-09-17 PROCEDURE — 99285 EMERGENCY DEPT VISIT HI MDM: CPT

## 2024-09-17 PROCEDURE — 80053 COMPREHEN METABOLIC PANEL: CPT

## 2024-09-17 PROCEDURE — 84484 ASSAY OF TROPONIN QUANT: CPT

## 2024-09-17 PROCEDURE — 700111 HCHG RX REV CODE 636 W/ 250 OVERRIDE (IP): Mod: JZ

## 2024-09-17 RX ORDER — AMLODIPINE BESYLATE 5 MG/1
5 TABLET ORAL DAILY
Status: DISCONTINUED | OUTPATIENT
Start: 2024-09-18 | End: 2024-09-19 | Stop reason: HOSPADM

## 2024-09-17 RX ORDER — HYDROMORPHONE HYDROCHLORIDE 1 MG/ML
0.5 INJECTION, SOLUTION INTRAMUSCULAR; INTRAVENOUS; SUBCUTANEOUS EVERY 4 HOURS PRN
Status: DISCONTINUED | OUTPATIENT
Start: 2024-09-17 | End: 2024-09-19 | Stop reason: HOSPADM

## 2024-09-17 RX ORDER — ACETAMINOPHEN 500 MG
1000 TABLET ORAL EVERY 6 HOURS PRN
Status: DISCONTINUED | OUTPATIENT
Start: 2024-09-17 | End: 2024-09-18

## 2024-09-17 RX ORDER — ACETAMINOPHEN 325 MG/1
650 TABLET ORAL ONCE
Status: COMPLETED | OUTPATIENT
Start: 2024-09-17 | End: 2024-09-17

## 2024-09-17 RX ORDER — ALBUTEROL SULFATE 90 UG/1
2 INHALANT RESPIRATORY (INHALATION) EVERY 6 HOURS PRN
Status: DISCONTINUED | OUTPATIENT
Start: 2024-09-17 | End: 2024-09-19 | Stop reason: HOSPADM

## 2024-09-17 RX ADMIN — HYDROMORPHONE HYDROCHLORIDE 0.5 MG: 1 INJECTION, SOLUTION INTRAMUSCULAR; INTRAVENOUS; SUBCUTANEOUS at 17:59

## 2024-09-17 RX ADMIN — HYDROMORPHONE HYDROCHLORIDE 0.5 MG: 1 INJECTION, SOLUTION INTRAMUSCULAR; INTRAVENOUS; SUBCUTANEOUS at 22:07

## 2024-09-17 RX ADMIN — FENTANYL CITRATE 50 MCG: 50 INJECTION, SOLUTION INTRAMUSCULAR; INTRAVENOUS at 14:28

## 2024-09-17 RX ADMIN — ACETAMINOPHEN 650 MG: 325 TABLET ORAL at 17:59

## 2024-09-17 ASSESSMENT — COGNITIVE AND FUNCTIONAL STATUS - GENERAL
MOBILITY SCORE: 24
DAILY ACTIVITIY SCORE: 24
SUGGESTED CMS G CODE MODIFIER MOBILITY: CH
SUGGESTED CMS G CODE MODIFIER DAILY ACTIVITY: CH

## 2024-09-17 ASSESSMENT — CHA2DS2 SCORE
PRIOR STROKE OR TIA OR THROMBOEMBOLISM: NO
AGE 75 OR GREATER: NO
CHF OR LEFT VENTRICULAR DYSFUNCTION: YES
DIABETES: NO
VASCULAR DISEASE: NO
HYPERTENSION: NO
SEX: MALE
CHA2DS2 VASC SCORE: 1
AGE 65 TO 74: NO

## 2024-09-17 ASSESSMENT — PATIENT HEALTH QUESTIONNAIRE - PHQ9
1. LITTLE INTEREST OR PLEASURE IN DOING THINGS: NOT AT ALL
SUM OF ALL RESPONSES TO PHQ9 QUESTIONS 1 AND 2: 0
2. FEELING DOWN, DEPRESSED, IRRITABLE, OR HOPELESS: NOT AT ALL

## 2024-09-17 ASSESSMENT — PAIN DESCRIPTION - PAIN TYPE: TYPE: ACUTE PAIN

## 2024-09-17 ASSESSMENT — LIFESTYLE VARIABLES
EVER HAD A DRINK FIRST THING IN THE MORNING TO STEADY YOUR NERVES TO GET RID OF A HANGOVER: NO
AVERAGE NUMBER OF DAYS PER WEEK YOU HAVE A DRINK CONTAINING ALCOHOL: 0
HAVE PEOPLE ANNOYED YOU BY CRITICIZING YOUR DRINKING: NO
TOTAL SCORE: 0
HAVE YOU EVER FELT YOU SHOULD CUT DOWN ON YOUR DRINKING: NO
DOES PATIENT WANT TO STOP DRINKING: NO
HOW MANY TIMES IN THE PAST YEAR HAVE YOU HAD 5 OR MORE DRINKS IN A DAY: 0
CONSUMPTION TOTAL: NEGATIVE
TOTAL SCORE: 0
EVER FELT BAD OR GUILTY ABOUT YOUR DRINKING: NO
ALCOHOL_USE: NO
TOTAL SCORE: 0
ON A TYPICAL DAY WHEN YOU DRINK ALCOHOL HOW MANY DRINKS DO YOU HAVE: 0

## 2024-09-17 ASSESSMENT — SOCIAL DETERMINANTS OF HEALTH (SDOH)
WITHIN THE LAST YEAR, HAVE YOU BEEN HUMILIATED OR EMOTIONALLY ABUSED IN OTHER WAYS BY YOUR PARTNER OR EX-PARTNER?: NO
WITHIN THE LAST YEAR, HAVE TO BEEN RAPED OR FORCED TO HAVE ANY KIND OF SEXUAL ACTIVITY BY YOUR PARTNER OR EX-PARTNER?: NO
WITHIN THE PAST 12 MONTHS, YOU WORRIED THAT YOUR FOOD WOULD RUN OUT BEFORE YOU GOT THE MONEY TO BUY MORE: NEVER TRUE
WITHIN THE PAST 12 MONTHS, THE FOOD YOU BOUGHT JUST DIDN'T LAST AND YOU DIDN'T HAVE MONEY TO GET MORE: NEVER TRUE
IN THE PAST 12 MONTHS, HAS THE ELECTRIC, GAS, OIL, OR WATER COMPANY THREATENED TO SHUT OFF SERVICE IN YOUR HOME?: NO
WITHIN THE LAST YEAR, HAVE YOU BEEN AFRAID OF YOUR PARTNER OR EX-PARTNER?: NO
WITHIN THE LAST YEAR, HAVE YOU BEEN KICKED, HIT, SLAPPED, OR OTHERWISE PHYSICALLY HURT BY YOUR PARTNER OR EX-PARTNER?: NO

## 2024-09-17 ASSESSMENT — FIBROSIS 4 INDEX: FIB4 SCORE: 0.74

## 2024-09-17 NOTE — ED PROVIDER NOTES
"  ER Provider Note    Scribed for Jolly Clark M.d. by Unique Guerrero 9/17/2024  2:03 PM    Primary Care Provider: Caryl Pineda M.D.    CHIEF COMPLAINT  Chief Complaint   Patient presents with    Chest Pain    Shortness of Breath     LIMITATION TO HISTORY   Select: : None    HPI/ROS  OUTSIDE HISTORIAN(S):  None    EXTERNAL RECORDS REVIEWED  Outpatient Notes outpatient primary care note reviewed from the third of this month.  Patient was hospitalized August 20 through 24 for acute hypoxic respiratory failure.  He had a period of PEA and required CPR for 30 seconds and was transferred to the ICU.  He has a 40-pack-year smoking history and uses oxygen at home when he feels his symptoms worsen.  He had an aortic aneurysm repair and TAVR in 2023.  He was started on Spiriva    Noe Hickey is a 63 y.o. male who presents to the ED for evaluation of chest pain and associated shortness of breath. He is also experiencing a headache, and right sided ankle/ foot swelling. He explains that he is experiencing intermittent chest pain which is described as \"pressure\" to the chest. The patient states he usually does more shallow breathing in order to not exacerbate his chest pain. Upon deep breathing, he feels an increase in chest pain. He reports going to the gym 5-6 days a week for weight lifting and explains that during weight lifting he is able to breathe normally. But, when he walks more than 10 steps at a time he becomes short of breath. The patient has attempted alleviation of his symptoms at home with inhalers and supplemental oxygen, however did not notice a positive change. He has a history of steroid use. He explains that he was requesting to be placed on diuretics by PCP, however they were uncomfortable prescribing this while he was on steroids. He states he has since ceased steroid use. He has also stopped smoking one month ago as well. He reports being recently diagnosed with COPD and has an upcoming " appointment with pulmonology in October.     PAST MEDICAL HISTORY  Past Medical History:   Diagnosis Date    Arrhythmia     Breath shortness 06/09/2023    prior to 1/2023 surgery    Cyclic vomiting syndrome     Elevated hemidiaphragm - LEFT post AVR 01/04/2023    Fracture     Headache, classical migraine     Heart burn     Heart valve disease 06/09/2023    heart surgery in 1/2023    Hyperlipidemia 04/26/2024    medicated    Hypertension 04/26/2024 6/6/2024 pt not currently taking any medication for this at this time    Indigestion     Pneumonia due to infectious organism 01/20/2023    Snoring     6/6/2024 no sleep study done       SURGICAL HISTORY  Past Surgical History:   Procedure Laterality Date    IRRIGATION & DEBRIDEMENT GENERAL N/A 6/7/2024    Procedure: RIGHT FOOT IRRIGATION AND DEBRIDEMENT;  Surgeon: Oliver Arreguin M.D.;  Location: [facility];  Service: Orthopedics    INCISION AND DRAINAGE GENERAL Left 04/19/2024    Procedure: INCISION AND DRAINAGE, LEG;  Surgeon: Mitch Bowie M.D.;  Location: [facility];  Service: Orthopedics    AORTIC VALVE REPLACEMENT  01/05/2023    Procedure: AORTIC VALVE REPLACEMENT, ASCENDING AORTIC ANEURYSM REPAIR AND TRANSESOPHAGEAL ECHOCARDIOGRAM.;  Surgeon: Serena Goyal M.D.;  Location: [facility];  Service: Cardiac    AORTIC ASCENDING DISSECTION  01/05/2023    Procedure: REPAIR, ANEURYSM OR DISSECTION, AORTA, ASCENDING;  Surgeon: Serena Goyal M.D.;  Location: [facility];  Service: Cardiac    ECHOCARDIOGRAM, TRANSESOPHAGEAL, INTRAOPERATIVE  01/05/2023    Procedure: ECHOCARDIOGRAM, TRANSESOPHAGEAL, INTRAOPERATIVE.;  Surgeon: Serena Goyal M.D.;  Location: [facility];  Service: Cardiac    IRRIGATION & DEBRIDEMENT ORTHO Right 09/19/2017    Procedure: IRRIGATION & DEBRIDEMENT ORTHO-THIGH;  Surgeon: Corbin Rivera M.D.;  Location: [facility];  Service: Orthopedics    SHOULDER HEMICAP RESURFACING  "Right 10/12/2015    Procedure: RIGHT SHOULDER RESURFACING;  Surgeon: Javon Doll M.D.;  Location: [facility];  Service:     SHOULDER ARTHROSCOPY Bilateral     x3    SHOULDER SURGERY Right     replacement right       FAMILY HISTORY  History reviewed. No pertinent family history.    SOCIAL HISTORY   reports that he quit smoking about 3 months ago. His smoking use included cigarettes. He started smoking about 47 years ago. He has a 23.7 pack-year smoking history. He has never been exposed to tobacco smoke. He has never used smokeless tobacco. He reports that he does not currently use alcohol. He reports that he does not currently use drugs after having used the following drugs: Oral.    CURRENT MEDICATIONS  Previous Medications    ALBUTEROL 108 (90 BASE) MCG/ACT AERO SOLN INHALATION AEROSOL    Inhale 2 Puffs every 6 hours as needed for Shortness of Breath.    AMLODIPINE (NORVASC) 5 MG TAB    Take 1 Tablet by mouth every day.    DICLOFENAC SODIUM (VOLTAREN) 1 % GEL    Apply 4 g topically 4 times a day as needed (to left heal pain).    MULTIPLE VITAMINS-MINERALS (ONE DAILY MENS PO)    Take 1 Tablet by mouth every day.    OMEGA-3 FATTY ACIDS (FISH OIL PO)    Take 1 Tablet by mouth every day.    OMEPRAZOLE (PRILOSEC) 20 MG DELAYED-RELEASE CAPSULE    Take 20 mg by mouth every day.    RIVAROXABAN (XARELTO) 20 MG TAB TABLET    Take 20 mg by mouth with dinner.    TIOTROPIUM (SPIRIVA RESPIMAT) 2.5 MCG/ACT AERO SOLN    Inhale 2 Inhalations every day.       ALLERGIES  Morphine    PHYSICAL EXAM  /87   Pulse 74   Temp 36.7 °C (98 °F) (Temporal)   Resp 18   Ht 1.727 m (5' 8\")   Wt 93 kg (205 lb)   SpO2 95%   BMI 31.17 kg/m²   Constitutional: Moderate respiratory distress.   HENT: No signs of trauma, Bilateral external ears normal, Nose normal.   Eyes: Pupils are equal and reactive, Conjunctiva normal, Non-icteric.   Neck: No stridor.   Cardiovascular: Regular rate and rhythm, no murmurs.   Thorax & Lungs:  " Moderate respiratory distress. Lungs are clear bilaterally with shallow breath sounds.   Abdomen: Bowel sounds normal, Soft, No tenderness, No masses, No peritoneal signs.  Skin: Warm, Dry, No erythema, No rash.   Musculoskeletal:  Trace edema in right ankle. No major deformities noted.   Neurologic: Alert, moving all extremities without difficulty, no focal deficits.      DIAGNOSTIC STUDIES & PROCEDURES    Labs:   Results for orders placed or performed during the hospital encounter of 09/17/24   CBC with Differential   Result Value Ref Range    WBC 15.1 (H) 4.8 - 10.8 K/uL    RBC 6.96 (H) 4.70 - 6.10 M/uL    Hemoglobin 13.3 (L) 14.0 - 18.0 g/dL    Hematocrit 45.7 42.0 - 52.0 %    MCV 65.7 (L) 81.4 - 97.8 fL    MCH 19.1 (L) 27.0 - 33.0 pg    MCHC 29.1 (L) 32.3 - 36.5 g/dL    RDW 52.5 (H) 35.9 - 50.0 fL    Platelet Count 616 (H) 164 - 446 K/uL    MPV 9.4 9.0 - 12.9 fL    Neutrophils-Polys 75.80 (H) 44.00 - 72.00 %    Lymphocytes 8.40 (L) 22.00 - 41.00 %    Monocytes 10.10 0.00 - 13.40 %    Eosinophils 3.50 0.00 - 6.90 %    Basophils 0.70 0.00 - 1.80 %    Immature Granulocytes 1.50 (H) 0.00 - 0.90 %    Nucleated RBC 0.00 0.00 - 0.20 /100 WBC    Neutrophils (Absolute) 11.43 (H) 1.82 - 7.42 K/uL    Lymphs (Absolute) 1.26 1.00 - 4.80 K/uL    Monos (Absolute) 1.53 (H) 0.00 - 0.85 K/uL    Eos (Absolute) 0.53 (H) 0.00 - 0.51 K/uL    Baso (Absolute) 0.10 0.00 - 0.12 K/uL    Immature Granulocytes (abs) 0.23 (H) 0.00 - 0.11 K/uL    NRBC (Absolute) 0.00 K/uL    Anisocytosis 1+     Microcytosis 1+    Comp Metabolic Panel   Result Value Ref Range    Sodium 137 135 - 145 mmol/L    Potassium 4.9 3.6 - 5.5 mmol/L    Chloride 101 96 - 112 mmol/L    Co2 24 20 - 33 mmol/L    Anion Gap 12.0 7.0 - 16.0    Glucose 82 65 - 99 mg/dL    Bun 18 8 - 22 mg/dL    Creatinine 0.80 0.50 - 1.40 mg/dL    Calcium 8.7 8.5 - 10.5 mg/dL    Correct Calcium 8.8 8.5 - 10.5 mg/dL    AST(SGOT) 39 12 - 45 U/L    ALT(SGPT) 47 2 - 50 U/L    Alkaline Phosphatase  35 30 - 99 U/L    Total Bilirubin 0.5 0.1 - 1.5 mg/dL    Albumin 3.9 3.2 - 4.9 g/dL    Total Protein 6.1 6.0 - 8.2 g/dL    Globulin 2.2 1.9 - 3.5 g/dL    A-G Ratio 1.8 g/dL   proBrain Natriuretic Peptide, NT   Result Value Ref Range    NT-proBNP 213 (H) 0 - 125 pg/mL   TROPONIN   Result Value Ref Range    Troponin T 21 (H) 6 - 19 ng/L   ESTIMATED GFR   Result Value Ref Range    GFR (CKD-EPI) 98 >60 mL/min/1.73 m 2   PLATELET ESTIMATE   Result Value Ref Range    Plt Estimation Increased    MORPHOLOGY   Result Value Ref Range    RBC Morphology Present     Polychromia 1+     Poikilocytosis 1+     Ovalocytes 1+     Schistocytes 1+    PERIPHERAL SMEAR REVIEW   Result Value Ref Range    Peripheral Smear Review see below    DIFFERENTIAL COMMENT   Result Value Ref Range    Comments-Diff see below    EKG   Result Value Ref Range    Report       [facility]    Test Date:  2024  Pt Name:    JUSTIN TOPETE               Department: ER  MRN:        [id number]                      Room:        10  Gender:     Male                         Technician:  :        [id number]                   Requested By:ER TRIAGE PROTOCOL  Order #:    [id number]                    Reading MD: RAUL ELIAS    Measurements  Intervals                                Axis  Rate:       75                           P:          63  WV:         164                          QRS:        20  QRSD:       99                           T:          54  QT:         371  QTc:        415    Interpretive Statements  Sinus rhythm  Compared to ECG 2024 23:47:15  No significant changes  Impression: Sinus rhythm without evidence of ischemia  Electronically Signed On 2024 20:07:53 PDT by RAUL ELIAS         All labs reviewed by me.    EKG:   I have independently interpreted this EKG as seen above.    Radiology:   The attending Emergency Physician has independently interpreted the diagnostic imaging associated with this  visit and is awaiting the final reading from the radiologist, which will be displayed below.    Preliminary interpretation is a follows: Elevated left hemidiaphragm  Radiologist interpretation:   DX-CHEST-PORTABLE (1 VIEW)   Final Result      1.  Redemonstrated left basilar atelectasis and/or consolidation with elevation of left hemidiaphragm.   2.  Stable enlargement of the cardiomediastinal silhouette.      DX SNIFF TEST    (Results Pending)        COURSE & MEDICAL DECISION MAKING    ED Observation Status? No; Patient does not meet criteria for ED Observation.     2:03 PM - Patient seen and evaluated at bedside. Patient will be treated with fentanyl injection 50 mcg for his symptoms. Ordered DX chest, CBC w/ differential, CMP, EKG. All other labs ordered via protocol in triage to evaluate. He understands and agrees to the plan of care. Differential diagnoses include but are not limited to: COPD exacerbation, less likely PE given recent CT imaging less likely CHF exacerbation    4:30 PM- Patient was reevaluated at bedside.     4:54 PM I discussed the patient's case and the above findings with Pulmonology who states that the patient's left diaphragm appears higher now, in comparison to before surgery, which could cause the diaphragm to not be functioning properly ultimately causing his symptoms of shortness of breath. She recommends a fluoroscopic sniff test and pulmonary function test to be completed, which require hospital admission. She will see him at bedside tomorrow.     5:07 PM- Patient was reevaluated at bedside. The patient had the opportunity to ask any questions. The plan for hospitalization was discussed with the patient given their current presentation and diagnostic study results. Patient is understanding and agreeable to the plan for hospitalization.    5:26 PM- I discussed the patient's case and the above findings with [facility] (Hospitalist) who agreed to hospitalize the patient. Patient's  care was transferred at this time.    5:30 PM- I paged the patient's wife and updated her on the plan of care moving forward.       INITIAL ASSESSMENT AND PLAN  Care Narrative: This is a 63-year-old gentleman who presents with persistent shortness of breath.  Patient has been hospitalized multiple times for the same without underlying cause evaluated.  He has follow-up with pulmonary but not for quite some time.  He thought it was in October but in talking to his wife when I updated her on the plan of care she says this follow-up with pulmonary is not actually until February.  His workup here was relatively unremarkable.  Similar to previous laboratory abnormalities.  He has a slight leukocytosis without obvious source of infection.  His x-ray does show this left basilar atelectasis and elevated left hemidiaphragm.  When I spoke with pulmonary she saw this and recommended a fluoroscopic sniff test as she suspects that his left hemidiaphragm may be the underlying cause of this is commonly injured and the surgery he had.  Patient is agreeable to hospitalization for this and more urgent pulmonary consultation.  I updated his wife on the plan of care and she gave me the additional information that his follow-up was actually not until February with pulmonary.  I do think this will help him significantly and trying to evaluate the underlying cause of his shortness of breath.                     DISPOSITION AND DISCUSSIONS  I have discussed management of the patient with the following physicians and SHAUN's: Pulmonology, [facility]    Discussion of management with other [facility] or appropriate source(s): None         DISPOSITION:  Patient will be hospitalized by [facility] in guarded condition.    FINAL IMPRESSION   1. Shortness of breath        IJolly M.D. (Patricia), am scribing for, and in the presence of, Jolly Clark M.D..    Electronically signed by: Jolly Clark M.D. (Patricia), 9/17/2024    CHANCE  Jolly Clark M.D. personally performed the services described in this documentation, as scribed by Jolly Clark M.D. in my presence, and it is both accurate and complete.    The note accurately reflects work and decisions made by me.  Jolly Clark M.D.  9/17/2024  8:13 PM

## 2024-09-17 NOTE — ED TRIAGE NOTES
"..  Chief Complaint   Patient presents with    Chest Pain    Shortness of Breath       64 yo male brought in by EMS for above complaint. Reports SOB upon waking up this morning.     Patient was given albuterol treatment, 100mcg fentanyl en route.    Ht 1.727 m (5' 8\")   Wt 93 kg (205 lb)   BMI 31.17 kg/m²     "

## 2024-09-17 NOTE — TELEPHONE ENCOUNTER
I was informed by my  this patient had called our call center with concerns about SOB. Call center advised the ER. Patient was upset, and ended the call. I called the patient to see if we could get him into our same day clinic, with no answer. I called his spouses phone that is one file, and she pleaded that I call dispatch because the last conversation she had with him was he was going to call 911 because he could not breathe. I tried his phone, again, with no luck. Another CMA, and my  called dispatch, were put on hold, and when checking his chart, saw that he was brought into [facility] ED. I called his spouse back, whom is out of town, and advised her. She was very thankful.

## 2024-09-18 ENCOUNTER — APPOINTMENT (OUTPATIENT)
Dept: RADIOLOGY | Facility: MEDICAL CENTER | Age: 64
DRG: 192 | End: 2024-09-18
Payer: MEDICAID

## 2024-09-18 ENCOUNTER — APPOINTMENT (OUTPATIENT)
Dept: RADIOLOGY | Facility: MEDICAL CENTER | Age: 64
DRG: 192 | End: 2024-09-18
Attending: FAMILY MEDICINE
Payer: MEDICAID

## 2024-09-18 LAB
ALBUMIN SERPL BCP-MCNC: 4 G/DL (ref 3.2–4.9)
ALBUMIN/GLOB SERPL: 1.7 G/DL
ALP SERPL-CCNC: 33 U/L (ref 30–99)
ALT SERPL-CCNC: 50 U/L (ref 2–50)
ANION GAP SERPL CALC-SCNC: 10 MMOL/L (ref 7–16)
AST SERPL-CCNC: 39 U/L (ref 12–45)
BILIRUB SERPL-MCNC: 0.8 MG/DL (ref 0.1–1.5)
BUN SERPL-MCNC: 20 MG/DL (ref 8–22)
CALCIUM ALBUM COR SERPL-MCNC: 8.9 MG/DL (ref 8.5–10.5)
CALCIUM SERPL-MCNC: 8.9 MG/DL (ref 8.5–10.5)
CHLORIDE SERPL-SCNC: 101 MMOL/L (ref 96–112)
CO2 SERPL-SCNC: 25 MMOL/L (ref 20–33)
CREAT SERPL-MCNC: 0.93 MG/DL (ref 0.5–1.4)
CRP SERPL HS-MCNC: <0.3 MG/DL (ref 0–0.75)
EKG IMPRESSION: NORMAL
ERYTHROCYTE [DISTWIDTH] IN BLOOD BY AUTOMATED COUNT: 53.6 FL (ref 35.9–50)
ERYTHROCYTE [SEDIMENTATION RATE] IN BLOOD BY WESTERGREN METHOD: 0 MM/HOUR (ref 0–20)
FEV1 % PREDICTED: 41
FEV1/FVC: 80 %
FVC % PREDICTED: 50
FVC (L): 1.33
FVC (L): 2.12
GFR SERPLBLD CREATININE-BSD FMLA CKD-EPI: 91 ML/MIN/1.73 M 2
GLOBULIN SER CALC-MCNC: 2.3 G/DL (ref 1.9–3.5)
GLUCOSE SERPL-MCNC: 110 MG/DL (ref 65–99)
HCT VFR BLD AUTO: 44.8 % (ref 42–52)
HGB BLD-MCNC: 12.9 G/DL (ref 14–18)
MCH RBC QN AUTO: 19 PG (ref 27–33)
MCHC RBC AUTO-ENTMCNC: 28.8 G/DL (ref 32.3–36.5)
MCV RBC AUTO: 66 FL (ref 81.4–97.8)
PLATELET # BLD AUTO: 595 K/UL (ref 164–446)
PMV BLD AUTO: 9.5 FL (ref 9–12.9)
POTASSIUM SERPL-SCNC: 4.7 MMOL/L (ref 3.6–5.5)
PROT SERPL-MCNC: 6.3 G/DL (ref 6–8.2)
RBC # BLD AUTO: 6.79 M/UL (ref 4.7–6.1)
SODIUM SERPL-SCNC: 136 MMOL/L (ref 135–145)
TROPONIN T SERPL-MCNC: 19 NG/L (ref 6–19)
TROPONIN T SERPL-MCNC: 22 NG/L (ref 6–19)
TROPONIN T SERPL-MCNC: 23 NG/L (ref 6–19)
WBC # BLD AUTO: 13.5 K/UL (ref 4.8–10.8)

## 2024-09-18 PROCEDURE — 76000 FLUOROSCOPY <1 HR PHYS/QHP: CPT

## 2024-09-18 PROCEDURE — 93005 ELECTROCARDIOGRAM TRACING: CPT | Performed by: FAMILY MEDICINE

## 2024-09-18 PROCEDURE — 770020 HCHG ROOM/CARE - TELE (206)

## 2024-09-18 PROCEDURE — 700101 HCHG RX REV CODE 250

## 2024-09-18 PROCEDURE — 71045 X-RAY EXAM CHEST 1 VIEW: CPT

## 2024-09-18 PROCEDURE — 700102 HCHG RX REV CODE 250 W/ 637 OVERRIDE(OP)

## 2024-09-18 PROCEDURE — 85027 COMPLETE CBC AUTOMATED: CPT

## 2024-09-18 PROCEDURE — A9270 NON-COVERED ITEM OR SERVICE: HCPCS

## 2024-09-18 PROCEDURE — 84484 ASSAY OF TROPONIN QUANT: CPT | Mod: 91

## 2024-09-18 PROCEDURE — 93010 ELECTROCARDIOGRAM REPORT: CPT | Performed by: INTERNAL MEDICINE

## 2024-09-18 PROCEDURE — 94010 BREATHING CAPACITY TEST: CPT

## 2024-09-18 PROCEDURE — 700111 HCHG RX REV CODE 636 W/ 250 OVERRIDE (IP): Mod: JZ

## 2024-09-18 PROCEDURE — 99222 1ST HOSP IP/OBS MODERATE 55: CPT | Performed by: STUDENT IN AN ORGANIZED HEALTH CARE EDUCATION/TRAINING PROGRAM

## 2024-09-18 PROCEDURE — 700117 HCHG RX CONTRAST REV CODE 255

## 2024-09-18 PROCEDURE — 94640 AIRWAY INHALATION TREATMENT: CPT

## 2024-09-18 PROCEDURE — 99999 PR NO CHARGE: CPT | Performed by: FAMILY MEDICINE

## 2024-09-18 PROCEDURE — 99232 SBSQ HOSP IP/OBS MODERATE 35: CPT | Mod: GC | Performed by: FAMILY MEDICINE

## 2024-09-18 PROCEDURE — A9270 NON-COVERED ITEM OR SERVICE: HCPCS | Performed by: STUDENT IN AN ORGANIZED HEALTH CARE EDUCATION/TRAINING PROGRAM

## 2024-09-18 PROCEDURE — 80053 COMPREHEN METABOLIC PANEL: CPT

## 2024-09-18 PROCEDURE — 85652 RBC SED RATE AUTOMATED: CPT

## 2024-09-18 PROCEDURE — 700102 HCHG RX REV CODE 250 W/ 637 OVERRIDE(OP): Performed by: STUDENT IN AN ORGANIZED HEALTH CARE EDUCATION/TRAINING PROGRAM

## 2024-09-18 PROCEDURE — 71260 CT THORAX DX C+: CPT

## 2024-09-18 PROCEDURE — 86140 C-REACTIVE PROTEIN: CPT

## 2024-09-18 RX ORDER — ALPRAZOLAM 0.5 MG
0.5 TABLET ORAL ONCE
Status: COMPLETED | OUTPATIENT
Start: 2024-09-18 | End: 2024-09-18

## 2024-09-18 RX ORDER — KETOROLAC TROMETHAMINE 15 MG/ML
15 INJECTION, SOLUTION INTRAMUSCULAR; INTRAVENOUS EVERY 6 HOURS PRN
Status: DISCONTINUED | OUTPATIENT
Start: 2024-09-18 | End: 2024-09-19 | Stop reason: HOSPADM

## 2024-09-18 RX ORDER — IPRATROPIUM BROMIDE AND ALBUTEROL SULFATE 2.5; .5 MG/3ML; MG/3ML
3 SOLUTION RESPIRATORY (INHALATION)
Status: DISCONTINUED | OUTPATIENT
Start: 2024-09-18 | End: 2024-09-18

## 2024-09-18 RX ORDER — NITROGLYCERIN 0.4 MG/1
0.4 TABLET SUBLINGUAL ONCE
Status: COMPLETED | OUTPATIENT
Start: 2024-09-18 | End: 2024-09-18

## 2024-09-18 RX ORDER — IPRATROPIUM BROMIDE AND ALBUTEROL SULFATE 2.5; .5 MG/3ML; MG/3ML
3 SOLUTION RESPIRATORY (INHALATION)
Status: DISCONTINUED | OUTPATIENT
Start: 2024-09-18 | End: 2024-09-19 | Stop reason: HOSPADM

## 2024-09-18 RX ORDER — OXYCODONE HYDROCHLORIDE 5 MG/1
5 TABLET ORAL ONCE
Status: COMPLETED | OUTPATIENT
Start: 2024-09-18 | End: 2024-09-18

## 2024-09-18 RX ORDER — ACETAMINOPHEN 500 MG
1000 TABLET ORAL 3 TIMES DAILY
Status: DISCONTINUED | OUTPATIENT
Start: 2024-09-18 | End: 2024-09-19 | Stop reason: HOSPADM

## 2024-09-18 RX ADMIN — IPRATROPIUM BROMIDE AND ALBUTEROL SULFATE 3 ML: 2.5; .5 SOLUTION RESPIRATORY (INHALATION) at 06:38

## 2024-09-18 RX ADMIN — ALPRAZOLAM 0.5 MG: 0.5 TABLET ORAL at 05:11

## 2024-09-18 RX ADMIN — NITROGLYCERIN 0.4 MG: 0.4 TABLET, ORALLY DISINTEGRATING SUBLINGUAL at 10:33

## 2024-09-18 RX ADMIN — MOMETASONE FUROATE AND FORMOTEROL FUMARATE DIHYDRATE 2 PUFF: 200; 5 AEROSOL RESPIRATORY (INHALATION) at 20:21

## 2024-09-18 RX ADMIN — ACETAMINOPHEN 1000 MG: 500 TABLET ORAL at 20:20

## 2024-09-18 RX ADMIN — AMLODIPINE BESYLATE 5 MG: 5 TABLET ORAL at 05:11

## 2024-09-18 RX ADMIN — HYDROMORPHONE HYDROCHLORIDE 0.5 MG: 1 INJECTION, SOLUTION INTRAMUSCULAR; INTRAVENOUS; SUBCUTANEOUS at 02:16

## 2024-09-18 RX ADMIN — ALBUTEROL SULFATE 2 PUFF: 90 AEROSOL, METERED RESPIRATORY (INHALATION) at 05:15

## 2024-09-18 RX ADMIN — HYDROMORPHONE HYDROCHLORIDE 0.5 MG: 1 INJECTION, SOLUTION INTRAMUSCULAR; INTRAVENOUS; SUBCUTANEOUS at 09:46

## 2024-09-18 RX ADMIN — HYDROMORPHONE HYDROCHLORIDE 0.5 MG: 1 INJECTION, SOLUTION INTRAMUSCULAR; INTRAVENOUS; SUBCUTANEOUS at 04:45

## 2024-09-18 RX ADMIN — KETOROLAC TROMETHAMINE 15 MG: 15 INJECTION, SOLUTION INTRAMUSCULAR; INTRAVENOUS at 20:21

## 2024-09-18 RX ADMIN — HYDROMORPHONE HYDROCHLORIDE 0.5 MG: 1 INJECTION, SOLUTION INTRAMUSCULAR; INTRAVENOUS; SUBCUTANEOUS at 14:33

## 2024-09-18 RX ADMIN — ACETAMINOPHEN 1000 MG: 500 TABLET ORAL at 16:09

## 2024-09-18 RX ADMIN — RIVAROXABAN 20 MG: 20 TABLET, FILM COATED ORAL at 17:31

## 2024-09-18 RX ADMIN — OXYCODONE HYDROCHLORIDE 5 MG: 5 TABLET ORAL at 08:49

## 2024-09-18 RX ADMIN — TIOTROPIUM BROMIDE INHALATION SPRAY 5 MCG: 3.12 SPRAY, METERED RESPIRATORY (INHALATION) at 11:38

## 2024-09-18 RX ADMIN — ACETAMINOPHEN 1000 MG: 500 TABLET ORAL at 01:33

## 2024-09-18 RX ADMIN — IOHEXOL 80 ML: 350 INJECTION, SOLUTION INTRAVENOUS at 11:45

## 2024-09-18 RX ADMIN — OMEPRAZOLE 20 MG: 20 CAPSULE, DELAYED RELEASE ORAL at 05:12

## 2024-09-18 RX ADMIN — KETOROLAC TROMETHAMINE 15 MG: 15 INJECTION, SOLUTION INTRAMUSCULAR; INTRAVENOUS at 12:50

## 2024-09-18 RX ADMIN — IPRATROPIUM BROMIDE AND ALBUTEROL SULFATE 3 ML: 2.5; .5 SOLUTION RESPIRATORY (INHALATION) at 16:01

## 2024-09-18 ASSESSMENT — ENCOUNTER SYMPTOMS
SHORTNESS OF BREATH: 1
HEARTBURN: 0
FEVER: 0
ABDOMINAL PAIN: 0
COUGH: 0
DIZZINESS: 0
TINGLING: 0

## 2024-09-18 ASSESSMENT — PAIN DESCRIPTION - PAIN TYPE
TYPE: ACUTE PAIN

## 2024-09-18 ASSESSMENT — FIBROSIS 4 INDEX: FIB4 SCORE: 0.59

## 2024-09-18 NOTE — CARE PLAN
The patient is Stable - Low risk of patient condition declining or worsening    Shift Goals  Clinical Goals: monitor HR and O2  Patient Goals: establish POC  Family Goals: jelly      Problem: Pain - Standard  Goal: Alleviation of pain or a reduction in pain to the patient’s comfort goal  Outcome: Progressing  Flowsheets  Taken 9/18/2024 0144  Pain Rating Scale (NPRS): 8  Taken 9/17/2024 2220  Non Verbal Scale:   Calm   Unlabored Breathing  Note: Patient will be able to rest comfortably throughout the night after pain intervention.     Problem: Knowledge Deficit - COPD  Goal: Patient/significant other demonstrates understanding of disease process, utilization of the Action Plan, medications and discharge instruction  Outcome: Progressing  Note: Patient will demonstrate understanding of plan of care for the COPD patient.

## 2024-09-18 NOTE — ASSESSMENT & PLAN NOTE
Severe, exertional but also at rest, primarily triggered by ambulation and speech, complicated by COPD requiring 2 L supplemental O2 at baseline.  Unclear etiology at this time.  Presentation not consistent with COPD exacerbation.  Considering musculoskeletal etiologies given chest x-ray findings notable for elevation of left hemidiaphragm which may or may not be related to the patient's recent aortic aneurysm/TAVR repair in 2023 which coincides with this patient's onset of symptoms.  Malignancy also being considered given the patient's smoking history however, less likely.  Cardiac etiologies also being considered however, less likely as patient had a normal EKG on admission and echo in April 2024 demonstrated preserved EF at 70% with hypertrophic changes, otherwise largely unremarkable.  Lastly, although not well studied, there is some evidence to suggest that long-term use of anabolic steroids can have detrimental effects on lung function, to include loosely associated pulmonary hypertension and respiratory muscle weakness. , at baseline.  PFTs indicate COPD.  Sniff test also performed.  Pulmonology consulted with further recommendations of a chemical streps test.  Considerations include left hemidiaphragm, atelectasis/pneumonitis, anxiety, COPD exacerbation, ACS, PE.  This likely is multifactorial 2/2 hemidiaphragm, atelectasis, and anxiety as COPD exacerbation, ACS, and PE are low on the differential with no wheezing on exam, negative troponin/EKG, and nontachycardic heart rate not beta blocked and proper oxygenation even when trialing room air at 95%    Plan:  - Pulmonology following  - Supplemental oxygen as needed  - Schedule tiotropium, DuoNebs as needed, albuterol as needed  - Ordered inpatient pharmacologic stress test, can consider this in the outpatient setting if patient cleared for discharge before being performed

## 2024-09-18 NOTE — H&P
[facility] FAMILY MEDICINE HISTORY AND PHYSICAL    PATIENT ID:  NAME:  Noe Hickey  MRN:               [id number]  YOB: 1960    Date of Admission: 9/17/2024     Attending: Luis Armando Mejia MD    Resident: Yoan Restrepo MD     Primary Care Physician:  Caryl Pineda M.D.    CC:  SOB     HPI: Noe Hickey is a 63 y.o. male with significant past medical history of aortic aneurysm repair and TAVR (2023), PEA requiring CPR for 30 seconds in August 2024 and COPD on 2 L O2 at baseline, who presented with recurrence of shortness of breath.  The patient reports he began to experience significant shortness of breath this morning with associated chest pain described primarily as pressure on the mid to left side.  Shortness of breath is primarily experienced with ambulation and reported to be profound requiring several minutes to recover his breath.  Prolonged conversations also trigger shortness of breath with associated chest pain.  However, patient reports that he is otherwise able to attend the gym and lift heavily without experiencing the symptoms.  Patient expressed frustration given this does not make sense and previous hospitalizations with similar presentation have been unsuccessful in determining the etiology.  Previously, he states his shortness of breath has been managed as COPD exacerbations.  He reports adherence to inhalers and daytime oxygen use as prescribed.  Patient recently stopped smoking approximately 1 month ago and has a 40-pack-year smoking history.  Additionally, the patient's history is notable for anabolic steroid use which he discontinued also about a month ago.      ER Course:  In the ED, vital signs notable for respiratory rate up to 48, satting adequately on room air, vital signs otherwise within normal limits.  Labs: CBC notable for elevated white blood cell count at 15.1, hemoglobin 13.3 with MCV 65.7, platelet 616.  CMP largely within normal limits.  Troponin 21, .   Imaging: Chest x-ray notable for left basilar atelectasis and/or consolidation with elevation of left hemidiaphragm, stable enlargement of the cardiomediastinal silhouette.  EKG sinus rhythm, with no acute changes.    REVIEW OF SYSTEMS:   Ten systems reviewed and were negative except as noted in the HPI.                PAST MEDICAL HISTORY:  Past Medical History:   Diagnosis Date    Arrhythmia     Breath shortness 06/09/2023    prior to 1/2023 surgery    Cyclic vomiting syndrome     Elevated hemidiaphragm - LEFT post AVR 01/04/2023    Fracture     Headache, classical migraine     Heart burn     Heart valve disease 06/09/2023    heart surgery in 1/2023    Hyperlipidemia 04/26/2024    medicated    Hypertension 04/26/2024 6/6/2024 pt not currently taking any medication for this at this time    Indigestion     Pneumonia due to infectious organism 01/20/2023    Snoring     6/6/2024 no sleep study done       PAST SURGICAL HISTORY:  Past Surgical History:   Procedure Laterality Date    IRRIGATION & DEBRIDEMENT GENERAL N/A 6/7/2024    Procedure: RIGHT FOOT IRRIGATION AND DEBRIDEMENT;  Surgeon: Oliver Arreguin M.D.;  Location: [facility];  Service: Orthopedics    INCISION AND DRAINAGE GENERAL Left 04/19/2024    Procedure: INCISION AND DRAINAGE, LEG;  Surgeon: Mitch Bowie M.D.;  Location: [facility];  Service: Orthopedics    AORTIC VALVE REPLACEMENT  01/05/2023    Procedure: AORTIC VALVE REPLACEMENT, ASCENDING AORTIC ANEURYSM REPAIR AND TRANSESOPHAGEAL ECHOCARDIOGRAM.;  Surgeon: Serena Goyal M.D.;  Location: [facility];  Service: Cardiac    AORTIC ASCENDING DISSECTION  01/05/2023    Procedure: REPAIR, ANEURYSM OR DISSECTION, AORTA, ASCENDING;  Surgeon: Serena Goyal M.D.;  Location: [facility];  Service: Cardiac    ECHOCARDIOGRAM, TRANSESOPHAGEAL, INTRAOPERATIVE  01/05/2023    Procedure: ECHOCARDIOGRAM, TRANSESOPHAGEAL, INTRAOPERATIVE.;  Surgeon: Serena Goyal  M.D.;  Location: SURGERY [facility];  Service: Cardiac    IRRIGATION & DEBRIDEMENT ORTHO Right 09/19/2017    Procedure: IRRIGATION & DEBRIDEMENT ORTHO-THIGH;  Surgeon: Corbin Rivera M.D.;  Location: SURGERY [facility];  Service: Orthopedics    SHOULDER HEMICAP RESURFACING Right 10/12/2015    Procedure: RIGHT SHOULDER RESURFACING;  Surgeon: Javon Doll M.D.;  Location: SURGERY [facility];  Service:     SHOULDER ARTHROSCOPY Bilateral     x3    SHOULDER SURGERY Right     replacement right       FAMILY HISTORY:  History reviewed. No pertinent family history.    SOCIAL HISTORY:   Pt lives in [place] by himself, currently  from his spouse.  Tobacco use: 40-pack-year smoking history, quit approximately 1 month ago  ETOH use: Denies  Drug use: Anabolic steroids otherwise negative    ALLERGIES:  Allergies   Allergen Reactions    Morphine Shortness of Breath and Rash     Rash, shortness of breath       OUTPATIENT MEDICATIONS:    Current Facility-Administered Medications:     [START ON 9/18/2024] rivaroxaban (Xarelto) tablet 20 mg, 20 mg, Oral, PM MEAL, Yoan Restrepo M.D.    [START ON 9/18/2024] tiotropium (Spiriva Respimat) 2.5 mcg/Act inhalation spray 5 mcg, 5 mcg, Inhalation, DAILY, Yoan Restrepo M.D.    [START ON 9/18/2024] omeprazole (PriLOSEC) capsule 20 mg, 20 mg, Oral, DAILY, Yoan Restrepo M.D.    [START ON 9/18/2024] amLODIPine (Norvasc) tablet 5 mg, 5 mg, Oral, DAILY, Yoan Restrepo M.D.    albuterol inhaler 2 Puff, 2 Puff, Inhalation, Q6HRS PRN, Yoan Restrepo M.D.    acetaminophen (Tylenol) tablet 1,000 mg, 1,000 mg, Oral, Q6HRS PRN, Yoan Restrepo M.D.    HYDROmorphone (Dilaudid) injection 0.5 mg, 0.5 mg, Intravenous, Q4HRS PRN, Yoan Restrepo M.D., 0.5 mg at 09/17/24 [id number]    Respiratory Therapy Consult, , Nebulization, Continuous RT, Yoan Restrepo M.D.    Current Outpatient Medications:     omeprazole (PRILOSEC) 20 MG delayed-release capsule, Take 20 mg by mouth  every day., Disp: , Rfl:     Omega-3 Fatty Acids (FISH OIL PO), Take 1 Tablet by mouth every day., Disp: , Rfl:     tiotropium (SPIRIVA RESPIMAT) 2.5 mcg/Act Aero Soln, Inhale 2 Inhalations every day., Disp: 2 Each, Rfl: 1    amLODIPine (NORVASC) 5 MG Tab, Take 1 Tablet by mouth every day., Disp: 30 Tablet, Rfl: 5    albuterol 108 (90 Base) MCG/ACT Aero Soln inhalation aerosol, Inhale 2 Puffs every 6 hours as needed for Shortness of Breath., Disp: 8.5 g, Rfl: 6    Multiple Vitamins-Minerals (ONE DAILY MENS PO), Take 1 Tablet by mouth every day., Disp: , Rfl:     rivaroxaban (XARELTO) 20 MG Tab tablet, Take 20 mg by mouth with dinner., Disp: , Rfl:     diclofenac sodium (VOLTAREN) 1 % Gel, Apply 4 g topically 4 times a day as needed (to left heal pain). (Patient not taking: Reported on 2024), Disp: 150 g, Rfl: 5    PHYSICAL EXAM:  Vitals:    24 1600 24 1700 24 1759 24 1800   BP: 129/62 137/82 (!) 152/88 (!) 152/88   Pulse: 75 74 79 75   Resp: 15  20   Temp:       TempSrc:       SpO2: 97% 96% 97% 95%   Weight:       Height:       , Temp (24hrs), Av.7 °C (98 °F), Min:36.7 °C (98 °F), Max:36.7 °C (98 °F)  , Pulse Oximetry: 95 %, O2 Delivery Device: None - Room Air    General: Pleasant, cooperative, easily becomes short of breath with conversation  Skin:  Pink, warm and dry.  No rashes  HEENT: Normocephalic, atraumatic, EOMI, neck supple, moist mucous membranes  Lungs: Poor inspiratory effort significantly restricted, audible wheezing in upper lung fields with deep inspiration  Cardiovascular: Regular rate and rhythm, murmur left sternal border  Abdomen:  Abdomen is soft NT/ND. +BS. No masses noted.  Extremities:  Full range of motion. No gross deformities noted. 2+ pulses in all extremities.   Neuro:  Muscle tone is normal. Cranial nerves II-XII grossly intact.         LAB TESTS:   Recent Labs     24  1352   WBC 15.1*   RBC 6.96*   HEMOGLOBIN 13.3*   HEMATOCRIT 45.7   MCV  65.7*   MCH 19.1*   RDW 52.5*   PLATELETCT 616*   MPV 9.4   NEUTSPOLYS 75.80*   LYMPHOCYTES 8.40*   MONOCYTES 10.10   EOSINOPHILS 3.50   BASOPHILS 0.70   RBCMORPHOLO Present         Recent Labs     09/17/24  1352   SODIUM 137   POTASSIUM 4.9   CHLORIDE 101   CO2 24   BUN 18   CREATININE 0.80   CALCIUM 8.7   ALBUMIN 3.9       CULTURES:   Results       ** No results found for the last 168 hours. **            IMAGING:  DX-CHEST-PORTABLE (1 VIEW)   Final Result      1.  Redemonstrated left basilar atelectasis and/or consolidation with elevation of left hemidiaphragm.   2.  Stable enlargement of the cardiomediastinal silhouette.      DX SNIFF TEST    (Results Pending)       CONSULTS:   Pulmonology consulted by EDP, day team will reconsult in the morning    ASSESSMENT/PLAN: 63 y.o. male with significant past medical history of aortic aneurysm repair and TAVR (2023), PEA requiring CPR for 30 seconds in August 2024 and COPD on 2 L O2 at baseline, admitted for recurrence of exertional shortness of breath with associated chest pain of unknown etiology.    * SOB (shortness of breath) on exertion- (present on admission)  Assessment & Plan  Severe, exertional, primarily triggered by ambulation and speech, complicated by COPD requiring 2 L supplemental O2 at baseline.  Unclear etiology at this time.  Presentation not consistent with COPD exacerbation.  Considering musculoskeletal etiologies given chest x-ray findings notable for elevation of left hemidiaphragm which may or may not be related to the patient's recent aortic aneurysm/TAVR repair in 2023.  Malignancy also being considered given the patient's smoking history however, less likely.  Cardiac etiologies also being considered however, less likely as patient had a normal EKG on admission and echo in April 2024 demonstrated preserved EF at 70% with hypertrophic changes, otherwise largely unremarkable.  Lastly, although not well studied, there is some evidence to suggest  that long-term use of anabolic steroids can have detrimental effects on lung function, to include loosely associated pulmonary hypertension and respiratory muscle weakness.  -, at baseline    Plan:  - Order fluoroscopy sniff test and bedside PFT per pulmonology recommendations given to EDP.  - Day team will formally consult pulmonology tomorrow morning  - Supplemental oxygen as needed  - Resume home inhaler, will consider trial of DuoNebs  - Consider repeat echocardiogram and/or cardiac MRI    Chest pain on breathing- (present on admission)  Assessment & Plan  Acute, localized to left sternal border, associated with shortness of breath and primarily triggered with deep inspiratory effort.  Less likely to be cardiac in nature at this time, troponin 21 on admission which appears to be at baseline at least since April 2024.  Low suspicion for DVT at this time however, patient is at increased risk given his chronic steroid use.  The 10-year ASCVD risk score (Chuckie SAMANO, et al., 2019) is: 26.5%    Values used to calculate the score:      Age: 63 years      Sex: Male      Is Non- : No      Diabetic: No      Tobacco smoker: No      Systolic Blood Pressure: 152 mmHg      Is BP treated: No      HDL Cholesterol: 21 mg/dL      Total Cholesterol: 212 mg/dL    Plan:  - Pain management  - Admit to telemetry  - Low threshold for repeat EKG and troponin    Thrombocytosis- (present on admission)  Assessment & Plan  Chronic, platelet count 616 on admission, etiology unknown.  May be related to anabolic steroid use as some uses of anabolic steroids can develop iron deficiency anemia which may trigger a reactive thrombocytosis.    Plan:  - Recommend close outpatient follow-up with primary care provider  - Continue to refrain from using anabolic steroids    Elevated white blood cell count- (present on admission)  Assessment & Plan  Chronic, WBC 15.1 on admission, at baseline, likely secondary to chronic  anabolic steroid use.  No evidence at this time for infectious process or malignancy.    Plan:  - Recommend outpatient monitoring with PCP, no additional interventions indicated at this time.    Microcytic anemia- (present on admission)  Assessment & Plan  Chronic, mild, Hgb 13.3 with MCV 65.7, asymptomatic, may be related to anabolic steroid use as some users of anabolic steroids can develop iron deficiency anemia.     Plan:  - No indication at this time for inpatient workup  - Recommend close outpatient follow-up          Core Measures:  Fluids: PO  Lines: PIV  Abx: None  Diet: Cardiac  PPX: SCDs/TEDs, lovenox  Wound care: No wounds    CODE STATUS: FULL Code      I anticipate the patient:  (1) will require at least two midnights for appropriate medical management, necessitating inpatient admission because patient is requiring supplemental oxygen and the need of further evaluation to determine the etiology of his shortness of breath  with associated chest pain.  Patient at risk for rapid deterioration given complex medical history.    Yoan Restrepo MD  [facility]

## 2024-09-18 NOTE — PROGRESS NOTES
"  [facility] MEDICINE     RAPID RESPONSE    S: At 0423 hrs, I was notified by nursing staff that the patient was experiencing 10 out of 10 chest pain with associated shortness of breath, different than on initial presentation and a rapid response was called.  At bedside, the patient was sitting upright on the edge of the bed and reported pain had moderately improved following pain medication.  Nursing staff reported chest x-ray, EKG, troponin and vital signs were all normal.  Patient denied any prior history of panic attacks.    O: /60   Pulse 65   Temp 36.7 °C (98 °F) (Temporal)   Resp (!) 25   Ht 1.727 m (5' 8\")   Wt 95.3 kg (210 lb 1.6 oz)   SpO2 95%      Physical Exam   General: Sitting up on side of bed, anxious  HEENT: NC/AT, EOMI, neck supple, moist mucous membranes  Cardiovascular: Regular rate and rhythm, murmur  Respiratory: Distant breath sounds throughout, restricted inspiratory effort    Troponin: 23  EKG: Sinus rhythm, minimal ST elevation in anterior leads  Chest x-ray: Stable left basilar airspace disease    A/P:  63-year-old male with significant past medical history of aortic aneurysm repair and TAVR (2023), PEA requiring CPR for 30 seconds in August 2024 and COPD, admitted for shortness of breath and associated chest pain of unknown etiology, evaluated at bedside for rapid response for acute change in chest pain, concerning for panic attack however, ACS remains high on the differential given patient's history and EKG now showing minimal ST elevation in anterior leads.  PE also remains on the differential although less likely as patient has not had increase in O2 demands.  Patient remains hemodynamically stable.    Plan:  - Serial troponins every 2 hours  - Dilaudid as needed for pain  - Order one-time dose of Xanax 0.5 mg  - Continue telemetry monitoring  - Will consider CTA PE should patient develop increasing O2 demands        "

## 2024-09-18 NOTE — ASSESSMENT & PLAN NOTE
Chronic, mild, Hgb 13.3 with MCV 65.7, asymptomatic, may be related to anabolic steroid use as some users of anabolic steroids can develop iron deficiency anemia.     Plan:  - No indication at this time for inpatient workup  - Recommend close outpatient follow-up

## 2024-09-18 NOTE — ASSESSMENT & PLAN NOTE
Unclear definitive cause at this time, can have multiple factors contributing to symptoms  Suspected COPD given his tobacco use , CT chest showing elevated left Gab diaphragm concerning for unilateral diaphragmatic paralysis  TTE shows TR with elevated RVSP concerning for moderate pulmonary hypertension, preserved EF but positive for diastolic dysfunction  Had a stress test in 2022 which showed basal inferior wall ischemia    -At this time will recommend fluoroscopic sniff testing to evaluate the diaphragm excursion, which is reviewed and doesnot show paradoxical motion of diaphragm  - bedside spirometry showed very irregular Flow volume loop consistent with muscle weakness, reduced ratio can indicate COPD  - will recommend to start dulera BID along with Spiriva 2 puffs in AM  - no further pulmonary work up indicated at this time, there is no indication for steroids at this time  - patient infarct must refrain from anabolic steroids  - low dose diuretics might help with diastolic dysfunction and elevated RVSP  -Will need cardiac stress test to rule out ischemic heart disease as outpatient   - may need RHC for elevated RVSP to r/o PH

## 2024-09-18 NOTE — PROGRESS NOTES
Bedside report received from off going RN/tech: Paul, assumed care of patient.     Fall Risk Score: NO RISK  Fall risk interventions in place: Provide patient/family education based on risk assessment, Educate patient/family to call staff for assistance when getting out of bed, Place fall precaution signage outside patient door, and Place patient in room close to nursing station  Bed type: Regular (Clement Score less than 17 interventions in place)  Patient on cardiac monitor: Yes  IVF/IV medications: Not Applicable   Oxygen: How many liters 3L  Bedside sitter: Not Applicable   Isolation: Not applicable

## 2024-09-18 NOTE — PROGRESS NOTES
Bedside report received from off going RN/tech: Cassy, assumed care of patient.     Fall Risk Score: NO RISK  Fall risk interventions in place: Provide patient/family education based on risk assessment and Educate patient/family to call staff for assistance when getting out of bed  Bed type: Regular (Clement Score less than 17 interventions in place)  Patient on cardiac monitor: Yes  IVF/IV medications: Not Applicable   Oxygen: How many liters 3L and Traced the line to wall oxygen  Bedside sitter: Not Applicable   Isolation: Not applicable

## 2024-09-18 NOTE — PROGRESS NOTES
Telemetry Monitor Report Summary    Rhythm sinus rhythm  HR 67-76  Ectopy NA    Measurements 0.17/ 0.08 /0.34

## 2024-09-18 NOTE — ASSESSMENT & PLAN NOTE
Chronic, WBC 15.1 on admission, at baseline, likely secondary to chronic anabolic steroid use.  No evidence at this time for infectious process or malignancy.    Plan:  - Recommend outpatient monitoring with PCP, no additional interventions indicated at this time.

## 2024-09-18 NOTE — PROGRESS NOTES
Rapid Response Summary     Rapid response called at 0430 for: Chest Pain     VS: WDL (See Vitals Flowsheet)  Additional info: Patient came in to ED with similar symptoms, but symptoms at this time are worse  MD Paged: DIPAK Restrepo  Interventions:    Imaging/Tests: Chest X-ray and EKG    Labs: Troponin   Medications:  Dilaudid IVP   Other: N/A  Disposition: Improved with rapid response team interventions. Primary RN updated on plan of care. Transfer not indicated at this time.  and Rapid team will continue to follow the patient.

## 2024-09-18 NOTE — CONSULTS
"Pulmonary Consultation    Date of consult: 9/18/2024    Referring Physician  Nabila Monge M.D.    Reason for Consultation  COPD/shortness of breath    History of Presenting Illness  63 y.o. male former smoker (quit 1 month ago ), anabolic steroid use with AAA repair, TAVR, atrial flutter s/p  post ablation, pulmonary vein isolation, hypertension, CHF, suspected COPD who presented on 9/17/2024 for shortness of breath, left chest pain.  Patient r reports that his shortness of breath started after his surgery(TAVR/AAA repair) in January 2023.  Prior to surgery patient reports no shortness of breath or chest pain.  He describes his shortness of breath in relation to walking or movement but denies any symptoms when he is lifting weights in the gym.  He also \" notices some shortness of breath when doing no activity\".  He has at least 4 admissions since his surgery for similar complaints requiring inpatient hospitalization.  He was started on inhalers suspected COPD due to smoking history, reports no improvement with using inhalers.  He is scheduled to see pulmonary in February 2025, but was worried about recurrent symptoms, not knowing the origin.  He has been using supplemental oxygen with no benefit.     Code Status  Full Code    Review of Systems  Review of Systems   Constitutional:  Negative for fever.   Respiratory:  Positive for shortness of breath. Negative for cough.    Cardiovascular:  Positive for chest pain. Negative for leg swelling.   Gastrointestinal:  Negative for abdominal pain and heartburn.   Neurological:  Negative for dizziness and tingling.   All other systems reviewed and are negative.      Past Medical History   has a past medical history of Arrhythmia, Breath shortness (06/09/2023), Cyclic vomiting syndrome, Elevated hemidiaphragm - LEFT post AVR (01/04/2023), Fracture, Headache, classical migraine, Heart burn, Heart valve disease (06/09/2023), Hyperlipidemia (04/26/2024), Hypertension " (04/26/2024), Indigestion, Pneumonia due to infectious organism (01/20/2023), and Snoring.    He has no past medical history of Anesthesia, Asthma, Cancer (HCC), Carcinoma in situ of respiratory system, Cataract, Cold, Coughing blood, Dental disorder, Disorder of thyroid, Glaucoma, Heart murmur, Hemorrhagic disorder (HCC), Hepatitis A, Hepatitis B, Hepatitis C, Hiatus hernia syndrome, Myocardial infarct (HCC), Pacemaker, Personal history of venous thrombosis and embolism, Psychiatric problem, Rheumatic fever, Seizure (HCC), Sleep apnea, Stroke (HCC), Tuberculosis, Urinary bladder disorder, or Urinary incontinence.    Surgical History   has a past surgical history that includes shoulder arthroscopy (Bilateral); shoulder hemicap resurfacing (Right, 10/12/2015); irrigation & debridement ortho (Right, 09/19/2017); shoulder surgery (Right); aortic valve replacement (01/05/2023); aortic ascending dissection (01/05/2023); echocardiogram, transesophageal, intraoperative (01/05/2023); incision and drainage general (Left, 04/19/2024); and irrigation & debridement general (N/A, 6/7/2024).    Family History  family history is not on file.    Social History   reports that he quit smoking about 3 months ago. His smoking use included cigarettes. He started smoking about 47 years ago. He has a 23.7 pack-year smoking history. He has never been exposed to tobacco smoke. He has never used smokeless tobacco. He reports that he does not currently use alcohol. He reports that he does not currently use drugs after having used the following drugs: Oral.    Medications  Home Medications       Reviewed by Cassy Vang R.N. (Registered Nurse) on 09/17/24 at 2475  Med List Status: Complete     Medication Last Dose Status   albuterol 108 (90 Base) MCG/ACT Aero Soln inhalation aerosol 9/17/2024 Active   amLODIPine (NORVASC) 5 MG Tab 9/16/2024 Active   diclofenac sodium (VOLTAREN) 1 % Gel Not Taking Active   Multiple Vitamins-Minerals (ONE DAILY  MENS PO) 9/17/2024 Active   Omega-3 Fatty Acids (FISH OIL PO) 9/17/2024 Active   omeprazole (PRILOSEC) 20 MG delayed-release capsule 9/17/2024 Active   rivaroxaban (XARELTO) 20 MG Tab tablet 9/16/2024 Active   tiotropium (SPIRIVA RESPIMAT) 2.5 mcg/Act Aero Soln 9/17/2024 Active                  Audit from Redirected Encounters    **Home medications have not yet been reviewed for this encounter**       Current Facility-Administered Medications   Medication Dose Route Frequency Provider Last Rate Last Admin    ipratropium-albuterol (DUONEB) nebulizer solution  3 mL Nebulization Q4H PRN (RT) Noe Ventura M.D.   3 mL at 09/18/24 1601    ketorolac (Toradol) 15 MG/ML injection 15 mg  15 mg Intravenous Q6HRS PRN Noe Ventura M.D.   15 mg at 09/18/24 1250    acetaminophen (Tylenol) tablet 1,000 mg  1,000 mg Oral TID Noe Ventura M.D.   1,000 mg at 09/18/24 1609    mometasone-formoterol (Dulera) 200-5 MCG/ACT inhaler 2 Puff  2 Puff Inhalation BID Rupali Castañeda M.D.        rivaroxaban (Xarelto) tablet 20 mg  20 mg Oral PM MEAL Yoan Restrepo M.D.   20 mg at 09/18/24 1731    tiotropium (Spiriva Respimat) 2.5 mcg/Act inhalation spray 5 mcg  5 mcg Inhalation DAILY Yoan Restrepo M.D.   5 mcg at 09/18/24 1138    omeprazole (PriLOSEC) capsule 20 mg  20 mg Oral DAILY Yoan Restrepo M.D.   20 mg at 09/18/24 0512    amLODIPine (Norvasc) tablet 5 mg  5 mg Oral DAILY Yoan Restrepo M.D.   5 mg at 09/18/24 0511    albuterol inhaler 2 Puff  2 Puff Inhalation Q6HRS PRN Yoan Restrepo M.D.   2 Puff at 09/18/24 0515    HYDROmorphone (Dilaudid) injection 0.5 mg  0.5 mg Intravenous Q4HRS PRN Yoan Restrepo M.D.   0.5 mg at 09/18/24 3723    Respiratory Therapy Consult   Nebulization Continuous RT Yoan Restrepo M.D.           Allergies  Allergies   Allergen Reactions    Morphine Shortness of Breath and Rash     Rash, shortness of breath       Vital Signs last 24 hours  Temp:  [36.6 °C (97.9 °F)-36.8 °C  (98.2 °F)] 36.8 °C (98.2 °F)  Pulse:  [61-76] 63  Resp:  [15-28] 20  BP: (102-142)/(54-87) 139/77  SpO2:  [91 %-99 %] 98 %    Physical Exam  Physical Exam  Vitals reviewed.   HENT:      Head: Normocephalic and atraumatic.   Eyes:      Extraocular Movements: Extraocular movements intact.      Conjunctiva/sclera: Conjunctivae normal.   Cardiovascular:      Rate and Rhythm: Normal rate and regular rhythm.      Heart sounds: Murmur heard.   Pulmonary:      Effort: Pulmonary effort is normal. No respiratory distress.      Breath sounds: No wheezing.   Abdominal:      General: There is no distension.      Palpations: Abdomen is soft.      Tenderness: There is no abdominal tenderness.   Musculoskeletal:      Right lower leg: Edema present.      Left lower leg: No edema.   Skin:     General: Skin is warm and dry.   Neurological:      General: No focal deficit present.      Mental Status: He is alert and oriented to person, place, and time. Mental status is at baseline.         Fluids    Intake/Output Summary (Last 24 hours) at 2024 1807  Last data filed at 2024 1320  Gross per 24 hour   Intake 600 ml   Output 0 ml   Net 600 ml       Laboratory  Recent Results (from the past 48 hour(s))   EKG    Collection Time: 24  1:44 PM   Result Value Ref Range    Report       [facility]    Test Date:  2024  Pt Name:    JUSTIN TOPETE               Department: ER  MRN:        [id number]                      Room:        10  Gender:     Male                         Technician:  :        1960                   Requested By:ER TRIAGE PROTOCOL  Order #:    [id number]                    Reading MD: RAUL ELIAS    Measurements  Intervals                                Axis  Rate:       75                           P:          63  MA:         164                          QRS:        20  QRSD:       99                           T:          54  QT:         371  QTc:         415    Interpretive Statements  Sinus rhythm  Compared to ECG 08/21/2024 23:47:15  No significant changes  Impression: Sinus rhythm without evidence of ischemia  Electronically Signed On 09- 20:07:53 PDT by RAUL ELIAS     CBC with Differential    Collection Time: 09/17/24  1:52 PM   Result Value Ref Range    WBC 15.1 (H) 4.8 - 10.8 K/uL    RBC 6.96 (H) 4.70 - 6.10 M/uL    Hemoglobin 13.3 (L) 14.0 - 18.0 g/dL    Hematocrit 45.7 42.0 - 52.0 %    MCV 65.7 (L) 81.4 - 97.8 fL    MCH 19.1 (L) 27.0 - 33.0 pg    MCHC 29.1 (L) 32.3 - 36.5 g/dL    RDW 52.5 (H) 35.9 - 50.0 fL    Platelet Count 616 (H) 164 - 446 K/uL    MPV 9.4 9.0 - 12.9 fL    Neutrophils-Polys 75.80 (H) 44.00 - 72.00 %    Lymphocytes 8.40 (L) 22.00 - 41.00 %    Monocytes 10.10 0.00 - 13.40 %    Eosinophils 3.50 0.00 - 6.90 %    Basophils 0.70 0.00 - 1.80 %    Immature Granulocytes 1.50 (H) 0.00 - 0.90 %    Nucleated RBC 0.00 0.00 - 0.20 /100 WBC    Neutrophils (Absolute) 11.43 (H) 1.82 - 7.42 K/uL    Lymphs (Absolute) 1.26 1.00 - 4.80 K/uL    Monos (Absolute) 1.53 (H) 0.00 - 0.85 K/uL    Eos (Absolute) 0.53 (H) 0.00 - 0.51 K/uL    Baso (Absolute) 0.10 0.00 - 0.12 K/uL    Immature Granulocytes (abs) 0.23 (H) 0.00 - 0.11 K/uL    NRBC (Absolute) 0.00 K/uL    Anisocytosis 1+     Microcytosis 1+    Comp Metabolic Panel    Collection Time: 09/17/24  1:52 PM   Result Value Ref Range    Sodium 137 135 - 145 mmol/L    Potassium 4.9 3.6 - 5.5 mmol/L    Chloride 101 96 - 112 mmol/L    Co2 24 20 - 33 mmol/L    Anion Gap 12.0 7.0 - 16.0    Glucose 82 65 - 99 mg/dL    Bun 18 8 - 22 mg/dL    Creatinine 0.80 0.50 - 1.40 mg/dL    Calcium 8.7 8.5 - 10.5 mg/dL    Correct Calcium 8.8 8.5 - 10.5 mg/dL    AST(SGOT) 39 12 - 45 U/L    ALT(SGPT) 47 2 - 50 U/L    Alkaline Phosphatase 35 30 - 99 U/L    Total Bilirubin 0.5 0.1 - 1.5 mg/dL    Albumin 3.9 3.2 - 4.9 g/dL    Total Protein 6.1 6.0 - 8.2 g/dL    Globulin 2.2 1.9 - 3.5 g/dL    A-G Ratio 1.8 g/dL   proBrain  Natriuretic Peptide, NT    Collection Time: 09/17/24  1:52 PM   Result Value Ref Range    NT-proBNP 213 (H) 0 - 125 pg/mL   TROPONIN    Collection Time: 09/17/24  1:52 PM   Result Value Ref Range    Troponin T 21 (H) 6 - 19 ng/L   ESTIMATED GFR    Collection Time: 09/17/24  1:52 PM   Result Value Ref Range    GFR (CKD-EPI) 98 >60 mL/min/1.73 m 2   PLATELET ESTIMATE    Collection Time: 09/17/24  1:52 PM   Result Value Ref Range    Plt Estimation Increased    MORPHOLOGY    Collection Time: 09/17/24  1:52 PM   Result Value Ref Range    RBC Morphology Present     Polychromia 1+     Poikilocytosis 1+     Ovalocytes 1+     Schistocytes 1+    PERIPHERAL SMEAR REVIEW    Collection Time: 09/17/24  1:52 PM   Result Value Ref Range    Peripheral Smear Review see below    DIFFERENTIAL COMMENT    Collection Time: 09/17/24  1:52 PM   Result Value Ref Range    Comments-Diff see below    TROPONIN    Collection Time: 09/17/24  7:39 PM   Result Value Ref Range    Troponin T 25 (H) 6 - 19 ng/L   Comp Metabolic Panel (CMP)    Collection Time: 09/18/24  1:44 AM   Result Value Ref Range    Sodium 136 135 - 145 mmol/L    Potassium 4.7 3.6 - 5.5 mmol/L    Chloride 101 96 - 112 mmol/L    Co2 25 20 - 33 mmol/L    Anion Gap 10.0 7.0 - 16.0    Glucose 110 (H) 65 - 99 mg/dL    Bun 20 8 - 22 mg/dL    Creatinine 0.93 0.50 - 1.40 mg/dL    Calcium 8.9 8.5 - 10.5 mg/dL    Correct Calcium 8.9 8.5 - 10.5 mg/dL    AST(SGOT) 39 12 - 45 U/L    ALT(SGPT) 50 2 - 50 U/L    Alkaline Phosphatase 33 30 - 99 U/L    Total Bilirubin 0.8 0.1 - 1.5 mg/dL    Albumin 4.0 3.2 - 4.9 g/dL    Total Protein 6.3 6.0 - 8.2 g/dL    Globulin 2.3 1.9 - 3.5 g/dL    A-G Ratio 1.7 g/dL   CBC without Differential    Collection Time: 09/18/24  1:44 AM   Result Value Ref Range    WBC 13.5 (H) 4.8 - 10.8 K/uL    RBC 6.79 (H) 4.70 - 6.10 M/uL    Hemoglobin 12.9 (L) 14.0 - 18.0 g/dL    Hematocrit 44.8 42.0 - 52.0 %    MCV 66.0 (L) 81.4 - 97.8 fL    MCH 19.0 (L) 27.0 - 33.0 pg    MCHC  28.8 (L) 32.3 - 36.5 g/dL    RDW 53.6 (H) 35.9 - 50.0 fL    Platelet Count 595 (H) 164 - 446 K/uL    MPV 9.5 9.0 - 12.9 fL   ESTIMATED GFR    Collection Time: 24  1:44 AM   Result Value Ref Range    GFR (CKD-EPI) 91 >60 mL/min/1.73 m 2   EKG    Collection Time: 24  4:36 AM   Result Value Ref Range    Report       [facility]    Test Date:  2024  Pt Name:    JUSTIN TOPETE               Department: 183  MRN:        [id number]                      Room:       [facility]  Gender:     Male                         Technician: NINFA  :        1960                   Requested By:LIMA CASILLAS  Order #:    [id number]                    Reading MD: Keshawn Trejo MD    Measurements  Intervals                                Axis  Rate:       69                           P:          44  CT:         170                          QRS:        -2  QRSD:       90                           T:          25  QT:         368  QTc:        395    Interpretive Statements  Sinus rhythm  Minimal ST elevation, anterior leads  Compared to ECG 2024 13:44:46  NO SIGNIFICANT CHANGES  Electronically Signed On 2024 07:42:40 PDT by Keshawn Trejo MD     Troponins NOW    Collection Time: 24  4:48 AM   Result Value Ref Range    Troponin T 23 (H) 6 - 19 ng/L   Troponins in two (2) hours    Collection Time: 24  9:01 AM   Result Value Ref Range    Troponin T 19 6 - 19 ng/L   Pulmonary Function Test - Bedside    Collection Time: 24 10:37 AM   Result Value Ref Range    FVC 2.12     FVC 1.33     FEV1/FVC % 80     FEV1 % Predicted 41     FVC % Predicted 50    TROPONIN    Collection Time: 24 12:23 PM   Result Value Ref Range    Troponin T 22 (H) 6 - 19 ng/L   CRP QUANTITIVE (NON-CARDIAC)    Collection Time: 24  3:40 PM   Result Value Ref Range    Stat C-Reactive Protein <0.30 0.00 - 0.75 mg/dL       Imaging  CT chest reviewed     Assessment/Plan  * SOB (shortness of breath) on  exertion- (present on admission)  Assessment & Plan  Unclear definitive cause at this time, can have multiple factors contributing to symptoms  Suspected COPD given his tobacco use , CT chest showing elevated left Gab diaphragm concerning for unilateral diaphragmatic paralysis  TTE shows TR with elevated RVSP concerning for moderate pulmonary hypertension, preserved EF but positive for diastolic dysfunction  Had a stress test in 2022 which showed basal inferior wall ischemia    -At this time will recommend fluoroscopic sniff testing to evaluate the diaphragm excursion, which is reviewed and doesnot show paradoxical motion of diaphragm  - bedside spirometry showed very irregular Flow volume loop consistent with muscle weakness, reduced ratio can indicate COPD  - will recommend to start dulera BID along with Spiriva 2 puffs in AM  - no further pulmonary work up indicated at this time, there is no indication for steroids at this time  - patient infarct must refrain from anabolic steroids  - low dose diuretics might help with diastolic dysfunction and elevated RVSP  -Will need cardiac stress test to rule out ischemic heart disease as outpatient   - may need RHC for elevated RVSP to r/o PH       Pulmonary will sign off please reach out via Voalte    Discussed patient condition and risk of morbidity and/or mortality with Hospitalist and Patient.    The patient care time = 60 minutes in directly providing and coordinating  care and extensive data review.  No time overlap and excludes procedures.

## 2024-09-18 NOTE — PROGRESS NOTES
Chest pain/pressure 8/10. 1 dose of nitro was given sublingual. Patients CP now 3/10 but has a severe headache 9/10. MD notified

## 2024-09-18 NOTE — PROGRESS NOTES
[facility] PROGRESS NOTE     Attending: Luis Armnado Mejia MD    Resident: Noe Ventura MD    PATIENT: Noe Hickey; [id number]; 1960    ID:   63 y.o. male former smoker (quit 1 month ago ), anabolic steroid use with AAA repair, TAVR, atrial flutter s/p  post ablation, pulmonary vein isolation, hypertension, CHF, suspected COPD who presented on 9/17/24 for shortness of breath, left chest pain.     SUBJECTIVE: Patient with a rapid called on him overnight due to 10/10 chest pain with shortness of breath, see rapid response note from 9/18.  This morning he feels mildly improved but still having constant left-sided chest pain and shortness of breath at rest.  Denies headache, N/V/D, or swelling change from his baseline.  I have visited the patient in the afternoon with much improvement in his respiratory distress which he also acknowledges.  His chest pain is also improved but still present at roughly a 5/10.    OBJECTIVE:  Temp:  [36.6 °C (97.9 °F)-36.8 °C (98.2 °F)] 36.8 °C (98.2 °F)  Pulse:  [61-79] 63  Resp:  [15-28] 20  BP: (102-152)/(54-88) 139/77  SpO2:  [91 %-99 %] 98 %      Intake/Output Summary (Last 24 hours) at 9/18/2024 0606  Last data filed at 9/18/2024 0411  Gross per 24 hour   Intake --   Output 0 ml   Net 0 ml       PE:   General: Short of breath, cooperative and pleasant  HEENT: NC/AT. EOMI. MMM  Cardiovascular: RRR, murmur present over the left systolic border III/IV  Respiratory: Symmetric inspiratory effort which is poor. CTAB with no adventitious sounds however breath sounds are diminished at the bases  Abdomen: BS+, soft, NT/ND   EXT:  DANG, RLE edema, no LLE edema  Neuro: Non focal, alert and oriented    LABS:  Recent Labs     09/17/24  1352 09/18/24  0144   WBC 15.1* 13.5*   RBC 6.96* 6.79*   HEMOGLOBIN 13.3* 12.9*   HEMATOCRIT 45.7 44.8   MCV 65.7* 66.0*   MCH 19.1* 19.0*   RDW 52.5* 53.6*   PLATELETCT 616* 595*   MPV 9.4 9.5   NEUTSPOLYS 75.80*  --    LYMPHOCYTES 8.40*  --   "  MONOCYTES 10.10  --    EOSINOPHILS 3.50  --    BASOPHILS 0.70  --    RBCMORPHOLO Present  --      Recent Labs     09/17/24  1352 09/18/24  0144   SODIUM 137 136   POTASSIUM 4.9 4.7   CHLORIDE 101 101   CO2 24 25   BUN 18 20   CREATININE 0.80 0.93   CALCIUM 8.7 8.9   ALBUMIN 3.9 4.0     Estimated GFR/CRCL = Estimated Creatinine Clearance: 89.9 mL/min (by C-G formula based on SCr of 0.93 mg/dL).  Recent Labs     09/17/24  1352 09/18/24  0144   GLUCOSE 82 110*     Recent Labs     09/17/24  1352 09/18/24  0144   ASTSGOT 39 39   ALTSGPT 47 50   TBILIRUBIN 0.5 0.8   ALKPHOSPHAT 35 33   GLOBULIN 2.2 2.3             No results for input(s): \"INR\", \"APTT\", \"FIBRINOGEN\" in the last 72 hours.    Invalid input(s): \"DIMER\"    IMAGING:  DX SNIFF TEST   Final Result      1.  Elevated left hemidiaphragm. Sniff test does NOT show paradoxical diaphragmatic motion.   2.  Unremarkable right hemidiaphragm.      CT-CHEST (THORAX) WITH   Final Result      1.  Moderate elevation left hemidiaphragm suspicious for diaphragmatic paralysis      2.  Multifocal wedge-shaped opacities in the left lung base likely atelectasis, possibly pneumonitis.      Fleischner Society pulmonary nodule recommendations:   Not Applicable         DX-CHEST-PORTABLE (1 VIEW)   Final Result      Stable left basilar airspace disease or atelectasis.      DX-CHEST-PORTABLE (1 VIEW)   Final Result      1.  Redemonstrated left basilar atelectasis and/or consolidation with elevation of left hemidiaphragm.   2.  Stable enlargement of the cardiomediastinal silhouette.      NM-CARDIAC STRESS TEST    (Results Pending)       MEDS:  Scheduled Medications   Medication Dose Frequency    acetaminophen  1,000 mg TID    rivaroxaban  20 mg PM MEAL    tiotropium  5 mcg DAILY    omeprazole  20 mg DAILY    amLODIPine  5 mg DAILY     PRN medications: ipratropium-albuterol, ketorolac, albuterol, HYDROmorphone, Respiratory Therapy Consult    ASSESSMENT/PLAN:   Noe Hickey is a 63 y.o. " male former smoker (quit 1 month ago ), anabolic steroid use with AAA repair, TAVR, atrial flutter s/p  post ablation, pulmonary vein isolation, hypertension, CHF, suspected COPD who presented on 9/17/24 for shortness of breath, left chest pain.  On the morning of 9/18 the patient had a rapid response called; EKG with questionable ST elevations in V2.  Troponin stable at his baseline at 23 which is downtrending from 25 from troponin night prior, follow-up after that troponin was 19.    * SOB (shortness of breath) on exertion- (present on admission)  Assessment & Plan  Severe, exertional but also at rest, primarily triggered by ambulation and speech, complicated by COPD requiring 2 L supplemental O2 at baseline.  Unclear etiology at this time.  Presentation not consistent with COPD exacerbation.  Considering musculoskeletal etiologies given chest x-ray findings notable for elevation of left hemidiaphragm which may or may not be related to the patient's recent aortic aneurysm/TAVR repair in 2023 which coincides with this patient's onset of symptoms.  Malignancy also being considered given the patient's smoking history however, less likely.  Cardiac etiologies also being considered however, less likely as patient had a normal EKG on admission and echo in April 2024 demonstrated preserved EF at 70% with hypertrophic changes, otherwise largely unremarkable.  Lastly, although not well studied, there is some evidence to suggest that long-term use of anabolic steroids can have detrimental effects on lung function, to include loosely associated pulmonary hypertension and respiratory muscle weakness. , at baseline.  PFTs indicate COPD.  Sniff test also performed.  Pulmonology consulted with further recommendations of a chemical streps test.  Considerations include left hemidiaphragm, atelectasis/pneumonitis, anxiety, COPD exacerbation, ACS, PE.  This likely is multifactorial 2/2 hemidiaphragm, atelectasis, and anxiety  as COPD exacerbation, ACS, and PE are low on the differential with no wheezing on exam, negative troponin/EKG, and nontachycardic heart rate not beta blocked and proper oxygenation even when trialing room air at 95%    Plan:  - Pulmonology following  - Supplemental oxygen as needed  - Schedule tiotropium, DuoNebs as needed, albuterol as needed  - Ordered inpatient pharmacologic stress test, can consider this in the outpatient setting if patient cleared for discharge before being performed    Thrombocytosis- (present on admission)  Assessment & Plan  Chronic, platelet count 616 on admission, etiology unknown.  May be related to anabolic steroid use as some uses of anabolic steroids can develop iron deficiency anemia which may trigger a reactive thrombocytosis.    Plan:  - Recommend close outpatient follow-up with primary care provider  - Continue to refrain from using anabolic steroids    Elevated white blood cell count- (present on admission)  Assessment & Plan  Chronic, WBC 15.1 on admission, at baseline, likely secondary to chronic anabolic steroid use.  No evidence at this time for infectious process or malignancy.    Plan:  - Recommend outpatient monitoring with PCP, no additional interventions indicated at this time.    Chest pain on breathing- (present on admission)  Assessment & Plan  Acute, localized to left sternal border, associated with shortness of breath and primarily triggered with deep inspiratory effort.  Less likely to be cardiac in nature at this time, troponin 21 on admission which appears to be at baseline at least since April 2024 and has been stable between 19 and 25.  Low suspicion for DVT at this time however, patient is at increased risk given his chronic steroid use.  Nitroglycerin was provided which provided relief of his chest pain but resulted in 10/10 headache  The 10-year ASCVD risk score (Chuckie SAMANO, et al., 2019) is: 26.5%    Values used to calculate the score:      Age: 63 years       Sex: Male      Is Non- : No      Diabetic: No      Tobacco smoker: No      Systolic Blood Pressure: 152 mmHg      Is BP treated: No      HDL Cholesterol: 21 mg/dL      Total Cholesterol: 212 mg/dL    Plan:  - Pain management  - On telemetry  - Low threshold for repeat EKG and troponin    Microcytic anemia- (present on admission)  Assessment & Plan  Chronic, mild, Hgb 13.3 with MCV 65.7, asymptomatic, may be related to anabolic steroid use as some users of anabolic steroids can develop iron deficiency anemia.     Plan:  - No indication at this time for inpatient workup  - Recommend close outpatient follow-up      #Disposition: Admitted for workup of dyspnea on exertion and at rest    Core Measures:  Fluids: P.o.  Lines: PIV  Abx: None  Diet: Cardiac  PPX: Xarelto    CODE STATUS: Full    Noe Ventura MD  PGY2  [facility]

## 2024-09-18 NOTE — CARE PLAN
Problem: Pain - Standard  Goal: Alleviation of pain or a reduction in pain to the patient’s comfort goal  Outcome: Not Progressing  Flowsheets (Taken 9/18/2024 1433)  Pain Rating Scale (NPRS): 7  Note: Assess and monitor for pain. Provide pharmacological and non-pharmacological interventions as appropriate. Re-evaluate and continue to monitor.        Problem: Respiratory  Goal: Patient will achieve/maintain optimum respiratory ventilation and gas exchange  Outcome: Progressing  Flowsheets  Taken 9/18/2024 1201 by FRAN [email or web address]  O2 Delivery Device: Silicone Nasal Cannula  Taken 9/18/2024 1140 by Alexa Ny R.N.  Deep Breathe and Cough: Performs Correctly  Note: Assess and monitor pulmonary status. Adjust oxygen as needed to maintain adequate saturation. Encourage ambulation, turning, coughing and deep breathing. Educate and encourage use of IS as needed. Continue to monitor.      The patient is Watcher - Medium risk of patient condition declining or worsening    Shift Goal: Monitor respiratory status   Clinical Goals: Pain management  Patient Goals: Pain control  Family Goals: jelly    Progress made toward(s) clinical / shift goals: Headache improved     Patient is not progressing towards the following goals: Patient complaining of continuous generalized pain. Dr. Ventura notified.

## 2024-09-18 NOTE — ASSESSMENT & PLAN NOTE
Acute, localized to left sternal border, associated with shortness of breath and primarily triggered with deep inspiratory effort.  Less likely to be cardiac in nature at this time, troponin 21 on admission which appears to be at baseline at least since April 2024 and has been stable between 19 and 25.  Low suspicion for DVT at this time however, patient is at increased risk given his chronic steroid use.  Nitroglycerin was provided which provided relief of his chest pain but resulted in 10/10 headache  The 10-year ASCVD risk score (Chuckie SAMANO, et al., 2019) is: 26.5%    Values used to calculate the score:      Age: 63 years      Sex: Male      Is Non- : No      Diabetic: No      Tobacco smoker: No      Systolic Blood Pressure: 152 mmHg      Is BP treated: No      HDL Cholesterol: 21 mg/dL      Total Cholesterol: 212 mg/dL    Plan:  - Pain management  - On telemetry  - Low threshold for repeat EKG and troponin

## 2024-09-18 NOTE — ED NOTES
Patient medicated for chest pain and headache   I have personally seen and examined the patient. I have collaborated with and supervised the

## 2024-09-18 NOTE — ED NOTES
Med rec is complete per Patient at bedside.    Allergies reviewed.    Has patient had any outside antibiotics in the last 30 days? N    Any Anticoagulants (rivaroxaban, apixaban, edoxaban, dabigatran, warfarin, enoxaparin) taken in the last 14 days? Y  Anticoagulant name: Xarelto, Last dose: 9/16/24 @ PM.        Pharmacy/Pharmacies Pt utilizes : [facility] [phone]

## 2024-09-18 NOTE — PROGRESS NOTES
"0430  Rapid called for sharp chest pain 10/10 radiating to abdomen and right arm. Patient appears diaphoretic and is having shallow rapid breathing.     RN called monitor room, tech confirmed sinus rhythm. Vitals stable. /76   Pulse 68   Temp 36.7 °C (98 °F) (Temporal)   Resp (!) 28   Ht 1.727 m (5' 8\")   Wt 95.3 kg (210 lb 1.6 oz)   SpO2 95%   BMI 31.95 kg/m²       RN called in charge RN to further assess patient and rapid was called.   EKG, chest xray done.  Labs ordered, MD called to bedside.  MD ordered 0.5mg dilaudid one time dose.   "

## 2024-09-18 NOTE — ASSESSMENT & PLAN NOTE
Chronic, platelet count 616 on admission, etiology unknown.  May be related to anabolic steroid use as some uses of anabolic steroids can develop iron deficiency anemia which may trigger a reactive thrombocytosis.    Plan:  - Recommend close outpatient follow-up with primary care provider  - Continue to refrain from using anabolic steroids

## 2024-09-18 NOTE — RESPIRATORY CARE
" COPD EDUCATION by COPD CLINICAL EDUCATOR  9/18/2024 at 11:54 AM by Lin Juarez, RRT     The COPD order set has been discontinued as the team states this is not a COPD exacerbation.    COPD Screen  COPD Risk Screening  Do you have a history of COPD?: Yes    COPD Assessment  COPD Clinical Specialists ONLY  COPD Education Initiated: Yes--Short Intervention  $ Bedside PFT Screen: Yes  Interdisciplinary Rounds: Attendance at Rounds (30 Min)    PFT Results    Lab Results   Component Value Date    FEV1/FVC % 80 09/18/2024    FVC % Predicted 50 09/18/2024    FEV1 % Predicted 41 09/18/2024    FVC 2.12 09/18/2024    FVC 1.33 09/18/2024       Meds to Beds  [facility] provides bedside medication delivery for all eligible patients at discharge and you have been automatically enrolled in the Meds to Beds Program. Would you like to opt out of this program for any reason?: No - Stay Opted In     MY COPD ACTION PLAN     It is recommended that patients and physicians /healthcare providers complete this action plan together. This plan should be discussed at each physician visit and updated as needed.    The green, yellow and red zones show groups of symptoms of COPD. This list of symptoms is not comprehensive, and you may experience other symptoms. In the \"Actions\" column, your healthcare provider has recommended actions for you to take based on your symptoms.    Patient Name: Noe Hickey   YOB: 1960   Last Updated on: 8/21/2024  9:35 AM   Green Zone:  I am doing well today Actions     Usual activitiy and exercise level   Take daily medications     Usual amounts of cough and phlegm/mucus   Use oxygen as prescribed     Sleep well at night   Continue regular exercise/diet plan     Appetite is good   At all times avoid cigarette smoke, inhaled irritants     Daily Medications (these medications are taken every day):   Budesonide-Formoterol Fumarate (Symbicort) 2 Puffs Twice daily     Additional Information:  Use " "the a spacer, rinse mouth after taking        Yellow Zone:  I am having a bad day or a COPD flare Actions     More breathless than usual   Continue daily medications     I have less energy for my daily activities   Use quick relief inhaler as ordered     Increased or thicker phlegm/mucus   Use oxygen as prescribed     Using quick relief inhaler/nebulizer more often   Get plenty of rest     Swelling of ankles more than usual   Use pursed lip breathing     More coughing than usual   At all times avoid cigarette smoke, inhaled irritants     I feel like I have a \"chest cold\"     Poor sleep and my symptoms woke me up     My appetite is not good     My medicine is not helping      Call provider immediately if symptoms don’t improve     Continue daily medications, add rescue medications:   Albuterol 2 Puffs Every 6 hours PRN       Medications to be used during a flare up, (as Discussed with Provider):           Additional Information:  Use with a spacer    Red Zone:  I need urgent medical care Actions     Severe shortness of breath even at rest   Call 911 or seek medical care immediately     Not able to do any activity because of breathing      Fever or shaking chills      Feeling confused or very drowsy       Chest pains      Coughing up blood                  "

## 2024-09-18 NOTE — RESPIRATORY CARE
"PULMONARY FUNCTION TEST by COPD CLINICAL EDUCATOR  9/18/2024 at 10:49 AM by Lin Juarez, RRT     PFT performed by COPD educator. Bedside PFT will reside in results tab on EMR and sent to inpatient pulmonary to interpret.      COPD Screen  COPD Risk Screening  Do you have a history of COPD?: Yes    COPD Assessment  COPD Clinical Specialists ONLY  COPD Education Initiated: Yes--Short Intervention  $ Bedside PFT Screen: Yes  Interdisciplinary Rounds: Attendance at Rounds (30 Min)    PFT Results    Lab Results   Component Value Date    FEV1/FVC % 80 09/18/2024    FVC % Predicted 50 09/18/2024    FEV1 % Predicted 41 09/18/2024    FVC 2.12 09/18/2024    FVC 1.33 09/18/2024       Meds to Beds  [facility] provides bedside medication delivery for all eligible patients at discharge and you have been automatically enrolled in the Meds to Beds Program. Would you like to opt out of this program for any reason?: No - Stay Opted In     MY COPD ACTION PLAN     It is recommended that patients and physicians /healthcare providers complete this action plan together. This plan should be discussed at each physician visit and updated as needed.    The green, yellow and red zones show groups of symptoms of COPD. This list of symptoms is not comprehensive, and you may experience other symptoms. In the \"Actions\" column, your healthcare provider has recommended actions for you to take based on your symptoms.    Patient Name: Noe Hickey   YOB: 1960   Last Updated on: 8/21/2024  9:35 AM   Green Zone:  I am doing well today Actions     Usual activitiy and exercise level   Take daily medications     Usual amounts of cough and phlegm/mucus   Use oxygen as prescribed     Sleep well at night   Continue regular exercise/diet plan     Appetite is good   At all times avoid cigarette smoke, inhaled irritants     Daily Medications (these medications are taken every day):   Budesonide-Formoterol Fumarate (Symbicort) 2 Puffs " "Twice daily     Additional Information:  Use the a spacer, rinse mouth after taking        Yellow Zone:  I am having a bad day or a COPD flare Actions     More breathless than usual   Continue daily medications     I have less energy for my daily activities   Use quick relief inhaler as ordered     Increased or thicker phlegm/mucus   Use oxygen as prescribed     Using quick relief inhaler/nebulizer more often   Get plenty of rest     Swelling of ankles more than usual   Use pursed lip breathing     More coughing than usual   At all times avoid cigarette smoke, inhaled irritants     I feel like I have a \"chest cold\"     Poor sleep and my symptoms woke me up     My appetite is not good     My medicine is not helping      Call provider immediately if symptoms don’t improve     Continue daily medications, add rescue medications:   Albuterol 2 Puffs Every 6 hours PRN       Medications to be used during a flare up, (as Discussed with Provider):           Additional Information:  Use with a spacer    Red Zone:  I need urgent medical care Actions     Severe shortness of breath even at rest   Call 911 or seek medical care immediately     Not able to do any activity because of breathing      Fever or shaking chills      Feeling confused or very drowsy       Chest pains      Coughing up blood                  "

## 2024-09-18 NOTE — ED NOTES
Bedside report received from off going RN, assumed care of patient.  POC discussed with patient. Call light within reach, all needs addressed at this time.       Fall risk interventions in place: Not Applicable (all applicable per [place] Fall risk assessment)   Continuous monitoring: Cardiac Leads, Pulse Ox, or Blood Pressure  IVF/IV medications: Not Applicable   Oxygen: How many liters 3L  Bedside sitter: Not Applicable   Isolation: Not Applicable

## 2024-09-18 NOTE — PROGRESS NOTES
4 Eyes Skin Assessment Completed by ALLEN Madera and ALLEN Esposito.    Head WDL  Ears WDL  Nose WDL  Mouth WDL  Neck WDL  Breast/Chest Scar  Shoulder Blades WDL  Spine WDL  (R) Arm/Elbow/Hand WDL  (L) Arm/Elbow/Hand WDL  Abdomen Scar  Groin WDL  Scrotum/Coccyx/Buttocks WDL  (R) Leg WDL  (L) Leg WDL  (R) Heel/Foot/Toe Scar  (L) Heel/Foot/Toe WDL          Devices In Places Tele Box, Pulse Ox, and Nasal Cannula      Interventions In Place Gray Ear Foams, Pillows, Heels Loaded W/Pillows, and Pressure Redistribution Mattress    Possible Skin Injury No    Pictures Uploaded Into Epic N/A  Wound Consult Placed N/A  RN Wound Prevention Protocol Ordered No

## 2024-09-19 ENCOUNTER — APPOINTMENT (OUTPATIENT)
Dept: RADIOLOGY | Facility: MEDICAL CENTER | Age: 64
DRG: 192 | End: 2024-09-19
Payer: MEDICAID

## 2024-09-19 ENCOUNTER — PHARMACY VISIT (OUTPATIENT)
Dept: PHARMACY | Facility: MEDICAL CENTER | Age: 64
End: 2024-09-19
Payer: COMMERCIAL

## 2024-09-19 VITALS
SYSTOLIC BLOOD PRESSURE: 128 MMHG | BODY MASS INDEX: 32.01 KG/M2 | WEIGHT: 211.2 LBS | RESPIRATION RATE: 18 BRPM | DIASTOLIC BLOOD PRESSURE: 76 MMHG | HEIGHT: 68 IN | TEMPERATURE: 98.2 F | HEART RATE: 88 BPM | OXYGEN SATURATION: 96 %

## 2024-09-19 PROBLEM — R07.1 CHEST PAIN ON BREATHING: Status: RESOLVED | Noted: 2024-09-17 | Resolved: 2024-09-19

## 2024-09-19 LAB
ANION GAP SERPL CALC-SCNC: 9 MMOL/L (ref 7–16)
BASOPHILS # BLD AUTO: 0.7 % (ref 0–1.8)
BASOPHILS # BLD: 0.09 K/UL (ref 0–0.12)
BUN SERPL-MCNC: 29 MG/DL (ref 8–22)
CALCIUM SERPL-MCNC: 9 MG/DL (ref 8.5–10.5)
CHLORIDE SERPL-SCNC: 98 MMOL/L (ref 96–112)
CO2 SERPL-SCNC: 28 MMOL/L (ref 20–33)
CREAT SERPL-MCNC: 1.17 MG/DL (ref 0.5–1.4)
EOSINOPHIL # BLD AUTO: 0.57 K/UL (ref 0–0.51)
EOSINOPHIL NFR BLD: 4.5 % (ref 0–6.9)
ERYTHROCYTE [DISTWIDTH] IN BLOOD BY AUTOMATED COUNT: 55.3 FL (ref 35.9–50)
GFR SERPLBLD CREATININE-BSD FMLA CKD-EPI: 69 ML/MIN/1.73 M 2
GLUCOSE SERPL-MCNC: 79 MG/DL (ref 65–99)
HCT VFR BLD AUTO: 45.7 % (ref 42–52)
HGB BLD-MCNC: 13.1 G/DL (ref 14–18)
IMM GRANULOCYTES # BLD AUTO: 0.19 K/UL (ref 0–0.11)
IMM GRANULOCYTES NFR BLD AUTO: 1.5 % (ref 0–0.9)
LYMPHOCYTES # BLD AUTO: 0.96 K/UL (ref 1–4.8)
LYMPHOCYTES NFR BLD: 7.6 % (ref 22–41)
MCH RBC QN AUTO: 19.2 PG (ref 27–33)
MCHC RBC AUTO-ENTMCNC: 28.7 G/DL (ref 32.3–36.5)
MCV RBC AUTO: 66.9 FL (ref 81.4–97.8)
MONOCYTES # BLD AUTO: 1.31 K/UL (ref 0–0.85)
MONOCYTES NFR BLD AUTO: 10.3 % (ref 0–13.4)
NEUTROPHILS # BLD AUTO: 9.56 K/UL (ref 1.82–7.42)
NEUTROPHILS NFR BLD: 75.4 % (ref 44–72)
NRBC # BLD AUTO: 0 K/UL
NRBC BLD-RTO: 0 /100 WBC (ref 0–0.2)
PLATELET # BLD AUTO: 549 K/UL (ref 164–446)
PMV BLD AUTO: 9.7 FL (ref 9–12.9)
POTASSIUM SERPL-SCNC: 5.7 MMOL/L (ref 3.6–5.5)
RBC # BLD AUTO: 6.83 M/UL (ref 4.7–6.1)
SODIUM SERPL-SCNC: 135 MMOL/L (ref 135–145)
WBC # BLD AUTO: 12.7 K/UL (ref 4.8–10.8)

## 2024-09-19 PROCEDURE — A9270 NON-COVERED ITEM OR SERVICE: HCPCS

## 2024-09-19 PROCEDURE — 700111 HCHG RX REV CODE 636 W/ 250 OVERRIDE (IP): Mod: JZ

## 2024-09-19 PROCEDURE — A9502 TC99M TETROFOSMIN: HCPCS

## 2024-09-19 PROCEDURE — 700105 HCHG RX REV CODE 258

## 2024-09-19 PROCEDURE — 85025 COMPLETE CBC W/AUTO DIFF WBC: CPT

## 2024-09-19 PROCEDURE — 700102 HCHG RX REV CODE 250 W/ 637 OVERRIDE(OP)

## 2024-09-19 PROCEDURE — 99238 HOSP IP/OBS DSCHRG MGMT 30/<: CPT | Performed by: FAMILY MEDICINE

## 2024-09-19 PROCEDURE — 700111 HCHG RX REV CODE 636 W/ 250 OVERRIDE (IP)

## 2024-09-19 PROCEDURE — 80048 BASIC METABOLIC PNL TOTAL CA: CPT

## 2024-09-19 PROCEDURE — RXMED WILLOW AMBULATORY MEDICATION CHARGE

## 2024-09-19 RX ORDER — REGADENOSON 0.08 MG/ML
0.4 INJECTION, SOLUTION INTRAVENOUS ONCE
Status: COMPLETED | OUTPATIENT
Start: 2024-09-19 | End: 2024-09-19

## 2024-09-19 RX ORDER — SODIUM CHLORIDE 9 MG/ML
INJECTION, SOLUTION INTRAVENOUS CONTINUOUS
Status: ACTIVE | OUTPATIENT
Start: 2024-09-19 | End: 2024-09-19

## 2024-09-19 RX ORDER — FUROSEMIDE 10 MG/ML
40 INJECTION INTRAMUSCULAR; INTRAVENOUS ONCE
Status: COMPLETED | OUTPATIENT
Start: 2024-09-19 | End: 2024-09-19

## 2024-09-19 RX ORDER — AMINOPHYLLINE 25 MG/ML
100 INJECTION, SOLUTION INTRAVENOUS
Status: DISCONTINUED | OUTPATIENT
Start: 2024-09-19 | End: 2024-09-19 | Stop reason: HOSPADM

## 2024-09-19 RX ORDER — REGADENOSON 0.08 MG/ML
INJECTION, SOLUTION INTRAVENOUS
Status: COMPLETED
Start: 2024-09-19 | End: 2024-09-19

## 2024-09-19 RX ORDER — BUDESONIDE AND FORMOTEROL FUMARATE DIHYDRATE 80; 4.5 UG/1; UG/1
2 AEROSOL RESPIRATORY (INHALATION) 2 TIMES DAILY
Qty: 10.2 G | Refills: 0 | Status: SHIPPED | OUTPATIENT
Start: 2024-09-19

## 2024-09-19 RX ADMIN — TIOTROPIUM BROMIDE INHALATION SPRAY 5 MCG: 3.12 SPRAY, METERED RESPIRATORY (INHALATION) at 06:00

## 2024-09-19 RX ADMIN — REGADENOSON 0.4 MG: 0.08 INJECTION, SOLUTION INTRAVENOUS at 08:19

## 2024-09-19 RX ADMIN — HYDROMORPHONE HYDROCHLORIDE 0.5 MG: 1 INJECTION, SOLUTION INTRAMUSCULAR; INTRAVENOUS; SUBCUTANEOUS at 09:53

## 2024-09-19 RX ADMIN — SODIUM CHLORIDE: 9 INJECTION, SOLUTION INTRAVENOUS at 04:31

## 2024-09-19 RX ADMIN — AMLODIPINE BESYLATE 5 MG: 5 TABLET ORAL at 04:27

## 2024-09-19 RX ADMIN — MOMETASONE FUROATE AND FORMOTEROL FUMARATE DIHYDRATE 2 PUFF: 200; 5 AEROSOL RESPIRATORY (INHALATION) at 04:28

## 2024-09-19 RX ADMIN — ACETAMINOPHEN 1000 MG: 500 TABLET ORAL at 09:24

## 2024-09-19 RX ADMIN — OMEPRAZOLE 20 MG: 20 CAPSULE, DELAYED RELEASE ORAL at 04:27

## 2024-09-19 RX ADMIN — FUROSEMIDE 40 MG: 10 INJECTION INTRAMUSCULAR; INTRAVENOUS at 04:26

## 2024-09-19 RX ADMIN — HYDROMORPHONE HYDROCHLORIDE 0.5 MG: 1 INJECTION, SOLUTION INTRAMUSCULAR; INTRAVENOUS; SUBCUTANEOUS at 01:45

## 2024-09-19 ASSESSMENT — PAIN DESCRIPTION - PAIN TYPE
TYPE: ACUTE PAIN

## 2024-09-19 ASSESSMENT — FIBROSIS 4 INDEX: FIB4 SCORE: 0.64

## 2024-09-19 NOTE — PROGRESS NOTES
Monitor Summary     Rhythm: SR  Heart Rate: 62-75  Ectopy: R PVC  Measurement: .18/.08/.39

## 2024-09-19 NOTE — CARE PLAN
Problem: Pain - Standard  Goal: Alleviation of pain or a reduction in pain to the patient’s comfort goal  Outcome: Not Progressing  Note: Assess and monitor for pain. Provide pharmacological and non-pharmacological interventions as appropriate. Re-evaluate and continue to monitor.        Problem: Respiratory  Goal: Patient will achieve/maintain optimum respiratory ventilation and gas exchange  Outcome: Progressing  Note: Assess and monitor pulmonary status. Adjust oxygen as needed to maintain adequate saturation. Encourage ambulation, turning, coughing and deep breathing. Educate and encourage use of IS as needed. Continue to monitor.      The patient is Watcher - Medium risk of patient condition declining or worsening    Shift Goals: Monitor heart rate and rhythm or for any cardiac changes   Clinical Goals: Pain management, monitor heart rate and rhythm  Patient Goals: Pain management  Family Goals: N/A    Progress made toward(s) clinical / shift goals: Stress test completed     Patient is not progressing towards the following goals: Pain management

## 2024-09-19 NOTE — DISCHARGE SUMMARY
[facility] Discharge Summary    Attending: Yung So MD  Senior Resident: Dr. Ventura  Intern:  Dr. Barajas  Contact Number: [phone]    CHIEF COMPLAINT ON ADMISSION  Chief Complaint   Patient presents with    Chest Pain    Shortness of Breath       Reason for Admission  EMS     Admission Date  9/17/2024    CODE STATUS  Full Code    HPI per Dr. Restrepo H&P & HOSPITAL COURSE  This is a 63 y.o. male here with significant past medical history of aortic aneurysm repair and TAVR (2023), PEA requiring CPR for 30 seconds in August 2024 and COPD on 2 L O2 at baseline, who presented with recurrence of shortness of breath.  The patient reports he began to experience significant shortness of breath this morning with associated chest pain described primarily as pressure on the mid to left side.  Shortness of breath is primarily experienced with ambulation and reported to be profound requiring several minutes to recover his breath.  Prolonged conversations also trigger shortness of breath with associated chest pain.  However, patient reports that he is otherwise able to attend the gym and lift heavily without experiencing the symptoms.  Patient expressed frustration given this does not make sense and previous hospitalizations with similar presentation have been unsuccessful in determining the etiology.  Previously, he states his shortness of breath has been managed as COPD exacerbations.  He reports adherence to inhalers and daytime oxygen use as prescribed.  Patient recently stopped smoking approximately 1 month ago and has a 40-pack-year smoking history.  Additionally, the patient's history is notable for anabolic steroid use which he discontinued also about a month ago.       #SOB  Pulmonology consulted who recommended sniff test (no paradoxical diaphragm movement noted), PFTs (c/w copd), pharmacologic stress test (wnl).  Optimized patient's COPD with Dulera and Spiriva.  This is likely multifactorial  related to left hemidiaphragm following aortic aneurysm repair in 2023, COPD not on optimize treatment, and possible subendocardial chest pain on exertion.  Following optimization of COPD with LABA, ICS, and LAMA the patient shortness of breath improved.  It was recommended the patient follows up with PCP for continued monitoring of blood pressure and COPD symptoms in addition to cardiothoracic surgery referral for possible diaphragmatic plication for left hemidiaphragm    #Left hemidiaphragm  Noted left hemidiaphragm, likely secondary to aortic aneurysm repair in 2023.  Sniff test without paradoxical movement.  CT chest and CXR demonstrate left hemidiaphragm with atelectasis versus pneumonitis changes in the left lung base.  There is a referral for outpatient cardiothoracic evaluation.    #Chest pain  Troponins chronically elevated in the low to mid 20s consistent with laboratory troponins gathered this admission.  Patient with 4 EKGs gathered this admission showing sinus rhythm and questionable ST elevations in anterior leads but overall stable.  Pharmacologic stress test demonstrated no evidence of significant jeopardized viable myocardium or prior myocardial infarction further noting normal left ventricular size, ejection fraction, and wall motion.  The patient has a cardiology follow-up he reports in a few weeks which he was urged to keep.  At the time of discharge the patient reported his chest pain had resolved.    #Microcytic anemia  #Thrombocytosis  #Elevated white blood cell count  Patient with incidental findings of microcytic anemia, thrombocytosis, and elevated white blood cell count.  Typically antibiotic steroids cause an increase in hemoglobin but there are studies showing antibiotics delays can cause microcytic anemia as well.  Patient has follow-up with his PCP for further outpatient evaluation.    Therefore, he is discharged in fair and stable condition to home with close outpatient  follow-up.    The patient met 2-midnight criteria for an inpatient stay at the time of discharge.    Discharge Date  9/19/2024    Physical Exam on Day of Discharge  Physical Exam  Constitutional:       Appearance: Normal appearance.   HENT:      Head: Normocephalic and atraumatic.      Nose: Nose normal. No congestion.      Mouth/Throat:      Mouth: Mucous membranes are moist.   Eyes:      Extraocular Movements: Extraocular movements intact.   Cardiovascular:      Rate and Rhythm: Normal rate and regular rhythm.      Heart sounds: Murmur heard.   Abdominal:      General: Abdomen is flat.      Palpations: Abdomen is soft.   Musculoskeletal:         General: Normal range of motion.      Cervical back: Normal range of motion.   Skin:     General: Skin is warm and dry.   Neurological:      General: No focal deficit present.      Mental Status: He is alert.   Psychiatric:         Mood and Affect: Mood normal.         Behavior: Behavior normal.         FOLLOW UP ITEMS POST DISCHARGE  Follow-up with cardiology  Follow-up with cardiothoracic surgery  Follow-up with PCP    DISCHARGE DIAGNOSES  Principal Problem:    SOB (shortness of breath) on exertion (POA: Yes)  Active Problems:    Microcytic anemia (POA: Yes)    Elevated white blood cell count (POA: Yes)    Thrombocytosis (POA: Yes)  Resolved Problems:    Chest pain on breathing (POA: Yes)      FOLLOW UP  Future Appointments   Date Time Provider Department Center   10/4/2024  4:30 PM Caryl Pineda M.D. [facility]   10/10/2024  3:15 PM EFRA[facility]N Theodore [facility] None     Caryl Pineda M.D.  [address]  [facility] [address]  [phone]    Call in 2 day(s)        MEDICATIONS ON DISCHARGE     Medication List        START taking these medications        Instructions   budesonide-formoterol 80-4.5 MCG/ACT Aero  Commonly known as: Symbicort   Inhale 2 Puffs 2 times a day.  Dose: 2 Puff            CONTINUE taking these medications        Instructions    albuterol 108 (90 Base) MCG/ACT Aers inhalation aerosol   Inhale 2 Puffs every 6 hours as needed for Shortness of Breath.  Dose: 2 Puff     amLODIPine 5 MG Tabs  Commonly known as: Norvasc   Take 1 Tablet by mouth every day.  Dose: 5 mg     FISH OIL PO   Take 1 Tablet by mouth every day.  Dose: 1 Tablet     omeprazole 20 MG delayed-release capsule  Commonly known as: PriLOSEC   Take 20 mg by mouth every day.  Dose: 20 mg     ONE DAILY MENS PO   Take 1 Tablet by mouth every day.  Dose: 1 Tablet     Spiriva Respimat 2.5 MCG/ACT Aers  Generic drug: tiotropium   Inhale 2 Inhalations every day.  Dose: 5 mcg     Xarelto 20 MG Tabs tablet  Generic drug: rivaroxaban   Take 20 mg by mouth with dinner.  Dose: 20 mg            STOP taking these medications      diclofenac sodium 1 % Gel  Commonly known as: Voltaren              Allergies  Allergies   Allergen Reactions    Morphine Shortness of Breath and Rash     Rash, shortness of breath       DIET  Orders Placed This Encounter   Procedures    Diet Order Diet: Cardiac     Standing Status:   Standing     Number of Occurrences:   1     Order Specific Question:   Diet:     Answer:   Cardiac [6]       ACTIVITY  As tolerated.  Weight bearing as tolerated    CONSULTATIONS  Pulmonology    PROCEDURES  Sniff test  PFTs  Pharmacologic stress test    LABORATORY  Lab Results   Component Value Date    SODIUM 135 09/19/2024    POTASSIUM 5.7 (H) 09/19/2024    CHLORIDE 98 09/19/2024    CO2 28 09/19/2024    GLUCOSE 79 09/19/2024    BUN 29 (H) 09/19/2024    CREATININE 1.17 09/19/2024        Lab Results   Component Value Date    WBC 12.7 (H) 09/19/2024    HEMOGLOBIN 13.1 (L) 09/19/2024    HEMATOCRIT 45.7 09/19/2024    PLATELETCT 549 (H) 09/19/2024        Total time of the discharge process exceeds 15 minutes.

## 2024-09-19 NOTE — PROGRESS NOTES
Bedside report received from off going RN/tech: Beth, assumed care of patient.     Fall Risk Score: NO RISK  Fall risk interventions in place: Provide patient/family education based on risk assessment, Educate patient/family to call staff for assistance when getting out of bed, Place fall precaution signage outside patient door, Place patient in room close to nursing station, and Notify charge of high risk for huddle  Bed type: Regular (Clement Score less than 17 interventions in place)  Patient on cardiac monitor: Yes  IVF/IV medications: Infusion per MAR (List Med(s)) NS at 150mL  Oxygen: Room Air  Bedside sitter: Not Applicable   Isolation: Not applicable

## 2024-09-19 NOTE — PROGRESS NOTES
Patient being discharged via [facility]. Pt educated on discharge instructions and new prescriptions, verbalized understanding. Follow up appointment to be made with PCP. PIV removed here in DCL. Patient going home via cab.

## 2024-09-19 NOTE — DISCHARGE PLANNING
Case Management Discharge Planning    Admission Date: 9/17/2024  GMLOS: 2.2  ALOS: 2    6-Clicks ADL Score: 24  6-Clicks Mobility Score: 24      Anticipated Discharge Dispo: Discharge Disposition: Discharged to home/self care (01)  Discharge Address: [address]    [facility] [address]    DME Needed: No    Action(s) Taken: DC Assessment Complete (See below)    Patient is admitted for shortness of breath. Please see pt's H&P for prior medical history. Patient was previously admitted in August 2024 for same problem.     RN CM  met with pt at bedside to complete assessment and discuss discharge planning.Pt A&Ox4 and able to verify the information on the face sheet.     Pt lives alone in a single-story apartment, Edy Hammer (p) [phone]; spouse and emergency contact.       Patient reports, prior to admission he is independent with ADL's and IADL’s.  Pt does not use any DME at baseline.      Pt reported that his spouse is good support for him although she lives in [place]. Patient is employed and has income.        The patient's PCP is Dr. Caryl Pineda.  Patient's preferred pharmacy is [facility] located on [facility] .      Patient denies a history of SNF/IPR nor HHC use in the past. Patient denies tobacco, alcohol and recreational drug use.  Pt denies any SA or MH concerns.      Patients confirmed medical coverage via [employer] . Patient will take a cab home.  RN CM discussed discharge planning.  Patient reported pt's goal is to return home once medically stable     Escalations Completed: None    Medically Clear: Yes    Next Steps: Patient will take a cab home at discharge.    Barriers to Discharge: None    Is the patient up for discharge tomorrow: No     Care Transition Team Assessment    Information Source  Orientation Level: Oriented X4  Information Given By: Patient  Informant's Name: oNe Adamsgoner  Who is responsible for making decisions for patient? : Patient    Readmission Evaluation  Is this a  "readmission?: Yes - unplanned readmission  Why do you think you were readmitted?: \"Couldn't breathe\"  Was an appointment arranged for you prior to discharge?: Yes, attended appointment  Were there new prescriptions you were supposed to fill after you were discharged?: Yes, prescriptions filled  Did you understand your discharge instructions?: Yes  Did you have enough support after your last discharge?: Yes    Elopement Risk  Legal Hold: No  Ambulatory or Self Mobile in Wheelchair: Yes  Disoriented: No  Psychiatric Symptoms: None  History of Wandering: No  Elopement this Admit: No  Vocalizing Wanting to Leave: No  Displays Behaviors, Body Language Wanting to Leave: No-Not at Risk for Elopement  Elopement Risk: Not at Risk for Elopement    Interdisciplinary Discharge Planning  Lives with - Patient's Self Care Capacity: Alone and Able to Care For Self  Patient or legal guardian wants to designate a caregiver: No  Support Systems: Family Member(s), Spouse / Significant Other, Friends / Neighbors  Housing / Facility: 1 Story Apartment / Condo    Discharge Preparedness  What is your plan after discharge?: Home with help  What are your discharge supports?: Spouse  Prior Functional Level: Ambulatory, Independent with Activities of Daily Living, Independent with Medication Management    Functional Assesment  Prior Functional Level: Ambulatory, Independent with Activities of Daily Living, Independent with Medication Management    Finances  Financial Barriers to Discharge: No  Prescription Coverage: Yes    Vision / Hearing Impairment  Vision Impairment : No  Hearing Impairment : No    Advance Directive  Advance Directive?: None    Domestic Abuse  Have you ever been the victim of abuse or violence?: No  Possible Abuse/Neglect Reported to:: Not Applicable    Psychological Assessment  History of Substance Abuse: None  History of Psychiatric Problems: No    Discharge Risks or Barriers  Discharge risks or barriers?: Complex medical " needs  Patient risk factors: Lack of outside supports, Readmission    Anticipated Discharge Information  Discharge Disposition: Discharged to home/self care (01)  Discharge Address: [address]    [facility] [address]

## 2024-09-19 NOTE — PROGRESS NOTES
Bedside report received from off going RN/tech: Alexa assumed care of patient.     Fall Risk Score: NO RISK  Fall risk interventions in place: Provide patient/family education based on risk assessment  Bed type: Regular (Clement Score less than 17 interventions in place)  Patient on cardiac monitor: Yes  IVF/IV medications: Not Applicable   Oxygen: How many liters 3L  Bedside sitter: Not Applicable   Isolation: Not applicable

## 2024-09-19 NOTE — CARE PLAN
The patient is Stable - Low risk of patient condition declining or worsening    Shift Goals  Clinical Goals: pain control, monitor HR/rhythm  Patient Goals: pain control, rest  Family Goals: N/A    Progress made toward(s) clinical / shift goals:    Problem: Knowledge Deficit - Standard  Goal: Patient and family/care givers will demonstrate understanding of plan of care, disease process/condition, diagnostic tests and medications  Description: Target End Date:  1-3 days or as soon as patient condition allows    Document in Patient Education    1.  Patient and family/caregiver oriented to unit, equipment, visitation policy and means for communicating concern  2.  Complete/review Learning Assessment  3.  Assess knowledge level of disease process/condition, treatment plan, diagnostic tests and medications  4.  Explain disease process/condition, treatment plan, diagnostic tests and medications  Outcome: Progressing  Note: Pt updated on POC, all questions answered at this time.     Problem: Respiratory  Goal: Patient will achieve/maintain optimum respiratory ventilation and gas exchange  Description: Target End Date:  Prior to discharge or change in level of care    Document on Assessment flowsheet    1.  Assess and monitor rate, rhythm, depth and effort of respiration  2.  Breath sounds assessed qshift and/or as needed  3.  Assess O2 saturation, administer/titrate oxygen as ordered  4.  Position patient for maximum ventilatory efficiency  5.  Turn, cough, and deep breath with splinting to improve effectiveness  6.  Collaborate with RT to administer medication/treatments per order  7.  Encourage use of incentive spirometer and encourage patient to cough after use and utilize splinting techniques if applicable  8.  Airway suctioning  9.  Monitor sputum production for changes in color, consistency and frequency  10. Perform frequent oral hygiene  11. Alternate physical activity with rest periods  Outcome: Progressing  Note:  Pt requiring 2.5L NC. Baseline O2 is RA. Will wean as tolerated. Pt receiving spiriva and dulera and breathing treatments with RT.

## 2024-09-20 ENCOUNTER — OFFICE VISIT (OUTPATIENT)
Dept: MEDICAL GROUP | Facility: CLINIC | Age: 64
End: 2024-09-20
Payer: MEDICAID

## 2024-09-20 ENCOUNTER — HOSPITAL ENCOUNTER (OUTPATIENT)
Facility: MEDICAL CENTER | Age: 64
End: 2024-09-20
Attending: BEHAVIOR ANALYST
Payer: MEDICAID

## 2024-09-20 VITALS
HEART RATE: 82 BPM | WEIGHT: 217 LBS | DIASTOLIC BLOOD PRESSURE: 89 MMHG | TEMPERATURE: 98.5 F | HEIGHT: 68 IN | OXYGEN SATURATION: 91 % | SYSTOLIC BLOOD PRESSURE: 145 MMHG | BODY MASS INDEX: 32.89 KG/M2

## 2024-09-20 DIAGNOSIS — F41.9 ANXIETY: ICD-10-CM

## 2024-09-20 DIAGNOSIS — F41.0 PANIC ANXIETY SYNDROME: ICD-10-CM

## 2024-09-20 PROCEDURE — 80307 DRUG TEST PRSMV CHEM ANLYZR: CPT

## 2024-09-20 PROCEDURE — 99213 OFFICE O/P EST LOW 20 MIN: CPT | Mod: GE | Performed by: BEHAVIOR ANALYST

## 2024-09-20 RX ORDER — ALPRAZOLAM 0.25 MG
0.25 TABLET ORAL NIGHTLY PRN
Qty: 15 TABLET | Refills: 0 | Status: SHIPPED | OUTPATIENT
Start: 2024-09-20 | End: 2024-10-05

## 2024-09-20 ASSESSMENT — FIBROSIS 4 INDEX: FIB4 SCORE: 0.64

## 2024-09-20 NOTE — PROGRESS NOTES
Subjective:     CC: hosp f/u    HPI:   Noe is a 63 y.o. male who presents today for anxiety and hosp f/u.     Assessment:  Problem   Panic Anxiety Syndrome   Anxiety    -pt has a hx of pulm htn, cardiac arrest d/t unknown etiology PEA s/p code 1mo ago likely d/t underlying copd exacerbation AHRF, hx of opiod dependence (sober), anabolic steroid use, smoker 40p-y hx, presumed COPD on oxygen at home (3L, not on O2 in clinic), TAVR in 2023 c/b hemidiaphragm who presents due to significant anxiety and panic related to thoughts of his recent cardiac arrest; he states he has a impending sense of doom and can't sleep, he does not report sob/aguilar out of baseline, cp, syncope, flushing, dizziness. He states he was given xanax in the hospital that helped calm him down, he would like a small quantity to hold him over until he sees his cardiologist. He states he's tried hydroxyzine before and it did not work. He is not interested in seeing a therapist or psychiatrist.    I explained to pt that xanax prn is not a long term solution and if he is in need of more in the future a different management plan must be implemented.     Pt has hx of opiod dependance has gone thru rehab and is currently sober; he recently had cardiac arrest in the hospital and has severe anxiety as a result and teeters on panic attacks; it is reasonable to rx pt a small quantitiy of low dose xanax as to help quell his anxiety until he sees his cardiologist and goes thru the work up of his cardiopulmonary issues. I extensively educated and cautioned him on the adverse effects of taking too much sedatives especially effects on his respiratory drive; he acknowledges. I will rx 15 tab of 0.25mg of alprazolam to be taken nightly prn; I will have him sign his consent for opiates documentation and send for a UDS. Pt agreeable and says he believes this is a short term problem because he is still dealing with emtional/mental sequale of his recent health issues.  "    I also emphasized the importance for him to f/u w/ his PFT and pulmonology along with his cardiologist and CT-surgeon.     Pt has stopped smoking and using anabolic steroids since his cardiac event. However he continues to go to the gym and lift ~400lbs of weight, he states his CT-surgeon told he should lift lighter, I emphasized this concept of cutting back significantly on activity that places significant strain on the cardiopulmonary system until he has further w/u and f/u.     ED precautions given for concerns for cardiopulmonary distress.            All plans of care were fully discussed with the patient and shared-decision making was utilized to conclude best course of actions in patient's medical management.    ROS: See HPI.     Objective:     Exam:  BP (!) 145/89 (BP Location: Left arm, Patient Position: Sitting, BP Cuff Size: Large adult)   Pulse 82   Temp 36.9 °C (98.5 °F) (Temporal)   Ht 1.727 m (5' 8\")   Wt 98.4 kg (217 lb)   SpO2 91%   BMI 32.99 kg/m²  Body mass index is 32.99 kg/m².    Physical Exam:  General: Pt resting in NAD, cooperative   Skin:  No cyanosis or jaundice   HEENT: NC/AT. EOMI. No conjunctival injection or sclera icterus.   Lungs:  CTAB. Somewhat labored.   Cardiovascular:  S1/S2 RRR   Extremities:  No LE edema   CNS:  No gross focal neurologic deficits  Psych: Appropriate mood and affect         Assessment & Plan:     See A/P under Subjective Section above    Problem List Items Addressed This Visit       Anxiety    Relevant Medications    ALPRAZolam (XANAX) 0.25 MG Tab    Panic anxiety syndrome    Relevant Medications    ALPRAZolam (XANAX) 0.25 MG Tab    Other Relevant Orders    Consent for Opiate Prescription (Completed)    URINE DRUG SCREEN               "

## 2024-09-22 LAB
AMPHET CTO UR CFM-MCNC: NEGATIVE NG/ML
BARBITURATES CTO UR CFM-MCNC: NEGATIVE NG/ML
BENZODIAZ CTO UR CFM-MCNC: NEGATIVE NG/ML
CANNABINOIDS CTO UR CFM-MCNC: NEGATIVE NG/ML
COCAINE CTO UR CFM-MCNC: NEGATIVE NG/ML
CREAT UR-MCNC: 108.5 MG/DL (ref 20–400)
DRUG COMMENT 753798: NORMAL
METHADONE CTO UR CFM-MCNC: NEGATIVE NG/ML
OPIATES CTO UR CFM-MCNC: NEGATIVE NG/ML
PCP CTO UR CFM-MCNC: NEGATIVE NG/ML
PROPOXYPH CTO UR CFM-MCNC: NEGATIVE NG/ML

## 2024-09-23 ENCOUNTER — OFFICE VISIT (OUTPATIENT)
Dept: CARDIOLOGY | Facility: MEDICAL CENTER | Age: 64
End: 2024-09-23
Attending: INTERNAL MEDICINE
Payer: MEDICAID

## 2024-09-23 VITALS
HEART RATE: 84 BPM | SYSTOLIC BLOOD PRESSURE: 128 MMHG | DIASTOLIC BLOOD PRESSURE: 86 MMHG | HEIGHT: 68 IN | BODY MASS INDEX: 32.55 KG/M2 | WEIGHT: 214.8 LBS | RESPIRATION RATE: 18 BRPM | OXYGEN SATURATION: 98 %

## 2024-09-23 DIAGNOSIS — R06.09 DYSPNEA ON EXERTION: ICD-10-CM

## 2024-09-23 DIAGNOSIS — Z95.2 S/P AVR: ICD-10-CM

## 2024-09-23 DIAGNOSIS — I50.32 CHRONIC HEART FAILURE WITH PRESERVED EJECTION FRACTION (HCC): ICD-10-CM

## 2024-09-23 DIAGNOSIS — Z86.79 STATUS POST ASCENDING AORTIC ANEURYSM REPAIR: ICD-10-CM

## 2024-09-23 DIAGNOSIS — I48.92 PAROXYSMAL ATRIAL FLUTTER (HCC): ICD-10-CM

## 2024-09-23 DIAGNOSIS — E78.00 PURE HYPERCHOLESTEROLEMIA: ICD-10-CM

## 2024-09-23 DIAGNOSIS — I51.89 LEFT VENTRICULAR DIASTOLIC DYSFUNCTION, NYHA CLASS 2: ICD-10-CM

## 2024-09-23 DIAGNOSIS — Z98.890 STATUS POST ASCENDING AORTIC ANEURYSM REPAIR: ICD-10-CM

## 2024-09-23 DIAGNOSIS — I50.30 ACC/AHA STAGE C HEART FAILURE WITH PRESERVED EJECTION FRACTION (HCC): ICD-10-CM

## 2024-09-23 DIAGNOSIS — I48.0 PAROXYSMAL ATRIAL FIBRILLATION (HCC): ICD-10-CM

## 2024-09-23 LAB — EKG IMPRESSION: NORMAL

## 2024-09-23 PROCEDURE — 3074F SYST BP LT 130 MM HG: CPT | Performed by: INTERNAL MEDICINE

## 2024-09-23 PROCEDURE — 93010 ELECTROCARDIOGRAM REPORT: CPT | Performed by: INTERNAL MEDICINE

## 2024-09-23 PROCEDURE — 99213 OFFICE O/P EST LOW 20 MIN: CPT | Performed by: INTERNAL MEDICINE

## 2024-09-23 PROCEDURE — 3079F DIAST BP 80-89 MM HG: CPT | Performed by: INTERNAL MEDICINE

## 2024-09-23 PROCEDURE — 93005 ELECTROCARDIOGRAM TRACING: CPT | Performed by: INTERNAL MEDICINE

## 2024-09-23 PROCEDURE — 99214 OFFICE O/P EST MOD 30 MIN: CPT | Mod: 25 | Performed by: INTERNAL MEDICINE

## 2024-09-23 RX ORDER — ROSUVASTATIN CALCIUM 20 MG/1
20 TABLET, COATED ORAL EVERY EVENING
Qty: 30 TABLET | Refills: 11 | Status: SHIPPED | OUTPATIENT
Start: 2024-09-23

## 2024-09-23 ASSESSMENT — ENCOUNTER SYMPTOMS
DIZZINESS: 0
HEADACHES: 0
BRUISES/BLEEDS EASILY: 0
FALLS: 0
PALPITATIONS: 0
DIAPHORESIS: 0
BLURRED VISION: 0
MYALGIAS: 0
MEMORY LOSS: 0
SHORTNESS OF BREATH: 1
DOUBLE VISION: 0
SENSORY CHANGE: 0
COUGH: 0
FEVER: 0
ABDOMINAL PAIN: 0
DEPRESSION: 0

## 2024-09-23 ASSESSMENT — FIBROSIS 4 INDEX: FIB4 SCORE: 0.64

## 2024-09-23 NOTE — PROGRESS NOTES
Chief Complaint   Patient presents with    Follow-Up     Dx: Chronic heart failure with preserved ejection fraction (HCC)        Atrial Fibrillation     F/V Dx: Paroxysmal atrial fibrillation (HCC)    Hypertension       Subjective     Noe Hickey is a 63 y.o. male who presents today for cardiac care and management due to heart failure with prior reduced LVEF but now improved and preserved ejection fraction, aortic stenosis status post bioprosthetic aortic valve replacement, paroxysmal atrial fibrillation s/p ablation, currently on anticoagulation, hypertension, hyperlipidemia.    08/2024 LVEF of 50 % with global hypokinesis. Known TAVR aortic valve with mildly increased gradients at upper limits of normal. Transvalvular gradients are - Peak: 37 mmHg,  Mean:  20 mmHg. I have independently interpreted and reviewed echocardiogram's actual images.     09/2024 I have independently interpreted and reviewed nuclear stress test's actual images and EKG tracing with patient which showed normal left ventricular systolic function. No coronary ischemia.    I have personally interpreted EKG today with patient, there is no evidence of acute coronary syndrome, no evidence of prior infarct, normal DC and QT interval, no significant conduction disease. Sinus rhythm.    I have independently interpreted and reviewed blood tests results with patient in clinic which shows LDL level of 166, triglycerides level of 125, GFR of 69, NT pro BNP of 213 down from 1568, K of 5.7.    Patient was seen in hospital due to exertional dyspnea. It was determined that his symptom might be related to his diaphragm problem.      Past Medical History:   Diagnosis Date    Arrhythmia     Breath shortness 06/09/2023    prior to 1/2023 surgery    Cyclic vomiting syndrome     Elevated hemidiaphragm - LEFT post AVR 01/04/2023    Fracture     Headache, classical migraine     Heart burn     Heart valve disease 06/09/2023    heart surgery in 1/2023     Hyperlipidemia 04/26/2024    medicated    Hypertension 04/26/2024 6/6/2024 pt not currently taking any medication for this at this time    Indigestion     Pneumonia due to infectious organism 01/20/2023    Snoring     6/6/2024 no sleep study done     Past Surgical History:   Procedure Laterality Date    IRRIGATION & DEBRIDEMENT GENERAL N/A 6/7/2024    Procedure: RIGHT FOOT IRRIGATION AND DEBRIDEMENT;  Surgeon: Oliver Arreguin M.D.;  Location: SUR[facility]ERY [facility];  Service: Orthopedics    INCISION AND DRAINAGE GENERAL Left 04/19/2024    Procedure: INCISION AND DRAINAGE, LEG;  Surgeon: Mitch Bowie M.D.;  Location: [facility];  Service: Orthopedics    AORTIC VALVE REPLACEMENT  01/05/2023    Procedure: AORTIC VALVE REPLACEMENT, ASCENDING AORTIC ANEURYSM REPAIR AND TRANSESOPHAGEAL ECHOCARDIOGRAM.;  Surgeon: Serena Goyal M.D.;  Location: [facility];  Service: Cardiac    AORTIC ASCENDING DISSECTION  01/05/2023    Procedure: REPAIR, ANEURYSM OR DISSECTION, AORTA, ASCENDING;  Surgeon: Serena Goyal M.D.;  Location: [facility];  Service: Cardiac    ECHOCARDIOGRAM, TRANSESOPHAGEAL, INTRAOPERATIVE  01/05/2023    Procedure: ECHOCARDIOGRAM, TRANSESOPHAGEAL, INTRAOPERATIVE.;  Surgeon: Serena Goyal M.D.;  Location: [facility];  Service: Cardiac    IRRIGATION & DEBRIDEMENT ORTHO Right 09/19/2017    Procedure: IRRIGATION & DEBRIDEMENT ORTHO-THIGH;  Surgeon: Corbin Rivera M.D.;  Location: [facility];  Service: Orthopedics    SHOULDER HEMICAP RESURFACING Right 10/12/2015    Procedure: RIGHT SHOULDER RESURFACING;  Surgeon: Javon Doll M.D.;  Location: [facility];  Service:     SHOULDER ARTHROSCOPY Bilateral     x3    SHOULDER SURGERY Right     replacement right     History reviewed. No pertinent family history.  Social History     Socioeconomic History    Marital status: Single     Spouse name: Not on file    Number of children: Not on file     Years of education: Not on file    Highest education level: Not on file   Occupational History    Not on file   Tobacco Use    Smoking status: Former     Current packs/day: 0.00     Average packs/day: 0.5 packs/day for 47.5 years (23.7 ttl pk-yrs)     Types: Cigarettes     Start date: 1976     Quit date: 2024     Years since quittin.3     Passive exposure: Never    Smokeless tobacco: Never    Tobacco comments:     2-3 a day   Vaping Use    Vaping status: Never Used   Substance and Sexual Activity    Alcohol use: Not Currently    Drug use: Not Currently     Types: Oral     Comment: past problems with narcotics not since     Sexual activity: Not on file   Other Topics Concern    Not on file   Social History Narrative    Not on file     Social Determinants of Health     Financial Resource Strain: Low Risk  (2023)    Overall Financial Resource Strain (CARDIA)     Difficulty of Paying Living Expenses: Not hard at all   Food Insecurity: No Food Insecurity (2024)    Hunger Vital Sign     Worried About Running Out of Food in the Last Year: Never true     Ran Out of Food in the Last Year: Never true   Transportation Needs: No Transportation Needs (2024)    PRAPARE - Transportation     Lack of Transportation (Medical): No     Lack of Transportation (Non-Medical): No   Physical Activity: Not on file   Stress: Not on file   Social Connections: Not on file   Intimate Partner Violence: Not At Risk (2024)    Humiliation, Afraid, Rape, and Kick questionnaire     Fear of Current or Ex-Partner: No     Emotionally Abused: No     Physically Abused: No     Sexually Abused: No   Housing Stability: Low Risk  (2024)    Housing Stability Vital Sign     Unable to Pay for Housing in the Last Year: No     Number of Times Moved in the Last Year: 1     Homeless in the Last Year: No   Recent Concern: Housing Stability - High Risk (2024)    Housing Stability Vital Sign     Unable to Pay for Housing in  the Last Year: No     Number of Times Moved in the Last Year: 2     Homeless in the Last Year: No     Allergies   Allergen Reactions    Morphine Shortness of Breath and Rash     Rash, shortness of breath     Outpatient Encounter Medications as of 9/23/2024   Medication Sig Dispense Refill    rosuvastatin (CRESTOR) 20 MG Tab Take 1 Tablet by mouth every evening. 30 Tablet 11    ALPRAZolam (XANAX) 0.25 MG Tab Take 1 Tablet by mouth at bedtime as needed for Anxiety or Sleep for up to 15 days. 15 Tablet 0    budesonide-formoterol (SYMBICORT) 80-4.5 MCG/ACT Aerosol Inhale 2 Puffs 2 times a day. 10.2 g 0    omeprazole (PRILOSEC) 20 MG delayed-release capsule Take 20 mg by mouth every day.      Omega-3 Fatty Acids (FISH OIL PO) Take 1 Tablet by mouth every day.      tiotropium (SPIRIVA RESPIMAT) 2.5 mcg/Act Aero Soln Inhale 2 Inhalations every day. 2 Each 1    amLODIPine (NORVASC) 5 MG Tab Take 1 Tablet by mouth every day. 30 Tablet 5    albuterol 108 (90 Base) MCG/ACT Aero Soln inhalation aerosol Inhale 2 Puffs every 6 hours as needed for Shortness of Breath. 8.5 g 6    Multiple Vitamins-Minerals (ONE DAILY MENS PO) Take 1 Tablet by mouth every day.      rivaroxaban (XARELTO) 20 MG Tab tablet Take 20 mg by mouth with dinner.       No facility-administered encounter medications on file as of 9/23/2024.     Review of Systems   Constitutional:  Negative for diaphoresis and fever.   HENT:  Negative for nosebleeds.    Eyes:  Negative for blurred vision and double vision.   Respiratory:  Positive for shortness of breath. Negative for cough.    Cardiovascular:  Negative for chest pain and palpitations.   Gastrointestinal:  Negative for abdominal pain.   Genitourinary:  Negative for dysuria and frequency.   Musculoskeletal:  Negative for falls and myalgias.   Skin:  Negative for rash.   Neurological:  Negative for dizziness, sensory change and headaches.   Endo/Heme/Allergies:  Does not bruise/bleed easily.  "  Psychiatric/Behavioral:  Negative for depression and memory loss.               Objective     /86 (BP Location: Left arm, Patient Position: Sitting, BP Cuff Size: Adult)   Pulse 84   Resp 18   Ht 1.727 m (5' 8\")   Wt 97.4 kg (214 lb 12.8 oz)   SpO2 98%   BMI 32.66 kg/m²     Physical Exam  Vitals and nursing note reviewed.   Constitutional:       General: He is not in acute distress.     Appearance: He is not diaphoretic.   HENT:      Head: Normocephalic and atraumatic.      Right Ear: External ear normal.      Left Ear: External ear normal.      Nose: No congestion or rhinorrhea.   Eyes:      General:         Right eye: No discharge.         Left eye: No discharge.   Neck:      Thyroid: No thyromegaly.      Vascular: No JVD.   Cardiovascular:      Rate and Rhythm: Normal rate and regular rhythm.      Pulses: Normal pulses.      Heart sounds: Murmur heard.   Pulmonary:      Effort: No respiratory distress.   Abdominal:      General: There is no distension.      Tenderness: There is no abdominal tenderness.   Musculoskeletal:         General: No swelling or tenderness.      Right lower leg: No edema.      Left lower leg: No edema.   Skin:     General: Skin is warm and dry.   Neurological:      Mental Status: He is alert and oriented to person, place, and time.      Cranial Nerves: No cranial nerve deficit.   Psychiatric:         Behavior: Behavior normal.           Assessment & Plan     1. S/P AVR        2. Chronic heart failure with preserved ejection fraction (HCC)        3. ACC/AHA stage C heart failure with preserved ejection fraction (HCC)        4. Left ventricular diastolic dysfunction, NYHA class 2        5. Dyspnea on exertion        6. Paroxysmal atrial fibrillation (HCC)  EKG      7. Paroxysmal atrial flutter (HCC)        8. Ascending aortic aneurysm repair        9. Pure hypercholesterolemia  rosuvastatin (CRESTOR) 20 MG Tab    Basic Metabolic Panel    LIPID PANEL          Medical Decision " Making: Today's Assessment/Status/Plan:    Patient is not in acute coronary syndrome presentation, is not having any active TIA or stroke symptoms, as not having decompensated heart failure, is not symptomatic in terms of presyncope pain or syncope. No evidence of significant valvular disease.    I explained to patient that further cardiac testing or procedures will not lower his overall cardiovascular risk for the surgery.  Patient remains moderate cardiovascular risk for the intended surgery of diaphragm.    Blood pressure is well controlled. Continue amlodipine 5 mg daily.    Will start Rosuvastatin 20 mg daily for LDL control.    This visit encounter signifies the visit complexity inherent to evaluation and management associated with medical care services that serve as the continuing focal point for all needed health care services and/or with medical care services that are part of ongoing care related to this patient's single, serious condition, complex cardiac condition.    Keysha Jameson M.D.

## 2024-09-23 NOTE — DOCUMENTATION QUERY
"                                                                         [facility]                                                                       Query Response Note      PATIENT:               JUSTIN TOPETE  ACCT #:                  [id number]  MRN:                     [id number]  :                      1960  ADMIT DATE:       2024 1:35 PM  DISCH DATE:        2024 12:32 PM  RESPONDING  PROVIDER #:        [id number]           QUERY TEXT:    Supplemental oxygen use at baseline is documented in the Medical Record. Please specify the type and acuity (includes suspected or probable)    The patient's Clinical Indicators include:  62yo with dx of COPD, thrombocytosis, HTN, dependence on supplemental oxygen     ED note \"...uses oxygen at home when he feels his symptoms worsen\"   PN \"...complicated by COPD requiring 2 L supplemental O2 at baseline\"    Risk factors: COPD, HTN, former smoker  Treatments: supplemental oxygen, imaging, labwork, monitoring    If you agree with the above dx, please document in the medical record.     Contact me with questions.     Thank you,  CARMINE Honeycutt, CDI  [email or web address]  Options provided:   -- Chronic respiratory failure   -- Chronic respiratory failure with hypoxia   -- Chronic respiratory failure with hypercapnia   -- Other explanation (please specify)   -- Unable to determine      Query created by: Mariel Rice on 2024 12:14 PM    RESPONSE TEXT:    Unable to determine          Electronically signed by:  LIMA CASILLAS MD 2024 2:12 PM              "

## 2024-10-04 ENCOUNTER — APPOINTMENT (OUTPATIENT)
Dept: MEDICAL GROUP | Facility: CLINIC | Age: 64
End: 2024-10-04
Payer: MEDICAID

## 2024-10-04 VITALS
DIASTOLIC BLOOD PRESSURE: 80 MMHG | OXYGEN SATURATION: 92 % | HEART RATE: 85 BPM | BODY MASS INDEX: 33.27 KG/M2 | HEIGHT: 67 IN | TEMPERATURE: 98 F | RESPIRATION RATE: 20 BRPM | SYSTOLIC BLOOD PRESSURE: 142 MMHG | WEIGHT: 212 LBS

## 2024-10-04 DIAGNOSIS — R60.9 DEPENDENT EDEMA: ICD-10-CM

## 2024-10-04 DIAGNOSIS — J98.6: ICD-10-CM

## 2024-10-04 DIAGNOSIS — Z11.3 ROUTINE SCREENING FOR STI (SEXUALLY TRANSMITTED INFECTION): ICD-10-CM

## 2024-10-04 PROCEDURE — 99214 OFFICE O/P EST MOD 30 MIN: CPT | Mod: GC

## 2024-10-04 ASSESSMENT — FIBROSIS 4 INDEX: FIB4 SCORE: 0.64

## 2024-10-08 ENCOUNTER — TELEPHONE (OUTPATIENT)
Dept: HEALTH INFORMATION MANAGEMENT | Facility: OTHER | Age: 64
End: 2024-10-08
Payer: MEDICAID

## 2024-11-22 ENCOUNTER — APPOINTMENT (OUTPATIENT)
Dept: RADIOLOGY | Facility: MEDICAL CENTER | Age: 64
End: 2024-11-22
Attending: EMERGENCY MEDICINE
Payer: MEDICAID

## 2024-11-22 ENCOUNTER — HOSPITAL ENCOUNTER (EMERGENCY)
Facility: MEDICAL CENTER | Age: 64
End: 2024-11-22
Attending: EMERGENCY MEDICINE
Payer: MEDICAID

## 2024-11-22 VITALS
SYSTOLIC BLOOD PRESSURE: 152 MMHG | DIASTOLIC BLOOD PRESSURE: 90 MMHG | OXYGEN SATURATION: 95 % | RESPIRATION RATE: 20 BRPM | HEIGHT: 67 IN | TEMPERATURE: 97.6 F | BODY MASS INDEX: 32.18 KG/M2 | WEIGHT: 205 LBS | HEART RATE: 79 BPM

## 2024-11-22 DIAGNOSIS — K59.00 CONSTIPATION, UNSPECIFIED CONSTIPATION TYPE: ICD-10-CM

## 2024-11-22 DIAGNOSIS — R10.30 LOWER ABDOMINAL PAIN: ICD-10-CM

## 2024-11-22 LAB
ALBUMIN SERPL BCP-MCNC: 3.8 G/DL (ref 3.2–4.9)
ALBUMIN/GLOB SERPL: 1.5 G/DL
ALP SERPL-CCNC: 38 U/L (ref 30–99)
ALT SERPL-CCNC: 35 U/L (ref 2–50)
ANION GAP SERPL CALC-SCNC: 11 MMOL/L (ref 7–16)
ANISOCYTOSIS BLD QL SMEAR: ABNORMAL
APPEARANCE UR: CLEAR
AST SERPL-CCNC: 42 U/L (ref 12–45)
BACTERIA #/AREA URNS HPF: NORMAL /HPF
BASOPHILS # BLD AUTO: 0.9 % (ref 0–1.8)
BASOPHILS # BLD: 0.14 K/UL (ref 0–0.12)
BILIRUB SERPL-MCNC: 0.4 MG/DL (ref 0.1–1.5)
BILIRUB UR QL STRIP.AUTO: NEGATIVE
BUN SERPL-MCNC: 26 MG/DL (ref 8–22)
CALCIUM ALBUM COR SERPL-MCNC: 9.2 MG/DL (ref 8.5–10.5)
CALCIUM SERPL-MCNC: 9 MG/DL (ref 8.5–10.5)
CASTS URNS QL MICRO: NORMAL /LPF (ref 0–2)
CHLORIDE SERPL-SCNC: 101 MMOL/L (ref 96–112)
CO2 SERPL-SCNC: 23 MMOL/L (ref 20–33)
COLOR UR: YELLOW
CREAT SERPL-MCNC: 1.03 MG/DL (ref 0.5–1.4)
EOSINOPHIL # BLD AUTO: 0.14 K/UL (ref 0–0.51)
EOSINOPHIL NFR BLD: 0.9 % (ref 0–6.9)
EPITHELIAL CELLS 1715: NORMAL /HPF (ref 0–5)
ERYTHROCYTE [DISTWIDTH] IN BLOOD BY AUTOMATED COUNT: 49.8 FL (ref 35.9–50)
GFR SERPLBLD CREATININE-BSD FMLA CKD-EPI: 81 ML/MIN/1.73 M 2
GLOBULIN SER CALC-MCNC: 2.5 G/DL (ref 1.9–3.5)
GLUCOSE SERPL-MCNC: 94 MG/DL (ref 65–99)
GLUCOSE UR STRIP.AUTO-MCNC: NEGATIVE MG/DL
HCT VFR BLD AUTO: 46.2 % (ref 42–52)
HGB BLD-MCNC: 13.7 G/DL (ref 14–18)
HYPOCHROMIA BLD QL SMEAR: ABNORMAL
KETONES UR STRIP.AUTO-MCNC: NEGATIVE MG/DL
LEUKOCYTE ESTERASE UR QL STRIP.AUTO: NEGATIVE
LIPASE SERPL-CCNC: 60 U/L (ref 11–82)
LYMPHOCYTES # BLD AUTO: 1.26 K/UL (ref 1–4.8)
LYMPHOCYTES NFR BLD: 7.9 % (ref 22–41)
MANUAL DIFF BLD: NORMAL
MCH RBC QN AUTO: 20.4 PG (ref 27–33)
MCHC RBC AUTO-ENTMCNC: 29.7 G/DL (ref 32.3–36.5)
MCV RBC AUTO: 68.8 FL (ref 81.4–97.8)
METAMYELOCYTES NFR BLD MANUAL: 0.9 %
MICRO URNS: ABNORMAL
MICROCYTES BLD QL SMEAR: ABNORMAL
MONOCYTES # BLD AUTO: 0.83 K/UL (ref 0–0.85)
MONOCYTES NFR BLD AUTO: 5.2 % (ref 0–13.4)
MORPHOLOGY BLD-IMP: NORMAL
NEUTROPHILS # BLD AUTO: 13.47 K/UL (ref 1.82–7.42)
NEUTROPHILS NFR BLD: 84.2 % (ref 44–72)
NITRITE UR QL STRIP.AUTO: NEGATIVE
NRBC # BLD AUTO: 0 K/UL
NRBC BLD-RTO: 0 /100 WBC (ref 0–0.2)
PH UR STRIP.AUTO: 5.5 [PH] (ref 5–8)
PLATELET # BLD AUTO: 441 K/UL (ref 164–446)
PLATELET BLD QL SMEAR: NORMAL
PMV BLD AUTO: 9.3 FL (ref 9–12.9)
POTASSIUM SERPL-SCNC: 5.1 MMOL/L (ref 3.6–5.5)
PROT SERPL-MCNC: 6.3 G/DL (ref 6–8.2)
PROT UR QL STRIP: 100 MG/DL
RBC # BLD AUTO: 6.72 M/UL (ref 4.7–6.1)
RBC # URNS HPF: NORMAL /HPF (ref 0–2)
RBC BLD AUTO: PRESENT
RBC UR QL AUTO: NEGATIVE
SCHISTOCYTES BLD QL SMEAR: NORMAL
SODIUM SERPL-SCNC: 135 MMOL/L (ref 135–145)
SP GR UR STRIP.AUTO: >1.045
UROBILINOGEN UR STRIP.AUTO-MCNC: 1 EU/DL
WBC # BLD AUTO: 16 K/UL (ref 4.8–10.8)
WBC #/AREA URNS HPF: NORMAL /HPF

## 2024-11-22 PROCEDURE — 83690 ASSAY OF LIPASE: CPT

## 2024-11-22 PROCEDURE — 85027 COMPLETE CBC AUTOMATED: CPT

## 2024-11-22 PROCEDURE — 81001 URINALYSIS AUTO W/SCOPE: CPT

## 2024-11-22 PROCEDURE — 96374 THER/PROPH/DIAG INJ IV PUSH: CPT | Mod: XU

## 2024-11-22 PROCEDURE — A9270 NON-COVERED ITEM OR SERVICE: HCPCS | Mod: UD | Performed by: EMERGENCY MEDICINE

## 2024-11-22 PROCEDURE — 80053 COMPREHEN METABOLIC PANEL: CPT

## 2024-11-22 PROCEDURE — 96376 TX/PRO/DX INJ SAME DRUG ADON: CPT

## 2024-11-22 PROCEDURE — 85007 BL SMEAR W/DIFF WBC COUNT: CPT

## 2024-11-22 PROCEDURE — 700111 HCHG RX REV CODE 636 W/ 250 OVERRIDE (IP): Mod: UD | Performed by: EMERGENCY MEDICINE

## 2024-11-22 PROCEDURE — 700102 HCHG RX REV CODE 250 W/ 637 OVERRIDE(OP): Mod: UD | Performed by: EMERGENCY MEDICINE

## 2024-11-22 PROCEDURE — 700101 HCHG RX REV CODE 250: Mod: UD | Performed by: EMERGENCY MEDICINE

## 2024-11-22 PROCEDURE — 99285 EMERGENCY DEPT VISIT HI MDM: CPT

## 2024-11-22 PROCEDURE — 74177 CT ABD & PELVIS W/CONTRAST: CPT

## 2024-11-22 PROCEDURE — 36415 COLL VENOUS BLD VENIPUNCTURE: CPT

## 2024-11-22 PROCEDURE — 700117 HCHG RX CONTRAST REV CODE 255: Mod: UD | Performed by: EMERGENCY MEDICINE

## 2024-11-22 RX ORDER — HYDROMORPHONE HYDROCHLORIDE 1 MG/ML
1 INJECTION, SOLUTION INTRAMUSCULAR; INTRAVENOUS; SUBCUTANEOUS ONCE
Status: COMPLETED | OUTPATIENT
Start: 2024-11-22 | End: 2024-11-22

## 2024-11-22 RX ORDER — DICYCLOMINE HCL 20 MG
20 TABLET ORAL EVERY 6 HOURS
Qty: 120 TABLET | Refills: 0 | Status: SHIPPED | OUTPATIENT
Start: 2024-11-22

## 2024-11-22 RX ORDER — SIMETHICONE 125 MG
125 TABLET,CHEWABLE ORAL EVERY 6 HOURS PRN
Qty: 120 TABLET | Refills: 0 | Status: SHIPPED | OUTPATIENT
Start: 2024-11-22

## 2024-11-22 RX ORDER — AMOXICILLIN 250 MG
1 CAPSULE ORAL DAILY
Qty: 30 TABLET | Refills: 0 | Status: SHIPPED | OUTPATIENT
Start: 2024-11-22

## 2024-11-22 RX ORDER — SODIUM PHOSPHATE,MONO-DIBASIC 19G-7G/118
1 ENEMA (ML) RECTAL ONCE
Status: COMPLETED | OUTPATIENT
Start: 2024-11-22 | End: 2024-11-22

## 2024-11-22 RX ORDER — SIMETHICONE 125 MG
125 TABLET,CHEWABLE ORAL ONCE
Status: COMPLETED | OUTPATIENT
Start: 2024-11-22 | End: 2024-11-22

## 2024-11-22 RX ADMIN — SIMETHICONE 125 MG: 125 TABLET, CHEWABLE ORAL at 07:49

## 2024-11-22 RX ADMIN — IOHEXOL 100 ML: 350 INJECTION, SOLUTION INTRAVENOUS at 06:04

## 2024-11-22 RX ADMIN — HYDROMORPHONE HYDROCHLORIDE 1 MG: 1 INJECTION, SOLUTION INTRAMUSCULAR; INTRAVENOUS; SUBCUTANEOUS at 05:09

## 2024-11-22 RX ADMIN — SODIUM PHOSPHATE 1 ENEMA: 7; 19 ENEMA RECTAL at 07:50

## 2024-11-22 RX ADMIN — HYDROMORPHONE HYDROCHLORIDE 1 MG: 1 INJECTION, SOLUTION INTRAMUSCULAR; INTRAVENOUS; SUBCUTANEOUS at 06:13

## 2024-11-22 ASSESSMENT — PAIN DESCRIPTION - PAIN TYPE
TYPE: ACUTE PAIN

## 2024-11-22 ASSESSMENT — FIBROSIS 4 INDEX: FIB4 SCORE: 0.64

## 2024-11-22 NOTE — ED TRIAGE NOTES
"Chief Complaint   Patient presents with    Abdominal Pain     X2 hours, mid upper abdomen, 10/10 stabbing     [facility]. Patient states that pain started around 2 hours PTA and is about 2 inches above umbilicus and feels like he is being stabbed. Patient states that pain radiates out into entire abdomen.     Patient has a history of AAA.    Patient was given 100 mcg Fentanyl and 4 mg Zofran PTA via 18g LAC PIV.    Pt is alert and oriented, speaking in full sentences, follows commands and responds appropriately to questions. Patient has slightly labored breathing and is requiring 2L/NC at this time, does not wear oxygen at baseline.    BP (!) 161/90   Pulse 84   Resp 20   Ht 1.702 m (5' 7\")   Wt 93 kg (205 lb)   SpO2 96%     "

## 2024-11-22 NOTE — DISCHARGE INSTRUCTIONS
Your CT today was reassuring, there was no evidence of any surgical process.  There was a significant bout of gas that your bowel and evidence of constipation.  I therefore started you on a bowel regimen.  Unfortunately opioids would be highly contraindicated here because they will make the gas pain and bowel pain even worse.  Therefore I have sent you home on some Bentyl instead and some magnesium citrate, this will give you diarrhea for the next few days.  Senna docusate will help keep your stools soft.  I have also given some Mylicon to help with your gas pain.

## 2024-11-22 NOTE — ED NOTES
Bedside report received from off going RN: Noah, assumed care of patient.  POC discussed with patient. Call light within reach, all needs addressed at this time.       Fall risk interventions in place: Keep floor surfaces clean and dry (all applicable per [facility] Fall risk assessment)   Continuous monitoring: Pulse Ox or Blood Pressure  IVF/IV medications: Not Applicable   Oxygen: How many liters 3L, Traced the line to wall oxygen, and No oxygen tank in room  Bedside sitter: Not Applicable   Isolation: Not Applicable

## 2024-11-22 NOTE — ED PROVIDER NOTES
ED Provider Note    CHIEF COMPLAINT  Chief Complaint   Patient presents with    Abdominal Pain     X2 hours, mid upper abdomen, 10/10 stabbing       EXTERNAL RECORDS REVIEWED  Inpatient Notes most recent inpatient note, 9/17/2024 patient with COPD exacerbation at that time.    HPI/ROS      Noe Hickey is a 63 y.o. male who presents with chief complaint of abdominal pain near his umbilicus.  Patient has extensive medical history including COPD on 2 L baseline.  He is status post TAVR in 2023, did have cardiac arrest in 2024 as well.  Patient reports that last night he started developing some cramping abdominal pain near his bellybutton and left lower quadrant.  This pain progressively worsened throughout the night and is now severe.  Patient reports pain is worse when trying to walk or any movement.  He denies any associated nausea or vomiting.  He denies any change in his bowel movements.  He has not had any recent black or bloody stool.  Patient denies any associated fevers or chills.  He continues to pass flatus.  Patient denies any associated back pain.  Patient denies any dysuria urgency frequency or urinary symptoms.  Patient denies any associated chest pain or shortness of breath.    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    SURGICAL HISTORY   has a past surgical history that includes shoulder arthroscopy (Bilateral); shoulder hemicap resurfacing (Right, 10/12/2015); irrigation & debridement ortho (Right, 09/19/2017); shoulder surgery (Right); aortic valve replacement (01/05/2023); aortic ascending dissection (01/05/2023); echocardiogram,  "transesophageal, intraoperative (2023); incision and drainage general (Left, 2024); and irrigation & debridement general (N/A, 2024).    FAMILY HISTORY  History reviewed. No pertinent family history.    SOCIAL HISTORY  Social History     Tobacco Use    Smoking status: Every Day     Current packs/day: 0.00     Average packs/day: 0.5 packs/day for 47.5 years (23.7 ttl pk-yrs)     Types: Cigarettes     Start date: 1976     Last attempt to quit: 2024     Years since quittin.4     Passive exposure: Never    Smokeless tobacco: Never    Tobacco comments:     2-3 a day   Vaping Use    Vaping status: Never Used   Substance and Sexual Activity    Alcohol use: Not Currently    Drug use: Not Currently     Types: Oral     Comment: past problems with narcotics not since     Sexual activity: Not on file       CURRENT MEDICATIONS  Home Medications       Reviewed by Xander Kidd R.N. (Registered Nurse) on 24 at 0439  Med List Status: Not Addressed     Medication Last Dose Status   albuterol 108 (90 Base) MCG/ACT Aero Soln inhalation aerosol  Active   amLODIPine (NORVASC) 5 MG Tab  Active   budesonide-formoterol (SYMBICORT) 80-4.5 MCG/ACT Aerosol  Active   Multiple Vitamins-Minerals (ONE DAILY MENS PO)  Active   Omega-3 Fatty Acids (FISH OIL PO)  Active   omeprazole (PRILOSEC) 20 MG delayed-release capsule  Active   rivaroxaban (XARELTO) 20 MG Tab tablet  Active   rosuvastatin (CRESTOR) 20 MG Tab  Active   tiotropium (SPIRIVA RESPIMAT) 2.5 mcg/Act Aero Soln  Active                    ALLERGIES  Allergies   Allergen Reactions    Morphine Shortness of Breath and Rash     Rash, shortness of breath       PHYSICAL EXAM  VITAL SIGNS: BP (!) 161/90   Pulse 84   Resp 20   Ht 1.702 m (5' 7\")   Wt 93 kg (205 lb)   SpO2 96%   BMI 32.11 kg/m²    Physical Exam  Constitutional:       Appearance: Normal appearance.   HENT:      Head: Normocephalic.      Right Ear: Tympanic membrane normal.     "  Left Ear: Tympanic membrane normal.      Nose: Nose normal.      Mouth/Throat:      Mouth: Mucous membranes are moist.   Eyes:      Extraocular Movements: Extraocular movements intact.      Pupils: Pupils are equal, round, and reactive to light.   Cardiovascular:      Rate and Rhythm: Normal rate and regular rhythm.   Pulmonary:      Effort: Pulmonary effort is normal. No respiratory distress.      Breath sounds: Normal breath sounds. No stridor. No wheezing or rales.   Chest:      Chest wall: No tenderness.   Abdominal:      General: Abdomen is flat. There is no distension.      Palpations: Abdomen is soft. There is no mass.      Tenderness: There is abdominal tenderness.      Comments: Parabolic wall and left lower quadrant tenderness palpation with some localized voluntary guarding here, no overlying skin changes.   Musculoskeletal:      Cervical back: Normal range of motion.   Skin:     General: Skin is warm.      Capillary Refill: Capillary refill takes less than 2 seconds.   Neurological:      General: No focal deficit present.      Mental Status: He is alert and oriented to person, place, and time.   Psychiatric:      Comments: Uncomfortable           EKG/LABS  Results for orders placed or performed during the hospital encounter of 11/22/24   CBC WITH DIFFERENTIAL    Collection Time: 11/22/24  4:41 AM   Result Value Ref Range    WBC 16.0 (H) 4.8 - 10.8 K/uL    RBC 6.72 (H) 4.70 - 6.10 M/uL    Hemoglobin 13.7 (L) 14.0 - 18.0 g/dL    Hematocrit 46.2 42.0 - 52.0 %    MCV 68.8 (L) 81.4 - 97.8 fL    MCH 20.4 (L) 27.0 - 33.0 pg    MCHC 29.7 (L) 32.3 - 36.5 g/dL    RDW 49.8 35.9 - 50.0 fL    Platelet Count 441 164 - 446 K/uL    MPV 9.3 9.0 - 12.9 fL    Neutrophils-Polys 84.20 (H) 44.00 - 72.00 %    Lymphocytes 7.90 (L) 22.00 - 41.00 %    Monocytes 5.20 0.00 - 13.40 %    Eosinophils 0.90 0.00 - 6.90 %    Basophils 0.90 0.00 - 1.80 %    Nucleated RBC 0.00 0.00 - 0.20 /100 WBC    Neutrophils (Absolute) 13.47 (H) 1.82  - 7.42 K/uL    Lymphs (Absolute) 1.26 1.00 - 4.80 K/uL    Monos (Absolute) 0.83 0.00 - 0.85 K/uL    Eos (Absolute) 0.14 0.00 - 0.51 K/uL    Baso (Absolute) 0.14 (H) 0.00 - 0.12 K/uL    NRBC (Absolute) 0.00 K/uL    Hypochromia 1+     Anisocytosis 1+     Microcytosis 2+ (A)    COMP METABOLIC PANEL    Collection Time: 11/22/24  4:41 AM   Result Value Ref Range    Sodium 135 135 - 145 mmol/L    Potassium 5.1 3.6 - 5.5 mmol/L    Chloride 101 96 - 112 mmol/L    Co2 23 20 - 33 mmol/L    Anion Gap 11.0 7.0 - 16.0    Glucose 94 65 - 99 mg/dL    Bun 26 (H) 8 - 22 mg/dL    Creatinine 1.03 0.50 - 1.40 mg/dL    Calcium 9.0 8.5 - 10.5 mg/dL    Correct Calcium 9.2 8.5 - 10.5 mg/dL    AST(SGOT) 42 12 - 45 U/L    ALT(SGPT) 35 2 - 50 U/L    Alkaline Phosphatase 38 30 - 99 U/L    Total Bilirubin 0.4 0.1 - 1.5 mg/dL    Albumin 3.8 3.2 - 4.9 g/dL    Total Protein 6.3 6.0 - 8.2 g/dL    Globulin 2.5 1.9 - 3.5 g/dL    A-G Ratio 1.5 g/dL   LIPASE    Collection Time: 11/22/24  4:41 AM   Result Value Ref Range    Lipase 60 11 - 82 U/L   ESTIMATED GFR    Collection Time: 11/22/24  4:41 AM   Result Value Ref Range    GFR (CKD-EPI) 81 >60 mL/min/1.73 m 2   DIFFERENTIAL MANUAL    Collection Time: 11/22/24  4:41 AM   Result Value Ref Range    Metamyelocytes 0.90 %    Manual Diff Status PERFORMED    PERIPHERAL SMEAR REVIEW    Collection Time: 11/22/24  4:41 AM   Result Value Ref Range    Peripheral Smear Review see below    PLATELET ESTIMATE    Collection Time: 11/22/24  4:41 AM   Result Value Ref Range    Plt Estimation Normal    MORPHOLOGY    Collection Time: 11/22/24  4:41 AM   Result Value Ref Range    RBC Morphology Present     Schistocytes 1+    URINALYSIS    Collection Time: 11/22/24  7:51 AM    Specimen: Urine, Clean Catch   Result Value Ref Range    Color Yellow     Character Clear     Specific Gravity >1.045 <1.035    Ph 5.5 5.0 - 8.0    Glucose Negative Negative mg/dL    Ketones Negative Negative mg/dL    Protein 100 (A) Negative mg/dL     Bilirubin Negative Negative    Urobilinogen, Urine 1.0 <=1.0 EU/dL    Nitrite Negative Negative    Leukocyte Esterase Negative Negative    Occult Blood Negative Negative    Micro Urine Req Microscopic    URINE MICROSCOPIC (W/UA)    Collection Time: 11/22/24  7:51 AM   Result Value Ref Range    WBC 0-2 /hpf    RBC 0-2 0 - 2 /hpf    Bacteria None Seen None /hpf    Epithelial Cells 0-2 0 - 5 /hpf    Urine Casts 0-2 0 - 2 /lpf     *Note: Due to a large number of results and/or encounters for the requested time period, some results have not been displayed. A complete set of results can be found in Results Review.       I have independently interpreted this EKG    RADIOLOGY/PROCEDURES   I have independently interpreted the diagnostic imaging associated with this visit and am waiting the final reading from the radiologist.   My preliminary interpretation is as follows: Gaseous distention and significant stool burden    Radiologist interpretation:  CT-ABDOMEN-PELVIS WITH   Final Result         1.  Hepatomegaly   2.  Diverticulosis   3.  Atherosclerosis            COURSE & MEDICAL DECISION MAKING    ASSESSMENT, COURSE AND PLAN  Care Narrative: Patient here with localized tenderness palpation in his left lower quadrant and periumbilically.  Concern for possible diverticulitis with associated localized peritonitis.  Given his age and tenderness I do believe that further evaluation with CT is indicated to evaluate for possible surgical pathology otherwise.  Certainly symptoms could be secondary to functional bowel pain but at this point this will be a diagnosis of exclusion.  Checking basic labs for further restratification.  Patient given Dilaudid for pain.  Patient basic labs reveal some leukocytosis.  Labs otherwise unremarkable.  CT fails to reveal any causative etiology but does show significant gaseous extension of his bowels with associated constipation.  Therefore patient given enema, and Gas-X.  Following this  patient pain improved.  Home with bowel regimen and Bentyl.  Return precautions reviewed.  Patient without any pain out of proportion, very low suspicion of vascular etiology of patient's pain especially given his improvement after stool burden was addressed              FINAL DIAGNOSIS  1. Constipation, unspecified constipation type    2. Lower abdominal pain

## 2024-12-10 ENCOUNTER — NON-PROVIDER VISIT (OUTPATIENT)
Dept: SLEEP MEDICINE | Facility: MEDICAL CENTER | Age: 64
End: 2024-12-10
Payer: MEDICAID

## 2024-12-10 VITALS — WEIGHT: 205 LBS | HEIGHT: 67 IN | BODY MASS INDEX: 32.18 KG/M2

## 2024-12-10 DIAGNOSIS — J44.1 COPD EXACERBATION (HCC): ICD-10-CM

## 2024-12-10 PROCEDURE — 94729 DIFFUSING CAPACITY: CPT | Mod: 26 | Performed by: STUDENT IN AN ORGANIZED HEALTH CARE EDUCATION/TRAINING PROGRAM

## 2024-12-10 PROCEDURE — 94726 PLETHYSMOGRAPHY LUNG VOLUMES: CPT | Performed by: STUDENT IN AN ORGANIZED HEALTH CARE EDUCATION/TRAINING PROGRAM

## 2024-12-10 PROCEDURE — 94729 DIFFUSING CAPACITY: CPT | Performed by: STUDENT IN AN ORGANIZED HEALTH CARE EDUCATION/TRAINING PROGRAM

## 2024-12-10 PROCEDURE — 94060 EVALUATION OF WHEEZING: CPT | Performed by: STUDENT IN AN ORGANIZED HEALTH CARE EDUCATION/TRAINING PROGRAM

## 2024-12-10 PROCEDURE — 94060 EVALUATION OF WHEEZING: CPT | Mod: 26 | Performed by: STUDENT IN AN ORGANIZED HEALTH CARE EDUCATION/TRAINING PROGRAM

## 2024-12-10 PROCEDURE — 94726 PLETHYSMOGRAPHY LUNG VOLUMES: CPT | Mod: 26 | Performed by: STUDENT IN AN ORGANIZED HEALTH CARE EDUCATION/TRAINING PROGRAM

## 2024-12-10 ASSESSMENT — FIBROSIS 4 INDEX: FIB4 SCORE: 1.05

## 2024-12-10 NOTE — PROCEDURES
Technician: Serenity Wagner RRT, CPFT  Good patient effort & cooperation.  Lots of vocal sounds when patient is exhaling. Patient had hard time repeating best effort, one effort reported for POST FVC.  The results of this test meet the ATS/ERS standards for acceptability & reproducibility.  Test was performed on the [employer] Body Plethysmograph-Elite DX system.  Predicted equations for Spirometry are GLI-2012, ITS for lung volumes, and GLI-2017 for DLCO.  The DLCO was uncorrected for Hgb.  A bronchodilator of Ventolin HFA -2puffs via spacer administered.  DLCO performed during dilation period.IVC is less than 90%.    Interpretation:  FVC 1.88 L, 47%  FEV1 1.32 L, 42%  FEV1/FVC 70%  TLC 4.68 L, 73%  RV/% predicted  DLCO 20.48, 83%  DL/VA 4.51, 118%    Spirometry shows proportional reductions in FEV1 and FVC.  Bronchodilator responses are observed in both FVC and FEV1.  Lung volumes demonstrate mild pulmonary restriction.  Gas exchange is mildly impaired and corrects for alveolar volume.

## 2024-12-16 ASSESSMENT — ENCOUNTER SYMPTOMS
HEMOPTYSIS: 0
DYSPNEA AT REST: 0
CHEST TIGHTNESS: 1
RESPIRATORY SYMPTOMS COMMENTS: YES
WHEEZING: 1
SHORTNESS OF BREATH: 1

## 2024-12-19 ENCOUNTER — OFFICE VISIT (OUTPATIENT)
Dept: SLEEP MEDICINE | Facility: MEDICAL CENTER | Age: 64
End: 2024-12-19
Attending: INTERNAL MEDICINE
Payer: MEDICAID

## 2024-12-19 VITALS
DIASTOLIC BLOOD PRESSURE: 88 MMHG | HEIGHT: 68 IN | WEIGHT: 210 LBS | BODY MASS INDEX: 31.83 KG/M2 | OXYGEN SATURATION: 96 % | HEART RATE: 77 BPM | SYSTOLIC BLOOD PRESSURE: 146 MMHG

## 2024-12-19 DIAGNOSIS — J43.9 MIXED RESTRICTIVE AND OBSTRUCTIVE LUNG DISEASE (HCC): ICD-10-CM

## 2024-12-19 DIAGNOSIS — I27.20 PULMONARY HYPERTENSION (HCC): ICD-10-CM

## 2024-12-19 DIAGNOSIS — Z72.0 TOBACCO USE: ICD-10-CM

## 2024-12-19 DIAGNOSIS — J98.4 MIXED RESTRICTIVE AND OBSTRUCTIVE LUNG DISEASE (HCC): ICD-10-CM

## 2024-12-19 DIAGNOSIS — Z95.2 S/P TAVR (TRANSCATHETER AORTIC VALVE REPLACEMENT): ICD-10-CM

## 2024-12-19 DIAGNOSIS — J98.6 ELEVATED HEMIDIAPHRAGM: ICD-10-CM

## 2024-12-19 PROCEDURE — 99204 OFFICE O/P NEW MOD 45 MIN: CPT | Performed by: INTERNAL MEDICINE

## 2024-12-19 PROCEDURE — 3079F DIAST BP 80-89 MM HG: CPT | Performed by: INTERNAL MEDICINE

## 2024-12-19 PROCEDURE — 3077F SYST BP >= 140 MM HG: CPT | Performed by: INTERNAL MEDICINE

## 2024-12-19 ASSESSMENT — ENCOUNTER SYMPTOMS
ORTHOPNEA: 0
FEVER: 0
WEIGHT LOSS: 0
SPUTUM PRODUCTION: 0
TREMORS: 0
STRIDOR: 0
WHEEZING: 1
CONSTIPATION: 0
BLURRED VISION: 0
EYE REDNESS: 0
CLAUDICATION: 0
PND: 0
FALLS: 0
WEAKNESS: 0
HEADACHES: 0
SORE THROAT: 0
PHOTOPHOBIA: 0
DIARRHEA: 0
DOUBLE VISION: 0
NECK PAIN: 0
COUGH: 0
CHILLS: 0
HEARTBURN: 0
SPEECH CHANGE: 0
SINUS PAIN: 0
DIZZINESS: 0
ABDOMINAL PAIN: 0
HEMOPTYSIS: 0
BACK PAIN: 0
DEPRESSION: 0
NAUSEA: 0
DIAPHORESIS: 0
FOCAL WEAKNESS: 0
EYE DISCHARGE: 0
VOMITING: 0
MYALGIAS: 0
EYE PAIN: 0
SHORTNESS OF BREATH: 1
PALPITATIONS: 0

## 2024-12-19 ASSESSMENT — FIBROSIS 4 INDEX: FIB4 SCORE: 1.05

## 2024-12-19 NOTE — PROGRESS NOTES
Chief Complaint   Patient presents with    Results     CT-CHEST ([facility]ORAX) 09/18/24, PFT 12/10/24         HPI: This patient is a 64 y.o. male presenting for evaluation of shortness of breath and mixed restrictive obstructive lung disease on pulmonary function testing.  Past medical history significant for hypertension, dyslipidemia, gastroesophageal reflux disease and a history of atrial fibrillation/flutter first noted in December 2022 when he presented to [facility] with headache.  CT angiogram was done to evaluate for pulmonary emboli but showed signs suggestive of severe aortic stenosis.  He was then referred to [facility] where he underwent TAVR in January 2023.  Since that time he has had hospital admissions every 1 to 3 months for dyspnea.  CT angiogram has been done at least 5 times in the past 11 months in addition to other CT chest.  Cardiac workup including nuclear stress test as recently as his last admission in September showed no evidence of stress-induced ischemia.  The only abnormal finding that has persisted his elevation of his left hemidiaphragm with associated atelectasis on that side.  Sniff test in April and again in September did not show paradoxical motion.  Pulmonary function testing done on December 10 showed a mixed restrictive obstructive process with significant bronchodilator response.  He has been treated with inhaled steroids and bronchodilators with no significant improvement in his respiratory symptoms which she describes as just breathlessness with any motion.  He denies significant cough or wheezing.  Denies chest congestion.  No chest pain.  He has had some mild peripheral edema.  He is using supplemental oxygen at night and has not been tested for sleep apnea.  In reviewing imaging, his left diaphragm was elevated on January 4 prior to his TAVR arguing against surgical complication.  I do not have imaging from [facility] in December when he presented with arrhythmia  however, chest imaging was reportedly normal at that time.  The patient denies any injury to his chest.  He has not had manipulation other than TAVR and a left heart cath at [facility] done radially on the left side.  He is a tobacco smoker currently between 7 to 8 cigarettes/day but has smoked up to a pack per day for 40 years.  He currently works driving heavy equipment.  He has had 4 shoulder surgeries but all on the right side in the past.  Transthoracic echocardiograms going back to 2023 have intermittently showed dilated RV with elevated RVSP between 43 mmHg up to most recent in August when it was estimated to be 50 mmHg.  TAVR aortic valve has functioned normally on all echocardiograms.  Last echo from August reported normal diastolic function.  RV was moderately dilated with reduced function.    Past Medical History:   Diagnosis Date    Arrhythmia     Breath shortness 2023    prior to 2023 surgery    Cyclic vomiting syndrome     Elevated hemidiaphragm - LEFT post AVR 2023    Fracture     Headache, classical migraine     Heart burn     Heart valve disease 2023    heart surgery in 2023    Hyperlipidemia 2024    medicated    Hypertension 2024 pt not currently taking any medication for this at this time    Indigestion     Pneumonia due to infectious organism 2023    Snoring     2024 no sleep study done       Social History     Socioeconomic History    Marital status: Single     Spouse name: Not on file    Number of children: Not on file    Years of education: Not on file    Highest education level: Not on file   Occupational History    Not on file   Tobacco Use    Smoking status: Every Day     Current packs/day: 0.00     Average packs/day: 0.5 packs/day for 47.5 years (23.7 ttl pk-yrs)     Types: Cigarettes     Start date: 1976     Last attempt to quit: 2024     Years since quittin.5     Passive exposure: Never     Smokeless tobacco: Never    Tobacco comments:     2-3 a day   Vaping Use    Vaping status: Never Used   Substance and Sexual Activity    Alcohol use: Not Currently    Drug use: Not Currently     Types: Oral     Comment: past problems with narcotics not since 2019    Sexual activity: Not on file   Other Topics Concern    Not on file   Social History Narrative    Not on file     Social Drivers of Health     Financial Resource Strain: Low Risk  (7/7/2023)    Overall Financial Resource Strain (CARDIA)     Difficulty of Paying Living Expenses: Not hard at all   Food Insecurity: No Food Insecurity (9/17/2024)    Hunger Vital Sign     Worried About Running Out of Food in the Last Year: Never true     Ran Out of Food in the Last Year: Never true   Transportation Needs: No Transportation Needs (9/17/2024)    PRAPARE - Transportation     Lack of Transportation (Medical): No     Lack of Transportation (Non-Medical): No   Physical Activity: Not on file   Stress: Not on file   Social Connections: Not on file   Intimate Partner Violence: Not At Risk (9/17/2024)    Humiliation, Afraid, Rape, and Kick questionnaire     Fear of Current or Ex-Partner: No     Emotionally Abused: No     Physically Abused: No     Sexually Abused: No   Housing Stability: Low Risk  (9/17/2024)    Housing Stability Vital Sign     Unable to Pay for Housing in the Last Year: No     Number of Times Moved in the Last Year: 1     Homeless in the Last Year: No   Recent Concern: Housing Stability - High Risk (8/20/2024)    Housing Stability Vital Sign     Unable to Pay for Housing in the Last Year: No     Number of Times Moved in the Last Year: 2     Homeless in the Last Year: No       History reviewed. No pertinent family history.    Current Outpatient Medications on File Prior to Visit   Medication Sig Dispense Refill    omeprazole (PRILOSEC) 20 MG delayed-release capsule Take 20 mg by mouth every day.      Omega-3 Fatty Acids (FISH OIL PO) Take 1 Tablet by  "mouth every day.      Multiple Vitamins-Minerals (ONE DAILY MENS PO) Take 1 Tablet by mouth every day.      rivaroxaban (XARELTO) 20 MG Tab tablet Take 20 mg by mouth with dinner.       No current facility-administered medications on file prior to visit.       Morphine      ROS:   Review of Systems   Constitutional:  Negative for chills, diaphoresis, fever, malaise/fatigue and weight loss.   HENT:  Negative for congestion, ear discharge, ear pain, hearing loss, nosebleeds, sinus pain, sore throat and tinnitus.    Eyes:  Negative for blurred vision, double vision, photophobia, pain, discharge and redness.   Respiratory:  Positive for shortness of breath and wheezing. Negative for cough, hemoptysis, sputum production and stridor.    Cardiovascular:  Negative for chest pain, palpitations, orthopnea, claudication, leg swelling and PND.   Gastrointestinal:  Negative for abdominal pain, constipation, diarrhea, heartburn, nausea and vomiting.   Genitourinary:  Negative for dysuria and urgency.   Musculoskeletal:  Negative for back pain, falls, joint pain, myalgias and neck pain.   Skin:  Negative for itching and rash.   Neurological:  Negative for dizziness, tremors, speech change, focal weakness, weakness and headaches.   Endo/Heme/Allergies:  Negative for environmental allergies.   Psychiatric/Behavioral:  Negative for depression.        BP (!) 146/88   Pulse 77   Ht 1.727 m (5' 8\")   Wt 95.3 kg (210 lb)   SpO2 96%   Physical Exam  Vitals reviewed.   Constitutional:       General: He is not in acute distress.     Appearance: Normal appearance.   HENT:      Head: Normocephalic and atraumatic.      Right Ear: External ear normal.      Left Ear: External ear normal.      Nose: Nose normal. No congestion.      Mouth/Throat:      Mouth: Mucous membranes are moist.      Pharynx: Oropharynx is clear. No oropharyngeal exudate.   Eyes:      General: No scleral icterus.     Extraocular Movements: Extraocular movements intact. "      Conjunctiva/sclera: Conjunctivae normal.      Pupils: Pupils are equal, round, and reactive to light.   Cardiovascular:      Rate and Rhythm: Normal rate and regular rhythm.      Heart sounds: Murmur heard.      No gallop.      Comments: II/VI systolic ejection murmur  Pulmonary:      Effort: Pulmonary effort is normal. No respiratory distress.      Breath sounds: No wheezing or rales.      Comments: No air movement LLL  Abdominal:      Palpations: Abdomen is soft.   Musculoskeletal:         General: Normal range of motion.      Cervical back: Normal range of motion and neck supple.      Right lower leg: No edema.      Left lower leg: No edema.   Skin:     General: Skin is warm and dry.      Findings: No rash.   Neurological:      General: No focal deficit present.      Mental Status: He is alert and oriented to person, place, and time.      Cranial Nerves: No cranial nerve deficit.   Psychiatric:         Mood and Affect: Mood normal.         Behavior: Behavior normal.         PFTs as reviewed by me personally: As per HPI    Imagaing as reviewed by me personally: As per HPI    Assessment:  1. Elevated hemidiaphragm  Bronchoscopy      2. Mixed restrictive and obstructive lung disease (HCC)  Bronchoscopy      3. S/P TAVR (transcatheter aortic valve replacement)        4. Pulmonary hypertension (HCC)        5. Tobacco use            Plan:  This was present prior to TAVR arguing against complication from his procedure.  Not clear if it was present in December at [facility] but we can try to obtain imaging for review.  He did not have paradoxical motion suggesting full paralysis but could have phrenic nerve injury.  He is high risk for cancer and although there is no clear endobronchial lesion on CT imaging, I am recommending we proceed with bronchoscopy to ensure that is not the case.  He is not hypoxic at rest on room air today.  This is likely a combination of poorly functioning left hemidiaphragm in addition  to obstructive airways disease in a patient with chronic tobacco use.  He has not benefited from bronchodilator therapy.  He was counseled on tobacco cessation which she is actively working to cut back.  Appears to be functioning normally although he does have a stenotic murmur on exam today.  Pending the results of bronchoscopy, will we may suggest right heart cath with cardiology.  This has been present intermittently but appears to be worsening despite valve replacement.  We did not discuss evaluation for obstructive sleep apnea today but I do agree that this is warranted particularly if the diaphragm is chronic which will be easier to elucidate once imaging from [facility] in December is easier review.  Consider right heart cath as per above given symptoms are new since January 2023 and progressive.  Counseled on tobacco cessation.  Return in about 4 weeks (around 1/16/2025) for bronchoscopy results.

## 2024-12-19 NOTE — Clinical Note
Can you see if we can have CT and CXR from [facility] in 12/2022 pushed? Thank you!

## 2024-12-20 ENCOUNTER — HOSPITAL ENCOUNTER (OUTPATIENT)
Dept: RADIOLOGY | Facility: MEDICAL CENTER | Age: 64
End: 2024-12-20

## 2024-12-20 ENCOUNTER — HOSPITAL ENCOUNTER (OUTPATIENT)
Dept: RADIOLOGY | Facility: MEDICAL CENTER | Age: 64
End: 2024-12-20
Attending: EMERGENCY MEDICINE

## 2024-12-20 ENCOUNTER — APPOINTMENT (OUTPATIENT)
Dept: ADMISSIONS | Facility: MEDICAL CENTER | Age: 64
End: 2024-12-20
Attending: INTERNAL MEDICINE
Payer: MEDICAID

## 2024-12-26 ENCOUNTER — PRE-ADMISSION TESTING (OUTPATIENT)
Dept: ADMISSIONS | Facility: MEDICAL CENTER | Age: 64
End: 2024-12-26
Attending: INTERNAL MEDICINE
Payer: MEDICAID

## 2024-12-26 NOTE — PREADMIT AVS NOTE
Current Medications   Medication Instructions    omeprazole (PRILOSEC) 20 MG delayed-release capsule Continue taking medication as prescribed, including morning of procedure     Omega-3 Fatty Acids (FISH OIL PO) Stop 7 days before surgery    Multiple Vitamins-Minerals (ONE DAILY MENS PO) Stop 7 days before surgery

## 2024-12-30 ENCOUNTER — APPOINTMENT (OUTPATIENT)
Dept: RADIOLOGY | Facility: MEDICAL CENTER | Age: 64
DRG: 203 | End: 2024-12-30
Attending: STUDENT IN AN ORGANIZED HEALTH CARE EDUCATION/TRAINING PROGRAM
Payer: MEDICAID

## 2024-12-30 ENCOUNTER — HOSPITAL ENCOUNTER (INPATIENT)
Facility: MEDICAL CENTER | Age: 64
LOS: 1 days | End: 2024-12-31
Attending: STUDENT IN AN ORGANIZED HEALTH CARE EDUCATION/TRAINING PROGRAM | Admitting: STUDENT IN AN ORGANIZED HEALTH CARE EDUCATION/TRAINING PROGRAM
Payer: MEDICAID

## 2024-12-30 DIAGNOSIS — R05.1 ACUTE COUGH: ICD-10-CM

## 2024-12-30 DIAGNOSIS — J98.4 PNEUMONITIS: ICD-10-CM

## 2024-12-30 DIAGNOSIS — J96.01 ACUTE HYPOXIC RESPIRATORY FAILURE (HCC): ICD-10-CM

## 2024-12-30 PROBLEM — R09.02 HYPOXIA: Status: ACTIVE | Noted: 2024-12-30

## 2024-12-30 LAB
ALBUMIN SERPL BCP-MCNC: 3.7 G/DL (ref 3.2–4.9)
ALBUMIN/GLOB SERPL: 1.5 G/DL
ALP SERPL-CCNC: 43 U/L (ref 30–99)
ALT SERPL-CCNC: 62 U/L (ref 2–50)
ANION GAP SERPL CALC-SCNC: 12 MMOL/L (ref 7–16)
AST SERPL-CCNC: 47 U/L (ref 12–45)
BASOPHILS # BLD AUTO: 1 % (ref 0–1.8)
BASOPHILS # BLD: 0.19 K/UL (ref 0–0.12)
BILIRUB SERPL-MCNC: 0.4 MG/DL (ref 0.1–1.5)
BUN SERPL-MCNC: 21 MG/DL (ref 8–22)
CALCIUM ALBUM COR SERPL-MCNC: 9.4 MG/DL (ref 8.5–10.5)
CALCIUM SERPL-MCNC: 9.2 MG/DL (ref 8.5–10.5)
CHLORIDE SERPL-SCNC: 101 MMOL/L (ref 96–112)
CO2 SERPL-SCNC: 26 MMOL/L (ref 20–33)
CREAT SERPL-MCNC: 1 MG/DL (ref 0.5–1.4)
EKG IMPRESSION: NORMAL
EOSINOPHIL # BLD AUTO: 0.39 K/UL (ref 0–0.51)
EOSINOPHIL NFR BLD: 2.1 % (ref 0–6.9)
ERYTHROCYTE [DISTWIDTH] IN BLOOD BY AUTOMATED COUNT: 50.3 FL (ref 35.9–50)
FLUAV RNA SPEC QL NAA+PROBE: NEGATIVE
FLUBV RNA SPEC QL NAA+PROBE: NEGATIVE
GFR SERPLBLD CREATININE-BSD FMLA CKD-EPI: 83 ML/MIN/1.73 M 2
GLOBULIN SER CALC-MCNC: 2.5 G/DL (ref 1.9–3.5)
GLUCOSE SERPL-MCNC: 78 MG/DL (ref 65–99)
HCT VFR BLD AUTO: 48.9 % (ref 42–52)
HGB BLD-MCNC: 14.8 G/DL (ref 14–18)
IMM GRANULOCYTES # BLD AUTO: 0.29 K/UL (ref 0–0.11)
IMM GRANULOCYTES NFR BLD AUTO: 1.6 % (ref 0–0.9)
LYMPHOCYTES # BLD AUTO: 0.71 K/UL (ref 1–4.8)
LYMPHOCYTES NFR BLD: 3.8 % (ref 22–41)
MCH RBC QN AUTO: 20.6 PG (ref 27–33)
MCHC RBC AUTO-ENTMCNC: 30.3 G/DL (ref 32.3–36.5)
MCV RBC AUTO: 68 FL (ref 81.4–97.8)
MONOCYTES # BLD AUTO: 2.46 K/UL (ref 0–0.85)
MONOCYTES NFR BLD AUTO: 13.2 % (ref 0–13.4)
NEUTROPHILS # BLD AUTO: 14.59 K/UL (ref 1.82–7.42)
NEUTROPHILS NFR BLD: 78.3 % (ref 44–72)
NRBC # BLD AUTO: 0.02 K/UL
NRBC BLD-RTO: 0.1 /100 WBC (ref 0–0.2)
NT-PROBNP SERPL IA-MCNC: 528 PG/ML (ref 0–125)
PLATELET # BLD AUTO: 526 K/UL (ref 164–446)
POTASSIUM SERPL-SCNC: 4.8 MMOL/L (ref 3.6–5.5)
PROCALCITONIN SERPL-MCNC: 0.11 NG/ML
PROT SERPL-MCNC: 6.2 G/DL (ref 6–8.2)
RBC # BLD AUTO: 7.19 M/UL (ref 4.7–6.1)
RSV RNA SPEC QL NAA+PROBE: NEGATIVE
SARS-COV-2 RNA RESP QL NAA+PROBE: NOTDETECTED
SODIUM SERPL-SCNC: 139 MMOL/L (ref 135–145)
TROPONIN T SERPL-MCNC: 24 NG/L (ref 6–19)
WBC # BLD AUTO: 18.6 K/UL (ref 4.8–10.8)

## 2024-12-30 PROCEDURE — 93005 ELECTROCARDIOGRAM TRACING: CPT | Mod: TC | Performed by: STUDENT IN AN ORGANIZED HEALTH CARE EDUCATION/TRAINING PROGRAM

## 2024-12-30 PROCEDURE — 96374 THER/PROPH/DIAG INJ IV PUSH: CPT

## 2024-12-30 PROCEDURE — 84484 ASSAY OF TROPONIN QUANT: CPT

## 2024-12-30 PROCEDURE — 700102 HCHG RX REV CODE 250 W/ 637 OVERRIDE(OP)

## 2024-12-30 PROCEDURE — 0241U HCHG SARS-COV-2 COVID-19 NFCT DS RESP RNA 4 TRGT ED POC: CPT

## 2024-12-30 PROCEDURE — A9270 NON-COVERED ITEM OR SERVICE: HCPCS | Mod: UD | Performed by: STUDENT IN AN ORGANIZED HEALTH CARE EDUCATION/TRAINING PROGRAM

## 2024-12-30 PROCEDURE — 770006 HCHG ROOM/CARE - MED/SURG/GYN SEMI*

## 2024-12-30 PROCEDURE — 71275 CT ANGIOGRAPHY CHEST: CPT

## 2024-12-30 PROCEDURE — 700111 HCHG RX REV CODE 636 W/ 250 OVERRIDE (IP): Mod: JZ

## 2024-12-30 PROCEDURE — 700117 HCHG RX CONTRAST REV CODE 255: Mod: UD | Performed by: STUDENT IN AN ORGANIZED HEALTH CARE EDUCATION/TRAINING PROGRAM

## 2024-12-30 PROCEDURE — 84145 PROCALCITONIN (PCT): CPT

## 2024-12-30 PROCEDURE — 700102 HCHG RX REV CODE 250 W/ 637 OVERRIDE(OP): Mod: UD | Performed by: STUDENT IN AN ORGANIZED HEALTH CARE EDUCATION/TRAINING PROGRAM

## 2024-12-30 PROCEDURE — 700101 HCHG RX REV CODE 250: Performed by: STUDENT IN AN ORGANIZED HEALTH CARE EDUCATION/TRAINING PROGRAM

## 2024-12-30 PROCEDURE — 36415 COLL VENOUS BLD VENIPUNCTURE: CPT

## 2024-12-30 PROCEDURE — 94644 CONT INHLJ TX 1ST HOUR: CPT

## 2024-12-30 PROCEDURE — 85025 COMPLETE CBC W/AUTO DIFF WBC: CPT

## 2024-12-30 PROCEDURE — 96375 TX/PRO/DX INJ NEW DRUG ADDON: CPT

## 2024-12-30 PROCEDURE — 80053 COMPREHEN METABOLIC PANEL: CPT

## 2024-12-30 PROCEDURE — 99285 EMERGENCY DEPT VISIT HI MDM: CPT

## 2024-12-30 PROCEDURE — 93005 ELECTROCARDIOGRAM TRACING: CPT | Mod: TC

## 2024-12-30 PROCEDURE — A9270 NON-COVERED ITEM OR SERVICE: HCPCS

## 2024-12-30 PROCEDURE — 83880 ASSAY OF NATRIURETIC PEPTIDE: CPT

## 2024-12-30 PROCEDURE — 700111 HCHG RX REV CODE 636 W/ 250 OVERRIDE (IP): Mod: UD | Performed by: STUDENT IN AN ORGANIZED HEALTH CARE EDUCATION/TRAINING PROGRAM

## 2024-12-30 PROCEDURE — 71045 X-RAY EXAM CHEST 1 VIEW: CPT

## 2024-12-30 RX ORDER — GUAIFENESIN/DEXTROMETHORPHAN 100-10MG/5
10 SYRUP ORAL EVERY 6 HOURS PRN
Status: DISCONTINUED | OUTPATIENT
Start: 2024-12-30 | End: 2024-12-31 | Stop reason: HOSPADM

## 2024-12-30 RX ORDER — PROCHLORPERAZINE EDISYLATE 5 MG/ML
10 INJECTION INTRAMUSCULAR; INTRAVENOUS ONCE
Status: COMPLETED | OUTPATIENT
Start: 2024-12-30 | End: 2024-12-30

## 2024-12-30 RX ORDER — HYDROMORPHONE HYDROCHLORIDE 1 MG/ML
0.5 INJECTION, SOLUTION INTRAMUSCULAR; INTRAVENOUS; SUBCUTANEOUS ONCE
Status: COMPLETED | OUTPATIENT
Start: 2024-12-30 | End: 2024-12-30

## 2024-12-30 RX ORDER — ACETAMINOPHEN 325 MG/1
650 TABLET ORAL EVERY 6 HOURS PRN
Status: DISCONTINUED | OUTPATIENT
Start: 2024-12-30 | End: 2024-12-31 | Stop reason: HOSPADM

## 2024-12-30 RX ORDER — ALBUTEROL SULFATE 5 MG/ML
2.5 SOLUTION RESPIRATORY (INHALATION)
Status: DISCONTINUED | OUTPATIENT
Start: 2024-12-30 | End: 2024-12-31 | Stop reason: HOSPADM

## 2024-12-30 RX ORDER — BENZONATATE 100 MG/1
100 CAPSULE ORAL 3 TIMES DAILY PRN
Status: DISCONTINUED | OUTPATIENT
Start: 2024-12-30 | End: 2024-12-31 | Stop reason: HOSPADM

## 2024-12-30 RX ORDER — IPRATROPIUM BROMIDE AND ALBUTEROL SULFATE 2.5; .5 MG/3ML; MG/3ML
3 SOLUTION RESPIRATORY (INHALATION)
Status: DISCONTINUED | OUTPATIENT
Start: 2024-12-30 | End: 2024-12-31 | Stop reason: HOSPADM

## 2024-12-30 RX ORDER — ACETAMINOPHEN 500 MG
1000 TABLET ORAL ONCE
Status: COMPLETED | OUTPATIENT
Start: 2024-12-30 | End: 2024-12-30

## 2024-12-30 RX ORDER — ENOXAPARIN SODIUM 100 MG/ML
40 INJECTION SUBCUTANEOUS DAILY
Status: DISCONTINUED | OUTPATIENT
Start: 2024-12-30 | End: 2024-12-31 | Stop reason: HOSPADM

## 2024-12-30 RX ORDER — AZITHROMYCIN 250 MG/1
500 TABLET, FILM COATED ORAL DAILY
Status: DISCONTINUED | OUTPATIENT
Start: 2024-12-30 | End: 2024-12-31 | Stop reason: HOSPADM

## 2024-12-30 RX ORDER — PREDNISONE 20 MG/1
40 TABLET ORAL DAILY
Status: DISCONTINUED | OUTPATIENT
Start: 2024-12-31 | End: 2024-12-31 | Stop reason: HOSPADM

## 2024-12-30 RX ADMIN — Medication 10 MG/HR: at 16:13

## 2024-12-30 RX ADMIN — ACETAMINOPHEN 1000 MG: 500 TABLET ORAL at 16:52

## 2024-12-30 RX ADMIN — AZITHROMYCIN 500 MG: 250 TABLET, FILM COATED ORAL at 22:42

## 2024-12-30 RX ADMIN — IOHEXOL 50 ML: 350 INJECTION, SOLUTION INTRAVENOUS at 18:00

## 2024-12-30 RX ADMIN — ENOXAPARIN SODIUM 40 MG: 100 INJECTION SUBCUTANEOUS at 20:26

## 2024-12-30 RX ADMIN — ACETAMINOPHEN 650 MG: 325 TABLET ORAL at 22:42

## 2024-12-30 RX ADMIN — PROCHLORPERAZINE EDISYLATE 10 MG: 5 INJECTION INTRAMUSCULAR; INTRAVENOUS at 16:53

## 2024-12-30 RX ADMIN — HYDROMORPHONE HYDROCHLORIDE 0.5 MG: 1 INJECTION, SOLUTION INTRAMUSCULAR; INTRAVENOUS; SUBCUTANEOUS at 17:29

## 2024-12-30 ASSESSMENT — PAIN DESCRIPTION - PAIN TYPE
TYPE: ACUTE PAIN

## 2024-12-30 ASSESSMENT — FIBROSIS 4 INDEX: FIB4 SCORE: 1.05

## 2024-12-30 ASSESSMENT — LIFESTYLE VARIABLES: DO YOU DRINK ALCOHOL: YES

## 2024-12-30 NOTE — ED PROVIDER NOTES
ER Provider Note    Scribed for Beny Silveira M.D. by Ruby Lara. 12/30/2024   3:58 PM    Primary Care Provider: Caryl Pineda M.D.    CHIEF COMPLAINT  Chief Complaint   Patient presents with    Shortness of Breath    Cough     EXTERNAL RECORDS REVIEWED  Outpatient Notes On 12/19 the patient saw their pulmonologist for chronic shortness of breath, obstructive lung disease, and atrial fibrillation. The patient has a history of a CT, and he had a negative for angiography but it was suggestive for anastomosis. The patient underwent an AVR on January 23 rd. Additionally he had a negative stress test in September. In 3 days he is scheduled for a bronchoscopy to assess for the potential of cancer.      HPI/ROS  LIMITATION TO HISTORY   Select: : None  OUTSIDE HISTORIAN(S):  None    Noe Hickey is a 64 y.o. male with chronic shortness of breath who presents to the ED for evaluation of a cough onset one day ago. The patient notes he has had chronic shortness of breath for about a year and a half. He states he has associated chest pain. He mentions the cough is the reason he came in for evaluation today as this is a new symptom. The patient states he can only get a few words out before he needs to catch his breath. He denies needing supplemental oxygen at home. He notes he does not currently use an inhaler and it has not helped his symptoms when he has used an albuterol inhaler in the past. The patient has a history of a heart surgery two years ago. He denies nausea, vomiting, and diarrhea.  He denies a history of heart failure.    PAST MEDICAL HISTORY  Past Medical History:   Diagnosis Date    Apnea, sleep     Arrhythmia     Breath shortness 06/09/2023    prior to 1/2023 surgery    Chest pain     Chest tightness     Cyclic vomiting syndrome     Daytime sleepiness     Difficulty breathing     Dizziness     Elevated hemidiaphragm - LEFT post AVR 01/04/2023    Fracture     Frequent headaches     Gasping  for breath     Headache, classical migraine     Heart burn     Heart valve disease 06/09/2023    heart surgery in 1/2023    Heartburn     Hyperlipidemia 04/26/2024    medicated    Hypertension 04/26/2024 6/6/2024 pt not currently taking any medication for this at this time    Indigestion     Insomnia     Morning headache     Painful breathing     Painful joint     Pneumonia due to infectious organism 01/20/2023    Shortness of breath     Snoring     6/6/2024 no sleep study done    Sore muscles     Swelling of lower extremity     Weakness     Wears glasses     Wheezing        SURGICAL HISTORY  Past Surgical History:   Procedure Laterality Date    IRRIGATION & DEBRIDEMENT GENERAL N/A 6/7/2024    Procedure: RIGHT FOOT IRRIGATION AND DEBRIDEMENT;  Surgeon: Oliver Arreguin M.D.;  Location: [facility];  Service: Orthopedics    INCISION AND DRAINAGE GENERAL Left 04/19/2024    Procedure: INCISION AND DRAINAGE, LEG;  Surgeon: Mitch Bowie M.D.;  Location: [facility];  Service: Orthopedics    AORTIC VALVE REPLACEMENT  01/05/2023    Procedure: AORTIC VALVE REPLACEMENT, ASCENDING AORTIC ANEURYSM REPAIR AND TRANSESOPHAGEAL ECHOCARDIOGRAM.;  Surgeon: Serena Goyal M.D.;  Location: [facility];  Service: Cardiac    AORTIC ASCENDING DISSECTION  01/05/2023    Procedure: REPAIR, ANEURYSM OR DISSECTION, AORTA, ASCENDING;  Surgeon: Serena Goyal M.D.;  Location: [facility];  Service: Cardiac    ECHOCARDIOGRAM, TRANSESOPHAGEAL, INTRAOPERATIVE  01/05/2023    Procedure: ECHOCARDIOGRAM, TRANSESOPHAGEAL, INTRAOPERATIVE.;  Surgeon: Serena Goyal M.D.;  Location: [facility];  Service: Cardiac    IRRIGATION & DEBRIDEMENT ORTHO Right 09/19/2017    Procedure: IRRIGATION & DEBRIDEMENT ORTHO-THIGH;  Surgeon: Corbin Rivera M.D.;  Location: [facility];  Service: Orthopedics    SHOULDER HEMICAP RESURFACING Right 10/12/2015    Procedure: RIGHT SHOULDER RESURFACING;   "Surgeon: Javon Doll M.D.;  Location: [facility];  Service:     SHOULDER ARTHROSCOPY Bilateral     x3    SHOULDER SURGERY Right     replacement right       FAMILY HISTORY  History reviewed. No pertinent family history.    SOCIAL HISTORY   reports that he quit smoking about 7 months ago. His smoking use included cigarettes. He started smoking about 48 years ago. He has a 47.5 pack-year smoking history. He has never been exposed to tobacco smoke. He has never used smokeless tobacco. He reports that he does not currently use alcohol. He reports that he does not currently use drugs after having used the following drugs: Oral.    CURRENT MEDICATIONS  There are no discharge medications for this patient.      ALLERGIES  Allergies   Allergen Reactions    Morphine Shortness of Breath and Rash     Rash, shortness of breath        PHYSICAL EXAM  BP (!) 129/97   Pulse 83   Temp 36.9 °C (98.4 °F) (Temporal)   Resp (!) 23   Ht 1.778 m (5' 10\")   Wt 95.3 kg (210 lb)   SpO2 95%   BMI 30.13 kg/m²    General: Pleasant, alert, oriented male sitting on bed in mild distress  Head: Normocephalic atraumatic  Eyes: Extraocular motion intact  Neck: Supple, no rigidity  Cardiovascular: Regular rate and rhythm no murmurs rubs or gallops  Respiratory: Wheezing, speaking in short sentences, tripoding, flush, large sternotomy scar, no peripheral edema, no focal crackles, Clear to auscultation bilaterally, equal chest rise and fall, no increased work of breathing  Abdomen: Soft non tender no guarding  Musculoskeletal: Warm and well perfused, no peripheral edema  Neuro: Alert, no focal deficits  Integumentary: No wounds or rashes    DIAGNOSTIC STUDIES    Labs:   Labs Reviewed   CBC WITH DIFFERENTIAL - Abnormal; Notable for the following components:       Result Value    WBC 18.6 (*)     RBC 7.19 (*)     MCV 68.0 (*)     MCH 20.6 (*)     MCHC 30.3 (*)     RDW 50.3 (*)     Platelet Count 526 (*)     Neutrophils-Polys 78.30 (*)     " Lymphocytes 3.80 (*)     Immature Granulocytes 1.60 (*)     Neutrophils (Absolute) 14.59 (*)     Lymphs (Absolute) 0.71 (*)     Monos (Absolute) 2.46 (*)     Baso (Absolute) 0.19 (*)     Immature Granulocytes (abs) 0.29 (*)     All other components within normal limits   COMP METABOLIC PANEL - Abnormal; Notable for the following components:    AST(SGOT) 47 (*)     ALT(SGPT) 62 (*)     All other components within normal limits   PROBRAIN NATRIURETIC PEPTIDE, NT - Abnormal; Notable for the following components:    NT-proBNP 528 (*)     All other components within normal limits   TROPONIN - Abnormal; Notable for the following components:    Troponin T 24 (*)     All other components within normal limits   ESTIMATED GFR   PROCALCITONIN   CBC WITH DIFFERENTIAL   COMP METABOLIC PANEL   POCT COV-2, FLU A/B, RSV BY PCR   POC COV-2, FLU A/B, RSV BY PCR   Labs notable for leukocytosis 18.6, slightly higher than baseline, mildly elevated proBNP, similar to baseline, mild evaded troponin similar to baseline, procalcitonin not elevated.  COVID flu RSV negative.    EKG:   I have independently interpreted this EKG as detailed above.  Results for orders placed or performed during the hospital encounter of 24   EKG   Result Value Ref Range    Report       [facility] Emergency Dept.    Test Date:  2024  Pt Name:    NOE TOPETE               Department: ER  MRN:        [id number]                      Room:       25  Gender:     Male                         Technician: [id number]  :        1960                   Requested By:ER TRIAGE PROTOCOL  Order #:    [id number]                    Reading MD: Noe Silveira    Measurements  Intervals                                Axis  Rate:       83                           P:          64  ME:         158                          QRS:        4  QRSD:       85                           T:          58  QT:         355  QTc:        418    Interpretive  Statements  Sinus rhythm, rate of 83, normal axis, normal intervals, no ST elevations or  depressions.  No significant changes compared to prior 9/23/2024  Electronically Signed On 12- 23:12:51 PST by Noe Silveira       Radiology:       Radiologist interpretation:   CT-CTA CHEST PULMONARY ARTERY W/ RECONS   Final Result      1. No acute pulmonary embolus.   2. Left lower lobe atelectasis versus pneumonitis.   3. Postsurgical changes in the aortic valve.   4. Broad-based left hemidiaphragm eventration.               DX-CHEST-PORTABLE (1 VIEW)   Final Result      1.  No acute cardiopulmonary abnormality.   2.  Stable elevation of the left hemidiaphragm with associated basilar atelectasis.   3.  Stable enlargement of the cardiomediastinal silhouette.           INITIAL ASSESSMENT COURSE AND PLAN  Care Narrative     3:58 PM - Patient was evaluated at bedside. They present for a cough, symptoms remarkable for chest pain, shortness of breath, and a persistent cough. Pertinent PE findings include: Squeaky, short senses, tripoding, flush, large trichotomy scar, no peripheral edema, and no focal crackles. Differential diagnoses include but not limited to: mixed lung disease, and obstructive lung disease, wheezing, post obstructive pneumonia, malignancy, atypical ACS oral arrhythmia.    5: 06 PM - Patient was reevaluated at bedside.  No significant improvement.    5:21 PM - Patient was reevaluated at bedside because they mentioned to the nurse they felt like leaving. I talked to the patient further about their symptoms and the plan for admission. I discussed my concern for potential post-obstructive pneumonia vs PE and that he requires supplemental O2.  I informed the patient of my plan to admit today given the patient's current presentation and diagnostic study results. Patient verbalizes understanding and support with my plan for admission.     6:19 PM - I discussed the patient's case and the above findings with [facility] resident who agrees to see the patient for admission for supplemental O2, potentially pulmonology consultation as patient is supposed to get a bronchoscopy in 3 days.    Hydration: Based on the patient's presentation of Dehydration the patient was given IV fluids. IV Hydration was used because oral hydration was not adequate alone. Upon recheck following hydration, the patient was feeling improved.    DISPOSITION AND DISCUSSIONS    I have discussed management of the patient with the following physicians and SHAUN's:  [facility] family medicine resident    Discussion of management with other QHP or appropriate source(s): None         Decision tools and prescription drugs considered including, but not limited to: Antibiotics deferring to inpatient team no obvious infection/pneumonia .    DISPOSITION:  Patient will be hospitalized by [facility] in guarded condition.      FINAL DIAGNOSIS  1. Acute hypoxic respiratory failure (HCC)    2. Acute cough    3. Pneumonitis         Ruby FLETCHER (Patricia), am scribing for, and in the presence of, Noe Silveira M.D..    Electronically signed by: Ruby Lara (Patricia), 12/30/2024    INoe M.D. personally performed the services described in this documentation, as scribed by Ruby Lara in my presence, and it is both accurate and complete.      The note accurately reflects work and decisions made by me.  Noe Silveira M.D.  12/30/2024  11:22 PM

## 2024-12-30 NOTE — ED TRIAGE NOTES
"Chief Complaint   Patient presents with    Shortness of Breath    Cough     Pt BIB ambulance to triage for chronic SOB, worsening today and productive cough and headache since yesterday. Pt says he has PMH of obstructive lung disase. Increased work of breathing noted in triage. Pt says he took Excedrin this morning w/o relief.    Blood Pressure : (!) 131/105, Pulse: 90, Respiration: (!) 22, Temperature: 36.9 °C (98.4 °F), Height: 177.8 cm (5' 10\"), Weight: 95.3 kg (210 lb), BMI (Calculated): 30.13, BSA (Calculated): 2.2, Pulse Oximetry: 94 %, O2 (LPM): 0      SOB protocol ordered.          "

## 2024-12-31 VITALS
TEMPERATURE: 99.1 F | OXYGEN SATURATION: 91 % | WEIGHT: 210 LBS | RESPIRATION RATE: 18 BRPM | HEART RATE: 78 BPM | BODY MASS INDEX: 30.06 KG/M2 | HEIGHT: 70 IN | SYSTOLIC BLOOD PRESSURE: 131 MMHG | DIASTOLIC BLOOD PRESSURE: 87 MMHG

## 2024-12-31 PROBLEM — R09.02 HYPOXIA: Status: RESOLVED | Noted: 2024-12-30 | Resolved: 2024-12-31

## 2024-12-31 PROBLEM — J98.01 BRONCHOSPASM: Status: ACTIVE | Noted: 2024-12-31

## 2024-12-31 LAB
ALBUMIN SERPL BCP-MCNC: 3.7 G/DL (ref 3.2–4.9)
ALBUMIN/GLOB SERPL: 1.4 G/DL
ALP SERPL-CCNC: 39 U/L (ref 30–99)
ALT SERPL-CCNC: 62 U/L (ref 2–50)
ANION GAP SERPL CALC-SCNC: 12 MMOL/L (ref 7–16)
AST SERPL-CCNC: 58 U/L (ref 12–45)
BASOPHILS # BLD AUTO: 0.6 % (ref 0–1.8)
BASOPHILS # BLD: 0.11 K/UL (ref 0–0.12)
BILIRUB SERPL-MCNC: 0.6 MG/DL (ref 0.1–1.5)
BUN SERPL-MCNC: 23 MG/DL (ref 8–22)
CALCIUM ALBUM COR SERPL-MCNC: 9.6 MG/DL (ref 8.5–10.5)
CALCIUM SERPL-MCNC: 9.4 MG/DL (ref 8.5–10.5)
CHLORIDE SERPL-SCNC: 102 MMOL/L (ref 96–112)
CO2 SERPL-SCNC: 24 MMOL/L (ref 20–33)
CREAT SERPL-MCNC: 1.16 MG/DL (ref 0.5–1.4)
EOSINOPHIL # BLD AUTO: 0.25 K/UL (ref 0–0.51)
EOSINOPHIL NFR BLD: 1.3 % (ref 0–6.9)
ERYTHROCYTE [DISTWIDTH] IN BLOOD BY AUTOMATED COUNT: 49.9 FL (ref 35.9–50)
GFR SERPLBLD CREATININE-BSD FMLA CKD-EPI: 70 ML/MIN/1.73 M 2
GLOBULIN SER CALC-MCNC: 2.6 G/DL (ref 1.9–3.5)
GLUCOSE SERPL-MCNC: 87 MG/DL (ref 65–99)
HCT VFR BLD AUTO: 46.5 % (ref 42–52)
HGB BLD-MCNC: 14.3 G/DL (ref 14–18)
IMM GRANULOCYTES # BLD AUTO: 0.25 K/UL (ref 0–0.11)
IMM GRANULOCYTES NFR BLD AUTO: 1.3 % (ref 0–0.9)
LYMPHOCYTES # BLD AUTO: 0.66 K/UL (ref 1–4.8)
LYMPHOCYTES NFR BLD: 3.5 % (ref 22–41)
MCH RBC QN AUTO: 20.6 PG (ref 27–33)
MCHC RBC AUTO-ENTMCNC: 30.8 G/DL (ref 32.3–36.5)
MCV RBC AUTO: 67 FL (ref 81.4–97.8)
MONOCYTES # BLD AUTO: 2.41 K/UL (ref 0–0.85)
MONOCYTES NFR BLD AUTO: 12.7 % (ref 0–13.4)
NEUTROPHILS # BLD AUTO: 15.32 K/UL (ref 1.82–7.42)
NEUTROPHILS NFR BLD: 80.6 % (ref 44–72)
NRBC # BLD AUTO: 0.02 K/UL
NRBC BLD-RTO: 0.1 /100 WBC (ref 0–0.2)
PLATELET # BLD AUTO: 510 K/UL (ref 164–446)
POTASSIUM SERPL-SCNC: 4.5 MMOL/L (ref 3.6–5.5)
PROT SERPL-MCNC: 6.3 G/DL (ref 6–8.2)
RBC # BLD AUTO: 6.94 M/UL (ref 4.7–6.1)
SODIUM SERPL-SCNC: 138 MMOL/L (ref 135–145)
WBC # BLD AUTO: 19 K/UL (ref 4.8–10.8)

## 2024-12-31 PROCEDURE — 36415 COLL VENOUS BLD VENIPUNCTURE: CPT

## 2024-12-31 PROCEDURE — 99221 1ST HOSP IP/OBS SF/LOW 40: CPT | Performed by: STUDENT IN AN ORGANIZED HEALTH CARE EDUCATION/TRAINING PROGRAM

## 2024-12-31 PROCEDURE — 85025 COMPLETE CBC W/AUTO DIFF WBC: CPT

## 2024-12-31 PROCEDURE — 80053 COMPREHEN METABOLIC PANEL: CPT

## 2024-12-31 PROCEDURE — 700111 HCHG RX REV CODE 636 W/ 250 OVERRIDE (IP)

## 2024-12-31 PROCEDURE — 99238 HOSP IP/OBS DSCHRG MGMT 30/<: CPT | Performed by: STUDENT IN AN ORGANIZED HEALTH CARE EDUCATION/TRAINING PROGRAM

## 2024-12-31 PROCEDURE — 94640 AIRWAY INHALATION TREATMENT: CPT

## 2024-12-31 PROCEDURE — 700101 HCHG RX REV CODE 250

## 2024-12-31 RX ADMIN — IPRATROPIUM BROMIDE AND ALBUTEROL SULFATE 3 ML: .5; 2.5 SOLUTION RESPIRATORY (INHALATION) at 02:36

## 2024-12-31 RX ADMIN — PREDNISONE 40 MG: 20 TABLET ORAL at 06:23

## 2024-12-31 ASSESSMENT — COGNITIVE AND FUNCTIONAL STATUS - GENERAL
DAILY ACTIVITIY SCORE: 24
MOBILITY SCORE: 24
SUGGESTED CMS G CODE MODIFIER DAILY ACTIVITY: CH
SUGGESTED CMS G CODE MODIFIER MOBILITY: CH

## 2024-12-31 ASSESSMENT — PAIN DESCRIPTION - PAIN TYPE: TYPE: ACUTE PAIN

## 2024-12-31 NOTE — RESPIRATORY CARE
"COPD EDUCATION by COPD CLINICAL EDUCATOR  12/31/2024 at 11:56 AM by Lin Juarez, RRT     Patient interviewed by COPD education team. Patient refused COPD program at this time. Patients in distress and states he just want to leave. Offered breathing treatment and patient declined. Denied needing refill son respiratory medications. Team notified that patient wanted to discharge.   An Action Plan was updated in the EMR to reflect current Respiratory Medication use.    There are no respiratory medications for this patient                 COPD Screen  COPD Risk Screening  Do you have a history of COPD?: No  COPD Population Screener  During the past 4 weeks, how much did you feel short of breath?: None/Little of the time  Do you ever cough up any mucus or phlegm?: No/only with occasional colds or infections  In the past 12 months, you do less than you used to because of your breathing problems: Disagree/unsure  Have you smoked at least 100 cigarettes in your entire life?: Yes  How old are you?: 60+  COPD Screening Score: 4  COPD Coordinator Not Recommended: Yes    COPD Assessment       PFT Results    Lab Results   Component Value Date    FEV1/FVC % 80 09/18/2024    FVC % Predicted 50 09/18/2024    FEV1 % Predicted 41 09/18/2024    FVC 2.12 09/18/2024    FVC 1.33 09/18/2024       Meds to Beds        MY COPD ACTION PLAN     It is recommended that patients and physicians /healthcare providers complete this action plan together. This plan should be discussed at each physician visit and updated as needed.    The green, yellow and red zones show groups of symptoms of COPD. This list of symptoms is not comprehensive, and you may experience other symptoms. In the \"Actions\" column, your healthcare provider has recommended actions for you to take based on your symptoms.    Patient Name: Noe Hickey   YOB: 1960   Last Updated on: 8/21/2024  9:35 AM   Green Zone:  I am doing well today Actions     Usual " "activitiy and exercise level   Take daily medications     Usual amounts of cough and phlegm/mucus   Use oxygen as prescribed     Sleep well at night   Continue regular exercise/diet plan     Appetite is good   At all times avoid cigarette smoke, inhaled irritants     Daily Medications (these medications are taken every day):   Budesonide-Formoterol Fumarate (Symbicort) 2 Puffs Twice daily     Additional Information:  Use the a spacer, rinse mouth after taking        Yellow Zone:  I am having a bad day or a COPD flare Actions     More breathless than usual   Continue daily medications     I have less energy for my daily activities   Use quick relief inhaler as ordered     Increased or thicker phlegm/mucus   Use oxygen as prescribed     Using quick relief inhaler/nebulizer more often   Get plenty of rest     Swelling of ankles more than usual   Use pursed lip breathing     More coughing than usual   At all times avoid cigarette smoke, inhaled irritants     I feel like I have a \"chest cold\"     Poor sleep and my symptoms woke me up     My appetite is not good     My medicine is not helping      Call provider immediately if symptoms don’t improve     Continue daily medications, add rescue medications:   Albuterol 2 Puffs Every 6 hours PRN       Medications to be used during a flare up, (as Discussed with Provider):           Additional Information:  Use with a spacer    Red Zone:  I need urgent medical care Actions     Severe shortness of breath even at rest   Call 911 or seek medical care immediately     Not able to do any activity because of breathing      Fever or shaking chills      Feeling confused or very drowsy       Chest pains      Coughing up blood                  "

## 2024-12-31 NOTE — CARE PLAN
The patient is Stable - Low risk of patient condition declining or worsening    Shift Goals  Clinical Goals: maintain o2, Abx  Patient Goals: feel better  Family Goals: HUGH    Progress made toward(s) clinical / shift goals:    Problem: Pain - Standard  Goal: Alleviation of pain or a reduction in pain to the patient’s comfort goal  Outcome: Progressing       Patient is having no complaints of pain. Pt has been feeling restless during the shift. No other complaints or concerns. No questions at this time.

## 2024-12-31 NOTE — H&P
[facility] MEDICINE HISTORY AND PHYSICAL     PATIENT ID:  NAME:  Noe Hickey  MRN:               [id number]  YOB: 1960    Date of Admission: 12/30/2024     Attending: Arnie Sousa M.d.  Primary Care Physician:  Caryl Pineda M.D.    CC:    Chief Complaint   Patient presents with    Shortness of Breath    Cough       HPI: Noe Hickey is a 64 y.o. male who presented to ED complaining of worsening shortness of breath and acute onset of productive cough that started yesterday.  Patient is denying home oxygen use.    Per chart review patient does have a 40-pack-year smoking history, and frequent and multiple hospitalization for shortness of breath that has worsened since TAVR procedure in January 2023 for AS. Cardiac workup including nuclear stress test from September showed no evidence of stress-induced ischemia. ECHO from 8/2024 showed estimated LV EF of 55%. RV is moderately dilated with reduced systolic function. LV is mildly dilated.  Known TAVR aortic valve with borderline elevated gradients at upper limits of normal. Mild tricuspid regurgitation. Right heart pressures are consistent with moderate pulmonary hypertension.    Patient has history of left hemidiaphragm dysfunction on imaging from hospital admission from 9/17/2024. He had referrals to cardiothoracic surgery, but had no response from them. Patient was seen by pulmonology on 12/19/24, for evaluation of his shortness of breath.  His PFTs from 12/2024 showed mixed restrictive obstructive lung disease with significant bronchodilator response.  Bronchoscopy was planned patient for Thursday, 1/2/25.      ERCourse:  In the ED, patient was placed on 3L of supplemental oxygen via NC as he desatted to 87% in room air. Patient also received breathing treatment. CBC showed elevated WBCS (18.6), neutrophils and absolute neutrophils (78.3/14.59). Troponin remained elevated but stable at 24 from 9/2024. BNP is 528. Procalcitonin (0.11,  wnl). CXR showed no acute cardiopulmonary changes, effusion or pneumothorax. Chest CTA was negative for PE. Viral panel was negative for COVID/RSV/Influenza.    REVIEW OF SYSTEMS:   Ten systems reviewed and were negative except as noted in the HPI.                PAST MEDICAL HISTORY:  Past Medical History:   Diagnosis Date    Apnea, sleep     Arrhythmia     Breath shortness 06/09/2023    prior to 1/2023 surgery    Chest pain     Chest tightness     Cyclic vomiting syndrome     Daytime sleepiness     Difficulty breathing     Dizziness     Elevated hemidiaphragm - LEFT post AVR 01/04/2023    Fracture     Frequent headaches     Gasping for breath     Headache, classical migraine     Heart burn     Heart valve disease 06/09/2023    heart surgery in 1/2023    Heartburn     Hyperlipidemia 04/26/2024    medicated    Hypertension 04/26/2024 6/6/2024 pt not currently taking any medication for this at this time    Indigestion     Insomnia     Morning headache     Painful breathing     Painful joint     Pneumonia due to infectious organism 01/20/2023    Shortness of breath     Snoring     6/6/2024 no sleep study done    Sore muscles     Swelling of lower extremity     Weakness     Wears glasses     Wheezing        PAST SURGICAL HISTORY:  Past Surgical History:   Procedure Laterality Date    IRRIGATION & DEBRIDEMENT GENERAL N/A 6/7/2024    Procedure: RIGHT FOOT IRRIGATION AND DEBRIDEMENT;  Surgeon: Oliver Arreguin M.D.;  Location: SURGERY [facility];  Service: Orthopedics    INCISION AND DRAINAGE GENERAL Left 04/19/2024    Procedure: INCISION AND DRAINAGE, LEG;  Surgeon: Mitch Bowie M.D.;  Location: SURGERY [facility];  Service: Orthopedics    AORTIC VALVE REPLACEMENT  01/05/2023    Procedure: AORTIC VALVE REPLACEMENT, ASCENDING AORTIC ANEURYSM REPAIR AND TRANSESOPHAGEAL ECHOCARDIOGRAM.;  Surgeon: Serena Goyal M.D.;  Location: SURGERY [facility];  Service: Cardiac    AORTIC ASCENDING DISSECTION   2023    Procedure: REPAIR, ANEURYSM OR DISSECTION, AORTA, ASCENDING;  Surgeon: Serena Goyal M.D.;  Location: SURGERY [facility];  Service: Cardiac    ECHOCARDIOGRAM, TRANSESOPHAGEAL, INTRAOPERATIVE  2023    Procedure: ECHOCARDIOGRAM, TRANSESOPHAGEAL, INTRAOPERATIVE.;  Surgeon: Serena Goyal M.D.;  Location: SURGERY [facility];  Service: Cardiac    IRRIGATION & DEBRIDEMENT ORTHO Right 2017    Procedure: IRRIGATION & DEBRIDEMENT ORTHO-THIGH;  Surgeon: Corbin Rivera M.D.;  Location: SURGERY [facility];  Service: Orthopedics    SHOULDER HEMICAP RESURFACING Right 10/12/2015    Procedure: RIGHT SHOULDER RESURFACING;  Surgeon: Javon Doll M.D.;  Location: SURGERY [facility];  Service:     SHOULDER ARTHROSCOPY Bilateral     x3    SHOULDER SURGERY Right     replacement right       FAMILY HISTORY:  History reviewed. No pertinent family history.    SOCIAL HISTORY:   Social History     Socioeconomic History    Marital status: Single     Spouse name: Not on file    Number of children: Not on file    Years of education: Not on file    Highest education level: Not on file   Occupational History    Not on file   Tobacco Use    Smoking status: Former     Current packs/day: 0.00     Average packs/day: 0.7 packs/day for 71.3 years (47.5 ttl pk-yrs)     Types: Cigarettes     Start date: 1976     Quit date: 2024     Years since quittin.5     Passive exposure: Never    Smokeless tobacco: Never    Tobacco comments:     2-3 a day   Vaping Use    Vaping status: Never Used   Substance and Sexual Activity    Alcohol use: Not Currently    Drug use: Not Currently     Types: Oral     Comment: past problems with narcotics not since     Sexual activity: Not on file   Other Topics Concern    Not on file   Social History Narrative    Not on file     Social Drivers of Health     Financial Resource Strain: Low Risk  (2023)    Overall Financial Resource Strain (CARDIA)     Difficulty of  Paying Living Expenses: Not hard at all   Food Insecurity: No Food Insecurity (9/17/2024)    Hunger Vital Sign     Worried About Running Out of Food in the Last Year: Never true     Ran Out of Food in the Last Year: Never true   Transportation Needs: No Transportation Needs (9/17/2024)    PRAPARE - Transportation     Lack of Transportation (Medical): No     Lack of Transportation (Non-Medical): No   Physical Activity: Not on file   Stress: Not on file   Social Connections: Not on file   Intimate Partner Violence: Not At Risk (9/17/2024)    Humiliation, Afraid, Rape, and Kick questionnaire     Fear of Current or Ex-Partner: No     Emotionally Abused: No     Physically Abused: No     Sexually Abused: No   Housing Stability: Low Risk  (9/17/2024)    Housing Stability Vital Sign     Unable to Pay for Housing in the Last Year: No     Number of Times Moved in the Last Year: 1     Homeless in the Last Year: No   Recent Concern: Housing Stability - High Risk (8/20/2024)    Housing Stability Vital Sign     Unable to Pay for Housing in the Last Year: No     Number of Times Moved in the Last Year: 2     Homeless in the Last Year: No       DIET:   Orders Placed This Encounter   Procedures    Diet Order Diet: Cardiac     Standing Status:   Standing     Number of Occurrences:   1     Order Specific Question:   Diet:     Answer:   Cardiac [6]       ALLERGIES:  Allergies   Allergen Reactions    Morphine Shortness of Breath and Rash     Rash, shortness of breath       OUTPATIENT MEDICATIONS:    Current Facility-Administered Medications:     Respiratory Therapy Consult, , Nebulization, Continuous RT, Feliciano Anderson M.D.    enoxaparin (Lovenox) inj 40 mg, 40 mg, Subcutaneous, DAILY AT 1800, Feliciano Anderson M.D., 40 mg at 12/30/24 2026    acetaminophen (Tylenol) tablet 650 mg, 650 mg, Oral, Q6HRS PRN, Feliciano Anderson M.D.    guaiFENesin dextromethorphan (Robitussin DM) 100-10 MG/5ML syrup 10 mL, 10 mL, Oral, Q6HRS  PRN, Feliciano Anderson M.D.    ipratropium-albuterol (DUONEB) nebulizer solution, 3 mL, Nebulization, Q4HRS (RT), Feliciano Anderson M.D.    albuterol (Proventil) 2.5mg/0.5ml nebulizer solution 2.5 mg, 2.5 mg, Nebulization, Q2HRS PRN (RT), Feliciano Anderson M.D.    [START ON 2024] predniSONE (Deltasone) tablet 40 mg, 40 mg, Oral, DAILY, Feliciano Anderson M.D.    benzonatate (Tessalon) capsule 100 mg, 100 mg, Oral, TID PRN, Feliciano Anderson M.D.    PHYSICAL EXAM:  Vitals:    24 1729 24 1800 24 1859 24 195   BP: 136/79 (!) 133/98 128/66 94/62   Pulse: 95 94 85 79   Resp: 18 16 (!) 23 19   Temp:    37.1 °C (98.7 °F)   TempSrc:    Temporal   SpO2: 92% 93% 93% 95%   Weight:       Height:       , Temp (24hrs), Av °C (98.6 °F), Min:36.9 °C (98.4 °F), Max:37.1 °C (98.7 °F)  , Pulse Oximetry: 95 %, O2 (LPM): 3.5, O2 Delivery Device: Nasal Cannula    General: Pt resting in NAD, cooperative. Easily aroused from sleep.  Skin:  Pink, warm and dry.  No rashes  HEENT: NC/AT. PERRL. EOMI. MMM. No nasal discharge. Oropharynx nonerythematous without exudate/plaques  Lungs:  Shortness of breath, decreased breath sounds in the lower lung fields. No wheezing   Cardiovascular:  Normal S1/S2, systolic murmur.  Abdomen:  BS+, Soft, NT/ND.  Extremities:  Full range of motion. No gross deformities noted. 2+ pulses in all extremities. No C/C/E   CNS:  A&Ox4, follows commands, Strength 5/5 in all extremities.         LAB TESTS:   Admission on 2024   Component Date Value Ref Range Status    Report 2024    Preliminary                    Value:[facility]    Test Date:  2024  Pt Name:    JUSTIN TOPETE               Department: ER  MRN:        [id number]                      Room:  Gender:     Male                         Technician: [id number]  :        1960                   Requested By:ER TRIAGE PROTOCOL  Order #:    [id number]                     Reading MD:    Measurements  Intervals                                Axis  Rate:       83                           P:          64  MO:         158                          QRS:        4  QRSD:       85                           T:          58  QT:         355  QTc:        418    Interpretive Statements  Sinus rhythm  Probable left atrial enlargement  Compared to ECG 09/23/2024 13:02:23  T-wave abnormality no longer present      WBC 12/30/2024 18.6 (H)  4.8 - 10.8 K/uL Final    RBC 12/30/2024 7.19 (H)  4.70 - 6.10 M/uL Final    Hemoglobin 12/30/2024 14.8  14.0 - 18.0 g/dL Final    Hematocrit 12/30/2024 48.9  42.0 - 52.0 % Final    MCV 12/30/2024 68.0 (L)  81.4 - 97.8 fL Final    MCH 12/30/2024 20.6 (L)  27.0 - 33.0 pg Final    MCHC 12/30/2024 30.3 (L)  32.3 - 36.5 g/dL Final    RDW 12/30/2024 50.3 (H)  35.9 - 50.0 fL Final    Platelet Count 12/30/2024 526 (H)  164 - 446 K/uL Final    Neutrophils-Polys 12/30/2024 78.30 (H)  44.00 - 72.00 % Final    Lymphocytes 12/30/2024 3.80 (L)  22.00 - 41.00 % Final    Monocytes 12/30/2024 13.20  0.00 - 13.40 % Final    Eosinophils 12/30/2024 2.10  0.00 - 6.90 % Final    Basophils 12/30/2024 1.00  0.00 - 1.80 % Final    Immature Granulocytes 12/30/2024 1.60 (H)  0.00 - 0.90 % Final    Nucleated RBC 12/30/2024 0.10  0.00 - 0.20 /100 WBC Final    Neutrophils (Absolute) 12/30/2024 14.59 (H)  1.82 - 7.42 K/uL Final    Includes immature neutrophils, if present.    Lymphs (Absolute) 12/30/2024 0.71 (L)  1.00 - 4.80 K/uL Final    Monos (Absolute) 12/30/2024 2.46 (H)  0.00 - 0.85 K/uL Final    Eos (Absolute) 12/30/2024 0.39  0.00 - 0.51 K/uL Final    Baso (Absolute) 12/30/2024 0.19 (H)  0.00 - 0.12 K/uL Final    Immature Granulocytes (abs) 12/30/2024 0.29 (H)  0.00 - 0.11 K/uL Final    NRBC (Absolute) 12/30/2024 0.02  K/uL Final    Sodium 12/30/2024 139  135 - 145 mmol/L Final    Potassium 12/30/2024 4.8  3.6 - 5.5 mmol/L Final    Chloride 12/30/2024 101  96 - 112 mmol/L  Final    Co2 12/30/2024 26  20 - 33 mmol/L Final    Anion Gap 12/30/2024 12.0  7.0 - 16.0 Final    Glucose 12/30/2024 78  65 - 99 mg/dL Final    Bun 12/30/2024 21  8 - 22 mg/dL Final    Creatinine 12/30/2024 1.00  0.50 - 1.40 mg/dL Final    Calcium 12/30/2024 9.2  8.5 - 10.5 mg/dL Final    Correct Calcium 12/30/2024 9.4  8.5 - 10.5 mg/dL Final    AST(SGOT) 12/30/2024 47 (H)  12 - 45 U/L Final    ALT(SGPT) 12/30/2024 62 (H)  2 - 50 U/L Final    Alkaline Phosphatase 12/30/2024 43  30 - 99 U/L Final    Total Bilirubin 12/30/2024 0.4  0.1 - 1.5 mg/dL Final    Albumin 12/30/2024 3.7  3.2 - 4.9 g/dL Final    Total Protein 12/30/2024 6.2  6.0 - 8.2 g/dL Final    Globulin 12/30/2024 2.5  1.9 - 3.5 g/dL Final    A-G Ratio 12/30/2024 1.5  g/dL Final    NT-proBNP 12/30/2024 528 (H)  0 - 125 pg/mL Final    Troponin T 12/30/2024 24 (H)  6 - 19 ng/L Final    Comment: Biotin intake of greater than 5 mg per day may interfere with  troponin levels, causing false low values.    The Ultra High Sensitivity Troponin T test has a reference range  for positive troponins that follows the recommendation of ACC for  the 99th percentile reference population.      POC Influenza A RNA, PCR 12/30/2024 Negative  Negative Final    POC Influenza B RNA, PCR 12/30/2024 Negative  Negative Final    POC RSV, by PCR 12/30/2024 Negative  Negative Final    POC SARS-CoV-2, PCR 12/30/2024 NotDetected  NotDetected Final    Comment: [facility] providers: PLEASE REFER TO DE-ESCALATION AND RETESTING PROTOCOL  on [email or web address]    **The [employer] GeneXpert Xpress SARS-CoV-2 RT-PCR Test has been made  available for use under the Emergency Use Authorization (EUA) only.      GFR (CKD-EPI) 12/30/2024 83  >60 mL/min/1.73 m 2 Final    Comment: Estimated Glomerular Filtration Rate is calculated using  race neutral CKD-EPI 2021 equation per NKF-ASN recommendations.      Procalcitonin 12/30/2024 0.11  <0.25 ng/mL Final    Comment: Initiation of empiric therapy (only if  patient does not meet  CMS sepsis inclusion criteria):  LOWER RESPIRATORY TRACT INFECTION  ---------------------------------  <=0.25 ng/mL   Unlikely to be bacterial; antibiotics are  discouraged (may repeat in 6 hours if antibiotics  are withheld)    >0.25  ng/mL   Likely to be bacterial; antibiotics encouraged    De-escalation/Discontinuation of therapy:  LOWER RESPIRATORY TRACT INFECTION  ---------------------------------  <=0.25 ng/mL   Cessation of antibiotics is encouraged unless  patient is clinically unstable    Decrease from baseline by >=80%   Cessation of antibiotics is  encouraged unless patient is clinically unstable    Increasing or not decreasing from baseline AND >0.50 ng/mL  Consider possible treatment failure; expansion of  antibiotic coverage is encouraged  Consider possible treatment failure; expansion of  SEPSIS  ------  <=0.50 ng/mL   Cessation of antibiotics is encouraged unless  patient is clinically unstable    Decrease fr                           om baseline by >=80%   Cessation of antibiotics is  encouraged unless patient is clinically unstable    Increasing or not decreasing from baseline AND >0.50 ng/mL  Consider possible treatment failure; expansion of  antibiotic coverage is encouraged            CULTURES:   Results       ** No results found for the last 168 hours. **            IMAGES:  CT-CTA CHEST PULMONARY ARTERY W/ RECONS   Final Result      1. No acute pulmonary embolus.   2. Left lower lobe atelectasis versus pneumonitis.   3. Postsurgical changes in the aortic valve.   4. Broad-based left hemidiaphragm eventration.               DX-CHEST-PORTABLE (1 VIEW)   Final Result      1.  No acute cardiopulmonary abnormality.   2.  Stable elevation of the left hemidiaphragm with associated basilar atelectasis.   3.  Stable enlargement of the cardiomediastinal silhouette.          CONSULTS:   None    ASSESSMENT/PLAN: 64 y.o. male admitted for:    #Hypoxia  #COPD Exacerbation  Patient  with a 40-pack-year smoking history, mixed restrictive obstructive lung disease, left hemidiaphragm dysfunction presented to the ED for worsening dyspnea and new onset of productive cough, requiring supplemental oxygen,3L on NC when he desatted to 87% in room air. CBC showed elevated WBCS (18.6), neutrophils and absolute neutrophils (78.3/14.59), respectively. Troponin remained elevated but stable at 24 from 9/2024. BNP is 528. Procalcitonin (0.11) wnl). CXP showed no acute cardiopulmonary changes, no effusion or pneumothorax. CTA was negative for PE. Viral panel was negative for COVID/RSV/Influenza.    On physical exam, notable shortness of breath with completion of sentences. There is decreased breath sounds in the lower left fields. No wheezing on ausculation. Patient currently on 3.5L supplemental oxygen via NC satting at 95%.   - Start azithromycin, 500mg for 3 days.  - Start prednisone, 40mg for 5 days.  - Continue pulse oximetry.  - Wean off supplemental oxygen as tolerated.  - RT protocol in place (albuterol -ipratropium- DUONEB).  - Benzonatate as needed for cough.  - Tylenol as needed for pain.  - Aspiration precaution in place.  - Consider IP pulmonary consult for possible inpatient bronchoscopy.      Core Measures:  Fluids: none  Lines: PIV  Abx: Azithromycin  Diet: cardiac  PPX: Lovenox  DISPO: inpatient for for hypoxia requiring supplemental oxygen    CODE STATUS: Full      eSnthil Anderson, PGY3  [facility]

## 2024-12-31 NOTE — PROGRESS NOTES
Pt discharged off unit Via self ambulation. Pt refused help with transportation and will provide own taxi. Pt refused to wait for discharge paperwork. IV removed and care reviewed with patient.

## 2024-12-31 NOTE — DISCHARGE SUMMARY
[facility] Discharge Summary    Attending: Dr. Katy Mustafa   Senior Resident: Dr. Flory Vasquez    Intern:  Dr. Alexus Kessler   Contact Number: [phone]    CHIEF COMPLAINT ON ADMISSION  Chief Complaint   Patient presents with    Shortness of Breath    Cough     Reason for Admission  SOB     Admission Date  12/30/2024    CODE STATUS  Full Code    HPI & HOSPITAL COURSE  This is a 64 y.o. male with past medical history of COPD, 40 pack year smoking history, frequent hospitalizations due to shortness of breath, TAVR in 2023 due to aortic stenosis c/b left hemidiaphragm dysfunction  who presented with cough and shortness of breath. Patient reported that he began having cough with difficulty breathing 3 days ago. Symptoms were persistent and patient then presented to the ER. NO fever, chills, congestion, or sputum production. In ED patient was desating at 87% requiring 3 L supplemental oxygen via NC. CBC indicated elevated WBC at 18.6 with left shift. Pro calcitonin was negative. BNP elevated at 528. Chest xray showed no acute cardiopulmonary changes, effusion, or pneumothorax. Chest CTA was negative for pulmonary embolism. Viral panel was negative for COVID/RSV/Influenza. On admission it was suspected that patient may have COPD exacerbation however patient's symptoms resolved over night without nebulizer treatments or prednisone. Did receive azithromycin. Likely had bronchospasm. Patient was then requesting to discharge home as he was no longer requiring oxygen. Patient reporting he has pulmonology appointment for bronchoscopy for evaluation of ongoing episodes of cough and shortness of breath. Patient at this time requesting discharge home. He does have increased work of breathing while talking however was saturating in room air both at rest and walking per nurse. Discussed case with pulmonology who will follow up during bronchoscopy on 1/3/24. Patient was then discharged home.     Other items addressed  "during this hospitalization:     Leukocytosis   Chronically elevated white blood cell count. On admission wbc of 18.6, repeat of 19. Baseline appears to range from 12-16. Vitals otherwise reassuring. Unclear cause of chronic leukocytosis. Does have history of anabolic steroid use. Patient to follow up with PCP.     Microcytic anemia   Patient also with history of microcytic anemia. Here hemoglobin is normal however MCV is 67. Unclear cause. As above will need follow up with PCP.     Thrombocytosis   Also with increased platelet count of 510. Has been chronically elevated in 500-600s. As above will need PCP follow up.     Therefore, he is discharged in good and stable condition to home with close outpatient follow-up.    The patient recovered much more quickly than anticipated on admission.    Discharge Date  12/31/2024    Physical Exam on Day of Discharge  /87   Pulse 78   Temp 37.3 °C (99.1 °F) (Temporal)   Resp 18   Ht 1.778 m (5' 10\")   Wt 95.3 kg (210 lb)   SpO2 91%   BMI 30.13 kg/m²    Physical Exam  Constitutional:       Appearance: Normal appearance.   Cardiovascular:      Rate and Rhythm: Normal rate and regular rhythm.   Pulmonary:      Breath sounds: No stridor. No wheezing, rhonchi or rales.      Comments: Increased work of breathing  Abdominal:      General: Abdomen is flat.      Palpations: Abdomen is soft.   Musculoskeletal:         General: Normal range of motion.   Skin:     General: Skin is warm.   Neurological:      General: No focal deficit present.      Mental Status: He is alert and oriented to person, place, and time.   Psychiatric:         Mood and Affect: Mood normal.         Behavior: Behavior normal.         FOLLOW UP ITEMS POST DISCHARGE  Bronchoscopy with pulmonology     DISCHARGE DIAGNOSES  Principal Problem (Resolved):    Hypoxia (POA: Yes)  Active Problems:    * No active hospital problems. *    FOLLOW UP  No future appointments.  No follow-up provider " specified.    MEDICATIONS ON DISCHARGE     Medication List      You have not been prescribed any medications.         Allergies  Allergies   Allergen Reactions    Morphine Shortness of Breath and Rash     Rash, shortness of breath       DIET  Orders Placed This Encounter   Procedures    Diet Order Diet: Cardiac     Standing Status:   Standing     Number of Occurrences:   1     Order Specific Question:   Diet:     Answer:   Cardiac [6]       ACTIVITY  As tolerated.  Weight bearing as tolerated    CONSULTATIONS  None     PROCEDURES  None     LABORATORY  Lab Results   Component Value Date    SODIUM 138 12/31/2024    POTASSIUM 4.5 12/31/2024    CHLORIDE 102 12/31/2024    CO2 24 12/31/2024    GLUCOSE 87 12/31/2024    BUN 23 (H) 12/31/2024    CREATININE 1.16 12/31/2024        Lab Results   Component Value Date    WBC 19.0 (H) 12/31/2024    HEMOGLOBIN 14.3 12/31/2024    HEMATOCRIT 46.5 12/31/2024    PLATELETCT 510 (H) 12/31/2024        Flory Vasquez M.D. PGY-3 [facility]

## 2024-12-31 NOTE — ED NOTES
Med rec updated and complete. Allergies reviewed.    Pt has not taken any medications in > 2 months.  Removed all medications  from med rec.  Not taking Rivaroxaban 20 mg, Rosuvastatin 20 mg , omeprazole 20 mg.    Preferred Pharmacy  [facility] [phone]

## 2024-12-31 NOTE — ED NOTES
Bedside report received from ALLEN Garces. Assumed patient care. Verified patient identification. Patient resting in bed, connected to monitor, respirations even and unlabored. Patient currently on 3L. Discussed plan of care. Patient denied any new complaints. Standard safety precautions in place. [place] in low position, side rail up for pt safety. Call light within reach. Transport present to move patient. Patient transported with all belongings. Patient transported on 3L O2.

## 2025-01-02 ENCOUNTER — TELEPHONE (OUTPATIENT)
Dept: HEALTH INFORMATION MANAGEMENT | Facility: OTHER | Age: 65
End: 2025-01-02
Payer: MEDICAID

## 2025-01-02 ENCOUNTER — ANESTHESIA (OUTPATIENT)
Dept: SURGERY | Facility: MEDICAL CENTER | Age: 65
End: 2025-01-02
Payer: MEDICAID

## 2025-01-02 ENCOUNTER — APPOINTMENT (OUTPATIENT)
Dept: RADIOLOGY | Facility: MEDICAL CENTER | Age: 65
End: 2025-01-02
Attending: INTERNAL MEDICINE
Payer: MEDICAID

## 2025-01-02 ENCOUNTER — HOSPITAL ENCOUNTER (OUTPATIENT)
Facility: MEDICAL CENTER | Age: 65
End: 2025-01-02
Attending: INTERNAL MEDICINE | Admitting: INTERNAL MEDICINE
Payer: MEDICAID

## 2025-01-02 ENCOUNTER — ANESTHESIA EVENT (OUTPATIENT)
Dept: SURGERY | Facility: MEDICAL CENTER | Age: 65
End: 2025-01-02
Payer: MEDICAID

## 2025-01-02 VITALS
TEMPERATURE: 98.6 F | WEIGHT: 204.59 LBS | HEIGHT: 68 IN | RESPIRATION RATE: 19 BRPM | SYSTOLIC BLOOD PRESSURE: 127 MMHG | HEART RATE: 78 BPM | OXYGEN SATURATION: 93 % | BODY MASS INDEX: 31.01 KG/M2 | DIASTOLIC BLOOD PRESSURE: 71 MMHG

## 2025-01-02 LAB
APPEARANCE FLD: NORMAL
BODY FLD TYPE: NORMAL
CELLS FLD: 4
COLOR FLD: NORMAL
FUNGUS SPEC FUNGUS STN: NORMAL
GRAM STN SPEC: NORMAL
LYMPHOCYTES NFR FLD: 16 %
MONOS+MACROS NFR FLD MANUAL: 13 %
NEUTROPHILS NFR FLD: 67 %
PATHOLOGY CONSULT NOTE: NORMAL
SIGNIFICANT IND 70042: NORMAL
SIGNIFICANT IND 70042: NORMAL
SITE SITE: NORMAL
SITE SITE: NORMAL
SOURCE SOURCE: NORMAL
SOURCE SOURCE: NORMAL

## 2025-01-02 PROCEDURE — 160048 HCHG OR STATISTICAL LEVEL 1-5: Performed by: INTERNAL MEDICINE

## 2025-01-02 PROCEDURE — 87102 FUNGUS ISOLATION CULTURE: CPT

## 2025-01-02 PROCEDURE — 160029 HCHG SURGERY MINUTES - 1ST 30 MINS LEVEL 4: Performed by: INTERNAL MEDICINE

## 2025-01-02 PROCEDURE — 87070 CULTURE OTHR SPECIMN AEROBIC: CPT

## 2025-01-02 PROCEDURE — 700101 HCHG RX REV CODE 250: Performed by: ANESTHESIOLOGY

## 2025-01-02 PROCEDURE — 160041 HCHG SURGERY MINUTES - EA ADDL 1 MIN LEVEL 4: Performed by: INTERNAL MEDICINE

## 2025-01-02 PROCEDURE — 700111 HCHG RX REV CODE 636 W/ 250 OVERRIDE (IP): Performed by: ANESTHESIOLOGY

## 2025-01-02 PROCEDURE — 31624 DX BRONCHOSCOPE/LAVAGE: CPT | Performed by: INTERNAL MEDICINE

## 2025-01-02 PROCEDURE — 87205 SMEAR GRAM STAIN: CPT

## 2025-01-02 PROCEDURE — 31645 BRNCHSC W/THER ASPIR 1ST: CPT | Performed by: INTERNAL MEDICINE

## 2025-01-02 PROCEDURE — 700102 HCHG RX REV CODE 250 W/ 637 OVERRIDE(OP): Performed by: INTERNAL MEDICINE

## 2025-01-02 PROCEDURE — A9270 NON-COVERED ITEM OR SERVICE: HCPCS | Performed by: INTERNAL MEDICINE

## 2025-01-02 PROCEDURE — 87116 MYCOBACTERIA CULTURE: CPT

## 2025-01-02 PROCEDURE — 160002 HCHG RECOVERY MINUTES (STAT): Performed by: INTERNAL MEDICINE

## 2025-01-02 PROCEDURE — 160035 HCHG PACU - 1ST 60 MINS PHASE I: Performed by: INTERNAL MEDICINE

## 2025-01-02 PROCEDURE — 88305 TISSUE EXAM BY PATHOLOGIST: CPT

## 2025-01-02 PROCEDURE — 700111 HCHG RX REV CODE 636 W/ 250 OVERRIDE (IP): Performed by: INTERNAL MEDICINE

## 2025-01-02 PROCEDURE — 160009 HCHG ANES TIME/MIN: Performed by: INTERNAL MEDICINE

## 2025-01-02 PROCEDURE — 89240 UNLISTED MISC PATH TEST: CPT

## 2025-01-02 PROCEDURE — 700105 HCHG RX REV CODE 258: Performed by: INTERNAL MEDICINE

## 2025-01-02 PROCEDURE — 160036 HCHG PACU - EA ADDL 30 MINS PHASE I: Performed by: INTERNAL MEDICINE

## 2025-01-02 PROCEDURE — 87015 SPECIMEN INFECT AGNT CONCNTJ: CPT

## 2025-01-02 PROCEDURE — 88112 CYTOPATH CELL ENHANCE TECH: CPT

## 2025-01-02 PROCEDURE — 87206 SMEAR FLUORESCENT/ACID STAI: CPT

## 2025-01-02 RX ORDER — ROCURONIUM BROMIDE 10 MG/ML
INJECTION, SOLUTION INTRAVENOUS PRN
Status: DISCONTINUED | OUTPATIENT
Start: 2025-01-02 | End: 2025-01-02 | Stop reason: SURG

## 2025-01-02 RX ORDER — DIPHENHYDRAMINE HYDROCHLORIDE 50 MG/ML
12.5 INJECTION INTRAMUSCULAR; INTRAVENOUS
Status: DISCONTINUED | OUTPATIENT
Start: 2025-01-02 | End: 2025-01-02

## 2025-01-02 RX ORDER — KETOROLAC TROMETHAMINE 30 MG/ML
15 INJECTION, SOLUTION INTRAMUSCULAR; INTRAVENOUS ONCE
Status: DISCONTINUED | OUTPATIENT
Start: 2025-01-02 | End: 2025-01-02

## 2025-01-02 RX ORDER — KETOROLAC TROMETHAMINE 15 MG/ML
15 INJECTION, SOLUTION INTRAMUSCULAR; INTRAVENOUS ONCE
Status: COMPLETED | OUTPATIENT
Start: 2025-01-02 | End: 2025-01-02

## 2025-01-02 RX ORDER — HALOPERIDOL 5 MG/ML
1 INJECTION INTRAMUSCULAR
Status: DISCONTINUED | OUTPATIENT
Start: 2025-01-02 | End: 2025-01-02

## 2025-01-02 RX ORDER — ONDANSETRON 2 MG/ML
4 INJECTION INTRAMUSCULAR; INTRAVENOUS
Status: DISCONTINUED | OUTPATIENT
Start: 2025-01-02 | End: 2025-01-02 | Stop reason: HOSPADM

## 2025-01-02 RX ORDER — ONDANSETRON 2 MG/ML
4 INJECTION INTRAMUSCULAR; INTRAVENOUS
Status: DISCONTINUED | OUTPATIENT
Start: 2025-01-02 | End: 2025-01-02

## 2025-01-02 RX ORDER — HALOPERIDOL 5 MG/ML
1 INJECTION INTRAMUSCULAR
Status: DISCONTINUED | OUTPATIENT
Start: 2025-01-02 | End: 2025-01-02 | Stop reason: HOSPADM

## 2025-01-02 RX ORDER — SODIUM CHLORIDE, SODIUM LACTATE, POTASSIUM CHLORIDE, CALCIUM CHLORIDE 600; 310; 30; 20 MG/100ML; MG/100ML; MG/100ML; MG/100ML
INJECTION, SOLUTION INTRAVENOUS CONTINUOUS
Status: ACTIVE | OUTPATIENT
Start: 2025-01-02 | End: 2025-01-02

## 2025-01-02 RX ORDER — PHENYLEPHRINE HYDROCHLORIDE 10 MG/ML
INJECTION, SOLUTION INTRAMUSCULAR; INTRAVENOUS; SUBCUTANEOUS PRN
Status: DISCONTINUED | OUTPATIENT
Start: 2025-01-02 | End: 2025-01-02 | Stop reason: SURG

## 2025-01-02 RX ORDER — DIPHENHYDRAMINE HYDROCHLORIDE 50 MG/ML
12.5 INJECTION INTRAMUSCULAR; INTRAVENOUS
Status: DISCONTINUED | OUTPATIENT
Start: 2025-01-02 | End: 2025-01-02 | Stop reason: HOSPADM

## 2025-01-02 RX ORDER — HYDROMORPHONE HYDROCHLORIDE 1 MG/ML
0.5 INJECTION, SOLUTION INTRAMUSCULAR; INTRAVENOUS; SUBCUTANEOUS
Status: COMPLETED | OUTPATIENT
Start: 2025-01-02 | End: 2025-01-02

## 2025-01-02 RX ORDER — HYDROCODONE BITARTRATE AND HOMATROPINE METHYLBROMIDE ORAL SOLUTION 5; 1.5 MG/5ML; MG/5ML
5 LIQUID ORAL EVERY 4 HOURS PRN
Status: DISCONTINUED | OUTPATIENT
Start: 2025-01-02 | End: 2025-01-02 | Stop reason: HOSPADM

## 2025-01-02 RX ORDER — LIDOCAINE HYDROCHLORIDE 20 MG/ML
INJECTION, SOLUTION EPIDURAL; INFILTRATION; INTRACAUDAL; PERINEURAL PRN
Status: DISCONTINUED | OUTPATIENT
Start: 2025-01-02 | End: 2025-01-02 | Stop reason: SURG

## 2025-01-02 RX ADMIN — SODIUM CHLORIDE, SODIUM LACTATE, POTASSIUM CHLORIDE, AND CALCIUM CHLORIDE: .6; .31; .03; .02 INJECTION, SOLUTION INTRAVENOUS at 13:02

## 2025-01-02 RX ADMIN — HYDROCODONE BITARTRATE AND HOMATROPINE METHYLBROMIDE 5 ML: 5; 1.5 SOLUTION ORAL at 13:02

## 2025-01-02 RX ADMIN — ROCURONIUM BROMIDE 50 MG: 50 INJECTION, SOLUTION INTRAVENOUS at 15:58

## 2025-01-02 RX ADMIN — LIDOCAINE HYDROCHLORIDE 100 MG: 20 INJECTION, SOLUTION EPIDURAL; INFILTRATION; INTRACAUDAL; PERINEURAL at 15:58

## 2025-01-02 RX ADMIN — PROPOFOL 150 MG: 10 INJECTION, EMULSION INTRAVENOUS at 15:58

## 2025-01-02 RX ADMIN — KETOROLAC TROMETHAMINE 15 MG: 15 INJECTION, SOLUTION INTRAMUSCULAR; INTRAVENOUS at 13:02

## 2025-01-02 RX ADMIN — PHENYLEPHRINE HYDROCHLORIDE 100 MCG: 10 INJECTION INTRAVENOUS at 16:22

## 2025-01-02 RX ADMIN — HYDROMORPHONE HYDROCHLORIDE 0.5 MG: 1 INJECTION, SOLUTION INTRAMUSCULAR; INTRAVENOUS; SUBCUTANEOUS at 15:13

## 2025-01-02 RX ADMIN — HYDROMORPHONE HYDROCHLORIDE 0.5 MG: 1 INJECTION, SOLUTION INTRAMUSCULAR; INTRAVENOUS; SUBCUTANEOUS at 14:42

## 2025-01-02 ASSESSMENT — PAIN DESCRIPTION - PAIN TYPE: TYPE: ACUTE PAIN

## 2025-01-02 ASSESSMENT — PAIN SCALES - GENERAL
PAIN_LEVEL: 2
PAIN_LEVEL: 4

## 2025-01-02 ASSESSMENT — FIBROSIS 4 INDEX: FIB4 SCORE: 0.94

## 2025-01-02 NOTE — OR NURSING
1240 Patient allergies and NPO status verified, home medication reconciliation completed, belongings secured. Patient verbalizes understanding of pain scale, expected course of stay and plan of care. Surgical site verified with patient, IV access established.Pt reports chest pain and SOB on admit Dr. Christianson updated ordered received and implemented.   1300 Pt sleeping, O2 sats 82% placed on 2L NC sats back up to 94% post intervention. Dr. Christianson aware of pt status.       1330 pt ambulated to restroom w/ O2 tank has increased WOB w/ ambulation.   1340 back to [facility] updated on procedure status.    1430 Surgical procedure delayed, pt in severe pain, Dr. Christianson updated orders received.    1445 Pt given analgesia and TV for comfort.  1510 pt reports pain persists plan to medicate w/ subsequent dose.    1530 Rounded on pt updated on status.   1550 Pt to surgery.

## 2025-01-03 LAB
MYCOBACTERIUM SPEC CULT: NORMAL
RHODAMINE-AURAMINE STN SPEC: NORMAL
RHODAMINE-AURAMINE STN SPEC: NORMAL
SIGNIFICANT IND 70042: NORMAL
SIGNIFICANT IND 70042: NORMAL
SITE SITE: NORMAL
SITE SITE: NORMAL
SOURCE SOURCE: NORMAL
SOURCE SOURCE: NORMAL

## 2025-01-03 NOTE — ANESTHESIA TIME REPORT
Anesthesia Start and Stop Event Times       Date Time Event    1/2/2025 1552 Ready for Procedure     1552 Anesthesia Start     1644 Anesthesia Stop          Responsible Staff  01/02/25      Name Role Begin End    Devin Vines M.D. Anesth 1552 1644          Overtime Reason:  no overtime (within assigned shift)    Comments:

## 2025-01-03 NOTE — OR NURSING
1756 Received report from ALLEN Jordan. Pt requiring 2L NC, satting 87% RA.     1814 Pt wishes to leave AMA, Dr Quan at bedside educating pt on the risks of leaving AMA post bronchoscopy with O2 sats below 90%. Dr. Maradiaga decided that patient has capacity.    1822: IV removed, patient dressed and sitting in bedside conversing with staff. Pt is oriented and talkative, tolerating juice.     1830 Pt states that he lives in [place] near [facility]. Ex wife María is enroute to pick him up, she is [place] in traffic on [address]    1845 Pt ex wife María calls from . She is brought back to stage 1 and signs AMA form. Pt is provided incentive spirometer and instructed how to use it.     1850 Pt ambulates through waiting room under own power with no assistance to go home.

## 2025-01-03 NOTE — OR NURSING
1638 To PACU from Special procedures by raj thompson.    1655 pt rouses to voice, no c/o pain or discomfort.     1715 pt  sleeping, VSS, no c/o pain or discomfort.    1730 no change in pt's status, VSS.     1747 pt care transferred to ALLEN Lino.

## 2025-01-03 NOTE — PROCEDURES
"Bronchoscopy Procedure Note    Results/Findings:   Diffuse purulent secretions bilaterally suctioned with saline. No underlying mucosal abnormalities.  Focal atelectasis/extrinsic compression of the orifice of the superior segment of the left lower lobe (see photo under \"Media\" tab in EMR). No clear extrinsic mass or mucosal abnormalities to suggest malignancy. Able to pass bronchoscope into the superior subsegment which appeared normal.   Remaining airways were unremarkable on bronchoscopic inspection    Specimen:   BAL- left lower lobe    Location: [facility] Suite    Date of Operation: 1/2/2025     Attending Physician Performing Procedure: Scot Christianson M.D.     Pre-op Diagnosis: Atelectasis, elevated hemidiaphragm    Post-op Diagnosis: Atelectasis, elevated hemidiaphragm    Anesthesia: Genera    Operation:   Diagnostic flexible fiberoptic bronchoscopy, with bronchoalveolar lavage    Estimated Blood Loss: none    Complications: none    Indications and History:    The patient is a 64 y.o. male. The risks, benefits, complications, treatment options and expected outcomes were discussed with the patient. The possibilities of reaction to medication, pulmonary aspiration, perforation of a viscus, bleeding, failure to diagnose a condition and creating a complication requiring transfusion or operation were discussed with the patient who freely signed the consent.    Description of Procedure:    The patient was seen in the Pre-op area and interval H&P was verified. The patient was taken to the endoscopy suite, identified as Noe Hickey and a Time Out was held and the above information confirmed. After the induction of general anesthesia, the patient was positioned supine and the bronchoscope was passed through the ET tube. The scope was then passed into the trachea. Careful inspection of the tracheal lumen was accomplished. The scope was sequentially passed into the left main and then " left upper and lower bronchi and segmental bronchi. The scope was then withdrawn and advanced into the right main bronchus and then into the RUL, RML, and RLL bronchi and segmental bronchi.    Findings: see above    Bronchoalveolar lavage was subsequently performed in the left lower lobe. Lavage specimen was purulent in gross appearance.    The Patient was subsequently taken to the PACU in satisfactory condition.    Harman (wife) was notified of the results of the procedure who will be driving patient home after recovery in PACU.    This note was generated using voice recognition software which has a chance of producing errors of grammar and content.  I have made every reasonable attempt to find and correct any errors, but it should be expected that some may not be found prior to finalization of this note.  __________  Scot Christianson MD  Pulmonary and Critical Care Medicine  [facility]

## 2025-01-03 NOTE — DISCHARGE INSTRUCTIONS
If any questions arise, call your provider.  If your provider is not available, please feel free to call the Surgical Center at [phone].    MEDICATIONS: Resume taking daily medication.  Take prescribed pain medication with food.  If no medication is prescribed, you may take non-aspirin pain medication if needed.  PAIN MEDICATION CAN BE VERY CONSTIPATING.  Take a stool softener or laxative such as senokot, pericolace, or milk of magnesia if needed.    Last pain medication given at _________________

## 2025-01-03 NOTE — ANESTHESIA PROCEDURE NOTES
Airway    Date/Time: 1/2/2025 3:58 PM    Performed by: Devin Vines M.D.  Authorized by: Devin Vines M.D.    Location:  OR  Urgency:  Elective  Indications for Airway Management:  Anesthesia      Spontaneous Ventilation: absent    Sedation Level:  Deep  Preoxygenated: Yes    Patient Position:  Sniffing  Final Airway Type:  Endotracheal airway  Final Endotracheal Airway:  ETT  Cuffed: Yes    Technique Used for Successful ETT Placement:  Direct laryngoscopy    Insertion Site:  Oral  Blade Type:  Lee  Laryngoscope Blade/Videolaryngoscope Blade Size:  2  ETT Size (mm):  8.5  Measured from:  Teeth  ETT to Teeth (cm):  23  Placement Verified by: auscultation and capnometry    Cormack-Lehane Classification:  Grade I - full view of glottis  Number of Attempts at Approach:  1

## 2025-01-03 NOTE — ANESTHESIA POSTPROCEDURE EVALUATION
Patient: Noe Hickey    Procedure Summary       Date: 01/02/25 Room / Location:  ENDOSCOPIC ULTRASOUND ROOM / SURGERY [facility]    Anesthesia Start: 1552 Anesthesia Stop: 1644    Procedure: FIBER OPTIC BRONCHOSCOPY WITH BRONCHOALVEOLAR LAVAGE (Chest) Diagnosis: (ELEVATED HEMIDIAPHRAGM, MIXED RESTRICTED AND OBSTRUCTIVE LUNG DISEASE)    Surgeons: Scot Christianson M.D. Responsible Provider: Devin Vines M.D.    Anesthesia Type: general ASA Status: 3            Final Anesthesia Type: general  Last vitals  BP   Blood Pressure : 128/72    Temp   37 °C (98.6 °F)    Pulse   95   Resp   15    SpO2   94 %      Anesthesia Post Evaluation    Patient location during evaluation: PACU  Patient participation: complete - patient participated  Level of consciousness: awake and alert  Pain score: 4    Airway patency: patent  Anesthetic complications: no  Cardiovascular status: hemodynamically stable  Respiratory status: acceptable  Hydration status: euvolemic  Comments: Pt with profound hypotension intr-op requiring IV phenylephrine. Of note. Pt was extremely agitated per report in POH and given IV dilaudid in POH prior to procedure. Pt awake in PACU maintaining BP and oxygenation.     PONV: none          Encounter Notable Events   Notable Event Outcome Phase Comment   Hypotension  Intraprocedure treated with phenyleprine  100 mcg        Nurse Pain Score: 8 (NPRS)

## 2025-01-03 NOTE — ANESTHESIA POSTPROCEDURE EVALUATION
Patient: Noe Hickey    Procedure Summary       Date: 01/02/25 Room / Location:  ENDOSCOPIC ULTRASOUND ROOM / SURGERY [facility]    Anesthesia Start: 1552 Anesthesia Stop: 1644    Procedure: FIBER OPTIC BRONCHOSCOPY WITH BRONCHOALVEOLAR LAVAGE (Chest) Diagnosis: (ELEVATED HEMIDIAPHRAGM, MIXED RESTRICTED AND OBSTRUCTIVE LUNG DISEASE)    Surgeons: Scot Christianson M.D. Responsible Provider: Devin Vines M.D.    Anesthesia Type: general ASA Status: 3            Final Anesthesia Type: general  Last vitals  BP   Blood Pressure : 128/72    Temp   37 °C (98.6 °F)    Pulse   95   Resp   15    SpO2   94 %      Anesthesia Post Evaluation    Patient location during evaluation: PACU  Patient participation: complete - patient participated  Level of consciousness: awake and alert  Pain score: 2    Airway patency: patent  Anesthetic complications: no  Cardiovascular status: hemodynamically stable  Respiratory status: acceptable  Hydration status: euvolemic    PONV: none          There were no known notable events for this encounter.     Nurse Pain Score: 8 (NPRS)

## 2025-01-04 LAB
BACTERIA SPEC RESP CULT: NORMAL
GRAM STN SPEC: NORMAL
SIGNIFICANT IND 70042: NORMAL
SITE SITE: NORMAL
SOURCE SOURCE: NORMAL

## 2025-01-06 ENCOUNTER — APPOINTMENT (OUTPATIENT)
Dept: RADIOLOGY | Facility: MEDICAL CENTER | Age: 65
End: 2025-01-06
Payer: MEDICAID

## 2025-01-06 ENCOUNTER — HOSPITAL ENCOUNTER (OUTPATIENT)
Facility: MEDICAL CENTER | Age: 65
End: 2025-01-07
Attending: EMERGENCY MEDICINE
Payer: MEDICAID

## 2025-01-06 ENCOUNTER — APPOINTMENT (OUTPATIENT)
Dept: RADIOLOGY | Facility: MEDICAL CENTER | Age: 65
End: 2025-01-06
Attending: EMERGENCY MEDICINE
Payer: MEDICAID

## 2025-01-06 ENCOUNTER — OFFICE VISIT (OUTPATIENT)
Dept: URGENT CARE | Facility: CLINIC | Age: 65
End: 2025-01-06
Payer: MEDICAID

## 2025-01-06 VITALS
OXYGEN SATURATION: 94 % | HEART RATE: 95 BPM | RESPIRATION RATE: 24 BRPM | TEMPERATURE: 97.8 F | DIASTOLIC BLOOD PRESSURE: 104 MMHG | SYSTOLIC BLOOD PRESSURE: 116 MMHG

## 2025-01-06 DIAGNOSIS — R11.2 NAUSEA AND VOMITING, UNSPECIFIED VOMITING TYPE: ICD-10-CM

## 2025-01-06 DIAGNOSIS — B34.9 VIRAL SYNDROME: ICD-10-CM

## 2025-01-06 DIAGNOSIS — R07.9 CHEST PAIN, UNSPECIFIED TYPE: ICD-10-CM

## 2025-01-06 DIAGNOSIS — R06.02 SOB (SHORTNESS OF BREATH): ICD-10-CM

## 2025-01-06 DIAGNOSIS — R09.02 HYPOXIA: ICD-10-CM

## 2025-01-06 PROBLEM — R51.9 HEADACHE: Status: ACTIVE | Noted: 2025-01-06

## 2025-01-06 LAB
ALBUMIN SERPL BCP-MCNC: 3.5 G/DL (ref 3.2–4.9)
ALBUMIN/GLOB SERPL: 1.7 G/DL
ALP SERPL-CCNC: 37 U/L (ref 30–99)
ALT SERPL-CCNC: 57 U/L (ref 2–50)
ANION GAP SERPL CALC-SCNC: 9 MMOL/L (ref 7–16)
ANISOCYTOSIS BLD QL SMEAR: ABNORMAL
AST SERPL-CCNC: 39 U/L (ref 12–45)
BASOPHILS # BLD AUTO: 0 % (ref 0–1.8)
BASOPHILS # BLD: 0 K/UL (ref 0–0.12)
BILIRUB SERPL-MCNC: 0.4 MG/DL (ref 0.1–1.5)
BUN SERPL-MCNC: 19 MG/DL (ref 8–22)
CALCIUM ALBUM COR SERPL-MCNC: 8.8 MG/DL (ref 8.5–10.5)
CALCIUM SERPL-MCNC: 8.4 MG/DL (ref 8.5–10.5)
CHLORIDE SERPL-SCNC: 101 MMOL/L (ref 96–112)
CO2 SERPL-SCNC: 28 MMOL/L (ref 20–33)
CREAT SERPL-MCNC: 1.04 MG/DL (ref 0.5–1.4)
D DIMER PPP IA.FEU-MCNC: 0.33 UG/ML (FEU) (ref 0–0.5)
EKG IMPRESSION: NORMAL
EOSINOPHIL # BLD AUTO: 0.47 K/UL (ref 0–0.51)
EOSINOPHIL NFR BLD: 3 % (ref 0–6.9)
ERYTHROCYTE [DISTWIDTH] IN BLOOD BY AUTOMATED COUNT: 51.2 FL (ref 35.9–50)
FLUAV RNA SPEC QL NAA+PROBE: NEGATIVE
FLUAV RNA SPEC QL NAA+PROBE: NEGATIVE
FLUBV RNA SPEC QL NAA+PROBE: NEGATIVE
FLUBV RNA SPEC QL NAA+PROBE: NEGATIVE
GFR SERPLBLD CREATININE-BSD FMLA CKD-EPI: 80 ML/MIN/1.73 M 2
GLOBULIN SER CALC-MCNC: 2.1 G/DL (ref 1.9–3.5)
GLUCOSE SERPL-MCNC: 98 MG/DL (ref 65–99)
HCT VFR BLD AUTO: 48.5 % (ref 42–52)
HGB BLD-MCNC: 14.4 G/DL (ref 14–18)
HYPOCHROMIA BLD QL SMEAR: ABNORMAL
LYMPHOCYTES # BLD AUTO: 0.95 K/UL (ref 1–4.8)
LYMPHOCYTES NFR BLD: 6 % (ref 22–41)
MACROCYTES BLD QL SMEAR: ABNORMAL
MANUAL DIFF BLD: NORMAL
MCH RBC QN AUTO: 20.3 PG (ref 27–33)
MCHC RBC AUTO-ENTMCNC: 29.7 G/DL (ref 32.3–36.5)
MCV RBC AUTO: 68.2 FL (ref 81.4–97.8)
MICROCYTES BLD QL SMEAR: ABNORMAL
MONOCYTES # BLD AUTO: 0.79 K/UL (ref 0–0.85)
MONOCYTES NFR BLD AUTO: 5 % (ref 0–13.4)
MORPHOLOGY BLD-IMP: NORMAL
MYELOCYTES NFR BLD MANUAL: 1 %
NEUTROPHILS # BLD AUTO: 13.43 K/UL (ref 1.82–7.42)
NEUTROPHILS NFR BLD: 84 % (ref 44–72)
NEUTS BAND NFR BLD MANUAL: 1 % (ref 0–10)
NRBC # BLD AUTO: 0 K/UL
NRBC BLD-RTO: 0 /100 WBC (ref 0–0.2)
NT-PROBNP SERPL IA-MCNC: 599 PG/ML (ref 0–125)
PLATELET # BLD AUTO: 508 K/UL (ref 164–446)
PLATELET BLD QL SMEAR: NORMAL
PMV BLD AUTO: 10.1 FL (ref 9–12.9)
POIKILOCYTOSIS BLD QL SMEAR: NORMAL
POTASSIUM SERPL-SCNC: 4.6 MMOL/L (ref 3.6–5.5)
PROCALCITONIN SERPL-MCNC: <0.05 NG/ML
PROT SERPL-MCNC: 5.6 G/DL (ref 6–8.2)
RBC # BLD AUTO: 7.11 M/UL (ref 4.7–6.1)
RBC BLD AUTO: PRESENT
RSV RNA SPEC QL NAA+PROBE: NEGATIVE
RSV RNA SPEC QL NAA+PROBE: NEGATIVE
SARS-COV-2 RNA RESP QL NAA+PROBE: NOTDETECTED
SARS-COV-2 RNA RESP QL NAA+PROBE: NOTDETECTED
SCHISTOCYTES BLD QL SMEAR: NORMAL
SODIUM SERPL-SCNC: 138 MMOL/L (ref 135–145)
SPECIMEN SOURCE: NORMAL
TARGETS BLD QL SMEAR: NORMAL
TROPONIN T SERPL-MCNC: 23 NG/L (ref 6–19)
TROPONIN T SERPL-MCNC: 27 NG/L (ref 6–19)
VARIANT LYMPHS BLD QL SMEAR: NORMAL
WBC # BLD AUTO: 15.8 K/UL (ref 4.8–10.8)

## 2025-01-06 PROCEDURE — G0378 HOSPITAL OBSERVATION PER HR: HCPCS

## 2025-01-06 PROCEDURE — 96375 TX/PRO/DX INJ NEW DRUG ADDON: CPT

## 2025-01-06 PROCEDURE — 85379 FIBRIN DEGRADATION QUANT: CPT

## 2025-01-06 PROCEDURE — 700111 HCHG RX REV CODE 636 W/ 250 OVERRIDE (IP): Mod: JZ,UD

## 2025-01-06 PROCEDURE — 84145 PROCALCITONIN (PCT): CPT

## 2025-01-06 PROCEDURE — 700102 HCHG RX REV CODE 250 W/ 637 OVERRIDE(OP): Mod: UD | Performed by: NURSE PRACTITIONER

## 2025-01-06 PROCEDURE — A9270 NON-COVERED ITEM OR SERVICE: HCPCS | Mod: UD | Performed by: EMERGENCY MEDICINE

## 2025-01-06 PROCEDURE — 85027 COMPLETE CBC AUTOMATED: CPT

## 2025-01-06 PROCEDURE — 96376 TX/PRO/DX INJ SAME DRUG ADON: CPT

## 2025-01-06 PROCEDURE — 3074F SYST BP LT 130 MM HG: CPT

## 2025-01-06 PROCEDURE — 71045 X-RAY EXAM CHEST 1 VIEW: CPT

## 2025-01-06 PROCEDURE — 0241U HCHG SARS-COV-2 COVID-19 NFCT DS RESP RNA 4 TRGT MIC: CPT

## 2025-01-06 PROCEDURE — 96365 THER/PROPH/DIAG IV INF INIT: CPT

## 2025-01-06 PROCEDURE — 93005 ELECTROCARDIOGRAM TRACING: CPT | Mod: TC | Performed by: EMERGENCY MEDICINE

## 2025-01-06 PROCEDURE — 84484 ASSAY OF TROPONIN QUANT: CPT

## 2025-01-06 PROCEDURE — 36415 COLL VENOUS BLD VENIPUNCTURE: CPT

## 2025-01-06 PROCEDURE — 700111 HCHG RX REV CODE 636 W/ 250 OVERRIDE (IP): Mod: JZ,UD | Performed by: EMERGENCY MEDICINE

## 2025-01-06 PROCEDURE — 70450 CT HEAD/BRAIN W/O DYE: CPT

## 2025-01-06 PROCEDURE — 3080F DIAST BP >= 90 MM HG: CPT

## 2025-01-06 PROCEDURE — 0241U HCHG SARS-COV-2 COVID-19 NFCT DS RESP RNA 4 TRGT ED POC: CPT

## 2025-01-06 PROCEDURE — 99285 EMERGENCY DEPT VISIT HI MDM: CPT

## 2025-01-06 PROCEDURE — 700102 HCHG RX REV CODE 250 W/ 637 OVERRIDE(OP): Mod: UD | Performed by: EMERGENCY MEDICINE

## 2025-01-06 PROCEDURE — A9270 NON-COVERED ITEM OR SERVICE: HCPCS | Mod: UD | Performed by: NURSE PRACTITIONER

## 2025-01-06 PROCEDURE — 700105 HCHG RX REV CODE 258: Mod: UD | Performed by: EMERGENCY MEDICINE

## 2025-01-06 PROCEDURE — 85007 BL SMEAR W/DIFF WBC COUNT: CPT

## 2025-01-06 PROCEDURE — 93005 ELECTROCARDIOGRAM TRACING: CPT | Mod: TC

## 2025-01-06 PROCEDURE — 99214 OFFICE O/P EST MOD 30 MIN: CPT

## 2025-01-06 PROCEDURE — 83880 ASSAY OF NATRIURETIC PEPTIDE: CPT

## 2025-01-06 PROCEDURE — 80053 COMPREHEN METABOLIC PANEL: CPT

## 2025-01-06 RX ORDER — ENOXAPARIN SODIUM 100 MG/ML
40 INJECTION SUBCUTANEOUS DAILY
Status: DISCONTINUED | OUTPATIENT
Start: 2025-01-06 | End: 2025-01-07 | Stop reason: HOSPADM

## 2025-01-06 RX ORDER — ONDANSETRON 2 MG/ML
4 INJECTION INTRAMUSCULAR; INTRAVENOUS EVERY 4 HOURS PRN
Status: DISCONTINUED | OUTPATIENT
Start: 2025-01-06 | End: 2025-01-07 | Stop reason: HOSPADM

## 2025-01-06 RX ORDER — HYDROMORPHONE HYDROCHLORIDE 1 MG/ML
0.5 INJECTION, SOLUTION INTRAMUSCULAR; INTRAVENOUS; SUBCUTANEOUS ONCE
Status: COMPLETED | OUTPATIENT
Start: 2025-01-06 | End: 2025-01-06

## 2025-01-06 RX ORDER — MAGNESIUM SULFATE HEPTAHYDRATE 40 MG/ML
2 INJECTION, SOLUTION INTRAVENOUS ONCE
Status: COMPLETED | OUTPATIENT
Start: 2025-01-06 | End: 2025-01-06

## 2025-01-06 RX ORDER — MAGNESIUM SULFATE 1 G/100ML
1 INJECTION INTRAVENOUS ONCE
Status: DISCONTINUED | OUTPATIENT
Start: 2025-01-06 | End: 2025-01-06

## 2025-01-06 RX ORDER — METOCLOPRAMIDE HYDROCHLORIDE 5 MG/ML
10 INJECTION INTRAMUSCULAR; INTRAVENOUS ONCE
Status: COMPLETED | OUTPATIENT
Start: 2025-01-06 | End: 2025-01-06

## 2025-01-06 RX ORDER — ACETAMINOPHEN 325 MG/1
650 TABLET ORAL EVERY 6 HOURS PRN
Status: DISCONTINUED | OUTPATIENT
Start: 2025-01-06 | End: 2025-01-07 | Stop reason: HOSPADM

## 2025-01-06 RX ORDER — SUMATRIPTAN 6 MG/.5ML
6 INJECTION, SOLUTION SUBCUTANEOUS ONCE
Status: DISCONTINUED | OUTPATIENT
Start: 2025-01-06 | End: 2025-01-06

## 2025-01-06 RX ORDER — AMOXICILLIN 250 MG
1 CAPSULE ORAL DAILY
Status: SHIPPED | COMMUNITY
End: 2025-01-06

## 2025-01-06 RX ORDER — KETOROLAC TROMETHAMINE 15 MG/ML
15 INJECTION, SOLUTION INTRAMUSCULAR; INTRAVENOUS ONCE
Status: COMPLETED | OUTPATIENT
Start: 2025-01-06 | End: 2025-01-06

## 2025-01-06 RX ORDER — SODIUM CHLORIDE 9 MG/ML
1000 INJECTION, SOLUTION INTRAVENOUS ONCE
Status: COMPLETED | OUTPATIENT
Start: 2025-01-06 | End: 2025-01-06

## 2025-01-06 RX ORDER — BUTALBITAL, ACETAMINOPHEN AND CAFFEINE 50; 325; 40 MG/1; MG/1; MG/1
1 TABLET ORAL ONCE
Status: COMPLETED | OUTPATIENT
Start: 2025-01-06 | End: 2025-01-06

## 2025-01-06 RX ORDER — DICYCLOMINE HCL 20 MG
20 TABLET ORAL EVERY 6 HOURS
Status: SHIPPED | COMMUNITY
End: 2025-01-06

## 2025-01-06 RX ORDER — ROSUVASTATIN CALCIUM 20 MG/1
20 TABLET, COATED ORAL EVERY EVENING
Status: SHIPPED | COMMUNITY
End: 2025-01-06

## 2025-01-06 RX ORDER — DIPHENHYDRAMINE HYDROCHLORIDE 50 MG/ML
25 INJECTION INTRAMUSCULAR; INTRAVENOUS ONCE
Status: COMPLETED | OUTPATIENT
Start: 2025-01-06 | End: 2025-01-06

## 2025-01-06 RX ORDER — ACETAMINOPHEN 325 MG/1
975 TABLET ORAL ONCE
Status: COMPLETED | OUTPATIENT
Start: 2025-01-06 | End: 2025-01-06

## 2025-01-06 RX ORDER — ONDANSETRON 2 MG/ML
4 INJECTION INTRAMUSCULAR; INTRAVENOUS ONCE
Status: COMPLETED | OUTPATIENT
Start: 2025-01-06 | End: 2025-01-06

## 2025-01-06 RX ORDER — BENZONATATE 100 MG/1
100 CAPSULE ORAL 3 TIMES DAILY PRN
Status: DISCONTINUED | OUTPATIENT
Start: 2025-01-06 | End: 2025-01-07 | Stop reason: HOSPADM

## 2025-01-06 RX ADMIN — DIPHENHYDRAMINE HYDROCHLORIDE 25 MG: 50 INJECTION, SOLUTION INTRAMUSCULAR; INTRAVENOUS at 16:41

## 2025-01-06 RX ADMIN — HYDROMORPHONE HYDROCHLORIDE 0.5 MG: 1 INJECTION, SOLUTION INTRAMUSCULAR; INTRAVENOUS; SUBCUTANEOUS at 22:58

## 2025-01-06 RX ADMIN — METOCLOPRAMIDE 10 MG: 5 INJECTION, SOLUTION INTRAMUSCULAR; INTRAVENOUS at 16:41

## 2025-01-06 RX ADMIN — HYDROMORPHONE HYDROCHLORIDE 0.5 MG: 1 INJECTION, SOLUTION INTRAMUSCULAR; INTRAVENOUS; SUBCUTANEOUS at 14:52

## 2025-01-06 RX ADMIN — ACETAMINOPHEN 975 MG: 325 TABLET ORAL at 13:55

## 2025-01-06 RX ADMIN — BUTALBITAL, ACETAMINOPHEN AND CAFFEINE 1 TABLET: 325; 50; 40 TABLET ORAL at 20:15

## 2025-01-06 RX ADMIN — KETOROLAC TROMETHAMINE 15 MG: 15 INJECTION, SOLUTION INTRAMUSCULAR; INTRAVENOUS at 22:21

## 2025-01-06 RX ADMIN — SODIUM CHLORIDE 1000 ML: 9 INJECTION, SOLUTION INTRAVENOUS at 13:53

## 2025-01-06 RX ADMIN — ENOXAPARIN SODIUM 40 MG: 100 INJECTION SUBCUTANEOUS at 17:43

## 2025-01-06 RX ADMIN — MAGNESIUM SULFATE HEPTAHYDRATE 2 G: 2 INJECTION, SOLUTION INTRAVENOUS at 16:42

## 2025-01-06 RX ADMIN — ONDANSETRON 4 MG: 2 INJECTION INTRAMUSCULAR; INTRAVENOUS at 13:54

## 2025-01-06 ASSESSMENT — LIFESTYLE VARIABLES
DOES PATIENT WANT TO STOP DRINKING: NO
TOTAL SCORE: 0
CONSUMPTION TOTAL: NEGATIVE
EVER FELT BAD OR GUILTY ABOUT YOUR DRINKING: NO
HAVE YOU EVER FELT YOU SHOULD CUT DOWN ON YOUR DRINKING: NO
HOW MANY TIMES IN THE PAST YEAR HAVE YOU HAD 5 OR MORE DRINKS IN A DAY: 0
EVER HAD A DRINK FIRST THING IN THE MORNING TO STEADY YOUR NERVES TO GET RID OF A HANGOVER: NO
AVERAGE NUMBER OF DAYS PER WEEK YOU HAVE A DRINK CONTAINING ALCOHOL: 0
ALCOHOL_USE: NO
HAVE PEOPLE ANNOYED YOU BY CRITICIZING YOUR DRINKING: NO
ON A TYPICAL DAY WHEN YOU DRINK ALCOHOL HOW MANY DRINKS DO YOU HAVE: 0

## 2025-01-06 ASSESSMENT — COGNITIVE AND FUNCTIONAL STATUS - GENERAL
SUGGESTED CMS G CODE MODIFIER DAILY ACTIVITY: CH
DAILY ACTIVITIY SCORE: 24
SUGGESTED CMS G CODE MODIFIER MOBILITY: CH
MOBILITY SCORE: 24

## 2025-01-06 ASSESSMENT — PAIN DESCRIPTION - PAIN TYPE
TYPE: ACUTE PAIN

## 2025-01-06 ASSESSMENT — SOCIAL DETERMINANTS OF HEALTH (SDOH)
WITHIN THE PAST 12 MONTHS, YOU WORRIED THAT YOUR FOOD WOULD RUN OUT BEFORE YOU GOT THE MONEY TO BUY MORE: NEVER TRUE
WITHIN THE LAST YEAR, HAVE YOU BEEN AFRAID OF YOUR PARTNER OR EX-PARTNER?: NO
WITHIN THE LAST YEAR, HAVE YOU BEEN KICKED, HIT, SLAPPED, OR OTHERWISE PHYSICALLY HURT BY YOUR PARTNER OR EX-PARTNER?: NO
WITHIN THE LAST YEAR, HAVE YOU BEEN HUMILIATED OR EMOTIONALLY ABUSED IN OTHER WAYS BY YOUR PARTNER OR EX-PARTNER?: NO
IN THE PAST 12 MONTHS, HAS THE ELECTRIC, GAS, OIL, OR WATER COMPANY THREATENED TO SHUT OFF SERVICE IN YOUR HOME?: NO
WITHIN THE PAST 12 MONTHS, THE FOOD YOU BOUGHT JUST DIDN'T LAST AND YOU DIDN'T HAVE MONEY TO GET MORE: NEVER TRUE
WITHIN THE LAST YEAR, HAVE TO BEEN RAPED OR FORCED TO HAVE ANY KIND OF SEXUAL ACTIVITY BY YOUR PARTNER OR EX-PARTNER?: NO

## 2025-01-06 ASSESSMENT — ENCOUNTER SYMPTOMS
SHORTNESS OF BREATH: 1
FEVER: 0
COUGH: 1

## 2025-01-06 ASSESSMENT — FIBROSIS 4 INDEX
FIB4 SCORE: 0.66
FIB4 SCORE: 0.94

## 2025-01-06 NOTE — PROGRESS NOTES
Verbal consent was acquired by the patient to use [employer] ambient listening note generation during this visit   Subjective:   Noe Hickey is a 64 y.o. male who presents for Headache, Chest Pain, and Emesis      HPI:  History of Present Illness  The patient is a 64-year-old male who presents for evaluation of headache, chest pain, and difficulty breathing.    He has been experiencing a severe headache for the past few days, which escalated in intensity today, leading to difficulty in breathing and the onset of left-sided chest pain. He rates his pain as 10 on a scale of 0 to 10. He reports no radiating pain or sensations of ripping or tearing. He also reports visual disturbances associated with the headache.     He has been experiencing respiratory issues chronically.  He does not use supplemental oxygen. He underwent a bronchoscopy last Thursday. He has been coughing for the past 1 to 2 weeks and has had 3 episodes of vomiting today.    He has a history of atrial fibrillation and underwent an ablation procedure 2 years ago. He is currently on Xarelto. He underwent aortic aneurysm repair and valve replacement in January 2023.       Review of Systems   Constitutional:  Negative for fever.   Respiratory:  Positive for cough and shortness of breath.    Cardiovascular:  Positive for chest pain.       Medications:    No current outpatient medications on file prior to visit.     No current facility-administered medications on file prior to visit.        Allergies:   Morphine    Problem List:   Patient Active Problem List   Diagnosis    Ascending aortic aneurysm repair    GERD (gastroesophageal reflux disease)    Paroxysmal atrial fibrillation ([facility])    Hemidiaphragm paralysis    Heart failure with improved ejection fraction (HFimpEF) ([facility])    H/O cardiac radiofrequency ablation    Tobacco dependence    Microcytic anemia    Primary hypertension    COPD exacerbation ([facility])    Plantar fasciitis of left foot     Elevated LFTs    Atrial flutter ([facility])    Long term (current) use of anticoagulants    Anxiety    Pulmonary hypertension ([facility])    Chronic foot pain, left    SOB (shortness of breath) on exertion    Elevated white blood cell count    Thrombocytosis    Panic anxiety syndrome    Bronchospasm        Surgical History:  Past Surgical History:   Procedure Laterality Date    CO BRONCHOSCOPY,DIAGNOSTIC N/A 1/2/2025    Procedure: FIBER OPTIC BRONCHOSCOPY WITH BRONCHOALVEOLAR LAVAGE;  Surgeon: Scot Christianson M.D.;  Location: [facility];  Service: Pulmonary    IRRIGATION & DEBRIDEMENT GENERAL N/A 6/7/2024    Procedure: RIGHT FOOT IRRIGATION AND DEBRIDEMENT;  Surgeon: Oliver Arreguin M.D.;  Location: [facility];  Service: Orthopedics    INCISION AND DRAINAGE GENERAL Left 04/19/2024    Procedure: INCISION AND DRAINAGE, LEG;  Surgeon: Mitch Bowie M.D.;  Location: [facility];  Service: Orthopedics    AORTIC VALVE REPLACEMENT  01/05/2023    Procedure: AORTIC VALVE REPLACEMENT, ASCENDING AORTIC ANEURYSM REPAIR AND TRANSESOPHAGEAL ECHOCARDIOGRAM.;  Surgeon: Serena Goyal M.D.;  Location: [facility];  Service: Cardiac    AORTIC ASCENDING DISSECTION  01/05/2023    Procedure: REPAIR, ANEURYSM OR DISSECTION, AORTA, ASCENDING;  Surgeon: Serena Goyal M.D.;  Location: [facility];  Service: Cardiac    ECHOCARDIOGRAM, TRANSESOPHAGEAL, INTRAOPERATIVE  01/05/2023    Procedure: ECHOCARDIOGRAM, TRANSESOPHAGEAL, INTRAOPERATIVE.;  Surgeon: Serena Goyal M.D.;  Location: [facility];  Service: Cardiac    IRRIGATION & DEBRIDEMENT ORTHO Right 09/19/2017    Procedure: IRRIGATION & DEBRIDEMENT ORTHO-THIGH;  Surgeon: Corbin Rivera M.D.;  Location: [facility];  Service: Orthopedics    SHOULDER HEMICAP RESURFACING Right 10/12/2015    Procedure: RIGHT SHOULDER RESURFACING;  Surgeon: Javon Doll M.D.;  Location: [facility];  Service:     SHOULDER  ARTHROSCOPY Bilateral     x3    SHOULDER SURGERY Right     replacement right       Past Social Hx:   Social History     Tobacco Use    Smoking status: Former     Current packs/day: 0.00     Average packs/day: 0.7 packs/day for 71.3 years (47.5 ttl pk-yrs)     Types: Cigarettes     Start date: 1976     Quit date: 2024     Years since quittin.6     Passive exposure: Never    Smokeless tobacco: Never    Tobacco comments:     2-3 a day   Vaping Use    Vaping status: Never Used   Substance Use Topics    Alcohol use: Not Currently    Drug use: Not Currently     Types: Oral     Comment: past problems with narcotics not since 2019          Problem list, medications, and allergies reviewed by myself today in Epic.     Objective:     BP (!) 116/104   Pulse 95   Temp 36.6 °C (97.8 °F) (Temporal)   Resp (!) 24   SpO2 94%     Physical Exam  Vitals and nursing note reviewed.   Constitutional:       General: He is not in acute distress.     Appearance: Normal appearance. He is normal weight. He is not ill-appearing, toxic-appearing or diaphoretic.   HENT:      Head: Normocephalic and atraumatic.      Nose: Nose normal. No congestion or rhinorrhea.      Mouth/Throat:      Mouth: Mucous membranes are moist.      Pharynx: Oropharynx is clear. No oropharyngeal exudate or posterior oropharyngeal erythema.   Cardiovascular:      Rate and Rhythm: Normal rate and regular rhythm.      Pulses: Normal pulses.      Heart sounds: Murmur heard.   Pulmonary:      Effort: Respiratory distress present.      Breath sounds: Normal breath sounds. No stridor. No wheezing, rhonchi or rales.   Chest:      Chest wall: No tenderness.   Musculoskeletal:      Cervical back: Normal range of motion and neck supple. No tenderness.   Skin:     General: Skin is warm and dry.      Capillary Refill: Capillary refill takes less than 2 seconds.   Neurological:      General: No focal deficit present.      Mental Status: He is alert and oriented to  person, place, and time. Mental status is at baseline.   Psychiatric:         Mood and Affect: Mood normal.         Behavior: Behavior normal.         Thought Content: Thought content normal.         Judgment: Judgment normal.         Assessment/Plan:     Diagnosis and associated orders:   1. Chest pain, unspecified type        2. SOB (shortness of breath)               Comments/MDM:     Assessment & Plan    He presents with left-sided chest pain that began today, rated 10/10 in intensity, and is non-radiating. EKG is negative for acute ST elevation. Given his extensive cardiac history and current symptoms [facility] will be contacted for immediate transport to the hospital.               Please note that this dictation was created using voice recognition software. I have made a reasonable attempt to correct obvious errors, but I expect that there are errors of grammar and possibly content that I did not discover before finalizing the note.    This note was electronically signed by LANI Max

## 2025-01-06 NOTE — ED TRIAGE NOTES
Chief Complaint   Patient presents with    Headache    Cough    Chest Pain    N/V     Pt BIB EMS for above complaints. Given 324 ASA, 2 sprays of nitro and 100 fent. Meds did not change pain. Reports 9/10 pain to head and chest.     States sx began about a week ago with a headache & cough, chest pain began after that.     Hx aortic aneurysm repair & valve replacement in 2023. Had scope on Thursday. Has been off xarelto for a few weeks for procedure.

## 2025-01-06 NOTE — ED PROVIDER NOTES
ED Provider Note    CHIEF COMPLAINT  Chief Complaint   Patient presents with    Headache    Cough    Chest Pain    N/V       EXTERNAL RECORDS REVIEWED  Reviewed medication list reviewed recent hospitalization records as well as surgical records of 2020 3 aortic ascending dissection and aneurysm repair  HPI/ROS  LIMITATION TO HISTORY   Patient is a poor historian  OUTSIDE HISTORIAN(S):  None    Noe Hickey is a 64 y.o. male who presents for a constellation of symptoms including headache cough nausea and vomiting without diarrhea chest discomfort.  He has a complex and extensive medical history including bioprosthetic aortic valve replacement 2 years ago, recent upper endoscopy.  He was previously on blood thinners but they held it for his recent bronchoscopy.  He denies hemoptysis or leg swelling.  He denies strokelike symptoms such as focal numbness weakness tingling to the arms legs or face.    PAST MEDICAL HISTORY   has a past medical history of Apnea, sleep, Arrhythmia, Breath shortness (06/09/2023), Chest pain, Chest tightness, Cyclic vomiting syndrome, Daytime sleepiness, Difficulty breathing, Dizziness, Elevated hemidiaphragm - LEFT post AVR (01/04/2023), Fracture, Frequent headaches, Gasping for breath, Headache, classical migraine, Heart burn, Heart valve disease (06/09/2023), Heartburn, Hyperlipidemia (04/26/2024), Hypertension (04/26/2024), Indigestion, Insomnia, Morning headache, Painful breathing, Painful joint, Pneumonia due to infectious organism (01/20/2023), Shortness of breath, Snoring, Sore muscles, Swelling of lower extremity, Weakness, Wears glasses, and Wheezing.    SURGICAL HISTORY   has a past surgical history that includes shoulder arthroscopy (Bilateral); shoulder hemicap resurfacing (Right, 10/12/2015); irrigation & debridement ortho (Right, 09/19/2017); shoulder surgery (Right); aortic valve replacement (01/05/2023); aortic ascending dissection (01/05/2023); echocardiogram,  "transesophageal, intraoperative (2023); incision and drainage general (Left, 2024); irrigation & debridement general (N/A, 2024); and bronchoscopy,diagnostic (N/A, 2025).    FAMILY HISTORY  History reviewed. No pertinent family history.    SOCIAL HISTORY  Social History     Tobacco Use    Smoking status: Former     Current packs/day: 0.00     Average packs/day: 0.7 packs/day for 71.3 years (47.5 ttl pk-yrs)     Types: Cigarettes     Start date: 1976     Quit date: 2024     Years since quittin.6     Passive exposure: Never    Smokeless tobacco: Never    Tobacco comments:     2-3 a day   Vaping Use    Vaping status: Never Used   Substance and Sexual Activity    Alcohol use: Not Currently    Drug use: Not Currently     Types: Oral     Comment: past problems with narcotics not since     Sexual activity: Not on file       CURRENT MEDICATIONS  Home Medications       Reviewed by Jolly Ross R.N. (Registered Nurse) on 25 at 1222  Med List Status: Partial     Medication Last Dose Status        Patient Mariano Taking any Medications                         Audit from Redirected Encounters    **Home medications have not yet been reviewed for this encounter**         ALLERGIES  Allergies   Allergen Reactions    Morphine Shortness of Breath and Rash     Rash, shortness of breath       PHYSICAL EXAM  VITAL SIGNS: /65   Pulse 74   Temp 36.7 °C (98 °F) (Temporal)   Resp 20   Ht 1.727 m (5' 8\")   Wt 93 kg (205 lb)   SpO2 95%   BMI 31.17 kg/m²    Pulse ox interpretation: Patient is hypoxic on room air  Constitutional: Alert and oriented x 3, moderate distress  HEENT: Atraumatic normocephalic, pupils are equal round reactive to light extraocular movements are intact. The nares is clear, external ears are normal, mouth shows moist mucous membranes normal dentition for age  Neck: Supple, no JVD no tracheal deviation  Cardiovascular: Regular rate and rhythm no murmur rub or gallop " 2+ pulses peripherally x4  Thorax & Lungs: Bilateral rhonchi  GI: Soft nontender nondistended positive bowel sounds, no peritoneal signs  Skin: Warm dry no acute rash or lesion  Musculoskeletal: Moving all extremities with full range and 5 of 5 strength no acute  deformity no pedal edema negative Homans' sign  Neurologic: Cranial nerves III through XII are grossly intact no sensory deficit no cerebellar dysfunction   Psychiatric: Appropriate affect for situation at this time          EKG/LABS  Results for orders placed or performed during the hospital encounter of 25   EKG    Collection Time: 25 12:20 PM   Result Value Ref Range    Report       [facility]    Test Date:  2025  Pt Name:    JUSTIN TOPETE               Department: ER  MRN:        [id number]                      Room:        03  Gender:     Male                         Technician: [id number]  :        1960                   Requested By:ER TRIAGE PROTOCOL  Order #:    [id number]                    Reading MD:    Measurements  Intervals                                Axis  Rate:       72                           P:          23  OR:         158                          QRS:        -21  QRSD:       116                          T:          49  QT:         389  QTc:        426    Interpretive Statements  Sinus rhythm  Probable left ventricular hypertrophy  Anterior ST elevation, probably due to LVH  Compared to ECG 2024 14:19:17  Left ventricular hypertrophy now present  ST (T wave) deviation now present     EKG    Collection Time: 25 12:20 PM   Result Value Ref Range    Report       [facility]    Test Date:  2025  Pt Name:    JUSTIN TOPETE               Department: ER  MRN:        [id number]                      Room:        03  Gender:     Male                         Technician: [id number]  :        1960                   Requested By:ER  TRIAGE PROTOCOL  Order #:    [id number]                    Reading MD:    Measurements  Intervals                                Axis  Rate:       72                           P:          23  TN:         158                          QRS:        -21  QRSD:       116                          T:          49  QT:         389  QTc:        426    Interpretive Statements  Sinus rhythm  Probable left ventricular hypertrophy  Anterior ST elevation, probably due to LVH  Compared to ECG 12/30/2024 14:19:17  Left ventricular hypertrophy now present  ST (T wave) deviation now present     CBC with Differential    Collection Time: 01/06/25 12:30 PM   Result Value Ref Range    WBC 15.8 (H) 4.8 - 10.8 K/uL    RBC 7.11 (H) 4.70 - 6.10 M/uL    Hemoglobin 14.4 14.0 - 18.0 g/dL    Hematocrit 48.5 42.0 - 52.0 %    MCV 68.2 (L) 81.4 - 97.8 fL    MCH 20.3 (L) 27.0 - 33.0 pg    MCHC 29.7 (L) 32.3 - 36.5 g/dL    RDW 51.2 (H) 35.9 - 50.0 fL    Platelet Count 508 (H) 164 - 446 K/uL    MPV 10.1 9.0 - 12.9 fL    Neutrophils-Polys 84.00 (H) 44.00 - 72.00 %    Lymphocytes 6.00 (L) 22.00 - 41.00 %    Monocytes 5.00 0.00 - 13.40 %    Eosinophils 3.00 0.00 - 6.90 %    Basophils 0.00 0.00 - 1.80 %    Nucleated RBC 0.00 0.00 - 0.20 /100 WBC    Neutrophils (Absolute) 13.43 (H) 1.82 - 7.42 K/uL    Lymphs (Absolute) 0.95 (L) 1.00 - 4.80 K/uL    Monos (Absolute) 0.79 0.00 - 0.85 K/uL    Eos (Absolute) 0.47 0.00 - 0.51 K/uL    Baso (Absolute) 0.00 0.00 - 0.12 K/uL    NRBC (Absolute) 0.00 K/uL    Hypochromia 1+     Anisocytosis 1+     Macrocytosis 1+     Microcytosis 2+ (A)    Complete Metabolic Panel (CMP)    Collection Time: 01/06/25 12:30 PM   Result Value Ref Range    Sodium 138 135 - 145 mmol/L    Potassium 4.6 3.6 - 5.5 mmol/L    Chloride 101 96 - 112 mmol/L    Co2 28 20 - 33 mmol/L    Anion Gap 9.0 7.0 - 16.0    Glucose 98 65 - 99 mg/dL    Bun 19 8 - 22 mg/dL    Creatinine 1.04 0.50 - 1.40 mg/dL    Calcium 8.4 (L) 8.5 - 10.5 mg/dL    Correct Calcium  8.8 8.5 - 10.5 mg/dL    AST(SGOT) 39 12 - 45 U/L    ALT(SGPT) 57 (H) 2 - 50 U/L    Alkaline Phosphatase 37 30 - 99 U/L    Total Bilirubin 0.4 0.1 - 1.5 mg/dL    Albumin 3.5 3.2 - 4.9 g/dL    Total Protein 5.6 (L) 6.0 - 8.2 g/dL    Globulin 2.1 1.9 - 3.5 g/dL    A-G Ratio 1.7 g/dL   proBrain Natriuretic Peptide, NT (BNP)    Collection Time: 01/06/25 12:30 PM   Result Value Ref Range    NT-proBNP 599 (H) 0 - 125 pg/mL   Troponins NOW    Collection Time: 01/06/25 12:30 PM   Result Value Ref Range    Troponin T 27 (H) 6 - 19 ng/L   D-DIMER    Collection Time: 01/06/25 12:30 PM   Result Value Ref Range    D-Dimer 0.33 0.00 - 0.50 ug/mL (FEU)   PROCALCITONIN    Collection Time: 01/06/25 12:30 PM   Result Value Ref Range    Procalcitonin <0.05 <0.25 ng/mL   DIFFERENTIAL MANUAL    Collection Time: 01/06/25 12:30 PM   Result Value Ref Range    Bands-Stabs 1.00 0.00 - 10.00 %    Myelocytes 1.00 %    Manual Diff Status PERFORMED    PERIPHERAL SMEAR REVIEW    Collection Time: 01/06/25 12:30 PM   Result Value Ref Range    Peripheral Smear Review see below    MORPHOLOGY    Collection Time: 01/06/25 12:30 PM   Result Value Ref Range    RBC Morphology Present     Poikilocytosis 1+     Schistocytes 1+     Target Cells 1+     Reactive Lymphocytes Few    PLATELET ESTIMATE    Collection Time: 01/06/25 12:30 PM   Result Value Ref Range    Plt Estimation Increased    ESTIMATED GFR    Collection Time: 01/06/25 12:30 PM   Result Value Ref Range    GFR (CKD-EPI) 80 >60 mL/min/1.73 m 2   POC CoV-2, FLU A/B, RSV by PCR    Collection Time: 01/06/25  2:52 PM   Result Value Ref Range    POC Influenza A RNA, PCR Negative Negative    POC Influenza B RNA, PCR Negative Negative    POC RSV, by PCR Negative Negative    POC SARS-CoV-2, PCR NotDetected NotDetected     *Note: Due to a large number of results and/or encounters for the requested time period, some results have not been displayed. A complete set of results can be found in Results Review.      Twelve-lead EKG interpretation by me sinus rhythm rate 72 LVH is noted with repolarization pattern.  ST segment elevation is noted but without STEMI morphology no evidence of arrhythmia heart block or ischemia    RADIOLOGY/PROCEDURES   I have independently interpreted the diagnostic imaging associated with this visit and am waiting the final reading from the radiologist.   My preliminary interpretation is as follows: No acute cardiopulmonary process    Radiologist interpretation:  DX-CHEST-PORTABLE (1 VIEW)   Final Result      1.  No acute cardiopulmonary disease.   2.  Persistent elevation of LEFT hemidiaphragm.          COURSE & MEDICAL DECISION MAKING    ASSESSMENT, COURSE AND PLAN  Care Narrative:    This is a 64-year-old gentleman with a complex medical history who presents here with headache myalgias cough and hypoxia.  He also endorsed some chest pain.  His initial EKG demonstrated LVH without STEMI morphology and troponin is indeterminate.  He was placed on 2 L supplemental oxygen.  Workup here is extensive and demonstrates leukocytosis with left shift but no bandemia.  Lactic acid and procalcitonin are reassuring and normal.  Chest x-ray did not demonstrate any obvious pneumonia.  He was given antipyretics pain medication and fluids and nausea medication.  I performed serial abdominal exams and there is no peritoneal signs.  Viral testing is negative but I suspect he has a nontypeable viral process.  With his hypoxia and ongoing symptoms I recommended admission for further treatment and evaluation.  D-dimer testing is negative and pulmonary embolism and aortic dissection are unlikely.  Patient will be admitted to [facility] for further treatment and evaluation          ADDITIONAL PROBLEMS MANAGED      DISPOSITION AND DISCUSSIONS  I have discussed management of the patient with the following physicians and SHAUN's: Discussed plan of care with [facility]    Discussion of  management with other [facility] or appropriate source(s): None    Escalation of care considered, and ultimately not performed: Considered antibiotic therapy    Barriers to care at this time, including but not limited to: None.     Decision tools and prescription drugs considered including, but not limited to: None.    FINAL DIAGNOSIS  1. Hypoxia        2. Viral syndrome        3. Nausea and vomiting, unspecified vomiting type        4. Chest pain, unspecified type               Electronically signed by: Diony Connell M.D., 1/6/2025 12:30 PM

## 2025-01-06 NOTE — ED NOTES
Medication history reviewed with PT at bedside    [facility] rec is complete per PT reporting    Allergies reviewed.     Patient denies any outpatient antibiotics in the last 30 days.     Patient is not taking anticoagulants.    Preferred pharmacy for this visit - [facility] on [address] ([phone])

## 2025-01-06 NOTE — H&P
FAMILY MEDICINE HISTORY AND PHYSICAL       PATIENT ID:  NAME:  Noe Hickey  MRN:               [id number]  YOB: 1960    Date of Admission: 1/6/2025     Attending: Italo Langley M.d.     Resident: Flory Vasquez M.D.    Primary Care Physician:  Caryl Pineda M.D.    CC:    Chief Complaint   Patient presents with    Headache    Cough    Chest Pain    N/V       HPI: Noe Hickey is a 64 y.o. male with past medical history mixed restrictive obstucting lung disease with onging hypoxia, hypertension, dyslipidemia, atrial fibrillation, aortic stenosis s/o TAVR c/b left elevated diaphgram, January 2023 here as advised by urgent care APRN for chest pain. Patient went to urgent care due to ongoig head pain for the past several days that has been worsening. Patient reports 10 out of 10 frontal headache pain bilaterally. Has had two weeks episodes of worsening cough and hypoxia. Has been hospitalized multiple times for episodes of dyspnea and hypoxia.  Currently is following pulmonology outpatient and recently had bronchoscopy performed. Denies recent fever, chills, vomiting, diarrhea.     ERCourse:  In the emergency room patient's vitals notable to be hypertensive ranging from 140-160 systolic over 80s to 90s diastolic.  Otherwise nontachycardic and saturating well on 2 L nasal cannula.  Initial labs including a normal CMP with very mildly elevated ALT at 57, CBC with a white blood cell count of 15.8.  Patient's white blood cell count is chronically elevated and last was checked 1 week ago and was greater than 18.  Ongoing mildly elevated platelet count of 508.  Otherwise normal CBC.  BNP elevated at 599 which is baseline appears to be greater than 500.  Troponin also mildly elevated at 27 and troponin appears to be chronically mildly elevated in the 20s.  Chest x-ray was performed and did not indicate acute lung pathology, persistent elevation of left hemidiaphragm.  ERP then asked for admission  due to hypoxia.  Attempted to treat headache with IV Dilaudid with minimal improvement of pain.  Also gave one-time NS bolus.  Patient admitted to our service on observation.    REVIEW OF SYSTEMS:   Ten systems reviewed and were negative except as noted in the HPI.                PAST MEDICAL HISTORY:   has a past medical history of Apnea, sleep, Arrhythmia, Breath shortness (06/09/2023), Chest pain, Chest tightness, Cyclic vomiting syndrome, Daytime sleepiness, Difficulty breathing, Dizziness, Elevated hemidiaphragm - LEFT post AVR (01/04/2023), Fracture, Frequent headaches, Gasping for breath, Headache, classical migraine, Heart burn, Heart valve disease (06/09/2023), Heartburn, Hyperlipidemia (04/26/2024), Hypertension (04/26/2024), Indigestion, Insomnia, Morning headache, Painful breathing, Painful joint, Pneumonia due to infectious organism (01/20/2023), Shortness of breath, Snoring, Sore muscles, Swelling of lower extremity, Weakness, Wears glasses, and Wheezing.     PAST SURGICAL HISTORY:   has a past surgical history that includes shoulder arthroscopy (Bilateral); shoulder hemicap resurfacing (Right, 10/12/2015); irrigation & debridement ortho (Right, 09/19/2017); shoulder surgery (Right); aortic valve replacement (01/05/2023); aortic ascending dissection (01/05/2023); echocardiogram, transesophageal, intraoperative (01/05/2023); incision and drainage general (Left, 04/19/2024); irrigation & debridement general (N/A, 6/7/2024); and pr bronchoscopy,diagnostic (N/A, 1/2/2025).     FAMILY HISTORY:  family history is not on file.     SOCIAL HISTORY:     Smoking: yes recently quit   Etoh: denies   Recreational Drug: denies     DIET:   Orders Placed This Encounter   Procedures    Diet Order Diet: Regular     Standing Status:   Standing     Number of Occurrences:   1     Order Specific Question:   Diet:     Answer:   Regular [1]       ALLERGIES:  Allergies   Allergen Reactions    Morphine Shortness of Breath and  Rash     Rash, shortness of breath       OUTPATIENT MEDICATIONS:    Current Facility-Administered Medications:     Respiratory Therapy Consult, , Nebulization, Continuous RT, Flory Vasquez M.D.    enoxaparin (Lovenox) inj 40 mg, 40 mg, Subcutaneous, DAILY AT 1800, Flory Vasquez M.D.    acetaminophen (Tylenol) tablet 650 mg, 650 mg, Oral, Q6HRS PRN, Flory Vasquez M.D.    SUMAtriptan Succinate (Imitrex) injection 6 mg, 6 mg, Subcutaneous, Once, Flory Vasquez M.D.    ondansetron (Zofran) syringe/vial injection 4 mg, 4 mg, Intravenous, Q4HRS PRN, Flory Vasquez M.D.    Current Outpatient Medications:     omeprazole (PRILOSEC) 20 MG delayed-release capsule, Take 20 mg by mouth 1 time a day as needed (acid reflux)., Disp: , Rfl:     asa/apap/caffeine (EXCEDRIN) 250-250-65 MG Tab, Take 2 Tablets by mouth every 8 hours as needed for Headache., Disp: , Rfl:     PHYSICAL EXAM:  Vitals:    25 1221 25 1222 25 1303 25 1400   BP:  136/65 (!) 140/88 (!) 140/95   Pulse:  74 75 69   Resp:  20 15 14   Temp: 36.7 °C (98 °F)      TempSrc: Temporal      SpO2:  95% 97% 97%   Weight:       Height:       , Temp (24hrs), Av.6 °C (97.9 °F), Min:36.6 °C (97.8 °F), Max:36.7 °C (98 °F)  , Pulse Oximetry: 97 %, O2 (LPM): 2, O2 Delivery Device: Nasal Cannula    Physical Exam  Constitutional:       Comments: Moderate distress    HENT:      Mouth/Throat:      Mouth: Mucous membranes are moist.   Eyes:      Extraocular Movements: Extraocular movements intact.   Cardiovascular:      Rate and Rhythm: Normal rate and regular rhythm.   Pulmonary:      Comments: Mild increase work of breathing, no wheezing, rhonchi, or rales, staccato cough   Abdominal:      General: Abdomen is flat. Bowel sounds are normal.   Musculoskeletal:         General: Normal range of motion.      Cervical back: Normal range of motion. No rigidity.   Skin:     General: Skin is warm.   Neurological:      General: No focal deficit present.  "     Mental Status: He is alert and oriented to person, place, and time.   Psychiatric:         Mood and Affect: Mood normal.         Behavior: Behavior normal.           LAB TESTS:   Recent Labs     01/06/25  1230   WBC 15.8*   RBC 7.11*   HEMOGLOBIN 14.4   HEMATOCRIT 48.5   MCV 68.2*   MCH 20.3*   MCHC 29.7*   RDW 51.2*   PLATELETCT 508*   MPV 10.1     Recent Labs     01/06/25  1230   SODIUM 138   POTASSIUM 4.6   CHLORIDE 101   CO2 28   GLUCOSE 98   BUN 19   CREATININE 1.04   CALCIUM 8.4*     Recent Labs     01/06/25  1230   ALTSGPT 57*   ASTSGOT 39   ALKPHOSPHAT 37   TBILIRUBIN 0.4   GLUCOSE 98         Recent Labs     01/06/25  1230   NTPROBNP 599*         Recent Labs     01/06/25  1230   TROPONINT 27*       CULTURES:   Results       Procedure Component Value Units Date/Time    CoV-2, Flu A/B, And RSV by PCR ([employer]) [[id number]]     Order Status: Sent Specimen: Respirate             IMAGES:  DX-CHEST-PORTABLE (1 VIEW)   Final Result      1.  No acute cardiopulmonary disease.   2.  Persistent elevation of LEFT hemidiaphragm.          CONSULTS:   None     ASSESSMENT/PLAN:   64 year old with past medical history mixed restrictive obstucting lung disease with onging hypoxia, hypertension, dyslipidemia, atrial fibrillation, aortic stenosis s/o TAVR c/b left elevated diaphgram, January 2023 admitted for ongoing hypoxia.     Hypoxia  Chronic hypoxia. Here for readmission due to severe headache that seems to be related to ongoing hypoxia and possible sleep apnea. Per pulmonology note for recent bronchoscopy, \"patient with history of mixed restrictive obstructive lung disease on pulmonary function testing, ongoing dyspnea with hospital admission every 1-3 months for dyspnea, persistent left hemidiaphragm elevation which may have lead to these ongoing hypoxia and dyspneic episodes.\" Patient currently requiring 2 L NC at bedside. Chest xray with no obvious pleural effusions or consolidations. BNP elevated >500 which " appears to be baseline. Pt reporting bronchodilators are not helpful.   Plan:   - Oxygen supplementation as needed. Wean as tolerated.   - RT consult, can trial nebulizer treatment if it would be beneficial for dyspnea and hypoxia   - Consult pulmonology during hospitalization given recent bronchoscopy and ongoing chronic dyspnea and hypoxia   - Patient requesting as needed cough medication, will trial tessalon pearls     Pain in the chest  The 10-year ASCVD risk score (Chuckie SAMANO, et al., 2019) is: 27.5%    Values used to calculate the score:      Age: 64 years      Sex: Male      Is Non- : No      Diabetic: No      Tobacco smoker: No      Systolic Blood Pressure: 151 mmHg      Is BP treated: No      HDL Cholesterol: 21 mg/dL      Total Cholesterol: 212 mg/dL  Ongoing chest pain likely from persistent cough but does have significant risk factors as above. Patient reports chest pain only occurring with ongoing cough and dyspnea. Troponin today 27, has been elevated in the 20s for the past several months. EKG sinus rhythm with left ventricular hypertrophy.   Plan:   - Pending repeat troponin   - Likely pleuritic chest pain, will hold off on further NSTEMI work up. Not having chest pain at this time.   - Repeat EKG and Troponin if symptoms reoccur     Primary hypertension  Patient with history of hypertension. Unclear if complaint with home medication. MAR suggest multiple medications. Hypertensive on admission but in pain related to headache. Will monitor while inpatient and start antihypertensives as needed.     Headache  Patient with pain complaint today of severe headache. Started several days ago and persistent. Pain located to bilateral forehead. 10/10 pain. Dilaudid in ED mildly helpful. Appears to worsen with cough, differential includes tension headache, sinus headache, migraine headache, low suspicion for subarachnoid hemorrhage with no other associated symptoms.   Plan:   - Will trial  Migraine cocktail with IV magnesium, benadryl, and compazine . Can try a triptan if no improvement.   - Ordered CT head for further evaluation   - Provide oxygen for ongoing dyspnea and hypoxia as below     Elevated white blood cell count  Chronic elevation of WBC. IN ED 15.7, most recent >18. This has been ongoing for several months. Unclear cause of leukocytosis. Is chronically stressed due to dyspnea and hypoxia. Concern in the past for testosterone use which also can increase WBC. No signs of infection with normal CXR and viral swab. Does not have symptoms of fever, chills, nausea, vomiting (unrelated to cough), or diarrhea. Recently bronchoscopy with negative cultures on 1/3.   Plan:   - continue to monitor CBC. May consider further infectious work up and antibiotics if patient becomes symptomatic with worsening WBC     Heart failure with improved ejection fraction (HFimpEF) (HCC)  Last echo The left ventricular ejection fraction is visually estimated to be 55%. The right ventricle is moderately dilated.  Reduced right ventricular systolic function. The left atrium is mildly dilated. Known TAVR aortic valve with borderline elevated gradients at upper   limits of normal. BNP chronically elevated, currently 550. Does not appear volume overloaded. Is not taking medications for heart failure at this time.     History of transcatheter aortic valve replacement (TAVR)  History of TAVR in Jan 2023. Also history of atrial flutter as well as ascending aortic aneurysm repair. Patient not currently taking any medications given cardiac history. Unclear if patient should be on ongoing blood thinners.   Plan:   - Will review medical records for further evaluation for need of blood thinners. Pending pharmacy review of medications.      Core Measures:  Fluids: none   Lines: PIV   Abx: none   Diet: regular   PPX: enoxaparin ppx    CODE Status: Full Code    Flory Vasquez M.D. PGY-3 R

## 2025-01-07 VITALS
HEART RATE: 87 BPM | SYSTOLIC BLOOD PRESSURE: 143 MMHG | OXYGEN SATURATION: 93 % | DIASTOLIC BLOOD PRESSURE: 86 MMHG | TEMPERATURE: 99.1 F | HEIGHT: 68 IN | RESPIRATION RATE: 16 BRPM | WEIGHT: 205 LBS | BODY MASS INDEX: 31.07 KG/M2

## 2025-01-07 LAB
ALBUMIN SERPL BCP-MCNC: 3.4 G/DL (ref 3.2–4.9)
ALBUMIN/GLOB SERPL: 1.7 G/DL
ALP SERPL-CCNC: 32 U/L (ref 30–99)
ALT SERPL-CCNC: 48 U/L (ref 2–50)
ANION GAP SERPL CALC-SCNC: 7 MMOL/L (ref 7–16)
AST SERPL-CCNC: 37 U/L (ref 12–45)
BASOPHILS # BLD AUTO: 0.9 % (ref 0–1.8)
BASOPHILS # BLD: 0.14 K/UL (ref 0–0.12)
BILIRUB SERPL-MCNC: 1 MG/DL (ref 0.1–1.5)
BUN SERPL-MCNC: 22 MG/DL (ref 8–22)
CALCIUM ALBUM COR SERPL-MCNC: 8.5 MG/DL (ref 8.5–10.5)
CALCIUM SERPL-MCNC: 8 MG/DL (ref 8.5–10.5)
CHLORIDE SERPL-SCNC: 102 MMOL/L (ref 96–112)
CO2 SERPL-SCNC: 27 MMOL/L (ref 20–33)
CREAT SERPL-MCNC: 1.07 MG/DL (ref 0.5–1.4)
EOSINOPHIL # BLD AUTO: 0.41 K/UL (ref 0–0.51)
EOSINOPHIL NFR BLD: 2.7 % (ref 0–6.9)
ERYTHROCYTE [DISTWIDTH] IN BLOOD BY AUTOMATED COUNT: 52.6 FL (ref 35.9–50)
GFR SERPLBLD CREATININE-BSD FMLA CKD-EPI: 77 ML/MIN/1.73 M 2
GLOBULIN SER CALC-MCNC: 2 G/DL (ref 1.9–3.5)
GLUCOSE SERPL-MCNC: 102 MG/DL (ref 65–99)
HCT VFR BLD AUTO: 48.9 % (ref 42–52)
HGB BLD-MCNC: 14.1 G/DL (ref 14–18)
IMM GRANULOCYTES # BLD AUTO: 0.37 K/UL (ref 0–0.11)
IMM GRANULOCYTES NFR BLD AUTO: 2.4 % (ref 0–0.9)
LYMPHOCYTES # BLD AUTO: 1.33 K/UL (ref 1–4.8)
LYMPHOCYTES NFR BLD: 8.7 % (ref 22–41)
MCH RBC QN AUTO: 20 PG (ref 27–33)
MCHC RBC AUTO-ENTMCNC: 28.8 G/DL (ref 32.3–36.5)
MCV RBC AUTO: 69.4 FL (ref 81.4–97.8)
MONOCYTES # BLD AUTO: 1.25 K/UL (ref 0–0.85)
MONOCYTES NFR BLD AUTO: 8.2 % (ref 0–13.4)
NEUTROPHILS # BLD AUTO: 11.74 K/UL (ref 1.82–7.42)
NEUTROPHILS NFR BLD: 77.1 % (ref 44–72)
NRBC # BLD AUTO: 0 K/UL
NRBC BLD-RTO: 0 /100 WBC (ref 0–0.2)
PLATELET # BLD AUTO: 472 K/UL (ref 164–446)
PMV BLD AUTO: 9.7 FL (ref 9–12.9)
POTASSIUM SERPL-SCNC: 5 MMOL/L (ref 3.6–5.5)
PROT SERPL-MCNC: 5.4 G/DL (ref 6–8.2)
RBC # BLD AUTO: 7.05 M/UL (ref 4.7–6.1)
SODIUM SERPL-SCNC: 136 MMOL/L (ref 135–145)
WBC # BLD AUTO: 15.2 K/UL (ref 4.8–10.8)

## 2025-01-07 PROCEDURE — 36415 COLL VENOUS BLD VENIPUNCTURE: CPT

## 2025-01-07 PROCEDURE — G0378 HOSPITAL OBSERVATION PER HR: HCPCS

## 2025-01-07 PROCEDURE — 80053 COMPREHEN METABOLIC PANEL: CPT

## 2025-01-07 PROCEDURE — 85025 COMPLETE CBC W/AUTO DIFF WBC: CPT

## 2025-01-07 ASSESSMENT — COPD QUESTIONNAIRES
COPD SCREENING SCORE: 4
DURING THE PAST 4 WEEKS HOW MUCH DID YOU FEEL SHORT OF BREATH: NONE/LITTLE OF THE TIME
HAVE YOU SMOKED AT LEAST 100 CIGARETTES IN YOUR ENTIRE LIFE: YES
DO YOU EVER COUGH UP ANY MUCUS OR PHLEGM?: NO/ONLY WITH OCCASIONAL COLDS OR INFECTIONS

## 2025-01-07 ASSESSMENT — PAIN DESCRIPTION - PAIN TYPE: TYPE: ACUTE PAIN

## 2025-01-07 NOTE — ASSESSMENT & PLAN NOTE
History of TAVR in Jan 2023. Also history of atrial flutter as well as ascending aortic aneurysm repair. Patient not currently taking any medications given cardiac history. Unclear if patient should be on ongoing blood thinners.   Plan:   - Will review medical records for further evaluation for need of blood thinners. Pending pharmacy review of medications.

## 2025-01-07 NOTE — ASSESSMENT & PLAN NOTE
Patient with history of hypertension. Unclear if complaint with home medication. MAR suggest multiple medications. Hypertensive on admission but in pain related to headache. Will monitor while inpatient and start antihypertensives as needed.

## 2025-01-07 NOTE — ASSESSMENT & PLAN NOTE
The 10-year ASCVD risk score (Chuckie SAMANO, et al., 2019) is: 27.5%    Values used to calculate the score:      Age: 64 years      Sex: Male      Is Non- : No      Diabetic: No      Tobacco smoker: No      Systolic Blood Pressure: 151 mmHg      Is BP treated: No      HDL Cholesterol: 21 mg/dL      Total Cholesterol: 212 mg/dL  Ongoing chest pain likely from persistent cough but does have significant risk factors as above. Patient reports chest pain only occurring with ongoing cough and dyspnea. Troponin today 27, has been elevated in the 20s for the past several months. EKG sinus rhythm with left ventricular hypertrophy.   Plan:   - Pending repeat troponin   - Likely pleuritic chest pain, will hold off on further NSTEMI work up. Not having chest pain at this time.   - Repeat EKG and Troponin if symptoms reoccur

## 2025-01-07 NOTE — PROGRESS NOTES
Pt requesting Dilaudid for his headache. Pt states nothing else works. RN attempted to recommend non pharmacological techniques to reduce pain as well as offering other medications. Pt getting increasingly agitated and states he will leave AMA if he does not get Dilaudid. RN notified on call [facility] resident who will order one dose of Dilaudid.

## 2025-01-07 NOTE — ED NOTES
Rounded on pt. Pt resting comfortably on gurney. Bed locked and in lowest position. Call light within reach. Respirations equal and unlabored on 2L via NC. No further needs at this time.

## 2025-01-07 NOTE — PROGRESS NOTES
"Pt has repeatedly requested more pain medication for his 10/10 headache. RN advised there are no more orders for narcotics and will contact provider. RN contacted resident at 03:11. Pt aware that he will be updated but continues to call requesting pain medication and requests to speak to the charge RN.    Charge RN sat with pt and discussed the situation and verbalizes understanding. Pt then states he is leaving and \"is over this place\" because he is not getting treatment. Resident aware pt wants to leave AMA and can speak to him in 20 minutes. Patient refuses to wait. RN explained risks of leaving without treatment, pt not receptive. IV removed, dressing applied. Pt ambulatory with steady gait, leaves the floor at 0435. Resident aware.  "

## 2025-01-07 NOTE — PROGRESS NOTES
Received pt from ED,on arrival pt very sleepy had benadryl,Dilaudid,pt responding to verbal,pt was on oxygen via nasal cannula but o2 sat was droping to 85%,pt placed on oxymask,was on mag,unable to proceed with admission procedure and skin check,will give report.

## 2025-01-07 NOTE — PROGRESS NOTES
"Notified by primary RN, Bre, that patient requesting to speak to charge. Charge RN, myself, in to speak with patient.  Patient expressing concerns regarding plan of care and plan for pain management. Patient stating \"Nobody has told me a plan on how we are going to control this headache. I don't understand how when I was in the ER the gave me all of the pain medications and here, I have to wait.\" Educated patient that although, he does have a headache, it would be unsafe to administer too much pain medication due to his current difficulty breathing/hypoxia. Patient verbalized understanding however, continued by reporting frontal headache rating it a 10/10. Patient stated that he ate \"about 15 [employer] animal cookies\" and has had headache since. Patient requesting intervention at this time stating \"if you aren't dong anything I might as well go home. I won't have anything done there either.\" Informed patient that I would reach out to the [facility] resident in regards intervention for pain management.  Patient agreeable. [facility] resident, Devorah, notifed at [phone].   "

## 2025-01-07 NOTE — ASSESSMENT & PLAN NOTE
Patient with pain complaint today of severe headache. Started several days ago and persistent. Pain located to bilateral forehead. 10/10 pain. Dilaudid in ED mildly helpful. Appears to worsen with cough, differential includes tension headache, sinus headache, migraine headache, low suspicion for subarachnoid hemorrhage with no other associated symptoms.   Plan:   - Will trial Migraine cocktail with IV magnesium, benadryl, and compazine . Can try a triptan if no improvement.   - Ordered CT head for further evaluation   - Provide oxygen for ongoing dyspnea and hypoxia as below

## 2025-01-07 NOTE — ASSESSMENT & PLAN NOTE
"Chronic hypoxia. Here for readmission due to severe headache that seems to be related to ongoing hypoxia and possible sleep apnea. Per pulmonology note for recent bronchoscopy, \"patient with history of mixed restrictive obstructive lung disease on pulmonary function testing, ongoing dyspnea with hospital admission every 1-3 months for dyspnea, persistent left hemidiaphragm elevation which may have lead to these ongoing hypoxia and dyspneic episodes.\" Patient currently requiring 2 L NC at bedside. Chest xray with no obvious pleural effusions or consolidations. BNP elevated >500 which appears to be baseline. Pt reporting bronchodilators are not helpful.   Plan:   - Oxygen supplementation as needed. Wean as tolerated.   - RT consult, can trial nebulizer treatment if it would be beneficial for dyspnea and hypoxia   - Consult pulmonology during hospitalization given recent bronchoscopy and ongoing chronic dyspnea and hypoxia   - Patient requesting as needed cough medication, will trial tessalon pearls   "

## 2025-01-07 NOTE — ASSESSMENT & PLAN NOTE
Last echo The left ventricular ejection fraction is visually estimated to be 55%. The right ventricle is moderately dilated.  Reduced right ventricular systolic function. The left atrium is mildly dilated. Known TAVR aortic valve with borderline elevated gradients at upper   limits of normal. BNP chronically elevated, currently 550. Does not appear volume overloaded. Is not taking medications for heart failure at this time.

## 2025-01-07 NOTE — ASSESSMENT & PLAN NOTE
Chronic elevation of WBC. IN ED 15.7, most recent >18. This has been ongoing for several months. Unclear cause of leukocytosis. Is chronically stressed due to dyspnea and hypoxia. Concern in the past for testosterone use which also can increase WBC. No signs of infection with normal CXR and viral swab. Does not have symptoms of fever, chills, nausea, vomiting (unrelated to cough), or diarrhea. Recently bronchoscopy with negative cultures on 1/3.   Plan:   - continue to monitor CBC. May consider further infectious work up and antibiotics if patient becomes symptomatic with worsening WBC

## 2025-01-07 NOTE — PROGRESS NOTES
Notified by RN at about 0425 that patient is considering leaving AMA and would like to speak with his physician regarding plan of care. Apparently patient was not happy regarding his current treatment plan. I informed RN that I would come talk to patient in person but 5 minutes later received phone call from RN stating patient already left.

## 2025-01-11 ENCOUNTER — HOSPITAL ENCOUNTER (EMERGENCY)
Facility: MEDICAL CENTER | Age: 65
End: 2025-01-11
Attending: EMERGENCY MEDICINE | Admitting: STUDENT IN AN ORGANIZED HEALTH CARE EDUCATION/TRAINING PROGRAM
Payer: MEDICAID

## 2025-01-11 ENCOUNTER — APPOINTMENT (OUTPATIENT)
Dept: RADIOLOGY | Facility: MEDICAL CENTER | Age: 65
End: 2025-01-11
Attending: EMERGENCY MEDICINE
Payer: MEDICAID

## 2025-01-11 VITALS
HEIGHT: 68 IN | HEART RATE: 72 BPM | BODY MASS INDEX: 31.83 KG/M2 | TEMPERATURE: 98.2 F | RESPIRATION RATE: 17 BRPM | SYSTOLIC BLOOD PRESSURE: 121 MMHG | DIASTOLIC BLOOD PRESSURE: 58 MMHG | OXYGEN SATURATION: 96 % | WEIGHT: 210 LBS

## 2025-01-11 DIAGNOSIS — R51.9 ACUTE NONINTRACTABLE HEADACHE, UNSPECIFIED HEADACHE TYPE: ICD-10-CM

## 2025-01-11 DIAGNOSIS — J44.1 CHRONIC OBSTRUCTIVE PULMONARY DISEASE WITH ACUTE EXACERBATION (HCC): ICD-10-CM

## 2025-01-11 DIAGNOSIS — I10 HYPERTENSION, UNSPECIFIED TYPE: ICD-10-CM

## 2025-01-11 DIAGNOSIS — R07.89 CHEST DISCOMFORT: ICD-10-CM

## 2025-01-11 DIAGNOSIS — R09.02 HYPOXIA: ICD-10-CM

## 2025-01-11 DIAGNOSIS — R06.02 SHORTNESS OF BREATH: ICD-10-CM

## 2025-01-11 LAB
ALBUMIN SERPL BCP-MCNC: 3.3 G/DL (ref 3.2–4.9)
ALBUMIN/GLOB SERPL: 1.4 G/DL
ALP SERPL-CCNC: 31 U/L (ref 30–99)
ALT SERPL-CCNC: 54 U/L (ref 2–50)
ANION GAP SERPL CALC-SCNC: 10 MMOL/L (ref 7–16)
ANISOCYTOSIS BLD QL SMEAR: ABNORMAL
AST SERPL-CCNC: 46 U/L (ref 12–45)
BASOPHILS # BLD AUTO: 0 % (ref 0–1.8)
BASOPHILS # BLD: 0 K/UL (ref 0–0.12)
BILIRUB SERPL-MCNC: 0.5 MG/DL (ref 0.1–1.5)
BUN SERPL-MCNC: 21 MG/DL (ref 8–22)
CALCIUM ALBUM COR SERPL-MCNC: 8.8 MG/DL (ref 8.5–10.5)
CALCIUM SERPL-MCNC: 8.2 MG/DL (ref 8.5–10.5)
CHLORIDE SERPL-SCNC: 101 MMOL/L (ref 96–112)
CO2 SERPL-SCNC: 25 MMOL/L (ref 20–33)
CREAT SERPL-MCNC: 1.32 MG/DL (ref 0.5–1.4)
D DIMER PPP IA.FEU-MCNC: 0.38 UG/ML (FEU) (ref 0–0.5)
EKG IMPRESSION: NORMAL
EKG IMPRESSION: NORMAL
EOSINOPHIL # BLD AUTO: 0.13 K/UL (ref 0–0.51)
EOSINOPHIL NFR BLD: 0.8 % (ref 0–6.9)
ERYTHROCYTE [DISTWIDTH] IN BLOOD BY AUTOMATED COUNT: 54.9 FL (ref 35.9–50)
FLUAV RNA SPEC QL NAA+PROBE: NEGATIVE
FLUBV RNA SPEC QL NAA+PROBE: NEGATIVE
GFR SERPLBLD CREATININE-BSD FMLA CKD-EPI: 60 ML/MIN/1.73 M 2
GLOBULIN SER CALC-MCNC: 2.4 G/DL (ref 1.9–3.5)
GLUCOSE SERPL-MCNC: 88 MG/DL (ref 65–99)
HCT VFR BLD AUTO: 48.6 % (ref 42–52)
HGB BLD-MCNC: 14.3 G/DL (ref 14–18)
HYPOCHROMIA BLD QL SMEAR: ABNORMAL
LACTATE SERPL-SCNC: 1.5 MMOL/L (ref 0.5–2)
LIPASE SERPL-CCNC: 90 U/L (ref 11–82)
LYMPHOCYTES # BLD AUTO: 0.94 K/UL (ref 1–4.8)
LYMPHOCYTES NFR BLD: 5.9 % (ref 22–41)
MANUAL DIFF BLD: NORMAL
MCH RBC QN AUTO: 20.5 PG (ref 27–33)
MCHC RBC AUTO-ENTMCNC: 29.4 G/DL (ref 32.3–36.5)
MCV RBC AUTO: 69.6 FL (ref 81.4–97.8)
MICROCYTES BLD QL SMEAR: ABNORMAL
MONOCYTES # BLD AUTO: 0.81 K/UL (ref 0–0.85)
MONOCYTES NFR BLD AUTO: 5.1 % (ref 0–13.4)
MORPHOLOGY BLD-IMP: NORMAL
MYELOCYTES NFR BLD MANUAL: 0.8 %
NEUTROPHILS # BLD AUTO: 13.9 K/UL (ref 1.82–7.42)
NEUTROPHILS NFR BLD: 87.4 % (ref 44–72)
NRBC # BLD AUTO: 0 K/UL
NRBC BLD-RTO: 0 /100 WBC (ref 0–0.2)
NT-PROBNP SERPL IA-MCNC: 687 PG/ML (ref 0–125)
PLATELET # BLD AUTO: 420 K/UL (ref 164–446)
PLATELET BLD QL SMEAR: NORMAL
PMV BLD AUTO: 9.7 FL (ref 9–12.9)
POIKILOCYTOSIS BLD QL SMEAR: NORMAL
POTASSIUM SERPL-SCNC: 5.3 MMOL/L (ref 3.6–5.5)
PROT SERPL-MCNC: 5.7 G/DL (ref 6–8.2)
RBC # BLD AUTO: 6.98 M/UL (ref 4.7–6.1)
RBC BLD AUTO: PRESENT
RSV RNA SPEC QL NAA+PROBE: NEGATIVE
SARS-COV-2 RNA RESP QL NAA+PROBE: NOTDETECTED
SCHISTOCYTES BLD QL SMEAR: NORMAL
SODIUM SERPL-SCNC: 136 MMOL/L (ref 135–145)
SPECIMEN SOURCE: NORMAL
TARGETS BLD QL SMEAR: NORMAL
TROPONIN T SERPL-MCNC: 25 NG/L (ref 6–19)
TROPONIN T SERPL-MCNC: 27 NG/L (ref 6–19)
WBC # BLD AUTO: 15.9 K/UL (ref 4.8–10.8)

## 2025-01-11 PROCEDURE — 71045 X-RAY EXAM CHEST 1 VIEW: CPT

## 2025-01-11 PROCEDURE — 94640 AIRWAY INHALATION TREATMENT: CPT

## 2025-01-11 PROCEDURE — 700117 HCHG RX CONTRAST REV CODE 255: Mod: UD | Performed by: EMERGENCY MEDICINE

## 2025-01-11 PROCEDURE — 99285 EMERGENCY DEPT VISIT HI MDM: CPT

## 2025-01-11 PROCEDURE — 700111 HCHG RX REV CODE 636 W/ 250 OVERRIDE (IP): Mod: JZ,UD | Performed by: EMERGENCY MEDICINE

## 2025-01-11 PROCEDURE — 85027 COMPLETE CBC AUTOMATED: CPT

## 2025-01-11 PROCEDURE — 85379 FIBRIN DEGRADATION QUANT: CPT

## 2025-01-11 PROCEDURE — 96375 TX/PRO/DX INJ NEW DRUG ADDON: CPT | Mod: XU

## 2025-01-11 PROCEDURE — 36415 COLL VENOUS BLD VENIPUNCTURE: CPT

## 2025-01-11 PROCEDURE — 83605 ASSAY OF LACTIC ACID: CPT

## 2025-01-11 PROCEDURE — 83880 ASSAY OF NATRIURETIC PEPTIDE: CPT

## 2025-01-11 PROCEDURE — 85007 BL SMEAR W/DIFF WBC COUNT: CPT

## 2025-01-11 PROCEDURE — 83690 ASSAY OF LIPASE: CPT

## 2025-01-11 PROCEDURE — 80053 COMPREHEN METABOLIC PANEL: CPT

## 2025-01-11 PROCEDURE — 700101 HCHG RX REV CODE 250: Mod: UD | Performed by: EMERGENCY MEDICINE

## 2025-01-11 PROCEDURE — 96374 THER/PROPH/DIAG INJ IV PUSH: CPT | Mod: XU

## 2025-01-11 PROCEDURE — 93005 ELECTROCARDIOGRAM TRACING: CPT | Mod: TC | Performed by: EMERGENCY MEDICINE

## 2025-01-11 PROCEDURE — 84484 ASSAY OF TROPONIN QUANT: CPT

## 2025-01-11 PROCEDURE — 71275 CT ANGIOGRAPHY CHEST: CPT

## 2025-01-11 PROCEDURE — 0241U HCHG SARS-COV-2 COVID-19 NFCT DS RESP RNA 4 TRGT MIC: CPT

## 2025-01-11 RX ORDER — LABETALOL HYDROCHLORIDE 5 MG/ML
10 INJECTION, SOLUTION INTRAVENOUS EVERY 4 HOURS PRN
Status: DISCONTINUED | OUTPATIENT
Start: 2025-01-11 | End: 2025-01-12 | Stop reason: HOSPADM

## 2025-01-11 RX ORDER — ENOXAPARIN SODIUM 100 MG/ML
40 INJECTION SUBCUTANEOUS DAILY
Status: DISCONTINUED | OUTPATIENT
Start: 2025-01-11 | End: 2025-01-12 | Stop reason: HOSPADM

## 2025-01-11 RX ORDER — ONDANSETRON 2 MG/ML
4 INJECTION INTRAMUSCULAR; INTRAVENOUS EVERY 4 HOURS PRN
Status: DISCONTINUED | OUTPATIENT
Start: 2025-01-11 | End: 2025-01-12 | Stop reason: HOSPADM

## 2025-01-11 RX ORDER — IPRATROPIUM BROMIDE AND ALBUTEROL SULFATE 2.5; .5 MG/3ML; MG/3ML
3 SOLUTION RESPIRATORY (INHALATION) ONCE
Status: COMPLETED | OUTPATIENT
Start: 2025-01-11 | End: 2025-01-11

## 2025-01-11 RX ORDER — ACETAMINOPHEN 500 MG
1000 TABLET ORAL EVERY 6 HOURS PRN
Status: DISCONTINUED | OUTPATIENT
Start: 2025-01-17 | End: 2025-01-12 | Stop reason: HOSPADM

## 2025-01-11 RX ORDER — PROCHLORPERAZINE EDISYLATE 5 MG/ML
10 INJECTION INTRAMUSCULAR; INTRAVENOUS ONCE
Status: COMPLETED | OUTPATIENT
Start: 2025-01-11 | End: 2025-01-11

## 2025-01-11 RX ORDER — OXYCODONE HYDROCHLORIDE 5 MG/1
5 TABLET ORAL
Status: DISCONTINUED | OUTPATIENT
Start: 2025-01-11 | End: 2025-01-12 | Stop reason: HOSPADM

## 2025-01-11 RX ORDER — ONDANSETRON 4 MG/1
4 TABLET, ORALLY DISINTEGRATING ORAL EVERY 4 HOURS PRN
Status: DISCONTINUED | OUTPATIENT
Start: 2025-01-11 | End: 2025-01-12 | Stop reason: HOSPADM

## 2025-01-11 RX ORDER — OXYCODONE HYDROCHLORIDE 5 MG/1
2.5 TABLET ORAL
Status: DISCONTINUED | OUTPATIENT
Start: 2025-01-11 | End: 2025-01-12 | Stop reason: HOSPADM

## 2025-01-11 RX ORDER — SUMATRIPTAN 50 MG/1
50 TABLET, FILM COATED ORAL
Status: DISCONTINUED | OUTPATIENT
Start: 2025-01-11 | End: 2025-01-12 | Stop reason: HOSPADM

## 2025-01-11 RX ORDER — ACETAMINOPHEN 500 MG
1000 TABLET ORAL EVERY 6 HOURS
Status: DISCONTINUED | OUTPATIENT
Start: 2025-01-12 | End: 2025-01-12 | Stop reason: HOSPADM

## 2025-01-11 RX ADMIN — IOHEXOL 100 ML: 350 INJECTION, SOLUTION INTRAVENOUS at 20:20

## 2025-01-11 RX ADMIN — FENTANYL CITRATE 50 MCG: 50 INJECTION, SOLUTION INTRAMUSCULAR; INTRAVENOUS at 19:32

## 2025-01-11 RX ADMIN — PROCHLORPERAZINE EDISYLATE 10 MG: 5 INJECTION INTRAMUSCULAR; INTRAVENOUS at 19:33

## 2025-01-11 RX ADMIN — IPRATROPIUM BROMIDE AND ALBUTEROL SULFATE 3 ML: .5; 2.5 SOLUTION RESPIRATORY (INHALATION) at 19:28

## 2025-01-11 ASSESSMENT — PAIN DESCRIPTION - DESCRIPTORS: DESCRIPTORS: PRESSURE

## 2025-01-11 ASSESSMENT — PAIN DESCRIPTION - PAIN TYPE
TYPE: ACUTE PAIN
TYPE: CHRONIC PAIN

## 2025-01-11 ASSESSMENT — FIBROSIS 4 INDEX: FIB4 SCORE: 0.74

## 2025-01-12 NOTE — ED NOTES
Pt expresses a desire to leave AMA. He states that he had to wait too long for pain medication and that he would rather be miserable at home. Hospitalist paged to come to bedside

## 2025-01-12 NOTE — ED TRIAGE NOTES
".  Chief Complaint   Patient presents with    Chest Pain     10/10 chest \"pressure\" radiating down left arm, (+) SOB, (+) nausea, 324 ASA taken PTA, 1 Nitro, 4 mg Zofran, patient has been off home meds including Xarelto and antihypertensive x 2 weeks, also c/o HA, /99     BP (!) 141/99   Pulse 82   Temp 36.8 °C (98.2 °F) (Temporal)   Resp 18   Ht 1.727 m (5' 8\")   Wt 95.3 kg (210 lb)   SpO2 93%   BMI 31.93 kg/m²     BIBA for above, fsbg 102, chart up for ERP  "

## 2025-01-12 NOTE — ED NOTES
Pt educated on the importance of staying and that his pain will be addressed. Pt also educated on his low oxygenation and is currently requiring 3L. Pt states he has oxygen at home and only uses it while sleeping. PIV removed. Pt ambulatory with a steady gate, A&Ox4, pt verbalizes understanding that he is leaving AMA but refused to sign the AMA form. Pt ambulatory to the ER lobby to call for a taxi home. Pt refused to wait for the physician to talk to him

## 2025-01-12 NOTE — PROGRESS NOTES
At 10:18pm, this resident received a voalte message from patient's RN that patient wanted to leave AMA as his pain medication was taking too long and not happy with oxycodone. Upon arrival in the ED, RN reports that patient has left the ED, refusing the sign the AMA form.     Senthil Anderson, PGY-3  [facility]

## 2025-01-12 NOTE — ED NOTES
Repeat trop sent to lab. Pt complaining of 8/10 pain in his head and is requesting more pain medication. ERP notified

## 2025-01-12 NOTE — H&P
"[facility] MEDICINE HISTORY AND PHYSICAL     PATIENT ID:  NAME:  Noe Hickey  MRN:               [id number]  YOB: 1960    Date of Admission: 1/11/2025     Attending: Ronald Manrique M.d.  Primary Care Physician:  Caryl Pineda M.D.    CC:    Chief Complaint   Patient presents with    Chest Pain     10/10 chest \"pressure\" radiating down left arm, (+) SOB, (+) nausea, 324 ASA taken PTA, 1 Nitro, 4 mg Zofran, patient has been off home meds including Xarelto and antihypertensive x 2 weeks, also c/o HA, /99       HPI: Noe Hickey is a 64 y.o. male history of mixed restrictive obstructive lung disease aortic stenosis s/p TAVR BIB by ENT for chest pressure, worsening shortness of breath, nausea and headache unrelieved by tylenol and excedrine.  Patient reports that his shortness of breath has been ongoing since his cardiac procedure.  Patient reports that he had stopped his home medication including his metoprolol and Xarelto 2 weeks prior to his bronchoscopy. Patient reports that at baseline, he is on 2 L of supplemental oxygen at home only when awake and asleep. He states his oxygen saturation ranges from 89-91%. Patient was recently admitted on 1/6 for frontal headache and worsening cough and hypoxia, however, patient left AMA within hours of admission.       ERCourse:  In the ED, patient patient presented with elevated blood pressure of 141/99. He received a dose of fentanyl, compazine and DuoNEB treatment. Patient was placed on 3L supplemental oxygen via NC when satting at 87%. His vital signs were otherwise stable. CBC showed increased WBCs of 15.9, neutrophils and ANC (87.4, 13.9), respectively. CMP showed increased AST/ALT of 46/54, respectively. Troponin increased at 27, but stable as prior hospital visits. Pro BNP elevated at 687, lipase at 90. DDimer  (0.38) wnl.  Viral panel for COVID/RSV/Influenza was negative. CTA showed no aortic aneurysm or dissection, left basilar " atelectasis. CXR showed hypoinflated lungs, elevated left hemidiaphragm, and linear opacity in the left lung base.  No pleural effusion, pneumothorax, or consolidation. Patient's clinical picture highlights ongoing respiratory symptoms with possible exacerbation of his underlying pulmonary condition, along with signs of inflammation and stable cardiac biomarkers. Patient is verbalized that he did not feel his hypoxia warranted admission, but agreed to be admitted for observation and further management of his headache.       REVIEW OF SYSTEMS:   Ten systems reviewed and were negative except as noted in the HPI.                PAST MEDICAL HISTORY:  Past Medical History:   Diagnosis Date    Apnea, sleep     Arrhythmia     Breath shortness 06/09/2023    prior to 1/2023 surgery    Chest pain     Chest tightness     Cyclic vomiting syndrome     Daytime sleepiness     Difficulty breathing     Dizziness     Elevated hemidiaphragm - LEFT post AVR 01/04/2023    Fracture     Frequent headaches     Gasping for breath     Headache, classical migraine     Heart burn     Heart valve disease 06/09/2023    heart surgery in 1/2023    Heartburn     Hyperlipidemia 04/26/2024    medicated    Hypertension 04/26/2024 6/6/2024 pt not currently taking any medication for this at this time    Indigestion     Insomnia     Morning headache     Painful breathing     Painful joint     Pneumonia due to infectious organism 01/20/2023    Shortness of breath     Snoring     6/6/2024 no sleep study done    Sore muscles     Swelling of lower extremity     Weakness     Wears glasses     Wheezing        PAST SURGICAL HISTORY:  Past Surgical History:   Procedure Laterality Date    LA BRONCHOSCOPY,DIAGNOSTIC N/A 1/2/2025    Procedure: FIBER OPTIC BRONCHOSCOPY WITH BRONCHOALVEOLAR LAVAGE;  Surgeon: Scot Christianson M.D.;  Location: SURGERY [facility];  Service: Pulmonary    IRRIGATION & DEBRIDEMENT GENERAL N/A 6/7/2024    Procedure: RIGHT FOOT  IRRIGATION AND DEBRIDEMENT;  Surgeon: Oliver Arreguin M.D.;  Location: SURGERY [facility];  Service: Orthopedics    INCISION AND DRAINAGE GENERAL Left 2024    Procedure: INCISION AND DRAINAGE, LEG;  Surgeon: Mitch Bowie M.D.;  Location: SURGERY [facility];  Service: Orthopedics    AORTIC VALVE REPLACEMENT  2023    Procedure: AORTIC VALVE REPLACEMENT, ASCENDING AORTIC ANEURYSM REPAIR AND TRANSESOPHAGEAL ECHOCARDIOGRAM.;  Surgeon: Serena Goyal M.D.;  Location: SURGERY [facility];  Service: Cardiac    AORTIC ASCENDING DISSECTION  2023    Procedure: REPAIR, ANEURYSM OR DISSECTION, AORTA, ASCENDING;  Surgeon: Serena Goyal M.D.;  Location: SURGERY [facility];  Service: Cardiac    ECHOCARDIOGRAM, TRANSESOPHAGEAL, INTRAOPERATIVE  2023    Procedure: ECHOCARDIOGRAM, TRANSESOPHAGEAL, INTRAOPERATIVE.;  Surgeon: Serena Goyal M.D.;  Location: SURGERY [facility];  Service: Cardiac    IRRIGATION & DEBRIDEMENT ORTHO Right 2017    Procedure: IRRIGATION & DEBRIDEMENT ORTHO-THIGH;  Surgeon: Corbin Rivera M.D.;  Location: [facility];  Service: Orthopedics    SHOULDER HEMICAP RESURFACING Right 10/12/2015    Procedure: RIGHT SHOULDER RESURFACING;  Surgeon: Javon Doll M.D.;  Location: [facility];  Service:     SHOULDER ARTHROSCOPY Bilateral     x3    SHOULDER SURGERY Right     replacement right       FAMILY HISTORY:  No family history on file.    SOCIAL HISTORY:   Social History     Socioeconomic History    Marital status:      Spouse name: Not on file    Number of children: Not on file    Years of education: Not on file    Highest education level: Not on file   Occupational History    Not on file   Tobacco Use    Smoking status: Former     Current packs/day: 0.00     Average packs/day: 0.7 packs/day for 71.3 years (47.5 ttl pk-yrs)     Types: Cigarettes     Start date: 1976     Quit date: 2024     Years since quittin.6      Passive exposure: Never    Smokeless tobacco: Never    Tobacco comments:     2-3 a day   Vaping Use    Vaping status: Never Used   Substance and Sexual Activity    Alcohol use: Not Currently    Drug use: Not Currently     Types: Oral     Comment: past problems with narcotics not since 2019    Sexual activity: Not on file   Other Topics Concern    Not on file   Social History Narrative    Not on file     Social Drivers of Health     Financial Resource Strain: Low Risk  (7/7/2023)    Overall Financial Resource Strain (CARDIA)     Difficulty of Paying Living Expenses: Not hard at all   Food Insecurity: No Food Insecurity (1/6/2025)    Hunger Vital Sign     Worried About Running Out of Food in the Last Year: Never true     Ran Out of Food in the Last Year: Never true   Transportation Needs: No Transportation Needs (1/6/2025)    PRAPARE - Transportation     Lack of Transportation (Medical): No     Lack of Transportation (Non-Medical): No   Physical Activity: Not on file   Stress: Not on file   Social Connections: Not on file   Intimate Partner Violence: Not At Risk (1/6/2025)    Humiliation, Afraid, Rape, and Kick questionnaire     Fear of Current or Ex-Partner: No     Emotionally Abused: No     Physically Abused: No     Sexually Abused: No   Housing Stability: Low Risk  (1/6/2025)    Housing Stability Vital Sign     Unable to Pay for Housing in the Last Year: No     Number of Times Moved in the Last Year: 0     Homeless in the Last Year: No       DIET:   Orders Placed This Encounter   Procedures    Diet Order Diet: Regular     Standing Status:   Standing     Number of Occurrences:   1     Order Specific Question:   Diet:     Answer:   Regular [1]       ALLERGIES:  Allergies   Allergen Reactions    Morphine Shortness of Breath and Rash     Rash, shortness of breath       OUTPATIENT MEDICATIONS:    Current Facility-Administered Medications:     enoxaparin (Lovenox) inj 40 mg, 40 mg, Subcutaneous, DAILY AT 1800,  Feliciano Anderson M.D.    labetalol (Normodyne/Trandate) injection 10 mg, 10 mg, Intravenous, Q4HRS PRN, Feliciano Anderson M.D.    ondansetron (Zofran) syringe/vial injection 4 mg, 4 mg, Intravenous, Q4HRS PRN, Feliciano Anderson M.D.    ondansetron (Zofran ODT) dispertab 4 mg, 4 mg, Oral, Q4HRS PRN, Feliciano Anderson M.D.    Pharmacy Consult Request ...Pain Management Review 1 Each, 1 Each, Other, PHARMACY TO DOSE, Feliciano Anderson M.D.    [START ON 2025] acetaminophen (Tylenol) tablet 1,000 mg, 1,000 mg, Oral, Q6HRS **FOLLOWED BY** [START ON 2025] acetaminophen (Tylenol) tablet 1,000 mg, 1,000 mg, Oral, Q6HRS PRN, Feliciano Anderson M.D.    oxyCODONE immediate-release (Roxicodone) tablet 2.5 mg, 2.5 mg, Oral, Q3HRS PRN **OR** oxyCODONE immediate-release (Roxicodone) tablet 5 mg, 5 mg, Oral, Q3HRS PRN **OR** fentaNYL (Sublimaze) injection 25 mcg, 25 mcg, Intravenous, Q3HRS PRN, Feliciano Anderson M.D.    SUMAtriptan (Imitrex) tablet 50 mg, 50 mg, Oral, Q2HRS PRN, Feliciano Anderson M.D.    Current Outpatient Medications:     omeprazole (PRILOSEC) 20 MG delayed-release capsule, Take 20 mg by mouth 1 time a day as needed (acid reflux)., Disp: , Rfl:     asa/apap/caffeine (EXCEDRIN) 250-250-65 MG Tab, Take 2 Tablets by mouth every 8 hours as needed for Headache., Disp: , Rfl:     PHYSICAL EXAM:  Vitals:    25 1936 25   BP: 107/58 109/57 116/69 121/58   Pulse: 76 72 73 72   Resp: (!) 21 14 18 17   Temp:       TempSrc:       SpO2: 93% 91% (!) 87% 96%   Weight:       Height:       , Temp (24hrs), Av.8 °C (98.2 °F), Min:36.8 °C (98.2 °F), Max:36.8 °C (98.2 °F)  , Pulse Oximetry: 96 %, O2 (LPM): 3, O2 Delivery Device: Silicone Nasal Cannula    General: Pt resting in NAD, cooperative   Skin:  Pink, warm and dry.  No rashes  HEENT: NC/AT. PERRL. EOMI. MMM. No nasal discharge. Oropharynx nonerythematous without  exudate/plaques  Neck:  Supple without lymphadenopathy or rigidity.  Lungs:  Symmetrical.  CTAB with no adventitious breath sounds.  Good air movement   Cardiovascular:  Normal S1/S2, RRR without M/R/G.  Abdomen:  BS+, Soft, NT/ND. No masses noted.  Extremities:  Full range of motion. No gross deformities noted. 2+ pulses in all extremities. No C/C/E   Spine:  Straight without vertebral anomalies.  CNS:  A&Ox4, follows commands, Strength 5/5 in all extremities.         LAB TESTS:   Admission on 01/11/2025   Component Date Value Ref Range Status    WBC 01/11/2025 15.9 (H)  4.8 - 10.8 K/uL Final    RBC 01/11/2025 6.98 (H)  4.70 - 6.10 M/uL Final    Hemoglobin 01/11/2025 14.3  14.0 - 18.0 g/dL Final    Hematocrit 01/11/2025 48.6  42.0 - 52.0 % Final    MCV 01/11/2025 69.6 (L)  81.4 - 97.8 fL Final    MCH 01/11/2025 20.5 (L)  27.0 - 33.0 pg Final    MCHC 01/11/2025 29.4 (L)  32.3 - 36.5 g/dL Final    RDW 01/11/2025 54.9 (H)  35.9 - 50.0 fL Final    Platelet Count 01/11/2025 420  164 - 446 K/uL Final    MPV 01/11/2025 9.7  9.0 - 12.9 fL Final    Neutrophils-Polys 01/11/2025 87.40 (H)  44.00 - 72.00 % Final    Lymphocytes 01/11/2025 5.90 (L)  22.00 - 41.00 % Final    Monocytes 01/11/2025 5.10  0.00 - 13.40 % Final    Eosinophils 01/11/2025 0.80  0.00 - 6.90 % Final    Basophils 01/11/2025 0.00  0.00 - 1.80 % Final    Nucleated RBC 01/11/2025 0.00  0.00 - 0.20 /100 WBC Final    Neutrophils (Absolute) 01/11/2025 13.90 (H)  1.82 - 7.42 K/uL Final    Includes immature neutrophils, if present.    Lymphs (Absolute) 01/11/2025 0.94 (L)  1.00 - 4.80 K/uL Final    Monos (Absolute) 01/11/2025 0.81  0.00 - 0.85 K/uL Final    Eos (Absolute) 01/11/2025 0.13  0.00 - 0.51 K/uL Final    Baso (Absolute) 01/11/2025 0.00  0.00 - 0.12 K/uL Final    NRBC (Absolute) 01/11/2025 0.00  K/uL Final    Hypochromia 01/11/2025 1+   Final    Anisocytosis 01/11/2025 1+   Final    Microcytosis 01/11/2025 1+   Final    Sodium 01/11/2025 136  135 - 145  mmol/L Final    Potassium 2025 5.3  3.6 - 5.5 mmol/L Final    Chloride 2025 101  96 - 112 mmol/L Final    Co2 2025 25  20 - 33 mmol/L Final    Anion Gap 2025 10.0  7.0 - 16.0 Final    Glucose 2025 88  65 - 99 mg/dL Final    Bun 2025 21  8 - 22 mg/dL Final    Creatinine 2025 1.32  0.50 - 1.40 mg/dL Final    Calcium 2025 8.2 (L)  8.5 - 10.5 mg/dL Final    Correct Calcium 2025 8.8  8.5 - 10.5 mg/dL Final    AST(SGOT) 2025 46 (H)  12 - 45 U/L Final    ALT(SGPT) 2025 54 (H)  2 - 50 U/L Final    Alkaline Phosphatase 2025 31  30 - 99 U/L Final    Total Bilirubin 2025 0.5  0.1 - 1.5 mg/dL Final    Albumin 2025 3.3  3.2 - 4.9 g/dL Final    Total Protein 2025 5.7 (L)  6.0 - 8.2 g/dL Final    Globulin 2025 2.4  1.9 - 3.5 g/dL Final    A-G Ratio 2025 1.4  g/dL Final    NT-proBNP 2025 687 (H)  0 - 125 pg/mL Final    Troponin T 2025 27 (H)  6 - 19 ng/L Final    Comment: Biotin intake of greater than 5 mg per day may interfere with  troponin levels, causing false low values.    The Ultra High Sensitivity Troponin T test has a reference range  for positive troponins that follows the recommendation of ACC for  the 99th percentile reference population.      Troponin T 2025 25 (H)  6 - 19 ng/L Final    Comment: Biotin intake of greater than 5 mg per day may interfere with  troponin levels, causing false low values.    The Ultra High Sensitivity Troponin T test has a reference range  for positive troponins that follows the recommendation of ACC for  the 99th percentile reference population.      Report 2025    Final                    Value:[facility] Emergency Dept.    Test Date:  2025  Pt Name:    JUSTIN TOPETE               Department: ER  MRN:        [id number]                      Room:       [facility]  Gender:     Male                         Technician: [id number]  :        1960                    Requested By:ERIC POSEY  Order #:    [id number]                    Reading MD: Perfecto Puckett MD    Measurements  Intervals                                Axis  Rate:       77                           P:          58  UT:         158                          QRS:        30  QRSD:       181                          T:          -1  QT:         357  QTc:        404    Interpretive Statements  Sinus rhythm, Rate 77, normal intervals and axis, nonspecific motion artifact  is noted in several of the anterior leads, nonspecific ST changes in  anterolateral, no reciprocal ischemia, LVH findings  Electronically Signed On 01- 19:20:27 PST by Perfecto Puckett MD      Lactic Acid 01/11/2025 1.5  0.5 - 2.0 mmol/L Final    Influenza virus A RNA 01/11/2025 Negative  Negative Final    Influenza virus B, PCR 01/11/2025 Negative  Negative Final    RSV, PCR 01/11/2025 Negative  Negative Final    SARS-CoV-2 by PCR 01/11/2025 NotDetected   Final    Comment: [facility] providers: PLEASE REFER TO DE-ESCALATION AND RETESTING PROTOCOL  on [email or web address]    **The [employer] GeneXpert Xpress SARS-CoV-2 RT-PCR Test has been made  available for use under the Emergency Use Authorization (EUA) only.      SARS-CoV-2 Source 01/11/2025 NP Swab   Final    D-Dimer 01/11/2025 0.38  0.00 - 0.50 ug/mL (FEU) Final    Comment: A negative D-Dimer result when combined with a clinical  assessment of low probability has been shown to have a high  negative predictive value for DVT or PE.    This assay should not be used independently to exclude or  diagnose DVT or Pulmonary Embolism.    This test methodology does not differentiate between DVT and  PE when an elevation in results is demonstrated.      Lipase 01/11/2025 90 (H)  11 - 82 U/L Final    GFR (CKD-EPI) 01/11/2025 60  >60 mL/min/1.73 m 2 Final    Comment: Estimated Glomerular Filtration Rate is calculated using  race neutral CKD-EPI 2021 equation per NKF-ASN recommendations.      Myelocytes  01/11/2025 0.80  % Final    Manual Diff Status 01/11/2025 PERFORMED   Final    Peripheral Smear Review 01/11/2025 see below   Final    Comment: Due to instrument suspect flags, further review of peripheral smear is  indicated on this patient sample. This review may or may not result in  abnormal findings.      Plt Estimation 01/11/2025 Normal   Final    RBC Morphology 01/11/2025 Present   Final    Poikilocytosis 01/11/2025 1+   Final    Schistocytes 01/11/2025 1+   Final    Target Cells 01/11/2025 1+   Final        CULTURES:   Results       Procedure Component Value Units Date/Time    CoV-2, Flu A/B, And RSV by PCR ([employer]) [[id number]] Collected: 01/11/25 1940    Order Status: Completed Specimen: Respirate Updated: 01/11/25 2041     Influenza virus A RNA Negative     Influenza virus B, PCR Negative     RSV, PCR Negative     SARS-CoV-2 by PCR NotDetected     Comment: [facility] providers: PLEASE REFER TO DE-ESCALATION AND RETESTING PROTOCOL  on [email or web address]    **The [employer] GeneXpert Xpress SARS-CoV-2 RT-PCR Test has been made  available for use under the Emergency Use Authorization (EUA) only.          SARS-CoV-2 Source NP Swab            IMAGES:  CT-CTA COMPLETE THORACOABDOMINAL AORTA   Final Result      1.  No aortic aneurysm or dissection identified.      2.  Previous aortic valve replacement.      3.  Left basilar atelectasis.                        DX-CHEST-PORTABLE (1 VIEW)   Final Result      No significant change from prior exam.          CONSULTS:   None    ASSESSMENT/PLAN: 64 y.o. male admitted for:    #Hypoxia  Chronic. In the ED patient was found to be hypoxic, oxygen saturation of 87 and was placed on 3 L via nasal cannula.  Patient received DuoNeb treatment and reports no significant change.  Patient reports that he is on 2L at home oxygen, which he uses whenever he is at home only awake and asleep.  Patient states he is oxygen saturation ranges from 89 to 91%.    - Recommended admitting patient for  observation overnight.  - At this point, patient choosing to leave and hesitant on getting admitted.    #Headache  Chronic.  He states that headache is frontal and pounding in nature with photosensitivity.  Denies any history of migraine headache.  Patient received a dose of fentanyl.    - Pain management including scheduled Tylenol, as needed oxycodone, fentanyl  - As needed sumatriptan ordered.  - Per RN, prior to administration of medication, patient decided and left AMA.    #Elevated WBCs  Chronic. CBC showed increased WBCs of 15.9, neutrophils and ANC (87.4, 13.9), respectively. Viral panel for COVID/RSV/Influenza was negative. CXR showed no significant change from prior exam. Patient is afebrile.    #Elevated troponin and BNP  Chronic. Troponin increased at 27, but stable as prior hospital visits. proBNP elevated at 687. DDimer  (0.38) wnl. CTA showed no aortic aneurysm or dissection, left basilar atelectasis. CXR showed no significant change from prior exam.      Core Measures:  Fluids: PO  Lines: PIV  Abx: none  Diet: regular  PPX: Lovenox  DISPO: inpatient for hypoxia and headache    CODE STATUS: full    Senthil Anderson, PGY3  [facility]

## 2025-01-12 NOTE — ED PROVIDER NOTES
"ED Provider Note    CHIEF COMPLAINT  Chief Complaint   Patient presents with    Chest Pain     10/10 chest \"pressure\" radiating down left arm, (+) SOB, (+) nausea, 324 ASA taken PTA, 1 Nitro, 4 mg Zofran, patient has been off home meds including Xarelto and antihypertensive x 2 weeks, also c/o HA, /99     EXTERNAL RECORDS REVIEWED  Recent ER note and subsequent hospitalization on 1/6/2025 and patient left AMA on 1/8.  Is a patient came in with headache, cough, nausea and vomiting without diarrhea and some ongoing chest discomfort.  He is extensive complex medical history including a bioprosthetic aortic valve 2 years ago, recent upper endoscopy, has been on blood thinners and during his most recent hospitalization he showed findings most consistent with nontypeable viral illness, some nonspecific EKG changes, hypoxia requiring supplemental oxygen use with negative D-dimer testing and nonspecific leukocytosis with negative lactate and Pro-Andreas.    Resident note shows that they came to the patient's bedside as he is going to leave AMA and they were unable to talk to him as he had already left.    HPI/ROS  LIMITATION TO HISTORY   Select: : None  OUTSIDE HISTORIAN(S):  none    Noe Hickey is a 64 y.o. male who presents to the emergency room for evaluation of ongoing chest discomfort that he says has been in the middle of his chest on and off over the last several days.  He took some aspirin and Tylenol as he was having worsening headache and did not feel like things were getting better.  He has chronic shortness of breath and feels like it very much always is and in triage was noted to be somewhat hypoxic and placed on 3 L.  He has a history of needing Xarelto and some other antihypertensives and says he has not been taking this for several weeks and has not refilled them for future doctors visit and he does not see the doctor until the 21st of this month.  He is denying any measured fevers but does " endorse occasional chills and said that he feels very similar to when he was admitted last week.  He says in general his headache and bodyaches and chills are worse, denies any urinary symptoms, no diarrhea, he says his chest pain occasionally radiates to the left arm, feels like occasionally using the lower part of the chest and abdomen, this feels very similar to how it has previously.    PAST MEDICAL HISTORY   has a past medical history of Apnea, sleep, Arrhythmia, Breath shortness (2023), Chest pain, Chest tightness, Cyclic vomiting syndrome, Daytime sleepiness, Difficulty breathing, Dizziness, Elevated hemidiaphragm - LEFT post AVR (2023), Fracture, Frequent headaches, Gasping for breath, Headache, classical migraine, Heart burn, Heart valve disease (2023), Heartburn, Hyperlipidemia (2024), Hypertension (2024), Indigestion, Insomnia, Morning headache, Painful breathing, Painful joint, Pneumonia due to infectious organism (2023), Shortness of breath, Snoring, Sore muscles, Swelling of lower extremity, Weakness, Wears glasses, and Wheezing.    SURGICAL HISTORY   has a past surgical history that includes shoulder arthroscopy (Bilateral); shoulder hemicap resurfacing (Right, 10/12/2015); irrigation & debridement ortho (Right, 2017); shoulder surgery (Right); aortic valve replacement (2023); aortic ascending dissection (2023); echocardiogram, transesophageal, intraoperative (2023); incision and drainage general (Left, 2024); irrigation & debridement general (N/A, 2024); and bronchoscopy,diagnostic (N/A, 2025).    FAMILY HISTORY  No family history on file.    SOCIAL HISTORY  Social History     Tobacco Use    Smoking status: Former     Current packs/day: 0.00     Average packs/day: 0.7 packs/day for 71.3 years (47.5 ttl pk-yrs)     Types: Cigarettes     Start date: 1976     Quit date: 2024     Years since quittin.6     Passive  "exposure: Never    Smokeless tobacco: Never    Tobacco comments:     2-3 a day   Vaping Use    Vaping status: Never Used   Substance and Sexual Activity    Alcohol use: Not Currently    Drug use: Not Currently     Types: Oral     Comment: past problems with narcotics not since 2019    Sexual activity: Not on file       CURRENT MEDICATIONS  Home Medications       Reviewed by Alexandra Rubin (Pharmacy Tech) on 01/11/25 at 2022  Med List Status: Complete     Medication Last Dose Status   asa/apap/caffeine (EXCEDRIN) 250-250-65 MG Tab Unknown Active   omeprazole (PRILOSEC) 20 MG delayed-release capsule Unknown Active                  Audit from Redirected Encounters    **Home medications have not yet been reviewed for this encounter**         ALLERGIES  Allergies   Allergen Reactions    Morphine Shortness of Breath and Rash     Rash, shortness of breath       PHYSICAL EXAM  VITAL SIGNS: /58   Pulse 72   Temp 36.8 °C (98.2 °F) (Temporal)   Resp 17   Ht 1.727 m (5' 8\")   Wt 95.3 kg (210 lb)   SpO2 96%   BMI 31.93 kg/m²    Genl: M sitting in [facility] uncomfortably, speaking clearly, appears tearful and in mild distress   Head: NC/AT   ENT: Mucous membranes slightly dry, posterior pharynx clear, uvula midline, nares patent bilaterally   Eyes: Normal sclera, pupils equal round reactive to light  Neck: Supple, FROM, no LAD appreciated   Pulmonary: Lungs are clear to auscultation bilaterally  Chest: No TTP  CV:  RRR, no murmur appreciated  Abdomen: soft, no bruits, NT/ND; no rebound/guarding, no masses palpated, no HSM   : no CVA or suprapubic tenderness   Musculoskeletal: Pain free ROM of the neck. Moving upper and lower extremities in spontaneous and coordinated fashion  Neuro: A&Ox4 (person, place, time, situation), speech fluent, gait not assessed, no focal deficits appreciated, No cerebellar signs. Sensation is grossly intact in the distal upper and lower extremities.  5/5 strength in  and " dorsiflexion/plantar flexion of the ankles  Psych: Patient has an appropriate affect and behavior  Skin: No rash or lesions.  No pallor or jaundice.  No cyanosis.  Warm and dry.     EKG/LABS  Results for orders placed or performed during the hospital encounter of 25   EKG   Result Value Ref Range    Report       [facility]    Test Date:  2025  Pt Name:    JUSTIN TOPETE               Department: ER  MRN:        [id number]                      Room:       [facility]  Gender:     Male                         Technician: [id number]  :        1960                   Requested By:ERIC POSEY  Order #:    [id number]                    Reading MD: Perfecto Puckett MD    Measurements  Intervals                                Axis  Rate:       77                           P:          58  TN:         158                          QRS:        30  QRSD:       181                          T:          -1  QT:         357  QTc:        404    Interpretive Statements  Sinus rhythm, Rate 77, normal intervals and axis, nonspecific motion artifact  is noted in several of the anterior leads, nonspecific ST changes in  anterolateral, no reciprocal ischemia, LVH findings  Electronically Signed On 2025 19:20:27 PST by Perfecto Puckett MD       *Note: Due to a large number of results and/or encounters for the requested time period, some results have not been displayed. A complete set of results can be found in Results Review.   I have independently interpreted this EKG    Labs Reviewed   CBC WITH DIFFERENTIAL - Abnormal; Notable for the following components:       Result Value    WBC 15.9 (*)     RBC 6.98 (*)     MCV 69.6 (*)     MCH 20.5 (*)     MCHC 29.4 (*)     RDW 54.9 (*)     Neutrophils-Polys 87.40 (*)     Lymphocytes 5.90 (*)     Neutrophils (Absolute) 13.90 (*)     Lymphs (Absolute) 0.94 (*)     All other components within normal limits   COMP METABOLIC PANEL - Abnormal; Notable for the  following components:    Calcium 8.2 (*)     AST(SGOT) 46 (*)     ALT(SGPT) 54 (*)     Total Protein 5.7 (*)     All other components within normal limits   PROBRAIN NATRIURETIC PEPTIDE, NT - Abnormal; Notable for the following components:    NT-proBNP 687 (*)     All other components within normal limits   TROPONIN - Abnormal; Notable for the following components:    Troponin T 27 (*)     All other components within normal limits   TROPONIN - Abnormal; Notable for the following components:    Troponin T 25 (*)     All other components within normal limits   LIPASE - Abnormal; Notable for the following components:    Lipase 90 (*)     All other components within normal limits   LACTIC ACID   COV-2, FLU A/B, AND RSV BY PCR (CEPPlayblazerID)   D-DIMER   ESTIMATED GFR   DIFFERENTIAL MANUAL   PERIPHERAL SMEAR REVIEW   PLATELET ESTIMATE   MORPHOLOGY     RADIOLOGY/PROCEDURES   I have independently interpreted the diagnostic imaging associated with this visit and am waiting the final reading from the radiologist.   My preliminary interpretation is as follows: No focal infiltrates or cardiomegaly.  CTA of the chest shows no aortic aneurysm or dissection, or aortic valve replacement    Radiologist interpretation:  CT-CTA COMPLETE THORACOABDOMINAL AORTA   Final Result      1.  No aortic aneurysm or dissection identified.      2.  Previous aortic valve replacement.      3.  Left basilar atelectasis.                        DX-CHEST-PORTABLE (1 VIEW)   Final Result      No significant change from prior exam.        COURSE & MEDICAL DECISION MAKING    ASSESSMENT, COURSE AND PLAN  Care Narrative: Patient is seen evaluated for symptoms as described above.  The patient has recently been admitted, left AMA and has an extensive past medical history as documented above.  He arrives with some tachypnea, has hypoxia it is confirmed at the bedside.  Bedside echo was performed and there is no pericardial effusion, no obvious curly B-lines, no  impaired global hypokinesis.  His chart review shows extensive prior COPD exacerbations, concerns about medication compliance with all of his antihypertensives and blood thinners.  He has been off of these for short period of time and does not have signs of stroke, he has chest pain with inconsistent distribution and quality of some elements of left arm discomfort this is not consistent, he does have some upper abdominal discomfort again with no abdominal bruits or significant radiation or chest tearing sensations.  He has had recent imaging but as he is coming back after leaving AGAINST MEDICAL ADVICE concern be for other evolving symptomology in the setting of noncompliance with his antihypertensives.  X-ray showed no widening of the mediastinum, no focal infiltrates.    His lab work shows subtle leukocytosis that has been chronic with his recent evaluations, no concerning anemia, elevated lipase with normal liver enzymes and no signs of obstructive hepatobiliary process.  BNP is slightly elevated but clinically does not show signs of acute fluid overload.  There is no positivity on his viral panel, troponin is detectable but not grossly elevated from his baseline.    On administration of breathing treatment he is feeling somewhat improved, he remains hypoxic corrected on 2 L at this time he likely has some elements of chronic chest pain as this has been an issue with him in the past and he often requires copious amounts of narcotics.  He is requesting narcotic pain medications every time I gone back to reassess him and have this discussion he is sleeping.  Current plan is for admission for likely acute on chronic COPD exacerbation in the setting of his ongoing chest pain and medication noncompliance.  He remains on 2 to 3 L oxygen based on him being awake or asleep.  I spoke with the [facility] medicine team to admit him and guarded condition    FINAL DIAGNOSIS  1. Chest discomfort    2. Acute nonintractable headache,  unspecified headache type    3. Hypertension, unspecified type    4. Hypoxia    5. Shortness of breath    6. Chronic obstructive pulmonary disease with acute exacerbation (HCC)      Electronically signed by: [place] M.D., 1/11/2025 6:58 PM

## 2025-01-12 NOTE — ED NOTES
Bedside report received from off going RN/tech: Flory, assumed care of patient.  POC discussed with patient. Call light within reach, all needs addressed at this time.       Fall risk interventions in place: Patient's personal possessions are with in their safe reach, Place socks on patient, Place fall risk sign on patient's door, Give patient urinal if applicable, Keep floor surfaces clean and dry, and Accompanied to restroom (all applicable per [place] risk assessment)   Continuous monitoring: Cardiac Leads, Pulse Ox, or Blood Pressure  IVF/IV medications: Not Applicable   Oxygen: How many liters 2L  Bedside sitter: Not Applicable   Isolation: Not Applicable

## 2025-01-12 NOTE — ED NOTES
Med rec updated and complete. Allergies reviewed.      Pt is not currently taking any prescription medications.    Pt reports that he was recently in the hospital ([facility]) and they did not discharge him on any medications.    Pt would  like to use [facility]  = [phone]

## 2025-01-21 ENCOUNTER — OFFICE VISIT (OUTPATIENT)
Dept: CARDIOLOGY | Facility: MEDICAL CENTER | Age: 65
End: 2025-01-21
Attending: NURSE PRACTITIONER
Payer: MEDICAID

## 2025-01-21 VITALS
BODY MASS INDEX: 31.83 KG/M2 | DIASTOLIC BLOOD PRESSURE: 80 MMHG | RESPIRATION RATE: 18 BRPM | HEIGHT: 68 IN | WEIGHT: 210 LBS | HEART RATE: 91 BPM | OXYGEN SATURATION: 92 % | SYSTOLIC BLOOD PRESSURE: 130 MMHG

## 2025-01-21 DIAGNOSIS — I48.0 PAROXYSMAL ATRIAL FIBRILLATION (HCC): ICD-10-CM

## 2025-01-21 DIAGNOSIS — R06.02 SOB (SHORTNESS OF BREATH) ON EXERTION: ICD-10-CM

## 2025-01-21 PROBLEM — I50.32 HEART FAILURE WITH IMPROVED EJECTION FRACTION (HFIMPEF) (HCC): Status: RESOLVED | Noted: 2023-05-16 | Resolved: 2025-01-21

## 2025-01-21 LAB — EKG IMPRESSION: NORMAL

## 2025-01-21 PROCEDURE — 93005 ELECTROCARDIOGRAM TRACING: CPT | Mod: TC | Performed by: NURSE PRACTITIONER

## 2025-01-21 PROCEDURE — 93010 ELECTROCARDIOGRAM REPORT: CPT | Performed by: INTERNAL MEDICINE

## 2025-01-21 PROCEDURE — 99214 OFFICE O/P EST MOD 30 MIN: CPT | Performed by: NURSE PRACTITIONER

## 2025-01-21 PROCEDURE — 99211 OFF/OP EST MAY X REQ PHY/QHP: CPT | Mod: 25 | Performed by: NURSE PRACTITIONER

## 2025-01-21 RX ORDER — METOPROLOL SUCCINATE 25 MG/1
TABLET, EXTENDED RELEASE ORAL
COMMUNITY
Start: 2025-01-08 | End: 2025-01-21

## 2025-01-21 ASSESSMENT — ENCOUNTER SYMPTOMS
DEPRESSION: 0
NEUROLOGICAL NEGATIVE: 1
DIZZINESS: 0
SENSORY CHANGE: 0
CLAUDICATION: 0
HALLUCINATIONS: 0
EYES NEGATIVE: 1
BRUISES/BLEEDS EASILY: 0
WHEEZING: 0
PALPITATIONS: 0
MUSCULOSKELETAL NEGATIVE: 1
ORTHOPNEA: 0
NERVOUS/ANXIOUS: 0
NAUSEA: 0
PND: 0
GASTROINTESTINAL NEGATIVE: 1
SHORTNESS OF BREATH: 1

## 2025-01-21 ASSESSMENT — FIBROSIS 4 INDEX: FIB4 SCORE: 0.97

## 2025-01-21 NOTE — PROGRESS NOTES
Cardiology Follow up Note    DOS: 1/21/2025   [id number]  Noe Starks [place]    Chief complaint/Reason for consult: shortness of breath    HPI: Pt is a 63 y.o. male who presents to the clinic today in follow up for profound dyspnea. Patient has a past medical history significant for but not limited to: h/o ascending aortic aneurysm repair, GERD, h/o HF with resolved EF, PAF S/P ablation, chronic anticoagulation. Patient comes in today with profound fatigue and dyspnea over months. He has been seen in the ER twice in past two weeks and he apologized that he lets his frustration get the best of him and leaves AMA. I have a long discussion with him about the need to get testing done. He had a bronchoscopy in December that he says he has not heard back from. Last echocardiogram shows RSVP of 50 suggestive of pulmonary hypertension. This could be playing a role in his overall situation. EKG normal. Blood pressure normal. Echo resolve. No indication that his issues are cardiac mediated. Will repeat echocardiogram to reassess RSVP. There is also notation that he has some diaphragm issues that could also be the result of his symptoms. HE states today is a moderate day but is having some distress with breathing. Has tried oxygen at home with no improvement. Told him to try stopping beta blocker to see if some of the situation improves with medication adjustment. No outward signs of edema. No rhonchus sounds on auscultation.     Past Medical History:   Diagnosis Date    Apnea, sleep     Arrhythmia     Breath shortness 06/09/2023    prior to 1/2023 surgery    Chest pain     Chest tightness     Cyclic vomiting syndrome     Daytime sleepiness     Difficulty breathing     Dizziness     Elevated hemidiaphragm - LEFT post AVR 01/04/2023    Fracture     Frequent headaches     Gasping for breath     Headache, classical migraine     Heart burn     Heart valve disease 06/09/2023    heart surgery in 1/2023    Heartburn      Hyperlipidemia 04/26/2024    medicated    Hypertension 04/26/2024 6/6/2024 pt not currently taking any medication for this at this time    Indigestion     Insomnia     Morning headache     Painful breathing     Painful joint     Pneumonia due to infectious organism 01/20/2023    Shortness of breath     Snoring     6/6/2024 no sleep study done    Sore muscles     Swelling of lower extremity     Weakness     Wears glasses     Wheezing        Past Surgical History:   Procedure Laterality Date    SC BRONCHOSCOPY,DIAGNOSTIC N/A 1/2/2025    Procedure: FIBER OPTIC BRONCHOSCOPY WITH BRONCHOALVEOLAR LAVAGE;  Surgeon: Scot Christianson M.D.;  Location: [facility];  Service: Pulmonary    IRRIGATION & DEBRIDEMENT GENERAL N/A 6/7/2024    Procedure: RIGHT FOOT IRRIGATION AND DEBRIDEMENT;  Surgeon: Oliver Arreguin M.D.;  Location: [facility];  Service: Orthopedics    INCISION AND DRAINAGE GENERAL Left 04/19/2024    Procedure: INCISION AND DRAINAGE, LEG;  Surgeon: Mitch Bowie M.D.;  Location: [facility];  Service: Orthopedics    AORTIC VALVE REPLACEMENT  01/05/2023    Procedure: AORTIC VALVE REPLACEMENT, ASCENDING AORTIC ANEURYSM REPAIR AND TRANSESOPHAGEAL ECHOCARDIOGRAM.;  Surgeon: Serena Goyal M.D.;  Location: [facility];  Service: Cardiac    AORTIC ASCENDING DISSECTION  01/05/2023    Procedure: REPAIR, ANEURYSM OR DISSECTION, AORTA, ASCENDING;  Surgeon: Serena Goyal M.D.;  Location: [facility];  Service: Cardiac    ECHOCARDIOGRAM, TRANSESOPHAGEAL, INTRAOPERATIVE  01/05/2023    Procedure: ECHOCARDIOGRAM, TRANSESOPHAGEAL, INTRAOPERATIVE.;  Surgeon: Serena Goyal M.D.;  Location: [facility];  Service: Cardiac    IRRIGATION & DEBRIDEMENT ORTHO Right 09/19/2017    Procedure: IRRIGATION & DEBRIDEMENT ORTHO-THIGH;  Surgeon: Corbin Rivera M.D.;  Location: [facility];  Service: Orthopedics    SHOULDER HEMICAP RESURFACING Right 10/12/2015     Procedure: RIGHT SHOULDER RESURFACING;  Surgeon: Javon Doll M.D.;  Location: [facility];  Service:     SHOULDER ARTHROSCOPY Bilateral     x3    SHOULDER SURGERY Right     replacement right       Social History     Socioeconomic History    Marital status:      Spouse name: Not on file    Number of children: Not on file    Years of education: Not on file    Highest education level: Not on file   Occupational History    Not on file   Tobacco Use    Smoking status: Every Day     Current packs/day: 0.00     Average packs/day: 0.7 packs/day for 71.3 years (47.5 ttl pk-yrs)     Types: Cigarettes     Start date: 1976     Last attempt to quit: 2024     Years since quittin.6     Passive exposure: Never    Smokeless tobacco: Never    Tobacco comments:     2-3 a day   Vaping Use    Vaping status: Never Used   Substance and Sexual Activity    Alcohol use: Not Currently    Drug use: Not Currently     Types: Oral     Comment: past problems with narcotics not since     Sexual activity: Not on file   Other Topics Concern    Not on file   Social History Narrative    Not on file     Social Drivers of Health     Financial Resource Strain: Low Risk  (2023)    Overall Financial Resource Strain (CARDIA)     Difficulty of Paying Living Expenses: Not hard at all   Food Insecurity: No Food Insecurity (2025)    Hunger Vital Sign     Worried About Running Out of Food in the Last Year: Never true     Ran Out of Food in the Last Year: Never true   Transportation Needs: No Transportation Needs (2025)    PRAPARE - Transportation     Lack of Transportation (Medical): No     Lack of Transportation (Non-Medical): No   Physical Activity: Not on file   Stress: Not on file   Social Connections: Not on file   Intimate Partner Violence: Not At Risk (2025)    Humiliation, Afraid, Rape, and Kick questionnaire     Fear of Current or Ex-Partner: No     Emotionally Abused: No     Physically Abused: No      Sexually Abused: No   Housing Stability: Low Risk  (1/6/2025)    Housing Stability Vital Sign     Unable to Pay for Housing in the Last Year: No     Number of Times Moved in the Last Year: 0     Homeless in the Last Year: No       No family history on file.    Allergies   Allergen Reactions    Morphine Shortness of Breath and Rash     Rash, shortness of breath       Current Outpatient Medications   Medication Sig Dispense Refill    omeprazole (PRILOSEC) 20 MG delayed-release capsule Take 20 mg by mouth 1 time a day as needed (acid reflux).      asa/apap/caffeine (EXCEDRIN) 250-250-65 MG Tab Take 2 Tablets by mouth every 8 hours as needed for Headache.       No current facility-administered medications for this visit.       Vitals:    01/21/25 1345   BP: 130/80   Pulse: 91   Resp: 18   SpO2: 92%         Review of Systems   Constitutional:  Positive for malaise/fatigue.   HENT: Negative.     Eyes: Negative.    Respiratory:  Positive for shortness of breath. Negative for wheezing.    Cardiovascular:  Positive for chest pain. Negative for palpitations, orthopnea, claudication, leg swelling and PND.   Gastrointestinal: Negative.  Negative for nausea.   Genitourinary: Negative.    Musculoskeletal: Negative.    Skin: Negative.    Neurological: Negative.  Negative for dizziness and sensory change.   Endo/Heme/Allergies: Negative.  Does not bruise/bleed easily.   Psychiatric/Behavioral:  Negative for depression and hallucinations. The patient is not nervous/anxious.         Prior echo results reviewed: LVEF 55% normal valves: TAVR working appropriately: RSVP 50mmHg        EKG interpreted by me: Sinus     Physical Exam  Constitutional:       General: He is in acute distress.      Appearance: Normal appearance.   HENT:      Head: Normocephalic.   Eyes:      Pupils: Pupils are equal, round, and reactive to light.   Neck:      Vascular: No JVD.   Cardiovascular:      Rate and Rhythm: Normal rate and regular rhythm.       "Pulses: Normal pulses.      Heart sounds: Normal heart sounds.   Pulmonary:      Effort: Tachypnea present.      Breath sounds: Normal breath sounds.   Abdominal:      General: Abdomen is flat.      Palpations: Abdomen is soft.   Musculoskeletal:      Cervical back: Normal range of motion.      Right lower leg: No edema.      Left lower leg: No edema.   Skin:     General: Skin is warm and dry.   Neurological:      Mental Status: He is alert and oriented to person, place, and time.   Psychiatric:         Mood and Affect: Mood normal.         Behavior: Behavior normal.          Data:  Lipids:   Lab Results   Component Value Date/Time    CHOLSTRLTOT 212 (H) 09/10/2023 03:19 AM    TRIGLYCERIDE 125 09/10/2023 03:19 AM    HDL 21 (A) 09/10/2023 03:19 AM     (H) 09/10/2023 03:19 AM        BMP:  Lab Results   Component Value Date/Time    SODIUM 133 (L) 09/13/2023 0046    POTASSIUM 5.2 09/13/2023 0046    CHLORIDE 99 09/13/2023 0046    CO2 23 09/13/2023 0046    GLUCOSE 136 (H) 09/13/2023 0046    BUN 28 (H) 09/13/2023 0046    CREATININE 1.22 09/13/2023 0046    CALCIUM 8.6 09/13/2023 0046    ANION 11.0 09/13/2023 0046       GFR:  Lab Results   Component Value Date/Time    IFAFRICA 66.14 08/27/2021 2047    IFA[place]ICA 198.42 08/27/2021 2047    IFNOTAFR 54.66 08/27/2021 2047    IFNOTAFR 163.98 08/27/2021 2047        TSH:   Lab Results   Component Value Date/Time    TSHULTRASEN 6.010 (H) 02/02/2023 0400       MAGNESIUM:  Lab Results   Component Value Date/Time    MAGNESIUM 2.2 09/12/2023 0019    MAGNESIUM 2.0 07/08/2023 0528    MAGNESIUM 2.1 07/06/2023 0049        THYROXINE (T4):   No results found for: \"FREEDIR\"     CBC:   Lab Results   Component Value Date/Time    WBC 15.9 (H) 01/11/2025 07:05 PM    RBC 6.98 (H) 01/11/2025 07:05 PM    HEMOGLOBIN 14.3 01/11/2025 07:05 PM    HEMATOCRIT 48.6 01/11/2025 07:05 PM    MCV 69.6 (L) 01/11/2025 07:05 PM    MCH 20.5 (L) 01/11/2025 07:05 PM    MCHC 29.4 (L) 01/11/2025 07:05 PM    RDW " "54.9 (H) 01/11/2025 07:05 PM    PLATELETCT 420 01/11/2025 07:05 PM    MPV 9.7 01/11/2025 07:05 PM    NEUTSPOLYS 87.40 (H) 01/11/2025 07:05 PM    LYMPHOCYTES 5.90 (L) 01/11/2025 07:05 PM    MONOCYTES 5.10 01/11/2025 07:05 PM    EOSINOPHILS 0.80 01/11/2025 07:05 PM    BASOPHILS 0.00 01/11/2025 07:05 PM    IMMGRAN 2.40 (H) 01/07/2025 12:42 AM    NRBC 0.00 01/11/2025 07:05 PM    NEUTS 13.90 (H) 01/11/2025 07:05 PM    LYMPHS 0.94 (L) 01/11/2025 07:05 PM    LYMPHS 16 01/02/2025 04:11 PM    MONOS 0.81 01/11/2025 07:05 PM    EOS 0.13 01/11/2025 07:05 PM    BASO 0.00 01/11/2025 07:05 PM    IMMGRANAB 0.37 (H) 01/07/2025 12:42 AM    NRBCAB 0.00 01/11/2025 07:05 PM        CBC w/o DIFF  Lab Results   Component Value Date/Time    WBC 15.9 (H) 01/11/2025 07:05 PM    RBC 6.98 (H) 01/11/2025 07:05 PM    HEMOGLOBIN 14.3 01/11/2025 07:05 PM    MCV 69.6 (L) 01/11/2025 07:05 PM    MCH 20.5 (L) 01/11/2025 07:05 PM    MCHC 29.4 (L) 01/11/2025 07:05 PM    RDW 54.9 (H) 01/11/2025 07:05 PM    MPV 9.7 01/11/2025 07:05 PM       LIVER:  Lab Results   Component Value Date/Time    ALKPHOSPHAT 31 01/11/2025 07:05 PM    ASTSGOT 46 (H) 01/11/2025 07:05 PM    ALTSGPT 54 (H) 01/11/2025 07:05 PM    TBILIRUBIN 0.5 01/11/2025 07:05 PM       BNP:  No results found for: \"BNPBTYPENAT\"    PT/INR:  Lab Results   Component Value Date/Time    PROTHROMBTM 14.7 (H) 04/11/2024 09:25 PM    PROTHROMBTM 20.4 (H) 09/10/2023 07:05 PM    PROTHROMBTM 13.1 07/04/2023 04:23 PM    INR 1.13 04/11/2024 09:25 PM    INR 1.72 (H) 09/10/2023 07:05 PM    INR 0.94 07/04/2023 04:23 PM             Impression/Plan:  SOB (shortness of breath) on exertion   - patient in distress  -  said he has not heard about recent bronchoscopy results   - documentation of hemidiaphragm paralysis   - RSVP 50mmHg on last echo   - repeat echo to reassess valves and RSVP   - probable referral to pulmonary hypertension clinic    - possible right heart cath   - trial stopping metoprolol   -      "       Jhon Sams AGACNP-EP  Cardiac Electrophysiology

## 2025-01-22 ENCOUNTER — APPOINTMENT (OUTPATIENT)
Dept: RADIOLOGY | Facility: MEDICAL CENTER | Age: 65
DRG: 102 | End: 2025-01-22
Attending: EMERGENCY MEDICINE
Payer: MEDICAID

## 2025-01-22 ENCOUNTER — HOSPITAL ENCOUNTER (OUTPATIENT)
Dept: CARDIOLOGY | Facility: MEDICAL CENTER | Age: 65
DRG: 102 | End: 2025-01-22
Attending: NURSE PRACTITIONER
Payer: MEDICAID

## 2025-01-22 ENCOUNTER — HOSPITAL ENCOUNTER (INPATIENT)
Facility: MEDICAL CENTER | Age: 65
LOS: 3 days | DRG: 102 | End: 2025-01-25
Attending: EMERGENCY MEDICINE | Admitting: STUDENT IN AN ORGANIZED HEALTH CARE EDUCATION/TRAINING PROGRAM
Payer: MEDICAID

## 2025-01-22 ENCOUNTER — APPOINTMENT (OUTPATIENT)
Dept: RADIOLOGY | Facility: MEDICAL CENTER | Age: 65
DRG: 102 | End: 2025-01-22
Payer: MEDICAID

## 2025-01-22 DIAGNOSIS — R51.9 INTRACTABLE HEADACHE, UNSPECIFIED CHRONICITY PATTERN, UNSPECIFIED HEADACHE TYPE: ICD-10-CM

## 2025-01-22 DIAGNOSIS — J96.01 ACUTE HYPOXEMIC RESPIRATORY FAILURE (HCC): ICD-10-CM

## 2025-01-22 DIAGNOSIS — R79.89 ELEVATED TROPONIN: ICD-10-CM

## 2025-01-22 DIAGNOSIS — R06.02 SOB (SHORTNESS OF BREATH) ON EXERTION: ICD-10-CM

## 2025-01-22 PROBLEM — S02.5XXA BROKEN TOOTH: Status: ACTIVE | Noted: 2025-01-22

## 2025-01-22 PROBLEM — R74.8 ELEVATED LIVER ENZYMES: Status: ACTIVE | Noted: 2025-01-22

## 2025-01-22 PROBLEM — R10.819 SUPRAPUBIC TENDERNESS: Status: ACTIVE | Noted: 2025-01-22

## 2025-01-22 LAB
ALBUMIN SERPL BCP-MCNC: 3.5 G/DL (ref 3.2–4.9)
ALBUMIN/GLOB SERPL: 1.5 G/DL
ALP SERPL-CCNC: 35 U/L (ref 30–99)
ALT SERPL-CCNC: 60 U/L (ref 2–50)
AMPHET UR QL SCN: NEGATIVE
ANION GAP SERPL CALC-SCNC: 6 MMOL/L (ref 7–16)
ANISOCYTOSIS BLD QL SMEAR: ABNORMAL
APPEARANCE UR: CLEAR
APTT PPP: 23.8 SEC (ref 24.7–36)
AST SERPL-CCNC: 51 U/L (ref 12–45)
BACTERIA #/AREA URNS HPF: NORMAL /HPF
BARBITURATES UR QL SCN: NEGATIVE
BASOPHILS # BLD AUTO: 0.9 % (ref 0–1.8)
BASOPHILS # BLD: 0.15 K/UL (ref 0–0.12)
BENZODIAZ UR QL SCN: NEGATIVE
BILIRUB SERPL-MCNC: 0.8 MG/DL (ref 0.1–1.5)
BILIRUB UR QL STRIP.AUTO: ABNORMAL
BUN SERPL-MCNC: 19 MG/DL (ref 8–22)
BZE UR QL SCN: NEGATIVE
CALCIUM ALBUM COR SERPL-MCNC: 9.2 MG/DL (ref 8.5–10.5)
CALCIUM SERPL-MCNC: 8.8 MG/DL (ref 8.5–10.5)
CANNABINOIDS UR QL SCN: NEGATIVE
CASTS URNS QL MICRO: NORMAL /LPF (ref 0–2)
CHLORIDE SERPL-SCNC: 100 MMOL/L (ref 96–112)
CO2 SERPL-SCNC: 30 MMOL/L (ref 20–33)
COLOR UR: ABNORMAL
CREAT SERPL-MCNC: 1.01 MG/DL (ref 0.5–1.4)
EKG IMPRESSION: NORMAL
EOSINOPHIL # BLD AUTO: 0.3 K/UL (ref 0–0.51)
EOSINOPHIL NFR BLD: 1.8 % (ref 0–6.9)
EPITHELIAL CELLS 1715: NORMAL /HPF (ref 0–5)
ERYTHROCYTE [DISTWIDTH] IN BLOOD BY AUTOMATED COUNT: 60.3 FL (ref 35.9–50)
ERYTHROCYTE [SEDIMENTATION RATE] IN BLOOD BY WESTERGREN METHOD: 1 MM/HOUR (ref 0–20)
FENTANYL UR QL: NEGATIVE
GFR SERPLBLD CREATININE-BSD FMLA CKD-EPI: 82 ML/MIN/1.73 M 2
GLOBULIN SER CALC-MCNC: 2.4 G/DL (ref 1.9–3.5)
GLUCOSE SERPL-MCNC: 96 MG/DL (ref 65–99)
GLUCOSE UR STRIP.AUTO-MCNC: NEGATIVE MG/DL
HCT VFR BLD AUTO: 48.8 % (ref 42–52)
HGB BLD-MCNC: 14.3 G/DL (ref 14–18)
INR PPP: 1.01 (ref 0.87–1.13)
KETONES UR STRIP.AUTO-MCNC: NEGATIVE MG/DL
LEUKOCYTE ESTERASE UR QL STRIP.AUTO: NEGATIVE
LV EJECT FRACT  99904: 67
LV EJECT FRACT MOD 2C 99903: 62.88
LV EJECT FRACT MOD 4C 99902: 72.22
LV EJECT FRACT MOD BP 99901: 67.59
LYMPHOCYTES # BLD AUTO: 1 K/UL (ref 1–4.8)
LYMPHOCYTES NFR BLD: 6.1 % (ref 22–41)
MACROCYTES BLD QL SMEAR: ABNORMAL
MANUAL DIFF BLD: NORMAL
MCH RBC QN AUTO: 20.7 PG (ref 27–33)
MCHC RBC AUTO-ENTMCNC: 29.3 G/DL (ref 32.3–36.5)
MCV RBC AUTO: 70.5 FL (ref 81.4–97.8)
METHADONE UR QL SCN: NEGATIVE
MICRO URNS: ABNORMAL
MONOCYTES # BLD AUTO: 1 K/UL (ref 0–0.85)
MONOCYTES NFR BLD AUTO: 6.1 % (ref 0–13.4)
MORPHOLOGY BLD-IMP: NORMAL
NEUTROPHILS # BLD AUTO: 13.96 K/UL (ref 1.82–7.42)
NEUTROPHILS NFR BLD: 85.1 % (ref 44–72)
NITRITE UR QL STRIP.AUTO: NEGATIVE
NRBC # BLD AUTO: 0 K/UL
NRBC BLD-RTO: 0 /100 WBC (ref 0–0.2)
NT-PROBNP SERPL IA-MCNC: 524 PG/ML (ref 0–125)
OPIATES UR QL SCN: NEGATIVE
OXYCODONE UR QL SCN: NEGATIVE
PCP UR QL SCN: NEGATIVE
PH UR STRIP.AUTO: 6.5 [PH] (ref 5–8)
PLATELET # BLD AUTO: 574 K/UL (ref 164–446)
PLATELET BLD QL SMEAR: NORMAL
PMV BLD AUTO: 9.8 FL (ref 9–12.9)
POTASSIUM SERPL-SCNC: 4.8 MMOL/L (ref 3.6–5.5)
PROPOXYPH UR QL SCN: NEGATIVE
PROT SERPL-MCNC: 5.9 G/DL (ref 6–8.2)
PROT UR QL STRIP: 300 MG/DL
PROTHROMBIN TIME: 13.3 SEC (ref 12–14.6)
RBC # BLD AUTO: 6.92 M/UL (ref 4.7–6.1)
RBC # URNS HPF: NORMAL /HPF (ref 0–2)
RBC BLD AUTO: PRESENT
RBC UR QL AUTO: NEGATIVE
SODIUM SERPL-SCNC: 136 MMOL/L (ref 135–145)
SP GR UR STRIP.AUTO: 1.04
T PALLIDUM AB SER QL IA: NORMAL
TROPONIN T SERPL-MCNC: 58 NG/L (ref 6–19)
TROPONIN T SERPL-MCNC: 74 NG/L (ref 6–19)
TROPONIN T SERPL-MCNC: 74 NG/L (ref 6–19)
UROBILINOGEN UR STRIP.AUTO-MCNC: 1 EU/DL
WBC # BLD AUTO: 16.4 K/UL (ref 4.8–10.8)
WBC #/AREA URNS HPF: NORMAL /HPF

## 2025-01-22 PROCEDURE — 87591 N.GONORRHOEAE DNA AMP PROB: CPT

## 2025-01-22 PROCEDURE — 85652 RBC SED RATE AUTOMATED: CPT

## 2025-01-22 PROCEDURE — A9270 NON-COVERED ITEM OR SERVICE: HCPCS

## 2025-01-22 PROCEDURE — 80053 COMPREHEN METABOLIC PANEL: CPT

## 2025-01-22 PROCEDURE — 700117 HCHG RX CONTRAST REV CODE 255: Mod: UD | Performed by: NURSE PRACTITIONER

## 2025-01-22 PROCEDURE — 85007 BL SMEAR W/DIFF WBC COUNT: CPT

## 2025-01-22 PROCEDURE — 83880 ASSAY OF NATRIURETIC PEPTIDE: CPT

## 2025-01-22 PROCEDURE — 700102 HCHG RX REV CODE 250 W/ 637 OVERRIDE(OP)

## 2025-01-22 PROCEDURE — 96375 TX/PRO/DX INJ NEW DRUG ADDON: CPT

## 2025-01-22 PROCEDURE — 80307 DRUG TEST PRSMV CHEM ANLYZR: CPT

## 2025-01-22 PROCEDURE — 81001 URINALYSIS AUTO W/SCOPE: CPT

## 2025-01-22 PROCEDURE — 700117 HCHG RX CONTRAST REV CODE 255: Mod: JZ

## 2025-01-22 PROCEDURE — 700117 HCHG RX CONTRAST REV CODE 255: Performed by: EMERGENCY MEDICINE

## 2025-01-22 PROCEDURE — 93306 TTE W/DOPPLER COMPLETE: CPT | Mod: 26 | Performed by: INTERNAL MEDICINE

## 2025-01-22 PROCEDURE — 700111 HCHG RX REV CODE 636 W/ 250 OVERRIDE (IP)

## 2025-01-22 PROCEDURE — 36415 COLL VENOUS BLD VENIPUNCTURE: CPT

## 2025-01-22 PROCEDURE — 84484 ASSAY OF TROPONIN QUANT: CPT

## 2025-01-22 PROCEDURE — 93005 ELECTROCARDIOGRAM TRACING: CPT | Mod: TC | Performed by: EMERGENCY MEDICINE

## 2025-01-22 PROCEDURE — 96376 TX/PRO/DX INJ SAME DRUG ADON: CPT

## 2025-01-22 PROCEDURE — A9579 GAD-BASE MR CONTRAST NOS,1ML: HCPCS | Mod: JZ

## 2025-01-22 PROCEDURE — 85027 COMPLETE CBC AUTOMATED: CPT

## 2025-01-22 PROCEDURE — 86780 TREPONEMA PALLIDUM: CPT

## 2025-01-22 PROCEDURE — 93306 TTE W/DOPPLER COMPLETE: CPT

## 2025-01-22 PROCEDURE — 96374 THER/PROPH/DIAG INJ IV PUSH: CPT

## 2025-01-22 PROCEDURE — 770020 HCHG ROOM/CARE - TELE (206)

## 2025-01-22 PROCEDURE — 70496 CT ANGIOGRAPHY HEAD: CPT

## 2025-01-22 PROCEDURE — 700111 HCHG RX REV CODE 636 W/ 250 OVERRIDE (IP): Mod: JZ,UD | Performed by: EMERGENCY MEDICINE

## 2025-01-22 PROCEDURE — 70553 MRI BRAIN STEM W/O & W/DYE: CPT

## 2025-01-22 PROCEDURE — 700111 HCHG RX REV CODE 636 W/ 250 OVERRIDE (IP): Mod: JZ

## 2025-01-22 PROCEDURE — 93005 ELECTROCARDIOGRAM TRACING: CPT | Mod: TC

## 2025-01-22 PROCEDURE — 96372 THER/PROPH/DIAG INJ SC/IM: CPT

## 2025-01-22 PROCEDURE — 99285 EMERGENCY DEPT VISIT HI MDM: CPT

## 2025-01-22 PROCEDURE — 85730 THROMBOPLASTIN TIME PARTIAL: CPT

## 2025-01-22 PROCEDURE — 87491 CHLMYD TRACH DNA AMP PROBE: CPT

## 2025-01-22 PROCEDURE — 71045 X-RAY EXAM CHEST 1 VIEW: CPT

## 2025-01-22 PROCEDURE — 85610 PROTHROMBIN TIME: CPT

## 2025-01-22 RX ORDER — ACETAMINOPHEN 325 MG/1
650 TABLET ORAL EVERY 4 HOURS PRN
Status: DISCONTINUED | OUTPATIENT
Start: 2025-01-22 | End: 2025-01-23

## 2025-01-22 RX ORDER — ONDANSETRON 2 MG/ML
4 INJECTION INTRAMUSCULAR; INTRAVENOUS ONCE
Status: COMPLETED | OUTPATIENT
Start: 2025-01-22 | End: 2025-01-22

## 2025-01-22 RX ORDER — METOPROLOL SUCCINATE 25 MG/1
25 TABLET, EXTENDED RELEASE ORAL DAILY
COMMUNITY

## 2025-01-22 RX ORDER — HEPARIN SODIUM 5000 [USP'U]/ML
5000 INJECTION, SOLUTION INTRAVENOUS; SUBCUTANEOUS EVERY 8 HOURS
Status: DISCONTINUED | OUTPATIENT
Start: 2025-01-22 | End: 2025-01-25 | Stop reason: HOSPADM

## 2025-01-22 RX ORDER — DEXAMETHASONE SODIUM PHOSPHATE 4 MG/ML
4 INJECTION, SOLUTION INTRA-ARTICULAR; INTRALESIONAL; INTRAMUSCULAR; INTRAVENOUS; SOFT TISSUE ONCE
Status: COMPLETED | OUTPATIENT
Start: 2025-01-22 | End: 2025-01-22

## 2025-01-22 RX ORDER — ALBUTEROL SULFATE 5 MG/ML
2.5 SOLUTION RESPIRATORY (INHALATION)
Status: DISCONTINUED | OUTPATIENT
Start: 2025-01-22 | End: 2025-01-25 | Stop reason: HOSPADM

## 2025-01-22 RX ORDER — OXYCODONE HYDROCHLORIDE 5 MG/1
5 TABLET ORAL ONCE
Status: COMPLETED | OUTPATIENT
Start: 2025-01-23 | End: 2025-01-22

## 2025-01-22 RX ORDER — MAGNESIUM SULFATE 1 G/100ML
1 INJECTION INTRAVENOUS ONCE
Status: COMPLETED | OUTPATIENT
Start: 2025-01-22 | End: 2025-01-22

## 2025-01-22 RX ORDER — LABETALOL HYDROCHLORIDE 5 MG/ML
20 INJECTION, SOLUTION INTRAVENOUS ONCE
Status: DISPENSED | OUTPATIENT
Start: 2025-01-22 | End: 2025-01-23

## 2025-01-22 RX ORDER — HYDROMORPHONE HYDROCHLORIDE 1 MG/ML
0.5 INJECTION, SOLUTION INTRAMUSCULAR; INTRAVENOUS; SUBCUTANEOUS ONCE
Status: COMPLETED | OUTPATIENT
Start: 2025-01-22 | End: 2025-01-22

## 2025-01-22 RX ORDER — IBUPROFEN 200 MG
400 TABLET ORAL EVERY 6 HOURS PRN
COMMUNITY

## 2025-01-22 RX ORDER — KETOROLAC TROMETHAMINE 15 MG/ML
15 INJECTION, SOLUTION INTRAMUSCULAR; INTRAVENOUS ONCE
Status: COMPLETED | OUTPATIENT
Start: 2025-01-22 | End: 2025-01-22

## 2025-01-22 RX ORDER — DIPHENHYDRAMINE HYDROCHLORIDE 50 MG/ML
25 INJECTION INTRAMUSCULAR; INTRAVENOUS ONCE
Status: COMPLETED | OUTPATIENT
Start: 2025-01-23 | End: 2025-01-22

## 2025-01-22 RX ORDER — OMEPRAZOLE 20 MG/1
20 CAPSULE, DELAYED RELEASE ORAL
Status: DISCONTINUED | OUTPATIENT
Start: 2025-01-22 | End: 2025-01-25 | Stop reason: HOSPADM

## 2025-01-22 RX ORDER — SUMATRIPTAN 6 MG/.5ML
6 INJECTION, SOLUTION SUBCUTANEOUS ONCE
Status: DISCONTINUED | OUTPATIENT
Start: 2025-01-22 | End: 2025-01-22

## 2025-01-22 RX ORDER — KETOROLAC TROMETHAMINE 15 MG/ML
15 INJECTION, SOLUTION INTRAMUSCULAR; INTRAVENOUS EVERY 6 HOURS PRN
Status: DISPENSED | OUTPATIENT
Start: 2025-01-22 | End: 2025-01-24

## 2025-01-22 RX ORDER — ACETAMINOPHEN 500 MG
1000 TABLET ORAL ONCE
Status: COMPLETED | OUTPATIENT
Start: 2025-01-23 | End: 2025-01-22

## 2025-01-22 RX ORDER — DIPHENHYDRAMINE HYDROCHLORIDE 50 MG/ML
25 INJECTION INTRAMUSCULAR; INTRAVENOUS ONCE
Status: COMPLETED | OUTPATIENT
Start: 2025-01-22 | End: 2025-01-22

## 2025-01-22 RX ORDER — HYDROMORPHONE HYDROCHLORIDE 1 MG/ML
1 INJECTION, SOLUTION INTRAMUSCULAR; INTRAVENOUS; SUBCUTANEOUS ONCE
Status: COMPLETED | OUTPATIENT
Start: 2025-01-22 | End: 2025-01-22

## 2025-01-22 RX ADMIN — OXYCODONE 5 MG: 5 TABLET ORAL at 23:50

## 2025-01-22 RX ADMIN — HEPARIN SODIUM 5000 UNITS: 5000 INJECTION, SOLUTION INTRAVENOUS; SUBCUTANEOUS at 18:35

## 2025-01-22 RX ADMIN — HYDROMORPHONE HYDROCHLORIDE 1 MG: 1 INJECTION, SOLUTION INTRAMUSCULAR; INTRAVENOUS; SUBCUTANEOUS at 14:18

## 2025-01-22 RX ADMIN — KETOROLAC TROMETHAMINE 15 MG: 15 INJECTION, SOLUTION INTRAMUSCULAR; INTRAVENOUS at 21:29

## 2025-01-22 RX ADMIN — GADOTERIDOL 20 ML: 279.3 INJECTION, SOLUTION INTRAVENOUS at 20:37

## 2025-01-22 RX ADMIN — NICOTINE 7 MG: 7 PATCH TRANSDERMAL at 17:40

## 2025-01-22 RX ADMIN — DIPHENHYDRAMINE HYDROCHLORIDE 25 MG: 50 INJECTION, SOLUTION INTRAMUSCULAR; INTRAVENOUS at 23:51

## 2025-01-22 RX ADMIN — IOHEXOL 80 ML: 350 INJECTION, SOLUTION INTRAVENOUS at 15:15

## 2025-01-22 RX ADMIN — HEPARIN SODIUM 5000 UNITS: 5000 INJECTION, SOLUTION INTRAVENOUS; SUBCUTANEOUS at 21:30

## 2025-01-22 RX ADMIN — ACETAMINOPHEN 1000 MG: 500 TABLET ORAL at 23:50

## 2025-01-22 RX ADMIN — DEXAMETHASONE SODIUM PHOSPHATE 4 MG: 4 INJECTION INTRA-ARTICULAR; INTRALESIONAL; INTRAMUSCULAR; INTRAVENOUS; SOFT TISSUE at 17:50

## 2025-01-22 RX ADMIN — MAGNESIUM SULFATE IN DEXTROSE 1 G: 10 INJECTION, SOLUTION INTRAVENOUS at 18:38

## 2025-01-22 RX ADMIN — ONDANSETRON 4 MG: 2 INJECTION INTRAMUSCULAR; INTRAVENOUS at 14:18

## 2025-01-22 RX ADMIN — DIPHENHYDRAMINE HYDROCHLORIDE 25 MG: 50 INJECTION, SOLUTION INTRAMUSCULAR; INTRAVENOUS at 17:41

## 2025-01-22 RX ADMIN — AMOXICILLIN AND CLAVULANATE POTASSIUM 1 TABLET: 875; 125 TABLET, FILM COATED ORAL at 17:40

## 2025-01-22 RX ADMIN — HUMAN ALBUMIN MICROSPHERES AND PERFLUTREN 3 ML: 10; .22 INJECTION, SOLUTION INTRAVENOUS at 12:45

## 2025-01-22 RX ADMIN — HYDROMORPHONE HYDROCHLORIDE 0.5 MG: 1 INJECTION, SOLUTION INTRAMUSCULAR; INTRAVENOUS; SUBCUTANEOUS at 16:09

## 2025-01-22 ASSESSMENT — COGNITIVE AND FUNCTIONAL STATUS - GENERAL
PERSONAL GROOMING: A LITTLE
DRESSING REGULAR LOWER BODY CLOTHING: A LITTLE
CLIMB 3 TO 5 STEPS WITH RAILING: A LITTLE
MOVING TO AND FROM BED TO CHAIR: A LITTLE
EATING MEALS: A LITTLE
MOBILITY SCORE: 18
DRESSING REGULAR UPPER BODY CLOTHING: A LITTLE
SUGGESTED CMS G CODE MODIFIER MOBILITY: CK
STANDING UP FROM CHAIR USING ARMS: A LITTLE
TURNING FROM BACK TO SIDE WHILE IN FLAT BAD: A LITTLE
WALKING IN HOSPITAL ROOM: A LITTLE
MOVING FROM LYING ON BACK TO SITTING ON SIDE OF FLAT BED: A LITTLE
DAILY ACTIVITIY SCORE: 18
HELP NEEDED FOR BATHING: A LITTLE
SUGGESTED CMS G CODE MODIFIER DAILY ACTIVITY: CK
TOILETING: A LITTLE

## 2025-01-22 ASSESSMENT — FIBROSIS 4 INDEX
FIB4 SCORE: 0.75
FIB4 SCORE: 0.97
FIB4 SCORE: 0.75

## 2025-01-22 ASSESSMENT — SOCIAL DETERMINANTS OF HEALTH (SDOH)
WITHIN THE LAST YEAR, HAVE YOU BEEN HUMILIATED OR EMOTIONALLY ABUSED IN OTHER WAYS BY YOUR PARTNER OR EX-PARTNER?: NO
WITHIN THE PAST 12 MONTHS, YOU WORRIED THAT YOUR FOOD WOULD RUN OUT BEFORE YOU GOT THE MONEY TO BUY MORE: NEVER TRUE
WITHIN THE LAST YEAR, HAVE TO BEEN RAPED OR FORCED TO HAVE ANY KIND OF SEXUAL ACTIVITY BY YOUR PARTNER OR EX-PARTNER?: NO
IN THE PAST 12 MONTHS, HAS THE ELECTRIC, GAS, OIL, OR WATER COMPANY THREATENED TO SHUT OFF SERVICE IN YOUR HOME?: NO
WITHIN THE PAST 12 MONTHS, THE FOOD YOU BOUGHT JUST DIDN'T LAST AND YOU DIDN'T HAVE MONEY TO GET MORE: NEVER TRUE
WITHIN THE LAST YEAR, HAVE YOU BEEN AFRAID OF YOUR PARTNER OR EX-PARTNER?: NO
WITHIN THE LAST YEAR, HAVE YOU BEEN KICKED, HIT, SLAPPED, OR OTHERWISE PHYSICALLY HURT BY YOUR PARTNER OR EX-PARTNER?: NO

## 2025-01-22 ASSESSMENT — LIFESTYLE VARIABLES
CONSUMPTION TOTAL: NEGATIVE
ON A TYPICAL DAY WHEN YOU DRINK ALCOHOL HOW MANY DRINKS DO YOU HAVE: 0
TOTAL SCORE: 0
ALCOHOL_USE: NO
EVER FELT BAD OR GUILTY ABOUT YOUR DRINKING: NO
DOES PATIENT WANT TO STOP DRINKING: NO
HAVE YOU EVER FELT YOU SHOULD CUT DOWN ON YOUR DRINKING: NO
HAVE PEOPLE ANNOYED YOU BY CRITICIZING YOUR DRINKING: NO
TOTAL SCORE: 0
HOW MANY TIMES IN THE PAST YEAR HAVE YOU HAD 5 OR MORE DRINKS IN A DAY: 0
EVER HAD A DRINK FIRST THING IN THE MORNING TO STEADY YOUR NERVES TO GET RID OF A HANGOVER: NO
TOTAL SCORE: 0
AVERAGE NUMBER OF DAYS PER WEEK YOU HAVE A DRINK CONTAINING ALCOHOL: 0

## 2025-01-22 ASSESSMENT — HEART SCORE
AGE: 65+
TROPONIN: GREATER THAN OR EQUAL TO 3 TIMES NORMAL LIMIT
HISTORY: SLIGHTLY SUSPICIOUS
RISK FACTORS: >2 RISK FACTORS OR HX OF ATHEROSCLEROTIC DISEASE
HEART SCORE: 7
ECG: NON-SPECIFIC REPOLARIZATION DISTURBANCE

## 2025-01-22 ASSESSMENT — PAIN DESCRIPTION - PAIN TYPE
TYPE: ACUTE PAIN

## 2025-01-22 NOTE — ED PROVIDER NOTES
CHIEF COMPLAINT  Chief Complaint   Patient presents with    Shortness of Breath     States for the last few years but he feels like he is having a attack, acutely yesterday        LIMITATION TO HISTORY   Select: none    HPI    Noe Hickey is a 64 y.o. male who presents to the Emergency Department for constant shortness of breath onset about 2 years ago. The patient notes that he had an aortic aneurysm surgery performed and since this procedure he has had difficulty breathing. The patient notes that he had his lungs scoped December 19 th, which showed no pulmonary obstruction. However, the patient reports that he has been having constant headaches since being scoped. Patient reports that his doctor had told him that his shortness of breath may be caused by pulmonary hypertension. He reports that he has oxygen at home, but states that it does not help with his breathing. Patient reports that his headaches get so bad that it will cause him to vomit. He reports that his headache will exacerbate his shortness of breath and cause him to have chest pain and a cough. Patient notes that he has been using Tylenol with no alleviation. He denies currently taking his medications because he was told to stop taking them due to his headaches. He notes that he used to take beta blockers and metoprolol. Patient reports that his cardiologist is a NP through [facility].  He did have an echocardiogram today. Patient notes that his legs are swollen. Denies any history of kidney problems. The patient has an allergy to morphine.     OUTSIDE HISTORIAN(S):  Select: None    EXTERNAL RECORDS REVIEWED  Select: Patient was admitted recently for worsening shortness of breath, nausea and pain. He left the hospital AMA. His normal oxygen saturation is 89-91%. His oxygen saturation was 85% when he came here.       ECHO done 1/22/2025    CONCLUSIONS  Normal left ventricular systolic function.  Moderate concentric left ventricular  hypertrophy.   The right ventricle is dilated.  Bioprosthetic aortic valve that is functioning normally with normal   transvalvular gradients.  Right ventricular systolic pressure is estimated to be 47 mmHg.    PAST MEDICAL HISTORY  Past Medical History:   Diagnosis Date    Apnea, sleep     Arrhythmia     Breath shortness 06/09/2023    prior to 1/2023 surgery    Chest pain     Chest tightness     Cyclic vomiting syndrome     Daytime sleepiness     Difficulty breathing     Dizziness     Elevated hemidiaphragm - LEFT post AVR 01/04/2023    Fracture     Frequent headaches     Gasping for breath     Headache, classical migraine     Heart burn     Heart valve disease 06/09/2023    heart surgery in 1/2023    Heartburn     Hyperlipidemia 04/26/2024    medicated    Hypertension 04/26/2024 6/6/2024 pt not currently taking any medication for this at this time    Indigestion     Insomnia     Morning headache     Painful breathing     Painful joint     Pneumonia due to infectious organism 01/20/2023    Shortness of breath     Snoring     6/6/2024 no sleep study done    Sore muscles     Swelling of lower extremity     Weakness     Wears glasses     Wheezing      SURGICAL HISTORY  Past Surgical History:   Procedure Laterality Date    AR BRONCHOSCOPY,DIAGNOSTIC N/A 1/2/2025    Procedure: FIBER OPTIC BRONCHOSCOPY WITH BRONCHOALVEOLAR LAVAGE;  Surgeon: Scot Christianson M.D.;  Location: [facility] [place];  Service: Pulmonary    IRRIGATION & DEBRIDEMENT GENERAL N/A 6/7/2024    Procedure: RIGHT FOOT IRRIGATION AND DEBRIDEMENT;  Surgeon: Oliver Arreguin M.D.;  Location: [facility];  Service: Orthopedics    INCISION AND DRAINAGE GENERAL Left 04/19/2024    Procedure: INCISION AND DRAINAGE, LEG;  Surgeon: Mitch Bowie M.D.;  Location: [facility];  Service: Orthopedics    AORTIC VALVE REPLACEMENT  01/05/2023    Procedure: AORTIC VALVE REPLACEMENT, ASCENDING AORTIC ANEURYSM REPAIR AND TRANSESOPHAGEAL  ECHOCARDIOGRAM.;  Surgeon: Serena Goyal M.D.;  Location: SUR[facility]ERY [facility];  Service: Cardiac    AORTIC ASCENDING DISSECTION  2023    Procedure: REPAIR, ANEURYSM OR DISSECTION, AORTA, ASCENDING;  Surgeon: Serena Goyal M.D.;  Location: SURGERY [facility];  Service: Cardiac    ECHOCARDIOGRAM, TRANSESOPHAGEAL, INTRAOPERATIVE  2023    Procedure: ECHOCARDIOGRAM, TRANSESOPHAGEAL, INTRAOPERATIVE.;  Surgeon: Serena Goyal M.D.;  Location: SURGERY [facility];  Service: Cardiac    IRRIGATION & DEBRIDEMENT ORTHO Right 2017    Procedure: IRRIGATION & DEBRIDEMENT ORTHO-THIGH;  Surgeon: Corbin Rivera M.D.;  Location: SUR[facility]ERY [facility];  Service: Orthopedics    SHOULDER HEMICAP RESURFACING Right 10/12/2015    Procedure: RIGHT SHOULDER RESURFACING;  Surgeon: Javon Doll M.D.;  Location: SURGERY [facility];  Service:     SHOULDER ARTHROSCOPY Bilateral     x3    SHOULDER SURGERY Right     replacement right     FAMILY HISTORY  No family history noted.     SOCIAL HISTORY  Social History     Tobacco Use    Smoking status: Every Day     Current packs/day: 0.00     Average packs/day: 0.7 packs/day for 71.3 years (47.5 ttl pk-yrs)     Types: Cigarettes     Start date: 1976     Last attempt to quit: 2024     Years since quittin.6     Passive exposure: Never    Smokeless tobacco: Never    Tobacco comments:     2-3 a day   Vaping Use    Vaping status: Never Used   Substance Use Topics    Alcohol use: Not Currently    Drug use: Not Currently     Types: Oral     Comment: past problems with narcotics not since 2019     CURRENT MEDICATIONS  Current Outpatient Medications Prior to Encounter   Medication Sig Dispense Refill    omeprazole (PRILOSEC) 20 MG delayed-release capsule Take 20 mg by mouth 1 time a day as needed (acid reflux).      asa/apap/caffeine (EXCEDRIN) 250-250-65 MG Tab Take 2 Tablets by mouth every 8 hours as needed for Headache.       ALLERGIES  Allergies   Allergen  "Reactions    Morphine Shortness of Breath and Rash     Rash, shortness of breath     PHYSICAL EXAM  VITAL SIGNS:BP (!) (P) 176/108   Pulse (P) 76   Temp 36.7 °C (98.1 °F) (Temporal)   Resp (!) (P) 25   Ht 1.702 m (5' 7\")   Wt 95.3 kg (210 lb)   SpO2 (P) 98%   BMI 32.89 kg/m²       Constitutional: Moderate discomfort, Well-developed   HENT: Normocephalic, Atraumatic, Bilateral external ears normal.  Eyes:  PERRLA, EOMI, conjunctiva are normal.   Neck: Supple.  Non tender midline  Cardiovascular: Regular rate and rhythm, 3/6 murmur of the left sternal border, No gallops or rubs.   Thorax & Lungs: Diminished, but clear, No respiratory distress. There are no wheezes no rales. Chest wall is non tender.  Abdomen: Soft, non distended, non tender   Skin: Warm, Dry, No erythema,   Back: No tenderness, No CVA tenderness.  Musculoskeletal: 0-1+ pitting edema in bilateral lower extremities, No clubbing or cyanosis, good range of motion   Neurologic: Alert & oriented x 3, normal sensation. Cranial nerves II-XII grossly intact, moving all 4 extremities, 5/5 strength in all 4 extremities, cerebellar functions intact, no other focal abnormalities.   Psychiatric: Affect normal, Judgment normal, Mood normal.     DIAGNOSTIC STUDIES / PROCEDURES  LABS  Results for orders placed or performed during the hospital encounter of 25   EKG    Collection Time: 25 11:40 AM   Result Value Ref Range    Report       [facility] Emergency Dept.    Test Date:  2025  Pt Name:    JUSTNI TOPETE               Department: ER  MRN:        [id number]                      Room:  Gender:     Male                         Technician: [id number]  :        1960                   Requested By:ER TRIAGE PROTOCOL  Order #:    [id number]                    Reading MD:    Measurements  Intervals                                Axis  Rate:       79                           P:          68  NJ:         160                      "     QRS:        -7  QRSD:       90                           T:          53  QT:         359  QTc:        412    Interpretive Statements  Sinus rhythm  RSR' in V1 or V2, probably normal variant  Compared to ECG 01/21/2025 13:54:38  RSR' in V1 or V2 now present     CBC with Differential    Collection Time: 01/22/25  1:07 PM   Result Value Ref Range    WBC 16.4 (H) 4.8 - 10.8 K/uL    RBC 6.92 (H) 4.70 - 6.10 M/uL    Hemoglobin 14.3 14.0 - 18.0 g/dL    Hematocrit 48.8 42.0 - 52.0 %    MCV 70.5 (L) 81.4 - 97.8 fL    MCH 20.7 (L) 27.0 - 33.0 pg    MCHC 29.3 (L) 32.3 - 36.5 g/dL    RDW 60.3 (H) 35.9 - 50.0 fL    Platelet Count 574 (H) 164 - 446 K/uL    MPV 9.8 9.0 - 12.9 fL    Neutrophils-Polys 85.10 (H) 44.00 - 72.00 %    Lymphocytes 6.10 (L) 22.00 - 41.00 %    Monocytes 6.10 0.00 - 13.40 %    Eosinophils 1.80 0.00 - 6.90 %    Basophils 0.90 0.00 - 1.80 %    Nucleated RBC 0.00 0.00 - 0.20 /100 WBC    Neutrophils (Absolute) 13.96 (H) 1.82 - 7.42 K/uL    Lymphs (Absolute) 1.00 1.00 - 4.80 K/uL    Monos (Absolute) 1.00 (H) 0.00 - 0.85 K/uL    Eos (Absolute) 0.30 0.00 - 0.51 K/uL    Baso (Absolute) 0.15 (H) 0.00 - 0.12 K/uL    NRBC (Absolute) 0.00 K/uL    Anisocytosis 1+     Macrocytosis 1+    Comp Metabolic Panel    Collection Time: 01/22/25  1:07 PM   Result Value Ref Range    Sodium 136 135 - 145 mmol/L    Potassium 4.8 3.6 - 5.5 mmol/L    Chloride 100 96 - 112 mmol/L    Co2 30 20 - 33 mmol/L    Anion Gap 6.0 (L) 7.0 - 16.0    Glucose 96 65 - 99 mg/dL    Bun 19 8 - 22 mg/dL    Creatinine 1.01 0.50 - 1.40 mg/dL    Calcium 8.8 8.5 - 10.5 mg/dL    Correct Calcium 9.2 8.5 - 10.5 mg/dL    AST(SGOT) 51 (H) 12 - 45 U/L    ALT(SGPT) 60 (H) 2 - 50 U/L    Alkaline Phosphatase 35 30 - 99 U/L    Total Bilirubin 0.8 0.1 - 1.5 mg/dL    Albumin 3.5 3.2 - 4.9 g/dL    Total Protein 5.9 (L) 6.0 - 8.2 g/dL    Globulin 2.4 1.9 - 3.5 g/dL    A-G Ratio 1.5 g/dL   proBrain Natriuretic Peptide, NT    Collection Time: 01/22/25  1:07 PM   Result  Value Ref Range    NT-proBNP 524 (H) 0 - 125 pg/mL   Troponin    Collection Time: 01/22/25  1:07 PM   Result Value Ref Range    Troponin T 74 (H) 6 - 19 ng/L   PT/INR    Collection Time: 01/22/25  1:07 PM   Result Value Ref Range    PT 13.3 12.0 - 14.6 sec    INR 1.01 0.87 - 1.13   PTT    Collection Time: 01/22/25  1:07 PM   Result Value Ref Range    APTT 23.8 (L) 24.7 - 36.0 sec   ESTIMATED GFR    Collection Time: 01/22/25  1:07 PM   Result Value Ref Range    GFR (CKD-EPI) 82 >60 mL/min/1.73 m 2   DIFFERENTIAL MANUAL    Collection Time: 01/22/25  1:07 PM   Result Value Ref Range    Manual Diff Status PERFORMED    PERIPHERAL SMEAR REVIEW    Collection Time: 01/22/25  1:07 PM   Result Value Ref Range    Peripheral Smear Review see below    PLATELET ESTIMATE    Collection Time: 01/22/25  1:07 PM   Result Value Ref Range    Plt Estimation Increased    MORPHOLOGY    Collection Time: 01/22/25  1:07 PM   Result Value Ref Range    RBC Morphology Present      EKG:   Interpreted by me as above.    RADIOLOGY  I have independently interpreted the diagnostic imaging associated with this visit and am waiting the final reading from the radiologist.   My preliminary interpretation is as follows: X-ray shows no new infiltrates.    Radiologist interpretation:   CT-CTA HEAD WITH & W/O-POST PROCESS   Final Result      CT angiogram of the [place] within normal limits.      DX-CHEST-PORTABLE (1 VIEW)   Final Result      1.  Postoperative median sternotomy and aortic valve replacement.   2.  Ongoing elevation of the left hemidiaphragm with subsegmental atelectatic changes at the left lung base.   3.  Stable exam with no significant change from 1/11/2025        COURSE & MEDICAL DECISION MAKING    ED COURSE:    ED Observation Status? No, The patient does not qualify for observation status     INTERVENTIONS BY ME:  Medications   labetalol (Normodyne/Trandate) injection 20 mg (has no administration in time range)   HYDROmorphone  "(Dilaudid) injection 0.5 mg (has no administration in time range)   HYDROmorphone (Dilaudid) injection 1 mg (1 mg Intravenous Given 1/22/25 1418)   ondansetron (Zofran) syringe/vial injection 4 mg (4 mg Intravenous Given 1/22/25 1418)   iohexol (OMNIPAQUE) 350 mg/mL (IV) (80 mL Intravenous Given 1/22/25 1515)       1:16 PM - Patient seen and examined at bedside. Discussed plan of care, including ordering imaging, labs and EKG to further evaluate. Patient agrees to the plan of care. The patient will be medicated with labetalol injection 20 mg, Zofran injection 4 mg, and Dilaudid injection 1 mg. Ordered for CT-CTA head with and w/o-post process, DX-chest, PT/INR, PTT, Differential manual, peripheral smear review, platelet estimate, morphology, CBC with diff, CMP, BNP, Troponin, EKG to evaluate his symptoms.     3:57 PM still having significant headache.  He was requesting another dose of pain medication so we will give him a dose of Dilaudid.  Patient's troponin was noted to be elevated at 75 which is more than double baseline.    INITIAL ASSESSMENT, COURSE AND PLAN  Care Narrative: Patient presents today with continued chronic symptoms of his shortness of breath exertional chest pain as well as headache.  He did recently have an echocardiogram done today and right after the echocardiogram presented to the emerged part because of the severe headache.  Patient was going to be admitted to the hospital but he left AMA because he states that he got up frustrated just because he did not know what was going on.\"  At this point CT angiogram of the head appears normal there is no signs of hemorrhaging.  His blood pressure is improving after some pain medications however he still describing severe headache so I will redosed with another dose of pain medications.  I did review his echocardiogram today.  There is no overt abnormalities of his ejection fraction he does have some right ventricular dilation.  His troponin however, " is double baseline at 75 he was at 25.  With all of his exertional discomforts I do feel the patient should be admitted to the hospital for just trending of his troponins if they do come down then I do feel it is reasonable and stable however also to evaluate for his chronic and continued headaches.  Patient has required 2 doses of narcotic pain medications and at this point for continued pain control and possible pain specialist set up I do feel the patient should be admitted for further evaluation and care.  I will reach out to [facility] for admission.  This time the patient states that he will stay in the hospital.    CHEST PAIN:   HEART Score for Major Cardiac Events  HEART Score     History: Slightly suspicious  ECG: Non-specific repolarization disturbance  Age: 65+  Risk Factors: >2 risk factors or hx of atherosclerotic disease  Troponin: Greater than or equal to 3 times normal limit    Heart Score: 7    Total Score   0-3 Points = Low Score, risk of MACE 0.9-1.7%.  4-6 Points = Moderate Score, risk of MACE 12-16.6%  7-10 Points = High Score, risk of MACE 50-65%   .     ADDITIONAL PROBLEM LIST  Hypertension  Recent cardiac surgery    DISPOSITION AND DISCUSSIONS  I have discussed management of the patient with the following physicians/ SHAUN's/ ancillary staff:  RT [facility]    Patient will be admitted for further treatment and care.    FINAL DIAGNOSIS  1. Intractable headache, unspecified chronicity pattern, unspecified headache type    2. Acute hypoxemic respiratory failure (HCC)    3. Elevated troponin         I, Dori Jaffe), am scribing for, and in the presence of, Geoff Patel M.D..    Electronically signed by: Dori Jaffe), 1/22/2025    IGeoff M.D. personally performed the services described in this documentation, as scribed by Dori Patel in my presence, and it is both accurate and complete.     Electronically signed by: Geoff  SHAGGY Patel M.D.,3:59 PM 01/22/25

## 2025-01-22 NOTE — ED TRIAGE NOTES
"Chief Complaint   Patient presents with    Shortness of Breath     States for the last few years but he feels like he is having a attack, acutely yesterday      Pt came from getting an ECHO done here with IV in place, pt has SOB with inspiratory and expiratory wheezes, pt states he wears oxygen at home, didn't have it on here. Patient states he had heart surgery since 2 years ago and doesn't know why he wears it. Patient has shallow, rapid respirations. Oxygen place in triage 2L NC. Charge notified.    Pt in wheelchair, placed in lobby. Pt educated to alert staff of any changes.     BP (!) 162/97   Pulse 87   Temp 36.7 °C (98.1 °F) (Temporal)   Resp 18 Comment: labored  Ht 1.702 m (5' 7\")   Wt 95.3 kg (210 lb)   SpO2 92%   BMI 32.89 kg/m²     "

## 2025-01-22 NOTE — ASSESSMENT & PLAN NOTE
- patient in distress  -  said he has not heard about recent bronchoscopy results   - documentation of hemidiaphragm paralysis   - RSVP 50mmHg on last echo   - repeat echo to reassess valves and RSVP   - probable referral to pulmonary hypertension clinic    - possible right heart cath   - trial stopping metoprolol   -

## 2025-01-23 ENCOUNTER — APPOINTMENT (OUTPATIENT)
Dept: RADIOLOGY | Facility: MEDICAL CENTER | Age: 65
DRG: 102 | End: 2025-01-23
Attending: STUDENT IN AN ORGANIZED HEALTH CARE EDUCATION/TRAINING PROGRAM
Payer: MEDICAID

## 2025-01-23 ENCOUNTER — APPOINTMENT (OUTPATIENT)
Dept: RADIOLOGY | Facility: MEDICAL CENTER | Age: 65
DRG: 102 | End: 2025-01-23
Payer: MEDICAID

## 2025-01-23 PROBLEM — R41.0 CONFUSION: Status: ACTIVE | Noted: 2025-01-23

## 2025-01-23 PROBLEM — E87.5 HYPERKALEMIA: Status: ACTIVE | Noted: 2025-01-23

## 2025-01-23 PROBLEM — R41.82 ALTERED MENTAL STATE: Status: ACTIVE | Noted: 2025-01-23

## 2025-01-23 LAB
ALBUMIN SERPL BCP-MCNC: 3.8 G/DL (ref 3.2–4.9)
ALBUMIN SERPL BCP-MCNC: 3.9 G/DL (ref 3.2–4.9)
ALBUMIN/GLOB SERPL: 1.4 G/DL
ALBUMIN/GLOB SERPL: 1.5 G/DL
ALP SERPL-CCNC: 39 U/L (ref 30–99)
ALP SERPL-CCNC: 43 U/L (ref 30–99)
ALT SERPL-CCNC: 60 U/L (ref 2–50)
ALT SERPL-CCNC: 62 U/L (ref 2–50)
ANION GAP SERPL CALC-SCNC: 7 MMOL/L (ref 7–16)
ANION GAP SERPL CALC-SCNC: 9 MMOL/L (ref 7–16)
ANION GAP SERPL CALC-SCNC: 9 MMOL/L (ref 7–16)
ANISOCYTOSIS BLD QL SMEAR: ABNORMAL
AST SERPL-CCNC: 44 U/L (ref 12–45)
AST SERPL-CCNC: 47 U/L (ref 12–45)
BASE EXCESS BLDA CALC-SCNC: 1 MMOL/L (ref -4–3)
BASOPHILS # BLD AUTO: 0 % (ref 0–1.8)
BASOPHILS # BLD AUTO: 0.5 % (ref 0–1.8)
BASOPHILS # BLD: 0 K/UL (ref 0–0.12)
BASOPHILS # BLD: 0.1 K/UL (ref 0–0.12)
BILIRUB SERPL-MCNC: 0.7 MG/DL (ref 0.1–1.5)
BILIRUB SERPL-MCNC: 0.8 MG/DL (ref 0.1–1.5)
BODY TEMPERATURE: 36.4 CENTIGRADE
BUN SERPL-MCNC: 28 MG/DL (ref 8–22)
BUN SERPL-MCNC: 30 MG/DL (ref 8–22)
BUN SERPL-MCNC: 32 MG/DL (ref 8–22)
BURR CELLS/RBC NFR CSF MANUAL: 0 %
C GATTII+NEOFOR DNA CSF QL NAA+NON-PROBE: NOT DETECTED
C TRACH DNA SPEC QL NAA+PROBE: NEGATIVE
CALCIUM ALBUM COR SERPL-MCNC: 9.1 MG/DL (ref 8.5–10.5)
CALCIUM ALBUM COR SERPL-MCNC: 9.3 MG/DL (ref 8.5–10.5)
CALCIUM SERPL-MCNC: 8.7 MG/DL (ref 8.5–10.5)
CALCIUM SERPL-MCNC: 8.9 MG/DL (ref 8.5–10.5)
CALCIUM SERPL-MCNC: 9.2 MG/DL (ref 8.5–10.5)
CHLORIDE SERPL-SCNC: 101 MMOL/L (ref 96–112)
CHLORIDE SERPL-SCNC: 98 MMOL/L (ref 96–112)
CHLORIDE SERPL-SCNC: 99 MMOL/L (ref 96–112)
CK SERPL-CCNC: 499 U/L (ref 0–154)
CLARITY CSF: CLEAR
CMV DNA CSF QL NAA+NON-PROBE: NOT DETECTED
CO2 SERPL-SCNC: 27 MMOL/L (ref 20–33)
CO2 SERPL-SCNC: 28 MMOL/L (ref 20–33)
CO2 SERPL-SCNC: 28 MMOL/L (ref 20–33)
COLOR CSF: COLORLESS
COLOR SPUN CSF: COLORLESS
CREAT SERPL-MCNC: 0.95 MG/DL (ref 0.5–1.4)
CREAT SERPL-MCNC: 1.14 MG/DL (ref 0.5–1.4)
CREAT SERPL-MCNC: 1.33 MG/DL (ref 0.5–1.4)
CSF COMMENTS 1658: NORMAL
E COLI K1 DNA CSF QL NAA+NON-PROBE: NOT DETECTED
EKG IMPRESSION: NORMAL
EKG IMPRESSION: NORMAL
EOSINOPHIL # BLD AUTO: 0 K/UL (ref 0–0.51)
EOSINOPHIL # BLD AUTO: 0.04 K/UL (ref 0–0.51)
EOSINOPHIL NFR BLD: 0 % (ref 0–6.9)
EOSINOPHIL NFR BLD: 0.2 % (ref 0–6.9)
ERYTHROCYTE [DISTWIDTH] IN BLOOD BY AUTOMATED COUNT: 60 FL (ref 35.9–50)
ERYTHROCYTE [DISTWIDTH] IN BLOOD BY AUTOMATED COUNT: 62 FL (ref 35.9–50)
EV RNA CSF QL NAA+NON-PROBE: NOT DETECTED
FLUAV RNA SPEC QL NAA+PROBE: NEGATIVE
FLUBV RNA SPEC QL NAA+PROBE: NEGATIVE
GFR SERPLBLD CREATININE-BSD FMLA CKD-EPI: 59 ML/MIN/1.73 M 2
GFR SERPLBLD CREATININE-BSD FMLA CKD-EPI: 71 ML/MIN/1.73 M 2
GFR SERPLBLD CREATININE-BSD FMLA CKD-EPI: 89 ML/MIN/1.73 M 2
GLOBULIN SER CALC-MCNC: 2.5 G/DL (ref 1.9–3.5)
GLOBULIN SER CALC-MCNC: 2.8 G/DL (ref 1.9–3.5)
GLUCOSE BLD STRIP.AUTO-MCNC: 100 MG/DL (ref 65–99)
GLUCOSE BLD STRIP.AUTO-MCNC: 122 MG/DL (ref 65–99)
GLUCOSE BLD STRIP.AUTO-MCNC: 143 MG/DL (ref 65–99)
GLUCOSE BLD STRIP.AUTO-MCNC: 71 MG/DL (ref 65–99)
GLUCOSE CSF-MCNC: 80 MG/DL (ref 40–80)
GLUCOSE SERPL-MCNC: 134 MG/DL (ref 65–99)
GLUCOSE SERPL-MCNC: 97 MG/DL (ref 65–99)
GLUCOSE SERPL-MCNC: 99 MG/DL (ref 65–99)
GP B STREP DNA CSF QL NAA+NON-PROBE: NOT DETECTED
GRAM STN SPEC: NORMAL
HAEM INFLU DNA CSF QL NAA+NON-PROBE: NOT DETECTED
HCO3 BLDA-SCNC: 28 MMOL/L (ref 21–28)
HCT VFR BLD AUTO: 47.4 % (ref 42–52)
HCT VFR BLD AUTO: 51.1 % (ref 42–52)
HGB BLD-MCNC: 14 G/DL (ref 14–18)
HGB BLD-MCNC: 14.6 G/DL (ref 14–18)
HHV6 DNA CSF QL NAA+NON-PROBE: NOT DETECTED
HSV1 DNA CSF QL NAA+NON-PROBE: NOT DETECTED
HSV2 DNA CSF QL NAA+NON-PROBE: NOT DETECTED
HYPOCHROMIA BLD QL SMEAR: ABNORMAL
IMM GRANULOCYTES # BLD AUTO: 0.35 K/UL (ref 0–0.11)
IMM GRANULOCYTES NFR BLD AUTO: 1.9 % (ref 0–0.9)
INHALED O2 FLOW RATE: 3 L/MIN
L MONOCYTOG DNA CSF QL NAA+NON-PROBE: NOT DETECTED
LACTATE SERPL-SCNC: 1.3 MMOL/L (ref 0.5–2)
LYMPHOCYTES # BLD AUTO: 0.15 K/UL (ref 1–4.8)
LYMPHOCYTES # BLD AUTO: 0.37 K/UL (ref 1–4.8)
LYMPHOCYTES NFR BLD: 0.8 % (ref 22–41)
LYMPHOCYTES NFR BLD: 2 % (ref 22–41)
MANUAL DIFF BLD: NORMAL
MCH RBC QN AUTO: 20.5 PG (ref 27–33)
MCH RBC QN AUTO: 20.8 PG (ref 27–33)
MCHC RBC AUTO-ENTMCNC: 28.6 G/DL (ref 32.3–36.5)
MCHC RBC AUTO-ENTMCNC: 29.5 G/DL (ref 32.3–36.5)
MCV RBC AUTO: 70.5 FL (ref 81.4–97.8)
MCV RBC AUTO: 71.9 FL (ref 81.4–97.8)
METAMYELOCYTES NFR BLD MANUAL: 0.9 %
MICROCYTES BLD QL SMEAR: ABNORMAL
MONOCYTES # BLD AUTO: 0.38 K/UL (ref 0–0.85)
MONOCYTES # BLD AUTO: 0.81 K/UL (ref 0–0.85)
MONOCYTES NFR BLD AUTO: 2.1 % (ref 0–13.4)
MONOCYTES NFR BLD AUTO: 4.3 % (ref 0–13.4)
MORPHOLOGY BLD-IMP: NORMAL
MYELOCYTES NFR BLD MANUAL: 0.9 %
N GONORRHOEA DNA SPEC QL NAA+PROBE: NEGATIVE
N MEN DNA CSF QL NAA+NON-PROBE: NOT DETECTED
NEUTROPHILS # BLD AUTO: 17.04 K/UL (ref 1.82–7.42)
NEUTROPHILS # BLD AUTO: 17.5 K/UL (ref 1.82–7.42)
NEUTROPHILS NFR BLD: 93.1 % (ref 44–72)
NEUTROPHILS NFR BLD: 93.3 % (ref 44–72)
NRBC # BLD AUTO: 0 K/UL
NRBC # BLD AUTO: 0 K/UL
NRBC BLD-RTO: 0 /100 WBC (ref 0–0.2)
NRBC BLD-RTO: 0 /100 WBC (ref 0–0.2)
NUC CELL # CSF: 1 CELLS/UL (ref 0–10)
PARECHOVIRUS A RNA CSF QL NAA+NON-PROBE: NOT DETECTED
PCO2 BLDA: 56.9 MMHG (ref 32–48)
PCO2 TEMP ADJ BLDA: 55.4 MMHG (ref 32–48)
PH BLDA: 7.31 [PH] (ref 7.35–7.45)
PH TEMP ADJ BLDA: 7.32 [PH] (ref 7.35–7.45)
PHOSPHATE SERPL-MCNC: 4.7 MG/DL (ref 2.5–4.5)
PHOSPHATE SERPL-MCNC: 5.2 MG/DL (ref 2.5–4.5)
PLATELET # BLD AUTO: 546 K/UL (ref 164–446)
PLATELET # BLD AUTO: 625 K/UL (ref 164–446)
PLATELET BLD QL SMEAR: NORMAL
PMV BLD AUTO: 9.9 FL (ref 9–12.9)
PMV BLD AUTO: 9.9 FL (ref 9–12.9)
PO2 BLDA: 95.5 MMHG (ref 83–108)
PO2 TEMP ADJ BLDA: 92.1 MMHG (ref 83–108)
POIKILOCYTOSIS BLD QL SMEAR: NORMAL
POLYCHROMASIA BLD QL SMEAR: NORMAL
POTASSIUM SERPL-SCNC: 5.2 MMOL/L (ref 3.6–5.5)
POTASSIUM SERPL-SCNC: 5.4 MMOL/L (ref 3.6–5.5)
POTASSIUM SERPL-SCNC: 6.4 MMOL/L (ref 3.6–5.5)
POTASSIUM SERPL-SCNC: 6.5 MMOL/L (ref 3.6–5.5)
POTASSIUM SERPL-SCNC: 6.7 MMOL/L (ref 3.6–5.5)
POTASSIUM SERPL-SCNC: 6.8 MMOL/L (ref 3.6–5.5)
PROT CSF-MCNC: 31 MG/DL (ref 15–45)
PROT SERPL-MCNC: 6.3 G/DL (ref 6–8.2)
PROT SERPL-MCNC: 6.7 G/DL (ref 6–8.2)
RBC # BLD AUTO: 6.72 M/UL (ref 4.7–6.1)
RBC # BLD AUTO: 7.11 M/UL (ref 4.7–6.1)
RBC # CSF: <1 CELLS/UL
RBC BLD AUTO: PRESENT
RSV RNA SPEC QL NAA+PROBE: NEGATIVE
S PNEUM DNA CSF QL NAA+NON-PROBE: NOT DETECTED
SAO2 % BLDA: 95.9 % (ref 93–99)
SARS-COV-2 RNA RESP QL NAA+PROBE: NOTDETECTED
SIGNIFICANT IND 70042: NORMAL
SITE SITE: NORMAL
SODIUM SERPL-SCNC: 134 MMOL/L (ref 135–145)
SODIUM SERPL-SCNC: 135 MMOL/L (ref 135–145)
SODIUM SERPL-SCNC: 137 MMOL/L (ref 135–145)
SOURCE SOURCE: NORMAL
SPECIMEN SOURCE: NORMAL
SPECIMEN SOURCE: NORMAL
SPECIMEN VOL CSF: 17 ML
TARGETS BLD QL SMEAR: NORMAL
TROPONIN T SERPL-MCNC: 43 NG/L (ref 6–19)
TUBE # CSF: 3
TUBE # CSF: NORMAL
VZV DNA CSF QL NAA+NON-PROBE: NOT DETECTED
WBC # BLD AUTO: 18.3 K/UL (ref 4.8–10.8)
WBC # BLD AUTO: 18.8 K/UL (ref 4.8–10.8)

## 2025-01-23 PROCEDURE — 82945 GLUCOSE OTHER FLUID: CPT

## 2025-01-23 PROCEDURE — 84100 ASSAY OF PHOSPHORUS: CPT

## 2025-01-23 PROCEDURE — A9270 NON-COVERED ITEM OR SERVICE: HCPCS

## 2025-01-23 PROCEDURE — 700102 HCHG RX REV CODE 250 W/ 637 OVERRIDE(OP)

## 2025-01-23 PROCEDURE — 700111 HCHG RX REV CODE 636 W/ 250 OVERRIDE (IP)

## 2025-01-23 PROCEDURE — 87205 SMEAR GRAM STAIN: CPT

## 2025-01-23 PROCEDURE — 62270 DX LMBR SPI PNXR: CPT | Performed by: HOSPITALIST

## 2025-01-23 PROCEDURE — 87070 CULTURE OTHR SPECIMN AEROBIC: CPT

## 2025-01-23 PROCEDURE — 700111 HCHG RX REV CODE 636 W/ 250 OVERRIDE (IP): Mod: JZ | Performed by: HOSPITALIST

## 2025-01-23 PROCEDURE — 82550 ASSAY OF CK (CPK): CPT

## 2025-01-23 PROCEDURE — 93005 ELECTROCARDIOGRAM TRACING: CPT | Mod: TC

## 2025-01-23 PROCEDURE — 70450 CT HEAD/BRAIN W/O DYE: CPT

## 2025-01-23 PROCEDURE — 770020 HCHG ROOM/CARE - TELE (206)

## 2025-01-23 PROCEDURE — 93010 ELECTROCARDIOGRAM REPORT: CPT | Performed by: INTERNAL MEDICINE

## 2025-01-23 PROCEDURE — 80048 BASIC METABOLIC PNL TOTAL CA: CPT

## 2025-01-23 PROCEDURE — 36415 COLL VENOUS BLD VENIPUNCTURE: CPT

## 2025-01-23 PROCEDURE — 700101 HCHG RX REV CODE 250

## 2025-01-23 PROCEDURE — 89051 BODY FLUID CELL COUNT: CPT

## 2025-01-23 PROCEDURE — 700111 HCHG RX REV CODE 636 W/ 250 OVERRIDE (IP): Mod: JZ

## 2025-01-23 PROCEDURE — 82962 GLUCOSE BLOOD TEST: CPT

## 2025-01-23 PROCEDURE — 009U3ZX DRAINAGE OF SPINAL CANAL, PERCUTANEOUS APPROACH, DIAGNOSTIC: ICD-10-PCS | Performed by: HOSPITALIST

## 2025-01-23 PROCEDURE — 87483 CNS DNA AMP PROBE TYPE 12-25: CPT

## 2025-01-23 PROCEDURE — 700105 HCHG RX REV CODE 258

## 2025-01-23 PROCEDURE — 85025 COMPLETE CBC W/AUTO DIFF WBC: CPT

## 2025-01-23 PROCEDURE — 84132 ASSAY OF SERUM POTASSIUM: CPT | Mod: 91

## 2025-01-23 PROCEDURE — 80053 COMPREHEN METABOLIC PANEL: CPT

## 2025-01-23 PROCEDURE — 84157 ASSAY OF PROTEIN OTHER: CPT

## 2025-01-23 PROCEDURE — 85007 BL SMEAR W/DIFF WBC COUNT: CPT

## 2025-01-23 PROCEDURE — 82803 BLOOD GASES ANY COMBINATION: CPT

## 2025-01-23 PROCEDURE — 0241U HCHG SARS-COV-2 COVID-19 NFCT DS RESP RNA 4 TRGT MIC: CPT

## 2025-01-23 PROCEDURE — 85027 COMPLETE CBC AUTOMATED: CPT

## 2025-01-23 PROCEDURE — 99223 1ST HOSP IP/OBS HIGH 75: CPT | Mod: GC | Performed by: STUDENT IN AN ORGANIZED HEALTH CARE EDUCATION/TRAINING PROGRAM

## 2025-01-23 PROCEDURE — 83605 ASSAY OF LACTIC ACID: CPT

## 2025-01-23 PROCEDURE — 84484 ASSAY OF TROPONIN QUANT: CPT

## 2025-01-23 PROCEDURE — 87040 BLOOD CULTURE FOR BACTERIA: CPT | Mod: 91

## 2025-01-23 RX ORDER — AMLODIPINE BESYLATE 5 MG/1
5 TABLET ORAL
Status: DISCONTINUED | OUTPATIENT
Start: 2025-01-23 | End: 2025-01-25 | Stop reason: HOSPADM

## 2025-01-23 RX ORDER — DEXTROSE MONOHYDRATE 25 G/50ML
50 INJECTION, SOLUTION INTRAVENOUS ONCE
Status: COMPLETED | OUTPATIENT
Start: 2025-01-23 | End: 2025-01-23

## 2025-01-23 RX ORDER — CALCIUM GLUCONATE 20 MG/ML
2 INJECTION, SOLUTION INTRAVENOUS ONCE
Status: COMPLETED | OUTPATIENT
Start: 2025-01-23 | End: 2025-01-23

## 2025-01-23 RX ORDER — DEXTROSE MONOHYDRATE 25 G/50ML
50 INJECTION, SOLUTION INTRAVENOUS ONCE
Status: DISCONTINUED | OUTPATIENT
Start: 2025-01-23 | End: 2025-01-23

## 2025-01-23 RX ORDER — ACETAMINOPHEN 325 MG/1
650 TABLET ORAL EVERY 4 HOURS PRN
Status: DISCONTINUED | OUTPATIENT
Start: 2025-01-23 | End: 2025-01-25 | Stop reason: HOSPADM

## 2025-01-23 RX ORDER — HYDROMORPHONE HYDROCHLORIDE 1 MG/ML
0.5 INJECTION, SOLUTION INTRAMUSCULAR; INTRAVENOUS; SUBCUTANEOUS ONCE
Status: COMPLETED | OUTPATIENT
Start: 2025-01-23 | End: 2025-01-23

## 2025-01-23 RX ORDER — HYDROMORPHONE HYDROCHLORIDE 1 MG/ML
1 INJECTION, SOLUTION INTRAMUSCULAR; INTRAVENOUS; SUBCUTANEOUS ONCE
Status: COMPLETED | OUTPATIENT
Start: 2025-01-23 | End: 2025-01-23

## 2025-01-23 RX ORDER — SUMATRIPTAN 50 MG/1
50 TABLET, FILM COATED ORAL ONCE
Status: COMPLETED | OUTPATIENT
Start: 2025-01-23 | End: 2025-01-23

## 2025-01-23 RX ORDER — LABETALOL HYDROCHLORIDE 5 MG/ML
10 INJECTION, SOLUTION INTRAVENOUS EVERY 4 HOURS PRN
Status: DISCONTINUED | OUTPATIENT
Start: 2025-01-23 | End: 2025-01-25 | Stop reason: HOSPADM

## 2025-01-23 RX ORDER — PROCHLORPERAZINE EDISYLATE 5 MG/ML
10 INJECTION INTRAMUSCULAR; INTRAVENOUS ONCE
Status: COMPLETED | OUTPATIENT
Start: 2025-01-23 | End: 2025-01-23

## 2025-01-23 RX ORDER — SODIUM CHLORIDE 9 MG/ML
1000 INJECTION, SOLUTION INTRAVENOUS ONCE
Status: COMPLETED | OUTPATIENT
Start: 2025-01-23 | End: 2025-01-23

## 2025-01-23 RX ADMIN — INSULIN HUMAN 10 UNITS: 100 INJECTION, SOLUTION PARENTERAL at 09:21

## 2025-01-23 RX ADMIN — SODIUM ZIRCONIUM CYCLOSILICATE 10 G: 10 POWDER, FOR SUSPENSION ORAL at 20:07

## 2025-01-23 RX ADMIN — VANCOMYCIN HYDROCHLORIDE 2000 MG: 5 INJECTION, POWDER, LYOPHILIZED, FOR SOLUTION INTRAVENOUS at 23:31

## 2025-01-23 RX ADMIN — SUMATRIPTAN SUCCINATE 50 MG: 50 TABLET ORAL at 06:41

## 2025-01-23 RX ADMIN — CALCIUM GLUCONATE 2 G: 20 INJECTION, SOLUTION INTRAVENOUS at 10:29

## 2025-01-23 RX ADMIN — AMPICILLIN SODIUM 2000 MG: 2 INJECTION, POWDER, FOR SOLUTION INTRAVENOUS at 22:11

## 2025-01-23 RX ADMIN — DEXTROSE MONOHYDRATE 50 ML: 25 INJECTION, SOLUTION INTRAVENOUS at 10:25

## 2025-01-23 RX ADMIN — ACETAMINOPHEN 650 MG: 325 TABLET ORAL at 03:29

## 2025-01-23 RX ADMIN — PROCHLORPERAZINE EDISYLATE 10 MG: 5 INJECTION INTRAMUSCULAR; INTRAVENOUS at 06:41

## 2025-01-23 RX ADMIN — SODIUM ZIRCONIUM CYCLOSILICATE 10 G: 10 POWDER, FOR SUSPENSION ORAL at 13:12

## 2025-01-23 RX ADMIN — SODIUM CHLORIDE 1000 ML: 9 INJECTION, SOLUTION INTRAVENOUS at 10:31

## 2025-01-23 RX ADMIN — AMLODIPINE BESYLATE 5 MG: 5 TABLET ORAL at 08:45

## 2025-01-23 RX ADMIN — AMOXICILLIN AND CLAVULANATE POTASSIUM 1 TABLET: 875; 125 TABLET, FILM COATED ORAL at 04:08

## 2025-01-23 RX ADMIN — Medication 5 MG: at 22:10

## 2025-01-23 RX ADMIN — VANCOMYCIN HYDROCHLORIDE 2500 MG: 5 INJECTION, POWDER, LYOPHILIZED, FOR SOLUTION INTRAVENOUS at 11:35

## 2025-01-23 RX ADMIN — ACETAMINOPHEN 650 MG: 325 TABLET ORAL at 20:07

## 2025-01-23 RX ADMIN — NICOTINE 7 MG: 7 PATCH TRANSDERMAL at 04:08

## 2025-01-23 RX ADMIN — OMEPRAZOLE 20 MG: 20 CAPSULE, DELAYED RELEASE ORAL at 06:19

## 2025-01-23 RX ADMIN — AMPICILLIN SODIUM 2000 MG: 2 INJECTION, POWDER, FOR SOLUTION INTRAVENOUS at 10:38

## 2025-01-23 RX ADMIN — AMPICILLIN SODIUM 2000 MG: 2 INJECTION, POWDER, FOR SOLUTION INTRAVENOUS at 14:44

## 2025-01-23 RX ADMIN — CEFTRIAXONE SODIUM 2000 MG: 10 INJECTION, POWDER, FOR SOLUTION INTRAVENOUS at 10:32

## 2025-01-23 RX ADMIN — AMPICILLIN SODIUM 2000 MG: 2 INJECTION, POWDER, FOR SOLUTION INTRAVENOUS at 17:28

## 2025-01-23 RX ADMIN — CEFTRIAXONE SODIUM 2000 MG: 10 INJECTION, POWDER, FOR SOLUTION INTRAVENOUS at 22:10

## 2025-01-23 RX ADMIN — LABETALOL HYDROCHLORIDE 10 MG: 5 INJECTION INTRAVENOUS at 06:41

## 2025-01-23 RX ADMIN — DEXTROSE MONOHYDRATE 50 ML: 25 INJECTION, SOLUTION INTRAVENOUS at 09:21

## 2025-01-23 RX ADMIN — HYDROMORPHONE HYDROCHLORIDE 1 MG: 1 INJECTION, SOLUTION INTRAMUSCULAR; INTRAVENOUS; SUBCUTANEOUS at 04:07

## 2025-01-23 RX ADMIN — SODIUM BICARBONATE 50 MEQ: 84 INJECTION INTRAVENOUS at 11:12

## 2025-01-23 RX ADMIN — HYDROMORPHONE HYDROCHLORIDE 0.5 MG: 1 INJECTION, SOLUTION INTRAMUSCULAR; INTRAVENOUS; SUBCUTANEOUS at 13:26

## 2025-01-23 RX ADMIN — KETOROLAC TROMETHAMINE 15 MG: 15 INJECTION, SOLUTION INTRAMUSCULAR; INTRAVENOUS at 02:21

## 2025-01-23 RX ADMIN — HYDROMORPHONE HYDROCHLORIDE 0.5 MG: 1 INJECTION, SOLUTION INTRAMUSCULAR; INTRAVENOUS; SUBCUTANEOUS at 14:30

## 2025-01-23 ASSESSMENT — PAIN DESCRIPTION - PAIN TYPE
TYPE: ACUTE PAIN

## 2025-01-23 ASSESSMENT — FIBROSIS 4 INDEX: FIB4 SCORE: 0.68
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
## 2025-01-23 NOTE — PROGRESS NOTES
Night RN informed this RN of a 6.4 potassium and that MD was notified with no new orders. This RN notified Dr. Manzo again about critical potassium and a recheck was ordered.     0835: Dr. Manzo notified about critical potassium of 6.8.     0840: MD at bedside with new orders.     0850: Rapid called for AMS and garbled speech.

## 2025-01-23 NOTE — ASSESSMENT & PLAN NOTE
Patient denies any dysuria or frequent urination but does have suprapubic tenderness on exam.  He had STI testing ordered several months ago which was not completed due to patient leaving [place].  Patient currently denies any new sexual partners.    UA and GC/CT showed no evidence of infection

## 2025-01-23 NOTE — PROGRESS NOTES
Patient with constant headache tonight. Unrelenting, reports it to be very anterior and behind both eyes. In the ED dilaudid has been the most helpful but did not break the headache. Patient drinks one energy drink per day but hasn't had caffeine in ~4 days. On the floor he has gotten decadron, benadryl, magnesium, toradol IV, tylenol, oxycodone 5mg x1. I visited patient who looks uncomfortable in bed. Cn's2-12 tested and all wnl. Reviewing his MRI I see no sights of cerebral edema, obvious mass, midline shift or hemorrhage. In an attempt to give him immediate relief he will receive his PRN toradol, tylenol and dilaudid. I informed him that narcotics are not first line and often aren't effective at breaking headaches rather they cover them up which he demonstrated understanding for. He is hopeful this will allow him to get some sleep so he can discuss further with the day team strategies to alleviate his headaches. He declined benadryl at this time.

## 2025-01-23 NOTE — ED NOTES
Pt medicated per MAR. Transported to floor by [facility] with chart and all belongings.

## 2025-01-23 NOTE — PROCEDURES
PROCEDURE : Marva Manzo MD  ATTENDING PRESENT: Holland Montes M.D.    CONSENT:    Procedure was explained to patient however given encephalopathy, two-physician consent had to be obtained.    PROCEDURE SUMMARY:   The patient was placed in the seated position with help from the nursing staff. The area was cleansed and draped in usual sterile fashion using chlorhexidine scrub.  Anesthesia was achieved with 1% lidocaine. A needle was placed in the L4-L5 lumbar interspace. Clear colored cerebral spinal fluid was obtained on third attempt. A total of 18 cc's of CSF was collected. These were sent for the ordered tests. A sterile bandaid was placed over the puncture site. The patient had no immediate complications and tolerated the procedure well.  Estimated blood loss was 1 cc.
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
## 2025-01-23 NOTE — CARE PLAN
The patient is Stable - Low risk of patient condition declining or worsening    Shift Goals  Clinical Goals: MRI, safety  Patient Goals: pain control  Family Goals: jelly    Progress made toward(s) clinical / shift goals:    Problem: Pain - Standard  Goal: Alleviation of pain or a reduction in pain to the patient’s comfort goal  Outcome: Progressing     Problem: Knowledge Deficit - Standard  Goal: Patient and family/care givers will demonstrate understanding of plan of care, disease process/condition, diagnostic tests and medications  Outcome: Progressing     Problem: Fall Risk  Goal: Patient will remain free from falls  Outcome: Progressing       Patient is not progressing towards the following goals:

## 2025-01-23 NOTE — PROGRESS NOTES
"During my examination of patient this morning, his speech was incomprehensible unless using one word answers which was a change from admission last night. Rapid response was called.     Physical exam at the time was the following:  Vital signs- /88   Pulse 82   Temp 36.6 °C (97.9 °F)   Resp 20   Ht 1.702 m (5' 7\")   Wt 98.2 kg (216 lb 7.9 oz)   SpO2 94%   BMI 33.91 kg/m²   General: Tired-appearing with eyes closed most of exam.  Neck: No neck stiffness.  Cardiac: Grade 3/6 systolic murmur. Normal rate and rhythm.  Respiratory: Increased work of breathing. No wheezing, rhonchi, or rales.  Abdomen: soft, non tender  Neuro: CN II-XII grossly intact. Pupils are pinpoint. Normal finger to nose.     Labs ordered and showed:  Recent Labs     01/22/25  1307 01/23/25  0322 01/23/25  0928   WBC 16.4* 18.3* 18.8*   RBC 6.92* 7.11* 6.72*   HEMOGLOBIN 14.3 14.6 14.0   HEMATOCRIT 48.8 51.1 47.4   MCV 70.5* 71.9* 70.5*   MCH 20.7* 20.5* 20.8*   RDW 60.3* 62.0* 60.0*   PLATELETCT 574* 625* 546*   MPV 9.8 9.9 9.9   NEUTSPOLYS 85.10* 93.30* 93.10*   LYMPHOCYTES 6.10* 2.00* 0.80*   MONOCYTES 6.10 2.10 4.30   EOSINOPHILS 1.80 0.20 0.00   BASOPHILS 0.90 0.50 0.00   RBCMORPHOLO Present  --  Present     Recent Labs     01/22/25  1307 01/23/25  0322 01/23/25  0737 01/23/25  0928   SODIUM 136 135  --  134*   POTASSIUM 4.8 6.4* 6.8* 6.7*   CHLORIDE 100 98  --  99   CO2 30 28  --  28   GLUCOSE 96 99  --  134*   BUN 19 30*  --  32*   CPKTOTAL  --   --   --  499*     Recent Labs     01/23/25  0928   LACTICAC 1.3      ABG  pH-7.31  Co2- 56.9  O2- 95.5    Blood cultures x2 drawn    Creatine kinase   499    EKG: normal sinus rhythm rate of 69, peaked T waves in V1, V2, and V3    Ct head was negative for acute intracranial abnormality.    Patient was treated with 10 units regular insulin, 2 amp D50, 2g calcium gluconate, and 50 mEq sodium bicarb. Dr. Hancock, intensivist, was called to evaluate the patient. Patient may remain on the " floor at this time pending repeat BMP. Hpercapnea is likely due to recent Dilaudid administration. Patient's mental status improved by the end of rapid.

## 2025-01-23 NOTE — ASSESSMENT & PLAN NOTE
Chronic and stable. Patient was discharged on home O2 in August 2024 but there have been mixed reports to different providers whether he is supposed to wear oxygen or not.  He recently had a bronchoscopy which showed no bacterial, fungal, or tubercular infection. Pathology just showed a mucous plug.  PFTs in December 2024 showed mild restrictive lung disease. Echo completed showed an elevated RVSP of 40 which is improved from prior echo but does still indicate some pulmonary hypertension.    Will continue oxygen supplementation.  Patient's baseline appears to be around 2 L  Patient should follow-up with pulmonology to discuss bronchoscopy results and further management

## 2025-01-23 NOTE — PROGRESS NOTES
Pharmacy Vancomycin Kinetics Note for 1/23/2025     64 y.o. male on Vancomycin day # 1     Vancomycin Indication (Trough based Dosing): CNS infection (goal of 18-22)  Provider specified end date:  (TBD)    Active Antibiotics (From admission, onward)      Ordered     Ordering Provider       Thu Jan 23, 2025  9:31 AM    01/23/25 0931  vancomycin (Vancocin) 2,500 mg in  mL IVPB  (vancomycin (VANCOCIN) IV (LD + Maintenance))  ONCE         Marva Manzo M.D.       Thu Jan 23, 2025  9:30 AM    01/23/25 0930  ampicillin (Omnipen) 2,000 mg in  mL IVPB  EVERY 4 HOURS         Marva Manzo M.D.       Thu Jan 23, 2025  9:11 AM    01/23/25 0911  cefTRIAXone (Rocephin) syringe 2,000 mg  EVERY 12 HOURS         Marva Manzo M.D.    01/23/25 0911  MD Alert...Vancomycin per Pharmacy  (MD Alert...Vancomycin per Pharmacy)  PHARMACY TO DOSE        Question Answer Comment   Indication(s) for vancomycin? Other (comments)    Indication(s) for vancomycin? Unknown source of infection        Marva Manzo M.D.            Dosing Weight: 98 kg (216 lb 0.8 oz)      Admission History: Admitted on 1/22/2025 for Intractable headache, unspecified chronicity pattern, unspecified headache type [R51.9]  Pertinent history: Admitted 1/22 with a headache and shortness of breath, started on antibiotics for possible tooth infection. Patient w/ AMS this morning, worsening leukocytosis thus antibiotics broadened for possible CNS infection.    Allergies:     Morphine     Pertinent cultures to date:     Results       Procedure Component Value Units Date/Time    BLOOD CULTURE [[id number]] Collected: 01/23/25 0928    Order Status: Completed Specimen: Blood from Peripheral Updated: 01/23/25 1208     Significant Indicator NEG     Source BLD     Site PERIPHERAL     Culture Result No Growth  Note: Blood cultures are incubated for 5 days and  are monitored continuously.Positive blood cultures  are called to the RN and reported as soon  as  they are identified.      BLOOD CULTURE [[id number]] Collected: 01/23/25 0934    Order Status: Completed Specimen: Blood from Peripheral Updated: 01/23/25 1208     Significant Indicator NEG     Source BLD     Site PERIPHERAL     Culture Result No Growth  Note: Blood cultures are incubated for 5 days and  are monitored continuously.Positive blood cultures  are called to the RN and reported as soon as  they are identified.      CoV-2, Flu A/B, And RSV by PCR (CepFishkiid) [[id number]] Collected: 01/23/25 0830    Order Status: Completed Specimen: Respirate from Nasal Updated: 01/23/25 1102     Influenza virus A RNA Negative     Influenza virus B, PCR Negative     RSV, PCR Negative     SARS-CoV-2 by PCR NotDetected     Comment: [facility] providers: PLEASE REFER TO DE-ESCALATION AND RETESTING PROTOCOL  on [email or web address]    **The [employer] GeneXpert Xpress SARS-CoV-2 RT-PCR Test has been made  available for use under the Emergency Use Authorization (EUA) only.          SARS-CoV-2 Source NP Swab    MRSA By PCR (Amp) [[id number]]     Order Status: No result Specimen: Respirate from Nares     CSF Culture [[id number]]     Order Status: No result Specimen: CSF from Tap     URINALYSIS [[id number]]  (Abnormal) Collected: 01/22/25 2150    Order Status: Sent Specimen: Urine, Clean Catch Updated: 01/22/25 2231     Color Dark Yellow     Character Clear     Specific Gravity 1.041     Ph 6.5     Glucose Negative mg/dL      Ketones Negative mg/dL      Protein 300 mg/dL      Bilirubin Small     Urobilinogen, Urine 1.0 EU/dL      Nitrite Negative     Leukocyte Esterase Negative     Occult Blood Negative     Micro Urine Req Microscopic    Chlamydia/GC, PCR (Urine) [[id number]] Collected: 01/22/25 2150    Order Status: Sent Specimen: Urine Updated: 01/22/25 2205     Source Urine            Labs:     Estimated Creatinine Clearance: 72.1 mL/min (by C-G formula based on SCr of 1.14 mg/dL).  Recent Labs     01/22/25  1307 01/23/25  0322 01/23/25  0928  "  WBC 16.4* 18.3* 18.8*   NEUTSPOLYS 85.10* 93.30* 93.10*     Recent Labs     25  1307 25  0322 25  0928   BUN 19 30* 32*   CREATININE 1.01 1.33 1.14   ALBUMIN 3.5 3.9 3.8       Intake/Output Summary (Last 24 hours) at 2025 1251  Last data filed at 2025 0618  Gross per 24 hour   Intake --   Output 303 ml   Net -303 ml      BP (!) 154/98   Pulse 78   Temp 36.6 °C (97.9 °F)   Resp (!) 22   Ht 1.702 m (5' 7\")   Wt 98.2 kg (216 lb 7.9 oz)   SpO2 96%  Temp (24hrs), Av.7 °C (98.1 °F), Min:36.6 °C (97.9 °F), Max:36.9 °C (98.4 °F)      List concerns for Vancomycin clearance:     Abnormal LFTs;Age;BUN/Scr ratio greater than 20:1    Pharmacokinetics:     A/P:     -  Vancomycin dose: 2000mg iv q12h ()    -  Next vancomycin level(s): 2-3 days    -  Comments: Empiric antibiotics for unknown source of infection, possible CNS. Of note, patient with broken tooth and s/p TAVR (); blood cultures and CSF cultures ordered. Renal function stable, appears to be at baseline. Will dose vancomycin at 20mg/kg iv q12h and tentatively plan to check vanco trough once at steady state. Pharmacy will continue to follow.     Rachana Thomas, PharmD, BCCCP    "

## 2025-01-23 NOTE — ASSESSMENT & PLAN NOTE
Patient reports that he has chest tightness, but not pain. Troponin 74 >74>58>43. EKG without evidence of ST elevation or MI.     If chest tightness changes to pain will repeat EKG   Telemetry
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
## 2025-01-23 NOTE — PROGRESS NOTES
4 Eyes Skin Assessment Completed by ALLEN Amaya and ALLEN Garcia.    Head WDL  Ears Redness and Blanching  Nose WDL  Mouth WDL  Neck Redness and Blanching  Breast/Chest Redness and Blanching  Shoulder Blades Redness and Blanching  Spine Redness and Blanching  (R) Arm/Elbow/Hand WDL  (L) Arm/Elbow/Hand WDL  Abdomen WDL  Groin WDL  Scrotum/Coccyx/Buttocks WDL  (R) Leg Redness and Blanching  (L) Leg Redness and Blanching  (R) Heel/Foot/Toe WDL  (L) Heel/Foot/Toe WDL          Devices In Places Tele Box and Pulse Ox      Interventions In Place NC W/Ear Foams, TAP System, and Pillows    Possible Skin Injury No    Pictures Uploaded Into Epic N/A  Wound Consult Placed N/A  RN Wound Prevention Protocol Ordered No

## 2025-01-23 NOTE — ASSESSMENT & PLAN NOTE
Patient was hyperkalemic at 6.4 and repeat was 6.8.  He was treated with insulin, D50, calcium gluconate, and sodium bicarb.  On repeat, patient was down to 5.2.  Possible etiology is muscle breakdown given elevated CPK.  Patient did not have an JENNY and is not on medications known to cause hyperkalemia.  EKG showed peaked T waves which was improving on repeat after treatment.    Continue [facility]

## 2025-01-23 NOTE — PROGRESS NOTES
Bedside report received from off going RN/tech: Paul RN, assumed care of patient.     Fall Risk Score: HIGH RISK  Fall risk interventions in place: Place yellow fall risk ID band on patient, Provide patient/family education based on risk assessment, Educate patient/family to call staff for assistance when getting out of bed, Place fall precaution signage outside patient door, Place patient in room close to nursing station, Utilize bed/chair fall alarm, Notify charge of high risk for huddle, and Bed alarm connected correctly  Bed type: Regular (Clement Score less than 17 interventions in place)  Patient on cardiac monitor: Yes  IVF/IV medications: Not Applicable   Oxygen: Room Air  Bedside sitter: Not Applicable   Isolation: Not applicable

## 2025-01-23 NOTE — ASSESSMENT & PLAN NOTE
Patient's platelets are elevated on presentation.  Likely secondary to iron deficiency anemia given low MCV and iron levels.    CTM

## 2025-01-23 NOTE — ASSESSMENT & PLAN NOTE
Stable and appears to be chronic.  No elevation in bilirubin or alkaline phosphatase.  No RUQ tenderness on exam.  Recent CT scan in November 2024 showed hepatomegaly.  Hepatitis C in April 2024 was normal.    Follow-up outpatient with PCP

## 2025-01-23 NOTE — PROGRESS NOTES
Change in patient condition due to: Cardiac and Neurologic  Rapid Response called at: 0851  Physician Jovany already at bedside. Labs ordered. CT ordered. Transported patient to CT at 0950 with zoll.    0916 Cardiology paged via [employer]uelightAppalte    0926 Cardiology paged via voalte    0932 Repaged Cardiology through answering service    0950 Repaged Cardiology through answering service     1005 Repaged Cardiology through answering service    1006 Dr. Verma returned page, not STEMI    1048 Rapid ended. Labs redrawn. Will follow.    See [employer] timeline for rapid response events. Patient Remained on Unit

## 2025-01-23 NOTE — ASSESSMENT & PLAN NOTE
WBC elevated at 16.4 on presentation.  Likely secondary to vomiting and/or tooth broken with with superimposed infection.  No evidence of meningitis on physical exam.  Migraine and leukocytosis concerning for possible meningitis.  Lumbar puncture performed 1/23 with negative result so far.  No opening pressure taken.  Now 23.3 on 1/24.    CTM  DC Augmentin and continue ceftriaxone  Continue vancomycin until results of MRSA nares negative
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
## 2025-01-23 NOTE — ASSESSMENT & PLAN NOTE
Patient had an episode of altered mental status and incomprehensible speech the morning of 1/23/2025.  At that time, patient was A&O to person, place, and event but not to time.  A rapid response was called and patient had several lab work done which showed elevated potassium and hypercapnia.  CT head showed no intracranial abnormalities.  Several hours later, patient was back to baseline.  Likely secondary to worsening baseline hypercapnia from Dilaudid use however, meningitis cannot be ruled out at this time.  LP 1/23 negative so far, will continue to monitor results.  No opening pressure recorded.  Imaging negative.    Continue ceftriaxone and Vanco.   Consider ID consult if cultures or fluid studies indicate infection

## 2025-01-23 NOTE — ED NOTES
Medication history reviewed with patient at bedside.   Med rec is complete  Allergies reviewed.     Patient has not had any outpatient antibiotics in the last 30 days.   Anticoagulants: No    Alexandra Cole

## 2025-01-23 NOTE — PROGRESS NOTES
Bedside report received from off going RN/tech: Jose Luis, assumed care of patient.     Fall Risk Score: HIGH RISK  Fall risk interventions in place: Place yellow fall risk ID band on patient, Provide patient/family education based on risk assessment, Educate patient/family to call staff for assistance when getting out of bed, Place fall precaution signage outside patient door, Place patient in room close to nursing station, Notify charge of high risk for huddle, and Bed alarm connected correctly  Bed type: Regular (Clement Score less than 17 interventions in place)  Patient on cardiac monitor: Yes  IVF/IV medications: Not Applicable   Oxygen: How many liters 4L and Traced the line to wall oxygen  Bedside sitter: Not Applicable   Isolation: Not applicable

## 2025-01-24 PROBLEM — G47.33 OSA (OBSTRUCTIVE SLEEP APNEA): Status: ACTIVE | Noted: 2025-01-24

## 2025-01-24 LAB
ALBUMIN SERPL BCP-MCNC: 3.2 G/DL (ref 3.2–4.9)
ALBUMIN/GLOB SERPL: 1.5 G/DL
ALP SERPL-CCNC: 32 U/L (ref 30–99)
ALT SERPL-CCNC: 52 U/L (ref 2–50)
ANION GAP SERPL CALC-SCNC: 9 MMOL/L (ref 7–16)
AST SERPL-CCNC: 41 U/L (ref 12–45)
BASOPHILS # BLD AUTO: 0.4 % (ref 0–1.8)
BASOPHILS # BLD: 0.1 K/UL (ref 0–0.12)
BILIRUB SERPL-MCNC: 0.4 MG/DL (ref 0.1–1.5)
BUN SERPL-MCNC: 24 MG/DL (ref 8–22)
CALCIUM ALBUM COR SERPL-MCNC: 8.5 MG/DL (ref 8.5–10.5)
CALCIUM SERPL-MCNC: 7.9 MG/DL (ref 8.5–10.5)
CHLORIDE SERPL-SCNC: 99 MMOL/L (ref 96–112)
CO2 SERPL-SCNC: 27 MMOL/L (ref 20–33)
CREAT SERPL-MCNC: 0.96 MG/DL (ref 0.5–1.4)
EOSINOPHIL # BLD AUTO: 0.26 K/UL (ref 0–0.51)
EOSINOPHIL NFR BLD: 1.1 % (ref 0–6.9)
ERYTHROCYTE [DISTWIDTH] IN BLOOD BY AUTOMATED COUNT: 60.6 FL (ref 35.9–50)
GFR SERPLBLD CREATININE-BSD FMLA CKD-EPI: 88 ML/MIN/1.73 M 2
GLOBULIN SER CALC-MCNC: 2.2 G/DL (ref 1.9–3.5)
GLUCOSE SERPL-MCNC: 101 MG/DL (ref 65–99)
HCT VFR BLD AUTO: 41.6 % (ref 42–52)
HGB BLD-MCNC: 12.2 G/DL (ref 14–18)
IMM GRANULOCYTES # BLD AUTO: 0.29 K/UL (ref 0–0.11)
IMM GRANULOCYTES NFR BLD AUTO: 1.2 % (ref 0–0.9)
LYMPHOCYTES # BLD AUTO: 0.84 K/UL (ref 1–4.8)
LYMPHOCYTES NFR BLD: 3.6 % (ref 22–41)
MAGNESIUM SERPL-MCNC: 2 MG/DL (ref 1.5–2.5)
MCH RBC QN AUTO: 21 PG (ref 27–33)
MCHC RBC AUTO-ENTMCNC: 29.3 G/DL (ref 32.3–36.5)
MCV RBC AUTO: 71.7 FL (ref 81.4–97.8)
MONOCYTES # BLD AUTO: 2.14 K/UL (ref 0–0.85)
MONOCYTES NFR BLD AUTO: 9.2 % (ref 0–13.4)
NEUTROPHILS # BLD AUTO: 19.63 K/UL (ref 1.82–7.42)
NEUTROPHILS NFR BLD: 84.5 % (ref 44–72)
NRBC # BLD AUTO: 0 K/UL
NRBC BLD-RTO: 0 /100 WBC (ref 0–0.2)
PHOSPHATE SERPL-MCNC: 3.7 MG/DL (ref 2.5–4.5)
PLATELET # BLD AUTO: 498 K/UL (ref 164–446)
PMV BLD AUTO: 9.9 FL (ref 9–12.9)
POTASSIUM SERPL-SCNC: 4.9 MMOL/L (ref 3.6–5.5)
PROT SERPL-MCNC: 5.4 G/DL (ref 6–8.2)
RBC # BLD AUTO: 5.8 M/UL (ref 4.7–6.1)
SODIUM SERPL-SCNC: 135 MMOL/L (ref 135–145)
WBC # BLD AUTO: 23.3 K/UL (ref 4.8–10.8)

## 2025-01-24 PROCEDURE — 700111 HCHG RX REV CODE 636 W/ 250 OVERRIDE (IP)

## 2025-01-24 PROCEDURE — 700101 HCHG RX REV CODE 250

## 2025-01-24 PROCEDURE — 84402 ASSAY OF FREE TESTOSTERONE: CPT

## 2025-01-24 PROCEDURE — A9270 NON-COVERED ITEM OR SERVICE: HCPCS

## 2025-01-24 PROCEDURE — 83735 ASSAY OF MAGNESIUM: CPT

## 2025-01-24 PROCEDURE — 770020 HCHG ROOM/CARE - TELE (206)

## 2025-01-24 PROCEDURE — 94640 AIRWAY INHALATION TREATMENT: CPT

## 2025-01-24 PROCEDURE — 94660 CPAP INITIATION&MGMT: CPT

## 2025-01-24 PROCEDURE — 84100 ASSAY OF PHOSPHORUS: CPT

## 2025-01-24 PROCEDURE — 700102 HCHG RX REV CODE 250 W/ 637 OVERRIDE(OP)

## 2025-01-24 PROCEDURE — 80053 COMPREHEN METABOLIC PANEL: CPT

## 2025-01-24 PROCEDURE — 36415 COLL VENOUS BLD VENIPUNCTURE: CPT

## 2025-01-24 PROCEDURE — 99232 SBSQ HOSP IP/OBS MODERATE 35: CPT | Mod: GC | Performed by: STUDENT IN AN ORGANIZED HEALTH CARE EDUCATION/TRAINING PROGRAM

## 2025-01-24 PROCEDURE — 700111 HCHG RX REV CODE 636 W/ 250 OVERRIDE (IP): Mod: JZ

## 2025-01-24 PROCEDURE — 700105 HCHG RX REV CODE 258

## 2025-01-24 PROCEDURE — 85025 COMPLETE CBC W/AUTO DIFF WBC: CPT

## 2025-01-24 RX ORDER — HYDROMORPHONE HYDROCHLORIDE 1 MG/ML
0.25 INJECTION, SOLUTION INTRAMUSCULAR; INTRAVENOUS; SUBCUTANEOUS ONCE
Status: COMPLETED | OUTPATIENT
Start: 2025-01-24 | End: 2025-01-24

## 2025-01-24 RX ORDER — BUTALBITAL, ACETAMINOPHEN AND CAFFEINE 50; 325; 40 MG/1; MG/1; MG/1
1 TABLET ORAL EVERY 6 HOURS PRN
Status: DISCONTINUED | OUTPATIENT
Start: 2025-01-24 | End: 2025-01-25 | Stop reason: HOSPADM

## 2025-01-24 RX ORDER — METOCLOPRAMIDE HYDROCHLORIDE 5 MG/ML
10 INJECTION INTRAMUSCULAR; INTRAVENOUS EVERY 6 HOURS
Status: DISCONTINUED | OUTPATIENT
Start: 2025-01-24 | End: 2025-01-24

## 2025-01-24 RX ORDER — SUMATRIPTAN 50 MG/1
25 TABLET, FILM COATED ORAL
Status: DISCONTINUED | OUTPATIENT
Start: 2025-01-24 | End: 2025-01-25 | Stop reason: HOSPADM

## 2025-01-24 RX ORDER — BUSPIRONE HYDROCHLORIDE 10 MG/1
5 TABLET ORAL 3 TIMES DAILY
Status: DISCONTINUED | OUTPATIENT
Start: 2025-01-24 | End: 2025-01-25 | Stop reason: HOSPADM

## 2025-01-24 RX ORDER — HYDROXYZINE HYDROCHLORIDE 25 MG/1
25 TABLET, FILM COATED ORAL 3 TIMES DAILY PRN
Status: DISCONTINUED | OUTPATIENT
Start: 2025-01-24 | End: 2025-01-25 | Stop reason: HOSPADM

## 2025-01-24 RX ORDER — HYDROXYZINE HYDROCHLORIDE 25 MG/1
50 TABLET, FILM COATED ORAL ONCE
Status: COMPLETED | OUTPATIENT
Start: 2025-01-24 | End: 2025-01-24

## 2025-01-24 RX ORDER — METOCLOPRAMIDE HYDROCHLORIDE 5 MG/ML
10 INJECTION INTRAMUSCULAR; INTRAVENOUS EVERY 6 HOURS PRN
Status: DISCONTINUED | OUTPATIENT
Start: 2025-01-24 | End: 2025-01-25 | Stop reason: HOSPADM

## 2025-01-24 RX ORDER — ALBUTEROL SULFATE 90 UG/1
2 INHALANT RESPIRATORY (INHALATION) EVERY 4 HOURS PRN
Status: DISCONTINUED | OUTPATIENT
Start: 2025-01-24 | End: 2025-01-25 | Stop reason: HOSPADM

## 2025-01-24 RX ADMIN — ALBUTEROL SULFATE 2.5 MG: 2.5 SOLUTION RESPIRATORY (INHALATION) at 02:44

## 2025-01-24 RX ADMIN — ALBUTEROL SULFATE 2 PUFF: 90 AEROSOL, METERED RESPIRATORY (INHALATION) at 15:47

## 2025-01-24 RX ADMIN — BUSPIRONE HYDROCHLORIDE 5 MG: 10 TABLET ORAL at 11:16

## 2025-01-24 RX ADMIN — CEFTRIAXONE SODIUM 2000 MG: 10 INJECTION, POWDER, FOR SOLUTION INTRAVENOUS at 06:01

## 2025-01-24 RX ADMIN — BUSPIRONE HYDROCHLORIDE 5 MG: 10 TABLET ORAL at 17:54

## 2025-01-24 RX ADMIN — SODIUM ZIRCONIUM CYCLOSILICATE 10 G: 10 POWDER, FOR SUSPENSION ORAL at 23:10

## 2025-01-24 RX ADMIN — METOCLOPRAMIDE 10 MG: 5 INJECTION, SOLUTION INTRAMUSCULAR; INTRAVENOUS at 15:40

## 2025-01-24 RX ADMIN — KETOROLAC TROMETHAMINE 15 MG: 15 INJECTION, SOLUTION INTRAMUSCULAR; INTRAVENOUS at 07:46

## 2025-01-24 RX ADMIN — HEPARIN SODIUM 5000 UNITS: 5000 INJECTION, SOLUTION INTRAVENOUS; SUBCUTANEOUS at 21:13

## 2025-01-24 RX ADMIN — Medication 5 MG: at 21:13

## 2025-01-24 RX ADMIN — AMPICILLIN SODIUM 2000 MG: 2 INJECTION, POWDER, FOR SOLUTION INTRAVENOUS at 06:01

## 2025-01-24 RX ADMIN — SODIUM ZIRCONIUM CYCLOSILICATE 10 G: 10 POWDER, FOR SUSPENSION ORAL at 10:01

## 2025-01-24 RX ADMIN — HYDROXYZINE HYDROCHLORIDE 50 MG: 25 TABLET, FILM COATED ORAL at 02:32

## 2025-01-24 RX ADMIN — VANCOMYCIN HYDROCHLORIDE 2000 MG: 5 INJECTION, POWDER, LYOPHILIZED, FOR SOLUTION INTRAVENOUS at 11:21

## 2025-01-24 RX ADMIN — SUMATRIPTAN SUCCINATE 25 MG: 50 TABLET ORAL at 12:26

## 2025-01-24 RX ADMIN — NICOTINE 7 MG: 7 PATCH TRANSDERMAL at 06:00

## 2025-01-24 RX ADMIN — HYDROMORPHONE HYDROCHLORIDE 0.25 MG: 1 INJECTION, SOLUTION INTRAMUSCULAR; INTRAVENOUS; SUBCUTANEOUS at 15:39

## 2025-01-24 RX ADMIN — SODIUM ZIRCONIUM CYCLOSILICATE 10 G: 10 POWDER, FOR SUSPENSION ORAL at 17:57

## 2025-01-24 RX ADMIN — VANCOMYCIN HYDROCHLORIDE 2000 MG: 5 INJECTION, POWDER, LYOPHILIZED, FOR SOLUTION INTRAVENOUS at 23:15

## 2025-01-24 RX ADMIN — ACETAMINOPHEN 650 MG: 325 TABLET ORAL at 11:16

## 2025-01-24 RX ADMIN — ACETAMINOPHEN, ASPIRIN, CAFFEINE 1 TABLET: 250; 65; 250 TABLET, FILM COATED ORAL at 10:01

## 2025-01-24 RX ADMIN — ACETAMINOPHEN, ASPIRIN, CAFFEINE 1 TABLET: 250; 65; 250 TABLET, FILM COATED ORAL at 01:17

## 2025-01-24 RX ADMIN — AMPICILLIN SODIUM 2000 MG: 2 INJECTION, POWDER, FOR SOLUTION INTRAVENOUS at 10:06

## 2025-01-24 RX ADMIN — AMPICILLIN SODIUM 2000 MG: 2 INJECTION, POWDER, FOR SOLUTION INTRAVENOUS at 02:12

## 2025-01-24 RX ADMIN — BUTALBITAL, ACETAMINOPHEN AND CAFFEINE 1 TABLET: 325; 50; 40 TABLET ORAL at 21:15

## 2025-01-24 RX ADMIN — AMLODIPINE BESYLATE 5 MG: 5 TABLET ORAL at 05:57

## 2025-01-24 RX ADMIN — SUMATRIPTAN SUCCINATE 25 MG: 50 TABLET ORAL at 15:42

## 2025-01-24 RX ADMIN — BUSPIRONE HYDROCHLORIDE 5 MG: 10 TABLET ORAL at 07:50

## 2025-01-24 ASSESSMENT — PAIN DESCRIPTION - PAIN TYPE
TYPE: ACUTE PAIN

## 2025-01-24 ASSESSMENT — FIBROSIS 4 INDEX: FIB4 SCORE: 0.74

## 2025-01-24 NOTE — PROGRESS NOTES
Bedside report received from off going RN: Shea, assumed care of patient.     Fall Risk Score: HIGH RISK  Fall risk interventions in place: Place yellow fall risk ID band on patient, Provide patient/family education based on risk assessment, Educate patient/family to call staff for assistance when getting out of bed, Place fall precaution signage outside patient door, Place patient in room close to nursing station, Utilize bed/chair fall alarm, Notify charge of high risk for huddle, and Bed alarm connected correctly  Bed type: Regular (Clement Score less than 17 interventions in place)  Patient on cardiac monitor: Yes  IVF/IV medications: Not Applicable   Oxygen: How many liters 3L, Traced the line to wall oxygen, and No oxygen tank in room  Bedside sitter: Not Applicable   Isolation: Not applicable

## 2025-01-24 NOTE — CARE PLAN
The patient is Stable - Low risk of patient condition declining or worsening    Shift Goals  Clinical Goals: Monitor electrolytes  Patient Goals: Pain mgmt  Family Goals: jelly    Progress made toward(s) clinical / shift goals:    Problem: Pain - Standard  Goal: Alleviation of pain or a reduction in pain to the patient’s comfort goal  Outcome: Progressing     Problem: Knowledge Deficit - Standard  Goal: Patient and family/care givers will demonstrate understanding of plan of care, disease process/condition, diagnostic tests and medications  Outcome: Progressing     Problem: Fall Risk  Goal: Patient will remain free from falls  Outcome: Progressing       Patient is not progressing towards the following goals:
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
## 2025-01-24 NOTE — ASSESSMENT & PLAN NOTE
Per nursing, patient wakes multiple times a night gasping for air.  Presentation indicative of possible MARIELENA.  Patient also becomes hypercapnic confused in the mornings.  -Will order CPAP while inpatient  -Outpatient referral

## 2025-01-24 NOTE — CARE PLAN
The patient is Watcher - Medium risk of patient condition declining or worsening    Shift Goals  Clinical Goals: pain control  Patient Goals: pain control, comfort, sleep  Family Goals: jelly    Progress made toward(s) clinical / shift goals:    Problem: Knowledge Deficit - Standard  Goal: Patient and family/care givers will demonstrate understanding of plan of care, disease process/condition, diagnostic tests and medications  Outcome: Progressing     Problem: Fall Risk  Goal: Patient will remain free from falls  Outcome: Progressing       Patient is not progressing towards the following goals:      Problem: Pain - Standard  Goal: Alleviation of pain or a reduction in pain to the patient’s comfort goal  Outcome: Not Progressing

## 2025-01-24 NOTE — PROGRESS NOTES
Monitor Summary  Rhythm: Normal Sinus Rhythm and Sinus Tachycardia  Rate:   Ectopy: PVCs  .17 / .07 / .36                    12 hr Chart check.

## 2025-01-24 NOTE — RESPIRATORY CARE
EDUCATION by COPD CLINICAL EDUCATOR  1/24/2025 at 10:10 AM by Lin Juarez, RRT     Patient interviewed by education team. Patient does not have a history or diagnosis of COPD. Patient is a smoker.  Patient endorses breathing well. Patient declined smoking cessation. Currently wearing a patch and states that is helping.    COPD Screen       COPD Assessment  COPD Clinical Specialists ONLY  COPD Education Initiated: Yes--Short Intervention (pt with restrictive disease with significant bronchdilator response. Declines smoking cessation- no home meds, no home O2 or CPAP)  Is this a COPD exacerbation patient?: No  DME Company: none prior  DME Equipment Type: none  Physician Follow Up Appointment:  (pt declines assistance)  Physician Name: Caryl Pineda M.D.  Pulmonologist Name: none prior  Referrals Initiated: Yes  Pulmonary Rehab: No  Smoking Cessation: Declined  Hospice: No  Home Health Care: No  Mobile Urgent Care Services: No  Geriatric Specialty Group: No  Private In-Home Care Agency: No  (OP) Pulmonary Function Testing: Yes (12/2024 FEV1 1.32 42%, FVC 1.88 47% FEV1/FVC 70- restriction with bronchodialator response)  Interdisciplinary Rounds: Attendance at Rounds (30 Min)    PFT Results 12/2024 FEV1 1.32 42%, FVC 1.88 47% FEV1/FVC 70- restriction with bronchodialator response    Lab Results   Component Value Date    FEV1/FVC % 80 09/18/2024    FVC % Predicted 50 09/18/2024    FEV1 % Predicted 41 09/18/2024    FVC 2.12 09/18/2024    FVC 1.33 09/18/2024       Meds to Beds  [facility] provides bedside medication delivery for all eligible patients at discharge and you have been automatically enrolled in the Meds to Beds Program. Would you like to opt out of this program for any reason?: No - Stay Opted In

## 2025-01-25 VITALS
SYSTOLIC BLOOD PRESSURE: 141 MMHG | BODY MASS INDEX: 33.56 KG/M2 | DIASTOLIC BLOOD PRESSURE: 89 MMHG | HEIGHT: 67 IN | OXYGEN SATURATION: 92 % | TEMPERATURE: 97.5 F | HEART RATE: 70 BPM | RESPIRATION RATE: 18 BRPM | WEIGHT: 213.85 LBS

## 2025-01-25 LAB
ALBUMIN SERPL BCP-MCNC: 3.4 G/DL (ref 3.2–4.9)
ALBUMIN/GLOB SERPL: 1.5 G/DL
ALP SERPL-CCNC: 37 U/L (ref 30–99)
ALT SERPL-CCNC: 55 U/L (ref 2–50)
ANION GAP SERPL CALC-SCNC: 7 MMOL/L (ref 7–16)
AST SERPL-CCNC: 39 U/L (ref 12–45)
BILIRUB SERPL-MCNC: 0.7 MG/DL (ref 0.1–1.5)
BUN SERPL-MCNC: 15 MG/DL (ref 8–22)
CALCIUM ALBUM COR SERPL-MCNC: 8.8 MG/DL (ref 8.5–10.5)
CALCIUM SERPL-MCNC: 8.3 MG/DL (ref 8.5–10.5)
CHLORIDE SERPL-SCNC: 99 MMOL/L (ref 96–112)
CO2 SERPL-SCNC: 31 MMOL/L (ref 20–33)
CREAT SERPL-MCNC: 0.85 MG/DL (ref 0.5–1.4)
ERYTHROCYTE [DISTWIDTH] IN BLOOD BY AUTOMATED COUNT: 61.8 FL (ref 35.9–50)
GFR SERPLBLD CREATININE-BSD FMLA CKD-EPI: 96 ML/MIN/1.73 M 2
GLOBULIN SER CALC-MCNC: 2.3 G/DL (ref 1.9–3.5)
GLUCOSE SERPL-MCNC: 77 MG/DL (ref 65–99)
HCT VFR BLD AUTO: 44.4 % (ref 42–52)
HGB BLD-MCNC: 12.9 G/DL (ref 14–18)
MCH RBC QN AUTO: 20.8 PG (ref 27–33)
MCHC RBC AUTO-ENTMCNC: 29.1 G/DL (ref 32.3–36.5)
MCV RBC AUTO: 71.6 FL (ref 81.4–97.8)
PHOSPHATE SERPL-MCNC: 2.8 MG/DL (ref 2.5–4.5)
PLATELET # BLD AUTO: 504 K/UL (ref 164–446)
PMV BLD AUTO: 9.7 FL (ref 9–12.9)
POTASSIUM SERPL-SCNC: 4.8 MMOL/L (ref 3.6–5.5)
PROT SERPL-MCNC: 5.7 G/DL (ref 6–8.2)
RBC # BLD AUTO: 6.2 M/UL (ref 4.7–6.1)
SODIUM SERPL-SCNC: 137 MMOL/L (ref 135–145)
WBC # BLD AUTO: 12.7 K/UL (ref 4.8–10.8)

## 2025-01-25 PROCEDURE — 36415 COLL VENOUS BLD VENIPUNCTURE: CPT

## 2025-01-25 PROCEDURE — 700105 HCHG RX REV CODE 258

## 2025-01-25 PROCEDURE — 84100 ASSAY OF PHOSPHORUS: CPT

## 2025-01-25 PROCEDURE — 700111 HCHG RX REV CODE 636 W/ 250 OVERRIDE (IP)

## 2025-01-25 PROCEDURE — A9270 NON-COVERED ITEM OR SERVICE: HCPCS

## 2025-01-25 PROCEDURE — 94660 CPAP INITIATION&MGMT: CPT

## 2025-01-25 PROCEDURE — 700102 HCHG RX REV CODE 250 W/ 637 OVERRIDE(OP)

## 2025-01-25 PROCEDURE — 80053 COMPREHEN METABOLIC PANEL: CPT

## 2025-01-25 PROCEDURE — 85027 COMPLETE CBC AUTOMATED: CPT

## 2025-01-25 RX ADMIN — HEPARIN SODIUM 5000 UNITS: 5000 INJECTION, SOLUTION INTRAVENOUS; SUBCUTANEOUS at 05:05

## 2025-01-25 RX ADMIN — BUTALBITAL, ACETAMINOPHEN AND CAFFEINE 1 TABLET: 325; 50; 40 TABLET ORAL at 07:45

## 2025-01-25 RX ADMIN — AMLODIPINE BESYLATE 5 MG: 5 TABLET ORAL at 05:05

## 2025-01-25 RX ADMIN — CEFTRIAXONE SODIUM 2000 MG: 10 INJECTION, POWDER, FOR SOLUTION INTRAVENOUS at 05:06

## 2025-01-25 RX ADMIN — BUSPIRONE HYDROCHLORIDE 5 MG: 10 TABLET ORAL at 05:06

## 2025-01-25 RX ADMIN — METOCLOPRAMIDE 10 MG: 5 INJECTION, SOLUTION INTRAMUSCULAR; INTRAVENOUS at 07:47

## 2025-01-25 RX ADMIN — SODIUM ZIRCONIUM CYCLOSILICATE 10 G: 10 POWDER, FOR SUSPENSION ORAL at 07:45

## 2025-01-25 RX ADMIN — NICOTINE 7 MG: 7 PATCH TRANSDERMAL at 05:05

## 2025-01-25 ASSESSMENT — FIBROSIS 4 INDEX: FIB4 SCORE: 0.68

## 2025-01-25 NOTE — CARE PLAN
The patient is Stable - Low risk of patient condition declining or worsening    Shift Goals  Clinical Goals: pain control, ID consult, abx  Patient Goals: rest  Family Goals: HUGH    Progress made toward(s) clinical / shift goals:    Problem: Pain - Standard  Goal: Alleviation of pain or a reduction in pain to the patient’s comfort goal  Outcome: Progressing     Problem: Knowledge Deficit - Standard  Goal: Patient and family/care givers will demonstrate understanding of plan of care, disease process/condition, diagnostic tests and medications  Outcome: Progressing     Problem: Fall Risk  Goal: Patient will remain free from falls  Outcome: Progressing

## 2025-01-25 NOTE — PROGRESS NOTES
Noe Starks Edelmira has chosen to leave the hospital against medical advice. The attending physician has not discharged the patient. The provider is aware that the patient is leaving against medical advice. Patient expresses understanding of the risks of leaving the hospital and benefits of admission including but not limited to, the availability and proximity of nurses, physicians, monitoring, diagnostic testing, treatment, and a safe discharge plan. The patient had the opportunity to ask questions about their medical condition and recommended treatment.  Patient is aware that they may return for care at any time.

## 2025-01-25 NOTE — PROGRESS NOTES
Assessment completed.  Pt A&Ox4.  Pt denies complains of 3/10 head pain and nausea, medicated per MAR. POC discussed; communication board updated.    Bed in locked, lowest position.  Call light and belongings within reach.  Needs met.

## 2025-01-25 NOTE — DISCHARGE SUMMARY
[facility] Discharge Summary    Attending: Minoo Martin M.d.  Senior Resident: Pastora Trinidad M.D.       CHIEF COMPLAINT ON ADMISSION  Chief Complaint   Patient presents with    Shortness of Breath     States for the last few years but he feels like he is having a attack, acutely yesterday        Reason for Admission  Other     Admission Date  1/22/2025    CODE STATUS  Full Code    HPI & HOSPITAL COURSE  This is a 64 y.o. male OPD, tobacco use, aortic stenosis status post TAVR 2023 complicated by left hemidiaphragm dysfunction, aortic aneurysm status post repair who was admitted for intractable headache on 1/22/2025.    Patient was admitted for intractable headaches which were treated with various forms of migraine abortive therapy and IV pain medications.  Patient on hospital day 2 continue to have worsening headaches and therefore LP was performed.  However IV antibiotics were started before lumbar puncture was completed.  Was continued on IV ceftriaxone and vancomycin during course of hospitalization.  During hospitalization was continued on home oxygen and night CPAP was trialed on 1/24.    On 1/25/2025 patient reported that his headache resolved and he was very anxious. He stated he would like to leave the hospital.  Educated patient at bedside that we were continuing to rule out possibility of headache caused by meningitis.  Patient stated he was aware of this however did not want to remain hospitalized.  Patient verbalized that he understood that if this is meningitis and he does not receive IV antibiotics his headaches could get worse, damage to his brain could occur and possible death.  Despite education patient elected to leave AMA.  Was encouraged to return to the hospital if symptoms worsen and to follow-up outpatient with PCP.  Was educated to continue his home oxygen as well.    Discharge Date  Left AMA on 1/25/25.     Physical Exam on Day of Discharge  Physical Exam  Constitutional:        General: He is not in acute distress.  HENT:      Head: Normocephalic.      Mouth/Throat:      Mouth: Mucous membranes are moist.      Pharynx: Oropharynx is clear.   Eyes:      Extraocular Movements: Extraocular movements intact.      Conjunctiva/sclera: Conjunctivae normal.   Cardiovascular:      Rate and Rhythm: Normal rate and regular rhythm.      Heart sounds: Murmur heard.   Pulmonary:      Effort: Pulmonary effort is normal.      Breath sounds: No wheezing, rhonchi or rales.   Abdominal:      General: Abdomen is flat.      Palpations: Abdomen is soft.      Tenderness: There is no abdominal tenderness.   Musculoskeletal:         General: Normal range of motion.      Cervical back: Normal range of motion. No rigidity or tenderness.   Skin:     General: Skin is warm.      Capillary Refill: Capillary refill takes less than 2 seconds.   Neurological:      General: No focal deficit present.      Mental Status: He is alert and oriented to person, place, and time.   Psychiatric:         Mood and Affect: Mood is anxious. Affect is angry.         Behavior: Behavior is agitated.         FOLLOW UP ITEMS   Patient needs to be seen by PCP  Sent patient 5-day course of Augmentin for concern for tooth abscess.  Sent to [facility] pharmacy per patient's request.    DISCHARGE DIAGNOSES  Principal Problem:    Intractable headache, unspecified chronicity pattern, unspecified headache type (POA: Yes)  Active Problems:    Anxiety (POA: Yes)      Overview: -pt has a hx of pulm htn, cardiac arrest d/t unknown etiology PEA s/p code       1mo ago likely d/t underlying copd exacerbation AHRF, hx of opiod       dependence (sober), anabolic steroid use, smoker 40p-y hx, presumed COPD       on oxygen at home (3L, not on O2 in clinic), TAVR in 2023 c/b       hemidiaphragm who presents due to significant anxiety and panic related to       thoughts of his recent cardiac arrest; he states he has a impending sense       of doom and can't sleep, he  does not report sob/aguilar out of baseline, cp,       syncope, flushing, dizziness. He states he was given xanax in the hospital       that helped calm him down, he would like a small quantity to hold him over       until he sees his cardiologist. He states he's tried hydroxyzine before       and it did not work. He is not interested in seeing a therapist or       psychiatrist.            I explained to pt that xanax prn is not a long term solution and if he is       in need of more in the future a different management plan must be       implemented.             Pt has hx of opiod dependance has gone thru rehab and is currently sober;       he recently had cardiac arrest in the hospital and has severe anxiety as a       result and teeters on panic attacks; it is reasonable to rx pt a small       quantitiy of low dose xanax as to help quell his anxiety until he sees his       cardiologist and goes thru the work up of his cardiopulmonary issues. I       extensively educated and cautioned him on the adverse effects of taking       too much sedatives especially effects on his respiratory drive; he       acknowledges. I will rx 15 tab of 0.25mg of alprazolam to be taken nightly       prn; I will have him sign his consent for opiates documentation and send       for a UDS. Pt agreeable and says he believes this is a short term problem       because he is still dealing with emtional/mental sequale of his recent       health issues.             I also emphasized the importance for him to f/u w/ his PFT and pulmonology       along with his cardiologist and CT-surgeon.             Pt has stopped smoking and using anabolic steroids since his cardiac       event. However he continues to go to the gym and lift ~400lbs of weight,       he states his CT-surgeon told he should lift lighter, I emphasized this       concept of cutting back significantly on activity that places significant       strain on the cardiopulmonary system until  he has further w/u and f/u.             ED precautions given for concerns for cardiopulmonary distress.           SOB (shortness of breath) on exertion (POA: Yes)    Leukocytosis (POA: Yes)    Thrombocytosis (POA: Yes)    Elevated troponin (POA: Unknown)    Broken tooth (POA: Unknown)    Elevated liver enzymes (POA: Unknown)    Suprapubic tenderness (POA: Unknown)    Hyperkalemia (POA: Unknown)    Altered mental state (POA: Unknown)    MARIELENA (obstructive sleep apnea) (POA: Unknown)  Resolved Problems:    * No resolved hospital problems. *      FOLLOW UP  No future appointments.  No follow-up provider specified.    MEDICATIONS ON DISCHARGE     Medication List        ASK your doctor about these medications        Instructions   asa/apap/caffeine 250-250-65 MG Tabs  Commonly known as: Excedrin   Take 2 Tablets by mouth every 8 hours as needed for Headache.  Dose: 2 Tablet     ibuprofen 200 MG Tabs  Commonly known as: Motrin   Take 400 mg by mouth every 6 hours as needed for Mild Pain.  Dose: 400 mg     metoprolol SR 25 MG Tb24  Commonly known as: Toprol XL   Take 25 mg by mouth every day.  Dose: 25 mg     omeprazole 20 MG delayed-release capsule  Commonly known as: PriLOSEC   Take 20 mg by mouth 2 times a day as needed (acid reflux).  Dose: 20 mg              Allergies  Allergies   Allergen Reactions    Morphine Shortness of Breath and Rash     Rash, shortness of breath       DIET  Orders Placed This Encounter   Procedures    Diet Order Diet: Renal     Standing Status:   Standing     Number of Occurrences:   1     Order Specific Question:   Diet:     Answer:   Renal [8]       ACTIVITY  As tolerated.  Weight bearing as tolerated    CONSULTATIONS  None     PROCEDURES  Lumbar Puncture     LABORATORY  Lab Results   Component Value Date    SODIUM 137 01/25/2025    POTASSIUM 4.8 01/25/2025    CHLORIDE 99 01/25/2025    CO2 31 01/25/2025    GLUCOSE 77 01/25/2025    BUN 15 01/25/2025    CREATININE 0.85 01/25/2025        Lab  Results   Component Value Date    WBC 12.7 (H) 01/25/2025    HEMOGLOBIN 12.9 (L) 01/25/2025    HEMATOCRIT 44.4 01/25/2025    PLATELETCT 504 (H) 01/25/2025

## 2025-01-25 NOTE — PROGRESS NOTES
Monitor Summary    Rhythm:  SR    Rate:  69-82    Ectopy:  r trig PVC, rPVC    .17  /  .08  /   .33

## 2025-01-25 NOTE — CARE PLAN
The patient is Stable - Low risk of patient condition declining or worsening    Shift Goals  Clinical Goals: Pain control  Patient Goals: Pain control, sleep  Family Goals: HUGH    Progress made toward(s) clinical / shift goals:    Problem: Pain - Standard  Goal: Alleviation of pain or a reduction in pain to the patient’s comfort goal  Outcome: Progressing  Note: Assess and monitor for pain. Provide pharmacological and non-pharmacological interventions as appropriate.      Problem: Knowledge Deficit - Standard  Goal: Patient and family/care givers will demonstrate understanding of plan of care, disease process/condition, diagnostic tests and medications  Outcome: Progressing  Note: Pt updated on POC, all current questions answered at this time       Problem: Fall Risk  Goal: Patient will remain free from falls  Outcome: Progressing  Note: Treaded socks and bed/strip alarm on, side rails up x 2. Call light within reach. Pt educated to call for assistance. Reinforce as needed.           Patient is not progressing towards the following goals:

## 2025-01-25 NOTE — PROGRESS NOTES
Monitor Summary  Rhythm: Normal Sinus Rhythm and Sinus Bradycardia  Rate: 50-80's  Ectopy: PVCs  .16 / .09 / .33

## 2025-01-25 NOTE — PROGRESS NOTES
Bedside report received from off going RN/tech: Jessie, assumed care of patient.     Fall Risk Score: MODERATE RISK  Fall risk interventions in place: Provide patient/family education based on risk assessment, Educate patient/family to call staff for assistance when getting out of bed, Place fall precaution signage outside patient door, Utilize bed/chair fall alarm, and Bed alarm connected correctly  Bed type: Regular (Clement Score less than 17 interventions in place)  Patient on cardiac monitor: Yes  IVF/IV medications: Infusion per MAR (List Med(s)) intermittent IV abx  Oxygen: How many liters 3L, Traced the line to wall oxygen, and No oxygen tank in room  Bedside sitter: Not Applicable   Isolation: Not applicable

## 2025-01-28 LAB
BACTERIA BLD CULT: NORMAL
BACTERIA BLD CULT: NORMAL
FUNGUS SPEC CULT: NORMAL
FUNGUS SPEC FUNGUS STN: NORMAL
SIGNIFICANT IND 70042: NORMAL
SITE SITE: NORMAL
SOURCE SOURCE: NORMAL

## 2025-01-31 ENCOUNTER — OFFICE VISIT (OUTPATIENT)
Dept: CARDIOLOGY | Facility: MEDICAL CENTER | Age: 65
End: 2025-01-31
Attending: NURSE PRACTITIONER
Payer: MEDICAID

## 2025-01-31 VITALS
DIASTOLIC BLOOD PRESSURE: 84 MMHG | HEIGHT: 68 IN | BODY MASS INDEX: 32.43 KG/M2 | WEIGHT: 214 LBS | HEART RATE: 79 BPM | OXYGEN SATURATION: 97 % | RESPIRATION RATE: 16 BRPM | SYSTOLIC BLOOD PRESSURE: 126 MMHG

## 2025-01-31 DIAGNOSIS — D50.9 MICROCYTIC ANEMIA: ICD-10-CM

## 2025-01-31 DIAGNOSIS — I48.0 PAROXYSMAL ATRIAL FIBRILLATION (HCC): ICD-10-CM

## 2025-01-31 DIAGNOSIS — R06.02 SOB (SHORTNESS OF BREATH) ON EXERTION: ICD-10-CM

## 2025-01-31 DIAGNOSIS — I51.89: ICD-10-CM

## 2025-01-31 LAB — EKG IMPRESSION: NORMAL

## 2025-01-31 PROCEDURE — 99211 OFF/OP EST MAY X REQ PHY/QHP: CPT | Performed by: NURSE PRACTITIONER

## 2025-01-31 PROCEDURE — 93005 ELECTROCARDIOGRAM TRACING: CPT | Mod: TC | Performed by: NURSE PRACTITIONER

## 2025-01-31 ASSESSMENT — ENCOUNTER SYMPTOMS
PALPITATIONS: 0
NEUROLOGICAL NEGATIVE: 1
SENSORY CHANGE: 0
SHORTNESS OF BREATH: 1
CLAUDICATION: 0
WHEEZING: 0
NERVOUS/ANXIOUS: 0
PND: 0
BRUISES/BLEEDS EASILY: 0
DEPRESSION: 0
ORTHOPNEA: 0
GASTROINTESTINAL NEGATIVE: 1
EYES NEGATIVE: 1
HALLUCINATIONS: 0
NAUSEA: 0
MUSCULOSKELETAL NEGATIVE: 1
DIZZINESS: 0

## 2025-01-31 ASSESSMENT — FIBROSIS 4 INDEX: FIB4 SCORE: 0.68

## 2025-01-31 NOTE — ASSESSMENT & PLAN NOTE
- was being worked up for meningitis in hospital   - headache resolved   - echo shows zay improvement in RSVP   - mild LVH and grade 2 will get PET scan to rule out inflammatory disease   - blood pressure and EKG stable in office   - valves working appropriately   - no edema in office   - LVEF normal    - most recent CBC labs suggestive of possible anemic process    - follow up with PCP   - has upcoming appt with pulmonary

## 2025-01-31 NOTE — PROGRESS NOTES
Cardiology Follow up Note    DOS: 1/21/2025   [id number]  Noe Starks Edelmira    Chief complaint/Reason for consult: shortness of breath    HPI: Pt is a 64 y.o. male who presents to the clinic today in follow up for profound dyspnea. Patient has a past medical history significant for but not limited to: h/o ascending aortic aneurysm repair, GERD, h/o HF with resolved EF, PAF S/P ablation, chronic anticoagulation. Patient was in hospital and after some dilaudid treatment his headache has resolved. HE also had a spinal tap to assess for meningitis. Repeat echocardiogram showed some improvement with pulmonary numbers. Normal LVEF and normal TAVR valve. Some grade 2 diastolic seen and some LV hypertrophy. Consider PET to rule out inflammatory disease. Also CBC levels all low and pointing to anemia process. Encouraged to follow up with primary. Has upcoming pulmonary appt. Need oxygen upon getting to office today. Blood pressure good. EKG normal.     Past Medical History:   Diagnosis Date    Apnea, sleep     Arrhythmia     Breath shortness 06/09/2023    prior to 1/2023 surgery    Chest pain     Chest tightness     Cyclic vomiting syndrome     Daytime sleepiness     Difficulty breathing     Dizziness     Elevated hemidiaphragm - LEFT post AVR 01/04/2023    Fracture     Frequent headaches     Gasping for breath     Headache, classical migraine     Heart burn     Heart valve disease 06/09/2023    heart surgery in 1/2023    Heartburn     Hyperlipidemia 04/26/2024    medicated    Hypertension 04/26/2024 6/6/2024 pt not currently taking any medication for this at this time    Indigestion     Insomnia     Morning headache     MARIELENA (obstructive sleep apnea) 1/24/2025    Painful breathing     Painful joint     Pneumonia due to infectious organism 01/20/2023    Shortness of breath     Snoring     6/6/2024 no sleep study done    Sore muscles     Swelling of lower extremity     Weakness     Wears glasses     Wheezing        Past  Surgical History:   Procedure Laterality Date    PA BRONCHOSCOPY,DIAGNOSTIC N/A 1/2/2025    Procedure: FIBER OPTIC BRONCHOSCOPY WITH BRONCHOALVEOLAR LAVAGE;  Surgeon: Scot Christianson M.D.;  Location: [facility];  Service: Pulmonary    IRRIGATION & DEBRIDEMENT GENERAL N/A 6/7/2024    Procedure: RIGHT FOOT IRRIGATION AND DEBRIDEMENT;  Surgeon: Oliver Arreguin M.D.;  Location: [facility];  Service: Orthopedics    INCISION AND DRAINAGE GENERAL Left 04/19/2024    Procedure: INCISION AND DRAINAGE, LEG;  Surgeon: Mitch Bowie M.D.;  Location: [facility];  Service: Orthopedics    AORTIC VALVE REPLACEMENT  01/05/2023    Procedure: AORTIC VALVE REPLACEMENT, ASCENDING AORTIC ANEURYSM REPAIR AND TRANSESOPHAGEAL ECHOCARDIOGRAM.;  Surgeon: Serena Goyal M.D.;  Location: [facility];  Service: Cardiac    AORTIC ASCENDING DISSECTION  01/05/2023    Procedure: REPAIR, ANEURYSM OR DISSECTION, AORTA, ASCENDING;  Surgeon: Serena Goyal M.D.;  Location: [facility];  Service: Cardiac    ECHOCARDIOGRAM, TRANSESOPHAGEAL, INTRAOPERATIVE  01/05/2023    Procedure: ECHOCARDIOGRAM, TRANSESOPHAGEAL, INTRAOPERATIVE.;  Surgeon: Serena Goyal M.D.;  Location: [facility];  Service: Cardiac    IRRIGATION & DEBRIDEMENT ORTHO Right 09/19/2017    Procedure: IRRIGATION & DEBRIDEMENT ORTHO-THIGH;  Surgeon: Corbin Rivera M.D.;  Location: [facility];  Service: Orthopedics    SHOULDER HEMICAP RESURFACING Right 10/12/2015    Procedure: RIGHT SHOULDER RESURFACING;  Surgeon: Javon Doll M.D.;  Location: [facility];  Service:     SHOULDER ARTHROSCOPY Bilateral     x3    SHOULDER SURGERY Right     replacement right       Social History     Socioeconomic History    Marital status:      Spouse name: Not on file    Number of children: Not on file    Years of education: Not on file    Highest education level: Not on file   Occupational History    Not on  file   Tobacco Use    Smoking status: Every Day     Current packs/day: 0.00     Average packs/day: 0.7 packs/day for 71.3 years (47.5 ttl pk-yrs)     Types: Cigarettes     Start date: 1976     Last attempt to quit: 2024     Years since quittin.6     Passive exposure: Never    Smokeless tobacco: Never    Tobacco comments:     2-3 a day   Vaping Use    Vaping status: Never Used   Substance and Sexual Activity    Alcohol use: Not Currently    Drug use: Not Currently     Types: Oral     Comment: past problems with narcotics not since     Sexual activity: Not on file   Other Topics Concern    Not on file   Social History Narrative    Not on file     Social Drivers of Health     Financial Resource Strain: Low Risk  (2023)    Overall Financial Resource Strain (CARDIA)     Difficulty of Paying Living Expenses: Not hard at all   Food Insecurity: No Food Insecurity (2025)    Hunger Vital Sign     Worried About Running Out of Food in the Last Year: Never true     Ran Out of Food in the Last Year: Never true   Transportation Needs: No Transportation Needs (2025)    PRAPARE - Transportation     Lack of Transportation (Medical): No     Lack of Transportation (Non-Medical): No   Physical Activity: Not on file   Stress: Not on file   Social Connections: Not on file   Intimate Partner Violence: Not At Risk (2025)    Humiliation, Afraid, Rape, and Kick questionnaire     Fear of Current or Ex-Partner: No     Emotionally Abused: No     Physically Abused: No     Sexually Abused: No   Housing Stability: Low Risk  (2025)    Housing Stability Vital Sign     Unable to Pay for Housing in the Last Year: No     Number of Times Moved in the Last Year: 0     Homeless in the Last Year: No       No family history on file.    Allergies   Allergen Reactions    Morphine Shortness of Breath and Rash     Rash, shortness of breath       Current Outpatient Medications   Medication Sig Dispense Refill     metoprolol SR (TOPROL XL) 25 MG TABLET SR 24 HR Take 25 mg by mouth every day.      ibuprofen (MOTRIN) 200 MG Tab Take 400 mg by mouth every 6 hours as needed for Mild Pain.      omeprazole (PRILOSEC) 20 MG delayed-release capsule Take 20 mg by mouth 2 times a day as needed (acid reflux).      asa/apap/caffeine (EXCEDRIN) 250-250-65 MG Tab Take 2 Tablets by mouth every 8 hours as needed for Headache.       No current facility-administered medications for this visit.       Vitals:    01/31/25 0743   BP: 126/84   Pulse: 79   Resp: 16   SpO2: 97%           Review of Systems   Constitutional:  Positive for malaise/fatigue.   HENT: Negative.     Eyes: Negative.    Respiratory:  Positive for shortness of breath. Negative for wheezing.    Cardiovascular:  Negative for palpitations, orthopnea, claudication, leg swelling and PND.   Gastrointestinal: Negative.  Negative for nausea.   Genitourinary: Negative.    Musculoskeletal: Negative.    Skin: Negative.    Neurological: Negative.  Negative for dizziness and sensory change.   Endo/Heme/Allergies: Negative.  Does not bruise/bleed easily.   Psychiatric/Behavioral:  Negative for depression and hallucinations. The patient is not nervous/anxious.         Prior echo results reviewed: LVEF 67% normal valves: TAVR working appropriately: RSVP 47mmHg: mild LVH and grade 2 diastolic         EKG interpreted by me: Sinus     Physical Exam  Constitutional:       General: He is in acute distress.      Appearance: Normal appearance.   HENT:      Head: Normocephalic.   Eyes:      Pupils: Pupils are equal, round, and reactive to light.   Neck:      Vascular: No JVD.   Cardiovascular:      Rate and Rhythm: Normal rate and regular rhythm.      Pulses: Normal pulses.      Heart sounds: Normal heart sounds.   Pulmonary:      Effort: Tachypnea present.      Breath sounds: Normal breath sounds.   Abdominal:      General: Abdomen is flat.      Palpations: Abdomen is soft.   Musculoskeletal:       "Cervical back: Normal range of motion.      Right lower leg: No edema.      Left lower leg: No edema.   Skin:     General: Skin is warm and dry.   Neurological:      Mental Status: He is alert and oriented to person, place, and time.   Psychiatric:         Mood and Affect: Mood normal.         Behavior: Behavior normal.          Data:  Lipids:   Lab Results   Component Value Date/Time    CHOLSTRLTOT 212 (H) 09/10/2023 03:19 AM    TRIGLYCERIDE 125 09/10/2023 03:19 AM    HDL 21 (A) 09/10/2023 03:19 AM     (H) 09/10/2023 03:19 AM        BMP:  Lab Results   Component Value Date/Time    SODIUM 133 (L) 09/13/2023 0046    POTASSIUM 5.2 09/13/2023 0046    CHLORIDE 99 09/13/2023 0046    CO2 23 09/13/2023 0046    GLUCOSE 136 (H) 09/13/2023 0046    BUN 28 (H) 09/13/2023 0046    CREATININE 1.22 09/13/2023 0046    CALCIUM 8.6 09/13/2023 0046    ANION 11.0 09/13/2023 0046       GFR:  Lab Results   Component Value Date/Time    IFAFRICA 66.14 08/27/2021 2047    IFAFRICA 198.42 08/27/2021 2047    IFNOTAFR 54.66 08/27/2021 2047    IFNOTAFR 163.98 08/27/2021 2047        TSH:   Lab Results   Component Value Date/Time    TSHULTRASEN 6.010 (H) 02/02/2023 0400       MAGNESIUM:  Lab Results   Component Value Date/Time    MAGNESIUM 2.2 09/12/2023 0019    MAGNESIUM 2.0 07/08/2023 0528    MAGNESIUM 2.1 07/06/2023 0049        THYROXINE (T4):   No results found for: \"FREEDIR\"     CBC:   Lab Results   Component Value Date/Time    WBC 12.7 (H) 01/25/2025 03:19 AM    RBC 6.20 (H) 01/25/2025 03:19 AM    HEMOGLOBIN 12.9 (L) 01/25/2025 03:19 AM    HEMATOCRIT 44.4 01/25/2025 03:19 AM    MCV 71.6 (L) 01/25/2025 03:19 AM    MCH 20.8 (L) 01/25/2025 03:19 AM    MCHC 29.1 (L) 01/25/2025 03:19 AM    RDW 61.8 (H) 01/25/2025 03:19 AM    PLATELETCT 504 (H) 01/25/2025 03:19 AM    MPV 9.7 01/25/2025 03:19 AM    NEUTSPOLYS 84.50 (H) 01/24/2025 02:46 AM    LYMPHOCYTES 3.60 (L) 01/24/2025 02:46 AM    MONOCYTES 9.20 01/24/2025 02:46 AM    EOSINOPHILS 1.10 " "01/24/2025 02:46 AM    BASOPHILS 0.40 01/24/2025 02:46 AM    IMMGRAN 1.20 (H) 01/24/2025 02:46 AM    NRBC 0.00 01/24/2025 02:46 AM    NEUTS 19.63 (H) 01/24/2025 02:46 AM    LYMPHS 0.84 (L) 01/24/2025 02:46 AM    LYMPHS 16 01/02/2025 04:11 PM    MONOS 2.14 (H) 01/24/2025 02:46 AM    EOS 0.26 01/24/2025 02:46 AM    BASO 0.10 01/24/2025 02:46 AM    IMMGRANAB 0.29 (H) 01/24/2025 02:46 AM    NRBCAB 0.00 01/24/2025 02:46 AM        CBC w/o DIFF  Lab Results   Component Value Date/Time    WBC 12.7 (H) 01/25/2025 03:19 AM    RBC 6.20 (H) 01/25/2025 03:19 AM    HEMOGLOBIN 12.9 (L) 01/25/2025 03:19 AM    MCV 71.6 (L) 01/25/2025 03:19 AM    MCH 20.8 (L) 01/25/2025 03:19 AM    MCHC 29.1 (L) 01/25/2025 03:19 AM    RDW 61.8 (H) 01/25/2025 03:19 AM    MPV 9.7 01/25/2025 03:19 AM       LIVER:  Lab Results   Component Value Date/Time    ALKPHOSPHAT 37 01/25/2025 03:19 AM    ASTSGOT 39 01/25/2025 03:19 AM    ALTSGPT 55 (H) 01/25/2025 03:19 AM    TBILIRUBIN 0.7 01/25/2025 03:19 AM       BNP:  No results found for: \"BNPBTYPENAT\"    PT/INR:  Lab Results   Component Value Date/Time    PROTHROMBTM 13.3 01/22/2025 01:07 PM    PROTHROMBTM 14.7 (H) 04/11/2024 09:25 PM    PROTHROMBTM 20.4 (H) 09/10/2023 07:05 PM    INR 1.01 01/22/2025 01:07 PM    INR 1.13 04/11/2024 09:25 PM    INR 1.72 (H) 09/10/2023 07:05 PM             Impression/Plan:  SOB (shortness of breath) on exertion   - was being worked up for meningitis in hospital   - headache resolved   - echo shows zay improvement in RSVP   - mild LVH and grade 2 will get PET scan to rule out inflammatory disease   - blood pressure and EKG stable in office   - valves working appropriately   - no edema in office   - LVEF normal    - most recent CBC labs suggestive of possible anemic process    - follow up with PCP   - has upcoming appt with pulmonary              Jhon Sams [facility]  Cardiac Electrophysiology    "

## 2025-02-03 LAB — TESTOST FREE SERPL-MCNC: 778.9 PG/ML (ref 47–244)

## 2025-02-05 ENCOUNTER — APPOINTMENT (OUTPATIENT)
Dept: SLEEP MEDICINE | Facility: MEDICAL CENTER | Age: 65
End: 2025-02-05
Attending: STUDENT IN AN ORGANIZED HEALTH CARE EDUCATION/TRAINING PROGRAM
Payer: MEDICAID

## 2025-02-06 ENCOUNTER — APPOINTMENT (OUTPATIENT)
Dept: MEDICAL GROUP | Facility: CLINIC | Age: 65
End: 2025-02-06
Payer: MEDICAID

## 2025-02-14 LAB
MYCOBACTERIUM SPEC CULT: NORMAL
RHODAMINE-AURAMINE STN SPEC: NORMAL
SIGNIFICANT IND 70042: NORMAL
SITE SITE: NORMAL
SOURCE SOURCE: NORMAL

## 2025-02-28 ENCOUNTER — APPOINTMENT (OUTPATIENT)
Dept: RADIOLOGY | Facility: MEDICAL CENTER | Age: 65
End: 2025-02-28
Attending: NURSE PRACTITIONER
Payer: MEDICAID

## 2025-03-02 ENCOUNTER — OFFICE VISIT (OUTPATIENT)
Dept: URGENT CARE | Facility: PHYSICIAN GROUP | Age: 65
End: 2025-03-02
Payer: MEDICAID

## 2025-03-02 VITALS
OXYGEN SATURATION: 94 % | WEIGHT: 215 LBS | HEART RATE: 76 BPM | DIASTOLIC BLOOD PRESSURE: 88 MMHG | BODY MASS INDEX: 32.58 KG/M2 | HEIGHT: 68 IN | SYSTOLIC BLOOD PRESSURE: 164 MMHG | TEMPERATURE: 98.7 F | RESPIRATION RATE: 26 BRPM

## 2025-03-02 DIAGNOSIS — R07.9 CHEST PAIN, UNSPECIFIED TYPE: ICD-10-CM

## 2025-03-02 DIAGNOSIS — R06.02 SOB (SHORTNESS OF BREATH): ICD-10-CM

## 2025-03-02 PROCEDURE — 3077F SYST BP >= 140 MM HG: CPT

## 2025-03-02 PROCEDURE — 3079F DIAST BP 80-89 MM HG: CPT

## 2025-03-02 PROCEDURE — 99215 OFFICE O/P EST HI 40 MIN: CPT

## 2025-03-02 ASSESSMENT — FIBROSIS 4 INDEX: FIB4 SCORE: 0.68

## 2025-03-02 NOTE — PROGRESS NOTES
"Chief Complaint   Patient presents with    Cough     Vomiting and SOB started this AM         Subjective:   HISTORY OF PRESENT ILLNESS: Noe Hickey is a 64 y.o. male who presents for cough, vomiting, headache and chest pain, pt in obvious distress speaking in 2-3 word sentences.  Reports chest pain but they are \"not that bad\".  Biggest complaint is his breathing and headache.   Patient denies recent fevers.     Medications, Allergies, current problem list, Social and Family history reviewed today in Epic.     Objective:     BP (!) 164/88   Pulse 76   Temp 37.1 °C (98.7 °F)   Resp (!) 26   Ht 1.727 m (5' 8\")   Wt 97.5 kg (215 lb)   SpO2 94%     Physical Exam  Vitals reviewed.   Constitutional:       General: He is in acute distress.      Appearance: Normal appearance. He is not toxic-appearing or diaphoretic.   HENT:      Head:      Jaw: No trismus.      Right Ear: Tympanic membrane normal.      Left Ear: Tympanic membrane normal.      Nose: No rhinorrhea.      Mouth/Throat:      Mouth: Mucous membranes are moist.      Pharynx: Uvula midline. No pharyngeal swelling, oropharyngeal exudate, posterior oropharyngeal erythema or uvula swelling.      Tonsils: No tonsillar exudate or tonsillar abscesses. 0 on the right. 0 on the left.      Comments: No muffled voice, trismus, unilateral deviation of the uvula, soft palate fullness or edema. No oral airway compromise, or drooling noted.   Eyes:      Conjunctiva/sclera: Conjunctivae normal.   Cardiovascular:      Rate and Rhythm: Normal rate and regular rhythm.      Pulses: Normal pulses.      Heart sounds: Normal heart sounds.      No friction rub. No gallop. No S3 or S4 sounds.   Pulmonary:      Effort: Respiratory distress present.      Breath sounds: Normal breath sounds. No decreased breath sounds or wheezing.      Comments: Pt in discomfort, speakin gin 2-3 word sentences.  Oxygenating well  Musculoskeletal:      Cervical back: Full passive range of " Patient was difficult for IV access, dicussed with patient to have a conversation with his Pmd about a possible PORT to assist patient in future visits for sickle cell crisis    motion without pain and neck supple.   Skin:     General: Skin is warm and dry.   Neurological:      Mental Status: He is alert and oriented to person, place, and time.   Psychiatric:         Mood and Affect: Mood normal.          Assessment/Plan:     Diagnosis and associated orders    I personally reviewed prior external notes and test results pertinent to today's visit.     1. SOB (shortness of breath)  EKG - Clinic Performed      2. Chest pain, unspecified type          EKG:   I have independently interpreted this EKG  NSR rate: 72 There is no ectopy, no ST elevation, depression, no signs of ischemia or infarct       IMPRESSION: Pt presents with SOB and resp distress, oxygenating well.  Does reports chest pain, no acute changes on his EKG.  He is having a severe headache.  Chart review revels multiple ER and hospital admissions for similar symptoms .  He often leaves AMA.  Has extensive history including COPD an chronic migraine.  Pt is unwell appearing, 911 has been called, pt will go to [facility]    Please note that this dictation was created using voice recognition software. I have made a reasonable attempt to correct obvious errors, but I expect that there are errors of grammar and possibly content that I did not discover before finalizing the note.    This note was electronically signed by EFRAÍN Sinclair

## 2025-04-04 NOTE — ANESTHESIA POSTPROCEDURE EVALUATION
Patient: Noe Hickey    Procedure Summary     Date: 06/12/23 Room / Location: [facility] IMAGING - CATH [facility]    Anesthesia Start: 0737 Anesthesia Stop: 0929    Procedure: CL-EP ABLATION ATRIAL FIBRILLATION Diagnosis:       Paroxysmal atrial fibrillation (HCC)      Paroxysmal atrial fibrillation      (See Associated Dx)    Scheduled Providers: Alex Webster M.D.; Ирина aMthews M.D. Responsible Provider: Ирина Mathews M.D.    Anesthesia Type: general ASA Status: 3          Final Anesthesia Type: general  Last vitals  BP   Blood Pressure: 110/68    Temp   36.5 °C (97.7 °F)    Pulse   78   Resp   14    SpO2   94 %      Anesthesia Post Evaluation    Patient location during evaluation: PACU  Patient participation: complete - patient participated  Level of consciousness: sleepy but conscious  Pain score: 1    Airway patency: patent  Anesthetic complications: no  Cardiovascular status: hemodynamically stable  Respiratory status: acceptable and face mask  Hydration status: euvolemic    PONV: none          No notable events documented.     Nurse Pain Score: 0 (NPRS)         [facility] DIVISION OF KIDNEY DISEASES AND HYPERTENSION -- FOLLOW UP NOTE  --------------------------------------------------------------------------------  Chief Complaint:    24 hour events/subjective:        PAST HISTORY  --------------------------------------------------------------------------------  No significant changes to PMH, PSH, FHx, SHx, unless otherwise noted    ALLERGIES & MEDICATIONS  --------------------------------------------------------------------------------  Allergies    No Known Allergies    Intolerances      Standing Inpatient Medications  acetaminophen     Tablet .. 975 milliGRAM(s) Oral every 8 hours  amLODIPine   Tablet 10 milliGRAM(s) Oral daily  artificial tears (preservative free) Ophthalmic Solution 2 Drop(s) Both EYES every 8 hours  atorvastatin 40 milliGRAM(s) Oral at bedtime  chlorhexidine 2% Cloths 1 Application(s) Topical <User Schedule>  dextrose 5%. 1000 milliLiter(s) IV Continuous <Continuous>  dextrose 5%. 1000 milliLiter(s) IV Continuous <Continuous>  dextrose 50% Injectable 25 Gram(s) IV Push once  dextrose 50% Injectable 12.5 Gram(s) IV Push once  dextrose 50% Injectable 25 Gram(s) IV Push once  dolutegravir 50 milliGRAM(s) Oral daily  gabapentin 100 milliGRAM(s) Oral three times a day  glucagon  Injectable 1 milliGRAM(s) IntraMuscular once  insulin glargine Injectable (LANTUS) 10 Unit(s) SubCutaneous at bedtime  insulin lispro (ADMELOG) corrective regimen sliding scale   SubCutaneous three times a day before meals  lamiVUDine- milliGRAM(s) Oral daily  prednisoLONE acetate 1% Suspension 1 Drop(s) Both EYES four times a day  trimethoprim/polymyxin Solution 1 Drop(s) Both EYES daily    PRN Inpatient Medications  aluminum hydroxide/magnesium hydroxide/simethicone Suspension 30 milliLiter(s) Oral every 4 hours PRN  dextrose Oral Gel 15 Gram(s) Oral once PRN  hydrALAZINE Injectable 5 milliGRAM(s) IV Push every 6 hours PRN  melatonin 3 milliGRAM(s) Oral at bedtime PRN  ondansetron Injectable 4 milliGRAM(s) IV Push every 8 hours PRN      REVIEW OF SYSTEMS  --------------------------------------------------------------------------------  Gen: No weight changes, fatigue, fevers/chills, weakness  Skin: No rashes  Head/Eyes/Ears/Mouth: No headache; Normal hearing; Normal vision w/o blurriness; No sinus pain/discomfort, sore throat  Respiratory: No dyspnea, cough, wheezing, hemoptysis  CV: No chest pain, PND, orthopnea  GI: No abdominal pain, diarrhea, constipation, nausea, vomiting, melena, hematochezia  : No increased frequency, dysuria, hematuria, nocturia  MSK: No joint pain/swelling; no back pain; no edema  Neuro: No dizziness/lightheadedness, weakness, seizures, numbness, tingling  Heme: No easy bruising or bleeding  Endo: No heat/cold intolerance  Psych: No significant nervousness, anxiety, stress, depression    All other systems were reviewed and are negative, except as noted.    VITALS/PHYSICAL EXAM  --------------------------------------------------------------------------------  T(C): 36.4 (04-04-25 @ 16:30), Max: 36.8 (04-04-25 @ 05:20)  HR: 89 (04-04-25 @ 16:30) (89 - 102)  BP: 153/88 (04-04-25 @ 16:30) (148/79 - 199/94)  RR: 18 (04-04-25 @ 16:30) (18 - 18)  SpO2: 96% (04-04-25 @ 16:30) (96% - 98%)  Wt(kg): --    Weight (kg): 78.6 (04-03-25 @ 16:30)      04-03-25 @ 07:01  -  04-04-25 @ 07:00  --------------------------------------------------------  IN: 900 mL / OUT: 400 mL / NET: 500 mL      Physical Exam:  	Gen: NAD, well-appearing  	HEENT: PERRL, supple neck, clear oropharynx  	Pulm: CTA B/L  	CV: RRR, S1S2; no rub  	Back: No spinal or CVA tenderness; no sacral edema  	Abd: +BS, soft, nontender/nondistended  	: No suprapubic tenderness  	UE: Warm, FROM, no clubbing, intact strength; no edema; no asterixis  	LE: Warm, FROM, no clubbing, intact strength; no edema  	Neuro: No focal deficits, intact gait  	Psych: Normal affect and mood  	Skin: Warm, without rashes  	Vascular access:    LABS/STUDIES  --------------------------------------------------------------------------------              10.4   8.11  >-----------<  341      [04-04-25 @ 05:54]              31.9     139  |  108  |  39.7  ----------------------------<  200      [04-04-25 @ 05:54]  4.2   |  21.0  |  6.04        Ca     8.3     [04-04-25 @ 05:54]    TPro  6.4  /  Alb  x   /  TBili  x   /  DBili  x   /  AST  x   /  ALT  x   /  AlkPhos  x   [04-04-25 @ 05:54]        Creatinine Trend:  SCr 6.04 [04-04 @ 05:54]  SCr 5.56 [04-03 @ 05:35]  SCr 4.86 [04-02 @ 05:16]  SCr 4.83 [03-31 @ 04:28]  SCr 4.78 [03-30 @ 09:25]    Urinalysis - [04-04-25 @ 05:54]      Color  / Appearance  / SG  / pH       Gluc 200 / Ketone   / Bili  / Urobili        Blood  / Protein  / Leuk Est  / Nitrite       RBC  / WBC  / Hyaline  / Gran  / Sq Epi  / Non Sq Epi  / Bacteria     Urine Creatinine 120      [04-02-25 @ 11:34]  Urine Protein >400.0      [04-02-25 @ 11:34]  Urine Sodium 76      [04-02-25 @ 11:34]  Urine Urea Nitrogen 427      [04-02-25 @ 11:34]  Urine Potassium 29      [04-02-25 @ 11:34]  Urine Osmolality 388      [04-02-25 @ 11:34]    Lipid: chol 100, , HDL 23, LDL --      [08-08-24 @ 06:35]

## 2025-05-09 ENCOUNTER — APPOINTMENT (OUTPATIENT)
Dept: RADIOLOGY | Facility: MEDICAL CENTER | Age: 65
End: 2025-05-09
Attending: EMERGENCY MEDICINE
Payer: MEDICARE

## 2025-05-09 ENCOUNTER — HOSPITAL ENCOUNTER (EMERGENCY)
Facility: MEDICAL CENTER | Age: 65
End: 2025-05-09
Attending: EMERGENCY MEDICINE
Payer: MEDICARE

## 2025-05-09 VITALS
SYSTOLIC BLOOD PRESSURE: 162 MMHG | BODY MASS INDEX: 31.81 KG/M2 | RESPIRATION RATE: 18 BRPM | TEMPERATURE: 98.5 F | DIASTOLIC BLOOD PRESSURE: 86 MMHG | HEART RATE: 75 BPM | WEIGHT: 209.88 LBS | HEIGHT: 68 IN | OXYGEN SATURATION: 95 %

## 2025-05-09 DIAGNOSIS — M54.2 NECK PAIN: ICD-10-CM

## 2025-05-09 LAB
ALBUMIN SERPL BCP-MCNC: 3.9 G/DL (ref 3.2–4.9)
ALBUMIN/GLOB SERPL: 1.5 G/DL
ALP SERPL-CCNC: 35 U/L (ref 30–99)
ALT SERPL-CCNC: 57 U/L (ref 2–50)
ANION GAP SERPL CALC-SCNC: 10 MMOL/L (ref 7–16)
AST SERPL-CCNC: 38 U/L (ref 12–45)
BASOPHILS # BLD AUTO: 0.8 % (ref 0–1.8)
BASOPHILS # BLD: 0.12 K/UL (ref 0–0.12)
BILIRUB SERPL-MCNC: 0.6 MG/DL (ref 0.1–1.5)
BUN SERPL-MCNC: 21 MG/DL (ref 8–22)
CALCIUM ALBUM COR SERPL-MCNC: 9.2 MG/DL (ref 8.5–10.5)
CALCIUM SERPL-MCNC: 9.1 MG/DL (ref 8.5–10.5)
CHLORIDE SERPL-SCNC: 103 MMOL/L (ref 96–112)
CO2 SERPL-SCNC: 25 MMOL/L (ref 20–33)
CREAT SERPL-MCNC: 1.07 MG/DL (ref 0.5–1.4)
EOSINOPHIL # BLD AUTO: 0.32 K/UL (ref 0–0.51)
EOSINOPHIL NFR BLD: 2.1 % (ref 0–6.9)
ERYTHROCYTE [DISTWIDTH] IN BLOOD BY AUTOMATED COUNT: 50.5 FL (ref 35.9–50)
GFR SERPLBLD CREATININE-BSD FMLA CKD-EPI: 77 ML/MIN/1.73 M 2
GLOBULIN SER CALC-MCNC: 2.6 G/DL (ref 1.9–3.5)
GLUCOSE SERPL-MCNC: 89 MG/DL (ref 65–99)
HCT VFR BLD AUTO: 49.5 % (ref 42–52)
HGB BLD-MCNC: 15.3 G/DL (ref 14–18)
IMM GRANULOCYTES # BLD AUTO: 0.16 K/UL (ref 0–0.11)
IMM GRANULOCYTES NFR BLD AUTO: 1 % (ref 0–0.9)
LYMPHOCYTES # BLD AUTO: 0.95 K/UL (ref 1–4.8)
LYMPHOCYTES NFR BLD: 6.1 % (ref 22–41)
MCH RBC QN AUTO: 22.2 PG (ref 27–33)
MCHC RBC AUTO-ENTMCNC: 30.9 G/DL (ref 32.3–36.5)
MCV RBC AUTO: 71.8 FL (ref 81.4–97.8)
MONOCYTES # BLD AUTO: 1.49 K/UL (ref 0–0.85)
MONOCYTES NFR BLD AUTO: 9.6 % (ref 0–13.4)
NEUTROPHILS # BLD AUTO: 12.5 K/UL (ref 1.82–7.42)
NEUTROPHILS NFR BLD: 80.4 % (ref 44–72)
NRBC # BLD AUTO: 0 K/UL
NRBC BLD-RTO: 0 /100 WBC (ref 0–0.2)
PLATELET # BLD AUTO: 521 K/UL (ref 164–446)
PMV BLD AUTO: 10.1 FL (ref 9–12.9)
POTASSIUM SERPL-SCNC: 5 MMOL/L (ref 3.6–5.5)
PROT SERPL-MCNC: 6.5 G/DL (ref 6–8.2)
RBC # BLD AUTO: 6.89 M/UL (ref 4.7–6.1)
SODIUM SERPL-SCNC: 138 MMOL/L (ref 135–145)
WBC # BLD AUTO: 15.5 K/UL (ref 4.8–10.8)

## 2025-05-09 PROCEDURE — 96375 TX/PRO/DX INJ NEW DRUG ADDON: CPT | Mod: XU

## 2025-05-09 PROCEDURE — 700105 HCHG RX REV CODE 258: Performed by: EMERGENCY MEDICINE

## 2025-05-09 PROCEDURE — 96374 THER/PROPH/DIAG INJ IV PUSH: CPT | Mod: XU

## 2025-05-09 PROCEDURE — 72125 CT NECK SPINE W/O DYE: CPT

## 2025-05-09 PROCEDURE — 70496 CT ANGIOGRAPHY HEAD: CPT

## 2025-05-09 PROCEDURE — 70498 CT ANGIOGRAPHY NECK: CPT

## 2025-05-09 PROCEDURE — A9270 NON-COVERED ITEM OR SERVICE: HCPCS | Performed by: EMERGENCY MEDICINE

## 2025-05-09 PROCEDURE — 700117 HCHG RX CONTRAST REV CODE 255: Performed by: EMERGENCY MEDICINE

## 2025-05-09 PROCEDURE — 700102 HCHG RX REV CODE 250 W/ 637 OVERRIDE(OP): Performed by: EMERGENCY MEDICINE

## 2025-05-09 PROCEDURE — 700111 HCHG RX REV CODE 636 W/ 250 OVERRIDE (IP): Mod: JZ | Performed by: EMERGENCY MEDICINE

## 2025-05-09 PROCEDURE — 99285 EMERGENCY DEPT VISIT HI MDM: CPT

## 2025-05-09 PROCEDURE — 85025 COMPLETE CBC W/AUTO DIFF WBC: CPT

## 2025-05-09 PROCEDURE — 80053 COMPREHEN METABOLIC PANEL: CPT

## 2025-05-09 PROCEDURE — RXMED WILLOW AMBULATORY MEDICATION CHARGE: Performed by: EMERGENCY MEDICINE

## 2025-05-09 PROCEDURE — 36415 COLL VENOUS BLD VENIPUNCTURE: CPT

## 2025-05-09 RX ORDER — HYDROMORPHONE HYDROCHLORIDE 1 MG/ML
0.5 INJECTION, SOLUTION INTRAMUSCULAR; INTRAVENOUS; SUBCUTANEOUS ONCE
Status: COMPLETED | OUTPATIENT
Start: 2025-05-09 | End: 2025-05-09

## 2025-05-09 RX ORDER — ONDANSETRON 2 MG/ML
4 INJECTION INTRAMUSCULAR; INTRAVENOUS ONCE
Status: COMPLETED | OUTPATIENT
Start: 2025-05-09 | End: 2025-05-09

## 2025-05-09 RX ORDER — CYCLOBENZAPRINE HCL 10 MG
10 TABLET ORAL 3 TIMES DAILY PRN
Qty: 20 TABLET | Refills: 0 | Status: SHIPPED | OUTPATIENT
Start: 2025-05-09

## 2025-05-09 RX ORDER — CYCLOBENZAPRINE HCL 10 MG
10 TABLET ORAL ONCE
Status: COMPLETED | OUTPATIENT
Start: 2025-05-09 | End: 2025-05-09

## 2025-05-09 RX ORDER — METHYLPREDNISOLONE 4 MG/1
TABLET ORAL
Qty: 21 EACH | Refills: 0 | Status: SHIPPED | OUTPATIENT
Start: 2025-05-09

## 2025-05-09 RX ORDER — KETOROLAC TROMETHAMINE 15 MG/ML
15 INJECTION, SOLUTION INTRAMUSCULAR; INTRAVENOUS ONCE
Status: COMPLETED | OUTPATIENT
Start: 2025-05-09 | End: 2025-05-09

## 2025-05-09 RX ORDER — SODIUM CHLORIDE 9 MG/ML
INJECTION, SOLUTION INTRAVENOUS CONTINUOUS
Status: DISCONTINUED | OUTPATIENT
Start: 2025-05-09 | End: 2025-05-10 | Stop reason: HOSPADM

## 2025-05-09 RX ADMIN — HYDROMORPHONE HYDROCHLORIDE 0.5 MG: 1 INJECTION, SOLUTION INTRAMUSCULAR; INTRAVENOUS; SUBCUTANEOUS at 19:38

## 2025-05-09 RX ADMIN — KETOROLAC TROMETHAMINE 15 MG: 15 INJECTION, SOLUTION INTRAMUSCULAR; INTRAVENOUS at 21:18

## 2025-05-09 RX ADMIN — IOHEXOL 80 ML: 350 INJECTION, SOLUTION INTRAVENOUS at 20:45

## 2025-05-09 RX ADMIN — CYCLOBENZAPRINE 10 MG: 10 TABLET, FILM COATED ORAL at 21:19

## 2025-05-09 RX ADMIN — SODIUM CHLORIDE: 9 INJECTION, SOLUTION INTRAVENOUS at 19:39

## 2025-05-09 RX ADMIN — ONDANSETRON 4 MG: 2 INJECTION INTRAMUSCULAR; INTRAVENOUS at 19:37

## 2025-05-09 ASSESSMENT — PAIN DESCRIPTION - PAIN TYPE
TYPE: ACUTE PAIN

## 2025-05-09 ASSESSMENT — FIBROSIS 4 INDEX: FIB4 SCORE: 0.68

## 2025-05-10 NOTE — ED TRIAGE NOTES
"Chief Complaint   Patient presents with    Neck Pain     Pt reports he was bench pressing last night and states he heard something crack in his neck. Pt reports since he has had stiffness and pain in neck as well as headache and two episodes of emesis. Pt denies numbness/tingling.        63 yo M to triage for above complaint.      Pt placed in lobby. Pt educated on triage process. Pt encouraged to alert staff for any changes.     Patient and staff wearing appropriate PPE    BP (!) 151/85   Pulse 92   Temp 36.9 °C (98.5 °F) (Temporal)   Resp (!) 24   Ht 1.727 m (5' 8\")   Wt 95.2 kg (209 lb 14.1 oz)   SpO2 92%   BMI 31.91 kg/m²     "

## 2025-05-10 NOTE — ED NOTES
Pt satting at 83% O2 on RA, pt placed on 2L NC and is now satting at 96%. Pt states he occasionally wears oxygen for comfort at home.

## 2025-05-10 NOTE — ED PROVIDER NOTES
"ED Provider Note    CHIEF COMPLAINT  Chief Complaint   Patient presents with    Neck Pain     Pt reports he was bench pressing last night and states he heard something crack in his neck. Pt reports since he has had stiffness and pain in neck as well as headache and two episodes of emesis. Pt denies numbness/tingling.        EXTERNAL RECORDS REVIEWED  Inpatient Notes admitted in January for headache, Outpatient Notes follows with cardiology, had an ablation back in 23, and Outpatient labs & studies CTA of his head was negative in January as well as an MRI with and without contrast .  Prior laboratories show persistence Kasai ptosis, also mild elevation of his ALT    HPI/ROS      Noe Hickey is a 64 y.o. male who presents with a bad headache.  The patient was bench pressing last night as he was doing so, felt a \"snap crackle pop\" in the left portion of his neck.  He had some associated stiffness and pain with that but as time is progressed its gotten worse, rating to his head.  He is thrown up for 5 times.  Hurts worse if he moves certain ways, his neck feels very stiff.  However denies any weakness or numbness.  No vision or speech changes.  No chest pain or shortness of breath.  There is no other complaint.    PAST MEDICAL HISTORY   has a past medical history of Apnea, sleep, Arrhythmia, Breath shortness (06/09/2023), Chest pain, Chest tightness, Cyclic vomiting syndrome, Daytime sleepiness, Difficulty breathing, Dizziness, Elevated hemidiaphragm - LEFT post AVR (01/04/2023), Fracture, Frequent headaches, Gasping for breath, Headache, classical migraine, Heart burn, Heart valve disease (06/09/2023), Heartburn, Hyperlipidemia (04/26/2024), Hypertension (04/26/2024), Indigestion, Insomnia, Morning headache, MARIELENA (obstructive sleep apnea) (1/24/2025), Painful breathing, Painful joint, Pneumonia due to infectious organism (01/20/2023), Shortness of breath, Snoring, Sore muscles, Swelling of lower extremity, " "Weakness, Wears glasses, and Wheezing.    SURGICAL HISTORY   has a past surgical history that includes shoulder arthroscopy (Bilateral); shoulder hemicap resurfacing (Right, 10/12/2015); irrigation & debridement ortho (Right, 2017); shoulder surgery (Right); aortic valve replacement (2023); aortic ascending dissection (2023); echocardiogram, transesophageal, intraoperative (2023); incision and drainage (Left, 2024); wound irrigation & debridement (N/A, 2024); and bronchoscopy,diagnostic (N/A, 2025).    FAMILY HISTORY  No family history on file.    SOCIAL HISTORY  Social History     Tobacco Use    Smoking status: Every Day     Current packs/day: 0.00     Average packs/day: 0.7 packs/day for 71.3 years (47.5 ttl pk-yrs)     Types: Cigarettes     Start date: 1976     Last attempt to quit: 2024     Years since quittin.9     Passive exposure: Never    Smokeless tobacco: Never    Tobacco comments:     2-3 a day   Vaping Use    Vaping status: Never Used   Substance and Sexual Activity    Alcohol use: Not Currently    Drug use: Not Currently     Types: Oral     Comment: past problems with narcotics not since     Sexual activity: Not on file       CURRENT MEDICATIONS  Home Medications       Reviewed by Josie Lee R.N. (Registered Nurse) on 25 at 1759  Med List Status: Not Addressed     Medication Last Dose Status   asa/apap/caffeine (EXCEDRIN) 250-250-65 MG Tab  Active   ibuprofen (MOTRIN) 200 MG Tab  Active   metoprolol SR (TOPROL XL) 25 MG TABLET SR 24 HR  Active   omeprazole (PRILOSEC) 20 MG delayed-release capsule  Active                    ALLERGIES  Allergies   Allergen Reactions    Morphine Shortness of Breath and Rash     Rash, shortness of breath       PHYSICAL EXAM  VITAL SIGNS: BP (!) 151/85   Pulse 92   Temp 36.9 °C (98.5 °F) (Temporal)   Resp (!) 24   Ht 1.727 m (5' 8\")   Wt 95.2 kg (209 lb 14.1 oz)   SpO2 92%   BMI 31.91 kg/m²  "   Constitutional: He appears uncomfortable from pain  HENT: Head is without trauma.  Oropharynx is clear.  Mucous membranes are moist.  Eyes: Sclerae are nonicteric, pupils are equally round.  Neck: Held stiffly  Cardiovascular: Heart is regular rate and rhythm without murmur rub or gallop.  Peripheral pulses are intact and symmetric throughout.  Thorax & Lungs: Breathing easily.  Good air movement.  There is no wheeze, rhonchi or rales.  Abdomen: Bowel sounds normal, soft, non-distended, nontender, no mass nor pulsatile mass. I do not appreciate hepatosplenomegaly.  Skin: No apparent rash.  I do not see petechiae or purpura.  Extremities: No evidence of acute trauma.  No clubbing, cyanosis, edema, no Homans or cords.  Neurologic: Alert. Moving all extremities.  Intact sensation and strength throughout.  Gait is normal.  Cranial nerves are normal.  Psychiatric: Normal for situation      EKG/LABS  Labs Reviewed   CBC WITH DIFFERENTIAL - Abnormal; Notable for the following components:       Result Value    WBC 15.5 (*)     RBC 6.89 (*)     MCV 71.8 (*)     MCH 22.2 (*)     MCHC 30.9 (*)     RDW 50.5 (*)     Platelet Count 521 (*)     Neutrophils-Polys 80.40 (*)     Lymphocytes 6.10 (*)     Immature Granulocytes 1.00 (*)     Neutrophils (Absolute) 12.50 (*)     Lymphs (Absolute) 0.95 (*)     Monos (Absolute) 1.49 (*)     Immature Granulocytes (abs) 0.16 (*)     All other components within normal limits   COMP METABOLIC PANEL - Abnormal; Notable for the following components:    ALT(SGPT) 57 (*)     All other components within normal limits   ESTIMATED GFR         RADIOLOGY/PROCEDURES   I have independently interpreted the diagnostic imaging associated with this visit and am waiting the final reading from the radiologist.   My preliminary interpretation is as follows: No intracranial hemorrhage    Radiologist interpretation:  CT-CSPINE WITHOUT PLUS RECONS   Final Result         1.  No acute traumatic bony injury of the  cervical spine is apparent.      CT-CTA NECK WITH & W/O-POST PROCESSING   Final Result         1.  CT angiogram of the neck within normal limits.         CT-CTA HEAD WITH & W/O-POST PROCESS   Final Result         1.  No large vessel occlusion or aneurysm identified.          COURSE & MEDICAL DECISION MAKING    ASSESSMENT, COURSE AND PLAN  Care Narrative: This patient presents with sudden onset of pain while he was lifting weights.  As I discussed with him, this could certainly be a muscular injury, however I am concerned about multiple more dangerous etiologies.  Specifically worried about carotid dissection, aneurysmal bleed, or fracture.  Neurologically he is intact.  Consideration for radiculopathy as well.  We will work him up.  We talked about pain medication, he will like to go and proceed with an opioid, he was given Dilaudid and Zofran.    Laboratories are returning, this shows a persistent leukocytosis, chemistries unrevealing he does have a minimally elevated ALT which also is persistent.    CTs returned, no evidence of cervical spine fracture.  No evidence of bleed or aneurysm nor dissection.  Patient was still hurting, he was given dose of Toradol and Flexeril.    Upon recheck, he is improved from when I initially saw him.  We discussed the results of his studies.  At this point, I like to go ahead and put him on Flexeril and a Medrol Dosepak.  I would like him to follow-up within the week with his primary care doctor to reevaluate him.  As I discussed with him this could be a cervical radiculopathy and if he is not improving he may need physical therapy or further imaging such as MRI, which is not emergently indicated presently.  Patient is comfortable with this plan, he is given instructions on cervical radiculopathy, prescriptions for Flexeril and Medrol, and discharged in improved condition.              ADDITIONAL PROBLEMS MANAGED      DISPOSITION AND DISCUSSIONS  I have discussed management of the  patient with the following physicians and SHAUN's:      Discussion of management with other QHP or appropriate source(s):      Escalation of care considered, and ultimately not performed:diagnostic imaging MRI not indicated today      Decision tools and prescription drugs considered including, but not limited to:  Flexeril and Medrol .    FINAL DIAGNOSIS  1. Neck pain         Electronically signed by: Dae Benavidez M.D., 5/9/2025 7:17 PM

## 2025-05-11 ENCOUNTER — PHARMACY VISIT (OUTPATIENT)
Dept: PHARMACY | Facility: MEDICAL CENTER | Age: 65
End: 2025-05-11
Payer: COMMERCIAL

## 2025-08-21 ENCOUNTER — OFFICE VISIT (OUTPATIENT)
Dept: MEDICAL GROUP | Facility: CLINIC | Age: 65
End: 2025-08-21
Payer: MEDICARE

## 2025-08-21 VITALS
SYSTOLIC BLOOD PRESSURE: 126 MMHG | HEIGHT: 68 IN | WEIGHT: 198.6 LBS | DIASTOLIC BLOOD PRESSURE: 86 MMHG | BODY MASS INDEX: 30.1 KG/M2 | TEMPERATURE: 97.5 F | HEART RATE: 72 BPM | OXYGEN SATURATION: 93 %

## 2025-08-21 DIAGNOSIS — M79.672 PAIN OF LEFT HEEL: ICD-10-CM

## 2025-08-21 DIAGNOSIS — M54.2 NECK PAIN: ICD-10-CM

## 2025-08-21 DIAGNOSIS — R73.03 PREDIABETES: Primary | ICD-10-CM

## 2025-08-21 LAB
HBA1C MFR BLD: 5.7 % (ref ?–5.8)
POCT INT CON NEG: NEGATIVE
POCT INT CON POS: POSITIVE

## 2025-08-21 PROCEDURE — 3079F DIAST BP 80-89 MM HG: CPT | Mod: GC

## 2025-08-21 PROCEDURE — 83036 HEMOGLOBIN GLYCOSYLATED A1C: CPT | Mod: GC

## 2025-08-21 PROCEDURE — 3074F SYST BP LT 130 MM HG: CPT | Mod: GC

## 2025-08-21 PROCEDURE — 99213 OFFICE O/P EST LOW 20 MIN: CPT | Mod: GE

## 2025-08-21 ASSESSMENT — LIFESTYLE VARIABLES
AUDIT-C TOTAL SCORE: 0
HOW MANY STANDARD DRINKS CONTAINING ALCOHOL DO YOU HAVE ON A TYPICAL DAY: PATIENT DOES NOT DRINK
HOW OFTEN DO YOU HAVE A DRINK CONTAINING ALCOHOL: NEVER
HOW OFTEN DO YOU HAVE SIX OR MORE DRINKS ON ONE OCCASION: NEVER
SKIP TO QUESTIONS 9-10: 1

## 2025-08-21 ASSESSMENT — FIBROSIS 4 INDEX: FIB4 SCORE: 0.63

## 2025-08-23 ENCOUNTER — HOSPITAL ENCOUNTER (OUTPATIENT)
Dept: RADIOLOGY | Facility: MEDICAL CENTER | Age: 65
End: 2025-08-23
Payer: MEDICARE

## 2025-08-23 DIAGNOSIS — M79.672 PAIN OF LEFT HEEL: ICD-10-CM

## 2025-08-23 PROCEDURE — 73630 X-RAY EXAM OF FOOT: CPT | Mod: LT

## 2025-08-25 ENCOUNTER — TELEPHONE (OUTPATIENT)
Dept: MEDICAL GROUP | Facility: CLINIC | Age: 65
End: 2025-08-25
Payer: MEDICARE

## 2025-08-27 ENCOUNTER — TELEPHONE (OUTPATIENT)
Dept: MEDICAL GROUP | Facility: CLINIC | Age: 65
End: 2025-08-27
Payer: MEDICARE

## 2025-08-27 DIAGNOSIS — M54.2 NECK PAIN: Primary | ICD-10-CM

## 2025-08-27 RX ORDER — LIDOCAINE 4 G/G
1 PATCH TOPICAL EVERY 24 HOURS
Qty: 30 PATCH | Refills: 2 | Status: SHIPPED | OUTPATIENT
Start: 2025-08-27

## 2025-08-28 RX ORDER — OMEPRAZOLE 20 MG/1
20 CAPSULE, DELAYED RELEASE ORAL 2 TIMES DAILY PRN
Qty: 30 CAPSULE | Refills: 3 | Status: SHIPPED | OUTPATIENT
Start: 2025-08-28

## (undated) DEVICE — PADDING CAST 4 IN X 4 YDS - SOF-ROLL (12RL/BG 6BG/CT)

## (undated) DEVICE — SLEEVE VASO CALF MED - (10PR/CA)

## (undated) DEVICE — GLOVE BIOGEL INDICATOR SZ 8 SURGICAL PF LTX - (50/BX 4BX/CA)

## (undated) DEVICE — SET LEADWIRE 5 LEAD BEDSIDE DISPOSABLE ECG (1SET OF 5/EA)

## (undated) DEVICE — SYSTEM CHEST DRAIN ADULT/PEDS W/AUTO TRANSFUSION CAPABILITY SAHARA (6EA/CA)

## (undated) DEVICE — DRESSING SURGICAL NUKNIT 6X9 (10EA/BX)

## (undated) DEVICE — STOPCOCK MALE 4-WAY - (50/CA)

## (undated) DEVICE — PAD LAP STERILE 18 X 18 - (5/PK 40PK/CA)

## (undated) DEVICE — MASK OXYGEN VNYL ADLT MED CONC WITH 7 FOOT TUBING  - (50EA/CA)

## (undated) DEVICE — BANDAGE ELASTIC 4 HONEYCOMB - 4"X5YD LF (20/CA)"

## (undated) DEVICE — SENSOR OXIMETER ADULT SPO2 RD SET (20EA/BX)

## (undated) DEVICE — SET EXTENSION WITH 2 PORTS (48EA/CA) ***PART #2C8610 IS A SUBSTITUTE*****

## (undated) DEVICE — GOWN SURGEONS LARGE - (32/CA)

## (undated) DEVICE — HEAD HOLDER JUNIOR/ADULT

## (undated) DEVICE — SUTURE 0 PDS CT-1 CR 8 X 18 (12PK/BX)"

## (undated) DEVICE — BOVIE BLADE COATED - (50/PK)

## (undated) DEVICE — SENSOR SPO2 NEO LNCS ADHESIVE (20/BX) SEE USER NOTES

## (undated) DEVICE — KIT ROOM DECONTAMINATION

## (undated) DEVICE — COVER LIGHT HANDLE ALC PLUS DISP (18EA/BX)

## (undated) DEVICE — GLOVE BIOGEL PI INDICATOR SZ 6.5 SURGICAL PF LF - (50/BX 4BX/CA)

## (undated) DEVICE — WATER IRRIGATION STERILE 1000ML (12EA/CA)

## (undated) DEVICE — SUTURE 3-0 SILK FS 18 INCH - (12PK/BX)

## (undated) DEVICE — GLOVE BIOGEL INDICATOR SZ 6.5 SURGICAL PF LTX - (50PR/BX 4BX/CA)

## (undated) DEVICE — CORETEMP DRAPE FORM-FITTED EASY DROPANDGO DRAPE FOR USE ON THE CORETEMP FLUID MANAGEMENT 56IN X 56IN

## (undated) DEVICE — TRAY MULTI-LUMEN 7FR PRESSURE W/MAX BARRIER AND BIOPATCH - (5/CA)

## (undated) DEVICE — SLEEVE, VASO, THIGH, MED

## (undated) DEVICE — SET FLUID WARMING STANDARD FLOW - (10/CA)

## (undated) DEVICE — SUTURE GENERAL

## (undated) DEVICE — PACK CV DRAPING/BASIN 2PART - (1/CA)

## (undated) DEVICE — BAG RESUSCITATION DISPOSABLE - WITH MASK (10 EA/CA)

## (undated) DEVICE — CANISTER SUCTION 3000ML MECHANICAL FILTER AUTO SHUTOFF MEDI-VAC NONSTERILE LF DISP  (40EA/CA)

## (undated) DEVICE — ELECTRODE DUAL RETURN W/ CORD - (50/PK)

## (undated) DEVICE — TUBING CLEARLINK DUO-VENT - C-FLO (48EA/CA)

## (undated) DEVICE — CONTAINER, SPECIMEN, STERILE

## (undated) DEVICE — GLOVE BIOGEL PI INDICATOR SZ 8.0 SURGICAL PF LF -(50/BX 4BX/CA)

## (undated) DEVICE — GLOVE SZ 6.5 BIOGEL PI MICRO - PF LF (50PR/BX)

## (undated) DEVICE — SODIUM CHL IRRIGATION 0.9% 1000ML (12EA/CA)

## (undated) DEVICE — SUCTION INSTRUMENT YANKAUER BULBOUS TIP W/O VENT (50EA/CA)

## (undated) DEVICE — TRAY SKIN SCRUB PVP WET (20EA/CA) PART #DYND70356 DISCONTINUED

## (undated) DEVICE — KIT ANESTHESIA W/CIRCUIT & 3/LT BAG W/FILTER (20EA/CA)

## (undated) DEVICE — WRAP COBAN SELF-ADHERENT 6 IN X  5YDS STERILE TAN (12/CA)

## (undated) DEVICE — MASK, LARYNGEAL AIRWAY #4

## (undated) DEVICE — SUTURE 4-0 PROLENE RB-1 D/A 36 (36PK/BX)"

## (undated) DEVICE — SUTURE 5 SURGICAL STEEL V-40 - (12/BX) CCS CURRENT

## (undated) DEVICE — ELECTRODE RADIOLUCNT SOLID GEL DEFIB PADS (12EA/CA)

## (undated) DEVICE — TUBE CHEST 32FR. STRAIGHT - (10EA/CA)

## (undated) DEVICE — VESSELOOP MAXI BLUE STERILE- SURG-I-LOOP (10EA/BX)

## (undated) DEVICE — SET BIFURCATED BLOOD - (48EA/CS)

## (undated) DEVICE — SUTURE 0 ETHIBOND MO6 C/R - (12/BX) 8-18 INCH ETHICON

## (undated) DEVICE — DERMABOND ADVANCED - (12EA/BX)

## (undated) DEVICE — TUBE CHEST 32FR. RIGHT ANGLED (10EA/CA)

## (undated) DEVICE — PROTECTOR ULNA NERVE - (36PR/CA)

## (undated) DEVICE — SUTURE 4-0 30CM STRATAFIX SPIRAL PS-2 (12EA/BX)

## (undated) DEVICE — KIT TOURNIQUET DLP (40EA/PK)

## (undated) DEVICE — PACK MINOR BASIN - (2EA/CA)

## (undated) DEVICE — COVER LIGHT HANDLE FLEXIBLE - SOFT (2EA/PK 80PK/CA)

## (undated) DEVICE — PACK LOWER EXTREMITY - (2/CA)

## (undated) DEVICE — GOWN WARMING STANDARD FLEX - (30/CA)

## (undated) DEVICE — GLOVE BIOGEL PI INDICATOR SZ 7.5 SURGICAL PF LF -(50/BX 4BX/CA)

## (undated) DEVICE — GLOVE BIOGEL INDICATOR SZ 7.5 SURGICAL PF LTX - (50PR/BX 4BX/CA)

## (undated) DEVICE — LACTATED RINGERS INJ 1000 ML - (14EA/CA 60CA/PF)

## (undated) DEVICE — CANISTER SUCTION RIGID RED 1500CC (40EA/CA)

## (undated) DEVICE — SUTURE 0 VICRYL PLUS CT-1 - 8 X 18 INCH (12/BX)

## (undated) DEVICE — SPONGE GAUZESTER 4 X 4 4PLY - (128PK/CA)

## (undated) DEVICE — TUBE CONNECTING SUCTION - CLEAR PLASTIC STERILE 72 IN (50EA/CA)

## (undated) DEVICE — MICRODRIP PRIMARY VENTED 60 (48EA/CA) THIS WAS PART #2C8428 WHICH WAS DISCONTINUED

## (undated) DEVICE — BLADE STERNUM SAW SURGICAL 32.0 X 6.4 MM STERILE (1/EA)

## (undated) DEVICE — KIT RADIAL ARTERY 20GA W/MAX BARRIER AND BIOPATCH  (5EA/CA) #10740 IS FOR THE SET RADIAL ARTERIAL

## (undated) DEVICE — APPLICATOR SURGIFLO - (6EA/CA)

## (undated) DEVICE — SODIUM CHL. INJ. 0.9% 500ML (24EA/CA 50CA/PF)

## (undated) DEVICE — FIBRILLAR SURGICEL 4X4 - 10/CA

## (undated) DEVICE — NEPTUNE 4 PORT MANIFOLD - (20/PK)

## (undated) DEVICE — SYSTEM PREVENA DRESSING CUSTOMIZABLE (5EA/CA)

## (undated) DEVICE — SUTURE OHS

## (undated) DEVICE — TRANSDUCER BIFURCATED MONITORING KIT (10EA/CA)

## (undated) DEVICE — TRAY SURESTEP FOLEY TEMP SENSING 16FR (10EA/CA) ORDER  #18764 FOR TEMP FOLEY ONLY

## (undated) DEVICE — BLADE SURGICAL #10 - (50/BX)

## (undated) DEVICE — SYS DLV COST CLS RM TEMP - INJECTATE (CO-SET II) (10EA/CA)

## (undated) DEVICE — DRESSING 3X8 ADAPTIC GAUZE - NON-ADHERING (36/PK 6PK/BX)

## (undated) DEVICE — SUTURE 5-0 PROLENE RB-1 D/A 36 (36PK/BX)"

## (undated) DEVICE — WIRE STEEL 5-0 B&S 20 OHS - 5/PK 12PK/BX ITEM. D5329 OR D6625 CAN BE USED AS A SUB

## (undated) DEVICE — ORGANIZER SUTURE GABBAY-FRAT - ER STERILE (3/SET 4ST/BX)

## (undated) DEVICE — TOWEL STOP TIMEOUT SAFETY FLAG (40EA/CA)

## (undated) DEVICE — SUTURE 2-0 VICRYL PLUS CT-1 - 8 X 18 INCH(12/BX)

## (undated) DEVICE — GLOVE BIOGEL PI INDICATOR SZ 7.0 SURGICAL PF LF - (50/BX 4BX/CA)

## (undated) DEVICE — GLOVE BIOGEL PI ORTHO SZ 8 PF LF (40PR/BX)

## (undated) DEVICE — SUTURE ETHILON 2-0 FSLX 30 (36PK/BX)"

## (undated) DEVICE — PADDING CAST 4 IN STERILE - 4 X 4 YDS (24/CA)

## (undated) DEVICE — KIT  I.V. START (100EA/CA)

## (undated) DEVICE — GLOVE BIOGEL SZ 8 SURGICAL PF LTX - (50PR/BX 4BX/CA)

## (undated) DEVICE — SWAB CULTURE AMIES ESWAB (50EA/PK)

## (undated) DEVICE — ELECTRODE 850 FOAM ADHESIVE - HYDROGEL RADIOTRNSPRNT (50/PK)

## (undated) DEVICE — TIP INTPLS HFLO ML ORFC BTRY - (12/CS)  FOR SURGILAV

## (undated) DEVICE — BANDAGE ELASTIC 6 HONEYCOMB - 6X5YD LF (20/CA)"

## (undated) DEVICE — GLOVE BIOGEL SZ 6 PF LATEX - (50EA/BX 4BX/CA)

## (undated) DEVICE — SUTURE 2-0 ETHIBOND V-5 - (6/BX)

## (undated) DEVICE — GAUZE FLUFF STERILE 2-PLY 36 X 36 (100EA/CA)

## (undated) DEVICE — SUTURE 3-0 PROLENE SH 36 (36PK/BX)"

## (undated) DEVICE — SENSOR CEREBRAL AND SOMATIC MONITORING (20/CA)

## (undated) DEVICE — INSERT STEALTH #3 - (10/BX)

## (undated) DEVICE — GLOVE BIOGEL PI ORTHO SZ 7.5 PF LF (40PR/BX)

## (undated) DEVICE — WRAP CO-FLEX 4IN X 5YD STERIL - SELF-ADHERENT (18/CA)

## (undated) DEVICE — PADDING CAST 6 IN STERILE - 6 X 4 YDS (24/CA)

## (undated) DEVICE — CANNULA O2 COMFORT SOFT EAR ADULT 7 FT TUBING (50/CA)

## (undated) DEVICE — CHLORAPREP 26 ML APPLICATOR - ORANGE TINT(25/CA)

## (undated) DEVICE — INSERT STEALTH #5 - (10/BX)

## (undated) DEVICE — SOD. CHL. INJ. 0.9% 1000 ML - (14EA/CA 60CA/PF)

## (undated) DEVICE — D-5-W INJ. 500 ML - (24EA/CA)

## (undated) DEVICE — STAPLER SKIN DISP - (6/BX 10BX/CA) VISISTAT

## (undated) DEVICE — ARMBOARD  SMALL IV 9 INLONG - (25EA/CA)

## (undated) DEVICE — WAX BONE ALKYLENE OXIDE HEMOSTASIS OSTENE 3.5GM (10EA/CA)

## (undated) DEVICE — MASK ANESTHESIA ADULT  - (100/CA)

## (undated) DEVICE — SURGIFOAM (SIZE 100) - (6EA/CA)

## (undated) DEVICE — GLOVE BIOGEL PI ORTHO SZ 8.5 PF LF (40/BX)

## (undated) DEVICE — BAG SPONGE COUNT 10.25 X 32 - BLUE (250/CA)

## (undated) DEVICE — SHEET PEDIATRIC LAPAROTOMY - (10/CA)

## (undated) DEVICE — DEVICE KIT COR-KNOT COMBO2 DEVICES & 12 KNOTS PER KIT (6KT/CA)

## (undated) DEVICE — GLOVE BIOGEL SZ 8.5 SURGICAL PF LTX - (50PR/BX 4BX/CA)

## (undated) DEVICE — ADHESIVE DERMABOND HVD MINI (12EA/BX)

## (undated) DEVICE — GLOVE BIOGEL INDICATOR SZ 7SURGICAL PF LTX - (50/BX 4BX/CA)

## (undated) DEVICE — SUTURE 2-0 ETHIBOND V-5 30 (12EA/BX)"

## (undated) DEVICE — LEAD PACING TEMP MYO - (12/BX)

## (undated) DEVICE — SODIUM CHL. IRRIGATION 0.9% 3000ML (4EA/CA 65CA/PF)

## (undated) DEVICE — GLOVE BIOGEL SZ 7.5 SURGICAL PF LTX - (50PR/BX 4BX/CA)

## (undated) DEVICE — HANDPIECE 10FT INTPLS SCT PLS IRRIGATION HAND CONTROL SET (6/PK)

## (undated) DEVICE — GLOVE BIOGEL SZ 6.5 SURGICAL PF LTX (50PR/BX 4BX/CA)

## (undated) DEVICE — SPONGE XRAY 8X4 STERL. 12PL - (10EA/TY 80TY/CA)

## (undated) DEVICE — SUTURE 5-0 PROLENE C-1 D/A 24 (36PK/BX)"

## (undated) DEVICE — SUTURE 2-0 PDS II CT-2 - (36/BX)

## (undated) DEVICE — DRAPE SLUSH WARMER DISC (24EA/CA)

## (undated) DEVICE — KIT SURGIFLO W/OUT THROMBIN - (6EA/CA)

## (undated) DEVICE — BLADE SURGICAL #15 - (50/BX 3BX/CA)

## (undated) DEVICE — GOWN SURGEONS X-LARGE - DISP. (30/CA)

## (undated) DEVICE — GLOVE SIZE 7.0 SURGEON ACCELERATOR FREE GREEN (50PR/BX 4BX/CA)

## (undated) DEVICE — GLOVE SZ 7 BIOGEL PI MICRO - PF LF (50PR/BX 4BX/CA)

## (undated) DEVICE — CONTAINER SPECIMEN BAG OR - STERILE 4 OZ W/LID (100EA/CA)

## (undated) DEVICE — SYRINGE NON SAFETY 10 CC 20 GA X 1-1/2 IN (100/BX 4BX/CA)

## (undated) DEVICE — SCRUB SOLUTION EXIDINE 4% 40Z - 48/CS CHLORAHEXADINE GLUCONATE